# Patient Record
Sex: FEMALE | Race: BLACK OR AFRICAN AMERICAN | NOT HISPANIC OR LATINO | Employment: OTHER | URBAN - METROPOLITAN AREA
[De-identification: names, ages, dates, MRNs, and addresses within clinical notes are randomized per-mention and may not be internally consistent; named-entity substitution may affect disease eponyms.]

---

## 2017-02-05 ENCOUNTER — HOSPITAL ENCOUNTER (EMERGENCY)
Facility: HOSPITAL | Age: 67
Discharge: HOME | End: 2017-02-05
Attending: EMERGENCY MEDICINE
Payer: MEDICARE

## 2017-02-05 VITALS
OXYGEN SATURATION: 87 % | BODY MASS INDEX: 29.26 KG/M2 | RESPIRATION RATE: 20 BRPM | WEIGHT: 159 LBS | DIASTOLIC BLOOD PRESSURE: 91 MMHG | HEIGHT: 62 IN | SYSTOLIC BLOOD PRESSURE: 142 MMHG | HEART RATE: 99 BPM

## 2017-02-05 DIAGNOSIS — M54.32 BACK PAIN WITH LEFT-SIDED SCIATICA: Primary | ICD-10-CM

## 2017-02-05 DIAGNOSIS — R52 PAIN: ICD-10-CM

## 2017-02-05 LAB
ALBUMIN SERPL BCP-MCNC: 4 G/DL
ALP SERPL-CCNC: 120 U/L
ALT SERPL W/O P-5'-P-CCNC: 21 U/L
ANION GAP SERPL CALC-SCNC: 12 MMOL/L
AST SERPL-CCNC: 18 U/L
BASOPHILS # BLD AUTO: 0.01 K/UL
BASOPHILS NFR BLD: 0.1 %
BILIRUB SERPL-MCNC: 0.2 MG/DL
BILIRUB UR QL STRIP: NEGATIVE
BUN SERPL-MCNC: 9 MG/DL
CALCIUM SERPL-MCNC: 9.5 MG/DL
CHLORIDE SERPL-SCNC: 108 MMOL/L
CLARITY UR: CLEAR
CO2 SERPL-SCNC: 17 MMOL/L
COLOR UR: YELLOW
CREAT SERPL-MCNC: 0.9 MG/DL
DIFFERENTIAL METHOD: ABNORMAL
EOSINOPHIL # BLD AUTO: 0.1 K/UL
EOSINOPHIL NFR BLD: 1.1 %
ERYTHROCYTE [DISTWIDTH] IN BLOOD BY AUTOMATED COUNT: 19.3 %
EST. GFR  (AFRICAN AMERICAN): >60 ML/MIN/1.73 M^2
EST. GFR  (NON AFRICAN AMERICAN): >60 ML/MIN/1.73 M^2
GLUCOSE SERPL-MCNC: 297 MG/DL
GLUCOSE UR QL STRIP: ABNORMAL
HCT VFR BLD AUTO: 39.2 %
HGB BLD-MCNC: 12.7 G/DL
HGB UR QL STRIP: NEGATIVE
KETONES UR QL STRIP: ABNORMAL
LEUKOCYTE ESTERASE UR QL STRIP: NEGATIVE
LYMPHOCYTES # BLD AUTO: 1.2 K/UL
LYMPHOCYTES NFR BLD: 14.1 %
MCH RBC QN AUTO: 29.3 PG
MCHC RBC AUTO-ENTMCNC: 32.4 %
MCV RBC AUTO: 91 FL
MONOCYTES # BLD AUTO: 0.3 K/UL
MONOCYTES NFR BLD: 3.6 %
NEUTROPHILS # BLD AUTO: 7.1 K/UL
NEUTROPHILS NFR BLD: 80.9 %
NITRITE UR QL STRIP: NEGATIVE
PH UR STRIP: 5 [PH] (ref 5–8)
PLATELET # BLD AUTO: 239 K/UL
PMV BLD AUTO: 9.1 FL
POCT GLUCOSE: 262 MG/DL (ref 70–110)
POTASSIUM SERPL-SCNC: 4.4 MMOL/L
PROT SERPL-MCNC: 7.9 G/DL
PROT UR QL STRIP: NEGATIVE
RBC # BLD AUTO: 4.33 M/UL
SODIUM SERPL-SCNC: 137 MMOL/L
SP GR UR STRIP: 1.02 (ref 1–1.03)
URN SPEC COLLECT METH UR: ABNORMAL
UROBILINOGEN UR STRIP-ACNC: 1 EU/DL
WBC # BLD AUTO: 8.72 K/UL

## 2017-02-05 PROCEDURE — 80053 COMPREHEN METABOLIC PANEL: CPT

## 2017-02-05 PROCEDURE — 81003 URINALYSIS AUTO W/O SCOPE: CPT

## 2017-02-05 PROCEDURE — 93010 ELECTROCARDIOGRAM REPORT: CPT | Mod: ,,, | Performed by: INTERNAL MEDICINE

## 2017-02-05 PROCEDURE — 96375 TX/PRO/DX INJ NEW DRUG ADDON: CPT

## 2017-02-05 PROCEDURE — 63600175 PHARM REV CODE 636 W HCPCS: Performed by: PHYSICIAN ASSISTANT

## 2017-02-05 PROCEDURE — 25000003 PHARM REV CODE 250: Performed by: PHYSICIAN ASSISTANT

## 2017-02-05 PROCEDURE — 51701 INSERT BLADDER CATHETER: CPT

## 2017-02-05 PROCEDURE — 82962 GLUCOSE BLOOD TEST: CPT

## 2017-02-05 PROCEDURE — 96374 THER/PROPH/DIAG INJ IV PUSH: CPT

## 2017-02-05 PROCEDURE — 93005 ELECTROCARDIOGRAM TRACING: CPT

## 2017-02-05 PROCEDURE — 85025 COMPLETE CBC W/AUTO DIFF WBC: CPT

## 2017-02-05 PROCEDURE — 99284 EMERGENCY DEPT VISIT MOD MDM: CPT | Mod: 25

## 2017-02-05 RX ORDER — HYDROMORPHONE HYDROCHLORIDE 1 MG/ML
1 INJECTION, SOLUTION INTRAMUSCULAR; INTRAVENOUS; SUBCUTANEOUS
Status: COMPLETED | OUTPATIENT
Start: 2017-02-05 | End: 2017-02-05

## 2017-02-05 RX ORDER — MORPHINE SULFATE 2 MG/ML
2 INJECTION, SOLUTION INTRAMUSCULAR; INTRAVENOUS
Status: COMPLETED | OUTPATIENT
Start: 2017-02-05 | End: 2017-02-05

## 2017-02-05 RX ORDER — CYCLOBENZAPRINE HCL 10 MG
10 TABLET ORAL 3 TIMES DAILY PRN
Qty: 15 TABLET | Refills: 0 | Status: SHIPPED | OUTPATIENT
Start: 2017-02-05 | End: 2017-02-10

## 2017-02-05 RX ORDER — ONDANSETRON 2 MG/ML
4 INJECTION INTRAMUSCULAR; INTRAVENOUS
Status: COMPLETED | OUTPATIENT
Start: 2017-02-05 | End: 2017-02-05

## 2017-02-05 RX ORDER — SODIUM CHLORIDE 9 MG/ML
1000 INJECTION, SOLUTION INTRAVENOUS
Status: COMPLETED | OUTPATIENT
Start: 2017-02-05 | End: 2017-02-05

## 2017-02-05 RX ORDER — HYDROCODONE BITARTRATE AND ACETAMINOPHEN 5; 325 MG/1; MG/1
1 TABLET ORAL EVERY 4 HOURS PRN
Qty: 18 TABLET | Refills: 0 | Status: SHIPPED | OUTPATIENT
Start: 2017-02-05 | End: 2017-06-20

## 2017-02-05 RX ADMIN — HYDROMORPHONE HYDROCHLORIDE 1 MG: 1 INJECTION, SOLUTION INTRAMUSCULAR; INTRAVENOUS; SUBCUTANEOUS at 10:02

## 2017-02-05 RX ADMIN — MORPHINE SULFATE 2 MG: 2 INJECTION, SOLUTION INTRAMUSCULAR; INTRAVENOUS at 09:02

## 2017-02-05 RX ADMIN — ONDANSETRON 4 MG: 2 INJECTION INTRAMUSCULAR; INTRAVENOUS at 09:02

## 2017-02-05 RX ADMIN — SODIUM CHLORIDE 1000 ML: 0.9 INJECTION, SOLUTION INTRAVENOUS at 09:02

## 2017-02-05 RX ADMIN — PROMETHAZINE HYDROCHLORIDE 12.5 MG: 25 INJECTION INTRAMUSCULAR; INTRAVENOUS at 11:02

## 2017-02-05 NOTE — ED NOTES
APPEARANCE: Alert, oriented and in no acute distress.  CARDIAC: Normal rate and rhythm, no murmur heard.   PERIPHERAL VASCULAR: peripheral pulses present. Normal cap refill. No edema. Warm to touch.    RESPIRATORY:Normal rate and effort, breath sounds clear bilaterally throughout chest. Respirations are equal and unlabored no obvious signs of distress.  GASTRO: soft, bowel sounds normal, no tenderness, no abdominal distention. + lt flank for 2 days,.  MUSC: Full ROM. No bony tenderness or soft tissue tenderness. No obvious deformity.  SKIN: Skin is warm and dry, normal skin turgor, mucous membranes moist.  NEURO: 5/5 strength major flexors/extensors bilaterally. Sensory intact to light touch bilaterally. [place]rotney coma scale: eyes open spontaneously-4, oriented & converses-5, obeys commands-6. No neurological abnormalities.   MENTAL STATUS: awake, alert and aware of environment.  EYE: PERRL, both eyes: pupils brisk and reactive to light. Normal size.  ENT: EARS: no obvious drainage. NOSE: no active bleeding.   BREAST: symmetrical. No masses. No tenderness.  GENITALIA: Normal external genitalia. Denies dysuria, + bleeding from vaginal area.

## 2017-02-05 NOTE — ED NOTES
This nurse was asked by Madeleine RODRÍGUEZ to administer pain meds and IV fluids while pt's nurse is caring for another patient.

## 2017-02-05 NOTE — ED NOTES
Reported received from maia mcdermott patient awake alert ox4 in no respiratory distress patient on cardiac monitor nurse will continue to monitor no new orders at this maxime.

## 2017-02-05 NOTE — ED PROVIDER NOTES
"Encounter Date: 2017       History     Chief Complaint   Patient presents with    Flank Pain     c/o lt  flank pain, with "vaginal bleeding", pt had hysterectomy years ago. pt not sure if blood coming from urine or vagina.     Review of patient's allergies indicates:  No Known Allergies  HPI Comments: Chayito Chung 66 y.o. nontoxic/afebrile female with PMH of chronic back pain, COPD, CAD,HTN, HLD, DM, asthma and colon cancer presented to the ED with c/o left flank pain for the past three days. She states that the pain began to the left lower back and now radiates to the left flank. She reports that she has chronic low back pain however this is sharp in sensation which is different from her chronic pain. She states that she noted scant blood in tissue with wiping today x 1 and is unsure if from the urethra or vagina. She states some decreased appetite and fluid intake due to intermittent nausea although presently denies. She denies any fever, chills, URI symptoms, cough, CP, upper abdominal pain, vomiting, diarrhea, constipation, blood in the stool, vaginal pain, vaginal discharge or dysuria. She denies trying any medications for the symptoms and did not take her regular medications today.     The history is provided by the patient.     Past Medical History   Diagnosis Date    Acute coronary syndrome     Asthma     Cancer      colon    COPD (chronic obstructive pulmonary disease)     Coronary artery disease     Depression     Diabetes mellitus     Elevated cholesterol     Hypertension      No past medical history pertinent negatives.  Past Surgical History   Procedure Laterality Date    Coronary angioplasty with stent placement  3 months ago      x2, Hysterectomy, Lung surgery, Partial stomach removed, Part of colon removed for rectal cancer    Abdominal surgery      Colon surgery      Cardiac surgery      Gastrectomy      Hysterectomy       section, classic      Hemorrhoid " surgery      Lung biopsy       Family History   Problem Relation Age of Onset    Cancer Mother     Diabetes Mother     Hypertension Mother     Cancer Father     Heart disease Father     Depression Sister     Hypertension Sister     Cancer Maternal Aunt      Social History   Substance Use Topics    Smoking status: Former Smoker     Packs/day: 1.00     Years: 46.00     Types: Cigarettes    Smokeless tobacco: Never Used    Alcohol use 1.8 oz/week     3 Glasses of wine per week      Comment: 1 every 3 months     Review of Systems   Constitutional: Negative for activity change, appetite change, chills and fever.   HENT: Negative for congestion and sore throat.    Respiratory: Negative for cough, chest tightness and shortness of breath.    Cardiovascular: Negative for chest pain and palpitations.   Gastrointestinal: Negative for abdominal pain, constipation, diarrhea, nausea and vomiting.   Genitourinary: Positive for flank pain, hematuria and vaginal bleeding. Negative for dysuria, frequency, pelvic pain, vaginal discharge and vaginal pain.        Patient is unsure if urinary or vaginal source of scant blood on the tissue   Musculoskeletal: Positive for back pain.        Chronic low back pain; left lower back pain   Skin: Negative for rash.   Neurological: Negative for dizziness, weakness, light-headedness, numbness and headaches.   Hematological: Does not bruise/bleed easily.       Physical Exam   Initial Vitals   BP Pulse Resp Temp SpO2   02/05/17 0800 02/05/17 0800 02/05/17 0800 -- 02/05/17 0800   144/91 126 18  98 %     Physical Exam    Nursing note and vitals reviewed.  Constitutional: She appears well-developed and well-nourished. She is cooperative.  Non-toxic appearance. She does not appear ill. No distress.   HENT:   Head: Normocephalic and atraumatic.   Mouth/Throat: Oropharynx is clear and moist. Mucous membranes are not pale and dry.   Eyes: Conjunctivae and lids are normal.   Neck: Neck supple.  No rigidity.   Cardiovascular: Normal rate and regular rhythm.   Pulmonary/Chest: Breath sounds normal. No respiratory distress. She has no wheezes. She has no rhonchi.   Abdominal: Soft. Normal appearance and bowel sounds are normal. There is no tenderness. There is no rigidity, no rebound, no guarding and no CVA tenderness.       Mild TTP of the left flank    Musculoskeletal: Normal range of motion.        Lumbar back: She exhibits tenderness and pain. She exhibits no swelling, no edema and no deformity.        Back:    Neurological: She is alert and oriented to person, place, and time. GCS eye subscore is 4. GCS verbal subscore is 5. GCS motor subscore is 6.   Skin: Skin is warm, dry and intact. No abrasion, no bruising and no rash noted. No erythema.   Psychiatric: She has a normal mood and affect. Her speech is normal and behavior is normal. Thought content normal.         ED Course   Procedures   Imaging Results         CT Renal Stone Study ABD Pelvis WO (Final result) Result time:  02/05/17 11:24:08    Final result by Trudy Cazares MD (02/05/17 11:24:08)    Impression:        #1. No evidence of urinary tract stone or obstruction.    #2. Nonspecific but stable adrenal nodules.    #3.  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    #4.  Abdominal aortic atherosclerosis.    #5.  Hysterectomy.      Electronically signed by: TRUDY CAZARES MD  Date:     02/05/17  Time:    11:24     Narrative:    HISTORY: Pain    COMPARISON: Abdominal ultrasound 08/25/16, CT abdomen 08/16/16    FINDINGS: Axial images of the abdomen and pelvis were obtained without the use of contrast per the renal stone protocol.    The right kidney collecting system and right ureter demonstrate no evidence of stone or obstruction. There is no right hydronephrosis.     The left kidney collecting system and left ureter demonstrate no evidence of stone or obstruction. There is no left hydronephrosis.     The liver demonstrates 2 hypodensities that are to  small to characterize but appear grossly stable when compared to the prior exam.  There is postsurgical change of in the region of the gastroesophageal junction and stomach.  The stomach is otherwise grossly unremarkable.    There is a splenic hypodensity that is incompletely characterized without IV contrast but appears stable.  The spleen is otherwise grossly unremarkable.  There are stable nonspecific bilateral adrenal gland nodules.    The gallbladder, pancreas, small bowel, large bowel, and urinary bladder are unremarkable.  The appendix is not definitively seen.  The abdominal aorta is atherosclerotic but tapers normally.    No ascites or lymphadenopathy. The visualized lung bases are unremarkable.  There are pars defects bilaterally at L5.  The osseous structures are otherwise unremarkable.  Status post hysterectomy.              Labs Reviewed   CBC W/ AUTO DIFFERENTIAL - Abnormal; Notable for the following:        Result Value    RDW 19.3 (*)     MPV 9.1 (*)     Gran% 80.9 (*)     Lymph% 14.1 (*)     Mono% 3.6 (*)     All other components within normal limits   COMPREHENSIVE METABOLIC PANEL - Abnormal; Notable for the following:     CO2 17 (*)     Glucose 297 (*)     All other components within normal limits   URINALYSIS - Abnormal; Notable for the following:     Glucose, UA 2+ (*)     Ketones, UA 2+ (*)     All other components within normal limits   POCT GLUCOSE - Abnormal; Notable for the following:     POCT Glucose 262 (*)     All other components within normal limits   Chayito Chung 66 y.o. nontoxic/afebrile female with PMH of chronic back pain, COPD, CAD,HTN, HLD, DM, asthma and colon cancer presented to the ED with c/o left flank pain for the past three days. She states that the pain began to the left lower back and now radiates to the left flank. She reports that she has chronic low back pain however this is sharp in sensation which is different from her chronic pain. She states that she noted scant  blood in tissue with wiping today x 1 and is unsure if from the urethra or vagina. She states some decreased appetite and fluid intake due to intermittent nausea although presently denies. She denies any fever, chills, URI symptoms, cough, CP, upper abdominal pain, vomiting, diarrhea, constipation, blood in the stool, vaginal pain, vaginal discharge or dysuria. She denies trying any medications for the symptoms and did not take her regular medications today.  ROS positive for left flank pain.  Physical exam reveals patient that appears well in no obvious distress. Mucous membranes dry. Lungs clear and free of wheeze; heart regular rhythm with tachycardia that improved throughout ED course. Abdomen is soft with no rebound or rigidity. Minimal TTP of the left flank, no CVA tenderness; normal bowel sounds. Fair ROM of neck and all extremities with strength 5/5 bilaterally. TTP of the left lower paraspinal region with no midline tenderness, step-off or deformity. Skin free of rash, pallor or diaphoresis.    DDX: UTI, kidney stone, vaginal bleeding, back strain, JUAN    ED management: labs and UA with no acute findings to suggest anemia, kidney stone, UTI or JUAN. . CT with no acute findings to explain symptoms. Patient with small amount of blood noted on tissue in the ED hemodynamically stable and well appearing. Patient with some development of nausea in the ED that resolved with medications; pain reduced in the ED. We recommended patient to follow up with GYN regarding vaginal source of bleeding with return if symptoms worsen. We will send home with norco for pain control.  Discussed monitoring BP and BG as both were elevated, low sodium and carb diet,  importance of taking medications and that patient should follow up with PCP next week.      Impression/Plan: The primary encounter diagnosis was Back pain with left-sided sciatica. A diagnosis of Pain was also pertinent to this visit. Discharged with Cecil. Patient will  follow up with Primary.  Patient cautioned on when to return to ED.  Pt. Understands and agrees with current treatment plan                         Attending Attestation:     Physician Attestation Statement for NP/PA:   I have conducted a face to face encounter with this patient in addition to the NP/PA, due to Medical Complexity    Other NP/PA Attestation Additions:    History of Present Illness: agree   Physical Exam: agree   Medical Decision Making: agree                 ED Course     Clinical Impression:   The primary encounter diagnosis was Back pain with left-sided sciatica. A diagnosis of Pain was also pertinent to this visit.    Disposition:   Disposition: Discharged  Condition: Stable       ANNE Wood  02/07/17 7987       Deshawn Perez MD  02/18/17 1214

## 2017-02-05 NOTE — ED AVS SNAPSHOT
[facility]  [address]  [place] [address]               Chayito Chung   2017  7:57 AM   ED    Description:  Female : 1950   Department:  [facility]           Your Care was Coordinated By:     Provider Role From To    Deshawn Perez MD Attending Provider 17 0834 --    ANNE Wood Physician Assistant 17 0807 --      Reason for Visit     Flank Pain           Diagnoses this Visit        Comments    Back pain with left-sided sciatica    -  Primary     Pain           ED Disposition     None           To Do List           Follow-up Information     Follow up with Sidney Delgado MD In 2 days.    Specialty:  Family Medicine    Contact information:    [address]  [place] [address]  [phone]         These Medications        Disp Refills Start End    hydrocodone-acetaminophen 5-325mg (NORCO) 5-325 mg per tablet 18 tablet 0 2017     Take 1 tablet by mouth every 4 (four) hours as needed for Pain. - Oral    Pharmacy: [facility] [id number]  [place] - [address] AT [facility] Ph #: [phone]       cyclobenzaprine (FLEXERIL) 10 MG tablet 15 tablet 0 2017 2/10/2017    Take 1 tablet (10 mg total) by mouth 3 (three) times daily as needed for Muscle spasms. - Oral    Pharmacy: [facility] [id number]  [place], [place] - [address] [facility] Ph #: [phone]         [facility] On Call     [facility] -  Assistance  Registered nurses in the [facility] provide clinical advisement, health education, appointment booking, and other advisory services.  Call for this free service at [phone].             Medications           Message regarding Medications     Verify the changes and/or additions to your medication regime listed below are the same as discussed with your clinician today.  If any of these  changes or additions are incorrect, please notify your healthcare provider.        START taking these NEW medications        Refills    hydrocodone-acetaminophen 5-325mg (NORCO) 5-325 mg per tablet 0    Sig: Take 1 tablet by mouth every 4 (four) hours as needed for Pain.    Class: Print    Route: Oral    cyclobenzaprine (FLEXERIL) 10 MG tablet 0    Sig: Take 1 tablet (10 mg total) by mouth 3 (three) times daily as needed for Muscle spasms.    Class: Print    Route: Oral      These medications were administered today        Dose Freq    0.9%  NaCl infusion 1,000 mL ED 1 Time    Sig: Inject 1,000 mLs into the vein ED 1 Time.    Class: Normal    Route: Intravenous    Cosign for Ordering: Required by Deshawn Perez MD    morphine injection 2 mg 2 mg ED 1 Time    Sig: Inject 1 mL (2 mg total) into the vein ED 1 Time.    Class: Normal    Route: Intravenous    Cosign for Ordering: Required by Deshawn Perez MD    ondansetron injection 4 mg 4 mg ED 1 Time    Sig: Inject 4 mg into the vein ED 1 Time.    Class: Normal    Route: Intravenous    Cosign for Ordering: Required by Deshawn Perez MD    hydromorphone (PF) injection 1 mg 1 mg ED 1 Time    Sig: Inject 1 mL (1 mg total) into the vein ED 1 Time.    Class: Normal    Route: Intravenous    Cosign for Ordering: Required by Deshawn Perez MD    promethazine (PHENERGAN) 12.5 mg in dextrose 5 % 50 mL IVPB 12.5 mg ED 1 Time    Sig: Inject 12.5 mg into the vein ED 1 Time.    Class: Normal    Route: Intravenous    Cosign for Ordering: Required by Deshawn Perez MD           Verify that the below list of medications is an accurate representation of the medications you are currently taking.  If none reported, the list may be blank. If incorrect, please contact your healthcare provider. Carry this list with you in case of emergency.           Current Medications     albuterol 90 mcg/actuation inhaler Inhale 2 puffs into the lungs every 6 (six) hours as needed for Wheezing.  "   albuterol-ipratropium 2.5mg-0.5mg/3mL (DUO-NEB) 0.5 mg-3 mg(2.5 mg base)/3 mL nebulizer solution     aspirin (ECOTRIN) 81 MG EC tablet Take 81 mg by mouth once daily.    BD ULTRA-FINE CATARINO PEN NEEDLES 32 gauge x 5/32" Ndle USE UTD    buPROPion (WELLBUTRIN SR) 200 MG TbSR Take 200 mg by mouth.    cyclobenzaprine (FLEXERIL) 10 MG tablet Take 1 tablet (10 mg total) by mouth 3 (three) times daily as needed for Muscle spasms.    dicyclomine (BENTYL) 20 mg tablet Take 1 tablet (20 mg total) by mouth every 6 (six) hours.    duloxetine (CYMBALTA) 60 MG capsule Take 60 mg by mouth once daily.    ferrous sulfate 324 mg (65 mg iron) TbEC Take 1 tablet (325 mg total) by mouth once daily.    FLUTICASONE/SALMETEROL (ADVAIR DISKUS INHL) Inhale into the lungs.    hydrocodone-acetaminophen 5-325mg (NORCO) 5-325 mg per tablet Take 1 tablet by mouth every 4 (four) hours as needed for Pain.    insulin syringe-needle U-100 1/2 mL 31 x 5/16" Syrg USE BID UTD    lidocaine-prilocaine (EMLA) cream     lisinopril (PRINIVIL,ZESTRIL) 2.5 MG tablet Take 2.5 mg by mouth 2 (two) times daily.    metformin (GLUCOPHAGE) 1000 MG tablet Take 0.5 tablets (500 mg total) by mouth 2 (two) times daily with meals.    nitroGLYCERIN (NITROSTAT) 0.4 MG SL tablet Place 0.4 mg under the tongue every 5 (five) minutes as needed for Chest pain.    NOVOLOG MIX 70-30 100 unit/mL (70-30) Soln INJECT 50 UNITS BENEATH SKIN BID    ondansetron (ZOFRAN) 4 MG tablet as needed.     potassium chloride SA (K-DUR,KLOR-CON) 20 MEQ tablet Take 20 mEq by mouth once daily.     pravastatin (PRAVACHOL) 10 MG tablet Take 20 mg by mouth.     pregabalin (LYRICA) 75 MG capsule Take 75 mg by mouth. Takes tablet Q AM, Noon and two Q PM    quetiapine (SEROQUEL) 200 MG Tab 200 mg once daily.     quetiapine (SEROQUEL) 50 MG tablet Take 50 mg by mouth nightly.     varenicline (CHANTIX) 1 mg Tab Take 1 tablet (1 mg total) by mouth 2 (two) times daily.    verapamil (CALAN-SR) 240 MG CR tablet " "Take 240 mg by mouth once daily.            Clinical Reference Information           Your Vitals Were     BP Pulse Resp Height Weight SpO2    135/92 106 21 5' 2" (1.575 m) 72.1 kg (159 lb) 82%    BMI                29.08 kg/m2          Allergies as of 2/5/2017     No Known Allergies      Immunizations Administered on Date of Encounter - 2/5/2017     None      ED Micro, Lab, POCT     Start Ordered       Status Ordering Provider    02/05/17 0835 02/05/17 0835  POCT glucose  Once      Final result     02/05/17 0826 02/05/17 0826  Urinalysis Catheterized  STAT      Final result     02/05/17 0812 02/05/17 0811    Once,   Status:  Canceled      Canceled     02/05/17 0811 02/05/17 0811  CBC auto differential  STAT      Final result     02/05/17 0811 02/05/17 0811  Comprehensive metabolic panel  STAT      Final result     02/05/17 0811 02/05/17 0811    STAT,   Status:  Canceled      Canceled       ED Imaging Orders     Start Ordered       Status Ordering Provider    02/05/17 1032 02/05/17 1031  CT Renal Stone Study ABD Pelvis WO  1 time imaging      Final result         Discharge Instructions         Causes of Lumbar (Low Back) Pain  Low back pain can be caused by problems with any part of the lumbar spine. A disk can herniate (push out) and press on a nerve. Vertebrae can rub against each other or slip out of place. This can irritate facet joints and nerves. It can also lead to stenosis, a narrowing of the spinal canal or foramen.  Pressure from a disk  Constant wear and tear on a disk can cause it to weaken and push outward. Part of the disk may then press on nearby nerves. There are two common types of herniated disks:  Contained means the soft nucleus is protruding outward.   Extruded means the firm annulus has torn, letting the soft center squeeze through.     Pressure from bone  An unstable spine   With age, a disk may thin and wear out. Vertebrae above and below the disk may begin to touch. This can put pressure on " nerves. It can also cause bone spurs (growths) to form where the bones rub together.    Stenosis results when bone spurs narrow the foramen or spinal canal. This also puts pressure on nerves. Slipping vertebrae can irritate nerves and joints. They can also worsen stenosis.    In some cases, vertebrae become unstable and slip forward. This is called spondylolisthesis.     Date Last Reviewed: 10/12/2015  © 1768-5839 [employer] [address] [place] [address]. All rights reserved. This information is not intended as a substitute for professional medical care. Always follow your healthcare professional's instructions.          Possible Causes of Low Back or Leg Pain    The symptoms in your back or leg may be due to pressure on a nerve. This pressure may be caused by a damaged disk or by abnormal bone growth. Either way, you may feel pain, burning, tingling, or numbness. If you have pressure on a nerve that connects to the sciatic nerve, pain may shoot down your leg.    Pressure from the disk  Constant wear and tear can weaken a disk over time and cause back pain. The disk can then be damaged by a sudden movement or injury. If its soft center begins to bulge, the disk may press on a nerve. Or the outside of the disk may tear, and the soft center may squeeze through and pinch a nerve.    Pressure from bone  As a disk wears out, the vertebrae right above and below the disk begin to touch. This can put pressure on a nerve. Often, abnormal bone (called bone spurs) grows where the vertebrae rub against each other. This can cause the foramen or the spinal canal to narrow (called stenosis) and press against a nerve.  Date Last Reviewed: 10/4/2015  © 2730-5042 [employer] [address] [place] [address]. All rights reserved. This information is not intended as a substitute for professional medical care. Always follow your healthcare professional's instructions.          Your  Scheduled Appointments     Jun 20, 2017  8:30 AM CDT   Follow Up with Gaurav Malin MD   [facility] GAURAV MALIN M.D. [place] ([facility])    [address] [place] [address]  [address]  [place] [address] [phone]              [place] Sign-Up     Activating your [facility] account is as easy as 1-2-3!     1) Visit [email or web address], select Sign Up Now, enter this activation code and your date of birth, then select Next.  [email or web address]  Expires: 3/22/2017 12:53 PM      2) Create a username and password to use when you visit [facility] in the future and select a security question in case you lose your password and select Next.    3) Enter your e-mail address and click Sign Up!    Additional Information  If you have questions, please e-mail [email or web address] or call [phone] to talk to our [facility] staff. Remember, [facility] is NOT to be used for urgent needs. For medical emergencies, dial 911.         Smoking Cessation     If you would like to quit smoking:   You may be eligible for free services if you are a [place] resident and started smoking cigarettes before September 1, 1988.  Call the Smoking Cessation Trust (SCT) toll free at (892) 669-8497 or (560) 649-9951.   Call 1-800-QUIT-NOW if you do not meet the above criteria.             [facility] complies with applicable Federal civil rights laws and does not discriminate on the basis of race, color, national origin, age, disability, or sex.        Language Assistance Services     ATTENTION: Language assistance services are available, free of charge. Please call [phone].      ATENCIÓN: Si habla efrain, tiene a ramirez disposición servicios gratuitos de asistencia lingüística. Llame al [phone].     CHÚ Ý: N?u b?n nói Ti?ng Vi?t, có các d?ch v? h? tr? ngôn ng? mi?n phí dành cho b?n. G?i s? [phone].

## 2017-02-05 NOTE — DISCHARGE INSTRUCTIONS
Causes of Lumbar (Low Back) Pain  Low back pain can be caused by problems with any part of the lumbar spine. A disk can herniate (push out) and press on a nerve. Vertebrae can rub against each other or slip out of place. This can irritate facet joints and nerves. It can also lead to stenosis, a narrowing of the spinal canal or foramen.  Pressure from a disk  Constant wear and tear on a disk can cause it to weaken and push outward. Part of the disk may then press on nearby nerves. There are two common types of herniated disks:  Contained means the soft nucleus is protruding outward.   Extruded means the firm annulus has torn, letting the soft center squeeze through.     Pressure from bone  An unstable spine   With age, a disk may thin and wear out. Vertebrae above and below the disk may begin to touch. This can put pressure on nerves. It can also cause bone spurs (growths) to form where the bones rub together.    Stenosis results when bone spurs narrow the foramen or spinal canal. This also puts pressure on nerves. Slipping vertebrae can irritate nerves and joints. They can also worsen stenosis.    In some cases, vertebrae become unstable and slip forward. This is called spondylolisthesis.     Date Last Reviewed: 10/12/2015  © 2958-0749 [employer]. [address] [place] [address], [place] [address]. All rights reserved. This information is not intended as a substitute for professional medical care. Always follow your healthcare professional's instructions.          Possible Causes of Low Back or Leg Pain    The symptoms in your back or leg may be due to pressure on a nerve. This pressure may be caused by a damaged disk or by abnormal bone growth. Either way, you may feel pain, burning, tingling, or numbness. If you have pressure on a nerve that connects to the sciatic nerve, pain may shoot down your leg.    Pressure from the disk  Constant wear and tear can weaken a disk over time and cause back pain. The  disk can then be damaged by a sudden movement or injury. If its soft center begins to bulge, the disk may press on a nerve. Or the outside of the disk may tear, and the soft center may squeeze through and pinch a nerve.    Pressure from bone  As a disk wears out, the vertebrae right above and below the disk begin to touch. This can put pressure on a nerve. Often, abnormal bone (called bone spurs) grows where the vertebrae rub against each other. This can cause the foramen or the spinal canal to narrow (called stenosis) and press against a nerve.  Date Last Reviewed: 10/4/2015  © 4119-9690 [employer]. [address] [place] [address], [place] [address]. All rights reserved. This information is not intended as a substitute for professional medical care. Always follow your healthcare professional's instructions.

## 2017-04-10 ENCOUNTER — CLINICAL SUPPORT (OUTPATIENT)
Dept: SMOKING CESSATION | Facility: CLINIC | Age: 67
End: 2017-04-10
Payer: COMMERCIAL

## 2017-04-10 DIAGNOSIS — F17.200 NICOTINE DEPENDENCE: Primary | ICD-10-CM

## 2017-04-10 PROCEDURE — 99407 BEHAV CHNG SMOKING > 10 MIN: CPT | Mod: S$GLB,,,

## 2017-04-23 ENCOUNTER — HOSPITAL ENCOUNTER (INPATIENT)
Facility: HOSPITAL | Age: 67
LOS: 8 days | Discharge: HOME HEALTH | DRG: 189 | End: 2017-05-01
Attending: EMERGENCY MEDICINE | Admitting: HOSPITALIST
Payer: MEDICARE

## 2017-04-23 DIAGNOSIS — Z72.0 TOBACCO ABUSE: ICD-10-CM

## 2017-04-23 DIAGNOSIS — J44.1 COPD EXACERBATION: Primary | ICD-10-CM

## 2017-04-23 DIAGNOSIS — R00.0 TACHYCARDIA: ICD-10-CM

## 2017-04-23 DIAGNOSIS — R79.89 ELEVATED LACTIC ACID LEVEL: ICD-10-CM

## 2017-04-23 LAB
ALBUMIN SERPL BCP-MCNC: 3.6 G/DL
ALP SERPL-CCNC: 137 U/L
ALT SERPL W/O P-5'-P-CCNC: 22 U/L
ANION GAP SERPL CALC-SCNC: 13 MMOL/L
AST SERPL-CCNC: 19 U/L
BASOPHILS # BLD AUTO: 0 K/UL
BASOPHILS NFR BLD: 0.4 %
BILIRUB SERPL-MCNC: 0.3 MG/DL
BUN SERPL-MCNC: 7 MG/DL
CALCIUM SERPL-MCNC: 9.5 MG/DL
CHLORIDE SERPL-SCNC: 105 MMOL/L
CO2 SERPL-SCNC: 21 MMOL/L
CREAT SERPL-MCNC: 0.8 MG/DL
DIFFERENTIAL METHOD: ABNORMAL
EOSINOPHIL # BLD AUTO: 0.1 K/UL
EOSINOPHIL NFR BLD: 1.7 %
ERYTHROCYTE [DISTWIDTH] IN BLOOD BY AUTOMATED COUNT: 17.1 %
EST. GFR  (AFRICAN AMERICAN): >60 ML/MIN/1.73 M^2
EST. GFR  (NON AFRICAN AMERICAN): >60 ML/MIN/1.73 M^2
GLUCOSE SERPL-MCNC: 412 MG/DL
HCT VFR BLD AUTO: 32.1 %
HGB BLD-MCNC: 10.4 G/DL
LACTATE SERPL-SCNC: 3 MMOL/L
LYMPHOCYTES # BLD AUTO: 1.6 K/UL
LYMPHOCYTES NFR BLD: 27.8 %
MCH RBC QN AUTO: 26.4 PG
MCHC RBC AUTO-ENTMCNC: 32.5 %
MCV RBC AUTO: 81 FL
MONOCYTES # BLD AUTO: 0.5 K/UL
MONOCYTES NFR BLD: 8.4 %
NEUTROPHILS # BLD AUTO: 3.7 K/UL
NEUTROPHILS NFR BLD: 61.7 %
PLATELET # BLD AUTO: 251 K/UL
PMV BLD AUTO: 6.8 FL
POCT GLUCOSE: 296 MG/DL (ref 70–110)
POCT GLUCOSE: 322 MG/DL (ref 70–110)
POTASSIUM SERPL-SCNC: 4 MMOL/L
PROT SERPL-MCNC: 7.3 G/DL
RBC # BLD AUTO: 3.96 M/UL
SODIUM SERPL-SCNC: 139 MMOL/L
WBC # BLD AUTO: 5.9 K/UL

## 2017-04-23 PROCEDURE — 25000242 PHARM REV CODE 250 ALT 637 W/ HCPCS: Performed by: HOSPITALIST

## 2017-04-23 PROCEDURE — 85025 COMPLETE CBC W/AUTO DIFF WBC: CPT

## 2017-04-23 PROCEDURE — 94640 AIRWAY INHALATION TREATMENT: CPT

## 2017-04-23 PROCEDURE — 80053 COMPREHEN METABOLIC PANEL: CPT

## 2017-04-23 PROCEDURE — 96374 THER/PROPH/DIAG INJ IV PUSH: CPT

## 2017-04-23 PROCEDURE — 25000003 PHARM REV CODE 250: Performed by: EMERGENCY MEDICINE

## 2017-04-23 PROCEDURE — 83605 ASSAY OF LACTIC ACID: CPT

## 2017-04-23 PROCEDURE — 96361 HYDRATE IV INFUSION ADD-ON: CPT

## 2017-04-23 PROCEDURE — 36415 COLL VENOUS BLD VENIPUNCTURE: CPT

## 2017-04-23 PROCEDURE — 63600175 PHARM REV CODE 636 W HCPCS: Performed by: EMERGENCY MEDICINE

## 2017-04-23 PROCEDURE — 12000002 HC ACUTE/MED SURGE SEMI-PRIVATE ROOM

## 2017-04-23 PROCEDURE — 25000003 PHARM REV CODE 250: Performed by: HOSPITALIST

## 2017-04-23 PROCEDURE — 99285 EMERGENCY DEPT VISIT HI MDM: CPT | Mod: 25

## 2017-04-23 PROCEDURE — 25000242 PHARM REV CODE 250 ALT 637 W/ HCPCS: Performed by: EMERGENCY MEDICINE

## 2017-04-23 PROCEDURE — 63600175 PHARM REV CODE 636 W HCPCS: Performed by: HOSPITALIST

## 2017-04-23 RX ORDER — ONDANSETRON 2 MG/ML
8 INJECTION INTRAMUSCULAR; INTRAVENOUS EVERY 8 HOURS PRN
Status: DISCONTINUED | OUTPATIENT
Start: 2017-04-23 | End: 2017-04-25

## 2017-04-23 RX ORDER — INSULIN ASPART 100 [IU]/ML
1-10 INJECTION, SOLUTION INTRAVENOUS; SUBCUTANEOUS
Status: DISCONTINUED | OUTPATIENT
Start: 2017-04-23 | End: 2017-05-02 | Stop reason: HOSPADM

## 2017-04-23 RX ORDER — BUPROPION HYDROCHLORIDE 100 MG/1
200 TABLET, EXTENDED RELEASE ORAL DAILY
Status: DISCONTINUED | OUTPATIENT
Start: 2017-04-24 | End: 2017-05-02 | Stop reason: HOSPADM

## 2017-04-23 RX ORDER — LISINOPRIL 2.5 MG/1
2.5 TABLET ORAL 2 TIMES DAILY
Status: DISCONTINUED | OUTPATIENT
Start: 2017-04-23 | End: 2017-05-02 | Stop reason: HOSPADM

## 2017-04-23 RX ORDER — GLUCAGON 1 MG
1 KIT INJECTION
Status: DISCONTINUED | OUTPATIENT
Start: 2017-04-23 | End: 2017-05-02 | Stop reason: HOSPADM

## 2017-04-23 RX ORDER — FLUTICASONE PROPIONATE AND SALMETEROL 250; 50 UG/1; UG/1
1 POWDER RESPIRATORY (INHALATION) 2 TIMES DAILY
Status: DISCONTINUED | OUTPATIENT
Start: 2017-04-23 | End: 2017-04-23 | Stop reason: SDUPTHER

## 2017-04-23 RX ORDER — IPRATROPIUM BROMIDE AND ALBUTEROL SULFATE 2.5; .5 MG/3ML; MG/3ML
3 SOLUTION RESPIRATORY (INHALATION) EVERY 6 HOURS
Status: DISCONTINUED | OUTPATIENT
Start: 2017-04-23 | End: 2017-05-02 | Stop reason: HOSPADM

## 2017-04-23 RX ORDER — CYCLOBENZAPRINE HCL 10 MG
10 TABLET ORAL 3 TIMES DAILY PRN
COMMUNITY
End: 2017-06-20

## 2017-04-23 RX ORDER — METHYLPREDNISOLONE SOD SUCC 125 MG
80 VIAL (EA) INJECTION EVERY 8 HOURS
Status: DISCONTINUED | OUTPATIENT
Start: 2017-04-23 | End: 2017-04-25

## 2017-04-23 RX ORDER — BENZONATATE 100 MG/1
100 CAPSULE ORAL 3 TIMES DAILY PRN
Status: DISCONTINUED | OUTPATIENT
Start: 2017-04-23 | End: 2017-05-02 | Stop reason: HOSPADM

## 2017-04-23 RX ORDER — QUETIAPINE FUMARATE 100 MG/1
200 TABLET, FILM COATED ORAL NIGHTLY
Status: DISCONTINUED | OUTPATIENT
Start: 2017-04-23 | End: 2017-05-02 | Stop reason: HOSPADM

## 2017-04-23 RX ORDER — DULOXETIN HYDROCHLORIDE 30 MG/1
60 CAPSULE, DELAYED RELEASE ORAL DAILY
Status: DISCONTINUED | OUTPATIENT
Start: 2017-04-24 | End: 2017-05-02 | Stop reason: HOSPADM

## 2017-04-23 RX ORDER — RAMELTEON 8 MG/1
8 TABLET ORAL NIGHTLY PRN
Status: DISCONTINUED | OUTPATIENT
Start: 2017-04-23 | End: 2017-05-02 | Stop reason: HOSPADM

## 2017-04-23 RX ORDER — HYDROCODONE POLISTIREX AND CHLORPHENIRAMINE POLISTIREX 10; 8 MG/5ML; MG/5ML
5 SUSPENSION, EXTENDED RELEASE ORAL EVERY 12 HOURS PRN
Status: DISCONTINUED | OUTPATIENT
Start: 2017-04-23 | End: 2017-05-02 | Stop reason: HOSPADM

## 2017-04-23 RX ORDER — PREGABALIN 75 MG/1
150 CAPSULE ORAL 2 TIMES DAILY
Status: DISCONTINUED | OUTPATIENT
Start: 2017-04-23 | End: 2017-05-02 | Stop reason: HOSPADM

## 2017-04-23 RX ORDER — IBUPROFEN 200 MG
16 TABLET ORAL
Status: DISCONTINUED | OUTPATIENT
Start: 2017-04-23 | End: 2017-05-02 | Stop reason: HOSPADM

## 2017-04-23 RX ORDER — VARENICLINE TARTRATE 0.5 MG/1
1 TABLET, FILM COATED ORAL 2 TIMES DAILY
Status: DISCONTINUED | OUTPATIENT
Start: 2017-04-23 | End: 2017-05-02 | Stop reason: HOSPADM

## 2017-04-23 RX ORDER — ASPIRIN 81 MG/1
81 TABLET ORAL DAILY
Status: DISCONTINUED | OUTPATIENT
Start: 2017-04-24 | End: 2017-05-02 | Stop reason: HOSPADM

## 2017-04-23 RX ORDER — HYDROCODONE BITARTRATE AND ACETAMINOPHEN 5; 325 MG/1; MG/1
1 TABLET ORAL EVERY 4 HOURS PRN
Status: DISCONTINUED | OUTPATIENT
Start: 2017-04-23 | End: 2017-05-02 | Stop reason: HOSPADM

## 2017-04-23 RX ORDER — INSULIN ASPART 100 [IU]/ML
15 INJECTION, SOLUTION INTRAVENOUS; SUBCUTANEOUS
Status: DISCONTINUED | OUTPATIENT
Start: 2017-04-23 | End: 2017-04-25

## 2017-04-23 RX ORDER — ACETAMINOPHEN 500 MG
1000 TABLET ORAL EVERY 6 HOURS PRN
Status: DISCONTINUED | OUTPATIENT
Start: 2017-04-23 | End: 2017-05-02 | Stop reason: HOSPADM

## 2017-04-23 RX ORDER — METOPROLOL SUCCINATE 50 MG/1
200 TABLET, EXTENDED RELEASE ORAL DAILY
Status: DISCONTINUED | OUTPATIENT
Start: 2017-04-24 | End: 2017-05-02 | Stop reason: HOSPADM

## 2017-04-23 RX ORDER — LIDOCAINE HYDROCHLORIDE 40 MG/ML
5 INJECTION, SOLUTION RETROBULBAR
Status: COMPLETED | OUTPATIENT
Start: 2017-04-23 | End: 2017-04-23

## 2017-04-23 RX ORDER — FLUTICASONE FUROATE AND VILANTEROL 100; 25 UG/1; UG/1
1 POWDER RESPIRATORY (INHALATION) DAILY
Status: DISCONTINUED | OUTPATIENT
Start: 2017-04-24 | End: 2017-05-02 | Stop reason: HOSPADM

## 2017-04-23 RX ORDER — IPRATROPIUM BROMIDE AND ALBUTEROL SULFATE 2.5; .5 MG/3ML; MG/3ML
3 SOLUTION RESPIRATORY (INHALATION)
Status: COMPLETED | OUTPATIENT
Start: 2017-04-23 | End: 2017-04-23

## 2017-04-23 RX ORDER — HYDROCODONE BITARTRATE AND ACETAMINOPHEN 5; 325 MG/1; MG/1
1 TABLET ORAL EVERY 4 HOURS PRN
Status: DISCONTINUED | OUTPATIENT
Start: 2017-04-23 | End: 2017-04-23 | Stop reason: SDUPTHER

## 2017-04-23 RX ORDER — PRAVASTATIN SODIUM 10 MG/1
20 TABLET ORAL DAILY
Status: DISCONTINUED | OUTPATIENT
Start: 2017-04-24 | End: 2017-05-02 | Stop reason: HOSPADM

## 2017-04-23 RX ORDER — MOXIFLOXACIN HYDROCHLORIDE 400 MG/250ML
400 INJECTION, SOLUTION INTRAVENOUS
Status: DISCONTINUED | OUTPATIENT
Start: 2017-04-23 | End: 2017-04-29

## 2017-04-23 RX ORDER — IBUPROFEN 200 MG
24 TABLET ORAL
Status: DISCONTINUED | OUTPATIENT
Start: 2017-04-23 | End: 2017-05-02 | Stop reason: HOSPADM

## 2017-04-23 RX ORDER — ENOXAPARIN SODIUM 100 MG/ML
40 INJECTION SUBCUTANEOUS EVERY 24 HOURS
Status: DISCONTINUED | OUTPATIENT
Start: 2017-04-23 | End: 2017-05-02 | Stop reason: HOSPADM

## 2017-04-23 RX ADMIN — BENZONATATE 100 MG: 100 CAPSULE ORAL at 06:04

## 2017-04-23 RX ADMIN — HYDROCODONE POLISTIREX AND CHLORPHENIRAMINE POLISITREX 5 ML: 10; 8 SUSPENSION, EXTENDED RELEASE ORAL at 08:04

## 2017-04-23 RX ADMIN — IPRATROPIUM BROMIDE AND ALBUTEROL SULFATE 3 ML: .5; 3 SOLUTION RESPIRATORY (INHALATION) at 01:04

## 2017-04-23 RX ADMIN — INSULIN ASPART 3 UNITS: 100 INJECTION, SOLUTION INTRAVENOUS; SUBCUTANEOUS at 08:04

## 2017-04-23 RX ADMIN — VARENICLINE TARTRATE 1 MG: 0.5 TABLET, FILM COATED ORAL at 08:04

## 2017-04-23 RX ADMIN — METHYLPREDNISOLONE SODIUM SUCCINATE 80 MG: 125 INJECTION, POWDER, FOR SOLUTION INTRAMUSCULAR; INTRAVENOUS at 11:04

## 2017-04-23 RX ADMIN — SODIUM CHLORIDE 1000 ML: 0.9 INJECTION, SOLUTION INTRAVENOUS at 01:04

## 2017-04-23 RX ADMIN — HYDROCODONE BITARTRATE AND ACETAMINOPHEN 1 TABLET: 5; 325 TABLET ORAL at 06:04

## 2017-04-23 RX ADMIN — MOXIFLOXACIN HYDROCHLORIDE 400 MG: 400 INJECTION, SOLUTION INTRAVENOUS at 05:04

## 2017-04-23 RX ADMIN — PREGABALIN 150 MG: 75 CAPSULE ORAL at 08:04

## 2017-04-23 RX ADMIN — ENOXAPARIN SODIUM 40 MG: 100 INJECTION SUBCUTANEOUS at 05:04

## 2017-04-23 RX ADMIN — LISINOPRIL 2.5 MG: 2.5 TABLET ORAL at 08:04

## 2017-04-23 RX ADMIN — LIDOCAINE HYDROCHLORIDE 5 ML: 40 INJECTION, SOLUTION RETROBULBAR; TOPICAL at 03:04

## 2017-04-23 RX ADMIN — INSULIN ASPART 15 UNITS: 100 INJECTION, SOLUTION INTRAVENOUS; SUBCUTANEOUS at 05:04

## 2017-04-23 RX ADMIN — RAMELTEON 8 MG: 8 TABLET, FILM COATED ORAL at 08:04

## 2017-04-23 RX ADMIN — IPRATROPIUM BROMIDE AND ALBUTEROL SULFATE 3 ML: .5; 3 SOLUTION RESPIRATORY (INHALATION) at 08:04

## 2017-04-23 RX ADMIN — INSULIN DETEMIR 40 UNITS: 100 INJECTION, SOLUTION SUBCUTANEOUS at 08:04

## 2017-04-23 RX ADMIN — QUETIAPINE FUMARATE 200 MG: 100 TABLET, FILM COATED ORAL at 08:04

## 2017-04-23 RX ADMIN — HYDROCODONE BITARTRATE AND ACETAMINOPHEN 1 TABLET: 5; 325 TABLET ORAL at 11:04

## 2017-04-23 RX ADMIN — METHYLPREDNISOLONE SODIUM SUCCINATE 40 MG: 40 INJECTION, POWDER, FOR SOLUTION INTRAMUSCULAR; INTRAVENOUS at 03:04

## 2017-04-23 NOTE — IP AVS SNAPSHOT
[address] [place] [address]  Phone: [phone]           Patient Discharge Instructions   Our goal is to set you up for success. This packet includes information on your condition, medications, and your home care.  It will help you care for yourself to prevent having to return to the hospital.     Please ask your nurse if you have any questions.      There are many details to remember when preparing to leave the hospital. Here is what you will need to do:    1. Take your medicine. If you are prescribed medications, review your Medication List on the following pages. You may have new medications to  at the pharmacy and others that you'll need to stop taking. Review the instructions for how and when to take your medications. Talk with your doctor or nurses if you are unsure of what to do.     2. Go to your follow-up appointments. Specific follow-up information is listed in the following pages. Your may be contacted by a nurse or clinical provider about future appointments. Be sure we have all of the phone numbers to reach you. Please contact your provider's office if you are unable to make an appointment.     3. Watch for warning signs. Your doctor or nurse will give you detailed warning signs to watch for and when to call for assistance. These instructions may also include educational information about your condition. If you experience any of warning signs to your health, call your doctor.           [facility]  Unless otherwise directed by your provider, please   contact [facility], our nurse care line   that is available for 24/7 assistance.     [phone] (toll-free)     Registered nurses in the [facility]   provide: appointment scheduling, clinical advisement, health education, and other advisory services.                  ** Verify the list of medication(s) below is accurate and up to date. Carry this with you in case of  emergency. If your medications have changed, please notify your healthcare provider.             Medication List      START taking these medications        Additional Info                      azithromycin 250 MG tablet   Commonly known as:  Z-YONI   Quantity:  7 tablet   Refills:  0   Dose:  250 mg    Instructions:  Take 1 tablet (250 mg total) by mouth once daily.     Begin Date    AM    Noon    PM    Bedtime       benzonatate 100 MG capsule   Commonly known as:  TESSALON   Quantity:  24 capsule   Refills:  0   Dose:  100 mg    Last time this was given:  100 mg on 4/25/2017 11:44 PM   Instructions:  Take 1 capsule (100 mg total) by mouth 3 (three) times daily as needed for Cough.     Begin Date    AM    Noon    PM    Bedtime       predniSONE 20 MG tablet   Commonly known as:  DELTASONE   Quantity:  18 tablet   Refills:  0    Instructions:  Take two tabs daily for 4 days, then one tab daily for 4 days then 1/2 tab daily for 6 days then stop     Begin Date    AM    Noon    PM    Bedtime         CHANGE how you take these medications        Additional Info                      metformin 1000 MG tablet   Commonly known as:  GLUCOPHAGE   Quantity:  30 tablet   Refills:  0   Dose:  500 mg   What changed:  how much to take    Instructions:  Take 0.5 tablets (500 mg total) by mouth 2 (two) times daily with meals.     Begin Date    AM    Noon    PM    Bedtime       NOVOLOG MIX 70-30 100 unit/mL (70-30) Soln   Refills:  3   What changed:  See the new instructions.   Generic drug:  insulin asp prt-insulin aspart (NOVOLOG 70/30)    Instructions:  60 units in the morning before breakfast, 40 units in the evening before dinner     Begin Date    AM    Noon    PM    Bedtime         CONTINUE taking these medications        Additional Info                      ADVAIR DISKUS INHL   Refills:  0    Instructions:  Inhale into the lungs.     Begin Date    AM    Noon    PM    Bedtime       albuterol 90 mcg/actuation inhaler   Refills:  0  "  Dose:  2 puff    Instructions:  Inhale 2 puffs into the lungs every 6 (six) hours as needed for Wheezing.     Begin Date    AM    Noon    PM    Bedtime       albuterol-ipratropium 2.5mg-0.5mg/3mL 0.5 mg-3 mg(2.5 mg base)/3 mL nebulizer solution   Commonly known as:  DUO-NEB   Refills:  5    Last time this was given:  3 mLs on 5/1/2017  1:43 PM     Begin Date    AM    Noon    PM    Bedtime       aspirin 81 MG EC tablet   Commonly known as:  ECOTRIN   Refills:  0   Dose:  81 mg    Last time this was given:  81 mg on 5/1/2017  8:11 AM   Instructions:  Take 81 mg by mouth once daily.     Begin Date    AM    Noon    PM    Bedtime       BD ULTRA-FINE CATARINO PEN NEEDLES 32 gauge x 5/32" Ndle   Refills:  2   Generic drug:  pen needle, diabetic    Instructions:  USE UTD     Begin Date    AM    Noon    PM    Bedtime       buPROPion 200 MG Tbsr   Commonly known as:  WELLBUTRIN SR   Refills:  0   Dose:  200 mg    Last time this was given:  200 mg on 5/1/2017  8:10 AM   Instructions:  Take 200 mg by mouth.     Begin Date    AM    Noon    PM    Bedtime       cyclobenzaprine 10 MG tablet   Commonly known as:  FLEXERIL   Refills:  0   Dose:  10 mg    Instructions:  Take 10 mg by mouth 3 (three) times daily as needed for Muscle spasms.     Begin Date    AM    Noon    PM    Bedtime       dicyclomine 20 mg tablet   Commonly known as:  BENTYL   Quantity:  30 tablet   Refills:  0   Dose:  20 mg    Instructions:  Take 1 tablet (20 mg total) by mouth every 6 (six) hours.     Begin Date    AM    Noon    PM    Bedtime       duloxetine 60 MG capsule   Commonly known as:  CYMBALTA   Refills:  0   Dose:  60 mg    Last time this was given:  60 mg on 5/1/2017  8:10 AM   Instructions:  Take 60 mg by mouth once daily.     Begin Date    AM    Noon    PM    Bedtime       ferrous sulfate 324 mg (65 mg iron) Tbec   Quantity:  60 tablet   Refills:  11   Dose:  325 mg    Instructions:  Take 1 tablet (325 mg total) by mouth once daily.     Begin Date    " AM    Noon    PM    Bedtime       hydrocodone-acetaminophen 5-325mg 5-325 mg per tablet   Commonly known as:  NORCO   Quantity:  18 tablet   Refills:  0   Dose:  1 tablet    Last time this was given:  1 tablet on 4/30/2017  8:45 AM   Instructions:  Take 1 tablet by mouth every 4 (four) hours as needed for Pain.     Begin Date    AM    Noon    PM    Bedtime       insulin syringe-needle U-100 0.5 mL 31 gauge x 5/16 Syrg   Refills:  3    Instructions:  USE BID UTD     Begin Date    AM    Noon    PM    Bedtime       lidocaine-prilocaine cream   Commonly known as:  EMLA   Refills:  0      Begin Date    AM    Noon    PM    Bedtime       lisinopril 2.5 MG tablet   Commonly known as:  PRINIVIL,ZESTRIL   Refills:  0   Dose:  2.5 mg    Last time this was given:  2.5 mg on 5/1/2017  8:10 AM   Instructions:  Take 2.5 mg by mouth 2 (two) times daily.     Begin Date    AM    Noon    PM    Bedtime       metoprolol succinate 200 MG 24 hr tablet   Commonly known as:  TOPROL-XL   Quantity:  90 tablet   Refills:  4    Last time this was given:  200 mg on 5/1/2017  8:11 AM   Instructions:  TAKE 1 TABLET BY MOUTH EVERY DAY     Begin Date    AM    Noon    PM    Bedtime       nitroGLYCERIN 0.4 MG SL tablet   Commonly known as:  NITROSTAT   Refills:  0   Dose:  0.4 mg    Instructions:  Place 0.4 mg under the tongue every 5 (five) minutes as needed for Chest pain.     Begin Date    AM    Noon    PM    Bedtime       ondansetron 4 MG tablet   Commonly known as:  ZOFRAN   Refills:  0    Instructions:  as needed.     Begin Date    AM    Noon    PM    Bedtime       potassium chloride SA 20 MEQ tablet   Commonly known as:  K-DUR,KLOR-CON   Refills:  0   Dose:  20 mEq    Instructions:  Take 20 mEq by mouth once daily.     Begin Date    AM    Noon    PM    Bedtime       pravastatin 10 MG tablet   Commonly known as:  PRAVACHOL   Refills:  0   Dose:  20 mg    Last time this was given:  20 mg on 5/1/2017  8:10 AM   Instructions:  Take 20 mg by mouth.      Begin Date    AM    Noon    PM    Bedtime       pregabalin 75 MG capsule   Commonly known as:  LYRICA   Refills:  0   Dose:  75 mg    Last time this was given:  150 mg on 5/1/2017  8:10 AM   Instructions:  Take 75 mg by mouth. Takes tablet Q AM, Noon and two Q PM     Begin Date    AM    Noon    PM    Bedtime       * quetiapine 50 MG tablet   Commonly known as:  SEROQUEL   Refills:  1   Dose:  50 mg    Last time this was given:  200 mg on 4/30/2017  9:30 PM   Instructions:  Take 50 mg by mouth nightly.     Begin Date    AM    Noon    PM    Bedtime       * quetiapine 200 MG Tab   Commonly known as:  SEROQUEL   Refills:  0   Dose:  200 mg    Last time this was given:  200 mg on 4/30/2017  9:30 PM   Instructions:  200 mg once daily.     Begin Date    AM    Noon    PM    Bedtime       varenicline 1 mg Tab   Commonly known as:  CHANTIX   Quantity:  58 tablet   Refills:  0   Dose:  1 mg    Last time this was given:  1 mg on 5/1/2017  8:10 AM   Instructions:  Take 1 tablet (1 mg total) by mouth 2 (two) times daily.     Begin Date    AM    Noon    PM    Bedtime       verapamil 240 MG CR tablet   Commonly known as:  CALAN-SR   Refills:  2   Dose:  240 mg    Instructions:  Take 240 mg by mouth once daily.     Begin Date    AM    Noon    PM    Bedtime       * Notice:  This list has 2 medication(s) that are the same as other medications prescribed for you. Read the directions carefully, and ask your doctor or other care provider to review them with you.         Where to Get Your Medications      These medications were sent to [id number] [place]  [address] AT [facility]  [address] [address]     Phone:  [phone]     azithromycin 250 MG tablet    benzonatate 100 MG capsule    predniSONE 20 MG tablet         Information about where to get these medications is not yet available     ! Ask your nurse or doctor about these medications     NOVOLOG MIX 70-30  "100 unit/mL (70-30) Soln                  Please bring to all follow up appointments:    1. A copy of your discharge instructions.  2. All medicines you are currently taking in their original bottles.  3. Identification and insurance card.    Please arrive 15 minutes ahead of scheduled appointment time.    Please call 24 hours in advance if you must reschedule your appointment and/or time.        Your Scheduled Appointments     Jun 20, 2017  8:30 AM CDT   Follow Up with Gaurav Malin MD   [facility] MICHELLE ROBB ([facility])    [address]  [place] [address]   [phone]              Follow-up Information     Follow up with Sidney Delgado MD On 5/16/2017.    Specialty:  Family Medicine    Why:  @ 0945    Contact information:    [address]  [facility] [address]  [phone]          Follow up with Martinez Chung MD On 5/22/2017.    Specialty:  Pulmonary Disease    Why:  @ 11:15; follow up of COPD    Contact information:    [address]  [address]  [facility] [address]  [phone]        Referrals     Future Orders    AMB Referral to [facility] Smoking Cessation     Referral to Home health         Discharge Instructions     Future Orders    Diet Diabetic 1800 Calories     Diet general     Questions:    Total calories:      Fat restriction, if any:      Protein restriction, if any:      Na restriction, if any:      Fluid restriction:      Additional restrictions:      OXYGEN FOR HOME USE     Questions:    Liter Flow:  2    Duration:  Continuous    Qualifying SpO2:  87%    Testing done at:  [facility]    Route:  nasal cannula    Portable mode:  pulse dose acceptable    Device:  home concentrator with portable unit    Length of need (in months):      Patient condition with qualifying saturation:      Height:  5' 2" (1.575 m)    Weight:  70.3 kg (155 lb)    Does patient have medical equipment at home?:  glucometer Comment - nebulizer / nebulizer    other (see comments)    " "Alternative treatment measures have been tried or considered and deemed clinically ineffective.:  Yes        Primary Diagnosis     Your primary diagnosis was:  Chronic Bronchitis      Admission Information     Date & Time Provider Department CSN    4/23/2017 12:28 PM Judson Fields MD [facility] [id number]      Care Providers     Provider Role Specialty Primary office phone    Judson Fields MD Attending Provider Rheumatology [phone]      Important Medicare Message          Most Recent Value    Important Message from Medicare Regarding Discharge Appeal Rights  Explained to patient/caregiver, Signed/date by patient/caregiver yes 04/27/2017 1130      Your Vitals Were     BP Pulse Temp Resp Height Weight    123/80 (BP Location: Left arm) 81 98.9 °F (37.2 °C) (Oral) 16 5' 2" (1.575 m) 70.3 kg (155 lb)    SpO2 BMI             90% 28.35 kg/m2         Recent Lab Values        2/26/2010 3/27/2011 8/8/2011 12/16/2012 5/4/2013 9/21/2014 1/21/2015 12/6/2015      2:45 AM  3:20 AM  6:05 AM  5:04 AM  6:54 AM 12:14 AM 10:55 AM  5:15 PM    A1C 11.5 (H) 7.9 (H) 7.1 (H) 11.7 (H) 9.7 (H) 6.1 7.0 (H) 7.7 (H)    Comment for A1C at  5:04 AM on 12/16/2012:           Increased risk for diabetes: 5.7 - 6.4         Diabetes: >6.4         Glycemic control for adults with diabetes: <7.0**In order to standardize %HbA1c results worldwide, as of October 11, 2010,the %HbA1c is being calculated using the master equation recommended in theconsensus statement adopted by the ADA (American Diabetes Assoc), EASD(European Assoc for the Study of Diabetes), IFCC (International Federationof Clinical Chemistry and Laboratory Medicine) and IDF (InternationalDiabetes Federation). Result units: %HgbA1c (DCCT/NGSP).In common with other methods, Hb A1C values may not accurately reflect meanblood glucose in patients with hemoglobin variants (HgbF, HgbS and HgbC).Any cause of shortened erythrocyte survival will reduce exposure oferythrocytes to " glucose with a consequent decrease in HbA1c (%) values, eventhough the time-averaged blood glucose level may be elevated. Causes ofshortened erythrocyte lifetime might be hemolytic anemia or other hemolyticdiseases, homozygous sickle cell trait, pregnancy, recent significant orchronic blood loss, etc. Caution should be used when interpreting the [id number] from patients with these conditions.      Allergies as of 5/1/2017     No Known Allergies      Advance Directives     An advance directive is a document which, in the event you are no longer able to make decisions for yourself, tells your healthcare team what kind of treatment you do or do not want to receive, or who you would like to make those decisions for you.  If you do not currently have an advance directive, [facility] encourages you to create one.  For more information call:  [phone] ([phone]), [phone] ([phone]),  or log on to [email or web address].        Smoking Cessation     If you would like to quit smoking:   You may be eligible for free services if you are a [place] resident and started smoking cigarettes before September 1, 1988.  Call the Smoking Cessation [employer] ([facility]) toll free at [phone] or [phone].   Call 7-800-QUIT-NOW if you do not meet the above criteria.   Contact us via email: [email or web address]   View our website for more information: [email or web address]        Language Assistance Services     ATTENTION: Language assistance services are available, free of charge. Please call [phone].      ATENCIÓN: Si habla español, tiene a ramirez disposición servicios gratuitos de asistencia lingüística. Llame al [phone].     [facility] Ý: N?u b?n nói Ti?ng Vi?t, có các d?ch v? h? tr? ngôn ng? mi?n phí dành cho b?n. G?i s? [phone].        Diabetes Discharge Instructions                                   [facility] Sign-Up     Activating your [facility] account is as easy as 1-2-3!     1) Visit  [email or web address], select Sign Up Now, enter this activation code and your date of birth, then select Next.  [email or web address]  Expires: 6/15/2017  3:04 PM      2) Create a username and password to use when you visit [facility] in the future and select a security question in case you lose your password and select Next.    3) Enter your e-mail address and click Sign Up!    Additional Information  If you have questions, please e-mail [email or web address] or call [phone] to talk to our [facility] staff. Remember, [facility] is NOT to be used for urgent needs. For medical emergencies, dial 911.          [facility] complies with applicable Federal civil rights laws and does not discriminate on the basis of race, color, national origin, age, disability, or sex.

## 2017-04-23 NOTE — ED PROVIDER NOTES
Encounter Date: 2017       History     Chief Complaint   Patient presents with    Cough     since Thursday, gen body aches.     Review of patient's allergies indicates:  No Known Allergies  HPI Comments: 66-year-old female with a history of CAD hypertension diabetes asthma and COPD presents to the ER with a productive cough since Thursday.  Symptoms are similar to a prior episode of pneumonia.  Fever 102 last night.  Shortness of breath is at baseline and is not any worse than usual.  Also complaining of a runny nose congestion and sneezing.  No myalgias no vomiting or diarrhea no chest pain or diaphoresis. No aggravating or alleviating factors.       The history is provided by the patient.     Past Medical History:   Diagnosis Date    Acute coronary syndrome     Asthma     Cancer     colon    COPD (chronic obstructive pulmonary disease)     Coronary artery disease     Depression     Diabetes mellitus     Elevated cholesterol     Hypertension      Past Surgical History:   Procedure Laterality Date    ABDOMINAL SURGERY      CARDIAC SURGERY       SECTION, CLASSIC      COLON SURGERY      CORONARY ANGIOPLASTY WITH STENT PLACEMENT  3 months ago     x2, Hysterectomy, Lung surgery, Partial stomach removed, Part of colon removed for rectal cancer    GASTRECTOMY      HEMORRHOID SURGERY      HYSTERECTOMY      LUNG BIOPSY       Family History   Problem Relation Age of Onset    Cancer Mother     Diabetes Mother     Hypertension Mother     Cancer Father     Heart disease Father     Depression Sister     Hypertension Sister     Cancer Maternal Aunt      Social History   Substance Use Topics    Smoking status: Former Smoker     Packs/day: 1.00     Years: 46.00     Types: Cigarettes    Smokeless tobacco: Never Used    Alcohol use 1.8 oz/week     3 Glasses of wine per week      Comment: 1 every 3 months     Review of Systems   Constitutional: Positive for fever.   HENT: Positive  for postnasal drip, rhinorrhea, sneezing and sore throat. Negative for trouble swallowing and voice change.    Respiratory: Positive for cough. Negative for shortness of breath and wheezing.    Gastrointestinal: Negative for abdominal pain, diarrhea, nausea and vomiting.   Skin: Negative for rash.   Neurological: Negative for headaches.   All other systems reviewed and are negative.      Physical Exam   Initial Vitals   BP Pulse Resp Temp SpO2   04/23/17 1226 04/23/17 1226 04/23/17 1226 04/23/17 1226 04/23/17 1226   140/88 118 16 98.9 °F (37.2 °C) 95 %     Physical Exam    Nursing note and vitals reviewed.  Constitutional: She appears well-developed and well-nourished. No distress.   HENT:   Head: Normocephalic and atraumatic.   Eyes: EOM are normal.   Neck: Normal range of motion. Neck supple.   Cardiovascular: Regular rhythm, normal heart sounds and intact distal pulses.   No murmur heard.  Regular tachycardia   Pulmonary/Chest: Breath sounds normal. She is in respiratory distress (due to intractable coughing). She has no wheezes. She has no rales.   There is no wheezing   Abdominal: She exhibits no distension. There is no tenderness.   Musculoskeletal: Normal range of motion.   Neurological: She is alert and oriented to person, place, and time.   Skin: Skin is warm and dry.   Psychiatric: She has a normal mood and affect. Her behavior is normal. Judgment and thought content normal.         ED Course   Procedures  Labs Reviewed   CBC W/ AUTO DIFFERENTIAL   COMPREHENSIVE METABOLIC PANEL   LACTIC ACID, PLASMA             Medical Decision Making:   History:   Old Medical Records: I decided to obtain old medical records.  Clinical Tests:   Lab Tests: Ordered and Reviewed  Radiological Study: Ordered and Reviewed                   ED Course   Comment By Time   Patient presents with a cough but no wheezing.  Lactic acid is elevated.  IV fluids have been ordered chest x-ray is unremarkable for any infiltrate.  I suspect  her symptoms are likely due to bronchitis and not pneumonia.  At this time she is coughing excessively which she was not doing on arrival.  A DuoNeb has been ordered. Esau Villegas MD 04/23 2324     Clinical Impression:   The primary encounter diagnosis was COPD exacerbation. Diagnoses of Elevated lactic acid level and Tachycardia were also pertinent to this visit.      66-year-old female with multiple medical problems including COPD presents to the ER with several days of a productive cough.  X-ray is unremarkable.  Labs demonstrate hyperglycemia but no DKA.  Lactic acidosis is present as well.  Multiple nebulizers have been ineffective in the emergency room at alleviating the cough.  Despite intervention the patient continues to cough frequently and forcefully.  Patient cannot be discharged in this condition.  She will be admitted by hospital medicine.  Dr. Valladares in the emergency room to admit the patient.  He requests 40 mg of IV Solu-Medrol.  Patient will be admitted to hospital medicine.     Esau Villegas MD  04/23/17 9113

## 2017-04-24 LAB
ALBUMIN SERPL BCP-MCNC: 3.3 G/DL
ALP SERPL-CCNC: 124 U/L
ALT SERPL W/O P-5'-P-CCNC: 21 U/L
ANION GAP SERPL CALC-SCNC: 10 MMOL/L
AST SERPL-CCNC: 17 U/L
BASOPHILS # BLD AUTO: 0 K/UL
BASOPHILS NFR BLD: 0 %
BILIRUB SERPL-MCNC: 0.3 MG/DL
BUN SERPL-MCNC: 10 MG/DL
CALCIUM SERPL-MCNC: 9.1 MG/DL
CHLORIDE SERPL-SCNC: 107 MMOL/L
CO2 SERPL-SCNC: 19 MMOL/L
CREAT SERPL-MCNC: 0.8 MG/DL
DIFFERENTIAL METHOD: ABNORMAL
EOSINOPHIL # BLD AUTO: 0 K/UL
EOSINOPHIL NFR BLD: 0 %
ERYTHROCYTE [DISTWIDTH] IN BLOOD BY AUTOMATED COUNT: 17.4 %
EST. GFR  (AFRICAN AMERICAN): >60 ML/MIN/1.73 M^2
EST. GFR  (NON AFRICAN AMERICAN): >60 ML/MIN/1.73 M^2
GLUCOSE SERPL-MCNC: 322 MG/DL
HCT VFR BLD AUTO: 30 %
HGB BLD-MCNC: 9.6 G/DL
LYMPHOCYTES # BLD AUTO: 0.6 K/UL
LYMPHOCYTES NFR BLD: 10.3 %
MAGNESIUM SERPL-MCNC: 1.5 MG/DL
MCH RBC QN AUTO: 26.2 PG
MCHC RBC AUTO-ENTMCNC: 31.9 %
MCV RBC AUTO: 82 FL
MONOCYTES # BLD AUTO: 0 K/UL
MONOCYTES NFR BLD: 0.5 %
NEUTROPHILS # BLD AUTO: 5.2 K/UL
NEUTROPHILS NFR BLD: 89.2 %
PHOSPHATE SERPL-MCNC: 3.8 MG/DL
PLATELET # BLD AUTO: 227 K/UL
PMV BLD AUTO: 7.3 FL
POCT GLUCOSE: 284 MG/DL (ref 70–110)
POCT GLUCOSE: 298 MG/DL (ref 70–110)
POCT GLUCOSE: 337 MG/DL (ref 70–110)
POTASSIUM SERPL-SCNC: 4.2 MMOL/L
PROT SERPL-MCNC: 6.9 G/DL
RBC # BLD AUTO: 3.65 M/UL
SODIUM SERPL-SCNC: 136 MMOL/L
WBC # BLD AUTO: 5.8 K/UL

## 2017-04-24 PROCEDURE — 36415 COLL VENOUS BLD VENIPUNCTURE: CPT

## 2017-04-24 PROCEDURE — 94761 N-INVAS EAR/PLS OXIMETRY MLT: CPT

## 2017-04-24 PROCEDURE — 94640 AIRWAY INHALATION TREATMENT: CPT

## 2017-04-24 PROCEDURE — 84100 ASSAY OF PHOSPHORUS: CPT

## 2017-04-24 PROCEDURE — 27000221 HC OXYGEN, UP TO 24 HOURS

## 2017-04-24 PROCEDURE — 25000242 PHARM REV CODE 250 ALT 637 W/ HCPCS: Performed by: HOSPITALIST

## 2017-04-24 PROCEDURE — 80053 COMPREHEN METABOLIC PANEL: CPT

## 2017-04-24 PROCEDURE — 83735 ASSAY OF MAGNESIUM: CPT

## 2017-04-24 PROCEDURE — 85025 COMPLETE CBC W/AUTO DIFF WBC: CPT

## 2017-04-24 PROCEDURE — 12000002 HC ACUTE/MED SURGE SEMI-PRIVATE ROOM

## 2017-04-24 PROCEDURE — 63600175 PHARM REV CODE 636 W HCPCS: Performed by: HOSPITALIST

## 2017-04-24 PROCEDURE — 25000003 PHARM REV CODE 250: Performed by: HOSPITALIST

## 2017-04-24 RX ORDER — MAG HYDROX/ALUMINUM HYD/SIMETH 200-200-20
30 SUSPENSION, ORAL (FINAL DOSE FORM) ORAL EVERY 4 HOURS PRN
Status: DISCONTINUED | OUTPATIENT
Start: 2017-04-24 | End: 2017-04-25

## 2017-04-24 RX ADMIN — FLUTICASONE FUROATE AND VILANTEROL TRIFENATATE 1 PUFF: 100; 25 POWDER RESPIRATORY (INHALATION) at 08:04

## 2017-04-24 RX ADMIN — INSULIN ASPART 15 UNITS: 100 INJECTION, SOLUTION INTRAVENOUS; SUBCUTANEOUS at 12:04

## 2017-04-24 RX ADMIN — ENOXAPARIN SODIUM 40 MG: 100 INJECTION SUBCUTANEOUS at 04:04

## 2017-04-24 RX ADMIN — HYDROCODONE BITARTRATE AND ACETAMINOPHEN 1 TABLET: 5; 325 TABLET ORAL at 09:04

## 2017-04-24 RX ADMIN — HYDROCODONE BITARTRATE AND ACETAMINOPHEN 1 TABLET: 5; 325 TABLET ORAL at 03:04

## 2017-04-24 RX ADMIN — INSULIN ASPART 8 UNITS: 100 INJECTION, SOLUTION INTRAVENOUS; SUBCUTANEOUS at 06:04

## 2017-04-24 RX ADMIN — PRAVASTATIN SODIUM 20 MG: 10 TABLET ORAL at 08:04

## 2017-04-24 RX ADMIN — DULOXETINE 60 MG: 30 CAPSULE, DELAYED RELEASE ORAL at 08:04

## 2017-04-24 RX ADMIN — METOPROLOL SUCCINATE 200 MG: 50 TABLET, EXTENDED RELEASE ORAL at 08:04

## 2017-04-24 RX ADMIN — ALUMINUM HYDROXIDE, MAGNESIUM HYDROXIDE, AND SIMETHICONE 30 ML: 200; 200; 20 SUSPENSION ORAL at 11:04

## 2017-04-24 RX ADMIN — INSULIN ASPART 6 UNITS: 100 INJECTION, SOLUTION INTRAVENOUS; SUBCUTANEOUS at 04:04

## 2017-04-24 RX ADMIN — INSULIN ASPART 15 UNITS: 100 INJECTION, SOLUTION INTRAVENOUS; SUBCUTANEOUS at 04:04

## 2017-04-24 RX ADMIN — ASPIRIN 81 MG: 81 TABLET, COATED ORAL at 08:04

## 2017-04-24 RX ADMIN — PREGABALIN 150 MG: 75 CAPSULE ORAL at 09:04

## 2017-04-24 RX ADMIN — QUETIAPINE FUMARATE 200 MG: 100 TABLET, FILM COATED ORAL at 09:04

## 2017-04-24 RX ADMIN — IPRATROPIUM BROMIDE AND ALBUTEROL SULFATE 3 ML: .5; 3 SOLUTION RESPIRATORY (INHALATION) at 07:04

## 2017-04-24 RX ADMIN — METHYLPREDNISOLONE SODIUM SUCCINATE 80 MG: 125 INJECTION, POWDER, FOR SOLUTION INTRAMUSCULAR; INTRAVENOUS at 05:04

## 2017-04-24 RX ADMIN — INSULIN ASPART 15 UNITS: 100 INJECTION, SOLUTION INTRAVENOUS; SUBCUTANEOUS at 06:04

## 2017-04-24 RX ADMIN — IPRATROPIUM BROMIDE AND ALBUTEROL SULFATE 3 ML: .5; 3 SOLUTION RESPIRATORY (INHALATION) at 01:04

## 2017-04-24 RX ADMIN — MOXIFLOXACIN HYDROCHLORIDE 400 MG: 400 INJECTION, SOLUTION INTRAVENOUS at 04:04

## 2017-04-24 RX ADMIN — METHYLPREDNISOLONE SODIUM SUCCINATE 80 MG: 125 INJECTION, POWDER, FOR SOLUTION INTRAMUSCULAR; INTRAVENOUS at 11:04

## 2017-04-24 RX ADMIN — ALUMINUM HYDROXIDE, MAGNESIUM HYDROXIDE, AND SIMETHICONE 30 ML: 200; 200; 20 SUSPENSION ORAL at 04:04

## 2017-04-24 RX ADMIN — VARENICLINE TARTRATE 1 MG: 0.5 TABLET, FILM COATED ORAL at 09:04

## 2017-04-24 RX ADMIN — HYDROCODONE BITARTRATE AND ACETAMINOPHEN 1 TABLET: 5; 325 TABLET ORAL at 08:04

## 2017-04-24 RX ADMIN — HYDROCODONE BITARTRATE AND ACETAMINOPHEN 1 TABLET: 5; 325 TABLET ORAL at 04:04

## 2017-04-24 RX ADMIN — LISINOPRIL 2.5 MG: 2.5 TABLET ORAL at 08:04

## 2017-04-24 RX ADMIN — METHYLPREDNISOLONE SODIUM SUCCINATE 80 MG: 125 INJECTION, POWDER, FOR SOLUTION INTRAMUSCULAR; INTRAVENOUS at 02:04

## 2017-04-24 RX ADMIN — ONDANSETRON 8 MG: 2 INJECTION INTRAMUSCULAR; INTRAVENOUS at 07:04

## 2017-04-24 RX ADMIN — BUPROPION HYDROCHLORIDE 200 MG: 100 TABLET, FILM COATED, EXTENDED RELEASE ORAL at 09:04

## 2017-04-24 RX ADMIN — HYDROCODONE POLISTIREX AND CHLORPHENIRAMINE POLISITREX 5 ML: 10; 8 SUSPENSION, EXTENDED RELEASE ORAL at 11:04

## 2017-04-24 RX ADMIN — IPRATROPIUM BROMIDE AND ALBUTEROL SULFATE 3 ML: .5; 3 SOLUTION RESPIRATORY (INHALATION) at 12:04

## 2017-04-24 RX ADMIN — BENZONATATE 100 MG: 100 CAPSULE ORAL at 03:04

## 2017-04-24 RX ADMIN — LISINOPRIL 2.5 MG: 2.5 TABLET ORAL at 09:04

## 2017-04-24 RX ADMIN — PREGABALIN 150 MG: 75 CAPSULE ORAL at 08:04

## 2017-04-24 NOTE — NURSING
"Patient c/o "burning" pain in stomach; Dr. Valladares notified, ordered maalox; maalox administered per PRN order. Patient resting with bed in lowest position, wheels locked, SR raised, in NAD. Will cont to monitor.  "

## 2017-04-24 NOTE — ASSESSMENT & PLAN NOTE
Severe. Ordered scheduled nebs/steroids/antibiotics and cough suppressants. Monitor status and wean oxygen to sats >90%. Low threshold for BIPAP.

## 2017-04-24 NOTE — ASSESSMENT & PLAN NOTE
Chronic problem. Will continue chronic medication(s), Lyrica and monitor for any changes, adjusting as needed.

## 2017-04-24 NOTE — ASSESSMENT & PLAN NOTE
Chronic problem. Will continue chronic medication(s), Wellbutrin and Chantix and monitor for any changes, adjusting as needed.

## 2017-04-24 NOTE — H&P
[facility] Hospital Medicine  History & Physical    Patient Name: Chayito Chung  MRN: [id number]  Admission Date: 2017  Attending Physician: Alphonse Valladares MD   Primary Care Provider: Sidney Delgado MD         Patient information was obtained from patient and ER records.     Subjective:     Principal Problem:COPD exacerbation    Chief Complaint:   Chief Complaint   Patient presents with    Cough     since Thursday, gen body aches.        HPI: 66-year-old female with a history of CAD hypertension diabetes asthma and COPD presents to the ER with a productive cough since Thursday.  Symptoms are similar to a prior episode of pneumonia.  Fever 102 last night.  Shortness of breath is at baseline and is not any worse than usual.  Also complaining of a runny nose congestion and sneezing.  No myalgias no vomiting or diarrhea no chest pain or diaphoresis. No aggravating or alleviating factors. Patient was coughing uncontrollably in ED and in resp distress. She improved with albuterol and steroids, but was not back to baseline and was admitted for COPD exacerbation.    Past Medical History:   Diagnosis Date    Acute coronary syndrome     Asthma     Cancer     colon    Cataracts, both eyes     COPD (chronic obstructive pulmonary disease)     Coronary artery disease     Depression     Diabetes mellitus     Elevated cholesterol     Hypertension        Past Surgical History:   Procedure Laterality Date    ABDOMINAL SURGERY       SECTION, CLASSIC      COLON SURGERY      CORONARY ANGIOPLASTY WITH STENT PLACEMENT  3 months ago     x2, Hysterectomy, Lung surgery, Partial stomach removed, Part of colon removed for rectal cancer    GASTRECTOMY      HEMORRHOID SURGERY      HYSTERECTOMY      LUNG BIOPSY         Review of patient's allergies indicates:  No Known Allergies    No current facility-administered medications on file prior to encounter.      Current Outpatient  "Prescriptions on File Prior to Encounter   Medication Sig    FLUTICASONE/SALMETEROL (ADVAIR DISKUS INHL) Inhale into the lungs.    hydrocodone-acetaminophen 5-325mg (NORCO) 5-325 mg per tablet Take 1 tablet by mouth every 4 (four) hours as needed for Pain.    insulin syringe-needle U-100 1/2 mL 31 x 5/16" Syrg USE BID UTD    lisinopril (PRINIVIL,ZESTRIL) 2.5 MG tablet Take 2.5 mg by mouth 2 (two) times daily.    metformin (GLUCOPHAGE) 1000 MG tablet Take 0.5 tablets (500 mg total) by mouth 2 (two) times daily with meals. (Patient taking differently: Take 1,000 mg by mouth 2 (two) times daily with meals. )    metoprolol succinate (TOPROL-XL) 200 MG 24 hr tablet TAKE 1 TABLET BY MOUTH EVERY DAY    NOVOLOG MIX 70-30 100 unit/mL (70-30) Soln INJECT 50 UNITS BENEATH SKIN BID    ondansetron (ZOFRAN) 4 MG tablet as needed.     potassium chloride SA (K-DUR,KLOR-CON) 20 MEQ tablet Take 20 mEq by mouth once daily.     pravastatin (PRAVACHOL) 10 MG tablet Take 20 mg by mouth.     pregabalin (LYRICA) 75 MG capsule Take 75 mg by mouth. Takes tablet Q AM, Noon and two Q PM    quetiapine (SEROQUEL) 200 MG Tab 200 mg once daily.     quetiapine (SEROQUEL) 50 MG tablet Take 50 mg by mouth nightly.     verapamil (CALAN-SR) 240 MG CR tablet Take 240 mg by mouth once daily.     albuterol 90 mcg/actuation inhaler Inhale 2 puffs into the lungs every 6 (six) hours as needed for Wheezing.    albuterol-ipratropium 2.5mg-0.5mg/3mL (DUO-NEB) 0.5 mg-3 mg(2.5 mg base)/3 mL nebulizer solution     aspirin (ECOTRIN) 81 MG EC tablet Take 81 mg by mouth once daily.    BD ULTRA-FINE CATARINO PEN NEEDLES 32 gauge x 5/32" Ndle USE UTD    buPROPion (WELLBUTRIN SR) 200 MG TbSR Take 200 mg by mouth.    dicyclomine (BENTYL) 20 mg tablet Take 1 tablet (20 mg total) by mouth every 6 (six) hours.    duloxetine (CYMBALTA) 60 MG capsule Take 60 mg by mouth once daily.    ferrous sulfate 324 mg (65 mg iron) TbEC Take 1 tablet (325 mg total) by " mouth once daily.    lidocaine-prilocaine (EMLA) cream     nitroGLYCERIN (NITROSTAT) 0.4 MG SL tablet Place 0.4 mg under the tongue every 5 (five) minutes as needed for Chest pain.    varenicline (CHANTIX) 1 mg Tab Take 1 tablet (1 mg total) by mouth 2 (two) times daily.     Family History     Problem Relation (Age of Onset)    Cancer Mother, Father, Maternal Aunt    Depression Sister    Diabetes Mother    Heart disease Father    Hypertension Mother, Sister        Social History Main Topics    Smoking status: Current Every Day Smoker     Packs/day: 1.00     Years: 46.00     Types: Cigarettes    Smokeless tobacco: Never Used    Alcohol use 1.8 oz/week     3 Glasses of wine per week      Comment: 1 every 3 months    Drug use: No    Sexual activity: Yes     Partners: Male     Birth control/ protection: None      Comment: last drink yesterday afternoon     Review of Systems   Constitutional: Positive for chills, diaphoresis, fatigue and fever. Negative for activity change.   HENT: Negative for ear discharge, facial swelling and sore throat.    Eyes: Negative for photophobia, pain and visual disturbance.   Respiratory: Positive for cough, shortness of breath and wheezing. Negative for apnea.    Cardiovascular: Negative for chest pain and leg swelling.   Gastrointestinal: Negative for abdominal pain and blood in stool.   Endocrine: Negative for cold intolerance and heat intolerance.   Musculoskeletal: Negative for back pain and gait problem.   Skin: Negative for pallor and rash.   Neurological: Negative for speech difficulty and headaches.   Psychiatric/Behavioral: Negative for confusion, hallucinations and suicidal ideas.   All other systems reviewed and are negative.    Objective:     Vital Signs (Most Recent):  Temp: 98.2 °F (36.8 °C) (04/23/17 1959)  Pulse: 100 (04/23/17 2000)  Resp: (!) 22 (04/23/17 2000)  BP: (!) 140/91 (04/23/17 1959)  SpO2: 95 % (04/23/17 2000) Vital Signs (24h Range):  Temp:  [98.2 °F  (36.8 °C)-98.9 °F (37.2 °C)] 98.2 °F (36.8 °C)  Pulse:  [100-118] 100  Resp:  [16-34] 22  SpO2:  [94 %-100 %] 95 %  BP: (104-140)/(78-91) 140/91     Weight: 70.3 kg (155 lb)  Body mass index is 28.35 kg/(m^2).    Physical Exam   Constitutional: She is oriented to person, place, and time. She appears well-developed and well-nourished.   HENT:   Head: Normocephalic and atraumatic.   Eyes: EOM are normal. Pupils are equal, round, and reactive to light.   Neck: Neck supple. No JVD present.   Cardiovascular: Normal rate and regular rhythm.  Exam reveals no gallop and no friction rub.    No murmur heard.  Pulmonary/Chest: She is in respiratory distress. She has no wheezes. She has no rales.   Increased resp effort with noted distress in ED, improved after nebs. Bibasilar decreased BS with scattered wheezes and rhonchi.   Abdominal: Soft. Bowel sounds are normal. She exhibits no distension. There is no tenderness.   Musculoskeletal: She exhibits edema. She exhibits no tenderness.   Lymphadenopathy:     She has no cervical adenopathy.   Neurological: She is alert and oriented to person, place, and time. She has normal reflexes. No cranial nerve deficit.   Skin: No rash noted. No erythema.   Psychiatric: She has a normal mood and affect.   Nursing note and vitals reviewed.       Significant Labs: All pertinent labs within the past 24 hours have been reviewed.    Significant Imaging: I have reviewed all pertinent imaging results/findings within the past 24 hours.    Assessment/Plan:     * COPD exacerbation  Severe. Ordered scheduled nebs/steroids/antibiotics and cough suppressants. Monitor status and wean oxygen to sats >90%. Low threshold for BIPAP.      CAD (coronary artery disease), stent 4/2012  Patient with known CAD s/p stent placement. Will continue Aspirin and Statin and monitor for S/Sx of angina/ACS. Continue to monitor on telemetry.         Tobacco abuse, 1ppd, 46 years  Dangers of cigarette smoking were reviewed  with patient in detail and patient was encouraged to quit. Nicotine replacement options were discussed.        Major depressive disorder in partial remission  Chronic problem. Will continue chronic medication(s), Wellbutrin and Chantix and monitor for any changes, adjusting as needed.         Migraines  Chronic problem. Will continue chronic medication(s), Lyrica and monitor for any changes, adjusting as needed.         Uncontrolled type 2 diabetes mellitus with hyperglycemia, with long-term current use of insulin  Patient's FSGs are not controlled on current hypoglycemics.   Last A1c reviewed-   Lab Results   Component Value Date    HGBA1C 7.7 (H) 12/06/2015     Most recent fingerstick glucose reviewed-   Recent Labs  Lab 04/23/17  1715   POCTGLUCOSE 322*     Will monitor FSGs regularly, adjust insulin and treat with sliding scale as needed.  Increase basal/bolus by 50% and monitor response. May need Gtt as steroid therapy may complicate hyperglycemia.        HTN (hypertension)  Chronic, controlled.  Continue home regimen monitoring BP closely.  Will titrate BP medications as needed for sustained BP control.        VTE Risk Mitigation         Ordered     enoxaparin injection 40 mg  Daily     Route:  Subcutaneous        04/23/17 1558     Medium Risk of VTE  Once      04/23/17 1558        Alphonse Valladares MD  Department of Hospital Medicine   [facility]

## 2017-04-24 NOTE — ASSESSMENT & PLAN NOTE
Patient's FSGs are not controlled on current hypoglycemics.   Last A1c reviewed-   Lab Results   Component Value Date    HGBA1C 7.7 (H) 12/06/2015     Most recent fingerstick glucose reviewed-   Recent Labs  Lab 04/23/17  1715   POCTGLUCOSE 322*     Will monitor FSGs regularly, adjust insulin and treat with sliding scale as needed.  Increase basal/bolus by 50% and monitor response. May need Gtt as steroid therapy may complicate hyperglycemia.

## 2017-04-24 NOTE — ASSESSMENT & PLAN NOTE
Patient with known CAD s/p stent placement. Will continue Aspirin and Statin and monitor for S/Sx of angina/ACS. Continue to monitor on telemetry.

## 2017-04-24 NOTE — ASSESSMENT & PLAN NOTE
Dangers of cigarette smoking were reviewed with patient in detail and patient was encouraged to quit. Nicotine replacement options were discussed.

## 2017-04-24 NOTE — PLAN OF CARE
PCP is Dr Delgado.  Verified insurance as [employer].  Pharmacy is [email or web address] on [facility].  Patient lives alone however has multiple family members that live nearby.  Patient may benefit from  upon discharge.       04/24/17 1214   Discharge Assessment   Assessment Type Discharge Planning Assessment   Confirmed/corrected address and phone number on facesheet? Yes   Assessment information obtained from? Patient   Prior to hospitilization cognitive status: Alert/Oriented   Prior to hospitalization functional status: Independent   Current cognitive status: Alert/Oriented   Current Functional Status: Independent   Arrived From home or self-care   Lives With alone   Able to Return to Prior Arrangements yes   Is patient able to care for self after discharge? Yes   How many people do you have in your home that can help with your care after discharge? 0   Patient's perception of discharge disposition home or selfcare   Readmission Within The Last 30 Days no previous admission in last 30 days   Patient currently being followed by outpatient case management? Yes   If yes, name of outpatient case management following: insurance company assigned oupatient case management   Patient currently receives home health services? No   Does the patient currently use E? Yes   Patient currently receives private duty nursing? No   Patient currently receives any other outside agency services? No   Equipment Currently Used at Home glucometer;other (see comments)  (nebulizer)   Do you have any problems affording any of your prescribed medications? No   Is the patient taking medications as prescribed? yes   Do you have any financial concerns preventing you from receiving the healthcare you need? No   Does the patient have transportation to healthcare appointments? Yes   Transportation Available car   On Dialysis? No   Does the patient receive services at the Coumadin Clinic? No   Discharge Plan A Home   Discharge Plan B Home Health    Patient/Family In Agreement With Plan yes

## 2017-04-24 NOTE — PROGRESS NOTES
04/23/17 2000   Patient Assessment/Suction   Level of Consciousness (AVPU) alert   Respiratory Effort Mild   Expansion/Accessory Muscles/Retractions no retractions;no use of accessory muscles   All Lung Fields Breath Sounds clear   Cough Frequency frequent   Cough Type dry;nonproductive   Suction Method not required   PRE-TX-O2-ETCO2   O2 Device (Oxygen Therapy) room air   SpO2 95 %   Pulse 100   Resp (!) 22   Aerosol Therapy   $ Aerosol Therapy Charges Aerosol Treatment   Respiratory Treatment Status given   SVN/Inhaler Treatment Route mask   Position During Treatment HOB at 30 degrees   Patient Tolerance good   Post-Treatment   Post-treatment Heart Rate (beats/min) 100   Post-treatment Resp Rate (breaths/min) 20   All Fields Breath Sounds unchanged

## 2017-04-24 NOTE — PLAN OF CARE
Problem: Patient Care Overview  Goal: Plan of Care Review  Outcome: Ongoing (interventions implemented as appropriate)  Pt had a decent night free from fall or injury.  Pt slept well with intermittent violent coughing spell.  Pt received IV steroids and PRN pain medications for c/o pleuritic pain and PRN cough medication with moderate relief.  Pt was continent of bladder and called for nursing assistance to ambulate to the BR.  Pt is receiving resp tx as well with mod relief.  No skin breakdown noted.  Pt's bedside rails raised x2 with call bell placed within reach.  Nurse rounded Q2 hrs to ensure pt safety.

## 2017-04-24 NOTE — PLAN OF CARE
Problem: Patient Care Overview  Goal: Plan of Care Review  Outcome: Ongoing (interventions implemented as appropriate)  Sergio aerosol tx and MDI well O2 placed on pt due to decrease in saturation. Upon arrival in room, pt was sitting in chair with an aerosol tx on her running at 2L. She said nurse put it on her due to decrease in O2 saturation of 80

## 2017-04-24 NOTE — SUBJECTIVE & OBJECTIVE
"Past Medical History:   Diagnosis Date    Acute coronary syndrome     Asthma     Cancer     colon    Cataracts, both eyes     COPD (chronic obstructive pulmonary disease)     Coronary artery disease     Depression     Diabetes mellitus     Elevated cholesterol     Hypertension        Past Surgical History:   Procedure Laterality Date    ABDOMINAL SURGERY       SECTION, CLASSIC      COLON SURGERY      CORONARY ANGIOPLASTY WITH STENT PLACEMENT  3 months ago     x2, Hysterectomy, Lung surgery, Partial stomach removed, Part of colon removed for rectal cancer    GASTRECTOMY      HEMORRHOID SURGERY      HYSTERECTOMY      LUNG BIOPSY         Review of patient's allergies indicates:  No Known Allergies    No current facility-administered medications on file prior to encounter.      Current Outpatient Prescriptions on File Prior to Encounter   Medication Sig    FLUTICASONE/SALMETEROL (ADVAIR DISKUS INHL) Inhale into the lungs.    hydrocodone-acetaminophen 5-325mg (NORCO) 5-325 mg per tablet Take 1 tablet by mouth every 4 (four) hours as needed for Pain.    insulin syringe-needle U-100 1/2 mL 31 x 5/16" Syrg USE BID UTD    lisinopril (PRINIVIL,ZESTRIL) 2.5 MG tablet Take 2.5 mg by mouth 2 (two) times daily.    metformin (GLUCOPHAGE) 1000 MG tablet Take 0.5 tablets (500 mg total) by mouth 2 (two) times daily with meals. (Patient taking differently: Take 1,000 mg by mouth 2 (two) times daily with meals. )    metoprolol succinate (TOPROL-XL) 200 MG 24 hr tablet TAKE 1 TABLET BY MOUTH EVERY DAY    NOVOLOG MIX 70-30 100 unit/mL (70-30) Soln INJECT 50 UNITS BENEATH SKIN BID    ondansetron (ZOFRAN) 4 MG tablet as needed.     potassium chloride SA (K-DUR,KLOR-CON) 20 MEQ tablet Take 20 mEq by mouth once daily.     pravastatin (PRAVACHOL) 10 MG tablet Take 20 mg by mouth.     pregabalin (LYRICA) 75 MG capsule Take 75 mg by mouth. Takes tablet Q AM, Noon and two Q PM    quetiapine " "(SEROQUEL) 200 MG Tab 200 mg once daily.     quetiapine (SEROQUEL) 50 MG tablet Take 50 mg by mouth nightly.     verapamil (CALAN-SR) 240 MG CR tablet Take 240 mg by mouth once daily.     albuterol 90 mcg/actuation inhaler Inhale 2 puffs into the lungs every 6 (six) hours as needed for Wheezing.    albuterol-ipratropium 2.5mg-0.5mg/3mL (DUO-NEB) 0.5 mg-3 mg(2.5 mg base)/3 mL nebulizer solution     aspirin (ECOTRIN) 81 MG EC tablet Take 81 mg by mouth once daily.    BD ULTRA-FINE CATARINO PEN NEEDLES 32 gauge x 5/32" Ndle USE UTD    buPROPion (WELLBUTRIN SR) 200 MG TbSR Take 200 mg by mouth.    dicyclomine (BENTYL) 20 mg tablet Take 1 tablet (20 mg total) by mouth every 6 (six) hours.    duloxetine (CYMBALTA) 60 MG capsule Take 60 mg by mouth once daily.    ferrous sulfate 324 mg (65 mg iron) TbEC Take 1 tablet (325 mg total) by mouth once daily.    lidocaine-prilocaine (EMLA) cream     nitroGLYCERIN (NITROSTAT) 0.4 MG SL tablet Place 0.4 mg under the tongue every 5 (five) minutes as needed for Chest pain.    varenicline (CHANTIX) 1 mg Tab Take 1 tablet (1 mg total) by mouth 2 (two) times daily.     Family History     Problem Relation (Age of Onset)    Cancer Mother, Father, Maternal Aunt    Depression Sister    Diabetes Mother    Heart disease Father    Hypertension Mother, Sister        Social History Main Topics    Smoking status: Current Every Day Smoker     Packs/day: 1.00     Years: 46.00     Types: Cigarettes    Smokeless tobacco: Never Used    Alcohol use 1.8 oz/week     3 Glasses of wine per week      Comment: 1 every 3 months    Drug use: No    Sexual activity: Yes     Partners: Male     Birth control/ protection: None      Comment: last drink yesterday afternoon     Review of Systems   Constitutional: Positive for chills, diaphoresis, fatigue and fever. Negative for activity change.   HENT: Negative for ear discharge, facial swelling and sore throat.    Eyes: Negative for photophobia, pain " and visual disturbance.   Respiratory: Positive for cough, shortness of breath and wheezing. Negative for apnea.    Cardiovascular: Negative for chest pain and leg swelling.   Gastrointestinal: Negative for abdominal pain and blood in stool.   Endocrine: Negative for cold intolerance and heat intolerance.   Musculoskeletal: Negative for back pain and gait problem.   Skin: Negative for pallor and rash.   Neurological: Negative for speech difficulty and headaches.   Psychiatric/Behavioral: Negative for confusion, hallucinations and suicidal ideas.   All other systems reviewed and are negative.    Objective:     Vital Signs (Most Recent):  Temp: 98.2 °F (36.8 °C) (04/23/17 1959)  Pulse: 100 (04/23/17 2000)  Resp: (!) 22 (04/23/17 2000)  BP: (!) 140/91 (04/23/17 1959)  SpO2: 95 % (04/23/17 2000) Vital Signs (24h Range):  Temp:  [98.2 °F (36.8 °C)-98.9 °F (37.2 °C)] 98.2 °F (36.8 °C)  Pulse:  [100-118] 100  Resp:  [16-34] 22  SpO2:  [94 %-100 %] 95 %  BP: (104-140)/(78-91) 140/91     Weight: 70.3 kg (155 lb)  Body mass index is 28.35 kg/(m^2).    Physical Exam   Constitutional: She is oriented to person, place, and time. She appears well-developed and well-nourished.   HENT:   Head: Normocephalic and atraumatic.   Eyes: EOM are normal. Pupils are equal, round, and reactive to light.   Neck: Neck supple. No JVD present.   Cardiovascular: Normal rate and regular rhythm.  Exam reveals no gallop and no friction rub.    No murmur heard.  Pulmonary/Chest: She is in respiratory distress. She has no wheezes. She has no rales.   Increased resp effort with noted distress in ED, improved after nebs. Bibasilar decreased BS with scattered wheezes and rhonchi.   Abdominal: Soft. Bowel sounds are normal. She exhibits no distension. There is no tenderness.   Musculoskeletal: She exhibits edema. She exhibits no tenderness.   Lymphadenopathy:     She has no cervical adenopathy.   Neurological: She is alert and oriented to person, place,  and time. She has normal reflexes. No cranial nerve deficit.   Skin: No rash noted. No erythema.   Psychiatric: She has a normal mood and affect.   Nursing note and vitals reviewed.       Significant Labs: All pertinent labs within the past 24 hours have been reviewed.    Significant Imaging: I have reviewed all pertinent imaging results/findings within the past 24 hours.

## 2017-04-25 PROBLEM — K29.60 STEROID-INDUCED GASTRITIS: Status: ACTIVE | Noted: 2017-04-25

## 2017-04-25 PROBLEM — T38.0X5A STEROID-INDUCED GASTRITIS: Status: ACTIVE | Noted: 2017-04-25

## 2017-04-25 LAB
ALBUMIN SERPL BCP-MCNC: 3.4 G/DL
ALP SERPL-CCNC: 114 U/L
ALT SERPL W/O P-5'-P-CCNC: 19 U/L
ANION GAP SERPL CALC-SCNC: 10 MMOL/L
AST SERPL-CCNC: 15 U/L
BASOPHILS # BLD AUTO: 0 K/UL
BASOPHILS NFR BLD: 0.2 %
BILIRUB SERPL-MCNC: 0.3 MG/DL
BUN SERPL-MCNC: 14 MG/DL
CALCIUM SERPL-MCNC: 9.1 MG/DL
CHLORIDE SERPL-SCNC: 103 MMOL/L
CO2 SERPL-SCNC: 22 MMOL/L
CREAT SERPL-MCNC: 0.7 MG/DL
DIFFERENTIAL METHOD: ABNORMAL
EOSINOPHIL # BLD AUTO: 0 K/UL
EOSINOPHIL NFR BLD: 0 %
ERYTHROCYTE [DISTWIDTH] IN BLOOD BY AUTOMATED COUNT: 17.7 %
EST. GFR  (AFRICAN AMERICAN): >60 ML/MIN/1.73 M^2
EST. GFR  (NON AFRICAN AMERICAN): >60 ML/MIN/1.73 M^2
GLUCOSE SERPL-MCNC: 290 MG/DL
HCT VFR BLD AUTO: 31.5 %
HGB BLD-MCNC: 10 G/DL
LYMPHOCYTES # BLD AUTO: 0.6 K/UL
LYMPHOCYTES NFR BLD: 5.6 %
MAGNESIUM SERPL-MCNC: 1.9 MG/DL
MCH RBC QN AUTO: 25.9 PG
MCHC RBC AUTO-ENTMCNC: 31.6 %
MCV RBC AUTO: 82 FL
MONOCYTES # BLD AUTO: 0.6 K/UL
MONOCYTES NFR BLD: 6.1 %
NEUTROPHILS # BLD AUTO: 9.3 K/UL
NEUTROPHILS NFR BLD: 88.1 %
PHOSPHATE SERPL-MCNC: 3.6 MG/DL
PLATELET # BLD AUTO: 262 K/UL
PMV BLD AUTO: 7.2 FL
POCT GLUCOSE: 210 MG/DL (ref 70–110)
POCT GLUCOSE: 322 MG/DL (ref 70–110)
POCT GLUCOSE: 88 MG/DL (ref 70–110)
POTASSIUM SERPL-SCNC: 3.8 MMOL/L
PROT SERPL-MCNC: 7 G/DL
RBC # BLD AUTO: 3.85 M/UL
SODIUM SERPL-SCNC: 135 MMOL/L
WBC # BLD AUTO: 10.6 K/UL

## 2017-04-25 PROCEDURE — 85025 COMPLETE CBC W/AUTO DIFF WBC: CPT

## 2017-04-25 PROCEDURE — 84100 ASSAY OF PHOSPHORUS: CPT

## 2017-04-25 PROCEDURE — 94761 N-INVAS EAR/PLS OXIMETRY MLT: CPT

## 2017-04-25 PROCEDURE — 27000221 HC OXYGEN, UP TO 24 HOURS

## 2017-04-25 PROCEDURE — 94640 AIRWAY INHALATION TREATMENT: CPT

## 2017-04-25 PROCEDURE — 83735 ASSAY OF MAGNESIUM: CPT

## 2017-04-25 PROCEDURE — 63600175 PHARM REV CODE 636 W HCPCS: Performed by: HOSPITALIST

## 2017-04-25 PROCEDURE — 12000002 HC ACUTE/MED SURGE SEMI-PRIVATE ROOM

## 2017-04-25 PROCEDURE — 36415 COLL VENOUS BLD VENIPUNCTURE: CPT

## 2017-04-25 PROCEDURE — 25000242 PHARM REV CODE 250 ALT 637 W/ HCPCS: Performed by: HOSPITALIST

## 2017-04-25 PROCEDURE — 80053 COMPREHEN METABOLIC PANEL: CPT

## 2017-04-25 PROCEDURE — 25000003 PHARM REV CODE 250: Performed by: HOSPITALIST

## 2017-04-25 RX ORDER — PANTOPRAZOLE SODIUM 40 MG/1
40 TABLET, DELAYED RELEASE ORAL 2 TIMES DAILY
Status: DISCONTINUED | OUTPATIENT
Start: 2017-04-25 | End: 2017-05-02 | Stop reason: HOSPADM

## 2017-04-25 RX ORDER — MAG HYDROX/ALUMINUM HYD/SIMETH 200-200-20
30 SUSPENSION, ORAL (FINAL DOSE FORM) ORAL
Status: DISCONTINUED | OUTPATIENT
Start: 2017-04-25 | End: 2017-05-02 | Stop reason: HOSPADM

## 2017-04-25 RX ORDER — ONDANSETRON 2 MG/ML
4 INJECTION INTRAMUSCULAR; INTRAVENOUS EVERY 6 HOURS PRN
Status: DISCONTINUED | OUTPATIENT
Start: 2017-04-25 | End: 2017-05-02 | Stop reason: HOSPADM

## 2017-04-25 RX ORDER — INSULIN ASPART 100 [IU]/ML
10 INJECTION, SOLUTION INTRAVENOUS; SUBCUTANEOUS
Status: DISCONTINUED | OUTPATIENT
Start: 2017-04-26 | End: 2017-04-27

## 2017-04-25 RX ORDER — METOCLOPRAMIDE HYDROCHLORIDE 5 MG/ML
5 INJECTION INTRAMUSCULAR; INTRAVENOUS EVERY 6 HOURS PRN
Status: DISCONTINUED | OUTPATIENT
Start: 2017-04-25 | End: 2017-04-30

## 2017-04-25 RX ADMIN — INSULIN ASPART 15 UNITS: 100 INJECTION, SOLUTION INTRAVENOUS; SUBCUTANEOUS at 08:04

## 2017-04-25 RX ADMIN — ALUMINUM HYDROXIDE, MAGNESIUM HYDROXIDE, AND SIMETHICONE 30 ML: 200; 200; 20 SUSPENSION ORAL at 10:04

## 2017-04-25 RX ADMIN — DULOXETINE 60 MG: 30 CAPSULE, DELAYED RELEASE ORAL at 09:04

## 2017-04-25 RX ADMIN — IPRATROPIUM BROMIDE AND ALBUTEROL SULFATE 3 ML: .5; 3 SOLUTION RESPIRATORY (INHALATION) at 07:04

## 2017-04-25 RX ADMIN — PANTOPRAZOLE SODIUM 40 MG: 40 TABLET, DELAYED RELEASE ORAL at 08:04

## 2017-04-25 RX ADMIN — ASPIRIN 81 MG: 81 TABLET, COATED ORAL at 09:04

## 2017-04-25 RX ADMIN — BENZONATATE 100 MG: 100 CAPSULE ORAL at 11:04

## 2017-04-25 RX ADMIN — PREGABALIN 150 MG: 75 CAPSULE ORAL at 08:04

## 2017-04-25 RX ADMIN — HYDROCODONE POLISTIREX AND CHLORPHENIRAMINE POLISITREX 5 ML: 10; 8 SUSPENSION, EXTENDED RELEASE ORAL at 09:04

## 2017-04-25 RX ADMIN — IPRATROPIUM BROMIDE AND ALBUTEROL SULFATE 3 ML: .5; 3 SOLUTION RESPIRATORY (INHALATION) at 12:04

## 2017-04-25 RX ADMIN — VARENICLINE TARTRATE 1 MG: 0.5 TABLET, FILM COATED ORAL at 09:04

## 2017-04-25 RX ADMIN — ALUMINUM HYDROXIDE, MAGNESIUM HYDROXIDE, AND SIMETHICONE 30 ML: 200; 200; 20 SUSPENSION ORAL at 08:04

## 2017-04-25 RX ADMIN — METOPROLOL SUCCINATE 200 MG: 50 TABLET, EXTENDED RELEASE ORAL at 09:04

## 2017-04-25 RX ADMIN — PREGABALIN 150 MG: 75 CAPSULE ORAL at 09:04

## 2017-04-25 RX ADMIN — FLUTICASONE FUROATE AND VILANTEROL TRIFENATATE 1 PUFF: 100; 25 POWDER RESPIRATORY (INHALATION) at 08:04

## 2017-04-25 RX ADMIN — HYDROCODONE BITARTRATE AND ACETAMINOPHEN 1 TABLET: 5; 325 TABLET ORAL at 11:04

## 2017-04-25 RX ADMIN — INSULIN ASPART 15 UNITS: 100 INJECTION, SOLUTION INTRAVENOUS; SUBCUTANEOUS at 11:04

## 2017-04-25 RX ADMIN — INSULIN ASPART 4 UNITS: 100 INJECTION, SOLUTION INTRAVENOUS; SUBCUTANEOUS at 08:04

## 2017-04-25 RX ADMIN — ENOXAPARIN SODIUM 40 MG: 100 INJECTION SUBCUTANEOUS at 04:04

## 2017-04-25 RX ADMIN — QUETIAPINE FUMARATE 200 MG: 100 TABLET, FILM COATED ORAL at 08:04

## 2017-04-25 RX ADMIN — MOXIFLOXACIN HYDROCHLORIDE 400 MG: 400 INJECTION, SOLUTION INTRAVENOUS at 04:04

## 2017-04-25 RX ADMIN — PRAVASTATIN SODIUM 20 MG: 10 TABLET ORAL at 09:04

## 2017-04-25 RX ADMIN — RAMELTEON 8 MG: 8 TABLET, FILM COATED ORAL at 11:04

## 2017-04-25 RX ADMIN — HYDROCODONE BITARTRATE AND ACETAMINOPHEN 1 TABLET: 5; 325 TABLET ORAL at 12:04

## 2017-04-25 RX ADMIN — BENZONATATE 100 MG: 100 CAPSULE ORAL at 10:04

## 2017-04-25 RX ADMIN — LISINOPRIL 2.5 MG: 2.5 TABLET ORAL at 09:04

## 2017-04-25 RX ADMIN — ACETAMINOPHEN 1000 MG: 500 TABLET, FILM COATED ORAL at 05:04

## 2017-04-25 RX ADMIN — METHYLPREDNISOLONE SODIUM SUCCINATE 40 MG: 40 INJECTION, POWDER, FOR SOLUTION INTRAMUSCULAR; INTRAVENOUS at 08:04

## 2017-04-25 RX ADMIN — PANTOPRAZOLE SODIUM 40 MG: 40 TABLET, DELAYED RELEASE ORAL at 10:04

## 2017-04-25 RX ADMIN — VARENICLINE TARTRATE 1 MG: 0.5 TABLET, FILM COATED ORAL at 08:04

## 2017-04-25 RX ADMIN — INSULIN ASPART 4 UNITS: 100 INJECTION, SOLUTION INTRAVENOUS; SUBCUTANEOUS at 11:04

## 2017-04-25 RX ADMIN — BUPROPION HYDROCHLORIDE 200 MG: 100 TABLET, FILM COATED, EXTENDED RELEASE ORAL at 09:04

## 2017-04-25 RX ADMIN — RAMELTEON 8 MG: 8 TABLET, FILM COATED ORAL at 12:04

## 2017-04-25 RX ADMIN — ALUMINUM HYDROXIDE, MAGNESIUM HYDROXIDE, AND SIMETHICONE 30 ML: 200; 200; 20 SUSPENSION ORAL at 04:04

## 2017-04-25 RX ADMIN — METHYLPREDNISOLONE SODIUM SUCCINATE 80 MG: 125 INJECTION, POWDER, FOR SOLUTION INTRAMUSCULAR; INTRAVENOUS at 06:04

## 2017-04-25 RX ADMIN — INSULIN ASPART 6 UNITS: 100 INJECTION, SOLUTION INTRAVENOUS; SUBCUTANEOUS at 08:04

## 2017-04-25 NOTE — PLAN OF CARE
Problem: Patient Care Overview  Goal: Plan of Care Review  Outcome: Ongoing (interventions implemented as appropriate)  Patient remained free from falls this shift with NAD; remains on O2 per nc, bed in lowest position, wheels locked, SR raised, call light within reach.

## 2017-04-25 NOTE — PHYSICIAN QUERY
PT Name: Chayito Chung  MR #: [id number]    Physician Query Form - Respiratory Condition Clarification      CDS/: Nell Cervantes RN, CDS              Contact information: [phone]    This form is a permanent document in the medical record.    Query Date: April 25, 2017    By submitting this query, we are merely seeking further clarification of documentation. Please utilize your independent clinical judgment when addressing the question(s) below.    The Medical record contains the following   Indicators   Supporting Clinical Findings Location in Medical Record   X   SOB, GARAY, Wheezing, Productive Cough, Use of Accessory Muscles, etc.  66-year-old female with multiple medical problems including COPD presents to the ER with several days of a productive cough.Shortness of breath is at baseline      Positive for cough, shortness of breath   She has wheezes    Bibasilar decreased BS with scattered wheezes and rhonchi ED Prov note 4/23            Hosp PN 4/24      H/P 4/23     X   Acute/Chronic Illness PMH:HTN,Nicotone dependence CAD hypertension diabetes asthma and COPD  H/P 4/23   X   Radiology Findings Cardiac size is normal. The lungs are clear. No pleural effusion or pneumothorax is seen. No focal alveolar consolidation is seen. Large patient body habitus noted. Multiple surgical clips are seen in the upper abdomen. No acute osseous   abnormality is seen. There is multilevel, mild to moderate degenerative disc disease.   CXR 4/23   X   Respiratory Distress or Failure She is in respiratory distress (due to intractable coughing).  ED Prov note 4/23   X   Hypoxia or Hypercapnia Patient doing better, but remains hypoxic off O2 into high 80s Hosp PN 4/24   X   RR         ABGs         O2 sat RR 16-34  O2 sat % Flowsheet 4/23-4/25      BiPAP/Intubation     X   Supplemental O2 O2 at 2L per NC Flowsheet 4/23-4/25      Home O2, Oxygen Dependence     X   Treatment Aerosol Inhalational Tx's    Ordered scheduled  nebs/steroids/antibiotics and cough suppressants. Monitor status and wean oxygen to sats >90%. MAR 4/24-4/25    H/P 4/23   X   Other COPD exacerbation   Severe.  Ordered scheduled nebs/steroids/antibiotics and cough suppressants.       Elevated lactic acid level and Tachycardia were also pertinent to this visit    pending admit to PCU. H/P 4/23            ED Prov note 4/23      ED PN 4/23     Provider, please specify diagnosis or diagnoses associated with above clinical findings.    [  ] Acute Respiratory Failure with Hypoxia  [  ] Acute Respiratory Failure with Hypercapnia  [  ] Acute Respiratory Failure with Hypoxia and Hypercapnia  [  ] Other Acute Respiratory Failure  [  ] Chronic Respiratory Failure with Hypoxia  [  ] Chronic Respiratory Failure with Hypercapnia  [  ] Chronic Respiratory Failure with Hypoxia and Hypercapnia  [  ] Other Chronic Respiratory Failure  [ x ] Other Respiratory Diagnosis (please specify): ___________COPD exacerbation____________________  [  ] Clinically Undetermined    Please document in your progress notes daily for the duration of treatment until resolved and include in your discharge summary.

## 2017-04-25 NOTE — ASSESSMENT & PLAN NOTE
Chronic, controlled.  Continue home regimen monitoring BP closely.  Will titrate BP medications as needed for sustained BP control.

## 2017-04-25 NOTE — PROGRESS NOTES
[facility] Hospital Medicine  Progress Note    Patient Name: Chayito Chung  MRN: [id number]  Patient Class: IP- Inpatient   Admission Date: 4/23/2017  Length of Stay: 1 days  Attending Physician: Alphonse Valladares MD  Primary Care Provider: Sidney Delgado MD        Subjective:     Principal Problem:COPD exacerbation    HPI:  66-year-old female with a history of CAD hypertension diabetes asthma and COPD presents to the ER with a productive cough since Thursday.  Symptoms are similar to a prior episode of pneumonia.  Fever 102 last night.  Shortness of breath is at baseline and is not any worse than usual.  Also complaining of a runny nose congestion and sneezing.  No myalgias no vomiting or diarrhea no chest pain or diaphoresis. No aggravating or alleviating factors. Patient was coughing uncontrollably in ED and in resp distress. She improved with albuterol and steroids, but was not back to baseline and was admitted for COPD exacerbation.    Interval History: Patient doing better, but remains hypoxic off O2 into high 80s. No acute events overnight. SOB with minimal exertion.    Review of Systems   Constitutional: Positive for fatigue.   Respiratory: Positive for cough, shortness of breath and wheezing.    Cardiovascular: Negative for chest pain and leg swelling.   Gastrointestinal: Negative for abdominal pain and nausea.   Neurological: Negative for weakness.   Psychiatric/Behavioral: Negative for confusion.     Objective:     Vital Signs (Most Recent):  Temp: 98.3 °F (36.8 °C) (04/24/17 1500)  Pulse: 86 (04/24/17 1956)  Resp: 18 (04/24/17 1956)  BP: 139/83 (04/24/17 1500)  SpO2: (!) 92 % (04/24/17 1956) Vital Signs (24h Range):  Temp:  [97 °F (36.1 °C)-98.3 °F (36.8 °C)] 98.3 °F (36.8 °C)  Pulse:  [] 86  Resp:  [16-26] 18  SpO2:  [90 %-100 %] 92 %  BP: (113-139)/(73-83) 139/83     Weight: 70.3 kg (155 lb)  Body mass index is 28.35 kg/(m^2).    Intake/Output Summary (Last 24 hours) at 04/24/17  2046  Last data filed at 04/24/17 1700   Gross per 24 hour   Intake             1090 ml   Output                0 ml   Net             1090 ml      Physical Exam   Constitutional: She is oriented to person, place, and time. She appears well-developed and well-nourished.   HENT:   Head: Normocephalic and atraumatic.   Eyes: EOM are normal. Pupils are equal, round, and reactive to light.   Neck: Neck supple. No JVD present.   Cardiovascular: Normal rate and regular rhythm.  Exam reveals no gallop and no friction rub.    No murmur heard.  Pulmonary/Chest: She has wheezes. She has no rales.   Increased resp effort noted   Abdominal: Soft. Bowel sounds are normal. She exhibits no distension. There is no tenderness.   Musculoskeletal: She exhibits no edema or tenderness.   Lymphadenopathy:     She has no cervical adenopathy.   Neurological: She is alert and oriented to person, place, and time. She has normal reflexes. No cranial nerve deficit.   Skin: No rash noted. No erythema.   Psychiatric: She has a normal mood and affect.   Nursing note and vitals reviewed.      Significant Labs: All pertinent labs within the past 24 hours have been reviewed.    Significant Imaging: I have reviewed all pertinent imaging results/findings within the past 24 hours.    Assessment/Plan:      * COPD exacerbation  Severe. Ordered scheduled nebs/steroids/antibiotics and cough suppressants. Monitor status and wean oxygen to sats >90%. Low threshold for BIPAP.      CAD (coronary artery disease), stent 4/2012  Patient with known CAD s/p stent placement. Will continue Aspirin and Statin and monitor for S/Sx of angina/ACS. Continue to monitor on telemetry.         Tobacco abuse, 1ppd, 46 years  Dangers of cigarette smoking were reviewed with patient in detail and patient was encouraged to quit. Nicotine replacement options were discussed.        Major depressive disorder in partial remission  Chronic problem. Will continue chronic medication(s),  Wellbutrin and Chantix and monitor for any changes, adjusting as needed.         Migraines  Chronic problem. Will continue chronic medication(s), Lyrica and monitor for any changes, adjusting as needed.         Uncontrolled type 2 diabetes mellitus with hyperglycemia, with long-term current use of insulin  Patient's FSGs are not controlled on current hypoglycemics.   Last A1c reviewed-   Lab Results   Component Value Date    HGBA1C 7.7 (H) 12/06/2015     Most recent fingerstick glucose reviewed-     Recent Labs  Lab 04/24/17  1048   POCTGLUCOSE 298*     Will monitor FSGs regularly, adjust insulin and treat with sliding scale as needed.  Increase basal/bolus by 50% and monitor response. May need Gtt as steroid therapy may complicate hyperglycemia.        HTN (hypertension)  Chronic, controlled.  Continue home regimen monitoring BP closely.  Will titrate BP medications as needed for sustained BP control.        VTE Risk Mitigation         Ordered     enoxaparin injection 40 mg  Daily     Route:  Subcutaneous        04/23/17 1558     Medium Risk of VTE  Once      04/23/17 1558          Alphonse Valladares MD  Department of Hospital Medicine   [facility]

## 2017-04-25 NOTE — SUBJECTIVE & OBJECTIVE
Interval History: Patient doing better, but remains hypoxic off O2 into high 80s. No acute events overnight. SOB with minimal exertion.    Review of Systems   Constitutional: Positive for fatigue.   Respiratory: Positive for cough, shortness of breath and wheezing.    Cardiovascular: Negative for chest pain and leg swelling.   Gastrointestinal: Negative for abdominal pain and nausea.   Neurological: Negative for weakness.   Psychiatric/Behavioral: Negative for confusion.     Objective:     Vital Signs (Most Recent):  Temp: 98.3 °F (36.8 °C) (04/24/17 1500)  Pulse: 86 (04/24/17 1956)  Resp: 18 (04/24/17 1956)  BP: 139/83 (04/24/17 1500)  SpO2: (!) 92 % (04/24/17 1956) Vital Signs (24h Range):  Temp:  [97 °F (36.1 °C)-98.3 °F (36.8 °C)] 98.3 °F (36.8 °C)  Pulse:  [] 86  Resp:  [16-26] 18  SpO2:  [90 %-100 %] 92 %  BP: (113-139)/(73-83) 139/83     Weight: 70.3 kg (155 lb)  Body mass index is 28.35 kg/(m^2).    Intake/Output Summary (Last 24 hours) at 04/24/17 2046  Last data filed at 04/24/17 1700   Gross per 24 hour   Intake             1090 ml   Output                0 ml   Net             1090 ml      Physical Exam   Constitutional: She is oriented to person, place, and time. She appears well-developed and well-nourished.   HENT:   Head: Normocephalic and atraumatic.   Eyes: EOM are normal. Pupils are equal, round, and reactive to light.   Neck: Neck supple. No JVD present.   Cardiovascular: Normal rate and regular rhythm.  Exam reveals no gallop and no friction rub.    No murmur heard.  Pulmonary/Chest: She has wheezes. She has no rales.   Increased resp effort noted   Abdominal: Soft. Bowel sounds are normal. She exhibits no distension. There is no tenderness.   Musculoskeletal: She exhibits no edema or tenderness.   Lymphadenopathy:     She has no cervical adenopathy.   Neurological: She is alert and oriented to person, place, and time. She has normal reflexes. No cranial nerve deficit.   Skin: No rash  noted. No erythema.   Psychiatric: She has a normal mood and affect.   Nursing note and vitals reviewed.      Significant Labs: All pertinent labs within the past 24 hours have been reviewed.    Significant Imaging: I have reviewed all pertinent imaging results/findings within the past 24 hours.

## 2017-04-25 NOTE — PLAN OF CARE
04/24/17 1956   Patient Assessment/Suction   Level of Consciousness (AVPU) alert   Respiratory Effort Unlabored   Expansion/Accessory Muscles/Retractions expansion symmetric   All Lung Fields Breath Sounds diminished   Cough Frequency infrequent   Cough Type nonproductive   Is this patient in SNF or Inpatient Rehab? No   PRE-TX-O2-ETCO2   O2 Device (Oxygen Therapy) nasal cannula   $ Is the patient on Oxygen? Yes   Flow (L/min) 2   SpO2 (!) 92 %   Pulse Oximetry Type Intermittent   $ Pulse Oximetry - Multiple Charge Pulse Oximetry - Multiple   Pulse 86   Resp 18   Aerosol Therapy   $ Aerosol Therapy Charges Aerosol Treatment   Respiratory Treatment Status given   SVN/Inhaler Treatment Route mask   Position During Treatment HOB at 30 degrees   Patient Tolerance good   Post-Treatment   Post-treatment Heart Rate (beats/min) 90   Post-treatment Resp Rate (breaths/min) 18   All Fields Breath Sounds unchanged

## 2017-04-25 NOTE — ASSESSMENT & PLAN NOTE
Patient's FSGs are not controlled on current hypoglycemics.   Last A1c reviewed-   Lab Results   Component Value Date    HGBA1C 7.7 (H) 12/06/2015     Most recent fingerstick glucose reviewed-     Recent Labs  Lab 04/24/17  1048   POCTGLUCOSE 298*     Will monitor FSGs regularly, adjust insulin and treat with sliding scale as needed.  Increase basal/bolus by 50% and monitor response. May need Gtt as steroid therapy may complicate hyperglycemia.

## 2017-04-26 LAB
ALBUMIN SERPL BCP-MCNC: 3.1 G/DL
ALP SERPL-CCNC: 106 U/L
ALT SERPL W/O P-5'-P-CCNC: 21 U/L
ANION GAP SERPL CALC-SCNC: 10 MMOL/L
AST SERPL-CCNC: 22 U/L
BASOPHILS # BLD AUTO: 0 K/UL
BASOPHILS NFR BLD: 0 %
BILIRUB SERPL-MCNC: 0.2 MG/DL
BUN SERPL-MCNC: 15 MG/DL
CALCIUM SERPL-MCNC: 8.7 MG/DL
CHLORIDE SERPL-SCNC: 103 MMOL/L
CO2 SERPL-SCNC: 21 MMOL/L
CREAT SERPL-MCNC: 0.8 MG/DL
DIFFERENTIAL METHOD: ABNORMAL
EOSINOPHIL # BLD AUTO: 0 K/UL
EOSINOPHIL NFR BLD: 0.1 %
ERYTHROCYTE [DISTWIDTH] IN BLOOD BY AUTOMATED COUNT: 17.3 %
EST. GFR  (AFRICAN AMERICAN): >60 ML/MIN/1.73 M^2
EST. GFR  (NON AFRICAN AMERICAN): >60 ML/MIN/1.73 M^2
GLUCOSE SERPL-MCNC: 435 MG/DL
HCT VFR BLD AUTO: 31.4 %
HGB BLD-MCNC: 10 G/DL
LYMPHOCYTES # BLD AUTO: 0.7 K/UL
LYMPHOCYTES NFR BLD: 8.2 %
MAGNESIUM SERPL-MCNC: 2.4 MG/DL
MCH RBC QN AUTO: 25.9 PG
MCHC RBC AUTO-ENTMCNC: 31.9 %
MCV RBC AUTO: 81 FL
MONOCYTES # BLD AUTO: 0.7 K/UL
MONOCYTES NFR BLD: 7.4 %
NEUTROPHILS # BLD AUTO: 7.6 K/UL
NEUTROPHILS NFR BLD: 84.3 %
PHOSPHATE SERPL-MCNC: 4 MG/DL
PLATELET # BLD AUTO: 240 K/UL
PMV BLD AUTO: 7.9 FL
POCT GLUCOSE: 214 MG/DL (ref 70–110)
POCT GLUCOSE: 304 MG/DL (ref 70–110)
POCT GLUCOSE: 359 MG/DL (ref 70–110)
POCT GLUCOSE: 363 MG/DL (ref 70–110)
POTASSIUM SERPL-SCNC: 4.2 MMOL/L
PROT SERPL-MCNC: 6.4 G/DL
RBC # BLD AUTO: 3.87 M/UL
SODIUM SERPL-SCNC: 134 MMOL/L
WBC # BLD AUTO: 9 K/UL

## 2017-04-26 PROCEDURE — 12000002 HC ACUTE/MED SURGE SEMI-PRIVATE ROOM

## 2017-04-26 PROCEDURE — 36415 COLL VENOUS BLD VENIPUNCTURE: CPT

## 2017-04-26 PROCEDURE — 25000242 PHARM REV CODE 250 ALT 637 W/ HCPCS: Performed by: HOSPITALIST

## 2017-04-26 PROCEDURE — 94640 AIRWAY INHALATION TREATMENT: CPT

## 2017-04-26 PROCEDURE — 83735 ASSAY OF MAGNESIUM: CPT

## 2017-04-26 PROCEDURE — 94761 N-INVAS EAR/PLS OXIMETRY MLT: CPT

## 2017-04-26 PROCEDURE — 80053 COMPREHEN METABOLIC PANEL: CPT

## 2017-04-26 PROCEDURE — 27000221 HC OXYGEN, UP TO 24 HOURS

## 2017-04-26 PROCEDURE — 84100 ASSAY OF PHOSPHORUS: CPT

## 2017-04-26 PROCEDURE — 63600175 PHARM REV CODE 636 W HCPCS: Performed by: PHYSICIAN ASSISTANT

## 2017-04-26 PROCEDURE — 25000003 PHARM REV CODE 250: Performed by: HOSPITALIST

## 2017-04-26 PROCEDURE — 99900035 HC TECH TIME PER 15 MIN (STAT)

## 2017-04-26 PROCEDURE — 63600175 PHARM REV CODE 636 W HCPCS: Performed by: HOSPITALIST

## 2017-04-26 PROCEDURE — 85025 COMPLETE CBC W/AUTO DIFF WBC: CPT

## 2017-04-26 RX ADMIN — LISINOPRIL 2.5 MG: 2.5 TABLET ORAL at 08:04

## 2017-04-26 RX ADMIN — ALUMINUM HYDROXIDE, MAGNESIUM HYDROXIDE, AND SIMETHICONE 30 ML: 200; 200; 20 SUSPENSION ORAL at 09:04

## 2017-04-26 RX ADMIN — ENOXAPARIN SODIUM 40 MG: 100 INJECTION SUBCUTANEOUS at 04:04

## 2017-04-26 RX ADMIN — INSULIN ASPART 10 UNITS: 100 INJECTION, SOLUTION INTRAVENOUS; SUBCUTANEOUS at 11:04

## 2017-04-26 RX ADMIN — ALUMINUM HYDROXIDE, MAGNESIUM HYDROXIDE, AND SIMETHICONE 30 ML: 200; 200; 20 SUSPENSION ORAL at 04:04

## 2017-04-26 RX ADMIN — FLUTICASONE FUROATE AND VILANTEROL TRIFENATATE 1 PUFF: 100; 25 POWDER RESPIRATORY (INHALATION) at 07:04

## 2017-04-26 RX ADMIN — IPRATROPIUM BROMIDE AND ALBUTEROL SULFATE 3 ML: .5; 3 SOLUTION RESPIRATORY (INHALATION) at 01:04

## 2017-04-26 RX ADMIN — BUPROPION HYDROCHLORIDE 200 MG: 100 TABLET, FILM COATED, EXTENDED RELEASE ORAL at 08:04

## 2017-04-26 RX ADMIN — ASPIRIN 81 MG: 81 TABLET, COATED ORAL at 08:04

## 2017-04-26 RX ADMIN — INSULIN ASPART 4 UNITS: 100 INJECTION, SOLUTION INTRAVENOUS; SUBCUTANEOUS at 11:04

## 2017-04-26 RX ADMIN — ALUMINUM HYDROXIDE, MAGNESIUM HYDROXIDE, AND SIMETHICONE 30 ML: 200; 200; 20 SUSPENSION ORAL at 11:04

## 2017-04-26 RX ADMIN — INSULIN ASPART 10 UNITS: 100 INJECTION, SOLUTION INTRAVENOUS; SUBCUTANEOUS at 04:04

## 2017-04-26 RX ADMIN — IPRATROPIUM BROMIDE AND ALBUTEROL SULFATE 3 ML: .5; 3 SOLUTION RESPIRATORY (INHALATION) at 07:04

## 2017-04-26 RX ADMIN — PANTOPRAZOLE SODIUM 40 MG: 40 TABLET, DELAYED RELEASE ORAL at 09:04

## 2017-04-26 RX ADMIN — VARENICLINE TARTRATE 1 MG: 0.5 TABLET, FILM COATED ORAL at 09:04

## 2017-04-26 RX ADMIN — VARENICLINE TARTRATE 1 MG: 0.5 TABLET, FILM COATED ORAL at 08:04

## 2017-04-26 RX ADMIN — MOXIFLOXACIN HYDROCHLORIDE 400 MG: 400 INJECTION, SOLUTION INTRAVENOUS at 04:04

## 2017-04-26 RX ADMIN — DULOXETINE 60 MG: 30 CAPSULE, DELAYED RELEASE ORAL at 08:04

## 2017-04-26 RX ADMIN — INSULIN ASPART 10 UNITS: 100 INJECTION, SOLUTION INTRAVENOUS; SUBCUTANEOUS at 08:04

## 2017-04-26 RX ADMIN — PREGABALIN 150 MG: 75 CAPSULE ORAL at 09:04

## 2017-04-26 RX ADMIN — METHYLPREDNISOLONE SODIUM SUCCINATE 40 MG: 40 INJECTION, POWDER, FOR SOLUTION INTRAMUSCULAR; INTRAVENOUS at 09:04

## 2017-04-26 RX ADMIN — METOPROLOL SUCCINATE 200 MG: 50 TABLET, EXTENDED RELEASE ORAL at 08:04

## 2017-04-26 RX ADMIN — QUETIAPINE FUMARATE 200 MG: 100 TABLET, FILM COATED ORAL at 09:04

## 2017-04-26 RX ADMIN — PRAVASTATIN SODIUM 20 MG: 10 TABLET ORAL at 08:04

## 2017-04-26 RX ADMIN — PANTOPRAZOLE SODIUM 40 MG: 40 TABLET, DELAYED RELEASE ORAL at 08:04

## 2017-04-26 RX ADMIN — INSULIN ASPART 8 UNITS: 100 INJECTION, SOLUTION INTRAVENOUS; SUBCUTANEOUS at 04:04

## 2017-04-26 RX ADMIN — INSULIN ASPART 10 UNITS: 100 INJECTION, SOLUTION INTRAVENOUS; SUBCUTANEOUS at 05:04

## 2017-04-26 RX ADMIN — METHYLPREDNISOLONE SODIUM SUCCINATE 40 MG: 40 INJECTION, POWDER, FOR SOLUTION INTRAMUSCULAR; INTRAVENOUS at 10:04

## 2017-04-26 RX ADMIN — ONDANSETRON 4 MG: 2 INJECTION INTRAMUSCULAR; INTRAVENOUS at 09:04

## 2017-04-26 RX ADMIN — PREGABALIN 150 MG: 75 CAPSULE ORAL at 08:04

## 2017-04-26 RX ADMIN — LISINOPRIL 2.5 MG: 2.5 TABLET ORAL at 09:04

## 2017-04-26 NOTE — ASSESSMENT & PLAN NOTE
Patient's FSGs are not controlled on current hypoglycemics.   Last A1c reviewed-   Lab Results   Component Value Date    HGBA1C 7.7 (H) 12/06/2015     Most recent fingerstick glucose reviewed-     Recent Labs  Lab 04/26/17  1547   POCTGLUCOSE 304*     Will monitor FSGs regularly, adjust insulin and treat with sliding scale as needed.  Increase basal/bolus by 20% and monitor response. May need Gtt as steroid therapy may complicate hyperglycemia.

## 2017-04-26 NOTE — ASSESSMENT & PLAN NOTE
Patient is chronically on statin. Will continue for now. Monitor clinically. Last LDL was   Lab Results   Component Value Date    LDLCALC 110.2 12/06/2015

## 2017-04-26 NOTE — PLAN OF CARE
Problem: Patient Care Overview  Goal: Plan of Care Review  Nc 2 lpm in use with aerosol txs Q6 hrs tolerates well,

## 2017-04-26 NOTE — ASSESSMENT & PLAN NOTE
Severe, with continued hypoxia. Ordered scheduled nebs/steroids/antibiotics and cough suppressants. Monitor status and wean oxygen to sats >90%. Low threshold for BIPAP.

## 2017-04-26 NOTE — SUBJECTIVE & OBJECTIVE
Interval History: Patient this AM with abdominal pain and nausea/vomiting, likely from steroid gastritis. FSGs elevated but improved. Still having severe SOB, hypoxia and severe paroxysms of cough.    Review of Systems   Constitutional: Positive for fatigue.   Respiratory: Positive for cough and shortness of breath.    Cardiovascular: Negative for chest pain and leg swelling.   Gastrointestinal: Positive for abdominal pain, nausea and vomiting.   Neurological: Negative for weakness.   Psychiatric/Behavioral: Negative for confusion.     Objective:     Vital Signs (Most Recent):  Temp: 98.5 °F (36.9 °C) (04/25/17 1600)  Pulse: 76 (04/25/17 1923)  Resp: 16 (04/25/17 1923)  BP: 119/79 (04/25/17 1600)  SpO2: 96 % (04/25/17 1923) Vital Signs (24h Range):  Temp:  [98.1 °F (36.7 °C)-98.5 °F (36.9 °C)] 98.5 °F (36.9 °C)  Pulse:  [76-95] 76  Resp:  [16-20] 16  SpO2:  [87 %-96 %] 96 %  BP: (119-150)/(79) 119/79     Weight: 70.3 kg (155 lb)  Body mass index is 28.35 kg/(m^2).    Intake/Output Summary (Last 24 hours) at 04/25/17 1942  Last data filed at 04/25/17 0500   Gross per 24 hour   Intake              680 ml   Output                0 ml   Net              680 ml      Physical Exam   Constitutional: She is oriented to person, place, and time. She appears well-developed and well-nourished.   HENT:   Head: Normocephalic and atraumatic.   Eyes: EOM are normal. Pupils are equal, round, and reactive to light.   Neck: Neck supple. No JVD present.   Cardiovascular: Normal rate and regular rhythm.  Exam reveals no gallop and no friction rub.    No murmur heard.  Pulmonary/Chest: She has wheezes. She has no rales.   Increased resp effort noted   Abdominal: Soft. Bowel sounds are normal. She exhibits no distension. There is no tenderness.   Musculoskeletal: She exhibits no edema or tenderness.   Lymphadenopathy:     She has no cervical adenopathy.   Neurological: She is alert and oriented to person, place, and time. She has normal  reflexes. No cranial nerve deficit.   Skin: No rash noted. No erythema.   Psychiatric: She has a normal mood and affect.   Nursing note and vitals reviewed.      Significant Labs: All pertinent labs within the past 24 hours have been reviewed.    Significant Imaging: I have reviewed all pertinent imaging results/findings within the past 24 hours.

## 2017-04-26 NOTE — ASSESSMENT & PLAN NOTE
Patient's FSGs are not controlled on current hypoglycemics.   Last A1c reviewed-   Lab Results   Component Value Date    HGBA1C 7.7 (H) 12/06/2015     Most recent fingerstick glucose reviewed-     Recent Labs  Lab 04/25/17  1627   POCTGLUCOSE 88     Will monitor FSGs regularly, adjust insulin and treat with sliding scale as needed.  Increase basal/bolus by 50% and monitor response. May need Gtt as steroid therapy may complicate hyperglycemia.

## 2017-04-26 NOTE — PLAN OF CARE
04/25/17 1923   Patient Assessment/Suction   Level of Consciousness (AVPU) alert   Respiratory Effort Unlabored;Normal   Expansion/Accessory Muscles/Retractions expansion symmetric   All Lung Fields Breath Sounds diminished   Cough Frequency infrequent   Cough Type good;nonproductive   PRE-TX-O2-ETCO2   O2 Device (Oxygen Therapy) nasal cannula   $ Is the patient on Oxygen? Yes   Flow (L/min) 2   SpO2 96 %   Pulse Oximetry Type Intermittent   $ Pulse Oximetry - Multiple Charge Pulse Oximetry - Multiple   Pulse 76   Resp 16   Positioning HOB elevated 45 degrees   Aerosol Therapy   $ Aerosol Therapy Charges Aerosol Treatment   Respiratory Treatment Status given   SVN/Inhaler Treatment Route mask   Position During Treatment HOB at 45 degrees   Patient Tolerance good   Post-Treatment   Post-treatment Heart Rate (beats/min) 79   Post-treatment Resp Rate (breaths/min) 16   All Fields Breath Sounds aeration increased

## 2017-04-26 NOTE — NURSING
Dr. Valladares consulting with patient; notified of patient's N/V overnight. States he wants to wean patient from O2 if SaO2 remains above 90%; SaO2 94% on room air at this time, will cont to monitor. Patient resting in bed with SR raised, call light within reach, NAD. Pillow provided per MD request for assistive coughing. Will cont to monitor.

## 2017-04-26 NOTE — PLAN OF CARE
Problem: Patient Care Overview  Goal: Plan of Care Review  Outcome: Ongoing (interventions implemented as appropriate)  Patient remained free of falls and in NAD this shift; no vomiting occurred. Patient's VSS with O2 at 2L to maintain SaO2 above 90%. Call light in reach, bed in lowest position, safety maintained.

## 2017-04-26 NOTE — SUBJECTIVE & OBJECTIVE
Interval History: Patient nausea improved, no further vomiting but still c/o abdominal pain. FSGs elevated but improved. Still having severe SOB, hypoxia and severe paroxysms of cough.    Review of Systems   Constitutional: Positive for fatigue.   Respiratory: Positive for cough and shortness of breath.    Cardiovascular: Negative for chest pain and leg swelling.   Gastrointestinal: Positive for abdominal pain and nausea. Negative for vomiting.   Neurological: Negative for weakness.   Psychiatric/Behavioral: Negative for confusion.     Objective:     Vital Signs (Most Recent):  Temp: 98 °F (36.7 °C) (04/26/17 1100)  Pulse: 76 (04/26/17 1313)  Resp: 20 (04/26/17 1313)  BP: 133/72 (04/26/17 1100)  SpO2: (!) 93 % (04/26/17 1313) Vital Signs (24h Range):  Temp:  [98 °F (36.7 °C)-98.7 °F (37.1 °C)] 98 °F (36.7 °C)  Pulse:  [73-87] 76  Resp:  [16-20] 20  SpO2:  [92 %-98 %] 93 %  BP: (114-149)/(64-89) 133/72     Weight: 70.3 kg (155 lb)  Body mass index is 28.35 kg/(m^2).    Intake/Output Summary (Last 24 hours) at 04/26/17 1554  Last data filed at 04/26/17 0600   Gross per 24 hour   Intake             1370 ml   Output                0 ml   Net             1370 ml      Physical Exam   Constitutional: She is oriented to person, place, and time. She appears well-developed and well-nourished.   HENT:   Head: Normocephalic and atraumatic.   Eyes: EOM are normal. Pupils are equal, round, and reactive to light.   Neck: Neck supple. No JVD present.   Cardiovascular: Normal rate and regular rhythm.  Exam reveals no gallop and no friction rub.    No murmur heard.  Pulmonary/Chest: She has wheezes. She has no rales.   Increased resp effort noted   Abdominal: Soft. Bowel sounds are normal. She exhibits no distension. There is tenderness.   Musculoskeletal: She exhibits no edema or tenderness.   Lymphadenopathy:     She has no cervical adenopathy.   Neurological: She is alert and oriented to person, place, and time. She has normal  reflexes. No cranial nerve deficit.   Skin: No rash noted. No erythema.   Psychiatric: She has a normal mood and affect.   Nursing note and vitals reviewed.      Significant Labs: All pertinent labs within the past 24 hours have been reviewed.    Significant Imaging: I have reviewed all pertinent imaging results/findings within the past 24 hours.

## 2017-04-26 NOTE — PROGRESS NOTES
[facility] Medicine  Progress Note    Patient Name: Chayito Chung  MRN: [id number]  Patient Class: IP- Inpatient   Admission Date: 4/23/2017  Length of Stay: 2 days  Attending Physician: Alphonse Valladares MD  Primary Care Provider: Sidney Delgado MD        Subjective:     Principal Problem:COPD exacerbation    HPI:  66-year-old female with a history of CAD hypertension diabetes asthma and COPD presents to the ER with a productive cough since Thursday.  Symptoms are similar to a prior episode of pneumonia.  Fever 102 last night.  Shortness of breath is at baseline and is not any worse than usual.  Also complaining of a runny nose congestion and sneezing.  No myalgias no vomiting or diarrhea no chest pain or diaphoresis. No aggravating or alleviating factors. Patient was coughing uncontrollably in ED and in resp distress. She improved with albuterol and steroids, but was not back to baseline and was admitted for COPD exacerbation.    Interval History: Patient this AM with abdominal pain and nausea/vomiting, likely from steroid gastritis. FSGs elevated but improved. Still having severe SOB, hypoxia and severe paroxysms of cough.    Review of Systems   Constitutional: Positive for fatigue.   Respiratory: Positive for cough and shortness of breath.    Cardiovascular: Negative for chest pain and leg swelling.   Gastrointestinal: Positive for abdominal pain, nausea and vomiting.   Neurological: Negative for weakness.   Psychiatric/Behavioral: Negative for confusion.     Objective:     Vital Signs (Most Recent):  Temp: 98.5 °F (36.9 °C) (04/25/17 1600)  Pulse: 76 (04/25/17 1923)  Resp: 16 (04/25/17 1923)  BP: 119/79 (04/25/17 1600)  SpO2: 96 % (04/25/17 1923) Vital Signs (24h Range):  Temp:  [98.1 °F (36.7 °C)-98.5 °F (36.9 °C)] 98.5 °F (36.9 °C)  Pulse:  [76-95] 76  Resp:  [16-20] 16  SpO2:  [87 %-96 %] 96 %  BP: (119-150)/(79) 119/79     Weight: 70.3 kg (155 lb)  Body mass index is 28.35  kg/(m^2).    Intake/Output Summary (Last 24 hours) at 04/25/17 1942  Last data filed at 04/25/17 0500   Gross per 24 hour   Intake              680 ml   Output                0 ml   Net              680 ml      Physical Exam   Constitutional: She is oriented to person, place, and time. She appears well-developed and well-nourished.   HENT:   Head: Normocephalic and atraumatic.   Eyes: EOM are normal. Pupils are equal, round, and reactive to light.   Neck: Neck supple. No JVD present.   Cardiovascular: Normal rate and regular rhythm.  Exam reveals no gallop and no friction rub.    No murmur heard.  Pulmonary/Chest: She has wheezes. She has no rales.   Increased resp effort noted   Abdominal: Soft. Bowel sounds are normal. She exhibits no distension. There is no tenderness.   Musculoskeletal: She exhibits no edema or tenderness.   Lymphadenopathy:     She has no cervical adenopathy.   Neurological: She is alert and oriented to person, place, and time. She has normal reflexes. No cranial nerve deficit.   Skin: No rash noted. No erythema.   Psychiatric: She has a normal mood and affect.   Nursing note and vitals reviewed.      Significant Labs: All pertinent labs within the past 24 hours have been reviewed.    Significant Imaging: I have reviewed all pertinent imaging results/findings within the past 24 hours.    Assessment/Plan:      * COPD exacerbation  Severe. Ordered scheduled nebs/steroids/antibiotics and cough suppressants. Monitor status and wean oxygen to sats >90%. Low threshold for BIPAP.      CAD (coronary artery disease), stent 4/2012  Patient with known CAD s/p stent placement. Will continue Aspirin and Statin and monitor for S/Sx of angina/ACS. Continue to monitor on telemetry.         Hyperlipidemia        Tobacco abuse, 1ppd, 46 years  Dangers of cigarette smoking were reviewed with patient in detail and patient was encouraged to quit. Nicotine replacement options were discussed.        Major depressive  disorder in partial remission  Chronic problem. Will continue chronic medication(s), Wellbutrin and Chantix and monitor for any changes, adjusting as needed.         Migraines  Chronic problem. Will continue chronic medication(s), Lyrica and monitor for any changes, adjusting as needed.         Uncontrolled type 2 diabetes mellitus with hyperglycemia, with long-term current use of insulin  Patient's FSGs are not controlled on current hypoglycemics.   Last A1c reviewed-   Lab Results   Component Value Date    HGBA1C 7.7 (H) 12/06/2015     Most recent fingerstick glucose reviewed-     Recent Labs  Lab 04/25/17  1627   POCTGLUCOSE 88     Will monitor FSGs regularly, adjust insulin and treat with sliding scale as needed.  Increase basal/bolus by 50% and monitor response. May need Gtt as steroid therapy may complicate hyperglycemia.        HTN (hypertension)  Chronic, controlled.  Continue home regimen monitoring BP closely.  Will titrate BP medications as needed for sustained BP control.        Steroid-induced gastritis  New problem. Will start bid PPI and maalox and monitor clinical response.      VTE Risk Mitigation         Ordered     enoxaparin injection 40 mg  Daily     Route:  Subcutaneous        04/23/17 1558     Medium Risk of VTE  Once      04/23/17 1558          Alphonse Valladares MD  Department of Hospital Medicine   [facility]

## 2017-04-26 NOTE — PROGRESS NOTES
[facility] Medicine  Progress Note    Patient Name: Chayito Chung  MRN: [id number]  Patient Class: IP- Inpatient   Admission Date: 4/23/2017  Length of Stay: 3 days  Attending Physician: Alphonse Valladares MD  Primary Care Provider: Sidney Delgado MD        Subjective:     Principal Problem:COPD exacerbation    HPI:  66-year-old female with a history of CAD hypertension diabetes asthma and COPD presents to the ER with a productive cough since Thursday.  Symptoms are similar to a prior episode of pneumonia.  Fever 102 last night.  Shortness of breath is at baseline and is not any worse than usual.  Also complaining of a runny nose congestion and sneezing.  No myalgias no vomiting or diarrhea no chest pain or diaphoresis. No aggravating or alleviating factors. Patient was coughing uncontrollably in ED and in resp distress. She improved with albuterol and steroids, but was not back to baseline and was admitted for COPD exacerbation.    Interval History: Patient nausea improved, no further vomiting but still c/o abdominal pain. FSGs elevated but improved. Still having severe SOB, hypoxia and severe paroxysms of cough.    Review of Systems   Constitutional: Positive for fatigue.   Respiratory: Positive for cough and shortness of breath.    Cardiovascular: Negative for chest pain and leg swelling.   Gastrointestinal: Positive for abdominal pain and nausea. Negative for vomiting.   Neurological: Negative for weakness.   Psychiatric/Behavioral: Negative for confusion.     Objective:     Vital Signs (Most Recent):  Temp: 98 °F (36.7 °C) (04/26/17 1100)  Pulse: 76 (04/26/17 1313)  Resp: 20 (04/26/17 1313)  BP: 133/72 (04/26/17 1100)  SpO2: (!) 93 % (04/26/17 1313) Vital Signs (24h Range):  Temp:  [98 °F (36.7 °C)-98.7 °F (37.1 °C)] 98 °F (36.7 °C)  Pulse:  [73-87] 76  Resp:  [16-20] 20  SpO2:  [92 %-98 %] 93 %  BP: (114-149)/(64-89) 133/72     Weight: 70.3 kg (155 lb)  Body mass index is 28.35  kg/(m^2).    Intake/Output Summary (Last 24 hours) at 04/26/17 1554  Last data filed at 04/26/17 0600   Gross per 24 hour   Intake             1370 ml   Output                0 ml   Net             1370 ml      Physical Exam   Constitutional: She is oriented to person, place, and time. She appears well-developed and well-nourished.   HENT:   Head: Normocephalic and atraumatic.   Eyes: EOM are normal. Pupils are equal, round, and reactive to light.   Neck: Neck supple. No JVD present.   Cardiovascular: Normal rate and regular rhythm.  Exam reveals no gallop and no friction rub.    No murmur heard.  Pulmonary/Chest: She has wheezes. She has no rales.   Increased resp effort noted   Abdominal: Soft. Bowel sounds are normal. She exhibits no distension. There is tenderness.   Musculoskeletal: She exhibits no edema or tenderness.   Lymphadenopathy:     She has no cervical adenopathy.   Neurological: She is alert and oriented to person, place, and time. She has normal reflexes. No cranial nerve deficit.   Skin: No rash noted. No erythema.   Psychiatric: She has a normal mood and affect.   Nursing note and vitals reviewed.      Significant Labs: All pertinent labs within the past 24 hours have been reviewed.    Significant Imaging: I have reviewed all pertinent imaging results/findings within the past 24 hours.    Assessment/Plan:      * COPD exacerbation  Severe, with continued hypoxia. Ordered scheduled nebs/steroids/antibiotics and cough suppressants. Monitor status and wean oxygen to sats >90%. Low threshold for BIPAP.      CAD (coronary artery disease), stent 4/2012  Patient with known CAD s/p stent placement. Will continue Aspirin and Statin and monitor for S/Sx of angina/ACS. Continue to monitor on telemetry.         Hyperlipidemia   Patient is chronically on statin. Will continue for now. Monitor clinically. Last LDL was   Lab Results   Component Value Date    LDLCALC 110.2 12/06/2015            Tobacco abuse, 1ppd,  46 years  Dangers of cigarette smoking were reviewed with patient in detail and patient was encouraged to quit. Nicotine replacement options were discussed.        Major depressive disorder in partial remission  Chronic problem. Will continue chronic medication(s), Wellbutrin and Chantix and monitor for any changes, adjusting as needed.         Migraines  Chronic problem. Will continue chronic medication(s), Lyrica and monitor for any changes, adjusting as needed.         Uncontrolled type 2 diabetes mellitus with hyperglycemia, with long-term current use of insulin  Patient's FSGs are not controlled on current hypoglycemics.   Last A1c reviewed-   Lab Results   Component Value Date    HGBA1C 7.7 (H) 12/06/2015     Most recent fingerstick glucose reviewed-     Recent Labs  Lab 04/26/17  1547   POCTGLUCOSE 304*     Will monitor FSGs regularly, adjust insulin and treat with sliding scale as needed.  Increase basal/bolus by 20% and monitor response. May need Gtt as steroid therapy may complicate hyperglycemia.        HTN (hypertension)  Chronic, controlled.  Continue home regimen monitoring BP closely.  Will titrate BP medications as needed for sustained BP control.        Steroid-induced gastritis  New problem. Will continue bid PPI and maalox and monitor clinical response.      VTE Risk Mitigation         Ordered     enoxaparin injection 40 mg  Daily     Route:  Subcutaneous        04/23/17 1558     Medium Risk of VTE  Once      04/23/17 1558          Alphonse Valladares MD  Department of Hospital Medicine   [facility]

## 2017-04-26 NOTE — NURSING
Patient on 2LO2 at this time; attempted to wean patient from O2: 89% on room air, 90% on 1L, 95% on 2L; patient remains with 2L O2 at this time; night nurse notified of Dr. Valladares's request to wean patient from supplemental O2 if SaO2 remains above 90%..

## 2017-04-27 LAB
ALBUMIN SERPL BCP-MCNC: 2.8 G/DL
ALP SERPL-CCNC: 99 U/L
ALT SERPL W/O P-5'-P-CCNC: 19 U/L
ANION GAP SERPL CALC-SCNC: 7 MMOL/L
AST SERPL-CCNC: 12 U/L
BASOPHILS # BLD AUTO: 0 K/UL
BASOPHILS NFR BLD: 0.1 %
BILIRUB SERPL-MCNC: 0.2 MG/DL
BUN SERPL-MCNC: 13 MG/DL
CALCIUM SERPL-MCNC: 8.8 MG/DL
CHLORIDE SERPL-SCNC: 105 MMOL/L
CO2 SERPL-SCNC: 23 MMOL/L
CREAT SERPL-MCNC: 0.7 MG/DL
DIFFERENTIAL METHOD: ABNORMAL
EOSINOPHIL # BLD AUTO: 0 K/UL
EOSINOPHIL NFR BLD: 0 %
ERYTHROCYTE [DISTWIDTH] IN BLOOD BY AUTOMATED COUNT: 17.3 %
EST. GFR  (AFRICAN AMERICAN): >60 ML/MIN/1.73 M^2
EST. GFR  (NON AFRICAN AMERICAN): >60 ML/MIN/1.73 M^2
GLUCOSE SERPL-MCNC: 322 MG/DL
HCT VFR BLD AUTO: 30.8 %
HGB BLD-MCNC: 9.7 G/DL
LYMPHOCYTES # BLD AUTO: 0.8 K/UL
LYMPHOCYTES NFR BLD: 9.2 %
MAGNESIUM SERPL-MCNC: 2.1 MG/DL
MCH RBC QN AUTO: 26 PG
MCHC RBC AUTO-ENTMCNC: 31.6 %
MCV RBC AUTO: 82 FL
MONOCYTES # BLD AUTO: 0.6 K/UL
MONOCYTES NFR BLD: 7.2 %
NEUTROPHILS # BLD AUTO: 7.5 K/UL
NEUTROPHILS NFR BLD: 83.5 %
PHOSPHATE SERPL-MCNC: 3.6 MG/DL
PLATELET # BLD AUTO: 243 K/UL
PMV BLD AUTO: 7.6 FL
POCT GLUCOSE: 259 MG/DL (ref 70–110)
POCT GLUCOSE: 269 MG/DL (ref 70–110)
POCT GLUCOSE: 302 MG/DL (ref 70–110)
POCT GLUCOSE: 309 MG/DL (ref 70–110)
POTASSIUM SERPL-SCNC: 4.2 MMOL/L
PROT SERPL-MCNC: 6.1 G/DL
RBC # BLD AUTO: 3.75 M/UL
SODIUM SERPL-SCNC: 135 MMOL/L
WBC # BLD AUTO: 8.9 K/UL

## 2017-04-27 PROCEDURE — 94640 AIRWAY INHALATION TREATMENT: CPT

## 2017-04-27 PROCEDURE — 80053 COMPREHEN METABOLIC PANEL: CPT

## 2017-04-27 PROCEDURE — 25000003 PHARM REV CODE 250: Performed by: HOSPITALIST

## 2017-04-27 PROCEDURE — 84100 ASSAY OF PHOSPHORUS: CPT

## 2017-04-27 PROCEDURE — 27000221 HC OXYGEN, UP TO 24 HOURS

## 2017-04-27 PROCEDURE — 83735 ASSAY OF MAGNESIUM: CPT

## 2017-04-27 PROCEDURE — 63600175 PHARM REV CODE 636 W HCPCS: Performed by: HOSPITALIST

## 2017-04-27 PROCEDURE — 25000242 PHARM REV CODE 250 ALT 637 W/ HCPCS: Performed by: HOSPITALIST

## 2017-04-27 PROCEDURE — 12000002 HC ACUTE/MED SURGE SEMI-PRIVATE ROOM

## 2017-04-27 PROCEDURE — 36415 COLL VENOUS BLD VENIPUNCTURE: CPT

## 2017-04-27 PROCEDURE — 85025 COMPLETE CBC W/AUTO DIFF WBC: CPT

## 2017-04-27 PROCEDURE — 94761 N-INVAS EAR/PLS OXIMETRY MLT: CPT

## 2017-04-27 RX ORDER — LACTULOSE 10 G/15ML
20 SOLUTION ORAL 3 TIMES DAILY
Status: DISCONTINUED | OUTPATIENT
Start: 2017-04-27 | End: 2017-05-02 | Stop reason: HOSPADM

## 2017-04-27 RX ORDER — INSULIN ASPART 100 [IU]/ML
12 INJECTION, SOLUTION INTRAVENOUS; SUBCUTANEOUS
Status: DISCONTINUED | OUTPATIENT
Start: 2017-04-27 | End: 2017-05-02 | Stop reason: HOSPADM

## 2017-04-27 RX ADMIN — IPRATROPIUM BROMIDE AND ALBUTEROL SULFATE 3 ML: .5; 3 SOLUTION RESPIRATORY (INHALATION) at 12:04

## 2017-04-27 RX ADMIN — ENOXAPARIN SODIUM 40 MG: 100 INJECTION SUBCUTANEOUS at 04:04

## 2017-04-27 RX ADMIN — METHYLPREDNISOLONE SODIUM SUCCINATE 40 MG: 40 INJECTION, POWDER, FOR SOLUTION INTRAMUSCULAR; INTRAVENOUS at 09:04

## 2017-04-27 RX ADMIN — IPRATROPIUM BROMIDE AND ALBUTEROL SULFATE 3 ML: .5; 3 SOLUTION RESPIRATORY (INHALATION) at 01:04

## 2017-04-27 RX ADMIN — FLUTICASONE FUROATE AND VILANTEROL TRIFENATATE 1 PUFF: 100; 25 POWDER RESPIRATORY (INHALATION) at 07:04

## 2017-04-27 RX ADMIN — PREGABALIN 150 MG: 75 CAPSULE ORAL at 08:04

## 2017-04-27 RX ADMIN — ASPIRIN 81 MG: 81 TABLET, COATED ORAL at 09:04

## 2017-04-27 RX ADMIN — INSULIN ASPART 8 UNITS: 100 INJECTION, SOLUTION INTRAVENOUS; SUBCUTANEOUS at 06:04

## 2017-04-27 RX ADMIN — PREGABALIN 150 MG: 75 CAPSULE ORAL at 09:04

## 2017-04-27 RX ADMIN — ALUMINUM HYDROXIDE, MAGNESIUM HYDROXIDE, AND SIMETHICONE 30 ML: 200; 200; 20 SUSPENSION ORAL at 11:04

## 2017-04-27 RX ADMIN — HYDROCODONE BITARTRATE AND ACETAMINOPHEN 1 TABLET: 5; 325 TABLET ORAL at 07:04

## 2017-04-27 RX ADMIN — IPRATROPIUM BROMIDE AND ALBUTEROL SULFATE 3 ML: .5; 3 SOLUTION RESPIRATORY (INHALATION) at 07:04

## 2017-04-27 RX ADMIN — LISINOPRIL 2.5 MG: 2.5 TABLET ORAL at 09:04

## 2017-04-27 RX ADMIN — QUETIAPINE FUMARATE 200 MG: 100 TABLET, FILM COATED ORAL at 08:04

## 2017-04-27 RX ADMIN — VARENICLINE TARTRATE 1 MG: 0.5 TABLET, FILM COATED ORAL at 10:04

## 2017-04-27 RX ADMIN — LISINOPRIL 2.5 MG: 2.5 TABLET ORAL at 08:04

## 2017-04-27 RX ADMIN — INSULIN ASPART 8 UNITS: 100 INJECTION, SOLUTION INTRAVENOUS; SUBCUTANEOUS at 04:04

## 2017-04-27 RX ADMIN — PRAVASTATIN SODIUM 20 MG: 10 TABLET ORAL at 09:04

## 2017-04-27 RX ADMIN — HYDROCODONE BITARTRATE AND ACETAMINOPHEN 1 TABLET: 5; 325 TABLET ORAL at 02:04

## 2017-04-27 RX ADMIN — ALUMINUM HYDROXIDE, MAGNESIUM HYDROXIDE, AND SIMETHICONE 30 ML: 200; 200; 20 SUSPENSION ORAL at 04:04

## 2017-04-27 RX ADMIN — ALUMINUM HYDROXIDE, MAGNESIUM HYDROXIDE, AND SIMETHICONE 30 ML: 200; 200; 20 SUSPENSION ORAL at 08:04

## 2017-04-27 RX ADMIN — BUPROPION HYDROCHLORIDE 200 MG: 100 TABLET, FILM COATED, EXTENDED RELEASE ORAL at 09:04

## 2017-04-27 RX ADMIN — LACTULOSE 20 G: 10 SOLUTION ORAL at 09:04

## 2017-04-27 RX ADMIN — HYDROCODONE POLISTIREX AND CHLORPHENIRAMINE POLISITREX 5 ML: 10; 8 SUSPENSION, EXTENDED RELEASE ORAL at 01:04

## 2017-04-27 RX ADMIN — LACTULOSE 20 G: 10 SOLUTION ORAL at 01:04

## 2017-04-27 RX ADMIN — INSULIN ASPART 10 UNITS: 100 INJECTION, SOLUTION INTRAVENOUS; SUBCUTANEOUS at 09:04

## 2017-04-27 RX ADMIN — DULOXETINE 60 MG: 30 CAPSULE, DELAYED RELEASE ORAL at 09:04

## 2017-04-27 RX ADMIN — HYDROCODONE BITARTRATE AND ACETAMINOPHEN 1 TABLET: 5; 325 TABLET ORAL at 09:04

## 2017-04-27 RX ADMIN — INSULIN ASPART 6 UNITS: 100 INJECTION, SOLUTION INTRAVENOUS; SUBCUTANEOUS at 11:04

## 2017-04-27 RX ADMIN — ALUMINUM HYDROXIDE, MAGNESIUM HYDROXIDE, AND SIMETHICONE 30 ML: 200; 200; 20 SUSPENSION ORAL at 06:04

## 2017-04-27 RX ADMIN — PANTOPRAZOLE SODIUM 40 MG: 40 TABLET, DELAYED RELEASE ORAL at 09:04

## 2017-04-27 RX ADMIN — INSULIN ASPART 3 UNITS: 100 INJECTION, SOLUTION INTRAVENOUS; SUBCUTANEOUS at 08:04

## 2017-04-27 RX ADMIN — METOPROLOL SUCCINATE 200 MG: 50 TABLET, EXTENDED RELEASE ORAL at 09:04

## 2017-04-27 RX ADMIN — VARENICLINE TARTRATE 1 MG: 0.5 TABLET, FILM COATED ORAL at 09:04

## 2017-04-27 RX ADMIN — MOXIFLOXACIN HYDROCHLORIDE 400 MG: 400 INJECTION, SOLUTION INTRAVENOUS at 04:04

## 2017-04-27 RX ADMIN — PANTOPRAZOLE SODIUM 40 MG: 40 TABLET, DELAYED RELEASE ORAL at 08:04

## 2017-04-27 RX ADMIN — INSULIN ASPART 12 UNITS: 100 INJECTION, SOLUTION INTRAVENOUS; SUBCUTANEOUS at 04:04

## 2017-04-27 RX ADMIN — INSULIN ASPART 10 UNITS: 100 INJECTION, SOLUTION INTRAVENOUS; SUBCUTANEOUS at 11:04

## 2017-04-27 NOTE — PLAN OF CARE
04/26/17 1930   Patient Assessment/Suction   Level of Consciousness (AVPU) alert   Respiratory Effort Normal;Unlabored   Expansion/Accessory Muscles/Retractions no retractions;no use of accessory muscles   All Lung Fields Breath Sounds coarse   LLL Breath Sounds crackles   RLL Breath Sounds crackles   Cough Frequency infrequent   Cough Type good;nonproductive   PRE-TX-O2-ETCO2   O2 Device (Oxygen Therapy) nasal cannula   Flow (L/min) 2   Oxygen Concentration (%) 28   SpO2 (!) 93 %   Pulse Oximetry Type Intermittent   Pulse 77   Resp 18   Aerosol Therapy   $ Aerosol Therapy Charges Aerosol Treatment   Respiratory Treatment Status given   SVN/Inhaler Treatment Route mask;with oxygen   Position During Treatment HOB at 45 degrees   Patient Tolerance good   Post-Treatment   Post-treatment Heart Rate (beats/min) 74   Post-treatment Resp Rate (breaths/min) 18   All Fields Breath Sounds unchanged   Ready to Wean/Extubation Screen   FIO2<60 (chart decimal) 0.28

## 2017-04-27 NOTE — NURSING
Patient resting with SR raised, call light in reach, family at bedside. SaO2 89% on room air; verbal cues given to increase depth of respiration, SaO2 increased to 92% on room air. Will cont to monitor.

## 2017-04-27 NOTE — NURSING
Dr. Valladares states to cont trying to wean patient from supplemental O2. Patient trialed on 1L, SaO2 92%; patient trialed on room air, SaO2 92%; patient to remain on room air. Will reassess at a later time. Patient currently resting with bed in lowest position, wheels locked, SR raised, NAD noted. Will cont to monitor.

## 2017-04-27 NOTE — PROGRESS NOTES
04/27/17 0711   Patient Assessment/Suction   Level of Consciousness (AVPU) alert   Respiratory Effort Normal;Unlabored   Expansion/Accessory Muscles/Retractions no retractions;no use of accessory muscles   All Lung Fields Breath Sounds coarse   Cough Frequency infrequent   Cough Type good;nonproductive   PRE-TX-O2-ETCO2   O2 Device (Oxygen Therapy) nasal cannula   $ Is the patient on Oxygen? Yes   Flow (L/min) 2   Oxygen Concentration (%) 28   SpO2 (!) 94 %   Pulse Oximetry Type Intermittent   $ Pulse Oximetry - Multiple Charge Pulse Oximetry - Multiple   Pulse 75   Resp 18   Inhaler   $ Inhaler Charges MDI (Metered Dose Inahler) Treatment   Respiratory Treatment Status given;mouth rinsed post treatment   SVN/Inhaler Treatment Route mouthpiece   Patient Tolerance good   Post-Treatment   Post-treatment Heart Rate (beats/min) 75   Post-treatment Resp Rate (breaths/min) 18   All Fields Breath Sounds unchanged       Breo q day. Duoneb q6 hours. Patient tolerated well.

## 2017-04-27 NOTE — NURSING
Patient up amb in room; SaO2 85% on room air; 2LO2 applied per NC, SaO2 increased to 92%. Patient resting in bed with SR raised, call light in reach, NAD noted. Will cont to monitor.

## 2017-04-27 NOTE — PLAN OF CARE
Problem: Patient Care Overview  Goal: Plan of Care Review  Outcome: Ongoing (interventions implemented as appropriate)  Fall and injury free. o2 2l nc in place. sats 93 - 94 %.  Plan of care reviewed with patient. Understanding voiced.  Blood sugar checked with sliding scale coverage noted.  Bed in low position and locked. Side rails up x 2. Call bell in reach.  Telemetry - Sinus Rhythm

## 2017-04-27 NOTE — PROGRESS NOTES
SSC met with patient at bedside regarding home health.  Patient signed patient choice disclosure choosing to allow placement with first available provider.  SSC will send patient information to [facility] through [facility] for processing and authorization ONCE/IF MD orders home health.RUY Pruitt

## 2017-04-28 LAB
ALBUMIN SERPL BCP-MCNC: 2.8 G/DL
ALP SERPL-CCNC: 93 U/L
ALT SERPL W/O P-5'-P-CCNC: 27 U/L
ANION GAP SERPL CALC-SCNC: 7 MMOL/L
AST SERPL-CCNC: 13 U/L
BASOPHILS # BLD AUTO: 0 K/UL
BASOPHILS NFR BLD: 0.1 %
BILIRUB SERPL-MCNC: 0.2 MG/DL
BUN SERPL-MCNC: 16 MG/DL
CALCIUM SERPL-MCNC: 8.9 MG/DL
CHLORIDE SERPL-SCNC: 104 MMOL/L
CO2 SERPL-SCNC: 24 MMOL/L
CREAT SERPL-MCNC: 0.7 MG/DL
DIFFERENTIAL METHOD: ABNORMAL
EOSINOPHIL # BLD AUTO: 0 K/UL
EOSINOPHIL NFR BLD: 0.1 %
ERYTHROCYTE [DISTWIDTH] IN BLOOD BY AUTOMATED COUNT: 17.2 %
EST. GFR  (AFRICAN AMERICAN): >60 ML/MIN/1.73 M^2
EST. GFR  (NON AFRICAN AMERICAN): >60 ML/MIN/1.73 M^2
GLUCOSE SERPL-MCNC: 308 MG/DL
GLUCOSE SERPL-MCNC: 555 MG/DL
HCT VFR BLD AUTO: 31.2 %
HGB BLD-MCNC: 9.9 G/DL
LYMPHOCYTES # BLD AUTO: 1.2 K/UL
LYMPHOCYTES NFR BLD: 11.8 %
MAGNESIUM SERPL-MCNC: 2.1 MG/DL
MCH RBC QN AUTO: 26 PG
MCHC RBC AUTO-ENTMCNC: 31.8 %
MCV RBC AUTO: 82 FL
MONOCYTES # BLD AUTO: 0.6 K/UL
MONOCYTES NFR BLD: 5.8 %
NEUTROPHILS # BLD AUTO: 8.1 K/UL
NEUTROPHILS NFR BLD: 82.2 %
PHOSPHATE SERPL-MCNC: 3.5 MG/DL
PLATELET # BLD AUTO: 260 K/UL
PMV BLD AUTO: 7.6 FL
POCT GLUCOSE: 279 MG/DL (ref 70–110)
POCT GLUCOSE: 314 MG/DL (ref 70–110)
POCT GLUCOSE: 389 MG/DL (ref 70–110)
POCT GLUCOSE: 449 MG/DL (ref 70–110)
POTASSIUM SERPL-SCNC: 4.4 MMOL/L
PROT SERPL-MCNC: 6.3 G/DL
RBC # BLD AUTO: 3.82 M/UL
SODIUM SERPL-SCNC: 135 MMOL/L
WBC # BLD AUTO: 9.8 K/UL

## 2017-04-28 PROCEDURE — 94761 N-INVAS EAR/PLS OXIMETRY MLT: CPT

## 2017-04-28 PROCEDURE — 80053 COMPREHEN METABOLIC PANEL: CPT

## 2017-04-28 PROCEDURE — 85025 COMPLETE CBC W/AUTO DIFF WBC: CPT

## 2017-04-28 PROCEDURE — 83735 ASSAY OF MAGNESIUM: CPT

## 2017-04-28 PROCEDURE — 25000003 PHARM REV CODE 250: Performed by: HOSPITALIST

## 2017-04-28 PROCEDURE — 12000002 HC ACUTE/MED SURGE SEMI-PRIVATE ROOM

## 2017-04-28 PROCEDURE — 84100 ASSAY OF PHOSPHORUS: CPT

## 2017-04-28 PROCEDURE — 63600175 PHARM REV CODE 636 W HCPCS: Performed by: HOSPITALIST

## 2017-04-28 PROCEDURE — 36415 COLL VENOUS BLD VENIPUNCTURE: CPT

## 2017-04-28 PROCEDURE — 94640 AIRWAY INHALATION TREATMENT: CPT

## 2017-04-28 PROCEDURE — 25000242 PHARM REV CODE 250 ALT 637 W/ HCPCS: Performed by: HOSPITALIST

## 2017-04-28 PROCEDURE — 82947 ASSAY GLUCOSE BLOOD QUANT: CPT

## 2017-04-28 PROCEDURE — 27000221 HC OXYGEN, UP TO 24 HOURS

## 2017-04-28 RX ADMIN — LACTULOSE 20 G: 10 SOLUTION ORAL at 03:04

## 2017-04-28 RX ADMIN — ALUMINUM HYDROXIDE, MAGNESIUM HYDROXIDE, AND SIMETHICONE 30 ML: 200; 200; 20 SUSPENSION ORAL at 12:04

## 2017-04-28 RX ADMIN — FLUTICASONE FUROATE AND VILANTEROL TRIFENATATE 1 PUFF: 100; 25 POWDER RESPIRATORY (INHALATION) at 07:04

## 2017-04-28 RX ADMIN — HYDROCODONE BITARTRATE AND ACETAMINOPHEN 1 TABLET: 5; 325 TABLET ORAL at 09:04

## 2017-04-28 RX ADMIN — ALUMINUM HYDROXIDE, MAGNESIUM HYDROXIDE, AND SIMETHICONE 30 ML: 200; 200; 20 SUSPENSION ORAL at 08:04

## 2017-04-28 RX ADMIN — PRAVASTATIN SODIUM 20 MG: 10 TABLET ORAL at 09:04

## 2017-04-28 RX ADMIN — INSULIN ASPART 6 UNITS: 100 INJECTION, SOLUTION INTRAVENOUS; SUBCUTANEOUS at 05:04

## 2017-04-28 RX ADMIN — LISINOPRIL 2.5 MG: 2.5 TABLET ORAL at 08:04

## 2017-04-28 RX ADMIN — PANTOPRAZOLE SODIUM 40 MG: 40 TABLET, DELAYED RELEASE ORAL at 09:04

## 2017-04-28 RX ADMIN — INSULIN ASPART 12 UNITS: 100 INJECTION, SOLUTION INTRAVENOUS; SUBCUTANEOUS at 09:04

## 2017-04-28 RX ADMIN — HYDROCODONE BITARTRATE AND ACETAMINOPHEN 1 TABLET: 5; 325 TABLET ORAL at 02:04

## 2017-04-28 RX ADMIN — DULOXETINE 60 MG: 30 CAPSULE, DELAYED RELEASE ORAL at 09:04

## 2017-04-28 RX ADMIN — INSULIN ASPART 8 UNITS: 100 INJECTION, SOLUTION INTRAVENOUS; SUBCUTANEOUS at 12:04

## 2017-04-28 RX ADMIN — METOPROLOL SUCCINATE 200 MG: 50 TABLET, EXTENDED RELEASE ORAL at 09:04

## 2017-04-28 RX ADMIN — ASPIRIN 81 MG: 81 TABLET, COATED ORAL at 09:04

## 2017-04-28 RX ADMIN — ALUMINUM HYDROXIDE, MAGNESIUM HYDROXIDE, AND SIMETHICONE 30 ML: 200; 200; 20 SUSPENSION ORAL at 05:04

## 2017-04-28 RX ADMIN — LACTULOSE 20 G: 10 SOLUTION ORAL at 05:04

## 2017-04-28 RX ADMIN — INSULIN ASPART 5 UNITS: 100 INJECTION, SOLUTION INTRAVENOUS; SUBCUTANEOUS at 08:04

## 2017-04-28 RX ADMIN — METHYLPREDNISOLONE SODIUM SUCCINATE 40 MG: 40 INJECTION, POWDER, FOR SOLUTION INTRAMUSCULAR; INTRAVENOUS at 09:04

## 2017-04-28 RX ADMIN — IPRATROPIUM BROMIDE AND ALBUTEROL SULFATE 3 ML: .5; 3 SOLUTION RESPIRATORY (INHALATION) at 01:04

## 2017-04-28 RX ADMIN — PANTOPRAZOLE SODIUM 40 MG: 40 TABLET, DELAYED RELEASE ORAL at 08:04

## 2017-04-28 RX ADMIN — INSULIN ASPART 12 UNITS: 100 INJECTION, SOLUTION INTRAVENOUS; SUBCUTANEOUS at 12:04

## 2017-04-28 RX ADMIN — PREGABALIN 150 MG: 75 CAPSULE ORAL at 09:04

## 2017-04-28 RX ADMIN — BUPROPION HYDROCHLORIDE 200 MG: 100 TABLET, FILM COATED, EXTENDED RELEASE ORAL at 09:04

## 2017-04-28 RX ADMIN — LISINOPRIL 2.5 MG: 2.5 TABLET ORAL at 09:04

## 2017-04-28 RX ADMIN — ENOXAPARIN SODIUM 40 MG: 100 INJECTION SUBCUTANEOUS at 05:04

## 2017-04-28 RX ADMIN — PREGABALIN 150 MG: 75 CAPSULE ORAL at 08:04

## 2017-04-28 RX ADMIN — VARENICLINE TARTRATE 1 MG: 0.5 TABLET, FILM COATED ORAL at 09:04

## 2017-04-28 RX ADMIN — IPRATROPIUM BROMIDE AND ALBUTEROL SULFATE 3 ML: .5; 3 SOLUTION RESPIRATORY (INHALATION) at 07:04

## 2017-04-28 RX ADMIN — INSULIN ASPART 12 UNITS: 100 INJECTION, SOLUTION INTRAVENOUS; SUBCUTANEOUS at 05:04

## 2017-04-28 RX ADMIN — QUETIAPINE FUMARATE 200 MG: 100 TABLET, FILM COATED ORAL at 08:04

## 2017-04-28 RX ADMIN — RAMELTEON 8 MG: 8 TABLET, FILM COATED ORAL at 09:04

## 2017-04-28 RX ADMIN — INSULIN ASPART 10 UNITS: 100 INJECTION, SOLUTION INTRAVENOUS; SUBCUTANEOUS at 05:04

## 2017-04-28 RX ADMIN — IPRATROPIUM BROMIDE AND ALBUTEROL SULFATE 3 ML: .5; 3 SOLUTION RESPIRATORY (INHALATION) at 12:04

## 2017-04-28 RX ADMIN — VARENICLINE TARTRATE 1 MG: 0.5 TABLET, FILM COATED ORAL at 08:04

## 2017-04-28 RX ADMIN — MOXIFLOXACIN HYDROCHLORIDE 400 MG: 400 INJECTION, SOLUTION INTRAVENOUS at 05:04

## 2017-04-28 NOTE — SUBJECTIVE & OBJECTIVE
Interval History: Patient nausea improved, no further vomiting but still c/o abdominal pain. FSGs elevated but improved. Still having severe SOB, hypoxia and severe paroxysms of cough.    Review of Systems   Constitutional: Positive for fatigue.   Respiratory: Positive for cough and shortness of breath.    Cardiovascular: Negative for chest pain and leg swelling.   Gastrointestinal: Positive for abdominal pain and nausea. Negative for vomiting.   Neurological: Negative for weakness.   Psychiatric/Behavioral: Negative for confusion.     Objective:     Vital Signs (Most Recent):  Temp: 98.1 °F (36.7 °C) (04/27/17 1940)  Pulse: 74 (04/27/17 1940)  Resp: 18 (04/27/17 1940)  BP: 129/89 (04/27/17 1940)  SpO2: (!) 94 % (04/27/17 1940) Vital Signs (24h Range):  Temp:  [97.6 °F (36.4 °C)-98.4 °F (36.9 °C)] 98.1 °F (36.7 °C)  Pulse:  [72-80] 74  Resp:  [18-20] 18  SpO2:  [92 %-96 %] 94 %  BP: (116-147)/(73-89) 129/89     Weight: 70.3 kg (155 lb)  Body mass index is 28.35 kg/(m^2).  No intake or output data in the 24 hours ending 04/27/17 1946   Physical Exam   Constitutional: She is oriented to person, place, and time. She appears well-developed and well-nourished.   HENT:   Head: Normocephalic and atraumatic.   Eyes: EOM are normal. Pupils are equal, round, and reactive to light.   Neck: Neck supple. No JVD present.   Cardiovascular: Normal rate and regular rhythm.  Exam reveals no gallop and no friction rub.    No murmur heard.  Pulmonary/Chest: She has wheezes. She has no rales.   Increased resp effort noted   Abdominal: Soft. Bowel sounds are normal. She exhibits no distension. There is tenderness.   Musculoskeletal: She exhibits no edema or tenderness.   Lymphadenopathy:     She has no cervical adenopathy.   Neurological: She is alert and oriented to person, place, and time. She has normal reflexes. No cranial nerve deficit.   Skin: No rash noted. No erythema.   Psychiatric: She has a normal mood and affect.   Nursing  note and vitals reviewed.      Significant Labs: All pertinent labs within the past 24 hours have been reviewed.    Significant Imaging: I have reviewed all pertinent imaging results/findings within the past 24 hours.

## 2017-04-28 NOTE — PROGRESS NOTES
04/28/17 1200   Home Oxygen Qualification   Room Air SpO2 At Rest 90 %   Room Air SpO2 on Exertion (!) 86 %   Exertion O2 LPM 2 LPM   SpO2 on Recovery 94 %

## 2017-04-28 NOTE — PLAN OF CARE
Problem: Patient Care Overview  Goal: Plan of Care Review  Outcome: Ongoing (interventions implemented as appropriate)  Patient remained free of falls this shift; remained resting with bed in lowest position, call light in reach, SR raised, NAD. Attempted weaning from supplemental O2, SaO2 drops to mid to high 80s on room air; patient on 2L O2 at this time. Patient c/o constipation, lactulose administered; no BM at this time.

## 2017-04-28 NOTE — ASSESSMENT & PLAN NOTE
Patient's FSGs are not controlled on current hypoglycemics.   Last A1c reviewed-   Lab Results   Component Value Date    HGBA1C 7.7 (H) 12/06/2015     Most recent fingerstick glucose reviewed-     Recent Labs  Lab 04/27/17  1629   POCTGLUCOSE 302*     Will monitor FSGs regularly, adjust insulin and treat with sliding scale as needed.  Increase basal/bolus and monitor response.

## 2017-04-28 NOTE — PROGRESS NOTES
[facility] Medicine  Progress Note    Patient Name: Chayito Chung  MRN: [id number]  Patient Class: IP- Inpatient   Admission Date: 4/23/2017  Length of Stay: 4 days  Attending Physician: Alphonse Valladares MD  Primary Care Provider: Sidney Delgado MD        Subjective:     Principal Problem:COPD exacerbation    HPI:  66-year-old female with a history of CAD hypertension diabetes asthma and COPD presents to the ER with a productive cough since Thursday.  Symptoms are similar to a prior episode of pneumonia.  Fever 102 last night.  Shortness of breath is at baseline and is not any worse than usual.  Also complaining of a runny nose congestion and sneezing.  No myalgias no vomiting or diarrhea no chest pain or diaphoresis. No aggravating or alleviating factors. Patient was coughing uncontrollably in ED and in resp distress. She improved with albuterol and steroids, but was not back to baseline and was admitted for COPD exacerbation.    Hospital Course:       Interval History: Patient nausea improved, no further vomiting but still c/o abdominal pain. FSGs elevated but improved. Still having severe SOB, hypoxia and severe paroxysms of cough.    Review of Systems   Constitutional: Positive for fatigue.   Respiratory: Positive for cough and shortness of breath.    Cardiovascular: Negative for chest pain and leg swelling.   Gastrointestinal: Positive for abdominal pain and nausea. Negative for vomiting.   Neurological: Negative for weakness.   Psychiatric/Behavioral: Negative for confusion.     Objective:     Vital Signs (Most Recent):  Temp: 98.1 °F (36.7 °C) (04/27/17 1940)  Pulse: 74 (04/27/17 1940)  Resp: 18 (04/27/17 1940)  BP: 129/89 (04/27/17 1940)  SpO2: (!) 94 % (04/27/17 1940) Vital Signs (24h Range):  Temp:  [97.6 °F (36.4 °C)-98.4 °F (36.9 °C)] 98.1 °F (36.7 °C)  Pulse:  [72-80] 74  Resp:  [18-20] 18  SpO2:  [92 %-96 %] 94 %  BP: (116-147)/(73-89) 129/89     Weight: 70.3 kg (155 lb)  Body  mass index is 28.35 kg/(m^2).  No intake or output data in the 24 hours ending 04/27/17 1946   Physical Exam   Constitutional: She is oriented to person, place, and time. She appears well-developed and well-nourished.   HENT:   Head: Normocephalic and atraumatic.   Eyes: EOM are normal. Pupils are equal, round, and reactive to light.   Neck: Neck supple. No JVD present.   Cardiovascular: Normal rate and regular rhythm.  Exam reveals no gallop and no friction rub.    No murmur heard.  Pulmonary/Chest: She has wheezes. She has no rales.   Increased resp effort noted   Abdominal: Soft. Bowel sounds are normal. She exhibits no distension. There is tenderness.   Musculoskeletal: She exhibits no edema or tenderness.   Lymphadenopathy:     She has no cervical adenopathy.   Neurological: She is alert and oriented to person, place, and time. She has normal reflexes. No cranial nerve deficit.   Skin: No rash noted. No erythema.   Psychiatric: She has a normal mood and affect.   Nursing note and vitals reviewed.      Significant Labs: All pertinent labs within the past 24 hours have been reviewed.    Significant Imaging: I have reviewed all pertinent imaging results/findings within the past 24 hours.    Assessment/Plan:      * COPD exacerbation  Severe, with continued hypoxia. Ordered scheduled nebs/steroids/antibiotics and cough suppressants. Monitor status and wean oxygen to sats >90%. Low threshold for BIPAP.      CAD (coronary artery disease), stent 4/2012  Patient with known CAD s/p stent placement. Will continue Aspirin and Statin and monitor for S/Sx of angina/ACS. Continue to monitor on telemetry.         Hyperlipidemia   Patient is chronically on statin. Will continue for now. Monitor clinically. Last LDL was   Lab Results   Component Value Date    LDLCALC 110.2 12/06/2015            Tobacco abuse, 1ppd, 46 years  Dangers of cigarette smoking were reviewed with patient in detail and patient was encouraged to quit.  Nicotine replacement options were discussed.        Major depressive disorder in partial remission  Chronic problem. Will continue chronic medication(s), Wellbutrin and Chantix and monitor for any changes, adjusting as needed.         Migraines  Chronic problem. Will continue chronic medication(s), Lyrica and monitor for any changes, adjusting as needed.         Uncontrolled type 2 diabetes mellitus with hyperglycemia, with long-term current use of insulin  Patient's FSGs are not controlled on current hypoglycemics.   Last A1c reviewed-   Lab Results   Component Value Date    HGBA1C 7.7 (H) 12/06/2015     Most recent fingerstick glucose reviewed-     Recent Labs  Lab 04/27/17  1629   POCTGLUCOSE 302*     Will monitor FSGs regularly, adjust insulin and treat with sliding scale as needed.  Increase basal/bolus and monitor response.       HTN (hypertension)  Chronic, controlled.  Continue home regimen monitoring BP closely.  Will titrate BP medications as needed for sustained BP control.        Steroid-induced gastritis  Improving. Will continue bid PPI and maalox and monitor clinical response.      VTE Risk Mitigation         Ordered     enoxaparin injection 40 mg  Daily     Route:  Subcutaneous        04/23/17 1558     Medium Risk of VTE  Once      04/23/17 1558          Alphonse Valladares MD  Department of Hospital Medicine   [facility]

## 2017-04-28 NOTE — PLAN OF CARE
Problem: Patient Care Overview  Goal: Plan of Care Review  Outcome: Ongoing (interventions implemented as appropriate)  Sergio aerosol tx well BBS decreased O2 eval done per RN O2 on @ 2l

## 2017-04-28 NOTE — PROGRESS NOTES
Informed Dr Valladares that patient qualifies for home oxygen.    Dr Valladares stated that patient is not cleared for discharge today and he will have the home o2 eval repeated tomorrow.

## 2017-04-28 NOTE — PLAN OF CARE
04/27/17 1940   Patient Assessment/Suction   Level of Consciousness (AVPU) alert   Respiratory Effort Normal;Unlabored   Expansion/Accessory Muscles/Retractions no retractions;no use of accessory muscles   All Lung Fields Breath Sounds coarse   Cough Type good;nonproductive   PRE-TX-O2-ETCO2   O2 Device (Oxygen Therapy) nasal cannula   Flow (L/min) 2   Oxygen Concentration (%) 28   SpO2 (!) 94 %   Pulse Oximetry Type Intermittent   Pulse 74   Resp 18   Temp 98.1 °F (36.7 °C)   /89   Aerosol Therapy   $ Aerosol Therapy Charges Aerosol Treatment   Respiratory Treatment Status given   SVN/Inhaler Treatment Route mask;with oxygen   Position During Treatment HOB at 45 degrees   Patient Tolerance good   Post-Treatment   Post-treatment Heart Rate (beats/min) 72   Post-treatment Resp Rate (breaths/min) 16   All Fields Breath Sounds unchanged   Ready to Wean/Extubation Screen   FIO2<60 (chart decimal) 0.28

## 2017-04-29 LAB
ALBUMIN SERPL BCP-MCNC: 2.8 G/DL
ALP SERPL-CCNC: 105 U/L
ALT SERPL W/O P-5'-P-CCNC: 36 U/L
ANION GAP SERPL CALC-SCNC: 8 MMOL/L
AST SERPL-CCNC: 19 U/L
BASOPHILS # BLD AUTO: 0 K/UL
BASOPHILS NFR BLD: 0.3 %
BILIRUB SERPL-MCNC: 0.3 MG/DL
BUN SERPL-MCNC: 12 MG/DL
CALCIUM SERPL-MCNC: 9.1 MG/DL
CHLORIDE SERPL-SCNC: 104 MMOL/L
CO2 SERPL-SCNC: 23 MMOL/L
CREAT SERPL-MCNC: 0.7 MG/DL
CRP SERPL-MCNC: 7.6 MG/L
DIFFERENTIAL METHOD: ABNORMAL
EOSINOPHIL # BLD AUTO: 0 K/UL
EOSINOPHIL NFR BLD: 0.1 %
ERYTHROCYTE [DISTWIDTH] IN BLOOD BY AUTOMATED COUNT: 17.1 %
EST. GFR  (AFRICAN AMERICAN): >60 ML/MIN/1.73 M^2
EST. GFR  (NON AFRICAN AMERICAN): >60 ML/MIN/1.73 M^2
GLUCOSE SERPL-MCNC: 273 MG/DL
HCT VFR BLD AUTO: 32.6 %
HGB BLD-MCNC: 10.3 G/DL
LYMPHOCYTES # BLD AUTO: 1.2 K/UL
LYMPHOCYTES NFR BLD: 13.1 %
MAGNESIUM SERPL-MCNC: 1.7 MG/DL
MCH RBC QN AUTO: 25.9 PG
MCHC RBC AUTO-ENTMCNC: 31.8 %
MCV RBC AUTO: 82 FL
MONOCYTES # BLD AUTO: 0.4 K/UL
MONOCYTES NFR BLD: 4.3 %
NEUTROPHILS # BLD AUTO: 7.6 K/UL
NEUTROPHILS NFR BLD: 82.2 %
PHOSPHATE SERPL-MCNC: 3.6 MG/DL
PLATELET # BLD AUTO: 250 K/UL
PMV BLD AUTO: 7.4 FL
POCT GLUCOSE: 262 MG/DL (ref 70–110)
POCT GLUCOSE: 273 MG/DL (ref 70–110)
POCT GLUCOSE: 303 MG/DL (ref 70–110)
POCT GLUCOSE: 333 MG/DL (ref 70–110)
POTASSIUM SERPL-SCNC: 4.3 MMOL/L
PROT SERPL-MCNC: 6.4 G/DL
RBC # BLD AUTO: 3.99 M/UL
SODIUM SERPL-SCNC: 135 MMOL/L
WBC # BLD AUTO: 9.2 K/UL

## 2017-04-29 PROCEDURE — 83735 ASSAY OF MAGNESIUM: CPT

## 2017-04-29 PROCEDURE — 63600175 PHARM REV CODE 636 W HCPCS: Performed by: HOSPITALIST

## 2017-04-29 PROCEDURE — 25000242 PHARM REV CODE 250 ALT 637 W/ HCPCS: Performed by: HOSPITALIST

## 2017-04-29 PROCEDURE — 86140 C-REACTIVE PROTEIN: CPT

## 2017-04-29 PROCEDURE — 12000002 HC ACUTE/MED SURGE SEMI-PRIVATE ROOM

## 2017-04-29 PROCEDURE — 94640 AIRWAY INHALATION TREATMENT: CPT

## 2017-04-29 PROCEDURE — 94761 N-INVAS EAR/PLS OXIMETRY MLT: CPT

## 2017-04-29 PROCEDURE — 80053 COMPREHEN METABOLIC PANEL: CPT

## 2017-04-29 PROCEDURE — 36415 COLL VENOUS BLD VENIPUNCTURE: CPT

## 2017-04-29 PROCEDURE — 25000003 PHARM REV CODE 250: Performed by: HOSPITALIST

## 2017-04-29 PROCEDURE — 27000221 HC OXYGEN, UP TO 24 HOURS

## 2017-04-29 PROCEDURE — 85025 COMPLETE CBC W/AUTO DIFF WBC: CPT

## 2017-04-29 PROCEDURE — 84100 ASSAY OF PHOSPHORUS: CPT

## 2017-04-29 RX ORDER — MOXIFLOXACIN HYDROCHLORIDE 400 MG/1
400 TABLET ORAL DAILY
Status: DISCONTINUED | OUTPATIENT
Start: 2017-04-29 | End: 2017-05-01

## 2017-04-29 RX ADMIN — MOXIFLOXACIN HYDROCHLORIDE 400 MG: 400 TABLET, FILM COATED ORAL at 12:04

## 2017-04-29 RX ADMIN — PRAVASTATIN SODIUM 20 MG: 10 TABLET ORAL at 08:04

## 2017-04-29 RX ADMIN — HYDROCODONE BITARTRATE AND ACETAMINOPHEN 1 TABLET: 5; 325 TABLET ORAL at 04:04

## 2017-04-29 RX ADMIN — IPRATROPIUM BROMIDE AND ALBUTEROL SULFATE 3 ML: .5; 3 SOLUTION RESPIRATORY (INHALATION) at 07:04

## 2017-04-29 RX ADMIN — PANTOPRAZOLE SODIUM 40 MG: 40 TABLET, DELAYED RELEASE ORAL at 08:04

## 2017-04-29 RX ADMIN — VARENICLINE TARTRATE 1 MG: 0.5 TABLET, FILM COATED ORAL at 09:04

## 2017-04-29 RX ADMIN — QUETIAPINE FUMARATE 200 MG: 100 TABLET, FILM COATED ORAL at 09:04

## 2017-04-29 RX ADMIN — PANTOPRAZOLE SODIUM 40 MG: 40 TABLET, DELAYED RELEASE ORAL at 09:04

## 2017-04-29 RX ADMIN — ALUMINUM HYDROXIDE, MAGNESIUM HYDROXIDE, AND SIMETHICONE 30 ML: 200; 200; 20 SUSPENSION ORAL at 04:04

## 2017-04-29 RX ADMIN — INSULIN ASPART 12 UNITS: 100 INJECTION, SOLUTION INTRAVENOUS; SUBCUTANEOUS at 04:04

## 2017-04-29 RX ADMIN — LISINOPRIL 2.5 MG: 2.5 TABLET ORAL at 08:04

## 2017-04-29 RX ADMIN — METHYLPREDNISOLONE SODIUM SUCCINATE 40 MG: 40 INJECTION, POWDER, FOR SOLUTION INTRAMUSCULAR; INTRAVENOUS at 09:04

## 2017-04-29 RX ADMIN — INSULIN ASPART 12 UNITS: 100 INJECTION, SOLUTION INTRAVENOUS; SUBCUTANEOUS at 08:04

## 2017-04-29 RX ADMIN — ENOXAPARIN SODIUM 40 MG: 100 INJECTION SUBCUTANEOUS at 04:04

## 2017-04-29 RX ADMIN — PREGABALIN 150 MG: 75 CAPSULE ORAL at 09:04

## 2017-04-29 RX ADMIN — IPRATROPIUM BROMIDE AND ALBUTEROL SULFATE 3 ML: .5; 3 SOLUTION RESPIRATORY (INHALATION) at 12:04

## 2017-04-29 RX ADMIN — INSULIN ASPART 6 UNITS: 100 INJECTION, SOLUTION INTRAVENOUS; SUBCUTANEOUS at 05:04

## 2017-04-29 RX ADMIN — ASPIRIN 81 MG: 81 TABLET, COATED ORAL at 08:04

## 2017-04-29 RX ADMIN — HYDROCODONE POLISTIREX AND CHLORPHENIRAMINE POLISITREX 5 ML: 10; 8 SUSPENSION, EXTENDED RELEASE ORAL at 08:04

## 2017-04-29 RX ADMIN — INSULIN ASPART 4 UNITS: 100 INJECTION, SOLUTION INTRAVENOUS; SUBCUTANEOUS at 09:04

## 2017-04-29 RX ADMIN — RAMELTEON 8 MG: 8 TABLET, FILM COATED ORAL at 09:04

## 2017-04-29 RX ADMIN — METOPROLOL SUCCINATE 200 MG: 50 TABLET, EXTENDED RELEASE ORAL at 08:04

## 2017-04-29 RX ADMIN — INSULIN ASPART 8 UNITS: 100 INJECTION, SOLUTION INTRAVENOUS; SUBCUTANEOUS at 04:04

## 2017-04-29 RX ADMIN — BUPROPION HYDROCHLORIDE 200 MG: 100 TABLET, FILM COATED, EXTENDED RELEASE ORAL at 08:04

## 2017-04-29 RX ADMIN — ALUMINUM HYDROXIDE, MAGNESIUM HYDROXIDE, AND SIMETHICONE 30 ML: 200; 200; 20 SUSPENSION ORAL at 05:04

## 2017-04-29 RX ADMIN — DULOXETINE 60 MG: 30 CAPSULE, DELAYED RELEASE ORAL at 08:04

## 2017-04-29 RX ADMIN — INSULIN ASPART 12 UNITS: 100 INJECTION, SOLUTION INTRAVENOUS; SUBCUTANEOUS at 11:04

## 2017-04-29 RX ADMIN — LISINOPRIL 2.5 MG: 2.5 TABLET ORAL at 09:04

## 2017-04-29 RX ADMIN — ALUMINUM HYDROXIDE, MAGNESIUM HYDROXIDE, AND SIMETHICONE 30 ML: 200; 200; 20 SUSPENSION ORAL at 10:04

## 2017-04-29 RX ADMIN — FLUTICASONE FUROATE AND VILANTEROL TRIFENATATE 1 PUFF: 100; 25 POWDER RESPIRATORY (INHALATION) at 07:04

## 2017-04-29 RX ADMIN — PREGABALIN 150 MG: 75 CAPSULE ORAL at 08:04

## 2017-04-29 RX ADMIN — VARENICLINE TARTRATE 1 MG: 0.5 TABLET, FILM COATED ORAL at 08:04

## 2017-04-29 RX ADMIN — INSULIN ASPART 6 UNITS: 100 INJECTION, SOLUTION INTRAVENOUS; SUBCUTANEOUS at 11:04

## 2017-04-29 NOTE — PLAN OF CARE
04/28/17 1934   Patient Assessment/Suction   Level of Consciousness (AVPU) alert   All Lung Fields Breath Sounds coarse;diminished   PRE-TX-O2-ETCO2   O2 Device (Oxygen Therapy) nasal cannula   Flow (L/min) 2   SpO2 95 %   Pulse Oximetry Type Intermittent   $ Pulse Oximetry - Multiple Charge Pulse Oximetry - Multiple   Pulse 73   Resp 16   Aerosol Therapy   $ Aerosol Therapy Charges Aerosol Treatment   Respiratory Treatment Status given   SVN/Inhaler Treatment Route mask   Position During Treatment HOB at 30 degrees   Patient Tolerance good   Post-Treatment   Post-treatment Heart Rate (beats/min) 73   Post-treatment Resp Rate (breaths/min) 16   All Fields Breath Sounds unchanged

## 2017-04-29 NOTE — SUBJECTIVE & OBJECTIVE
Interval History: Patient nausea improved, no further vomiting but still c/o abdominal pain. FSGs elevated. Still having severe SOB, hypoxia and severe paroxysms of cough.    Review of Systems   Constitutional: Positive for fatigue.   Respiratory: Positive for cough and shortness of breath.    Cardiovascular: Negative for chest pain and leg swelling.   Gastrointestinal: Positive for abdominal pain and nausea. Negative for vomiting.   Neurological: Negative for weakness.   Psychiatric/Behavioral: Negative for confusion.     Objective:     Vital Signs (Most Recent):  Temp: 98 °F (36.7 °C) (04/28/17 2010)  Pulse: 72 (04/28/17 2010)  Resp: 18 (04/28/17 2010)  BP: (!) 143/98 (04/28/17 2010)  SpO2: (!) 93 % (04/28/17 2010) Vital Signs (24h Range):  Temp:  [97.7 °F (36.5 °C)-98.2 °F (36.8 °C)] 98 °F (36.7 °C)  Pulse:  [63-89] 72  Resp:  [14-18] 18  SpO2:  [92 %-99 %] 93 %  BP: (134-148)/(68-98) 143/98     Weight: 70.3 kg (155 lb)  Body mass index is 28.35 kg/(m^2).    Intake/Output Summary (Last 24 hours) at 04/28/17 2111  Last data filed at 04/28/17 1800   Gross per 24 hour   Intake              600 ml   Output                0 ml   Net              600 ml      Physical Exam   Constitutional: She is oriented to person, place, and time. She appears well-developed and well-nourished.   HENT:   Head: Normocephalic and atraumatic.   Eyes: EOM are normal. Pupils are equal, round, and reactive to light.   Neck: Neck supple. No JVD present.   Cardiovascular: Normal rate and regular rhythm.  Exam reveals no gallop and no friction rub.    No murmur heard.  Pulmonary/Chest: She has wheezes. She has no rales.   Increased resp effort noted   Abdominal: Soft. Bowel sounds are normal. She exhibits no distension. There is tenderness.   Musculoskeletal: She exhibits no edema or tenderness.   Lymphadenopathy:     She has no cervical adenopathy.   Neurological: She is alert and oriented to person, place, and time. She has normal reflexes.  No cranial nerve deficit.   Skin: No rash noted. No erythema.   Psychiatric: She has a normal mood and affect.   Nursing note and vitals reviewed.      Significant Labs: All pertinent labs within the past 24 hours have been reviewed.    Significant Imaging: I have reviewed all pertinent imaging results/findings within the past 24 hours.

## 2017-04-29 NOTE — PLAN OF CARE
Problem: Patient Care Overview  Goal: Plan of Care Review  Outcome: Ongoing (interventions implemented as appropriate)  Pt rec neb treatments with duoneb and breo mdi with O2 @ 2lpm nc. BS diminished roseann with HR 68 bpm and 93% sats.

## 2017-04-29 NOTE — PLAN OF CARE
Problem: Patient Care Overview  Goal: Plan of Care Review  Outcome: Ongoing (interventions implemented as appropriate)  Asleep most of the shift. Denies complaint of pain or discomfort.   Bowel movent x2 noted. Pt refuses lactulose. O2 2l nc in place  o2 sats 93- 95%. Fall and injury free this shift. Bed in low position  And locked. Side rails up x 2, call bell in reach.  Telemetry

## 2017-04-29 NOTE — ASSESSMENT & PLAN NOTE
Patient's FSGs are not controlled on current hypoglycemics.   Last A1c reviewed-   Lab Results   Component Value Date    HGBA1C 7.7 (H) 12/06/2015     Most recent fingerstick glucose reviewed-     Recent Labs  Lab 04/28/17 2012   POCTGLUCOSE 389*     Will monitor FSGs regularly, adjust insulin and treat with sliding scale as needed.  Increase basal/bolus and monitor response.  Discuss with patient about dietary compliance (Found candy in her room)

## 2017-04-29 NOTE — PROGRESS NOTES
[facility]  Progress Note    Patient Name: Chayito Chung  MRN: [id number]  Patient Class: IP- Inpatient   Admission Date: 4/23/2017  Length of Stay: 5 days  Attending Physician: Alphonse Valladares MD  Primary Care Provider: Sidney Delgado MD        Subjective:     Principal Problem:COPD exacerbation    HPI:  66-year-old female with a history of CAD hypertension diabetes asthma and COPD presents to the ER with a productive cough since Thursday.  Symptoms are similar to a prior episode of pneumonia.  Fever 102 last night.  Shortness of breath is at baseline and is not any worse than usual.  Also complaining of a runny nose congestion and sneezing.  No myalgias no vomiting or diarrhea no chest pain or diaphoresis. No aggravating or alleviating factors. Patient was coughing uncontrollably in ED and in resp distress. She improved with albuterol and steroids, but was not back to baseline and was admitted for COPD exacerbation.    Interval History: Patient nausea improved, no further vomiting but still c/o abdominal pain. FSGs elevated. Still having severe SOB, hypoxia and severe paroxysms of cough. CXR done which shows developing RLL pneumonia.    Review of Systems   Constitutional: Positive for fatigue.   Respiratory: Positive for cough and shortness of breath.    Cardiovascular: Negative for chest pain and leg swelling.   Gastrointestinal: Positive for abdominal pain and nausea. Negative for vomiting.   Neurological: Negative for weakness.   Psychiatric/Behavioral: Negative for confusion.     Objective:     Vital Signs (Most Recent):  Temp: 98 °F (36.7 °C) (04/28/17 2010)  Pulse: 72 (04/28/17 2010)  Resp: 18 (04/28/17 2010)  BP: (!) 143/98 (04/28/17 2010)  SpO2: (!) 93 % (04/28/17 2010) Vital Signs (24h Range):  Temp:  [97.7 °F (36.5 °C)-98.2 °F (36.8 °C)] 98 °F (36.7 °C)  Pulse:  [63-89] 72  Resp:  [14-18] 18  SpO2:  [92 %-99 %] 93 %  BP: (134-148)/(68-98) 143/98     Weight: 70.3 kg (155  lb)  Body mass index is 28.35 kg/(m^2).    Intake/Output Summary (Last 24 hours) at 04/28/17 2111  Last data filed at 04/28/17 1800   Gross per 24 hour   Intake              600 ml   Output                0 ml   Net              600 ml      Physical Exam   Constitutional: She is oriented to person, place, and time. She appears well-developed and well-nourished.   HENT:   Head: Normocephalic and atraumatic.   Eyes: EOM are normal. Pupils are equal, round, and reactive to light.   Neck: Neck supple. No JVD present.   Cardiovascular: Normal rate and regular rhythm.  Exam reveals no gallop and no friction rub.    No murmur heard.  Pulmonary/Chest: She has wheezes. She has no rales.   Increased resp effort noted   Abdominal: Soft. Bowel sounds are normal. She exhibits no distension. There is tenderness.   Musculoskeletal: She exhibits no edema or tenderness.   Lymphadenopathy:     She has no cervical adenopathy.   Neurological: She is alert and oriented to person, place, and time. She has normal reflexes. No cranial nerve deficit.   Skin: No rash noted. No erythema.   Psychiatric: She has a normal mood and affect.   Nursing note and vitals reviewed.      Significant Labs: All pertinent labs within the past 24 hours have been reviewed.    Significant Imaging: I have reviewed all pertinent imaging results/findings within the past 24 hours.    Assessment/Plan:      * COPD exacerbation  Severe, with continued hypoxia. Ordered scheduled nebs/steroids/antibiotics and cough suppressants. Monitor status and wean oxygen to sats >90%. Low threshold for BIPAP.      CAD (coronary artery disease), stent 4/2012  Patient with known CAD s/p stent placement. Will continue Aspirin and Statin and monitor for S/Sx of angina/ACS. Continue to monitor on telemetry.         Hyperlipidemia   Patient is chronically on statin. Will continue for now. Monitor clinically. Last LDL was   Lab Results   Component Value Date    LDLCALC 110.2 12/06/2015             Tobacco abuse, 1ppd, 46 years  Dangers of cigarette smoking were reviewed with patient in detail and patient was encouraged to quit. Nicotine replacement options were discussed.        Major depressive disorder in partial remission  Chronic problem. Will continue chronic medication(s), Wellbutrin and Chantix and monitor for any changes, adjusting as needed.         Migraines  Chronic problem. Will continue chronic medication(s), Lyrica and monitor for any changes, adjusting as needed.         Uncontrolled type 2 diabetes mellitus with hyperglycemia, with long-term current use of insulin  Patient's FSGs are not controlled on current hypoglycemics.   Last A1c reviewed-   Lab Results   Component Value Date    HGBA1C 7.7 (H) 12/06/2015     Most recent fingerstick glucose reviewed-     Recent Labs  Lab 04/28/17 2012   POCTGLUCOSE 389*     Will monitor FSGs regularly, adjust insulin and treat with sliding scale as needed.  Increase basal/bolus and monitor response.  Discuss with patient about dietary compliance (Found candy in her room)      HTN (hypertension)  Chronic, controlled.  Continue home regimen monitoring BP closely.  Will titrate BP medications as needed for sustained BP control.        Steroid-induced gastritis  Improving. Will continue bid PPI and maalox and monitor clinical response.      VTE Risk Mitigation         Ordered     enoxaparin injection 40 mg  Daily     Route:  Subcutaneous        04/23/17 1558     Medium Risk of VTE  Once      04/23/17 5207          Alphonse Valladares MD  Department of Hospital Medicine   [facility]

## 2017-04-30 LAB
ALBUMIN SERPL BCP-MCNC: 2.8 G/DL
ALP SERPL-CCNC: 126 U/L
ALT SERPL W/O P-5'-P-CCNC: 56 U/L
ANION GAP SERPL CALC-SCNC: 12 MMOL/L
AST SERPL-CCNC: 34 U/L
BASOPHILS # BLD AUTO: 0 K/UL
BASOPHILS NFR BLD: 0.2 %
BILIRUB SERPL-MCNC: 0.2 MG/DL
BUN SERPL-MCNC: 17 MG/DL
CALCIUM SERPL-MCNC: 9 MG/DL
CHLORIDE SERPL-SCNC: 101 MMOL/L
CO2 SERPL-SCNC: 22 MMOL/L
CREAT SERPL-MCNC: 0.9 MG/DL
DIFFERENTIAL METHOD: ABNORMAL
EOSINOPHIL # BLD AUTO: 0 K/UL
EOSINOPHIL NFR BLD: 0.1 %
ERYTHROCYTE [DISTWIDTH] IN BLOOD BY AUTOMATED COUNT: 17.4 %
EST. GFR  (AFRICAN AMERICAN): >60 ML/MIN/1.73 M^2
EST. GFR  (NON AFRICAN AMERICAN): >60 ML/MIN/1.73 M^2
GLUCOSE SERPL-MCNC: 550 MG/DL
HCT VFR BLD AUTO: 34.2 %
HGB BLD-MCNC: 10.9 G/DL
LYMPHOCYTES # BLD AUTO: 1 K/UL
LYMPHOCYTES NFR BLD: 10.2 %
MAGNESIUM SERPL-MCNC: 1.9 MG/DL
MCH RBC QN AUTO: 25.9 PG
MCHC RBC AUTO-ENTMCNC: 31.7 %
MCV RBC AUTO: 82 FL
MONOCYTES # BLD AUTO: 0.6 K/UL
MONOCYTES NFR BLD: 6.3 %
NEUTROPHILS # BLD AUTO: 7.7 K/UL
NEUTROPHILS NFR BLD: 83.2 %
PHOSPHATE SERPL-MCNC: 4.5 MG/DL
PLATELET # BLD AUTO: 263 K/UL
PMV BLD AUTO: 7.8 FL
POCT GLUCOSE: 330 MG/DL (ref 70–110)
POCT GLUCOSE: 347 MG/DL (ref 70–110)
POCT GLUCOSE: 349 MG/DL (ref 70–110)
POTASSIUM SERPL-SCNC: 4.3 MMOL/L
PROT SERPL-MCNC: 6.2 G/DL
RBC # BLD AUTO: 4.2 M/UL
SODIUM SERPL-SCNC: 135 MMOL/L
WBC # BLD AUTO: 9.3 K/UL

## 2017-04-30 PROCEDURE — 25000242 PHARM REV CODE 250 ALT 637 W/ HCPCS: Performed by: HOSPITALIST

## 2017-04-30 PROCEDURE — 84100 ASSAY OF PHOSPHORUS: CPT

## 2017-04-30 PROCEDURE — 83735 ASSAY OF MAGNESIUM: CPT

## 2017-04-30 PROCEDURE — 27000221 HC OXYGEN, UP TO 24 HOURS

## 2017-04-30 PROCEDURE — 63600175 PHARM REV CODE 636 W HCPCS: Performed by: HOSPITALIST

## 2017-04-30 PROCEDURE — 94640 AIRWAY INHALATION TREATMENT: CPT

## 2017-04-30 PROCEDURE — 99900035 HC TECH TIME PER 15 MIN (STAT)

## 2017-04-30 PROCEDURE — 85025 COMPLETE CBC W/AUTO DIFF WBC: CPT

## 2017-04-30 PROCEDURE — 94761 N-INVAS EAR/PLS OXIMETRY MLT: CPT

## 2017-04-30 PROCEDURE — 36415 COLL VENOUS BLD VENIPUNCTURE: CPT

## 2017-04-30 PROCEDURE — 80053 COMPREHEN METABOLIC PANEL: CPT

## 2017-04-30 PROCEDURE — 25000003 PHARM REV CODE 250: Performed by: HOSPITALIST

## 2017-04-30 PROCEDURE — 12000002 HC ACUTE/MED SURGE SEMI-PRIVATE ROOM

## 2017-04-30 RX ADMIN — VARENICLINE TARTRATE 1 MG: 0.5 TABLET, FILM COATED ORAL at 09:04

## 2017-04-30 RX ADMIN — ALUMINUM HYDROXIDE, MAGNESIUM HYDROXIDE, AND SIMETHICONE 30 ML: 200; 200; 20 SUSPENSION ORAL at 06:04

## 2017-04-30 RX ADMIN — BUPROPION HYDROCHLORIDE 200 MG: 100 TABLET, FILM COATED, EXTENDED RELEASE ORAL at 08:04

## 2017-04-30 RX ADMIN — IPRATROPIUM BROMIDE AND ALBUTEROL SULFATE 3 ML: .5; 3 SOLUTION RESPIRATORY (INHALATION) at 07:04

## 2017-04-30 RX ADMIN — INSULIN ASPART 10 UNITS: 100 INJECTION, SOLUTION INTRAVENOUS; SUBCUTANEOUS at 06:04

## 2017-04-30 RX ADMIN — INSULIN ASPART 12 UNITS: 100 INJECTION, SOLUTION INTRAVENOUS; SUBCUTANEOUS at 08:04

## 2017-04-30 RX ADMIN — QUETIAPINE FUMARATE 200 MG: 100 TABLET, FILM COATED ORAL at 09:04

## 2017-04-30 RX ADMIN — METOPROLOL SUCCINATE 200 MG: 50 TABLET, EXTENDED RELEASE ORAL at 08:04

## 2017-04-30 RX ADMIN — INSULIN ASPART 12 UNITS: 100 INJECTION, SOLUTION INTRAVENOUS; SUBCUTANEOUS at 06:04

## 2017-04-30 RX ADMIN — PANTOPRAZOLE SODIUM 40 MG: 40 TABLET, DELAYED RELEASE ORAL at 08:04

## 2017-04-30 RX ADMIN — INSULIN ASPART 12 UNITS: 100 INJECTION, SOLUTION INTRAVENOUS; SUBCUTANEOUS at 11:04

## 2017-04-30 RX ADMIN — INSULIN ASPART 4 UNITS: 100 INJECTION, SOLUTION INTRAVENOUS; SUBCUTANEOUS at 09:04

## 2017-04-30 RX ADMIN — IPRATROPIUM BROMIDE AND ALBUTEROL SULFATE 3 ML: .5; 3 SOLUTION RESPIRATORY (INHALATION) at 01:04

## 2017-04-30 RX ADMIN — ALUMINUM HYDROXIDE, MAGNESIUM HYDROXIDE, AND SIMETHICONE 30 ML: 200; 200; 20 SUSPENSION ORAL at 10:04

## 2017-04-30 RX ADMIN — LISINOPRIL 2.5 MG: 2.5 TABLET ORAL at 09:04

## 2017-04-30 RX ADMIN — INSULIN ASPART 8 UNITS: 100 INJECTION, SOLUTION INTRAVENOUS; SUBCUTANEOUS at 06:04

## 2017-04-30 RX ADMIN — INSULIN ASPART 8 UNITS: 100 INJECTION, SOLUTION INTRAVENOUS; SUBCUTANEOUS at 11:04

## 2017-04-30 RX ADMIN — PANTOPRAZOLE SODIUM 40 MG: 40 TABLET, DELAYED RELEASE ORAL at 09:04

## 2017-04-30 RX ADMIN — VARENICLINE TARTRATE 1 MG: 0.5 TABLET, FILM COATED ORAL at 08:04

## 2017-04-30 RX ADMIN — HYDROCODONE BITARTRATE AND ACETAMINOPHEN 1 TABLET: 5; 325 TABLET ORAL at 08:04

## 2017-04-30 RX ADMIN — LISINOPRIL 2.5 MG: 2.5 TABLET ORAL at 08:04

## 2017-04-30 RX ADMIN — ASPIRIN 81 MG: 81 TABLET, COATED ORAL at 08:04

## 2017-04-30 RX ADMIN — PREGABALIN 150 MG: 75 CAPSULE ORAL at 09:04

## 2017-04-30 RX ADMIN — LACTULOSE 20 G: 10 SOLUTION ORAL at 02:04

## 2017-04-30 RX ADMIN — RAMELTEON 8 MG: 8 TABLET, FILM COATED ORAL at 09:04

## 2017-04-30 RX ADMIN — FLUTICASONE FUROATE AND VILANTEROL TRIFENATATE 1 PUFF: 100; 25 POWDER RESPIRATORY (INHALATION) at 07:04

## 2017-04-30 RX ADMIN — ALUMINUM HYDROXIDE, MAGNESIUM HYDROXIDE, AND SIMETHICONE 30 ML: 200; 200; 20 SUSPENSION ORAL at 11:04

## 2017-04-30 RX ADMIN — METHYLPREDNISOLONE SODIUM SUCCINATE 40 MG: 40 INJECTION, POWDER, FOR SOLUTION INTRAMUSCULAR; INTRAVENOUS at 08:04

## 2017-04-30 RX ADMIN — MOXIFLOXACIN HYDROCHLORIDE 400 MG: 400 TABLET, FILM COATED ORAL at 08:04

## 2017-04-30 RX ADMIN — ENOXAPARIN SODIUM 40 MG: 100 INJECTION SUBCUTANEOUS at 06:04

## 2017-04-30 RX ADMIN — PREGABALIN 150 MG: 75 CAPSULE ORAL at 08:04

## 2017-04-30 RX ADMIN — METHYLPREDNISOLONE SODIUM SUCCINATE 40 MG: 40 INJECTION, POWDER, FOR SOLUTION INTRAMUSCULAR; INTRAVENOUS at 09:04

## 2017-04-30 RX ADMIN — DULOXETINE 60 MG: 30 CAPSULE, DELAYED RELEASE ORAL at 08:04

## 2017-04-30 RX ADMIN — PRAVASTATIN SODIUM 20 MG: 10 TABLET ORAL at 08:04

## 2017-04-30 NOTE — PLAN OF CARE
Problem: Patient Care Overview  Goal: Plan of Care Review  Outcome: Ongoing (interventions implemented as appropriate)  Sergio aerosol tx well BBS decreased clear ao2 on @ 2L 99%MDI sdone

## 2017-04-30 NOTE — PROGRESS NOTES
Outcome: Ongoing (interventions implemented as appropriate)  Home Oxygen evaluation      Resting in bed on 2LNasal Cannula = 93%      Resting in bed on room air= 87%      Ambulating to restroom= 86%      Recovery on 2L nasal cannula= 93%  Resting in bed recovery= 93     04/30/17 1500   Home Oxygen Qualification   Room Air SpO2 At Rest (!) 87 %   Room Air SpO2 on Exertion (!) 86 %   SpO2 During Exertion on O2 93 %   Heart Rate on O2 102 bpm   Exertion O2 LPM 2 LPM   SpO2 on Recovery 93 %   Recovery Heart Rate 100 bpm   Recovery O2 LPM 2 LPM

## 2017-04-30 NOTE — PROGRESS NOTES
[facility]  Progress Note    Patient Name: Chayito Chung  MRN: [id number]  Patient Class: IP- Inpatient   Admission Date: 4/23/2017  Length of Stay: 6 days  Attending Physician: Xochitl Gray MD  Primary Care Provider: Sidney Delgado MD        Subjective:     Principal Problem:COPD exacerbation    HPI:  66-year-old female with a history of CAD hypertension diabetes asthma and COPD presents to the ER with a productive cough since Thursday.  Symptoms are similar to a prior episode of pneumonia.  Fever 102 last night.  Shortness of breath is at baseline and is not any worse than usual.  Also complaining of a runny nose congestion and sneezing.  No myalgias no vomiting or diarrhea no chest pain or diaphoresis. No aggravating or alleviating factors. Patient was coughing uncontrollably in ED and in resp distress. She improved with albuterol and steroids, but was not back to baseline and was admitted for COPD exacerbation.    Hospital Course:  pna-copd  Patient nausea improved, no further vomiting but still c/o abdominal pain. FSGs elevated. Still having severe SOB, hypoxia and severe paroxysms of cough. CXR done which shows developing RLL pneumonia.    COPD exacerbation  Severe, with continued hypoxia. Ordered scheduled nebs/steroids/antibiotics and cough suppressants. Monitor status and wean oxygen to sats >90%. Low threshold for BIPAP.        CAD (coronary artery disease), stent 4/2012  Patient with known CAD s/p stent placement. Will continue Aspirin and Statin and monitor for S/Sx of angina/ACS.         Hyperlipidemia  Patient is chronically on statin. Will continue for now. Monitor clinically. Last LDL was   Lab Results  Component Value Date    LDLCALC 110.2 12/06/2015      Interval History: feeling better. Some sob with exertiono  Review of Systems   Respiratory: Positive for shortness of breath and wheezing.    Cardiovascular: Negative.    Gastrointestinal: Negative.       Objective:     Vital Signs (Most Recent):  Temp: 98.2 °F (36.8 °C) (04/29/17 1300)  Pulse: 78 (04/29/17 1936)  Resp: 18 (04/29/17 1936)  BP: (!) 158/89 (04/29/17 1300)  SpO2: 97 % (04/29/17 1936) Vital Signs (24h Range):  Temp:  [97.2 °F (36.2 °C)-98.2 °F (36.8 °C)] 98.2 °F (36.8 °C)  Pulse:  [65-78] 78  Resp:  [16-18] 18  SpO2:  [93 %-97 %] 97 %  BP: (131-158)/(81-97) 158/89     Weight: 70.3 kg (155 lb)  Body mass index is 28.35 kg/(m^2).  No intake or output data in the 24 hours ending 04/29/17 2143   Physical Exam   Constitutional: She is oriented to person, place, and time. She appears well-developed and well-nourished.   HENT:   Head: Normocephalic and atraumatic.   Eyes: EOM are normal. Pupils are equal, round, and reactive to light.   Neck: Neck supple. No JVD present.   Cardiovascular: Normal rate and regular rhythm.  Exam reveals no gallop and no friction rub.    No murmur heard.  Pulmonary/Chest: She has wheezes. She has no rales.   Increased resp effort noted   Abdominal: Soft. Bowel sounds are normal. She exhibits no distension. There is tenderness.   Musculoskeletal: She exhibits no edema or tenderness.   Lymphadenopathy:     She has no cervical adenopathy.   Neurological: She is alert and oriented to person, place, and time. She has normal reflexes. No cranial nerve deficit.   Skin: No rash noted. No erythema.   Psychiatric: She has a normal mood and affect.   Nursing note and vitals reviewed.      Significant Labs: All pertinent labs within the past 24 hours have been reviewed.    Significant Imaging: I have reviewed and interpreted all pertinent imaging results/findings within the past 24 hours.    Assessment/Plan:      * COPD exacerbation  Severe, with continued hypoxia. Ordered scheduled nebs/steroids/antibiotics and cough suppressants. Monitor status and wean oxygen to sats >90%. Low threshold for BIPAP.  Home o2 eval prior to dc       CAD (coronary artery disease), stent 4/2012  Patient with  known CAD s/p stent placement. Will continue Aspirin and Statin and monitor for S/Sx of angina/ACS. Continue to monitor on telemetry.         Tobacco abuse, 1ppd, 46 years  Dangers of cigarette smoking were reviewed with patient in detail and patient was encouraged to quit. Nicotine replacement options were discussed.        Major depressive disorder in partial remission  Chronic problem. Will continue chronic medication(s), Wellbutrin and Chantix and monitor for any changes, adjusting as needed.         Uncontrolled type 2 diabetes mellitus with hyperglycemia, with long-term current use of insulin  Patient's FSGs are not controlled on current hypoglycemics.   Last A1c reviewed-   Lab Results   Component Value Date    HGBA1C 7.7 (H) 12/06/2015     Most recent fingerstick glucose reviewed-     Recent Labs  Lab 04/29/17  2114   POCTGLUCOSE 333*     Will monitor FSGs regularly, adjust insulin and treat with sliding scale as needed.  Increase basal/bolus and monitor response.  Discuss with patient about dietary compliance (Found candy in her room)      HTN (hypertension)  Chronic, controlled.  Continue home regimen monitoring BP closely.  Will titrate BP medications as needed for sustained BP control.        Steroid-induced gastritis  Improving. Will continue bid PPI and maalox and monitor clinical response.      VTE Risk Mitigation         Ordered     enoxaparin injection 40 mg  Daily     Route:  Subcutaneous        04/23/17 1558     Medium Risk of VTE  Once      04/23/17 1558      dispo-home with daughter with hh  Home o2 eval in am     Xochitl Gray MD  Department of Hospital Medicine   [facility]

## 2017-04-30 NOTE — NURSING
Spoke with lab blood sugar 550. Spoke with Karla NP. Ok to use sliding scale. Will cont to ingris.

## 2017-04-30 NOTE — ASSESSMENT & PLAN NOTE
Severe, with continued hypoxia. Ordered scheduled nebs/steroids/antibiotics and cough suppressants. Monitor status and wean oxygen to sats >90%. Low threshold for BIPAP.  Home o2 eval prior to dc

## 2017-04-30 NOTE — PLAN OF CARE
Problem: Patient Care Overview  Goal: Plan of Care Review  Outcome: Ongoing (interventions implemented as appropriate)  Home Oxygen evaluation     Resting in bed on 2LNasal Cannula  = 93%     Resting in bed on room air= 87%     Ambulating to restroom= 86%     Recovery on 2L nasal cannula= 93%  Resting in bed recovery= 93%

## 2017-04-30 NOTE — SUBJECTIVE & OBJECTIVE
Interval History: feeling better. Some sob with exertiono  Review of Systems   Respiratory: Positive for shortness of breath and wheezing.    Cardiovascular: Negative.    Gastrointestinal: Negative.      Objective:     Vital Signs (Most Recent):  Temp: 98.2 °F (36.8 °C) (04/29/17 1300)  Pulse: 78 (04/29/17 1936)  Resp: 18 (04/29/17 1936)  BP: (!) 158/89 (04/29/17 1300)  SpO2: 97 % (04/29/17 1936) Vital Signs (24h Range):  Temp:  [97.2 °F (36.2 °C)-98.2 °F (36.8 °C)] 98.2 °F (36.8 °C)  Pulse:  [65-78] 78  Resp:  [16-18] 18  SpO2:  [93 %-97 %] 97 %  BP: (131-158)/(81-97) 158/89     Weight: 70.3 kg (155 lb)  Body mass index is 28.35 kg/(m^2).  No intake or output data in the 24 hours ending 04/29/17 2143   Physical Exam   Constitutional: She is oriented to person, place, and time. She appears well-developed and well-nourished.   HENT:   Head: Normocephalic and atraumatic.   Eyes: EOM are normal. Pupils are equal, round, and reactive to light.   Neck: Neck supple. No JVD present.   Cardiovascular: Normal rate and regular rhythm.  Exam reveals no gallop and no friction rub.    No murmur heard.  Pulmonary/Chest: She has wheezes. She has no rales.   Increased resp effort noted   Abdominal: Soft. Bowel sounds are normal. She exhibits no distension. There is tenderness.   Musculoskeletal: She exhibits no edema or tenderness.   Lymphadenopathy:     She has no cervical adenopathy.   Neurological: She is alert and oriented to person, place, and time. She has normal reflexes. No cranial nerve deficit.   Skin: No rash noted. No erythema.   Psychiatric: She has a normal mood and affect.   Nursing note and vitals reviewed.      Significant Labs: All pertinent labs within the past 24 hours have been reviewed.    Significant Imaging: I have reviewed and interpreted all pertinent imaging results/findings within the past 24 hours.

## 2017-04-30 NOTE — ASSESSMENT & PLAN NOTE
Patient's FSGs are not controlled on current hypoglycemics.   Last A1c reviewed-   Lab Results   Component Value Date    HGBA1C 7.7 (H) 12/06/2015     Most recent fingerstick glucose reviewed-     Recent Labs  Lab 04/29/17 2114   POCTGLUCOSE 333*     Will monitor FSGs regularly, adjust insulin and treat with sliding scale as needed.  Increase basal/bolus and monitor response.  Discuss with patient about dietary compliance (Found candy in her room)

## 2017-04-30 NOTE — PLAN OF CARE
Pt stated this morning while getting up to the sink she started to get dizzy and landed on her left hip. She stated she used her left knee to get back up. She denies losing consciousness and denies any pain or hitting her head at this time. On assessment, pt has no redness and no tenderness to touch. Pt stated she was wearing her oxygen while ambulating to the sink. Pt is sitting up in chair at this time, legs crossed, AAOX4 and has been re-educated to call for assistance if needing to get up. Pt verbalizes understanding.     1420-Freda Cabrales and Dr. Gray notified. Post huddle completed.

## 2017-04-30 NOTE — PLAN OF CARE
04/29/17 1936   Patient Assessment/Suction   Level of Consciousness (AVPU) alert   All Lung Fields Breath Sounds diminished   PRE-TX-O2-ETCO2   O2 Device (Oxygen Therapy) nasal cannula   Flow (L/min) 2   SpO2 97 %   Pulse Oximetry Type Intermittent   $ Pulse Oximetry - Multiple Charge Pulse Oximetry - Multiple   Pulse 78   Resp 18   Aerosol Therapy   $ Aerosol Therapy Charges Aerosol Treatment   Respiratory Treatment Status given   SVN/Inhaler Treatment Route mask   Patient Tolerance good   Post-Treatment   Post-treatment Heart Rate (beats/min) 76   Post-treatment Resp Rate (breaths/min) 18   All Fields Breath Sounds unchanged

## 2017-05-01 VITALS
DIASTOLIC BLOOD PRESSURE: 80 MMHG | WEIGHT: 155 LBS | RESPIRATION RATE: 16 BRPM | HEART RATE: 81 BPM | HEIGHT: 62 IN | SYSTOLIC BLOOD PRESSURE: 123 MMHG | TEMPERATURE: 99 F | BODY MASS INDEX: 28.52 KG/M2 | OXYGEN SATURATION: 90 %

## 2017-05-01 PROBLEM — J96.01 ACUTE HYPOXEMIC RESPIRATORY FAILURE: Status: ACTIVE | Noted: 2017-05-01

## 2017-05-01 LAB
POCT GLUCOSE: 313 MG/DL (ref 70–110)
POCT GLUCOSE: 327 MG/DL (ref 70–110)
POCT GLUCOSE: 330 MG/DL (ref 70–110)
POCT GLUCOSE: 363 MG/DL (ref 70–110)
POCT GLUCOSE: >500 MG/DL (ref 70–110)

## 2017-05-01 PROCEDURE — 94761 N-INVAS EAR/PLS OXIMETRY MLT: CPT

## 2017-05-01 PROCEDURE — 25000003 PHARM REV CODE 250: Performed by: HOSPITALIST

## 2017-05-01 PROCEDURE — 63600175 PHARM REV CODE 636 W HCPCS: Performed by: HOSPITALIST

## 2017-05-01 PROCEDURE — 94640 AIRWAY INHALATION TREATMENT: CPT

## 2017-05-01 PROCEDURE — 27000221 HC OXYGEN, UP TO 24 HOURS

## 2017-05-01 PROCEDURE — 25000242 PHARM REV CODE 250 ALT 637 W/ HCPCS: Performed by: HOSPITALIST

## 2017-05-01 PROCEDURE — 99900035 HC TECH TIME PER 15 MIN (STAT)

## 2017-05-01 RX ORDER — AZITHROMYCIN 250 MG/1
250 TABLET, FILM COATED ORAL DAILY
Status: DISCONTINUED | OUTPATIENT
Start: 2017-05-02 | End: 2017-05-02 | Stop reason: HOSPADM

## 2017-05-01 RX ORDER — INSULIN ASPART 100 [IU]/ML
INJECTION, SUSPENSION SUBCUTANEOUS
Refills: 3 | Status: ON HOLD
Start: 2017-05-01 | End: 2020-05-13 | Stop reason: SDUPTHER

## 2017-05-01 RX ORDER — AZITHROMYCIN 250 MG/1
250 TABLET, FILM COATED ORAL DAILY
Qty: 7 TABLET | Refills: 0 | Status: SHIPPED | OUTPATIENT
Start: 2017-05-01 | End: 2017-05-08

## 2017-05-01 RX ORDER — PREDNISONE 20 MG/1
40 TABLET ORAL DAILY
Status: DISCONTINUED | OUTPATIENT
Start: 2017-05-02 | End: 2017-05-02 | Stop reason: HOSPADM

## 2017-05-01 RX ORDER — BENZONATATE 100 MG/1
100 CAPSULE ORAL 3 TIMES DAILY PRN
Qty: 24 CAPSULE | Refills: 0 | Status: SHIPPED | OUTPATIENT
Start: 2017-05-01 | End: 2017-05-11

## 2017-05-01 RX ORDER — PREDNISONE 20 MG/1
TABLET ORAL
Qty: 18 TABLET | Refills: 0 | Status: SHIPPED | OUTPATIENT
Start: 2017-05-01 | End: 2017-06-20

## 2017-05-01 RX ADMIN — INSULIN ASPART 10 UNITS: 100 INJECTION, SOLUTION INTRAVENOUS; SUBCUTANEOUS at 05:05

## 2017-05-01 RX ADMIN — ALUMINUM HYDROXIDE, MAGNESIUM HYDROXIDE, AND SIMETHICONE 30 ML: 200; 200; 20 SUSPENSION ORAL at 12:05

## 2017-05-01 RX ADMIN — ASPIRIN 81 MG: 81 TABLET, COATED ORAL at 08:05

## 2017-05-01 RX ADMIN — IPRATROPIUM BROMIDE AND ALBUTEROL SULFATE 3 ML: .5; 3 SOLUTION RESPIRATORY (INHALATION) at 12:05

## 2017-05-01 RX ADMIN — BUPROPION HYDROCHLORIDE 200 MG: 100 TABLET, FILM COATED, EXTENDED RELEASE ORAL at 08:05

## 2017-05-01 RX ADMIN — INSULIN ASPART 8 UNITS: 100 INJECTION, SOLUTION INTRAVENOUS; SUBCUTANEOUS at 08:05

## 2017-05-01 RX ADMIN — VARENICLINE TARTRATE 1 MG: 0.5 TABLET, FILM COATED ORAL at 08:05

## 2017-05-01 RX ADMIN — INSULIN ASPART 12 UNITS: 100 INJECTION, SOLUTION INTRAVENOUS; SUBCUTANEOUS at 12:05

## 2017-05-01 RX ADMIN — INSULIN ASPART 12 UNITS: 100 INJECTION, SOLUTION INTRAVENOUS; SUBCUTANEOUS at 05:05

## 2017-05-01 RX ADMIN — IPRATROPIUM BROMIDE AND ALBUTEROL SULFATE 3 ML: .5; 3 SOLUTION RESPIRATORY (INHALATION) at 01:05

## 2017-05-01 RX ADMIN — METOPROLOL SUCCINATE 200 MG: 50 TABLET, EXTENDED RELEASE ORAL at 08:05

## 2017-05-01 RX ADMIN — DULOXETINE 60 MG: 30 CAPSULE, DELAYED RELEASE ORAL at 08:05

## 2017-05-01 RX ADMIN — INSULIN ASPART 12 UNITS: 100 INJECTION, SOLUTION INTRAVENOUS; SUBCUTANEOUS at 06:05

## 2017-05-01 RX ADMIN — PRAVASTATIN SODIUM 20 MG: 10 TABLET ORAL at 08:05

## 2017-05-01 RX ADMIN — FLUTICASONE FUROATE AND VILANTEROL TRIFENATATE 1 PUFF: 100; 25 POWDER RESPIRATORY (INHALATION) at 07:05

## 2017-05-01 RX ADMIN — IPRATROPIUM BROMIDE AND ALBUTEROL SULFATE 3 ML: .5; 3 SOLUTION RESPIRATORY (INHALATION) at 07:05

## 2017-05-01 RX ADMIN — PREGABALIN 150 MG: 75 CAPSULE ORAL at 08:05

## 2017-05-01 RX ADMIN — METHYLPREDNISOLONE SODIUM SUCCINATE 40 MG: 40 INJECTION, POWDER, FOR SOLUTION INTRAMUSCULAR; INTRAVENOUS at 09:05

## 2017-05-01 RX ADMIN — INSULIN ASPART 8 UNITS: 100 INJECTION, SOLUTION INTRAVENOUS; SUBCUTANEOUS at 12:05

## 2017-05-01 RX ADMIN — PANTOPRAZOLE SODIUM 40 MG: 40 TABLET, DELAYED RELEASE ORAL at 08:05

## 2017-05-01 RX ADMIN — MOXIFLOXACIN HYDROCHLORIDE 400 MG: 400 TABLET, FILM COATED ORAL at 08:05

## 2017-05-01 RX ADMIN — ALUMINUM HYDROXIDE, MAGNESIUM HYDROXIDE, AND SIMETHICONE 30 ML: 200; 200; 20 SUSPENSION ORAL at 06:05

## 2017-05-01 RX ADMIN — LISINOPRIL 2.5 MG: 2.5 TABLET ORAL at 08:05

## 2017-05-01 NOTE — ASSESSMENT & PLAN NOTE
Chronic problem. Will continue chronic medication(s), Wellbutrin and Chantix and monitor for any changes, adjusting as needed.   Dc to daughters home with CLAUD[facility]

## 2017-05-01 NOTE — ASSESSMENT & PLAN NOTE
Patient's FSGs are not controlled on current hypoglycemics.   Last A1c reviewed-   Lab Results   Component Value Date    HGBA1C 7.7 (H) 12/06/2015     Most recent fingerstick glucose reviewed-     Recent Labs  Lab 04/30/17  1535   POCTGLUCOSE 330*     Will monitor FSGs regularly, adjust insulin and treat with sliding scale as needed.  Increase basal/bolus and monitor response.  Discuss with patient about dietary compliance (Found candy in her room)

## 2017-05-01 NOTE — SUBJECTIVE & OBJECTIVE
Interval History: ambulated to sink but fell after a few steps-no pain  Sob/cough better     Review of Systems   Respiratory: Positive for cough and shortness of breath.    Cardiovascular: Negative.    Gastrointestinal: Negative.    Neurological: Positive for weakness.     Objective:     Vital Signs (Most Recent):  Temp: 98.1 °F (36.7 °C) (04/30/17 1931)  Pulse: 73 (04/30/17 1931)  Resp: 18 (04/30/17 1931)  BP: (!) 140/82 (04/30/17 1931)  SpO2: (!) 94 % (04/30/17 1931) Vital Signs (24h Range):  Temp:  [97.5 °F (36.4 °C)-98.7 °F (37.1 °C)] 98.1 °F (36.7 °C)  Pulse:  [65-92] 73  Resp:  [14-18] 18  SpO2:  [87 %-99 %] 94 %  BP: (115-147)/(71-92) 140/82     Weight: 70.3 kg (155 lb)  Body mass index is 28.35 kg/(m^2).    Intake/Output Summary (Last 24 hours) at 04/30/17 1946  Last data filed at 04/30/17 1802   Gross per 24 hour   Intake             1080 ml   Output                0 ml   Net             1080 ml      Physical Exam   Constitutional: She is oriented to person, place, and time. She appears well-developed and well-nourished.   HENT:   Head: Normocephalic and atraumatic.   Eyes: EOM are normal. Pupils are equal, round, and reactive to light.   Neck: Neck supple. No JVD present.   Cardiovascular: Normal rate and regular rhythm.  Exam reveals no gallop and no friction rub.    No murmur heard.  Pulmonary/Chest: She has no rales.   Decreased bs bilat bases    Abdominal: Soft. Bowel sounds are normal. She exhibits no distension. There is tenderness.   Musculoskeletal: She exhibits no edema or tenderness.   Lymphadenopathy:     She has no cervical adenopathy.   Neurological: She is alert and oriented to person, place, and time. She has normal reflexes. No cranial nerve deficit.   Skin: No rash noted. No erythema.   Psychiatric: She has a normal mood and affect.   Nursing note and vitals reviewed.      Significant Labs: All pertinent labs within the past 24 hours have been reviewed.    Significant Imaging: na

## 2017-05-01 NOTE — PLAN OF CARE
05/01/17 1641   Final Note   Assessment Type Final Discharge Note   Discharge Disposition Home-Health   Discharge planning education complete? Yes   Hospital Follow Up  Appt(s) scheduled? Yes

## 2017-05-01 NOTE — PLAN OF CARE
04/30/17 1931   Patient Assessment/Suction   Level of Consciousness (AVPU) alert   All Lung Fields Breath Sounds diminished   PRE-TX-O2-ETCO2   O2 Device (Oxygen Therapy) nasal cannula   Flow (L/min) 2   SpO2 (!) 94 %   Pulse Oximetry Type Intermittent   $ Pulse Oximetry - Multiple Charge Pulse Oximetry - Multiple   Pulse 73   Resp 18   Aerosol Therapy   $ Aerosol Therapy Charges Aerosol Treatment   Respiratory Treatment Status given   SVN/Inhaler Treatment Route mask   Position During Treatment HOB at 30 degrees   Patient Tolerance good   Post-Treatment   Post-treatment Heart Rate (beats/min) 75   Post-treatment Resp Rate (breaths/min) 16   All Fields Breath Sounds unchanged

## 2017-05-01 NOTE — PROGRESS NOTES
05/01/17 1343   Patient Assessment/Suction   Level of Consciousness (AVPU) alert   All Lung Fields Breath Sounds diminished   PRE-TX-O2-ETCO2   O2 Device (Oxygen Therapy) nasal cannula   SpO2 (!) 90 %   Pulse 81   Resp 16   Aerosol Therapy   $ Aerosol Therapy Charges Aerosol Treatment   Respiratory Treatment Status given   SVN/Inhaler Treatment Route mask;with oxygen   Position During Treatment HOB at 45 degrees   Patient Tolerance good   Post-Treatment   Post-treatment Heart Rate (beats/min) 80   Post-treatment Resp Rate (breaths/min) 16   All Fields Breath Sounds unchanged   Home Oxygen Qualification   Room Air SpO2 At Rest (!) 88 %   Room Air SpO2 on Exertion (!) 85 %   SpO2 During Exertion on O2 92 %   Heart Rate on O2 101 bpm   Exertion O2 LPM 2 LPM   SpO2 on Recovery 93 %   Recovery Heart Rate 98 bpm   Recovery O2 LPM 2 LPM

## 2017-05-01 NOTE — PROGRESS NOTES
[facility] Hospital Medicine  Progress Note    Patient Name: Chayito Chung  MRN: [id number]  Patient Class: IP- Inpatient   Admission Date: 4/23/2017  Length of Stay: 7 days  Attending Physician: Xochitl Gray MD  Primary Care Provider: Sidney Delgado MD        Subjective:     Principal Problem:COPD exacerbation    HPI:  66-year-old female with a history of CAD hypertension diabetes asthma and COPD presents to the ER with a productive cough since Thursday.  Symptoms are similar to a prior episode of pneumonia.  Fever 102 last night.  Shortness of breath is at baseline and is not any worse than usual.  Also complaining of a runny nose congestion and sneezing.  No myalgias no vomiting or diarrhea no chest pain or diaphoresis. No aggravating or alleviating factors. Patient was coughing uncontrollably in ED and in resp distress. She improved with albuterol and steroids, but was not back to baseline and was admitted for COPD exacerbation.    Hospital Course:  pna-copd  Patient nausea improved, no further vomiting but still c/o abdominal pain. FSGs elevated. Still having severe SOB, hypoxia and severe paroxysms of cough. CXR done which shows developing RLL pneumonia.-    COPD exacerbation  Severe, with continued hypoxia. Ordered scheduled nebs/steroids/antibiotics and cough suppressants. Monitor status and wean oxygen to sats >90%.   Home O2 re-eval to be done prior to dc -likely am         CAD (coronary artery disease), stent 4/2012  Patient with known CAD s/p stent placement. Continued Aspirin and Statin and monitor for S/Sx of angina/ACS.          Interval History: ambulated to sink but fell after a few steps-no pain  Sob/cough better     Review of Systems   Respiratory: Positive for cough and shortness of breath.    Cardiovascular: Negative.    Gastrointestinal: Negative.    Neurological: Positive for weakness.     Objective:     Vital Signs (Most Recent):  Temp: 98.1 °F (36.7 °C) (04/30/17  1931)  Pulse: 73 (04/30/17 1931)  Resp: 18 (04/30/17 1931)  BP: (!) 140/82 (04/30/17 1931)  SpO2: (!) 94 % (04/30/17 1931) Vital Signs (24h Range):  Temp:  [97.5 °F (36.4 °C)-98.7 °F (37.1 °C)] 98.1 °F (36.7 °C)  Pulse:  [65-92] 73  Resp:  [14-18] 18  SpO2:  [87 %-99 %] 94 %  BP: (115-147)/(71-92) 140/82     Weight: 70.3 kg (155 lb)  Body mass index is 28.35 kg/(m^2).    Intake/Output Summary (Last 24 hours) at 04/30/17 1946  Last data filed at 04/30/17 1802   Gross per 24 hour   Intake             1080 ml   Output                0 ml   Net             1080 ml      Physical Exam   Constitutional: She is oriented to person, place, and time. She appears well-developed and well-nourished.   HENT:   Head: Normocephalic and atraumatic.   Eyes: EOM are normal. Pupils are equal, round, and reactive to light.   Neck: Neck supple. No JVD present.   Cardiovascular: Normal rate and regular rhythm.  Exam reveals no gallop and no friction rub.    No murmur heard.  Pulmonary/Chest: She has no rales.   Decreased bs bilat bases    Abdominal: Soft. Bowel sounds are normal. She exhibits no distension. There is tenderness.   Musculoskeletal: She exhibits no edema or tenderness.   Lymphadenopathy:     She has no cervical adenopathy.   Neurological: She is alert and oriented to person, place, and time. She has normal reflexes. No cranial nerve deficit.   Skin: No rash noted. No erythema.   Psychiatric: She has a normal mood and affect.   Nursing note and vitals reviewed.      Significant Labs: All pertinent labs within the past 24 hours have been reviewed.    Significant Imaging: na    Assessment/Plan:      * COPD exacerbation  Severe, with continued hypoxia. Ordered scheduled nebs/steroids/antibiotics -completed 8 days avelox in hospital  and cough suppressants. Monitor status and wean oxygen to sats >90%. Low threshold for BIPAP.  Home o2 eval prior to dc   Advised DC tobacco       CAD (coronary artery disease), stent 4/2012  Patient  with known CAD s/p stent placement. Will continue Aspirin and Statin and monitor for S/Sx of angina/ACS. Continue to monitor on telemetry.         Major depressive disorder in partial remission  Chronic problem. Will continue chronic medication(s), Wellbutrin and Chantix and monitor for any changes, adjusting as needed.   Dc to daughters home with HH        Uncontrolled type 2 diabetes mellitus with hyperglycemia, with long-term current use of insulin  Patient's FSGs are not controlled on current hypoglycemics.   Last A1c reviewed-   Lab Results   Component Value Date    HGBA1C 7.7 (H) 12/06/2015     Most recent fingerstick glucose reviewed-     Recent Labs  Lab 04/30/17  1535   POCTGLUCOSE 330*     Will monitor FSGs regularly, adjust insulin and treat with sliding scale as needed.  Increase basal/bolus and monitor response.  Discuss with patient about dietary compliance (Found candy in her room)      HTN (hypertension)  Chronic, controlled.  Continue home regimen monitoring BP closely.  Will titrate BP medications as needed for sustained BP control.        VTE Risk Mitigation         Ordered     enoxaparin injection 40 mg  Daily     Route:  Subcutaneous        04/23/17 1558     Medium Risk of VTE  Once      04/23/17 1558        dispo-home with HH  Xochitl Gray MD  Department of Hospital Medicine   [facility]

## 2017-05-01 NOTE — PROGRESS NOTES
[facility] acknowledge home health and home O2 orders for patient.  Per VIVIAN Ramos note patient undecided about discharge address.  [facility] awaiting discharge address to set up.RUY kelly    3:25pm [facility] sent patient information to [facility] for authorization and acceptance.RUY kelly    05/03/2017 @ 420pm Per Raquel with [facility], [facility] is home health agency.RUY kelly

## 2017-05-01 NOTE — ASSESSMENT & PLAN NOTE
Severe, with continued hypoxia. Ordered scheduled nebs/steroids/antibiotics -completed 8 days avelox in hospital  and cough suppressants. Monitor status and wean oxygen to sats >90%. Low threshold for BIPAP.  Home o2 eval prior to dc   Advised DC tobacco

## 2017-05-01 NOTE — PLAN OF CARE
Problem: Patient Care Overview  Goal: Plan of Care Review  Outcome: Ongoing (interventions implemented as appropriate)  Patient alert and oriented resting in bed. NAD. Denies pain or SOB. VSS.Normnal Sr on monitor. O2@2L. Up with assist.  Plan of care reviewed with patient. Verbalizes understanding.Call light in reach. Pt free from fall or injury. Will monitor.

## 2017-05-01 NOTE — PROGRESS NOTES
Met with patient at bedside regarding discharge destination; patient has decided to discharge to her home and her sister will be staying with her.  Patient is sitting in chair dressed and ready to discharge.  Informed patient that she can't leave the hospital until the portable oxygen has been delivered; verbalized understanding.

## 2017-05-02 ENCOUNTER — TELEPHONE (OUTPATIENT)
Dept: MEDSURG UNIT | Facility: HOSPITAL | Age: 67
End: 2017-05-02

## 2017-05-02 NOTE — ASSESSMENT & PLAN NOTE
Dangers of cigarette smoking were reviewed with patient in detail and patient was encouraged to quit. Nicotine replacement options were discussed. Refer to tobacco cessation program on discharge.

## 2017-05-02 NOTE — ASSESSMENT & PLAN NOTE
Patient with known CAD s/p stent placement. Will continue Aspirin and Statin and metoprolol and monitor for S/Sx of angina/ACS. Continue to monitor on telemetry.

## 2017-05-02 NOTE — DISCHARGE SUMMARY
[facility] Hospital Medicine  Discharge Summary      Patient Name: Chayito Chung  MRN: [id number]  Admission Date: 4/23/2017  Hospital Length of Stay: 8 days  Discharge Date and Time:  05/01/2017 10:09 PM  Attending Physician: Judson Fields MD   Discharging Provider: Judson Fields MD  Primary Care Provider: Sidney Delgado MD      HPI:   66-year-old female with a history of CAD hypertension diabetes asthma and COPD presents to the ER with a productive cough since Thursday.  Symptoms are similar to a prior episode of pneumonia.  Fever 102 last night.  Shortness of breath is at baseline and is not any worse than usual.  Also complaining of a runny nose congestion and sneezing.  No myalgias no vomiting or diarrhea no chest pain or diaphoresis. No aggravating or alleviating factors. Patient was coughing uncontrollably in ED and in resp distress. She improved with albuterol and steroids, but was not back to baseline and was admitted for COPD exacerbation.    * No surgery found *      Indwelling Lines/Drains at time of discharge:   Lines/Drains/Airways          No matching active lines, drains, or airways        Hospital Course:   Patient was admitted and started on steroids, nebulizer treatments and antibiotics for COPD exacerbation. Supplemental O2 was provided.  Chest radiograph demonstrated no focal infiltrate. She did have fever to 102 prior to presentation, likely due to mucopurulent bronchitis in context of bronchiectasis.  Her symptoms eventually improved.  She was noted to have ongoing requirement for oxygen supplementation and was discharged home to complete course of oral azithromycin and prednisone taper with follow up with her outpatient pulmonologist Dr. Chung, in [facility].       Consults:   Consults         Status Ordering Provider     Inpatient consult to Social Work/Case Management  Once     Provider:  (Not yet assigned)    Completed MARLENY SULLIVAN          Significant Diagnostic Studies:  Labs: All labs within the past 24 hours have been reviewed  Microbiology:   Blood Culture   Lab Results   Component Value Date    LABBLOO No growth after 5 days. 01/20/2015   , Sputum Culture No results found for: GSRESP, RESPIRATORYC and Urine Culture    Lab Results   Component Value Date    LABURIN KLEBSIELLA PNEUMONIAE  >100,000 cfu/ml   08/31/2015     Radiology: X-Ray: CXR: X-Ray Chest 1 View (CXR):   Results for orders placed or performed during the hospital encounter of 04/23/17   X-Ray Chest 1 View    Narrative    One view chest  Comparison study: 4/23/2017    Right hemidiaphragm is mildly elevated and there is bibasilar atelectasis.  There is no pleural effusion.  The cardiomediastinal silhouette is stable    Impression    Interval development of bibasilar atelectasis      Electronically signed by: BECKY LA MD  Date:     04/28/17  Time:    16:05     and X-Ray Chest PA and Lateral (CXR):   Results for orders placed or performed during the hospital encounter of 04/23/17   X-Ray Chest PA And Lateral    Narrative    Comparison: None available    Technique: PA and lateral chest radiographs.    Findings:Cardiac size is normal. The lungs are clear. No pleural effusion or pneumothorax is seen. No focal alveolar consolidation is seen. Large patient body habitus noted. Multiple surgical clips are seen in the upper abdomen. No acute osseous abnormality is seen. There is multilevel, mild to moderate degenerative disc disease.    Impression    1.  No acute radiographic findings in the chest.        Electronically signed by: Wero Sheth MD  Date:     04/23/17  Time:    13:15      Cardiac Graphics: Echocardiogram:   2D echo with color flow doppler:   Results for orders placed or performed during the hospital encounter of 12/06/15   2D echo with color flow doppler   Result Value Ref Range    EF 62 55 - 65    Diastolic Dysfunction No     Est. PA Systolic Pressure 5.69        Pending Diagnostic Studies:     None         Final Active Diagnoses:    Diagnosis Date Noted POA    PRINCIPAL PROBLEM:  COPD exacerbation [J44.1] 04/23/2017 Yes    Acute hypoxemic respiratory failure [J96.01] 05/01/2017 Yes    Steroid-induced gastritis [K29.60] 04/25/2017 No    HTN (hypertension) [I10] 09/22/2014 Yes    Uncontrolled type 2 diabetes mellitus with hyperglycemia, with long-term current use of insulin [E11.65, Z79.4] 09/22/2014 Not Applicable    Migraines [G43.909] 12/17/2012 Yes    Major depressive disorder in partial remission [F32.4] 12/17/2012 Yes    CAD (coronary artery disease), stent 4/2012 [I25.10] 12/16/2012 Yes    Tobacco abuse, 1ppd, 46 years [Z72.0] 12/16/2012 Yes    Hyperlipidemia [E78.5] 12/16/2012 Yes      Problems Resolved During this Admission:    Diagnosis Date Noted Date Resolved POA      * COPD exacerbation  Continues to smoke. Present exacerbation mostly resolved. She is hypoxemic presently at rest and this corrects with minimal oxygen supplementation.  Prescribe Home O2. Follow up with pulmonologist for reassessment promptly.       CAD (coronary artery disease), stent 4/2012  Patient with known CAD s/p stent placement. Will continue Aspirin and Statin and metoprolol and monitor for S/Sx of angina/ACS. Continue to monitor on telemetry.         Hyperlipidemia   Patient is chronically on statin. Will continue for now. Monitor clinically. Last LDL was   Lab Results   Component Value Date    LDLCALC 110.2 12/06/2015            Tobacco abuse, 1ppd, 46 years  Dangers of cigarette smoking were reviewed with patient in detail and patient was encouraged to quit. Nicotine replacement options were discussed. Refer to tobacco cessation program on discharge.         Major depressive disorder in partial remission  Chronic problem. Will continue chronic medication(s), Wellbutrin and Chantix and monitor for any changes, adjusting as needed.   Dc to daughters home with HH        Migraines  Chronic problem. Will continue chronic  medication(s), Lyrica and monitor for any changes, adjusting as needed.         Uncontrolled type 2 diabetes mellitus with hyperglycemia, with long-term current use of insulin  Patient's FSGs are not controlled on current hypoglycemics.   Last A1c reviewed-   Lab Results   Component Value Date    HGBA1C 7.7 (H) 12/06/2015     Most recent fingerstick glucose reviewed-     Recent Labs  Lab 05/01/17  2048   POCTGLUCOSE 313*     Will monitor FSGs regularly, adjust insulin and treat with sliding scale as needed.  Increase basal/bolus and monitor response.  Discuss with patient about dietary compliance.    Glucocorticoids also implicated in her hyperglycemia; with taper of prednisone, likely to improve.       HTN (hypertension)  Chronic, controlled.  Continue home regimen monitoring BP closely.  Will titrate BP medications as needed for sustained BP control.        Steroid-induced gastritis  Improving. Will continue bid PPI and maalox and monitor clinical response.      Acute hypoxemic respiratory failure  Likely related to underlying emphysema rather than solely acute COPD exacerbation.  With supplemental O2 today she had good exercise tolerance relatively.  Prescribe home O2 and follow up with outpatient pulmonologist.         Discharged Condition: good    Disposition: Home-Health Care [facility]    Follow Up:  Follow-up Information     Follow up with Sidney Delgado MD On 5/16/2017.    Specialty:  Family Medicine    Why:  @ 0945    Contact information:    [address]  [place] [id number] [phone]          Follow up with Martinez Chung MD On 5/22/2017.    Specialty:  Pulmonary Disease    Why:  @ 11:15; follow up of COPD    Contact information:    [address] [facility]  [address]  [place] [address]  [phone]          Patient Instructions:     OXYGEN FOR HOME USE   Order Specific Question Answer Comments   Liter Flow 2    Duration Continuous    Qualifying SpO2: 87%    Testing done at: Rest    Route nasal cannula   "  Portable mode: pulse dose acceptable    Device home concentrator with portable unit    Height: 5' 2" (1.575 m)    Weight: 70.3 kg (155 lb)    Does patient have medical equipment at home? glucometer nebulizer / nebulizer   Does patient have medical equipment at home? other (see comments)    Alternative treatment measures have been tried or considered and deemed clinically ineffective. Yes      AMB Referral to [facility] Smoking Cessation   Referral Priority: Routine Referral Type: Consultation   Referral Reason: Specialty Services Required    Requested Specialty: Addiction Medicine    Number of Visits Requested: 1      Referral to Home health   Referral Priority: Routine Referral Type: Home Health Care   Referral Reason: Specialty Services Required    Requested Specialty: Home Health Services    Number of Visits Requested: 1      Diet Diabetic 1800 Calories     Diet general       Medications:  Reconciled Home Medications:   Current Discharge Medication List      START taking these medications    Details   azithromycin (Z-YONI) 250 MG tablet Take 1 tablet (250 mg total) by mouth once daily.  Qty: 7 tablet, Refills: 0    Associated Diagnoses: COPD exacerbation      benzonatate (TESSALON) 100 MG capsule Take 1 capsule (100 mg total) by mouth 3 (three) times daily as needed for Cough.  Qty: 24 capsule, Refills: 0    Associated Diagnoses: COPD exacerbation      predniSONE (DELTASONE) 20 MG tablet Take two tabs daily for 4 days, then one tab daily for 4 days then 1/2 tab daily for 6 days then stop  Qty: 18 tablet, Refills: 0    Associated Diagnoses: COPD exacerbation         CONTINUE these medications which have CHANGED    Details   NOVOLOG MIX 70-30 100 unit/mL (70-30) Soln 60 units in the morning before breakfast, 40 units in the evening before dinner  Refills: 3         CONTINUE these medications which have NOT CHANGED    Details   cyclobenzaprine (FLEXERIL) 10 MG tablet Take 10 mg by mouth 3 (three) times daily as needed " "for Muscle spasms.      FLUTICASONE/SALMETEROL (ADVAIR DISKUS INHL) Inhale into the lungs.      hydrocodone-acetaminophen 5-325mg (NORCO) 5-325 mg per tablet Take 1 tablet by mouth every 4 (four) hours as needed for Pain.  Qty: 18 tablet, Refills: 0      insulin syringe-needle U-100 1/2 mL 31 x 5/16" Syrg USE BID UTD  Refills: 3      lisinopril (PRINIVIL,ZESTRIL) 2.5 MG tablet Take 2.5 mg by mouth 2 (two) times daily.      metformin (GLUCOPHAGE) 1000 MG tablet Take 0.5 tablets (500 mg total) by mouth 2 (two) times daily with meals.  Qty: 30 tablet, Refills: 0      metoprolol succinate (TOPROL-XL) 200 MG 24 hr tablet TAKE 1 TABLET BY MOUTH EVERY DAY  Qty: 90 tablet, Refills: 4    Associated Diagnoses: Essential hypertension      ondansetron (ZOFRAN) 4 MG tablet as needed.       potassium chloride SA (K-DUR,KLOR-CON) 20 MEQ tablet Take 20 mEq by mouth once daily.   Refills: 0      pravastatin (PRAVACHOL) 10 MG tablet Take 20 mg by mouth.       pregabalin (LYRICA) 75 MG capsule Take 75 mg by mouth. Takes tablet Q AM, Noon and two Q PM      !! quetiapine (SEROQUEL) 200 MG Tab 200 mg once daily.       !! quetiapine (SEROQUEL) 50 MG tablet Take 50 mg by mouth nightly.   Refills: 1      verapamil (CALAN-SR) 240 MG CR tablet Take 240 mg by mouth once daily.   Refills: 2      albuterol 90 mcg/actuation inhaler Inhale 2 puffs into the lungs every 6 (six) hours as needed for Wheezing.      albuterol-ipratropium 2.5mg-0.5mg/3mL (DUO-NEB) 0.5 mg-3 mg(2.5 mg base)/3 mL nebulizer solution Refills: 5      aspirin (ECOTRIN) 81 MG EC tablet Take 81 mg by mouth once daily.      BD ULTRA-FINE CATARINO PEN NEEDLES 32 gauge x 5/32" Ndle USE UTD  Refills: 2      buPROPion (WELLBUTRIN SR) 200 MG TbSR Take 200 mg by mouth.      dicyclomine (BENTYL) 20 mg tablet Take 1 tablet (20 mg total) by mouth every 6 (six) hours.  Qty: 30 tablet, Refills: 0      duloxetine (CYMBALTA) 60 MG capsule Take 60 mg by mouth once daily.      ferrous sulfate 324 mg " (65 mg iron) TbEC Take 1 tablet (325 mg total) by mouth once daily.  Qty: 60 tablet, Refills: 11    Associated Diagnoses: Other iron deficiency anemias      lidocaine-prilocaine (EMLA) cream       nitroGLYCERIN (NITROSTAT) 0.4 MG SL tablet Place 0.4 mg under the tongue every 5 (five) minutes as needed for Chest pain.      varenicline (CHANTIX) 1 mg Tab Take 1 tablet (1 mg total) by mouth 2 (two) times daily.  Qty: 58 tablet, Refills: 0    Associated Diagnoses: Smokes 1/2 pack/day or less       !! - Potential duplicate medications found. Please discuss with provider.        Time spent on the discharge of patient: 33 minutes    Judson Fields MD  Department of Hospital Medicine  [facility]

## 2017-05-02 NOTE — PROGRESS NOTES
Pt still waiting to be picked up per family, states her daughter had to met with the rep for her home O2.

## 2017-05-02 NOTE — ASSESSMENT & PLAN NOTE
Patient's FSGs are not controlled on current hypoglycemics.   Last A1c reviewed-   Lab Results   Component Value Date    HGBA1C 7.7 (H) 12/06/2015     Most recent fingerstick glucose reviewed-     Recent Labs  Lab 05/01/17 2048   POCTGLUCOSE 313*     Will monitor FSGs regularly, adjust insulin and treat with sliding scale as needed.  Increase basal/bolus and monitor response.  Discuss with patient about dietary compliance.    Glucocorticoids also implicated in her hyperglycemia; with taper of prednisone, likely to improve.

## 2017-05-02 NOTE — ASSESSMENT & PLAN NOTE
Continues to smoke. Present exacerbation mostly resolved. She is hypoxemic presently at rest and this corrects with minimal oxygen supplementation.  Prescribe Home O2. Follow up with pulmonologist for reassessment promptly.

## 2017-05-02 NOTE — ASSESSMENT & PLAN NOTE
Likely related to underlying emphysema rather than solely acute COPD exacerbation.  With supplemental O2 today she had good exercise tolerance relatively.  Prescribe home O2 and follow up with outpatient pulmonologist.

## 2017-05-02 NOTE — PROGRESS NOTES
Pt sitting up to chair at  currently waiting for family to pick her. States they will be here for 2030

## 2017-05-02 NOTE — PLAN OF CARE
Discharged pt per MD order. Given d/c instruction and education on new medications with family at bedside. Pt waiting on ride at this time. Instructed to call nurses station when they arrive. Report given to PRAVEEN Medina.

## 2017-05-03 ENCOUNTER — PATIENT OUTREACH (OUTPATIENT)
Dept: ADMINISTRATIVE | Facility: CLINIC | Age: 67
End: 2017-05-03
Payer: MEDICARE

## 2017-05-03 NOTE — PATIENT INSTRUCTIONS
Care for COPD  You have been diagnosed with chronic obstructive pulmonary disease (COPD). This is a name given to a group of diseases that limit the flow of air in and out of your lungs, making it harder to breathe. With COPD, you are also more likely to get lung infections. COPD includes chronic bronchitis and emphysema, which are most often caused by heavy, long-term cigarette smoking.  Home Care  Break the smoking habit.  Enroll in a stop-smoking program to increase your chances of success.  Ask your doctor about medications or other methods to help you quit.  Ask family members to quit smoking as well.  Don't allow people to smoke in your home or when they are around you.  Protect yourself from infection.  Wash your hands often. Do your best to keep your hands away from your face. Most germs are spread from your hands to your mouth.  Get a flu shot every year. Ask your doctor about a pneumonia vaccination.  Avoid crowds. It's especially important to do this in the winter when more people have colds and flu.  Get enough sleep, exercise regularly, and eat a balanced diet.  Learn postural drainage and percussion, techniques that can help you cough up extra mucus. This extra mucus can trap germs in your lungs.  Take your medications exactly as directed. Don't skip doses.  Stress can make COPD worse. Use these stress-management techniques:  Find a quiet place and sit or lie in a comfortable position.  Close your eyes and perform breathing exercises for several minutes.  Follow-Up  Make a follow-up appointment as directed by our staff.    When to Call Your Doctor  Call your doctor immediately if you have any of the following:  Shortness of breath, wheezing, or coughing  Increased mucus  Yellow, green, bloody, or smelly mucus  Fever or chills  Tightness in your chest that does not go away with rest or medication  An irregular heartbeat  Swollen ankles                                     © 1627-8678 [address]  [place] [address]. All rights reserved. This information is not intended as a substitute for professional medical care. Always follow your healthcare professional's instructions.

## 2017-05-25 ENCOUNTER — TELEPHONE (OUTPATIENT)
Dept: SMOKING CESSATION | Facility: CLINIC | Age: 67
End: 2017-05-25

## 2017-05-25 NOTE — TELEPHONE ENCOUNTER
Called pt in regards to missed appointment for smoking cessation. Pt stated she was stuck in traffic, but would like to reschedule for tomorrow 5/26/17 at 2 pm.

## 2017-05-26 ENCOUNTER — CLINICAL SUPPORT (OUTPATIENT)
Dept: SMOKING CESSATION | Facility: CLINIC | Age: 67
End: 2017-05-26
Payer: COMMERCIAL

## 2017-05-26 DIAGNOSIS — F17.200 NICOTINE DEPENDENCE: Primary | ICD-10-CM

## 2017-05-26 PROCEDURE — 99999 PR PBB SHADOW E&M-EST. PATIENT-LVL I: CPT | Mod: PBBFAC,,,

## 2017-05-26 PROCEDURE — 99404 PREV MED CNSL INDIV APPRX 60: CPT | Mod: S$GLB,,,

## 2017-05-26 RX ORDER — IBUPROFEN 200 MG
1 TABLET ORAL DAILY
Qty: 14 PATCH | Refills: 0 | Status: SHIPPED | OUTPATIENT
Start: 2017-05-26 | End: 2017-06-15 | Stop reason: SDUPTHER

## 2017-05-26 RX ORDER — DIPHENHYDRAMINE HCL 25 MG
4 CAPSULE ORAL
Qty: 100 EACH | Refills: 0 | Status: SHIPPED | OUTPATIENT
Start: 2017-05-26 | End: 2018-05-08

## 2017-06-08 ENCOUNTER — CLINICAL SUPPORT (OUTPATIENT)
Dept: SMOKING CESSATION | Facility: CLINIC | Age: 67
End: 2017-06-08
Payer: COMMERCIAL

## 2017-06-08 DIAGNOSIS — F17.200 NICOTINE DEPENDENCE: Primary | ICD-10-CM

## 2017-06-08 PROCEDURE — 99407 BEHAV CHNG SMOKING > 10 MIN: CPT | Mod: S$GLB,,,

## 2017-06-15 ENCOUNTER — CLINICAL SUPPORT (OUTPATIENT)
Dept: SMOKING CESSATION | Facility: CLINIC | Age: 67
End: 2017-06-15
Payer: COMMERCIAL

## 2017-06-15 DIAGNOSIS — F17.200 NICOTINE DEPENDENCE: Primary | ICD-10-CM

## 2017-06-15 PROCEDURE — 99999 PR PBB SHADOW E&M-EST. PATIENT-LVL I: CPT | Mod: PBBFAC,,,

## 2017-06-15 PROCEDURE — 90853 GROUP PSYCHOTHERAPY: CPT | Mod: S$GLB,,,

## 2017-06-15 RX ORDER — IBUPROFEN 200 MG
1 TABLET ORAL DAILY
Qty: 14 PATCH | Refills: 0 | Status: SHIPPED | OUTPATIENT
Start: 2017-06-15 | End: 2017-07-13 | Stop reason: DRUGHIGH

## 2017-06-15 NOTE — PROGRESS NOTES
Smoking Cessation Group Session #1 & 2     Site: [facility]  Date:  6/15/2017  Clinical Status of Patient: Outpatient   Length of Service and Code: 90 minutes - 26584   Number in Attendance: 3  Group Activities/Focus of Group:  Sharing last weeks challenges, triggers, and coping activities to remain quit and/ or keep making progress toward cessation, completion of TCRS (Tobacco Cessation Rating Scale) learned addiction model, personal reasons for quitting, medications, goals, quit date. Reviewed strategies, cues, and triggers. Introduced the negative impact of tobacco on health, the health advantages of discontinuing the use of tobacco, time line improved health changes after a quit, withdrawal issues to expect from nicotine and habit, and ways to achieve the goal of a quit.      Target symptoms:  withdrawal and medication side effects             The following were rated moderate (3) to severe (4) on TCRS:       Moderate 3: desires or craves tobacco, anxious or nervous, heart racing; reviewed with patient     Severe 4:   none  Patient's Response to Intervention: Patient continues to smoke 3 cpd. Pt remains on the prescribed tobacco cessation medication of the 21 mg nicotine patch QAM and 4 mg gum PRN. Withdrawals and side effects are all related to withdrawals and NRT. Advised pt to completely eliminate smoking with the patch and gum. Will send refill for patches. Pt is to remain on 21 mg patches for another 2 weeks. Pt's goal for the week is to not buy anymore cigarettes and attempt an abrupt quit. CO was 5 ppm with last cigarette smoked 2 hrs prior. Pt is to f/u next week in group session.    Progress Toward Goals and Other Mental Status Changes: The patient denies any abnormal behavioral or mental changes at this time.       Diagnosis: Z72.0  Plan: The patient will continue with group therapy sessions and medication regimen prescribed with management by physician or by the Cessation Clinic Provider. Patient  will inform Smoking Cessation Counselor of symptoms as rated high on TCRS.    Return to Clinic: 1 week    Planned Quit Date: TBD

## 2017-06-15 NOTE — Clinical Note
Patient continues to smoke 3 cpd. Pt remains on the prescribed tobacco cessation medication of the 21 mg nicotine patch QAM and 4 mg gum PRN. Symtoms and side effects are all related to withdrawals and NRT. Advised pt to completely eliminate smoking with the patch and gum. Will send refill for patches. Pt is to remain on 21 mg patches for another 2 weeks. Pt's goal for the week is to not buy anymore cigarettes and attempt an abrupt quit. Pt is to f/u next week in group session.

## 2017-06-22 ENCOUNTER — CLINICAL SUPPORT (OUTPATIENT)
Dept: SMOKING CESSATION | Facility: CLINIC | Age: 67
End: 2017-06-22
Payer: COMMERCIAL

## 2017-06-22 DIAGNOSIS — F17.200 NICOTINE DEPENDENCE: Primary | ICD-10-CM

## 2017-06-22 PROCEDURE — 99999 PR PBB SHADOW E&M-EST. PATIENT-LVL I: CPT | Mod: PBBFAC,,,

## 2017-06-22 PROCEDURE — 90853 GROUP PSYCHOTHERAPY: CPT | Mod: S$GLB,,,

## 2017-06-22 NOTE — PROGRESS NOTES
Smoking Cessation Group Session #3    Site: [facility] [place]  Date:  6/22/2017  Clinical Status of Patient: Outpatient   Length of Service and Code: 90 minutes - 35290   Number in Attendance: 2  Group Activities/Focus of Group:  completion of TCRS (Tobacco Cessation Rating Scale) reviewed strategies, controlling environment, cues, triggers, new goals set. Introduced high risk situations with preparation interventions, caffeine similarities with withdrawal issues of habit and nicotine, Alcohol, Understanding urges, cravings, stress and relaxation. Open discussion with intervention discussion.    Target symptoms:  withdrawal and medication side effects             The following were rated moderate (3) to severe (4) on TCRS:       Moderate 3: anxious, nervous, appetite loss, unusual, vivid dreams, headaches; reviewed with patient     Severe 4:   none  Patient's Response to Intervention:  Patient continues to smoke 3 cpd. Pt remains on the prescribed tobacco cessation medication of the 21 mg nicotine patch QAM and 4 mg gum PRN. Withdrawals and side effects are all related to withdrawals and NRT. Advised pt to completely eliminate smoking with the patch and gum. Pt is to remain on 21 mg patches for another week, will wean to 14 mg with next visit. Pt's goal for the week is to not buy anymore cigarettes and attempt an abrupt quit. She will attempt a dry run on Saturday 6/24/17. CO was 5 ppm with last cigarette smoked last night. Pt is to f/u next week in group session.    Progress Toward Goals and Other Mental Status Changes: The patient denies any abnormal behavioral or mental changes at this time.     Diagnosis: Z72.0  Plan: The patient will continue with group therapy sessions and medication regimen prescribed with management by physician or by the Cessation Clinic Provider. Patient will inform Smoking Cessation Counselor of symptoms as rated high on TCRS.    Return to Clinic: 1 week

## 2017-06-22 NOTE — Clinical Note
Patient continues to smoke 3 cpd. Pt remains on the prescribed tobacco cessation medication of the 21 mg nicotine patch QAM and 4 mg gum PRN. Withdrawals and side effects are all related to withdrawals and NRT. Advised pt to completely eliminate smoking with the patch and gum. Pt is to remain on 21 mg patches for another week, will wean to 14 mg with next visit. Pt's goal for the week is to not buy anymore cigarettes and attempt an abrupt quit. She will attempt a dry run on Saturday 6/24/17. CO was 5 ppm with last cigarette smoked last night. Pt is to f/u next week in group session.

## 2017-06-29 ENCOUNTER — CLINICAL SUPPORT (OUTPATIENT)
Dept: SMOKING CESSATION | Facility: CLINIC | Age: 67
End: 2017-06-29
Payer: COMMERCIAL

## 2017-06-29 DIAGNOSIS — F17.200 NICOTINE DEPENDENCE: Primary | ICD-10-CM

## 2017-06-29 PROCEDURE — 90853 GROUP PSYCHOTHERAPY: CPT | Mod: S$GLB,,,

## 2017-06-29 NOTE — PROGRESS NOTES
Smoking Cessation Group Session #4    Site: [facility]  Date:  6/29/2017  Clinical Status of Patient: Outpatient   Length of Service and Code: 60 minutes - 40476   Number in Attendance: 2  Group Activities/Focus of Group:completion of TCRS (Tobacco Cessation Rating Scale) reviewed strategies, habitual behavior, stress, and high risk situations. Introduced stress with addition interventions, SOLVE, relaxation with interventions, nutrition, exercise, weight gain, and the importance of rewarding oneself for accomplishments toward becoming tobacco free. Open discussion of all items with interventions.     Target symptoms:  withdrawal and medication side effects             The following were rated moderate (3) to severe (4) on TCRS:       Moderate 3: desires or craves tobacco; reviewed with patient     Severe 4:   none     Patient's Response to Intervention:  Patient continues to smoke 3 cpd. Pt remains on the prescribed tobacco cessation medication of the 21 mg nicotine patch QAM and 4 mg gum PRN. Withdrawals and side effects are all related to withdrawals and NRT. Advised pt to completely eliminate smoking with the patch and gum. Pt is to remain on 21 mg patches for another week, will wean to 14 mg with next visit. Pt's goal for the week is to not buy anymore cigarettes and attempt an abrupt quit. She will attempt a dry run tomorrow. Exhaled carbon monoxide level was 6 ppm per Smoker[facility]zer (0-6 non-smoker). Pt is to f/u next week in group session.     Progress Toward Goals and Other Mental Status Changes: The patient denies any abnormal behavioral or mental changes at this time.     Diagnosis: Z72.0  Plan: The patient will continue with group therapy sessions and medication regimen prescribed with management by physician or by the Cessation Clinic Provider. Patient will inform Smoking Cessation Counselor of symptoms as rated high on TCRS.    Return to Clinic: 1 week    Planned Quit Date: 6/30/17

## 2017-06-29 NOTE — Clinical Note
Patient continues to smoke 3 cpd. Pt remains on the prescribed tobacco cessation medication of the 21 mg nicotine patch QAM and 4 mg gum PRN. Pt is to remain on 21 mg patches for another week, will wean to 14 mg with next visit. Pt's goal for the week is to not buy anymore cigarettes and attempt an abrupt quit. She will attempt a dry run tomorrow. Exhaled carbon monoxide level was 4 ppm per Smokerlyzer (0-6 non-smoker). Pt is to f/u next week in group session.

## 2017-07-06 ENCOUNTER — CLINICAL SUPPORT (OUTPATIENT)
Dept: SMOKING CESSATION | Facility: CLINIC | Age: 67
End: 2017-07-06
Payer: COMMERCIAL

## 2017-07-06 DIAGNOSIS — F17.200 NICOTINE DEPENDENCE: Primary | ICD-10-CM

## 2017-07-06 PROCEDURE — 99407 BEHAV CHNG SMOKING > 10 MIN: CPT | Mod: S$GLB,,,

## 2017-07-13 ENCOUNTER — CLINICAL SUPPORT (OUTPATIENT)
Dept: SMOKING CESSATION | Facility: CLINIC | Age: 67
End: 2017-07-13
Payer: COMMERCIAL

## 2017-07-13 DIAGNOSIS — F17.200 NICOTINE DEPENDENCE: Primary | ICD-10-CM

## 2017-07-13 PROCEDURE — 99999 PR PBB SHADOW E&M-EST. PATIENT-LVL I: CPT | Mod: PBBFAC,,,

## 2017-07-13 PROCEDURE — 90853 GROUP PSYCHOTHERAPY: CPT | Mod: S$GLB,,,

## 2017-07-13 RX ORDER — IBUPROFEN 200 MG
1 TABLET ORAL DAILY
Qty: 28 PATCH | Refills: 0 | Status: SHIPPED | OUTPATIENT
Start: 2017-07-13 | End: 2018-05-08

## 2017-07-13 NOTE — Clinical Note
Pt was quit for 4 days, but lapsed and is back to smoking 1 cpd. Pt stated her lapse occurred when her mother got sick and was admitted to the hospital. Her sister offered her a cigarette and she stated she went for it. Advised pt not to bum anymore cigarettes and attempt another try at remaining quit. Patient remains on prescribed tobacco cessation medication regimen of 21 mg patch and 4 mg nicotine gum PRN, without any negative side effects at this time. Will wean to 14 mg patch with this visit. Her goal is to get back on track and attempt another dry run. Exhaled carbon monoxide level was 4 ppm per Smokerlyzer (0-6 non-smoker). Pt is to f/u in 1 week for group session.

## 2017-07-13 NOTE — PROGRESS NOTES
Smoking Cessation Group Session #5    Site: [facility] [place]  Date:  7/13/2017  Clinical Status of Patient: Outpatient   Length of Service and Code: 90 minutes - 23126   Number in Attendance: 3  Group Activities/Focus of Group:completion of TCRS (Tobacco Cessation Rating Scale) reviewed strategies, habitual behavior, high risks situations, understanding urges and cravings, stress and relaxation with open discussion and additional interventions, Introduced lapses, relapses, understanding them and analyzing the situation of a lapse, conflict issues that may be linked to a lapse.     Target symptoms:  withdrawal and medication side effects             The following were rated moderate (3) to severe (4) on TCRS:       Moderate 3: angry, irritable, frustrated, desires or craves tobacco, insomnia, appetite loss, unusual/vivid dreams; reviewed as withdrawals     Severe 4:   none  Patient's Response to Intervention: Pt was quit for 4 days, but lapsed and is back to smoking 1 cpd. Pt stated her lapse occurred when her mother got sick and was admitted to the hospital. Her sister offered her a cigarette and she stated she went for it. Advised pt not to bum anymore cigarettes and attempt another try at remaining quit. Patient remains on prescribed tobacco cessation medication regimen of 21 mg patch and 4 mg nicotine gum PRN, without any negative side effects at this time. Will wean to 14 mg patch with this visit. Her goal is to get back on track and attempt another dry run. Exhaled carbon monoxide level was 4 ppm per Smokerlyzer (0-6 non-smoker). Pt is to f/u in 1 week for group session.      Progress Toward Goals and Other Mental Status Changes: The patient denies any abnormal behavioral or mental changes at this time.     Diagnosis: Z72.0  Plan: The patient will continue with group therapy sessions and medication regimen prescribed with management by physician or by the Cessation Clinic Provider. Patient will inform Smoking  Cessation Counselor of symptoms as rated high on TCRS.    Return to Clinic: 1 week

## 2017-07-20 ENCOUNTER — CLINICAL SUPPORT (OUTPATIENT)
Dept: SMOKING CESSATION | Facility: CLINIC | Age: 67
End: 2017-07-20
Payer: COMMERCIAL

## 2017-07-20 DIAGNOSIS — F17.200 NICOTINE DEPENDENCE: Primary | ICD-10-CM

## 2017-07-20 PROCEDURE — 99407 BEHAV CHNG SMOKING > 10 MIN: CPT | Mod: S$GLB,,,

## 2017-07-27 ENCOUNTER — CLINICAL SUPPORT (OUTPATIENT)
Dept: SMOKING CESSATION | Facility: CLINIC | Age: 67
End: 2017-07-27
Payer: COMMERCIAL

## 2017-07-27 DIAGNOSIS — F17.200 NICOTINE DEPENDENCE: Primary | ICD-10-CM

## 2017-07-27 PROCEDURE — 99999 PR PBB SHADOW E&M-EST. PATIENT-LVL I: CPT | Mod: PBBFAC,,,

## 2017-07-27 PROCEDURE — 90853 GROUP PSYCHOTHERAPY: CPT | Mod: S$GLB,,,

## 2017-07-27 NOTE — Clinical Note
Patient is down to smoking 1 cig/day. Patient remains on prescribed tobacco cessation medication regimen of 14 mg patch and 4 mg gum PRN, without any negative side effects at this time. Discussed rate reduction with her and encouraged her to wait 15 min prior to smoking, move location of where she keeps her cigarettes, and to stay distracted and develop new habits oppose to smoking. Pt stated she went 48 hrs last week without a cigarette when she was admitted into the hospital, but as soon as she was discharged she bought some. Pt's goal for the week is to not buy anymore. Exhaled carbon monoxide level was 4 ppm per Smokerlyzer (0-6 non-smoker). Will see pt back in 1 week for group session.

## 2017-07-27 NOTE — PROGRESS NOTES
Smoking Cessation Group Session #1    Site: [facility] [place]  Date:  7/27/2017  Clinical Status of Patient: Outpatient   Length of Service and Code: 90 minutes - 03358   Number in Attendance: 4  Group Activities/Focus of Group:  Sharing last weeks challenges, triggers, and coping activities to remain quit and/ or keep making progress toward cessation, completion of TCRS (Tobacco Cessation Rating Scale) learned addiction model, personal reasons for quitting, medications, goals, quit date.    Target symptoms:  withdrawal and medication side effects             The following were rated moderate (3) to severe (4) on TCRS:       Moderate 3: insomnia, restless, appetite loss; reviewed with patient     Severe 4:   Desires or craves tobacco; reviewed with patient    Patient's Response to Intervention:  Patient is down to smoking 1 cig/day. Patient remains on prescribed tobacco cessation medication regimen of 14 mg patch and 4 mg gum PRN, without any negative side effects at this time. Discussed rate reduction with her and encouraged her to wait 15 min prior to smoking, move location of where she keeps her cigarettes, and to stay distracted and develop new habits oppose to smoking. Pt stated she went 48 hrs last week without a cigarette when she was admitted into the hospital, but as soon as she was discharged she bought some. Pt's goal for the week is to not buy anymore. Exhaled carbon monoxide level was 4 ppm per Smokerlyzer (0-6 non-smoker). Will see pt back in 1 week for group session.    Progress Toward Goals and Other Mental Status Changes: The patient denies any abnormal behavioral or mental changes at this time.     Diagnosis: Z72.0  Plan: The patient will continue with group therapy sessions and medication regimen prescribed with management by physician or by the Cessation Clinic Provider. Patient will inform Smoking Cessation Counselor of symptoms as rated high on TCRS.    Return to Clinic: 1 week

## 2017-08-03 ENCOUNTER — CLINICAL SUPPORT (OUTPATIENT)
Dept: SMOKING CESSATION | Facility: CLINIC | Age: 67
End: 2017-08-03
Payer: COMMERCIAL

## 2017-08-03 DIAGNOSIS — F17.200 NICOTINE DEPENDENCE: Primary | ICD-10-CM

## 2017-08-03 PROCEDURE — 90853 GROUP PSYCHOTHERAPY: CPT | Mod: S$GLB,,,

## 2017-08-03 PROCEDURE — 99999 PR PBB SHADOW E&M-EST. PATIENT-LVL I: CPT | Mod: PBBFAC,,,

## 2017-08-03 NOTE — Clinical Note
Patient continues to smoke 1 cig/day. Patient remains on prescribed tobacco cessation medication regimen of 14 mg patch and 4 mg gum PRN, without any negative side effects at this time. Exhaled carbon monoxide level was 4 ppm per Smokerlyzer (0-6 non-smoker). Pt is to f/u next week for group session.

## 2017-08-03 NOTE — PROGRESS NOTES
Smoking Cessation Group Session #2    Site: [facility] [place]  Date:  8/3/2017  Clinical Status of Patient: Outpatient   Length of Service and Code: 90 minutes - 63030   Number in Attendance: 4  Group Activities/Focus of Group:completion of TCRS (Tobacco Cessation Rating Scale) reviewed strategies, cues, and triggers. Introduced the negative impact of tobacco on health, the health advantages of discontinuing the use of tobacco, time line improved health changes after a quit, withdrawal issues to expect from nicotine and habit, and ways to achieve the goal of a quit.    Target symptoms:  withdrawal and medication side effects             The following were rated moderate (3) to severe (4) on TCRS:       Moderate 3: desires or craves tobacco, increased hunger; reviewed with patient     Severe 4:   none  Patient's Response to Intervention:  Patient continues to smoke 1 cig/day. Patient remains on prescribed tobacco cessation medication regimen of 14 mg patch and 4 mg gum PRN, without any negative side effects at this time. Exhaled carbon monoxide level was 4 ppm per Smokerlyzer (0-6 non-smoker). Pt is to f/u next week for group session.    Progress Toward Goals and Other Mental Status Changes: The patient denies any abnormal behavioral or mental changes at this time.     Diagnosis: Z72.0  Plan: The patient will continue with group therapy sessions and medication regimen prescribed with management by physician or by the Cessation Clinic Provider. Patient will inform Smoking Cessation Counselor of symptoms as rated high on TCRS.    Return to Clinic: 1 week

## 2017-08-18 ENCOUNTER — HOSPITAL ENCOUNTER (EMERGENCY)
Facility: HOSPITAL | Age: 67
Discharge: HOME | End: 2017-08-18
Attending: EMERGENCY MEDICINE
Payer: MEDICARE

## 2017-08-18 VITALS
DIASTOLIC BLOOD PRESSURE: 70 MMHG | BODY MASS INDEX: 26.68 KG/M2 | HEIGHT: 62 IN | OXYGEN SATURATION: 100 % | HEART RATE: 80 BPM | SYSTOLIC BLOOD PRESSURE: 130 MMHG | TEMPERATURE: 98 F | WEIGHT: 145 LBS | RESPIRATION RATE: 18 BRPM

## 2017-08-18 DIAGNOSIS — M54.16 LUMBAR RADICULOPATHY, ACUTE: Primary | ICD-10-CM

## 2017-08-18 DIAGNOSIS — D64.9 ANEMIA, UNSPECIFIED TYPE: ICD-10-CM

## 2017-08-18 LAB
ALBUMIN SERPL BCP-MCNC: 2.7 G/DL
ALP SERPL-CCNC: 95 U/L
ALT SERPL W/O P-5'-P-CCNC: 13 U/L
ANION GAP SERPL CALC-SCNC: 10 MMOL/L
ANION GAP SERPL CALC-SCNC: 9 MMOL/L
AST SERPL-CCNC: 13 U/L
BASOPHILS # BLD AUTO: 0.01 K/UL
BASOPHILS NFR BLD: 0.2 %
BILIRUB SERPL-MCNC: 0.2 MG/DL
BILIRUB UR QL STRIP: NEGATIVE
BUN SERPL-MCNC: 8 MG/DL
BUN SERPL-MCNC: 9 MG/DL
CALCIUM SERPL-MCNC: 7.7 MG/DL
CALCIUM SERPL-MCNC: 8.1 MG/DL
CHLORIDE SERPL-SCNC: 113 MMOL/L
CHLORIDE SERPL-SCNC: 114 MMOL/L
CLARITY UR: CLEAR
CO2 SERPL-SCNC: 17 MMOL/L
CO2 SERPL-SCNC: 19 MMOL/L
COLOR UR: ABNORMAL
CREAT SERPL-MCNC: 0.6 MG/DL
CREAT SERPL-MCNC: 0.7 MG/DL
DIFFERENTIAL METHOD: ABNORMAL
EOSINOPHIL # BLD AUTO: 0.1 K/UL
EOSINOPHIL NFR BLD: 2.1 %
ERYTHROCYTE [DISTWIDTH] IN BLOOD BY AUTOMATED COUNT: 19.1 %
EST. GFR  (AFRICAN AMERICAN): >60 ML/MIN/1.73 M^2
EST. GFR  (AFRICAN AMERICAN): >60 ML/MIN/1.73 M^2
EST. GFR  (NON AFRICAN AMERICAN): >60 ML/MIN/1.73 M^2
EST. GFR  (NON AFRICAN AMERICAN): >60 ML/MIN/1.73 M^2
GLUCOSE SERPL-MCNC: 107 MG/DL
GLUCOSE SERPL-MCNC: 128 MG/DL
GLUCOSE UR QL STRIP: ABNORMAL
HCT VFR BLD AUTO: 27.5 %
HGB BLD-MCNC: 8.1 G/DL
HGB UR QL STRIP: NEGATIVE
KETONES UR QL STRIP: NEGATIVE
LACTATE SERPL-SCNC: 1.9 MMOL/L
LACTATE SERPL-SCNC: 2.5 MMOL/L
LEUKOCYTE ESTERASE UR QL STRIP: NEGATIVE
LIPASE SERPL-CCNC: 65 U/L
LYMPHOCYTES # BLD AUTO: 1.4 K/UL
LYMPHOCYTES NFR BLD: 20.3 %
MCH RBC QN AUTO: 23 PG
MCHC RBC AUTO-ENTMCNC: 29.5 G/DL
MCV RBC AUTO: 78 FL
MONOCYTES # BLD AUTO: 0.5 K/UL
MONOCYTES NFR BLD: 7.7 %
NEUTROPHILS # BLD AUTO: 4.6 K/UL
NEUTROPHILS NFR BLD: 69.5 %
NITRITE UR QL STRIP: NEGATIVE
PH UR STRIP: 5 [PH] (ref 5–8)
PLATELET # BLD AUTO: 161 K/UL
PMV BLD AUTO: 8.9 FL
POTASSIUM SERPL-SCNC: 3.2 MMOL/L
POTASSIUM SERPL-SCNC: 4.1 MMOL/L
PROT SERPL-MCNC: 5.9 G/DL
PROT UR QL STRIP: NEGATIVE
RBC # BLD AUTO: 3.52 M/UL
SODIUM SERPL-SCNC: 140 MMOL/L
SODIUM SERPL-SCNC: 142 MMOL/L
SP GR UR STRIP: >=1.03 (ref 1–1.03)
URN SPEC COLLECT METH UR: ABNORMAL
UROBILINOGEN UR STRIP-ACNC: NEGATIVE EU/DL
WBC # BLD AUTO: 6.66 K/UL

## 2017-08-18 PROCEDURE — 63600175 PHARM REV CODE 636 W HCPCS: Performed by: EMERGENCY MEDICINE

## 2017-08-18 PROCEDURE — 96374 THER/PROPH/DIAG INJ IV PUSH: CPT

## 2017-08-18 PROCEDURE — 80053 COMPREHEN METABOLIC PANEL: CPT

## 2017-08-18 PROCEDURE — 25500020 PHARM REV CODE 255: Performed by: EMERGENCY MEDICINE

## 2017-08-18 PROCEDURE — 93005 ELECTROCARDIOGRAM TRACING: CPT

## 2017-08-18 PROCEDURE — 96375 TX/PRO/DX INJ NEW DRUG ADDON: CPT

## 2017-08-18 PROCEDURE — 83605 ASSAY OF LACTIC ACID: CPT

## 2017-08-18 PROCEDURE — 25000003 PHARM REV CODE 250: Performed by: EMERGENCY MEDICINE

## 2017-08-18 PROCEDURE — 83605 ASSAY OF LACTIC ACID: CPT | Mod: 91

## 2017-08-18 PROCEDURE — 96361 HYDRATE IV INFUSION ADD-ON: CPT

## 2017-08-18 PROCEDURE — 85025 COMPLETE CBC W/AUTO DIFF WBC: CPT

## 2017-08-18 PROCEDURE — 93010 ELECTROCARDIOGRAM REPORT: CPT | Mod: ,,, | Performed by: INTERNAL MEDICINE

## 2017-08-18 PROCEDURE — 81003 URINALYSIS AUTO W/O SCOPE: CPT

## 2017-08-18 PROCEDURE — 83690 ASSAY OF LIPASE: CPT

## 2017-08-18 PROCEDURE — 99285 EMERGENCY DEPT VISIT HI MDM: CPT | Mod: 25

## 2017-08-18 PROCEDURE — 80048 BASIC METABOLIC PNL TOTAL CA: CPT

## 2017-08-18 RX ORDER — ONDANSETRON 2 MG/ML
4 INJECTION INTRAMUSCULAR; INTRAVENOUS
Status: COMPLETED | OUTPATIENT
Start: 2017-08-18 | End: 2017-08-18

## 2017-08-18 RX ORDER — HYDROCODONE BITARTRATE AND ACETAMINOPHEN 10; 325 MG/1; MG/1
1 TABLET ORAL EVERY 6 HOURS PRN
Qty: 12 TABLET | Refills: 0 | Status: SHIPPED | OUTPATIENT
Start: 2017-08-18 | End: 2018-11-15 | Stop reason: SDUPTHER

## 2017-08-18 RX ORDER — KETOROLAC TROMETHAMINE 30 MG/ML
15 INJECTION, SOLUTION INTRAMUSCULAR; INTRAVENOUS
Status: COMPLETED | OUTPATIENT
Start: 2017-08-18 | End: 2017-08-18

## 2017-08-18 RX ORDER — POTASSIUM CHLORIDE 20 MEQ/1
40 TABLET, EXTENDED RELEASE ORAL
Status: COMPLETED | OUTPATIENT
Start: 2017-08-18 | End: 2017-08-18

## 2017-08-18 RX ORDER — HYDROCODONE BITARTRATE AND ACETAMINOPHEN 5; 325 MG/1; MG/1
1 TABLET ORAL
Status: COMPLETED | OUTPATIENT
Start: 2017-08-18 | End: 2017-08-18

## 2017-08-18 RX ADMIN — SODIUM CHLORIDE 1000 ML: 0.9 INJECTION, SOLUTION INTRAVENOUS at 06:08

## 2017-08-18 RX ADMIN — KETOROLAC TROMETHAMINE 15 MG: 30 INJECTION, SOLUTION INTRAMUSCULAR at 03:08

## 2017-08-18 RX ADMIN — POTASSIUM CHLORIDE 40 MEQ: 20 TABLET, EXTENDED RELEASE ORAL at 08:08

## 2017-08-18 RX ADMIN — SODIUM CHLORIDE 1000 ML: 0.9 INJECTION, SOLUTION INTRAVENOUS at 03:08

## 2017-08-18 RX ADMIN — IOHEXOL 75 ML: 350 INJECTION, SOLUTION INTRAVENOUS at 07:08

## 2017-08-18 RX ADMIN — ONDANSETRON 4 MG: 2 INJECTION INTRAMUSCULAR; INTRAVENOUS at 11:08

## 2017-08-18 RX ADMIN — HYDROCODONE BITARTRATE AND ACETAMINOPHEN 1 TABLET: 5; 325 TABLET ORAL at 11:08

## 2017-08-18 NOTE — ED PROVIDER NOTES
Encounter Date: 2017       History     Chief Complaint   Patient presents with    Flank Pain     pt activated her medical alert for left flank pain tonight. pt took 300mg seroquel prior to calling ems. pt is lethargic and hypotensive.      66-year-old female with a history of hypertension, coronary artery disease, hyperlipidemia, and anxiety, comes to the emergency department for left posterior buttock pain that started tonight.  She had a similar episode on Friday evening that resolved.  The pain does not radiate to her groin she does have some pain that radiates down to her left leg.  She said she was taking her Seroquel as she normally does to help her sleep because the pain was so severe.  She denies urinary incontinence she does have some back pain.  The pain radiates down her left leg, and a sharp stabbing pain.    She denies history of blood in her urine she denies fevers or chills.      The history is provided by the patient.     Review of patient's allergies indicates:  No Known Allergies  Past Medical History:   Diagnosis Date    Acute coronary syndrome     Anxiety     Asthma     Cancer     colon    Cataracts, both eyes     COPD (chronic obstructive pulmonary disease)     Coronary artery disease     Depression     Diabetes mellitus     Elevated cholesterol     Hypertension      Past Surgical History:   Procedure Laterality Date    ABDOMINAL SURGERY       SECTION, CLASSIC      COLON SURGERY      CORONARY ANGIOPLASTY WITH STENT PLACEMENT  3 months ago     x2, Hysterectomy, Lung surgery, Partial stomach removed, Part of colon removed for rectal cancer    GASTRECTOMY      HEMORRHOID SURGERY      HYSTERECTOMY      LUNG BIOPSY       Family History   Problem Relation Age of Onset    Cancer Mother     Diabetes Mother     Hypertension Mother     Cancer Father     Heart disease Father     Depression Sister     Hypertension Sister     Cancer Maternal Aunt      Social  History   Substance Use Topics    Smoking status: Current Every Day Smoker     Packs/day: 0.25     Years: 46.00     Types: Cigarettes    Smokeless tobacco: Never Used    Alcohol use 1.8 oz/week     3 Glasses of wine per week      Comment: 1 every 3 months     Review of Systems   Musculoskeletal: Positive for back pain and myalgias.   All other systems reviewed and are negative.      Physical Exam     Initial Vitals   BP Pulse Resp Temp SpO2   08/18/17 0130 08/18/17 0130 08/18/17 0130 08/18/17 0144 08/18/17 0130   104/66 84 (!) 21 97.9 °F (36.6 °C) 99 %      MAP       08/18/17 0130       78.67         Physical Exam    Nursing note and vitals reviewed.  Constitutional: She appears well-developed and well-nourished. She appears distressed.   HENT:   Head: Normocephalic and atraumatic.   Eyes: EOM are normal. Pupils are equal, round, and reactive to light.   Neck: Normal range of motion. Neck supple.   Cardiovascular: Normal rate and normal heart sounds.   Pulmonary/Chest: Breath sounds normal.   Abdominal: Soft.   Musculoskeletal: Normal range of motion.   She has posterior buttock tenderness over her left iliac crest and left flank.  No tenderness in her left lower quadrant or suprapubic region.   Neurological: She is alert and oriented to person, place, and time. She has normal strength. No cranial nerve deficit.   Skin: Skin is warm and dry.   Psychiatric: She has a normal mood and affect. Her behavior is normal. Thought content normal.   She is initially sleepy and slurring her words, but wakes up and talks, appropriate         ED Course   Procedures  Labs Reviewed   CBC W/ AUTO DIFFERENTIAL - Abnormal; Notable for the following:        Result Value    RBC 3.52 (*)     Hemoglobin 8.1 (*)     Hematocrit 27.5 (*)     MCV 78 (*)     MCH 23.0 (*)     MCHC 29.5 (*)     RDW 19.1 (*)     MPV 8.9 (*)     All other components within normal limits   COMPREHENSIVE METABOLIC PANEL - Abnormal; Notable for the following:      Potassium 3.2 (*)     Chloride 113 (*)     CO2 17 (*)     Glucose 128 (*)     Calcium 7.7 (*)     Total Protein 5.9 (*)     Albumin 2.7 (*)     All other components within normal limits   LIPASE - Abnormal; Notable for the following:     Lipase 65 (*)     All other components within normal limits   URINALYSIS - Abnormal; Notable for the following:     Color, UA Brown (*)     Specific Gravity, UA >=1.030 (*)     Glucose, UA 2+ (*)     All other components within normal limits   LACTIC ACID, PLASMA - Abnormal; Notable for the following:     Lactate (Lactic Acid) 2.5 (*)     All other components within normal limits   BASIC METABOLIC PANEL - Abnormal; Notable for the following:     Chloride 114 (*)     CO2 19 (*)     Calcium 8.1 (*)     All other components within normal limits   LACTIC ACID, PLASMA     EKG Readings: (Independently Interpreted)   Normal sinus rhythm rate of 83, no acute ST changes suggestive of ischemia          Medical Decision Making:   History:   Old Medical Records: I decided to obtain old medical records.  Old Records Summarized: records from previous admission(s).       <> Summary of Records: Previous CT scan w/o nephrolithiasis, with calcified aorta  Initial Assessment:   46-year-old female with a history of hypertension coronary artery disease here with left posterior buttock pain, recurrence of pain since Friday radiating down her left leg apparently took more Seroquel than normal and is sleepy, though it is 2AM at the time of her exam  Differential Diagnosis:   Radiculopathy, UTI/pyelo/nephrolithiasis, AAA, colitis/diverticulitis  Clinical Tests:   Lab Tests: Ordered and Reviewed       <> Summary of Lab: Drop in Hgb, acidotic and hypokalemic  Radiological Study: Ordered and Reviewed  ED Management:  Patient continues to be sleepy, UA with ketones, BMP acidotic and hypokalemic  Awaiting CT scan of head and abdomen, repeating IVF and lactate  Signed out to Dr Aguilar pending blood work and CT  scan, re-evaluation      FOLLOW UP PROGRESS NOTES FROM THIS ED VISIT, FROM DR EUCEDA:      Patient presents with left lower lumbosacral pain that radiates to the leg.  I believe this is likely a lumbar radiculopathy or or peripheral neuropathy and less likely her kidneys, diverticulitis colitis AAA or vascular ischemia.  CT is read as normal.  However, her labs were abnormal here with a slightly elevated lactic acidosis and a hemoglobin and hematocrit that were slightly decreased.  After review of her cardiologist recent notes it states she was hospitalized 2 weeks ago for anemia and her cardiologist stopped her Plavix.  She states she still having dark stools.  I did a rectal exam on her here because her H&H is decreased compared to her was a week ago and she still hasn't seen a gastroenterologist.  She does take aspirin.  Her rectal exam was heme-negative.  I will discuss the case with her cardiologist.     11:38 AM I spoke with the patient's cardiologist who states [facility] and they stopped her Plavix.  Her hemoglobin was 8.5 at that time.  It's back at that level now w/ no cp or sob and I don't think she needs transufsion.   Spoke with her gastrologist, Dr. Daniels, and the patient be set up for an upper endoscopy next week.  Patient is no longer taking Plavix.  She has had a similar hemoglobin and hematocrit in the past and has small bowel and gastric AVMs.  Given no significant bleeding at this time I feel that she is stable for discharge.  I'll prescribe Norco for her back pain.                      ED Course     Clinical Impression:   The primary encounter diagnosis was Lumbar radiculopathy, acute. A diagnosis of Anemia, unspecified type was also pertinent to this visit.                           Lalitha Mckeon MD  08/30/17 2442

## 2017-08-18 NOTE — ED NOTES
Assumed care of patient at this time. IV NS infusing at this time. On cardiac, bp and o2 sat monitor. SR up and CB in reach. Pt visible from nurses station.

## 2017-08-18 NOTE — PROVIDER PROGRESS NOTES - EMERGENCY DEPT.
Encounter Date: 8/18/2017    ED Physician Progress Notes        Physician Note:   Patient presents with left lower lumbosacral pain that radiates to the leg.  I believe this is likely a lumbar radiculopathy or or peripheral neuropathy and less likely her kidneys, diverticulitis colitis AAA or vascular ischemia.  CT is read as normal.  However, her labs were abnormal here with a slightly elevated lactic acidosis and a hemoglobin and hematocrit that were slightly decreased.  After review of her cardiologist recent notes it states she was hospitalized 2 weeks ago for anemia and her cardiologist stopped her Plavix.  She states she still having dark stools.  I did a rectal exam on her here because her H&H is decreased compared to her was a week ago and she still hasn't seen a gastroenterologist.  She does take aspirin.  Her rectal exam was heme-negative.  I will discuss the case with her cardiologist.    11:38 AM I spoke with the patient's cardiologist who states that she was in [facility] last week for a GI bleed and they stopped her Plavix.  Her hemoglobin was 8.5 at that time.  It's back at that level now w/ no cp or sob and I don't think she needs transufsion.   Spoke with her gastrologist, Dr. Daniels, and the patient be set up for an upper endoscopy next week.  Patient is no longer taking Plavix.  She has had a similar hemoglobin and hematocrit in the past and has small bowel and gastric AVMs.  Given no significant bleeding at this time I feel that she is stable for discharge.  I'll prescribe Norco for her back pain.

## 2017-08-18 NOTE — ED NOTES
Patient identifiers for Chayito Chung checked and correct.  LOC: Drowsy. Speech slurred.  Oriented x 4.  APPEARANCE: Patient uncomfortable  SKIN: The skin is warm and dry, patient has normal skin turgor and moist mucus membranes, skin intact, no breakdown or brusing noted.  MUSKULOSKELETAL: Patient moving all extremities well, no obvious swelling or deformities noted.  RESPIRATORY: Airway is open and patent, respirations are spontaneous, patient has a normal effort and rate.  CARDIAC: Patient has a normal rate and rhythm, no periphreal edema noted.  NEUROLOGIC: PERRL, facial expression is symmetrical, bilateral hand grasps weak and equal, normal sensation in all extremities when touched with a finger.

## 2017-08-18 NOTE — ED NOTES
Pt resting on stretcher at this time with eyes closed, snoring. Pt on cardiac, bp and o2 sat monitor. Pt awakens after tactile stimulation. Pt tolerated PO medication at this time. Pt then asks for pain medication for complaints of pain to her left lower abdomen. Dr. Junior. SR up and CB in reach.

## 2017-08-19 DIAGNOSIS — R10.9 FLANK PAIN: Primary | ICD-10-CM

## 2017-09-06 DIAGNOSIS — Z12.31 SCREENING MAMMOGRAM, ENCOUNTER FOR: Primary | ICD-10-CM

## 2017-09-11 ENCOUNTER — HOSPITAL ENCOUNTER (OUTPATIENT)
Dept: RADIOLOGY | Facility: HOSPITAL | Age: 67
Discharge: HOME | End: 2017-09-11
Attending: FAMILY MEDICINE
Payer: MEDICARE

## 2017-09-11 DIAGNOSIS — Z12.31 SCREENING MAMMOGRAM, ENCOUNTER FOR: ICD-10-CM

## 2017-09-11 PROCEDURE — 77067 SCR MAMMO BI INCL CAD: CPT | Mod: TC

## 2017-09-11 PROCEDURE — 77067 SCR MAMMO BI INCL CAD: CPT | Mod: 26,,, | Performed by: RADIOLOGY

## 2017-09-12 ENCOUNTER — CLINICAL SUPPORT (OUTPATIENT)
Dept: SMOKING CESSATION | Facility: CLINIC | Age: 67
End: 2017-09-12
Payer: COMMERCIAL

## 2017-09-12 ENCOUNTER — TELEPHONE (OUTPATIENT)
Dept: OTOLARYNGOLOGY | Facility: CLINIC | Age: 67
End: 2017-09-12

## 2017-09-12 DIAGNOSIS — F17.200 NICOTINE DEPENDENCE: Primary | ICD-10-CM

## 2017-09-12 PROCEDURE — 99407 BEHAV CHNG SMOKING > 10 MIN: CPT | Mod: S$GLB,,,

## 2017-09-12 NOTE — TELEPHONE ENCOUNTER
----- Message from Letitia Wells sent at 9/12/2017 12:53 PM CDT -----  Contact: [phone]  Patient is requesting to be seen before 10 2-17

## 2017-09-13 ENCOUNTER — OFFICE VISIT (OUTPATIENT)
Dept: OTOLARYNGOLOGY | Facility: CLINIC | Age: 67
End: 2017-09-13
Payer: MEDICARE

## 2017-09-13 VITALS
WEIGHT: 151.38 LBS | DIASTOLIC BLOOD PRESSURE: 88 MMHG | TEMPERATURE: 99 F | HEART RATE: 103 BPM | BODY MASS INDEX: 27.68 KG/M2 | SYSTOLIC BLOOD PRESSURE: 126 MMHG

## 2017-09-13 DIAGNOSIS — R49.0 HOARSENESS OF VOICE: Primary | ICD-10-CM

## 2017-09-13 DIAGNOSIS — R09.A2 GLOBUS SENSATION: ICD-10-CM

## 2017-09-13 DIAGNOSIS — K21.9 LARYNGOPHARYNGEAL REFLUX (LPR): ICD-10-CM

## 2017-09-13 PROCEDURE — 3008F BODY MASS INDEX DOCD: CPT | Mod: S$GLB,,, | Performed by: OTOLARYNGOLOGY

## 2017-09-13 PROCEDURE — 99999 PR PBB SHADOW E&M-EST. PATIENT-LVL V: CPT | Mod: PBBFAC,,, | Performed by: OTOLARYNGOLOGY

## 2017-09-13 PROCEDURE — 31575 DIAGNOSTIC LARYNGOSCOPY: CPT | Mod: S$GLB,,, | Performed by: OTOLARYNGOLOGY

## 2017-09-13 PROCEDURE — 3079F DIAST BP 80-89 MM HG: CPT | Mod: S$GLB,,, | Performed by: OTOLARYNGOLOGY

## 2017-09-13 PROCEDURE — 3074F SYST BP LT 130 MM HG: CPT | Mod: S$GLB,,, | Performed by: OTOLARYNGOLOGY

## 2017-09-13 PROCEDURE — 99204 OFFICE O/P NEW MOD 45 MIN: CPT | Mod: 25,S$GLB,, | Performed by: OTOLARYNGOLOGY

## 2017-09-13 RX ORDER — PANTOPRAZOLE SODIUM 40 MG/1
40 TABLET, DELAYED RELEASE ORAL 2 TIMES DAILY
Qty: 60 TABLET | Refills: 11 | Status: SHIPPED | OUTPATIENT
Start: 2017-09-13 | End: 2018-01-23 | Stop reason: SDUPTHER

## 2017-09-13 NOTE — PATIENT INSTRUCTIONS
Tips to Control Acid Reflux    To control acid reflux, youll need to make some basic diet and lifestyle changes. The simple steps outlined below may be all youll need to ease discomfort.  Watch what you eat  · Avoid fatty foods and spicy foods.  · Eat fewer acidic foods, such as citrus and tomato-based foods. These can increase symptoms.  · Limit drinking alcohol, caffeine, and fizzy beverages. All increase acid reflux.  · Try limiting chocolate, peppermint, and spearmint. These can worsen acid reflux in some people.  Watch when you eat  · Avoid lying down for 3 hours after eating.  · Do not snack before going to bed.  Raise your head  Raising your head and upper body by 4 to 6 inches helps limit reflux when youre lying down. Put blocks under the head of your bed frame to raise it.  Other changes  · Lose weight, if you need to  · Dont exercise near bedtime  · Avoid tight-fitting clothes  · Limit aspirin and ibuprofen  · Stop smoking   Date Last Reviewed: 7/1/2016  © 3833-1275 [employer]. [address] [place] [address], [place] [address]. All rights reserved. This information is not intended as a substitute for professional medical care. Always follow your healthcare professional's instructions.

## 2017-09-13 NOTE — PROGRESS NOTES
Chief Complaint   Patient presents with    Other     patients reports it feels like something stuck in her throat    Cough   .     HPI:Chayito Chung is a 66 y.o. female who has been referred by Dr. Escamilla for a 3 months history of globus sensation and hoarseness. She notes a foreign bodys sensation that is constant.  She states it is worse at night.  She coughs and clears her throat but unable to relieve the sensation.  Her voice is not progressively worsening over this time. There are not pitch breaks or cracks. There is not vocal fatigue. She denies dysphagia, odynophagia, throat pain, and otalgia.  There is no hemoptysis or hematemesis. She is breathing well.     She admits to throat clearing and cough. She admits to frequent heartburn and reflux. She is currently on Protonix 40mg daily and will use OTC tums and pepto-bismol without relief.         Past Medical History:   Diagnosis Date    Acute coronary syndrome     Anxiety     Asthma     Cancer     colon    Cataracts, both eyes     Colon cancer 1988    COPD (chronic obstructive pulmonary disease)     Coronary artery disease     Depression     Diabetes mellitus     Elevated cholesterol     Hypertension      Social History     Social History    Marital status: Single     Spouse name: N/A    Number of children: N/A    Years of education: N/A     Occupational History    Not on file.     Social History Main Topics    Smoking status: Current Every Day Smoker     Packs/day: 0.25     Years: 46.00     Types: Cigarettes    Smokeless tobacco: Former User    Alcohol use 1.8 oz/week     3 Glasses of wine per week      Comment: 1 every 3 months    Drug use: No    Sexual activity: Yes     Partners: Male     Birth control/ protection: None      Comment: last drink yesterday afternoon     Other Topics Concern    Not on file     Social History Narrative    No narrative on file     Past Surgical History:   Procedure Laterality Date    ABDOMINAL  SURGERY       SECTION, CLASSIC      COLON SURGERY      CORONARY ANGIOPLASTY WITH STENT PLACEMENT  3 months ago     x2, Hysterectomy, Lung surgery, Partial stomach removed, Part of colon removed for rectal cancer    GASTRECTOMY      HEMORRHOID SURGERY      HYSTERECTOMY      @28yrs of age    LUNG BIOPSY      OOPHORECTOMY      @28yrs of age     Family History   Problem Relation Age of Onset    Cancer Mother     Diabetes Mother     Hypertension Mother     Breast cancer Mother     Cancer Father     Heart disease Father     Depression Sister     Hypertension Sister     Cancer Maternal Aunt            Review of Systems  General: negative for chills, fever or weight loss  Psychological: negative for mood changes or depression  Ophthalmic: negative for blurry vision, photophobia or eye pain  ENT: see HPI  Respiratory: no cough, shortness of breath, or wheezing  Cardiovascular: no chest pain or dyspnea on exertion  Gastrointestinal: no abdominal pain, change in bowel habits, or black/ bloody stools  Musculoskeletal: negative for gait disturbance or muscular weakness  Neurological: no syncope or seizures; no ataxia  Dermatological: negative for puritis,  rash and jaundice  Hematologic/lymphatic: no easy bruising, no new lumps or bumps      Physical Exam:    Vitals:    17 1458   BP: 126/88   Pulse: 103   Temp: 98.7 °F (37.1 °C)       Constitutional: Well appearing / communicating without difficutly.  NAD.  Eyes: EOM I Bilaterally  Head/Face: Normocephalic.  Negative paranasal sinus pressure/tenderness.  Salivary glands WNL.  House Brackmann I Bilaterally.    Right Ear: Auricle normal appearance. External Auditory Canal within normal limits no lesions or masses,TM w/o masses/lesions/perforations. TM mobility noted.   Left Ear: Auricle normal appearance. External Auditory Canal within normal limits no lesions or masses,TM w/o masses/lesions/perforations. TM mobility noted.  Nose: No gross  nasal septal deviation. Inferior Turbinates 3+ bilaterally. No septal perforation. No masses/lesions. External nasal skin appears normal without masses/lesions.  Oral Cavity: Gingiva/lips within normal limits.  Dentition/gingiva healthy appearing. Mucus membranes moist. Floor of mouth soft, no masses palpated. Oral Tongue mobile. Hard Palate appears normal.    Oropharynx: Base of tongue appears normal. No masses/lesions noted. Tonsillar fossa/pharyngeal wall without lesions. Posterior oropharynx WNL.  Soft palate without masses. Midline uvula.   Neck/Lymphatic: No LAD I-VI bilaterally.  No thyromegaly.  No masses noted on exam.    Mirror laryngoscopy/nasopharyngoscopy: Active gag reflex.  Unable to perform.    Neuro/Psychiatric: AOx3.  Normal mood and affect.   Cardiovascular: Normal carotid pulses bilaterally, no increasing jugular venous distention noted at cervical region bilaterally.    Respiratory: Normal respiratory effort, no stridor, no retractions noted.      See separate procedure note for FFL.     Assessment:    ICD-10-CM ICD-9-CM    1. Hoarseness of voice R49.0 784.42    2. Laryngopharyngeal reflux (LPR) K21.9 478.79    3. Globus sensation F45.8 306.4      The primary encounter diagnosis was Hoarseness of voice. Diagnoses of Laryngopharyngeal reflux (LPR) and Globus sensation were also pertinent to this visit.      Plan:  No orders of the defined types were placed in this encounter.      The patient has the physical findings of chronic laryngopharyngeal reflux, which I believe is the cause of the hoarseness and globus sensation. I explained to the patient how it is common to experience throat discomfort, a foreign body sensation, problems swallowing, chronic cough and hoarseness among other things due to reflux, even in the absence of heartburn. Smaller volumes of gastric contents are required to irritate the pharynx than the lower esophagus. Often patients with significant reflux and heartburn will seek  medical treatment earlier.     I recommended that the patient start taking Omeprazole 40mg twice daily and provided a prescription.     I also recommended that the patient refrain from eating within 3 hours of going to bed at night and that the patient place a 2 x 4 underneath the head board of the bed to elevate the head of bed very subtly and optimize the impact of gravity on the potential reflux.  I recommended that the patient avoid alcohol, caffeine, tobacco, tomato sauce, spicy foods, fried food, and chocolate.     There is a potential long-term risk of reflux for the development of esophageal cancer, and the data on head and neck malignancy is unclear. Therefore it is important for the patient to be compliant with these recommendations. The patient may benefit from an evaluation by Gastroenterology if the symptoms fail to improve or worsen. Follow up in 4 weeks  to reassess progress with treatment regimen.     Sonia Freeman MD

## 2017-09-13 NOTE — PROCEDURES
Laryngoscopy  Date/Time: 9/13/2017 4:17 PM  Performed by: LORE TRAN  Authorized by: LORE TRAN     Consent Done?:  Yes (Verbal)    Due to indication in patient's history, presentation or risk factors,  a fiber optic exam was performed.    SEPARATE PROCEDURE NOTE:    ANESTHESIA:  Topical xylocaine with bud-synephrine    FINDINGS:  Moderate to severe inaterarytenoid erythema and edema    PROCEDURE:  After verbal consent was obtained, the flexible scope was passed through the patient's nasal cavity without difficulty.  The nasopharynx (adenoid pad) and eustachian tube orifices were first visualized and were found to be normal, without masses or irregularity.  The posterior pharyngeal wall and base of tongue were then examined and no mass or irregular tissue was seen.  The scope was then advanced to the larynx, and the epiglottis, valleculae, and piriform sinuses were normal, without masses or mucosal irregularity.  The false vocal folds and true vocal folds were then examined and were found to have normal mobility (full abduction and adduction) and no masses or mucosal irregularity was seen.  The interartyenoid area had moderate to severe edema and erythema consistent with reflux.

## 2017-09-13 NOTE — LETTER
September 13, 2017      Ruby Escamilla MD  [address]  [address]  [facility] [address]           [place]hin[facility]  [address], [address]  [facility] [address]  Phone: [phone]  Fax: [phone]          Patient: Chayito Chung   MR Number: [id number]   YOB: 1950   Date of Visit: 9/13/2017       Dear Dr. Ruby Escamilla:    Thank you for referring Chayito Chung to me for evaluation. Attached you will find relevant portions of my assessment and plan of care.    If you have questions, please do not hesitate to call me. I look forward to following Chayito Chung along with you.    Sincerely,    Sonia Freeman MD    Enclosure  CC:  No Recipients    If you would like to receive this communication electronically, please contact [email or web address] or [phone] to request more information on [employer] access.    For providers and/or their staff who would like to refer a patient to [facility], please contact us through our one-stop-shop provider referral line, [facility], at [phone].    If you feel you have received this communication in error or would no longer like to receive these types of communications, please e-mail [email or web address]

## 2017-09-18 ENCOUNTER — TELEPHONE (OUTPATIENT)
Dept: OTOLARYNGOLOGY | Facility: CLINIC | Age: 67
End: 2017-09-18

## 2017-10-11 ENCOUNTER — OFFICE VISIT (OUTPATIENT)
Dept: OTOLARYNGOLOGY | Facility: CLINIC | Age: 67
End: 2017-10-11
Payer: MEDICARE

## 2017-10-11 VITALS
TEMPERATURE: 97 F | SYSTOLIC BLOOD PRESSURE: 142 MMHG | HEIGHT: 62 IN | DIASTOLIC BLOOD PRESSURE: 101 MMHG | WEIGHT: 147.19 LBS | BODY MASS INDEX: 27.08 KG/M2 | HEART RATE: 103 BPM

## 2017-10-11 DIAGNOSIS — K21.9 LARYNGOPHARYNGEAL REFLUX (LPR): ICD-10-CM

## 2017-10-11 DIAGNOSIS — R09.A2 GLOBUS SENSATION: ICD-10-CM

## 2017-10-11 DIAGNOSIS — R49.0 HOARSENESS OF VOICE: Primary | ICD-10-CM

## 2017-10-11 PROCEDURE — 99214 OFFICE O/P EST MOD 30 MIN: CPT | Mod: 25,S$GLB,, | Performed by: OTOLARYNGOLOGY

## 2017-10-11 PROCEDURE — 31575 DIAGNOSTIC LARYNGOSCOPY: CPT | Mod: S$GLB,,, | Performed by: OTOLARYNGOLOGY

## 2017-10-11 PROCEDURE — 99999 PR PBB SHADOW E&M-EST. PATIENT-LVL V: CPT | Mod: PBBFAC,,, | Performed by: OTOLARYNGOLOGY

## 2017-10-11 RX ORDER — BENZONATATE 100 MG/1
100 CAPSULE ORAL 3 TIMES DAILY PRN
Refills: 5 | COMMUNITY
Start: 2017-09-28 | End: 2018-07-12

## 2017-10-11 NOTE — PROGRESS NOTES
Chief Complaint   Patient presents with    Follow-up      pt reports since last visit hoarseness has improved   .     HPI:Chayito Chung is a 66 y.o. female who has been referred by Dr. Escamilla for a 3 months history of globus sensation and hoarseness. She notes a foreign bodys sensation that is constant.  She states it is worse at night.  She coughs and clears her throat but unable to relieve the sensation.  Her voice is not progressively worsening over this time. There are not pitch breaks or cracks. There is not vocal fatigue. She denies dysphagia, odynophagia, throat pain, and otalgia.  There is no hemoptysis or hematemesis. She is breathing well.     She admits to throat clearing and cough. She admits to frequent heartburn and reflux. She is currently on Protonix 40mg daily and will use OTC tums and pepto-bismol without relief.     Interval HPI:  She reports that the hoarseness is still present but it is improved. She notes the globus sensation is improved. She denies dysphagia or a choking sensation at this point. She has been on Protonix 40mg BID.         Past Medical History:   Diagnosis Date    Acute coronary syndrome     Anxiety     Asthma     Cancer     colon    Cataracts, both eyes     Colon cancer 1988    COPD (chronic obstructive pulmonary disease)     Coronary artery disease     Depression     Diabetes mellitus     Elevated cholesterol     Hypertension      Social History     Social History    Marital status: Single     Spouse name: N/A    Number of children: N/A    Years of education: N/A     Occupational History    Not on file.     Social History Main Topics    Smoking status: Current Every Day Smoker     Packs/day: 0.25     Years: 46.00     Types: Cigarettes    Smokeless tobacco: Former User    Alcohol use 1.8 oz/week     3 Glasses of wine per week      Comment: 1 every 3 months    Drug use: No    Sexual activity: Yes     Partners: Male     Birth control/ protection: None       Comment: last drink yesterday afternoon     Other Topics Concern    Not on file     Social History Narrative    No narrative on file     Past Surgical History:   Procedure Laterality Date    ABDOMINAL SURGERY       SECTION, CLASSIC      COLON SURGERY      CORONARY ANGIOPLASTY WITH STENT PLACEMENT  3 months ago     x2, Hysterectomy, Lung surgery, Partial stomach removed, Part of colon removed for rectal cancer    GASTRECTOMY      HEMORRHOID SURGERY      HYSTERECTOMY      @28yrs of age    LUNG BIOPSY      OOPHORECTOMY      @28yrs of age     Family History   Problem Relation Age of Onset    Cancer Mother     Diabetes Mother     Hypertension Mother     Breast cancer Mother     Cancer Father     Heart disease Father     Depression Sister     Hypertension Sister     Cancer Maternal Aunt            Review of Systems  General: negative for chills, fever or weight loss  Psychological: negative for mood changes or depression  Ophthalmic: negative for blurry vision, photophobia or eye pain  ENT: see HPI  Respiratory: no cough, shortness of breath, or wheezing  Cardiovascular: no chest pain or dyspnea on exertion  Gastrointestinal: no abdominal pain, change in bowel habits, or black/ bloody stools  Musculoskeletal: negative for gait disturbance or muscular weakness  Neurological: no syncope or seizures; no ataxia  Dermatological: negative for puritis,  rash and jaundice  Hematologic/lymphatic: no easy bruising, no new lumps or bumps      Physical Exam:    Vitals:    10/11/17 1130   BP: (!) 142/101   Pulse: 103   Temp: 97.4 °F (36.3 °C)       Constitutional: Well appearing / communicating without difficutly.  NAD.  Eyes: EOM I Bilaterally  Head/Face: Normocephalic.  Negative paranasal sinus pressure/tenderness.  Salivary glands WNL.  House Brackmann I Bilaterally.    Right Ear: Auricle normal appearance. External Auditory Canal within normal limits no lesions or masses,TM w/o  masses/lesions/perforations. TM mobility noted.   Left Ear: Auricle normal appearance. External Auditory Canal within normal limits no lesions or masses,TM w/o masses/lesions/perforations. TM mobility noted.  Nose: No gross nasal septal deviation. Inferior Turbinates 3+ bilaterally. No septal perforation. No masses/lesions. External nasal skin appears normal without masses/lesions.  Oral Cavity: Gingiva/lips within normal limits.  Dentition/gingiva healthy appearing. Mucus membranes moist. Floor of mouth soft, no masses palpated. Oral Tongue mobile. Hard Palate appears normal.    Oropharynx: Base of tongue appears normal. No masses/lesions noted. Tonsillar fossa/pharyngeal wall without lesions. Posterior oropharynx WNL.  Soft palate without masses. Midline uvula.   Neck/Lymphatic: No LAD I-VI bilaterally.  No thyromegaly.  No masses noted on exam.    Mirror laryngoscopy/nasopharyngoscopy: Active gag reflex.  Unable to perform.    Neuro/Psychiatric: AOx3.  Normal mood and affect.   Cardiovascular: Normal carotid pulses bilaterally, no increasing jugular venous distention noted at cervical region bilaterally.    Respiratory: Normal respiratory effort, no stridor, no retractions noted.      See separate procedure note for FFL.     Assessment:    ICD-10-CM ICD-9-CM    1. Hoarseness of voice R49.0 784.42    2. Laryngopharyngeal reflux (LPR) K21.9 478.79    3. Globus sensation F45.8 306.4      The primary encounter diagnosis was Hoarseness of voice. Diagnoses of Laryngopharyngeal reflux (LPR) and Globus sensation were also pertinent to this visit.      Plan:  No orders of the defined types were placed in this encounter.     I recommended that the patient continue taking Omeprazole 40mg twice daily and provided a prescription.     I also recommended that the patient refrain from eating within 3 hours of going to bed at night and that the patient place a 2 x 4 underneath the head board of the bed to elevate the head of bed  very subtly and optimize the impact of gravity on the potential reflux.  I recommended that the patient avoid alcohol, caffeine, tobacco, tomato sauce, spicy foods, fried food, and chocolate.     There is a potential long-term risk of reflux for the development of esophageal cancer, and the data on head and neck malignancy is unclear. Therefore it is important for the patient to be compliant with these recommendations. The patient may benefit from an evaluation by Gastroenterology if the symptoms fail to improve or worsen. Follow up in 6 weeks  to reassess progress with treatment regimen. If improving, will attempt to wean PPI.     Sonia Freeman MD

## 2017-10-11 NOTE — PROCEDURES
Laryngoscopy  Date/Time: 10/11/2017 12:57 PM  Performed by: LORE TRAN  Authorized by: LORE TRAN     Consent Done?:  Yes (Verbal)    Due to indication in patient's history, presentation or risk factors,  a fiber optic exam was performed.    SEPARATE PROCEDURE NOTE:    ANESTHESIA:  Topical xylocaine with bud-synephrine    FINDINGS:  Moderate inaterarytenoid erythema and edema    PROCEDURE:  After verbal consent was obtained, the flexible scope was passed through the patient's nasal cavity without difficulty.  The nasopharynx (adenoid pad) and eustachian tube orifices were first visualized and were found to be normal, without masses or irregularity.  The posterior pharyngeal wall and base of tongue were then examined and no mass or irregular tissue was seen.  The scope was then advanced to the larynx, and the epiglottis, valleculae, and piriform sinuses were normal, without masses or mucosal irregularity.  The false vocal folds and true vocal folds were then examined and were found to have normal mobility (full abduction and adduction) and no masses or mucosal irregularity was seen.  The interartyenoid area had moderate edema and erythema consistent with reflux. Improvement noted since last exam.

## 2017-10-31 ENCOUNTER — OFFICE VISIT (OUTPATIENT)
Dept: CARDIOLOGY | Facility: CLINIC | Age: 67
End: 2017-10-31
Payer: MEDICARE

## 2017-10-31 ENCOUNTER — HOSPITAL ENCOUNTER (OUTPATIENT)
Dept: RADIOLOGY | Facility: HOSPITAL | Age: 67
Discharge: HOME | End: 2017-10-31
Attending: INTERNAL MEDICINE
Payer: MEDICARE

## 2017-10-31 VITALS
WEIGHT: 148.31 LBS | HEART RATE: 98 BPM | SYSTOLIC BLOOD PRESSURE: 134 MMHG | DIASTOLIC BLOOD PRESSURE: 88 MMHG | HEIGHT: 62 IN | BODY MASS INDEX: 27.29 KG/M2

## 2017-10-31 DIAGNOSIS — I10 ESSENTIAL HYPERTENSION: ICD-10-CM

## 2017-10-31 DIAGNOSIS — J42 CHRONIC BRONCHITIS, UNSPECIFIED CHRONIC BRONCHITIS TYPE: ICD-10-CM

## 2017-10-31 DIAGNOSIS — I25.118 CORONARY ARTERY DISEASE OF NATIVE ARTERY OF NATIVE HEART WITH STABLE ANGINA PECTORIS: Primary | ICD-10-CM

## 2017-10-31 DIAGNOSIS — E78.5 HYPERLIPIDEMIA, UNSPECIFIED HYPERLIPIDEMIA TYPE: ICD-10-CM

## 2017-10-31 DIAGNOSIS — Z98.61 S/P PTCA (PERCUTANEOUS TRANSLUMINAL CORONARY ANGIOPLASTY): ICD-10-CM

## 2017-10-31 PROCEDURE — 71020 XR CHEST PA AND LATERAL: CPT | Mod: 26,,, | Performed by: RADIOLOGY

## 2017-10-31 PROCEDURE — 99999 PR PBB SHADOW E&M-EST. PATIENT-LVL III: CPT | Mod: PBBFAC,,, | Performed by: INTERNAL MEDICINE

## 2017-10-31 PROCEDURE — 93000 ELECTROCARDIOGRAM COMPLETE: CPT | Mod: S$GLB,,, | Performed by: INTERNAL MEDICINE

## 2017-10-31 PROCEDURE — 71020 XR CHEST PA AND LATERAL: CPT | Mod: TC

## 2017-10-31 PROCEDURE — 99214 OFFICE O/P EST MOD 30 MIN: CPT | Mod: S$GLB,,, | Performed by: INTERNAL MEDICINE

## 2017-10-31 NOTE — PROGRESS NOTES
Subjective:   Patient ID:  Chayito Chugn is a 66 y.o. female     Chief Complaint   Patient presents with    Follow-up       HPI:     ROS    Objective:   Physical Exam    Assessment:     No diagnosis found.    Plan:

## 2017-10-31 NOTE — PROGRESS NOTES
Subjective:   Patient ID:  Chayito Chung is a 66 y.o. female     Chief Complaint   Patient presents with    Follow-up   fpr chronic CAD    HPI: Pt still feels tired even after getting 2 doses of IV iron as ordered by Dr Mendoza. Help her Mother with household duties.     Review of Systems   Cardiovascular: Positive for chest pain (Pt has chest discomfort and shortness of breath after pushing grocery cart one block. The discomfort is left anterior in location and eases off in several minutes with quiet resting.  ), dyspnea on exertion and palpitations (Occasional racing of the heart.). Negative for claudication, irregular heartbeat, leg swelling, near-syncope, orthopnea and syncope.     Mother  with breast cancer.  Father  with lung cancer. Pt has a hx of colon cancer. Brother with bladder cancer.     Dr Gomez increased the insulin due to HgbA1C of 12.  Pt reports noncompliance with insulin.     ENT put pt on antibiotics and Protonix.  Objective:   Physical Exam   Constitutional: She is oriented to person, place, and time. She appears well-developed and well-nourished. No distress.   HENT:   Head: Normocephalic.   Eyes: No scleral icterus.   Neck: No JVD present.   Cardiovascular: Normal rate, regular rhythm and normal heart sounds.  Exam reveals no gallop and no friction rub.    No murmur heard.  Pulmonary/Chest: Effort normal and breath sounds normal. No stridor.   Few ronchi bilaterally     Musculoskeletal: She exhibits no edema.   Neurological: She is alert and oriented to person, place, and time.   Skin: Skin is warm and dry. She is not diaphoretic.   Psychiatric: She has a normal mood and affect. Her behavior is normal. Judgment and thought content normal.   Vitals reviewed.    ECG: NSR, WNL  Assessment:     1. Coronary artery disease of native artery of native heart with stable angina pectoris    2. Hyperlipidemia, unspecified hyperlipidemia type    3. Essential hypertension    4. Chronic bronchitis,  unspecified chronic bronchitis type    5. S/P PTCA (percutaneous transluminal coronary angioplasty)    6.      Anemia due to GI blood loss now S/P electrocautery.    Plan:   Dobutamine Cardiolite stress test due to some increased frequency of angina pectoris.   EPA and lateral CXR  Repeat lab  Same meds  RTC 6 months

## 2017-11-01 ENCOUNTER — TELEPHONE (OUTPATIENT)
Dept: CARDIOLOGY | Facility: CLINIC | Age: 67
End: 2017-11-01

## 2017-11-06 ENCOUNTER — HOSPITAL ENCOUNTER (OUTPATIENT)
Dept: RADIOLOGY | Facility: HOSPITAL | Age: 67
Discharge: HOME | End: 2017-11-06
Attending: INTERNAL MEDICINE
Payer: MEDICARE

## 2017-11-06 ENCOUNTER — HOSPITAL ENCOUNTER (OUTPATIENT)
Dept: CARDIOLOGY | Facility: HOSPITAL | Age: 67
Discharge: HOME | End: 2017-11-06
Attending: INTERNAL MEDICINE
Payer: MEDICARE

## 2017-11-06 DIAGNOSIS — I25.118 CORONARY ARTERY DISEASE OF NATIVE ARTERY OF NATIVE HEART WITH STABLE ANGINA PECTORIS: ICD-10-CM

## 2017-11-06 DIAGNOSIS — Z98.61 S/P PTCA (PERCUTANEOUS TRANSLUMINAL CORONARY ANGIOPLASTY): ICD-10-CM

## 2017-11-06 LAB — DIASTOLIC DYSFUNCTION: NO

## 2017-11-06 PROCEDURE — 78452 HT MUSCLE IMAGE SPECT MULT: CPT | Mod: 26,,, | Performed by: RADIOLOGY

## 2017-11-06 PROCEDURE — 93017 CV STRESS TEST TRACING ONLY: CPT

## 2017-11-06 PROCEDURE — 78452 HT MUSCLE IMAGE SPECT MULT: CPT | Mod: TC

## 2017-11-06 PROCEDURE — A9502 TC99M TETROFOSMIN: HCPCS

## 2017-11-06 PROCEDURE — 93018 CV STRESS TEST I&R ONLY: CPT | Mod: ,,, | Performed by: INTERNAL MEDICINE

## 2017-11-06 PROCEDURE — 93016 CV STRESS TEST SUPVJ ONLY: CPT | Mod: ,,, | Performed by: INTERNAL MEDICINE

## 2017-11-06 NOTE — NURSING
1400    Patient on table, ready for test.  Allergies/history confirmed.  Connected to monitor EKG and NIBP  1410    CYRUS Carrillo NP @ BS, consent obtained, all questions answered.  1418    Test started per protocol, See EKG sheet for VS   1421    Dobutamine increased to 20mcg per protocol to increase heart rate.  Pt tolerating test well   1424    Dobutamine increased to 30 mcg per protocol to increase heart rate, pt tolerating test well.    1427    Dobutamine increased to 40 mcg per protocol to increase heart rate, pt tolerating test well.  1431    Target heart rate achieved.    1431    Testing phase complete, dobutamine off.  NS up at rapid rate for recovery phase   1445     Recovery phase complete.  Patient states she feels normal, no distress.  NS dc'd 200cc infused.    Pt             released to Cardio.

## 2017-11-07 ENCOUNTER — HOSPITAL ENCOUNTER (EMERGENCY)
Facility: HOSPITAL | Age: 67
Discharge: HOME | End: 2017-11-07
Attending: EMERGENCY MEDICINE
Payer: MEDICARE

## 2017-11-07 VITALS
DIASTOLIC BLOOD PRESSURE: 81 MMHG | HEART RATE: 98 BPM | RESPIRATION RATE: 22 BRPM | SYSTOLIC BLOOD PRESSURE: 130 MMHG | OXYGEN SATURATION: 97 % | HEIGHT: 62 IN | WEIGHT: 148 LBS | BODY MASS INDEX: 27.23 KG/M2 | TEMPERATURE: 99 F

## 2017-11-07 DIAGNOSIS — R73.9 HYPERGLYCEMIA: ICD-10-CM

## 2017-11-07 DIAGNOSIS — K85.90 PANCREATITIS: ICD-10-CM

## 2017-11-07 DIAGNOSIS — A59.00 UROGENITAL INFECTION BY TRICHOMONAS VAGINALIS: ICD-10-CM

## 2017-11-07 DIAGNOSIS — K85.20 ALCOHOL-INDUCED ACUTE PANCREATITIS, UNSPECIFIED COMPLICATION STATUS: Primary | ICD-10-CM

## 2017-11-07 LAB
ALBUMIN SERPL BCP-MCNC: 3.6 G/DL
ALP SERPL-CCNC: 135 U/L
ALT SERPL W/O P-5'-P-CCNC: 13 U/L
ANION GAP SERPL CALC-SCNC: 13 MMOL/L
AST SERPL-CCNC: 12 U/L
BACTERIA #/AREA URNS HPF: ABNORMAL /HPF
BASOPHILS # BLD AUTO: 0.01 K/UL
BASOPHILS NFR BLD: 0.1 %
BILIRUB SERPL-MCNC: 0.5 MG/DL
BILIRUB UR QL STRIP: NEGATIVE
BUN SERPL-MCNC: 8 MG/DL
CALCIUM SERPL-MCNC: 9.8 MG/DL
CHLORIDE SERPL-SCNC: 99 MMOL/L
CLARITY UR: CLEAR
CO2 SERPL-SCNC: 22 MMOL/L
COLOR UR: YELLOW
CREAT SERPL-MCNC: 0.8 MG/DL
DIFFERENTIAL METHOD: ABNORMAL
EOSINOPHIL # BLD AUTO: 0.1 K/UL
EOSINOPHIL NFR BLD: 1.2 %
ERYTHROCYTE [DISTWIDTH] IN BLOOD BY AUTOMATED COUNT: 20.1 %
EST. GFR  (AFRICAN AMERICAN): >60 ML/MIN/1.73 M^2
EST. GFR  (NON AFRICAN AMERICAN): >60 ML/MIN/1.73 M^2
GLUCOSE SERPL-MCNC: 309 MG/DL
GLUCOSE UR QL STRIP: ABNORMAL
HCT VFR BLD AUTO: 40.9 %
HGB BLD-MCNC: 14.2 G/DL
HGB UR QL STRIP: NEGATIVE
HYALINE CASTS #/AREA URNS LPF: 1 /LPF
KETONES UR QL STRIP: NEGATIVE
LEUKOCYTE ESTERASE UR QL STRIP: NEGATIVE
LIPASE SERPL-CCNC: 408 U/L
LYMPHOCYTES # BLD AUTO: 1.4 K/UL
LYMPHOCYTES NFR BLD: 16.8 %
MCH RBC QN AUTO: 32.7 PG
MCHC RBC AUTO-ENTMCNC: 34.7 G/DL
MCV RBC AUTO: 94 FL
MICROSCOPIC COMMENT: ABNORMAL
MONOCYTES # BLD AUTO: 0.7 K/UL
MONOCYTES NFR BLD: 8 %
NEUTROPHILS # BLD AUTO: 6.1 K/UL
NEUTROPHILS NFR BLD: 73.7 %
NITRITE UR QL STRIP: NEGATIVE
PH UR STRIP: 6 [PH] (ref 5–8)
PLATELET # BLD AUTO: 211 K/UL
PMV BLD AUTO: 9.3 FL
POCT GLUCOSE: 197 MG/DL (ref 70–110)
POCT GLUCOSE: 300 MG/DL (ref 70–110)
POTASSIUM SERPL-SCNC: 4 MMOL/L
PROT SERPL-MCNC: 7.8 G/DL
PROT UR QL STRIP: NEGATIVE
RBC # BLD AUTO: 4.34 M/UL
RBC #/AREA URNS HPF: 2 /HPF (ref 0–4)
SODIUM SERPL-SCNC: 134 MMOL/L
SP GR UR STRIP: 1.01 (ref 1–1.03)
SQUAMOUS #/AREA URNS HPF: ABNORMAL /HPF
TRICHOMONAS UR QL MICRO: ABNORMAL
URN SPEC COLLECT METH UR: ABNORMAL
UROBILINOGEN UR STRIP-ACNC: 1 EU/DL
WBC # BLD AUTO: 8.33 K/UL
WBC #/AREA URNS HPF: 4 /HPF (ref 0–5)
WBC CASTS #/AREA URNS LPF: ABNORMAL /LPF
YEAST URNS QL MICRO: ABNORMAL

## 2017-11-07 PROCEDURE — 83690 ASSAY OF LIPASE: CPT

## 2017-11-07 PROCEDURE — 93005 ELECTROCARDIOGRAM TRACING: CPT

## 2017-11-07 PROCEDURE — 96372 THER/PROPH/DIAG INJ SC/IM: CPT | Mod: 59

## 2017-11-07 PROCEDURE — 93010 ELECTROCARDIOGRAM REPORT: CPT | Mod: ,,, | Performed by: INTERNAL MEDICINE

## 2017-11-07 PROCEDURE — 96375 TX/PRO/DX INJ NEW DRUG ADDON: CPT

## 2017-11-07 PROCEDURE — 96361 HYDRATE IV INFUSION ADD-ON: CPT

## 2017-11-07 PROCEDURE — 81000 URINALYSIS NONAUTO W/SCOPE: CPT

## 2017-11-07 PROCEDURE — 96374 THER/PROPH/DIAG INJ IV PUSH: CPT

## 2017-11-07 PROCEDURE — 25000003 PHARM REV CODE 250: Performed by: EMERGENCY MEDICINE

## 2017-11-07 PROCEDURE — 82962 GLUCOSE BLOOD TEST: CPT

## 2017-11-07 PROCEDURE — 85025 COMPLETE CBC W/AUTO DIFF WBC: CPT

## 2017-11-07 PROCEDURE — 25500020 PHARM REV CODE 255: Performed by: EMERGENCY MEDICINE

## 2017-11-07 PROCEDURE — 63600175 PHARM REV CODE 636 W HCPCS: Performed by: EMERGENCY MEDICINE

## 2017-11-07 PROCEDURE — 99284 EMERGENCY DEPT VISIT MOD MDM: CPT | Mod: 25

## 2017-11-07 PROCEDURE — 80053 COMPREHEN METABOLIC PANEL: CPT

## 2017-11-07 RX ORDER — DICYCLOMINE HYDROCHLORIDE 10 MG/ML
20 INJECTION INTRAMUSCULAR
Status: COMPLETED | OUTPATIENT
Start: 2017-11-07 | End: 2017-11-07

## 2017-11-07 RX ORDER — ONDANSETRON 4 MG/1
4 TABLET, ORALLY DISINTEGRATING ORAL EVERY 8 HOURS PRN
Qty: 14 TABLET | Refills: 1 | Status: SHIPPED | OUTPATIENT
Start: 2017-11-07 | End: 2019-12-16 | Stop reason: SDUPTHER

## 2017-11-07 RX ORDER — METRONIDAZOLE 500 MG/1
500 TABLET ORAL EVERY 12 HOURS
Qty: 14 TABLET | Refills: 0 | Status: SHIPPED | OUTPATIENT
Start: 2017-11-07 | End: 2017-11-14

## 2017-11-07 RX ORDER — ONDANSETRON 2 MG/ML
4 INJECTION INTRAMUSCULAR; INTRAVENOUS
Status: COMPLETED | OUTPATIENT
Start: 2017-11-07 | End: 2017-11-07

## 2017-11-07 RX ORDER — KETOROLAC TROMETHAMINE 30 MG/ML
30 INJECTION, SOLUTION INTRAMUSCULAR; INTRAVENOUS
Status: COMPLETED | OUTPATIENT
Start: 2017-11-07 | End: 2017-11-07

## 2017-11-07 RX ORDER — DICYCLOMINE HYDROCHLORIDE 20 MG/1
20 TABLET ORAL 2 TIMES DAILY
Qty: 20 TABLET | Refills: 0 | Status: SHIPPED | OUTPATIENT
Start: 2017-11-07 | End: 2017-12-07

## 2017-11-07 RX ADMIN — ONDANSETRON 4 MG: 2 INJECTION INTRAMUSCULAR; INTRAVENOUS at 03:11

## 2017-11-07 RX ADMIN — IOHEXOL 75 ML: 350 INJECTION, SOLUTION INTRAVENOUS at 05:11

## 2017-11-07 RX ADMIN — INSULIN HUMAN 5 UNITS: 100 INJECTION, SOLUTION PARENTERAL at 05:11

## 2017-11-07 RX ADMIN — DICYCLOMINE HYDROCHLORIDE 20 MG: 20 INJECTION, SOLUTION INTRAMUSCULAR at 03:11

## 2017-11-07 RX ADMIN — KETOROLAC TROMETHAMINE 30 MG: 30 INJECTION, SOLUTION INTRAMUSCULAR at 06:11

## 2017-11-07 RX ADMIN — SODIUM CHLORIDE 1000 ML: 0.9 INJECTION, SOLUTION INTRAVENOUS at 05:11

## 2017-11-07 NOTE — ED NOTES
Pt. reports epigastric abd. pain with n.v.d 5 days. Generalized weakness. Reports n.v shortly after any food or liquids. Denies CP. Denies SOB. Denies fever, chills. Denies urianry symptoms. Denies back pain. Reports having stress test done yesterday. AAOx4. NAD.

## 2017-11-07 NOTE — ED PROVIDER NOTES
Encounter Date: 2017    SCRIBE #1 NOTE: I, Enedina López, am scribing for, and in the presence of, Dr. Hoyt.       History     Chief Complaint   Patient presents with    Abdominal Pain     Patient presents to the ED with reports of having epigastric abdominal pain with nausea, vomiting and diarrhea x 5 days.      Time seen by provider: 2:59 AM    This is a 66 y.o. female with a PMHx of HTN,  ACS, COPD, DM, stomach ulcers,  and cancer who presents with complaint of abdominal pain and vomiting for 5 days. The patient describes constant epigastric pain with vomiting about 15 minutes after any PO intake including fluids and medications. The patient states this feels similar to a bout of pancreatitis she had about 4 years ago. The patient reports associated frontal headache, weakness, and diarrhea. She denies dysuria, frequency, and decreased output. The patient reports she smokes cigarettes and drinks a few beers 2 times a week, last 3 days ago. She also had a negative stress test done yesterday.       The history is provided by the patient.     Review of patient's allergies indicates:  No Known Allergies  Past Medical History:   Diagnosis Date    Acute coronary syndrome     Anxiety     Asthma     Cancer     colon    Cataracts, both eyes     Colon cancer     COPD (chronic obstructive pulmonary disease)     Coronary artery disease     Depression     Diabetes mellitus     Elevated cholesterol     Hypertension      Past Surgical History:   Procedure Laterality Date    ABDOMINAL SURGERY       SECTION, CLASSIC      COLON SURGERY      CORONARY ANGIOPLASTY WITH STENT PLACEMENT  3 months ago     x2, Hysterectomy, Lung surgery, Partial stomach removed, Part of colon removed for rectal cancer    GASTRECTOMY      HEMORRHOID SURGERY      HYSTERECTOMY      @28yrs of age    LUNG BIOPSY      OOPHORECTOMY      @28yrs of age     Family History   Problem Relation Age of Onset     Cancer Mother     Diabetes Mother     Hypertension Mother     Breast cancer Mother     Cancer Father     Heart disease Father     Depression Sister     Hypertension Sister     Cancer Maternal Aunt      Social History   Substance Use Topics    Smoking status: Current Every Day Smoker     Packs/day: 0.25     Years: 46.00     Types: Cigarettes    Smokeless tobacco: Never Used    Alcohol use 1.8 oz/week     3 Glasses of wine per week      Comment: 1 every 3 months     Review of Systems   Constitutional: Positive for fatigue.   Respiratory: Negative for shortness of breath.    Cardiovascular: Negative for chest pain.   Gastrointestinal: Positive for abdominal pain, diarrhea, nausea and vomiting.   Genitourinary: Negative for decreased urine volume, dysuria and frequency.   Neurological: Positive for headaches.       Physical Exam     Initial Vitals [11/07/17 0254]   BP Pulse Resp Temp SpO2   (!) 159/88 (!) 111 20 99.1 °F (37.3 °C) 98 %      MAP       111.67         Physical Exam    Nursing note and vitals reviewed.  Constitutional: She appears well-developed and well-nourished. She is not diaphoretic. No distress.   Appears uncomfortable.    HENT:   Head: Normocephalic and atraumatic.   Eyes: Conjunctivae are normal. Pupils are equal, round, and reactive to light.   Neck: Normal range of motion. Neck supple.   Cardiovascular: Normal rate, regular rhythm, normal heart sounds and intact distal pulses.   Pulmonary/Chest: Breath sounds normal. No respiratory distress. She has no wheezes. She has no rhonchi. She has no rales.   Abdominal: Soft. There is tenderness (high central and right epigastric) in the epigastric area.   Musculoskeletal: Normal range of motion. She exhibits no edema.   Neurological: She is alert and oriented to person, place, and time.   Skin: Skin is warm and dry.         ED Course   Procedures  Labs Reviewed   CBC W/ AUTO DIFFERENTIAL   COMPREHENSIVE METABOLIC PANEL   LIPASE   URINALYSIS    POCT GLUCOSE MONITORING CONTINUOUS     EKG Readings: (Independently Interpreted)   Initial Reading: No STEMI.   Sinus tachycardia, heart rate 103, no ST segment elevation, normal QT, normal NJ, normal axis          Medical Decision Making:   History:   Old Medical Records: I decided to obtain old medical records.  Initial Assessment:   66-year-old complains of pain to her upper abdomen associated with vomiting for 5 days.  On exam patient does have tenderness to her upper abdomen  Clinical Tests:   Lab Tests: Ordered and Reviewed  Radiological Study: Ordered and Reviewed  ED Management:  Patient will be evaluated with labs.  She'll be given Zofran and Bentyl.  Patient's labs were significant for a blood sugar 300 and lipase of 408.  CT shows acute pancreatitis.  No bowel obstruction.  Patient was treated with Bentyl, Toradol and Zofran with resolution of her pain.  Do not feel that she requires admission at this time.  She was also given IV fluids and IV insulin.  She was informed that she was not drink any alcohol or symptoms recur                   ED Course      Clinical Impression:   The primary encounter diagnosis was Alcohol-induced acute pancreatitis, unspecified complication status. Diagnoses of Pancreatitis, Hyperglycemia, and Urogenital infection by trichomonas vaginalis were also pertinent to this visit.                           Lola Hoyt MD  11/08/17 2780

## 2017-11-07 NOTE — ED NOTES
Report received from ANDERSON Geronimo. Assumed care of pt, resting in no acute distress. Will continue to monitor.

## 2017-11-07 NOTE — ED NOTES
Pt. reports abd discomfort. Rates abd pain 7/10 compares to 10/10 upon arrival. Denies n.v since administration of medication.

## 2017-11-08 ENCOUNTER — TELEPHONE (OUTPATIENT)
Dept: CARDIOLOGY | Facility: CLINIC | Age: 67
End: 2017-11-08

## 2017-11-08 NOTE — TELEPHONE ENCOUNTER
Dobutamine stress Cardiolite WNL.  I spoke with pt.  Pt reports recent pancreatitis.  Pt now abstains from alcohol.

## 2017-11-13 DIAGNOSIS — R07.9 CHEST PAIN RADIATING TO JAW: Primary | ICD-10-CM

## 2017-12-12 ENCOUNTER — OFFICE VISIT (OUTPATIENT)
Dept: OTOLARYNGOLOGY | Facility: CLINIC | Age: 67
End: 2017-12-12
Payer: MEDICARE

## 2017-12-12 VITALS
HEART RATE: 84 BPM | BODY MASS INDEX: 27.25 KG/M2 | HEIGHT: 62 IN | WEIGHT: 148.06 LBS | TEMPERATURE: 98 F | SYSTOLIC BLOOD PRESSURE: 129 MMHG | DIASTOLIC BLOOD PRESSURE: 91 MMHG

## 2017-12-12 DIAGNOSIS — R49.0 HOARSENESS OF VOICE: ICD-10-CM

## 2017-12-12 DIAGNOSIS — K21.9 LARYNGOPHARYNGEAL REFLUX (LPR): Primary | ICD-10-CM

## 2017-12-12 DIAGNOSIS — R09.A2 GLOBUS SENSATION: ICD-10-CM

## 2017-12-12 PROCEDURE — 99999 PR PBB SHADOW E&M-EST. PATIENT-LVL V: CPT | Mod: PBBFAC,,, | Performed by: OTOLARYNGOLOGY

## 2017-12-12 PROCEDURE — 31575 DIAGNOSTIC LARYNGOSCOPY: CPT | Mod: S$GLB,,, | Performed by: OTOLARYNGOLOGY

## 2017-12-12 PROCEDURE — 99213 OFFICE O/P EST LOW 20 MIN: CPT | Mod: 25,S$GLB,, | Performed by: OTOLARYNGOLOGY

## 2017-12-12 RX ORDER — INSULIN ASPART 100 [IU]/ML
INJECTION, SUSPENSION SUBCUTANEOUS
Refills: 5 | COMMUNITY
Start: 2017-10-07 | End: 2019-07-25 | Stop reason: SDUPTHER

## 2017-12-12 RX ORDER — TRAMADOL HYDROCHLORIDE 50 MG/1
50 TABLET ORAL EVERY 6 HOURS PRN
COMMUNITY
Start: 2017-10-21 | End: 2019-05-23

## 2017-12-12 RX ORDER — HYDROCHLOROTHIAZIDE 25 MG/1
25 TABLET ORAL DAILY
COMMUNITY
Start: 2017-10-11 | End: 2019-12-16 | Stop reason: SDUPTHER

## 2017-12-12 RX ORDER — LISINOPRIL 5 MG/1
5 TABLET ORAL DAILY
COMMUNITY
Start: 2017-12-04 | End: 2018-08-22 | Stop reason: SINTOL

## 2017-12-12 NOTE — PROGRESS NOTES
Chief Complaint   Patient presents with    Follow-up     pt reports feels like something stuck in her throat    Cough   .     HPI:Chayito Chung is a 67 y.o. female who has been referred by Dr. Escamilla for a 3 months history of globus sensation and hoarseness. She notes a foreign bodys sensation that is constant.  She states it is worse at night.  She coughs and clears her throat but unable to relieve the sensation.  Her voice is not progressively worsening over this time. There are not pitch breaks or cracks. There is not vocal fatigue. She denies dysphagia, odynophagia, throat pain, and otalgia.  There is no hemoptysis or hematemesis. She is breathing well.     She admits to throat clearing and cough. She admits to frequent heartburn and reflux. She is currently on Protonix 40mg daily and will use OTC tums and pepto-bismol without relief.     Interval HPI:  She reports that the hoarseness is  improved. She notes that she still has a globus sensation which is intermittent.  She notes an intermittent dry cough as well. She denies dysphagia or a choking sensation at this point. She has been on Protonix 40mg BID. She does feel that the Protonix is helping with her symptoms.         Past Medical History:   Diagnosis Date    Acute coronary syndrome     Anxiety     Asthma     Cancer     colon    Cataracts, both eyes     Colon cancer 1988    COPD (chronic obstructive pulmonary disease)     Coronary artery disease     Depression     Diabetes mellitus     Elevated cholesterol     Hypertension      Social History     Social History    Marital status: Single     Spouse name: N/A    Number of children: N/A    Years of education: N/A     Occupational History    Not on file.     Social History Main Topics    Smoking status: Current Every Day Smoker     Packs/day: 0.25     Years: 46.00     Types: Cigarettes    Smokeless tobacco: Never Used    Alcohol use 1.8 oz/week     3 Glasses of wine per week       Comment: 1 every 3 months    Drug use: No    Sexual activity: Yes     Partners: Male     Birth control/ protection: None      Comment: last drink yesterday afternoon     Other Topics Concern    Not on file     Social History Narrative    No narrative on file     Past Surgical History:   Procedure Laterality Date    ABDOMINAL SURGERY       SECTION, CLASSIC      COLON SURGERY      CORONARY ANGIOPLASTY WITH STENT PLACEMENT  3 months ago     x2, Hysterectomy, Lung surgery, Partial stomach removed, Part of colon removed for rectal cancer    GASTRECTOMY      HEMORRHOID SURGERY      HYSTERECTOMY      @28yrs of age    LUNG BIOPSY      OOPHORECTOMY      @28yrs of age     Family History   Problem Relation Age of Onset    Cancer Mother     Diabetes Mother     Hypertension Mother     Breast cancer Mother     Cancer Father     Heart disease Father     Depression Sister     Hypertension Sister     Cancer Maternal Aunt            Review of Systems  General: negative for chills, fever or weight loss  Psychological: negative for mood changes or depression  Ophthalmic: negative for blurry vision, photophobia or eye pain  ENT: see HPI  Respiratory: no cough, shortness of breath, or wheezing  Cardiovascular: no chest pain or dyspnea on exertion  Gastrointestinal: no abdominal pain, change in bowel habits, or black/ bloody stools  Musculoskeletal: negative for gait disturbance or muscular weakness  Neurological: no syncope or seizures; no ataxia  Dermatological: negative for puritis,  rash and jaundice  Hematologic/lymphatic: no easy bruising, no new lumps or bumps      Physical Exam:    Vitals:    17 0855   BP: (!) 129/91   Pulse: 84   Temp: 98.2 °F (36.8 °C)       Constitutional: Well appearing / communicating without difficutly.  NAD.  Eyes: EOM I Bilaterally  Head/Face: Normocephalic.  Negative paranasal sinus pressure/tenderness.  Salivary glands WNL.  House Brackmann I  Bilaterally.    Right Ear: Auricle normal appearance. External Auditory Canal within normal limits no lesions or masses,TM w/o masses/lesions/perforations. TM mobility noted.   Left Ear: Auricle normal appearance. External Auditory Canal within normal limits no lesions or masses,TM w/o masses/lesions/perforations. TM mobility noted.  Nose: No gross nasal septal deviation. Inferior Turbinates 3+ bilaterally. No septal perforation. No masses/lesions. External nasal skin appears normal without masses/lesions.  Oral Cavity: Gingiva/lips within normal limits.  Dentition/gingiva healthy appearing. Mucus membranes moist. Floor of mouth soft, no masses palpated. Oral Tongue mobile. Hard Palate appears normal.    Oropharynx: Base of tongue appears normal. No masses/lesions noted. Tonsillar fossa/pharyngeal wall without lesions. Posterior oropharynx WNL.  Soft palate without masses. Midline uvula.   Neck/Lymphatic: No LAD I-VI bilaterally.  No thyromegaly.  No masses noted on exam.    Mirror laryngoscopy/nasopharyngoscopy: Active gag reflex.  Unable to perform.    Neuro/Psychiatric: AOx3.  Normal mood and affect.   Cardiovascular: Normal carotid pulses bilaterally, no increasing jugular venous distention noted at cervical region bilaterally.    Respiratory: Normal respiratory effort, no stridor, no retractions noted.      See separate procedure note for FFL.     Assessment:    ICD-10-CM ICD-9-CM    1. Laryngopharyngeal reflux (LPR) K21.9 478.79    2. Globus sensation F45.8 306.4    3. Hoarseness of voice R49.0 784.42      The primary encounter diagnosis was Laryngopharyngeal reflux (LPR). Diagnoses of Globus sensation and Hoarseness of voice were also pertinent to this visit.      Plan:  No orders of the defined types were placed in this encounter.     I recommended that the patient continue taking Protonix 40mg BId* and provided a prescription.     I also recommended that the patient refrain from eating within 3 hours of  going to bed at night and that the patient place a 2 x 4 underneath the head board of the bed to elevate the head of bed very subtly and optimize the impact of gravity on the potential reflux.  I recommended that the patient avoid alcohol, caffeine, tobacco, tomato sauce, spicy foods, fried food, and chocolate.     Follow up in 6 weeks  to reassess progress with treatment regimen. If improving, will attempt to wean PPI.     Sonia Freeman MD

## 2018-01-23 ENCOUNTER — OFFICE VISIT (OUTPATIENT)
Dept: OTOLARYNGOLOGY | Facility: CLINIC | Age: 68
End: 2018-01-23
Payer: MEDICARE

## 2018-01-23 VITALS
DIASTOLIC BLOOD PRESSURE: 104 MMHG | TEMPERATURE: 98 F | HEIGHT: 62 IN | SYSTOLIC BLOOD PRESSURE: 146 MMHG | BODY MASS INDEX: 26.56 KG/M2 | WEIGHT: 144.31 LBS | HEART RATE: 114 BPM

## 2018-01-23 DIAGNOSIS — R49.0 HOARSENESS OF VOICE: ICD-10-CM

## 2018-01-23 DIAGNOSIS — K21.9 LARYNGOPHARYNGEAL REFLUX (LPR): Primary | ICD-10-CM

## 2018-01-23 DIAGNOSIS — Z72.0 TOBACCO ABUSE: ICD-10-CM

## 2018-01-23 DIAGNOSIS — Z71.6 TOBACCO ABUSE COUNSELING: ICD-10-CM

## 2018-01-23 DIAGNOSIS — R09.A2 GLOBUS SENSATION: ICD-10-CM

## 2018-01-23 PROCEDURE — 99999 PR PBB SHADOW E&M-EST. PATIENT-LVL V: CPT | Mod: PBBFAC,,, | Performed by: OTOLARYNGOLOGY

## 2018-01-23 PROCEDURE — 99213 OFFICE O/P EST LOW 20 MIN: CPT | Mod: 25,S$GLB,, | Performed by: OTOLARYNGOLOGY

## 2018-01-23 RX ORDER — CELECOXIB 200 MG/1
200 CAPSULE ORAL DAILY
COMMUNITY
Start: 2017-12-20 | End: 2019-12-16

## 2018-01-23 RX ORDER — LISINOPRIL 20 MG/1
20 TABLET ORAL DAILY
COMMUNITY
Start: 2017-12-28 | End: 2018-08-22 | Stop reason: SINTOL

## 2018-01-23 RX ORDER — LORAZEPAM 0.5 MG/1
0.5 TABLET ORAL EVERY 12 HOURS PRN
Refills: 1 | Status: ON HOLD | COMMUNITY
Start: 2017-12-28 | End: 2020-07-29 | Stop reason: HOSPADM

## 2018-01-23 RX ORDER — PANTOPRAZOLE SODIUM 40 MG/1
40 TABLET, DELAYED RELEASE ORAL DAILY
Qty: 30 TABLET | Refills: 11 | Status: SHIPPED | OUTPATIENT
Start: 2018-01-23 | End: 2018-03-06

## 2018-01-23 NOTE — PROGRESS NOTES
"  Chief Complaint   Patient presents with    Follow-up     LPR, pt reports coughing through out the day, hoarseness    .     HPI:Chayito Chung is a 67 y.o. female who has been referred by Dr. Escamilla for a 3 months history of globus sensation and hoarseness. She notes a foreign bodys sensation that is constant.  She states it is worse at night.  She coughs and clears her throat but unable to relieve the sensation.  Her voice is not progressively worsening over this time. There are not pitch breaks or cracks. There is not vocal fatigue. She denies dysphagia, odynophagia, throat pain, and otalgia.  There is no hemoptysis or hematemesis. She is breathing well.     She admits to throat clearing and cough. She admits to frequent heartburn and reflux. She is currently on Protonix 40mg daily and will use OTC tums and pepto-bismol without relief.     Interval HPI:  She reports that the hoarseness is much improved. She reports that she still has a dry cough that is constant and worse at night.  She notes intermittent globus sensation. She denies dysphagia or a choking sensation at this point. She has been on Protonix 40mg BID and feels it is improving her symptoms. She states that she has a pulmonologist Dr. Martinez Chung at [facility].  She states he is treating her for her COPD and is watching a "spot on her lungs."  She last visited him 2 weeks ago.  She has follow up with in 3 months with a repeat chest x ray.       Past Medical History:   Diagnosis Date    Acute coronary syndrome     Anxiety     Asthma     Cancer     colon    Cataracts, both eyes     Colon cancer 1988    COPD (chronic obstructive pulmonary disease)     Coronary artery disease     Depression     Diabetes mellitus     Elevated cholesterol     Hypertension      Social History     Social History    Marital status: Single     Spouse name: N/A    Number of children: N/A    Years of education: N/A     Occupational History    Not on file. "     Social History Main Topics    Smoking status: Current Every Day Smoker     Packs/day: 0.25     Years: 46.00     Types: Cigarettes    Smokeless tobacco: Never Used    Alcohol use 1.8 oz/week     3 Glasses of wine per week      Comment: 1 every 3 months    Drug use: No    Sexual activity: Yes     Partners: Male     Birth control/ protection: None      Comment: last drink yesterday afternoon     Other Topics Concern    Not on file     Social History Narrative    No narrative on file     Past Surgical History:   Procedure Laterality Date    ABDOMINAL SURGERY       SECTION, CLASSIC      COLON SURGERY      CORONARY ANGIOPLASTY WITH STENT PLACEMENT  3 months ago     x2, Hysterectomy, Lung surgery, Partial stomach removed, Part of colon removed for rectal cancer    GASTRECTOMY      HEMORRHOID SURGERY      HYSTERECTOMY      @28yrs of age    LUNG BIOPSY      OOPHORECTOMY      @28yrs of age     Family History   Problem Relation Age of Onset    Cancer Mother     Diabetes Mother     Hypertension Mother     Breast cancer Mother     Cancer Father     Heart disease Father     Depression Sister     Hypertension Sister     Cancer Maternal Aunt            Review of Systems  General: negative for chills, fever or weight loss  Psychological: negative for mood changes or depression  Ophthalmic: negative for blurry vision, photophobia or eye pain  ENT: see HPI  Respiratory: no cough, shortness of breath, or wheezing  Cardiovascular: no chest pain or dyspnea on exertion  Gastrointestinal: no abdominal pain, change in bowel habits, or black/ bloody stools  Musculoskeletal: negative for gait disturbance or muscular weakness  Neurological: no syncope or seizures; no ataxia  Dermatological: negative for puritis,  rash and jaundice  Hematologic/lymphatic: no easy bruising, no new lumps or bumps      Physical Exam:    Vitals:    18 1002   BP: (!) 146/104   Pulse: (!) 114   Temp: 98 °F (36.7 °C)        Constitutional: Well appearing / communicating without difficutly.  NAD.  Eyes: EOM I Bilaterally  Head/Face: Normocephalic.  Negative paranasal sinus pressure/tenderness.  Salivary glands WNL.  House Brackmann I Bilaterally.    Right Ear: Auricle normal appearance. External Auditory Canal within normal limits no lesions or masses,TM w/o masses/lesions/perforations. TM mobility noted.   Left Ear: Auricle normal appearance. External Auditory Canal within normal limits no lesions or masses,TM w/o masses/lesions/perforations. TM mobility noted.  Nose: No gross nasal septal deviation. Inferior Turbinates 3+ bilaterally. No septal perforation. No masses/lesions. External nasal skin appears normal without masses/lesions.  Oral Cavity: Gingiva/lips within normal limits.  Dentition/gingiva healthy appearing. Mucus membranes moist. Floor of mouth soft, no masses palpated. Oral Tongue mobile. Hard Palate appears normal.    Oropharynx: Base of tongue appears normal. No masses/lesions noted. Tonsillar fossa/pharyngeal wall without lesions. Posterior oropharynx WNL.  Soft palate without masses. Midline uvula.   Neck/Lymphatic: No LAD I-VI bilaterally.  No thyromegaly.  No masses noted on exam.    Mirror laryngoscopy/nasopharyngoscopy: Active gag reflex.  Unable to perform.    Neuro/Psychiatric: AOx3.  Normal mood and affect.   Cardiovascular: Normal carotid pulses bilaterally, no increasing jugular venous distention noted at cervical region bilaterally.    Respiratory: Normal respiratory effort, no stridor, no retractions noted.      See separate procedure note for FFL.     Assessment:    ICD-10-CM ICD-9-CM    1. Laryngopharyngeal reflux (LPR) K21.9 478.79    2. Globus sensation F45.8 306.4    3. Hoarseness of voice R49.0 784.42    4. Tobacco abuse Z72.0 305.1    5. Tobacco abuse counseling Z71.6 V65.42      305.1      The primary encounter diagnosis was Laryngopharyngeal reflux (LPR). Diagnoses of Globus sensation,  Hoarseness of voice, Tobacco abuse, and Tobacco abuse counseling were also pertinent to this visit.      Plan:  No orders of the defined types were placed in this encounter.     I recommended that the patient decrease her Protonix to 40mg once daily. She has had improvement in many of her LPR symptoms. She continues with cough which is likely more related to her COPD and tobacco use.  I have counseled her regarding tobacco cessation.  She does see the tobacco cessation clinic.  She states she has been having difficulty with stress and  has increased her cigarette smoking as a result.      I also recommended that the patient refrain from eating within 3 hours of going to bed at night and that the patient place a 2 x 4 underneath the head board of the bed to elevate the head of bed very subtly and optimize the impact of gravity on the potential reflux.  I recommended that the patient avoid alcohol, caffeine, tobacco, tomato sauce, spicy foods, fried food, and chocolate.     Follow up in 6 weeks  to reassess progress with treatment regimen. If improving, will attempt to wean PPI.     Sonia Freeman MD

## 2018-03-06 ENCOUNTER — OFFICE VISIT (OUTPATIENT)
Dept: OTOLARYNGOLOGY | Facility: CLINIC | Age: 68
End: 2018-03-06
Payer: MEDICARE

## 2018-03-06 VITALS
HEART RATE: 104 BPM | WEIGHT: 144.81 LBS | DIASTOLIC BLOOD PRESSURE: 90 MMHG | HEIGHT: 62 IN | TEMPERATURE: 99 F | SYSTOLIC BLOOD PRESSURE: 125 MMHG | BODY MASS INDEX: 26.65 KG/M2

## 2018-03-06 DIAGNOSIS — K21.9 LARYNGOPHARYNGEAL REFLUX (LPR): Primary | ICD-10-CM

## 2018-03-06 DIAGNOSIS — J30.89 CHRONIC NON-SEASONAL ALLERGIC RHINITIS, UNSPECIFIED TRIGGER: ICD-10-CM

## 2018-03-06 DIAGNOSIS — R09.A2 GLOBUS SENSATION: ICD-10-CM

## 2018-03-06 DIAGNOSIS — R49.0 HOARSENESS OF VOICE: ICD-10-CM

## 2018-03-06 PROCEDURE — 31575 DIAGNOSTIC LARYNGOSCOPY: CPT | Mod: S$GLB,,, | Performed by: OTOLARYNGOLOGY

## 2018-03-06 PROCEDURE — 99999 PR PBB SHADOW E&M-EST. PATIENT-LVL V: CPT | Mod: PBBFAC,,, | Performed by: OTOLARYNGOLOGY

## 2018-03-06 PROCEDURE — 3074F SYST BP LT 130 MM HG: CPT | Mod: S$GLB,,, | Performed by: OTOLARYNGOLOGY

## 2018-03-06 PROCEDURE — 99214 OFFICE O/P EST MOD 30 MIN: CPT | Mod: 25,S$GLB,, | Performed by: OTOLARYNGOLOGY

## 2018-03-06 PROCEDURE — 3080F DIAST BP >= 90 MM HG: CPT | Mod: S$GLB,,, | Performed by: OTOLARYNGOLOGY

## 2018-03-06 RX ORDER — FLUTICASONE PROPIONATE 50 MCG
2 SPRAY, SUSPENSION (ML) NASAL DAILY
Qty: 1 BOTTLE | Refills: 12 | Status: SHIPPED | OUTPATIENT
Start: 2018-03-06 | End: 2019-06-04

## 2018-03-06 RX ORDER — PANTOPRAZOLE SODIUM 40 MG/1
40 TABLET, DELAYED RELEASE ORAL 2 TIMES DAILY
Qty: 60 TABLET | Refills: 11 | Status: SHIPPED | OUTPATIENT
Start: 2018-03-06 | End: 2018-05-08 | Stop reason: SDUPTHER

## 2018-03-06 RX ORDER — CIPROFLOXACIN HYDROCHLORIDE 3 MG/ML
SOLUTION/ DROPS OPHTHALMIC
COMMUNITY
Start: 2018-02-01 | End: 2018-05-08

## 2018-03-06 NOTE — PROGRESS NOTES
Chief Complaint   Patient presents with    Follow-up     pt reports feeling of something stuck in her throat still occurs but has improved, also clearing of the throat   .     HPI:Chayito Chung is a 67 y.o. female who has been referred by Dr. Escamilla for a 3 months history of globus sensation and hoarseness. She notes a foreign bodys sensation that is constant.  She states it is worse at night.  She coughs and clears her throat but unable to relieve the sensation.  Her voice is not progressively worsening over this time. There are not pitch breaks or cracks. There is not vocal fatigue. She denies dysphagia, odynophagia, throat pain, and otalgia.  There is no hemoptysis or hematemesis. She is breathing well.     She admits to throat clearing and cough. She admits to frequent heartburn and reflux. She is currently on Protonix 40mg daily and will use OTC tums and pepto-bismol without relief.     Interval HPI:  Since last visit she has been on decreased dose of Protonix 40mg.  She reports that the hoarseness seems worse with this dosing.   She notes worsened globus sensation. She denies dysphagia or a choking sensation at this point. She has been on Protonix 40mg BID in the past and felt that that helped her more.   She also reports that she has had increased clear bilateral rhinorrhea over the last few weeks. She notes nasal congestion associated with sneezing as well. She has not tried any medications for these symptoms.       Past Medical History:   Diagnosis Date    Acute coronary syndrome     Anxiety     Asthma     Cancer     colon    Cataracts, both eyes     Colon cancer 1988    COPD (chronic obstructive pulmonary disease)     Coronary artery disease     Depression     Diabetes mellitus     Elevated cholesterol     Hypertension      Social History     Social History    Marital status: Single     Spouse name: N/A    Number of children: N/A    Years of education: N/A     Occupational History     Not on file.     Social History Main Topics    Smoking status: Current Every Day Smoker     Packs/day: 0.25     Years: 46.00     Types: Cigarettes    Smokeless tobacco: Never Used    Alcohol use 1.8 oz/week     3 Glasses of wine per week      Comment: 1 every 3 months    Drug use: No    Sexual activity: Yes     Partners: Male     Birth control/ protection: None      Comment: last drink yesterday afternoon     Other Topics Concern    Not on file     Social History Narrative    No narrative on file     Past Surgical History:   Procedure Laterality Date    ABDOMINAL SURGERY       SECTION, CLASSIC      COLON SURGERY      CORONARY ANGIOPLASTY WITH STENT PLACEMENT  3 months ago     x2, Hysterectomy, Lung surgery, Partial stomach removed, Part of colon removed for rectal cancer    GASTRECTOMY      HEMORRHOID SURGERY      HYSTERECTOMY      @28yrs of age    LUNG BIOPSY      OOPHORECTOMY      @28yrs of age     Family History   Problem Relation Age of Onset    Cancer Mother     Diabetes Mother     Hypertension Mother     Breast cancer Mother     Cancer Father     Heart disease Father     Depression Sister     Hypertension Sister     Cancer Maternal Aunt            Review of Systems  General: negative for chills, fever or weight loss  Psychological: negative for mood changes or depression  Ophthalmic: negative for blurry vision, photophobia or eye pain  ENT: see HPI  Respiratory: no cough, shortness of breath, or wheezing  Cardiovascular: no chest pain or dyspnea on exertion  Gastrointestinal: no abdominal pain, change in bowel habits, or black/ bloody stools  Musculoskeletal: negative for gait disturbance or muscular weakness  Neurological: no syncope or seizures; no ataxia  Dermatological: negative for puritis,  rash and jaundice  Hematologic/lymphatic: no easy bruising, no new lumps or bumps      Physical Exam:    Vitals:    18 1418   BP: (!) 125/90   Pulse: 104   Temp: 98.8 °F  (37.1 °C)       Constitutional: Well appearing / communicating without difficutly.  NAD.  Eyes: EOM I Bilaterally  Head/Face: Normocephalic.  Negative paranasal sinus pressure/tenderness.  Salivary glands WNL.  House Brackmann I Bilaterally.    Right Ear: Auricle normal appearance. External Auditory Canal within normal limits no lesions or masses,TM w/o masses/lesions/perforations. TM mobility noted.   Left Ear: Auricle normal appearance. External Auditory Canal within normal limits no lesions or masses,TM w/o masses/lesions/perforations. TM mobility noted.  Nose: clear rhinorrhea present.  No gross nasal septal deviation. Inferior Turbinates 3+ bilaterally. No septal perforation. No masses/lesions. External nasal skin appears normal without masses/lesions.  Oral Cavity: Gingiva/lips within normal limits.  Dentition/gingiva healthy appearing. Mucus membranes moist. Floor of mouth soft, no masses palpated. Oral Tongue mobile. Hard Palate appears normal.    Oropharynx: Base of tongue appears normal. No masses/lesions noted. Tonsillar fossa/pharyngeal wall without lesions. Posterior oropharynx WNL.  Soft palate without masses. Midline uvula.   Neck/Lymphatic: No LAD I-VI bilaterally.  No thyromegaly.  No masses noted on exam.    Mirror laryngoscopy/nasopharyngoscopy: Active gag reflex.  Unable to perform.    Neuro/Psychiatric: AOx3.  Normal mood and affect.   Cardiovascular: Normal carotid pulses bilaterally, no increasing jugular venous distention noted at cervical region bilaterally.    Respiratory: Normal respiratory effort, no stridor, no retractions noted.      See separate procedure note for FFL.     Assessment:    ICD-10-CM ICD-9-CM    1. Laryngopharyngeal reflux (LPR) K21.9 478.79    2. Globus sensation F45.8 306.4    3. Hoarseness of voice R49.0 784.42    4. Chronic non-seasonal allergic rhinitis, unspecified trigger J30.89 477.9      The primary encounter diagnosis was Laryngopharyngeal reflux (LPR).  Diagnoses of Globus sensation, Hoarseness of voice, and Chronic non-seasonal allergic rhinitis, unspecified trigger were also pertinent to this visit.      Plan:  No orders of the defined types were placed in this encounter.     I recommended that the patient increase her Protonix to 40mg once daily. She has had improvement in many of her LPR symptoms when on this dose in the past.  I also recommended that the patient refrain from eating within 3 hours of going to bed at night and that the patient place a 2 x 4 underneath the head board of the bed to elevate the head of bed very subtly and optimize the impact of gravity on the potential reflux.  I recommended that the patient avoid alcohol, caffeine, tobacco, tomato sauce, spicy foods, fried food, and chocolate.     AR: Flonase prescription given today.     Follow up in 8 weeks  to reassess progress with treatment regimen.     Sonia Freeman MD

## 2018-03-06 NOTE — PROCEDURES
Procedures  Due to indication in patient's history, presentation or risk factors,  a fiber optic exam was performed.    SEPARATE PROCEDURE NOTE:    ANESTHESIA:  Topical xylocaine with bud-synephrine    FINDINGS:  Moderate and severe  inaterarytenoid erythema and edema    PROCEDURE:  After verbal consent was obtained, the flexible scope was passed through the patient's nasal cavity without difficulty.  The nasopharynx (adenoid pad) and eustachian tube orifices were first visualized and were found to be normal, without masses or irregularity.  The posterior pharyngeal wall and base of tongue were then examined and no mass or irregular tissue was seen.  The scope was then advanced to the larynx, and the epiglottis, valleculae, and piriform sinuses were normal, without masses or mucosal irregularity.  The false vocal folds and true vocal folds were then examined and were found to have normal mobility (full abduction and adduction) and no masses or mucosal irregularity was seen.  The interartyenoid area had moderate and severe edema and erythema consistent with reflux. Worsened since last exam.

## 2018-04-12 ENCOUNTER — TELEPHONE (OUTPATIENT)
Dept: SMOKING CESSATION | Facility: CLINIC | Age: 68
End: 2018-04-12

## 2018-04-24 DIAGNOSIS — M47.892 OTHER SPONDYLOSIS, CERVICAL REGION: Primary | ICD-10-CM

## 2018-04-24 DIAGNOSIS — M47.816 ARTHROPATHY OF LUMBAR FACET JOINT: ICD-10-CM

## 2018-04-24 DIAGNOSIS — M47.816 FACET HYPERTROPHY OF LUMBAR REGION: ICD-10-CM

## 2018-04-24 DIAGNOSIS — M47.26 OTHER SPONDYLOSIS WITH RADICULOPATHY, LUMBAR REGION: ICD-10-CM

## 2018-04-28 ENCOUNTER — HOSPITAL ENCOUNTER (OUTPATIENT)
Dept: RADIOLOGY | Facility: HOSPITAL | Age: 68
Discharge: HOME | End: 2018-04-28
Attending: PAIN MEDICINE
Payer: MEDICARE

## 2018-04-28 DIAGNOSIS — M47.26 OTHER SPONDYLOSIS WITH RADICULOPATHY, LUMBAR REGION: ICD-10-CM

## 2018-04-28 DIAGNOSIS — M47.816 FACET HYPERTROPHY OF LUMBAR REGION: ICD-10-CM

## 2018-04-28 DIAGNOSIS — M47.892 OTHER SPONDYLOSIS, CERVICAL REGION: ICD-10-CM

## 2018-04-28 DIAGNOSIS — M47.816 ARTHROPATHY OF LUMBAR FACET JOINT: ICD-10-CM

## 2018-04-28 PROCEDURE — 72148 MRI LUMBAR SPINE W/O DYE: CPT | Mod: 26,,, | Performed by: RADIOLOGY

## 2018-04-28 PROCEDURE — 72148 MRI LUMBAR SPINE W/O DYE: CPT | Mod: TC

## 2018-04-28 PROCEDURE — 72110 X-RAY EXAM L-2 SPINE 4/>VWS: CPT | Mod: 26,,, | Performed by: RADIOLOGY

## 2018-04-28 PROCEDURE — 72110 X-RAY EXAM L-2 SPINE 4/>VWS: CPT | Mod: TC,FY

## 2018-05-01 ENCOUNTER — CLINICAL SUPPORT (OUTPATIENT)
Dept: SMOKING CESSATION | Facility: CLINIC | Age: 68
End: 2018-05-01
Payer: COMMERCIAL

## 2018-05-01 DIAGNOSIS — F17.200 NICOTINE DEPENDENCE: Primary | ICD-10-CM

## 2018-05-01 PROCEDURE — 99407 BEHAV CHNG SMOKING > 10 MIN: CPT | Mod: S$GLB,,,

## 2018-05-08 ENCOUNTER — OFFICE VISIT (OUTPATIENT)
Dept: OTOLARYNGOLOGY | Facility: CLINIC | Age: 68
End: 2018-05-08
Payer: MEDICARE

## 2018-05-08 VITALS
WEIGHT: 146.94 LBS | BODY MASS INDEX: 27.04 KG/M2 | TEMPERATURE: 98 F | SYSTOLIC BLOOD PRESSURE: 137 MMHG | DIASTOLIC BLOOD PRESSURE: 97 MMHG | HEART RATE: 102 BPM | HEIGHT: 62 IN

## 2018-05-08 DIAGNOSIS — Z72.0 TOBACCO ABUSE: ICD-10-CM

## 2018-05-08 DIAGNOSIS — Z71.6 TOBACCO ABUSE COUNSELING: ICD-10-CM

## 2018-05-08 DIAGNOSIS — J31.0 CHRONIC RHINITIS: ICD-10-CM

## 2018-05-08 DIAGNOSIS — K21.9 LARYNGOPHARYNGEAL REFLUX (LPR): Primary | ICD-10-CM

## 2018-05-08 DIAGNOSIS — R09.A2 GLOBUS SENSATION: ICD-10-CM

## 2018-05-08 PROCEDURE — 99999 PR PBB SHADOW E&M-EST. PATIENT-LVL V: CPT | Mod: PBBFAC,,, | Performed by: OTOLARYNGOLOGY

## 2018-05-08 PROCEDURE — 3080F DIAST BP >= 90 MM HG: CPT | Mod: CPTII,S$GLB,, | Performed by: OTOLARYNGOLOGY

## 2018-05-08 PROCEDURE — 3075F SYST BP GE 130 - 139MM HG: CPT | Mod: CPTII,S$GLB,, | Performed by: OTOLARYNGOLOGY

## 2018-05-08 PROCEDURE — 99213 OFFICE O/P EST LOW 20 MIN: CPT | Mod: 25,S$GLB,, | Performed by: OTOLARYNGOLOGY

## 2018-05-08 PROCEDURE — 31575 DIAGNOSTIC LARYNGOSCOPY: CPT | Mod: S$GLB,,, | Performed by: OTOLARYNGOLOGY

## 2018-05-08 PROCEDURE — 3008F BODY MASS INDEX DOCD: CPT | Mod: CPTII,S$GLB,, | Performed by: OTOLARYNGOLOGY

## 2018-05-08 RX ORDER — PREGABALIN 150 MG/1
CAPSULE ORAL 2 TIMES DAILY
Refills: 1 | COMMUNITY
Start: 2018-05-03 | End: 2021-08-18 | Stop reason: ALTCHOICE

## 2018-05-08 RX ORDER — PANTOPRAZOLE SODIUM 40 MG/1
40 TABLET, DELAYED RELEASE ORAL 2 TIMES DAILY
Qty: 60 TABLET | Refills: 11 | Status: SHIPPED | OUTPATIENT
Start: 2018-05-08 | End: 2018-08-21

## 2018-05-08 NOTE — PROCEDURES
Procedures  Due to indication in patient's history, presentation or risk factors,  a fiber optic exam was performed.    SEPARATE PROCEDURE NOTE:    ANESTHESIA:  Topical xylocaine with bud-synephrine    FINDINGS:  Moderate and severe  inaterarytenoid erythema and edema    PROCEDURE:  After verbal consent was obtained, the flexible scope was passed through the patient's nasal cavity without difficulty.  The nasopharynx (adenoid pad) and eustachian tube orifices were first visualized and were found to be normal, without masses or irregularity.  The posterior pharyngeal wall and base of tongue were then examined and no mass or irregular tissue was seen.  The scope was then advanced to the larynx, and the epiglottis, valleculae, and piriform sinuses were normal, without masses or mucosal irregularity.  The false vocal folds and true vocal folds were then examined and were found to have normal mobility (full abduction and adduction) and no masses or mucosal irregularity was seen.  The interartyenoid area had moderate and severe edema and erythema consistent with reflux. Stable since last exam.

## 2018-05-08 NOTE — PROGRESS NOTES
Chief Complaint   Patient presents with    Follow-up     pt reports cough has slightly improved, hoarseness is better, clearing of the throat   .     HPI:Chayito Chung is a 67 y.o. female who has been referred by Dr. Escamilla for a 3 months history of globus sensation and hoarseness. She notes a foreign bodys sensation that is constant.  She states it is worse at night.  She coughs and clears her throat but unable to relieve the sensation.  Her voice is not progressively worsening over this time. There are not pitch breaks or cracks. There is not vocal fatigue. She denies dysphagia, odynophagia, throat pain, and otalgia.  There is no hemoptysis or hematemesis. She is breathing well.     She admits to throat clearing and cough. She admits to frequent heartburn and reflux. She is currently on Protonix 40mg daily and will use OTC tums and pepto-bismol without relief.     Interval HPI:  Since last visit she has been on Protonix 40mg PO BID.   She reports that the hoarseness seems better with this dosing.   She the globus sensation is improved. She is still frequently clearing her throat.  She denies dysphagia or a choking sensation at this point. She notes that she ran out of Protonix a few weeks ago and now her symptoms are worse.    She notes nasal congestion is improved with Flonase. She notes decreased sneezing and itchy nose as well.     Past Medical History:   Diagnosis Date    Acute coronary syndrome     Anxiety     Asthma     Cancer     colon    Cataracts, both eyes     Colon cancer 1988    COPD (chronic obstructive pulmonary disease)     Coronary artery disease     Depression     Diabetes mellitus     Elevated cholesterol     Hypertension      Social History     Social History    Marital status: Single     Spouse name: N/A    Number of children: N/A    Years of education: N/A     Occupational History    Not on file.     Social History Main Topics    Smoking status: Current Every Day Smoker      Packs/day: 0.25     Years: 46.00     Types: Cigarettes    Smokeless tobacco: Never Used    Alcohol use 1.8 oz/week     3 Glasses of wine per week      Comment: 1 every 3 months    Drug use: No    Sexual activity: Yes     Partners: Male     Birth control/ protection: None      Comment: last drink yesterday afternoon     Other Topics Concern    Not on file     Social History Narrative    No narrative on file     Past Surgical History:   Procedure Laterality Date    ABDOMINAL SURGERY       SECTION, CLASSIC      COLON SURGERY      CORONARY ANGIOPLASTY WITH STENT PLACEMENT  3 months ago     x2, Hysterectomy, Lung surgery, Partial stomach removed, Part of colon removed for rectal cancer    GASTRECTOMY      HEMORRHOID SURGERY      HYSTERECTOMY      @28yrs of age    LUNG BIOPSY      OOPHORECTOMY      @28yrs of age     Family History   Problem Relation Age of Onset    Cancer Mother     Diabetes Mother     Hypertension Mother     Breast cancer Mother     Cancer Father     Heart disease Father     Depression Sister     Hypertension Sister     Cancer Maternal Aunt            Review of Systems  General: negative for chills, fever or weight loss  Psychological: negative for mood changes or depression  Ophthalmic: negative for blurry vision, photophobia or eye pain  ENT: see HPI  Respiratory: no cough, shortness of breath, or wheezing  Cardiovascular: no chest pain or dyspnea on exertion  Gastrointestinal: no abdominal pain, change in bowel habits, or black/ bloody stools  Musculoskeletal: negative for gait disturbance or muscular weakness  Neurological: no syncope or seizures; no ataxia  Dermatological: negative for puritis,  rash and jaundice  Hematologic/lymphatic: no easy bruising, no new lumps or bumps      Physical Exam:    Vitals:    18 1355   BP: (!) 137/97   Pulse: 102   Temp: 98 °F (36.7 °C)       Constitutional: Well appearing / communicating without difficutly.   NAD.  Eyes: EOM I Bilaterally  Head/Face: Normocephalic.  Negative paranasal sinus pressure/tenderness.  Salivary glands WNL.  House Brackmann I Bilaterally.    Right Ear: Auricle normal appearance. External Auditory Canal within normal limits no lesions or masses,TM w/o masses/lesions/perforations. TM mobility noted.   Left Ear: Auricle normal appearance. External Auditory Canal within normal limits no lesions or masses,TM w/o masses/lesions/perforations. TM mobility noted.  Nose: clear rhinorrhea present.  No gross nasal septal deviation. Inferior Turbinates 3+ bilaterally. No septal perforation. No masses/lesions. External nasal skin appears normal without masses/lesions.  Oral Cavity: Gingiva/lips within normal limits.  Dentition/gingiva healthy appearing. Mucus membranes moist. Floor of mouth soft, no masses palpated. Oral Tongue mobile. Hard Palate appears normal.    Oropharynx: Base of tongue appears normal. No masses/lesions noted. Tonsillar fossa/pharyngeal wall without lesions. Posterior oropharynx WNL.  Soft palate without masses. Midline uvula.   Neck/Lymphatic: No LAD I-VI bilaterally.  No thyromegaly.  No masses noted on exam.    Mirror laryngoscopy/nasopharyngoscopy: Active gag reflex.  Unable to perform.    Neuro/Psychiatric: AOx3.  Normal mood and affect.   Cardiovascular: Normal carotid pulses bilaterally, no increasing jugular venous distention noted at cervical region bilaterally.    Respiratory: Normal respiratory effort, no stridor, no retractions noted.      See separate procedure note for FFL.     Assessment:    ICD-10-CM ICD-9-CM    1. Laryngopharyngeal reflux (LPR) K21.9 478.79    2. Globus sensation F45.8 306.4    3. Chronic rhinitis J31.0 472.0    4. Tobacco abuse Z72.0 305.1    5. Tobacco abuse counseling Z71.6 V65.42      305.1      The primary encounter diagnosis was Laryngopharyngeal reflux (LPR). Diagnoses of Globus sensation, Chronic rhinitis, Tobacco abuse, and Tobacco abuse  counseling were also pertinent to this visit.      Plan:  No orders of the defined types were placed in this encounter.    I recommended that the patient continue her Protonix 40mg BID.  I also recommended that the patient refrain from eating within 3 hours of going to bed at night and that the patient place a 2 x 4 underneath the head board of the bed to elevate the head of bed very subtly and optimize the impact of gravity on the potential reflux.  I recommended that the patient avoid alcohol, caffeine, tobacco, tomato sauce, spicy foods, fried food, and chocolate.     AR: Continue Flonase.      I have encouraged her to continue her efforts to quit smokingand offered options of Wellbutrin or Chantix. She is currently active in the smoking cessation program.       Follow up in 8 weeks  to reassess progress with treatment regimen.     Sonia Freeman MD

## 2018-07-12 ENCOUNTER — OFFICE VISIT (OUTPATIENT)
Dept: CARDIOLOGY | Facility: CLINIC | Age: 68
End: 2018-07-12
Payer: MEDICARE

## 2018-07-12 VITALS
HEART RATE: 94 BPM | BODY MASS INDEX: 28.37 KG/M2 | DIASTOLIC BLOOD PRESSURE: 80 MMHG | HEIGHT: 62 IN | SYSTOLIC BLOOD PRESSURE: 122 MMHG | WEIGHT: 154.19 LBS

## 2018-07-12 DIAGNOSIS — Z79.4 UNCONTROLLED TYPE 2 DIABETES MELLITUS WITH HYPERGLYCEMIA, WITH LONG-TERM CURRENT USE OF INSULIN: ICD-10-CM

## 2018-07-12 DIAGNOSIS — R00.2 PALPITATIONS: ICD-10-CM

## 2018-07-12 DIAGNOSIS — R07.9 CHEST PAIN OF UNCERTAIN ETIOLOGY: ICD-10-CM

## 2018-07-12 DIAGNOSIS — R55 SYNCOPE AND COLLAPSE: Primary | ICD-10-CM

## 2018-07-12 DIAGNOSIS — I25.118 CORONARY ARTERY DISEASE OF NATIVE ARTERY OF NATIVE HEART WITH STABLE ANGINA PECTORIS: ICD-10-CM

## 2018-07-12 DIAGNOSIS — I25.119 CORONARY ARTERY DISEASE INVOLVING NATIVE CORONARY ARTERY OF NATIVE HEART WITH ANGINA PECTORIS: ICD-10-CM

## 2018-07-12 DIAGNOSIS — I10 ESSENTIAL HYPERTENSION: ICD-10-CM

## 2018-07-12 DIAGNOSIS — Z98.61 S/P PTCA (PERCUTANEOUS TRANSLUMINAL CORONARY ANGIOPLASTY): ICD-10-CM

## 2018-07-12 DIAGNOSIS — E11.65 UNCONTROLLED TYPE 2 DIABETES MELLITUS WITH HYPERGLYCEMIA, WITH LONG-TERM CURRENT USE OF INSULIN: ICD-10-CM

## 2018-07-12 DIAGNOSIS — E11.649 HYPOGLYCEMIA ASSOCIATED WITH DIABETES: ICD-10-CM

## 2018-07-12 DIAGNOSIS — J44.1 COPD EXACERBATION: ICD-10-CM

## 2018-07-12 DIAGNOSIS — E78.00 ELEVATED CHOLESTEROL: ICD-10-CM

## 2018-07-12 PROCEDURE — 93000 ELECTROCARDIOGRAM COMPLETE: CPT | Mod: S$GLB,,, | Performed by: INTERNAL MEDICINE

## 2018-07-12 PROCEDURE — 99999 PR PBB SHADOW E&M-EST. PATIENT-LVL II: CPT | Mod: PBBFAC,,, | Performed by: INTERNAL MEDICINE

## 2018-07-12 PROCEDURE — 3079F DIAST BP 80-89 MM HG: CPT | Mod: CPTII,S$GLB,, | Performed by: INTERNAL MEDICINE

## 2018-07-12 PROCEDURE — 99214 OFFICE O/P EST MOD 30 MIN: CPT | Mod: S$GLB,,, | Performed by: INTERNAL MEDICINE

## 2018-07-12 PROCEDURE — 3074F SYST BP LT 130 MM HG: CPT | Mod: CPTII,S$GLB,, | Performed by: INTERNAL MEDICINE

## 2018-07-12 PROCEDURE — 3008F BODY MASS INDEX DOCD: CPT | Mod: CPTII,S$GLB,, | Performed by: INTERNAL MEDICINE

## 2018-07-12 RX ORDER — TOPIRAMATE 25 MG/1
50 TABLET ORAL 2 TIMES DAILY
Refills: 1 | COMMUNITY
Start: 2018-05-29 | End: 2019-12-13 | Stop reason: SDUPTHER

## 2018-07-12 NOTE — PROGRESS NOTES
Subjective:      Patient ID: Chayito Chung is a 67 y.o. female.    Chief Complaint: Loss of Consciousness (Several falls over the last 4 months)    HPI:  Woke up on the floor with fecal and urinary incontinence last week and last month.    Friend called ambulance.  Sugar was 96 when pt woke up.  Pt declined to go to the hospital.    No hx of seizures.    Last year pt's mother called the ambulance for a syncopal attack and pt's blood sugar was documented to be 24.    Dr Delgado did labs which pt says were OK    Pt has been hospitalized with a blood sugar of 16 a couple of years ago after a syncopal episode    Review of Systems   Cardiovascular: Positive for chest pain, palpitations and syncope. Negative for claudication, dyspnea on exertion, irregular heartbeat, leg swelling, near-syncope and orthopnea.      Chest pains last 15 min and occur while pt is lying in bed  Past Medical History:   Diagnosis Date    Acute coronary syndrome     Anxiety     Asthma     Cancer     colon    Cataracts, both eyes     Colon cancer     COPD (chronic obstructive pulmonary disease)     Coronary artery disease     Depression     Diabetes mellitus     Elevated cholesterol     Hypertension         Past Surgical History:   Procedure Laterality Date    ABDOMINAL SURGERY       SECTION, CLASSIC      COLON SURGERY      CORONARY ANGIOPLASTY WITH STENT PLACEMENT  3 months ago     x2, Hysterectomy, Lung surgery, Partial stomach removed, Part of colon removed for rectal cancer    GASTRECTOMY      HEMORRHOID SURGERY      HYSTERECTOMY      @28yrs of age    LUNG BIOPSY      OOPHORECTOMY      @28yrs of age       Family History   Problem Relation Age of Onset    Cancer Mother     Diabetes Mother     Hypertension Mother     Breast cancer Mother     Cancer Father     Heart disease Father     Depression Sister     Hypertension Sister     Cancer Maternal Aunt        Social History     Social History     "Marital status: Single     Spouse name: N/A    Number of children: N/A    Years of education: N/A     Social History Main Topics    Smoking status: Current Every Day Smoker     Packs/day: 0.25     Years: 46.00     Types: Cigarettes    Smokeless tobacco: Never Used    Alcohol use 1.8 oz/week     3 Glasses of wine per week      Comment: 1 every 3 months    Drug use: No    Sexual activity: Yes     Partners: Male     Birth control/ protection: None      Comment: last drink yesterday afternoon     Other Topics Concern    None     Social History Narrative    None       Current Outpatient Prescriptions on File Prior to Visit   Medication Sig Dispense Refill    albuterol 90 mcg/actuation inhaler Inhale 2 puffs into the lungs every 6 (six) hours as needed for Wheezing.      albuterol-ipratropium 2.5mg-0.5mg/3mL (DUO-NEB) 0.5 mg-3 mg(2.5 mg base)/3 mL nebulizer solution   5    aspirin (ECOTRIN) 81 MG EC tablet Take 81 mg by mouth once daily.      BD ULTRA-FINE CATARINO PEN NEEDLES 32 gauge x 5/32" Ndle USE UTD  2    buPROPion (WELLBUTRIN SR) 200 MG TbSR Take 200 mg by mouth.      celecoxib (CELEBREX) 200 MG capsule Take 200 mg by mouth once daily.       duloxetine (CYMBALTA) 30 MG capsule Take 30 mg by mouth once daily.       duloxetine (CYMBALTA) 60 MG capsule Take 60 mg by mouth once daily.      fluticasone (FLONASE) 50 mcg/actuation nasal spray 2 sprays (100 mcg total) by Each Nare route once daily. 1 Bottle 12    FLUTICASONE/SALMETEROL (ADVAIR DISKUS INHL) Inhale into the lungs.      hydroCHLOROthiazide (HYDRODIURIL) 25 MG tablet Take 25 mg by mouth once daily.       hydrocodone-acetaminophen 10-325mg (NORCO)  mg Tab Take 1 tablet by mouth every 6 (six) hours as needed for Pain. 12 tablet 0    insulin syringe-needle U-100 1/2 mL 31 x 5/16" Syrg USE BID UTD  3    lisinopril (PRINIVIL,ZESTRIL) 2.5 MG tablet Take 2.5 mg by mouth 2 (two) times daily.      lisinopril (PRINIVIL,ZESTRIL) 20 MG tablet " Take 20 mg by mouth once daily.       lisinopril (PRINIVIL,ZESTRIL) 5 MG tablet Take 5 mg by mouth once daily.       LORazepam (ATIVAN) 0.5 MG tablet TK ONE T PO BID  1    LYRICA 150 mg capsule TK 1 C PO BID FOR 30 DAYS  1    metformin (GLUCOPHAGE) 1000 MG tablet Take 0.5 tablets (500 mg total) by mouth 2 (two) times daily with meals. (Patient taking differently: Take 1,000 mg by mouth 2 (two) times daily with meals. ) 30 tablet 0    metoprolol succinate (TOPROL-XL) 25 MG 24 hr tablet Take 1 tablet (25 mg total) by mouth once daily. 30 tablet 11    nitroGLYCERIN (NITROSTAT) 0.4 MG SL tablet Place 0.4 mg under the tongue every 5 (five) minutes as needed for Chest pain.      NOVOLOG FLEXPEN 100 unit/mL InPn pen       NOVOLOG MIX 70-30 100 unit/mL (70-30) Soln 60 units in the morning before breakfast, 40 units in the evening before dinner (Patient taking differently: 40 units in the morning before breakfast, 50 units in the evening before dinner)  3    NOVOLOG MIX 70-30 FLEXPEN 100 unit/mL (70-30) InPn pen INJECT 40 UNITS UNDER THE SKIN QAM AND 50 UNITS QPM UTD  5    ondansetron (ZOFRAN-ODT) 4 MG TbDL Take 1 tablet (4 mg total) by mouth every 8 (eight) hours as needed. 14 tablet 1    pantoprazole (PROTONIX) 40 MG tablet Take 1 tablet (40 mg total) by mouth 2 (two) times daily. 60 tablet 11    potassium chloride SA (K-DUR,KLOR-CON) 20 MEQ tablet Take 20 mEq by mouth once daily.   0    pravastatin (PRAVACHOL) 20 MG tablet Take 20 mg by mouth once daily.       quetiapine (SEROQUEL) 300 MG Tab 300 mg nightly.       tizanidine (ZANAFLEX) 4 MG tablet as needed.       traMADol (ULTRAM) 50 mg tablet Take 50 mg by mouth every 6 (six) hours as needed.       verapamil (CALAN-SR) 240 MG CR tablet Take 240 mg by mouth once daily.   2    [DISCONTINUED] benzonatate (TESSALON) 100 MG capsule Take 100 mg by mouth 3 (three) times daily as needed.   5    [DISCONTINUED] pregabalin (LYRICA) 75 MG capsule Take 75 mg by  "mouth. Takes tablet Q AM, Noon and two Q PM       No current facility-administered medications on file prior to visit.        Review of patient's allergies indicates:  No Known Allergies  Objective:     Vitals:    07/12/18 1554   BP: 122/80   BP Location: Left arm   Patient Position: Sitting   BP Method: Medium (Automatic)   Pulse: 94   Weight: 69.9 kg (154 lb 3.2 oz)   Height: 5' 2" (1.575 m)        Physical Exam   Constitutional: She is oriented to person, place, and time. She appears well-developed and well-nourished.   Eyes: No scleral icterus.   Neck: No JVD present. Carotid bruit is not present.   Cardiovascular: Normal rate and regular rhythm.  Exam reveals no gallop.    No murmur heard.  Pulmonary/Chest: Breath sounds normal.   Musculoskeletal: She exhibits no edema.   Neurological: She is alert and oriented to person, place, and time.   Skin: Skin is warm and dry.   Psychiatric: She has a normal mood and affect. Her behavior is normal. Judgment and thought content normal.   Vitals reviewed.     Note Cardiolite stress test 11/17 WNL with LVEF 70%    Note CT scans of brain in 2016 and 2017 were OK.    ECG today--NSR, WNL  Assessment:     1. Syncope and collapse    2. Chest pain of uncertain etiology    3. Uncontrolled type 2 diabetes mellitus with hyperglycemia, with long-term current use of insulin    4. Hypoglycemia associated with diabetes    5. Palpitations    6. Coronary artery disease involving native coronary artery of native heart with angina pectoris    7. S/P PTCA (percutaneous transluminal coronary angioplasty)    8. Elevated cholesterol    9. Essential hypertension    10. Coronary artery disease of native artery of native heart with stable angina pectoris    11. COPD exacerbation      Plan:   Chayito was seen today for loss of consciousness.    Diagnoses and all orders for this visit:    Syncope and collapse  -     IN OFFICE EKG 12-LEAD (to Muse)  -     Holter monitor - 24 hour; Future    Chest pain " of uncertain etiology    Uncontrolled type 2 diabetes mellitus with hyperglycemia, with long-term current use of insulin    Hypoglycemia associated with diabetes    Palpitations    Coronary artery disease involving native coronary artery of native heart with angina pectoris    S/P PTCA (percutaneous transluminal coronary angioplasty)    Elevated cholesterol    Essential hypertension    Coronary artery disease of native artery of native heart with stable angina pectoris    COPD exacerbation       Syncope with incontinence twice in the past 2 weeks and 3 times earlier this year of uncertain etiology.  Suspect symptomatic hypoglycemia. Doubt seizures or arrhythmia or orthostatic hypotension.  Holter monitor  F/u with dr Delgado next week, endocrinologist, for apparent insulin reactions as cause for recurrent syncope  F/u with Dr Delgado, PCP  F/U with Dr Cotto, psychiatrist.  F/u with Dr Martinez Chung for pulmonary nodule who does serial CT scans    Follow-up in about 4 months (around 11/12/2018).

## 2018-07-16 ENCOUNTER — HOSPITAL ENCOUNTER (OUTPATIENT)
Dept: CARDIOLOGY | Facility: HOSPITAL | Age: 68
Discharge: HOME | End: 2018-07-16
Attending: INTERNAL MEDICINE
Payer: MEDICARE

## 2018-07-16 DIAGNOSIS — R55 SYNCOPE AND COLLAPSE: ICD-10-CM

## 2018-07-16 PROCEDURE — 93227 XTRNL ECG REC<48 HR R&I: CPT | Mod: ,,, | Performed by: INTERNAL MEDICINE

## 2018-07-16 PROCEDURE — 93226 XTRNL ECG REC<48 HR SCAN A/R: CPT

## 2018-07-20 ENCOUNTER — TELEPHONE (OUTPATIENT)
Dept: CARDIOLOGY | Facility: CLINIC | Age: 68
End: 2018-07-20

## 2018-08-21 ENCOUNTER — OFFICE VISIT (OUTPATIENT)
Dept: OTOLARYNGOLOGY | Facility: CLINIC | Age: 68
End: 2018-08-21
Payer: MEDICARE

## 2018-08-21 VITALS
SYSTOLIC BLOOD PRESSURE: 104 MMHG | BODY MASS INDEX: 28.28 KG/M2 | TEMPERATURE: 99 F | HEIGHT: 62 IN | WEIGHT: 153.69 LBS | DIASTOLIC BLOOD PRESSURE: 72 MMHG | HEART RATE: 94 BPM

## 2018-08-21 DIAGNOSIS — K21.9 LARYNGOPHARYNGEAL REFLUX (LPR): Primary | ICD-10-CM

## 2018-08-21 DIAGNOSIS — Z72.0 TOBACCO ABUSE: ICD-10-CM

## 2018-08-21 DIAGNOSIS — J31.0 CHRONIC RHINITIS: ICD-10-CM

## 2018-08-21 PROCEDURE — 99999 PR PBB SHADOW E&M-EST. PATIENT-LVL V: CPT | Mod: PBBFAC,,, | Performed by: OTOLARYNGOLOGY

## 2018-08-21 PROCEDURE — 3078F DIAST BP <80 MM HG: CPT | Mod: CPTII,S$GLB,, | Performed by: OTOLARYNGOLOGY

## 2018-08-21 PROCEDURE — 3074F SYST BP LT 130 MM HG: CPT | Mod: CPTII,S$GLB,, | Performed by: OTOLARYNGOLOGY

## 2018-08-21 PROCEDURE — 31575 DIAGNOSTIC LARYNGOSCOPY: CPT | Mod: S$GLB,,, | Performed by: OTOLARYNGOLOGY

## 2018-08-21 PROCEDURE — 3008F BODY MASS INDEX DOCD: CPT | Mod: CPTII,S$GLB,, | Performed by: OTOLARYNGOLOGY

## 2018-08-21 PROCEDURE — 99214 OFFICE O/P EST MOD 30 MIN: CPT | Mod: 25,S$GLB,, | Performed by: OTOLARYNGOLOGY

## 2018-08-21 RX ORDER — PANTOPRAZOLE SODIUM 40 MG/1
40 TABLET, DELAYED RELEASE ORAL DAILY
Qty: 30 TABLET | Refills: 11 | Status: SHIPPED | OUTPATIENT
Start: 2018-08-21 | End: 2019-04-04

## 2018-08-21 RX ORDER — LEVOCETIRIZINE DIHYDROCHLORIDE 5 MG/1
5 TABLET, FILM COATED ORAL NIGHTLY
Qty: 30 TABLET | Refills: 11 | Status: SHIPPED | OUTPATIENT
Start: 2018-08-21 | End: 2018-11-15

## 2018-08-21 NOTE — PROGRESS NOTES
Chief Complaint   Patient presents with    Follow-up    Hoarse     slightly improved    Cough     dry cough   .     HPI:Chayito Chung is a 67 y.o. female who has been referred by Dr. Escamilla for a 3 months history of globus sensation and hoarseness. She notes a foreign bodys sensation that is constant.  She states it is worse at night.  She coughs and clears her throat but unable to relieve the sensation.  Her voice is not progressively worsening over this time. There are not pitch breaks or cracks. There is not vocal fatigue. She denies dysphagia, odynophagia, throat pain, and otalgia.  There is no hemoptysis or hematemesis. She is breathing well.     She admits to throat clearing and cough. She admits to frequent heartburn and reflux. She is currently on Protonix 40mg daily and will use OTC tums and pepto-bismol without relief.     Interval HPI 8/21/2018:    Since last visit she has been on Protonix 40mg PO BID.   She reports that the hoarseness seems better with this dosing.   She the globus sensation and throat clearing s improved. She denies dysphagia.  She notes persistant dry cough.     Past Medical History:   Diagnosis Date    Acute coronary syndrome     Anxiety     Asthma     Cancer     colon    Cataracts, both eyes     Colon cancer 1988    COPD (chronic obstructive pulmonary disease)     Coronary artery disease     Depression     Diabetes mellitus     Elevated cholesterol     Hypertension      Social History     Socioeconomic History    Marital status: Single     Spouse name: Not on file    Number of children: Not on file    Years of education: Not on file    Highest education level: Not on file   Social Needs    Financial resource strain: Not on file    Food insecurity - worry: Not on file    Food insecurity - inability: Not on file    Transportation needs - medical: Not on file    Transportation needs - non-medical: Not on file   Occupational History    Not on file   Tobacco  Use    Smoking status: Current Every Day Smoker     Packs/day: 0.25     Years: 46.00     Pack years: 11.50     Types: Cigarettes    Smokeless tobacco: Never Used   Substance and Sexual Activity    Alcohol use: Yes     Alcohol/week: 1.8 oz     Types: 3 Glasses of wine per week     Comment: 1 every 3 months    Drug use: No    Sexual activity: Yes     Partners: Male     Birth control/protection: None     Comment: last drink yesterday afternoon   Other Topics Concern    Not on file   Social History Narrative    Not on file     Past Surgical History:   Procedure Laterality Date    ABDOMINAL SURGERY       SECTION, CLASSIC      COLON SURGERY      CORONARY ANGIOPLASTY WITH STENT PLACEMENT  3 months ago     x2, Hysterectomy, Lung surgery, Partial stomach removed, Part of colon removed for rectal cancer    GASTRECTOMY      HEMORRHOID SURGERY      HYSTERECTOMY      @28yrs of age    LUNG BIOPSY      OOPHORECTOMY      @28yrs of age     Family History   Problem Relation Age of Onset    Cancer Mother     Diabetes Mother     Hypertension Mother     Breast cancer Mother     Cancer Father     Heart disease Father     Depression Sister     Hypertension Sister     Cancer Maternal Aunt            Review of Systems  General: negative for chills, fever or weight loss  Psychological: negative for mood changes or depression  Ophthalmic: negative for blurry vision, photophobia or eye pain  ENT: see HPI  Respiratory: no cough, shortness of breath, or wheezing  Cardiovascular: no chest pain or dyspnea on exertion  Gastrointestinal: no abdominal pain, change in bowel habits, or black/ bloody stools  Musculoskeletal: negative for gait disturbance or muscular weakness  Neurological: no syncope or seizures; no ataxia  Dermatological: negative for puritis,  rash and jaundice  Hematologic/lymphatic: no easy bruising, no new lumps or bumps      Physical Exam:    Vitals:    18 1014   BP: 104/72   Pulse:  94   Temp: 98.8 °F (37.1 °C)       Constitutional: Well appearing / communicating without difficutly.  NAD.  Eyes: EOM I Bilaterally  Head/Face: Normocephalic.  Negative paranasal sinus pressure/tenderness.  Salivary glands WNL.  House Brackmann I Bilaterally.    Right Ear: Auricle normal appearance. External Auditory Canal within normal limits no lesions or masses,TM w/o masses/lesions/perforations. TM mobility noted.   Left Ear: Auricle normal appearance. External Auditory Canal within normal limits no lesions or masses,TM w/o masses/lesions/perforations. TM mobility noted.  Nose: clear rhinorrhea present.  No gross nasal septal deviation. Inferior Turbinates 3+ bilaterally. No septal perforation. No masses/lesions. External nasal skin appears normal without masses/lesions.  Oral Cavity: Gingiva/lips within normal limits.  Dentition/gingiva healthy appearing. Mucus membranes moist. Floor of mouth soft, no masses palpated. Oral Tongue mobile. Hard Palate appears normal.    Oropharynx: Base of tongue appears normal. No masses/lesions noted. Tonsillar fossa/pharyngeal wall without lesions. Posterior oropharynx WNL.  Soft palate without masses. Midline uvula.   Neck/Lymphatic: No LAD I-VI bilaterally.  No thyromegaly.  No masses noted on exam.    Mirror laryngoscopy/nasopharyngoscopy: Active gag reflex.  Unable to perform.    Neuro/Psychiatric: AOx3.  Normal mood and affect.   Cardiovascular: Normal carotid pulses bilaterally, no increasing jugular venous distention noted at cervical region bilaterally.    Respiratory: Normal respiratory effort, no stridor, no retractions noted.      See separate procedure note for FFL.     Assessment:    ICD-10-CM ICD-9-CM    1. Laryngopharyngeal reflux (LPR) K21.9 478.79    2. Chronic rhinitis J31.0 472.0    3. Tobacco abuse Z72.0 305.1      The primary encounter diagnosis was Laryngopharyngeal reflux (LPR). Diagnoses of Chronic rhinitis and Tobacco abuse were also pertinent to this  visit.      Plan:  No orders of the defined types were placed in this encounter.    LPR symptoms and examination appear to be improving. I recommended decreasing Protonix to 40mg PO daily.  I also recommended that the patient refrain from eating within 3 hours of going to bed at night and that the patient place a 2 x 4 underneath the head board of the bed to elevate the head of bed very subtly and optimize the impact of gravity on the potential reflux.  I recommended that the patient avoid alcohol, caffeine, tobacco, tomato sauce, spicy foods, fried food, and chocolate.     AR: Continue Flonase; add levocetirizine.    Patient still with persistent dry cough.  In review of medication list, patient is on lisinopril. I will reach out to her cardiologist to review if this could be contributing.       I have encouraged her to continue her efforts to quit smokingand offered options of Wellbutrin or Chantix. She is currently active in the smoking cessation program.     Follow up in  3 months  to reassess progress with treatment regimen.     Sonia Freeman MD

## 2018-08-21 NOTE — PROCEDURES
Procedures  Due to indication in patient's history, presentation or risk factors,  a fiber optic exam was performed.    SEPARATE PROCEDURE NOTE:    ANESTHESIA:  Topical xylocaine with bud-synephrine    FINDINGS:  Mild to moderate  inaterarytenoid erythema and edema    PROCEDURE:  After verbal consent was obtained, the flexible scope was passed through the patient's nasal cavity without difficulty.  The nasopharynx (adenoid pad) and eustachian tube orifices were first visualized and were found to be normal, without masses or irregularity.  The posterior pharyngeal wall and base of tongue were then examined and no mass or irregular tissue was seen.  The scope was then advanced to the larynx, and the epiglottis, valleculae, and piriform sinuses were normal, without masses or mucosal irregularity.  The false vocal folds and true vocal folds were then examined and were found to have normal mobility (full abduction and adduction) and no masses or mucosal irregularity was seen.  The interartyenoid area had mild to moderate edema and erythema consistent with reflux. Stable since last exam.

## 2018-08-22 ENCOUNTER — TELEPHONE (OUTPATIENT)
Dept: CARDIOLOGY | Facility: CLINIC | Age: 68
End: 2018-08-22

## 2018-08-22 RX ORDER — IRBESARTAN 75 MG/1
75 TABLET ORAL DAILY
Qty: 90 TABLET | Refills: 3 | Status: SHIPPED | OUTPATIENT
Start: 2018-08-22 | End: 2018-11-27 | Stop reason: SDUPTHER

## 2018-08-22 NOTE — TELEPHONE ENCOUNTER
Dr Arzate, ENT, suggests discontinuation of the lisinopril due to cough and hoarseness.  Discussed with pt.  Discontinue the lisinopril.  Substitute irbesartan 75 mg daily. RTC one month for blood pressure check.

## 2018-08-27 ENCOUNTER — TELEPHONE (OUTPATIENT)
Dept: OTOLARYNGOLOGY | Facility: CLINIC | Age: 68
End: 2018-08-27

## 2018-08-27 NOTE — TELEPHONE ENCOUNTER
----- Message from Savannah Mensah sent at 8/27/2018 11:48 AM CDT -----  Contact: [phone]  Patient would like to be seen today for a swollen face. Please advise.

## 2018-08-27 NOTE — TELEPHONE ENCOUNTER
Called pt, c/o edematous face and jaw. GI physician recommended she go to the ED today, she is requesting and appt for today rather than the ED. I explained that Dr. Arzate is in surgery and advised her to follow her physician recommendations. She requested an appt tomzully, appt overbooked for 0920 in the am on 8/28/18,  With Dr. Arzate, pt confirmed.

## 2018-08-28 ENCOUNTER — OFFICE VISIT (OUTPATIENT)
Dept: OTOLARYNGOLOGY | Facility: CLINIC | Age: 68
End: 2018-08-28
Payer: MEDICARE

## 2018-08-28 VITALS
BODY MASS INDEX: 27.83 KG/M2 | WEIGHT: 151.25 LBS | DIASTOLIC BLOOD PRESSURE: 95 MMHG | SYSTOLIC BLOOD PRESSURE: 139 MMHG | HEIGHT: 62 IN | HEART RATE: 94 BPM

## 2018-08-28 DIAGNOSIS — L03.211 FACIAL CELLULITIS: Primary | ICD-10-CM

## 2018-08-28 PROCEDURE — 3080F DIAST BP >= 90 MM HG: CPT | Mod: CPTII,S$GLB,, | Performed by: OTOLARYNGOLOGY

## 2018-08-28 PROCEDURE — 99214 OFFICE O/P EST MOD 30 MIN: CPT | Mod: 25,S$GLB,, | Performed by: OTOLARYNGOLOGY

## 2018-08-28 PROCEDURE — 96372 THER/PROPH/DIAG INJ SC/IM: CPT | Mod: 59,S$GLB,, | Performed by: OTOLARYNGOLOGY

## 2018-08-28 PROCEDURE — 3008F BODY MASS INDEX DOCD: CPT | Mod: CPTII,S$GLB,, | Performed by: OTOLARYNGOLOGY

## 2018-08-28 PROCEDURE — 99999 PR PBB SHADOW E&M-EST. PATIENT-LVL IV: CPT | Mod: PBBFAC,,, | Performed by: OTOLARYNGOLOGY

## 2018-08-28 PROCEDURE — 3075F SYST BP GE 130 - 139MM HG: CPT | Mod: CPTII,S$GLB,, | Performed by: OTOLARYNGOLOGY

## 2018-08-28 RX ORDER — METHYLPREDNISOLONE ACETATE 40 MG/ML
40 INJECTION, SUSPENSION INTRA-ARTICULAR; INTRALESIONAL; INTRAMUSCULAR; SOFT TISSUE
Status: COMPLETED | OUTPATIENT
Start: 2018-08-28 | End: 2018-08-28

## 2018-08-28 RX ORDER — CEFTRIAXONE 1 G/1
1 INJECTION, POWDER, FOR SOLUTION INTRAMUSCULAR; INTRAVENOUS
Status: COMPLETED | OUTPATIENT
Start: 2018-08-28 | End: 2018-08-28

## 2018-08-28 RX ORDER — AMOXICILLIN AND CLAVULANATE POTASSIUM 875; 125 MG/1; MG/1
1 TABLET, FILM COATED ORAL 2 TIMES DAILY
Qty: 20 TABLET | Refills: 0 | Status: SHIPPED | OUTPATIENT
Start: 2018-08-28 | End: 2018-09-07

## 2018-08-28 RX ORDER — ALPRAZOLAM 0.5 MG/1
0.5 TABLET ORAL DAILY
COMMUNITY
Start: 2018-08-07 | End: 2019-05-23

## 2018-08-28 RX ADMIN — CEFTRIAXONE 1 G: 1 INJECTION, POWDER, FOR SOLUTION INTRAMUSCULAR; INTRAVENOUS at 11:08

## 2018-08-28 RX ADMIN — METHYLPREDNISOLONE ACETATE 40 MG: 40 INJECTION, SUSPENSION INTRA-ARTICULAR; INTRALESIONAL; INTRAMUSCULAR; SOFT TISSUE at 11:08

## 2018-08-28 NOTE — PROGRESS NOTES
Chief Complaint   Patient presents with    Follow-up     pt reports on Thursday started with pain on right side of face, gradually started swelling to her lips and right cheek   .       Chief Complaint: Follow-up (pt reports on Thursday started with pain on right side of face, gradually started swelling to her lips and right cheek)      Subjective:       Patient ID: Chayito Chnug is a 67 y.o. female.      HPI Mrs. Chung presents with a 5 day history of pain and swelling to the right side of her face.  She notes that it has been gradually worsening since onset.  She notes that it it tender to touch.  She rates the pain at a 8/10.  She denies any trauma, insect bites, new medications.  She denies shortness or breath, change in speech, dysphagia, or sensation of tongue or throat swelling. Medication list reviewed- she is not currently on an ACE inhibitor.  She has not noted any alleviating factors.     Past Medical History:   Diagnosis Date    Acute coronary syndrome     Anxiety     Asthma     Cancer     colon    Cataracts, both eyes     Colon cancer 1988    COPD (chronic obstructive pulmonary disease)     Coronary artery disease     Depression     Diabetes mellitus     Elevated cholesterol     Hypertension        Social History     Socioeconomic History    Marital status: Single     Spouse name: Not on file    Number of children: Not on file    Years of education: Not on file    Highest education level: Not on file   Social Needs    Financial resource strain: Not on file    Food insecurity - worry: Not on file    Food insecurity - inability: Not on file    Transportation needs - medical: Not on file    Transportation needs - non-medical: Not on file   Occupational History    Not on file   Tobacco Use    Smoking status: Current Every Day Smoker     Packs/day: 0.25     Years: 46.00     Pack years: 11.50     Types: Cigarettes    Smokeless tobacco: Never Used   Substance and Sexual Activity     Alcohol use: Yes     Alcohol/week: 1.8 oz     Types: 3 Glasses of wine per week     Comment: 1 every 3 months    Drug use: No    Sexual activity: Yes     Partners: Male     Birth control/protection: None     Comment: last drink yesterday afternoon   Other Topics Concern    Not on file   Social History Narrative    Not on file     Past Surgical History:   Procedure Laterality Date    ABDOMINAL SURGERY       SECTION, CLASSIC      COLON SURGERY      CORONARY ANGIOPLASTY WITH STENT PLACEMENT  3 months ago     x2, Hysterectomy, Lung surgery, Partial stomach removed, Part of colon removed for rectal cancer    GASTRECTOMY      HEMORRHOID SURGERY      HYSTERECTOMY      @28yrs of age    LUNG BIOPSY      OOPHORECTOMY      @28yrs of age       Family History   Problem Relation Age of Onset    Cancer Mother     Diabetes Mother     Hypertension Mother     Breast cancer Mother     Cancer Father     Heart disease Father     Depression Sister     Hypertension Sister     Cancer Maternal Aunt        Review of Systems   Constitutional: Negative for chills, fever and unexpected weight change.   HENT: Positive for facial swelling and sinus pain. Negative for postnasal drip, sinus pressure, sore throat, trouble swallowing and voice change.    Eyes: Negative for photophobia, pain and visual disturbance.   Respiratory: Negative for shortness of breath, wheezing and stridor.    Cardiovascular: Negative for chest pain and palpitations.   Gastrointestinal: Negative for abdominal pain, blood in stool and vomiting.   Musculoskeletal: Negative for gait problem and myalgias.   Skin: Negative for pallor and rash.   Neurological: Negative for seizures, syncope and weakness.   Hematological: Negative for adenopathy. Does not bruise/bleed easily.   Psychiatric/Behavioral: Negative for confusion and dysphoric mood.         Objective:        Physical Exam     Vitals:    18 1003   BP: (!) 139/95   Pulse:  94       Constitutional: Well appearing / communicating.  NAD.  Eyes: EOM I Bilaterally  Head/Face: Normocephalic.  Negative paranasal sinus pressure/tenderness.  Salivary glands WNL.  House Brackmann I Bilaterally. +right facial swelling in the maxillary, buccal, and upper lip region. + mild erythema. No fluctuance, no induration.     Right Ear: External Auditory Canal WNL,TM w/o masses/lesions/perforations.  Auricle WNL.  Left Ear: External Auditory Canal WNL,TM w/o masses/lesions/perforations. Auricle WNL.  Nose: No gross nasal septal deviation. Inferior Turbinates 3+ bilaterally. No septal perforation. No masses/lesions. External nasal skin without masses/lesions.  Oral Cavity: Gingiva/lips- right maxillary gingiva exquisitely tender to palpation.  FOM Soft, no masses palpated. Oral Tongue mobile. Hard Palate WNL.   Oropharynx: BOT WNL. No masses/lesions noted. Tonsillar fossa/pharyngeal wall without lesions. Posterior oropharynx WNL.  Soft palate without masses. Midline uvula.   Neck/Lymphatic: No LAD I-VI bilaterally.  No thyromegaly.  No masses noted on exam.    Mirror laryngoscopy/nasopharyngoscopy: Active gag reflex.  Unable to perform.    Neuro/Psychiatric: AOx3.  Normal mood and affect.   Cardiovascular: Normal carotid pulses bilaterally, no increasing jugular venous distention noted at cervical region bilaterally.    Respiratory: Normal respiratory effort, no stridor, no retractions noted.      Assessment:       1. Facial cellulitis        Plan:       I suspect facial cellulitis as sequela to odontogenic source.   IM Rocephin 1gm today.  Augmentin 875mg PO BID for 10 days.  Depo medrol IM injection today.  Patient counseled to go to the ER for any worsening symptoms such as tongue swelling, shortness of breath, dysphagia, worsening lip swelling.      Follow up in 1-2 weeks for recheck.   This visit was 25 minutes in duration, with over 50% of the time spent in direct face-to-face counseling regarding the  issues outlined above.      Sonia Freeman MD

## 2018-09-14 DIAGNOSIS — Z12.31 VISIT FOR SCREENING MAMMOGRAM: Primary | ICD-10-CM

## 2018-09-19 ENCOUNTER — HOSPITAL ENCOUNTER (OUTPATIENT)
Dept: RADIOLOGY | Facility: HOSPITAL | Age: 68
Discharge: HOME | End: 2018-09-19
Attending: FAMILY MEDICINE
Payer: MEDICARE

## 2018-09-19 DIAGNOSIS — Z12.31 VISIT FOR SCREENING MAMMOGRAM: ICD-10-CM

## 2018-09-19 PROCEDURE — 77067 SCR MAMMO BI INCL CAD: CPT | Mod: 26,,, | Performed by: RADIOLOGY

## 2018-09-19 PROCEDURE — 77067 SCR MAMMO BI INCL CAD: CPT | Mod: TC

## 2018-10-01 ENCOUNTER — HOSPITAL ENCOUNTER (EMERGENCY)
Facility: HOSPITAL | Age: 68
Discharge: HOME | End: 2018-10-01
Attending: EMERGENCY MEDICINE
Payer: MEDICARE

## 2018-10-01 VITALS
BODY MASS INDEX: 27.42 KG/M2 | HEART RATE: 85 BPM | OXYGEN SATURATION: 99 % | DIASTOLIC BLOOD PRESSURE: 82 MMHG | TEMPERATURE: 99 F | RESPIRATION RATE: 26 BRPM | HEIGHT: 62 IN | SYSTOLIC BLOOD PRESSURE: 148 MMHG | WEIGHT: 149 LBS

## 2018-10-01 DIAGNOSIS — R07.9 CHEST PAIN: ICD-10-CM

## 2018-10-01 LAB
ALBUMIN SERPL BCP-MCNC: 3.8 G/DL
ALP SERPL-CCNC: 114 U/L
ALT SERPL W/O P-5'-P-CCNC: 13 U/L
ANION GAP SERPL CALC-SCNC: 13 MMOL/L
AST SERPL-CCNC: 10 U/L
BASOPHILS # BLD AUTO: 0.02 K/UL
BASOPHILS NFR BLD: 0.3 %
BILIRUB SERPL-MCNC: 0.4 MG/DL
BNP SERPL-MCNC: 137 PG/ML
BUN SERPL-MCNC: 10 MG/DL
CALCIUM SERPL-MCNC: 9.8 MG/DL
CHLORIDE SERPL-SCNC: 110 MMOL/L
CO2 SERPL-SCNC: 18 MMOL/L
CREAT SERPL-MCNC: 0.8 MG/DL
DIFFERENTIAL METHOD: NORMAL
EOSINOPHIL # BLD AUTO: 0.1 K/UL
EOSINOPHIL NFR BLD: 0.8 %
ERYTHROCYTE [DISTWIDTH] IN BLOOD BY AUTOMATED COUNT: 14.5 %
EST. GFR  (AFRICAN AMERICAN): >60 ML/MIN/1.73 M^2
EST. GFR  (NON AFRICAN AMERICAN): >60 ML/MIN/1.73 M^2
GLUCOSE SERPL-MCNC: 228 MG/DL
HCT VFR BLD AUTO: 39.7 %
HGB BLD-MCNC: 13 G/DL
LYMPHOCYTES # BLD AUTO: 1.8 K/UL
LYMPHOCYTES NFR BLD: 26.9 %
MCH RBC QN AUTO: 30.2 PG
MCHC RBC AUTO-ENTMCNC: 32.7 G/DL
MCV RBC AUTO: 92 FL
MONOCYTES # BLD AUTO: 0.5 K/UL
MONOCYTES NFR BLD: 7.4 %
NEUTROPHILS # BLD AUTO: 4.3 K/UL
NEUTROPHILS NFR BLD: 64.6 %
PLATELET # BLD AUTO: 248 K/UL
PMV BLD AUTO: 9.6 FL
POTASSIUM SERPL-SCNC: 3.7 MMOL/L
PROT SERPL-MCNC: 7.6 G/DL
RBC # BLD AUTO: 4.31 M/UL
SODIUM SERPL-SCNC: 141 MMOL/L
TROPONIN I SERPL DL<=0.01 NG/ML-MCNC: <0.006 NG/ML
WBC # BLD AUTO: 6.66 K/UL

## 2018-10-01 PROCEDURE — 83880 ASSAY OF NATRIURETIC PEPTIDE: CPT

## 2018-10-01 PROCEDURE — 99285 EMERGENCY DEPT VISIT HI MDM: CPT | Mod: 25

## 2018-10-01 PROCEDURE — 84484 ASSAY OF TROPONIN QUANT: CPT

## 2018-10-01 PROCEDURE — 80053 COMPREHEN METABOLIC PANEL: CPT

## 2018-10-01 PROCEDURE — 93005 ELECTROCARDIOGRAM TRACING: CPT

## 2018-10-01 PROCEDURE — 25000003 PHARM REV CODE 250: Performed by: EMERGENCY MEDICINE

## 2018-10-01 PROCEDURE — 85025 COMPLETE CBC W/AUTO DIFF WBC: CPT

## 2018-10-01 RX ORDER — ASPIRIN 325 MG
325 TABLET ORAL
Status: COMPLETED | OUTPATIENT
Start: 2018-10-01 | End: 2018-10-01

## 2018-10-01 RX ADMIN — ASPIRIN 325 MG ORAL TABLET 325 MG: 325 PILL ORAL at 11:10

## 2018-10-01 NOTE — TELEPHONE ENCOUNTER
Patient called and stated she was having chest pain, took a NTG no relief took another one 15 minutes later fell asleep woke up this morning still having chest pain took another NTG now have a headache and still having chest pain. Advised patient to go to ER she said she was going to ER.

## 2018-10-01 NOTE — ED PROVIDER NOTES
Encounter Date: 10/1/2018    SCRIBE #1 NOTE: I, Reta Flores, am scribing for, and in the presence of,  Dr. Michele. I have scribed the entire note.       History     Chief Complaint   Patient presents with    Chest Pain     c/o pain to left breast since yesterday, unrelieved by 2 nitro yesterday and 2 nitro this morning. Also c/o headache after taking nitro     A 67 year-old female, with a history of CAD and COPD, present to the ED complaining of left sided chest pain that began last night at  while she was watching television. She describes an intermittent sharp/stabbing pain that lasts a few minutes per episode and dull pain radiates to her left arm. Increased nausea, diaphoresis, and SOB associated with chest pain. Patient denies any abdominal pain, vomiting, or any other concerning symptoms. On exam, patient states that she is currently experiencing chest pain. The patient is a smoker, with no plans to quit. She took 2 NTG this morning, without relief.           The history is provided by the patient.     Review of patient's allergies indicates:  No Known Allergies  Past Medical History:   Diagnosis Date    Acute coronary syndrome     Anxiety     Asthma     Cancer     colon    Cataracts, both eyes     Colon cancer     COPD (chronic obstructive pulmonary disease)     Coronary artery disease     Depression     Diabetes mellitus     Elevated cholesterol     Hypertension      Past Surgical History:   Procedure Laterality Date    ABDOMINAL SURGERY      BLOCK-NERVE-MEDIAL BRANCH-LUMBAR Bilateral 10/18/2016    [facility] PAIN [facility]GT    BREAST BIOPSY      benign unsure what side     SECTION, CLASSIC      COLON SURGERY      CORONARY ANGIOPLASTY WITH STENT PLACEMENT  3 months ago     x2, Hysterectomy, Lung surgery, Partial stomach removed, Part of colon removed for rectal cancer    GASTRECTOMY      HEMORRHOID SURGERY      HYSTERECTOMY      @28yrs  of age    INJECTION-FACET Left 8/23/2016    [facility]formed [facility] at [facility]    INJECTION-FACET Right 8/2/2016    Performed by [facility] at [facility]    LUNG BIOPSY      OOPHORECTOMY      @28yrs of age    RADIOFREQUENCY THERMOCOAGULATION ([facility])-NERVE-MEDIAN BRANCH-LUMBAR Bilateral 10/19/2016    Performed by [facility] at [facility]     Family History   Problem Relation Age of Onset    Cancer Mother     Diabetes Mother     Hypertension Mother     Breast cancer Mother     Cancer Father     Heart disease Father     Depression Sister     Hypertension Sister     Cancer Maternal Aunt      Social History     Tobacco Use    Smoking status: Current Every Day Smoker     Packs/day: 0.25     Years: 46.00     Pack years: 11.50     Types: Cigarettes    Smokeless tobacco: Never Used   Substance Use Topics    Alcohol use: Yes     Alcohol/week: 1.8 oz     Types: 3 Glasses of wine per week     Comment: 1 every 3 months    Drug use: No     Review of Systems   Constitutional: Positive for diaphoresis.   Respiratory: Positive for shortness of breath.    Cardiovascular: Positive for chest pain.   Gastrointestinal: Positive for nausea. Negative for abdominal pain and vomiting.       Physical Exam     Initial Vitals [10/01/18 1105]   BP Pulse Resp Temp SpO2   (!) 180/106 105 20 98.9 °F (37.2 °C) 97 %      MAP       --         Physical Exam    Nursing note and vitals reviewed.  Constitutional: She appears well-developed and well-nourished. She is not diaphoretic. No distress.   Eyes: Conjunctivae are normal.   Cardiovascular: Normal heart sounds.   Pulmonary/Chest: Breath sounds normal. No respiratory distress.   Abdominal: Soft. There is no tenderness.   Genitourinary: Vagina normal.   Musculoskeletal: Normal range of motion. She exhibits no edema or tenderness.   No LE edema   Neurological: She is alert and oriented to person, place, and time. She has normal strength. No cranial  nerve deficit.         ED Course   Procedures  Labs Reviewed   COMPREHENSIVE METABOLIC PANEL - Abnormal; Notable for the following components:       Result Value    CO2 18 (*)     Glucose 228 (*)     All other components within normal limits   B-TYPE NATRIURETIC PEPTIDE - Abnormal; Notable for the following components:     (*)     All other components within normal limits   CBC W/ AUTO DIFFERENTIAL   TROPONIN I     EKG Readings: (Independently Interpreted)   Rate of 108 Sinus tachycardia. No St elevations. Normal T waves          X-Rays:   Independently Interpreted Readings:   Other Readings:  Reviewed by myself, read by radiology.      Imaging Results          X-Ray Chest 1 View (Final result)  Result time 10/01/18 12:05:39    Final result by Dinesh Ayoub MD (10/01/18 12:05:39)                 Impression:      Mild CHF/pulmonary edema pattern.  Pneumonitis could have a similar appearance.  Short-term PA and lateral chest follow-up could be performed to ensure resolution.      Electronically signed by: Dinesh Ayoub MD  Date:    10/01/2018  Time:    12:05             Narrative:    EXAMINATION:  XR CHEST 1 VIEW    CLINICAL HISTORY:  chest pain;    TECHNIQUE:  Single frontal view of the chest was performed.    COMPARISON:  10/31/2017    FINDINGS:  There is mild increased perihilar lung opacities with slightly indistinct pulmonary vasculature.  No pneumothorax or pleural effusions.  Heart size within normal limits.                               Medical Decision Making:   Clinical Tests:   Lab Tests: Ordered and Reviewed  Radiological Study: Reviewed and Ordered  Medical Tests: Reviewed and Ordered  ED Management:  67-year-old female intermittent chest pain since last night.  Patient describes it as a sharp pain that only lasts for 2-3 minutes.  Her pain was completely unaffected by nitroglycerin.  Her EKG shows no acute injury pattern and troponin is 0.  Based on her history and workup performed here in the  ED, this does not seem consistent with angina.  She will be discharged in stable condition and urged to follow up with the primary physician as soon as able for recheck and further treatment as warranted.  I have also advised to return here immediately for any recurring concerning chest pain.                      Clinical Impression:     1. Chest pain            I, Dr. Abdirahman Elias, personally performed the services described in this documentation. All medical record entries made by the scribe were at my direction and in my presence. I have reviewed the chart and agree that the record reflects my personal performance and is accurate and complete. Abdirahman Elias MD.  3:41 PM 10/01/2018                   Abdirahman Elias MD  10/01/18 1542

## 2018-10-08 ENCOUNTER — TELEPHONE (OUTPATIENT)
Dept: OTOLARYNGOLOGY | Facility: CLINIC | Age: 68
End: 2018-10-08

## 2018-10-08 NOTE — TELEPHONE ENCOUNTER
----- Message from Marry Law sent at 10/8/2018  9:51 AM CDT -----  Contact: [phone]/self  Patient called to cancel appointment on due to family emergency. Patient would still like to be seen but on date sooner than 10-12-18. Please call and advise.

## 2018-10-25 NOTE — ASSESSMENT & PLAN NOTE
Dangers of cigarette smoking were reviewed with patient in detail and patient was encouraged to quit. Nicotine replacement options were discussed.       25-Oct-2018

## 2018-11-15 ENCOUNTER — OFFICE VISIT (OUTPATIENT)
Dept: CARDIOLOGY | Facility: CLINIC | Age: 68
End: 2018-11-15
Payer: MEDICARE

## 2018-11-15 VITALS
HEART RATE: 90 BPM | DIASTOLIC BLOOD PRESSURE: 85 MMHG | HEIGHT: 62 IN | BODY MASS INDEX: 27.58 KG/M2 | SYSTOLIC BLOOD PRESSURE: 139 MMHG | WEIGHT: 149.88 LBS

## 2018-11-15 DIAGNOSIS — R07.9 CHEST PAIN OF UNCERTAIN ETIOLOGY: ICD-10-CM

## 2018-11-15 DIAGNOSIS — R79.89 ELEVATED BRAIN NATRIURETIC PEPTIDE (BNP) LEVEL: ICD-10-CM

## 2018-11-15 DIAGNOSIS — I10 ESSENTIAL HYPERTENSION: ICD-10-CM

## 2018-11-15 DIAGNOSIS — I25.118 CORONARY ARTERY DISEASE OF NATIVE ARTERY OF NATIVE HEART WITH STABLE ANGINA PECTORIS: Primary | ICD-10-CM

## 2018-11-15 DIAGNOSIS — E78.2 MIXED HYPERLIPIDEMIA: ICD-10-CM

## 2018-11-15 DIAGNOSIS — E11.65 UNCONTROLLED TYPE 2 DIABETES MELLITUS WITH HYPERGLYCEMIA, WITH LONG-TERM CURRENT USE OF INSULIN: ICD-10-CM

## 2018-11-15 DIAGNOSIS — R07.9 CHRONIC CHEST PAIN: ICD-10-CM

## 2018-11-15 DIAGNOSIS — G89.29 CHRONIC CHEST PAIN: ICD-10-CM

## 2018-11-15 DIAGNOSIS — R06.09 DYSPNEA ON EXERTION: ICD-10-CM

## 2018-11-15 DIAGNOSIS — I50.33 ACUTE ON CHRONIC DIASTOLIC HEART FAILURE: ICD-10-CM

## 2018-11-15 DIAGNOSIS — Z98.61 POST PTCA: ICD-10-CM

## 2018-11-15 DIAGNOSIS — Z98.61 S/P PTCA (PERCUTANEOUS TRANSLUMINAL CORONARY ANGIOPLASTY): ICD-10-CM

## 2018-11-15 DIAGNOSIS — R55 SYNCOPE AND COLLAPSE: ICD-10-CM

## 2018-11-15 DIAGNOSIS — Z79.4 UNCONTROLLED TYPE 2 DIABETES MELLITUS WITH HYPERGLYCEMIA, WITH LONG-TERM CURRENT USE OF INSULIN: ICD-10-CM

## 2018-11-15 DIAGNOSIS — Z72.0 TOBACCO ABUSE: ICD-10-CM

## 2018-11-15 PROCEDURE — 99999 PR PBB SHADOW E&M-EST. PATIENT-LVL III: CPT | Mod: PBBFAC,,, | Performed by: INTERNAL MEDICINE

## 2018-11-15 PROCEDURE — 3075F SYST BP GE 130 - 139MM HG: CPT | Mod: CPTII,S$GLB,, | Performed by: INTERNAL MEDICINE

## 2018-11-15 PROCEDURE — 99214 OFFICE O/P EST MOD 30 MIN: CPT | Mod: 25,S$GLB,, | Performed by: INTERNAL MEDICINE

## 2018-11-15 PROCEDURE — 93000 ELECTROCARDIOGRAM COMPLETE: CPT | Mod: S$GLB,,, | Performed by: INTERNAL MEDICINE

## 2018-11-15 PROCEDURE — 1101F PT FALLS ASSESS-DOCD LE1/YR: CPT | Mod: CPTII,S$GLB,, | Performed by: INTERNAL MEDICINE

## 2018-11-15 PROCEDURE — 3008F BODY MASS INDEX DOCD: CPT | Mod: CPTII,S$GLB,, | Performed by: INTERNAL MEDICINE

## 2018-11-15 PROCEDURE — 3079F DIAST BP 80-89 MM HG: CPT | Mod: CPTII,S$GLB,, | Performed by: INTERNAL MEDICINE

## 2018-11-15 RX ORDER — LISINOPRIL 2.5 MG/1
TABLET ORAL
Refills: 0 | COMMUNITY
Start: 2018-11-05 | End: 2018-11-15

## 2018-11-15 NOTE — PROGRESS NOTES
Subjective:      Patient ID: Chayito Chung is a 67 y.o. female.    Chief Complaint: Shortness of Breath    HPI: No fainting since dose of insulin was reduced.   Sometimes blood sugar drops and pt eats and then feels better.   Walks.  Went to ER about 6 weeks ago with chest pain and fast heart beat.  Pt was sent home after lab and CXR and ECG.  Pt took NTG SL 3 times with no relief.   Review of Systems   Cardiovascular: Positive for chest pain and dyspnea on exertion. Negative for claudication, irregular heartbeat, leg swelling, near-syncope, orthopnea, palpitations and syncope.      Back to smoking one pack per day cigarets.    Stopped beer over a year ago due to pancreatitis    Father  from lung cancer    Mother  from breast cancer.  Past Medical History:   Diagnosis Date    Acute coronary syndrome     Anxiety     Asthma     Cancer     colon    Cataracts, both eyes     Colon cancer     COPD (chronic obstructive pulmonary disease)     Coronary artery disease     Depression     Diabetes mellitus     Elevated cholesterol     Hypertension         Past Surgical History:   Procedure Laterality Date    ABDOMINAL SURGERY      BLOCK-NERVE-MEDIAL BRANCH-LUMBAR Bilateral 10/18/2016    Performed by [facility] at [facility]    BREAST BIOPSY      benign unsure what side     SECTION, CLASSIC      COLON SURGERY      CORONARY ANGIOPLASTY WITH STENT PLACEMENT  3 months ago     x2, Hysterectomy, Lung surgery, Partial stomach removed, Part of colon removed for rectal cancer    GASTRECTOMY      HEMORRHOID SURGERY      HYSTERECTOMY      @28yrs of age    INJECTION-FACET Left 2016    Performed by [facility] at [facility]    INJECTION-FACET Right 2016    Per[facility] by [facility] at [facility]    LUNG BIOPSY      OOPHORECTOMY      @28yrs of age    RADIOFREQUENCY THERMOCOAGULATION ([facility])-NERVE-MEDIAN BRANCH-LUMBAR Bilateral 10/19/2016     "Performed by [facility] at [facility] PAIN MGT       Family History   Problem Relation Age of Onset    Cancer Mother     Diabetes Mother     Hypertension Mother     Breast cancer Mother     Cancer Father     Heart disease Father     Depression Sister     Hypertension Sister     Cancer Maternal Aunt        Social History     Socioeconomic History    Marital status: Single     Spouse name: None    Number of children: None    Years of education: None    Highest education level: None   Social Needs    Financial resource strain: None    Food insecurity - worry: None    Food insecurity - inability: None    Transportation needs - medical: None    Transportation needs - non-medical: None   Occupational History    None   Tobacco Use    Smoking status: Current Every Day Smoker     Packs/day: 0.25     Years: 46.00     Pack years: 11.50     Types: Cigarettes    Smokeless tobacco: Never Used   Substance and Sexual Activity    Alcohol use: Yes     Alcohol/week: 1.8 oz     Types: 3 Glasses of wine per week     Comment: 1 every 3 months    Drug use: No    Sexual activity: Yes     Partners: Male     Birth control/protection: None     Comment: last drink yesterday afternoon   Other Topics Concern    None   Social History Narrative    None       Current Outpatient Medications on File Prior to Visit   Medication Sig Dispense Refill    albuterol 90 mcg/actuation inhaler Inhale 2 puffs into the lungs every 6 (six) hours as needed for Wheezing.      albuterol-ipratropium 2.5mg-0.5mg/3mL (DUO-NEB) 0.5 mg-3 mg(2.5 mg base)/3 mL nebulizer solution   5    ALPRAZolam (XANAX) 0.5 MG tablet Take 0.5 mg by mouth once daily.       aspirin (ECOTRIN) 81 MG EC tablet Take 81 mg by mouth once daily.      BD ULTRA-FINE CATARINO PEN NEEDLES 32 gauge x 5/32" Ndle USE UTD  2    buPROPion (WELLBUTRIN SR) 200 MG TbSR Take 200 mg by mouth.      celecoxib (CELEBREX) 200 MG capsule Take 200 mg by mouth once daily.       " "duloxetine (CYMBALTA) 30 MG capsule Take 30 mg by mouth once daily.       duloxetine (CYMBALTA) 60 MG capsule Take 60 mg by mouth once daily.      fluticasone (FLONASE) 50 mcg/actuation nasal spray 2 sprays (100 mcg total) by Each Nare route once daily. 1 Bottle 12    FLUTICASONE/SALMETEROL (ADVAIR DISKUS INHL) Inhale into the lungs.      hydroCHLOROthiazide (HYDRODIURIL) 25 MG tablet Take 25 mg by mouth once daily.       HYDROcodone-acetaminophen (NORCO)  mg per tablet Take 1 tablet by mouth 2 to 3 times daily as needed. for pain for 30 days 75 tablet 0    insulin syringe-needle U-100 1/2 mL 31 x 5/16" Syrg USE BID UTD  3    irbesartan (AVAPRO) 75 MG tablet Take 1 tablet (75 mg total) by mouth once daily. 90 tablet 3    LORazepam (ATIVAN) 0.5 MG tablet TK ONE T PO BID  1    LYRICA 150 mg capsule TK 1 C PO BID FOR 30 DAYS  1    metformin (GLUCOPHAGE) 1000 MG tablet Take 0.5 tablets (500 mg total) by mouth 2 (two) times daily with meals. (Patient taking differently: Take 1,000 mg by mouth 2 (two) times daily with meals. ) 30 tablet 0    metoprolol succinate (TOPROL-XL) 25 MG 24 hr tablet Take 1 tablet (25 mg total) by mouth once daily. 30 tablet 11    nitroGLYCERIN (NITROSTAT) 0.4 MG SL tablet Place 0.4 mg under the tongue every 5 (five) minutes as needed for Chest pain.      NOVOLOG FLEXPEN 100 unit/mL InPn pen       NOVOLOG MIX 70-30 100 unit/mL (70-30) Soln 60 units in the morning before breakfast, 40 units in the evening before dinner (Patient taking differently: 40 units in the morning before breakfast, 50 units in the evening before dinner)  3    NOVOLOG MIX 70-30 FLEXPEN 100 unit/mL (70-30) InPn pen INJECT 40 UNITS UNDER THE SKIN QAM AND 50 UNITS QPM UTD  5    ondansetron (ZOFRAN-ODT) 4 MG TbDL Take 1 tablet (4 mg total) by mouth every 8 (eight) hours as needed. 14 tablet 1    pantoprazole (PROTONIX) 40 MG tablet Take 1 tablet (40 mg total) by mouth once daily. 30 tablet 11    potassium " "chloride SA (K-DUR,KLOR-CON) 20 MEQ tablet Take 20 mEq by mouth once daily.   0    pravastatin (PRAVACHOL) 20 MG tablet Take 20 mg by mouth once daily.       quetiapine (SEROQUEL) 300 MG Tab 300 mg nightly.       tizanidine (ZANAFLEX) 4 MG tablet as needed.       topiramate (TOPAMAX) 25 MG tablet Take 25 mg by mouth once daily.   1    traMADol (ULTRAM) 50 mg tablet Take 50 mg by mouth every 6 (six) hours as needed.       verapamil (CALAN-SR) 240 MG CR tablet Take 240 mg by mouth once daily.   2    [DISCONTINUED] hydrocodone-acetaminophen 10-325mg (NORCO)  mg Tab Take 1 tablet by mouth every 6 (six) hours as needed for Pain. 12 tablet 0    [DISCONTINUED] levocetirizine (XYZAL) 5 MG tablet Take 1 tablet (5 mg total) by mouth every evening. 30 tablet 11    [DISCONTINUED] lisinopril (PRINIVIL,ZESTRIL) 2.5 MG tablet TK 1 T PO BID  0     No current facility-administered medications on file prior to visit.        Review of patient's allergies indicates:  No Known Allergies  Objective:     Vitals:    11/15/18 0840 11/15/18 0929   BP: (!) 134/92 139/85   BP Location: Left arm Left arm   Patient Position: Sitting Sitting   BP Method: Large (Automatic)    Pulse: 90    Weight: 68 kg (149 lb 14.4 oz)    Height: 5' 2" (1.575 m)         Physical Exam   Constitutional: She is oriented to person, place, and time. She appears well-developed and well-nourished.   Eyes: No scleral icterus.   Neck: No JVD present. Carotid bruit is not present.   Cardiovascular: Normal rate and regular rhythm. Exam reveals no gallop.   No murmur heard.  Pulmonary/Chest: Breath sounds normal.   Musculoskeletal: She exhibits no edema.   Neurological: She is alert and oriented to person, place, and time.   Skin: Skin is warm and dry.   Psychiatric: She has a normal mood and affect. Her behavior is normal. Judgment and thought content normal.   Vitals reviewed.     Recent CBC and CMP and troponin WNL, BNP was slightly elevated.    \recent CXR " in ER suggested possible CHF or pneumonitis    ECG: NSR, WNL  Assessment:     1. Coronary artery disease of native artery of native heart with stable angina pectoris    2. Mixed hyperlipidemia    3. Essential hypertension    4. S/P PTCA (percutaneous transluminal coronary angioplasty)    5. Uncontrolled type 2 diabetes mellitus with hyperglycemia, with long-term current use of insulin    6. Tobacco abuse, 1ppd, 46 years    7. Chest pain of uncertain etiology    8. Syncope and collapse    9. Chronic chest pain    10. Dyspnea on exertion    11. Elevated brain natriuretic peptide (BNP) level    12. Acute on chronic diastolic heart failure    13. Post PTCA      Plan:   Chayito was seen today for shortness of breath.    Diagnoses and all orders for this visit:    Coronary artery disease of native artery of native heart with stable angina pectoris  -     Stress test (Cupid Only); Future    Mixed hyperlipidemia    Essential hypertension    S/P PTCA (percutaneous transluminal coronary angioplasty)  -     IN OFFICE EKG 12-LEAD (to Muse)  -     NM Myocardial Perfusion Spect Multi Exer; Future  -     Stress test (Cupid Only); Future    Uncontrolled type 2 diabetes mellitus with hyperglycemia, with long-term current use of insulin    Tobacco abuse, 1ppd, 46 years  -     X-Ray Chest PA And Lateral; Future    Chest pain of uncertain etiology    Syncope and collapse    Chronic chest pain  -     NM Myocardial Perfusion Spect Multi Exer; Future  -     Stress test (Cupid Only); Future    Dyspnea on exertion    Elevated brain natriuretic peptide (BNP) level    Acute on chronic diastolic heart failure    Post PTCA  -     IN OFFICE EKG 12-LEAD (to Muse)     Treadmill Cardiolite stress test    Same meds    RTC 6 months    F/u with Dr Martinez Chung for pulmonary nodule    Follow-up in about 6 months (around 5/15/2019).

## 2018-11-20 ENCOUNTER — HOSPITAL ENCOUNTER (OUTPATIENT)
Dept: RADIOLOGY | Facility: HOSPITAL | Age: 68
Discharge: HOME | End: 2018-11-20
Attending: INTERNAL MEDICINE
Payer: MEDICARE

## 2018-11-20 ENCOUNTER — TELEPHONE (OUTPATIENT)
Dept: CARDIOLOGY | Facility: CLINIC | Age: 68
End: 2018-11-20

## 2018-11-20 ENCOUNTER — HOSPITAL ENCOUNTER (OUTPATIENT)
Dept: CARDIOLOGY | Facility: HOSPITAL | Age: 68
Discharge: HOME | End: 2018-11-20
Attending: INTERNAL MEDICINE
Payer: MEDICARE

## 2018-11-20 DIAGNOSIS — Z98.61 S/P PTCA (PERCUTANEOUS TRANSLUMINAL CORONARY ANGIOPLASTY): ICD-10-CM

## 2018-11-20 DIAGNOSIS — G89.29 CHRONIC CHEST PAIN: ICD-10-CM

## 2018-11-20 DIAGNOSIS — R07.9 CHRONIC CHEST PAIN: ICD-10-CM

## 2018-11-20 DIAGNOSIS — I25.118 CORONARY ARTERY DISEASE OF NATIVE ARTERY OF NATIVE HEART WITH STABLE ANGINA PECTORIS: ICD-10-CM

## 2018-11-20 LAB
CV STRESS BASE HR: 86
DIASTOLIC BLOOD PRESSURE: 91
OHS CV CPX 1 MINUTE RECOVERY HEART RATE: 125 BPM
OHS CV CPX 85 PERCENT MAX PREDICTED HEART RATE MALE: 126
OHS CV CPX ESTIMATED METS: 4
OHS CV CPX MAX PREDICTED HEART RATE: 149
OHS CV CPX PATIENT IS FEMALE: 1
OHS CV CPX PATIENT IS MALE: 0
OHS CV CPX PEAK DIASTOLIC BLOOD PRESSURE: 98 MMHG
OHS CV CPX PEAK HEAR RATE: 139
OHS CV CPX PEAK RATE PRESSURE PRODUCT: NORMAL
OHS CV CPX PEAK SYSTOLIC BLOOD PRESSURE: 186
OHS CV CPX PERCENT MAX PREDICTED HEART RATE ACHIEVED: 93
OHS CV CPX PERCENT TARGET HEART RATE ACHIEVED: 89.68
OHS CV CPX RATE PRESSURE PRODUCT PRESENTING: NORMAL
OHS CV CPX TARGET HEART RATE: 155
STRESS ECHO POST EXERCISE DUR MIN: 7 MIN
STRESS ECHO POST EXERCISE DUR SEC: 0
SYSTOLIC BLOOD PRESSURE: 151

## 2018-11-20 PROCEDURE — 78452 HT MUSCLE IMAGE SPECT MULT: CPT | Mod: 26,,, | Performed by: RADIOLOGY

## 2018-11-20 PROCEDURE — 93016 CV STRESS TEST SUPVJ ONLY: CPT | Mod: ,,, | Performed by: INTERNAL MEDICINE

## 2018-11-20 PROCEDURE — 93017 CV STRESS TEST TRACING ONLY: CPT

## 2018-11-20 PROCEDURE — 78452 HT MUSCLE IMAGE SPECT MULT: CPT | Mod: TC

## 2018-11-20 PROCEDURE — 93018 CV STRESS TEST I&R ONLY: CPT | Mod: ,,, | Performed by: INTERNAL MEDICINE

## 2018-11-27 RX ORDER — IRBESARTAN 75 MG/1
75 TABLET ORAL DAILY
Qty: 90 TABLET | Refills: 3 | Status: SHIPPED | OUTPATIENT
Start: 2018-11-27 | End: 2020-01-15

## 2019-01-03 DIAGNOSIS — R05.9 COUGH: Primary | ICD-10-CM

## 2019-01-04 ENCOUNTER — HOSPITAL ENCOUNTER (OUTPATIENT)
Dept: RADIOLOGY | Facility: HOSPITAL | Age: 69
Discharge: HOME | End: 2019-01-04
Attending: FAMILY MEDICINE
Payer: MEDICARE

## 2019-01-04 DIAGNOSIS — R05.9 COUGH: ICD-10-CM

## 2019-01-04 PROCEDURE — 71046 X-RAY EXAM CHEST 2 VIEWS: CPT | Mod: 26,,, | Performed by: RADIOLOGY

## 2019-01-04 PROCEDURE — 71046 XR CHEST PA AND LATERAL: ICD-10-PCS | Mod: 26,,, | Performed by: RADIOLOGY

## 2019-01-04 PROCEDURE — 71046 X-RAY EXAM CHEST 2 VIEWS: CPT | Mod: TC,FY

## 2019-04-04 ENCOUNTER — OFFICE VISIT (OUTPATIENT)
Dept: OTOLARYNGOLOGY | Facility: CLINIC | Age: 69
End: 2019-04-04
Payer: MEDICARE

## 2019-04-04 VITALS
HEART RATE: 111 BPM | BODY MASS INDEX: 27.22 KG/M2 | SYSTOLIC BLOOD PRESSURE: 151 MMHG | DIASTOLIC BLOOD PRESSURE: 105 MMHG | WEIGHT: 148.81 LBS | TEMPERATURE: 98 F

## 2019-04-04 DIAGNOSIS — J31.0 CHRONIC RHINITIS: ICD-10-CM

## 2019-04-04 DIAGNOSIS — K21.9 LARYNGOPHARYNGEAL REFLUX (LPR): Primary | ICD-10-CM

## 2019-04-04 DIAGNOSIS — R09.A2 GLOBUS SENSATION: ICD-10-CM

## 2019-04-04 PROCEDURE — 99214 PR OFFICE/OUTPT VISIT, EST, LEVL IV, 30-39 MIN: ICD-10-PCS | Mod: 25,S$GLB,, | Performed by: OTOLARYNGOLOGY

## 2019-04-04 PROCEDURE — 3288F FALL RISK ASSESSMENT DOCD: CPT | Mod: CPTII,S$GLB,, | Performed by: OTOLARYNGOLOGY

## 2019-04-04 PROCEDURE — 1100F PR PT FALLS ASSESS DOC 2+ FALLS/FALL W/INJURY/YR: ICD-10-PCS | Mod: CPTII,S$GLB,, | Performed by: OTOLARYNGOLOGY

## 2019-04-04 PROCEDURE — 99999 PR PBB SHADOW E&M-EST. PATIENT-LVL V: CPT | Mod: PBBFAC,,, | Performed by: OTOLARYNGOLOGY

## 2019-04-04 PROCEDURE — 99999 PR PBB SHADOW E&M-EST. PATIENT-LVL V: ICD-10-PCS | Mod: PBBFAC,,, | Performed by: OTOLARYNGOLOGY

## 2019-04-04 PROCEDURE — 1100F PTFALLS ASSESS-DOCD GE2>/YR: CPT | Mod: CPTII,S$GLB,, | Performed by: OTOLARYNGOLOGY

## 2019-04-04 PROCEDURE — 31575 DIAGNOSTIC LARYNGOSCOPY: CPT | Mod: S$GLB,,, | Performed by: OTOLARYNGOLOGY

## 2019-04-04 PROCEDURE — 3077F PR MOST RECENT SYSTOLIC BLOOD PRESSURE >= 140 MM HG: ICD-10-PCS | Mod: CPTII,S$GLB,, | Performed by: OTOLARYNGOLOGY

## 2019-04-04 PROCEDURE — 3077F SYST BP >= 140 MM HG: CPT | Mod: CPTII,S$GLB,, | Performed by: OTOLARYNGOLOGY

## 2019-04-04 PROCEDURE — 3080F PR MOST RECENT DIASTOLIC BLOOD PRESSURE >= 90 MM HG: ICD-10-PCS | Mod: CPTII,S$GLB,, | Performed by: OTOLARYNGOLOGY

## 2019-04-04 PROCEDURE — 31575 PR LARYNGOSCOPY, FLEXIBLE; DIAGNOSTIC: ICD-10-PCS | Mod: S$GLB,,, | Performed by: OTOLARYNGOLOGY

## 2019-04-04 PROCEDURE — 3080F DIAST BP >= 90 MM HG: CPT | Mod: CPTII,S$GLB,, | Performed by: OTOLARYNGOLOGY

## 2019-04-04 PROCEDURE — 3288F PR FALLS RISK ASSESSMENT DOCUMENTED: ICD-10-PCS | Mod: CPTII,S$GLB,, | Performed by: OTOLARYNGOLOGY

## 2019-04-04 PROCEDURE — 99214 OFFICE O/P EST MOD 30 MIN: CPT | Mod: 25,S$GLB,, | Performed by: OTOLARYNGOLOGY

## 2019-04-04 RX ORDER — OMEPRAZOLE 40 MG/1
40 CAPSULE, DELAYED RELEASE ORAL
Qty: 60 CAPSULE | Refills: 11 | Status: SHIPPED | OUTPATIENT
Start: 2019-04-04 | End: 2019-07-24

## 2019-04-04 RX ORDER — AMMONIUM LACTATE 12 G/100G
CREAM TOPICAL
Status: ON HOLD | COMMUNITY
End: 2020-07-29 | Stop reason: HOSPADM

## 2019-04-04 NOTE — PROCEDURES
Procedures  Due to indication in patient's history, presentation or risk factors,  a fiber optic exam was performed.    SEPARATE PROCEDURE NOTE:    ANESTHESIA:  Topical xylocaine with bud-synephrine    FINDINGS:  moderate to severe inaterarytenoid erythema and edema    PROCEDURE:  After verbal consent was obtained, the flexible scope was passed through the patient's nasal cavity without difficulty.  The nasopharynx (adenoid pad) and eustachian tube orifices were first visualized and were found to be normal, without masses or irregularity.  The posterior pharyngeal wall and base of tongue were then examined and no mass or irregular tissue was seen.  The scope was then advanced to the larynx, and the epiglottis, valleculae, and piriform sinuses were normal, without masses or mucosal irregularity.  The false vocal folds and true vocal folds were then examined and were found to have normal mobility (full abduction and adduction) and no masses or mucosal irregularity was seen.  The interartyenoid area had moderate to severe edema and erythema consistent with reflux.

## 2019-04-04 NOTE — PROGRESS NOTES
Chief Complaint   Patient presents with    Other     LPR, feels like something is stuck in her throat and she feels like she is choking   .     HPI:Chayito Chung is a 68 y.o. female who has been referred by Dr. Escamilla for a 3 months history of globus sensation and hoarseness. She notes a foreign bodys sensation that is constant.  She states it is worse at night.  She coughs and clears her throat but unable to relieve the sensation.  Her voice is not progressively worsening over this time. There are not pitch breaks or cracks. There is not vocal fatigue. She denies dysphagia, odynophagia, throat pain, and otalgia.  There is no hemoptysis or hematemesis. She is breathing well.     She admits to throat clearing and cough. She admits to frequent heartburn and reflux. She is currently on Protonix 40mg daily and will use OTC tums and pepto-bismol without relief.     Interval HPI 4/4/2019:  Since last visit she has been on Protonix 40mg PO daily.   She reports that she is having globus sensation with the feeling of something stuck in her throat.  She feels like she is having a choking sensation.  She notes that her symptoms are worse since decreasing her Protonix to once daily. She reports that the hoarseness seems better with this dosing.   She notes throat clearing.  She denies dysphagia.    She notes nasal congestion is improved with Flonase. She notes decreased sneezing and itchy nose as well.     Past Medical History:   Diagnosis Date    Acute coronary syndrome     Anxiety     Asthma     Cancer     colon    Cataracts, both eyes     Colon cancer 1988    COPD (chronic obstructive pulmonary disease)     Coronary artery disease     Depression     Diabetes mellitus     Elevated cholesterol     Hypertension      Social History     Socioeconomic History    Marital status: Single     Spouse name: Not on file    Number of children: Not on file    Years of education: Not on file    Highest education  level: Not on file   Occupational History    Not on file   Social Needs    Financial resource strain: Not on file    Food insecurity:     Worry: Not on file     Inability: Not on file    Transportation needs:     Medical: Not on file     Non-medical: Not on file   Tobacco Use    Smoking status: Current Every Day Smoker     Packs/day: 0.25     Years: 46.00     Pack years: 11.50     Types: Cigarettes    Smokeless tobacco: Never Used   Substance and Sexual Activity    Alcohol use: Yes     Alcohol/week: 1.8 oz     Types: 3 Glasses of wine per week     Comment: 1 every 3 months    Drug use: No    Sexual activity: Yes     Partners: Male     Birth control/protection: None     Comment: last drink yesterday afternoon   Lifestyle    Physical activity:     Days per week: Not on file     Minutes per session: Not on file    Stress: Not on file   Relationships    Social connections:     Talks on phone: Not on file     Gets together: Not on file     Attends Je[employer] witness service: Not on file     Active member of club or organization: Not on file     Attends meetings of clubs or organizations: Not on file     Relationship status: Not on file   Other Topics Concern    Not on file   Social History Narrative    Not on file     Past Surgical History:   Procedure Laterality Date    ABDOMINAL SURGERY      BLOCK-NERVE-MEDIAL BRANCH-LUMBAR Bilateral 10/18/2016    [facility]    BREAST BIOPSY      benign unsure what side     SECTION, CLASSIC      COLON SURGERY      CORONARY ANGIOPLASTY WITH STENT PLACEMENT  3 months ago     x2, Hysterectomy, Lung surgery, Partial stomach removed, Part of colon removed for rectal cancer    GASTRECTOMY      HEMORRHOID SURGERY      HYSTERECTOMY      @28yrs of age    INJECTION-FACET Left 2016    [facility]    INJECTION-FACET Right 2016    [facility]     LUNG BIOPSY      OOPHORECTOMY      @28yrs of age    RADIOFREQUENCY THERMOCOAGULATION (RFTC)-NERVE-MEDIAN BRANCH-LUMBAR Bilateral 10/19/2016    [facility]     Family History   Problem Relation Age of Onset    Cancer Mother     Diabetes Mother     Hypertension Mother     Breast cancer Mother     Cancer Father     Heart disease Father     Depression Sister     Hypertension Sister     Cancer Maternal Aunt            Review of Systems  General: negative for chills, fever or weight loss  Psychological: negative for mood changes or depression  Ophthalmic: negative for blurry vision, photophobia or eye pain  ENT: see HPI  Respiratory: no cough, shortness of breath, or wheezing  Cardiovascular: no chest pain or dyspnea on exertion  Gastrointestinal: no abdominal pain, change in bowel habits, or black/ bloody stools  Musculoskeletal: negative for gait disturbance or muscular weakness  Neurological: no syncope or seizures; no ataxia  Dermatological: negative for puritis,  rash and jaundice  Hematologic/lymphatic: no easy bruising, no new lumps or bumps      Physical Exam:    Vitals:    04/04/19 1124   BP: (!) 151/105   Pulse: (!) 111   Temp: 98.4 °F (36.9 °C)       Constitutional: Well appearing / communicating without difficutly.  NAD.  Eyes: EOM I Bilaterally  Head/Face: Normocephalic.  Negative paranasal sinus pressure/tenderness.  Salivary glands WNL.  House Brackmann I Bilaterally.    Right Ear: Auricle normal appearance. External Auditory Canal within normal limits no lesions or masses,TM w/o masses/lesions/perforations. TM mobility noted.   Left Ear: Auricle normal appearance. External Auditory Canal within normal limits no lesions or masses,TM w/o masses/lesions/perforations. TM mobility noted.  Nose: clear rhinorrhea present.  No gross nasal septal deviation. Inferior Turbinates 3+ bilaterally. No septal perforation. No masses/lesions. External nasal skin appears  normal without masses/lesions.  Oral Cavity: Gingiva/lips within normal limits.  Dentition/gingiva healthy appearing. Mucus membranes moist. Floor of mouth soft, no masses palpated. Oral Tongue mobile. Hard Palate appears normal.    Oropharynx: Base of tongue appears normal. No masses/lesions noted. Tonsillar fossa/pharyngeal wall without lesions. Posterior oropharynx WNL.  Soft palate without masses. Midline uvula.   Neck/Lymphatic: No LAD I-VI bilaterally.  No thyromegaly.  No masses noted on exam.    Mirror laryngoscopy/nasopharyngoscopy: Active gag reflex.  Unable to perform.    Neuro/Psychiatric: AOx3.  Normal mood and affect.   Cardiovascular: Normal carotid pulses bilaterally, no increasing jugular venous distention noted at cervical region bilaterally.    Respiratory: Normal respiratory effort, no stridor, no retractions noted.      See separate procedure note for FFL.     Assessment:    ICD-10-CM ICD-9-CM    1. Laryngopharyngeal reflux (LPR) K21.9 478.79    2. Chronic rhinitis J31.0 472.0    3. Globus sensation F45.8 306.4      The primary encounter diagnosis was Laryngopharyngeal reflux (LPR). Diagnoses of Chronic rhinitis and Globus sensation were also pertinent to this visit.      Plan:  No orders of the defined types were placed in this encounter.    I recommended that the patient continue her Protonix 40mg BID.  I also recommended that the patient refrain from eating within 3 hours of going to bed at night and that the patient place a 2 x 4 underneath the head board of the bed to elevate the head of bed very subtly and optimize the impact of gravity on the potential reflux.  I recommended that the patient avoid alcohol, caffeine, tobacco, tomato sauce, spicy foods, fried food, and chocolate.     AR: Continue Flonase.     Follow up in 8 weeks  to reassess progress with treatment regimen.     Sonia Freeman MD

## 2019-04-12 ENCOUNTER — NURSE TRIAGE (OUTPATIENT)
Dept: ADMINISTRATIVE | Facility: CLINIC | Age: 69
End: 2019-04-12

## 2019-04-13 NOTE — TELEPHONE ENCOUNTER
Reason for Disposition   [1] Systolic BP  >= 180 OR Diastolic >= 110 AND [2] missed most recent dose of blood pressure medication    Protocols used: HIGH BLOOD PRESSURE-A-AH

## 2019-05-02 ENCOUNTER — TELEPHONE (OUTPATIENT)
Dept: CARDIOLOGY | Facility: CLINIC | Age: 69
End: 2019-05-02

## 2019-05-02 ENCOUNTER — HOSPITAL ENCOUNTER (EMERGENCY)
Facility: HOSPITAL | Age: 69
Discharge: HOME | End: 2019-05-02
Attending: EMERGENCY MEDICINE
Payer: MEDICARE

## 2019-05-02 VITALS
SYSTOLIC BLOOD PRESSURE: 127 MMHG | OXYGEN SATURATION: 95 % | RESPIRATION RATE: 18 BRPM | HEIGHT: 62 IN | BODY MASS INDEX: 27.42 KG/M2 | WEIGHT: 149 LBS | TEMPERATURE: 99 F | HEART RATE: 95 BPM | DIASTOLIC BLOOD PRESSURE: 76 MMHG

## 2019-05-02 DIAGNOSIS — R06.02 SHORTNESS OF BREATH: ICD-10-CM

## 2019-05-02 LAB
ANION GAP SERPL CALC-SCNC: 9 MMOL/L (ref 8–16)
BASOPHILS # BLD AUTO: 0.01 K/UL (ref 0–0.2)
BASOPHILS NFR BLD: 0.2 % (ref 0–1.9)
BILIRUB UR QL STRIP: NEGATIVE
BNP SERPL-MCNC: 26 PG/ML (ref 0–99)
BUN SERPL-MCNC: 21 MG/DL (ref 8–23)
CALCIUM SERPL-MCNC: 9.3 MG/DL (ref 8.7–10.5)
CHLORIDE SERPL-SCNC: 107 MMOL/L (ref 95–110)
CLARITY UR: CLEAR
CO2 SERPL-SCNC: 24 MMOL/L (ref 23–29)
COLOR UR: YELLOW
CREAT SERPL-MCNC: 0.8 MG/DL (ref 0.5–1.4)
DIFFERENTIAL METHOD: ABNORMAL
EOSINOPHIL # BLD AUTO: 0.2 K/UL (ref 0–0.5)
EOSINOPHIL NFR BLD: 3.4 % (ref 0–8)
ERYTHROCYTE [DISTWIDTH] IN BLOOD BY AUTOMATED COUNT: 16.5 % (ref 11.5–14.5)
EST. GFR  (AFRICAN AMERICAN): >60 ML/MIN/1.73 M^2
EST. GFR  (NON AFRICAN AMERICAN): >60 ML/MIN/1.73 M^2
GLUCOSE SERPL-MCNC: 101 MG/DL (ref 70–110)
GLUCOSE UR QL STRIP: ABNORMAL
HCT VFR BLD AUTO: 32.1 % (ref 37–48.5)
HGB BLD-MCNC: 9.7 G/DL (ref 12–16)
HGB UR QL STRIP: NEGATIVE
KETONES UR QL STRIP: NEGATIVE
LEUKOCYTE ESTERASE UR QL STRIP: NEGATIVE
LYMPHOCYTES # BLD AUTO: 1.8 K/UL (ref 1–4.8)
LYMPHOCYTES NFR BLD: 38.8 % (ref 18–48)
MCH RBC QN AUTO: 26.1 PG (ref 27–31)
MCHC RBC AUTO-ENTMCNC: 30.2 G/DL (ref 32–36)
MCV RBC AUTO: 87 FL (ref 82–98)
MONOCYTES # BLD AUTO: 0.4 K/UL (ref 0.3–1)
MONOCYTES NFR BLD: 8.8 % (ref 4–15)
NEUTROPHILS # BLD AUTO: 2.3 K/UL (ref 1.8–7.7)
NEUTROPHILS NFR BLD: 48.6 % (ref 38–73)
NITRITE UR QL STRIP: NEGATIVE
PH UR STRIP: 7 [PH] (ref 5–8)
PLATELET # BLD AUTO: 186 K/UL (ref 150–350)
PMV BLD AUTO: 8.6 FL (ref 9.2–12.9)
POTASSIUM SERPL-SCNC: 4.1 MMOL/L (ref 3.5–5.1)
PROT UR QL STRIP: NEGATIVE
RBC # BLD AUTO: 3.71 M/UL (ref 4–5.4)
SODIUM SERPL-SCNC: 140 MMOL/L (ref 136–145)
SP GR UR STRIP: 1.01 (ref 1–1.03)
TROPONIN I SERPL DL<=0.01 NG/ML-MCNC: <0.006 NG/ML (ref 0–0.03)
URN SPEC COLLECT METH UR: ABNORMAL
UROBILINOGEN UR STRIP-ACNC: NEGATIVE EU/DL
WBC # BLD AUTO: 4.67 K/UL (ref 3.9–12.7)

## 2019-05-02 PROCEDURE — 99285 EMERGENCY DEPT VISIT HI MDM: CPT | Mod: 25

## 2019-05-02 PROCEDURE — 84484 ASSAY OF TROPONIN QUANT: CPT

## 2019-05-02 PROCEDURE — 93005 ELECTROCARDIOGRAM TRACING: CPT

## 2019-05-02 PROCEDURE — 80048 BASIC METABOLIC PNL TOTAL CA: CPT

## 2019-05-02 PROCEDURE — 85025 COMPLETE CBC W/AUTO DIFF WBC: CPT

## 2019-05-02 PROCEDURE — 81003 URINALYSIS AUTO W/O SCOPE: CPT

## 2019-05-02 PROCEDURE — 83880 ASSAY OF NATRIURETIC PEPTIDE: CPT

## 2019-05-02 NOTE — DISCHARGE INSTRUCTIONS
Follow up with your primary care physician in the next 2-3 days for a recheck of today's complaints.    Return to the emergency department for any new or worsening symptoms.

## 2019-05-02 NOTE — ED NOTES
Pt complaining of increasing shortness of breath the last month, at rest and with walking. Pt states she will stand up and become dizzy and pass out sometimes.Pt stopped using her oxygen due to smoking. Pt has numbness and tingling to right arm and tightness mid sternal chest.

## 2019-05-02 NOTE — ED PROVIDER NOTES
Encounter Date: 5/2/2019    SCRIBE #1 NOTE: I, Hank Wallis, am scribing for, and in the presence of,  Dr. Hyatt. I have scribed the entire note.       History     Chief Complaint   Patient presents with    Shortness of Breath     68 year old female presents to ed cc of sob x 3 weeks states sob is worse with exertion. patient states tingling feeling down right arm x 2 months      Time seen by provider: 12:39 PM    Pt is a 67 yo AAF with pmxh of COPD who presents to the ED with the complaint of SOB.  Patient reports worsening shortness of breath worsening upon exertion associated with chest tightness. The patient currently denies any  chest pain or leg swelling on my questioning, but does endorse the aforementioned intermittent SOB. The patient denies any recent illness. Patient was seen today by Dr. Mancuso ([facility]) for her chronic back pain who instructed her to report to the ED. Pt states that he also called her cardiologist and said she was having chest pain; the cardiologist's office instructed the patient to come to the ED (of note is the fact that the patient is not complaining of chest pain on this presentation). She states she had a nodule on her lung removed by Dr. Chung and has a follow up in July. Patient admits to smoking 0.5 packs per day.        The history is provided by the patient.     Review of patient's allergies indicates:  No Known Allergies  Past Medical History:   Diagnosis Date    Acute coronary syndrome     Anxiety     Asthma     Cancer     colon    Cataracts, both eyes     Colon cancer 1988    COPD (chronic obstructive pulmonary disease)     Coronary artery disease     Depression     Diabetes mellitus     Elevated cholesterol     Hypertension      Past Surgical History:   Procedure Laterality Date    ABDOMINAL SURGERY      BLOCK-NERVE-MEDIAL BRANCH-LUMBAR Bilateral 10/18/2016    [facility]    BREAST BIOPSY      benign unsure  what side     SECTION, CLASSIC      COLON SURGERY      CORONARY ANGIOPLASTY WITH STENT PLACEMENT  3 months ago     x2, Hysterectomy, Lung surgery, Partial stomach removed, Part of colon removed for rectal cancer    GASTRECTOMY      HEMORRHOID SURGERY      HYSTERECTOMY      @28yrs of age    INJECTION-FACET Left 2016    [facility]    INJECTION-FACET Right 2016    [facility]    LUNG BIOPSY      OOPHORECTOMY      @28yrs of age    RADIOFREQUENCY THERMOCOAGULATION (RFTC)-NERVE-MEDIAN BRANCH-LUMBAR Bilateral 10/19/2016    [facility]     Family History   Problem Relation Age of Onset    Cancer Mother     Diabetes Mother     Hypertension Mother     Breast cancer Mother     Cancer Father     Heart disease Father     Depression Sister     Hypertension Sister     Cancer Maternal Aunt      Social History     Tobacco Use    Smoking status: Current Every Day Smoker     Packs/day: 0.25     Years: 46.00     Pack years: 11.50     Types: Cigarettes    Smokeless tobacco: Never Used   Substance Use Topics    Alcohol use: Yes     Alcohol/week: 1.8 oz     Types: 3 Glasses of wine per week     Comment: 1 every 3 months    Drug use: No     Review of Systems   Constitutional: Negative for fever.   HENT: Negative for facial swelling and sore throat.    Eyes: Negative for pain and redness.   Respiratory: Positive for shortness of breath. Negative for chest tightness.    Cardiovascular: Negative for chest pain.   Gastrointestinal: Negative for abdominal pain, diarrhea, nausea and vomiting.   Genitourinary: Negative for dysuria and hematuria.   Musculoskeletal: Negative for back pain and neck pain.   Skin: Negative for rash.   Neurological: Negative for seizures and facial asymmetry.        Right arm tingling       Physical Exam     Initial Vitals [19 1231]   BP Pulse Resp  Temp SpO2   126/86 102 20 98.2 °F (36.8 °C) 98 %      MAP       --         Physical Exam    Nursing note and vitals reviewed.  Constitutional: She appears well-developed and well-nourished. She is not diaphoretic. No distress.   HENT:   Head: Normocephalic and atraumatic.   Right Ear: External ear normal.   Left Ear: External ear normal.   Nose: Nose normal.   Mouth/Throat: Oropharynx is clear and moist.   Eyes: Conjunctivae and EOM are normal. Pupils are equal, round, and reactive to light.   Neck: Normal range of motion. Neck supple. No JVD present.   Cardiovascular: Normal rate, regular rhythm and normal heart sounds.   Pulmonary/Chest: Breath sounds normal. No respiratory distress.   Well-appearing AAF in no acute distress.  No increased WOB.  No respiratory distress  Lungs are clear.     Abdominal: Soft. There is no tenderness.   Musculoskeletal: Normal range of motion. She exhibits no edema or tenderness.        Right upper arm: She exhibits no tenderness, no bony tenderness and no swelling.   Neurological: She is alert and oriented to person, place, and time. She has normal strength.   Strength 5/5 throughout  Sensation grossly in tact  No focal neurological deficits noted     Skin: Skin is warm and dry. Capillary refill takes less than 2 seconds.   Psychiatric: She has a normal mood and affect.         ED Course   Procedures  Labs Reviewed   CBC W/ AUTO DIFFERENTIAL - Abnormal; Notable for the following components:       Result Value    RBC 3.71 (*)     Hemoglobin 9.7 (*)     Hematocrit 32.1 (*)     Mean Corpuscular Hemoglobin 26.1 (*)     Mean Corpuscular Hemoglobin Conc 30.2 (*)     RDW 16.5 (*)     MPV 8.6 (*)     All other components within normal limits   URINALYSIS - Abnormal; Notable for the following components:    Glucose, UA Trace (*)     All other components within normal limits   BASIC METABOLIC PANEL   TROPONIN I   B-TYPE NATRIURETIC PEPTIDE   B-TYPE NATRIURETIC PEPTIDE        ECG Results           EKG 12-lead (In process)  Result time 05/02/19 13:41:52    In process by Interface, Lab In [facility] (05/02/19 13:41:52)                 Narrative:    Test Reason : R06.02,    Vent. Rate : 095 BPM     Atrial Rate : 095 BPM     P-R Int : 120 ms          QRS Dur : 084 ms      QT Int : 348 ms       P-R-T Axes : 068 046 023 degrees     QTc Int : 437 ms    Normal sinus rhythm  Normal ECG  When compared with ECG of 15-NOV-2018 09:44,  Nonspecific T wave abnormality, improved in Lateral leads    Referred By: System System           Confirmed By:                             Imaging Results          X-Ray Chest PA And Lateral (Final result)  Result time 05/02/19 13:33:45    Final result by Leonid Hook MD (05/02/19 13:33:45)                 Impression:      See above      Electronically signed by: Leonid Hook MD  Date:    05/02/2019  Time:    13:33             Narrative:    EXAMINATION:  XR CHEST PA AND LATERAL    CLINICAL HISTORY:  Shortness of breath    TECHNIQUE:  PA and lateral views of the chest were performed.    COMPARISON:  01/04/2019    FINDINGS:  Cardiac size is normal.  Few fibrotic or atelectatic streaks are seen at the left lung base no consolidation or pleural fluid can be seen.                                 Medical Decision Making:   Clinical Tests:   Lab Tests: Ordered and Reviewed  Radiological Study: Ordered and Reviewed  Medical Tests: Ordered and Reviewed  ED Management:    - Well-appearing AAF who presents to the ED with the complaint of SOB  - Pt states she called her cardiologist's office who told her to come to the emergency department for evealuation  - EKG sinus rhythm without significant ST elevation/depression/TWI  - CXR without acute cardiopulmonary process; no PNA; no PTX  - CBC w/diff WNL; H/H stable; no leukocytosis  - Troponin WNL  - Pt in no acute distress; in increased WOB; no neurological deficits  - Pt currently being seen by pulmonology, pain mgmt and cardiology  -  Laboratory workup unremarkable  - Pt claims that she is supposed to be on home oxygen, but has not been using because she wishes to smoke; discussed the risks involved with smoking and not following home O2 protocol  - No emergent intervention required at this time  - Will discharge pt to home with instructions to follow up with her PCP as OP in next 2-3 days  -No further intervention is indicated at this time after having taken into account the patient's history, physical exam findings, and empirical and objective data obtained during the patient's emergency department workup.   - The patient is at low risk for an emergent medical condition at this time, and I am of the belief that that it is safe to discharge the patient from the emergency department.   - The patient is instructed to follow up as outpatient as indicated on the discharge paperwork.    - I have discussed the specifics of the workup with the patient and the patient has verbalized understanding of the details of the workup, the diagnosis, the treatment plan, and the need for outpatient follow-up.    - Although the patient has no emergent etiology today this does not preclude the development of an emergent condition so, in addition, I have advised the patient that they can return to the ED and/or activate EMS at any time with worsening of their symptoms, change of their symptoms, or with any other medical complaint.    - The patient remained comfortable and stable during their visit in the ED.    - Discharge and follow-up instructions discussed with the patient who expressed understanding and willingness to comply with my recommendations.  - Results of all emergency department tests  discussed thoroughly with patient; all patient questions answered; pt in agreement with plan  - Pt instructed to follow up with PCP in 2-3 days for recheck of today's complaints  - Pt given strict emergency department return precautions for any new or worsening of  symptoms  - Pt discharged from the emergency department in stable condition, in no acute distress                        Clinical Impression:       ICD-10-CM ICD-9-CM   1. Shortness of breath R06.02 786.05   2. Shortness of breath R06.02 786.05             I, Theodore Hyatt,  personally performed the services described in this documentation. All medical record entries made by the scribe were at my direction and in my presence.  I have reviewed the chart and agree that the record reflects my personal performance and is accurate and complete. Theodore Hyatt M.D. 4:20 PM05/02/2019                 Theodore Hyatt MD  05/02/19 [id number]

## 2019-05-23 ENCOUNTER — OFFICE VISIT (OUTPATIENT)
Dept: CARDIOLOGY | Facility: CLINIC | Age: 69
End: 2019-05-23
Payer: MEDICARE

## 2019-05-23 VITALS
HEART RATE: 108 BPM | DIASTOLIC BLOOD PRESSURE: 78 MMHG | WEIGHT: 143.5 LBS | HEIGHT: 63 IN | BODY MASS INDEX: 25.43 KG/M2 | SYSTOLIC BLOOD PRESSURE: 122 MMHG

## 2019-05-23 DIAGNOSIS — Z98.61 S/P PTCA (PERCUTANEOUS TRANSLUMINAL CORONARY ANGIOPLASTY): Primary | ICD-10-CM

## 2019-05-23 DIAGNOSIS — E78.00 PURE HYPERCHOLESTEROLEMIA: ICD-10-CM

## 2019-05-23 DIAGNOSIS — Z98.61 POST PTCA: ICD-10-CM

## 2019-05-23 DIAGNOSIS — I10 ESSENTIAL HYPERTENSION: ICD-10-CM

## 2019-05-23 DIAGNOSIS — R00.0 SINUS TACHYCARDIA: ICD-10-CM

## 2019-05-23 DIAGNOSIS — R55 SYNCOPE AND COLLAPSE: ICD-10-CM

## 2019-05-23 DIAGNOSIS — I25.119 CORONARY ARTERY DISEASE INVOLVING NATIVE CORONARY ARTERY OF NATIVE HEART WITH ANGINA PECTORIS: ICD-10-CM

## 2019-05-23 DIAGNOSIS — D50.9 MICROCYTIC ANEMIA: ICD-10-CM

## 2019-05-23 DIAGNOSIS — E78.00 ELEVATED CHOLESTEROL: ICD-10-CM

## 2019-05-23 DIAGNOSIS — Z72.0 TOBACCO ABUSE: ICD-10-CM

## 2019-05-23 PROCEDURE — 93000 ELECTROCARDIOGRAM COMPLETE: CPT | Mod: S$GLB,,, | Performed by: INTERNAL MEDICINE

## 2019-05-23 PROCEDURE — 93000 EKG 12-LEAD: ICD-10-PCS | Mod: S$GLB,,, | Performed by: INTERNAL MEDICINE

## 2019-05-23 PROCEDURE — 99999 PR PBB SHADOW E&M-EST. PATIENT-LVL V: ICD-10-PCS | Mod: PBBFAC,,, | Performed by: INTERNAL MEDICINE

## 2019-05-23 PROCEDURE — 1101F PT FALLS ASSESS-DOCD LE1/YR: CPT | Mod: CPTII,S$GLB,, | Performed by: INTERNAL MEDICINE

## 2019-05-23 PROCEDURE — 3078F DIAST BP <80 MM HG: CPT | Mod: CPTII,S$GLB,, | Performed by: INTERNAL MEDICINE

## 2019-05-23 PROCEDURE — 3078F PR MOST RECENT DIASTOLIC BLOOD PRESSURE < 80 MM HG: ICD-10-PCS | Mod: CPTII,S$GLB,, | Performed by: INTERNAL MEDICINE

## 2019-05-23 PROCEDURE — 99999 PR PBB SHADOW E&M-EST. PATIENT-LVL V: CPT | Mod: PBBFAC,,, | Performed by: INTERNAL MEDICINE

## 2019-05-23 PROCEDURE — 99214 OFFICE O/P EST MOD 30 MIN: CPT | Mod: S$GLB,,, | Performed by: INTERNAL MEDICINE

## 2019-05-23 PROCEDURE — 3074F SYST BP LT 130 MM HG: CPT | Mod: CPTII,S$GLB,, | Performed by: INTERNAL MEDICINE

## 2019-05-23 PROCEDURE — 1101F PR PT FALLS ASSESS DOC 0-1 FALLS W/OUT INJ PAST YR: ICD-10-PCS | Mod: CPTII,S$GLB,, | Performed by: INTERNAL MEDICINE

## 2019-05-23 PROCEDURE — 3074F PR MOST RECENT SYSTOLIC BLOOD PRESSURE < 130 MM HG: ICD-10-PCS | Mod: CPTII,S$GLB,, | Performed by: INTERNAL MEDICINE

## 2019-05-23 PROCEDURE — 99214 PR OFFICE/OUTPT VISIT, EST, LEVL IV, 30-39 MIN: ICD-10-PCS | Mod: S$GLB,,, | Performed by: INTERNAL MEDICINE

## 2019-05-23 RX ORDER — LEVOCETIRIZINE DIHYDROCHLORIDE 5 MG/1
5 TABLET, FILM COATED ORAL NIGHTLY
COMMUNITY
Start: 2019-02-28 | End: 2019-12-16

## 2019-05-23 RX ORDER — LISINOPRIL 2.5 MG/1
TABLET ORAL
COMMUNITY
End: 2019-05-23

## 2019-05-23 NOTE — PROGRESS NOTES
Subjective:      Patient ID: Chayito Chung is a 68 y.o. female.    Chief Complaint: Shortness of Breath (ER visit on 19 for SOB)    HPI:  Some bad days with shortness of breath.  Appetite is bad.  Still smoking  Pt went to ER with chest pain and elevated blood pressure.  Heart rate was 109 bpm    Review of Systems   Cardiovascular: Positive for chest pain (no chest pain since ER visit), dyspnea on exertion and syncope. Negative for claudication, irregular heartbeat, leg swelling, near-syncope, orthopnea and palpitations.      Pt sees Dr Anguiano.   Had an MRI of neck and an EEG.    Had upper and lower endoscopy by Dr Mendoza last year and received intravenous iron.    Seven times last year pt awoke on the floor,once with facial injury    Pt awoke on the floor twice this year    Twice pt's syncopal episodes have been associated with fecal incontinence  Past Medical History:   Diagnosis Date    Acute coronary syndrome     Anxiety     Asthma     Cancer     colon    Cataracts, both eyes     Colon cancer     COPD (chronic obstructive pulmonary disease)     Coronary artery disease     Depression     Diabetes mellitus     Elevated cholesterol     Hypertension         Past Surgical History:   Procedure Laterality Date    ABDOMINAL SURGERY      BLOCK-NERVE-MEDIAL BRANCH-LUMBAR Bilateral 10/18/2016    [facility] at [facility]    BREAST BIOPSY      benign unsure what side     SECTION, CLASSIC      COLON SURGERY      CORONARY ANGIOPLASTY WITH STENT PLACEMENT  3 months ago     x2, Hysterectomy, Lung surgery, Partial stomach removed, Part of colon removed for rectal cancer    GASTRECTOMY      HEMORRHOID SURGERY      HYSTERECTOMY      @28yrs of age    INJECTION-FACET Left 2016    [facility] by [facility] at [facility]    INJECTION-FACET Right 2016    [facility] by [facility] at [facility]    LUNG BIOPSY      OOPHORECTOMY       @28yrs of age    RADIOFREQUENCY THERMOCOAGULATION (RFTC)-NERVE-MEDIAN BRANCH-LUMBAR Bilateral 10/19/2016    Performed by [facility] Powell [facility] at [facility]       Family History   Problem Relation Age of Onset    Cancer Mother     Diabetes Mother     Hypertension Mother     Breast cancer Mother     Cancer Father     Heart disease Father     Depression Sister     Hypertension Sister     Cancer Maternal Aunt        Social History     Socioeconomic History    Marital status: Single     Spouse name: Not on file    Number of children: Not on file    Years of education: Not on file    Highest education level: Not on file   Occupational History    Not on file   Social Needs    Financial resource strain: Not on file    Food insecurity:     Worry: Not on file     Inability: Not on file    Transportation needs:     Medical: Not on file     Non-medical: Not on file   Tobacco Use    Smoking status: Current Every Day Smoker     Packs/day: 0.50     Years: 46.00     Pack years: 23.00     Types: Cigarettes    Smokeless tobacco: Never Used   Substance and Sexual Activity    Alcohol use: Yes     Alcohol/week: 1.8 oz     Types: 3 Glasses of wine per week     Comment: 1 every 3 months    Drug use: No    Sexual activity: Yes     Partners: Male     Birth control/protection: None     Comment: last drink yesterday afternoon   Lifestyle    Physical activity:     Days per week: Not on file     Minutes per session: Not on file    Stress: Not on file   Relationships    Social connections:     Talks on phone: Not on file     Gets together: Not on file     Attends Worship service: Not on file     Active member of club or organization: Not on file     Attends meetings of clubs or organizations: Not on file     Relationship status: Not on file   Other Topics Concern    Not on file   Social History Narrative    Not on file       Current Outpatient Medications on File Prior to Visit   Medication Sig Dispense Refill     "albuterol 90 mcg/actuation inhaler Inhale 2 puffs into the lungs every 6 (six) hours as needed for Wheezing.      albuterol-ipratropium 2.5mg-0.5mg/3mL (DUO-NEB) 0.5 mg-3 mg(2.5 mg base)/3 mL nebulizer solution   5    ammonium lactate 12 % Crea ammonium lactate 12 % topical cream      aspirin (ECOTRIN) 81 MG EC tablet Take 81 mg by mouth once daily.      BD ULTRA-FINE CATARINO PEN NEEDLES 32 gauge x 5/32" Ndle USE UTD  2    celecoxib (CELEBREX) 200 MG capsule Take 200 mg by mouth once daily.       duloxetine (CYMBALTA) 30 MG capsule Take 30 mg by mouth once daily.       duloxetine (CYMBALTA) 60 MG capsule Take 60 mg by mouth once daily.      fluticasone (FLONASE) 50 mcg/actuation nasal spray 2 sprays (100 mcg total) by Each Nare route once daily. 1 Bottle 12    FLUTICASONE/SALMETEROL (ADVAIR DISKUS INHL) Inhale into the lungs.      hydroCHLOROthiazide (HYDRODIURIL) 25 MG tablet Take 25 mg by mouth once daily.       HYDROcodone-acetaminophen (NORCO)  mg per tablet Take 1 tablet by mouth 2 to 3 times daily as needed. for pain for 30 days 75 tablet 0    insulin syringe-needle U-100 1/2 mL 31 x 5/16" Syrg USE BID UTD  3    irbesartan (AVAPRO) 75 MG tablet Take 1 tablet (75 mg total) by mouth once daily. 90 tablet 3    levocetirizine (XYZAL) 5 MG tablet Take 5 mg by mouth every evening.       LORazepam (ATIVAN) 1 MG tablet TK ONE T PO BID  1    LYRICA 75 mg capsule TK 1 C PO BID  1    metformin (GLUCOPHAGE) 1000 MG tablet Take 0.5 tablets (500 mg total) by mouth 2 (two) times daily with meals. (Patient taking differently: Take 1,000 mg by mouth 2 (two) times daily with meals. ) 30 tablet 0    nitroGLYCERIN (NITROSTAT) 0.4 MG SL tablet Place 0.4 mg under the tongue every 5 (five) minutes as needed for Chest pain.      NOVOLOG FLEXPEN 100 unit/mL InPn pen       NOVOLOG MIX 70-30 100 unit/mL (70-30) Soln 60 units in the morning before breakfast, 40 units in the evening before dinner (Patient taking " "differently: 40 units in the morning before breakfast, 60 units in the evening before dinner)  3    NOVOLOG MIX 70-30 FLEXPEN 100 unit/mL (70-30) InPn pen INJECT 40 UNITS UNDER THE SKIN QAM AND 60 UNITS QPM UTD  5    omeprazole (PRILOSEC) 40 MG capsule Take 1 capsule (40 mg total) by mouth 2 (two) times daily before meals. 60 capsule 11    ondansetron (ZOFRAN-ODT) 4 MG TbDL Take 1 tablet (4 mg total) by mouth every 8 (eight) hours as needed. 14 tablet 1    potassium chloride SA (K-DUR,KLOR-CON) 20 MEQ tablet Take 20 mEq by mouth once daily.   0    pravastatin (PRAVACHOL) 20 MG tablet Take 20 mg by mouth once daily.       quetiapine (SEROQUEL) 300 MG Tab 300 mg nightly.       tizanidine (ZANAFLEX) 4 MG tablet as needed.       topiramate (TOPAMAX) 25 MG tablet Take 50 mg by mouth 2 (two) times daily.   1    verapamil (CALAN-SR) 240 MG CR tablet Take 240 mg by mouth once daily.   2    [DISCONTINUED] lisinopril (PRINIVIL,ZESTRIL) 2.5 MG tablet Take one tablet twice daily      buPROPion (WELLBUTRIN SR) 200 MG TbSR Take 200 mg by mouth.      metoprolol succinate (TOPROL-XL) 25 MG 24 hr tablet Take 1 tablet (25 mg total) by mouth once daily. 30 tablet 11    [DISCONTINUED] ALPRAZolam (XANAX) 0.5 MG tablet Take 0.5 mg by mouth once daily.       [DISCONTINUED] traMADol (ULTRAM) 50 mg tablet Take 50 mg by mouth every 6 (six) hours as needed.        No current facility-administered medications on file prior to visit.        Review of patient's allergies indicates:  No Known Allergies  Objective:     Vitals:    05/23/19 0915   BP: 122/78   BP Location: Left arm   Patient Position: Sitting   BP Method: Medium (Automatic)   Pulse: 108   Weight: 65.1 kg (143 lb 8.3 oz)   Height: 5' 3" (1.6 m)        Physical Exam   Constitutional: She is oriented to person, place, and time. She appears well-developed and well-nourished.   Eyes: No scleral icterus.   Neck: No JVD present. Carotid bruit is not present.   Cardiovascular: " Normal rate and regular rhythm. Exam reveals no gallop.   No murmur heard.  Pulmonary/Chest: Breath sounds normal.   Musculoskeletal: She exhibits no edema.   Neurological: She is alert and oriented to person, place, and time.   Skin: Skin is warm and dry.   Psychiatric: She has a normal mood and affect. Her behavior is normal. Judgment and thought content normal.   Vitals reviewed.       ECG today:sinus tachycardia, nonspecific ST segment sagging, low T waves    Note Cardiolite stress test 11/18 WNL with normal LVEF    BMP this month WNL    Hgb 9.7  MCV 87    Assessment:     1. S/P PTCA (percutaneous transluminal coronary angioplasty)    2. Elevated cholesterol    3. Essential hypertension    4. Pure hypercholesterolemia    5. Coronary artery disease involving native coronary artery of native heart with angina pectoris    6. Tobacco abuse, 1ppd, 46 years    7. Syncope and collapse    8. Post PTCA    9. Microcytic anemia    10. Sinus tachycardia      Plan:   Chayito was seen today for shortness of breath.    Diagnoses and all orders for this visit:    S/P PTCA (percutaneous transluminal coronary angioplasty)  -     IN OFFICE EKG 12-LEAD (to Muse)  -     Transthoracic echo (TTE) 2D with Color Flow; Future    Elevated cholesterol  -     Comprehensive metabolic panel; Future  -     Lipid panel; Future    Essential hypertension  -     Magnesium; Future    Pure hypercholesterolemia    Coronary artery disease involving native coronary artery of native heart with angina pectoris    Tobacco abuse, 1ppd, 46 years  -     Ambulatory referral to Smoking Cessation Program    Syncope and collapse  -     IN OFFICE EKG 12-LEAD (to Muse)  -     Holter monitor - 24 hour; Future  -     Transthoracic echo (TTE) 2D with Color Flow; Future    Post PTCA  -     IN OFFICE EKG 12-LEAD (to Muse)    Microcytic anemia  -     CBC auto differential; Future  -     Ferritin; Future  -     Iron and TIBC; Future    Sinus tachycardia  -     TSH;  Future  -     T4, free; Future    Holter monitor    Echocardiogram    Fe TIBC  Ferritin  Repeat CBC and lab    TFT's    Consider 30 day event monitor or loop recorder    Discontinue the lisinopril since pt is already taking irbesartan    Syncopal episodes could be due to orthostatic hypotension or arrhythmia or seizure disorder or may be due to oversedation from meds or hypoglycemia.  Pt reports 2 known episodes of syncope associated with documented sugars of 17 and 21 on those occasions.    F/u with PCP    F/u with Dr Delgado     Follow up in about 1 month (around 6/20/2019).

## 2019-05-27 ENCOUNTER — CLINICAL SUPPORT (OUTPATIENT)
Dept: SMOKING CESSATION | Facility: CLINIC | Age: 69
End: 2019-05-27
Payer: COMMERCIAL

## 2019-05-27 VITALS
HEART RATE: 99 BPM | BODY MASS INDEX: 26.31 KG/M2 | WEIGHT: 143 LBS | SYSTOLIC BLOOD PRESSURE: 138 MMHG | DIASTOLIC BLOOD PRESSURE: 93 MMHG | HEIGHT: 62 IN

## 2019-05-27 DIAGNOSIS — F17.210 MODERATE CIGARETTE SMOKER (10-19 PER DAY): Primary | ICD-10-CM

## 2019-05-27 PROCEDURE — 99404 PR PREVENT COUNSEL,INDIV,60 MIN: ICD-10-PCS | Mod: S$GLB,,,

## 2019-05-27 PROCEDURE — 99999 PR PBB SHADOW E&M-EST. PATIENT-LVL II: ICD-10-PCS | Mod: PBBFAC,,,

## 2019-05-27 PROCEDURE — 99404 PREV MED CNSL INDIV APPRX 60: CPT | Mod: S$GLB,,,

## 2019-05-27 PROCEDURE — 99999 PR PBB SHADOW E&M-EST. PATIENT-LVL II: CPT | Mod: PBBFAC,,,

## 2019-05-27 RX ORDER — DM/P-EPHED/ACETAMINOPH/DOXYLAM 30-7.5/3
2 LIQUID (ML) ORAL
Qty: 168 LOZENGE | Refills: 0 | Status: SHIPPED | OUTPATIENT
Start: 2019-05-27 | End: 2019-09-16 | Stop reason: SDUPTHER

## 2019-05-27 RX ORDER — DM/P-EPHED/ACETAMINOPH/DOXYLAM 30-7.5/3
2 LIQUID (ML) ORAL
Qty: 168 LOZENGE | Refills: 0 | Status: SHIPPED | OUTPATIENT
Start: 2019-05-27 | End: 2019-05-27

## 2019-05-27 RX ORDER — IBUPROFEN 200 MG
1 TABLET ORAL DAILY
Qty: 14 PATCH | Refills: 0 | Status: SHIPPED | OUTPATIENT
Start: 2019-05-27 | End: 2019-06-06 | Stop reason: SDUPTHER

## 2019-05-27 NOTE — Clinical Note
CESD of 4 is preceived as no mental distress or depression at this time; FTND of 3 indicates a low level of tobacco dependency. CO 3 ppm

## 2019-05-28 ENCOUNTER — HOSPITAL ENCOUNTER (OUTPATIENT)
Dept: CARDIOLOGY | Facility: HOSPITAL | Age: 69
Discharge: HOME | End: 2019-05-28
Attending: INTERNAL MEDICINE
Payer: MEDICARE

## 2019-05-28 ENCOUNTER — TELEPHONE (OUTPATIENT)
Dept: CARDIOLOGY | Facility: CLINIC | Age: 69
End: 2019-05-28

## 2019-05-28 DIAGNOSIS — R55 SYNCOPE AND COLLAPSE: ICD-10-CM

## 2019-05-28 DIAGNOSIS — Z98.61 S/P PTCA (PERCUTANEOUS TRANSLUMINAL CORONARY ANGIOPLASTY): ICD-10-CM

## 2019-05-28 LAB
AORTIC ROOT ANNULUS: 2.93 CM
AORTIC VALVE CUSP SEPERATION: 1.89 CM
AV INDEX (PROSTH): 0.98
AV MEAN GRADIENT: 3.28 MMHG
AV PEAK GRADIENT: 5.76 MMHG
AV VALVE AREA: 3.03 CM2
AV VELOCITY RATIO: 0.99
CV ECHO LV RWT: 0.54 CM
DOP CALC AO PEAK VEL: 1.2 M/S
DOP CALC AO VTI: 23.21 CM
DOP CALC LVOT AREA: 3.11 CM2
DOP CALC LVOT DIAMETER: 1.99 CM
DOP CALC LVOT PEAK VEL: 1.19 M/S
DOP CALC LVOT STROKE VOLUME: 70.38 CM3
DOP CALCLVOT PEAK VEL VTI: 22.64 CM
E WAVE DECELERATION TIME: 205.76 MSEC
E/A RATIO: 0.92
ECHO LV POSTERIOR WALL: 1.1 CM (ref 0.6–1.1)
FRACTIONAL SHORTENING: 38 % (ref 28–44)
INTERVENTRICULAR SEPTUM: 1.8 CM (ref 0.6–1.1)
IVRT: 0.09 MSEC
LA MAJOR: 4.66 CM
LA MINOR: 5.24 CM
LA WIDTH: 3.39 CM
LEFT ATRIUM SIZE: 4.1 CM
LEFT ATRIUM VOLUME: 58.28 CM3
LEFT INTERNAL DIMENSION IN SYSTOLE: 2.55 CM (ref 2.1–4)
LEFT VENTRICLE DIASTOLIC VOLUME: 73.22 ML
LEFT VENTRICLE SYSTOLIC VOLUME: 23.41 ML
LEFT VENTRICULAR INTERNAL DIMENSION IN DIASTOLE: 4.08 CM (ref 3.5–6)
LEFT VENTRICULAR MASS: 227.03 G
MV PEAK A VEL: 0.74 M/S
MV PEAK E VEL: 0.68 M/S
PISA TR MAX VEL: 1.94 M/S
PULM VEIN S/D RATIO: 1.42
PV PEAK D VEL: 0.53 M/S
PV PEAK S VEL: 0.75 M/S
PV PEAK VELOCITY: 0.7 CM/S
RA MAJOR: 4.4 CM
RA PRESSURE: 3 MMHG
RIGHT VENTRICULAR END-DIASTOLIC DIMENSION: 2.6 CM
TR MAX PG: 15.05 MMHG
TV REST PULMONARY ARTERY PRESSURE: 18 MMHG

## 2019-05-28 PROCEDURE — 93226 XTRNL ECG REC<48 HR SCAN A/R: CPT

## 2019-05-28 PROCEDURE — 93306 TRANSTHORACIC ECHO (TTE) COMPLETE (CUPID ONLY): ICD-10-PCS | Mod: 26,,, | Performed by: INTERNAL MEDICINE

## 2019-05-28 PROCEDURE — 93306 TTE W/DOPPLER COMPLETE: CPT | Mod: 26,,, | Performed by: INTERNAL MEDICINE

## 2019-05-28 PROCEDURE — 93227 XTRNL ECG REC<48 HR R&I: CPT | Mod: ,,, | Performed by: INTERNAL MEDICINE

## 2019-05-28 PROCEDURE — 93306 TTE W/DOPPLER COMPLETE: CPT

## 2019-05-28 PROCEDURE — 93227 HOLTER MONITOR - 24 HOUR (CUPID ONLY): ICD-10-PCS | Mod: ,,, | Performed by: INTERNAL MEDICINE

## 2019-05-28 RX ORDER — FERROUS SULFATE 324(65)MG
325 TABLET, DELAYED RELEASE (ENTERIC COATED) ORAL 2 TIMES DAILY
Qty: 180 TABLET | Refills: 3 | COMMUNITY
Start: 2019-05-28 | End: 2019-06-20

## 2019-05-28 NOTE — TELEPHONE ENCOUNTER
Iron levels are low c/w iron deficiency anemia  I put in prescription for iron sulfate 325 mg bid at [facility]  F/u with Dr Mendoza and Danny

## 2019-05-29 ENCOUNTER — TELEPHONE (OUTPATIENT)
Dept: CARDIOLOGY | Facility: CLINIC | Age: 69
End: 2019-05-29

## 2019-05-29 LAB
OHS CV EVENT MONITOR DAY: 0
OHS CV HOLTER LENGTH DECIMAL HOURS: 24
OHS CV HOLTER LENGTH HOURS: 24
OHS CV HOLTER LENGTH MINUTES: 0

## 2019-05-29 NOTE — TELEPHONE ENCOUNTER
Echocardiogram shows LVH with asymmetric septal hypertrophy but no outflow tract obstruction.  Left ventricular systolic function is normal.  Mild diastolic dysfunction  Holter shows no dysrhythmia to explain syncope.  Pt reassured

## 2019-06-04 ENCOUNTER — OFFICE VISIT (OUTPATIENT)
Dept: OTOLARYNGOLOGY | Facility: CLINIC | Age: 69
End: 2019-06-04
Payer: MEDICARE

## 2019-06-04 VITALS
TEMPERATURE: 99 F | SYSTOLIC BLOOD PRESSURE: 127 MMHG | HEART RATE: 122 BPM | HEIGHT: 62 IN | DIASTOLIC BLOOD PRESSURE: 91 MMHG | WEIGHT: 141.19 LBS | BODY MASS INDEX: 25.98 KG/M2

## 2019-06-04 DIAGNOSIS — J31.0 CHRONIC RHINITIS: ICD-10-CM

## 2019-06-04 DIAGNOSIS — K21.9 LARYNGOPHARYNGEAL REFLUX (LPR): Primary | ICD-10-CM

## 2019-06-04 DIAGNOSIS — R13.10 DYSPHAGIA, UNSPECIFIED TYPE: ICD-10-CM

## 2019-06-04 PROCEDURE — 99214 PR OFFICE/OUTPT VISIT, EST, LEVL IV, 30-39 MIN: ICD-10-PCS | Mod: 25,S$GLB,, | Performed by: OTOLARYNGOLOGY

## 2019-06-04 PROCEDURE — 1101F PR PT FALLS ASSESS DOC 0-1 FALLS W/OUT INJ PAST YR: ICD-10-PCS | Mod: CPTII,S$GLB,, | Performed by: OTOLARYNGOLOGY

## 2019-06-04 PROCEDURE — 99999 PR PBB SHADOW E&M-EST. PATIENT-LVL V: ICD-10-PCS | Mod: PBBFAC,,, | Performed by: OTOLARYNGOLOGY

## 2019-06-04 PROCEDURE — 3080F PR MOST RECENT DIASTOLIC BLOOD PRESSURE >= 90 MM HG: ICD-10-PCS | Mod: CPTII,S$GLB,, | Performed by: OTOLARYNGOLOGY

## 2019-06-04 PROCEDURE — 3074F SYST BP LT 130 MM HG: CPT | Mod: CPTII,S$GLB,, | Performed by: OTOLARYNGOLOGY

## 2019-06-04 PROCEDURE — 31575 DIAGNOSTIC LARYNGOSCOPY: CPT | Mod: S$GLB,,, | Performed by: OTOLARYNGOLOGY

## 2019-06-04 PROCEDURE — 99214 OFFICE O/P EST MOD 30 MIN: CPT | Mod: 25,S$GLB,, | Performed by: OTOLARYNGOLOGY

## 2019-06-04 PROCEDURE — 31575 PR LARYNGOSCOPY, FLEXIBLE; DIAGNOSTIC: ICD-10-PCS | Mod: S$GLB,,, | Performed by: OTOLARYNGOLOGY

## 2019-06-04 PROCEDURE — 1101F PT FALLS ASSESS-DOCD LE1/YR: CPT | Mod: CPTII,S$GLB,, | Performed by: OTOLARYNGOLOGY

## 2019-06-04 PROCEDURE — 3074F PR MOST RECENT SYSTOLIC BLOOD PRESSURE < 130 MM HG: ICD-10-PCS | Mod: CPTII,S$GLB,, | Performed by: OTOLARYNGOLOGY

## 2019-06-04 PROCEDURE — 3080F DIAST BP >= 90 MM HG: CPT | Mod: CPTII,S$GLB,, | Performed by: OTOLARYNGOLOGY

## 2019-06-04 PROCEDURE — 99999 PR PBB SHADOW E&M-EST. PATIENT-LVL V: CPT | Mod: PBBFAC,,, | Performed by: OTOLARYNGOLOGY

## 2019-06-04 RX ORDER — FLUTICASONE PROPIONATE 50 MCG
2 SPRAY, SUSPENSION (ML) NASAL DAILY
Qty: 1 BOTTLE | Refills: 12 | Status: ON HOLD | OUTPATIENT
Start: 2019-06-04 | End: 2022-09-01 | Stop reason: CLARIF

## 2019-06-04 RX ORDER — PREGABALIN 150 MG/1
150 CAPSULE ORAL 2 TIMES DAILY
Refills: 1 | COMMUNITY
Start: 2019-05-21 | End: 2019-06-20

## 2019-06-04 NOTE — PROGRESS NOTES
Chief Complaint   Patient presents with    Follow-up     Patient states no improvement,still feels like something stuck in throat.No Pain   .     HPI:Chayito Chung is a 68 y.o. female who has been referred by Dr. Escamilla for a 3 months history of globus sensation and hoarseness. She notes a foreign bodys sensation that is constant.  She states it is worse at night.  She coughs and clears her throat but unable to relieve the sensation.  Her voice is not progressively worsening over this time. There are not pitch breaks or cracks. There is not vocal fatigue. She denies dysphagia, odynophagia, throat pain, and otalgia.  There is no hemoptysis or hematemesis. She is breathing well.     She admits to throat clearing and cough. She admits to frequent heartburn and reflux. She is currently on Protonix 40mg daily and will use OTC tums and pepto-bismol without relief.     Interval HPI 6/4/2019:    Mrs. Chung follows up today for LPR.  Since last visit she has been on Protonix 40mg PO BID.  She relays that she is still having globus sensation with the feeling of something stuck in her throat.  She has noted that it is difficult to swallow some solid foods(breads) and pills.  She reports that the hoarseness seems better with this dosing.   She notes throat clearing.  She denies dysphagia.    She notes nasal congestion is controleed with Flonase. She notes decreased sneezing and itchy nose as well.     Past Medical History:   Diagnosis Date    Acute coronary syndrome     Anxiety     Asthma     Cancer     colon    Cataracts, both eyes     Colon cancer 1988    COPD (chronic obstructive pulmonary disease)     Coronary artery disease     Depression     Diabetes mellitus     Elevated cholesterol     Hypertension      Social History     Socioeconomic History    Marital status: Single     Spouse name: Not on file    Number of children: Not on file    Years of education: Not on file    Highest education level:  Not on file   Occupational History    Not on file   Social Needs    Financial resource strain: Not on file    Food insecurity:     Worry: Not on file     Inability: Not on file    Transportation needs:     Medical: Not on file     Non-medical: Not on file   Tobacco Use    Smoking status: Current Every Day Smoker     Packs/day: 0.50     Years: 46.00     Pack years: 23.00     Types: Cigarettes    Smokeless tobacco: Never Used   Substance and Sexual Activity    Alcohol use: Yes     Alcohol/week: 1.8 oz     Types: 3 Glasses of wine per week     Comment: 1 every 3 months    Drug use: No    Sexual activity: Yes     Partners: Male     Birth control/protection: None     Comment: last drink yesterday afternoon   Lifestyle    Physical activity:     Days per week: Not on file     Minutes per session: Not on file    Stress: Not on file   Relationships    Social connections:     Talks on phone: Not on file     Gets together: Not on file     Attends [employer] service: Not on file     Active member of club or organization: Not on file     Attends meetings of clubs or organizations: Not on file     Relationship status: Not on file   Other Topics Concern    Not on file   Social History Narrative    Not on file     Past Surgical History:   Procedure Laterality Date    ABDOMINAL SURGERY      BLOCK-NERVE-MEDIAL BRANCH-LUMBAR Bilateral 10/18/2016    [facility]formed by [facility] at [facility]    BREAST BIOPSY      benign unsure what side     SECTION, CLASSIC      COLON SURGERY      CORONARY ANGIOPLASTY WITH STENT PLACEMENT  3 months ago     x2, Hysterectomy, Lung surgery, Partial stomach removed, Part of colon removed for rectal cancer    GASTRECTOMY      HEMORRHOID SURGERY      HYSTERECTOMY      @28yrs of age    INJECTION-FACET Left 2016    [facility]    INJECTION-FACET Right 2016    Performed by [facility] at [facility]    LUNG  BIOPSY      OOPHORECTOMY      @28yrs of age    RADIOFREQUENCY THERMOCOAGULATION (RFTC)-NERVE-MEDIAN BRANCH-LUMBAR Bilateral 10/19/2016    Per[facility] by [facility] at [facility]     Family History   Problem Relation Age of Onset    Cancer Mother     Diabetes Mother     Hypertension Mother     Breast cancer Mother     Cancer Father     Heart disease Father     Depression Sister     Hypertension Sister     Cancer Maternal Aunt            Review of Systems  General: negative for chills, fever or weight loss  Psychological: negative for mood changes or depression  Ophthalmic: negative for blurry vision, photophobia or eye pain  ENT: see HPI  Respiratory: no cough, shortness of breath, or wheezing  Cardiovascular: no chest pain or dyspnea on exertion  Gastrointestinal: no abdominal pain, change in bowel habits, or black/ bloody stools  Musculoskeletal: negative for gait disturbance or muscular weakness  Neurological: no syncope or seizures; no ataxia  Dermatological: negative for puritis,  rash and jaundice  Hematologic/lymphatic: no easy bruising, no new lumps or bumps      Physical Exam:    Vitals:    06/04/19 0941   BP: (!) 127/91   Pulse: (!) 122   Temp: 98.8 °F (37.1 °C)       Constitutional: Well appearing / communicating without difficutly.  NAD.  Eyes: EOM I Bilaterally  Head/Face: Normocephalic.  Negative paranasal sinus pressure/tenderness.  Salivary glands WNL.  House Brackmann I Bilaterally.    Right Ear: Auricle normal appearance. External Auditory Canal within normal limits no lesions or masses,TM w/o masses/lesions/perforations. TM mobility noted.   Left Ear: Auricle normal appearance. External Auditory Canal within normal limits no lesions or masses,TM w/o masses/lesions/perforations. TM mobility noted.  Nose: clear rhinorrhea present.  No gross nasal septal deviation. Inferior Turbinates 3+ bilaterally. No septal perforation. No masses/lesions. External nasal skin appears normal  without masses/lesions.  Oral Cavity: Gingiva/lips within normal limits.  Dentition/gingiva healthy appearing. Mucus membranes moist. Floor of mouth soft, no masses palpated. Oral Tongue mobile. Hard Palate appears normal.    Oropharynx: Base of tongue appears normal. No masses/lesions noted. Tonsillar fossa/pharyngeal wall without lesions. Posterior oropharynx WNL.  Soft palate without masses. Midline uvula.   Neck/Lymphatic: No LAD I-VI bilaterally.  No thyromegaly.  No masses noted on exam.    Mirror laryngoscopy/nasopharyngoscopy: Active gag reflex.  Unable to perform.    Neuro/Psychiatric: AOx3.  Normal mood and affect.   Cardiovascular: Normal carotid pulses bilaterally, no increasing jugular venous distention noted at cervical region bilaterally.    Respiratory: Normal respiratory effort, no stridor, no retractions noted.      See separate procedure note for FFL.     Assessment:    ICD-10-CM ICD-9-CM    1. Laryngopharyngeal reflux (LPR) K21.9 478.79 Ambulatory consult to Gastroenterology   2. Dysphagia, unspecified type R13.10 787.20 FL Esophagram Cervical   3. Chronic rhinitis J31.0 472.0      The primary encounter diagnosis was Laryngopharyngeal reflux (LPR). Diagnoses of Dysphagia, unspecified type and Chronic rhinitis were also pertinent to this visit.      Plan:  Orders Placed This Encounter   Procedures    FL Esophagram Cervical    Ambulatory consult to Gastroenterology     Obtain esophagram.    I recommended that the patient continue her Protonix 40mg BID.  GI referral placed. Continue reflux precautions.    AR: Continue Flonase.     Follow up in 8 weeks  to reassess progress with treatment regimen.     Sonia Freeman MD

## 2019-06-04 NOTE — PROCEDURES
Procedures  Due to indication in patient's history, presentation or risk factors,  a fiber optic exam was performed.    SEPARATE PROCEDURE NOTE:    ANESTHESIA:  Topical xylocaine with bud-synephrine    FINDINGS:  Moderate inaterarytenoid erythema and edema    PROCEDURE:  After verbal consent was obtained, the flexible scope was passed through the patient's nasal cavity without difficulty.  The nasopharynx (adenoid pad) and eustachian tube orifices were first visualized and were found to be normal, without masses or irregularity.  The posterior pharyngeal wall and base of tongue were then examined and no mass or irregular tissue was seen.  The scope was then advanced to the larynx, and the epiglottis, valleculae, and piriform sinuses were normal, without masses or mucosal irregularity.  The false vocal folds and true vocal folds were then examined and were found to have normal mobility (full abduction and adduction) and no masses or mucosal irregularity was seen.  The interartyenoid area had moderate edema and erythema consistent with reflux.

## 2019-06-06 ENCOUNTER — CLINICAL SUPPORT (OUTPATIENT)
Dept: SMOKING CESSATION | Facility: CLINIC | Age: 69
End: 2019-06-06
Payer: COMMERCIAL

## 2019-06-06 DIAGNOSIS — F17.210 MODERATE CIGARETTE SMOKER (10-19 PER DAY): ICD-10-CM

## 2019-06-06 PROCEDURE — 99999 PR PBB SHADOW E&M-EST. PATIENT-LVL I: ICD-10-PCS | Mod: PBBFAC,,,

## 2019-06-06 PROCEDURE — 99403 PREV MED CNSL INDIV APPRX 45: CPT | Mod: S$GLB,,,

## 2019-06-06 PROCEDURE — 99999 PR PBB SHADOW E&M-EST. PATIENT-LVL I: CPT | Mod: PBBFAC,,,

## 2019-06-06 PROCEDURE — 99403 PR PREVENT COUNSEL,INDIV,45 MIN: ICD-10-PCS | Mod: S$GLB,,,

## 2019-06-06 RX ORDER — IBUPROFEN 200 MG
1 TABLET ORAL DAILY
Qty: 28 PATCH | Refills: 0 | Status: SHIPPED | OUTPATIENT
Start: 2019-06-06 | End: 2019-07-29 | Stop reason: SDUPTHER

## 2019-06-06 NOTE — Clinical Note
Just a note to advise how the patient is progressing in the tobacco cessation program. patient states she smoking 8 cigarettes per day. The patient remains on the prescribed tobacco cessation medication regimen of 2 mg Lozenges without any negative side effects at this time. Patient stated that she wish she could get more relief from her cravings. Advised adding 21 mg patches into the cessation medication regimen and ordered. CO 1 ppm (6< is nonsmoker) completion of TCRS (Tobacco Cessation Rating Scale) reviewed strategies, cues, and triggers. Introduced the negative impact of tobacco on health, the health advantages of discontinuing the use of tobacco, time line improved health changes after a quit, withdrawal issues to expect from nicotine and habit, and ways to achieve the goal of a quit.The patient denies any abnormal behavioral or mental changes at this time. The patient will continue with group therapy sessions and medication monitoring by CTTS. Prescribed medication management will be by physician.

## 2019-06-06 NOTE — PROGRESS NOTES
Individual Follow-Up Form    6/6/2019    Quit Date:     Clinical Status of Patient: Outpatient    Length of Service: 45 minutes    Continuing Medication: yes  Nicotine Lozenges    Other Medications: Patches     Target Symptoms: Withdrawal and medication side effects. The following were  rated moderate (3) to severe (4) on TCRS:  · Moderate (3): Desire or Crave Tobacco   · Severe (4): None    Comments: patient states she smoking 8 cigarettes per day. The patient remains on the prescribed tobacco cessation medication regimen of 2 mg Lozenges without any negative side effects at this time. Patient stated that she wish she could get more relief from her cravings. Advised adding 21 mg patches into the cessation medication regimen and ordered. CO 1 ppm (6< is nonsmoker) completion of TCRS (Tobacco Cessation Rating Scale) reviewed strategies, cues, and triggers. Introduced the negative impact of tobacco on health, the health advantages of discontinuing the use of tobacco, time line improved health changes after a quit, withdrawal issues to expect from nicotine and habit, and ways to achieve the goal of a quit.The patient denies any abnormal behavioral or mental changes at this time. The patient will continue with group therapy sessions and medication monitoring by CTTS. Prescribed medication management will be by physician.     Diagnosis: F17.210    Next Visit: 1 week

## 2019-06-10 ENCOUNTER — CLINICAL SUPPORT (OUTPATIENT)
Dept: SMOKING CESSATION | Facility: CLINIC | Age: 69
End: 2019-06-10
Payer: COMMERCIAL

## 2019-06-10 DIAGNOSIS — F17.210 LIGHT CIGARETTE SMOKER (1-9 CIGARETTES PER DAY): Primary | ICD-10-CM

## 2019-06-10 PROCEDURE — 90853 GROUP PSYCHOTHERAPY: CPT | Mod: S$GLB,,,

## 2019-06-10 PROCEDURE — 99999 PR PBB SHADOW E&M-EST. PATIENT-LVL I: CPT | Mod: PBBFAC,,,

## 2019-06-10 PROCEDURE — 99999 PR PBB SHADOW E&M-EST. PATIENT-LVL I: ICD-10-PCS | Mod: PBBFAC,,,

## 2019-06-10 PROCEDURE — 90853 PR GROUP PSYCHOTHERAPY: ICD-10-PCS | Mod: S$GLB,,,

## 2019-06-10 NOTE — PROGRESS NOTES
Smoking Cessation Group Session #6    Site: [facility]  Date:  6/10/2019  Clinical Status of Patient: Outpatient   Length of Service and Code: 90 minutes - 61104   Number in Attendance: 6  Group Activities/Focus of Group:  Sharing last weeks challenges, triggers, and coping activities to remain quit and/ or keep making progress toward cessation, completion of TCRS (Tobacco Cessation Rating Scale) learned addiction model, personal reasons for quitting, medications, goals, quit date.    Specific session focus: completion of TCRS (Tobacco Cessation Rating Scale) reviewed strategies, cues, triggers, high risk situations, lapses, relapses, diet, exercise, stress, relaxation, sleep, habitual behavior, and life style changes.    Target symptoms:  withdrawal and medication side effects             The following were rated moderate (3) to severe (4) on TCRS:       Moderate 3: Angry, Irritable, Frustrated; Anxious, Nervous;      Severe 4:   Increased Appetite/Hunger.    Patient's Response to Intervention: Active participation, self-disclosure, supportive of group and peers. Patient states she's smoking 8 cigarettes per day. Commended patient on this as she's down from 20 cigarettes per day. The patient remains on the prescribed tobacco cessation medication regimen of 21 mg patches without any negative side effects at this time. Specific session focus: completion of TCRS (Tobacco Cessation Rating Scale) reviewed strategies, cues, triggers, high risk situations, lapses, relapses, diet, exercise, stress, relaxation, sleep, habitual behavior, and life style changes. Target symptoms:  Due to withdrawal and medication side effects.  The following were rated moderate (3) to severe (4) on TCRS: Moderate 3: Angry, Irritable, Frustrated; Anxious, Nervous; Severe 4:   Increased Appetite/Hunger. Patient to follow up in one week.     Progress Toward Goals and Other Mental Status Changes: The patient denies any abnormal behavioral or mental  changes at this time.     Diagnosis: Z72.0  Plan: The patient will continue with group therapy sessions and medication regimen prescribed with management by physician or by the Cessation Clinic Provider. Patient will inform Smoking Cessation Counselor of symptoms as rated high on TCRS.    Return to Clinic: 1 week    Quit Date:    Planned Quit Date:

## 2019-06-10 NOTE — Clinical Note
Just a note to advise how the patient is progressing in the tobacco cessation program. Active participation, self-disclosure, supportive of group and peers. Patient states she's smoking 8 cigarettes per day. Commended patient on this as she's down from 20 cigarettes per day. The patient remains on the prescribed tobacco cessation medication regimen of 21 mg patches without any negative side effects at this time. Specific session focus: completion of TCRS (Tobacco Cessation Rating Scale) reviewed strategies, cues, triggers, high risk situations, lapses, relapses, diet, exercise, stress, relaxation, sleep, habitual behavior, and life style changes. Target symptoms:  Due to withdrawal and medication side effects.  The following were rated moderate (3) to severe (4) on TCRS: Moderate 3: Angry, Irritable, Frustrated; Anxious, Nervous; Severe 4:   Increased Appetite/Hunger. Patient to follow up in one week.

## 2019-06-12 ENCOUNTER — HOSPITAL ENCOUNTER (OUTPATIENT)
Dept: RADIOLOGY | Facility: HOSPITAL | Age: 69
Discharge: HOME | End: 2019-06-12
Attending: OTOLARYNGOLOGY
Payer: MEDICARE

## 2019-06-12 DIAGNOSIS — R13.10 DYSPHAGIA, UNSPECIFIED TYPE: ICD-10-CM

## 2019-06-12 PROCEDURE — 74210 X-RAY XM PHRNX&/CRV ESOPH C+: CPT | Mod: 26,,, | Performed by: RADIOLOGY

## 2019-06-12 PROCEDURE — 74210 X-RAY XM PHRNX&/CRV ESOPH C+: CPT | Mod: TC

## 2019-06-12 PROCEDURE — 74210 FL ESOPHAGRAM CERVICAL: ICD-10-PCS | Mod: 26,,, | Performed by: RADIOLOGY

## 2019-06-17 ENCOUNTER — CLINICAL SUPPORT (OUTPATIENT)
Dept: SMOKING CESSATION | Facility: CLINIC | Age: 69
End: 2019-06-17
Payer: COMMERCIAL

## 2019-06-17 ENCOUNTER — TELEPHONE (OUTPATIENT)
Dept: OTOLARYNGOLOGY | Facility: CLINIC | Age: 69
End: 2019-06-17

## 2019-06-17 DIAGNOSIS — F17.210 LIGHT CIGARETTE SMOKER (1-9 CIGARETTES PER DAY): Primary | ICD-10-CM

## 2019-06-17 PROCEDURE — 90853 GROUP PSYCHOTHERAPY: CPT | Mod: S$GLB,,,

## 2019-06-17 PROCEDURE — 90853 PR GROUP PSYCHOTHERAPY: ICD-10-PCS | Mod: S$GLB,,,

## 2019-06-17 PROCEDURE — 99999 PR PBB SHADOW E&M-EST. PATIENT-LVL I: ICD-10-PCS | Mod: PBBFAC,,,

## 2019-06-17 PROCEDURE — 99999 PR PBB SHADOW E&M-EST. PATIENT-LVL I: CPT | Mod: PBBFAC,,,

## 2019-06-17 NOTE — PROGRESS NOTES
Smoking Cessation Group Session #1    Site: [facility]  Date:  6/17/2019  Clinical Status of Patient: Outpatient   Length of Service and Code: 90 minutes - 37898   Number in Attendance: 5  Group Activities/Focus of Group:  Sharing last weeks challenges, triggers, and coping activities to remain quit and/ or keep making progress toward cessation, completion of TCRS (Tobacco Cessation Rating Scale) learned addiction model, personal reasons for quitting, medications, goals, quit date.    Specific session focus:  orientation, client introductions, completion of TCRS (Tobacco Cessation Rating Scale) learned addiction model, cues/triggers, personal reasons for quitting, medications, goals, quit date    Target symptoms:  withdrawal and medication side effects             The following were rated moderate (3) to severe (4) on TCRS:       Moderate 3: Desire or Crave Tobacco; Depressed Mood; Difficulty Concentrating; Anxious, Nervous; Insomnia     Severe 4:   Angry, Irritable, Frustrated    Patient's Response to Intervention: Patient states she's smoking 9 cigarettes per day, CO 0 ppm (6< nonsmoker) patient states she does not inhale when she smokes, could result in 0 ppm per smoklizer. Patient an active participant in group discussions. Specific session focus:  orientation, client introductions, completion of TCRS (Tobacco Cessation Rating Scale) learned addiction model, cues/triggers, personal reasons for quitting, medications, goals, quit date. Target symptoms:  withdrawal and medication side effects. The following were rated moderate (3) to severe (4) on TCRS: Moderate 3: Desire or Crave Tobacco; Depressed Mood; Difficulty Concentrating; Anxious, Nervous; Insomnia. Severe 4:   Angry, Irritable, Frustrated. The patient remains on the prescribed tobacco cessation medication regimen of 21 mg patches without any negative side effects at this time.       Progress Toward Goals and Other Mental Status Changes: The patient denies any  abnormal behavioral or mental changes at this time.     Diagnosis: Z72.0  Plan: The patient will continue with group therapy sessions and medication regimen prescribed with management by physician or by the Cessation Clinic Provider. Patient will inform Smoking Cessation Counselor of symptoms as rated high on TCRS.    Return to Clinic: 1 week    Quit Date:    Planned Quit Date:

## 2019-06-17 NOTE — TELEPHONE ENCOUNTER
Spoke with patient and told her I spoke with CAYDEN Long's Medical Assistant and she squeezed you in on Monday June 24 th at 8:00 AM,but you must arrive 15 minutes early. MS. Chung thanked me and said she will make her appointment.

## 2019-06-17 NOTE — TELEPHONE ENCOUNTER
----- Message from Marry Law sent at 6/17/2019  3:07 PM CDT -----  Contact: [phone]/self  Pt would like a callback states that anything she eats or drinks makes her cough. Please call for more info and advise.     Spoke with patient and explained to her I would call her back I'm trying to get her in sooner with  before 07/17/2019.

## 2019-06-17 NOTE — Clinical Note
Just a note to advise how the patient is progressing in the tobacco cessation program. Patient states she's smoking 9 cigarettes per day, CO 0 ppm (6< nonsmoker) patient states she does not inhale when she smokes, could result in 0 ppm per smoklizer. Patient an active participant in group discussions. Specific session focus:  orientation, client introductions, completion of TCRS (Tobacco Cessation Rating Scale) learned addiction model, cues/triggers, personal reasons for quitting, medications, goals, quit date. Target symptoms:  withdrawal and medication side effects. The following were rated moderate (3) to severe (4) on TCRS: Moderate 3: Desire or Crave Tobacco; Depressed Mood; Difficulty Concentrating; Anxious, Nervous; Insomnia. Severe 4:   Angry, Irritable, Frustrated. The patient remains on the prescribed tobacco cessation medication regimen of 21 mg patches without any negative side effects at this time.

## 2019-06-20 ENCOUNTER — OFFICE VISIT (OUTPATIENT)
Dept: CARDIOLOGY | Facility: CLINIC | Age: 69
End: 2019-06-20
Payer: MEDICARE

## 2019-06-20 VITALS
WEIGHT: 145.63 LBS | OXYGEN SATURATION: 97 % | HEIGHT: 62 IN | DIASTOLIC BLOOD PRESSURE: 80 MMHG | HEART RATE: 105 BPM | BODY MASS INDEX: 26.8 KG/M2 | SYSTOLIC BLOOD PRESSURE: 139 MMHG

## 2019-06-20 DIAGNOSIS — R06.09 DYSPNEA ON EXERTION: ICD-10-CM

## 2019-06-20 DIAGNOSIS — I25.118 CORONARY ARTERY DISEASE OF NATIVE ARTERY OF NATIVE HEART WITH STABLE ANGINA PECTORIS: ICD-10-CM

## 2019-06-20 DIAGNOSIS — R00.0 SINUS TACHYCARDIA: ICD-10-CM

## 2019-06-20 DIAGNOSIS — Z98.61 S/P PTCA (PERCUTANEOUS TRANSLUMINAL CORONARY ANGIOPLASTY): ICD-10-CM

## 2019-06-20 DIAGNOSIS — R00.2 PALPITATIONS: ICD-10-CM

## 2019-06-20 DIAGNOSIS — I24.9 ACUTE CORONARY SYNDROME: ICD-10-CM

## 2019-06-20 DIAGNOSIS — J41.0 SIMPLE CHRONIC BRONCHITIS: Primary | ICD-10-CM

## 2019-06-20 DIAGNOSIS — D50.0 IRON DEFICIENCY ANEMIA DUE TO CHRONIC BLOOD LOSS: ICD-10-CM

## 2019-06-20 DIAGNOSIS — E78.00 PURE HYPERCHOLESTEROLEMIA: ICD-10-CM

## 2019-06-20 PROCEDURE — 99999 PR PBB SHADOW E&M-EST. PATIENT-LVL III: CPT | Mod: PBBFAC,,, | Performed by: INTERNAL MEDICINE

## 2019-06-20 PROCEDURE — 3079F DIAST BP 80-89 MM HG: CPT | Mod: CPTII,S$GLB,, | Performed by: INTERNAL MEDICINE

## 2019-06-20 PROCEDURE — 3075F PR MOST RECENT SYSTOLIC BLOOD PRESS GE 130-139MM HG: ICD-10-PCS | Mod: CPTII,S$GLB,, | Performed by: INTERNAL MEDICINE

## 2019-06-20 PROCEDURE — 1101F PR PT FALLS ASSESS DOC 0-1 FALLS W/OUT INJ PAST YR: ICD-10-PCS | Mod: CPTII,S$GLB,, | Performed by: INTERNAL MEDICINE

## 2019-06-20 PROCEDURE — 99214 PR OFFICE/OUTPT VISIT, EST, LEVL IV, 30-39 MIN: ICD-10-PCS | Mod: S$GLB,,, | Performed by: INTERNAL MEDICINE

## 2019-06-20 PROCEDURE — 99999 PR PBB SHADOW E&M-EST. PATIENT-LVL III: ICD-10-PCS | Mod: PBBFAC,,, | Performed by: INTERNAL MEDICINE

## 2019-06-20 PROCEDURE — 3079F PR MOST RECENT DIASTOLIC BLOOD PRESSURE 80-89 MM HG: ICD-10-PCS | Mod: CPTII,S$GLB,, | Performed by: INTERNAL MEDICINE

## 2019-06-20 PROCEDURE — 1101F PT FALLS ASSESS-DOCD LE1/YR: CPT | Mod: CPTII,S$GLB,, | Performed by: INTERNAL MEDICINE

## 2019-06-20 PROCEDURE — 99499 UNLISTED E&M SERVICE: CPT | Mod: S$GLB,,, | Performed by: INTERNAL MEDICINE

## 2019-06-20 PROCEDURE — 99214 OFFICE O/P EST MOD 30 MIN: CPT | Mod: S$GLB,,, | Performed by: INTERNAL MEDICINE

## 2019-06-20 PROCEDURE — 3075F SYST BP GE 130 - 139MM HG: CPT | Mod: CPTII,S$GLB,, | Performed by: INTERNAL MEDICINE

## 2019-06-20 PROCEDURE — 99499 RISK ADDL DX/OHS AUDIT: ICD-10-PCS | Mod: S$GLB,,, | Performed by: INTERNAL MEDICINE

## 2019-06-20 RX ORDER — LAMOTRIGINE 25 MG/1
TABLET ORAL
COMMUNITY
End: 2019-12-16

## 2019-06-20 NOTE — PROGRESS NOTES
Subjective:      Patient ID: Chayito Chung is a 68 y.o. female.    Chief Complaint: Follow-up    HPI:  Pt has a chronic cough.  Pt had an UGI series .  ENT MD referred pt for EGD which is scheduled.     Going to smoking cessation classes.  Still smoking 6 cigarettes a day.  Pt coughs a lot at night. Pt c/o incontinence when coughing.     Dr Mendoza has given pt intravenous iron weekly for the past 2 weeks.    Review of Systems   Cardiovascular: Positive for dyspnea on exertion, palpitations (Heart beats fast at times) and syncope (Fainted last month--fell out of bed while asleep.). Negative for chest pain, claudication, irregular heartbeat, leg swelling, near-syncope and orthopnea.      Dr Anguiano dx nerve damage in neck.      Dr Anguiano diagnosed seizure disorder as cause of sycnope and began pt on lamictal.    (There is also a possibility of symptomatic hypoglycemia.     Past Medical History:   Diagnosis Date    Acute coronary syndrome     Anxiety     Asthma     Cancer     colon    Cataracts, both eyes     Colon cancer     COPD (chronic obstructive pulmonary disease)     Coronary artery disease     Depression     Diabetes mellitus     Elevated cholesterol     Hypertension         Past Surgical History:   Procedure Laterality Date    ABDOMINAL SURGERY      BLOCK-NERVE-MEDIAL BRANCH-LUMBAR Bilateral 10/18/2016    [facility]    BREAST BIOPSY      benign unsure what side     SECTION, CLASSIC      COLON SURGERY      CORONARY ANGIOPLASTY WITH STENT PLACEMENT  3 months ago     x2, Hysterectomy, Lung surgery, Partial stomach removed, Part of colon removed for rectal cancer    GASTRECTOMY      HEMORRHOID SURGERY      HYSTERECTOMY      @28yrs of age    INJECTION-FACET Left 2016    [facility]    INJECTION-FACET Right 2016    [facility]    LUNG BIOPSY       OOPHORECTOMY      @28yrs of age    RADIOFREQUENCY THERMOCOAGULATION (RFTC)-NERVE-MEDIAN BRANCH-LUMBAR Bilateral 10/19/2016    [facility]       Family History   Problem Relation Age of Onset    Cancer Mother     Diabetes Mother     Hypertension Mother     Breast cancer Mother     Cancer Father     Heart disease Father     Depression Sister     Hypertension Sister     Cancer Maternal Aunt        Social History     Socioeconomic History    Marital status: Single     Spouse name: Not on file    Number of children: Not on file    Years of education: Not on file    Highest education level: Not on file   Occupational History    Not on file   Social Needs    Financial resource strain: Not on file    Food insecurity:     Worry: Not on file     Inability: Not on file    Transportation needs:     Medical: Not on file     Non-medical: Not on file   Tobacco Use    Smoking status: Current Every Day Smoker     Packs/day: 0.50     Years: 46.00     Pack years: 23.00     Types: Cigarettes    Smokeless tobacco: Never Used   Substance and Sexual Activity    Alcohol use: Yes     Alcohol/week: 1.8 oz     Types: 3 Glasses of wine per week     Comment: 1 every 3 months    Drug use: No    Sexual activity: Yes     Partners: Male     Birth control/protection: None     Comment: last drink yesterday afternoon   Lifestyle    Physical activity:     Days per week: Not on file     Minutes per session: Not on file    Stress: Not on file   Relationships    Social connections:     Talks on phone: Not on file     Gets together: Not on file     Attends Jain service: Not on file     Active member of club or organization: Not on file     Attends meetings of clubs or organizations: Not on file     Relationship status: Not on file   Other Topics Concern    Not on file   Social History Narrative    Not on file       Current Outpatient Medications on File Prior to Visit   Medication Sig  "Dispense Refill    albuterol 90 mcg/actuation inhaler Inhale 2 puffs into the lungs every 6 (six) hours as needed for Wheezing.      albuterol-ipratropium 2.5mg-0.5mg/3mL (DUO-NEB) 0.5 mg-3 mg(2.5 mg base)/3 mL nebulizer solution   5    ammonium lactate 12 % Crea ammonium lactate 12 % topical cream      aspirin (ECOTRIN) 81 MG EC tablet Take 81 mg by mouth once daily.      BD ULTRA-FINE CATARINO PEN NEEDLES 32 gauge x 5/32" Ndle USE UTD  2    buPROPion (WELLBUTRIN SR) 200 MG TbSR Take 200 mg by mouth.      celecoxib (CELEBREX) 200 MG capsule Take 200 mg by mouth once daily.       duloxetine (CYMBALTA) 30 MG capsule Take 30 mg by mouth once daily.       duloxetine (CYMBALTA) 60 MG capsule Take 60 mg by mouth once daily.      fluticasone propionate (FLONASE) 50 mcg/actuation nasal spray 2 sprays (100 mcg total) by Each Nare route once daily. 1 Bottle 12    FLUTICASONE/SALMETEROL (ADVAIR DISKUS INHL) Inhale into the lungs.      hydroCHLOROthiazide (HYDRODIURIL) 25 MG tablet Take 25 mg by mouth once daily.       HYDROcodone-acetaminophen (NORCO)  mg per tablet Take 1 tablet by mouth 2 to 3 times daily as needed. for pain for 30 days 75 tablet 0    insulin syringe-needle U-100 1/2 mL 31 x 5/16" Syrg USE BID UTD  3    irbesartan (AVAPRO) 75 MG tablet Take 1 tablet (75 mg total) by mouth once daily. 90 tablet 3    lamoTRIgine (LAMICTAL) 25 MG tablet lamotrigine 25 mg tablet   TAKE 2 TABLETS BY MOUTH TWICE DAILY      levocetirizine (XYZAL) 5 MG tablet Take 5 mg by mouth every evening.       LORazepam (ATIVAN) 1 MG tablet TK ONE T PO BID  1    LYRICA 75 mg capsule TK 1 C PO BID  1    metformin (GLUCOPHAGE) 1000 MG tablet Take 0.5 tablets (500 mg total) by mouth 2 (two) times daily with meals. (Patient taking differently: Take 1,000 mg by mouth 2 (two) times daily with meals. ) 30 tablet 0    metoprolol succinate (TOPROL-XL) 25 MG 24 hr tablet Take 1 tablet (25 mg total) by mouth once daily. 30 tablet " 11    nicotine (NICODERM CQ) 21 mg/24 hr Place 1 patch onto the skin once daily. 28 patch 0    nicotine polacrilex 2 MG Lozg Take 1 lozenge (2 mg total) by mouth as needed. No more than 3-5 per day. 168 lozenge 0    nitroGLYCERIN (NITROSTAT) 0.4 MG SL tablet Place 0.4 mg under the tongue every 5 (five) minutes as needed for Chest pain.      NOVOLOG FLEXPEN 100 unit/mL InPn pen       NOVOLOG MIX 70-30 100 unit/mL (70-30) Soln 60 units in the morning before breakfast, 40 units in the evening before dinner (Patient taking differently: 40 units in the morning before breakfast, 60 units in the evening before dinner)  3    NOVOLOG MIX 70-30 FLEXPEN 100 unit/mL (70-30) InPn pen INJECT 40 UNITS UNDER THE SKIN QAM AND 60 UNITS QPM UTD  5    omeprazole (PRILOSEC) 40 MG capsule Take 1 capsule (40 mg total) by mouth 2 (two) times daily before meals. 60 capsule 11    ondansetron (ZOFRAN-ODT) 4 MG TbDL Take 1 tablet (4 mg total) by mouth every 8 (eight) hours as needed. 14 tablet 1    potassium chloride SA (K-DUR,KLOR-CON) 20 MEQ tablet Take 20 mEq by mouth once daily.   0    pravastatin (PRAVACHOL) 20 MG tablet Take 20 mg by mouth once daily.       quetiapine (SEROQUEL) 300 MG Tab 300 mg nightly.       tizanidine (ZANAFLEX) 4 MG tablet as needed.       topiramate (TOPAMAX) 25 MG tablet Take 50 mg by mouth 2 (two) times daily.   1    verapamil (CALAN-SR) 240 MG CR tablet Take 240 mg by mouth once daily.   2    [DISCONTINUED] ferrous sulfate 324 mg (65 mg iron) TbEC Take 1 tablet (324 mg total) by mouth 2 (two) times daily. 180 tablet 3    [DISCONTINUED] LYRICA 150 mg capsule Take 150 mg by mouth 2 (two) times daily.  1     No current facility-administered medications on file prior to visit.        Review of patient's allergies indicates:  No Known Allergies  Objective:     Vitals:    06/20/19 0959 06/20/19 1043   BP: (!) 145/92 139/80   BP Location: Left arm Left arm   Patient Position: Sitting Sitting   BP  "Method: Medium (Automatic)    Pulse: 105    SpO2: 97%    Weight: 66 kg (145 lb 9.8 oz)    Height: 5' 2" (1.575 m)         Physical Exam   Constitutional: She is oriented to person, place, and time. She appears well-developed and well-nourished.   Eyes: No scleral icterus.   Neck: No JVD present. Carotid bruit is not present.   Cardiovascular: Normal rate and regular rhythm. Exam reveals no gallop.   No murmur heard.  Pulmonary/Chest: Breath sounds normal.   Musculoskeletal: She exhibits no edema.   Neurological: She is alert and oriented to person, place, and time.   Skin: Skin is warm and dry.   Psychiatric: She has a normal mood and affect. Her behavior is normal. Judgment and thought content normal.   Vitals reviewed.     UGI showed esophageal dysmotility    Lab last month:  CMP WNL except glu 247  Hgb 10  HDL 51    Fe 18  TIBC 573    Recent holter showed occ PC    Recent echo showed septal hypertrophy without obstruction, LVEF 65%, grade I disatolic dysfunction    Most recent CXR showed fibrotic changes in the base  Assessment:     1. Simple chronic bronchitis    2. Coronary artery disease of native artery of native heart with stable angina pectoris    3. Pure hypercholesterolemia    4. Acute coronary syndrome    5. S/P PTCA (percutaneous transluminal coronary angioplasty)    6. Palpitations    7. Sinus tachycardia    8. Dyspnea on exertion    9. Iron deficiency anemia due to chronic blood loss      Plan:   Chayito was seen today for follow-up.    Diagnoses and all orders for this visit:    Simple chronic bronchitis    Coronary artery disease of native artery of native heart with stable angina pectoris    Pure hypercholesterolemia    Acute coronary syndrome    S/P PTCA (percutaneous transluminal coronary angioplasty)    Palpitations    Sinus tachycardia    Dyspnea on exertion    Iron deficiency anemia due to chronic blood loss    Pt has hypertrophic nonobstructive cardiomyopathy which is stable with normal " LVEF    Pt's hypertension is controlled    Pt is not having angina S/P PTCA    Pt has syncope either due to seizures or hypoglycemia or cough syncope.  No evidence for significant dysrhythmia on recent holter    f/u with ENT  F/u with GI   F/u with new PCP  (pt says Dr Delgado no longer takes [facility])  F/u with Dr Anguiano for seizures.  Smoking cessation counseled  F/u with Dr Martinez Chung for f/u lung nodule    Chronic cough could be due to COPD and esophageal reflux associated with dysmotility and hiatal hernia    Discontinue Good[employer]'s since pt may be losing blood from gastritis.  Continue omeprazole    RTC 6 months  Same meds  Follow up in about 6 months (around 12/20/2019).     Addendum 6/27/19:  Pt requests clearance for endoscopy by Dr Fishman.  Pt is medically stable for endoscopy.  OK to hold the Plavix for endoscopy.

## 2019-06-24 ENCOUNTER — TELEPHONE (OUTPATIENT)
Dept: GASTROENTEROLOGY | Facility: CLINIC | Age: 69
End: 2019-06-24

## 2019-06-24 ENCOUNTER — CLINICAL SUPPORT (OUTPATIENT)
Dept: SMOKING CESSATION | Facility: CLINIC | Age: 69
End: 2019-06-24
Payer: COMMERCIAL

## 2019-06-24 ENCOUNTER — OFFICE VISIT (OUTPATIENT)
Dept: GASTROENTEROLOGY | Facility: CLINIC | Age: 69
End: 2019-06-24
Payer: MEDICARE

## 2019-06-24 VITALS
SYSTOLIC BLOOD PRESSURE: 131 MMHG | HEIGHT: 62 IN | BODY MASS INDEX: 26.17 KG/M2 | WEIGHT: 142.19 LBS | DIASTOLIC BLOOD PRESSURE: 85 MMHG

## 2019-06-24 DIAGNOSIS — K21.9 LARYNGOPHARYNGEAL REFLUX (LPR): ICD-10-CM

## 2019-06-24 DIAGNOSIS — R13.10 DYSPHAGIA, UNSPECIFIED TYPE: Primary | ICD-10-CM

## 2019-06-24 DIAGNOSIS — F17.210 CIGARETTE NICOTINE DEPENDENCE, UNCOMPLICATED: Primary | ICD-10-CM

## 2019-06-24 PROCEDURE — 1101F PT FALLS ASSESS-DOCD LE1/YR: CPT | Mod: CPTII,S$GLB,, | Performed by: INTERNAL MEDICINE

## 2019-06-24 PROCEDURE — 99999 PR PBB SHADOW E&M-EST. PATIENT-LVL III: CPT | Mod: PBBFAC,,, | Performed by: INTERNAL MEDICINE

## 2019-06-24 PROCEDURE — 3075F SYST BP GE 130 - 139MM HG: CPT | Mod: CPTII,S$GLB,, | Performed by: INTERNAL MEDICINE

## 2019-06-24 PROCEDURE — 99204 OFFICE O/P NEW MOD 45 MIN: CPT | Mod: S$GLB,,, | Performed by: INTERNAL MEDICINE

## 2019-06-24 PROCEDURE — 99999 PR PBB SHADOW E&M-EST. PATIENT-LVL III: ICD-10-PCS | Mod: PBBFAC,,, | Performed by: INTERNAL MEDICINE

## 2019-06-24 PROCEDURE — 3079F PR MOST RECENT DIASTOLIC BLOOD PRESSURE 80-89 MM HG: ICD-10-PCS | Mod: CPTII,S$GLB,, | Performed by: INTERNAL MEDICINE

## 2019-06-24 PROCEDURE — 1101F PR PT FALLS ASSESS DOC 0-1 FALLS W/OUT INJ PAST YR: ICD-10-PCS | Mod: CPTII,S$GLB,, | Performed by: INTERNAL MEDICINE

## 2019-06-24 PROCEDURE — 99204 PR OFFICE/OUTPT VISIT, NEW, LEVL IV, 45-59 MIN: ICD-10-PCS | Mod: S$GLB,,, | Performed by: INTERNAL MEDICINE

## 2019-06-24 PROCEDURE — 3075F PR MOST RECENT SYSTOLIC BLOOD PRESS GE 130-139MM HG: ICD-10-PCS | Mod: CPTII,S$GLB,, | Performed by: INTERNAL MEDICINE

## 2019-06-24 PROCEDURE — 99999 PR PBB SHADOW E&M-EST. PATIENT-LVL I: ICD-10-PCS | Mod: PBBFAC,,,

## 2019-06-24 PROCEDURE — 90853 GROUP PSYCHOTHERAPY: CPT | Mod: S$GLB,,,

## 2019-06-24 PROCEDURE — 99999 PR PBB SHADOW E&M-EST. PATIENT-LVL I: CPT | Mod: PBBFAC,,,

## 2019-06-24 PROCEDURE — 90853 PR GROUP PSYCHOTHERAPY: ICD-10-PCS | Mod: S$GLB,,,

## 2019-06-24 PROCEDURE — 3079F DIAST BP 80-89 MM HG: CPT | Mod: CPTII,S$GLB,, | Performed by: INTERNAL MEDICINE

## 2019-06-24 NOTE — Clinical Note
Just a note to advise how the patient is progressing in the tobacco cessation program. Patient reports to being smoke free for 1 week beginning on 6/18/2019. Commended patient on success. CO 0 ppm (6< is nonsmoker). The patient remains on the prescribed tobacco cessation medication regimen of 21 mg Patches and 2 mg Gum without any negative side effects at this time. Specific session focus: completion of TCRS (Tobacco Cessation Rating Scale) reviewed strategies, cues, and triggers. Introduced the negative impact of tobacco on health, the health advantages of discontinuing the use of tobacco, time line improved health changes after a quit, withdrawal issues to expect from nicotine and habit, and ways to achieve the goal of a quit. Target symptoms:  withdrawal and medication side effects. The following were rated moderate (3) to severe (4) on TCRS:  Moderate 3: Increased Appetite; Depressed Mood;Restlessness. Severe 4:   Anxious. Patient to follow up in one week.

## 2019-06-24 NOTE — LETTER
June 24, 2019      Sonia Freeman MD  [address]  [address]  [place] [address]           [facility]  [address] [address]  Phone: [phone]          Patient: Chayito Chung   MR Number: [id number]   YOB: 1950   Date of Visit: 6/24/2019       Dear Dr. Sonia Freeman:    Thank you for referring Chayito Chung to me for evaluation. Attached you will find relevant portions of my assessment and plan of care.    If you have questions, please do not hesitate to call me. I look forward to following Chayito Chung along with you.    Sincerely,    Van Schilling MD    Enclosure  CC:  No Recipients    If you would like to receive this communication electronically, please contact [email or web address] or [phone] to request more information on [facility] access.    For providers and/or their staff who would like to refer a patient to [facility], please contact us through our one-stop-shop provider referral line, [facility], at [phone].    If you feel you have received this communication in error or would no longer like to receive these types of communications, please e-mail [email or web address]

## 2019-06-24 NOTE — PATIENT INSTRUCTIONS
EGD Prep Instructions    [facility] [address] Clinic Office [phone]  Endoscopy Lab [phone]    You are scheduled for an EGD with Dr. Schilling on 7/11/19 at [facility].  You will check in at Admit on the first floor of the hospital.    Nothing to eat or drink after midnight before the procedure.  You MAY brush your teeth.    You MAY take your blood pressure, heart, and seizure medication on the morning of the procedure, with a SIP of water.  Hold ALL other medications until after the procedure.    If you are on blood thinners THAT YOU HAVE BEEN INSTRUCTED TO HOLD BY YOUR DOCTOR FOR THIS PROCEDURE, then do NOT take this the morning of your EGD.  Do NOT stop these medications on your own, they must be approved to be held by your doctor.  Your EGD can NOT be done if you are on these medications.  Examples of blood thinners include: Coumadin, Aggrenox, Plavix, Pradaxa, Reapro, Pletal, Xarelto, Ticagrelor, Brilinta, Eliquis, and high dose aspirin (325 mg).  You do not have to stop baby aspirin 81 mg.    You will receive a call 2 days before your EGD to tell you the time to arrive.  If you have not received a call by the day before your procedure, call the Endoscopy Lab at [phone].

## 2019-06-24 NOTE — PROGRESS NOTES
Subjective:       Patient ID: Chayito Chung is a 68 y.o. female.    Chief Complaint: Dysphagia    This is a 68-year-old female with a past medical history of tobacco use who presents for evaluation of dysphagia and LPR.  She has been seen by ENT to evaluate chronic, moderate changes in her voice suspected due to reflux.  I reviewed her records and she underwent laryngoscopy suspicious for LPR.  She denies any classic heartburn symptoms.  She does endorse dysphagia, predominantly to solids lasting for seconds to minutes at a time intermittently.  She notes a sensation of it is slowing in the midsternal region.  No vomiting or regurgitation.  No history of food impactions.  No history of allergic or atopic conditions.  She denies melena, hematochezia or unintentional weight loss.  She reports a negative colonoscopy last year at [facility].  She also reports concern for a history of questionable bleeding from her stomach.  No melena or hematochezia. Esophagram reviewed.     The following portions of the patient's history were reviewed and updated as appropriate: allergies, current medications, past family history, past medical history, past social history, past surgical history and problem list.    HPI  Review of Systems   Constitutional: Negative for appetite change and fever.   HENT: Positive for trouble swallowing. Negative for postnasal drip.    Eyes: Negative for pain and redness.   Respiratory: Positive for cough. Negative for choking, chest tightness and shortness of breath.    Cardiovascular: Negative for chest pain and leg swelling.   Gastrointestinal: Negative for abdominal distention, abdominal pain, anal bleeding, blood in stool, constipation, diarrhea, nausea, rectal pain and vomiting.   Endocrine: Negative for cold intolerance and heat intolerance.   Genitourinary: Negative for difficulty urinating and hematuria.   Musculoskeletal: Negative for arthralgias and back pain.   Skin: Negative for color change  and pallor.   Allergic/Immunologic: Negative for environmental allergies and food allergies.   Neurological: Negative for dizziness and light-headedness.   Hematological: Negative for adenopathy. Does not bruise/bleed easily.   Psychiatric/Behavioral: Negative for agitation and behavioral problems.       Objective:      Physical Exam   Constitutional: She is oriented to person, place, and time. She appears well-developed and well-nourished. No distress.   HENT:   Head: Normocephalic and atraumatic.   Eyes: Conjunctivae are normal. No scleral icterus.   Neck: Normal range of motion. Neck supple. No tracheal deviation present. No thyromegaly present.   Cardiovascular: Normal rate and regular rhythm. Exam reveals no gallop and no friction rub.   Pulmonary/Chest: Effort normal and breath sounds normal. No respiratory distress. She has no wheezes.   Abdominal: Soft. Bowel sounds are normal. She exhibits no distension. There is no tenderness.   Musculoskeletal:        Right wrist: She exhibits normal range of motion and no tenderness.        Left wrist: She exhibits normal range of motion and no tenderness.   Lymphadenopathy:        Head (right side): No submental and no submandibular adenopathy present.        Head (left side): No submental and no submandibular adenopathy present.   Neurological: She is alert and oriented to person, place, and time.   Skin: Skin is warm and dry. No rash noted. She is not diaphoretic. No erythema.   Psychiatric: She has a normal mood and affect. Her behavior is normal.   Nursing note and vitals reviewed.      Labs/imaging; reviewed  Assessment:       1. Dysphagia, unspecified type    2. Laryngopharyngeal reflux (LPR)        Plan:   1. EGD  2. Cardiac clearance

## 2019-06-24 NOTE — TELEPHONE ENCOUNTER
----- Message from Gaurav Malin MD sent at 6/24/2019  8:55 AM CDT -----  Yes, Ms Chung is mediclly stable for endoscopy.    ----- Message -----  From: Amador Montejo MA  Sent: 6/24/2019   8:47 AM  To: Gaurav Malin MD    Good Morning,     will likes to proceed with doing a Endoscopy on patient above will it be okay for  to proceed with procedure.

## 2019-06-24 NOTE — PROGRESS NOTES
Smoking Cessation Group Session #2    Site: [facility]  Date:  6/24/2019  Clinical Status of Patient: Outpatient   Length of Service and Code: 90 minutes - 67900   Number in Attendance: 7  Group Activities/Focus of Group:  Sharing last weeks challenges, triggers, and coping activities to remain quit and/ or keep making progress toward cessation, completion of TCRS (Tobacco Cessation Rating Scale) learned addiction model, personal reasons for quitting, medications, goals, quit date.    Specific session focus: completion of TCRS (Tobacco Cessation Rating Scale) reviewed strategies, cues, and triggers. Introduced the negative impact of tobacco on health, the health advantages of discontinuing the use of tobacco, time line improved health changes after a quit, withdrawal issues to expect from nicotine and habit, and ways to achieve the goal of a quit.    Target symptoms:  withdrawal and medication side effects             The following were rated moderate (3) to severe (4) on TCRS:       Moderate 3: Increased Appetite; Depressed Mood;Restlessness     Severe 4:   Anxious    Patient's Response to Intervention: Patient reports to being smoke free for 1 week beginning on 6/18/2019. Commended patient on success. CO 0 ppm (6< is nonsmoker). The patient remains on the prescribed tobacco cessation medication regimen of 21 mg Patches and 2 mg Gum without any negative side effects at this time. Specific session focus: completion of TCRS (Tobacco Cessation Rating Scale) reviewed strategies, cues, and triggers. Introduced the negative impact of tobacco on health, the health advantages of discontinuing the use of tobacco, time line improved health changes after a quit, withdrawal issues to expect from nicotine and habit, and ways to achieve the goal of a quit. Target symptoms:  withdrawal and medication side effects. The following were rated moderate (3) to severe (4) on TCRS:  Moderate 3: Increased Appetite; Depressed Mood;Restlessness.  Severe 4:   Anxious. Patient to follow up in one week.     Progress Toward Goals and Other Mental Status Changes: The patient denies any abnormal behavioral or mental changes at this time.     Diagnosis: Z72.0  Plan: The patient will continue with group therapy sessions and medication regimen prescribed with management by physician or by the Cessation Clinic Provider. Patient will inform Smoking Cessation Counselor of symptoms as rated high on TCRS.    Return to Clinic: 1 week    Quit Date: 6/18/2019   Planned Quit Date: 6/18/2019

## 2019-06-27 ENCOUNTER — TELEPHONE (OUTPATIENT)
Dept: GASTROENTEROLOGY | Facility: CLINIC | Age: 69
End: 2019-06-27

## 2019-07-08 ENCOUNTER — TELEPHONE (OUTPATIENT)
Dept: GASTROENTEROLOGY | Facility: CLINIC | Age: 69
End: 2019-07-08

## 2019-07-08 DIAGNOSIS — R13.10 DYSPHAGIA, UNSPECIFIED TYPE: Primary | ICD-10-CM

## 2019-07-09 ENCOUNTER — TELEPHONE (OUTPATIENT)
Dept: ENDOSCOPY | Facility: HOSPITAL | Age: 69
End: 2019-07-09

## 2019-07-09 NOTE — TELEPHONE ENCOUNTER
Unable to leave voicemail d/t being full.  Arrival time to be given @ 1000  Patient does not have a patient portal.

## 2019-07-15 ENCOUNTER — TELEPHONE (OUTPATIENT)
Dept: GASTROENTEROLOGY | Facility: CLINIC | Age: 69
End: 2019-07-15

## 2019-07-15 ENCOUNTER — CLINICAL SUPPORT (OUTPATIENT)
Dept: SMOKING CESSATION | Facility: CLINIC | Age: 69
End: 2019-07-15
Payer: COMMERCIAL

## 2019-07-15 DIAGNOSIS — F17.210 CIGARETTE NICOTINE DEPENDENCE, UNCOMPLICATED: Primary | ICD-10-CM

## 2019-07-15 PROCEDURE — 90853 PR GROUP PSYCHOTHERAPY: ICD-10-PCS | Mod: S$GLB,,,

## 2019-07-15 PROCEDURE — 99999 PR PBB SHADOW E&M-EST. PATIENT-LVL I: ICD-10-PCS | Mod: PBBFAC,,,

## 2019-07-15 PROCEDURE — 99999 PR PBB SHADOW E&M-EST. PATIENT-LVL I: CPT | Mod: PBBFAC,,,

## 2019-07-15 PROCEDURE — 90853 GROUP PSYCHOTHERAPY: CPT | Mod: S$GLB,,,

## 2019-07-15 NOTE — TELEPHONE ENCOUNTER
Spoke with patient about scheduling her procedure with Dr. Schilling. Patient stated she will give office a call back.

## 2019-07-15 NOTE — Clinical Note
Just a note to advise how the patient is progressing in the tobacco cessation program. Patient states she remains smoke free as of 6/18/19. The patient remains on the prescribed tobacco cessation medication regimen of 21 mg Patches without any negative side effects at this time, discussed reducing to 14 mg patches and ordered to [facility]. Specific session focus:  orientation, client introductions, completion of TCRS (Tobacco Cessation Rating Scale) learned addiction model, cues/triggers, personal reasons for quitting, medications, goals, quit date. Target symptoms:  withdrawal and medication side effects. The following were rated moderate (3) to severe (4) on TCRS: Moderate 3: Angry, Frustrated, Irritable. Severe 4:   None. Patient to follow up in one week. Pt stated she's getting support from her [employer] group, helping her to stay motivated.

## 2019-07-16 RX ORDER — IBUPROFEN 200 MG
1 TABLET ORAL DAILY
Qty: 14 PATCH | Refills: 0 | Status: SHIPPED | OUTPATIENT
Start: 2019-07-16 | End: 2019-07-25 | Stop reason: CLARIF

## 2019-07-16 NOTE — PROGRESS NOTES
Smoking Cessation Group Session #1    Site: [facility]  Date:  7/15/2019  Clinical Status of Patient: Outpatient   Length of Service and Code: 90 minutes - 54887   Number in Attendance: 6  Group Activities/Focus of Group:  Sharing last weeks challenges, triggers, and coping activities to remain quit and/ or keep making progress toward cessation, completion of TCRS (Tobacco Cessation Rating Scale) learned addiction model, personal reasons for quitting, medications, goals, quit date.    Specific session focus:  orientation, client introductions, completion of TCRS (Tobacco Cessation Rating Scale) learned addiction model, cues/triggers, personal reasons for quitting, medications, goals, quit date    Target symptoms:  withdrawal and medication side effects             The following were rated moderate (3) to severe (4) on TCRS:       Moderate 3: Angry, Frustrated, Irritable     Severe 4:   None    Patient's Response to Intervention: Patient states she remains smoke free as of 6/18/19. The patient remains on the prescribed tobacco cessation medication regimen of 21 mg Patches without any negative side effects at this time, discussed reducing to 14 mg patches and ordered to [facility]. Specific session focus:  orientation, client introductions, completion of TCRS (Tobacco Cessation Rating Scale) learned addiction model, cues/triggers, personal reasons for quitting, medications, goals, quit date. Target symptoms:  withdrawal and medication side effects. The following were rated moderate (3) to severe (4) on TCRS: Moderate 3: Angry, Frustrated, Irritable. Severe 4:   None. Patient to follow up in one week. Pt stated she's getting support from her [employer] choir group, helping her to stay motivated.     Progress Toward Goals and Other Mental Status Changes: The patient denies any abnormal behavioral or mental changes at this time.     Diagnosis: Z72.0  Plan: The patient will continue with group therapy sessions and medication  regimen prescribed with management by physician or by the Cessation Clinic Provider. Patient will inform Smoking Cessation Counselor of symptoms as rated high on TCRS.    Return to Clinic: 1 week    Quit Date: 6/18/19   Planned Quit Date: 6/25/19

## 2019-07-22 ENCOUNTER — CLINICAL SUPPORT (OUTPATIENT)
Dept: SMOKING CESSATION | Facility: CLINIC | Age: 69
End: 2019-07-22
Payer: COMMERCIAL

## 2019-07-22 DIAGNOSIS — F17.210 CIGARETTE NICOTINE DEPENDENCE, UNCOMPLICATED: Primary | ICD-10-CM

## 2019-07-22 PROCEDURE — 99999 PR PBB SHADOW E&M-EST. PATIENT-LVL I: CPT | Mod: PBBFAC,,,

## 2019-07-22 PROCEDURE — 90853 GROUP PSYCHOTHERAPY: CPT | Mod: S$GLB,,,

## 2019-07-22 PROCEDURE — 90853 PR GROUP PSYCHOTHERAPY: ICD-10-PCS | Mod: S$GLB,,,

## 2019-07-22 PROCEDURE — 99999 PR PBB SHADOW E&M-EST. PATIENT-LVL I: ICD-10-PCS | Mod: PBBFAC,,,

## 2019-07-22 NOTE — Clinical Note
Just a note to advise how the patient is progressing in the tobacco cessation program. Patient remains mostly tobacco free as of 6/18/19. She may have 1 cigarette every now and then. The patient remains on the prescribed tobacco cessation medication regimen of 14 mg Patches and 2 mg Gum without any negative side effects at this time. Specific session focus: completion of TCRS (Tobacco Cessation Rating Scale) reviewed strategies, cues, and triggers. Introduced the negative impact of tobacco on health, the health advantages of discontinuing the use of tobacco, time line improved health changes after a quit, withdrawal issues to expect from nicotine and habit, and ways to achieve the goal of a quit. Target symptoms:  withdrawal and medication side effects. The following were rated moderate (3) to severe (4) on TCRS: Moderate 3: None. Severe 4: None.

## 2019-07-22 NOTE — PROGRESS NOTES
Smoking Cessation Group Session #2    Site: [facility]  Date:  7/22/2019  Clinical Status of Patient: Outpatient   Length of Service and Code: 90 minutes - 82667   Number in Attendance: 7  Group Activities/Focus of Group:  Sharing last weeks challenges, triggers, and coping activities to remain quit and/ or keep making progress toward cessation, completion of TCRS (Tobacco Cessation Rating Scale) learned addiction model, personal reasons for quitting, medications, goals, quit date.    Specific session focus: completion of TCRS (Tobacco Cessation Rating Scale) reviewed strategies, cues, and triggers. Introduced the negative impact of tobacco on health, the health advantages of discontinuing the use of tobacco, time line improved health changes after a quit, withdrawal issues to expect from nicotine and habit, and ways to achieve the goal of a quit.    Target symptoms:  withdrawal and medication side effects             The following were rated moderate (3) to severe (4) on TCRS:       Moderate 3: None     Severe 4:   None    Patient's Response to Intervention: Patient remains mostly tobacco free as of 6/18/19. She may have 1 cigarette every now and then. The patient remains on the prescribed tobacco cessation medication regimen of 14 mg Patches and 2 mg Gum without any negative side effects at this time. Specific session focus: completion of TCRS (Tobacco Cessation Rating Scale) reviewed strategies, cues, and triggers. Introduced the negative impact of tobacco on health, the health advantages of discontinuing the use of tobacco, time line improved health changes after a quit, withdrawal issues to expect from nicotine and habit, and ways to achieve the goal of a quit. Target symptoms:  withdrawal and medication side effects. The following were rated moderate (3) to severe (4) on TCRS: Moderate 3: None. Severe 4: None.The patient will continue with group therapy sessions and medication monitoring by CTTS. Prescribed  medication management will be by physician. The patient denies any abnormal behavioral or mental changes at this time.     Progress Toward Goals and Other Mental Status Changes: The patient denies any abnormal behavioral or mental changes at this time.     Diagnosis: Z72.0  Plan: The patient will continue with group therapy sessions and medication regimen prescribed with management by physician or by the Cessation Clinic Provider. Patient will inform Smoking Cessation Counselor of symptoms as rated high on TCRS.    Return to Clinic: 1 week    Quit Date: 6/18/2019   Planned Quit Date: 6/18/2019

## 2019-07-24 ENCOUNTER — OFFICE VISIT (OUTPATIENT)
Dept: OTOLARYNGOLOGY | Facility: CLINIC | Age: 69
End: 2019-07-24
Payer: MEDICARE

## 2019-07-24 VITALS
SYSTOLIC BLOOD PRESSURE: 133 MMHG | WEIGHT: 146.81 LBS | HEIGHT: 62 IN | BODY MASS INDEX: 27.02 KG/M2 | HEART RATE: 105 BPM | DIASTOLIC BLOOD PRESSURE: 87 MMHG

## 2019-07-24 DIAGNOSIS — J30.89 NON-SEASONAL ALLERGIC RHINITIS, UNSPECIFIED TRIGGER: ICD-10-CM

## 2019-07-24 DIAGNOSIS — R09.A2 GLOBUS SENSATION: ICD-10-CM

## 2019-07-24 DIAGNOSIS — K44.9 HIATAL HERNIA: ICD-10-CM

## 2019-07-24 DIAGNOSIS — K21.9 LARYNGOPHARYNGEAL REFLUX (LPR): Primary | ICD-10-CM

## 2019-07-24 PROCEDURE — 99999 PR PBB SHADOW E&M-EST. PATIENT-LVL V: ICD-10-PCS | Mod: PBBFAC,,, | Performed by: OTOLARYNGOLOGY

## 2019-07-24 PROCEDURE — 99213 OFFICE O/P EST LOW 20 MIN: CPT | Mod: 25,S$GLB,, | Performed by: OTOLARYNGOLOGY

## 2019-07-24 PROCEDURE — 3075F PR MOST RECENT SYSTOLIC BLOOD PRESS GE 130-139MM HG: ICD-10-PCS | Mod: CPTII,S$GLB,, | Performed by: OTOLARYNGOLOGY

## 2019-07-24 PROCEDURE — 3079F PR MOST RECENT DIASTOLIC BLOOD PRESSURE 80-89 MM HG: ICD-10-PCS | Mod: CPTII,S$GLB,, | Performed by: OTOLARYNGOLOGY

## 2019-07-24 PROCEDURE — 99213 PR OFFICE/OUTPT VISIT, EST, LEVL III, 20-29 MIN: ICD-10-PCS | Mod: 25,S$GLB,, | Performed by: OTOLARYNGOLOGY

## 2019-07-24 PROCEDURE — 1100F PTFALLS ASSESS-DOCD GE2>/YR: CPT | Mod: CPTII,S$GLB,, | Performed by: OTOLARYNGOLOGY

## 2019-07-24 PROCEDURE — 99999 PR PBB SHADOW E&M-EST. PATIENT-LVL V: CPT | Mod: PBBFAC,,, | Performed by: OTOLARYNGOLOGY

## 2019-07-24 PROCEDURE — 3288F PR FALLS RISK ASSESSMENT DOCUMENTED: ICD-10-PCS | Mod: CPTII,S$GLB,, | Performed by: OTOLARYNGOLOGY

## 2019-07-24 PROCEDURE — 1100F PR PT FALLS ASSESS DOC 2+ FALLS/FALL W/INJURY/YR: ICD-10-PCS | Mod: CPTII,S$GLB,, | Performed by: OTOLARYNGOLOGY

## 2019-07-24 PROCEDURE — 3288F FALL RISK ASSESSMENT DOCD: CPT | Mod: CPTII,S$GLB,, | Performed by: OTOLARYNGOLOGY

## 2019-07-24 PROCEDURE — 3079F DIAST BP 80-89 MM HG: CPT | Mod: CPTII,S$GLB,, | Performed by: OTOLARYNGOLOGY

## 2019-07-24 PROCEDURE — 31575 PR LARYNGOSCOPY, FLEXIBLE; DIAGNOSTIC: ICD-10-PCS | Mod: S$GLB,,, | Performed by: OTOLARYNGOLOGY

## 2019-07-24 PROCEDURE — 31575 DIAGNOSTIC LARYNGOSCOPY: CPT | Mod: S$GLB,,, | Performed by: OTOLARYNGOLOGY

## 2019-07-24 PROCEDURE — 3075F SYST BP GE 130 - 139MM HG: CPT | Mod: CPTII,S$GLB,, | Performed by: OTOLARYNGOLOGY

## 2019-07-24 RX ORDER — QUETIAPINE FUMARATE 100 MG/1
150 TABLET, FILM COATED ORAL
COMMUNITY
Start: 2017-07-20 | End: 2019-07-25

## 2019-07-24 RX ORDER — ZONISAMIDE 100 MG/1
CAPSULE ORAL
COMMUNITY
Start: 2019-07-17 | End: 2019-07-25 | Stop reason: SDUPTHER

## 2019-07-24 RX ORDER — OMEPRAZOLE 40 MG/1
40 CAPSULE, DELAYED RELEASE ORAL EVERY MORNING
Qty: 30 CAPSULE | Refills: 11 | Status: SHIPPED | OUTPATIENT
Start: 2019-07-24 | End: 2021-03-02

## 2019-07-24 RX ORDER — PREGABALIN 150 MG/1
150 CAPSULE ORAL 2 TIMES DAILY
Refills: 1 | COMMUNITY
Start: 2019-06-29 | End: 2019-07-25

## 2019-07-24 RX ORDER — ZONISAMIDE 100 MG/1
CAPSULE ORAL
COMMUNITY
End: 2021-03-02

## 2019-07-24 RX ORDER — POLYETHYLENE GLYCOL 3350, SODIUM SULFATE ANHYDROUS, SODIUM BICARBONATE, SODIUM CHLORIDE, POTASSIUM CHLORIDE 236; 22.74; 6.74; 5.86; 2.97 G/4L; G/4L; G/4L; G/4L; G/4L
POWDER, FOR SOLUTION ORAL
COMMUNITY
End: 2019-07-25

## 2019-07-24 RX ORDER — POLYETHYLENE GLYCOL-3350 AND ELECTROLYTES 236; 6.74; 5.86; 2.97; 22.74 G/274.31G; G/274.31G; G/274.31G; G/274.31G; G/274.31G
POWDER, FOR SOLUTION ORAL
COMMUNITY
Start: 2019-06-24 | End: 2019-07-25

## 2019-07-24 NOTE — PROGRESS NOTES
Chief Complaint   Patient presents with    Follow-up    Hoarse     improved along with globus sensation   .     HPI:Chayito Chung is a 68 y.o. female who has been referred by Dr. Escamilla for a 3 months history of globus sensation and hoarseness. She notes a foreign bodys sensation that is constant.  She states it is worse at night.  She coughs and clears her throat but unable to relieve the sensation.  Her voice is not progressively worsening over this time. There are not pitch breaks or cracks. There is not vocal fatigue. She denies dysphagia, odynophagia, throat pain, and otalgia.  There is no hemoptysis or hematemesis. She is breathing well.     She admits to throat clearing and cough. She admits to frequent heartburn and reflux. She is currently on Protonix 40mg daily and will use OTC tums and pepto-bismol without relief.     Interval HPI 7/24/2019:   Mrs. Chung follows up today for LPR.  Since last visit she has been on Protonix 40mg PO BID.  She notes that her globus sensation and feeling of mucus stuck in the throat is improved.  She reports that the hoarseness seems better with this dosing.   She notes throat clearing.  She denies dysphagia.  Since last visit has seen Dr. Van Schilling  with GI and also an outside GI physician Dr. Joseph.  Dr. Mendoza performed an EGD and noted a small hiatal hernia but otherwise esophagus.  Records have been reviewed.  She notes nasal congestion is controlled with Flonase. She notes decreased sneezing and itchy nose as well.     Past Medical History:   Diagnosis Date    Acute coronary syndrome     Anxiety     Asthma     Cancer     colon    Cataracts, both eyes     Colon cancer 1988    COPD (chronic obstructive pulmonary disease)     Coronary artery disease     Depression     Diabetes mellitus     Elevated cholesterol     Hypertension      Social History     Socioeconomic History    Marital status: Single     Spouse name: Not on file    Number of  children: Not on file    Years of education: Not on file    Highest education level: Not on file   Occupational History    Not on file   Social Needs    Financial resource strain: Not on file    Food insecurity:     Worry: Not on file     Inability: Not on file    Transportation needs:     Medical: Not on file     Non-medical: Not on file   Tobacco Use    Smoking status: Former Smoker     Packs/day: 0.50     Years: 46.00     Pack years: 23.00     Types: Cigarettes     Last attempt to quit: 2019     Years since quittin.0    Smokeless tobacco: Never Used   Substance and Sexual Activity    Alcohol use: Yes     Alcohol/week: 1.8 oz     Types: 3 Glasses of wine per week     Comment: 1 every 3 months    Drug use: No    Sexual activity: Yes     Partners: Male     Birth control/protection: None     Comment: last drink yesterday afternoon   Lifestyle    Physical activity:     Days per week: Not on file     Minutes per session: Not on file    Stress: Not on file   Relationships    Social connections:     Talks on phone: Not on file     Gets together: Not on file     Attends [employer] service: Not on file     Active member of club or organization: Not on file     Attends meetings of clubs or organizations: Not on file     Relationship status: Not on file   Other Topics Concern    Not on file   Social History Narrative    Not on file     Past Surgical History:   Procedure Laterality Date    ABDOMINAL SURGERY      BLOCK-NERVE-MEDIAL BRANCH-LUMBAR Bilateral 10/18/2016    Per[facility] Powell [facility] at [facility]GT    BREAST BIOPSY      benign unsure what side     SECTION, CLASSIC      COLON SURGERY      CORONARY ANGIOPLASTY WITH STENT PLACEMENT  3 months ago     x2, Hysterectomy, Lung surgery, Partial stomach removed, Part of colon removed for rectal cancer    GASTRECTOMY      HEMORRHOID SURGERY      HYSTERECTOMY      @28yrs of age    INJECTION-FACET Left 2016     [facility]    INJECTION-FACET Right 8/2/2016    [facility] at [facility]    LUNG BIOPSY      OOPHORECTOMY      @28yrs of age    RADIOFREQUENCY THERMOCOAGULATION (RFTC)-NERVE-MEDIAN BRANCH-LUMBAR Bilateral 10/19/2016    Performed by [facility] Powell [facility] at [facility]     Family History   Problem Relation Age of Onset    Cancer Mother     Diabetes Mother     Hypertension Mother     Breast cancer Mother     Cancer Father     Heart disease Father     Depression Sister     Hypertension Sister     Cancer Maternal Aunt            Review of Systems  General: negative for chills, fever or weight loss  Psychological: negative for mood changes or depression  Ophthalmic: negative for blurry vision, photophobia or eye pain  ENT: see HPI  Respiratory: no cough, shortness of breath, or wheezing  Cardiovascular: no chest pain or dyspnea on exertion  Gastrointestinal: no abdominal pain, change in bowel habits, or black/ bloody stools  Musculoskeletal: negative for gait disturbance or muscular weakness  Neurological: no syncope or seizures; no ataxia  Dermatological: negative for puritis,  rash and jaundice  Hematologic/lymphatic: no easy bruising, no new lumps or bumps      Physical Exam:    Vitals:    07/24/19 1040   BP: 133/87   Pulse: 105       Constitutional: Well appearing / communicating without difficutly.  NAD.  Eyes: EOM I Bilaterally  Head/Face: Normocephalic.  Negative paranasal sinus pressure/tenderness.  Salivary glands WNL.  House Brackmann I Bilaterally.    Right Ear: Auricle normal appearance. External Auditory Canal within normal limits no lesions or masses,TM w/o masses/lesions/perforations. TM mobility noted.   Left Ear: Auricle normal appearance. External Auditory Canal within normal limits no lesions or masses,TM w/o masses/lesions/perforations. TM mobility noted.  Nose: clear rhinorrhea present.  No gross nasal septal deviation.  Inferior Turbinates 3+ bilaterally. No septal perforation. No masses/lesions. External nasal skin appears normal without masses/lesions.  Oral Cavity: Gingiva/lips within normal limits.  Dentition/gingiva healthy appearing. Mucus membranes moist. Floor of mouth soft, no masses palpated. Oral Tongue mobile. Hard Palate appears normal.    Oropharynx: Base of tongue appears normal. No masses/lesions noted. Tonsillar fossa/pharyngeal wall without lesions. Posterior oropharynx WNL.  Soft palate without masses. Midline uvula.   Neck/Lymphatic: No LAD I-VI bilaterally.  No thyromegaly.  No masses noted on exam.    Mirror laryngoscopy/nasopharyngoscopy: Active gag reflex.  Unable to perform.    Neuro/Psychiatric: AOx3.  Normal mood and affect.   Cardiovascular: Normal carotid pulses bilaterally, no increasing jugular venous distention noted at cervical region bilaterally.    Respiratory: Normal respiratory effort, no stridor, no retractions noted.      See separate procedure note for FFL.     Assessment:    ICD-10-CM ICD-9-CM    1. Laryngopharyngeal reflux (LPR) K21.9 478.79    2. Globus sensation F45.8 306.4    3. Hiatal hernia K44.9 553.3    4. Non-seasonal allergic rhinitis, unspecified trigger J30.89 477.8      The primary encounter diagnosis was Laryngopharyngeal reflux (LPR). Diagnoses of Globus sensation, Hiatal hernia, and Non-seasonal allergic rhinitis, unspecified trigger were also pertinent to this visit.      Plan:  No orders of the defined types were placed in this encounter.      Decrease Protonix to 40mg daily.  GI referral placed. Continue reflux precautions. Keep GI follow up appointments as scheduled.     AR: Continue Flonase.     Follow up in 3 months  to reassess progress with treatment regimen.     Sonia Freeman MD

## 2019-07-24 NOTE — PROCEDURES
Procedures     Due to indication in patient's history, presentation or risk factors,  a fiber optic exam was performed.    SEPARATE PROCEDURE NOTE:    ANESTHESIA:  Topical xylocaine with bud-synephrine    FINDINGS:  Minimal  inaterarytenoid erythema and edema    PROCEDURE:  After verbal consent was obtained, the flexible scope was passed through the patient's nasal cavity without difficulty.  The nasopharynx (adenoid pad) and eustachian tube orifices were first visualized and were found to be normal, without masses or irregularity.  The posterior pharyngeal wall and base of tongue were then examined and no mass or irregular tissue was seen.  The scope was then advanced to the larynx, and the epiglottis, valleculae, and piriform sinuses were normal, without masses or mucosal irregularity.  The false vocal folds and true vocal folds were then examined and were found to have normal mobility (full abduction and adduction) and no masses or mucosal irregularity was seen.  The interartyenoid area had minimal  edema and erythema consistent with reflux. Improvement since last visit.

## 2019-07-25 ENCOUNTER — OFFICE VISIT (OUTPATIENT)
Dept: FAMILY MEDICINE | Facility: CLINIC | Age: 69
End: 2019-07-25
Payer: MEDICARE

## 2019-07-25 VITALS
BODY MASS INDEX: 27.26 KG/M2 | WEIGHT: 148.13 LBS | SYSTOLIC BLOOD PRESSURE: 130 MMHG | HEIGHT: 62 IN | HEART RATE: 82 BPM | OXYGEN SATURATION: 98 % | DIASTOLIC BLOOD PRESSURE: 86 MMHG

## 2019-07-25 DIAGNOSIS — Z11.8 SCREENING FOR CHLAMYDIAL DISEASE: ICD-10-CM

## 2019-07-25 DIAGNOSIS — I15.2 HYPERTENSION ASSOCIATED WITH DIABETES: ICD-10-CM

## 2019-07-25 DIAGNOSIS — Z78.0 POSTMENOPAUSAL: ICD-10-CM

## 2019-07-25 DIAGNOSIS — E78.5 HYPERLIPIDEMIA ASSOCIATED WITH TYPE 2 DIABETES MELLITUS: ICD-10-CM

## 2019-07-25 DIAGNOSIS — Z11.4 SCREENING FOR HIV (HUMAN IMMUNODEFICIENCY VIRUS): ICD-10-CM

## 2019-07-25 DIAGNOSIS — Z79.899 MEDICATION MANAGEMENT: ICD-10-CM

## 2019-07-25 DIAGNOSIS — I50.32 CHRONIC DIASTOLIC CONGESTIVE HEART FAILURE: ICD-10-CM

## 2019-07-25 DIAGNOSIS — E11.65 UNCONTROLLED TYPE 2 DIABETES MELLITUS WITH HYPERGLYCEMIA, WITH LONG-TERM CURRENT USE OF INSULIN: ICD-10-CM

## 2019-07-25 DIAGNOSIS — E11.59 HYPERTENSION ASSOCIATED WITH DIABETES: ICD-10-CM

## 2019-07-25 DIAGNOSIS — I25.119 CORONARY ARTERY DISEASE INVOLVING NATIVE CORONARY ARTERY OF NATIVE HEART WITH ANGINA PECTORIS: ICD-10-CM

## 2019-07-25 DIAGNOSIS — Z11.59 ENCOUNTER FOR HEPATITIS C SCREENING TEST FOR LOW RISK PATIENT: ICD-10-CM

## 2019-07-25 DIAGNOSIS — Z87.891 PERSONAL HISTORY OF NICOTINE DEPENDENCE: ICD-10-CM

## 2019-07-25 DIAGNOSIS — Z98.61 S/P PTCA (PERCUTANEOUS TRANSLUMINAL CORONARY ANGIOPLASTY): ICD-10-CM

## 2019-07-25 DIAGNOSIS — Z72.0 TOBACCO ABUSE: ICD-10-CM

## 2019-07-25 DIAGNOSIS — M25.541 ARTHRALGIA OF RIGHT HAND: Primary | ICD-10-CM

## 2019-07-25 DIAGNOSIS — E11.69 HYPERLIPIDEMIA ASSOCIATED WITH TYPE 2 DIABETES MELLITUS: ICD-10-CM

## 2019-07-25 DIAGNOSIS — Z79.4 UNCONTROLLED TYPE 2 DIABETES MELLITUS WITH HYPERGLYCEMIA, WITH LONG-TERM CURRENT USE OF INSULIN: ICD-10-CM

## 2019-07-25 PROBLEM — Z85.038 HISTORY OF COLON CANCER: Status: ACTIVE | Noted: 2019-07-25

## 2019-07-25 PROBLEM — J96.01 ACUTE HYPOXEMIC RESPIRATORY FAILURE: Status: RESOLVED | Noted: 2017-05-01 | Resolved: 2019-07-25

## 2019-07-25 PROBLEM — R79.89 ELEVATED BRAIN NATRIURETIC PEPTIDE (BNP) LEVEL: Status: RESOLVED | Noted: 2018-11-15 | Resolved: 2019-07-25

## 2019-07-25 PROBLEM — R06.09 DYSPNEA ON EXERTION: Status: RESOLVED | Noted: 2018-11-15 | Resolved: 2019-07-25

## 2019-07-25 PROCEDURE — G0009 ADMIN PNEUMOCOCCAL VACCINE: HCPCS | Mod: S$GLB,,, | Performed by: FAMILY MEDICINE

## 2019-07-25 PROCEDURE — 99204 PR OFFICE/OUTPT VISIT, NEW, LEVL IV, 45-59 MIN: ICD-10-PCS | Mod: 25,S$GLB,, | Performed by: FAMILY MEDICINE

## 2019-07-25 PROCEDURE — 3045F PR MOST RECENT HEMOGLOBIN A1C LEVEL 7.0-9.0%: CPT | Mod: CPTII,S$GLB,, | Performed by: FAMILY MEDICINE

## 2019-07-25 PROCEDURE — 90732 PNEUMOCOCCAL POLYSACCHARIDE VACCINE 23-VALENT =>2YO SQ IM: ICD-10-PCS | Mod: S$GLB,,, | Performed by: FAMILY MEDICINE

## 2019-07-25 PROCEDURE — 3075F SYST BP GE 130 - 139MM HG: CPT | Mod: CPTII,S$GLB,, | Performed by: FAMILY MEDICINE

## 2019-07-25 PROCEDURE — 3288F PR FALLS RISK ASSESSMENT DOCUMENTED: ICD-10-PCS | Mod: CPTII,S$GLB,, | Performed by: FAMILY MEDICINE

## 2019-07-25 PROCEDURE — 3075F PR MOST RECENT SYSTOLIC BLOOD PRESS GE 130-139MM HG: ICD-10-PCS | Mod: CPTII,S$GLB,, | Performed by: FAMILY MEDICINE

## 2019-07-25 PROCEDURE — 3045F PR MOST RECENT HEMOGLOBIN A1C LEVEL 7.0-9.0%: ICD-10-PCS | Mod: CPTII,S$GLB,, | Performed by: FAMILY MEDICINE

## 2019-07-25 PROCEDURE — G0009 PNEUMOCOCCAL POLYSACCHARIDE VACCINE 23-VALENT =>2YO SQ IM: ICD-10-PCS | Mod: S$GLB,,, | Performed by: FAMILY MEDICINE

## 2019-07-25 PROCEDURE — 99204 OFFICE O/P NEW MOD 45 MIN: CPT | Mod: 25,S$GLB,, | Performed by: FAMILY MEDICINE

## 2019-07-25 PROCEDURE — 90732 PPSV23 VACC 2 YRS+ SUBQ/IM: CPT | Mod: S$GLB,,, | Performed by: FAMILY MEDICINE

## 2019-07-25 PROCEDURE — 3079F DIAST BP 80-89 MM HG: CPT | Mod: CPTII,S$GLB,, | Performed by: FAMILY MEDICINE

## 2019-07-25 PROCEDURE — 1100F PR PT FALLS ASSESS DOC 2+ FALLS/FALL W/INJURY/YR: ICD-10-PCS | Mod: CPTII,S$GLB,, | Performed by: FAMILY MEDICINE

## 2019-07-25 PROCEDURE — 1100F PTFALLS ASSESS-DOCD GE2>/YR: CPT | Mod: CPTII,S$GLB,, | Performed by: FAMILY MEDICINE

## 2019-07-25 PROCEDURE — 3079F PR MOST RECENT DIASTOLIC BLOOD PRESSURE 80-89 MM HG: ICD-10-PCS | Mod: CPTII,S$GLB,, | Performed by: FAMILY MEDICINE

## 2019-07-25 PROCEDURE — 99999 PR PBB SHADOW E&M-EST. PATIENT-LVL IV: CPT | Mod: PBBFAC,,, | Performed by: FAMILY MEDICINE

## 2019-07-25 PROCEDURE — 99999 PR PBB SHADOW E&M-EST. PATIENT-LVL IV: ICD-10-PCS | Mod: PBBFAC,,, | Performed by: FAMILY MEDICINE

## 2019-07-25 PROCEDURE — 3288F FALL RISK ASSESSMENT DOCD: CPT | Mod: CPTII,S$GLB,, | Performed by: FAMILY MEDICINE

## 2019-07-25 RX ORDER — INSULIN ASPART 100 [IU]/ML
INJECTION, SOLUTION INTRAVENOUS; SUBCUTANEOUS
COMMUNITY
End: 2021-03-02

## 2019-07-25 NOTE — PROGRESS NOTES
"Subjective:       Patient ID: Chayito Chung is a 68 y.o. female.    Chief Complaint: [facility]    67 yo F pt, new to me (previously followed by Dr. Sidney Delgado) with PMH significant for CAD s/p stent placement; CHF; HTN; HLD; uncontrolled DM-2; COPD; tobacco abuse; solitary pulmonary nodule s/p resection 4/2012; h/o colon cancer s/p resection; steroid induced gastritis s/p partial gastrectomy;  Migraine Headaches; seizure disorder; Chronic Back pain; MDD/JAZMIN; Fe deficiency anemia  She presents to [facility]. She is followed by multiple medical sub-specialist including the following:  Cardiologist (Dr. Malin--[facility]); Pulmonologist  (Dr. Martinez Chung--[facility]); Gastroenterologist (Dr. Hooper--[facility]); Neurologist (Dr. Mejia--private); Psychiatrist (Dr. Cotto--private); Endocrinologist (Dr. Delgado--private); Pain Management (Dr. Mancuso--[facility]); ENT (Dr. Arzaet--[facility]); Ophthalmologist  (Dr. Sullivan--private)    Her goals with a primary care physician are to monitor her health maintenance measures and to address any urgent issues.    She had a mammogram that was BI-RADS category 1 09/2018  Reports up-to-date colonoscopy with Dr. Hooper  She has never had a bone density screening test  Her last Tdap was 01/18/2015  Shingrix #1 11/08/2018; Shingrix #2 06/13/2019  She has never had a pneumonia vaccine    She tells me that she does have 1 male sexual partner that she believes to be "promiscuous".  She is requesting STI testing today.  Denies symptoms of vaginitis including vaginal itching, vaginal odor, and vaginal discharge. Denies urinary urgency/frequency.  Denies dysuria and hematuria.    Over the past several days she has developed right hand/wrist pain and numbness. Symptoms localized mostly to wrist and to right fourth and fifth fingers. States that she woke up with fingers of right hand in flexed position, and was not able to extend her fingers several hours. Denies h/o " accident/injury/trauma to her right upper extremity.  No redness, swelling, or warmth to right hand or wrist.  She has been experiencing weakness to the right hand .  She is right-hand dominant. No associated neck/back pain.     Review of Systems   Constitutional: Negative for activity change, appetite change, chills, fever and unexpected weight change.   HENT: Negative for congestion, sneezing and sore throat.    Eyes: Negative for redness, itching and visual disturbance.   Respiratory: Negative for cough, chest tightness, shortness of breath and wheezing.    Cardiovascular: Negative for chest pain.   Gastrointestinal: Negative for abdominal pain, constipation, diarrhea, nausea and vomiting.   Genitourinary: Negative for dysuria, frequency, hematuria, urgency, vaginal bleeding and vaginal discharge.   Musculoskeletal: Negative for joint swelling.   Skin: Negative for rash.   Neurological: Positive for weakness (Right hand) and numbness (Right hand). Negative for dizziness, syncope and headaches.   Psychiatric/Behavioral: Negative for dysphoric mood and suicidal ideas. The patient is not nervous/anxious.           Patient Active Problem List   Diagnosis    Coronary artery disease of native artery of native heart with stable angina pectoris    Hyperlipidemia    COPD (chronic obstructive pulmonary disease)    Tobacco abuse, 1ppd, 50 years    Major depressive disorder in partial remission    Solitary pulmonary nodule, operation 4/2012    Neuropathy    Migraines    JAZMIN (generalized anxiety disorder)    Hypoglycemia associated with diabetes    Adrenal nodule    Uncontrolled type 2 diabetes mellitus with hyperglycemia, with long-term current use of insulin    Coronary artery disease    Hypertension associated with diabetes    Hyperlipidemia associated with type 2 diabetes mellitus    S/P PTCA (percutaneous transluminal coronary angioplasty)    Coronary artery disease involving native coronary artery of  "native heart with angina pectoris    Lumbar facet arthropathy    Palpitations    Sinus tachycardia    Iron deficiency anemia    COPD exacerbation    Steroid-induced gastritis    Chronic diastolic congestive heart failure    History of colon cancer       Past Surgical History:   Procedure Laterality Date    ABDOMINAL SURGERY      BLOCK-NERVE-MEDIAL BRANCH-LUMBAR Bilateral 10/18/2016    [facility]    BREAST BIOPSY      benign unsure what side     SECTION, CLASSIC      COLON SURGERY      CORONARY ANGIOPLASTY WITH STENT PLACEMENT  3 months ago     x2, Hysterectomy, Lung surgery, Partial stomach removed, Part of colon removed for rectal cancer    GASTRECTOMY      HEMORRHOID SURGERY      HYSTERECTOMY      @28yrs of age    INJECTION-FACET Left 2016    [facility]    INJECTION-FACET Right 2016    [facility]    LUNG BIOPSY      OOPHORECTOMY      @28yrs of age    RADIOFREQUENCY THERMOCOAGULATION (RFTC)-NERVE-MEDIAN BRANCH-LUMBAR Bilateral 10/19/2016    [facility] by [facility] at [facility]       Family History   Problem Relation Age of Onset    Cancer Mother     Diabetes Mother     Hypertension Mother     Breast cancer Mother     Cancer Father     Heart disease Father     Depression Sister     Hypertension Sister     Cancer Maternal Aunt        Social History     Tobacco Use   Smoking Status Current Every Day Smoker    Packs/day: 1.00    Years: 50.00    Pack years: 50.00    Types: Cigarettes   Smokeless Tobacco Never Used   Tobacco Comment    Smoking 5 cigs daily presently       Objective:        Vitals:    19 0825   BP: 130/86   Pulse: 82   SpO2: 98%   Weight: 67.2 kg (148 lb 2.4 oz)   Height: 5' 2" (1.575 m)       Physical Exam   Constitutional: She is oriented to person, place, and time. She appears well-developed and " well-nourished. No distress.   HENT:   Head: Normocephalic and atraumatic.   Right Ear: External ear normal.   Left Ear: External ear normal.   Nose: Nose normal.   Mouth/Throat: Oropharynx is clear and moist. No oropharyngeal exudate.   Poor dentition. + brown discoloration of tongue.   Eyes: Conjunctivae and EOM are normal. No scleral icterus.   Neck: Normal range of motion. Neck supple.   Cardiovascular: Normal rate, regular rhythm and normal heart sounds. Exam reveals no gallop and no friction rub.   No murmur heard.  Pulmonary/Chest: Effort normal and breath sounds normal. She has no wheezes. She has no rales.   Musculoskeletal: Normal range of motion. She exhibits no edema.   Lymphadenopathy:     She has no cervical adenopathy.   Neurological: She is alert and oriented to person, place, and time. A sensory deficit (+ decreased sensation of 1st-3rd fingers on right) is present. No cranial nerve deficit.   + Durkan's on right  + 3/5  strength on right    Skin: Skin is warm and dry. No rash noted. No erythema.   Psychiatric: She has a normal mood and affect. Her behavior is normal.       Assessment:       1. Arthralgia of right hand     2. Coronary artery disease involving native coronary artery of native heart with angina pectoris    3. S/P PTCA (percutaneous transluminal coronary angioplasty)    4. Chronic diastolic congestive heart failure    5. Hypertension associated with diabetes    6. Uncontrolled type 2 diabetes mellitus with hyperglycemia, with long-term current use of insulin    7. Hyperlipidemia associated with type 2 diabetes mellitus    8. Tobacco abuse, 1ppd, 50 years    9. Personal history of nicotine dependence     10. Postmenopausal    11. Screening for chlamydial disease    12. Screening for HIV (human immunodeficiency virus)    13. Encounter for hepatitis C screening test for low risk patient    14. Medication management        Plan:       Chayito was seen today for establish  care.    Diagnoses and all orders for this visit:    Arthralgia of right hand   -     C. trachomatis/N. gonorrhoeae by AMP DNA [facility]; Urine; Future    Coronary artery disease involving native coronary artery of native heart with angina pectoris    S/P PTCA (percutaneous transluminal coronary angioplasty)    Chronic diastolic congestive heart failure    Hypertension associated with diabetes    Uncontrolled type 2 diabetes mellitus with hyperglycemia, with long-term current use of insulin  -     Hemoglobin A1c; Future  -     (In Office Administered) Pneumococcal Polysaccharide Vaccine (23 Valent) (SQ/IM)    Hyperlipidemia associated with type 2 diabetes mellitus  -     Lipid panel; Future    Tobacco abuse, 1ppd, 50 years  -     CT Chest Lung Screening Low Dose; Future    Personal history of nicotine dependence   -     CT Chest Lung Screening Low Dose; Future    Postmenopausal  -     DXA Bone Density Spine And Hip; Future    Screening for chlamydial disease  -     C. trachomatis/N. gonorrhoeae by AMP DNA [facility]; Urine; Future    Screening for HIV (human immunodeficiency virus)  -     HIV 1/2 Ag/Ab (4th Gen); Future    Encounter for hepatitis C screening test for low risk patient  -     Hepatitis C antibody; Future    Medication management  -     RPR; Future  -     Vitamin D; Future    Other orders  -     Cancel: DXA Bone Density Spine And Hip; Future    Right hand and wrist pain  History and Exam seem most consistent with carpal tunnel syndrome  Advised patient to follow up with Neurology for possible nerve conduction study and EMG  May benefit from trial of splint    CAD s/p stent placement; CHF; HTN   Blood pressure is well controlled in the office today.  Last 2d echo 5/2019:  Normal left ventricular systolic function. The estimated ejection fraction is 65%  Grade I (mild) left ventricular diastolic dysfunction consistent with impaired relaxation.  Followed by cardiologist (Dr. Malin--[facility])  She is not sure  of her current antihypertensive regimen  Advised to bring in all medications to next visit    DM-2 uncontrolled   Reports last A1c in 9s a couple weeks ago.  Will obtain today   Adherent with NovoLog 70/30 45 units qa.m. and 65 units q.p.m. also using NovoLog sliding scale  Followed by endocrinologist (Dr. Delgado--private)  Requesting records from Ophthalmologist  (Dr. Sullivan--private)    HLD   FLP today   Continue pravastatin 20 mg qhs    Screen for Venereal Disease   HIV, RPR, GC/CT  Advised 100% condom use    HM   Mammogram BI-RADS category 1 09/2018  Reports up-to-date colonoscopy with Dr. Hooper--requesting records  Refer for DEXA today   Refer for CT low dose today given 50 pack year history of tobacco use  Hep C screen today  Tdap 01/18/2015. Due 2025  Shingrix #1 11/08/2018; Shingrix #2 06/13/2019  PPSV-23 today 7/25/19. Offer PCV-13 7/25/2020    Follow-up plan pending laboratory test results

## 2019-07-26 ENCOUNTER — TELEPHONE (OUTPATIENT)
Dept: FAMILY MEDICINE | Facility: CLINIC | Age: 69
End: 2019-07-26

## 2019-07-26 DIAGNOSIS — E55.9 VITAMIN D DEFICIENCY: ICD-10-CM

## 2019-07-26 RX ORDER — ERGOCALCIFEROL 1.25 MG/1
50000 CAPSULE ORAL
Qty: 12 CAPSULE | Refills: 1 | Status: SHIPPED | OUTPATIENT
Start: 2019-07-26 | End: 2019-09-14

## 2019-07-26 NOTE — TELEPHONE ENCOUNTER
Called patient to notify that-- Please let patient know HIV, Syphilis, Gonorrhea, and Chlamydia tests negative. Also her Hepatitis C test was negative.  Her diabetes is pretty well-controlled as her A1c was 7.2. Her cholesterol levels were at target.     Her Vitamin D level was low, so I am prescribing a high dose Vitamin D supplement that she should take once per week for 12 weeks.   We will repeat her Vitamin D in 12 weeks. I would like her to schedule a f/u visit with me in 3 weeks (40 minutes). Patient verbalized understanding.

## 2019-07-29 ENCOUNTER — TELEPHONE (OUTPATIENT)
Dept: FAMILY MEDICINE | Facility: CLINIC | Age: 69
End: 2019-07-29

## 2019-07-29 ENCOUNTER — CLINICAL SUPPORT (OUTPATIENT)
Dept: SMOKING CESSATION | Facility: CLINIC | Age: 69
End: 2019-07-29
Payer: COMMERCIAL

## 2019-07-29 ENCOUNTER — HOSPITAL ENCOUNTER (OUTPATIENT)
Dept: RADIOLOGY | Facility: HOSPITAL | Age: 69
Discharge: HOME | End: 2019-07-29
Attending: FAMILY MEDICINE
Payer: MEDICARE

## 2019-07-29 ENCOUNTER — HOSPITAL ENCOUNTER (OUTPATIENT)
Dept: RADIOLOGY | Facility: HOSPITAL | Age: 69
Discharge: HOME | End: 2019-07-29
Attending: FAMILY MEDICINE | Admitting: INTERNAL MEDICINE
Payer: MEDICARE

## 2019-07-29 DIAGNOSIS — Z87.891 PERSONAL HISTORY OF NICOTINE DEPENDENCE: ICD-10-CM

## 2019-07-29 DIAGNOSIS — F17.210 LIGHT CIGARETTE SMOKER (1-9 CIGARETTES PER DAY): Primary | ICD-10-CM

## 2019-07-29 DIAGNOSIS — F17.210 MODERATE CIGARETTE SMOKER (10-19 PER DAY): ICD-10-CM

## 2019-07-29 DIAGNOSIS — Z78.0 POSTMENOPAUSAL: ICD-10-CM

## 2019-07-29 DIAGNOSIS — Z72.0 TOBACCO ABUSE: ICD-10-CM

## 2019-07-29 PROCEDURE — 77080 DEXA BONE DENSITY SPINE HIP: ICD-10-PCS | Mod: 26,,, | Performed by: RADIOLOGY

## 2019-07-29 PROCEDURE — 99999 PR PBB SHADOW E&M-EST. PATIENT-LVL II: ICD-10-PCS | Mod: PBBFAC,,,

## 2019-07-29 PROCEDURE — G0297 CT CHEST LUNG SCREENING LOW DOSE: ICD-10-PCS | Mod: 26,,, | Performed by: RADIOLOGY

## 2019-07-29 PROCEDURE — 77080 DXA BONE DENSITY AXIAL: CPT | Mod: TC

## 2019-07-29 PROCEDURE — G0297 LDCT FOR LUNG CA SCREEN: HCPCS | Mod: 26,,, | Performed by: RADIOLOGY

## 2019-07-29 PROCEDURE — 90853 GROUP PSYCHOTHERAPY: CPT | Mod: S$GLB,,, | Performed by: FAMILY MEDICINE

## 2019-07-29 PROCEDURE — G0297 LDCT FOR LUNG CA SCREEN: HCPCS | Mod: TC

## 2019-07-29 PROCEDURE — 77080 DXA BONE DENSITY AXIAL: CPT | Mod: 26,,, | Performed by: RADIOLOGY

## 2019-07-29 PROCEDURE — 90853 PR GROUP PSYCHOTHERAPY: ICD-10-PCS | Mod: S$GLB,,, | Performed by: FAMILY MEDICINE

## 2019-07-29 PROCEDURE — 99999 PR PBB SHADOW E&M-EST. PATIENT-LVL II: CPT | Mod: PBBFAC,,,

## 2019-07-29 RX ORDER — IBUPROFEN 200 MG
1 TABLET ORAL DAILY
Qty: 28 PATCH | Refills: 0 | Status: SHIPPED | OUTPATIENT
Start: 2019-07-29 | End: 2019-09-16 | Stop reason: SDUPTHER

## 2019-07-29 RX ORDER — IBUPROFEN 200 MG
1 TABLET ORAL DAILY
Qty: 28 PATCH | Refills: 0 | Status: SHIPPED | OUTPATIENT
Start: 2019-07-29 | End: 2019-07-29

## 2019-07-29 NOTE — PROGRESS NOTES
Smoking Cessation Group Session #3    Site: [facility]  Date:  7/29/2019  Clinical Status of Patient: Outpatient   Length of Service and Code: 90 minutes - 90765   Number in Attendance: 7  Group Activities/Focus of Group:  Sharing last weeks challenges, triggers, and coping activities to remain quit and/ or keep making progress toward cessation, completion of TCRS (Tobacco Cessation Rating Scale) learned addiction model, personal reasons for quitting, medications, goals, quit date.    Specific session focus: completion of TCRS (Tobacco Cessation Rating Scale) reviewed strategies, controlling environment, cues, triggers, new goals set. Introduced high risk situations with preparation interventions, caffeine similarities with withdrawal issues of habit and nicotine, Alcohol, Understanding urges, cravings, stress and relaxation. Open discussion with intervention discussion.    Target symptoms:  withdrawal and medication side effects             The following were rated moderate (3) to severe (4) on TCRS:       Moderate 3: Desire or Crave Tobacco     Severe 4:   None    Patient's Response to Intervention: Patient states she's smoking 2-3 cigarettes per day. This past weekend she smoked 3 all weekend due to cooking all weekend, and being busy. Commended pt on noticing that when she's busy it can be easier to not smoke as much. The patient remains on the prescribed tobacco cessation medication regimen of 21 mg Patches without any negative side effects at this time. CO 1 ppm, last smoked 4 hours prior. Specific session focus: completion of TCRS (Tobacco Cessation Rating Scale) reviewed strategies, controlling environment, cues, triggers, new goals set. Introduced high risk situations with preparation interventions, caffeine similarities with withdrawal issues of habit and nicotine, Alcohol, Understanding urges, cravings, stress and relaxation. Open discussion with intervention discussion. Target symptoms:  withdrawal and  medication side effects. Moderate 3: Desire or Crave Tobacco.  Severe 4: None. Pt states she quit for 5 weeks and feels confident she can get back there. Commended patient.     Progress Toward Goals and Other Mental Status Changes: The patient denies any abnormal behavioral or mental changes at this time.     Diagnosis: Z72.0  Plan: The patient will continue with group therapy sessions and medication regimen prescribed with management by physician or by the Cessation Clinic Provider. Patient will inform Smoking Cessation Counselor of symptoms as rated high on TCRS.    Return to Clinic: 1 week    Quit Date:    Planned Quit Date:

## 2019-07-29 NOTE — Clinical Note
Just a note to advise how the patient is progressing in the tobacco cessation program. Patient states she's smoking 2-3 cigarettes per day. This past weekend she smoked 3 all weekend due to cooking all weekend, and being busy. Commended pt on noticing that when she's busy it can be easier to not smoke as much. The patient remains on the prescribed tobacco cessation medication regimen of 21 mg Patches without any negative side effects at this time. CO 1 ppm, last smoked 4 hours prior. Specific session focus: completion of TCRS (Tobacco Cessation Rating Scale) reviewed strategies, controlling environment, cues, triggers, new goals set. Introduced high risk situations with preparation interventions, caffeine similarities with withdrawal issues of habit and nicotine, Alcohol, Understanding urges, cravings, stress and relaxation. Target symptoms:  withdrawal and medication side effects. Moderate 3: Desire or Crave Tobacco.  Severe 4: None. Pt states she quit for 5 week

## 2019-07-29 NOTE — TELEPHONE ENCOUNTER
DEXA   FINDINGS:  The L1 to L4 vertebral bone mineral density is equal to 0.906 g/cm squared with a T score of -2.3.    The left femoral neck bone mineral density is equal to 0.950 g/cm squared with a T score of -0.6.    The right femoral neck bone mineral density is equal to 0.977 g/cm squared with a T score of -0.4.    There is a 3.3% risk of a major osteoporotic fracture and a 0.3% risk of hip fracture in the next 10 years (FRAX).    Impression       Osteopenia of the lumbar spine.  Normal bone mineral density of the hips given age     CT low dose   FINDINGS:  Lungs: There no abnormal nodular opacity identified in the left lung.  Opacity in the right lung appears solid measuring 5 x 4 mm on series 4, image 246, not definitely seen on prior CTA.  The lungs show findings consistent with emphysema.  There are chronic appearing bibasilar interstitial changes, nonspecific.  Streaky right middle lobe and left upper lobe linear atelectasis versus scarring.    Pleura:   No effusion..    Heart and pericardium: Normal size without effusion.    Aorta and vasculature: Atherosclerosis including coronary arteries.    Chest wall and skeletal structures: Unremarkable except age-appropriate degenerative changes.    Upper abdomen: Few low-attenuation hepatic foci, too small for definitive characterization and possible cysts.  Stable postsurgical changes of the upper abdomen.  Similar bilateral adrenal nodular thickening.    Impression       Lung-RADS Category:  3 probably benign-short term follow up suggested (6 month low-dose CT).     Will discuss during f/u office visit  MD Elinor

## 2019-07-29 NOTE — LETTER
July 29, 2019      Hari Howard MD  [address]  [address]  [facility] [address]           [facility]  [address], [address]  [facility] [address]  Phone: [phone]          Patient: Chayito Chung   MR Number: [id number]   YOB: 1950   Date of Visit: 7/29/2019       Dear Dr. Hari Howard:    Thank you for referring Chayito Chung to me for evaluation. Attached you will find relevant portions of my assessment and plan of care.    If you have questions, please do not hesitate to call me. I look forward to following Chayito Chung along with you.    Sincerely,    Guadalupe Raymundo, RRT    Enclosure  CC:  No Recipients    If you would like to receive this communication electronically, please contact [email or web address] or [phone] to request more information on "[employer] Link access.    For providers and/or their staff who would like to refer a patient to [facility], please contact us through our one-stop-shop provider referral line, [facility] , at [phone].    If you feel you have received this communication in error or would no longer like to receive these types of communications, please e-mail [email or web address]

## 2019-08-05 ENCOUNTER — CLINICAL SUPPORT (OUTPATIENT)
Dept: SMOKING CESSATION | Facility: CLINIC | Age: 69
End: 2019-08-05
Payer: MEDICARE

## 2019-08-05 DIAGNOSIS — F17.210 LIGHT CIGARETTE SMOKER (1-9 CIGARETTES PER DAY): Primary | ICD-10-CM

## 2019-08-05 PROCEDURE — 99999 PR PBB SHADOW E&M-EST. PATIENT-LVL I: CPT | Mod: PBBFAC,,,

## 2019-08-05 PROCEDURE — 99999 PR PBB SHADOW E&M-EST. PATIENT-LVL I: ICD-10-PCS | Mod: PBBFAC,,,

## 2019-08-05 NOTE — Clinical Note
Just a note to advise how the patient is progressing in the tobacco cessation program. Patient states she smoking 2 cigarettes per day. She has had days that she has not smoked at all. The patient remains on the prescribed tobacco cessation medication regimen of 21 mg Patches without any negative side effects at this time. CO 3 ppm. Specific session focus: completion of TCRS (Tobacco Cessation Rating Scale) reviewed strategies, habitual behavior, high risks situations, understanding urges and cravings, stress and relaxation with open discussion and additional interventions, Introduced lapses, relapses, understanding them and analyzing the situation of a lapse, conflict issues that may be linked to a lapse. Target symptoms:  withdrawal and medication side effects. The following were rated moderate (3) to severe (4) on TCRS: Moderate 3: Angry, Irritable, Frustrated; Desire or Crave Tobacco.  Severe 4: None.

## 2019-08-06 ENCOUNTER — HOSPITAL ENCOUNTER (EMERGENCY)
Facility: HOSPITAL | Age: 69
Discharge: HOME | End: 2019-08-06
Attending: EMERGENCY MEDICINE
Payer: MEDICARE

## 2019-08-06 VITALS
WEIGHT: 145 LBS | OXYGEN SATURATION: 96 % | RESPIRATION RATE: 16 BRPM | TEMPERATURE: 99 F | HEART RATE: 76 BPM | SYSTOLIC BLOOD PRESSURE: 154 MMHG | BODY MASS INDEX: 26.52 KG/M2 | DIASTOLIC BLOOD PRESSURE: 90 MMHG

## 2019-08-06 DIAGNOSIS — R10.13 EPIGASTRIC ABDOMINAL PAIN: Primary | ICD-10-CM

## 2019-08-06 DIAGNOSIS — N30.00 ACUTE CYSTITIS WITHOUT HEMATURIA: ICD-10-CM

## 2019-08-06 LAB
ALBUMIN SERPL BCP-MCNC: 3.7 G/DL (ref 3.5–5.2)
ALP SERPL-CCNC: 131 U/L (ref 55–135)
ALT SERPL W/O P-5'-P-CCNC: 11 U/L (ref 10–44)
ANION GAP SERPL CALC-SCNC: 10 MMOL/L (ref 8–16)
AST SERPL-CCNC: 11 U/L (ref 10–40)
BACTERIA #/AREA URNS HPF: ABNORMAL /HPF
BILIRUB SERPL-MCNC: 0.2 MG/DL (ref 0.1–1)
BILIRUB UR QL STRIP: NEGATIVE
BUN SERPL-MCNC: 15 MG/DL (ref 8–23)
CALCIUM SERPL-MCNC: 9.1 MG/DL (ref 8.7–10.5)
CHLORIDE SERPL-SCNC: 108 MMOL/L (ref 95–110)
CLARITY UR: CLEAR
CO2 SERPL-SCNC: 19 MMOL/L (ref 23–29)
COLOR UR: YELLOW
CREAT SERPL-MCNC: 1 MG/DL (ref 0.5–1.4)
ERYTHROCYTE [DISTWIDTH] IN BLOOD BY AUTOMATED COUNT: 20.4 % (ref 11.5–14.5)
EST. GFR  (AFRICAN AMERICAN): >60 ML/MIN/1.73 M^2
EST. GFR  (NON AFRICAN AMERICAN): 59 ML/MIN/1.73 M^2
GLUCOSE SERPL-MCNC: 339 MG/DL (ref 70–110)
GLUCOSE UR QL STRIP: ABNORMAL
HCT VFR BLD AUTO: 38.7 % (ref 37–48.5)
HGB BLD-MCNC: 12.6 G/DL (ref 12–16)
HGB UR QL STRIP: NEGATIVE
KETONES UR QL STRIP: NEGATIVE
LEUKOCYTE ESTERASE UR QL STRIP: NEGATIVE
LIPASE SERPL-CCNC: 37 U/L (ref 4–60)
MCH RBC QN AUTO: 31.5 PG (ref 27–31)
MCHC RBC AUTO-ENTMCNC: 32.6 G/DL (ref 32–36)
MCV RBC AUTO: 97 FL (ref 82–98)
MICROSCOPIC COMMENT: ABNORMAL
NITRITE UR QL STRIP: NEGATIVE
PH UR STRIP: 7 [PH] (ref 5–8)
PLATELET # BLD AUTO: 207 K/UL (ref 150–350)
PMV BLD AUTO: 9.4 FL (ref 9.2–12.9)
POTASSIUM SERPL-SCNC: 3.6 MMOL/L (ref 3.5–5.1)
PROT SERPL-MCNC: 6.9 G/DL (ref 6–8.4)
PROT UR QL STRIP: NEGATIVE
RBC # BLD AUTO: 4 M/UL (ref 4–5.4)
RBC #/AREA URNS HPF: 2 /HPF (ref 0–4)
SODIUM SERPL-SCNC: 137 MMOL/L (ref 136–145)
SP GR UR STRIP: 1.01 (ref 1–1.03)
SQUAMOUS #/AREA URNS HPF: 15 /HPF
URN SPEC COLLECT METH UR: ABNORMAL
UROBILINOGEN UR STRIP-ACNC: 1 EU/DL
WBC # BLD AUTO: 5.47 K/UL (ref 3.9–12.7)
WBC #/AREA URNS HPF: 6 /HPF (ref 0–5)
YEAST URNS QL MICRO: ABNORMAL

## 2019-08-06 PROCEDURE — 63600175 PHARM REV CODE 636 W HCPCS: Performed by: EMERGENCY MEDICINE

## 2019-08-06 PROCEDURE — 96361 HYDRATE IV INFUSION ADD-ON: CPT

## 2019-08-06 PROCEDURE — 81000 URINALYSIS NONAUTO W/SCOPE: CPT

## 2019-08-06 PROCEDURE — 85027 COMPLETE CBC AUTOMATED: CPT

## 2019-08-06 PROCEDURE — 96375 TX/PRO/DX INJ NEW DRUG ADDON: CPT

## 2019-08-06 PROCEDURE — 96376 TX/PRO/DX INJ SAME DRUG ADON: CPT

## 2019-08-06 PROCEDURE — 96374 THER/PROPH/DIAG INJ IV PUSH: CPT | Mod: 59

## 2019-08-06 PROCEDURE — 80053 COMPREHEN METABOLIC PANEL: CPT

## 2019-08-06 PROCEDURE — 25500020 PHARM REV CODE 255: Performed by: EMERGENCY MEDICINE

## 2019-08-06 PROCEDURE — 99285 EMERGENCY DEPT VISIT HI MDM: CPT | Mod: 25

## 2019-08-06 PROCEDURE — 83690 ASSAY OF LIPASE: CPT

## 2019-08-06 RX ORDER — NITROFURANTOIN 25; 75 MG/1; MG/1
100 CAPSULE ORAL 2 TIMES DAILY
Qty: 6 CAPSULE | Refills: 0 | Status: SHIPPED | OUTPATIENT
Start: 2019-08-06 | End: 2019-08-09

## 2019-08-06 RX ORDER — SODIUM CHLORIDE 9 MG/ML
500 INJECTION, SOLUTION INTRAVENOUS
Status: COMPLETED | OUTPATIENT
Start: 2019-08-06 | End: 2019-08-06

## 2019-08-06 RX ORDER — HYDROMORPHONE HYDROCHLORIDE 1 MG/ML
0.5 INJECTION, SOLUTION INTRAMUSCULAR; INTRAVENOUS; SUBCUTANEOUS
Status: COMPLETED | OUTPATIENT
Start: 2019-08-06 | End: 2019-08-06

## 2019-08-06 RX ORDER — MORPHINE SULFATE 4 MG/ML
4 INJECTION, SOLUTION INTRAMUSCULAR; INTRAVENOUS
Status: DISCONTINUED | OUTPATIENT
Start: 2019-08-06 | End: 2019-08-06

## 2019-08-06 RX ORDER — ONDANSETRON 2 MG/ML
4 INJECTION INTRAMUSCULAR; INTRAVENOUS
Status: COMPLETED | OUTPATIENT
Start: 2019-08-06 | End: 2019-08-06

## 2019-08-06 RX ORDER — PROMETHAZINE HYDROCHLORIDE 25 MG/1
25 TABLET ORAL EVERY 6 HOURS PRN
Qty: 8 TABLET | Refills: 0 | Status: SHIPPED | OUTPATIENT
Start: 2019-08-06 | End: 2019-08-08

## 2019-08-06 RX ADMIN — IOHEXOL 75 ML: 350 INJECTION, SOLUTION INTRAVENOUS at 02:08

## 2019-08-06 RX ADMIN — ONDANSETRON 4 MG: 2 INJECTION INTRAMUSCULAR; INTRAVENOUS at 01:08

## 2019-08-06 RX ADMIN — HYDROMORPHONE HYDROCHLORIDE 0.5 MG: 1 INJECTION, SOLUTION INTRAMUSCULAR; INTRAVENOUS; SUBCUTANEOUS at 01:08

## 2019-08-06 RX ADMIN — HYDROMORPHONE HYDROCHLORIDE 0.5 MG: 1 INJECTION, SOLUTION INTRAMUSCULAR; INTRAVENOUS; SUBCUTANEOUS at 06:08

## 2019-08-06 RX ADMIN — SODIUM CHLORIDE 500 ML: 0.9 INJECTION, SOLUTION INTRAVENOUS at 01:08

## 2019-08-06 NOTE — ED NOTES
Pt assisted to restroom on return from CT. States pain and nausea have completely resolved. IV was leaking when flushed in CT, so new 22g IV was started in right outer AC by CT tech.

## 2019-08-06 NOTE — ED NOTES
Pt c/o epigastric pain and nausea x2 days. Pain radiates to the center of her back. Denies vomiting, constipation, or diarrhea. States last bm was last night. Pt states nausea yesterday was unrelieved by antiemetic pills, and pain only improved last night after taking a norco.

## 2019-08-06 NOTE — ED PROVIDER NOTES
Encounter Date: 8/6/2019    SCRIBE #1 NOTE: I, David Dai, am scribing for, and in the presence of,  Dr. Quiñonez . I have scribed the entire note.       History     Chief Complaint   Patient presents with    Abdominal Pain     reports epigastric abd pain for past 3 days. last BM last night. reports nausea with abd pain. reports had relief last night after taking hydrocodone but reports pain came back today      This is a 68 y.o. female who has a past medical history of Acute coronary syndrome, Acute hypoxemic respiratory failure (5/1/2017), Anxiety, Asthma, Cancer, Cataracts, both eyes, Chest pain at rest, Chest pain of uncertain etiology (12/6/2015), Colon cancer (1988), COPD (chronic obstructive pulmonary disease), Coronary artery disease, Depression, Diabetes mellitus, Dyspnea on exertion (11/15/2018), Elevated brain natriuretic peptide (BNP) level (11/15/2018), Elevated cholesterol, Hypertension, Swelling, and Syncope and collapse (11/8/2016).     The patient presents to the Emergency Department due to epigastric abdominal pain that started three days ago.   She reports associated nausea but denies any fever, vomiting, diarrhea, dysuria, hematuria.  Her last bowel movement was yesterday, and she denies any hematochezia or melena. She takes hydrocodone at home, that initially helped, but her pain returned today, so she presents for evaluation.  She has been taking zofran for nausea without improvement.  Patient notes she has a history of pancreatitis. She also notes multiple prior abdominal surgeries, including partial gastrectomy.        The history is provided by the patient.     Review of patient's allergies indicates:  No Known Allergies  Past Medical History:   Diagnosis Date    Acute coronary syndrome     Acute hypoxemic respiratory failure 5/1/2017    Anxiety     Asthma     Cancer     colon    Cataracts, both eyes     Chest pain at rest     Chest pain of uncertain etiology 12/6/2015    Colon  cancer 1988    COPD (chronic obstructive pulmonary disease)     Coronary artery disease     Depression     Diabetes mellitus     Dyspnea on exertion 11/15/2018    Elevated brain natriuretic peptide (BNP) level 11/15/2018    Elevated cholesterol     Hypertension     Swelling     Syncope and collapse 2016     Past Surgical History:   Procedure Laterality Date    ABDOMINAL SURGERY      BLOCK-NERVE-MEDIAL BRANCH-LUMBAR Bilateral 10/18/2016    Per[facility] at [facility]    BREAST BIOPSY      benign unsure what side     SECTION, CLASSIC      COLON SURGERY      CORONARY ANGIOPLASTY WITH STENT PLACEMENT  3 months ago     x2, Hysterectomy, Lung surgery, Partial stomach removed, Part of colon removed for rectal cancer    GASTRECTOMY      HEMORRHOID SURGERY      HYSTERECTOMY      @28yrs of age    INJECTION-FACET Left 2016    Per[facility] at [facility]    INJECTION-FACET Right 2016    Per[facility] at [facility]    LUNG BIOPSY      OOPHORECTOMY      @28yrs of age    RADIOFREQUENCY THERMOCOAGULATION (RFTC)-NERVE-MEDIAN BRANCH-LUMBAR Bilateral 10/19/2016    Performed by [facility] Powell [facility] at [facility]     Family History   Problem Relation Age of Onset    Cancer Mother     Diabetes Mother     Hypertension Mother     Breast cancer Mother     Cancer Father     Heart disease Father     Depression Sister     Hypertension Sister     Cancer Maternal Aunt      Social History     Tobacco Use    Smoking status: Current Every Day Smoker     Packs/day: 1.00     Years: 50.00     Pack years: 50.00     Types: Cigarettes    Smokeless tobacco: Never Used    Tobacco comment: Smoking 5 cigs daily presently   Substance Use Topics    Alcohol use: Yes     Alcohol/week: 1.8 oz     Types: 3 Glasses of wine per week     Comment: 1 every 3 months    Drug use: No     Review of Systems   Constitutional: Negative for  chills and fever.   HENT: Negative for congestion, rhinorrhea and sore throat.    Eyes: Negative for redness and visual disturbance.   Respiratory: Negative for cough, shortness of breath and wheezing.    Cardiovascular: Negative for chest pain and palpitations.   Gastrointestinal: Positive for abdominal pain and nausea. Negative for abdominal distention, constipation, diarrhea and vomiting.   Genitourinary: Negative for dysuria and hematuria.   Musculoskeletal: Negative for back pain, myalgias and neck pain.   Skin: Negative for rash.   Neurological: Negative for dizziness, weakness and light-headedness.   Psychiatric/Behavioral: Negative for confusion.       Physical Exam     Initial Vitals [08/06/19 1202]   BP Pulse Resp Temp SpO2   139/78 108 20 98.9 °F (37.2 °C) 97 %      MAP       --         Physical Exam    Nursing note and vitals reviewed.  Constitutional: She appears well-developed and well-nourished. She is not diaphoretic. No distress.   HENT:   Head: Normocephalic and atraumatic.   Mouth/Throat: Oropharynx is clear and moist.   Eyes: EOM are normal. Pupils are equal, round, and reactive to light.   Neck: No tracheal deviation present.   Cardiovascular: Regular rhythm, normal heart sounds and intact distal pulses. Tachycardia present.    Heart rate low 100's.    Pulmonary/Chest: Breath sounds normal. No stridor. No respiratory distress. She has no wheezes.   Abdominal: Soft. Bowel sounds are normal. She exhibits no distension and no mass. There is tenderness in the epigastric area. There is no rigidity, no rebound, no guarding, no CVA tenderness, no tenderness at McBurney's point and negative Walters's sign.   Large midline vertical abdominal scar. Well healed.   Musculoskeletal: Normal range of motion. She exhibits no edema.   Neurological: She is alert and oriented to person, place, and time. She has normal strength. No cranial nerve deficit or sensory deficit.   Skin: Skin is warm and dry. Capillary  refill takes less than 2 seconds. No pallor.   Psychiatric: She has a normal mood and affect. Her behavior is normal. Thought content normal.         ED Course   Procedures  Labs Reviewed   CBC WITHOUT DIFFERENTIAL - Abnormal; Notable for the following components:       Result Value    Mean Corpuscular Hemoglobin 31.5 (*)     RDW 20.4 (*)     All other components within normal limits   COMPREHENSIVE METABOLIC PANEL - Abnormal; Notable for the following components:    CO2 19 (*)     Glucose 339 (*)     eGFR if non  59 (*)     All other components within normal limits   URINALYSIS, REFLEX TO URINE CULTURE - Abnormal; Notable for the following components:    Glucose, UA 3+ (*)     All other components within normal limits    Narrative:     Preferred Collection Type->Urine, Clean Catch   URINALYSIS MICROSCOPIC - Abnormal; Notable for the following components:    WBC, UA 6 (*)     Bacteria Few (*)     All other components within normal limits    Narrative:     Preferred Collection Type->Urine, Clean Catch   LIPASE          Imaging Results          CT Abdomen Pelvis With Contrast (Final result)  Result time 08/06/19 14:57:56    Final result by Alphonse Alex MD (08/06/19 14:57:56)                 Impression:      1. Distention of the urinary bladder without wall thickening, finding is nonspecific, correlation with urinalysis as warranted.  2. Continued prominence of the intra and extrahepatic bile ducts, correlation with ERCP as warranted.  3. Probable atelectasis within the medial aspect of the right lower lobe, developing consolidation however is a consideration and correlation is recommended.  4. Findings suggesting hepatic steatosis, correlation with LFTs advised.  5. No findings to suggest high-grade bowel obstruction as clinically questioned.  6. Additional findings above.      Electronically signed by: Alphonse Alex MD  Date:    08/06/2019  Time:    14:57             Narrative:     EXAMINATION:  CT ABDOMEN PELVIS WITH CONTRAST    CLINICAL HISTORY:  Bowel obstruction, high-grade;    TECHNIQUE:  Low dose axial images, sagittal and coronal reformations were obtained from the lung bases to the pubic symphysis following the IV administration of 75 mL of Omnipaque 350 .  Oral contrast was not given.    COMPARISON:  11/07/2017    FINDINGS:  Images of the lower thorax are far commode for bilateral dependent atelectasis/scarring.  There is slight ground-glass attenuation along the medial aspect of the right lower lobe versus atelectasis.    The liver is hypoattenuating, suggesting steatosis, correlation with LFTs recommended.  Three hypoattenuating subcentimeter lesions are noted within the right hepatic lobe, too small for characterization, stable as compared to the previous examination.  There is an additional lobulated hypoattenuating lesion within the lateral aspect of the left hepatic lobe, also stable.  Surgical changes are noted of the gastroesophageal junction and stomach.  There is lobular thickening of the adrenal glands bilaterally, noting left adrenal nodule measures up to 1.5 cm, similar to the previous examination.  The pancreas is unremarkable.  The gallbladder is unremarkable noting prominence of the common duct, similar to the previous examination without intraluminal filling defect.  The portal vein, splenic vein, SMV, celiac axis and SMA all are patent.    The kidneys enhance symmetrically.  There is excretion of contrast by the kidneys bilaterally, limiting evaluation for nephrolithiasis.  No hydronephrosis.  The bilateral ureters are grossly unremarkable along their visualized extent, no definite calculi seen.  The urinary bladder is mildly distended without wall thickening.  The uterus is absent the adnexa is grossly unremarkable.    The large bowel is grossly unremarkable noting moderate stool within the right colon.  The terminal ileum is unremarkable.  The appendix is not  clearly identified, no pericecal inflammation.  There are a few scattered fluid-filled small bowel loops, nondilated.  No small bowel wall thickening.  No findings to suggest high-grade bowel obstruction.  No focal organized pelvic fluid collection.    Degenerative changes are noted of the spine.  There is osteopenia.  There is either Schmorl's node involvement or prior compression deformity involving the inferior endplate of L1, stable.  No significant inguinal lymphadenopathy.  Surgical changes are noted of the anterior abdominal wall.  There are probable anterior abdominal wall injection sites.                                 Medical Decision Making:   Initial Assessment:   67 yo F with history of pancreatitis, multiple prior abdominal surgeries presents to ED with epigastric abdominal pain and nausea.  Vitals with mild tachycardia, otherwise reassuring.  Exam with mild epigastric tenderness, no rebound/guarding.  Will obtain labs, CT A/P given surgical history, and reassess.  Differential Diagnosis:   Differential Diagnosis includes, but is not limited to:  AAA, aortic dissection, mesenteric ischemia, perforated viscous, MI/ACS, SBO/volvulus, incarcerated/strangulated hernia, intussusception, ileus, appendicitis, cholecystitis, cholangitis, diverticulitis, esophagitis, hepatitis, nephrolithiasis, pancreatitis, gastroenteritis, colitis, IBD/IBS, biliary colic, GERD, PUD, constipation, UTI/pyelonephritis,  disorder.      Clinical Tests:   Lab Tests: Ordered and Reviewed  Radiological Study: Ordered and Reviewed  ED Management:  Labs unremarkable. Lipase WNL.  CT without acute findings.  UA with mild UTI, will RX Keflex.  Counseled on continuing omeprazole as prescribed, and f/u with PCP or GI if symptoms persist.  Given return precautions including vomiting, hematochezia/hematemesis/melena, fever, or any other concerns.  Upon re-evaluation, the patient's status has improved.  After complete ED evaluation,  clinical impression is most consistent with epigastric pain.  PCP/GI follow-up within 2-3 days was recommended.    After taking into careful account the patient's history, physical exam findings, as well as empirical and objective data obtained throughout ED workup, I feel no emergent medical condition has been identified. No further evaluation or admission was felt to be required, and the patient is stable for discharge from the ED. The patient and any additional family present were updated with test results, overall clinical impression, and recommended further plan of care, including discharge instructions as provided and outpatient follow-up for continued evaluation and management as needed. All questions were answered. The patient expressed understanding and agreed with current plan for discharge and follow-up plan of care. Strict ED return precautions were provided, including return/worsening of current symptoms, new symptoms, or any other concerns.      Medications   0.9%  NaCl infusion (0 mLs Intravenous Stopped 8/6/19 1504)   ondansetron injection 4 mg (4 mg Intravenous Given 8/6/19 1335)   HYDROmorphone injection 0.5 mg (0.5 mg Intravenous Given 8/6/19 1346)   iohexol (OMNIPAQUE 350) injection 75 mL (75 mLs Intravenous Given 8/6/19 1410)   HYDROmorphone injection 0.5 mg (0.5 mg Intravenous Given 8/6/19 1802)     Discharge Medication List as of 8/6/2019  5:17 PM      START taking these medications    Details   nitrofurantoin, macrocrystal-monohydrate, (MACROBID) 100 MG capsule Take 1 capsule (100 mg total) by mouth 2 (two) times daily. for 3 days, Starting Tue 8/6/2019, Until Fri 8/9/2019, Print      promethazine (PHENERGAN) 25 MG tablet Take 1 tablet (25 mg total) by mouth every 6 (six) hours as needed for Nausea., Starting Tue 8/6/2019, Until Thu 8/8/2019, Print                                 Clinical Impression:       ICD-10-CM ICD-9-CM   1. Epigastric abdominal pain R10.13 789.06   2. Acute cystitis  without hematuria N30.00 595.0         Disposition:   Disposition: Discharged  Condition: Stable          I, Dr. Shahzad Quiñonez, personally performed the services described in this documentation. All medical record entries made by the scribe were at my direction and in my presence.  I have reviewed the chart and agree that the record reflects my personal performance and is accurate and complete.     Shahzad Quiñonez MD.                   Shahzad Quiñonez MD  08/08/19 1003

## 2019-08-07 NOTE — PROGRESS NOTES
Smoking Cessation Group Session #5    Site: [facility]  Date:  8/5/19  Clinical Status of Patient: Outpatient   Length of Service and Code: 90 minutes - 68466   Number in Attendance: 8  Group Activities/Focus of Group:  Sharing last weeks challenges, triggers, and coping activities to remain quit and/ or keep making progress toward cessation, completion of TCRS (Tobacco Cessation Rating Scale) learned addiction model, personal reasons for quitting, medications, goals, quit date.    Specific session focus: completion of TCRS (Tobacco Cessation Rating Scale) reviewed strategies, habitual behavior, high risks situations, understanding urges and cravings, stress and relaxation with open discussion and additional interventions, Introduced lapses, relapses, understanding them and analyzing the situation of a lapse, conflict issues that may be linked to a lapse.     Target symptoms:  withdrawal and medication side effects             The following were rated moderate (3) to severe (4) on TCRS:       Moderate 3: Angry, Irritable, Frustrated; Desire or Crave Tobacco     Severe 4:   None    Patient's Response to Intervention: Patient states she smoking 2 cigarettes per day. She has had days that she has not smoked at all. The patient remains on the prescribed tobacco cessation medication regimen of 21 mg Patches without any negative side effects at this time. CO 3 ppm. Specific session focus: completion of TCRS (Tobacco Cessation Rating Scale) reviewed strategies, habitual behavior, high risks situations, understanding urges and cravings, stress and relaxation with open discussion and additional interventions, Introduced lapses, relapses, understanding them and analyzing the situation of a lapse, conflict issues that may be linked to a lapse. Target symptoms:  withdrawal and medication side effects. The following were rated moderate (3) to severe (4) on TCRS: Moderate 3: Angry, Irritable, Frustrated; Desire or Crave Tobacco.   Severe 4: None.     Progress Toward Goals and Other Mental Status Changes: The patient denies any abnormal behavioral or mental changes at this time.     Diagnosis: Z72.0  Plan: The patient will continue with group therapy sessions and medication regimen prescribed with management by physician or by the Cessation Clinic Provider. Patient will inform Smoking Cessation Counselor of symptoms as rated high on TCRS.    Return to Clinic: 1 week    Quit Date:    Planned Quit Date:

## 2019-08-08 ENCOUNTER — TELEPHONE (OUTPATIENT)
Dept: FAMILY MEDICINE | Facility: CLINIC | Age: 69
End: 2019-08-08

## 2019-08-08 NOTE — TELEPHONE ENCOUNTER
----- Message from Palmira Camacho sent at 8/8/2019  9:23 AM CDT -----  Contact: MARYBEL RODRIGUEZ [[id number]]  Name of Who is Calling: MARYBEL RODRIGUEZ [[id number]]    What is the request in detail: Patient is needing a medication lsit faxed to [facility] at [phone], patient states she is currently at the dentist office nowPlease contact to further discuss and advise      Can the clinic reply by [facility]: No     What Number to Call Back [facility]: [phone]

## 2019-08-08 NOTE — TELEPHONE ENCOUNTER
Returned patient's call and advised that medication list will be faxed over ASAP.  Patient verbalized understanding.

## 2019-08-12 ENCOUNTER — PATIENT OUTREACH (OUTPATIENT)
Dept: ADMINISTRATIVE | Facility: HOSPITAL | Age: 69
End: 2019-08-12

## 2019-08-12 ENCOUNTER — TELEPHONE (OUTPATIENT)
Dept: ADMINISTRATIVE | Facility: HOSPITAL | Age: 69
End: 2019-08-12

## 2019-08-12 ENCOUNTER — CLINICAL SUPPORT (OUTPATIENT)
Dept: SMOKING CESSATION | Facility: CLINIC | Age: 69
End: 2019-08-12
Payer: COMMERCIAL

## 2019-08-12 DIAGNOSIS — F17.210 LIGHT CIGARETTE SMOKER (1-9 CIGARETTES PER DAY): Primary | ICD-10-CM

## 2019-08-12 PROCEDURE — 99999 PR PBB SHADOW E&M-EST. PATIENT-LVL I: CPT | Mod: PBBFAC,,,

## 2019-08-12 PROCEDURE — 90853 GROUP PSYCHOTHERAPY: CPT | Mod: S$GLB,,,

## 2019-08-12 PROCEDURE — 90853 PR GROUP PSYCHOTHERAPY: ICD-10-PCS | Mod: S$GLB,,,

## 2019-08-12 PROCEDURE — 99999 PR PBB SHADOW E&M-EST. PATIENT-LVL I: ICD-10-PCS | Mod: PBBFAC,,,

## 2019-08-12 NOTE — Clinical Note
"Just a note to advise how the patient is progressing in the tobacco cessation program. Patient states she smoked 2 cigarettes on Sunday and 2 cigarettes today but goes most days with not smoking. When she gets around her sister who "smokes a lot" that she smokes.  CO 17 ppm, last smoked 1 hour prior. The patient remains on the prescribed tobacco cessation medication regimen of 21 mg Patches without any negative side effects at this time. Specific session focus: completion of TCRS (Tobacco Cessation Rating Scale) reviewed strategies, controlling environment, cues, triggers, new goals set. Introduced high risk situations with preparation interventions, caffeine similarities with withdrawal issues of habit and nicotine, Alcohol, Understanding urges, cravings, stress and relaxation. Open discussion with intervention discussion. Target symptoms:  withdrawal and medication side effects, moderate 3: Angry. Irritable, Frustrated. Reminded pt to remember the reasons why she's quitting smoking. "

## 2019-08-12 NOTE — PROGRESS NOTES
"Smoking Cessation Group Session #3    Site: [facility]  Date:  8/12/2019  Clinical Status of Patient: Outpatient   Length of Service and Code: 90 minutes - 14814   Number in Attendance: 12  Group Activities/Focus of Group:  Sharing last weeks challenges, triggers, and coping activities to remain quit and/ or keep making progress toward cessation, completion of TCRS (Tobacco Cessation Rating Scale) learned addiction model, personal reasons for quitting, medications, goals, quit date.    Specific session focus: completion of TCRS (Tobacco Cessation Rating Scale) reviewed strategies, controlling environment, cues, triggers, new goals set. Introduced high risk situations with preparation interventions, caffeine similarities with withdrawal issues of habit and nicotine, Alcohol, Understanding urges, cravings, stress and relaxation. Open discussion with intervention discussion.    Target symptoms:  withdrawal and medication side effects             The following were rated moderate (3) to severe (4) on TCRS:       Moderate 3: Angry. Irritable, Frustrated     Severe 4:   None    Patient's Response to Intervention: Patient states she smoked 2 cigarettes on Sunday and 2 cigarettes today but goes most days with not smoking. When she gets around her sister who "smokes a lot" that she smokes.  CO 17 ppm, last smoked 1 hour prior. The patient remains on the prescribed tobacco cessation medication regimen of 21 mg Patches without any negative side effects at this time. Specific session focus: completion of TCRS (Tobacco Cessation Rating Scale) reviewed strategies, controlling environment, cues, triggers, new goals set. Introduced high risk situations with preparation interventions, caffeine similarities with withdrawal issues of habit and nicotine, Alcohol, Understanding urges, cravings, stress and relaxation. Open discussion with intervention discussion. Target symptoms:  withdrawal and medication side effects. The following were " rated moderate (3) to severe (4) on TCRS: Moderate 3: Angry. Irritable, Frustrated. Severe 4: None. Reminded pt to remember the reasons why she's quitting smoking.       Progress Toward Goals and Other Mental Status Changes: The patient denies any abnormal behavioral or mental changes at this time.     Diagnosis: Z72.0  Plan: The patient will continue with group therapy sessions and medication regimen prescribed with management by physician or by the Cessation Clinic Provider. Patient will inform Smoking Cessation Counselor of symptoms as rated high on TCRS.    Return to Clinic: 1 week    Quit Date:    Planned Quit Date:

## 2019-08-19 ENCOUNTER — CLINICAL SUPPORT (OUTPATIENT)
Dept: SMOKING CESSATION | Facility: CLINIC | Age: 69
End: 2019-08-19
Payer: COMMERCIAL

## 2019-08-19 DIAGNOSIS — F17.210 LIGHT CIGARETTE SMOKER (1-9 CIGARETTES PER DAY): Primary | ICD-10-CM

## 2019-08-19 PROCEDURE — 99999 PR PBB SHADOW E&M-EST. PATIENT-LVL I: ICD-10-PCS | Mod: PBBFAC,,,

## 2019-08-19 PROCEDURE — 90853 PR GROUP PSYCHOTHERAPY: ICD-10-PCS | Mod: S$GLB,,,

## 2019-08-19 PROCEDURE — 99999 PR PBB SHADOW E&M-EST. PATIENT-LVL I: CPT | Mod: PBBFAC,,,

## 2019-08-19 PROCEDURE — 90853 GROUP PSYCHOTHERAPY: CPT | Mod: S$GLB,,,

## 2019-08-19 NOTE — PROGRESS NOTES
Smoking Cessation Group Session #1    Site: [facility]  Date:  8/19/2019  Clinical Status of Patient: Outpatient   Length of Service and Code: 90 minutes - 34771   Number in Attendance: 7  Group Activities/Focus of Group:  Sharing last weeks challenges, triggers, and coping activities to remain quit and/ or keep making progress toward cessation, completion of TCRS (Tobacco Cessation Rating Scale) learned addiction model, personal reasons for quitting, medications, goals, quit date.    Specific session focus:  orientation, client introductions, completion of TCRS (Tobacco Cessation Rating Scale) learned addiction model, cues/triggers, personal reasons for quitting, medications, goals, quit date discussed but not set.     Target symptoms:  withdrawal and medication side effects             The following were rated moderate (3) to severe (4) on TCRS:       Moderate 3: None     Severe 4:   None    Patient's Response to Intervention: Patient states she smoking no more than 2 cigarettes per day, she has days where she doesn't smoke at all. CO 10 ppm, (<6 is nonsmoker) Advised pt to make this pack her last pack, even suggested to give away some that she currently has. Session Focus:  orientation, client introductions, completion of TCRS (Tobacco Cessation Rating Scale) learned addiction model, cues/triggers, personal reasons for quitting, medications, goals, quit date discussed but not set yet. The patient remains on the prescribed tobacco cessation medication regimen of 21 mg Patches without any negative side effects at this time. Specific session focus:  orientation, client introductions, completion of TCRS (Tobacco Cessation Rating Scale) learned addiction model, cues/triggers, personal reasons for quitting, medications, goals, quit date discussed but not set. Target symptoms:  withdrawal and medication side effects. The following were rated moderate (3) to severe (4) on TCRS: Moderate 3: None. Severe 4: None.     Progress  Toward Goals and Other Mental Status Changes: The patient denies any abnormal behavioral or mental changes at this time.     Diagnosis: Z72.0  Plan: The patient will continue with group therapy sessions and medication regimen prescribed with management by physician or by the Cessation Clinic Provider. Patient will inform Smoking Cessation Counselor of symptoms as rated high on TCRS.    Return to Clinic: 1 week    Quit Date:    Planned Quit Date:

## 2019-08-19 NOTE — Clinical Note
Just a note to advise how the patient is progressing in the tobacco cessation program. Patient states she smoking no more than 2 cigarettes per day, she has days where she doesn't smoke at all. CO 10 ppm, (<6 is nonsmoker) Advised pt to make this pack her last pack, even suggested to give away some that she currently has. Session Focus:  orientation, client introductions, completion of TCRS (Tobacco Cessation Rating Scale) learned addiction model, cues/triggers, personal reasons for quitting, medications, goals, quit date discussed but not set yet. The patient remains on the prescribed tobacco cessation medication regimen of 21 mg Patches without any negative side effects at this time. Specific session focus:  orientation, client introductions, completion of TCRS (Tobacco Cessation Rating Scale) learned addiction model, cues/triggers, personal reasons for quitting, medications, goals, quit date discussed but not set.

## 2019-08-26 ENCOUNTER — OFFICE VISIT (OUTPATIENT)
Dept: FAMILY MEDICINE | Facility: CLINIC | Age: 69
End: 2019-08-26
Payer: MEDICARE

## 2019-08-26 ENCOUNTER — CLINICAL SUPPORT (OUTPATIENT)
Dept: SMOKING CESSATION | Facility: CLINIC | Age: 69
End: 2019-08-26
Payer: COMMERCIAL

## 2019-08-26 VITALS
HEIGHT: 62 IN | HEART RATE: 103 BPM | WEIGHT: 146.38 LBS | SYSTOLIC BLOOD PRESSURE: 138 MMHG | BODY MASS INDEX: 26.94 KG/M2 | DIASTOLIC BLOOD PRESSURE: 82 MMHG | OXYGEN SATURATION: 95 %

## 2019-08-26 DIAGNOSIS — I15.2 HYPERTENSION ASSOCIATED WITH DIABETES: ICD-10-CM

## 2019-08-26 DIAGNOSIS — Z79.4 UNCONTROLLED TYPE 2 DIABETES MELLITUS WITH HYPERGLYCEMIA, WITH LONG-TERM CURRENT USE OF INSULIN: Primary | ICD-10-CM

## 2019-08-26 DIAGNOSIS — F17.210 LIGHT CIGARETTE SMOKER (1-9 CIGARETTES PER DAY): Primary | ICD-10-CM

## 2019-08-26 DIAGNOSIS — E11.69 HYPERLIPIDEMIA ASSOCIATED WITH TYPE 2 DIABETES MELLITUS: ICD-10-CM

## 2019-08-26 DIAGNOSIS — M79.641 RIGHT HAND PAIN: ICD-10-CM

## 2019-08-26 DIAGNOSIS — E78.5 HYPERLIPIDEMIA ASSOCIATED WITH TYPE 2 DIABETES MELLITUS: ICD-10-CM

## 2019-08-26 DIAGNOSIS — I25.119 CORONARY ARTERY DISEASE INVOLVING NATIVE CORONARY ARTERY OF NATIVE HEART WITH ANGINA PECTORIS: ICD-10-CM

## 2019-08-26 DIAGNOSIS — I50.32 CHRONIC DIASTOLIC CONGESTIVE HEART FAILURE: ICD-10-CM

## 2019-08-26 DIAGNOSIS — E55.9 VITAMIN D DEFICIENCY: ICD-10-CM

## 2019-08-26 DIAGNOSIS — Z98.61 S/P PTCA (PERCUTANEOUS TRANSLUMINAL CORONARY ANGIOPLASTY): ICD-10-CM

## 2019-08-26 DIAGNOSIS — M85.88 OSTEOPENIA OF LUMBAR SPINE: ICD-10-CM

## 2019-08-26 DIAGNOSIS — E11.59 HYPERTENSION ASSOCIATED WITH DIABETES: ICD-10-CM

## 2019-08-26 DIAGNOSIS — E11.65 UNCONTROLLED TYPE 2 DIABETES MELLITUS WITH HYPERGLYCEMIA, WITH LONG-TERM CURRENT USE OF INSULIN: Primary | ICD-10-CM

## 2019-08-26 PROCEDURE — 99999 PR PBB SHADOW E&M-EST. PATIENT-LVL V: ICD-10-PCS | Mod: PBBFAC,,, | Performed by: FAMILY MEDICINE

## 2019-08-26 PROCEDURE — 99402 PREV MED CNSL INDIV APPRX 30: CPT | Mod: S$GLB,,,

## 2019-08-26 PROCEDURE — 3075F SYST BP GE 130 - 139MM HG: CPT | Mod: CPTII,S$GLB,, | Performed by: FAMILY MEDICINE

## 2019-08-26 PROCEDURE — 99499 RISK ADDL DX/OHS AUDIT: ICD-10-PCS | Mod: S$GLB,,, | Performed by: FAMILY MEDICINE

## 2019-08-26 PROCEDURE — 99402 PR PREVENT COUNSEL,INDIV,30 MIN: ICD-10-PCS | Mod: S$GLB,,,

## 2019-08-26 PROCEDURE — 3075F PR MOST RECENT SYSTOLIC BLOOD PRESS GE 130-139MM HG: ICD-10-PCS | Mod: CPTII,S$GLB,, | Performed by: FAMILY MEDICINE

## 2019-08-26 PROCEDURE — 99499 UNLISTED E&M SERVICE: CPT | Mod: S$GLB,,, | Performed by: FAMILY MEDICINE

## 2019-08-26 PROCEDURE — 99214 OFFICE O/P EST MOD 30 MIN: CPT | Mod: S$GLB,,, | Performed by: FAMILY MEDICINE

## 2019-08-26 PROCEDURE — 3288F PR FALLS RISK ASSESSMENT DOCUMENTED: ICD-10-PCS | Mod: CPTII,S$GLB,, | Performed by: FAMILY MEDICINE

## 2019-08-26 PROCEDURE — 99999 PR PBB SHADOW E&M-EST. PATIENT-LVL I: ICD-10-PCS | Mod: PBBFAC,,,

## 2019-08-26 PROCEDURE — 3045F PR MOST RECENT HEMOGLOBIN A1C LEVEL 7.0-9.0%: ICD-10-PCS | Mod: CPTII,S$GLB,, | Performed by: FAMILY MEDICINE

## 2019-08-26 PROCEDURE — 1100F PTFALLS ASSESS-DOCD GE2>/YR: CPT | Mod: CPTII,S$GLB,, | Performed by: FAMILY MEDICINE

## 2019-08-26 PROCEDURE — 99999 PR PBB SHADOW E&M-EST. PATIENT-LVL V: CPT | Mod: PBBFAC,,, | Performed by: FAMILY MEDICINE

## 2019-08-26 PROCEDURE — 3079F PR MOST RECENT DIASTOLIC BLOOD PRESSURE 80-89 MM HG: ICD-10-PCS | Mod: CPTII,S$GLB,, | Performed by: FAMILY MEDICINE

## 2019-08-26 PROCEDURE — 3045F PR MOST RECENT HEMOGLOBIN A1C LEVEL 7.0-9.0%: CPT | Mod: CPTII,S$GLB,, | Performed by: FAMILY MEDICINE

## 2019-08-26 PROCEDURE — 99999 PR PBB SHADOW E&M-EST. PATIENT-LVL I: CPT | Mod: PBBFAC,,,

## 2019-08-26 PROCEDURE — 1100F PR PT FALLS ASSESS DOC 2+ FALLS/FALL W/INJURY/YR: ICD-10-PCS | Mod: CPTII,S$GLB,, | Performed by: FAMILY MEDICINE

## 2019-08-26 PROCEDURE — 3079F DIAST BP 80-89 MM HG: CPT | Mod: CPTII,S$GLB,, | Performed by: FAMILY MEDICINE

## 2019-08-26 PROCEDURE — 3288F FALL RISK ASSESSMENT DOCD: CPT | Mod: CPTII,S$GLB,, | Performed by: FAMILY MEDICINE

## 2019-08-26 PROCEDURE — 99214 PR OFFICE/OUTPT VISIT, EST, LEVL IV, 30-39 MIN: ICD-10-PCS | Mod: S$GLB,,, | Performed by: FAMILY MEDICINE

## 2019-08-26 RX ORDER — MICONAZOLE NITRATE 2 %
2 CREAM (GRAM) TOPICAL
Qty: 200 EACH | Refills: 0 | Status: SHIPPED | OUTPATIENT
Start: 2019-08-26 | End: 2019-12-16

## 2019-08-26 NOTE — PROGRESS NOTES
Subjective:       Patient ID: Chayito Chung is a 68 y.o. female.    Chief Complaint: Follow-up      69 yo F pt, new to me (previously followed by Dr. Sidney Delgado) with PMH significant for CAD s/p stent placement; CHF; HTN; HLD; uncontrolled DM-2; COPD; tobacco abuse; solitary pulmonary nodule s/p resection 4/2012; h/o colon cancer s/p resection; steroid induced gastritis s/p partial gastrectomy;  Migraine Headaches; seizure disorder; Chronic Back pain; MDD/JAZMIN; Fe deficiency anemia  She presents for f/u visit today. She had laboratory test drawn 7/25/19:     HIV, RPR, GC/CT, Hep C neg   A1c 7.2. Adherent with NovoLog 70/30 45 units qa.m. and 65 units q.p.m.  also using NovoLog sliding scale  Followed by endocrinologist (Dr. Delgado--private)  ; ; HDL 54; LDL 79.6.  Adherent with pravastatin 20 mg HS  Vitamin D 9--Pt states she was unable to afford prescription vitamin-D.  She begin taking over-the-counter vitamin D supplementation (dose unknown).   Last visit she did c/o  right hand/wrist pain and numbness which seemed most c/w carpal tunnel syndrome.  She did alert her neurologist of this issue and follow-up testing (? EMG/NCS) scheduled.     DEXA 7/29/19:     The L1 to L4 vertebral bone mineral density is equal to 0.906 g/cm squared with a T score of -2.3.    The left femoral neck bone mineral density is equal to 0.950 g/cm squared with a T score of -0.6.    The right femoral neck bone mineral density is equal to 0.977 g/cm squared with a T score of -0.4.    There is a 3.3% risk of a major osteoporotic fracture and a 0.3% risk of hip fracture in the next 10 years (FRAX).    CT Low Dose 7/29/19:   Lung-RADS Category:  3 probably benign-short term follow up suggested (6 month low-dose CT).    Review of Systems   Constitutional: Negative for activity change, appetite change, chills, fever and unexpected weight change.   HENT: Negative for congestion, sneezing and sore throat.    Eyes: Negative for  redness, itching and visual disturbance.   Respiratory: Negative for cough, chest tightness, shortness of breath and wheezing.    Cardiovascular: Negative for chest pain.   Gastrointestinal: Negative for abdominal pain, constipation, diarrhea, nausea and vomiting.   Genitourinary: Negative for dysuria, frequency, hematuria, urgency, vaginal bleeding and vaginal discharge.   Musculoskeletal: Negative for joint swelling.   Skin: Negative for rash.   Neurological: Positive for weakness (Right hand) and numbness (Right hand) and intermittent headaches.  Negative for dizziness and syncope.   Psychiatric/Behavioral: Negative for dysphoric mood and suicidal ideas. The patient is not nervous/anxious.      Past Medical History:   Diagnosis Date    Acute coronary syndrome     Acute hypoxemic respiratory failure 5/1/2017    Anxiety     Asthma     Cancer     colon    Cataracts, both eyes     Chest pain at rest     Chest pain of uncertain etiology 12/6/2015    Colon cancer 1988    COPD (chronic obstructive pulmonary disease)     Coronary artery disease     Depression     Diabetes mellitus     Dyspnea on exertion 11/15/2018    Elevated brain natriuretic peptide (BNP) level 11/15/2018    Elevated cholesterol     Hypertension     Swelling     Syncope and collapse 11/8/2016       Patient Active Problem List   Diagnosis    Coronary artery disease of native artery of native heart with stable angina pectoris    Hyperlipidemia    COPD (chronic obstructive pulmonary disease)    Tobacco abuse, 1ppd, 50 years    Major depressive disorder in partial remission    Solitary pulmonary nodule, operation 4/2012    Neuropathy    Migraines    JAZMIN (generalized anxiety disorder)    Hypoglycemia associated with diabetes    Adrenal nodule    Uncontrolled type 2 diabetes mellitus with hyperglycemia, with long-term current use of insulin    Hypertension associated with diabetes    Hyperlipidemia associated with type 2  diabetes mellitus    S/P PTCA (percutaneous transluminal coronary angioplasty)    Coronary artery disease involving native coronary artery of native heart with angina pectoris    Lumbar facet arthropathy    Palpitations    Sinus tachycardia    Iron deficiency anemia    Steroid-induced gastritis    Chronic diastolic congestive heart failure    History of colon cancer    Vitamin D deficiency    Osteopenia of lumbar spine       Past Surgical History:   Procedure Laterality Date    ABDOMINAL SURGERY      BLOCK-NERVE-MEDIAL BRANCH-LUMBAR Bilateral 10/18/2016    [facility]    BREAST BIOPSY      benign unsure what side     SECTION, CLASSIC      COLON SURGERY      CORONARY ANGIOPLASTY WITH STENT PLACEMENT  3 months ago     x2, Hysterectomy, Lung surgery, Partial stomach removed, Part of colon removed for rectal cancer    GASTRECTOMY      HEMORRHOID SURGERY      HYSTERECTOMY      @28yrs of age    INJECTION-FACET Left 2016    [facility]    INJECTION-FACET Right 2016    [facility]    LUNG BIOPSY      OOPHORECTOMY      @28yrs of age    RADIOFREQUENCY THERMOCOAGULATION (RFTC)-NERVE-MEDIAN BRANCH-LUMBAR Bilateral 10/19/2016    Per[facility] by [facility]       Family History   Problem Relation Age of Onset    Cancer Mother     Diabetes Mother     Hypertension Mother     Breast cancer Mother     Cancer Father     Heart disease Father     Depression Sister     Hypertension Sister     Cancer Maternal Aunt        Social History     Tobacco Use   Smoking Status Current Every Day Smoker    Packs/day: 1.00    Years: 50.00    Pack years: 50.00    Types: Cigarettes   Smokeless Tobacco Never Used   Tobacco Comment    Smoking 5 cigs daily presently       Objective:        Vitals:    19 1035   BP: 138/82   Pulse: 103   SpO2: 95%  "  Weight: 66.4 kg (146 lb 6.2 oz)   Height: 5' 2" (1.575 m)       Physical Exam   Constitutional: She is oriented to person, place, and time. She appears well-developed and well-nourished. No distress.   HENT:   Head: Normocephalic and atraumatic.   Right Ear: External ear normal.   Left Ear: External ear normal.   Nose: Nose normal.   Mouth/Throat: Oropharynx is clear and moist. No oropharyngeal exudate.   Poor dentition. + brown discoloration of tongue.   Eyes: Conjunctivae and EOM are normal. No scleral icterus.   Neck: Normal range of motion. Neck supple.   Cardiovascular: Normal rate, regular rhythm and normal heart sounds. Exam reveals no gallop and no friction rub.   No murmur heard.  Pulses:       Dorsalis pedis pulses are 2+ on the right side, and 2+ on the left side.   Pulmonary/Chest: Effort normal and breath sounds normal. She has no wheezes. She has no rales.   Musculoskeletal: Normal range of motion. She exhibits no edema.        Right foot: There is normal range of motion and no deformity.        Left foot: There is normal range of motion and no deformity.   Feet:   Right Foot:   Protective Sensation: 10 sites tested. 10 sites sensed.   Skin Integrity: Negative for ulcer, blister or dry skin.   Left Foot:   Protective Sensation: 10 sites tested. 10 sites sensed.   Skin Integrity: Negative for ulcer or dry skin.   Lymphadenopathy:     She has no cervical adenopathy.   Neurological: She is alert and oriented to person, place, and time. A sensory deficit (+ decreased sensation of 1st-3rd fingers on right) is present. No cranial nerve deficit.   + Durkan's on right  + 3/5  strength on right    Skin: Skin is warm and dry. No rash noted. No erythema. + mild onychomycosis/onychomycosis to all nails  Psychiatric: She has a normal mood and affect. Her behavior is normal.     Assessment:       1. Uncontrolled type 2 diabetes mellitus with hyperglycemia, with long-term current use of insulin    2. Vitamin D " deficiency    3. Osteopenia of lumbar spine    4. Coronary artery disease involving native coronary artery of native heart with angina pectoris    5. S/P PTCA (percutaneous transluminal coronary angioplasty)    6. Chronic diastolic congestive heart failure    7. Hypertension associated with diabetes    8. Hyperlipidemia associated with type 2 diabetes mellitus    9. Right hand pain        Plan:       Chayito was seen today for follow-up.    Diagnoses and all orders for this visit:    Uncontrolled type 2 diabetes mellitus with hyperglycemia, with long-term current use of insulin  -     Foot Exam Performed    Vitamin D deficiency    Osteopenia of lumbar spine    Coronary artery disease involving native coronary artery of native heart with angina pectoris    S/P PTCA (percutaneous transluminal coronary angioplasty)    Chronic diastolic congestive heart failure    Hypertension associated with diabetes    Hyperlipidemia associated with type 2 diabetes mellitus    Right hand pain    DM-2 uncontrolled   A1c 7.2 7/25/19  Continue NovoLog 70/30 45 units qa.m. and 65 units q.p.m. also using NovoLog sliding scale  Continue f/u with endocrinology (Dr. Delgado--private)  Previously requested records from Ophthalmologist  (Dr. Sullivan--private). Pt states she is due for a f/u appt   Foot exam performed today 8/26/19. Notable for tinea unguium. Pt will continue to f/u with podiatry     Vitamin D deficiency   Pt states she is unable to afford ergocalciferol 08239 units q.week  She is taking over-the-counter vitamin-D supplementation (dose unknown--pt will call with exact units)     Osteopenia   DEXA 7/2019 showed Osteopenia of the lumbar spine.  Normal bone mineral density of the hips given age.  Will     CAD s/p stent placement; CHF; HTN   Blood pressure is well controlled in the office today.  Last 2d echo 5/2019:  Normal left ventricular systolic function. The estimated ejection fraction is 65%  Grade I (mild) left ventricular  diastolic dysfunction consistent with impaired relaxation.  Followed by cardiologist (Dr. Malin--[facility])  She is not sure of her current antihypertensive regimen  Advised to bring in all medications to next visit    HLD   ; ; HDL 54; LDL 79.6. 7/2019    Continue pravastatin 20 mg qhs    Right hand and wrist pain  History and Exam seem most consistent with carpal tunnel syndrome  Planning to f/u with neurology for possible nerve conduction study and EMG  May benefit from trial of splint    HM   Mammogram BI-RADS category 1 09/2018  Reports up-to-date colonoscopy with Dr. Hooper---Pt plans to bring records from home   DEXA performed 7/2019. Notable for osteopenia of L-spine. Due 3803-8457  Pt with 50 pack year history of tobacco use--CT low dose 7/2019 significant for Lung-RADS Category:  3 probably benign-short term follow up suggested (6 month low-dose CT)  Hep C screen neg 7/2019  Tdap 01/18/2015. Due 2025  Shingrix #1 11/08/2018; Shingrix #2 06/13/2019  PPSV-23 today 7/25/19. Offer PCV-13 7/25/2020  HIV, RPR, GC/CT neg 7/2019      Follow up in about 3 months (around 11/26/2019).   Needs medication reconciliation on f/u visit.

## 2019-08-26 NOTE — PROGRESS NOTES
Individual Follow-Up Form    8/26/2019    Quit Date:     Clinical Status of Patient: Outpatient    Length of Service: 30 minutes    Continuing Medication: yes  Patches    Other Medications:      Target Symptoms: Withdrawal and medication side effects. The following were  rated moderate (3) to severe (4) on TCRS:  · Moderate (3): Desire or Crave Tobacco  · Severe (4): None    Comments:  Patient states she smoking 3 cigarettes per day. CO 17 ppm per smokilizer (<6 is nonsmoker) lasted 45 minutes prior. Specific session focus: completion of TCRS (Tobacco Cessation Rating Scale) reviewed strategies, controlling environment, cues, triggers, new goals set. Introduced high risk situations with preparation interventions, caffeine similarities with withdrawal issues of habit and nicotine, Alcohol, Understanding urges, cravings, stress and relaxation. Open discussion with intervention discussion. Target symptoms:  withdrawal and medication side effects. The following were rated moderate (3) to severe (4) on TCRS: Moderate 3: None. Severe 4: None. Encouraged patient to pick a quit date.     Diagnosis: F17.210    Next Visit: 2 weeks

## 2019-08-26 NOTE — PATIENT INSTRUCTIONS
Understanding Carbohydrates, Fats, and Protein  Food is a source of fuel and nourishment for your body. Its also a source of pleasure. Having diabetes doesnt mean you have to eat special foods or give up desserts. Instead, your dietitian can show you how to plan meals to suit your body. To start, learn how different foods affect blood sugar.  Carbohydrates  Carbohydrates are the main source of fuel for the body. Carbohydrates raise blood sugar. Many people think carbohydrates are only found in pasta or bread. But carbohydrates are actually in many kinds of foods:  · Sugars occur naturally in foods such as fruit, milk, honey, and molasses. Sugars can also be added to many foods, from cereals and yogurt to candy and desserts. Sugars raise blood sugar.  · Starches are found in bread, cereals, pasta, and dried beans. Theyre also found in corn, peas, potatoes, yam, acorn squash, and butternut squash. Starches also raise blood sugar.   · Fiber is found in foods such as vegetables, fruits, beans, and whole grains. Unlike other carbs, fiber isnt digested or absorbed. So it doesnt raise blood sugar. In fact, fiber can help keep blood sugar from rising too fast. It also helps keep blood cholesterol at a healthy level.  Did you know?  Even though carbohydrates raise blood sugar, its best to have some in every meal. They are an important part of a healthy diet.   Fat  Fat is an energy source that can be stored until needed. Fat does not raise blood sugar. However, it can raise blood cholesterol, increasing the risk of heart disease. Fat is also high in calories, which can cause weight gain. Not all types of fat are the same.  More Healthy:  · Monounsaturated fats are mostly found in vegetable oils, such as olive, canola, and peanut oils. They are also found in avocados and some nuts. Monounsaturated fats are healthy for your heart. Thats because they lower LDL (unhealthy) cholesterol.  · Polyunsaturated fats are mostly  found in vegetable oils, such as corn, safflower, and soybean oils. They are also found in some seeds, nuts, and fish. Polyunsaturated fats lower LDL (unhealthy) cholesterol. So, choosing them instead of saturated fats is healthy for your heart. Certain unsaturated fats can help lower triglycerides.   Less Healthy:  · Saturated fats are found in animal products, such as meat, poultry, whole milk, lard, and butter. Saturated fats raise LDL cholesterol and are not healthy for your heart.  · Hydrogenated oils and trans fats are formed when vegetable oils are processed into solid fats. They are found in many processed foods. Hydrogenated oils and trans fats raise LDL cholesterol and lower HDL (healthy) cholesterol. They are not healthy for your heart.  Protein  Protein helps the body build and repair muscle and other tissue. Protein has little or no effect on blood sugar. However, many foods that contain protein also contain saturated fat. By choosing low-fat protein sources, you can get the benefits of protein without the extra fat:  · Plant protein is found in dry beans and peas, nuts, and soy products, such as tofu and soymilk. These sources tend to be cholesterol-free and low in saturated fat.  · Animal protein is found in fish, poultry, meat, cheese, milk, and eggs. These contain cholesterol and can be high in saturated fat. Aim for lean, lower-fat choices.  Date Last Reviewed: 3/1/2016  © 4832-3654 [employer]. [address] [place] [address]. All rights reserved. This information is not intended as a substitute for professional medical care. Always follow your healthcare professional's instructions.      Please schedule an eye appointment with Dr. Sullivan for your diabetic eye exam.   Please bring in the report from your colonoscopy.

## 2019-08-26 NOTE — Clinical Note
Just a note to advise how the patient is progressing in the tobacco cessation program. Patient states she smoking 3 cigarettes per day. CO 17 ppm per smokilizer (<6 is nonsmoker) lasted 45 minutes prior. Specific session focus: completion of TCRS (Tobacco Cessation Rating Scale) reviewed strategies, controlling environment, cues, triggers, new goals set. Introduced high risk situations with preparation interventions, caffeine similarities with withdrawal issues of habit and nicotine, Alcohol, Understanding urges, cravings, stress and relaxation. Open discussion with intervention discussion. Target symptoms:  withdrawal and medication side effects. The following were rated moderate (3) to severe (4) on TCRS: Moderate 3: None. Severe 4: None. Encouraged patient to pick a quit date.

## 2019-08-27 PROBLEM — M46.96 INFLAMMATORY SPONDYLOPATHY OF LUMBAR REGION: Status: ACTIVE | Noted: 2019-08-27

## 2019-08-27 PROBLEM — M85.88 OSTEOPENIA OF LUMBAR SPINE: Status: ACTIVE | Noted: 2019-08-27

## 2019-08-29 ENCOUNTER — TELEPHONE (OUTPATIENT)
Dept: FAMILY MEDICINE | Facility: CLINIC | Age: 69
End: 2019-08-29

## 2019-08-29 NOTE — TELEPHONE ENCOUNTER
----- Message from Luz Maria Gray LPN sent at 8/28/2019  4:23 PM CDT -----  Contact: Pt came in  Just an FYI...  ----- Message -----  From: Juli Solis  Sent: 8/28/2019   3:09 PM  To: Rory Moe Staff    Pt came in to drop off test results from [facility] for Dr Valadez to review. The pt requested that she be called when they have been reviewed so that she can  the packet to keep for her records. Verified that her best number is [phone]. The information is in the family medicine inbox at the . Thanks!

## 2019-09-09 ENCOUNTER — CLINICAL SUPPORT (OUTPATIENT)
Dept: SMOKING CESSATION | Facility: CLINIC | Age: 69
End: 2019-09-09
Payer: COMMERCIAL

## 2019-09-09 DIAGNOSIS — F17.210 LIGHT CIGARETTE SMOKER (1-9 CIGARETTES PER DAY): Primary | ICD-10-CM

## 2019-09-09 PROCEDURE — 99404 PR PREVENT COUNSEL,INDIV,60 MIN: ICD-10-PCS | Mod: S$GLB,,,

## 2019-09-09 PROCEDURE — 99999 PR PBB SHADOW E&M-EST. PATIENT-LVL I: CPT | Mod: PBBFAC,,,

## 2019-09-09 PROCEDURE — 99999 PR PBB SHADOW E&M-EST. PATIENT-LVL I: ICD-10-PCS | Mod: PBBFAC,,,

## 2019-09-09 PROCEDURE — 99404 PREV MED CNSL INDIV APPRX 60: CPT | Mod: S$GLB,,,

## 2019-09-09 NOTE — Clinical Note
Just a note to advise how the patient is progressing in the tobacco cessation program. Patient states she's smoking about 2-3 cigarettes per day. CO 7 ppm (<6 is nonsmoker) last smoked 1 day prior. Session Focus: completion of TCRS (Tobacco Cessation Rating Scale) reviewed strategies, habitual behavior, high risks situations, understanding urges and cravings, stress and relaxation with open discussion and additional interventions, Introduced lapses, relapses, understanding them and analyzing the situation of a lapse, conflict issues that may be linked to a lapse. The patient denies any abnormal behavioral or mental changes at this time. The patient will continue with group therapy sessions and medication monitoring by CTTS. Patient not taking any cessation medications.

## 2019-09-12 LAB
LEFT EYE DM RETINOPATHY: NEGATIVE
RIGHT EYE DM RETINOPATHY: NEGATIVE

## 2019-09-16 ENCOUNTER — CLINICAL SUPPORT (OUTPATIENT)
Dept: SMOKING CESSATION | Facility: CLINIC | Age: 69
End: 2019-09-16
Payer: COMMERCIAL

## 2019-09-16 DIAGNOSIS — F17.210 LIGHT CIGARETTE SMOKER (1-9 CIGARETTES PER DAY): ICD-10-CM

## 2019-09-16 DIAGNOSIS — F17.210 MODERATE CIGARETTE SMOKER (10-19 PER DAY): ICD-10-CM

## 2019-09-16 PROCEDURE — 99404 PR PREVENT COUNSEL,INDIV,60 MIN: ICD-10-PCS | Mod: S$GLB,,,

## 2019-09-16 PROCEDURE — 99999 PR PBB SHADOW E&M-EST. PATIENT-LVL I: CPT | Mod: PBBFAC,,,

## 2019-09-16 PROCEDURE — 99999 PR PBB SHADOW E&M-EST. PATIENT-LVL I: ICD-10-PCS | Mod: PBBFAC,,,

## 2019-09-16 PROCEDURE — 99404 PREV MED CNSL INDIV APPRX 60: CPT | Mod: S$GLB,,,

## 2019-09-16 RX ORDER — DM/P-EPHED/ACETAMINOPH/DOXYLAM 30-7.5/3
2 LIQUID (ML) ORAL
Qty: 144 LOZENGE | Refills: 0 | Status: SHIPPED | OUTPATIENT
Start: 2019-09-16 | End: 2019-12-16

## 2019-09-16 RX ORDER — IBUPROFEN 200 MG
1 TABLET ORAL DAILY
Qty: 28 PATCH | Refills: 0 | Status: SHIPPED | OUTPATIENT
Start: 2019-09-16 | End: 2019-12-16

## 2019-09-16 NOTE — PROGRESS NOTES
Individual Follow-Up Form    9/16/2019    Quit Date:     Clinical Status of Patient: Outpatient    Length of Service: 60 minutes    Continuing Medication: yes  Patches    Other Medications: Lozenges     Target Symptoms: Withdrawal and medication side effects. The following were  rated moderate (3) to severe (4) on TCRS:  · Moderate (3): Desire or Crave Tobacco   · Severe (4): None    Comments: Patient states she's smoking about 2-3 cigarettes per day. CO 7 ppm (<6 is nonsmoker) last smoked 1 day prior. Session Focus:  orientation, client introductions, completion of TCRS (Tobacco Cessation Rating Scale) learned addiction model, cues/triggers, personal reasons for quitting, medications, goals, quit date discussed but not set. The patient denies any abnormal behavioral or mental changes at this time. The patient will continue with group therapy sessions and medication monitoring by CTTS. Prescribed medication management will be by physician.     Diagnosis: F17.210    Next Visit: 1 week

## 2019-09-16 NOTE — Clinical Note
Just a note to advise how the patient is progressing in the tobacco cessation program.  Patient states she's smoking about 2-3 cigarettes per day. CO 7 ppm (<6 is nonsmoker) last smoked 1 day prior. Session Focus:  orientation, client introductions, completion of TCRS (Tobacco Cessation Rating Scale) learned addiction model, cues/triggers, personal reasons for quitting, medications, goals, quit date discussed but not set. The patient denies any abnormal behavioral or mental changes at this time. The patient will continue with group therapy sessions and medication monitoring by CTTS. Prescribed medication management will be by physician

## 2019-09-23 ENCOUNTER — CLINICAL SUPPORT (OUTPATIENT)
Dept: SMOKING CESSATION | Facility: CLINIC | Age: 69
End: 2019-09-23
Payer: COMMERCIAL

## 2019-09-23 DIAGNOSIS — F17.210 LIGHT CIGARETTE SMOKER (1-9 CIGARETTES PER DAY): Primary | ICD-10-CM

## 2019-09-23 PROCEDURE — 90853 PR GROUP PSYCHOTHERAPY: ICD-10-PCS | Mod: S$GLB,,,

## 2019-09-23 PROCEDURE — 99999 PR PBB SHADOW E&M-EST. PATIENT-LVL I: ICD-10-PCS | Mod: PBBFAC,,,

## 2019-09-23 PROCEDURE — 99999 PR PBB SHADOW E&M-EST. PATIENT-LVL I: CPT | Mod: PBBFAC,,,

## 2019-09-23 PROCEDURE — 90853 GROUP PSYCHOTHERAPY: CPT | Mod: S$GLB,,,

## 2019-09-23 NOTE — Clinical Note
Just a note to advise how the patient is progressing in the tobacco cessation program.  Patient states she smoked 5 cigarettes this weekend. She had a death in the family (sudden loss of Brother in law). Her sister is a heavy smoker and she was with her during this time and smoking more than usual. CO 12 ppm per Smokerlyzer (<6 is nonsmoker). Specific session focus: completion of TCRS (Tobacco Cessation Rating Scale) reviewed strategies, controlling environment, cues, triggers, new goals set. Introduced high risk situations with preparation interventions, caffeine similarities with withdrawal issues of habit and nicotine, Alcohol, Understanding urges, cravings, stress and relaxation. Open discussion with intervention discussion. Target symptoms:  withdrawal and medication side effects. The following were rated moderate (3) to severe (4) on TCRS: Moderate 3: Desire or CraveTobacco

## 2019-09-23 NOTE — PROGRESS NOTES
Smoking Cessation Group Session #3    Site: [facility]  Date:  9/23/2019  Clinical Status of Patient: Outpatient   Length of Service and Code: 90 minutes - 29279   Number in Attendance: 9  Group Activities/Focus of Group:  Sharing last weeks challenges, triggers, and coping activities to remain quit and/ or keep making progress toward cessation, completion of TCRS (Tobacco Cessation Rating Scale) learned addiction model, personal reasons for quitting, medications, goals, quit date.    Specific session focus: completion of TCRS (Tobacco Cessation Rating Scale) reviewed strategies, controlling environment, cues, triggers, new goals set. Introduced high risk situations with preparation interventions, caffeine similarities with withdrawal issues of habit and nicotine, Alcohol, Understanding urges, cravings, stress and relaxation. Open discussion with intervention discussion.    Target symptoms:  withdrawal and medication side effects             The following were rated moderate (3) to severe (4) on TCRS:       Moderate 3: Desire or CraveTobacco     Severe 4:   None    Patient's Response to Intervention: Patient states she smoked 5 cigarettes this weekend. She had a death in the family (sudden loss of Brother in law). Her sister is a heavy smoker and she was with her during this time and smoking more than usual. CO 12 ppm per Smokerlyzer (<6 is nonsmoker). Specific session focus: completion of TCRS (Tobacco Cessation Rating Scale) reviewed strategies, controlling environment, cues, triggers, new goals set. Introduced high risk situations with preparation interventions, caffeine similarities with withdrawal issues of habit and nicotine, Alcohol, Understanding urges, cravings, stress and relaxation. Open discussion with intervention discussion. Target symptoms:  withdrawal and medication side effects. The following were rated moderate (3) to severe (4) on TCRS: Moderate 3: Desire or CraveTobacco    Progress Toward Goals and  Other Mental Status Changes: The patient denies any abnormal behavioral or mental changes at this time.     Diagnosis: Z72.0  Plan: The patient will continue with group therapy sessions and medication regimen prescribed with management by physician or by the Cessation Clinic Provider. Patient will inform Smoking Cessation Counselor of symptoms as rated high on TCRS.    Return to Clinic: 1 week    Quit Date:    Planned Quit Date:

## 2019-09-27 ENCOUNTER — TELEPHONE (OUTPATIENT)
Dept: FAMILY MEDICINE | Facility: CLINIC | Age: 69
End: 2019-09-27

## 2019-09-27 NOTE — TELEPHONE ENCOUNTER
Called patient to notify that documentation has been reviewed and is ready for  per her request.  No answer, unable to leave voicemail.

## 2019-10-07 ENCOUNTER — CLINICAL SUPPORT (OUTPATIENT)
Dept: SMOKING CESSATION | Facility: CLINIC | Age: 69
End: 2019-10-07
Payer: COMMERCIAL

## 2019-10-07 DIAGNOSIS — F17.210 LIGHT CIGARETTE SMOKER (1-9 CIGARETTES PER DAY): Primary | ICD-10-CM

## 2019-10-07 PROCEDURE — 90853 PR GROUP PSYCHOTHERAPY: ICD-10-PCS | Mod: S$GLB,,,

## 2019-10-07 PROCEDURE — 99999 PR PBB SHADOW E&M-EST. PATIENT-LVL I: CPT | Mod: PBBFAC,,,

## 2019-10-07 PROCEDURE — 90853 GROUP PSYCHOTHERAPY: CPT | Mod: S$GLB,,,

## 2019-10-07 PROCEDURE — 99999 PR PBB SHADOW E&M-EST. PATIENT-LVL I: ICD-10-PCS | Mod: PBBFAC,,,

## 2019-10-07 NOTE — Clinical Note
Just a note to advise how the patient is progressing in the tobacco cessation program. Patient states she's still smoking 2 cigarettes per day, patient had a period of time that she quit for 5 weeks. Discussed how she got to that point and what are the obstacles now? Patient could not remember what happened when she started back smoking, she thinks stress. She states her main motivation for quitting is health and her singing voice. Her [employer] thinks she's quit. Advised to be honest with them and ask for support to help her to get rid of the small amount she's smoking now and try to make this pack her last pack.

## 2019-10-07 NOTE — PROGRESS NOTES
Smoking Cessation Group Session #6    Site: [facility]   Date:  10/7/2019  Clinical Status of Patient: Outpatient   Length of Service and Code: 90 minutes - 36797   Number in Attendance: 6  Group Activities/Focus of Group:  Sharing last weeks challenges, triggers, and coping activities to remain quit and/ or keep making progress toward cessation, completion of TCRS (Tobacco Cessation Rating Scale) learned addiction model, personal reasons for quitting, medications, goals, quit date.    Specific session focus: completion of TCRS (Tobacco Cessation Rating Scale) reviewed strategies, cues, triggers, high risk situations, lapses, relapses, diet, exercise, stress, relaxation, sleep, habitual behavior, and life style changes.    Target symptoms:  withdrawal and medication side effects             The following were rated moderate (3) to severe (4) on TCRS:       Moderate 3: Desire or Crave Tobacco      Severe 4:   None    Patient's Response to Intervention: Patient states she's still smoking 2 cigarettes per day, patient had a period of time that she quit for 5 weeks. Discussed how she got to that point and what are the obstacles now? Patient could not remember what happened when she started back smoking, she thinks stress. She states her main motivation for quitting is health and her singing voice. Her Mu-ism thinks she's quit. Advised to be honest with them and ask for support to help her to get rid of the small amount she's smoking now and try to make this pack her last pack.      Progress Toward Goals and Other Mental Status Changes: The patient denies any abnormal behavioral or mental changes at this time.     Diagnosis: Z72.0  Plan: The patient will continue with group therapy sessions and medication regimen prescribed with management by physician or by the Cessation Clinic Provider. Patient will inform Smoking Cessation Counselor of symptoms as rated high on TCRS.    Return to Clinic: 1 week    Quit Date:    Planned  Quit Date:

## 2019-10-09 ENCOUNTER — TELEPHONE (OUTPATIENT)
Dept: FAMILY MEDICINE | Facility: CLINIC | Age: 69
End: 2019-10-09

## 2019-10-09 NOTE — TELEPHONE ENCOUNTER
----- Message from Lashon Steven sent at 10/9/2019  9:02 AM CDT -----  Contact: MARYBEL RODRIGUEZ [[id number]]  [phone]    Patient was treated by Dr. Cotto and was told she might have kidney stones and should she her PCP. First available appointment is 11/19. She needs to be seen sooner.

## 2019-10-10 ENCOUNTER — OFFICE VISIT (OUTPATIENT)
Dept: FAMILY MEDICINE | Facility: CLINIC | Age: 69
End: 2019-10-10
Payer: MEDICARE

## 2019-10-10 DIAGNOSIS — E55.9 VITAMIN D DEFICIENCY: ICD-10-CM

## 2019-10-10 DIAGNOSIS — Z79.4 UNCONTROLLED TYPE 2 DIABETES MELLITUS WITH HYPERGLYCEMIA, WITH LONG-TERM CURRENT USE OF INSULIN: ICD-10-CM

## 2019-10-10 DIAGNOSIS — I15.2 HYPERTENSION ASSOCIATED WITH DIABETES: ICD-10-CM

## 2019-10-10 DIAGNOSIS — Z98.61 S/P PTCA (PERCUTANEOUS TRANSLUMINAL CORONARY ANGIOPLASTY): ICD-10-CM

## 2019-10-10 DIAGNOSIS — I50.32 CHRONIC DIASTOLIC CONGESTIVE HEART FAILURE: ICD-10-CM

## 2019-10-10 DIAGNOSIS — I25.118 CORONARY ARTERY DISEASE OF NATIVE ARTERY OF NATIVE HEART WITH STABLE ANGINA PECTORIS: ICD-10-CM

## 2019-10-10 DIAGNOSIS — E78.00 PURE HYPERCHOLESTEROLEMIA: ICD-10-CM

## 2019-10-10 DIAGNOSIS — M85.88 OSTEOPENIA OF LUMBAR SPINE: ICD-10-CM

## 2019-10-10 DIAGNOSIS — M54.50 ACUTE RIGHT-SIDED LOW BACK PAIN WITHOUT SCIATICA: Primary | ICD-10-CM

## 2019-10-10 DIAGNOSIS — E11.59 HYPERTENSION ASSOCIATED WITH DIABETES: ICD-10-CM

## 2019-10-10 DIAGNOSIS — E11.65 UNCONTROLLED TYPE 2 DIABETES MELLITUS WITH HYPERGLYCEMIA, WITH LONG-TERM CURRENT USE OF INSULIN: ICD-10-CM

## 2019-10-10 PROCEDURE — G0008 FLU VACCINE - HIGH DOSE (65+) PRESERVATIVE FREE IM: ICD-10-PCS | Mod: 59,S$GLB,, | Performed by: FAMILY MEDICINE

## 2019-10-10 PROCEDURE — 1101F PT FALLS ASSESS-DOCD LE1/YR: CPT | Mod: CPTII,S$GLB,, | Performed by: FAMILY MEDICINE

## 2019-10-10 PROCEDURE — 90662 FLU VACCINE - HIGH DOSE (65+) PRESERVATIVE FREE IM: ICD-10-PCS | Mod: S$GLB,,, | Performed by: FAMILY MEDICINE

## 2019-10-10 PROCEDURE — 99999 PR PBB SHADOW E&M-EST. PATIENT-LVL V: ICD-10-PCS | Mod: PBBFAC,,, | Performed by: FAMILY MEDICINE

## 2019-10-10 PROCEDURE — 99499 UNLISTED E&M SERVICE: CPT | Mod: S$GLB,,, | Performed by: FAMILY MEDICINE

## 2019-10-10 PROCEDURE — 99214 OFFICE O/P EST MOD 30 MIN: CPT | Mod: 25,S$GLB,, | Performed by: FAMILY MEDICINE

## 2019-10-10 PROCEDURE — 90662 IIV NO PRSV INCREASED AG IM: CPT | Mod: S$GLB,,, | Performed by: FAMILY MEDICINE

## 2019-10-10 PROCEDURE — 99999 PR PBB SHADOW E&M-EST. PATIENT-LVL V: CPT | Mod: PBBFAC,,, | Performed by: FAMILY MEDICINE

## 2019-10-10 PROCEDURE — 1101F PR PT FALLS ASSESS DOC 0-1 FALLS W/OUT INJ PAST YR: ICD-10-PCS | Mod: CPTII,S$GLB,, | Performed by: FAMILY MEDICINE

## 2019-10-10 PROCEDURE — G0008 ADMIN INFLUENZA VIRUS VAC: HCPCS | Mod: 59,S$GLB,, | Performed by: FAMILY MEDICINE

## 2019-10-10 PROCEDURE — 99214 PR OFFICE/OUTPT VISIT, EST, LEVL IV, 30-39 MIN: ICD-10-PCS | Mod: 25,S$GLB,, | Performed by: FAMILY MEDICINE

## 2019-10-10 PROCEDURE — 99499 RISK ADDL DX/OHS AUDIT: ICD-10-PCS | Mod: S$GLB,,, | Performed by: FAMILY MEDICINE

## 2019-10-10 PROCEDURE — 3074F PR MOST RECENT SYSTOLIC BLOOD PRESSURE < 130 MM HG: ICD-10-PCS | Mod: CPTII,S$GLB,, | Performed by: FAMILY MEDICINE

## 2019-10-10 PROCEDURE — 3078F DIAST BP <80 MM HG: CPT | Mod: CPTII,S$GLB,, | Performed by: FAMILY MEDICINE

## 2019-10-10 PROCEDURE — 3078F PR MOST RECENT DIASTOLIC BLOOD PRESSURE < 80 MM HG: ICD-10-PCS | Mod: CPTII,S$GLB,, | Performed by: FAMILY MEDICINE

## 2019-10-10 PROCEDURE — 3074F SYST BP LT 130 MM HG: CPT | Mod: CPTII,S$GLB,, | Performed by: FAMILY MEDICINE

## 2019-10-10 RX ORDER — LIDOCAINE 50 MG/G
1 PATCH TOPICAL DAILY
Qty: 15 PATCH | Refills: 0 | Status: SHIPPED | OUTPATIENT
Start: 2019-10-10 | End: 2019-12-16

## 2019-10-10 RX ORDER — TIZANIDINE 4 MG/1
4 TABLET ORAL EVERY 8 HOURS PRN
Qty: 30 TABLET | Refills: 1 | Status: SHIPPED | OUTPATIENT
Start: 2019-10-10 | End: 2019-10-20

## 2019-10-10 NOTE — PROGRESS NOTES
"Subjective:       Patient ID: Chayito Chung is a 68 y.o. female.    Chief Complaint: Flank Pain (Right side)    67 yo F pt, new to me (previously followed by Dr. Sidney Delgado) with PMH significant for CAD s/p stent placement; CHF; HTN; HLD; uncontrolled DM-2; COPD; tobacco abuse; solitary pulmonary nodule s/p resection 4/2012; h/o colon cancer s/p resection; steroid induced gastritis s/p partial gastrectomy;  Migraine Headaches; seizure disorder; Chronic Back pain; MDD/JAZMIN; Fe deficiency anemia  She presents today with c/o right lower back pain x 3 days. Pain began suddenly upon waking 3 days ago. Denies heavy lifting, trauma, injury, and accident involving the back. Patient states she is unable to describe the pain. Pain is constant;  non-radiating; 10/10 in intensity. + subjective fevers and diaphoresis associated with pain. No nausea/vomiting. Denies urinary frequency/urgency, dysuria, hematuria. No cough, wheezing, CP, or SOB.  Pain is worse with movement--exacerbated with getting into a motor vehicle and with transitioning from from sitting to standing after sitting for long periods. No OTC medications attempted for pain.  Denies lower extremity radiculopathy, lower extremity sensory deficits, lower extremity weakness, bowel changes, weight loss, and skin changes to affected area. Pt is concerned that symptoms could be 2/2 a "kidney stone"    Review of Systems   Constitutional: Positive for fever. Negative for activity change, appetite change, chills and unexpected weight change.   HENT: Negative for congestion, sneezing and sore throat.    Eyes: Negative for redness, itching and visual disturbance.   Respiratory: Negative for cough, chest tightness, shortness of breath and wheezing.    Cardiovascular: Negative for chest pain.   Gastrointestinal: Negative for abdominal pain, constipation, diarrhea, nausea and vomiting.   Genitourinary: Negative for dysuria, frequency, hematuria, urgency, vaginal bleeding and " vaginal discharge.   Musculoskeletal: Positive for back pain.   Skin: Negative for rash.   Neurological: Negative for dizziness, syncope and headaches.   Psychiatric/Behavioral: Negative for dysphoric mood and suicidal ideas. The patient is not nervous/anxious.          Past Medical History:   Diagnosis Date    Acute coronary syndrome     Acute hypoxemic respiratory failure 2017    Anxiety     Asthma     Cancer     colon    Cataracts, both eyes     Chest pain at rest     Chest pain of uncertain etiology 2015    Colon cancer 1988    COPD (chronic obstructive pulmonary disease)     Coronary artery disease     Depression     Diabetes mellitus     Dyspnea on exertion 11/15/2018    Elevated brain natriuretic peptide (BNP) level 11/15/2018    Elevated cholesterol     Hypertension     Swelling     Syncope and collapse 2016       Patient Active Problem List   Diagnosis    Coronary artery disease of native artery of native heart with stable angina pectoris    Hyperlipidemia    COPD (chronic obstructive pulmonary disease)    Tobacco abuse, 1ppd, 50 years    Major depressive disorder in partial remission    Solitary pulmonary nodule, operation 2012    Neuropathy    Migraines    JAZMIN (generalized anxiety disorder)    Adrenal nodule    Uncontrolled type 2 diabetes mellitus with hyperglycemia, with long-term current use of insulin    Hypertension associated with diabetes    Hyperlipidemia associated with type 2 diabetes mellitus    S/P PTCA (percutaneous transluminal coronary angioplasty)    Lumbar facet arthropathy    Palpitations    Sinus tachycardia    Iron deficiency anemia    Steroid-induced gastritis    Chronic diastolic congestive heart failure    History of colon cancer    Vitamin D deficiency    Osteopenia of lumbar spine       Past Surgical History:   Procedure Laterality Date    ABDOMINAL SURGERY      BREAST BIOPSY      benign unsure what side      "SECTION, CLASSIC      COLON SURGERY      CORONARY ANGIOPLASTY WITH STENT PLACEMENT  3 months ago     x2, Hysterectomy, Lung surgery, Partial stomach removed, Part of colon removed for rectal cancer    GASTRECTOMY      HEMORRHOID SURGERY      HYSTERECTOMY      @28yrs of age    LUNG BIOPSY      OOPHORECTOMY      @28yrs of age       Family History   Problem Relation Age of Onset    Cancer Mother     Diabetes Mother     Hypertension Mother     Breast cancer Mother     Cancer Father     Heart disease Father     Depression Sister     Hypertension Sister     Cancer Maternal Aunt        Social History     Tobacco Use   Smoking Status Current Every Day Smoker    Packs/day: 1.00    Years: 50.00    Pack years: 50.00    Types: Cigarettes   Smokeless Tobacco Never Used   Tobacco Comment    Smoking 5 cigs daily presently       Objective:        Vitals:    10/10/19 1550   BP: 108/72   Pulse: 88   Temp: 98.5 °F (36.9 °C)   TempSrc: Oral   Weight: 66.8 kg (147 lb 4.3 oz)   Height: 5' 2" (1.575 m)           Physical Exam   Constitutional: She is oriented to person, place, and time. She appears well-developed and well-nourished. No distress.   HENT:   Head: Normocephalic and atraumatic.   Right Ear: External ear normal.   Left Ear: External ear normal.   Nose: Nose normal.   Mouth/Throat: Oropharynx is clear and moist. No oropharyngeal exudate.   Poor dentition. + brown discoloration of tongue.   Eyes: Conjunctivae and EOM are normal. No scleral icterus.   Neck: Normal range of motion. Neck supple.   Cardiovascular: Normal rate, regular rhythm and normal heart sounds. Exam reveals no gallop and no friction rub.   No murmur heard.  Pulmonary/Chest: Effort normal and breath sounds normal. She has no wheezes. She has no rales.   Abdominal: Soft. She exhibits no distension and no mass. There is no tenderness. There is no rebound.   Musculoskeletal:        Back:    + pain localized to area outlined in red. No " reproducible ttp. FROM of L-spine on flexion and twisting. Mild discomfort with hyperextension. Severe discomfort with tilting to the left side.    Neurological: She is alert and oriented to person, place, and time. Sensation equal and intact to bilateral lower extremities. No cranial nerve deficit. 5/5 strength to BLEs. +2 patellar reflexes bilaterally. SLR neg bilaterally.    Skin: Skin is warm and dry. No rash noted. No erythema.   Psychiatric: She has a normal mood and affect. Her behavior is normal.     Assessment:       1. Acute right-sided low back pain without sciatica    2. Uncontrolled type 2 diabetes mellitus with hyperglycemia, with long-term current use of insulin    3. Vitamin D deficiency    4. Osteopenia of lumbar spine    5. Coronary artery disease of native artery of native heart with stable angina pectoris    6. S/P PTCA (percutaneous transluminal coronary angioplasty)    7. Chronic diastolic congestive heart failure    8. Hypertension associated with diabetes    9. Pure hypercholesterolemia        Plan:       Chayito was seen today for flank pain.    Diagnoses and all orders for this visit:    Acute right-sided low back pain without sciatica  -     tiZANidine (ZANAFLEX) 4 MG tablet; Take 1 tablet (4 mg total) by mouth every 8 (eight) hours as needed.  -     lidocaine (LIDODERM) 5 %; Place 1 patch onto the skin once daily. Remove & Discard patch within 12 hours.    Uncontrolled type 2 diabetes mellitus with hyperglycemia, with long-term current use of insulin    Vitamin D deficiency    Osteopenia of lumbar spine    Coronary artery disease of native artery of native heart with stable angina pectoris    S/P PTCA (percutaneous transluminal coronary angioplasty)    Chronic diastolic congestive heart failure    Hypertension associated with diabetes    Pure hypercholesterolemia    Other orders  -     Influenza - High Dose (65+) (PF) (IM)    Acute Right Low Back Pain (superimposed on chronic back pain)  MANPREET  in nature   --No red objective flag symptoms  --Pt is already followed by pain management (Dr. Mancuso) Tx'd with Celebrex 200 mg daily, Norco 10/325, Lyrica 150 mg BID  --Will add zanaflex 4 mg q8 prn and Lidoderm Patch.   --Advised warm compress x 15 min 4x daily followed by massage of affected area  --Advised activity as tolerated  --If symptom unimproved or worsening over next 4-6 weeks, consider referral to PT.        DM-2 uncontrolled   A1c 7.2 7/25/19  Continue NovoLog 70/30 45 units qa.m. and 65 units q.p.m. also using NovoLog sliding scale  Continue f/u with endocrinology (Dr. Delgado--private)  Previously requested records from Ophthalmologist  (Dr. Sullivan--private). Pt states she is due for a f/u appt   Foot exam performed 8/26/19. Notable for tinea unguium. Pt will continue to f/u with podiatry     Vitamin D deficiency   Pt states she is unable to afford ergocalciferol 89124 units q.week  She is taking over-the-counter vitamin-D supplementation (dose unknown--pt will call with exact units)     Osteopenia   DEXA 7/2019 showed Osteopenia of the lumbar spine.  Normal bone mineral density of the hips given age.  Will     CAD s/p stent placement; CHF; HTN   Blood pressure is well controlled in the office today.  Last 2d echo 5/2019:  Normal left ventricular systolic function. The estimated ejection fraction is 65%  Grade I (mild) left ventricular diastolic dysfunction consistent with impaired relaxation.  Followed by cardiologist (Dr. Malin--[facility])  She is not sure of her current antihypertensive regimen  Advised to bring in all medications to next visit    HLD   ; ; HDL 54; LDL 79.6. 7/2019    Continue pravastatin 20 mg qhs       Mammogram BI-RADS category 1 09/2018  Reports up-to-date colonoscopy with Dr. Hooper---Pt plans to bring records from home   DEXA performed 7/2019. Notable for osteopenia of L-spine. Due 0176-9002  Pt with 50 pack year history of tobacco use--CT low dose 7/2019  significant for Lung-RADS Category:  3 probably benign-short term follow up suggested (6 month low-dose CT)  Hep C screen neg 7/2019  Tdap 01/18/2015. Due 2025   Shingrix #1 11/08/2018; Shingrix #2 06/13/2019  Flu vaccine administered today 10/10/19  PPSV-23 today 7/25/19. Offer PCV-13 7/25/2020  HIV, RPR, GC/CT neg 7/2019    Follow up due around 11/26/2019  Needs medication reconciliation on f/u visit. Need to f/u colonoscopy and eye exam on f/u visit

## 2019-10-12 VITALS
TEMPERATURE: 99 F | WEIGHT: 147.25 LBS | HEART RATE: 88 BPM | DIASTOLIC BLOOD PRESSURE: 72 MMHG | BODY MASS INDEX: 27.1 KG/M2 | HEIGHT: 62 IN | SYSTOLIC BLOOD PRESSURE: 108 MMHG

## 2019-10-13 ENCOUNTER — HOSPITAL ENCOUNTER (EMERGENCY)
Facility: OTHER | Age: 69
Discharge: HOME | End: 2019-10-13
Attending: EMERGENCY MEDICINE
Payer: MEDICARE

## 2019-10-13 VITALS
BODY MASS INDEX: 25.76 KG/M2 | HEIGHT: 62 IN | DIASTOLIC BLOOD PRESSURE: 77 MMHG | RESPIRATION RATE: 21 BRPM | OXYGEN SATURATION: 99 % | SYSTOLIC BLOOD PRESSURE: 152 MMHG | TEMPERATURE: 98 F | WEIGHT: 140 LBS | HEART RATE: 97 BPM

## 2019-10-13 DIAGNOSIS — R56.9 SEIZURE: Primary | ICD-10-CM

## 2019-10-13 DIAGNOSIS — S09.90XA INJURY OF HEAD, INITIAL ENCOUNTER: ICD-10-CM

## 2019-10-13 DIAGNOSIS — I10 ESSENTIAL HYPERTENSION: ICD-10-CM

## 2019-10-13 LAB
ALBUMIN SERPL BCP-MCNC: 3.7 G/DL (ref 3.5–5.2)
ALP SERPL-CCNC: 119 U/L (ref 55–135)
ALT SERPL W/O P-5'-P-CCNC: 16 U/L (ref 10–44)
ANION GAP SERPL CALC-SCNC: 13 MMOL/L (ref 8–16)
AST SERPL-CCNC: 18 U/L (ref 10–40)
BASOPHILS # BLD AUTO: 0.01 K/UL (ref 0–0.2)
BASOPHILS NFR BLD: 0.2 % (ref 0–1.9)
BILIRUB SERPL-MCNC: 0.3 MG/DL (ref 0.1–1)
BUN SERPL-MCNC: 8 MG/DL (ref 8–23)
CALCIUM SERPL-MCNC: 9.6 MG/DL (ref 8.7–10.5)
CHLORIDE SERPL-SCNC: 111 MMOL/L (ref 95–110)
CO2 SERPL-SCNC: 18 MMOL/L (ref 23–29)
CREAT SERPL-MCNC: 0.7 MG/DL (ref 0.5–1.4)
DIFFERENTIAL METHOD: ABNORMAL
EOSINOPHIL # BLD AUTO: 0.1 K/UL (ref 0–0.5)
EOSINOPHIL NFR BLD: 1.7 % (ref 0–8)
ERYTHROCYTE [DISTWIDTH] IN BLOOD BY AUTOMATED COUNT: 14.2 % (ref 11.5–14.5)
EST. GFR  (AFRICAN AMERICAN): >60 ML/MIN/1.73 M^2
EST. GFR  (NON AFRICAN AMERICAN): >60 ML/MIN/1.73 M^2
GLUCOSE SERPL-MCNC: 121 MG/DL (ref 70–110)
HCT VFR BLD AUTO: 42.3 % (ref 37–48.5)
HGB BLD-MCNC: 13.9 G/DL (ref 12–16)
IMM GRANULOCYTES # BLD AUTO: 0.01 K/UL (ref 0–0.04)
IMM GRANULOCYTES NFR BLD AUTO: 0.2 % (ref 0–0.5)
LYMPHOCYTES # BLD AUTO: 1.7 K/UL (ref 1–4.8)
LYMPHOCYTES NFR BLD: 36.1 % (ref 18–48)
MCH RBC QN AUTO: 33.1 PG (ref 27–31)
MCHC RBC AUTO-ENTMCNC: 32.9 G/DL (ref 32–36)
MCV RBC AUTO: 101 FL (ref 82–98)
MONOCYTES # BLD AUTO: 0.4 K/UL (ref 0.3–1)
MONOCYTES NFR BLD: 8.3 % (ref 4–15)
NEUTROPHILS # BLD AUTO: 2.5 K/UL (ref 1.8–7.7)
NEUTROPHILS NFR BLD: 53.5 % (ref 38–73)
NRBC BLD-RTO: 0 /100 WBC
PLATELET # BLD AUTO: 222 K/UL (ref 150–350)
PMV BLD AUTO: 9.6 FL (ref 9.2–12.9)
POCT GLUCOSE: 135 MG/DL (ref 70–110)
POTASSIUM SERPL-SCNC: 3.5 MMOL/L (ref 3.5–5.1)
PROT SERPL-MCNC: 7.2 G/DL (ref 6–8.4)
RBC # BLD AUTO: 4.2 M/UL (ref 4–5.4)
SODIUM SERPL-SCNC: 142 MMOL/L (ref 136–145)
WBC # BLD AUTO: 4.6 K/UL (ref 3.9–12.7)

## 2019-10-13 PROCEDURE — 36410 VNPNXR 3YR/> PHY/QHP DX/THER: CPT

## 2019-10-13 PROCEDURE — 85025 COMPLETE CBC W/AUTO DIFF WBC: CPT

## 2019-10-13 PROCEDURE — 93005 ELECTROCARDIOGRAM TRACING: CPT

## 2019-10-13 PROCEDURE — 36415 COLL VENOUS BLD VENIPUNCTURE: CPT

## 2019-10-13 PROCEDURE — 25000003 PHARM REV CODE 250: Performed by: EMERGENCY MEDICINE

## 2019-10-13 PROCEDURE — 93010 ELECTROCARDIOGRAM REPORT: CPT | Mod: ,,, | Performed by: INTERNAL MEDICINE

## 2019-10-13 PROCEDURE — 82962 GLUCOSE BLOOD TEST: CPT

## 2019-10-13 PROCEDURE — 80053 COMPREHEN METABOLIC PANEL: CPT

## 2019-10-13 PROCEDURE — 99285 EMERGENCY DEPT VISIT HI MDM: CPT | Mod: 25

## 2019-10-13 PROCEDURE — 93010 EKG 12-LEAD: ICD-10-PCS | Mod: ,,, | Performed by: INTERNAL MEDICINE

## 2019-10-13 RX ORDER — LAMOTRIGINE 25 MG/1
50 TABLET ORAL
Status: COMPLETED | OUTPATIENT
Start: 2019-10-13 | End: 2019-10-13

## 2019-10-13 RX ORDER — HYDROCHLOROTHIAZIDE 25 MG/1
25 TABLET ORAL
Status: COMPLETED | OUTPATIENT
Start: 2019-10-13 | End: 2019-10-13

## 2019-10-13 RX ORDER — METOPROLOL SUCCINATE 25 MG/1
25 TABLET, EXTENDED RELEASE ORAL
Status: COMPLETED | OUTPATIENT
Start: 2019-10-13 | End: 2019-10-13

## 2019-10-13 RX ORDER — VERAPAMIL HYDROCHLORIDE 240 MG/1
240 TABLET, FILM COATED, EXTENDED RELEASE ORAL
Status: COMPLETED | OUTPATIENT
Start: 2019-10-13 | End: 2019-10-13

## 2019-10-13 RX ORDER — IRBESARTAN 75 MG/1
75 TABLET ORAL
Status: COMPLETED | OUTPATIENT
Start: 2019-10-13 | End: 2019-10-13

## 2019-10-13 RX ADMIN — VERAPAMIL HYDROCHLORIDE 240 MG: 240 TABLET, FILM COATED, EXTENDED RELEASE ORAL at 06:10

## 2019-10-13 RX ADMIN — LAMOTRIGINE 50 MG: 25 TABLET ORAL at 06:10

## 2019-10-13 RX ADMIN — IRBESARTAN 75 MG: 75 TABLET ORAL at 06:10

## 2019-10-13 RX ADMIN — HYDROCHLOROTHIAZIDE 25 MG: 25 TABLET ORAL at 06:10

## 2019-10-13 RX ADMIN — METOPROLOL SUCCINATE 25 MG: 25 TABLET, EXTENDED RELEASE ORAL at 06:10

## 2019-10-13 NOTE — ED TRIAGE NOTES
Pt reports to ED via [facility] after witnessed tonic-clonic seizure activity while at [facility]. Per EMS pt was post ictal on scene, upon arrival to ED pt AAO x 3, tearful but speaking appropriately and fully oriented. Pt tearful during initial exam and admits being under a lot of stress lately due to recent medical diagnosis. Pt admits taking 31 pills/day and usually compliant with meds but did not take medications this morning because she was running late to [facility]. Pt reports right sided abd pain after seizure, no bruising or deformity noted, abd soft and non tender. Pt denies chest pain, SOB, fevers, chills, N/V/D or other complaints. Pt did not have episode of incontinence during seizure, denies head trauma.     Patient identifiers for Chayito Chung checked and correct.  LOC: Patient is awake, alert, and aware of environment with an appropriate affect. Patient is oriented x 3 and speaking appropriately.  APPEARANCE: Patient resting comfortably and in no acute distress. Patient is clean and well groomed, patient's clothing is properly fastened.  SKIN: The skin is warm and dry. Patient has normal skin turgor and moist mucus membrances. Skin is intact; no bruising or breakdown noted.  MUSKULOSKELETAL: Patient is moving all extremities well, no obvious swelling or deformities noted. Pulses intact.   RESPIRATORY: Airway is open and patent. Respirations are spontaneous, even and unlabored. Normal effort and rate noted.  ABDOMEN: No distention noted. Bowel sounds active in all 4 quadrants. Soft and non-tender upon palpation.  Allergies reported: Review of patient's allergies indicates:  No Known Allergies

## 2019-10-13 NOTE — ED NOTES
Report received from Jessica FELDMAN PT lying in bed with eyes open, respirations even and unlabored, NAD noted, bed locked and low, will continue to monitor.

## 2019-10-13 NOTE — ED PROVIDER NOTES
Encounter Date: 10/13/2019       History     Chief Complaint   Patient presents with    Seizures     per EMS, pt had witnessed seizure at [facility].      Time seen by provider: 5:09 PM    This is a 68 y.o. female who presents with complaint of a seizure that occurred prior to arrival. The patient was at [facility] this afternoon when she had a seizure. The patient's friend reports that they were standing when she fell to the ground. This morning she reports that she was rushing and didn't take any of her medications because she didn't have time to eat anything. The patient reports that she been having seizures for the last two years. She denies fever, sore throat, chest pain, shortness of breath, nausea, vomiting, and dysuria.     The history is provided by the patient and a friend.     Review of patient's allergies indicates:  No Known Allergies  Past Medical History:   Diagnosis Date    Acute coronary syndrome     Acute hypoxemic respiratory failure 2017    Anxiety     Asthma     Cancer     colon    Cataracts, both eyes     Chest pain at rest     Chest pain of uncertain etiology 2015    Colon cancer 1988    COPD (chronic obstructive pulmonary disease)     Coronary artery disease     Depression     Diabetes mellitus     Dyspnea on exertion 11/15/2018    Elevated brain natriuretic peptide (BNP) level 11/15/2018    Elevated cholesterol     Hypertension     Swelling     Syncope and collapse 2016     Past Surgical History:   Procedure Laterality Date    ABDOMINAL SURGERY      BREAST BIOPSY      benign unsure what side     SECTION, CLASSIC      COLON SURGERY      CORONARY ANGIOPLASTY WITH STENT PLACEMENT  3 months ago     x2, Hysterectomy, Lung surgery, Partial stomach removed, Part of colon removed for rectal cancer    GASTRECTOMY      HEMORRHOID SURGERY      HYSTERECTOMY      @28yrs of age    LUNG BIOPSY      OOPHORECTOMY      @28yrs of age     Family History    Problem Relation Age of Onset    Cancer Mother     Diabetes Mother     Hypertension Mother     Breast cancer Mother     Cancer Father     Heart disease Father     Depression Sister     Hypertension Sister     Cancer Maternal Aunt      Social History     Tobacco Use    Smoking status: Current Every Day Smoker     Packs/day: 1.00     Years: 50.00     Pack years: 50.00     Types: Cigarettes    Smokeless tobacco: Never Used    Tobacco comment: Smoking 5 cigs daily presently   Substance Use Topics    Alcohol use: Yes     Alcohol/week: 3.0 standard drinks     Types: 3 Glasses of wine per week     Comment: 1 every 3 months    Drug use: No     Review of Systems   Constitutional: Negative for chills and fever.   HENT: Negative for congestion and sore throat.    Eyes: Negative for photophobia and redness.   Respiratory: Negative for cough and shortness of breath.    Cardiovascular: Negative for chest pain.   Gastrointestinal: Negative for abdominal pain, nausea and vomiting.   Genitourinary: Negative for dysuria.   Musculoskeletal: Negative for back pain.   Skin: Negative for rash.   Neurological: Positive for seizures. Negative for weakness and light-headedness.   Psychiatric/Behavioral: Negative for confusion.       Physical Exam     Initial Vitals [10/13/19 1651]   BP Pulse Resp Temp SpO2   (!) 180/93 93 18 98.3 °F (36.8 °C) 100 %      MAP       --         Physical Exam    Nursing note and vitals reviewed.  Constitutional: She appears well-developed and well-nourished. She is not diaphoretic. No distress.   HENT:   Head: Normocephalic and atraumatic.   Mouth/Throat: Oropharynx is clear and moist.   Eyes: Conjunctivae and EOM are normal. Pupils are equal, round, and reactive to light. No scleral icterus.   Neck: Normal range of motion. Neck supple.   Cardiovascular: Normal rate, regular rhythm, S1 normal, S2 normal and normal heart sounds. Exam reveals no gallop and no friction rub.    No murmur  heard.  Pulmonary/Chest: Breath sounds normal. No respiratory distress. She has no wheezes. She has no rhonchi. She has no rales.   Abdominal: Soft. Bowel sounds are normal. There is no tenderness. There is no rebound and no guarding.   Musculoskeletal: Normal range of motion. She exhibits no edema or tenderness.   Lymphadenopathy:     She has no cervical adenopathy.   Neurological: She is alert and oriented to person, place, and time. GCS score is 15. GCS eye subscore is 4. GCS verbal subscore is 5. GCS motor subscore is 6.   Cranial nerves II-XII intact. Strength 5/5 throughout. Sensation intact to light touch throughout. Good finger to nose task ability. Negative pronator drift. Two point discrimination is intact throughout. Reflexes 2+ throughout. No papilledema.  No meningeal signs. PERRLA. EOMI.      Skin: Skin is warm and dry. Capillary refill takes less than 2 seconds. No rash and no abscess noted. No erythema. No pallor.   Psychiatric: She has a normal mood and affect. Her behavior is normal. Judgment and thought content normal.         ED Course   External Jugular IV  Date/Time: 10/13/2019 5:21 PM  Performed by: Michael Loo MD  Authorized by: Michael Loo MD     Anesthesia:  Local anesthesia used: no  Location (Ext Jugular): Left.  Area Prepped With: Chlorohexidine.  Catheter Size: 18 ga.  Catheter Type: Jelco.  Number of attempts: 1  Fixation/Dressing: Taped in place.  Patient tolerance: Patient tolerated the procedure well with no immediate complications        Labs Reviewed   CBC W/ AUTO DIFFERENTIAL - Abnormal; Notable for the following components:       Result Value    Mean Corpuscular Volume 101 (*)     Mean Corpuscular Hemoglobin 33.1 (*)     All other components within normal limits   COMPREHENSIVE METABOLIC PANEL - Abnormal; Notable for the following components:    Chloride 111 (*)     CO2 18 (*)     Glucose 121 (*)     All other components within normal limits    Narrative:      Recoll. [id number] by [facility] at 10/13/2019 17:49, reason: Specimen   hemolyzed, called Kellee Hawthorne RN ER   POCT GLUCOSE - Abnormal; Notable for the following components:    POCT Glucose 135 (*)     All other components within normal limits     EKG Readings: (Independently Interpreted)   Initial Reading: No STEMI.   Normal sinus rhythm at a rate of 85 bpm. No ST or T wave abnormalities. Normal QRS.        Imaging Results          X-Ray Lumbar Spine Ap And Lateral (Final result)  Result time 10/13/19 18:18:05    Final result by Inder Aguirre MD (10/13/19 18:18:05)                 Impression:      No acute lumbar spine abnormalities identified.      Electronically signed by: Inder Aguirre MD  Date:    10/13/2019  Time:    18:18             Narrative:    EXAMINATION:  XR LUMBAR SPINE AP AND LATERAL    CLINICAL HISTORY:  T/L-spine trauma, minor-mod, low back pain;    TECHNIQUE:  AP, lateral and spot images were performed of the lumbar spine.    COMPARISON:  April 2018.    FINDINGS:  Lumbar spine alignment is within normal limits.  Stable mild remote compression deformity is seen involving the L1 vertebral body.  No evidence of acute lumbar spine fracture or subluxation.  Intervertebral disc spaces appear fairly well maintained.  Visualized sacrum is unremarkable.                               CT Head Without Contrast (Final result)  Result time 10/13/19 18:04:17    Final result by Inder Aguirre MD (10/13/19 18:04:17)                 Impression:      No acute intracranial abnormalities identified.      Electronically signed by: Inder Aguirre MD  Date:    10/13/2019  Time:    18:04             Narrative:    EXAMINATION:  CT HEAD WITHOUT CONTRAST    CLINICAL HISTORY:  Headache, post trauma;    TECHNIQUE:  Low dose axial images were obtained through the head.  Coronal and sagittal reformations were also performed. Contrast was not administered.    COMPARISON:  CT head from August 2017.    FINDINGS:  No evidence of  acute/recent major vascular distribution cerebral infarction, intraparenchymal hemorrhage, or intra-axial space occupying lesion. The ventricular system is normal in size and configuration with no evidence of hydrocephalus. No effacement of the skull-base cisterns. No abnormal extra-axial fluid collections or blood products. Visualized paranasal sinuses and mastoid air cells are clear. The calvarium shows no significant abnormality.                              X-Rays:   Independently Interpreted Readings:   Other Readings:  L Spine: No fracture or dislocation.     Medical Decision Making:   Clinical Tests:   Lab Tests: Ordered and Reviewed  Medical Tests: Ordered and Reviewed            Scribe Attestation:   Scribe #1: I performed the above scribed service and the documentation accurately describes the services I performed. I attest to the accuracy of the note.    Attending Attestation:           Physician Attestation for Scribe:  Physician Attestation Statement for Scribe #1: I, Dr. Loo, reviewed documentation, as scribed by Juanpablo Cobb  in my presence, and it is both accurate and complete.         Attending ED Notes:   Emergent evaluation a 68-year-old female with past medical history of seizure disorder and hypertension who presents to the emergency room with complaint of seizure while at [facility].  Patient states that she did not take her hypertensive her seizure medications this morning before she left for [employer].  The patient is afebrile, nontoxic-appearing stable vital signs except for elevation of blood pressure.  Patient is neurovascularly intact without focal neurologic deficits. Cranial nerves II-XII intact. Strength 5/5 throughout. Sensation intact to light touch throughout. Good finger to nose task ability. Negative pronator drift. Two point discrimination is intact throughout. Reflexes 2+ throughout. No papilledema.  No meningeal signs. PERRLA. EOMI.  Patient has no midline C-spine, T-spine or  L-spine tenderness to palpation, crepitus or step-offs.  During the course of patient's stay in the emergency room patient did not have any seizure activity.  Patient is administered her home medications that she did not take this morning.  No acute findings on CMP.  No elevation of white blood cell count.  H&H within normal limits.  Blood glucose is 135.  No acute findings on x-ray of the lumbar spine.  No acute findings on CT of the head.  GCS of 15.  The patient is extensively counseled her diagnosis and treatment including all diagnostic, laboratory and physical exam findings.  The patient discharged good condition and directed follow-up with her neurologist and primary care physician in the next 24-48 hours.             Clinical Impression:     1. Seizure    2. Essential hypertension    3. Injury of head, initial encounter                                   Michael Loo MD  10/13/19 3801

## 2019-10-15 ENCOUNTER — TELEPHONE (OUTPATIENT)
Dept: FAMILY MEDICINE | Facility: CLINIC | Age: 69
End: 2019-10-15

## 2019-10-15 NOTE — TELEPHONE ENCOUNTER
----- Message from Marry Law sent at 10/15/2019 10:57 AM CDT -----  Contact: Magalys @ [employer] [phone]  NICO is requesting a callback concerning a PA requested:  Needs to know if member has post herpetic neuralgia, diabetic neuropathy, or cancer related neuropathy.   Please call Magalys @ [employer] [phone] and advise.

## 2019-10-15 NOTE — TELEPHONE ENCOUNTER
Magalys with [employer] called regarding a PA started on patient.  Would like to know if patient's neuropathy is herpetic neuralgia, diabetic or cancer related?  Please advise.

## 2019-10-22 ENCOUNTER — PATIENT OUTREACH (OUTPATIENT)
Dept: ADMINISTRATIVE | Facility: OTHER | Age: 69
End: 2019-10-22

## 2019-10-22 DIAGNOSIS — Z79.4 UNCONTROLLED TYPE 2 DIABETES MELLITUS WITH HYPERGLYCEMIA, WITH LONG-TERM CURRENT USE OF INSULIN: Primary | ICD-10-CM

## 2019-10-22 DIAGNOSIS — E11.65 UNCONTROLLED TYPE 2 DIABETES MELLITUS WITH HYPERGLYCEMIA, WITH LONG-TERM CURRENT USE OF INSULIN: Primary | ICD-10-CM

## 2019-10-24 ENCOUNTER — TELEPHONE (OUTPATIENT)
Dept: OTOLARYNGOLOGY | Facility: CLINIC | Age: 69
End: 2019-10-24

## 2019-10-31 ENCOUNTER — HOSPITAL ENCOUNTER (EMERGENCY)
Facility: HOSPITAL | Age: 69
Discharge: ANOTHER HOSPITAL | End: 2019-11-01
Attending: EMERGENCY MEDICINE
Payer: MEDICARE

## 2019-10-31 DIAGNOSIS — R10.84 GENERALIZED ABDOMINAL PAIN: Primary | ICD-10-CM

## 2019-10-31 DIAGNOSIS — R10.9 ABDOMINAL PAIN: ICD-10-CM

## 2019-10-31 DIAGNOSIS — R73.9 HYPERGLYCEMIA: ICD-10-CM

## 2019-10-31 DIAGNOSIS — R10.9 POSTOPERATIVE ABDOMINAL PAIN: ICD-10-CM

## 2019-10-31 DIAGNOSIS — G89.18 POSTOPERATIVE ABDOMINAL PAIN: ICD-10-CM

## 2019-10-31 DIAGNOSIS — J44.1 COPD WITH ACUTE EXACERBATION: ICD-10-CM

## 2019-10-31 LAB
APTT BLDCRRT: 24.5 SEC (ref 21–32)
BASOPHILS # BLD AUTO: 0.03 K/UL (ref 0–0.2)
BASOPHILS NFR BLD: 0.3 % (ref 0–1.9)
DIFFERENTIAL METHOD: ABNORMAL
EOSINOPHIL # BLD AUTO: 0.1 K/UL (ref 0–0.5)
EOSINOPHIL NFR BLD: 0.9 % (ref 0–8)
ERYTHROCYTE [DISTWIDTH] IN BLOOD BY AUTOMATED COUNT: 17.2 % (ref 11.5–14.5)
ETHANOL SERPL-MCNC: <10 MG/DL
HCT VFR BLD AUTO: 38 % (ref 37–48.5)
HGB BLD-MCNC: 11.9 G/DL (ref 12–16)
INR PPP: 1 (ref 0.8–1.2)
LYMPHOCYTES # BLD AUTO: 1.4 K/UL (ref 1–4.8)
LYMPHOCYTES NFR BLD: 13.8 % (ref 18–48)
MCH RBC QN AUTO: 30.6 PG (ref 27–31)
MCHC RBC AUTO-ENTMCNC: 31.3 G/DL (ref 32–36)
MCV RBC AUTO: 98 FL (ref 82–98)
MONOCYTES # BLD AUTO: 0.6 K/UL (ref 0.3–1)
MONOCYTES NFR BLD: 5.9 % (ref 4–15)
NEUTROPHILS # BLD AUTO: 8.1 K/UL (ref 1.8–7.7)
NEUTROPHILS NFR BLD: 79.1 % (ref 38–73)
PLATELET # BLD AUTO: 377 K/UL (ref 150–350)
PMV BLD AUTO: 9.3 FL (ref 9.2–12.9)
POCT GLUCOSE: 191 MG/DL (ref 70–110)
PROTHROMBIN TIME: 10.6 SEC (ref 9–12.5)
RBC # BLD AUTO: 3.89 M/UL (ref 4–5.4)
WBC # BLD AUTO: 10.31 K/UL (ref 3.9–12.7)

## 2019-10-31 PROCEDURE — 82962 GLUCOSE BLOOD TEST: CPT

## 2019-10-31 PROCEDURE — 85025 COMPLETE CBC W/AUTO DIFF WBC: CPT

## 2019-10-31 PROCEDURE — 99292 CRITICAL CARE ADDL 30 MIN: CPT

## 2019-10-31 PROCEDURE — 63600175 PHARM REV CODE 636 W HCPCS: Performed by: EMERGENCY MEDICINE

## 2019-10-31 PROCEDURE — 87040 BLOOD CULTURE FOR BACTERIA: CPT

## 2019-10-31 PROCEDURE — 85730 THROMBOPLASTIN TIME PARTIAL: CPT

## 2019-10-31 PROCEDURE — 84484 ASSAY OF TROPONIN QUANT: CPT

## 2019-10-31 PROCEDURE — 83880 ASSAY OF NATRIURETIC PEPTIDE: CPT

## 2019-10-31 PROCEDURE — 80320 DRUG SCREEN QUANTALCOHOLS: CPT

## 2019-10-31 PROCEDURE — 96361 HYDRATE IV INFUSION ADD-ON: CPT

## 2019-10-31 PROCEDURE — 85610 PROTHROMBIN TIME: CPT

## 2019-10-31 PROCEDURE — 93005 ELECTROCARDIOGRAM TRACING: CPT

## 2019-10-31 PROCEDURE — 99291 CRITICAL CARE FIRST HOUR: CPT | Mod: 25

## 2019-10-31 PROCEDURE — 83690 ASSAY OF LIPASE: CPT

## 2019-10-31 PROCEDURE — 80053 COMPREHEN METABOLIC PANEL: CPT

## 2019-10-31 PROCEDURE — 82550 ASSAY OF CK (CPK): CPT

## 2019-10-31 RX ADMIN — SODIUM CHLORIDE 1000 ML: 0.9 INJECTION, SOLUTION INTRAVENOUS at 11:10

## 2019-11-01 VITALS
DIASTOLIC BLOOD PRESSURE: 77 MMHG | HEART RATE: 125 BPM | RESPIRATION RATE: 29 BRPM | OXYGEN SATURATION: 95 % | SYSTOLIC BLOOD PRESSURE: 124 MMHG | TEMPERATURE: 99 F

## 2019-11-01 LAB
ALBUMIN SERPL BCP-MCNC: 3.7 G/DL (ref 3.5–5.2)
ALP SERPL-CCNC: 235 U/L (ref 55–135)
ALT SERPL W/O P-5'-P-CCNC: 16 U/L (ref 10–44)
AMPHET+METHAMPHET UR QL: NEGATIVE
ANION GAP SERPL CALC-SCNC: 14 MMOL/L (ref 8–16)
AST SERPL-CCNC: 12 U/L (ref 10–40)
BACTERIA #/AREA URNS HPF: NORMAL /HPF
BARBITURATES UR QL SCN>200 NG/ML: NEGATIVE
BENZODIAZ UR QL SCN>200 NG/ML: NEGATIVE
BILIRUB SERPL-MCNC: 0.6 MG/DL (ref 0.1–1)
BILIRUB UR QL STRIP: NEGATIVE
BNP SERPL-MCNC: 23 PG/ML (ref 0–99)
BUN SERPL-MCNC: 8 MG/DL (ref 8–23)
BZE UR QL SCN: NEGATIVE
CALCIUM SERPL-MCNC: 10.2 MG/DL (ref 8.7–10.5)
CANNABINOIDS UR QL SCN: NEGATIVE
CHLORIDE SERPL-SCNC: 105 MMOL/L (ref 95–110)
CK SERPL-CCNC: 41 U/L (ref 20–180)
CLARITY UR: CLEAR
CO2 SERPL-SCNC: 19 MMOL/L (ref 23–29)
COLOR UR: YELLOW
CREAT SERPL-MCNC: 0.8 MG/DL (ref 0.5–1.4)
CREAT UR-MCNC: 81.2 MG/DL (ref 15–325)
EST. GFR  (AFRICAN AMERICAN): >60 ML/MIN/1.73 M^2
EST. GFR  (NON AFRICAN AMERICAN): >60 ML/MIN/1.73 M^2
GLUCOSE SERPL-MCNC: 373 MG/DL (ref 70–110)
GLUCOSE UR QL STRIP: ABNORMAL
HGB UR QL STRIP: NEGATIVE
KETONES UR QL STRIP: ABNORMAL
LEUKOCYTE ESTERASE UR QL STRIP: NEGATIVE
LIPASE SERPL-CCNC: 108 U/L (ref 4–60)
METHADONE UR QL SCN>300 NG/ML: NEGATIVE
MICROSCOPIC COMMENT: NORMAL
NITRITE UR QL STRIP: NEGATIVE
OPIATES UR QL SCN: NORMAL
PCP UR QL SCN>25 NG/ML: NEGATIVE
PH UR STRIP: 6 [PH] (ref 5–8)
POCT GLUCOSE: 266 MG/DL (ref 70–110)
POCT GLUCOSE: 301 MG/DL (ref 70–110)
POTASSIUM SERPL-SCNC: 3.8 MMOL/L (ref 3.5–5.1)
PROT SERPL-MCNC: 8 G/DL (ref 6–8.4)
PROT UR QL STRIP: NEGATIVE
RBC #/AREA URNS HPF: 2 /HPF (ref 0–4)
SODIUM SERPL-SCNC: 138 MMOL/L (ref 136–145)
SP GR UR STRIP: 1.01 (ref 1–1.03)
SQUAMOUS #/AREA URNS HPF: 7 /HPF
TOXICOLOGY INFORMATION: NORMAL
TROPONIN I SERPL DL<=0.01 NG/ML-MCNC: <0.006 NG/ML (ref 0–0.03)
URN SPEC COLLECT METH UR: ABNORMAL
UROBILINOGEN UR STRIP-ACNC: 1 EU/DL
WBC #/AREA URNS HPF: 1 /HPF (ref 0–5)
YEAST URNS QL MICRO: NORMAL

## 2019-11-01 PROCEDURE — 94640 AIRWAY INHALATION TREATMENT: CPT

## 2019-11-01 PROCEDURE — 96375 TX/PRO/DX INJ NEW DRUG ADDON: CPT | Mod: 59

## 2019-11-01 PROCEDURE — 96365 THER/PROPH/DIAG IV INF INIT: CPT

## 2019-11-01 PROCEDURE — 80307 DRUG TEST PRSMV CHEM ANLYZR: CPT

## 2019-11-01 PROCEDURE — 82962 GLUCOSE BLOOD TEST: CPT | Mod: 91

## 2019-11-01 PROCEDURE — 81000 URINALYSIS NONAUTO W/SCOPE: CPT | Mod: 59

## 2019-11-01 PROCEDURE — 25500020 PHARM REV CODE 255: Performed by: EMERGENCY MEDICINE

## 2019-11-01 PROCEDURE — 87040 BLOOD CULTURE FOR BACTERIA: CPT

## 2019-11-01 PROCEDURE — 96361 HYDRATE IV INFUSION ADD-ON: CPT

## 2019-11-01 PROCEDURE — 63600175 PHARM REV CODE 636 W HCPCS: Performed by: EMERGENCY MEDICINE

## 2019-11-01 PROCEDURE — 25000242 PHARM REV CODE 250 ALT 637 W/ HCPCS: Performed by: EMERGENCY MEDICINE

## 2019-11-01 PROCEDURE — 25000003 PHARM REV CODE 250: Performed by: EMERGENCY MEDICINE

## 2019-11-01 RX ORDER — SODIUM CHLORIDE, SODIUM LACTATE, POTASSIUM CHLORIDE, CALCIUM CHLORIDE 600; 310; 30; 20 MG/100ML; MG/100ML; MG/100ML; MG/100ML
1000 INJECTION, SOLUTION INTRAVENOUS
Status: COMPLETED | OUTPATIENT
Start: 2019-11-01 | End: 2019-11-01

## 2019-11-01 RX ORDER — IPRATROPIUM BROMIDE AND ALBUTEROL SULFATE 2.5; .5 MG/3ML; MG/3ML
3 SOLUTION RESPIRATORY (INHALATION)
Status: COMPLETED | OUTPATIENT
Start: 2019-11-01 | End: 2019-11-01

## 2019-11-01 RX ORDER — KETOROLAC TROMETHAMINE 30 MG/ML
15 INJECTION, SOLUTION INTRAMUSCULAR; INTRAVENOUS
Status: COMPLETED | OUTPATIENT
Start: 2019-11-01 | End: 2019-11-01

## 2019-11-01 RX ORDER — PROCHLORPERAZINE EDISYLATE 5 MG/ML
10 INJECTION INTRAMUSCULAR; INTRAVENOUS
Status: COMPLETED | OUTPATIENT
Start: 2019-11-01 | End: 2019-11-01

## 2019-11-01 RX ORDER — METOCLOPRAMIDE HYDROCHLORIDE 5 MG/ML
10 INJECTION INTRAMUSCULAR; INTRAVENOUS
Status: COMPLETED | OUTPATIENT
Start: 2019-11-01 | End: 2019-11-01

## 2019-11-01 RX ORDER — PREDNISONE 20 MG/1
40 TABLET ORAL
Status: COMPLETED | OUTPATIENT
Start: 2019-11-01 | End: 2019-11-01

## 2019-11-01 RX ADMIN — IPRATROPIUM BROMIDE AND ALBUTEROL SULFATE 3 ML: .5; 3 SOLUTION RESPIRATORY (INHALATION) at 12:11

## 2019-11-01 RX ADMIN — IOHEXOL 100 ML: 350 INJECTION, SOLUTION INTRAVENOUS at 02:11

## 2019-11-01 RX ADMIN — LIDOCAINE HYDROCHLORIDE: 20 SOLUTION ORAL; TOPICAL at 01:11

## 2019-11-01 RX ADMIN — PROCHLORPERAZINE EDISYLATE 10 MG: 5 INJECTION INTRAMUSCULAR; INTRAVENOUS at 12:11

## 2019-11-01 RX ADMIN — DEXTROSE MONOHYDRATE 4.5 G: 50 INJECTION, SOLUTION INTRAVENOUS at 04:11

## 2019-11-01 RX ADMIN — PREDNISONE 40 MG: 20 TABLET ORAL at 12:11

## 2019-11-01 RX ADMIN — METOCLOPRAMIDE 10 MG: 5 INJECTION, SOLUTION INTRAMUSCULAR; INTRAVENOUS at 12:11

## 2019-11-01 RX ADMIN — INSULIN HUMAN 6 UNITS: 100 INJECTION, SOLUTION PARENTERAL at 01:11

## 2019-11-01 RX ADMIN — SODIUM CHLORIDE, SODIUM LACTATE, POTASSIUM CHLORIDE, AND CALCIUM CHLORIDE 1000 ML: .6; .31; .03; .02 INJECTION, SOLUTION INTRAVENOUS at 04:11

## 2019-11-01 RX ADMIN — KETOROLAC TROMETHAMINE 15 MG: 30 INJECTION, SOLUTION INTRAMUSCULAR at 01:11
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
## 2019-11-01 NOTE — ED NOTES
Patient reports feeling cold. Extra blanket provided. Patient drowsy and falls to sleep if left unstimulated. Does not appear distressed and reports having slight improvement on pain but states she is comfortable at this time. Patient has been informed of need to provide urine specimen for analysis. SR up x 2 with call light in reach. Patient instructed to call staff for any assistance.

## 2019-11-01 NOTE — ED PROVIDER NOTES
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 degrades the studies sensitivity, there is no large pulmonary embolism.  There is a fluid collection in the upper abdomen concerning for possible seroma versus biloma versus abscess.  I briefly discussed the case with the [facility] general surgery team, who advised that the patient would likely be better served by evaluation of the surgery team that performed her recent operations.  Patient is comfortable with transfer.  I discussed the case with Dr. Garcia at [facility] who recommends broad-spectrum antibiotics and agrees with transfer to [facility].  I have ordered some Zosyn for the patient as well as blood cultures.                       Clinical Impression:       ICD-10-CM ICD-9-CM   1. Generalized abdominal pain R10.84 789.07   2. Abdominal pain R10.9 789.00   3. Postoperative abdominal pain R10.9 789.00    G89.18 338.18   4. COPD with acute exacerbation J44.1 491.21   5. Hyperglycemia R73.9 790.29         Disposition:   Disposition: Transferred  Condition: Serious                        Brien Man MD  11/01/19 0358

## 2019-11-01 NOTE — ED NOTES
Respiratory therapist finishing breathing treatments at the bedside. Patient provided with warm blankets for comfort. Respirations remain elevated with sinus tach noted on the monitor. Patient speaking in full sentences. HOB adjusted for comfort. Will continue to monitor.

## 2019-11-06 ENCOUNTER — TELEPHONE (OUTPATIENT)
Dept: SMOKING CESSATION | Facility: CLINIC | Age: 69
End: 2019-11-06

## 2019-11-06 LAB
BACTERIA BLD CULT: NORMAL
BACTERIA BLD CULT: NORMAL

## 2019-11-11 ENCOUNTER — HOSPITAL ENCOUNTER (EMERGENCY)
Facility: HOSPITAL | Age: 69
Discharge: ANOTHER HOSPITAL | End: 2019-11-11
Attending: EMERGENCY MEDICINE
Payer: MEDICARE

## 2019-11-11 VITALS
WEIGHT: 140 LBS | TEMPERATURE: 98 F | SYSTOLIC BLOOD PRESSURE: 128 MMHG | BODY MASS INDEX: 25.76 KG/M2 | RESPIRATION RATE: 22 BRPM | DIASTOLIC BLOOD PRESSURE: 73 MMHG | HEART RATE: 105 BPM | OXYGEN SATURATION: 93 % | HEIGHT: 62 IN

## 2019-11-11 DIAGNOSIS — N17.9 ACUTE KIDNEY INJURY: ICD-10-CM

## 2019-11-11 DIAGNOSIS — T81.44XA SEPSIS FOLLOWING PROCEDURE, INITIAL ENCOUNTER: ICD-10-CM

## 2019-11-11 DIAGNOSIS — K83.8 PNEUMOBILIA: ICD-10-CM

## 2019-11-11 DIAGNOSIS — J18.9 HCAP (HEALTHCARE-ASSOCIATED PNEUMONIA): ICD-10-CM

## 2019-11-11 DIAGNOSIS — R56.9 SEIZURE: ICD-10-CM

## 2019-11-11 DIAGNOSIS — E86.0 DEHYDRATION: ICD-10-CM

## 2019-11-11 DIAGNOSIS — A41.9 SEPSIS, DUE TO UNSPECIFIED ORGANISM, UNSPECIFIED WHETHER ACUTE ORGAN DYSFUNCTION PRESENT: ICD-10-CM

## 2019-11-11 DIAGNOSIS — R41.82 ALTERED MENTAL STATUS: ICD-10-CM

## 2019-11-11 DIAGNOSIS — E16.2 HYPOGLYCEMIA: Primary | ICD-10-CM

## 2019-11-11 LAB
ALBUMIN SERPL BCP-MCNC: 3.4 G/DL (ref 3.5–5.2)
ALP SERPL-CCNC: 165 U/L (ref 55–135)
ALT SERPL W/O P-5'-P-CCNC: 10 U/L (ref 10–44)
AMMONIA PLAS-SCNC: 22 UMOL/L (ref 10–50)
AMPHET+METHAMPHET UR QL: NEGATIVE
ANION GAP SERPL CALC-SCNC: 12 MMOL/L (ref 8–16)
AST SERPL-CCNC: 16 U/L (ref 10–40)
BARBITURATES UR QL SCN>200 NG/ML: NEGATIVE
BASOPHILS # BLD AUTO: 0.01 K/UL (ref 0–0.2)
BASOPHILS NFR BLD: 0.1 % (ref 0–1.9)
BENZODIAZ UR QL SCN>200 NG/ML: NEGATIVE
BILIRUB SERPL-MCNC: 0.3 MG/DL (ref 0.1–1)
BILIRUB UR QL STRIP: NEGATIVE
BUN SERPL-MCNC: 27 MG/DL (ref 8–23)
BZE UR QL SCN: NEGATIVE
CALCIUM SERPL-MCNC: 9.4 MG/DL (ref 8.7–10.5)
CANNABINOIDS UR QL SCN: NEGATIVE
CHLORIDE SERPL-SCNC: 112 MMOL/L (ref 95–110)
CLARITY UR: CLEAR
CO2 SERPL-SCNC: 19 MMOL/L (ref 23–29)
COLOR UR: YELLOW
CREAT SERPL-MCNC: 1.8 MG/DL (ref 0.5–1.4)
CREAT UR-MCNC: 196.2 MG/DL (ref 15–325)
DIFFERENTIAL METHOD: ABNORMAL
EOSINOPHIL # BLD AUTO: 0 K/UL (ref 0–0.5)
EOSINOPHIL NFR BLD: 0.4 % (ref 0–8)
ERYTHROCYTE [DISTWIDTH] IN BLOOD BY AUTOMATED COUNT: 17.5 % (ref 11.5–14.5)
EST. GFR  (AFRICAN AMERICAN): 33 ML/MIN/1.73 M^2
EST. GFR  (NON AFRICAN AMERICAN): 29 ML/MIN/1.73 M^2
GLUCOSE SERPL-MCNC: 31 MG/DL (ref 70–110)
GLUCOSE UR QL STRIP: NEGATIVE
HCT VFR BLD AUTO: 35.9 % (ref 37–48.5)
HGB BLD-MCNC: 11 G/DL (ref 12–16)
HGB UR QL STRIP: NEGATIVE
INFLUENZA A, MOLECULAR: NEGATIVE
INFLUENZA B, MOLECULAR: NEGATIVE
KETONES UR QL STRIP: ABNORMAL
LACTATE SERPL-SCNC: 3.1 MMOL/L (ref 0.5–2.2)
LACTATE SERPL-SCNC: 3.5 MMOL/L (ref 0.5–2.2)
LEUKOCYTE ESTERASE UR QL STRIP: NEGATIVE
LYMPHOCYTES # BLD AUTO: 0.7 K/UL (ref 1–4.8)
LYMPHOCYTES NFR BLD: 7.9 % (ref 18–48)
MCH RBC QN AUTO: 29.8 PG (ref 27–31)
MCHC RBC AUTO-ENTMCNC: 30.6 G/DL (ref 32–36)
MCV RBC AUTO: 97 FL (ref 82–98)
METHADONE UR QL SCN>300 NG/ML: NEGATIVE
MONOCYTES # BLD AUTO: 0.3 K/UL (ref 0.3–1)
MONOCYTES NFR BLD: 3.1 % (ref 4–15)
NEUTROPHILS # BLD AUTO: 8 K/UL (ref 1.8–7.7)
NEUTROPHILS NFR BLD: 88.5 % (ref 38–73)
NITRITE UR QL STRIP: NEGATIVE
OPIATES UR QL SCN: NORMAL
PCP UR QL SCN>25 NG/ML: NEGATIVE
PH UR STRIP: 5 [PH] (ref 5–8)
PLATELET # BLD AUTO: 316 K/UL (ref 150–350)
PMV BLD AUTO: 8.8 FL (ref 9.2–12.9)
POCT GLUCOSE: 117 MG/DL (ref 70–110)
POCT GLUCOSE: 174 MG/DL (ref 70–110)
POCT GLUCOSE: 48 MG/DL (ref 70–110)
POCT GLUCOSE: 62 MG/DL (ref 70–110)
POTASSIUM SERPL-SCNC: 4.7 MMOL/L (ref 3.5–5.1)
PROCALCITONIN SERPL IA-MCNC: 0.04 NG/ML
PROT SERPL-MCNC: 7.2 G/DL (ref 6–8.4)
PROT UR QL STRIP: NEGATIVE
RBC # BLD AUTO: 3.69 M/UL (ref 4–5.4)
SODIUM SERPL-SCNC: 143 MMOL/L (ref 136–145)
SP GR UR STRIP: >=1.03 (ref 1–1.03)
SPECIMEN SOURCE: NORMAL
TOXICOLOGY INFORMATION: NORMAL
URN SPEC COLLECT METH UR: ABNORMAL
UROBILINOGEN UR STRIP-ACNC: NEGATIVE EU/DL
WBC # BLD AUTO: 9.07 K/UL (ref 3.9–12.7)

## 2019-11-11 PROCEDURE — 87502 INFLUENZA DNA AMP PROBE: CPT

## 2019-11-11 PROCEDURE — 25500020 PHARM REV CODE 255: Performed by: EMERGENCY MEDICINE

## 2019-11-11 PROCEDURE — 84145 PROCALCITONIN (PCT): CPT

## 2019-11-11 PROCEDURE — 80053 COMPREHEN METABOLIC PANEL: CPT

## 2019-11-11 PROCEDURE — 85025 COMPLETE CBC W/AUTO DIFF WBC: CPT

## 2019-11-11 PROCEDURE — 96365 THER/PROPH/DIAG IV INF INIT: CPT

## 2019-11-11 PROCEDURE — 93005 ELECTROCARDIOGRAM TRACING: CPT

## 2019-11-11 PROCEDURE — 87040 BLOOD CULTURE FOR BACTERIA: CPT | Mod: 59

## 2019-11-11 PROCEDURE — 81003 URINALYSIS AUTO W/O SCOPE: CPT

## 2019-11-11 PROCEDURE — 96361 HYDRATE IV INFUSION ADD-ON: CPT

## 2019-11-11 PROCEDURE — 96376 TX/PRO/DX INJ SAME DRUG ADON: CPT

## 2019-11-11 PROCEDURE — 96374 THER/PROPH/DIAG INJ IV PUSH: CPT | Mod: 59

## 2019-11-11 PROCEDURE — 25000003 PHARM REV CODE 250

## 2019-11-11 PROCEDURE — 80307 DRUG TEST PRSMV CHEM ANLYZR: CPT

## 2019-11-11 PROCEDURE — 83605 ASSAY OF LACTIC ACID: CPT | Mod: 91

## 2019-11-11 PROCEDURE — S5010 5% DEXTROSE AND 0.45% SALINE: HCPCS | Performed by: EMERGENCY MEDICINE

## 2019-11-11 PROCEDURE — 96375 TX/PRO/DX INJ NEW DRUG ADDON: CPT

## 2019-11-11 PROCEDURE — 82140 ASSAY OF AMMONIA: CPT

## 2019-11-11 PROCEDURE — 63600175 PHARM REV CODE 636 W HCPCS: Performed by: EMERGENCY MEDICINE

## 2019-11-11 PROCEDURE — 99291 CRITICAL CARE FIRST HOUR: CPT | Mod: 25

## 2019-11-11 PROCEDURE — 25000003 PHARM REV CODE 250: Performed by: EMERGENCY MEDICINE

## 2019-11-11 PROCEDURE — 99292 CRITICAL CARE ADDL 30 MIN: CPT

## 2019-11-11 PROCEDURE — 63600175 PHARM REV CODE 636 W HCPCS

## 2019-11-11 PROCEDURE — 82962 GLUCOSE BLOOD TEST: CPT

## 2019-11-11 RX ORDER — LORAZEPAM 2 MG/ML
INJECTION INTRAMUSCULAR
Status: COMPLETED
Start: 2019-11-11 | End: 2019-11-11

## 2019-11-11 RX ORDER — DEXTROSE 50 % IN WATER (D50W) INTRAVENOUS SYRINGE
25
Status: COMPLETED | OUTPATIENT
Start: 2019-11-11 | End: 2019-11-11

## 2019-11-11 RX ORDER — DEXTROSE MONOHYDRATE AND SODIUM CHLORIDE 5; .45 G/100ML; G/100ML
1000 INJECTION, SOLUTION INTRAVENOUS
Status: COMPLETED | OUTPATIENT
Start: 2019-11-11 | End: 2019-11-11

## 2019-11-11 RX ORDER — DEXTROSE 50 % IN WATER (D50W) INTRAVENOUS SYRINGE
Status: COMPLETED
Start: 2019-11-11 | End: 2019-11-11

## 2019-11-11 RX ADMIN — PIPERACILLIN AND TAZOBACTAM 4.5 G: 4; .5 INJECTION, POWDER, LYOPHILIZED, FOR SOLUTION INTRAVENOUS; PARENTERAL at 03:11

## 2019-11-11 RX ADMIN — LORAZEPAM 1 MG: 2 INJECTION INTRAMUSCULAR; INTRAVENOUS at 07:11

## 2019-11-11 RX ADMIN — DEXTROSE MONOHYDRATE 50 ML: 500 INJECTION PARENTERAL at 07:11

## 2019-11-11 RX ADMIN — DEXTROSE MONOHYDRATE 25 G: 500 INJECTION PARENTERAL at 07:11

## 2019-11-11 RX ADMIN — SODIUM CHLORIDE, SODIUM LACTATE, POTASSIUM CHLORIDE, AND CALCIUM CHLORIDE 1000 ML: .6; .31; .03; .02 INJECTION, SOLUTION INTRAVENOUS at 03:11

## 2019-11-11 RX ADMIN — LORAZEPAM 1 MG: 2 INJECTION INTRAMUSCULAR; INTRAVENOUS at 06:11

## 2019-11-11 RX ADMIN — VANCOMYCIN HYDROCHLORIDE 2000 MG: 100 INJECTION, POWDER, LYOPHILIZED, FOR SOLUTION INTRAVENOUS at 10:11

## 2019-11-11 RX ADMIN — DEXTROSE AND SODIUM CHLORIDE 1000 ML: 5; .45 INJECTION, SOLUTION INTRAVENOUS at 08:11

## 2019-11-11 RX ADMIN — SODIUM CHLORIDE, SODIUM LACTATE, POTASSIUM CHLORIDE, AND CALCIUM CHLORIDE 750 ML: .6; .31; .03; .02 INJECTION, SOLUTION INTRAVENOUS at 05:11

## 2019-11-11 RX ADMIN — IOHEXOL 75 ML: 350 INJECTION, SOLUTION INTRAVENOUS at 07:11

## 2019-11-11 RX ADMIN — DEXTROSE MONOHYDRATE 25 G: 500 INJECTION PARENTERAL at 08:11
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
## 2019-11-12 ENCOUNTER — CLINICAL SUPPORT (OUTPATIENT)
Dept: SMOKING CESSATION | Facility: CLINIC | Age: 69
End: 2019-11-12
Payer: COMMERCIAL

## 2019-11-12 ENCOUNTER — PATIENT OUTREACH (OUTPATIENT)
Dept: ADMINISTRATIVE | Facility: HOSPITAL | Age: 69
End: 2019-11-12

## 2019-11-12 DIAGNOSIS — F17.200 NICOTINE DEPENDENCE: Primary | ICD-10-CM

## 2019-11-12 PROCEDURE — 99406 BEHAV CHNG SMOKING 3-10 MIN: CPT | Mod: S$GLB,,,

## 2019-11-12 PROCEDURE — 99406 PR TOBACCO USE CESSATION INTERMEDIATE 3-10 MINUTES: ICD-10-PCS | Mod: S$GLB,,,

## 2019-11-12 NOTE — ED NOTES
Pt left unit to be trasferred to [facility] gabi via [facility] at this time.

## 2019-11-12 NOTE — ED NOTES
Pt currently in bed, appears to be resting comfortably. Easily awakened with verbal stimuli. Respirations even and unlabored. Cardiac monitor, continuous pulse ox and NIBP in place.

## 2019-11-12 NOTE — PROGRESS NOTES
Called pt to f/u on her 3 and 6 month smoking cessation quit status. Pt's daughter stated she was in the hospital and that she is still smoking. Informed her she has benefits available and is able to rejoin. Informed her of benefit period, phone follow ups, and contact information. Daughter stated she would inform her of benefits available to rejoin. Completed 3 month smart form per visit with [facility] on 8/26/19 patient was still smoking 3 cpd.  Will complete 6 month smart form and will continue to follow up on quit #4 episode.

## 2019-11-12 NOTE — ED NOTES
Pt awake and following commands. Pt answering questions appropriately. Pt also talking to/referring to people who are not present.

## 2019-11-15 ENCOUNTER — TELEPHONE (OUTPATIENT)
Dept: FAMILY MEDICINE | Facility: CLINIC | Age: 69
End: 2019-11-15

## 2019-11-15 NOTE — TELEPHONE ENCOUNTER
----- Message from Samantha Grady sent at 11/15/2019  2:51 PM CST -----  Contact: [phone] ext [phone] Maryann with [facility]   Maryann with [facility] is requesting verbal orders for Nursing evaluation.

## 2019-11-15 NOTE — TELEPHONE ENCOUNTER
Maryann with [facility] at [phone] ext [phone]   is requesting verbal orders for Nursing evaluation. Pls advise.

## 2019-11-16 LAB
BACTERIA BLD CULT: NORMAL
BACTERIA BLD CULT: NORMAL

## 2019-11-17 ENCOUNTER — HOSPITAL ENCOUNTER (EMERGENCY)
Facility: HOSPITAL | Age: 69
Discharge: ANOTHER HOSPITAL | End: 2019-11-17
Attending: EMERGENCY MEDICINE
Payer: MEDICARE

## 2019-11-17 VITALS
TEMPERATURE: 98 F | DIASTOLIC BLOOD PRESSURE: 69 MMHG | HEART RATE: 114 BPM | WEIGHT: 140 LBS | BODY MASS INDEX: 25.76 KG/M2 | SYSTOLIC BLOOD PRESSURE: 108 MMHG | RESPIRATION RATE: 24 BRPM | OXYGEN SATURATION: 98 % | HEIGHT: 62 IN

## 2019-11-17 DIAGNOSIS — J18.9 HCAP (HEALTHCARE-ASSOCIATED PNEUMONIA): ICD-10-CM

## 2019-11-17 DIAGNOSIS — K83.8 PNEUMOBILIA: ICD-10-CM

## 2019-11-17 DIAGNOSIS — A41.9 SEPSIS, DUE TO UNSPECIFIED ORGANISM, UNSPECIFIED WHETHER ACUTE ORGAN DYSFUNCTION PRESENT: Primary | ICD-10-CM

## 2019-11-17 DIAGNOSIS — N17.9 ACUTE KIDNEY INJURY: ICD-10-CM

## 2019-11-17 DIAGNOSIS — R41.82 ALTERED MENTAL STATUS: ICD-10-CM

## 2019-11-17 DIAGNOSIS — E87.20 LACTIC ACIDOSIS: ICD-10-CM

## 2019-11-17 LAB
ALBUMIN SERPL BCP-MCNC: 3.3 G/DL (ref 3.5–5.2)
ALLENS TEST: ABNORMAL
ALP SERPL-CCNC: 157 U/L (ref 55–135)
ALT SERPL W/O P-5'-P-CCNC: 7 U/L (ref 10–44)
AMMONIA PLAS-SCNC: 32 UMOL/L (ref 10–50)
ANION GAP SERPL CALC-SCNC: 16 MMOL/L (ref 8–16)
AST SERPL-CCNC: 7 U/L (ref 10–40)
BASOPHILS # BLD AUTO: 0.02 K/UL (ref 0–0.2)
BASOPHILS NFR BLD: 0.2 % (ref 0–1.9)
BILIRUB SERPL-MCNC: 0.3 MG/DL (ref 0.1–1)
BILIRUB UR QL STRIP: ABNORMAL
BUN SERPL-MCNC: 33 MG/DL (ref 8–23)
CALCIUM SERPL-MCNC: 8.8 MG/DL (ref 8.7–10.5)
CHLORIDE SERPL-SCNC: 107 MMOL/L (ref 95–110)
CLARITY UR: CLEAR
CO2 SERPL-SCNC: 14 MMOL/L (ref 23–29)
COLOR UR: ABNORMAL
CREAT SERPL-MCNC: 3.7 MG/DL (ref 0.5–1.4)
DELSYS: ABNORMAL
DIFFERENTIAL METHOD: ABNORMAL
EOSINOPHIL # BLD AUTO: 0.2 K/UL (ref 0–0.5)
EOSINOPHIL NFR BLD: 1.9 % (ref 0–8)
ERYTHROCYTE [DISTWIDTH] IN BLOOD BY AUTOMATED COUNT: 16.9 % (ref 11.5–14.5)
EST. GFR  (AFRICAN AMERICAN): 14 ML/MIN/1.73 M^2
EST. GFR  (NON AFRICAN AMERICAN): 12 ML/MIN/1.73 M^2
FLOW: 2
GLUCOSE SERPL-MCNC: 282 MG/DL (ref 70–110)
GLUCOSE UR QL STRIP: ABNORMAL
HCO3 UR-SCNC: 13.4 MMOL/L (ref 24–28)
HCT VFR BLD AUTO: 37 % (ref 37–48.5)
HGB BLD-MCNC: 11.6 G/DL (ref 12–16)
HGB UR QL STRIP: NEGATIVE
INFLUENZA A, MOLECULAR: NEGATIVE
INFLUENZA B, MOLECULAR: NEGATIVE
KETONES UR QL STRIP: NEGATIVE
LACTATE SERPL-SCNC: 5.6 MMOL/L (ref 0.5–2.2)
LEUKOCYTE ESTERASE UR QL STRIP: NEGATIVE
LIPASE SERPL-CCNC: 196 U/L (ref 4–60)
LYMPHOCYTES # BLD AUTO: 1.8 K/UL (ref 1–4.8)
LYMPHOCYTES NFR BLD: 19.7 % (ref 18–48)
MCH RBC QN AUTO: 29.9 PG (ref 27–31)
MCHC RBC AUTO-ENTMCNC: 31.4 G/DL (ref 32–36)
MCV RBC AUTO: 95 FL (ref 82–98)
MODE: ABNORMAL
MONOCYTES # BLD AUTO: 0.7 K/UL (ref 0.3–1)
MONOCYTES NFR BLD: 7.3 % (ref 4–15)
NEUTROPHILS # BLD AUTO: 6.3 K/UL (ref 1.8–7.7)
NEUTROPHILS NFR BLD: 70.9 % (ref 38–73)
NITRITE UR QL STRIP: NEGATIVE
PCO2 BLDA: 32.3 MMHG (ref 35–45)
PH SMN: 7.23 [PH] (ref 7.35–7.45)
PH UR STRIP: 5 [PH] (ref 5–8)
PLATELET # BLD AUTO: 260 K/UL (ref 150–350)
PMV BLD AUTO: 9.4 FL (ref 9.2–12.9)
PO2 BLDA: 92 MMHG (ref 80–100)
POC BE: -14 MMOL/L
POC SATURATED O2: 96 % (ref 95–100)
POC TCO2: 14 MMOL/L (ref 23–27)
POCT GLUCOSE: 326 MG/DL (ref 70–110)
POTASSIUM SERPL-SCNC: 4.8 MMOL/L (ref 3.5–5.1)
PROT SERPL-MCNC: 6.5 G/DL (ref 6–8.4)
PROT UR QL STRIP: NEGATIVE
RBC # BLD AUTO: 3.88 M/UL (ref 4–5.4)
SAMPLE: ABNORMAL
SITE: ABNORMAL
SODIUM SERPL-SCNC: 137 MMOL/L (ref 136–145)
SP GR UR STRIP: >=1.03 (ref 1–1.03)
SPECIMEN SOURCE: NORMAL
TROPONIN I SERPL DL<=0.01 NG/ML-MCNC: 0.01 NG/ML (ref 0–0.03)
URN SPEC COLLECT METH UR: ABNORMAL
UROBILINOGEN UR STRIP-ACNC: NEGATIVE EU/DL
WBC # BLD AUTO: 8.87 K/UL (ref 3.9–12.7)

## 2019-11-17 PROCEDURE — 96365 THER/PROPH/DIAG IV INF INIT: CPT

## 2019-11-17 PROCEDURE — 87040 BLOOD CULTURE FOR BACTERIA: CPT

## 2019-11-17 PROCEDURE — 84484 ASSAY OF TROPONIN QUANT: CPT

## 2019-11-17 PROCEDURE — 36600 WITHDRAWAL OF ARTERIAL BLOOD: CPT | Mod: 59

## 2019-11-17 PROCEDURE — 83690 ASSAY OF LIPASE: CPT

## 2019-11-17 PROCEDURE — 82140 ASSAY OF AMMONIA: CPT

## 2019-11-17 PROCEDURE — 87502 INFLUENZA DNA AMP PROBE: CPT

## 2019-11-17 PROCEDURE — 85025 COMPLETE CBC W/AUTO DIFF WBC: CPT

## 2019-11-17 PROCEDURE — 80053 COMPREHEN METABOLIC PANEL: CPT

## 2019-11-17 PROCEDURE — 93005 ELECTROCARDIOGRAM TRACING: CPT

## 2019-11-17 PROCEDURE — 93010 EKG 12-LEAD: ICD-10-PCS | Mod: ,,, | Performed by: STUDENT IN AN ORGANIZED HEALTH CARE EDUCATION/TRAINING PROGRAM

## 2019-11-17 PROCEDURE — 63600175 PHARM REV CODE 636 W HCPCS: Performed by: EMERGENCY MEDICINE

## 2019-11-17 PROCEDURE — 96366 THER/PROPH/DIAG IV INF ADDON: CPT

## 2019-11-17 PROCEDURE — 99900035 HC TECH TIME PER 15 MIN (STAT)

## 2019-11-17 PROCEDURE — 93010 ELECTROCARDIOGRAM REPORT: CPT | Mod: ,,, | Performed by: STUDENT IN AN ORGANIZED HEALTH CARE EDUCATION/TRAINING PROGRAM

## 2019-11-17 PROCEDURE — 81003 URINALYSIS AUTO W/O SCOPE: CPT

## 2019-11-17 PROCEDURE — 82962 GLUCOSE BLOOD TEST: CPT

## 2019-11-17 PROCEDURE — 99285 EMERGENCY DEPT VISIT HI MDM: CPT | Mod: 25

## 2019-11-17 PROCEDURE — 82803 BLOOD GASES ANY COMBINATION: CPT

## 2019-11-17 PROCEDURE — 51702 INSERT TEMP BLADDER CATH: CPT

## 2019-11-17 PROCEDURE — 96361 HYDRATE IV INFUSION ADD-ON: CPT

## 2019-11-17 PROCEDURE — 96375 TX/PRO/DX INJ NEW DRUG ADDON: CPT | Mod: 59

## 2019-11-17 PROCEDURE — 83605 ASSAY OF LACTIC ACID: CPT

## 2019-11-17 RX ORDER — NALOXONE HCL 0.4 MG/ML
0.2 VIAL (ML) INJECTION
Status: COMPLETED | OUTPATIENT
Start: 2019-11-17 | End: 2019-11-17

## 2019-11-17 RX ORDER — SODIUM CHLORIDE, SODIUM LACTATE, POTASSIUM CHLORIDE, CALCIUM CHLORIDE 600; 310; 30; 20 MG/100ML; MG/100ML; MG/100ML; MG/100ML
1000 INJECTION, SOLUTION INTRAVENOUS
Status: COMPLETED | OUTPATIENT
Start: 2019-11-17 | End: 2019-11-17

## 2019-11-17 RX ADMIN — NALOXONE HYDROCHLORIDE 0.2 MG: 0.4 INJECTION, SOLUTION INTRAMUSCULAR; INTRAVENOUS; SUBCUTANEOUS at 12:11

## 2019-11-17 RX ADMIN — SODIUM CHLORIDE, SODIUM LACTATE, POTASSIUM CHLORIDE, AND CALCIUM CHLORIDE 1000 ML: .6; .31; .03; .02 INJECTION, SOLUTION INTRAVENOUS at 05:11

## 2019-11-17 RX ADMIN — PIPERACILLIN AND TAZOBACTAM 4.5 G: 4; .5 INJECTION, POWDER, FOR SOLUTION INTRAVENOUS at 02:11

## 2019-11-17 RX ADMIN — SODIUM CHLORIDE 1000 ML: 0.9 INJECTION, SOLUTION INTRAVENOUS at 12:11

## 2019-11-17 RX ADMIN — SODIUM CHLORIDE, SODIUM LACTATE, POTASSIUM CHLORIDE, AND CALCIUM CHLORIDE 1000 ML: .6; .31; .03; .02 INJECTION, SOLUTION INTRAVENOUS at 01:11
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
## 2019-11-17 NOTE — ED NOTES
Bed: EXAM 13  Expected date:   Expected time:   Means of arrival:   Comments:   Alert and oriented, no focal deficits, no motor or sensory deficits.

## 2019-11-18 ENCOUNTER — TELEPHONE (OUTPATIENT)
Dept: OTOLARYNGOLOGY | Facility: CLINIC | Age: 69
End: 2019-11-18

## 2019-11-18 NOTE — TELEPHONE ENCOUNTER
Spoke to patient she states she had to cancel recent appointment with Dr Arzate but once she is out the hospital she will call back to schedule a follow up.
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
## 2019-11-20 ENCOUNTER — TELEPHONE (OUTPATIENT)
Dept: SMOKING CESSATION | Facility: CLINIC | Age: 69
End: 2019-11-20

## 2019-11-20 NOTE — TELEPHONE ENCOUNTER
Left a message for patient regarding a missed appointment and to see how her smoking was going. Gave my number to call and reschedule. [phone]

## 2019-11-21 ENCOUNTER — TELEPHONE (OUTPATIENT)
Dept: FAMILY MEDICINE | Facility: CLINIC | Age: 69
End: 2019-11-21

## 2019-11-21 NOTE — TELEPHONE ENCOUNTER
Maryann with [facility], [phone] ext [phone]  States there were no home health orders received from hospital to resume care. Requests an order to resume home health for nurse and physical therapy.  Please advise.

## 2019-11-22 LAB
BACTERIA BLD CULT: NORMAL
BACTERIA BLD CULT: NORMAL

## 2019-11-25 ENCOUNTER — TELEPHONE (OUTPATIENT)
Dept: SMOKING CESSATION | Facility: CLINIC | Age: 69
End: 2019-11-25

## 2019-11-25 ENCOUNTER — CLINICAL SUPPORT (OUTPATIENT)
Dept: SMOKING CESSATION | Facility: CLINIC | Age: 69
End: 2019-11-25
Payer: COMMERCIAL

## 2019-11-25 ENCOUNTER — TELEPHONE (OUTPATIENT)
Dept: FAMILY MEDICINE | Facility: CLINIC | Age: 69
End: 2019-11-25

## 2019-11-25 DIAGNOSIS — F17.210 LIGHT CIGARETTE SMOKER (1-9 CIGARETTES PER DAY): Primary | ICD-10-CM

## 2019-11-25 DIAGNOSIS — E11.65 UNCONTROLLED TYPE 2 DIABETES MELLITUS WITH HYPERGLYCEMIA, WITH LONG-TERM CURRENT USE OF INSULIN: Primary | ICD-10-CM

## 2019-11-25 DIAGNOSIS — Z79.4 UNCONTROLLED TYPE 2 DIABETES MELLITUS WITH HYPERGLYCEMIA, WITH LONG-TERM CURRENT USE OF INSULIN: Primary | ICD-10-CM

## 2019-11-25 PROCEDURE — 99999 PR PBB SHADOW E&M-EST. PATIENT-LVL I: ICD-10-PCS | Mod: PBBFAC,,,

## 2019-11-25 PROCEDURE — 99999 PR PBB SHADOW E&M-EST. PATIENT-LVL I: CPT | Mod: PBBFAC,,,

## 2019-11-25 PROCEDURE — 99406 BEHAV CHNG SMOKING 3-10 MIN: CPT | Mod: S$GLB,,,

## 2019-11-25 PROCEDURE — 99406 PR TOBACCO USE CESSATION INTERMEDIATE 3-10 MINUTES: ICD-10-PCS | Mod: S$GLB,,,

## 2019-11-25 RX ORDER — LANCETS
EACH MISCELLANEOUS
Qty: 100 EACH | Refills: 5 | Status: SHIPPED | OUTPATIENT
Start: 2019-11-25 | End: 2019-12-16

## 2019-11-25 RX ORDER — INSULIN PUMP SYRINGE, 3 ML
EACH MISCELLANEOUS
Qty: 1 EACH | Refills: 0 | Status: SHIPPED | OUTPATIENT
Start: 2019-11-25 | End: 2019-11-27 | Stop reason: SDUPTHER
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
## 2019-11-25 NOTE — TELEPHONE ENCOUNTER
----- Message from Jackie Barahona MA sent at 11/22/2019  2:34 PM CST -----  Contact: [phone]/self  Please Advise.  ----- Message -----  From: Krista Allred  Sent: 11/22/2019   2:23 PM CST  To: Rory Moe Staff    Patient is requesting a call back regarding getting a rx for a blood glucose machine and supplies     sent to [id number] - [place] - [address] AT [facility]
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
## 2019-11-26 ENCOUNTER — TELEPHONE (OUTPATIENT)
Dept: HOME HEALTH SERVICES | Facility: HOSPITAL | Age: 69
End: 2019-11-26

## 2019-11-26 NOTE — TELEPHONE ENCOUNTER
Home health lindsay called and stated that pt said that the hospital told her to stop taking half of her medication and the rest of her meds are missing according to the family,  lindsay stated that her heart rate is at 122 with a lot of wheezing. No flu symptoms.bp was 132/86. She isn't having any other symptoms, I did advise the home health nurse to tell her to go to the ER if her heart rate doesn't go down or she starts to have more symptoms, I also got her hospital follow up scheduled for 8AM with Dr Valadez tomorrow.

## 2019-11-27 ENCOUNTER — OFFICE VISIT (OUTPATIENT)
Dept: FAMILY MEDICINE | Facility: CLINIC | Age: 69
End: 2019-11-27
Payer: MEDICARE

## 2019-11-27 ENCOUNTER — HOSPITAL ENCOUNTER (OUTPATIENT)
Dept: RADIOLOGY | Facility: HOSPITAL | Age: 69
Discharge: HOME | End: 2019-11-27
Attending: FAMILY MEDICINE
Payer: MEDICARE

## 2019-11-27 ENCOUNTER — TELEPHONE (OUTPATIENT)
Dept: FAMILY MEDICINE | Facility: CLINIC | Age: 69
End: 2019-11-27

## 2019-11-27 VITALS
HEIGHT: 62 IN | SYSTOLIC BLOOD PRESSURE: 139 MMHG | DIASTOLIC BLOOD PRESSURE: 94 MMHG | BODY MASS INDEX: 25.11 KG/M2 | TEMPERATURE: 99 F | OXYGEN SATURATION: 95 % | HEART RATE: 118 BPM | WEIGHT: 136.44 LBS

## 2019-11-27 DIAGNOSIS — K66.8 BILOMA FOLLOWING SURGERY, SEQUELA: ICD-10-CM

## 2019-11-27 DIAGNOSIS — I50.32 CHRONIC DIASTOLIC CONGESTIVE HEART FAILURE: ICD-10-CM

## 2019-11-27 DIAGNOSIS — E11.65 UNCONTROLLED TYPE 2 DIABETES MELLITUS WITH HYPERGLYCEMIA, WITH LONG-TERM CURRENT USE OF INSULIN: ICD-10-CM

## 2019-11-27 DIAGNOSIS — I15.2 HYPERTENSION ASSOCIATED WITH DIABETES: ICD-10-CM

## 2019-11-27 DIAGNOSIS — E78.5 HYPERLIPIDEMIA ASSOCIATED WITH TYPE 2 DIABETES MELLITUS: ICD-10-CM

## 2019-11-27 DIAGNOSIS — Z98.61 S/P PTCA (PERCUTANEOUS TRANSLUMINAL CORONARY ANGIOPLASTY): ICD-10-CM

## 2019-11-27 DIAGNOSIS — R05.9 COUGH: Primary | ICD-10-CM

## 2019-11-27 DIAGNOSIS — E11.69 HYPERLIPIDEMIA ASSOCIATED WITH TYPE 2 DIABETES MELLITUS: ICD-10-CM

## 2019-11-27 DIAGNOSIS — Z79.4 UNCONTROLLED TYPE 2 DIABETES MELLITUS WITH HYPERGLYCEMIA, WITH LONG-TERM CURRENT USE OF INSULIN: ICD-10-CM

## 2019-11-27 DIAGNOSIS — R06.02 SHORTNESS OF BREATH: ICD-10-CM

## 2019-11-27 DIAGNOSIS — T81.89XS BILOMA FOLLOWING SURGERY, SEQUELA: ICD-10-CM

## 2019-11-27 DIAGNOSIS — M85.88 OSTEOPENIA OF LUMBAR SPINE: ICD-10-CM

## 2019-11-27 DIAGNOSIS — I25.118 CORONARY ARTERY DISEASE OF NATIVE ARTERY OF NATIVE HEART WITH STABLE ANGINA PECTORIS: ICD-10-CM

## 2019-11-27 DIAGNOSIS — E55.9 VITAMIN D DEFICIENCY: ICD-10-CM

## 2019-11-27 DIAGNOSIS — R05.9 COUGH: ICD-10-CM

## 2019-11-27 DIAGNOSIS — E11.59 HYPERTENSION ASSOCIATED WITH DIABETES: ICD-10-CM

## 2019-11-27 PROBLEM — T81.89XA BILOMA FOLLOWING SURGERY: Status: ACTIVE | Noted: 2019-11-27

## 2019-11-27 LAB
CTP QC/QA: YES
FLUAV AG NPH QL: NEGATIVE
FLUBV AG NPH QL: NEGATIVE

## 2019-11-27 PROCEDURE — 71046 X-RAY EXAM CHEST 2 VIEWS: CPT | Mod: TC,FY

## 2019-11-27 PROCEDURE — 99499 UNLISTED E&M SERVICE: CPT | Mod: S$GLB,,, | Performed by: FAMILY MEDICINE

## 2019-11-27 PROCEDURE — 3075F SYST BP GE 130 - 139MM HG: CPT | Mod: CPTII,S$GLB,, | Performed by: FAMILY MEDICINE

## 2019-11-27 PROCEDURE — 3075F PR MOST RECENT SYSTOLIC BLOOD PRESS GE 130-139MM HG: ICD-10-PCS | Mod: CPTII,S$GLB,, | Performed by: FAMILY MEDICINE

## 2019-11-27 PROCEDURE — 1101F PR PT FALLS ASSESS DOC 0-1 FALLS W/OUT INJ PAST YR: ICD-10-PCS | Mod: CPTII,S$GLB,, | Performed by: FAMILY MEDICINE

## 2019-11-27 PROCEDURE — 99999 PR PBB SHADOW E&M-EST. PATIENT-LVL V: CPT | Mod: PBBFAC,,, | Performed by: FAMILY MEDICINE

## 2019-11-27 PROCEDURE — 99999 PR PBB SHADOW E&M-EST. PATIENT-LVL V: ICD-10-PCS | Mod: PBBFAC,,, | Performed by: FAMILY MEDICINE

## 2019-11-27 PROCEDURE — 87804 INFLUENZA ASSAY W/OPTIC: CPT | Mod: 59,QW,S$GLB, | Performed by: FAMILY MEDICINE

## 2019-11-27 PROCEDURE — 87804 POCT INFLUENZA A/B: ICD-10-PCS | Mod: 59,QW,S$GLB, | Performed by: FAMILY MEDICINE

## 2019-11-27 PROCEDURE — 99214 PR OFFICE/OUTPT VISIT, EST, LEVL IV, 30-39 MIN: ICD-10-PCS | Mod: 25,S$GLB,, | Performed by: FAMILY MEDICINE

## 2019-11-27 PROCEDURE — 1125F PR PAIN SEVERITY QUANTIFIED, PAIN PRESENT: ICD-10-PCS | Mod: S$GLB,,, | Performed by: FAMILY MEDICINE

## 2019-11-27 PROCEDURE — 71046 XR CHEST PA AND LATERAL: ICD-10-PCS | Mod: 26,,, | Performed by: RADIOLOGY

## 2019-11-27 PROCEDURE — 99214 OFFICE O/P EST MOD 30 MIN: CPT | Mod: 25,S$GLB,, | Performed by: FAMILY MEDICINE

## 2019-11-27 PROCEDURE — 1159F MED LIST DOCD IN RCRD: CPT | Mod: S$GLB,,, | Performed by: FAMILY MEDICINE

## 2019-11-27 PROCEDURE — 3078F PR MOST RECENT DIASTOLIC BLOOD PRESSURE < 80 MM HG: ICD-10-PCS | Mod: CPTII,S$GLB,, | Performed by: FAMILY MEDICINE

## 2019-11-27 PROCEDURE — 99499 RISK ADDL DX/OHS AUDIT: ICD-10-PCS | Mod: S$GLB,,, | Performed by: FAMILY MEDICINE

## 2019-11-27 PROCEDURE — 1159F PR MEDICATION LIST DOCUMENTED IN MEDICAL RECORD: ICD-10-PCS | Mod: S$GLB,,, | Performed by: FAMILY MEDICINE

## 2019-11-27 PROCEDURE — 71046 X-RAY EXAM CHEST 2 VIEWS: CPT | Mod: 26,,, | Performed by: RADIOLOGY

## 2019-11-27 PROCEDURE — 3078F DIAST BP <80 MM HG: CPT | Mod: CPTII,S$GLB,, | Performed by: FAMILY MEDICINE

## 2019-11-27 PROCEDURE — 1101F PT FALLS ASSESS-DOCD LE1/YR: CPT | Mod: CPTII,S$GLB,, | Performed by: FAMILY MEDICINE

## 2019-11-27 PROCEDURE — 1125F AMNT PAIN NOTED PAIN PRSNT: CPT | Mod: S$GLB,,, | Performed by: FAMILY MEDICINE

## 2019-11-27 RX ORDER — AMITRIPTYLINE HYDROCHLORIDE 10 MG/1
20 TABLET, FILM COATED ORAL NIGHTLY
COMMUNITY
Start: 2019-09-04 | End: 2021-04-12 | Stop reason: ALTCHOICE

## 2019-11-27 RX ORDER — PREGABALIN 150 MG/1
150 CAPSULE ORAL 2 TIMES DAILY
COMMUNITY
End: 2019-12-16 | Stop reason: SDUPTHER

## 2019-11-27 RX ORDER — LEVOFLOXACIN 750 MG/1
750 TABLET ORAL DAILY
Qty: 5 TABLET | Refills: 0 | Status: SHIPPED | OUTPATIENT
Start: 2019-11-27 | End: 2019-12-16

## 2019-11-27 RX ORDER — INSULIN PUMP SYRINGE, 3 ML
EACH MISCELLANEOUS
Qty: 1 EACH | Refills: 0 | Status: SHIPPED | OUTPATIENT
Start: 2019-11-27 | End: 2019-12-16

## 2019-11-27 RX ORDER — GABAPENTIN 300 MG/1
CAPSULE ORAL
COMMUNITY
Start: 2019-10-29 | End: 2019-12-16

## 2019-11-27 RX ORDER — PREDNISONE 20 MG/1
40 TABLET ORAL DAILY
Qty: 6 TABLET | Refills: 0 | Status: SHIPPED | OUTPATIENT
Start: 2019-11-27 | End: 2019-11-30

## 2019-11-27 NOTE — TELEPHONE ENCOUNTER
----- Message from Hattie Dalton sent at 11/27/2019  1:07 PM CST -----  Contact: self  Patient is requesting a call back concerning needing a prescription for her diabetic machine. Please call
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
## 2019-11-27 NOTE — TELEPHONE ENCOUNTER
Called and spoke to patient at phone number listed. Reviewed CXR results.  Advised that I will treat for community-acquired pneumonia with levofloxacin 750 mg x7 days.  Will also prescribe prednisone 40 mg x3 days.  Will have patient follow up with me in 1 week.    Dr. Valadez

## 2019-11-27 NOTE — TELEPHONE ENCOUNTER
Spoke to pt and stated she needs a new glucometer machine. One was sent to [facility], but [facility] can not fill due to pt's [employer] Insurance. Informed pt that the order will be faxed to [employer]. Pt verbalized understanding.

## 2019-11-29 ENCOUNTER — TELEPHONE (OUTPATIENT)
Dept: FAMILY MEDICINE | Facility: CLINIC | Age: 69
End: 2019-11-29

## 2019-11-29 NOTE — TELEPHONE ENCOUNTER
Spoke with Caitlin with [facility].  She states that patient's heart rate is 118.  Patient o2 saturation is 92%.  Caitlin was also asking if patient should be on metoprolol. Please advise.

## 2019-12-02 ENCOUNTER — TELEPHONE (OUTPATIENT)
Dept: FAMILY MEDICINE | Facility: CLINIC | Age: 69
End: 2019-12-02

## 2019-12-02 NOTE — TELEPHONE ENCOUNTER
----- Message from Hattie Dalton sent at 12/2/2019 12:11 PM CST -----  Contact: [email or web address]-[phone]  [email or web address]-[phone] is requesting a callback concerning need a prescription for meter strips and lances. Please call  Or [phone]

## 2019-12-04 ENCOUNTER — OFFICE VISIT (OUTPATIENT)
Dept: FAMILY MEDICINE | Facility: CLINIC | Age: 69
End: 2019-12-04
Payer: MEDICARE

## 2019-12-04 ENCOUNTER — TELEPHONE (OUTPATIENT)
Dept: CARDIOLOGY | Facility: OTHER | Age: 69
End: 2019-12-04

## 2019-12-04 VITALS
SYSTOLIC BLOOD PRESSURE: 139 MMHG | BODY MASS INDEX: 24.87 KG/M2 | WEIGHT: 135.13 LBS | HEART RATE: 103 BPM | HEIGHT: 62 IN | DIASTOLIC BLOOD PRESSURE: 93 MMHG | OXYGEN SATURATION: 98 %

## 2019-12-04 DIAGNOSIS — M85.88 OSTEOPENIA OF LUMBAR SPINE: ICD-10-CM

## 2019-12-04 DIAGNOSIS — E11.59 HYPERTENSION ASSOCIATED WITH DIABETES: ICD-10-CM

## 2019-12-04 DIAGNOSIS — I25.118 CORONARY ARTERY DISEASE OF NATIVE ARTERY OF NATIVE HEART WITH STABLE ANGINA PECTORIS: ICD-10-CM

## 2019-12-04 DIAGNOSIS — Z98.61 S/P PTCA (PERCUTANEOUS TRANSLUMINAL CORONARY ANGIOPLASTY): ICD-10-CM

## 2019-12-04 DIAGNOSIS — I50.32 CHRONIC DIASTOLIC CONGESTIVE HEART FAILURE: ICD-10-CM

## 2019-12-04 DIAGNOSIS — E11.65 UNCONTROLLED TYPE 2 DIABETES MELLITUS WITH HYPERGLYCEMIA, WITH LONG-TERM CURRENT USE OF INSULIN: ICD-10-CM

## 2019-12-04 DIAGNOSIS — Z79.4 UNCONTROLLED TYPE 2 DIABETES MELLITUS WITH HYPERGLYCEMIA, WITH LONG-TERM CURRENT USE OF INSULIN: ICD-10-CM

## 2019-12-04 DIAGNOSIS — E11.69 HYPERLIPIDEMIA ASSOCIATED WITH TYPE 2 DIABETES MELLITUS: ICD-10-CM

## 2019-12-04 DIAGNOSIS — E55.9 VITAMIN D DEFICIENCY: ICD-10-CM

## 2019-12-04 DIAGNOSIS — K66.8 BILOMA FOLLOWING SURGERY, SEQUELA: ICD-10-CM

## 2019-12-04 DIAGNOSIS — J18.9 COMMUNITY ACQUIRED PNEUMONIA, UNSPECIFIED LATERALITY: Primary | ICD-10-CM

## 2019-12-04 DIAGNOSIS — I15.2 HYPERTENSION ASSOCIATED WITH DIABETES: ICD-10-CM

## 2019-12-04 DIAGNOSIS — T81.89XS BILOMA FOLLOWING SURGERY, SEQUELA: ICD-10-CM

## 2019-12-04 DIAGNOSIS — E78.5 HYPERLIPIDEMIA ASSOCIATED WITH TYPE 2 DIABETES MELLITUS: ICD-10-CM

## 2019-12-04 DIAGNOSIS — J44.1 COPD WITH EXACERBATION: ICD-10-CM

## 2019-12-04 PROCEDURE — 1159F MED LIST DOCD IN RCRD: CPT | Mod: S$GLB,,, | Performed by: FAMILY MEDICINE

## 2019-12-04 PROCEDURE — 3075F PR MOST RECENT SYSTOLIC BLOOD PRESS GE 130-139MM HG: ICD-10-PCS | Mod: CPTII,S$GLB,, | Performed by: FAMILY MEDICINE

## 2019-12-04 PROCEDURE — 1101F PR PT FALLS ASSESS DOC 0-1 FALLS W/OUT INJ PAST YR: ICD-10-PCS | Mod: CPTII,S$GLB,, | Performed by: FAMILY MEDICINE

## 2019-12-04 PROCEDURE — 99214 PR OFFICE/OUTPT VISIT, EST, LEVL IV, 30-39 MIN: ICD-10-PCS | Mod: S$GLB,,, | Performed by: FAMILY MEDICINE

## 2019-12-04 PROCEDURE — 1125F PR PAIN SEVERITY QUANTIFIED, PAIN PRESENT: ICD-10-PCS | Mod: S$GLB,,, | Performed by: FAMILY MEDICINE

## 2019-12-04 PROCEDURE — 99499 UNLISTED E&M SERVICE: CPT | Mod: S$GLB,,, | Performed by: FAMILY MEDICINE

## 2019-12-04 PROCEDURE — 99999 PR PBB SHADOW E&M-EST. PATIENT-LVL V: ICD-10-PCS | Mod: PBBFAC,,, | Performed by: FAMILY MEDICINE

## 2019-12-04 PROCEDURE — 1125F AMNT PAIN NOTED PAIN PRSNT: CPT | Mod: S$GLB,,, | Performed by: FAMILY MEDICINE

## 2019-12-04 PROCEDURE — 3080F DIAST BP >= 90 MM HG: CPT | Mod: CPTII,S$GLB,, | Performed by: FAMILY MEDICINE

## 2019-12-04 PROCEDURE — 99214 OFFICE O/P EST MOD 30 MIN: CPT | Mod: S$GLB,,, | Performed by: FAMILY MEDICINE

## 2019-12-04 PROCEDURE — 99499 RISK ADDL DX/OHS AUDIT: ICD-10-PCS | Mod: S$GLB,,, | Performed by: FAMILY MEDICINE

## 2019-12-04 PROCEDURE — 1101F PT FALLS ASSESS-DOCD LE1/YR: CPT | Mod: CPTII,S$GLB,, | Performed by: FAMILY MEDICINE

## 2019-12-04 PROCEDURE — 99999 PR PBB SHADOW E&M-EST. PATIENT-LVL V: CPT | Mod: PBBFAC,,, | Performed by: FAMILY MEDICINE

## 2019-12-04 PROCEDURE — 3080F PR MOST RECENT DIASTOLIC BLOOD PRESSURE >= 90 MM HG: ICD-10-PCS | Mod: CPTII,S$GLB,, | Performed by: FAMILY MEDICINE

## 2019-12-04 PROCEDURE — 3075F SYST BP GE 130 - 139MM HG: CPT | Mod: CPTII,S$GLB,, | Performed by: FAMILY MEDICINE

## 2019-12-04 PROCEDURE — 1159F PR MEDICATION LIST DOCUMENTED IN MEDICAL RECORD: ICD-10-PCS | Mod: S$GLB,,, | Performed by: FAMILY MEDICINE

## 2019-12-04 RX ORDER — PREDNISONE 10 MG/1
40 TABLET ORAL DAILY
Qty: 30 TABLET | Refills: 0 | Status: SHIPPED | OUTPATIENT
Start: 2019-12-04 | End: 2019-12-07

## 2019-12-04 NOTE — TELEPHONE ENCOUNTER
Phoned patient regarding ERCP scheduled from December 10th.  Per Dr Malin's instructions, patient was advised to hold Plavix one week prior to procedure.  Patient verbalized understanding.
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
## 2019-12-04 NOTE — PATIENT INSTRUCTIONS
Take 4 tabs of prednisone 12/4-12/6  Take 3 tabs of prednisone 12/7-12/9  Take 2 tabs of prednisone 12/10-12/12  Take 1 tab of prednisone 12/13-12/15    Please take over the counter Mucinex every 6 hours as needed.     Please drink lots of water (8-10 glasses) daily

## 2019-12-09 ENCOUNTER — TELEPHONE (OUTPATIENT)
Dept: ADMINISTRATIVE | Facility: HOSPITAL | Age: 69
End: 2019-12-09

## 2019-12-09 ENCOUNTER — PATIENT OUTREACH (OUTPATIENT)
Dept: ADMINISTRATIVE | Facility: HOSPITAL | Age: 69
End: 2019-12-09

## 2019-12-12 ENCOUNTER — TELEPHONE (OUTPATIENT)
Dept: ADMINISTRATIVE | Facility: HOSPITAL | Age: 69
End: 2019-12-12

## 2019-12-12 ENCOUNTER — TELEPHONE (OUTPATIENT)
Dept: HOME HEALTH SERVICES | Facility: HOSPITAL | Age: 69
End: 2019-12-12

## 2019-12-12 ENCOUNTER — PATIENT OUTREACH (OUTPATIENT)
Dept: ADMINISTRATIVE | Facility: HOSPITAL | Age: 69
End: 2019-12-12

## 2019-12-12 NOTE — TELEPHONE ENCOUNTER
----- Message from Pavan Parson sent at 12/12/2019  1:19 PM CST -----  Contact: Vance from [facility]  Vance called to state the patient's heart rate is elevated to 124, her blood sugar is 336 and she is non compliant with her diabetic diet.      She is still missing a bunch of her medications, including her anti seizure and heart medicines.    Please call [phone] to discuss today.

## 2019-12-12 NOTE — TELEPHONE ENCOUNTER
Returned call to Home Health nurse and was advised about patient current health condition.   Patient heart rate is elevated to 124, blood sugar is 336 and that she is non compliant with her diabetic diet and anti seizure medication.  Called patient to check on her and schedule sooner follow up visit with PCP.  Patient was advised to go to ER if symptoms persist or worsen.  Patient was also instructed to bring all medications to upcoming appointment so we can do a medication reconciliation. Patient verbalized understanding.

## 2019-12-12 NOTE — TELEPHONE ENCOUNTER
----- Message from Pavan Parson sent at 12/12/2019  1:41 PM CST -----  Contact: patient  Type:  Patient Returning Call    Who Called: Chayito  Who Left Message for Patient: Chrissy  Does the patient know what this is regarding?: no  Would the patient rather a call back or a response via [facility]? Call back  Best Call Back Number: [phone]  Additional Information:  no

## 2019-12-15 RX ORDER — TOPIRAMATE 50 MG/1
50 TABLET, FILM COATED ORAL 2 TIMES DAILY
Qty: 180 TABLET | Refills: 1 | Status: SHIPPED | OUTPATIENT
Start: 2019-12-15 | End: 2020-05-22

## 2019-12-15 RX ORDER — VERAPAMIL HYDROCHLORIDE 240 MG/1
240 TABLET, FILM COATED, EXTENDED RELEASE ORAL DAILY
Qty: 90 TABLET | Refills: 1 | Status: SHIPPED | OUTPATIENT
Start: 2019-12-15 | End: 2020-05-22

## 2019-12-16 ENCOUNTER — OFFICE VISIT (OUTPATIENT)
Dept: FAMILY MEDICINE | Facility: CLINIC | Age: 69
End: 2019-12-16
Payer: MEDICARE

## 2019-12-16 VITALS
BODY MASS INDEX: 24.51 KG/M2 | SYSTOLIC BLOOD PRESSURE: 126 MMHG | DIASTOLIC BLOOD PRESSURE: 84 MMHG | HEART RATE: 110 BPM | HEIGHT: 62 IN | OXYGEN SATURATION: 95 % | WEIGHT: 133.19 LBS

## 2019-12-16 DIAGNOSIS — E55.9 VITAMIN D DEFICIENCY: ICD-10-CM

## 2019-12-16 DIAGNOSIS — T81.89XS BILOMA FOLLOWING SURGERY, SEQUELA: ICD-10-CM

## 2019-12-16 DIAGNOSIS — Z72.0 TOBACCO ABUSE: ICD-10-CM

## 2019-12-16 DIAGNOSIS — Z85.038 HISTORY OF COLON CANCER: ICD-10-CM

## 2019-12-16 DIAGNOSIS — F33.41 RECURRENT MAJOR DEPRESSIVE DISORDER, IN PARTIAL REMISSION: ICD-10-CM

## 2019-12-16 DIAGNOSIS — I50.32 CHRONIC DIASTOLIC CONGESTIVE HEART FAILURE: ICD-10-CM

## 2019-12-16 DIAGNOSIS — E11.59 HYPERTENSION ASSOCIATED WITH DIABETES: ICD-10-CM

## 2019-12-16 DIAGNOSIS — G40.909 SEIZURE DISORDER: ICD-10-CM

## 2019-12-16 DIAGNOSIS — K29.60 STEROID-INDUCED GASTRITIS: ICD-10-CM

## 2019-12-16 DIAGNOSIS — Z90.3 HISTORY OF PARTIAL GASTRECTOMY: ICD-10-CM

## 2019-12-16 DIAGNOSIS — J44.9 CHRONIC OBSTRUCTIVE PULMONARY DISEASE, UNSPECIFIED COPD TYPE: ICD-10-CM

## 2019-12-16 DIAGNOSIS — K66.8 BILOMA FOLLOWING SURGERY, SEQUELA: ICD-10-CM

## 2019-12-16 DIAGNOSIS — E78.5 HYPERLIPIDEMIA ASSOCIATED WITH TYPE 2 DIABETES MELLITUS: ICD-10-CM

## 2019-12-16 DIAGNOSIS — G43.809 OTHER MIGRAINE WITHOUT STATUS MIGRAINOSUS, NOT INTRACTABLE: ICD-10-CM

## 2019-12-16 DIAGNOSIS — E11.69 HYPERLIPIDEMIA ASSOCIATED WITH TYPE 2 DIABETES MELLITUS: ICD-10-CM

## 2019-12-16 DIAGNOSIS — Z79.4 UNCONTROLLED TYPE 2 DIABETES MELLITUS WITH HYPERGLYCEMIA, WITH LONG-TERM CURRENT USE OF INSULIN: ICD-10-CM

## 2019-12-16 DIAGNOSIS — E11.65 UNCONTROLLED TYPE 2 DIABETES MELLITUS WITH HYPERGLYCEMIA, WITH LONG-TERM CURRENT USE OF INSULIN: ICD-10-CM

## 2019-12-16 DIAGNOSIS — T38.0X5A STEROID-INDUCED GASTRITIS: ICD-10-CM

## 2019-12-16 DIAGNOSIS — N28.9 RENAL INSUFFICIENCY: ICD-10-CM

## 2019-12-16 DIAGNOSIS — D50.9 IRON DEFICIENCY ANEMIA, UNSPECIFIED IRON DEFICIENCY ANEMIA TYPE: ICD-10-CM

## 2019-12-16 DIAGNOSIS — R91.1 SOLITARY PULMONARY NODULE: ICD-10-CM

## 2019-12-16 DIAGNOSIS — Z98.61 S/P PTCA (PERCUTANEOUS TRANSLUMINAL CORONARY ANGIOPLASTY): ICD-10-CM

## 2019-12-16 DIAGNOSIS — M85.88 OSTEOPENIA OF LUMBAR SPINE: ICD-10-CM

## 2019-12-16 DIAGNOSIS — F41.1 GAD (GENERALIZED ANXIETY DISORDER): ICD-10-CM

## 2019-12-16 DIAGNOSIS — I25.118 CORONARY ARTERY DISEASE OF NATIVE ARTERY OF NATIVE HEART WITH STABLE ANGINA PECTORIS: Primary | ICD-10-CM

## 2019-12-16 DIAGNOSIS — I15.2 HYPERTENSION ASSOCIATED WITH DIABETES: ICD-10-CM

## 2019-12-16 DIAGNOSIS — M47.816 LUMBAR FACET ARTHROPATHY: ICD-10-CM

## 2019-12-16 PROCEDURE — 99214 OFFICE O/P EST MOD 30 MIN: CPT | Mod: S$GLB,,, | Performed by: FAMILY MEDICINE

## 2019-12-16 PROCEDURE — 99999 PR PBB SHADOW E&M-EST. PATIENT-LVL III: CPT | Mod: PBBFAC,,, | Performed by: FAMILY MEDICINE

## 2019-12-16 PROCEDURE — 99214 PR OFFICE/OUTPT VISIT, EST, LEVL IV, 30-39 MIN: ICD-10-PCS | Mod: S$GLB,,, | Performed by: FAMILY MEDICINE

## 2019-12-16 PROCEDURE — 99499 UNLISTED E&M SERVICE: CPT | Mod: S$GLB,,, | Performed by: FAMILY MEDICINE

## 2019-12-16 PROCEDURE — 99999 PR PBB SHADOW E&M-EST. PATIENT-LVL III: ICD-10-PCS | Mod: PBBFAC,,, | Performed by: FAMILY MEDICINE

## 2019-12-16 PROCEDURE — 99499 RISK ADDL DX/OHS AUDIT: ICD-10-PCS | Mod: S$GLB,,, | Performed by: FAMILY MEDICINE

## 2019-12-16 RX ORDER — TIZANIDINE 4 MG/1
4 TABLET ORAL DAILY PRN
Status: ON HOLD | COMMUNITY
End: 2020-05-27 | Stop reason: SDUPTHER

## 2019-12-16 RX ORDER — ONDANSETRON 4 MG/1
4 TABLET, ORALLY DISINTEGRATING ORAL EVERY 8 HOURS PRN
Qty: 14 TABLET | Refills: 1 | Status: SHIPPED | OUTPATIENT
Start: 2019-12-16 | End: 2021-05-17 | Stop reason: SDUPTHER

## 2019-12-16 RX ORDER — HYDROCHLOROTHIAZIDE 25 MG/1
25 TABLET ORAL DAILY
Qty: 90 TABLET | Refills: 1 | Status: SHIPPED | OUTPATIENT
Start: 2019-12-16 | End: 2020-05-22

## 2019-12-16 RX ORDER — POTASSIUM CHLORIDE 20 MEQ/1
20 TABLET, EXTENDED RELEASE ORAL DAILY
Qty: 90 TABLET | Refills: 1 | Status: SHIPPED | OUTPATIENT
Start: 2019-12-16 | End: 2020-05-22
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
## 2019-12-17 ENCOUNTER — TELEPHONE (OUTPATIENT)
Dept: FAMILY MEDICINE | Facility: CLINIC | Age: 69
End: 2019-12-17

## 2019-12-17 NOTE — TELEPHONE ENCOUNTER
----- Message from Abhi Valadez MD sent at 12/16/2019  5:46 PM CST -----  Nani Loera,     Could you do me a big favor and ask this patient to return to the office at her earliest convenience to have a repeat kidney function test done? Her kidney function looked like it was really decreased the last time that she was in the hospital and I would like to make sure that this has improved.    Thanks   Dr. Valadez

## 2019-12-17 NOTE — TELEPHONE ENCOUNTER
Patient advised that Dr Valadez would like repeat bmp.  Patient scheduled for lab on 12/19.  Patient verbalized understanding.

## 2019-12-19 ENCOUNTER — LAB VISIT (OUTPATIENT)
Dept: LAB | Facility: HOSPITAL | Age: 69
End: 2019-12-19
Attending: FAMILY MEDICINE
Payer: MEDICARE

## 2019-12-19 ENCOUNTER — OFFICE VISIT (OUTPATIENT)
Dept: CARDIOLOGY | Facility: CLINIC | Age: 69
End: 2019-12-19
Payer: MEDICARE

## 2019-12-19 VITALS
DIASTOLIC BLOOD PRESSURE: 80 MMHG | HEART RATE: 107 BPM | SYSTOLIC BLOOD PRESSURE: 138 MMHG | BODY MASS INDEX: 24.97 KG/M2 | WEIGHT: 135.69 LBS | HEIGHT: 62 IN

## 2019-12-19 DIAGNOSIS — I15.2 HYPERTENSION ASSOCIATED WITH DIABETES: ICD-10-CM

## 2019-12-19 DIAGNOSIS — R91.1 SOLITARY PULMONARY NODULE: ICD-10-CM

## 2019-12-19 DIAGNOSIS — N28.9 RENAL INSUFFICIENCY: ICD-10-CM

## 2019-12-19 DIAGNOSIS — E78.5 HYPERLIPIDEMIA ASSOCIATED WITH TYPE 2 DIABETES MELLITUS: ICD-10-CM

## 2019-12-19 DIAGNOSIS — Z98.61 S/P PTCA (PERCUTANEOUS TRANSLUMINAL CORONARY ANGIOPLASTY): Primary | ICD-10-CM

## 2019-12-19 DIAGNOSIS — Z90.3 HISTORY OF PARTIAL GASTRECTOMY: ICD-10-CM

## 2019-12-19 DIAGNOSIS — I10 ESSENTIAL HYPERTENSION: ICD-10-CM

## 2019-12-19 DIAGNOSIS — Z72.0 TOBACCO ABUSE: ICD-10-CM

## 2019-12-19 DIAGNOSIS — E11.69 HYPERLIPIDEMIA ASSOCIATED WITH TYPE 2 DIABETES MELLITUS: ICD-10-CM

## 2019-12-19 DIAGNOSIS — E11.65 UNCONTROLLED TYPE 2 DIABETES MELLITUS WITH HYPERGLYCEMIA, WITH LONG-TERM CURRENT USE OF INSULIN: ICD-10-CM

## 2019-12-19 DIAGNOSIS — E11.59 HYPERTENSION ASSOCIATED WITH DIABETES: ICD-10-CM

## 2019-12-19 DIAGNOSIS — I50.32 CHRONIC DIASTOLIC CONGESTIVE HEART FAILURE: ICD-10-CM

## 2019-12-19 DIAGNOSIS — Z79.4 UNCONTROLLED TYPE 2 DIABETES MELLITUS WITH HYPERGLYCEMIA, WITH LONG-TERM CURRENT USE OF INSULIN: ICD-10-CM

## 2019-12-19 DIAGNOSIS — R00.0 SINUS TACHYCARDIA: ICD-10-CM

## 2019-12-19 LAB
ALBUMIN SERPL BCP-MCNC: 3.6 G/DL (ref 3.5–5.2)
ALP SERPL-CCNC: 152 U/L (ref 55–135)
ALT SERPL W/O P-5'-P-CCNC: 12 U/L (ref 10–44)
ANION GAP SERPL CALC-SCNC: 11 MMOL/L (ref 8–16)
AST SERPL-CCNC: 9 U/L (ref 10–40)
BILIRUB SERPL-MCNC: 0.2 MG/DL (ref 0.1–1)
BUN SERPL-MCNC: 8 MG/DL (ref 8–23)
CALCIUM SERPL-MCNC: 9.7 MG/DL (ref 8.7–10.5)
CHLORIDE SERPL-SCNC: 109 MMOL/L (ref 95–110)
CO2 SERPL-SCNC: 22 MMOL/L (ref 23–29)
CREAT SERPL-MCNC: 0.7 MG/DL (ref 0.5–1.4)
EST. GFR  (AFRICAN AMERICAN): >60 ML/MIN/1.73 M^2
EST. GFR  (NON AFRICAN AMERICAN): >60 ML/MIN/1.73 M^2
GLUCOSE SERPL-MCNC: 92 MG/DL (ref 70–110)
POTASSIUM SERPL-SCNC: 3.8 MMOL/L (ref 3.5–5.1)
PROT SERPL-MCNC: 7.2 G/DL (ref 6–8.4)
SODIUM SERPL-SCNC: 142 MMOL/L (ref 136–145)

## 2019-12-19 PROCEDURE — 99999 PR PBB SHADOW E&M-EST. PATIENT-LVL III: CPT | Mod: PBBFAC,,, | Performed by: INTERNAL MEDICINE

## 2019-12-19 PROCEDURE — 1159F MED LIST DOCD IN RCRD: CPT | Mod: S$GLB,,, | Performed by: INTERNAL MEDICINE

## 2019-12-19 PROCEDURE — 3079F PR MOST RECENT DIASTOLIC BLOOD PRESSURE 80-89 MM HG: ICD-10-PCS | Mod: CPTII,S$GLB,, | Performed by: INTERNAL MEDICINE

## 2019-12-19 PROCEDURE — 1101F PR PT FALLS ASSESS DOC 0-1 FALLS W/OUT INJ PAST YR: ICD-10-PCS | Mod: CPTII,S$GLB,, | Performed by: INTERNAL MEDICINE

## 2019-12-19 PROCEDURE — 93000 EKG 12-LEAD: ICD-10-PCS | Mod: S$GLB,,, | Performed by: INTERNAL MEDICINE

## 2019-12-19 PROCEDURE — 1101F PT FALLS ASSESS-DOCD LE1/YR: CPT | Mod: CPTII,S$GLB,, | Performed by: INTERNAL MEDICINE

## 2019-12-19 PROCEDURE — 99213 PR OFFICE/OUTPT VISIT, EST, LEVL III, 20-29 MIN: ICD-10-PCS | Mod: 25,S$GLB,, | Performed by: INTERNAL MEDICINE

## 2019-12-19 PROCEDURE — 3075F SYST BP GE 130 - 139MM HG: CPT | Mod: CPTII,S$GLB,, | Performed by: INTERNAL MEDICINE

## 2019-12-19 PROCEDURE — 3079F DIAST BP 80-89 MM HG: CPT | Mod: CPTII,S$GLB,, | Performed by: INTERNAL MEDICINE

## 2019-12-19 PROCEDURE — 3075F PR MOST RECENT SYSTOLIC BLOOD PRESS GE 130-139MM HG: ICD-10-PCS | Mod: CPTII,S$GLB,, | Performed by: INTERNAL MEDICINE

## 2019-12-19 PROCEDURE — 93000 ELECTROCARDIOGRAM COMPLETE: CPT | Mod: S$GLB,,, | Performed by: INTERNAL MEDICINE

## 2019-12-19 PROCEDURE — 36415 COLL VENOUS BLD VENIPUNCTURE: CPT

## 2019-12-19 PROCEDURE — 99213 OFFICE O/P EST LOW 20 MIN: CPT | Mod: 25,S$GLB,, | Performed by: INTERNAL MEDICINE

## 2019-12-19 PROCEDURE — 80053 COMPREHEN METABOLIC PANEL: CPT

## 2019-12-19 PROCEDURE — 1159F PR MEDICATION LIST DOCUMENTED IN MEDICAL RECORD: ICD-10-PCS | Mod: S$GLB,,, | Performed by: INTERNAL MEDICINE

## 2019-12-19 PROCEDURE — 99999 PR PBB SHADOW E&M-EST. PATIENT-LVL III: ICD-10-PCS | Mod: PBBFAC,,, | Performed by: INTERNAL MEDICINE
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
## 2019-12-24 ENCOUNTER — TELEPHONE (OUTPATIENT)
Dept: FAMILY MEDICINE | Facility: CLINIC | Age: 69
End: 2019-12-24

## 2019-12-24 NOTE — TELEPHONE ENCOUNTER
Called patient to notify that Please let patient know that her repeat kidney function test is now normal.  No answer.

## 2020-01-15 ENCOUNTER — OFFICE VISIT (OUTPATIENT)
Dept: FAMILY MEDICINE | Facility: CLINIC | Age: 70
End: 2020-01-15
Payer: MEDICARE

## 2020-01-15 ENCOUNTER — HOSPITAL ENCOUNTER (EMERGENCY)
Facility: HOSPITAL | Age: 70
Discharge: HOME | End: 2020-01-15
Attending: EMERGENCY MEDICINE
Payer: MEDICARE

## 2020-01-15 VITALS
HEART RATE: 96 BPM | BODY MASS INDEX: 24.29 KG/M2 | SYSTOLIC BLOOD PRESSURE: 118 MMHG | TEMPERATURE: 98 F | OXYGEN SATURATION: 97 % | WEIGHT: 132 LBS | HEIGHT: 62 IN | DIASTOLIC BLOOD PRESSURE: 75 MMHG | RESPIRATION RATE: 20 BRPM

## 2020-01-15 VITALS
OXYGEN SATURATION: 98 % | BODY MASS INDEX: 24.51 KG/M2 | HEIGHT: 62 IN | WEIGHT: 133.19 LBS | DIASTOLIC BLOOD PRESSURE: 85 MMHG | HEART RATE: 98 BPM | SYSTOLIC BLOOD PRESSURE: 131 MMHG

## 2020-01-15 DIAGNOSIS — E55.9 VITAMIN D DEFICIENCY: ICD-10-CM

## 2020-01-15 DIAGNOSIS — I25.118 CORONARY ARTERY DISEASE OF NATIVE ARTERY OF NATIVE HEART WITH STABLE ANGINA PECTORIS: ICD-10-CM

## 2020-01-15 DIAGNOSIS — D50.9 IRON DEFICIENCY ANEMIA, UNSPECIFIED IRON DEFICIENCY ANEMIA TYPE: ICD-10-CM

## 2020-01-15 DIAGNOSIS — K66.8 BILOMA FOLLOWING SURGERY, SEQUELA: ICD-10-CM

## 2020-01-15 DIAGNOSIS — J44.9 CHRONIC OBSTRUCTIVE PULMONARY DISEASE, UNSPECIFIED COPD TYPE: ICD-10-CM

## 2020-01-15 DIAGNOSIS — Z79.4 UNCONTROLLED TYPE 2 DIABETES MELLITUS WITH HYPERGLYCEMIA, WITH LONG-TERM CURRENT USE OF INSULIN: ICD-10-CM

## 2020-01-15 DIAGNOSIS — R53.83 FATIGUE: ICD-10-CM

## 2020-01-15 DIAGNOSIS — E16.2 HYPOGLYCEMIA: Primary | ICD-10-CM

## 2020-01-15 DIAGNOSIS — M47.816 LUMBAR FACET ARTHROPATHY: ICD-10-CM

## 2020-01-15 DIAGNOSIS — E11.65 UNCONTROLLED TYPE 2 DIABETES MELLITUS WITH HYPERGLYCEMIA, WITH LONG-TERM CURRENT USE OF INSULIN: ICD-10-CM

## 2020-01-15 DIAGNOSIS — G43.809 OTHER MIGRAINE WITHOUT STATUS MIGRAINOSUS, NOT INTRACTABLE: ICD-10-CM

## 2020-01-15 DIAGNOSIS — R51.9 ACUTE INTRACTABLE HEADACHE, UNSPECIFIED HEADACHE TYPE: Primary | ICD-10-CM

## 2020-01-15 DIAGNOSIS — R42 DIZZINESS: ICD-10-CM

## 2020-01-15 DIAGNOSIS — E11.69 HYPERLIPIDEMIA ASSOCIATED WITH TYPE 2 DIABETES MELLITUS: ICD-10-CM

## 2020-01-15 DIAGNOSIS — E11.59 HYPERTENSION ASSOCIATED WITH DIABETES: ICD-10-CM

## 2020-01-15 DIAGNOSIS — T81.89XS BILOMA FOLLOWING SURGERY, SEQUELA: ICD-10-CM

## 2020-01-15 DIAGNOSIS — I15.2 HYPERTENSION ASSOCIATED WITH DIABETES: ICD-10-CM

## 2020-01-15 DIAGNOSIS — G40.909 SEIZURE DISORDER: ICD-10-CM

## 2020-01-15 DIAGNOSIS — Z98.61 S/P PTCA (PERCUTANEOUS TRANSLUMINAL CORONARY ANGIOPLASTY): ICD-10-CM

## 2020-01-15 DIAGNOSIS — Z72.0 TOBACCO ABUSE: ICD-10-CM

## 2020-01-15 DIAGNOSIS — Z85.038 HISTORY OF COLON CANCER: ICD-10-CM

## 2020-01-15 DIAGNOSIS — K29.60 STEROID-INDUCED GASTRITIS: ICD-10-CM

## 2020-01-15 DIAGNOSIS — E78.5 HYPERLIPIDEMIA ASSOCIATED WITH TYPE 2 DIABETES MELLITUS: ICD-10-CM

## 2020-01-15 DIAGNOSIS — R91.1 SOLITARY PULMONARY NODULE: ICD-10-CM

## 2020-01-15 DIAGNOSIS — F33.41 RECURRENT MAJOR DEPRESSIVE DISORDER, IN PARTIAL REMISSION: ICD-10-CM

## 2020-01-15 DIAGNOSIS — M85.88 OSTEOPENIA OF LUMBAR SPINE: ICD-10-CM

## 2020-01-15 DIAGNOSIS — R20.2 PARESTHESIA: ICD-10-CM

## 2020-01-15 DIAGNOSIS — F41.1 GAD (GENERALIZED ANXIETY DISORDER): ICD-10-CM

## 2020-01-15 DIAGNOSIS — I50.32 CHRONIC DIASTOLIC CONGESTIVE HEART FAILURE: ICD-10-CM

## 2020-01-15 DIAGNOSIS — T38.0X5A STEROID-INDUCED GASTRITIS: ICD-10-CM

## 2020-01-15 LAB
ALBUMIN SERPL BCP-MCNC: 3.6 G/DL (ref 3.5–5.2)
ALLENS TEST: ABNORMAL
ALP SERPL-CCNC: 152 U/L (ref 55–135)
ALT SERPL W/O P-5'-P-CCNC: 10 U/L (ref 10–44)
ANION GAP SERPL CALC-SCNC: 12 MMOL/L (ref 8–16)
AST SERPL-CCNC: 10 U/L (ref 10–40)
B-OH-BUTYR BLD STRIP-SCNC: 0.2 MMOL/L (ref 0–0.5)
BASOPHILS # BLD AUTO: 0.01 K/UL (ref 0–0.2)
BASOPHILS NFR BLD: 0.2 % (ref 0–1.9)
BILIRUB SERPL-MCNC: 0.2 MG/DL (ref 0.1–1)
BILIRUB UR QL STRIP: NEGATIVE
BNP SERPL-MCNC: <10 PG/ML (ref 0–99)
BUN SERPL-MCNC: 10 MG/DL (ref 8–23)
CALCIUM SERPL-MCNC: 9.6 MG/DL (ref 8.7–10.5)
CHLORIDE SERPL-SCNC: 107 MMOL/L (ref 95–110)
CLARITY UR: CLEAR
CO2 SERPL-SCNC: 22 MMOL/L (ref 23–29)
COLOR UR: YELLOW
CREAT SERPL-MCNC: 0.7 MG/DL (ref 0.5–1.4)
DELSYS: ABNORMAL
DIFFERENTIAL METHOD: ABNORMAL
EOSINOPHIL # BLD AUTO: 0.1 K/UL (ref 0–0.5)
EOSINOPHIL NFR BLD: 1.7 % (ref 0–8)
ERYTHROCYTE [DISTWIDTH] IN BLOOD BY AUTOMATED COUNT: 15.9 % (ref 11.5–14.5)
EST. GFR  (AFRICAN AMERICAN): >60 ML/MIN/1.73 M^2
EST. GFR  (NON AFRICAN AMERICAN): >60 ML/MIN/1.73 M^2
GLUCOSE SERPL-MCNC: 32 MG/DL (ref 70–110)
GLUCOSE UR QL STRIP: NEGATIVE
HCO3 UR-SCNC: 19.4 MMOL/L (ref 24–28)
HCT VFR BLD AUTO: 35.3 % (ref 37–48.5)
HGB BLD-MCNC: 10.8 G/DL (ref 12–16)
HGB UR QL STRIP: NEGATIVE
KETONES UR QL STRIP: NEGATIVE
LEUKOCYTE ESTERASE UR QL STRIP: NEGATIVE
LYMPHOCYTES # BLD AUTO: 1.6 K/UL (ref 1–4.8)
LYMPHOCYTES NFR BLD: 29.4 % (ref 18–48)
MAGNESIUM SERPL-MCNC: 1.5 MG/DL (ref 1.6–2.6)
MCH RBC QN AUTO: 26.6 PG (ref 27–31)
MCHC RBC AUTO-ENTMCNC: 30.6 G/DL (ref 32–36)
MCV RBC AUTO: 87 FL (ref 82–98)
MONOCYTES # BLD AUTO: 0.4 K/UL (ref 0.3–1)
MONOCYTES NFR BLD: 7.7 % (ref 4–15)
NEUTROPHILS # BLD AUTO: 3.2 K/UL (ref 1.8–7.7)
NEUTROPHILS NFR BLD: 61 % (ref 38–73)
NITRITE UR QL STRIP: NEGATIVE
PCO2 BLDA: 34.5 MMHG (ref 35–45)
PH SMN: 7.36 [PH] (ref 7.35–7.45)
PH UR STRIP: 6 [PH] (ref 5–8)
PLATELET # BLD AUTO: 249 K/UL (ref 150–350)
PMV BLD AUTO: 8.7 FL (ref 9.2–12.9)
PO2 BLDA: 60 MMHG (ref 80–100)
POC BE: -6 MMOL/L
POC SATURATED O2: 90 % (ref 95–100)
POC TCO2: 20 MMOL/L (ref 23–27)
POCT GLUCOSE: 139 MG/DL (ref 70–110)
POCT GLUCOSE: 196 MG/DL (ref 70–110)
POCT GLUCOSE: 29 MG/DL (ref 70–110)
POTASSIUM SERPL-SCNC: 4.1 MMOL/L (ref 3.5–5.1)
PROT SERPL-MCNC: 7.2 G/DL (ref 6–8.4)
PROT UR QL STRIP: NEGATIVE
RBC # BLD AUTO: 4.06 M/UL (ref 4–5.4)
SAMPLE: ABNORMAL
SITE: ABNORMAL
SODIUM SERPL-SCNC: 141 MMOL/L (ref 136–145)
SP GR UR STRIP: 1.02 (ref 1–1.03)
TROPONIN I SERPL DL<=0.01 NG/ML-MCNC: <0.006 NG/ML (ref 0–0.03)
URN SPEC COLLECT METH UR: NORMAL
UROBILINOGEN UR STRIP-ACNC: NEGATIVE EU/DL
WBC # BLD AUTO: 5.3 K/UL (ref 3.9–12.7)

## 2020-01-15 PROCEDURE — 99499 UNLISTED E&M SERVICE: CPT | Mod: S$GLB,,, | Performed by: FAMILY MEDICINE

## 2020-01-15 PROCEDURE — 82962 GLUCOSE BLOOD TEST: CPT

## 2020-01-15 PROCEDURE — 99999 PR PBB SHADOW E&M-EST. PATIENT-LVL IV: ICD-10-PCS | Mod: PBBFAC,,, | Performed by: FAMILY MEDICINE

## 2020-01-15 PROCEDURE — 81003 URINALYSIS AUTO W/O SCOPE: CPT

## 2020-01-15 PROCEDURE — 63600175 PHARM REV CODE 636 W HCPCS: Performed by: EMERGENCY MEDICINE

## 2020-01-15 PROCEDURE — 93010 EKG 12-LEAD: ICD-10-PCS | Mod: ,,, | Performed by: INTERNAL MEDICINE

## 2020-01-15 PROCEDURE — 99214 OFFICE O/P EST MOD 30 MIN: CPT | Mod: S$GLB,,, | Performed by: FAMILY MEDICINE

## 2020-01-15 PROCEDURE — 3079F PR MOST RECENT DIASTOLIC BLOOD PRESSURE 80-89 MM HG: ICD-10-PCS | Mod: CPTII,S$GLB,, | Performed by: FAMILY MEDICINE

## 2020-01-15 PROCEDURE — 1125F PR PAIN SEVERITY QUANTIFIED, PAIN PRESENT: ICD-10-PCS | Mod: S$GLB,,, | Performed by: FAMILY MEDICINE

## 2020-01-15 PROCEDURE — 99999 PR PBB SHADOW E&M-EST. PATIENT-LVL IV: CPT | Mod: PBBFAC,,, | Performed by: FAMILY MEDICINE

## 2020-01-15 PROCEDURE — 99214 PR OFFICE/OUTPT VISIT, EST, LEVL IV, 30-39 MIN: ICD-10-PCS | Mod: S$GLB,,, | Performed by: FAMILY MEDICINE

## 2020-01-15 PROCEDURE — 1125F AMNT PAIN NOTED PAIN PRSNT: CPT | Mod: S$GLB,,, | Performed by: FAMILY MEDICINE

## 2020-01-15 PROCEDURE — 1159F MED LIST DOCD IN RCRD: CPT | Mod: S$GLB,,, | Performed by: FAMILY MEDICINE

## 2020-01-15 PROCEDURE — 3075F SYST BP GE 130 - 139MM HG: CPT | Mod: CPTII,S$GLB,, | Performed by: FAMILY MEDICINE

## 2020-01-15 PROCEDURE — 99285 EMERGENCY DEPT VISIT HI MDM: CPT | Mod: 25

## 2020-01-15 PROCEDURE — 96374 THER/PROPH/DIAG INJ IV PUSH: CPT

## 2020-01-15 PROCEDURE — 83735 ASSAY OF MAGNESIUM: CPT

## 2020-01-15 PROCEDURE — 93010 ELECTROCARDIOGRAM REPORT: CPT | Mod: ,,, | Performed by: INTERNAL MEDICINE

## 2020-01-15 PROCEDURE — 80053 COMPREHEN METABOLIC PANEL: CPT

## 2020-01-15 PROCEDURE — 96361 HYDRATE IV INFUSION ADD-ON: CPT

## 2020-01-15 PROCEDURE — 3075F PR MOST RECENT SYSTOLIC BLOOD PRESS GE 130-139MM HG: ICD-10-PCS | Mod: CPTII,S$GLB,, | Performed by: FAMILY MEDICINE

## 2020-01-15 PROCEDURE — 25000003 PHARM REV CODE 250

## 2020-01-15 PROCEDURE — 1101F PT FALLS ASSESS-DOCD LE1/YR: CPT | Mod: CPTII,S$GLB,, | Performed by: FAMILY MEDICINE

## 2020-01-15 PROCEDURE — 99499 RISK ADDL DX/OHS AUDIT: ICD-10-PCS | Mod: S$GLB,,, | Performed by: FAMILY MEDICINE

## 2020-01-15 PROCEDURE — 93005 ELECTROCARDIOGRAM TRACING: CPT

## 2020-01-15 PROCEDURE — 85025 COMPLETE CBC W/AUTO DIFF WBC: CPT

## 2020-01-15 PROCEDURE — 1159F PR MEDICATION LIST DOCUMENTED IN MEDICAL RECORD: ICD-10-PCS | Mod: S$GLB,,, | Performed by: FAMILY MEDICINE

## 2020-01-15 PROCEDURE — 1101F PR PT FALLS ASSESS DOC 0-1 FALLS W/OUT INJ PAST YR: ICD-10-PCS | Mod: CPTII,S$GLB,, | Performed by: FAMILY MEDICINE

## 2020-01-15 PROCEDURE — 84484 ASSAY OF TROPONIN QUANT: CPT

## 2020-01-15 PROCEDURE — 82010 KETONE BODYS QUAN: CPT

## 2020-01-15 PROCEDURE — 3079F DIAST BP 80-89 MM HG: CPT | Mod: CPTII,S$GLB,, | Performed by: FAMILY MEDICINE

## 2020-01-15 PROCEDURE — 83880 ASSAY OF NATRIURETIC PEPTIDE: CPT

## 2020-01-15 RX ORDER — DEXTROSE 50 % IN WATER (D50W) INTRAVENOUS SYRINGE
Status: COMPLETED
Start: 2020-01-15 | End: 2020-01-15

## 2020-01-15 RX ORDER — DEXTROSE 50 % IN WATER (D50W) INTRAVENOUS SYRINGE
25
Status: COMPLETED | OUTPATIENT
Start: 2020-01-15 | End: 2020-01-15

## 2020-01-15 RX ADMIN — DEXTROSE 50 % IN WATER (D50W) INTRAVENOUS SYRINGE 50 ML: at 02:01

## 2020-01-15 RX ADMIN — SODIUM CHLORIDE 500 ML: 0.9 INJECTION, SOLUTION INTRAVENOUS at 12:01

## 2020-01-15 RX ADMIN — DEXTROSE MONOHYDRATE 50 ML: 500 INJECTION PARENTERAL at 02:01

## 2020-01-15 NOTE — ED NOTES
Patient in bed, resting well, awakens easily when name called, call light within reach, will continue to monitor

## 2020-01-15 NOTE — ED PROVIDER NOTES
Encounter Date: 1/15/2020    SCRIBE #1 NOTE: I, Juli Clark , am scribing for, and in the presence of,  Dr. Lim . I have scribed the entire note.       History     Chief Complaint   Patient presents with    Fatigue     Reports generalized weakness and fatigue,  also complaining of headache to R side and feels unbalanced. Reports all symptoms started on Sun. VAN-.      Time of initial exam: 11:28    The patient is a 69-year-old female who has a history of coronary artery disease, hypoxemic respiratory failure, anxiety, asthma, cancer, cataracts, colon cancer, COPD, depression, diabetes mellitus, hypercholesterolemia, and hypertension who presents with numbness to the right side of her scalp for the past three days.  She also feels off balance when she walks.  She has also had episodic visual change described as floaters in all visual fields.  She denies headache. She has had intermittent right-sided anterior neck pain. She denies chest pain and palpitations.  She denies cough shortness of breath. She denies dysuria, hematuria, and change in urinary frequency.    The history is provided by the patient. No  was used.     Review of patient's allergies indicates:  No Known Allergies  Past Medical History:   Diagnosis Date    Acute coronary syndrome     Acute hypoxemic respiratory failure 5/1/2017    Anxiety     Asthma     Cancer     colon    Cataracts, both eyes     Chest pain at rest     Chest pain of uncertain etiology 12/6/2015    Colon cancer 1988    COPD (chronic obstructive pulmonary disease)     Coronary artery disease     Depression     Diabetes mellitus     Dyspnea on exertion 11/15/2018    Elevated brain natriuretic peptide (BNP) level 11/15/2018    Elevated cholesterol     Hypertension     Swelling     Syncope and collapse 11/8/2016     Past Surgical History:   Procedure Laterality Date    ABDOMINAL SURGERY      BREAST BIOPSY      benign unsure what side      SECTION, CLASSIC      COLON SURGERY      COLONOSCOPY  2019    repeat in 5 years    CORONARY ANGIOPLASTY WITH STENT PLACEMENT  3 months ago     x2, Hysterectomy, Lung surgery, Partial stomach removed, Part of colon removed for rectal cancer    GASTRECTOMY      HEMORRHOID SURGERY      HYSTERECTOMY      @28yrs of age    LUNG BIOPSY      OOPHORECTOMY      @28yrs of age     Family History   Problem Relation Age of Onset    Cancer Mother     Diabetes Mother     Hypertension Mother     Breast cancer Mother     Cancer Father     Heart disease Father     Depression Sister     Hypertension Sister     Cancer Maternal Aunt      Social History     Tobacco Use    Smoking status: Current Every Day Smoker     Packs/day: 1.00     Years: 50.00     Pack years: 50.00     Types: Cigarettes    Smokeless tobacco: Never Used    Tobacco comment: Smoking 5 cigs daily presently   Substance Use Topics    Alcohol use: Yes     Alcohol/week: 3.0 standard drinks     Types: 3 Glasses of wine per week     Comment: 1 every 3 months    Drug use: No     Review of Systems   Constitutional: Positive for fatigue. Negative for appetite change, chills, diaphoresis and fever.   HENT: Negative for hearing loss, sore throat, tinnitus and trouble swallowing.    Eyes: Positive for visual disturbance. Negative for pain.   Respiratory: Positive for cough and shortness of breath.    Cardiovascular: Negative for chest pain, palpitations and leg swelling.   Gastrointestinal: Negative for abdominal pain, constipation and diarrhea.   Endocrine: Negative for polydipsia and polyuria.   Genitourinary: Negative for difficulty urinating and dysuria.   Musculoskeletal: Positive for neck pain. Negative for arthralgias and myalgias.   Skin: Negative for rash.   Neurological: Positive for numbness. Negative for dizziness, light-headedness and headaches.       Physical Exam     Initial Vitals [01/15/20 1105]   BP Pulse Resp Temp SpO2    137/81 91 17 97.9 °F (36.6 °C) (!) 93 %      MAP       --         Physical Exam    Nursing note and vitals reviewed.  Constitutional: She appears well-developed and well-nourished. She appears lethargic. She is not diaphoretic. No distress.   HENT:   Head: Normocephalic and atraumatic.   Mouth/Throat: Oropharynx is clear and moist. No oropharyngeal exudate or posterior oropharyngeal erythema.   Eyes: Conjunctivae and EOM are normal. Pupils are equal, round, and reactive to light. No scleral icterus. Right eye exhibits no nystagmus. Left eye exhibits no nystagmus.   Neck: Normal range of motion. Neck supple. Carotid bruit is not present. No JVD present.   Cardiovascular: Normal rate, regular rhythm and normal heart sounds. Exam reveals no gallop and no friction rub.    No murmur heard.  Pulses:       Radial pulses are 2+ on the right side, and 2+ on the left side.        Dorsalis pedis pulses are 2+ on the right side, and 2+ on the left side.   Pulmonary/Chest: Effort normal and breath sounds normal. No stridor. No respiratory distress. She has no decreased breath sounds. She has no wheezes. She has no rhonchi. She has no rales.   Abdominal: Soft. She exhibits no distension. There is no tenderness.   Musculoskeletal: Normal range of motion. She exhibits no edema or tenderness.   Neurological: She is oriented to person, place, and time. She has normal strength. She appears lethargic. No cranial nerve deficit. Coordination normal. GCS score is 15. GCS eye subscore is 4. GCS verbal subscore is 5. GCS motor subscore is 6.   Decreased sensation to the right side of the scalp.   Skin: Skin is warm and dry. No pallor.         ED Course   Procedures  Labs Reviewed   CBC W/ AUTO DIFFERENTIAL - Abnormal; Notable for the following components:       Result Value    Hemoglobin 10.8 (*)     Hematocrit 35.3 (*)     Mean Corpuscular Hemoglobin 26.6 (*)     Mean Corpuscular Hemoglobin Conc 30.6 (*)     RDW 15.9 (*)     MPV 8.7 (*)      All other components within normal limits   ISTAT PROCEDURE - Abnormal; Notable for the following components:    POC PCO2 34.5 (*)     POC PO2 60 (*)     POC HCO3 19.4 (*)     POC SATURATED O2 90 (*)     POC TCO2 20 (*)     All other components within normal limits   COMPREHENSIVE METABOLIC PANEL   URINALYSIS, REFLEX TO URINE CULTURE   MAGNESIUM   TROPONIN I   B-TYPE NATRIURETIC PEPTIDE   BETA - HYDROXYBUTYRATE, SERUM     EKG Readings: (Independently Interpreted)   01/15/2020 11:22  Normal sinus rhythm. Ventricular rate 88 bpm.  Normal axis.  Normal QRS and QT intervals.  No ST segment elevation or depression.  Normal T-wave morphology. Overall impression:  Normal EKG.       Imaging Results          CT Head Without Contrast (No Result on File)                X-Ray Chest AP Portable (Final result)  Result time 01/15/20 11:55:38    Final result by Alicia Bailey MD (01/15/20 11:55:38)                 Impression:      No acute cardiopulmonary abnormality.      Electronically signed by: Alicia Bailey  Date:    01/15/2020  Time:    11:55             Narrative:    EXAMINATION:  XR CHEST AP PORTABLE    CLINICAL HISTORY:  Fatigue;    TECHNIQUE:  Single view of the chest was obtained.    COMPARISON:  Multiple priors, most recent 11/27/2019    FINDINGS:  Surgical clips over the upper abdomen.    Normal cardiomediastinal contour. Bibasilar atelectasis versus scarring.  No focal consolidation, pleural effusion or pneumothorax.                                 Medical Decision Making:   History:   Old Medical Records: I decided to obtain old medical records.  Old Records Summarized: other records.       <> Summary of Records: Reviewed past medical history, see HPI.  Independently Interpreted Test(s):   I have ordered and independently interpreted EKG Reading(s) - see prior notes  Clinical Tests:   Lab Tests: Ordered and Reviewed  Radiological Study: Ordered and Reviewed  Medical Tests: Ordered and Reviewed    Medical decision  making:  This patient underwent evaluation for numbness to the right side of her scalp, cough, shortness of breath, fatigue, and episodic vision changes.  She was afebrile with stable vital signs.  She had numbness to the right side of her scalp on exam but no other discernible neurological deficits.  CT imaging of the brain was negative for acute intracranial processes.  Her presentation, exam, workup were not felt to be consistent with TIA or stroke.  Additionally, she was evaluated with cardiopulmonary monitoring, labs, EKG, and serial exams.  She was found to have profound hypoglycemia.  This was treated with IV dextrose and feeding.  Her symptoms resolved after blood glucose was corrected.  The cause of hypoglycemia was unclear.  The patient is an insulin-dependent diabetic but denied skipping meals.  She did not have renal insufficiency.  There are no concerning findings for acute infectious processes or acute coronary syndromes on workup.  She underwent a period of monitoring in the ED.  There was no recurrence of hypoglycemia.  She was discharged with instructions to check her blood glucose periodically for 24 hour period and to contact her primary care provider to arrange close follow-up.              Attending Attestation:             Attending ED Notes:   Portions of this chart were completed by the scribe by interpretive transcription of statements made by the patient as a result of my questions at the bedside. Other portions were completed by the scribe from statements made by me for direct transcription into the medical record. Following completion of the charting by the scribe, I made modifications for both correctness and proper phrasing.  - Rodrick Lim III, M.D.                        Clinical Impression:       ICD-10-CM ICD-9-CM   1. Hypoglycemia E16.2 251.2   2. Fatigue R53.83 780.79   3. Paresthesia R20.2 782.0         Disposition:   Disposition: Discharged  Condition:  Stable                     Rodrick Lim III, MD  01/20/20 0693
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
## 2020-01-15 NOTE — ED NOTES
[facility] called to report critical glucose for this patient of 32. Dr. Lim informed and D50 was overidden. Pt sitting up and stating she is hungry. Repeat POCT glucose taken and showed lower than 29. Pt only has 24G in left wrist. Pt given amie crackers and orange juice. Pt is sitting up drinking without assistance. Will recheck CBG.

## 2020-01-15 NOTE — ED TRIAGE NOTES
Patient arrived to ED c/o weakness and fatigue since Sunday, in triage c/o of headache to right side, in room denied headache, c/o RUQ pain, denies n/v, no chest pain

## 2020-02-27 ENCOUNTER — LAB VISIT (OUTPATIENT)
Dept: LAB | Facility: HOSPITAL | Age: 70
End: 2020-02-27
Attending: FAMILY MEDICINE
Payer: MEDICARE

## 2020-02-27 ENCOUNTER — OFFICE VISIT (OUTPATIENT)
Dept: FAMILY MEDICINE | Facility: CLINIC | Age: 70
End: 2020-02-27
Payer: MEDICARE

## 2020-02-27 VITALS
HEART RATE: 103 BPM | BODY MASS INDEX: 25.15 KG/M2 | DIASTOLIC BLOOD PRESSURE: 82 MMHG | OXYGEN SATURATION: 95 % | SYSTOLIC BLOOD PRESSURE: 136 MMHG | WEIGHT: 136.69 LBS | HEIGHT: 62 IN

## 2020-02-27 DIAGNOSIS — T38.0X5A STEROID-INDUCED GASTRITIS: ICD-10-CM

## 2020-02-27 DIAGNOSIS — F41.1 GAD (GENERALIZED ANXIETY DISORDER): ICD-10-CM

## 2020-02-27 DIAGNOSIS — Z72.0 TOBACCO ABUSE: ICD-10-CM

## 2020-02-27 DIAGNOSIS — E55.9 VITAMIN D DEFICIENCY: ICD-10-CM

## 2020-02-27 DIAGNOSIS — K90.9 DIARRHEA DUE TO MALABSORPTION: ICD-10-CM

## 2020-02-27 DIAGNOSIS — K29.60 STEROID-INDUCED GASTRITIS: ICD-10-CM

## 2020-02-27 DIAGNOSIS — K66.8 BILOMA FOLLOWING SURGERY, SEQUELA: ICD-10-CM

## 2020-02-27 DIAGNOSIS — E11.59 HYPERTENSION ASSOCIATED WITH DIABETES: ICD-10-CM

## 2020-02-27 DIAGNOSIS — E27.8 ADRENAL NODULE: ICD-10-CM

## 2020-02-27 DIAGNOSIS — G43.809 OTHER MIGRAINE WITHOUT STATUS MIGRAINOSUS, NOT INTRACTABLE: ICD-10-CM

## 2020-02-27 DIAGNOSIS — T81.89XS BILOMA FOLLOWING SURGERY, SEQUELA: ICD-10-CM

## 2020-02-27 DIAGNOSIS — E11.65 UNCONTROLLED TYPE 2 DIABETES MELLITUS WITH HYPERGLYCEMIA, WITH LONG-TERM CURRENT USE OF INSULIN: Primary | ICD-10-CM

## 2020-02-27 DIAGNOSIS — Z79.4 UNCONTROLLED TYPE 2 DIABETES MELLITUS WITH HYPERGLYCEMIA, WITH LONG-TERM CURRENT USE OF INSULIN: Primary | ICD-10-CM

## 2020-02-27 DIAGNOSIS — D50.9 IRON DEFICIENCY ANEMIA, UNSPECIFIED IRON DEFICIENCY ANEMIA TYPE: ICD-10-CM

## 2020-02-27 DIAGNOSIS — Z85.038 HISTORY OF COLON CANCER: ICD-10-CM

## 2020-02-27 DIAGNOSIS — M47.816 LUMBAR FACET ARTHROPATHY: ICD-10-CM

## 2020-02-27 DIAGNOSIS — I15.2 HYPERTENSION ASSOCIATED WITH DIABETES: ICD-10-CM

## 2020-02-27 DIAGNOSIS — R19.7 DIARRHEA DUE TO MALABSORPTION: ICD-10-CM

## 2020-02-27 DIAGNOSIS — F33.41 RECURRENT MAJOR DEPRESSIVE DISORDER, IN PARTIAL REMISSION: ICD-10-CM

## 2020-02-27 DIAGNOSIS — R91.1 SOLITARY PULMONARY NODULE: ICD-10-CM

## 2020-02-27 DIAGNOSIS — E11.65 UNCONTROLLED TYPE 2 DIABETES MELLITUS WITH HYPERGLYCEMIA, WITH LONG-TERM CURRENT USE OF INSULIN: ICD-10-CM

## 2020-02-27 DIAGNOSIS — J44.9 CHRONIC OBSTRUCTIVE PULMONARY DISEASE, UNSPECIFIED COPD TYPE: ICD-10-CM

## 2020-02-27 DIAGNOSIS — E11.69 HYPERLIPIDEMIA ASSOCIATED WITH TYPE 2 DIABETES MELLITUS: ICD-10-CM

## 2020-02-27 DIAGNOSIS — Z79.4 UNCONTROLLED TYPE 2 DIABETES MELLITUS WITH HYPERGLYCEMIA, WITH LONG-TERM CURRENT USE OF INSULIN: ICD-10-CM

## 2020-02-27 DIAGNOSIS — I25.118 CORONARY ARTERY DISEASE OF NATIVE ARTERY OF NATIVE HEART WITH STABLE ANGINA PECTORIS: ICD-10-CM

## 2020-02-27 DIAGNOSIS — G40.909 SEIZURE DISORDER: ICD-10-CM

## 2020-02-27 DIAGNOSIS — I50.32 CHRONIC DIASTOLIC CONGESTIVE HEART FAILURE: ICD-10-CM

## 2020-02-27 DIAGNOSIS — Z98.61 S/P PTCA (PERCUTANEOUS TRANSLUMINAL CORONARY ANGIOPLASTY): ICD-10-CM

## 2020-02-27 DIAGNOSIS — E78.5 HYPERLIPIDEMIA ASSOCIATED WITH TYPE 2 DIABETES MELLITUS: ICD-10-CM

## 2020-02-27 DIAGNOSIS — M85.88 OSTEOPENIA OF LUMBAR SPINE: ICD-10-CM

## 2020-02-27 LAB
25(OH)D3+25(OH)D2 SERPL-MCNC: 17 NG/ML (ref 30–96)
ALBUMIN SERPL BCP-MCNC: 3.9 G/DL (ref 3.5–5.2)
ALP SERPL-CCNC: 149 U/L (ref 55–135)
ALT SERPL W/O P-5'-P-CCNC: 13 U/L (ref 10–44)
ANION GAP SERPL CALC-SCNC: 11 MMOL/L (ref 8–16)
AST SERPL-CCNC: 14 U/L (ref 10–40)
BILIRUB SERPL-MCNC: 0.2 MG/DL (ref 0.1–1)
BUN SERPL-MCNC: 9 MG/DL (ref 8–23)
CALCIUM SERPL-MCNC: 9.6 MG/DL (ref 8.7–10.5)
CHLORIDE SERPL-SCNC: 109 MMOL/L (ref 95–110)
CO2 SERPL-SCNC: 19 MMOL/L (ref 23–29)
CREAT SERPL-MCNC: 0.8 MG/DL (ref 0.5–1.4)
EST. GFR  (AFRICAN AMERICAN): >60 ML/MIN/1.73 M^2
EST. GFR  (NON AFRICAN AMERICAN): >60 ML/MIN/1.73 M^2
ESTIMATED AVG GLUCOSE: 192 MG/DL (ref 68–131)
GLUCOSE SERPL-MCNC: 149 MG/DL (ref 70–110)
HBA1C MFR BLD HPLC: 8.3 % (ref 4–5.6)
POTASSIUM SERPL-SCNC: 4 MMOL/L (ref 3.5–5.1)
PROT SERPL-MCNC: 7.7 G/DL (ref 6–8.4)
SODIUM SERPL-SCNC: 139 MMOL/L (ref 136–145)

## 2020-02-27 PROCEDURE — 1126F PR PAIN SEVERITY QUANTIFIED, NO PAIN PRESENT: ICD-10-PCS | Mod: S$GLB,,, | Performed by: FAMILY MEDICINE

## 2020-02-27 PROCEDURE — 99499 RISK ADDL DX/OHS AUDIT: ICD-10-PCS | Mod: S$GLB,,, | Performed by: FAMILY MEDICINE

## 2020-02-27 PROCEDURE — 1126F AMNT PAIN NOTED NONE PRSNT: CPT | Mod: S$GLB,,, | Performed by: FAMILY MEDICINE

## 2020-02-27 PROCEDURE — 1159F MED LIST DOCD IN RCRD: CPT | Mod: S$GLB,,, | Performed by: FAMILY MEDICINE

## 2020-02-27 PROCEDURE — 80053 COMPREHEN METABOLIC PANEL: CPT

## 2020-02-27 PROCEDURE — 99214 PR OFFICE/OUTPT VISIT, EST, LEVL IV, 30-39 MIN: ICD-10-PCS | Mod: S$GLB,,, | Performed by: FAMILY MEDICINE

## 2020-02-27 PROCEDURE — 99999 PR PBB SHADOW E&M-EST. PATIENT-LVL III: CPT | Mod: PBBFAC,,, | Performed by: FAMILY MEDICINE

## 2020-02-27 PROCEDURE — 3075F SYST BP GE 130 - 139MM HG: CPT | Mod: CPTII,S$GLB,, | Performed by: FAMILY MEDICINE

## 2020-02-27 PROCEDURE — 36415 COLL VENOUS BLD VENIPUNCTURE: CPT

## 2020-02-27 PROCEDURE — 3052F PR MOST RECENT HEMOGLOBIN A1C LEVEL 8.0 - < 9.0%: ICD-10-PCS | Mod: CPTII,S$GLB,, | Performed by: FAMILY MEDICINE

## 2020-02-27 PROCEDURE — 99214 OFFICE O/P EST MOD 30 MIN: CPT | Mod: S$GLB,,, | Performed by: FAMILY MEDICINE

## 2020-02-27 PROCEDURE — 3075F PR MOST RECENT SYSTOLIC BLOOD PRESS GE 130-139MM HG: ICD-10-PCS | Mod: CPTII,S$GLB,, | Performed by: FAMILY MEDICINE

## 2020-02-27 PROCEDURE — 3079F PR MOST RECENT DIASTOLIC BLOOD PRESSURE 80-89 MM HG: ICD-10-PCS | Mod: CPTII,S$GLB,, | Performed by: FAMILY MEDICINE

## 2020-02-27 PROCEDURE — 99999 PR PBB SHADOW E&M-EST. PATIENT-LVL III: ICD-10-PCS | Mod: PBBFAC,,, | Performed by: FAMILY MEDICINE

## 2020-02-27 PROCEDURE — 3079F DIAST BP 80-89 MM HG: CPT | Mod: CPTII,S$GLB,, | Performed by: FAMILY MEDICINE

## 2020-02-27 PROCEDURE — 3052F HG A1C>EQUAL 8.0%<EQUAL 9.0%: CPT | Mod: CPTII,S$GLB,, | Performed by: FAMILY MEDICINE

## 2020-02-27 PROCEDURE — 99499 UNLISTED E&M SERVICE: CPT | Mod: S$GLB,,, | Performed by: FAMILY MEDICINE

## 2020-02-27 PROCEDURE — 1159F PR MEDICATION LIST DOCUMENTED IN MEDICAL RECORD: ICD-10-PCS | Mod: S$GLB,,, | Performed by: FAMILY MEDICINE

## 2020-02-27 PROCEDURE — 1101F PR PT FALLS ASSESS DOC 0-1 FALLS W/OUT INJ PAST YR: ICD-10-PCS | Mod: CPTII,S$GLB,, | Performed by: FAMILY MEDICINE

## 2020-02-27 PROCEDURE — 83036 HEMOGLOBIN GLYCOSYLATED A1C: CPT

## 2020-02-27 PROCEDURE — 82306 VITAMIN D 25 HYDROXY: CPT

## 2020-02-27 PROCEDURE — 1101F PT FALLS ASSESS-DOCD LE1/YR: CPT | Mod: CPTII,S$GLB,, | Performed by: FAMILY MEDICINE

## 2020-02-27 RX ORDER — CHOLESTYRAMINE 4 G/9G
4 POWDER, FOR SUSPENSION ORAL 2 TIMES DAILY PRN
Qty: 378 G | Refills: 0 | Status: ON HOLD | OUTPATIENT
Start: 2020-02-27 | End: 2022-04-06

## 2020-02-27 RX ORDER — PEN NEEDLE, DIABETIC 31 GX5/16"
NEEDLE, DISPOSABLE MISCELLANEOUS
COMMUNITY
Start: 2019-12-23
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
## 2020-02-28 DIAGNOSIS — K90.9 DIARRHEA DUE TO MALABSORPTION: ICD-10-CM

## 2020-02-28 DIAGNOSIS — R19.7 DIARRHEA DUE TO MALABSORPTION: ICD-10-CM

## 2020-03-02 RX ORDER — CHOLESTYRAMINE 4 G/9G
POWDER, FOR SUSPENSION ORAL
OUTPATIENT
Start: 2020-03-02

## 2020-03-08 ENCOUNTER — TELEPHONE (OUTPATIENT)
Dept: FAMILY MEDICINE | Facility: CLINIC | Age: 70
End: 2020-03-08

## 2020-03-08 NOTE — TELEPHONE ENCOUNTER
A1c 8.3-- Novolin 70/30  pm dosing recently decreased from 65--> 50 units. Managed by endo Dr. Delgado-- Need fasting and 2 hr PP to adjust insulin dosing given recent episode of hypoglycemia.     Electrolytes normal     Vitamin D 17  --Pt should be taking OTC Vitamin D3 2000 IU daily    MD Elinor

## 2020-03-09 NOTE — TELEPHONE ENCOUNTER
Called patient to notify--Please let patient know that her diabetes control has worsened since her last testing. Her most recent A1c is 8.3, prior to this it was 7.2. She should schedule an appointment with her endocrinologist to discuss her diabetes control. Her electrolytes were normal. Her Vitamin D is improved, but still a little lower than normal. I recommend that she take over the counter Vitamin D3 2000 IU daily.  Patient verbalized understanding.

## 2020-05-04 ENCOUNTER — TELEPHONE (OUTPATIENT)
Dept: FAMILY MEDICINE | Facility: CLINIC | Age: 70
End: 2020-05-04

## 2020-05-04 NOTE — TELEPHONE ENCOUNTER
----- Message from Ede Cotto sent at 5/4/2020 10:18 AM CDT -----  Contact: Pt  Pt is requesting orders for Covid-19 testing.    Pt can be reached at [phone].    Thanks

## 2020-05-12 ENCOUNTER — HOSPITAL ENCOUNTER (OUTPATIENT)
Facility: HOSPITAL | Age: 70
Discharge: HOME | End: 2020-05-13
Attending: EMERGENCY MEDICINE | Admitting: INTERNAL MEDICINE
Payer: MEDICARE

## 2020-05-12 DIAGNOSIS — Z72.0 TOBACCO ABUSE: ICD-10-CM

## 2020-05-12 DIAGNOSIS — I15.2 HYPERTENSION ASSOCIATED WITH DIABETES: ICD-10-CM

## 2020-05-12 DIAGNOSIS — I25.118 CORONARY ARTERY DISEASE OF NATIVE ARTERY OF NATIVE HEART WITH STABLE ANGINA PECTORIS: ICD-10-CM

## 2020-05-12 DIAGNOSIS — E55.9 VITAMIN D DEFICIENCY: ICD-10-CM

## 2020-05-12 DIAGNOSIS — J44.9 CHRONIC OBSTRUCTIVE PULMONARY DISEASE, UNSPECIFIED COPD TYPE: ICD-10-CM

## 2020-05-12 DIAGNOSIS — E78.5 HYPERLIPIDEMIA ASSOCIATED WITH TYPE 2 DIABETES MELLITUS: ICD-10-CM

## 2020-05-12 DIAGNOSIS — E11.69 HYPERLIPIDEMIA ASSOCIATED WITH TYPE 2 DIABETES MELLITUS: ICD-10-CM

## 2020-05-12 DIAGNOSIS — E11.649 HYPOGLYCEMIA ASSOCIATED WITH TYPE 2 DIABETES MELLITUS: ICD-10-CM

## 2020-05-12 DIAGNOSIS — E11.59 HYPERTENSION ASSOCIATED WITH DIABETES: ICD-10-CM

## 2020-05-12 DIAGNOSIS — F33.41 RECURRENT MAJOR DEPRESSIVE DISORDER, IN PARTIAL REMISSION: ICD-10-CM

## 2020-05-12 DIAGNOSIS — E16.2 HYPOGLYCEMIA: Primary | ICD-10-CM

## 2020-05-12 LAB
ALBUMIN SERPL BCP-MCNC: 4 G/DL (ref 3.5–5.2)
ALP SERPL-CCNC: 144 U/L (ref 55–135)
ALT SERPL W/O P-5'-P-CCNC: 16 U/L (ref 10–44)
ANION GAP SERPL CALC-SCNC: 12 MMOL/L (ref 8–16)
APTT BLDCRRT: 25 SEC (ref 21–32)
AST SERPL-CCNC: 19 U/L (ref 10–40)
BASOPHILS # BLD AUTO: 0.03 K/UL (ref 0–0.2)
BASOPHILS NFR BLD: 0.4 % (ref 0–1.9)
BILIRUB SERPL-MCNC: 0.1 MG/DL (ref 0.1–1)
BILIRUB UR QL STRIP: NEGATIVE
BUN SERPL-MCNC: 18 MG/DL (ref 8–23)
CALCIUM SERPL-MCNC: 9.9 MG/DL (ref 8.7–10.5)
CHLORIDE SERPL-SCNC: 106 MMOL/L (ref 95–110)
CLARITY UR: CLEAR
CO2 SERPL-SCNC: 20 MMOL/L (ref 23–29)
COLOR UR: YELLOW
CREAT SERPL-MCNC: 0.9 MG/DL (ref 0.5–1.4)
DIFFERENTIAL METHOD: ABNORMAL
EOSINOPHIL # BLD AUTO: 0.1 K/UL (ref 0–0.5)
EOSINOPHIL NFR BLD: 1.3 % (ref 0–8)
ERYTHROCYTE [DISTWIDTH] IN BLOOD BY AUTOMATED COUNT: 19.5 % (ref 11.5–14.5)
EST. GFR  (AFRICAN AMERICAN): >60 ML/MIN/1.73 M^2
EST. GFR  (NON AFRICAN AMERICAN): >60 ML/MIN/1.73 M^2
GLUCOSE SERPL-MCNC: 69 MG/DL (ref 70–110)
GLUCOSE UR QL STRIP: NEGATIVE
HCT VFR BLD AUTO: 29.3 % (ref 37–48.5)
HGB BLD-MCNC: 8.2 G/DL (ref 12–16)
HGB UR QL STRIP: NEGATIVE
IMM GRANULOCYTES # BLD AUTO: 0.02 K/UL (ref 0–0.04)
IMM GRANULOCYTES NFR BLD AUTO: 0.3 % (ref 0–0.5)
INR PPP: 0.9 (ref 0.8–1.2)
KETONES UR QL STRIP: NEGATIVE
LEUKOCYTE ESTERASE UR QL STRIP: NEGATIVE
LYMPHOCYTES # BLD AUTO: 1.8 K/UL (ref 1–4.8)
LYMPHOCYTES NFR BLD: 27.2 % (ref 18–48)
MCH RBC QN AUTO: 21.8 PG (ref 27–31)
MCHC RBC AUTO-ENTMCNC: 28 G/DL (ref 32–36)
MCV RBC AUTO: 78 FL (ref 82–98)
MONOCYTES # BLD AUTO: 0.6 K/UL (ref 0.3–1)
MONOCYTES NFR BLD: 8.2 % (ref 4–15)
NEUTROPHILS # BLD AUTO: 4.2 K/UL (ref 1.8–7.7)
NEUTROPHILS NFR BLD: 62.6 % (ref 38–73)
NITRITE UR QL STRIP: NEGATIVE
NRBC BLD-RTO: 0 /100 WBC
PH UR STRIP: 6 [PH] (ref 5–8)
PLATELET # BLD AUTO: 245 K/UL (ref 150–350)
PMV BLD AUTO: 10.2 FL (ref 9.2–12.9)
POCT GLUCOSE: 108 MG/DL (ref 70–110)
POCT GLUCOSE: 140 MG/DL (ref 70–110)
POCT GLUCOSE: 164 MG/DL (ref 70–110)
POCT GLUCOSE: 170 MG/DL (ref 70–110)
POCT GLUCOSE: 48 MG/DL (ref 70–110)
POCT GLUCOSE: 63 MG/DL (ref 70–110)
POCT GLUCOSE: 75 MG/DL (ref 70–110)
POCT GLUCOSE: 78 MG/DL (ref 70–110)
POCT GLUCOSE: 98 MG/DL (ref 70–110)
POTASSIUM SERPL-SCNC: 4 MMOL/L (ref 3.5–5.1)
PROT SERPL-MCNC: 8.4 G/DL (ref 6–8.4)
PROT UR QL STRIP: NEGATIVE
PROTHROMBIN TIME: 9.6 SEC (ref 9–12.5)
RBC # BLD AUTO: 3.76 M/UL (ref 4–5.4)
SARS-COV-2 RDRP RESP QL NAA+PROBE: NEGATIVE
SODIUM SERPL-SCNC: 138 MMOL/L (ref 136–145)
SP GR UR STRIP: 1.02 (ref 1–1.03)
URN SPEC COLLECT METH UR: NORMAL
UROBILINOGEN UR STRIP-ACNC: NEGATIVE EU/DL
WBC # BLD AUTO: 6.68 K/UL (ref 3.9–12.7)

## 2020-05-12 PROCEDURE — 82607 VITAMIN B-12: CPT

## 2020-05-12 PROCEDURE — 80061 LIPID PANEL: CPT

## 2020-05-12 PROCEDURE — 83540 ASSAY OF IRON: CPT

## 2020-05-12 PROCEDURE — 85025 COMPLETE CBC W/AUTO DIFF WBC: CPT

## 2020-05-12 PROCEDURE — 80053 COMPREHEN METABOLIC PANEL: CPT

## 2020-05-12 PROCEDURE — 83036 HEMOGLOBIN GLYCOSYLATED A1C: CPT

## 2020-05-12 PROCEDURE — 82728 ASSAY OF FERRITIN: CPT

## 2020-05-12 PROCEDURE — 99285 EMERGENCY DEPT VISIT HI MDM: CPT

## 2020-05-12 PROCEDURE — U0002 COVID-19 LAB TEST NON-CDC: HCPCS

## 2020-05-12 PROCEDURE — G0378 HOSPITAL OBSERVATION PER HR: HCPCS

## 2020-05-12 PROCEDURE — 25000003 PHARM REV CODE 250: Performed by: NURSE PRACTITIONER

## 2020-05-12 PROCEDURE — 85610 PROTHROMBIN TIME: CPT

## 2020-05-12 PROCEDURE — 82746 ASSAY OF FOLIC ACID SERUM: CPT

## 2020-05-12 PROCEDURE — 82962 GLUCOSE BLOOD TEST: CPT

## 2020-05-12 PROCEDURE — 85730 THROMBOPLASTIN TIME PARTIAL: CPT

## 2020-05-12 PROCEDURE — 84443 ASSAY THYROID STIM HORMONE: CPT

## 2020-05-12 PROCEDURE — 81003 URINALYSIS AUTO W/O SCOPE: CPT

## 2020-05-12 PROCEDURE — 84439 ASSAY OF FREE THYROXINE: CPT

## 2020-05-12 RX ORDER — ENOXAPARIN SODIUM 100 MG/ML
40 INJECTION SUBCUTANEOUS EVERY 24 HOURS
Status: DISCONTINUED | OUTPATIENT
Start: 2020-05-13 | End: 2020-05-13 | Stop reason: HOSPADM

## 2020-05-12 RX ORDER — PANTOPRAZOLE SODIUM 40 MG/1
40 TABLET, DELAYED RELEASE ORAL DAILY
Status: DISCONTINUED | OUTPATIENT
Start: 2020-05-13 | End: 2020-05-13 | Stop reason: HOSPADM

## 2020-05-12 RX ORDER — QUETIAPINE FUMARATE 100 MG/1
300 TABLET, FILM COATED ORAL NIGHTLY
Status: DISCONTINUED | OUTPATIENT
Start: 2020-05-12 | End: 2020-05-13 | Stop reason: HOSPADM

## 2020-05-12 RX ORDER — IBUPROFEN 200 MG
16 TABLET ORAL
Status: DISCONTINUED | OUTPATIENT
Start: 2020-05-12 | End: 2020-05-13 | Stop reason: HOSPADM

## 2020-05-12 RX ORDER — AMITRIPTYLINE HYDROCHLORIDE 10 MG/1
20 TABLET, FILM COATED ORAL NIGHTLY
Status: DISCONTINUED | OUTPATIENT
Start: 2020-05-13 | End: 2020-05-13 | Stop reason: HOSPADM

## 2020-05-12 RX ORDER — SODIUM CHLORIDE 0.9 % (FLUSH) 0.9 %
10 SYRINGE (ML) INJECTION
Status: DISCONTINUED | OUTPATIENT
Start: 2020-05-12 | End: 2020-05-13 | Stop reason: HOSPADM

## 2020-05-12 RX ORDER — FLUTICASONE PROPIONATE 50 MCG
2 SPRAY, SUSPENSION (ML) NASAL DAILY
Status: DISCONTINUED | OUTPATIENT
Start: 2020-05-13 | End: 2020-05-13 | Stop reason: HOSPADM

## 2020-05-12 RX ORDER — HYDROCHLOROTHIAZIDE 25 MG/1
25 TABLET ORAL DAILY
Status: DISCONTINUED | OUTPATIENT
Start: 2020-05-13 | End: 2020-05-13 | Stop reason: HOSPADM

## 2020-05-12 RX ORDER — DULOXETIN HYDROCHLORIDE 30 MG/1
90 CAPSULE, DELAYED RELEASE ORAL DAILY
Status: DISCONTINUED | OUTPATIENT
Start: 2020-05-13 | End: 2020-05-13 | Stop reason: HOSPADM

## 2020-05-12 RX ORDER — IBUPROFEN 200 MG
16 TABLET ORAL ONCE AS NEEDED
Status: COMPLETED | OUTPATIENT
Start: 2020-05-12 | End: 2020-05-12

## 2020-05-12 RX ORDER — VERAPAMIL HYDROCHLORIDE 240 MG/1
240 TABLET, FILM COATED, EXTENDED RELEASE ORAL DAILY
Status: DISCONTINUED | OUTPATIENT
Start: 2020-05-13 | End: 2020-05-13 | Stop reason: HOSPADM

## 2020-05-12 RX ORDER — GLUCAGON 1 MG
1 KIT INJECTION
Status: DISCONTINUED | OUTPATIENT
Start: 2020-05-12 | End: 2020-05-13 | Stop reason: HOSPADM

## 2020-05-12 RX ORDER — ONDANSETRON 4 MG/1
4 TABLET, ORALLY DISINTEGRATING ORAL EVERY 8 HOURS PRN
Status: DISCONTINUED | OUTPATIENT
Start: 2020-05-12 | End: 2020-05-13 | Stop reason: HOSPADM

## 2020-05-12 RX ORDER — IBUPROFEN 200 MG
24 TABLET ORAL
Status: DISCONTINUED | OUTPATIENT
Start: 2020-05-12 | End: 2020-05-13 | Stop reason: HOSPADM

## 2020-05-12 RX ORDER — ZONISAMIDE 100 MG/1
200 CAPSULE ORAL 2 TIMES DAILY
Status: DISCONTINUED | OUTPATIENT
Start: 2020-05-13 | End: 2020-05-13 | Stop reason: HOSPADM

## 2020-05-12 RX ORDER — IPRATROPIUM BROMIDE AND ALBUTEROL SULFATE 2.5; .5 MG/3ML; MG/3ML
3 SOLUTION RESPIRATORY (INHALATION) EVERY 4 HOURS PRN
Status: DISCONTINUED | OUTPATIENT
Start: 2020-05-12 | End: 2020-05-13 | Stop reason: HOSPADM

## 2020-05-12 RX ORDER — PREGABALIN 75 MG/1
150 CAPSULE ORAL 2 TIMES DAILY
Status: DISCONTINUED | OUTPATIENT
Start: 2020-05-13 | End: 2020-05-13 | Stop reason: HOSPADM

## 2020-05-12 RX ORDER — PRAVASTATIN SODIUM 20 MG/1
20 TABLET ORAL DAILY
Status: DISCONTINUED | OUTPATIENT
Start: 2020-05-13 | End: 2020-05-13 | Stop reason: HOSPADM

## 2020-05-12 RX ORDER — TOPIRAMATE 25 MG/1
50 TABLET ORAL 2 TIMES DAILY
Status: DISCONTINUED | OUTPATIENT
Start: 2020-05-13 | End: 2020-05-13 | Stop reason: HOSPADM

## 2020-05-12 RX ORDER — ASPIRIN 81 MG/1
81 TABLET ORAL DAILY
Status: DISCONTINUED | OUTPATIENT
Start: 2020-05-13 | End: 2020-05-13 | Stop reason: HOSPADM

## 2020-05-12 RX ADMIN — Medication 16 G: at 05:05

## 2020-05-12 NOTE — ED TRIAGE NOTES
Patient comes to the er after being referred by pain specialist for low cbg. Patient was given crackers there and given a sandwhich and juices. Patient states she has been sleeping a lot and more drowsy.

## 2020-05-12 NOTE — ED NOTES
Pt. States she feels sleepy, cbg checked up to 164, will re-assess in 30 mins. Side rails x 2 up, bed in lowest and locked position.    Call light within reach.

## 2020-05-12 NOTE — ED PROVIDER NOTES
Encounter Date: 5/12/2020       History     Chief Complaint   Patient presents with    Hypoglycemia     sent from Dr. Mancuso's office for hypoglycemia today. States in the office her blood sugar was 37, increased after eating a cookie, and then decreased again to 47. Pt states she has been fatigued today.      69-year-old female presents to the emergency room states that her blood sugars have been running low for a while her doctor had placed her on 60 twice a day of 70 30 of insulin patient states that she started taking 45 units twice daily because her blood sugars have been running low.  Patient states her blood sugar was 70 last night prior to taking her 70 30.  Patient states this morning for breakfast she had a baked potato with chili.  Patient states that she was seen at her pain management clinic for procedure for injection in her back and her knee at that time she was told her blood sugar was low and was transferred care of the emergency room for further evaluation.  Patient states that she has noticed that she has been feeling a little unsteady on her feet when ambulating.  She denies loss of consciousness she denies chest pain abdominal pain nausea vomiting or diarrhea or any recent infection.   Past medical history        Review of patient's allergies indicates:  No Known Allergies  Past Medical History:   Diagnosis Date    Acute coronary syndrome     Acute hypoxemic respiratory failure 5/1/2017    Anxiety     Asthma     Cancer     colon    Cataracts, both eyes     Chest pain at rest     Chest pain of uncertain etiology 12/6/2015    Colon cancer 1988    COPD (chronic obstructive pulmonary disease)     Coronary artery disease     Depression     Diabetes mellitus     Dyspnea on exertion 11/15/2018    Elevated brain natriuretic peptide (BNP) level 11/15/2018    Elevated cholesterol     Hypertension     Swelling     Syncope and collapse 11/8/2016     Past Surgical History:   Procedure  Laterality Date    ABDOMINAL SURGERY      BREAST BIOPSY      benign unsure what side     SECTION, CLASSIC      COLON SURGERY      COLONOSCOPY  2019    repeat in 5 years    CORONARY ANGIOPLASTY WITH STENT PLACEMENT  3 months ago     x2, Hysterectomy, Lung surgery, Partial stomach removed, Part of colon removed for rectal cancer    GASTRECTOMY      HEMORRHOID SURGERY      HYSTERECTOMY      @28yrs of age    LUNG BIOPSY      OOPHORECTOMY      @28yrs of age     Family History   Problem Relation Age of Onset    Cancer Mother     Diabetes Mother     Hypertension Mother     Breast cancer Mother     Cancer Father     Heart disease Father     Depression Sister     Hypertension Sister     Cancer Maternal Aunt      Social History     Tobacco Use    Smoking status: Current Every Day Smoker     Packs/day: 1.00     Years: 50.00     Pack years: 50.00     Types: Cigarettes    Smokeless tobacco: Never Used    Tobacco comment: Smoking 5 cigs daily presently   Substance Use Topics    Alcohol use: Yes     Alcohol/week: 3.0 standard drinks     Types: 3 Glasses of wine per week     Comment: 1 every 3 months    Drug use: No     Review of Systems   Constitutional: Negative for fever.   HENT: Negative for sore throat.    Respiratory: Negative for shortness of breath.    Cardiovascular: Negative for chest pain.   Gastrointestinal: Negative for nausea.   Genitourinary: Negative for dysuria.   Musculoskeletal: Negative for back pain.   Skin: Negative for rash.   Neurological: Negative for weakness.   Hematological: Does not bruise/bleed easily.       Physical Exam     Initial Vitals [20 1405]   BP Pulse Resp Temp SpO2   105/64 99 20 98 °F (36.7 °C) 96 %      MAP       --         Physical Exam    Nursing note and vitals reviewed.  Constitutional: She appears well-developed and well-nourished.   HENT:   Head: Normocephalic.   Neck: Normal range of motion.   Cardiovascular: Normal rate, regular  rhythm and normal heart sounds.   Pulmonary/Chest: Breath sounds normal.   Abdominal: Soft. Bowel sounds are normal.   Neurological: She is alert and oriented to person, place, and time. GCS score is 15. GCS eye subscore is 4. GCS verbal subscore is 5. GCS motor subscore is 6.   Skin: Skin is warm and dry. Capillary refill takes less than 2 seconds.   Psychiatric: She has a normal mood and affect. Thought content normal.         ED Course   Procedures  Labs Reviewed   CBC W/ AUTO DIFFERENTIAL - Abnormal; Notable for the following components:       Result Value    RBC 3.76 (*)     Hemoglobin 8.2 (*)     Hematocrit 29.3 (*)     Mean Corpuscular Volume 78 (*)     Mean Corpuscular Hemoglobin 21.8 (*)     Mean Corpuscular Hemoglobin Conc 28.0 (*)     RDW 19.5 (*)     All other components within normal limits   COMPREHENSIVE METABOLIC PANEL - Abnormal; Notable for the following components:    CO2 20 (*)     Glucose 69 (*)     Alkaline Phosphatase 144 (*)     All other components within normal limits   POCT GLUCOSE - Abnormal; Notable for the following components:    POCT Glucose 48 (*)     All other components within normal limits   POCT GLUCOSE - Abnormal; Notable for the following components:    POCT Glucose 63 (*)     All other components within normal limits   POCT GLUCOSE - Abnormal; Notable for the following components:    POCT Glucose 164 (*)     All other components within normal limits   POCT GLUCOSE - Abnormal; Notable for the following components:    POCT Glucose 140 (*)     All other components within normal limits   POCT GLUCOSE - Abnormal; Notable for the following components:    POCT Glucose 170 (*)     All other components within normal limits   URINALYSIS, REFLEX TO URINE CULTURE    Narrative:     Preferred Collection Type->Urine, Clean Catch   SARS-COV-2 RNA AMPLIFICATION, QUAL    Narrative:     What symptom criteria does the patient meet?->Other (specify)  Please specify:->possible admit   PROTIME-INR    APTT   POCT GLUCOSE   POCT GLUCOSE   POCT GLUCOSE   POCT GLUCOSE          Imaging Results    None          Medical Decision Making:   Initial Assessment:   Initial assessment patient is awake alert oriented she states that she was at her pain management clinic today in which she was to have a procedure and upon taking her blood sugar is 37.  She was fed a cookie ending up to a 47 then transferred to the emergency room.  Patient was given cake on arrival to the emergency room and was able to eat and drink without difficulty we have trended her blood pressure over the time that she has been emergency and consult at \[facility]\"" in which we are to monitor her blood sugar.  She was given glucose tab.  He maintained a blood sugar for approximately 3 hr and begin the declined again.  [facility] internal medicine was been reconsulted is has accepted patient for further monitoring of blood sugars    Patient has remained alert awake and oriented is able is swallowing without difficulty.   Differential Diagnosis:   Hypoglycemia  Clinical Tests:   Lab Tests: Ordered and Reviewed  The following lab test(s) were unremarkable: CBC, BMP and Urinalysis  Other:   I have discussed this case with another health care provider.       <> Summary of the Discussion: Patient was discussed in collaboration with Dr. Lefort  With \[facility]\"" internal medicine - has accepted patient as observation  as well as Dr. Allred for collaboration                                 Clinical Impression:       ICD-10-CM ICD-9-CM   1. Hypoglycemia E16.2 251.2                                Zoe Berman, CHAGO  05/12/20 [id number]

## 2020-05-12 NOTE — ED NOTES
Pt. Given a slice of chocolate cake with two sugar packets on it. Plus apple juice.     Er np okay with it, patient sitting up eating it.

## 2020-05-12 NOTE — PROVIDER PROGRESS NOTES - EMERGENCY DEPT.
Emergency Department TeleTRIAGE Encounter Note      CHIEF COMPLAINT    Chief Complaint   Patient presents with    Hypoglycemia     sent from Dr. Mancuso's office for hypoglycemia today. States in the office her blood sugar was 37, increased after eating a cookie, and then decreased again to 47. Pt states she has been fatigued today.        VITAL SIGNS   Initial Vitals [05/12/20 1405]   BP Pulse Resp Temp SpO2   105/64 99 20 98 °F (36.7 °C) 96 %      MAP       --            ALLERGIES    Review of patient's allergies indicates:  No Known Allergies    PROVIDER TRIAGE NOTE  Patient with past medical history anxiety, asthma, COPD, CAD, depression, diabetes, hypertension, hyperlipidemia presents to the ED for evaluation of hypoglycemia.  Patient states she was seen at her PCPs office and her blood sugar was 37.  Her blood sugar normalized after eating a cookie but then decreased again to 47.  She states she has been feeling more fatigued than normal today.  She reports taking her insulin as prescribed this morning and eating prior to going to PCPs office.  She is currently eating a sandwich and crackers with no complaints.      ORDERS  Labs Reviewed   POCT GLUCOSE - Abnormal; Notable for the following components:       Result Value    POCT Glucose 48 (*)     All other components within normal limits       ED Orders (720h ago, onward)    Start Ordered     Status Ordering Provider    05/12/20 1410 05/12/20 1410  POCT glucose  Once  Completed    Final result EMERGENCY, DEPT PHYSICIAN    Unscheduled 05/12/20 1418  Saline lock IV  Once      Ordered SASHA BAHENA G.    Unscheduled 05/12/20 1418  Vital signs  Every 15 min      Ordered JOSEFSASHA G.    Unscheduled 05/12/20 1418  CBC Auto Differential  Once      Ordered JOSEF SASHA G.    Unscheduled 05/12/20 1418  Comprehensive Metabolic Panel  Once      Ordered SASHA BAHENA G.            Virtual Visit Note: The provider triage portion of this emergency department evaluation and  documentation was performed via [employer], a HIPAA-compliant telemedicine application, in concert with a tele-presenter in the room. A face to face patient evaluation with one of my colleagues will occur once the patient is placed in an emergency department room.      DISCLAIMER: This note was prepared with [employer] voice recognition transcription software. Garbled syntax, mangled pronouns, and other bizarre constructions may be attributed to that software system.

## 2020-05-12 NOTE — ED NOTES
Pt. arousable to verbal stimuli, appropriate. States feeling a little bit better. Side rails x 2 up, bed in lowest and locked position.

## 2020-05-12 NOTE — ED NOTES
np notified about patient's cbg. Given 1 cup of applel juice with 8 sugar packets. Will give glucose chewable tablet

## 2020-05-13 ENCOUNTER — CLINICAL SUPPORT (OUTPATIENT)
Dept: SMOKING CESSATION | Facility: CLINIC | Age: 70
End: 2020-05-13

## 2020-05-13 VITALS
TEMPERATURE: 98 F | WEIGHT: 143.94 LBS | DIASTOLIC BLOOD PRESSURE: 80 MMHG | BODY MASS INDEX: 26.49 KG/M2 | OXYGEN SATURATION: 97 % | HEART RATE: 105 BPM | HEIGHT: 62 IN | SYSTOLIC BLOOD PRESSURE: 135 MMHG | RESPIRATION RATE: 20 BRPM

## 2020-05-13 DIAGNOSIS — F17.210 CIGARETTE SMOKER: Primary | ICD-10-CM

## 2020-05-13 LAB
ALBUMIN SERPL BCP-MCNC: 3.3 G/DL (ref 3.5–5.2)
ALP SERPL-CCNC: 120 U/L (ref 55–135)
ALT SERPL W/O P-5'-P-CCNC: 12 U/L (ref 10–44)
ANION GAP SERPL CALC-SCNC: 9 MMOL/L (ref 8–16)
AST SERPL-CCNC: 9 U/L (ref 10–40)
BASOPHILS # BLD AUTO: 0.02 K/UL (ref 0–0.2)
BASOPHILS NFR BLD: 0.4 % (ref 0–1.9)
BILIRUB SERPL-MCNC: 0.2 MG/DL (ref 0.1–1)
BUN SERPL-MCNC: 12 MG/DL (ref 8–23)
CALCIUM SERPL-MCNC: 9.1 MG/DL (ref 8.7–10.5)
CHLORIDE SERPL-SCNC: 107 MMOL/L (ref 95–110)
CHOLEST SERPL-MCNC: 119 MG/DL (ref 120–199)
CHOLEST/HDLC SERPL: 2.4 {RATIO} (ref 2–5)
CO2 SERPL-SCNC: 23 MMOL/L (ref 23–29)
CREAT SERPL-MCNC: 0.7 MG/DL (ref 0.5–1.4)
DIFFERENTIAL METHOD: ABNORMAL
EOSINOPHIL # BLD AUTO: 0.1 K/UL (ref 0–0.5)
EOSINOPHIL NFR BLD: 2 % (ref 0–8)
ERYTHROCYTE [DISTWIDTH] IN BLOOD BY AUTOMATED COUNT: 19.1 % (ref 11.5–14.5)
EST. GFR  (AFRICAN AMERICAN): >60 ML/MIN/1.73 M^2
EST. GFR  (NON AFRICAN AMERICAN): >60 ML/MIN/1.73 M^2
ESTIMATED AVG GLUCOSE: 157 MG/DL (ref 68–131)
FERRITIN SERPL-MCNC: 4 NG/ML (ref 20–300)
FOLATE SERPL-MCNC: 11.3 NG/ML (ref 4–24)
GLUCOSE SERPL-MCNC: 104 MG/DL (ref 70–110)
HBA1C MFR BLD HPLC: 7.1 % (ref 4–5.6)
HCT VFR BLD AUTO: 27.3 % (ref 37–48.5)
HDLC SERPL-MCNC: 49 MG/DL (ref 40–75)
HDLC SERPL: 41.2 % (ref 20–50)
HGB BLD-MCNC: 7.5 G/DL (ref 12–16)
IMM GRANULOCYTES # BLD AUTO: 0.01 K/UL (ref 0–0.04)
IMM GRANULOCYTES NFR BLD AUTO: 0.2 % (ref 0–0.5)
IRON SERPL-MCNC: 13 UG/DL (ref 30–160)
LDLC SERPL CALC-MCNC: 56.2 MG/DL (ref 63–159)
LYMPHOCYTES # BLD AUTO: 1.7 K/UL (ref 1–4.8)
LYMPHOCYTES NFR BLD: 32.8 % (ref 18–48)
MAGNESIUM SERPL-MCNC: 1.7 MG/DL (ref 1.6–2.6)
MCH RBC QN AUTO: 21.4 PG (ref 27–31)
MCHC RBC AUTO-ENTMCNC: 27.5 G/DL (ref 32–36)
MCV RBC AUTO: 78 FL (ref 82–98)
MONOCYTES # BLD AUTO: 0.5 K/UL (ref 0.3–1)
MONOCYTES NFR BLD: 10.1 % (ref 4–15)
NEUTROPHILS # BLD AUTO: 2.8 K/UL (ref 1.8–7.7)
NEUTROPHILS NFR BLD: 54.5 % (ref 38–73)
NONHDLC SERPL-MCNC: 70 MG/DL
NRBC BLD-RTO: 0 /100 WBC
PLATELET # BLD AUTO: 207 K/UL (ref 150–350)
PMV BLD AUTO: 9.8 FL (ref 9.2–12.9)
POCT GLUCOSE: 127 MG/DL (ref 70–110)
POCT GLUCOSE: 132 MG/DL (ref 70–110)
POCT GLUCOSE: 143 MG/DL (ref 70–110)
POCT GLUCOSE: 161 MG/DL (ref 70–110)
POTASSIUM SERPL-SCNC: 3.6 MMOL/L (ref 3.5–5.1)
PROT SERPL-MCNC: 6.9 G/DL (ref 6–8.4)
RBC # BLD AUTO: 3.51 M/UL (ref 4–5.4)
SATURATED IRON: 2 % (ref 20–50)
SODIUM SERPL-SCNC: 139 MMOL/L (ref 136–145)
T4 FREE SERPL-MCNC: 0.84 NG/DL (ref 0.71–1.51)
TOTAL IRON BINDING CAPACITY: 630 UG/DL (ref 250–450)
TRANSFERRIN SERPL-MCNC: 426 MG/DL (ref 200–375)
TRANSFERRIN SERPL-MCNC: 426 MG/DL (ref 200–375)
TRIGL SERPL-MCNC: 69 MG/DL (ref 30–150)
TSH SERPL DL<=0.005 MIU/L-ACNC: 0.61 UIU/ML (ref 0.4–4)
VIT B12 SERPL-MCNC: 296 PG/ML (ref 210–950)
WBC # BLD AUTO: 5.06 K/UL (ref 3.9–12.7)

## 2020-05-13 PROCEDURE — S4991 NICOTINE PATCH NONLEGEND: HCPCS | Performed by: STUDENT IN AN ORGANIZED HEALTH CARE EDUCATION/TRAINING PROGRAM

## 2020-05-13 PROCEDURE — 80053 COMPREHEN METABOLIC PANEL: CPT

## 2020-05-13 PROCEDURE — 99999 PR PBB SHADOW E&M-EST. PATIENT-LVL II: CPT | Mod: PBBFAC,,,

## 2020-05-13 PROCEDURE — 83735 ASSAY OF MAGNESIUM: CPT

## 2020-05-13 PROCEDURE — 94761 N-INVAS EAR/PLS OXIMETRY MLT: CPT

## 2020-05-13 PROCEDURE — G0378 HOSPITAL OBSERVATION PER HR: HCPCS

## 2020-05-13 PROCEDURE — 36415 COLL VENOUS BLD VENIPUNCTURE: CPT

## 2020-05-13 PROCEDURE — 99999 PR PBB SHADOW E&M-EST. PATIENT-LVL II: ICD-10-PCS | Mod: PBBFAC,,,

## 2020-05-13 PROCEDURE — 99407 BEHAV CHNG SMOKING > 10 MIN: CPT | Mod: S$GLB,,,

## 2020-05-13 PROCEDURE — 25000003 PHARM REV CODE 250: Performed by: STUDENT IN AN ORGANIZED HEALTH CARE EDUCATION/TRAINING PROGRAM

## 2020-05-13 PROCEDURE — 85025 COMPLETE CBC W/AUTO DIFF WBC: CPT

## 2020-05-13 PROCEDURE — 99407 PR TOBACCO USE CESSATION INTENSIVE >10 MINUTES: ICD-10-PCS | Mod: S$GLB,,,

## 2020-05-13 PROCEDURE — 25000242 PHARM REV CODE 250 ALT 637 W/ HCPCS: Performed by: STUDENT IN AN ORGANIZED HEALTH CARE EDUCATION/TRAINING PROGRAM

## 2020-05-13 RX ORDER — IBUPROFEN 200 MG
1 TABLET ORAL DAILY
Status: DISCONTINUED | OUTPATIENT
Start: 2020-05-13 | End: 2020-05-13 | Stop reason: HOSPADM

## 2020-05-13 RX ORDER — FERROUS SULFATE 325(65) MG
325 TABLET ORAL DAILY
Qty: 30 TABLET | Refills: 3 | Status: ON HOLD | OUTPATIENT
Start: 2020-05-13 | End: 2022-08-26

## 2020-05-13 RX ORDER — INSULIN ASPART 100 [IU]/ML
INJECTION, SUSPENSION SUBCUTANEOUS
Refills: 3
Start: 2020-05-13 | End: 2020-07-08

## 2020-05-13 RX ADMIN — PRAVASTATIN SODIUM 20 MG: 20 TABLET ORAL at 09:05

## 2020-05-13 RX ADMIN — ZONISAMIDE 200 MG: 100 CAPSULE ORAL at 09:05

## 2020-05-13 RX ADMIN — DULOXETINE HYDROCHLORIDE 90 MG: 30 CAPSULE, DELAYED RELEASE ORAL at 09:05

## 2020-05-13 RX ADMIN — VERAPAMIL HYDROCHLORIDE 240 MG: 240 TABLET, FILM COATED, EXTENDED RELEASE ORAL at 09:05

## 2020-05-13 RX ADMIN — FLUTICASONE PROPIONATE 100 MCG: 50 SPRAY, METERED NASAL at 09:05

## 2020-05-13 RX ADMIN — Medication 1 PATCH: at 12:05

## 2020-05-13 RX ADMIN — QUETIAPINE FUMARATE 300 MG: 100 TABLET ORAL at 12:05

## 2020-05-13 RX ADMIN — PANTOPRAZOLE SODIUM 40 MG: 40 TABLET, DELAYED RELEASE ORAL at 09:05

## 2020-05-13 RX ADMIN — HYDROCHLOROTHIAZIDE 25 MG: 25 TABLET ORAL at 09:05

## 2020-05-13 RX ADMIN — TOPIRAMATE 50 MG: 25 TABLET, FILM COATED ORAL at 09:05

## 2020-05-13 RX ADMIN — PREGABALIN 150 MG: 75 CAPSULE ORAL at 09:05

## 2020-05-13 RX ADMIN — ASPIRIN 81 MG: 81 TABLET, COATED ORAL at 09:05

## 2020-05-13 NOTE — PLAN OF CARE
Scheduled medications administered per MD order. Afebrile. Blood glucose monitored. Monitored closely for signs of hypoglycemia. Snack provided at bedside. Safety maintained.

## 2020-05-13 NOTE — PROGRESS NOTES
Individual Follow-Up Form    5/13/2020    Quit Date: To be determined    Clinical Status of Patient: Inpatient    Length of Service: 30 minutes    Comments: Smoking cessation education provided. Pt is a 1 pack per day smoker x 53 years.  She states that she has recently cut down to 0.5 to 0.75 pack of cigarettes per day.  21 mg nicotine patch ordered. Pt states that she is ready to quit smoking. She is currently enrolled in the [employer]..     Diagnosis: F17.210    Next Visit:   Ambulatory referral to Smoking Cessation program following hospital discharge

## 2020-05-13 NOTE — ED NOTES
Report received, care assumed.   Adult Physical Assessment  LOC: Chayito Chung, 69 y.o. female verified via two identifiers.  The patient is awake, alert, oriented and speaking appropriately at this time.  APPEARANCE: Patient resting comfortably and appears to be in no acute distress at this time. Patient is clean and well groomed, patient's clothing is properly fastened.  SKIN:The skin is warm and dry, color consistent with ethnicity, patient has normal skin turgor and moist mucus membranes, skin intact, no breakdown or brusing noted.  MUSCULOSKELETAL: Patient moving all extremities well, no obvious swelling or deformities noted.  RESPIRATORY: Airway is open and patent, respirations are spontaneous, patient has a normal effort and rate, no accessory muscle use noted.  CARDIAC: Patient has a normal rate and rhythm, no periphreal edema noted in any extremity, capillary refill < 3 seconds in all extremities  ABDOMEN: Soft and non tender to palpation, no abdominal distention noted. Bowel sounds present in all four quadrants.  NEUROLOGIC: Eyes open spontaneously, behavior, she appears to be resting but wakes up w gentle stimulation and follows simple commands, facial expression symmetrical, bilateral hand grasp equal and even, purposeful motor response noted, normal sensation in all extremities when touched with a finger.

## 2020-05-13 NOTE — ED NOTES
Order clarified w the medicine team re tele needs on the floor. Telemetry ordered and box will be procured.

## 2020-05-13 NOTE — PLAN OF CARE
Discharge order noted, no HH or DME noted.       05/13/20 1624   Final Note   Assessment Type Final Discharge Note   Anticipated Discharge Disposition Home   What phone number can be called within the next 1-3 days to see how you are doing after discharge? [phone]   Hospital Follow Up  Appt(s) scheduled? Yes   Discharge plans and expectations educations in teach back method with documentation complete? Yes   Right Care Referral Info   Post Acute Recommendation No Care

## 2020-05-13 NOTE — NURSING
Patient arrived to floor from ED. Alert. Vitals and BG WNL. Appears to be in no distress. Will continue to monitor.

## 2020-05-13 NOTE — DISCHARGE SUMMARY
[facility] Discharge Summary    Primary Team: [facility] IM Team A  Attending Physician: Chhaya Sunshine MD  Resident: Dr. Stapleton  Intern: Dr. Saavedra    Date of Admit: 5/12/2020  Date of Discharge: 5/13/2020    Discharge to: Home  Condition: Improved    Discharge Diagnoses     Patient Active Problem List   Diagnosis    Coronary artery disease of native artery of native heart with stable angina pectoris    COPD (chronic obstructive pulmonary disease)    Tobacco abuse, 1ppd, 50 years    Major depressive disorder in partial remission    Solitary pulmonary nodule s/p resection 4/2012    Neuropathy    Migraines    JAZMIN (generalized anxiety disorder)    Hypoglycemia associated with type 2 diabetes mellitus    Adrenal nodule    Uncontrolled type 2 diabetes mellitus with hyperglycemia, with long-term current use of insulin    Hypertension associated with diabetes    Hyperlipidemia associated with type 2 diabetes mellitus    S/P PTCA (percutaneous transluminal coronary angioplasty)    Lumbar facet arthropathy    Sinus tachycardia    Iron deficiency anemia    Steroid-induced gastritis    Chronic diastolic congestive heart failure    History of colon cancer    Vitamin D deficiency    Osteopenia of lumbar spine    Biloma following surgery    History of partial gastrectomy    Seizure disorder    Hypoglycemia       Consultants and Procedures     Consultants:  none    Procedures:   none    Brief History of Present Illness      Chayito Chung is a 69 y.o. female w/ a past medical history of colon cancer s/p resection (1988), COPD, CAD s/p stent 2011, T2DM, HFpEF, MDD/JAZMIN, HLD, and HTN who was sent to the [facility] ED on 5/12/2020 after found to by hypoglycemic to the 30's at her pain management clinic appointment earlier this morning.      The patient was in their usual state of health until about 2 days prior to presentation when she began to feel fatigued and lightheaded. She noted  "her BG was running lower than normal (50-70s) but she "didn't think anything of it." She reported feeling poorly this morning and felt off-balance when she walked. She reports compliance with her home insulin regimen which is Novolog 70-30 45 units BID. She reports taking her insulin this morning per usual and eating a baked potato with chili for breakfast. She denies change in appetite. She did notice some nausea over the past 2 days that improved with zofran and denied any episodes of vomiting. She endorses chronic non-bloody diarrhea that has been going on for months now. She denies any LOC, fever/chills, chest pain, worsening of her chronic shortness of breath, cough, increase in LE edema, pain with urination or change in the frequency, color, or smell of her urine.     Hospital Course By Problem with Pertinent Findings     Hypoglycemia  - Pt with history of admissions for hypoglycemic episodesx2 (most recent 11/2019 at [facility] requiring D5 drip for persistent hypoglycemia) was sent to ED today from clinic for BG of 30; patient was given foods with high sugar content which increased her glucose to 170 however her glucose continued to fall over the next few hours despite continued intake of PO. She administered her AM insulin of 40 Units Novolog 70-30 but did not receive any insulin since then.   - patient currently asymptomatic   - continue to monitor glucose checks q 4hrs, stable throughout admission  - encourage PO intake   - will discharge on Novolog 70/30 30 units BID w/ follow up with PCP and Endocrinology     T2DM  - hgb A1C 8.3 (2/2020), repeat 5/12 7.1  - on Novolog 70-30 45 units BID       - per above will decrease to 30 units BID  - follows with Endocrinology Dr. Delgado     Essential HTN  - home meds: Verapamil 240 and HCTZ 25  - BP well controlled continue home medications      HFpEF  - last echo 5/28/2019 w/ grade 1 diastolic dysfunction normal LVEF 65%  - no acute concerns     CAD s/p stent placement " 2011  - no acute issues   - continue asa/statin      Iron Deficiency Anemia   - baseline hgb 10-12   - H/H  8.2/29.3  MCV 78; Ferritin 8  Fe 18  TIBC 573  %sat 3 (5/2019)  - repeat iron panel with low iron, elevated TIBC, low ferritin  - continue home iron     COPD  - home meds: albuterol PRN   - well controlled, no acute issues   - follows with Pulmonologist: Dr. Martinez Chung--[facility]  - Duonebs q4 PRN while here     HLD   - ; TG 69; HDL 49; LDL 56 on 5/2020  - pravastatin 20 mg qhs      History of Migraines  - home meds Topamax 50 BID and amytriptyline 20 qhs   - well controlled; continue home meds     Seizure Disorder   - home meds: Zonisamide 200mg BID   - no acute issues continue home meds   - follows with Neurology: Dr Mejia     Chronic Back Pain  - home meds Pierce 10//325 20-3 times daily, Lyrica 150 BID; Zanaflex 4 qhs  - follows with pain management: Dr. Mancuso      Generalized Anxiety/Major Depressive Disorder  - well managed with Cymbalta 90, amitriptyline 20 qhs, Ativan 0.5 BID PRN, and Seroquel 300 qhs   - follows with psychiatrist Dr. Cotto   - continue with home meds while inpatient      Tobacco Abuse  - 50 pack year history (1 PPDx50 years)   - counseled on the importance of cessation      History of Colon Cancer  - diagnosed in 1988 s/p resection   - UTD with colonoscopies   - last c-scope 7/2019  - follows with Gastroenterology (Dr. Hooper--[facility])    Discharge Medications        Medication List      START taking these medications    ferrous sulfate 324 mg (65 mg iron) Tbec  Take 1 tablet (324 mg total) by mouth once daily.        CHANGE how you take these medications    DULoxetine 60 MG capsule  Commonly known as:  CYMBALTA  What changed:  Another medication with the same name was removed. Continue taking this medication, and follow the directions you see here.     NovoLOG Mix 70-30 U-100 Insuln 100 unit/mL (70-30) Soln  Generic drug:  insulin asp prt-insulin aspart (NOVOLOG 70/30)  30 units  "BID  What changed:  additional instructions        CONTINUE taking these medications    albuterol 90 mcg/actuation inhaler  Commonly known as:  PROVENTIL/VENTOLIN HFA     albuterol-ipratropium 2.5 mg-0.5 mg/3 mL nebulizer solution  Commonly known as:  DUO-NEB     amitriptyline 10 MG tablet  Commonly known as:  ELAVIL     ammonium lactate 12 % Crea     aspirin 81 MG EC tablet  Commonly known as:  ECOTRIN     BD ULTRA-FINE MINI PEN NEEDLE 31 gauge x 3/16" Ndle  Generic drug:  pen needle, diabetic     cholestyramine (with sugar) 4 gram Powd  Take 4 g by mouth 2 (two) times daily as needed (diarrhea).     fluticasone propionate 50 mcg/actuation nasal spray  Commonly known as:  FLONASE  2 sprays (100 mcg total) by Each Nare route once daily.     hydroCHLOROthiazide 25 MG tablet  Commonly known as:  HYDRODIURIL  Take 1 tablet (25 mg total) by mouth once daily.     HYDROcodone-acetaminophen  mg per tablet  Commonly known as:  NORCO  Take 1 tablet by mouth 2 to 3 times daily as needed. for pain for 30 days     LORazepam 0.5 MG tablet  Commonly known as:  ATIVAN     LYRICA 150 MG capsule  Generic drug:  pregabalin     nitroGLYCERIN 0.4 MG SL tablet  Commonly known as:  NITROSTAT     NovoLOG Flexpen U-100 Insulin 100 unit/mL (3 mL) Inpn pen  Generic drug:  insulin aspart U-100     omeprazole 40 MG capsule  Commonly known as:  PRILOSEC  Take 1 capsule (40 mg total) by mouth every morning.     ondansetron 4 MG Tbdl  Commonly known as:  ZOFRAN-ODT  Take 1 tablet (4 mg total) by mouth every 8 (eight) hours as needed.     potassium chloride SA 20 MEQ tablet  Commonly known as:  K-DUR,KLOR-CON  Take 1 tablet (20 mEq total) by mouth once daily.     pravastatin 20 MG tablet  Commonly known as:  PRAVACHOL     QUEtiapine 300 MG Tab  Commonly known as:  SEROQUEL     tiZANidine 4 MG tablet  Commonly known as:  ZANAFLEX     topiramate 50 MG tablet  Commonly known as:  TOPAMAX  Take 1 tablet (50 mg total) by mouth 2 (two) times " daily.     verapamiL 240 MG CR tablet  Commonly known as:  CALAN-SR  Take 1 tablet (240 mg total) by mouth once daily.     zonisamide 100 MG Cap  Commonly known as:  ZONEGRAN        STOP taking these medications    metFORMIN 1000 MG tablet  Commonly known as:  GLUCOPHAGE     metoprolol succinate 25 MG 24 hr tablet  Commonly known as:  TOPROL-XL           Where to Get Your Medications      These medications were sent to [facility] [address], [place] [address]    Hours:  Mon-Fri, 8a-5:30p Phone:  [phone]   · ferrous sulfate 324 mg (65 mg iron) Tbec     Information about where to get these medications is not yet available    Ask your nurse or doctor about these medications  · NovoLOG Mix 70-30 U-100 Insuln 100 unit/mL (70-30) Soln         Discharge Information:   Diet:  Diabetic    Physical Activity:  As tolerated    Instructions:  1. Take all medications as prescribed  2. Keep all follow-up appointments  3. Return to the hospital or call your primary care physicians if any worsening symptoms such as SOB, dizziness, lightheadedness, chest pain occur.    Follow-Up Appointments:  Future Appointments   Date Time Provider Department Center   6/23/2020  2:30 PM Gaurav Malin MD [facility]     Follow up w/ PCP and Endocrinology    Brien Stapleton  \[facility] [place] [facility]\"" Internal Medicine, -III

## 2020-05-13 NOTE — NURSING
VIRTUAL NURSE:  Cued into patient's room.  Permission received per patient to turn camera to view patient. Nurse at bedside. Introduced as VN for night shift that will be working with floor nurse and nursing assistant.  Educated patient on VN's role in patient care. Plan of care reviewed with patient. Education per flowsheet.  Opportunity given for questions and questions answered.  Admission assessment questions answered.  Instructed to call for assistance.  Will cont to monitor.    Labs, notes, and orders reviewed.

## 2020-05-13 NOTE — H&P
"[facility] Medicine H&P Note     Admitting Team: [facility] Hospitalist Team A  Attending Physician: Benedict Allred Jr., MD  Resident: Justino Stapleton  Intern: Pamela Saavedra    Date of Admit: 5/12/2020    Chief Complaint     Hypoglycemia x 2 days    Subjective:      History of Present Illness:  Chayito Chung is a 69 y.o. female w/ a past medical history of colon cancer s/p resection (1988), COPD, CAD s/p stent 2011, T2DM, HFpEF, MDD/JAZMIN, HLD, and HTN who was sent to the [facility] ED on 5/12/2020 after found to by hypoglycemic to the 30's at her pain management clinic appointment earlier this morning.     The patient was in their usual state of health until about 2 days prior to presentation when she began to feel fatigued and lightheaded. She noted her BG was running lower than normal (50-70s) but she "didn't think anything of it." She reported feeling poorly this morning and felt off-balance when she walked. She reports compliance with her home insulin regimen which is Novolog 70-30 45 units BID. She reports taking her insulin this morning per usual and eating a baked potato with chili for breakfast. She denies change in appetite. She did notice some nausea over the past 2 days that improved with zofran and denied any episodes of vomiting. She endorses chronic non-bloody diarrhea that has been going on for months now. She denies any LOC, fever/chills, chest pain, worsening of her chronic shortness of breath, cough, increase in LE edema, pain with urination or change in the frequency, color, or smell of her urine.        Past Medical History:  Past Medical History:   Diagnosis Date    Acute coronary syndrome     Acute hypoxemic respiratory failure 5/1/2017    Anxiety     Asthma     Cancer     colon    Cataracts, both eyes     Chest pain at rest     Chest pain of uncertain etiology 12/6/2015    Colon cancer 1988    COPD (chronic obstructive pulmonary disease)     Coronary artery disease  " "   Depression     Diabetes mellitus     Dyspnea on exertion 11/15/2018    Elevated brain natriuretic peptide (BNP) level 11/15/2018    Elevated cholesterol     Hypertension     Swelling     Syncope and collapse 2016       Past Surgical History:  Past Surgical History:   Procedure Laterality Date    ABDOMINAL SURGERY      BREAST BIOPSY      benign unsure what side     SECTION, CLASSIC      COLON SURGERY      COLONOSCOPY  2019    repeat in 5 years    CORONARY ANGIOPLASTY WITH STENT PLACEMENT  3 months ago     x2, Hysterectomy, Lung surgery, Partial stomach removed, Part of colon removed for rectal cancer    GASTRECTOMY      HEMORRHOID SURGERY      HYSTERECTOMY      @28yrs of age    LUNG BIOPSY      OOPHORECTOMY      @28yrs of age       Allergies:  Review of patient's allergies indicates:  No Known Allergies    Home Medications:  Prior to Admission medications    Medication Sig Start Date End Date Taking? Authorizing Provider   albuterol 90 mcg/actuation inhaler Inhale 2 puffs into the lungs every 6 (six) hours as needed for Wheezing.    Historical Provider, MD   albuterol-ipratropium 2.5mg-0.5mg/3mL (DUO-NEB) 0.5 mg-3 mg(2.5 mg base)/3 mL nebulizer solution  16   Historical Provider, MD   amitriptyline (ELAVIL) 10 MG tablet 20 mg every evening.  19   Historical Provider, MD   ammonium lactate 12 % Crea ammonium lactate 12 % topical cream    Historical Provider, MD   aspirin (ECOTRIN) 81 MG EC tablet Take 81 mg by mouth once daily.    Historical Provider, MD   BD ULTRA-FINE MINI PEN NEEDLE 31 gauge x 3/16" Ndle  19   Historical Provider, MD   cholestyramine, with sugar, 4 gram Powd Take 4 g by mouth 2 (two) times daily as needed (diarrhea). 20  Abhi Valadez MD   duloxetine (CYMBALTA) 60 MG capsule Take 60 mg by mouth once daily.    Historical Provider, MD   DULoxetine 30 mg CDRS Take 60 mg by mouth once daily.  8/3/17   Historical " Provider, MD   fluticasone propionate (FLONASE) 50 mcg/actuation nasal spray 2 sprays (100 mcg total) by Each Nare route once daily. 6/4/19   Sonia Freeman MD   hydroCHLOROthiazide (HYDRODIURIL) 25 MG tablet Take 1 tablet (25 mg total) by mouth once daily. 12/16/19   Abhi Valadez MD   HYDROcodone-acetaminophen (NORCO)  mg per tablet Take 1 tablet by mouth 2 to 3 times daily as needed. for pain for 30 days 10/13/18      insulin aspart U-100 (NOVOLOG FLEXPEN U-100 INSULIN) 100 unit/mL (3 mL) InPn pen Inject into the skin. Taken as SS    Historical Provider, MD   LORazepam (ATIVAN) 0.5 MG tablet Take 0.5 mg by mouth every 12 (twelve) hours as needed.  12/28/17   Historical Provider, MD   LYRICA 150 mg capsule Take by mouth 2 (two) times daily.  5/3/18   Historical Provider, MD   metformin (GLUCOPHAGE) 1000 MG tablet Take 0.5 tablets (500 mg total) by mouth 2 (two) times daily with meals.  Patient taking differently: Take 1,000 mg by mouth 2 (two) times daily with meals.  9/22/14   Juan Vo MD   metoprolol succinate (TOPROL-XL) 25 MG 24 hr tablet Take 1 tablet (25 mg total) by mouth once daily. 8/4/17 10/13/19  Gaurav Malin MD   nitroGLYCERIN (NITROSTAT) 0.4 MG SL tablet Place 0.4 mg under the tongue every 5 (five) minutes as needed for Chest pain.    Historical Provider, MD   NOVOLOG MIX 70-30 100 unit/mL (70-30) Soln 60 units in the morning before breakfast, 40 units in the evening before dinner  Patient taking differently: 45 units in the morning before breakfast, 65 units in the evening before dinner 5/1/17   Judson Fields MD   omeprazole (PRILOSEC) 40 MG capsule Take 1 capsule (40 mg total) by mouth every morning. 7/24/19 7/23/20  Sonia Freeman MD   ondansetron (ZOFRAN-ODT) 4 MG TbDL Take 1 tablet (4 mg total) by mouth every 8 (eight) hours as needed. 12/16/19   Abhi Valadez MD   potassium chloride SA (K-DUR,KLOR-CON) 20 MEQ tablet Take 1 tablet (20 mEq total)  by mouth once daily. 19   Abhi Valadez MD   pravastatin (PRAVACHOL) 20 MG tablet Take 20 mg by mouth once daily.  17   Historical Provider, MD   quetiapine (SEROQUEL) 300 MG Tab 300 mg nightly.  6/10/17   Historical Provider, MD   tiZANidine (ZANAFLEX) 4 MG tablet Take 4 mg by mouth daily as needed.    Historical Provider, MD   topiramate (TOPAMAX) 50 MG tablet Take 1 tablet (50 mg total) by mouth 2 (two) times daily. 12/15/19   Abhi Valadez MD   verapamil (CALAN-SR) 240 MG CR tablet Take 1 tablet (240 mg total) by mouth once daily. 12/15/19   Abhi Valadez MD   zonisamide (ZONEGRAN) 100 MG Cap zonisamide 100 mg capsule   Take 2 capsules twice a day by oral route.    Historical Provider, MD       Family History:  Family History   Problem Relation Age of Onset    Cancer Mother     Diabetes Mother     Hypertension Mother     Breast cancer Mother     Cancer Father     Heart disease Father     Depression Sister     Hypertension Sister     Cancer Maternal Aunt        Social History:  Social History     Tobacco Use    Smoking status: Current Every Day Smoker     Packs/day: 1.00     Years: 50.00     Pack years: 50.00     Types: Cigarettes    Smokeless tobacco: Never Used    Tobacco comment: Smoking 5 cigs daily presently   Substance Use Topics    Alcohol use: Yes     Alcohol/week: 3.0 standard drinks     Types: 3 Glasses of wine per week     Comment: 1 every 3 months    Drug use: No       Review of Systems:  Pertinent items are noted in HPI. All other systems are reviewed and are negative.    Health Maintaince :   Primary Care Physician: Abhi Valadez    Immunizations:   TDap UTD   Flu UTD  Pna UTD    Cancer Screening:  MMG: not UTD  Colonoscopy: UTD     Objective:   Last 24 Hour Vital Signs:  BP  Min: 105/64  Max: 142/80  Temp  Av °F (36.7 °C)  Min: 98 °F (36.7 °C)  Max: 98 °F (36.7 °C)  Pulse  Av.7  Min: 99  Max: 111  Resp  Av  Min: 20  Max: 24  SpO2  Avg:  "97.2 %  Min: 96 %  Max: 98 %  Height  Av' 2" (157.5 cm)  Min: 5' 2" (157.5 cm)  Max: 5' 2" (157.5 cm)  Weight  Av.7 kg (136 lb)  Min: 61.7 kg (136 lb)  Max: 61.7 kg (136 lb)  Body mass index is 24.87 kg/m².  No intake/output data recorded.    Physical Examination:  General: NAD, awake, alert, conversant  HEENT: normocephalic, EOMI, MMM  CV: RRR, no murmurs or rubs  Lungs: minimal bibasilar crackles, no wheezing appreciated   GI: soft, non distended, non tender  Extrem: trace pitting edema; no clubbing or cyanosis  Skin: dry, warm, intact. No bruises or rashes  Neuro: AAOx3, no focal deficits      Laboratory:  Most Recent Data:  CBC:   Lab Results   Component Value Date    WBC 6.68 2020    HGB 8.2 (L) 2020    HCT 29.3 (L) 2020     2020    MCV 78 (L) 2020    RDW 19.5 (H) 2020     BMP:   Lab Results   Component Value Date     2020    K 4.0 2020     2020    CO2 20 (L) 2020    BUN 18 2020    CREATININE 0.9 2020    GLU 69 (L) 2020    CALCIUM 9.9 2020    MG 1.5 (L) 01/15/2020    PHOS 4.5 2017     LFTs:   Lab Results   Component Value Date    PROT 8.4 2020    ALBUMIN 4.0 2020    BILITOT 0.1 2020    AST 19 2020    ALKPHOS 144 (H) 2020    ALT 16 2020     Coags:   Lab Results   Component Value Date    INR 0.9 2020     FLP:   Lab Results   Component Value Date    CHOL 166 2019    HDL 54 2019    LDLCALC 79.6 2019    TRIG 162 (H) 2019    CHOLHDL 32.5 2019     DM:   Lab Results   Component Value Date    HGBA1C 8.3 (H) 2020    HGBA1C 7.2 (H) 2019    HGBA1C 7.7 (H) 2015    LDLCALC 79.6 2019    CREATININE 0.9 2020     Thyroid:   Lab Results   Component Value Date    TSH 0.941 2019    FREET4 0.97 2019    S1THWDA 9.6 2017     Anemia:   Lab Results   Component Value Date    IRON 18 (L) 2019    " TIBC 573 (H) 05/28/2019    FERRITIN 8 (L) 05/28/2019    BOYWQQCR02 468 01/21/2015    FOLATE 10.6 01/21/2015     Cardiac:   Lab Results   Component Value Date    TROPONINI <0.006 01/15/2020    CKTOTAL 97 12/15/2012    BNP <10 01/15/2020     Urinalysis:   Lab Results   Component Value Date    LABURIN KLEBSIELLA PNEUMONIAE  >100,000 cfu/ml   08/31/2015    COLORU Yellow 05/12/2020    CLARITYU HAZY 06/10/2013    SPECGRAV 1.025 05/12/2020    NITRITE Negative 05/12/2020    GLUCOSEU 50 (A) 06/10/2013    KETONESU Negative 05/12/2020    UROBILINOGEN Negative 05/12/2020    BLOODU NEG 06/10/2013    WBCUA 1 11/01/2019       Trended Lab Data:  Recent Labs   Lab 05/12/20  1513   WBC 6.68   HGB 8.2*   HCT 29.3*      MCV 78*   RDW 19.5*      K 4.0      CO2 20*   BUN 18   CREATININE 0.9   GLU 69*   PROT 8.4   ALBUMIN 4.0   BILITOT 0.1   AST 19   ALKPHOS 144*   ALT 16     Microbiology Data:  SARS-CoV-2: negative     Other Results:  EKG (my interpretation): normal EKG, normal sinus rhythm, unchanged from previous tracings.    Radiology:  Imaging Results    None              Assessment:     Chayito Chung is a 69 y.o. female with:  Patient Active Problem List    Diagnosis Date Noted    Hypoglycemia 05/12/2020    History of partial gastrectomy 12/16/2019    Seizure disorder 12/16/2019    Biloma following surgery 11/27/2019    Osteopenia of lumbar spine 08/27/2019    Vitamin D deficiency 07/26/2019    History of colon cancer 07/25/2019    Chronic diastolic congestive heart failure 11/15/2018    Steroid-induced gastritis 04/25/2017    Sinus tachycardia 10/11/2016    Iron deficiency anemia 10/11/2016    Lumbar facet arthropathy 08/02/2016    S/P PTCA (percutaneous transluminal coronary angioplasty) 10/20/2015    Hypertension associated with diabetes     Hyperlipidemia associated with type 2 diabetes mellitus     Uncontrolled type 2 diabetes mellitus with hyperglycemia, with long-term current use of insulin  09/22/2014    Hypoglycemia associated with type 2 diabetes mellitus 09/20/2014    Adrenal nodule 09/20/2014    Major depressive disorder in partial remission 12/17/2012    Solitary pulmonary nodule s/p resection 4/2012 12/17/2012    Neuropathy 12/17/2012    Migraines 12/17/2012    JAZMIN (generalized anxiety disorder) 12/17/2012    Coronary artery disease of native artery of native heart with stable angina pectoris 12/16/2012    COPD (chronic obstructive pulmonary disease) 12/16/2012    Tobacco abuse, 1ppd, 50 years 12/16/2012        Plan:     Hypoglycemia  - Pt with history of admissions for hypoglycemic episodesx2 (most recent 11/2019 at [facility] requiring D5 drip for persistent hypoglycemia) was sent to ED today from clinic for BG of 30; patient was given foods with high sugar content which increased her glucose to 170 however her glucose continued to fall over the next few hours despite continued intake of PO. She administered her AM insulin of 40 Units Novolog 70-30 but did not receive any insulin since then.   - patient currently asymptomatic   - monitor patient overnight with glucose checks q 4hrs  - encourage PO intake   - will consider hypoglycemia workup with iunsulin level and c-peptide if hypoglycemia is persistent     T2DM  - hgb A1C 8.3 (2/2020)   - on Novolog 70-30 45 units BID  - follows with Endocrinology Dr. Delgado  - will hold off on insulin for now given current hypoglycemia     Essential HTN  - home meds: Verapamil 240 and HCTZ 25  - BP well controlled continue home medications     HFpEF  - last echo 5/28/2019 w/ grade 1 diastolic dysfunction normal LVEF 65%  - stable 2 pillow orthopnea, mild bibasilar crackles and trace pitting edema on exam  - continue to monitor     CAD s/p stent placement 2011  - no acute issues   - continue statin     Iron Deficiency Anemia   - baseline hgb 10-12   - H/H  8.2/29.3  MCV 78; Ferritin 8  Fe 18  TIBC 573  %sat 3 (5/2019)  - no signs of bleeding; continue  to monitor     COPD  - home meds: albuterol PRN   - well controlled, no acute issues   - follows with Pulmonologist: Dr. Martinez Chung--[facility]  - Duonebs q4 PRN while here    HLD   - ; ; HDL 54; LDL 79.6. 7/2019  - pravastatin 20 mg qhs     History of Migraines  - home meds Topamax 50 BID and amytriptyline 20 qhs   - well controlled; continue home meds    Seizure Disorder   - home meds: Zonisamide 200mg BID   - no acute issues continue home meds   - follows with Neurology: Dr Mejia    Chronic Back Pain  - home meds [place] 10//325 20-3 times daily, Lyrica 150 BID; Zanaflex 4 qhs  - follows with pain management: Dr. Mancuso     Generalized Anxiety/Major Depressive Disorder  - well managed with Cymbalta 90, amitriptyline 20 qhs, Ativan 0.5 BID PRN, and Seroquel 300 qhs   - follows with psychiatrist Dr. Cotto   - continue with home meds while inpatient     Tobacco Abuse  - 50 pack year history (1 PPDx50 years)   - counseled on the importance of cessation     History of Colon Cancer  - diagnosed in 1988 s/p resection   - UTD with colonoscopies   - last c-scope 7/2019  - follows with Gastroenterology (Dr. Hooper--[facility])      Diet: Cardiac  DVT PPX: Lovenox  Code: Full    Dispo: admit to observation; monitor glucose overnight likely home tomorrow     Ruby Morales MD  [facility] Internal Medicine HO-I    [facility] Medicine Hospitalist Pager numbers:   [facility] Hospitalist Medicine Team A (Bita/Sydni): [phone]  [facility] Hospitalist Medicine Team B (Lillian/Danny):  [phone]

## 2020-05-13 NOTE — PROGRESS NOTES
"[facility] Resident HO-III Progress Note    Subjective:      Chayito Chung is a 69 y.o. female who is being followed by the [facility] Medicine service at [facility] for hypoglycemia.     Admitted to mild lightheadedness this morning, otherwise denied chest pain, SOB, headache, abdominal pain.      Objective:   Last 24 Hour Vital Signs:  BP  Min: 105/64  Max: 148/79  Temp  Av.1 °F (36.7 °C)  Min: 98 °F (36.7 °C)  Max: 98.3 °F (36.8 °C)  Pulse  Av.8  Min: 97  Max: 111  Resp  Av.3  Min: 17  Max: 24  SpO2  Av.8 %  Min: 84 %  Max: 98 %  Height  Av' 2" (157.5 cm)  Min: 5' 2" (157.5 cm)  Max: 5' 2" (157.5 cm)  Weight  Av kg (141 lb 0.2 oz)  Min: 61.7 kg (136 lb)  Max: 65.3 kg (143 lb 15.4 oz)  I/O last 3 completed shifts:  In: -   Out: 300 [Urine:300]    Physical Examination:  Gen: awake, alert, NAD  Head: NC/AT  Eyes: conjunctiva clear, EOMI, PERRL  Throat: MMM, OP clear  Lungs: CTAB, normal respiratory effort   Cardiac: RRR, no murmurs   Abdominal: soft, ND/NT, +BS  Extremities: acyanotic, no edema   Pulses: 2+ and symmetric   Skin: no rashes or lesions noted     Laboratory:  Most Recent Data:  Trended Lab Data:  Recent Labs   Lab 20  1513 20  0642   WBC 6.68 5.06   HGB 8.2* 7.5*   HCT 29.3* 27.3*    207   MCV 78* 78*   RDW 19.5* 19.1*    139   K 4.0 3.6    107   CO2 20* 23   BUN 18 12   CREATININE 0.9 0.7   GLU 69* 104   PROT 8.4 6.9   ALBUMIN 4.0 3.3*   BILITOT 0.1 0.2   AST 19 9*   ALKPHOS 144* 120   ALT 16 12       Coags:   Lab Results   Component Value Date    INR 0.9 2020     FLP:   Lab Results   Component Value Date    CHOL 119 (L) 2020    HDL 49 2020    LDLCALC 56.2 (L) 2020    TRIG 69 2020    CHOLHDL 41.2 2020     DM:   Lab Results   Component Value Date    HGBA1C 7.1 (H) 2020    HGBA1C 8.3 (H) 2020    HGBA1C 7.2 (H) 2019    LDLCALC 56.2 (L) 2020    CREATININE " 0.7 05/13/2020     Thyroid:   Lab Results   Component Value Date    TSH 0.607 05/12/2020    FREET4 0.84 05/12/2020    K9AHWND 9.6 08/04/2017     Anemia:   Lab Results   Component Value Date    IRON 13 (L) 05/12/2020    TIBC 630 (H) 05/12/2020    FERRITIN 4 (L) 05/12/2020    ZHQAMHCH80 296 05/12/2020    FOLATE 11.3 05/12/2020     Urinalysis:   Lab Results   Component Value Date    LABURIN KLEBSIELLA PNEUMONIAE  >100,000 cfu/ml   08/31/2015    COLORU Yellow 05/12/2020    CLARITYU HAZY 06/10/2013    SPECGRAV 1.025 05/12/2020    NITRITE Negative 05/12/2020    GLUCOSEU 50 (A) 06/10/2013    KETONESU Negative 05/12/2020    UROBILINOGEN Negative 05/12/2020    BLOODU NEG 06/10/2013    WBCUA 1 11/01/2019       Microbiology Data:  COVID negative    Other Results:    Radiology:  Imaging Results    None         Current Medications:     Infusions:       Scheduled:   amitriptyline  20 mg Oral QHS    aspirin  81 mg Oral Daily    DULoxetine  90 mg Oral Daily    enoxparin  40 mg Subcutaneous Daily    fluticasone propionate  2 spray Each Nostril Daily    hydroCHLOROthiazide  25 mg Oral Daily    pantoprazole  40 mg Oral Daily    pravastatin  20 mg Oral Daily    pregabalin  150 mg Oral BID    QUEtiapine  300 mg Oral Nightly    topiramate  50 mg Oral BID    verapamiL  240 mg Oral Daily    zonisamide  200 mg Oral BID        PRN:  albuterol-ipratropium, Dextrose 10% Bolus, Dextrose 10% Bolus, glucagon (human recombinant), glucose, glucose, ondansetron, sodium chloride 0.9%    Antibiotics and Day Number of Therapy:  none    Assessment:     Chayito Chung is a 69 y.o.female with  Patient Active Problem List    Diagnosis Date Noted    Hypoglycemia 05/12/2020    History of partial gastrectomy 12/16/2019    Seizure disorder 12/16/2019    Biloma following surgery 11/27/2019    Osteopenia of lumbar spine 08/27/2019    Vitamin D deficiency 07/26/2019    History of colon cancer 07/25/2019    Chronic diastolic congestive heart  failure 11/15/2018    Steroid-induced gastritis 04/25/2017    Sinus tachycardia 10/11/2016    Iron deficiency anemia 10/11/2016    Lumbar facet arthropathy 08/02/2016    S/P PTCA (percutaneous transluminal coronary angioplasty) 10/20/2015    Hypertension associated with diabetes     Hyperlipidemia associated with type 2 diabetes mellitus     Uncontrolled type 2 diabetes mellitus with hyperglycemia, with long-term current use of insulin 09/22/2014    Hypoglycemia associated with type 2 diabetes mellitus 09/20/2014    Adrenal nodule 09/20/2014    Major depressive disorder in partial remission 12/17/2012    Solitary pulmonary nodule s/p resection 4/2012 12/17/2012    Neuropathy 12/17/2012    Migraines 12/17/2012    JAZMIN (generalized anxiety disorder) 12/17/2012    Coronary artery disease of native artery of native heart with stable angina pectoris 12/16/2012    COPD (chronic obstructive pulmonary disease) 12/16/2012    Tobacco abuse, 1ppd, 50 years 12/16/2012        Plan:     Hypoglycemia  - Pt with history of admissions for hypoglycemic episodesx2 (most recent 11/2019 at [facility] requiring D5 drip for persistent hypoglycemia) was sent to ED today from clinic for BG of 30; patient was given foods with high sugar content which increased her glucose to 170 however her glucose continued to fall over the next few hours despite continued intake of PO. She administered her AM insulin of 40 Units Novolog 70-30 but did not receive any insulin since then.   - patient currently asymptomatic   - continue to monitor glucose checks q 4hrs  - encourage PO intake   - will consider hypoglycemia workup with iunsulin level and c-peptide if hypoglycemia is persistent      T2DM  - hgb A1C 8.3 (2/2020), repeat 5/12 7.1  - on Novolog 70-30 45 units BID  - follows with Endocrinology Dr. Delgado  - will hold off on insulin for now given current hypoglycemia      Essential HTN  - home meds: Verapamil 240 and HCTZ 25  - BP well  controlled continue home medications      HFpEF  - last echo 5/28/2019 w/ grade 1 diastolic dysfunction normal LVEF 65%  - no acute concerns     CAD s/p stent placement 2011  - no acute issues   - continue asa/statin      Iron Deficiency Anemia   - baseline hgb 10-12   - H/H  8.2/29.3  MCV 78; Ferritin 8  Fe 18  TIBC 573  %sat 3 (5/2019)  - repeat iron panel with low iron, elevated TIBC, low ferritin     COPD  - home meds: albuterol PRN   - well controlled, no acute issues   - follows with Pulmonologist: Dr. Martinez Chung--[facility]  - Duonebs q4 PRN while here     HLD   - ; TG 69; HDL 49; LDL 56 on 5/2020  - pravastatin 20 mg qhs      History of Migraines  - home meds Topamax 50 BID and amytriptyline 20 qhs   - well controlled; continue home meds     Seizure Disorder   - home meds: Zonisamide 200mg BID   - no acute issues continue home meds   - follows with Neurology: Dr Mejia     Chronic Back Pain  - home meds [place] 10//325 20-3 times daily, Lyrica 150 BID; Zanaflex 4 qhs  - follows with pain management: Dr. Mancuso      Generalized Anxiety/Major Depressive Disorder  - well managed with Cymbalta 90, amitriptyline 20 qhs, Ativan 0.5 BID PRN, and Seroquel 300 qhs   - follows with psychiatrist Dr. Cotto   - continue with home meds while inpatient      Tobacco Abuse  - 50 pack year history (1 PPDx50 years)   - counseled on the importance of cessation      History of Colon Cancer  - diagnosed in 1988 s/p resection   - UTD with colonoscopies   - last c-scope 7/2019  - follows with Gastroenterology (Dr. Hooper--[facility])    Code: Full  Diet: Cardiac  PPx: lovenox    Brien Jose Stapleton  [facility] Internal Medicine/Pediatrics HO-III  [facility] IM Service Team A    [facility] Hospitalist Pager numbers:   [facility] Hospitalist Medicine Team A (Bita/Sydni): [phone]  [facility] Hospitalist Medicine Team B (Lillian/Danny):  [phone]

## 2020-05-19 ENCOUNTER — TELEPHONE (OUTPATIENT)
Dept: FAMILY MEDICINE | Facility: CLINIC | Age: 70
End: 2020-05-19

## 2020-05-19 NOTE — TELEPHONE ENCOUNTER
----- Message from Gelacio Chadwick sent at 5/19/2020  8:30 AM CDT -----  PT missed a call and would the nurse to return their call   Pt can be reached at  [phone]

## 2020-05-21 DIAGNOSIS — G89.4 CHRONIC PAIN SYNDROME: ICD-10-CM

## 2020-05-21 DIAGNOSIS — R20.0 NUMBNESS AND TINGLING OF RIGHT UPPER EXTREMITY: ICD-10-CM

## 2020-05-21 DIAGNOSIS — M47.896 OTHER SPONDYLOSIS, LUMBAR REGION: ICD-10-CM

## 2020-05-21 DIAGNOSIS — M47.26 OTHER SPONDYLOSIS WITH RADICULOPATHY, LUMBAR REGION: Primary | ICD-10-CM

## 2020-05-21 DIAGNOSIS — M47.818 ARTHROPATHY OF SACROILIAC JOINT: ICD-10-CM

## 2020-05-21 DIAGNOSIS — R20.2 NUMBNESS AND TINGLING OF RIGHT UPPER EXTREMITY: ICD-10-CM

## 2020-05-21 DIAGNOSIS — M46.1 SACROILIITIS: ICD-10-CM

## 2020-05-21 DIAGNOSIS — M47.816 LUMBAR SPONDYLOSIS: ICD-10-CM

## 2020-05-21 DIAGNOSIS — Z79.891 ENCOUNTER FOR LONG-TERM USE OF OPIATE ANALGESIC: ICD-10-CM

## 2020-05-21 DIAGNOSIS — F11.20 UNCOMPLICATED OPIOID DEPENDENCE: ICD-10-CM

## 2020-05-21 DIAGNOSIS — M47.892 OTHER SPONDYLOSIS, CERVICAL REGION: ICD-10-CM

## 2020-05-21 DIAGNOSIS — M54.16 RADICULOPATHY OF LUMBAR REGION: ICD-10-CM

## 2020-05-22 DIAGNOSIS — I15.2 HYPERTENSION ASSOCIATED WITH DIABETES: ICD-10-CM

## 2020-05-22 DIAGNOSIS — E11.59 HYPERTENSION ASSOCIATED WITH DIABETES: ICD-10-CM

## 2020-05-22 RX ORDER — VERAPAMIL HYDROCHLORIDE 240 MG/1
TABLET, FILM COATED, EXTENDED RELEASE ORAL
Qty: 90 TABLET | Refills: 1 | Status: SHIPPED | OUTPATIENT
Start: 2020-05-22 | End: 2021-04-26

## 2020-05-22 RX ORDER — POTASSIUM CHLORIDE 20 MEQ/1
TABLET, EXTENDED RELEASE ORAL
Qty: 90 TABLET | Refills: 1 | Status: SHIPPED | OUTPATIENT
Start: 2020-05-22 | End: 2021-03-05 | Stop reason: SDUPTHER

## 2020-05-22 RX ORDER — HYDROCHLOROTHIAZIDE 25 MG/1
TABLET ORAL
Qty: 90 TABLET | Refills: 1 | Status: SHIPPED | OUTPATIENT
Start: 2020-05-22 | End: 2021-07-09 | Stop reason: ALTCHOICE

## 2020-05-22 RX ORDER — TOPIRAMATE 50 MG/1
TABLET, FILM COATED ORAL
Qty: 180 TABLET | Refills: 1 | Status: SHIPPED | OUTPATIENT
Start: 2020-05-22 | End: 2020-07-15 | Stop reason: CLARIF

## 2020-05-25 ENCOUNTER — HOSPITAL ENCOUNTER (OUTPATIENT)
Facility: HOSPITAL | Age: 70
Discharge: HOME | End: 2020-05-27
Attending: EMERGENCY MEDICINE | Admitting: EMERGENCY MEDICINE
Payer: MEDICARE

## 2020-05-25 ENCOUNTER — HOSPITAL ENCOUNTER (EMERGENCY)
Facility: HOSPITAL | Age: 70
Discharge: ANOTHER HOSPITAL | End: 2020-05-25
Attending: EMERGENCY MEDICINE
Payer: MEDICARE

## 2020-05-25 VITALS
SYSTOLIC BLOOD PRESSURE: 154 MMHG | HEART RATE: 91 BPM | RESPIRATION RATE: 24 BRPM | OXYGEN SATURATION: 97 % | BODY MASS INDEX: 25.4 KG/M2 | HEIGHT: 62 IN | DIASTOLIC BLOOD PRESSURE: 80 MMHG | TEMPERATURE: 98 F | WEIGHT: 138 LBS

## 2020-05-25 DIAGNOSIS — M47.816 LUMBAR FACET ARTHROPATHY: ICD-10-CM

## 2020-05-25 DIAGNOSIS — R29.898 WEAKNESS OF LOWER EXTREMITY, UNSPECIFIED LATERALITY: ICD-10-CM

## 2020-05-25 DIAGNOSIS — R07.9 CHEST PAIN: ICD-10-CM

## 2020-05-25 DIAGNOSIS — R29.898 WEAKNESS OF RIGHT LOWER EXTREMITY: ICD-10-CM

## 2020-05-25 DIAGNOSIS — R29.898 RIGHT LEG WEAKNESS: ICD-10-CM

## 2020-05-25 DIAGNOSIS — M54.16 LUMBAR RADICULOPATHY: Primary | ICD-10-CM

## 2020-05-25 DIAGNOSIS — M48.061 SPINAL STENOSIS OF LUMBAR REGION, UNSPECIFIED WHETHER NEUROGENIC CLAUDICATION PRESENT: ICD-10-CM

## 2020-05-25 DIAGNOSIS — M25.561 RIGHT KNEE PAIN: ICD-10-CM

## 2020-05-25 DIAGNOSIS — R26.9 GAIT DISTURBANCE: ICD-10-CM

## 2020-05-25 DIAGNOSIS — M48.02 CERVICAL STENOSIS OF SPINE: Primary | ICD-10-CM

## 2020-05-25 DIAGNOSIS — R26.2 IMPAIRED AMBULATION: ICD-10-CM

## 2020-05-25 LAB
ALBUMIN SERPL BCP-MCNC: 3.4 G/DL (ref 3.5–5.2)
ALP SERPL-CCNC: 138 U/L (ref 55–135)
ALT SERPL W/O P-5'-P-CCNC: 14 U/L (ref 10–44)
ANION GAP SERPL CALC-SCNC: 7 MMOL/L (ref 8–16)
AST SERPL-CCNC: 11 U/L (ref 10–40)
BASOPHILS # BLD AUTO: 0.02 K/UL (ref 0–0.2)
BASOPHILS NFR BLD: 0.4 % (ref 0–1.9)
BILIRUB SERPL-MCNC: 0.2 MG/DL (ref 0.1–1)
BUN SERPL-MCNC: 12 MG/DL (ref 8–23)
CALCIUM SERPL-MCNC: 9.2 MG/DL (ref 8.7–10.5)
CHLORIDE SERPL-SCNC: 111 MMOL/L (ref 95–110)
CO2 SERPL-SCNC: 23 MMOL/L (ref 23–29)
CREAT SERPL-MCNC: 0.7 MG/DL (ref 0.5–1.4)
DIFFERENTIAL METHOD: ABNORMAL
EOSINOPHIL # BLD AUTO: 0.2 K/UL (ref 0–0.5)
EOSINOPHIL NFR BLD: 3.5 % (ref 0–8)
ERYTHROCYTE [DISTWIDTH] IN BLOOD BY AUTOMATED COUNT: 19.4 % (ref 11.5–14.5)
EST. GFR  (AFRICAN AMERICAN): >60 ML/MIN/1.73 M^2
EST. GFR  (NON AFRICAN AMERICAN): >60 ML/MIN/1.73 M^2
GLUCOSE SERPL-MCNC: 72 MG/DL (ref 70–110)
HCT VFR BLD AUTO: 26.7 % (ref 37–48.5)
HGB BLD-MCNC: 7.7 G/DL (ref 12–16)
IMM GRANULOCYTES # BLD AUTO: 0.01 K/UL (ref 0–0.04)
IMM GRANULOCYTES NFR BLD AUTO: 0.2 % (ref 0–0.5)
LYMPHOCYTES # BLD AUTO: 1.7 K/UL (ref 1–4.8)
LYMPHOCYTES NFR BLD: 37.8 % (ref 18–48)
MCH RBC QN AUTO: 21.8 PG (ref 27–31)
MCHC RBC AUTO-ENTMCNC: 28.8 G/DL (ref 32–36)
MCV RBC AUTO: 75 FL (ref 82–98)
MONOCYTES # BLD AUTO: 0.5 K/UL (ref 0.3–1)
MONOCYTES NFR BLD: 10.3 % (ref 4–15)
NEUTROPHILS # BLD AUTO: 2.2 K/UL (ref 1.8–7.7)
NEUTROPHILS NFR BLD: 47.8 % (ref 38–73)
NRBC BLD-RTO: 0 /100 WBC
PLATELET # BLD AUTO: 156 K/UL (ref 150–350)
PMV BLD AUTO: 9.3 FL (ref 9.2–12.9)
POTASSIUM SERPL-SCNC: 3.6 MMOL/L (ref 3.5–5.1)
PROT SERPL-MCNC: 7 G/DL (ref 6–8.4)
RBC # BLD AUTO: 3.54 M/UL (ref 4–5.4)
SARS-COV-2 RDRP RESP QL NAA+PROBE: NEGATIVE
SODIUM SERPL-SCNC: 141 MMOL/L (ref 136–145)
WBC # BLD AUTO: 4.58 K/UL (ref 3.9–12.7)

## 2020-05-25 PROCEDURE — U0002 COVID-19 LAB TEST NON-CDC: HCPCS

## 2020-05-25 PROCEDURE — 99284 EMERGENCY DEPT VISIT MOD MDM: CPT | Mod: ,,, | Performed by: PHYSICIAN ASSISTANT

## 2020-05-25 PROCEDURE — 99285 EMERGENCY DEPT VISIT HI MDM: CPT | Mod: 25

## 2020-05-25 PROCEDURE — 99285 EMERGENCY DEPT VISIT HI MDM: CPT | Mod: 25,27

## 2020-05-25 PROCEDURE — 36000 PLACE NEEDLE IN VEIN: CPT

## 2020-05-25 PROCEDURE — 99284 PR EMERGENCY DEPT VISIT,LEVEL IV: ICD-10-PCS | Mod: ,,, | Performed by: PHYSICIAN ASSISTANT

## 2020-05-25 PROCEDURE — 85025 COMPLETE CBC W/AUTO DIFF WBC: CPT

## 2020-05-25 PROCEDURE — 80053 COMPREHEN METABOLIC PANEL: CPT

## 2020-05-25 NOTE — ED PROVIDER NOTES
"Encounter Date: 5/25/2020    SCRIBE #1 NOTE: I, Michelle Leslie, am scribing for, and in the presence of,  Dr. Henriquez. I have scribed the entire note.       History     Chief Complaint   Patient presents with    Leg Pain     Pt states that she her right leg from her knee to her foot is painful and weak. Pt states that hey symptoms began this am. Pt states her leg keeps giving out on her and she is falling. Pt denies hitting head with fall. Pt states that she has stenosis of her lower back and has been in physical therapy x1 year.      Chayito Chung is a 69 y.o. female with a past medical history of acute coronary syndrome, cancer, COPD, CAD, diabetes mellitus, and hypertension who presents to the ED due to worsening right lower extremity weakness onset gradually a couple weeks ago. Patient reports she got out of bed this morning, took a few steps, and felt her right knee "buckle." She states she fell backwards onto carpeted floor in bedroom. Patient proceeded to walk to the kitchen where she fell again to her side. She denies LOC, head injury, or pain. Patient was diagnosed with lumbar stenosis a couple years ago, and she states her last MRI was approximately one year ago. Last time receiving steroid injections was noted approximately one year ago. Patient occasionally uses walker for ambulation since October 2019. She denies perineal anesthesia, dysuria, fever, recent urinary or fecal incontinence. Patient's mother was tested positive for COVID about a month ago, and patient endorses contact with her before diagnosis was confirmed.     The history is provided by the patient.     Review of patient's allergies indicates:  No Known Allergies  Past Medical History:   Diagnosis Date    Acute coronary syndrome     Acute hypoxemic respiratory failure 5/1/2017    Anxiety     Asthma     Cancer     colon    Cataracts, both eyes     Chest pain at rest     Chest pain of uncertain etiology 12/6/2015    Colon cancer 1988 "    COPD (chronic obstructive pulmonary disease)     Coronary artery disease     Depression     Diabetes mellitus     Dyspnea on exertion 11/15/2018    Elevated brain natriuretic peptide (BNP) level 11/15/2018    Elevated cholesterol     Hypertension     Swelling     Syncope and collapse 2016     Past Surgical History:   Procedure Laterality Date    ABDOMINAL SURGERY      BREAST BIOPSY      benign unsure what side     SECTION, CLASSIC      COLON SURGERY      COLONOSCOPY  2019    repeat in 5 years    CORONARY ANGIOPLASTY WITH STENT PLACEMENT  3 months ago     x2, Hysterectomy, Lung surgery, Partial stomach removed, Part of colon removed for rectal cancer    GASTRECTOMY      HEMORRHOID SURGERY      HYSTERECTOMY      @28yrs of age    LUNG BIOPSY      OOPHORECTOMY      @28yrs of age     Family History   Problem Relation Age of Onset    Cancer Mother     Diabetes Mother     Hypertension Mother     Breast cancer Mother     Cancer Father     Heart disease Father     Depression Sister     Hypertension Sister     Cancer Maternal Aunt      Social History     Tobacco Use    Smoking status: Current Every Day Smoker     Packs/day: 1.00     Years: 53.00     Pack years: 53.00     Types: Cigarettes     Start date:     Smokeless tobacco: Never Used    Tobacco comment: Pt is currently enrolled in the [employer].  Ambulatory referral to Smoking Cessation program.    Substance Use Topics    Alcohol use: Yes     Alcohol/week: 3.0 standard drinks     Types: 3 Glasses of wine per week     Comment: 1 every 3 months    Drug use: No     Review of Systems   Constitutional: Negative for fever.   HENT: Negative for congestion.    Eyes: Negative for pain.   Respiratory: Negative for cough.    Cardiovascular: Negative for chest pain.   Gastrointestinal: Negative for nausea and vomiting.   Genitourinary: Negative for dysuria.   Musculoskeletal: Negative for back pain.   Skin:  Negative for color change.   Neurological: Positive for weakness (RLE).   Psychiatric/Behavioral: The patient is not nervous/anxious.    All other systems reviewed and are negative.      Physical Exam     Initial Vitals [05/25/20 1408]   BP Pulse Resp Temp SpO2   (!) 148/82 74 16 98.5 °F (36.9 °C) --      MAP       --         Physical Exam    Nursing note and vitals reviewed.  Constitutional: She appears well-developed and well-nourished. She is not diaphoretic. No distress.   HENT:   Head: Normocephalic and atraumatic.   Eyes: Conjunctivae and EOM are normal. Pupils are equal, round, and reactive to light.   Neck: Normal range of motion. Neck supple.   Cardiovascular: Normal rate, regular rhythm, normal heart sounds and intact distal pulses.   Pulmonary/Chest: Breath sounds normal. No respiratory distress.   Abdominal: Soft. Bowel sounds are normal. She exhibits no distension. There is no tenderness.   Musculoskeletal: Normal range of motion. She exhibits no edema or tenderness.   Neurological: She is alert and oriented to person, place, and time. She displays abnormal reflex (1/4 right patellar and ankle).   Reflex Scores:       Patellar reflexes are 1+ on the right side and 2+ on the left side.       Achilles reflexes are 1+ on the right side and 2+ on the left side.  4/5 strength LLE  3/5 strength RLE   Skin: Skin is warm and dry. No erythema.   Psychiatric: She has a normal mood and affect. Her behavior is normal. Thought content normal.         ED Course   Procedures  Labs Reviewed   SARS-COV-2 RNA AMPLIFICATION, QUAL   CBC W/ AUTO DIFFERENTIAL   COMPREHENSIVE METABOLIC PANEL          Imaging Results          MRI Lumbar Spine Without Contrast (Final result)  Result time 05/25/20 19:07:08    Final result by Chapincito Byers MD (05/25/20 19:07:08)                 Impression:      Spinal canal stenosis at L1-2 as result of circumferential disc bulge.    Predominantly facet based degenerative change throughout the  lumbar spine with pars defects at L5-S1 bilaterally.    Bladder appears distended.    Electronically signed by resident: Bert Verduzco  Date:    05/25/2020  Time:    18:44    Electronically signed by: Chapincito Byers MD  Date:    05/25/2020  Time:    19:07             Narrative:    EXAMINATION:  MRI LUMBAR SPINE WITHOUT CONTRAST    CLINICAL HISTORY:  Low back pain, cauda equina syndrome suspected;T/L-spine trauma, minor-mod, low back pain;    TECHNIQUE:  Multiplanar, multisequence MR images were acquired from the thoracolumbar junction to the sacrum without the administration of contrast.    COMPARISON:  Lumbar spine radiograph 10/13/2019 and CT abdomen and pelvis most recent 11/17/2019    FINDINGS:  Alignment: 4 mm retrolisthesis of L1 on L2. Minimal anterolisthesis of L5 on S1 likely related to combination of bilateral L5 remote pars defects and degenerative change.    Vertebrae: Chronic moderate anterior wedge deformity with suspected Schmorl's node at the inferior endplate of L1 and chronic mild anterior wedge deformity of T11 and T12, unchanged.  No acute vertebral body height loss.  Marrow signal appears unremarkable.    Discs: Disc heights are fairly well maintained.  There is disc desiccation throughout the upper lumbar spine.    Cord: Normal.  Conus terminates at L2    Degenerative findings:    Facet arthropathy with facet joint effusions throughout the lumbar spine creating multilevel mild-to-moderate neural foraminal narrowing with neural foraminal narrowing worst at L5-S1 as result of bilateral probable pars defects.    Relatively minimal disc based degenerative changes side from at L1-2 with a circumferential disc bulge which produces mild spinal canal stenosis at this level and at L5-S1 which is without spinal canal stenosis.    Paraspinal muscles & soft tissues: Bladder appears distended.                                 Medical Decision Making:   Initial Assessment:   Chayito Chung is a 69 y.o. female  who  has a past medical history of acute coronary syndrome, cancer, COPD, CAD, diabetes mellitus, and hypertension who presents to the ED due to worsening right lower extremity weakness onset gradually a couple weeks ago.  Differential Diagnosis:   Differential Diagnosis includes, but is not limited to:  Clinical Tests:   Lab Tests: Ordered and Reviewed  Radiological Study: Ordered and Reviewed  ED Management:  8:03 PM  Patient informed of CT findings. I discussed with Dr. Tate regarding patient's increasing difficulty to ambulate without falling. No evidence of central cord compression. Will transfer to the [facility] for further evaluation including a possible MRI of the cervical spine. Will add basic labs.                                  Clinical Impression:       ICD-10-CM ICD-9-CM   1. Lumbar radiculopathy M54.16 724.4   2. Spinal stenosis of lumbar region, unspecified whether neurogenic claudication present M48.061 724.02   3. Gait disturbance R26.9 781.2                   I, Dr. Randell Henriquez, personally performed the services described in this documentation. All medical record entries made by the scribe were at my direction and in my presence.  I have reviewed the chart and agree that the record reflects my personal performance and is accurate and complete               Randell Henriquez MD  05/25/20 2006

## 2020-05-25 NOTE — ED TRIAGE NOTES
Pt presents to ED with complaints of lower right leg pain and weakness. Pt states that her right leg from her knee to her foot is painful and weak. Pt states that her symptoms began this am. Pt states her right leg keeps giving out on her when she ambulates and she is falling. Pt states she fell 3 times today. Pt denies hitting head with fall. Pt states that she has stenosis of her lower back and has been in physical therapy x1 year. Pt states she see Dr. Knox for her back. Pt denies numbness, tingling, urinary/bowel incontinence. Pt states pain 7/10.

## 2020-05-25 NOTE — ED NOTES
Spoke with Alix with MRI- states it will be about an hour until she is ready for pt. Will call when she is ready.

## 2020-05-26 ENCOUNTER — TELEPHONE (OUTPATIENT)
Dept: CARDIOLOGY | Facility: CLINIC | Age: 70
End: 2020-05-26

## 2020-05-26 PROBLEM — R29.898 LOWER EXTREMITY WEAKNESS: Status: ACTIVE | Noted: 2020-05-26

## 2020-05-26 LAB
ALBUMIN SERPL BCP-MCNC: 3.1 G/DL (ref 3.5–5.2)
ALP SERPL-CCNC: 137 U/L (ref 55–135)
ALT SERPL W/O P-5'-P-CCNC: 10 U/L (ref 10–44)
ANION GAP SERPL CALC-SCNC: 8 MMOL/L (ref 8–16)
AST SERPL-CCNC: 14 U/L (ref 10–40)
BASOPHILS # BLD AUTO: 0.02 K/UL (ref 0–0.2)
BASOPHILS NFR BLD: 0.4 % (ref 0–1.9)
BILIRUB SERPL-MCNC: 0.2 MG/DL (ref 0.1–1)
BUN SERPL-MCNC: 11 MG/DL (ref 8–23)
CALCIUM SERPL-MCNC: 8.7 MG/DL (ref 8.7–10.5)
CHLORIDE SERPL-SCNC: 109 MMOL/L (ref 95–110)
CO2 SERPL-SCNC: 20 MMOL/L (ref 23–29)
CREAT SERPL-MCNC: 0.8 MG/DL (ref 0.5–1.4)
DIFFERENTIAL METHOD: ABNORMAL
EOSINOPHIL # BLD AUTO: 0.1 K/UL (ref 0–0.5)
EOSINOPHIL NFR BLD: 2.9 % (ref 0–8)
ERYTHROCYTE [DISTWIDTH] IN BLOOD BY AUTOMATED COUNT: 19.8 % (ref 11.5–14.5)
EST. GFR  (AFRICAN AMERICAN): >60 ML/MIN/1.73 M^2
EST. GFR  (NON AFRICAN AMERICAN): >60 ML/MIN/1.73 M^2
GLUCOSE SERPL-MCNC: 162 MG/DL (ref 70–110)
HCT VFR BLD AUTO: 27.5 % (ref 37–48.5)
HGB BLD-MCNC: 7.4 G/DL (ref 12–16)
IMM GRANULOCYTES # BLD AUTO: 0.01 K/UL (ref 0–0.04)
IMM GRANULOCYTES NFR BLD AUTO: 0.2 % (ref 0–0.5)
LYMPHOCYTES # BLD AUTO: 1.4 K/UL (ref 1–4.8)
LYMPHOCYTES NFR BLD: 31.5 % (ref 18–48)
MAGNESIUM SERPL-MCNC: 1.6 MG/DL (ref 1.6–2.6)
MCH RBC QN AUTO: 21.5 PG (ref 27–31)
MCHC RBC AUTO-ENTMCNC: 26.9 G/DL (ref 32–36)
MCV RBC AUTO: 80 FL (ref 82–98)
MONOCYTES # BLD AUTO: 0.4 K/UL (ref 0.3–1)
MONOCYTES NFR BLD: 7.8 % (ref 4–15)
NEUTROPHILS # BLD AUTO: 2.6 K/UL (ref 1.8–7.7)
NEUTROPHILS NFR BLD: 57.2 % (ref 38–73)
NRBC BLD-RTO: 0 /100 WBC
PHOSPHATE SERPL-MCNC: 3.6 MG/DL (ref 2.7–4.5)
PLATELET # BLD AUTO: 157 K/UL (ref 150–350)
PMV BLD AUTO: 10.3 FL (ref 9.2–12.9)
POCT GLUCOSE: 186 MG/DL (ref 70–110)
POCT GLUCOSE: 400 MG/DL (ref 70–110)
POCT GLUCOSE: 76 MG/DL (ref 70–110)
POTASSIUM SERPL-SCNC: 3.8 MMOL/L (ref 3.5–5.1)
PROT SERPL-MCNC: 6.5 G/DL (ref 6–8.4)
RBC # BLD AUTO: 3.44 M/UL (ref 4–5.4)
SODIUM SERPL-SCNC: 137 MMOL/L (ref 136–145)
WBC # BLD AUTO: 4.51 K/UL (ref 3.9–12.7)

## 2020-05-26 PROCEDURE — 25000003 PHARM REV CODE 250: Performed by: PHYSICIAN ASSISTANT

## 2020-05-26 PROCEDURE — 85025 COMPLETE CBC W/AUTO DIFF WBC: CPT

## 2020-05-26 PROCEDURE — 36415 COLL VENOUS BLD VENIPUNCTURE: CPT

## 2020-05-26 PROCEDURE — 80053 COMPREHEN METABOLIC PANEL: CPT

## 2020-05-26 PROCEDURE — 25000003 PHARM REV CODE 250: Performed by: INTERNAL MEDICINE

## 2020-05-26 PROCEDURE — 97116 GAIT TRAINING THERAPY: CPT | Mod: 59

## 2020-05-26 PROCEDURE — 25000003 PHARM REV CODE 250: Performed by: STUDENT IN AN ORGANIZED HEALTH CARE EDUCATION/TRAINING PROGRAM

## 2020-05-26 PROCEDURE — G0378 HOSPITAL OBSERVATION PER HR: HCPCS

## 2020-05-26 PROCEDURE — 97165 OT EVAL LOW COMPLEX 30 MIN: CPT

## 2020-05-26 PROCEDURE — 96372 THER/PROPH/DIAG INJ SC/IM: CPT

## 2020-05-26 PROCEDURE — 99204 PR OFFICE/OUTPT VISIT, NEW, LEVL IV, 45-59 MIN: ICD-10-PCS | Mod: ,,, | Performed by: ORTHOPAEDIC SURGERY

## 2020-05-26 PROCEDURE — 97161 PT EVAL LOW COMPLEX 20 MIN: CPT

## 2020-05-26 PROCEDURE — 97535 SELF CARE MNGMENT TRAINING: CPT | Mod: 59

## 2020-05-26 PROCEDURE — 97530 THERAPEUTIC ACTIVITIES: CPT

## 2020-05-26 PROCEDURE — 63600175 PHARM REV CODE 636 W HCPCS: Performed by: INTERNAL MEDICINE

## 2020-05-26 PROCEDURE — 99220 PR INITIAL OBSERVATION CARE,LEVL III: CPT | Mod: ,,, | Performed by: INTERNAL MEDICINE

## 2020-05-26 PROCEDURE — 84100 ASSAY OF PHOSPHORUS: CPT

## 2020-05-26 PROCEDURE — 99220 PR INITIAL OBSERVATION CARE,LEVL III: ICD-10-PCS | Mod: ,,, | Performed by: INTERNAL MEDICINE

## 2020-05-26 PROCEDURE — 83735 ASSAY OF MAGNESIUM: CPT

## 2020-05-26 PROCEDURE — C9399 UNCLASSIFIED DRUGS OR BIOLOG: HCPCS | Performed by: INTERNAL MEDICINE

## 2020-05-26 PROCEDURE — 99204 OFFICE O/P NEW MOD 45 MIN: CPT | Mod: ,,, | Performed by: ORTHOPAEDIC SURGERY

## 2020-05-26 RX ORDER — PROMETHAZINE HYDROCHLORIDE 25 MG/1
25 TABLET ORAL EVERY 6 HOURS PRN
Status: DISCONTINUED | OUTPATIENT
Start: 2020-05-26 | End: 2020-05-27 | Stop reason: HOSPADM

## 2020-05-26 RX ORDER — GLUCAGON 1 MG
1 KIT INJECTION
Status: DISCONTINUED | OUTPATIENT
Start: 2020-05-26 | End: 2020-05-27 | Stop reason: HOSPADM

## 2020-05-26 RX ORDER — ONDANSETRON 8 MG/1
8 TABLET, ORALLY DISINTEGRATING ORAL EVERY 8 HOURS PRN
Status: DISCONTINUED | OUTPATIENT
Start: 2020-05-26 | End: 2020-05-27 | Stop reason: HOSPADM

## 2020-05-26 RX ORDER — IPRATROPIUM BROMIDE AND ALBUTEROL SULFATE 2.5; .5 MG/3ML; MG/3ML
3 SOLUTION RESPIRATORY (INHALATION) EVERY 6 HOURS PRN
Status: DISCONTINUED | OUTPATIENT
Start: 2020-05-26 | End: 2020-05-27 | Stop reason: HOSPADM

## 2020-05-26 RX ORDER — TIZANIDINE 4 MG/1
4 TABLET ORAL DAILY PRN
Status: DISCONTINUED | OUTPATIENT
Start: 2020-05-26 | End: 2020-05-26

## 2020-05-26 RX ORDER — LIDOCAINE 50 MG/G
1 PATCH TOPICAL
Status: DISCONTINUED | OUTPATIENT
Start: 2020-05-26 | End: 2020-05-27 | Stop reason: HOSPADM

## 2020-05-26 RX ORDER — ASPIRIN 81 MG/1
81 TABLET ORAL DAILY
Status: DISCONTINUED | OUTPATIENT
Start: 2020-05-26 | End: 2020-05-27 | Stop reason: HOSPADM

## 2020-05-26 RX ORDER — ACETAMINOPHEN 325 MG/1
650 TABLET ORAL EVERY 8 HOURS PRN
Status: DISCONTINUED | OUTPATIENT
Start: 2020-05-26 | End: 2020-05-27 | Stop reason: HOSPADM

## 2020-05-26 RX ORDER — QUETIAPINE FUMARATE 100 MG/1
300 TABLET, FILM COATED ORAL NIGHTLY
Status: DISCONTINUED | OUTPATIENT
Start: 2020-05-26 | End: 2020-05-27 | Stop reason: HOSPADM

## 2020-05-26 RX ORDER — POLYETHYLENE GLYCOL 3350 17 G/17G
17 POWDER, FOR SOLUTION ORAL 2 TIMES DAILY PRN
Status: DISCONTINUED | OUTPATIENT
Start: 2020-05-26 | End: 2020-05-27 | Stop reason: HOSPADM

## 2020-05-26 RX ORDER — ACETAMINOPHEN 500 MG
1000 TABLET ORAL EVERY 8 HOURS
Status: DISCONTINUED | OUTPATIENT
Start: 2020-05-26 | End: 2020-05-27 | Stop reason: HOSPADM

## 2020-05-26 RX ORDER — PREGABALIN 75 MG/1
150 CAPSULE ORAL 2 TIMES DAILY
Status: DISCONTINUED | OUTPATIENT
Start: 2020-05-26 | End: 2020-05-27 | Stop reason: HOSPADM

## 2020-05-26 RX ORDER — VERAPAMIL HYDROCHLORIDE 120 MG/1
240 TABLET, FILM COATED, EXTENDED RELEASE ORAL DAILY
Status: DISCONTINUED | OUTPATIENT
Start: 2020-05-26 | End: 2020-05-27 | Stop reason: HOSPADM

## 2020-05-26 RX ORDER — PRAVASTATIN SODIUM 10 MG/1
20 TABLET ORAL DAILY
Status: DISCONTINUED | OUTPATIENT
Start: 2020-05-26 | End: 2020-05-27 | Stop reason: HOSPADM

## 2020-05-26 RX ORDER — IBUPROFEN 200 MG
16 TABLET ORAL
Status: DISCONTINUED | OUTPATIENT
Start: 2020-05-26 | End: 2020-05-27 | Stop reason: HOSPADM

## 2020-05-26 RX ORDER — METHOCARBAMOL 750 MG/1
750 TABLET, FILM COATED ORAL 3 TIMES DAILY
Status: DISCONTINUED | OUTPATIENT
Start: 2020-05-26 | End: 2020-05-27 | Stop reason: HOSPADM

## 2020-05-26 RX ORDER — OXYCODONE HYDROCHLORIDE 10 MG/1
10 TABLET ORAL EVERY 12 HOURS PRN
Status: DISCONTINUED | OUTPATIENT
Start: 2020-05-26 | End: 2020-05-27 | Stop reason: HOSPADM

## 2020-05-26 RX ORDER — FERROUS SULFATE 325(65) MG
325 TABLET, DELAYED RELEASE (ENTERIC COATED) ORAL DAILY
Status: DISCONTINUED | OUTPATIENT
Start: 2020-05-26 | End: 2020-05-27 | Stop reason: HOSPADM

## 2020-05-26 RX ORDER — ZONISAMIDE 100 MG/1
200 CAPSULE ORAL 2 TIMES DAILY
Status: DISCONTINUED | OUTPATIENT
Start: 2020-05-26 | End: 2020-05-27 | Stop reason: HOSPADM

## 2020-05-26 RX ORDER — HYDROCHLOROTHIAZIDE 25 MG/1
25 TABLET ORAL DAILY
Status: DISCONTINUED | OUTPATIENT
Start: 2020-05-26 | End: 2020-05-27 | Stop reason: HOSPADM

## 2020-05-26 RX ORDER — ENOXAPARIN SODIUM 100 MG/ML
40 INJECTION SUBCUTANEOUS EVERY 24 HOURS
Status: DISCONTINUED | OUTPATIENT
Start: 2020-05-26 | End: 2020-05-27 | Stop reason: HOSPADM

## 2020-05-26 RX ORDER — IBUPROFEN 200 MG
24 TABLET ORAL
Status: DISCONTINUED | OUTPATIENT
Start: 2020-05-26 | End: 2020-05-27 | Stop reason: HOSPADM

## 2020-05-26 RX ORDER — LORAZEPAM 0.5 MG/1
0.5 TABLET ORAL EVERY 12 HOURS PRN
Status: DISCONTINUED | OUTPATIENT
Start: 2020-05-26 | End: 2020-05-27 | Stop reason: HOSPADM

## 2020-05-26 RX ORDER — FLUTICASONE PROPIONATE 50 MCG
2 SPRAY, SUSPENSION (ML) NASAL DAILY
Status: DISCONTINUED | OUTPATIENT
Start: 2020-05-26 | End: 2020-05-27 | Stop reason: HOSPADM

## 2020-05-26 RX ORDER — INSULIN ASPART 100 [IU]/ML
0-5 INJECTION, SOLUTION INTRAVENOUS; SUBCUTANEOUS
Status: DISCONTINUED | OUTPATIENT
Start: 2020-05-26 | End: 2020-05-27 | Stop reason: HOSPADM

## 2020-05-26 RX ORDER — PANTOPRAZOLE SODIUM 40 MG/1
40 TABLET, DELAYED RELEASE ORAL DAILY
Status: DISCONTINUED | OUTPATIENT
Start: 2020-05-26 | End: 2020-05-27 | Stop reason: HOSPADM

## 2020-05-26 RX ORDER — SODIUM CHLORIDE 0.9 % (FLUSH) 0.9 %
10 SYRINGE (ML) INJECTION
Status: DISCONTINUED | OUTPATIENT
Start: 2020-05-26 | End: 2020-05-27 | Stop reason: HOSPADM

## 2020-05-26 RX ORDER — AMITRIPTYLINE HYDROCHLORIDE 10 MG/1
20 TABLET, FILM COATED ORAL NIGHTLY PRN
Status: DISCONTINUED | OUTPATIENT
Start: 2020-05-26 | End: 2020-05-27 | Stop reason: HOSPADM

## 2020-05-26 RX ADMIN — PREGABALIN 150 MG: 75 CAPSULE ORAL at 08:05

## 2020-05-26 RX ADMIN — DULOXETINE HYDROCHLORIDE 90 MG: 60 CAPSULE, DELAYED RELEASE ORAL at 08:05

## 2020-05-26 RX ADMIN — HYDROCHLOROTHIAZIDE 25 MG: 25 TABLET ORAL at 08:05

## 2020-05-26 RX ADMIN — LIDOCAINE 1 PATCH: 50 PATCH TOPICAL at 05:05

## 2020-05-26 RX ADMIN — ZONISAMIDE 200 MG: 100 CAPSULE ORAL at 08:05

## 2020-05-26 RX ADMIN — ACETAMINOPHEN 1000 MG: 500 TABLET ORAL at 09:05

## 2020-05-26 RX ADMIN — METHOCARBAMOL TABLETS 750 MG: 750 TABLET, COATED ORAL at 01:05

## 2020-05-26 RX ADMIN — METHOCARBAMOL TABLETS 750 MG: 750 TABLET, COATED ORAL at 08:05

## 2020-05-26 RX ADMIN — VERAPAMIL HYDROCHLORIDE 240 MG: 120 TABLET, FILM COATED, EXTENDED RELEASE ORAL at 08:05

## 2020-05-26 RX ADMIN — QUETIAPINE FUMARATE 300 MG: 100 TABLET ORAL at 08:05

## 2020-05-26 RX ADMIN — QUETIAPINE FUMARATE 300 MG: 100 TABLET ORAL at 02:05

## 2020-05-26 RX ADMIN — METHOCARBAMOL TABLETS 750 MG: 750 TABLET, COATED ORAL at 09:05

## 2020-05-26 RX ADMIN — METHOCARBAMOL TABLETS 750 MG: 750 TABLET, COATED ORAL at 02:05

## 2020-05-26 RX ADMIN — INSULIN ASPART 3 UNITS: 100 INJECTION, SOLUTION INTRAVENOUS; SUBCUTANEOUS at 09:05

## 2020-05-26 RX ADMIN — FERROUS SULFATE TAB EC 325 MG (65 MG FE EQUIVALENT) 325 MG: 325 (65 FE) TABLET DELAYED RESPONSE at 08:05

## 2020-05-26 RX ADMIN — ASPIRIN 81 MG: 81 TABLET, COATED ORAL at 08:05

## 2020-05-26 RX ADMIN — ENOXAPARIN SODIUM 40 MG: 100 INJECTION SUBCUTANEOUS at 04:05

## 2020-05-26 RX ADMIN — PANTOPRAZOLE SODIUM 40 MG: 40 TABLET, DELAYED RELEASE ORAL at 08:05

## 2020-05-26 RX ADMIN — ACETAMINOPHEN 1000 MG: 500 TABLET ORAL at 02:05

## 2020-05-26 RX ADMIN — ACETAMINOPHEN 1000 MG: 500 TABLET ORAL at 05:05

## 2020-05-26 RX ADMIN — PRAVASTATIN SODIUM 20 MG: 10 TABLET ORAL at 08:05

## 2020-05-26 RX ADMIN — INSULIN DETEMIR 20 UNITS: 100 INJECTION, SOLUTION SUBCUTANEOUS at 08:05

## 2020-05-26 NOTE — H&P
Hospital Medicine  History and Physical Exam       Team: [facility] [place], [place] HOSP MED E Ish Wing MD  Admit Date: 2020  Principal Problem:  Lower extremity weakness   Patient information was obtained from patient, past medical records and ER records.   Primary care Physician: Abhi Valadez MD  Code status: Full Code    HPI:   Chayito Chung is a/an 69 y.o. female with CAD, DM2, COPD HTN, HPLD presenting to [facility] for RLE weakness and multiple falls today, sent to ED for evaluation by ortho of spinal stenosis. She states symptoms have been going on for a couple of weeks and fell several times today which she attributes to her RLE weakness. She denies hitting her head or losing consciousness. She denies bowel or bladder incontinence today. She denies hitting her back, or lifting anything heavy over the past couple of weeks. She uses a walker occasionally for assistance. MRI at [facility] showed a stenosis at L1 and L2, with disc bulge, ultimately ortho spine was consulted who felt that her findings were not completely consistent with her physical exam findings, but felt evaluation by PT/OT was warranted.  She denies any other symptoms besides the leg weakness.       Past Medical History: Patient has a past medical history of Acute coronary syndrome, Acute hypoxemic respiratory failure (2017), Anxiety, Asthma, Cancer, Cataracts, both eyes, Chest pain at rest, Chest pain of uncertain etiology (2015), Colon cancer (), COPD (chronic obstructive pulmonary disease), Coronary artery disease, Depression, Diabetes mellitus, Dyspnea on exertion (11/15/2018), Elevated brain natriuretic peptide (BNP) level (11/15/2018), Elevated cholesterol, Hypertension, Swelling, and Syncope and collapse (2016).    Past Surgical History: Patient has a past surgical history that includes Coronary angioplasty with stent (3 months ago); Abdominal surgery; Colon surgery; Gastrectomy;  section, classic; Hemorrhoid surgery; Lung  biopsy; Hysterectomy; Oophorectomy; Breast biopsy; and Colonoscopy (07/02/2019).    Social History: Patient reports that she has been smoking cigarettes. She started smoking about 53 years ago. She has a 53.00 pack-year smoking history. She has never used smokeless tobacco. She reports that she drinks about 3.0 standard drinks of alcohol per week. She reports that she does not use drugs.    Family History: family history includes Breast cancer in her mother; Cancer in her father, maternal aunt, and mother; Depression in her sister; Diabetes in her mother; Heart disease in her father; Hypertension in her mother and sister.    Medications: reviewed     Allergies: Patient has No Known Allergies.    ROS  Constitutional: no fever or chills  Respiratory: no cough or shortness of breath  Cardiovascular: no chest pain or palpitations  Gastrointestinal: no nausea or vomiting, no abdominal pain or change in bowel habits  Genitourinary: no hematuria or dysuria  Integument/Breast: no rash or pruritis  Hematologic/Lymphatic: no easy bruising or lymphadenopathy  Musculoskeletal: no arthralgias or myalgias  Neurological: no seizures or tremors  Behavioral/Psych: no depression or anxiety    PEx  Temp:  [97.9 °F (36.6 °C)-98.8 °F (37.1 °C)]   Pulse:  [74-91]   Resp:  [15-24]   BP: (148-156)/(80-84)   SpO2:  [97 %-98 %]   Body mass index is 25.61 kg/m².   No intake or output data in the 24 hours ending 05/26/20 0055    General appearance: no distress, lying in bed  Mental status: Alert and oriented x 3  HEENT:  conjunctivae/corneas clear, PERRL  Neck: supple, thyroid not enlarged  Pulm:   normal respiratory effort, CTA B, no c/w/r  Card: RRR, S1, S2 normal, no murmur, click, rub or gallop  Abd: soft, NT, ND, BS present; no masses, no organomegaly  Ext: no c/c/e  Pulses: 2+, symmetric  Skin: color, texture, turgor normal. No rashes or lesions  Neuro: CN II-XII grossly intact, RUE and RLE extremity with very mild weakness    Recent  Results (from the past 24 hour(s))   COVID-19 Rapid Screening    Collection Time: 05/25/20  2:20 PM   Result Value Ref Range    SARS-CoV-2 RNA, Amplification, Qual Negative Negative   CBC auto differential    Collection Time: 05/25/20  8:23 PM   Result Value Ref Range    WBC 4.58 3.90 - 12.70 K/uL    RBC 3.54 (L) 4.00 - 5.40 M/uL    Hemoglobin 7.7 (L) 12.0 - 16.0 g/dL    Hematocrit 26.7 (L) 37.0 - 48.5 %    Mean Corpuscular Volume 75 (L) 82 - 98 fL    Mean Corpuscular Hemoglobin 21.8 (L) 27.0 - 31.0 pg    Mean Corpuscular Hemoglobin Conc 28.8 (L) 32.0 - 36.0 g/dL    RDW 19.4 (H) 11.5 - 14.5 %    Platelets 156 150 - 350 K/uL    MPV 9.3 9.2 - 12.9 fL    Immature Granulocytes 0.2 0.0 - 0.5 %    Gran # (ANC) 2.2 1.8 - 7.7 K/uL    Immature Grans (Abs) 0.01 0.00 - 0.04 K/uL    Lymph # 1.7 1.0 - 4.8 K/uL    Mono # 0.5 0.3 - 1.0 K/uL    Eos # 0.2 0.0 - 0.5 K/uL    Baso # 0.02 0.00 - 0.20 K/uL    nRBC 0 0 /100 WBC    Gran% 47.8 38.0 - 73.0 %    Lymph% 37.8 18.0 - 48.0 %    Mono% 10.3 4.0 - 15.0 %    Eosinophil% 3.5 0.0 - 8.0 %    Basophil% 0.4 0.0 - 1.9 %    Differential Method Automated    Comprehensive metabolic panel    Collection Time: 05/25/20  8:23 PM   Result Value Ref Range    Sodium 141 136 - 145 mmol/L    Potassium 3.6 3.5 - 5.1 mmol/L    Chloride 111 (H) 95 - 110 mmol/L    CO2 23 23 - 29 mmol/L    Glucose 72 70 - 110 mg/dL    BUN, Bld 12 8 - 23 mg/dL    Creatinine 0.7 0.5 - 1.4 mg/dL    Calcium 9.2 8.7 - 10.5 mg/dL    Total Protein 7.0 6.0 - 8.4 g/dL    Albumin 3.4 (L) 3.5 - 5.2 g/dL    Total Bilirubin 0.2 0.1 - 1.0 mg/dL    Alkaline Phosphatase 138 (H) 55 - 135 U/L    AST 11 10 - 40 U/L    ALT 14 10 - 44 U/L    Anion Gap 7 (L) 8 - 16 mmol/L    eGFR if African American >60 >60 mL/min/1.73 m^2    eGFR if non African American >60 >60 mL/min/1.73 m^2       No results for input(s): POCTGLUCOSE in the last 168 hours.    Active Hospital Problems    Diagnosis  POA    *Lower extremity weakness [R28.598]  Yes     Hypertension associated with diabetes [E11.59, I10]  Yes    Hyperlipidemia associated with type 2 diabetes mellitus [E11.69, E78.5]  Yes    Major depressive disorder in partial remission [F32.4]  Yes    JAZMIN (generalized anxiety disorder) [F41.1]  Yes    Coronary artery disease of native artery of native heart with stable angina pectoris [I25.118]  Yes    COPD (chronic obstructive pulmonary disease) [J44.9]  Yes    Tobacco abuse, 1ppd, 50 years [Z72.0]  Yes    Diabetes mellitus  Yes      Resolved Hospital Problems   No resolved problems to display.         Assessment and Plan:  Lower ext weakness  Falls  Chronic neuropathy  Chronic pain  - ortho consulted, appreciated recs  - MRI brain did not note any acute processes  - likely chronic pain and neuropathy contributing to symptoms of falls and weakness  - continue home PRNs  - PT/OT ordered    COPD  Tobacco abuse  Continue inhalers,  on quitting smoking     Diabetes type 2- controlled, stable on insulin 70/30, will switch to levemir for now w/ SSI and accuchecks  HTN, CAD- continue home BP/CAD medications  HPLD- continue statin  GERD- continue PPI  Depression/anxiety- continue duloxetine      DVT PPx: lovenox    Ish Wing MD  Hospital Medicine Staff  Ish Wing MD  Hospital Medicine Staff  05/26/2020

## 2020-05-26 NOTE — PT/OT/SLP EVAL
Physical Therapy Evaluation    Patient Name:  Chayito Chung   MRN:  [id number]    Recommendations:     Discharge Recommendations:  home health PT   Discharge Equipment Recommendations: shower chair   Barriers to discharge: None    Assessment:     Chayito Chung is a 69 y.o. female admitted with a medical diagnosis of Lower extremity weakness.  She presents with the following impairments/functional limitations:  weakness, impaired functional mobilty, impaired endurance, gait instability, pain, decreased lower extremity function, impaired self care skills, impaired balance . Patient tolerated session well. She reports R knee pain associated with some numbness along the medial aspect.  Patient will benefit from PT services to address the mentioned deficits in order to promote an improve functional mobility status. Upon d/c, she is an appropriate candidate for HHPT in order to progress towards an improved level of functional mobility independence.     Rehab Prognosis: Good; patient would benefit from acute skilled PT services to address these deficits and reach maximum level of function.    Recent Surgery: * No surgery found *      Plan:     During this hospitalization, patient to be seen 4 x/week to address the identified rehab impairments via gait training, therapeutic activities, therapeutic exercises, neuromuscular re-education and progress toward the following goals:    · Plan of Care Expires:  06/25/20    History:     Past Medical History:   Diagnosis Date    Acute coronary syndrome     Acute hypoxemic respiratory failure 5/1/2017    Anxiety     Asthma     Cancer     colon    Cataracts, both eyes     Chest pain at rest     Chest pain of uncertain etiology 12/6/2015    Colon cancer 1988    COPD (chronic obstructive pulmonary disease)     Coronary artery disease     Depression     Diabetes mellitus     Dyspnea on exertion 11/15/2018    Elevated brain natriuretic peptide (BNP) level 11/15/2018     "Elevated cholesterol     Hypertension     Swelling     Syncope and collapse 2016       Past Surgical History:   Procedure Laterality Date    ABDOMINAL SURGERY      BREAST BIOPSY      benign unsure what side     SECTION, CLASSIC      COLON SURGERY      COLONOSCOPY  2019    repeat in 5 years    CORONARY ANGIOPLASTY WITH STENT PLACEMENT  3 months ago     x2, Hysterectomy, Lung surgery, Partial stomach removed, Part of colon removed for rectal cancer    GASTRECTOMY      HEMORRHOID SURGERY      HYSTERECTOMY      @28yrs of age    LUNG BIOPSY      OOPHORECTOMY      @28yrs of age       Subjective     Chief Complaint: R knee pain   Patient/Family Comments/goals: to get stronger   Pain/Comfort:  Pain Rating 1: 710  Location - Side 1: Right  Location 1: knee  Pain Addressed 1: Reposition, Distraction, Cessation of Activity  Pain Rating Post-Intervention 1: 7/10    Patients cultural, spiritual, Congregation conflicts given the current situation: no    Living Environment:  Patient's living environment is as follows:  Home type Apt    1 or 2 stories  1 story - lives on 3rd floor with elevator access   Number of ALVARO/ rails 0 ALVARO   AD used?/Owned?  Rollator, RW, grab bars   Bathroom set-up  Tub/shower   Working? no   Driving? no   Individuals living with patient:  Alone    Hobbies/Roles: Spending time with family    Prior to admission, patients level of function was (I) for ADLs and Mod(I) for mobility with RW prn; patient reports several falls recently with R knee "giving out".  Equipment used at home: walker, rolling, rollator, grab bar.  DME owned (not currently used): none.  Upon discharge, patient will have assistance from family.    Objective:     Communicated with RN prior to session.  Patient found supine with telemetry, PureWick  upon PT entry to room. See below for detailed functional assessment. Patient agreeable to participate in initial evaluation.    General Precautions: " Standard, fall, seizure   Orthopedic Precautions:RLE weight bearing as tolerated   Braces: N/A     Exams:  · Cognitive Exam:  Patient is oriented to Person, Place, Time and Situation  · Fine Motor Coordination:  Test:  Intact Impaired  Comments    Rapid ankle DF/PF  X     · Postural Exam:  Patient presented with the following abnormalities:    · -       Rounded shoulders  · -       Forward head  · Sensation:    LEFT LE: -       Intact  light/touch LE RIGHT LE:-       Intact  light/touch L2,L5-S1 dermatomal pattern   -       Impaired  light/touch L3-4; diminshed       ROM and Strength   Right Lower Extremity  Left Lower Extremity    Hip Flexion WFL WFL   Knee Extension  3+/5 WFL   Knee Flexion  WFL WFL   Ankle DF 3+/5 WFL   Ankle PF  WFL WFL   · No TTP along R Knee  · Joint play of patella WFL (right and left)   · Slight R patella shea     Functional Mobility:  · Bed Mobility:     · Scooting: stand by assistance  · Supine to Sit: stand by assistance  · Transfers:     · Sit to Stand:  stand by assistance with rolling walker  · Toilet Transfer: stand by assistance with  standard walker  using  Step Transfer  · Gait: 12ftx2 with SBA and RW  ·  FFP over RW; antalgic gait, RLE foot flat contact, slow libby   · Balance: sitting EOB - (I); static standing - Sup; dynamic standing - SBA     Therapeutic Activities and Exercises:  -Patient educated on the role and goal of PT services during acute care LOS. Question and concerns acknowledged and answers as appropriate.   -Will continue to educated as needed.    -White board updated in patients room to reflect level of assistance needed with nursing.     AM-PAC 6 CLICK MOBILITY  Total Score:19     Patient left up in chair with all lines intact, call button in reach and RN notified; RW left in patient room for safe use of RNx1   White board updated in patient room to reflect level of function with nursing.     GOALS:   Multidisciplinary Problems     Physical Therapy Goals         Problem: Physical Therapy Goal    Goal Priority Disciplines Outcome Goal Variances Interventions   Physical Therapy Goal     PT, PT/OT Ongoing, Progressing     Description:  Goals to be met by: 20     Patient will increase functional independence with mobility by performin. Supine to sit with Modified [place]  2. Sit to supine with Modified [place]  3. Sit to stand transfer with Modified [place]  4. Gait  x 100 feet with Modified [place] using Rolling Walker.   5. Lower extremity exercise program x15 reps per handout, with independence                      Time Tracking:     PT Received On: 20  PT Start Time: 902     PT Stop Time: 935  PT Total Time (min): 33 min     Billable Minutes: Evaluation 10, Gait Training 10 and Therapeutic Activity 13      Meri Fox, PT  2020

## 2020-05-26 NOTE — ED PROVIDER NOTES
Encounter Date: 2020       History     Chief Complaint   Patient presents with    Transfer     [place]- spinal stenosis     HPI   Chayito Chung is a 69 y.o. female with medical history of acute coronary syndrome, cancer, COPD, CAD, diabetes mellitus, and hypertension presenting to the ED via transfer from [place] for evaluation of spinal stenosis. Patient reports having RLE weakness and parasthesias for a few weeks. She awoke today and fell several times while walking in her house that she contributes to her RLE weakness. No head trauma or LOC. She reports having 1 episode of bowel incontinence about 1 week ago, but otherwise denies bowel or bladder incontinence. She occasionally uses a walker for assistance.       Review of patient's allergies indicates:  No Known Allergies  Past Medical History:   Diagnosis Date    Acute coronary syndrome     Acute hypoxemic respiratory failure 2017    Anxiety     Asthma     Cancer     colon    Cataracts, both eyes     Chest pain at rest     Chest pain of uncertain etiology 2015    Colon cancer 1988    COPD (chronic obstructive pulmonary disease)     Coronary artery disease     Depression     Diabetes mellitus     Dyspnea on exertion 11/15/2018    Elevated brain natriuretic peptide (BNP) level 11/15/2018    Elevated cholesterol     Hypertension     Swelling     Syncope and collapse 2016     Past Surgical History:   Procedure Laterality Date    ABDOMINAL SURGERY      BREAST BIOPSY      benign unsure what side     SECTION, CLASSIC      COLON SURGERY      COLONOSCOPY  2019    repeat in 5 years    CORONARY ANGIOPLASTY WITH STENT PLACEMENT  3 months ago     x2, Hysterectomy, Lung surgery, Partial stomach removed, Part of colon removed for rectal cancer    GASTRECTOMY      HEMORRHOID SURGERY      HYSTERECTOMY      @28yrs of age    LUNG BIOPSY      OOPHORECTOMY      @28yrs of age     Family History   Problem Relation  Age of Onset    Cancer Mother     Diabetes Mother     Hypertension Mother     Breast cancer Mother     Cancer Father     Heart disease Father     Depression Sister     Hypertension Sister     Cancer Maternal Aunt      Social History     Tobacco Use    Smoking status: Current Every Day Smoker     Packs/day: 1.00     Years: 53.00     Pack years: 53.00     Types: Cigarettes     Start date: 1967    Smokeless tobacco: Never Used    Tobacco comment: Pt is currently enrolled in the [employer].  Ambulatory referral to Smoking Cessation program.    Substance Use Topics    Alcohol use: Yes     Alcohol/week: 3.0 standard drinks     Types: 3 Glasses of wine per week     Comment: 1 every 3 months    Drug use: No     Review of Systems   Constitutional: Negative for fever.   HENT: Negative for sore throat.    Eyes: Negative for pain.   Respiratory: Negative for shortness of breath.    Cardiovascular: Negative for chest pain.   Gastrointestinal: Negative for nausea.   Genitourinary: Negative for dysuria.   Musculoskeletal: Positive for gait problem. Negative for back pain.   Skin: Negative for rash.   Neurological: Positive for weakness (RLE) and numbness (RLE).   Hematological: Does not bruise/bleed easily.     Physical Exam     Initial Vitals [05/25/20 2201]   BP Pulse Resp Temp SpO2   (!) 149/84 85 18 98.8 °F (37.1 °C) 98 %      MAP       --         Physical Exam    Constitutional: She appears well-developed and well-nourished. She is not diaphoretic. No distress.   HENT:   Head: Normocephalic and atraumatic.   Eyes: EOM are normal. Pupils are equal, round, and reactive to light.   Neck: Normal range of motion. Neck supple.   Cardiovascular: Normal rate and regular rhythm.   Pulmonary/Chest: No respiratory distress.   Speaking full sentences without difficulty   Abdominal: Soft. There is no tenderness.   Musculoskeletal: Normal range of motion.   TTP overlying R knee. No edema, ecchymosis, or other skin changes.  Full AROM and PROM   Neurological: She is alert and oriented to person, place, and time. A sensory deficit is present.   Strength RLE: 4/5 dorsiflexion, 4/5 hip flexion, 5/5 otherwise. Difficulty holding RL off of bed   Strength RUE: 4+/5 hand strength, 5/5 otherwise  Decreased sensation RLE below knee and RUE forearm  Follows commands appropriately. No dysmetria. Negative pronator drift.    Skin: Skin is warm and dry. No rash noted. No erythema.       ED Course   Procedures  Labs Reviewed   MAGNESIUM   PHOSPHORUS   CBC W/ AUTO DIFFERENTIAL   COMPREHENSIVE METABOLIC PANEL          Imaging Results           MRI Thoracic Spine Without Contrast (Final result)  Result time 05/26/20 01:04:23    Final result by Heriberto Brown MD (05/26/20 01:04:23)                 Impression:      Multilevel cervical spondylosis resulting in multilevel cervical spinal canal stenosis most notable at the C5-C6 level resulting in severe left neural foraminal narrowing and severe spinal canal stenosis with associated mildly increased T2/STIR signal in the adjacent spinal cord which could reflect myelomalacia.  Additional multilevel cervical spondylosis, as above.    Thoracic spondylosis with posterior disc osteophyte complex at T9-10 resulting in a mild degree of canal stenosis at this level..  No significant thoracic neural foraminal narrowing or significant spinal canal stenosis identified.    This report was flagged in Epic as abnormal.    Electronically signed by resident: Clyde Otero MD  Date:    05/26/2020  Time:    00:29    Electronically signed by: Heriberto Brown MD  Date:    05/26/2020  Time:    01:04             Narrative:    EXAMINATION:  MRI CERVICAL SPINE WITHOUT CONTRAST; MRI THORACIC SPINE WITHOUT CONTRAST    CLINICAL HISTORY:  Spinal stenosis, cervical;; Spinal stenosis, thoracic;    TECHNIQUE:  Multiplanar, multisequence images of the cervical and thoracic Spine were obtained without the use of intravenous  contrast    COMPARISON:  Cervical spine radiograph 05/25/2020.    FINDINGS:  Cervical spine:    There is straightening of the normal cervical lordosis.  No acute fracture.  Bone marrow signal intensity is within normal limits.  Vertebral body heights are maintained.  There is multilevel intervertebral disc height loss and disc desiccation.  The craniocervical junction and visualized intracranial contents are unremarkable.  Surrounding soft tissue structures reveals no significant abnormality.    C2-C3: Posterior disc osteophyte complex.  No significant neural foraminal narrowing or spinal canal stenosis.    C3-C4: Posterior disc osteophyte complex and uncovertebral spurring bilateral uncovertebral spurring resulting in mild left moderate right neural foraminal narrowing and mild spinal canal stenosis.    C4-C5: Posterior disc osteophyte complex and bilateral uncovertebral spurring resulting in moderate bilateral neural foraminal narrowing, worse on the right, and mild spinal canal stenosis.    C5-C6: Posterior disc osteophyte complex and uncovertebral spurring resulting in moderate right, severe left neural foraminal narrowing and severe spinal canal stenosis.  There is mildly increased T2/STIR cord signal which could reflect cervical cord myelomalacia.    C6-C7: Posterior disc osteophyte complex and uncovertebral spurring resulting in moderate right, severe left neural foraminal narrowing and moderate spinal canal stenosis.    C7-T1: No significant posterior disc osteophyte complex, neural foraminal narrowing, or spinal canal stenosis.    Thoracic spine:    Thoracic spine alignment is within normal limits.  Thoracic vertebral body heights and intervertebral disc heights are maintained.  No acute fracture.  No marrow signal abnormality suspicious for infiltrative process.  No cord signal abnormality.  There is posterior disc osteophyte complex at T9-T10 resulting in a mild degree of canal stenosis at this level.   Additional broad-based posterior disc bulges are present at T2-T3 and T3-T4.  No significant neural foraminal narrowing or significant spinal canal stenosis identified at any level.    Remote L1 compression fracture better evaluated on prior lumbar MRI exam earlier today, reported separately.    Limited evaluation of paraspinal musculature and partially visualized abdominopelvic contents reveals nothing unusual.                                MRI Cervical Spine Without Contrast (Final result)  Result time 05/26/20 01:04:23    Final result by Heriberto Brown MD (05/26/20 01:04:23)                 Impression:      Multilevel cervical spondylosis resulting in multilevel cervical spinal canal stenosis most notable at the C5-C6 level resulting in severe left neural foraminal narrowing and severe spinal canal stenosis with associated mildly increased T2/STIR signal in the adjacent spinal cord which could reflect myelomalacia.  Additional multilevel cervical spondylosis, as above.    Thoracic spondylosis with posterior disc osteophyte complex at T9-10 resulting in a mild degree of canal stenosis at this level..  No significant thoracic neural foraminal narrowing or significant spinal canal stenosis identified.    This report was flagged in Epic as abnormal.    Electronically signed by resident: Clyde Otero MD  Date:    05/26/2020  Time:    00:29    Electronically signed by: Heriberto Brown MD  Date:    05/26/2020  Time:    01:04             Narrative:    EXAMINATION:  MRI CERVICAL SPINE WITHOUT CONTRAST; MRI THORACIC SPINE WITHOUT CONTRAST    CLINICAL HISTORY:  Spinal stenosis, cervical;; Spinal stenosis, thoracic;    TECHNIQUE:  Multiplanar, multisequence images of the cervical and thoracic Spine were obtained without the use of intravenous contrast    COMPARISON:  Cervical spine radiograph 05/25/2020.    FINDINGS:  Cervical spine:    There is straightening of the normal cervical lordosis.  No acute fracture.  Bone  marrow signal intensity is within normal limits.  Vertebral body heights are maintained.  There is multilevel intervertebral disc height loss and disc desiccation.  The craniocervical junction and visualized intracranial contents are unremarkable.  Surrounding soft tissue structures reveals no significant abnormality.    C2-C3: Posterior disc osteophyte complex.  No significant neural foraminal narrowing or spinal canal stenosis.    C3-C4: Posterior disc osteophyte complex and uncovertebral spurring bilateral uncovertebral spurring resulting in mild left moderate right neural foraminal narrowing and mild spinal canal stenosis.    C4-C5: Posterior disc osteophyte complex and bilateral uncovertebral spurring resulting in moderate bilateral neural foraminal narrowing, worse on the right, and mild spinal canal stenosis.    C5-C6: Posterior disc osteophyte complex and uncovertebral spurring resulting in moderate right, severe left neural foraminal narrowing and severe spinal canal stenosis.  There is mildly increased T2/STIR cord signal which could reflect cervical cord myelomalacia.    C6-C7: Posterior disc osteophyte complex and uncovertebral spurring resulting in moderate right, severe left neural foraminal narrowing and moderate spinal canal stenosis.    C7-T1: No significant posterior disc osteophyte complex, neural foraminal narrowing, or spinal canal stenosis.    Thoracic spine:    Thoracic spine alignment is within normal limits.  Thoracic vertebral body heights and intervertebral disc heights are maintained.  No acute fracture.  No marrow signal abnormality suspicious for infiltrative process.  No cord signal abnormality.  There is posterior disc osteophyte complex at T9-T10 resulting in a mild degree of canal stenosis at this level.  Additional broad-based posterior disc bulges are present at T2-T3 and T3-T4.  No significant neural foraminal narrowing or significant spinal canal stenosis identified at any  level.    Remote L1 compression fracture better evaluated on prior lumbar MRI exam earlier today, reported separately.    Limited evaluation of paraspinal musculature and partially visualized abdominopelvic contents reveals nothing unusual.                               MRI Brain Without Contrast (Final result)  Result time 05/26/20 00:34:53    Final result by Heriberto Brown MD (05/26/20 00:34:53)                 Impression:      No significant intracranial pathology.    Small scattered supratentorial white matter T2/flair hyperintense signal foci, nonspecific, suggesting sequela of mild chronic small vessel ischemic change.    Electronically signed by resident: Clyde Otero MD  Date:    05/26/2020  Time:    00:23    Electronically signed by: Heriberto Brown MD  Date:    05/26/2020  Time:    00:34             Narrative:    EXAMINATION:  MRI BRAIN WITHOUT CONTRAST    CLINICAL HISTORY:  Focal neuro deficit, new, fixed or worsening, >6 hours;.    TECHNIQUE:  Multiplanar multisequence MR imaging of the brain was performed without contrast.    COMPARISON:  CT head 01/15/2020    FINDINGS:  Ventricles and sulci are normal in size and midline for age without evidence of hydrocephalus.    Brain parenchyma is grossly unremarkable.  Small scattered supratentorial white matter T2/flair hyperintense signal foci, nonspecific, suggesting sequela of mild chronic small vessel ischemic change.  No diffusion restriction to suggest acute infarct. No areas of gradient susceptibility to suggest intraparenchymal hemorrhage.  No mass lesion or edema.  No extra-axial blood or fluid collections.    Major T2 skull base vascular flow voids are preserved.    Bone marrow signal intensity is within normal limits.                               X-Ray Lumbar Spine Ap And Lateral (Final result)  Result time 05/25/20 23:37:05    Final result by Heriberto Brown MD (05/25/20 23:37:05)                 Impression:      No acute lumbar  fracture.    Stable mild remote compression deformity involving L1.      Electronically signed by: Heriberto Brown MD  Date:    05/25/2020  Time:    23:37             Narrative:    EXAMINATION:  XR LUMBAR SPINE AP AND LATERAL    CLINICAL HISTORY:  Low back pain, minor trauma;    TECHNIQUE:  AP, lateral and spot images were performed of the lumbar spine.    COMPARISON:  October 13, 2019.    FINDINGS:  Alignment: Alignment is maintained.    Vertebrae: Stable mild remote compression deformity involving L1. No acute lumbar compression fracture identified.    Discs and facets: Suboptimal positioning/rotation for lumbosacral spot view limiting evaluation of lumbosacral junction.  (Note that this region was evaluated on recent lumbar MRI earlier today.)  Otherwise, disc heights are maintained.  Facet arthropathy similar to prior.  L5 spondylolysis not as well seen on the current study due to suboptimal positioning/rotation.    Miscellaneous: Vascular calcifications similar to prior.                               X-Ray Knee 1 or 2 View Right (Final result)  Result time 05/25/20 23:31:02    Final result by Heriberto Brown MD (05/25/20 23:31:02)                 Impression:      No acute fracture.      Electronically signed by: Heriberto Brown MD  Date:    05/25/2020  Time:    23:31             Narrative:    EXAMINATION:  XR KNEE 1 OR 2 VIEW RIGHT    CLINICAL HISTORY:  Pain in right knee    TECHNIQUE:  AP and lateral views of right knee    COMPARISON:  None.    FINDINGS:  No fracture or dislocation.  No joint effusion.  Mild narrowing of the medial tibiofemoral compartment.                               X-Ray Thoracic Spine AP Lateral (Final result)  Result time 05/25/20 23:30:12    Final result by Heriberto Brown MD (05/25/20 23:30:12)                 Impression:      No acute thoracic vertebral fracture.    Chronic anterior wedge compression deformity L1 similar to October 13, 2019.      Electronically signed  by: Heriberto Brown MD  Date:    05/25/2020  Time:    23:30             Narrative:    EXAMINATION:  XR THORACIC SPINE AP LATERAL    CLINICAL HISTORY:  Other symptoms and signs involving the musculoskeletal system    TECHNIQUE:  AP and lateral views of the thoracic spine.    COMPARISON:  Lumbar spine October 13, 2019.    FINDINGS:  Alignment: Alignment is maintained.    Vertebrae: Thoracic vertebral body heights are maintained.  Chronic anterior wedge compression deformity L1 similar to October 13, 2019.    Discs and facets: Disc heights are maintained.    Miscellaneous: Surgical clips about the GE junction region.  Electronic device projects over the lateral chest.                               X-Ray Cervical Spine AP And Lateral (Final result)  Result time 05/25/20 23:53:17    Final result by Salomon Palmer MD (05/25/20 23:53:17)                 Impression:      Multilevel chronic degenerative changes with no acute radiographic abnormality.      Electronically signed by: Salomon Palmer  Date:    05/25/2020  Time:    23:53             Narrative:    EXAMINATION:  XR CERVICAL SPINE AP LATERAL    CLINICAL HISTORY:  Other symptoms and signs involving the musculoskeletal system    TECHNIQUE:  AP, lateral and open mouth views of the cervical spine were performed.    COMPARISON:  None.    FINDINGS:  The odontoid process appears intact.    Suboptimal patient positioning.    Moderate to severe disc space narrowing from C3 through C7.  Anterior osteophytic spurring most prominent C5-6.    No acute fracture, subluxation or dislocation is identified.  Prevertebral soft tissues appear within normal limits.                                 Medical Decision Making:   History:   Old Medical Records: I decided to obtain old medical records.  Old Records Summarized: records from clinic visits and records from previous admission(s).       <> Summary of Records: OSH ED MRI L-spine impresson:  --Spinal canal stenosis at L1-2 as result  of circumferential disc bulge.  --Predominantly facet based degenerative change throughout the lumbar spine with pars defects at L5-S1 bilaterally.  --Bladder appears distended.  Clinical Tests:   Lab Tests: Ordered and Reviewed  Radiological Study: Ordered and Reviewed  Other:   I have discussed this case with another health care provider.       <> Summary of the Discussion: Ortho Spine Service, Hospital Medicine       APC / Resident Notes:   69 y.o. female with medical history of acute coronary syndrome, cancer, COPD, CAD, diabetes mellitus, and hypertension presenting to the ED via transfer from [place] for evaluation of spinal stenosis. Patient reports 1 week of RLE weakness and paresthesias. Reports falling several times today due to the weakness. MRI L-spine OSH ED without impressive evidence to explain patient's weakness and ambulation difficulty and transferred to [facility] for Ortho Spine Service evaluation. DDx includes but not limited to spinal cord injury, spinal canal stenosis, peripheral neuropathy, CVA, brain mass, MS. Symptoms unilateral and do not suspect GBS.     10:47 PM - Chap[place]ito Stafford PA-C  Discussed with Ortho Spine Service on arrival. Given additional RUE neuro deficits, will obtain MRI C/T/Brain for further evaluation.     1:39 AM - Chapincito Stafford PA-C  MRI significant for severe spinal canal stenosis most notable C5-6. Ortho evaluated patient at bedside and reviewed imaging, no emergent intervention at this time. Official recommendations pending. Given her ambulation difficulties, will place in observation for further evaluation. Patient expresses understanding and agreeable to the plan. I have discussed the care of this patient with my supervising physician.                             Clinical Impression:       ICD-10-CM ICD-9-CM   1. Impaired ambulation R26.2 719.7   2. Right leg weakness R29.898 729.89   3. Right knee pain M25.561 719.46   4. Chest pain R07.9 786.50         Disposition:    Disposition: Placed in Observation  Condition: Fair     ED Disposition Condition    Observation                           Chapincito Stafford PA-C  05/26/20 0140

## 2020-05-26 NOTE — CONSULTS
"[facility]  Orthopedics  Consult Note    Patient Name: Chayito Chung  MRN: [id number]  Admission Date: 2020  Hospital Length of Stay: 0 days  Attending Provider: Scar Hess MD  Primary Care Provider: Abhi Valadez MD      Consults  Subjective:     Principal Problem:Lower extremity weakness    Chief Complaint:   Chief Complaint   Patient presents with    Transfer     [place]- spinal stenosis        HPI: Chayito Chung is a 69 y.o. female with medical history of CAD, COPD, presents as a transfer from [place] for evaluation of  Lower extremity weakness.  Patient reports having RLE weakness and parasthesias for a few weeks, 1 episode of "bowel incontinence" one week ago.  She awoke today and fell several times while trying to ambulate. She denies head trauma or LOC.  She occasionally uses a walker for assistance.      Past Medical History:   Diagnosis Date    Acute coronary syndrome     Acute hypoxemic respiratory failure 2017    Anxiety     Asthma     Cancer     colon    Cataracts, both eyes     Chest pain at rest     Chest pain of uncertain etiology 2015    Colon cancer 1988    COPD (chronic obstructive pulmonary disease)     Coronary artery disease     Depression     Diabetes mellitus     Dyspnea on exertion 11/15/2018    Elevated brain natriuretic peptide (BNP) level 11/15/2018    Elevated cholesterol     Hypertension     Swelling     Syncope and collapse 2016       Past Surgical History:   Procedure Laterality Date    ABDOMINAL SURGERY      BREAST BIOPSY      benign unsure what side     SECTION, CLASSIC      COLON SURGERY      COLONOSCOPY  2019    repeat in 5 years    CORONARY ANGIOPLASTY WITH STENT PLACEMENT  3 months ago     x2, Hysterectomy, Lung surgery, Partial stomach removed, Part of colon removed for rectal cancer    GASTRECTOMY      HEMORRHOID SURGERY      HYSTERECTOMY      @28yrs of age    LUNG BIOPSY      " "OOPHORECTOMY      @28yrs of age       Review of patient's allergies indicates:  No Known Allergies    No current facility-administered medications for this encounter.      Current Outpatient Medications   Medication Sig    albuterol 90 mcg/actuation inhaler Inhale 2 puffs into the lungs every 6 (six) hours as needed for Wheezing.    albuterol-ipratropium 2.5mg-0.5mg/3mL (DUO-NEB) 0.5 mg-3 mg(2.5 mg base)/3 mL nebulizer solution     amitriptyline (ELAVIL) 10 MG tablet 20 mg every evening.     ammonium lactate 12 % Crea ammonium lactate 12 % topical cream    aspirin (ECOTRIN) 81 MG EC tablet Take 81 mg by mouth once daily.    BD ULTRA-FINE MINI PEN NEEDLE 31 gauge x 3/16" Ndle     cholestyramine, with sugar, 4 gram Powd Take 4 g by mouth 2 (two) times daily as needed (diarrhea).    duloxetine (CYMBALTA) 60 MG capsule Take 90 mg by mouth once daily.     ferrous sulfate (FEOSOL) 325 mg (65 mg iron) Tab tablet Take 1 tablet (325 mg total) by mouth once daily.    fluticasone propionate (FLONASE) 50 mcg/actuation nasal spray 2 sprays (100 mcg total) by Each Nare route once daily.    hydroCHLOROthiazide (HYDRODIURIL) 25 MG tablet TAKE ONE TABLET BY MOUTH DAILY    HYDROcodone-acetaminophen (NORCO)  mg per tablet Take 1 tablet by mouth 2 to 3 times daily as needed. for pain for 30 days    insulin aspart U-100 (NOVOLOG FLEXPEN U-100 INSULIN) 100 unit/mL (3 mL) InPn pen Inject into the skin. Taken as SS    LORazepam (ATIVAN) 0.5 MG tablet Take 0.5 mg by mouth every 12 (twelve) hours as needed.     LYRICA 150 mg capsule Take by mouth 2 (two) times daily.     nitroGLYCERIN (NITROSTAT) 0.4 MG SL tablet Place 0.4 mg under the tongue every 5 (five) minutes as needed for Chest pain.    NOVOLOG MIX 70-30 U-100 INSULN 100 unit/mL (70-30) Soln 30 units BID    omeprazole (PRILOSEC) 40 MG capsule Take 1 capsule (40 mg total) by mouth every morning.    ondansetron (ZOFRAN-ODT) 4 MG TbDL Take 1 tablet (4 mg total) " "by mouth every 8 (eight) hours as needed.    potassium chloride SA (K-DUR,KLOR-CON) 20 MEQ tablet TAKE ONE TABLET BY MOUTH DAILY    pravastatin (PRAVACHOL) 20 MG tablet Take 20 mg by mouth once daily.     quetiapine (SEROQUEL) 300 MG Tab 300 mg nightly.     tiZANidine (ZANAFLEX) 4 MG tablet Take 4 mg by mouth daily as needed.    topiramate (TOPAMAX) 50 MG tablet TAKE 1 BY MOUTH TWICE DAILY    verapamiL (CALAN-SR) 240 MG CR tablet TAKE 1 TABLET BY MOUTH ONCE DAILY    zonisamide (ZONEGRAN) 100 MG Cap zonisamide 100 mg capsule   Take 2 capsules twice a day by oral route.     Family History     Problem Relation (Age of Onset)    Breast cancer Mother    Cancer Mother, Father, Maternal Aunt    Depression Sister    Diabetes Mother    Heart disease Father    Hypertension Mother, Sister        Tobacco Use    Smoking status: Current Every Day Smoker     Packs/day: 1.00     Years: 53.00     Pack years: 53.00     Types: Cigarettes     Start date: 1967    Smokeless tobacco: Never Used    Tobacco comment: Pt is currently enrolled in the [employer].  Ambulatory referral to Smoking Cessation program.    Substance and Sexual Activity    Alcohol use: Yes     Alcohol/week: 3.0 standard drinks     Types: 3 Glasses of wine per week     Comment: 1 every 3 months    Drug use: No    Sexual activity: Yes     Partners: Male     Birth control/protection: None     Comment: last drink yesterday afternoon     ROS   Per ED ROS 5/25/20  Objective:     Vital Signs (Most Recent):  Temp: 98.8 °F (37.1 °C) (05/25/20 2201)  Pulse: 85 (05/25/20 2201)  Resp: 18 (05/25/20 2201)  BP: (!) 149/84 (05/25/20 2201)  SpO2: 98 % (05/25/20 2201) Vital Signs (24h Range):  Temp:  [97.9 °F (36.6 °C)-98.8 °F (37.1 °C)] 98.8 °F (37.1 °C)  Pulse:  [74-91] 85  Resp:  [15-24] 18  SpO2:  [97 %-98 %] 98 %  BP: (148-156)/(80-84) 149/84     Weight: 63.5 kg (140 lb)  Height: 5' 2" (157.5 cm)  Body mass index is 25.61 kg/m².    No intake or output data in the 24 " hours ending 05/25/20 2305    Ortho/SPM Exam  Vitals: Afebrile.  Vital signs stable.  General: No acute distress.  Cardio: Regular rate.  Chest: No increased work of breathing.    Motor            RIGHT  LEFT  Deltoid(C5)        5/5    5/5  Biceps(C5)         5/5    5/5  Extensor Carpi Radialis Longus(C6)    5/5    5/5   Triceps(C7)       5/5    5/5     Flexor Carpi Radialis(C7)     5/5    5/5  Flexor Digitorum Superficialis(C8)    5/5    5/5  Interossei(T1)       5/5    5/5     Sensation (a=absent, i=impaired, n=normal)       RIGHT  LEFT  C5 dermatome       n     n  C6 dermatome       n     n  C7 dermatome       n     n  C8 dermatome       n     n  T1 dermatome       n     n    Reflexes       RIGHT  LEFT  Triceps        2+     2+  Biceps         2+     2+  Brachioradialis       2+           2+  Hoffmans's       neg    neg    Motor             RIGHT  LEFT  Iliopsoas (L2,3)       5/5    5/5  Quadriceps (L3,4)      3/5    5/5   Tibialis Anterior (L4.5)         5/5    5/5   Extensor Halucis Longus (L5)    5/5    5/5     Gastrocnemius/Soleus (S1)     5/5    5/5  Flexor Hallucis Longus(S2)     5/5    5/5    Sensation (a=absent, i=impaired, n=normal)       RIGHT   LEFT  L2 dermatome        n     n  L3 dermatome        n     n  L4 dermatome         n     n  L5 dermatome         n     n  S1 dermatome       n     n  S2 dermatome       n     n    Reflexes        RIGHT  LEFT  Patellar       2+     2+  Achilles       2+     2+  Babinski      neg    neg  Clonus          neg    neg    Right patella hypermobile    Significant Labs:   CBC:   Recent Labs   Lab 05/25/20 2023   WBC 4.58   HGB 7.7*   HCT 26.7*        CMP:   Recent Labs   Lab 05/25/20 2023      K 3.6   *   CO2 23   GLU 72   BUN 12   CREATININE 0.7   CALCIUM 9.2   PROT 7.0   ALBUMIN 3.4*   BILITOT 0.2   ALKPHOS 138*   AST 11   ALT 14   ANIONGAP 7*   EGFRNONAA >60     All pertinent labs within the past 24 hours have been reviewed.    Significant  Imaging: I have reviewed all pertinent imaging results/findings.   Xray cervical spine: spondylosis and anterior osteophytes, thoracic spine with mild degenerative disease. lumbar spine  With chronic appearing L1 wedge fx and spondylosis similar to study 2 years ago.   MRI Lumbar spine: Chronic degenerative changes consistent with MRI 2 years ago. Mild spinal stenosis at level of L1-2 primarily  MRI cervical spine: Moderate cervical stenosis most severe C5-6 region, no acute disc herniation. No significant myelomalacia.   MRI thoracic spine: mild stenosis    Assessment/Plan:     * Lower extremity weakness  Chayito Chung is a 69 y.o. female with RLE weakness and multiple falls in last day. After evaluating the patient and evaluating imaging, we recommend admission to the hospital to work with physical therapy. Her acute RLE weakness does not seem consistent with her imaging. She has moderate cervical spine stenosis and mild thoracic and lumbar stenosis. Cervical spine imaging shows trace myelomalacia C3-4, C5-6 primarily. She has no urinary retention, last BM was 2 days ago.   -WBAT  -Robaxin  -pain control  -Patella stabilizing brace  -monitor neuro status  -anticoagulation: per primary team  -NPO midnight as precaution  -Will discuss with staff        Mustapha Magallanes MD  Orthopedics  [facility]

## 2020-05-26 NOTE — PLAN OF CARE
CM met with patient at the bedside to discuss discharge planning assessment. Pt lives alone and has transportation at discharge. Pt verified PCP and Pharmacy. CM will continue to follow for discharge needs.        05/26/20 7253   Discharge Assessment   Assessment Type Discharge Planning Assessment   Confirmed/corrected address and phone number on facesheet? Yes   Assessment information obtained from? Patient   Expected Length of Stay (days) 2   Communicated expected length of stay with patient/caregiver yes   Prior to hospitilization cognitive status: Alert/Oriented   Prior to hospitalization functional status: Assistive Equipment   Current cognitive status: Alert/Oriented   Current Functional Status: Assistive Equipment   Lives With alone   Able to Return to Prior Arrangements yes   Is patient able to care for self after discharge? Yes   Who are your caregiver(s) and their phone number(s)? Jodie gonsales- [phone]   Patient's perception of discharge disposition home health   Readmission Within the Last 30 Days no previous admission in last 30 days   Patient currently being followed by outpatient case management? No   Patient currently receives any other outside agency services? Yes   Name and contact number of agency or person providing outside services [facility]     Is it the patient/care giver preference to resume care with the current outside agency? Yes   Equipment Currently Used at Home nebulizer;walker, rolling   Do you have any problems affording any of your prescribed medications? No   Is the patient taking medications as prescribed? yes   Does the patient have transportation home? Yes   Transportation Anticipated family or friend will provide   Does the patient receive services at the [facility]? No   Discharge Plan A Home Health   Discharge Plan B Home with family   DME Needed Upon Discharge  none   Patient/Family in Agreement with Plan yes

## 2020-05-26 NOTE — ED TRIAGE NOTES
"Patient transfer from [place]. Hx spinal stenosis. Physical therapy x1 year. Patient presented to ED with complaints of right knee pain and right leg weakness. Reports right knee "giving out" while ambulating and fell 3 times today. Denies LOC or head trauma.   "

## 2020-05-26 NOTE — TELEPHONE ENCOUNTER
Returned patient's call. No answer. Left voice mail message.     ----- Message from Ernestine Martínez sent at 5/26/2020 12:23 PM CDT -----  Contact: SElf [phone]  Patient would like to speak with you about having to cancel her appointment today due to being admitted. Please advise

## 2020-05-26 NOTE — ASSESSMENT & PLAN NOTE
Chayito Chung is a 69 y.o. female with RLE weakness and multiple falls in last day. After evaluating the patient and evaluating imaging, we recommend admission to the hospital to work with physical therapy. Her acute RLE weakness does not seem consistent with her imaging. She has moderate cervical spine stenosis and mild thoracic and lumbar stenosis. She has no urinary retention, last BM was 2 days ago.   -WBAT  -Robaxin  -pain control  -Patella stabilizing brace  -monitor neuro status  -anticoagulation: per primary team

## 2020-05-26 NOTE — HPI
"Chayito Chung is a 69 y.o. female with medical history of CAD, COPD, presents as a transfer from [place] for evaluation of  Lower extremity weakness.  Patient reports having RLE weakness and parasthesias for a few weeks, 1 episode of "bowel incontinence" one week ago.  She awoke today and fell several times while trying to ambulate. She denies head trauma or LOC.  She occasionally uses a walker for assistance.   "

## 2020-05-26 NOTE — PROGRESS NOTES
[facility] Hospital Medicine  Progress Note    Patient Name: Chayito Chung  MRN: [id number]  Patient Class: OP- Observation   Admission Date: 5/25/2020  Length of Stay: 0 days  Attending Physician: Erik Chen MD  Primary Care Provider: Abhi Valadez MD    [facility] Medicine Team: [facility] [place] Marry Hernandez PA-C    Subjective:     Principal Problem:Lower extremity weakness    Interval History: No reported events overnight. Pt seen at bedside resting in NAD, sitting in chair. She reports some improvement in RLE weakness. Patella brace in place. Appreciate ortho assistance> will follow recs  PTOT rec HHC    Code status discussed with patietn who wishes to be DNR. Will update chart    Review of Systems   Constitutional: Negative for fever.   Respiratory: Negative for shortness of breath.    Cardiovascular: Negative for chest pain.   Gastrointestinal: Negative for nausea and vomiting.   Genitourinary: Negative for difficulty urinating and dysuria.   Musculoskeletal: Positive for gait problem. Negative for arthralgias and myalgias.   Skin: Negative for color change and wound.   Neurological: Positive for weakness (RLE). Negative for speech difficulty.   Psychiatric/Behavioral: Negative for agitation. The patient is not nervous/anxious.      Objective:     Vital Signs (Most Recent):  Temp: 97.5 °F (36.4 °C) (05/26/20 1128)  Pulse: 95 (05/26/20 1128)  Resp: 18 (05/26/20 1128)  BP: (!) 105/55 (05/26/20 1128)  SpO2: 95 % (05/26/20 1128) Vital Signs (24h Range):  Temp:  [96.7 °F (35.9 °C)-98.8 °F (37.1 °C)] 97.5 °F (36.4 °C)  Pulse:  [74-99] 95  Resp:  [15-24] 18  SpO2:  [93 %-99 %] 95 %  BP: (105-167)/(55-87) 105/55     Weight: 63.5 kg (140 lb 0.2 oz)  Body mass index is 25.61 kg/m².  No intake or output data in the 24 hours ending 05/26/20 1345   Physical Exam   Constitutional: She is oriented to person, place, and time. No distress.   HENT:   Head: Normocephalic.   Eyes: EOM are  normal.   Neck: Normal range of motion.   Cardiovascular: Normal rate.   Pulmonary/Chest: Effort normal. No respiratory distress.   Abdominal: She exhibits no distension.   Musculoskeletal:   Decreased ROM RLE  Knee brace RLE in place   Neurological: She is alert and oriented to person, place, and time. No cranial nerve deficit.   Skin: Skin is warm and dry. She is not diaphoretic.   Psychiatric: She has a normal mood and affect. Her behavior is normal.         Overview/Hospital Course: Patient admitted for RLE weakness, several falls.  MRI brain no acute abnormality. MRI C-T-L spine showed mod cervical spine stenosis, mild thoracic and lumbar spine stenosis. Cervical spine also has trace myelomalacia c3-4 and C5-6. Ortho spine consulted, started robaxin, placed in patella stabilizing brace, NPO as precaution. Will follow up ortho recs. PTOT rec [facility]. Neuro f/u at d/c for possible neuropathy contributing    Significant Labs: All pertinent labs within the past 24 hours have been reviewed.    Significant Imaging: I have reviewed all pertinent imaging results/findings within the past 24 hours.    Assessment/Plan:      Lower ext weakness  Falls  Chronic neuropathy  Chronic pain  - MRI brain did not note any acute processes  - ortho consulted, appreciated recs   Her acute RLE weakness does not seem consistent with her imaging. She has moderate cervical spine stenosis and mild thoracic and lumbar stenosis. Cervical spine imaging shows trace myelomalacia C3-4, C5-6 primarily   Starting robaxin, patella stabilizing brace; discussing with teeam> will follow recs  - likely chronic pain and neuropathy contributing to symptoms of falls and weakness  - continue home PRNs  - PT/OT: [facility]     COPD  Tobacco abuse  Continue inhalers,  on quitting smoking      Diabetes type 2- controlled, stable on insulin 70/30, will switch to levemir for now w/ SSI and accuchecks  HTN, CAD- continue home BP/CAD medications  HPLD- continue  statin  GERD- continue PPI  Depression/anxiety- continue duloxetine        DVT PPx: lovenox  VTE Risk Mitigation (From admission, onward)         Ordered     enoxaparin injection 40 mg  Daily      05/26/20 0103     IP VTE HIGH RISK PATIENT  Once      05/26/20 0103     Place sequential compression device  Until discontinued      05/26/20 0103                 Discussed with staff  Marry Hernandez PA-C  Department of Hospital Medicine   [facility]

## 2020-05-26 NOTE — PLAN OF CARE
Problem: Physical Therapy Goal  Goal: Physical Therapy Goal  Description  Goals to be met by: 20     Patient will increase functional independence with mobility by performin. Supine to sit with Modified [place]  2. Sit to supine with Modified [place]  3. Sit to stand transfer with Modified [place]  4. Gait  x 100 feet with Modified [place] using Rolling Walker.   5. Lower extremity exercise program x15 reps per handout, with independence     Outcome: Ongoing, Progressing   Initial evaluation completed. Patient tolerated assessment well. Established POC and goals. Patient would continue to benefit from skilled PT services in order to improve functional mobility independence.       Meri Fox, PT, DPT  2020

## 2020-05-26 NOTE — PLAN OF CARE
Problem: Occupational Therapy Goal  Goal: Occupational Therapy Goal  Description  Goals set on 5/26 with expiration date 6/9:  Patient will increase functional independence with ADLs by performing:    Supine <> Sit with Stand-by Assistance.  Feeding with Stand-by Assistance.  Grooming while standing at sink with Stand-by Assistance.  UB Dressing with Stand-by Assistance.  LB Dressing with Stand-by Assistance.  Step transfer with Stand-by Assistance with DME as needed.  Pt will demonstrate understanding of education provided regarding energy conservation and task modification through teach-back method.        Outcome: Ongoing, Progressing     Eval complete. Pt tolerated session well. Initiate OT POC.  Once patient is medically stable, patient is safe to discharge home with continued OT services via HHOT.     ANNE Espana  05/26/2020

## 2020-05-26 NOTE — PLAN OF CARE
Pt is a/ox4, using purewick, VSS. She c/o pain in her RLE but denies n/v, dizziness, and sob. Fall risks and precautions, as well as pain management were discussed, and any questions addressed. Bed alarm active. Seizure precautions maintained. Pt remains free from falls and injury. Personal belongings and call light are within reach. Will continue to monitor.

## 2020-05-26 NOTE — PT/OT/SLP EVAL
"Occupational Therapy   Evaluation    Name: Chayito Chung  MRN: [id number]  Admitting Diagnosis:  Lower extremity weakness    Length of Stay: 0 days    Recommendations:     Discharge Recommendations: home, home health OT  Discharge Equipment Recommendations:  shower chair  Barriers to discharge:    patient lives alone    Plan:     Patient to be seen 3 x/week to address the above listed problems via self-care/home management, therapeutic activities, therapeutic exercises  · Plan of Care Expires: 06/25/20  · Plan of Care Reviewed with:      Assessment:     Chayito Chung is a 69 y.o. female with a medical diagnosis of Lower extremity weakness.  She presents with the following performance deficits affecting function: weakness, impaired endurance, impaired self care skills, impaired functional mobilty, gait instability, impaired balance, pain, decreased lower extremity function.  Patient presents with R knee pain, remained constant in seated and during functional mobility. Patient demonstrated good insight and had no instability or LOB.   Once patient is medically stable, patient is safe to discharge home with continued OT services via HHOT.       Rehab Prognosis: Good; patient would benefit from acute skilled OT services to address these deficits and reach maximum level of function.       Subjective   Communicated with: RN prior to session.  Patient found HOB elevated with telemetry, PureWick upon OT entry to room.    Chief Complaint: Transfer ([place]- spinal stenosis)    Patient/Family Comments/goals: "the pain and the numbness is new, I don't remember hurting it, but I have had seizures, so maybe they happened then"     Pain/Comfort:  · Pain Rating 1: 7/10  · Location - Side 1: Right  · Location - Orientation 1: anterior  · Location 1: knee  · Pain Addressed 1: Reposition, Distraction, Cessation of Activity  · Pain Rating Post-Intervention 1: 7/10    Patients cultural, spiritual, Catholic conflicts given the current " "situation: no    Occupational Profile:  Living Environment: Patient lives alone in apartment with elevator entrance to 3rd floor. TBS. 0STE.   Prior Level of Function: Patient reports being mod I with mobility & with ADLs using rolling walker and rollator.   Patient uses DME as follows: walker, rolling, rollator, grab bar.   DME owned (not currently used): none.  Roles/Repsonsibilities:   Hand Dominance: right   Work: no.   Drive: no.   Managing Medicines/Managing Home: yes.   Hobbies: patient enjoys spending time with family, walks to mothers house daily to spend time with family.  Equipment Used at Home:  walker, rolling, rollator, grab bar    Patient reports they will have assistance from siblings upon discharge.      Objective:     Patient found with: telemetry, PureWick   General Precautions: Standard, Cardiac fall, seizure   Orthopedic Precautions:RLE weight bearing as tolerated   Braces: N/A   Oxygen Device: Room Air  Vitals: BP (!) 105/55   Pulse 95   Temp 97.5 °F (36.4 °C)   Resp 18   Ht 5' 2" (1.575 m)   Wt 63.5 kg (140 lb 0.2 oz)   LMP  (LMP Unknown)   SpO2 95%   Breastfeeding? No   BMI 25.61 kg/m²     Cognitive and Psychosocial Function:   · AxOx4 -- Person, Place, Time and Situation   · Follows Commands/attention:follows one-step commands  · Communication:  clear/fluent  · Memory: No Deficits noted  · Safety awareness/insight to disability: intact   · Mood/Affect/Coping skills/emotional control: Appropriate to situation, Cooperative and Flat affect    Hearing: Intact    Vision:  Intact visual fields    Physical Exam:  Postural examination/scapula alignment:    -       Rounded shoulders  -       Forward head  Skin integrity: Visible skin intact     Left UE Right UE   UE Edema absent absent   UE ROM AROM WFL AROM WFL   UE Strength 5/5 5/5    Strength grasp WFL grasp WFL   Sensation LUE INTACT:WFL RUE INTACT: WFL   Fine Motor Coordination:  LUE INTACT: WFL RUE INTACT: WFL   Gross Motor " Coordination: LUE INTACT: WFL RUE INTACT:WFL     Occupational Performance:  Bed Mobility:    · Patient completed Rolling/Turning to Left with  stand by assistance  · Patient completed Supine to Sit with stand by assistance on L side of bed  · Scooting anteriorly to EOB to have both feet planted on floor: stand by assistance    Functional Mobility/Transfers:  -Static Sitting EOB: SBA  -Dynamic Sitting EOB: SBA  -Patient completed Sit <> Stand Transfer with stand by assistance  with  rolling walker   -Patient completed Bed <> Chair Transfer using Step Transfer technique with contact guard assistance with rolling walker  -Patient completed Toilet Transfer Step Transfer technique with contact guard assistance with  rolling walker  -Functional Mobility: Patient ambulated bathroom distance using RW to complete toileting at toilet level, patient endorsed steady pain of 7/10 throughout functional mobility.     NOTE TO STAFF: Patient is safe to transfer to bedside chair/commode with [id number] assist.     Activities of Daily Living:  · Lower Body Dressing: stand by assistance seated UIC  · Toileting: contact guard assistance at toilet level, patient able to perform perineal hygeine with adequate balance      AMPAC 6 Click ADL:  AMPAC Total Score: 22    Treatment & Education:  -Pt education on OT role and POC   -Importance of E/OOB activity with staff assistance  -Multiple self-care tasks and functional mobility completed -- assistance level noted above  -Safety during functional transfer and mobility ensured  -White board updated, orientation assessed and provided  -Education provided reviewed, questions answered within OT scope of practice.       Patient left up in chair with all lines intact, call button in reach and RN notified    GOALS:   Multidisciplinary Problems     Occupational Therapy Goals        Problem: Occupational Therapy Goal    Goal Priority Disciplines Outcome Interventions   Occupational Therapy Goal     OT,  PT/OT Ongoing, Progressing    Description:  Goals set on  with expiration date :  Patient will increase functional independence with ADLs by performing:    Supine <> Sit with Stand-by Assistance.  Feeding with Stand-by Assistance.  Grooming while standing at sink with Stand-by Assistance.  UB Dressing with Stand-by Assistance.  LB Dressing with Stand-by Assistance.  Step transfer with Stand-by Assistance with DME as needed.  Pt will demonstrate understanding of education provided regarding energy conservation and task modification through teach-back method.                         History:     Past Medical History:   Diagnosis Date    Acute coronary syndrome     Acute hypoxemic respiratory failure 2017    Anxiety     Asthma     Cancer     colon    Cataracts, both eyes     Chest pain at rest     Chest pain of uncertain etiology 2015    Colon cancer 1988    COPD (chronic obstructive pulmonary disease)     Coronary artery disease     Depression     Diabetes mellitus     Dyspnea on exertion 11/15/2018    Elevated brain natriuretic peptide (BNP) level 11/15/2018    Elevated cholesterol     Hypertension     Swelling     Syncope and collapse 2016       Past Surgical History:   Procedure Laterality Date    ABDOMINAL SURGERY      BREAST BIOPSY      benign unsure what side     SECTION, CLASSIC      COLON SURGERY      COLONOSCOPY  2019    repeat in 5 years    CORONARY ANGIOPLASTY WITH STENT PLACEMENT  3 months ago     x2, Hysterectomy, Lung surgery, Partial stomach removed, Part of colon removed for rectal cancer    GASTRECTOMY      HEMORRHOID SURGERY      HYSTERECTOMY      @28yrs of age    LUNG BIOPSY      OOPHORECTOMY      @28yrs of age       Time Tracking:       OT Date of Treatment: 20  OT Start Time: 905  OT Stop Time: 935  OT Total Time (min): 30 min  Additional staff present: PT      Billable Minutes:Evaluation 15  Self Care/Home  Management 15      Bri Helm, LOTR  5/26/2020

## 2020-05-26 NOTE — SUBJECTIVE & OBJECTIVE
"Past Medical History:   Diagnosis Date    Acute coronary syndrome     Acute hypoxemic respiratory failure 2017    Anxiety     Asthma     Cancer     colon    Cataracts, both eyes     Chest pain at rest     Chest pain of uncertain etiology 2015    Colon cancer 1988    COPD (chronic obstructive pulmonary disease)     Coronary artery disease     Depression     Diabetes mellitus     Dyspnea on exertion 11/15/2018    Elevated brain natriuretic peptide (BNP) level 11/15/2018    Elevated cholesterol     Hypertension     Swelling     Syncope and collapse 2016       Past Surgical History:   Procedure Laterality Date    ABDOMINAL SURGERY      BREAST BIOPSY      benign unsure what side     SECTION, CLASSIC      COLON SURGERY      COLONOSCOPY  2019    repeat in 5 years    CORONARY ANGIOPLASTY WITH STENT PLACEMENT  3 months ago     x2, Hysterectomy, Lung surgery, Partial stomach removed, Part of colon removed for rectal cancer    GASTRECTOMY      HEMORRHOID SURGERY      HYSTERECTOMY      @28yrs of age    LUNG BIOPSY      OOPHORECTOMY      @28yrs of age       Review of patient's allergies indicates:  No Known Allergies    No current facility-administered medications for this encounter.      Current Outpatient Medications   Medication Sig    albuterol 90 mcg/actuation inhaler Inhale 2 puffs into the lungs every 6 (six) hours as needed for Wheezing.    albuterol-ipratropium 2.5mg-0.5mg/3mL (DUO-NEB) 0.5 mg-3 mg(2.5 mg base)/3 mL nebulizer solution     amitriptyline (ELAVIL) 10 MG tablet 20 mg every evening.     ammonium lactate 12 % Crea ammonium lactate 12 % topical cream    aspirin (ECOTRIN) 81 MG EC tablet Take 81 mg by mouth once daily.    BD ULTRA-FINE MINI PEN NEEDLE 31 gauge x 3/16" Ndle     cholestyramine, with sugar, 4 gram Powd Take 4 g by mouth 2 (two) times daily as needed (diarrhea).    duloxetine (CYMBALTA) 60 MG capsule Take 90 mg by mouth once " daily.     ferrous sulfate (FEOSOL) 325 mg (65 mg iron) Tab tablet Take 1 tablet (325 mg total) by mouth once daily.    fluticasone propionate (FLONASE) 50 mcg/actuation nasal spray 2 sprays (100 mcg total) by Each Nare route once daily.    hydroCHLOROthiazide (HYDRODIURIL) 25 MG tablet TAKE ONE TABLET BY MOUTH DAILY    HYDROcodone-acetaminophen (NORCO)  mg per tablet Take 1 tablet by mouth 2 to 3 times daily as needed. for pain for 30 days    insulin aspart U-100 (NOVOLOG FLEXPEN U-100 INSULIN) 100 unit/mL (3 mL) InPn pen Inject into the skin. Taken as SS    LORazepam (ATIVAN) 0.5 MG tablet Take 0.5 mg by mouth every 12 (twelve) hours as needed.     LYRICA 150 mg capsule Take by mouth 2 (two) times daily.     nitroGLYCERIN (NITROSTAT) 0.4 MG SL tablet Place 0.4 mg under the tongue every 5 (five) minutes as needed for Chest pain.    NOVOLOG MIX 70-30 U-100 INSULN 100 unit/mL (70-30) Soln 30 units BID    omeprazole (PRILOSEC) 40 MG capsule Take 1 capsule (40 mg total) by mouth every morning.    ondansetron (ZOFRAN-ODT) 4 MG TbDL Take 1 tablet (4 mg total) by mouth every 8 (eight) hours as needed.    potassium chloride SA (K-DUR,KLOR-CON) 20 MEQ tablet TAKE ONE TABLET BY MOUTH DAILY    pravastatin (PRAVACHOL) 20 MG tablet Take 20 mg by mouth once daily.     quetiapine (SEROQUEL) 300 MG Tab 300 mg nightly.     tiZANidine (ZANAFLEX) 4 MG tablet Take 4 mg by mouth daily as needed.    topiramate (TOPAMAX) 50 MG tablet TAKE 1 BY MOUTH TWICE DAILY    verapamiL (CALAN-SR) 240 MG CR tablet TAKE 1 TABLET BY MOUTH ONCE DAILY    zonisamide (ZONEGRAN) 100 MG Cap zonisamide 100 mg capsule   Take 2 capsules twice a day by oral route.     Family History     Problem Relation (Age of Onset)    Breast cancer Mother    Cancer Mother, Father, Maternal Aunt    Depression Sister    Diabetes Mother    Heart disease Father    Hypertension Mother, Sister        Tobacco Use    Smoking status: Current Every Day Smoker  "    Packs/day: 1.00     Years: 53.00     Pack years: 53.00     Types: Cigarettes     Start date: 1967    Smokeless tobacco: Never Used    Tobacco comment: Pt is currently enrolled in the [employer].  Ambulatory referral to Smoking Cessation program.    Substance and Sexual Activity    Alcohol use: Yes     Alcohol/week: 3.0 standard drinks     Types: 3 Glasses of wine per week     Comment: 1 every 3 months    Drug use: No    Sexual activity: Yes     Partners: Male     Birth control/protection: None     Comment: last drink yesterday afternoon     ROS   Per ED ROS 5/25/20  Objective:     Vital Signs (Most Recent):  Temp: 98.8 °F (37.1 °C) (05/25/20 2201)  Pulse: 85 (05/25/20 2201)  Resp: 18 (05/25/20 2201)  BP: (!) 149/84 (05/25/20 2201)  SpO2: 98 % (05/25/20 2201) Vital Signs (24h Range):  Temp:  [97.9 °F (36.6 °C)-98.8 °F (37.1 °C)] 98.8 °F (37.1 °C)  Pulse:  [74-91] 85  Resp:  [15-24] 18  SpO2:  [97 %-98 %] 98 %  BP: (148-156)/(80-84) 149/84     Weight: 63.5 kg (140 lb)  Height: 5' 2" (157.5 cm)  Body mass index is 25.61 kg/m².    No intake or output data in the 24 hours ending 05/25/20 2305    Ortho/SPM Exam  Vitals: Afebrile.  Vital signs stable.  General: No acute distress.  Cardio: Regular rate.  Chest: No increased work of breathing.    Motor            RIGHT  LEFT  Deltoid(C5)        5/5    5/5  Biceps(C5)         5/5    5/5  Extensor Carpi Radialis Longus(C6)    5/5    5/5   Triceps(C7)       5/5    5/5     Flexor Carpi Radialis(C7)     5/5    5/5  Flexor Digitorum Superficialis(C8)    5/5    5/5  Interossei(T1)       5/5    5/5     Sensation (a=absent, i=impaired, n=normal)       RIGHT  LEFT  C5 dermatome       n     n  C6 dermatome       n     n  C7 dermatome       n     n  C8 dermatome       n     n  T1 dermatome       n     n    Reflexes       RIGHT  LEFT  Triceps        2+     2+  Biceps         2+     2+  Brachioradialis       2+           2+  Hoffmans's       neg    neg    Motor   "           RIGHT  LEFT  Iliopsoas (L2,3)       5/5    5/5  Quadriceps (L3,4)      5/5    5/5   Tibialis Anterior (L4.5)         5/5    5/5   Extensor Halucis Longus (L5)    5/5    5/5     Gastrocnemius/Soleus (S1)     5/5    5/5  Flexor Hallucis Longus(S2)     5/5    5/5    Sensation (a=absent, i=impaired, n=normal)       RIGHT   LEFT  L2 dermatome        n     n  L3 dermatome        n     n  L4 dermatome         n     n  L5 dermatome         n     n  S1 dermatome       n     n  S2 dermatome       n     n    Reflexes        RIGHT  LEFT  Patellar       2+     2+  Achilles       2+     2+  Babinski      neg    neg  Clonus          neg    neg      Significant Labs:   CBC:   Recent Labs   Lab 05/25/20 2023   WBC 4.58   HGB 7.7*   HCT 26.7*        CMP:   Recent Labs   Lab 05/25/20 2023      K 3.6   *   CO2 23   GLU 72   BUN 12   CREATININE 0.7   CALCIUM 9.2   PROT 7.0   ALBUMIN 3.4*   BILITOT 0.2   ALKPHOS 138*   AST 11   ALT 14   ANIONGAP 7*   EGFRNONAA >60     All pertinent labs within the past 24 hours have been reviewed.    Significant Imaging: I have reviewed all pertinent imaging results/findings.   Xray cervical spine: spondylosis and anterior osteophytes, thoracic spine with mild degenerative disease. lumbar spine  With chronic appearing L1 wedge fx and spondylosis similar to study 2 years ago.   MRI Lumbar spine: Chronic degenerative changes consistent with MRI 2 years ago. Mild spinal stenosis at level of L1-2 primarily  MRI cervical spine: Moderate cervical stenosis most severe C5-6 region, no acute disc herniation. No significant myelomalacia.   MRI thoracic spine: mild stenosis

## 2020-05-26 NOTE — PLAN OF CARE
Problem: Adult Inpatient Plan of Care  Goal: Plan of Care Review  Outcome: Ongoing, Progressing       Problem: Adult Inpatient Plan of Care  Goal: Optimal Comfort and Wellbeing  Outcome: Ongoing, Progressing        POC reviewed with Pt. VSS, A&Ox4. No acute changes. Pt denies pain. Safety checks preformed. Call light in reach. All questions answered. Will continue to monitor.

## 2020-05-27 VITALS
BODY MASS INDEX: 25.76 KG/M2 | TEMPERATURE: 98 F | RESPIRATION RATE: 20 BRPM | HEIGHT: 62 IN | WEIGHT: 140 LBS | OXYGEN SATURATION: 94 % | HEART RATE: 89 BPM | DIASTOLIC BLOOD PRESSURE: 67 MMHG | SYSTOLIC BLOOD PRESSURE: 110 MMHG

## 2020-05-27 LAB
ALBUMIN SERPL BCP-MCNC: 3.1 G/DL (ref 3.5–5.2)
ALP SERPL-CCNC: 147 U/L (ref 55–135)
ALT SERPL W/O P-5'-P-CCNC: 9 U/L (ref 10–44)
ANION GAP SERPL CALC-SCNC: 7 MMOL/L (ref 8–16)
AST SERPL-CCNC: 7 U/L (ref 10–40)
BASOPHILS # BLD AUTO: 0.03 K/UL (ref 0–0.2)
BASOPHILS NFR BLD: 0.9 % (ref 0–1.9)
BILIRUB SERPL-MCNC: 0.2 MG/DL (ref 0.1–1)
BUN SERPL-MCNC: 11 MG/DL (ref 8–23)
CALCIUM SERPL-MCNC: 9.1 MG/DL (ref 8.7–10.5)
CHLORIDE SERPL-SCNC: 109 MMOL/L (ref 95–110)
CO2 SERPL-SCNC: 25 MMOL/L (ref 23–29)
CREAT SERPL-MCNC: 0.8 MG/DL (ref 0.5–1.4)
DIFFERENTIAL METHOD: ABNORMAL
EOSINOPHIL # BLD AUTO: 0.1 K/UL (ref 0–0.5)
EOSINOPHIL NFR BLD: 3.4 % (ref 0–8)
ERYTHROCYTE [DISTWIDTH] IN BLOOD BY AUTOMATED COUNT: 19.4 % (ref 11.5–14.5)
EST. GFR  (AFRICAN AMERICAN): >60 ML/MIN/1.73 M^2
EST. GFR  (NON AFRICAN AMERICAN): >60 ML/MIN/1.73 M^2
GLUCOSE SERPL-MCNC: 129 MG/DL (ref 70–110)
HCT VFR BLD AUTO: 26.2 % (ref 37–48.5)
HGB BLD-MCNC: 7.3 G/DL (ref 12–16)
IMM GRANULOCYTES # BLD AUTO: 0.01 K/UL (ref 0–0.04)
IMM GRANULOCYTES NFR BLD AUTO: 0.3 % (ref 0–0.5)
LYMPHOCYTES # BLD AUTO: 1.4 K/UL (ref 1–4.8)
LYMPHOCYTES NFR BLD: 44 % (ref 18–48)
MAGNESIUM SERPL-MCNC: 1.7 MG/DL (ref 1.6–2.6)
MCH RBC QN AUTO: 21.9 PG (ref 27–31)
MCHC RBC AUTO-ENTMCNC: 27.9 G/DL (ref 32–36)
MCV RBC AUTO: 78 FL (ref 82–98)
MONOCYTES # BLD AUTO: 0.4 K/UL (ref 0.3–1)
MONOCYTES NFR BLD: 11.3 % (ref 4–15)
NEUTROPHILS # BLD AUTO: 1.3 K/UL (ref 1.8–7.7)
NEUTROPHILS NFR BLD: 40.1 % (ref 38–73)
NRBC BLD-RTO: 0 /100 WBC
PHOSPHATE SERPL-MCNC: 4.2 MG/DL (ref 2.7–4.5)
PLATELET # BLD AUTO: 158 K/UL (ref 150–350)
PMV BLD AUTO: 9.8 FL (ref 9.2–12.9)
POCT GLUCOSE: 131 MG/DL (ref 70–110)
POCT GLUCOSE: 255 MG/DL (ref 70–110)
POCT GLUCOSE: 287 MG/DL (ref 70–110)
POCT GLUCOSE: 367 MG/DL (ref 70–110)
POCT GLUCOSE: 370 MG/DL (ref 70–110)
POTASSIUM SERPL-SCNC: 3.8 MMOL/L (ref 3.5–5.1)
PROT SERPL-MCNC: 6.5 G/DL (ref 6–8.4)
RBC # BLD AUTO: 3.34 M/UL (ref 4–5.4)
SODIUM SERPL-SCNC: 141 MMOL/L (ref 136–145)
WBC # BLD AUTO: 3.27 K/UL (ref 3.9–12.7)

## 2020-05-27 PROCEDURE — 80053 COMPREHEN METABOLIC PANEL: CPT

## 2020-05-27 PROCEDURE — 96372 THER/PROPH/DIAG INJ SC/IM: CPT

## 2020-05-27 PROCEDURE — 83735 ASSAY OF MAGNESIUM: CPT

## 2020-05-27 PROCEDURE — 25000003 PHARM REV CODE 250: Performed by: PHYSICIAN ASSISTANT

## 2020-05-27 PROCEDURE — 97116 GAIT TRAINING THERAPY: CPT

## 2020-05-27 PROCEDURE — 63600175 PHARM REV CODE 636 W HCPCS: Performed by: PHYSICIAN ASSISTANT

## 2020-05-27 PROCEDURE — 25000003 PHARM REV CODE 250: Performed by: STUDENT IN AN ORGANIZED HEALTH CARE EDUCATION/TRAINING PROGRAM

## 2020-05-27 PROCEDURE — G0378 HOSPITAL OBSERVATION PER HR: HCPCS

## 2020-05-27 PROCEDURE — 85025 COMPLETE CBC W/AUTO DIFF WBC: CPT

## 2020-05-27 PROCEDURE — 63600175 PHARM REV CODE 636 W HCPCS: Performed by: INTERNAL MEDICINE

## 2020-05-27 PROCEDURE — 84100 ASSAY OF PHOSPHORUS: CPT

## 2020-05-27 PROCEDURE — 97530 THERAPEUTIC ACTIVITIES: CPT

## 2020-05-27 PROCEDURE — 25000003 PHARM REV CODE 250: Performed by: INTERNAL MEDICINE

## 2020-05-27 PROCEDURE — 25000242 PHARM REV CODE 250 ALT 637 W/ HCPCS: Performed by: INTERNAL MEDICINE

## 2020-05-27 PROCEDURE — 99217 PR OBSERVATION CARE DISCHARGE: ICD-10-PCS | Mod: ,,, | Performed by: PHYSICIAN ASSISTANT

## 2020-05-27 PROCEDURE — 99217 PR OBSERVATION CARE DISCHARGE: CPT | Mod: ,,, | Performed by: PHYSICIAN ASSISTANT

## 2020-05-27 PROCEDURE — 36415 COLL VENOUS BLD VENIPUNCTURE: CPT

## 2020-05-27 RX ORDER — LIDOCAINE 50 MG/G
1 PATCH TOPICAL DAILY
Qty: 10 PATCH | Refills: 0 | Status: ON HOLD | OUTPATIENT
Start: 2020-05-27 | End: 2020-07-29 | Stop reason: HOSPADM

## 2020-05-27 RX ORDER — INSULIN ASPART 100 [IU]/ML
5 INJECTION, SOLUTION INTRAVENOUS; SUBCUTANEOUS ONCE
Status: COMPLETED | OUTPATIENT
Start: 2020-05-27 | End: 2020-05-27

## 2020-05-27 RX ORDER — TIZANIDINE 4 MG/1
4 TABLET ORAL DAILY PRN
Qty: 30 TABLET | Refills: 0 | Status: SHIPPED | OUTPATIENT
Start: 2020-05-27 | End: 2020-08-26 | Stop reason: SDUPTHER

## 2020-05-27 RX ADMIN — PREGABALIN 150 MG: 75 CAPSULE ORAL at 09:05

## 2020-05-27 RX ADMIN — METHOCARBAMOL TABLETS 750 MG: 750 TABLET, COATED ORAL at 09:05

## 2020-05-27 RX ADMIN — HYDROCHLOROTHIAZIDE 25 MG: 25 TABLET ORAL at 09:05

## 2020-05-27 RX ADMIN — ZONISAMIDE 200 MG: 100 CAPSULE ORAL at 09:05

## 2020-05-27 RX ADMIN — OXYCODONE HYDROCHLORIDE 10 MG: 10 TABLET ORAL at 12:05

## 2020-05-27 RX ADMIN — ACETAMINOPHEN 1000 MG: 500 TABLET ORAL at 03:05

## 2020-05-27 RX ADMIN — LIDOCAINE 1 PATCH: 50 PATCH TOPICAL at 05:05

## 2020-05-27 RX ADMIN — PANTOPRAZOLE SODIUM 40 MG: 40 TABLET, DELAYED RELEASE ORAL at 09:05

## 2020-05-27 RX ADMIN — FERROUS SULFATE TAB EC 325 MG (65 MG FE EQUIVALENT) 325 MG: 325 (65 FE) TABLET DELAYED RESPONSE at 09:05

## 2020-05-27 RX ADMIN — INSULIN ASPART 5 UNITS: 100 INJECTION, SOLUTION INTRAVENOUS; SUBCUTANEOUS at 12:05

## 2020-05-27 RX ADMIN — DULOXETINE HYDROCHLORIDE 90 MG: 60 CAPSULE, DELAYED RELEASE ORAL at 09:05

## 2020-05-27 RX ADMIN — PRAVASTATIN SODIUM 20 MG: 10 TABLET ORAL at 09:05

## 2020-05-27 RX ADMIN — VERAPAMIL HYDROCHLORIDE 240 MG: 120 TABLET, FILM COATED, EXTENDED RELEASE ORAL at 09:05

## 2020-05-27 RX ADMIN — FLUTICASONE PROPIONATE 100 MCG: 50 SPRAY, METERED NASAL at 12:05

## 2020-05-27 RX ADMIN — ASPIRIN 81 MG: 81 TABLET, COATED ORAL at 09:05

## 2020-05-27 RX ADMIN — METHOCARBAMOL TABLETS 750 MG: 750 TABLET, COATED ORAL at 03:05

## 2020-05-27 NOTE — PT/OT/SLP PROGRESS
"Physical Therapy   Progress Note    Patient Name:  Chayito Chung  MRN: [id number]  Recent Surgery: * No surgery found *      Recommendations:     Discharge Recommendations: Home Health PT  Equipment recommendations: shower chair  Barriers to discharge: Fall risk     Assessment:     Chayito Chung is a 69 y.o. female admitted to [facility] on 5/25/2020 with medical diagnosis of Lower extremity weakness. Pt presents with RLE pain, weakness, impaired balance, decreased endurance, impaired functional mobility  and gait instability. Pt tolerated mobilization well this visit, with mild increase in RLE pain with weight bearing activities.  Chayito Chung would benefit from continued acute PT intervention to address listed functional deficits, provide patient/caregiver education, reduce fall risk, and maximize (I) and safety with functional mobility. Once medically stable, recommending pt discharge home with HHPT.     Rehab Prognosis: Good; patient would benefit from acute skilled PT services to address these deficits and reach maximum level of function.      Plan:     During this hospitalization, patient to be seen 4 x/week to address the identified rehab impairments via gait training, therapeutic exercises, therapeutic activities, neuromuscular re-education and progress towards stated goals.     Plan of Care Expires:  06/25/20  Plan of Care reviewed with: patient    This plan of care has been discussed with the patient/caregiver, who was included in its development and is in agreement with the identified goals and treatment plan.     Subjective     Communicated with RN prior to session.  Patient agreeable to participate.      Patient comments/goals: "They aren't doing surgery today, but maybe later to help my leg"     Pain/Comfort:  · Location: RLE   · Pt did not rate pain on numeric scale   · RN notified, pt assisted with repositioning for comfort     Patients cultural, spiritual, Confucianism conflicts given the current situation: " No cultural, spiritual, or educational needs identified.    Objective:     Patient found HOB elevated with: telemetry    General Precautions: Standard, fall, seizure   Orthopedic Precautions:RLE weight bearing as tolerated   Braces: R patella stabilizing brace   Body mass index is 25.61 kg/m².  Oxygen Device: nasal cannula    Cognition:   Pt is Alert and Cooperative during session.    Functional Mobility:    Bed Mobility:  · Supine > Sit: Supervision  · Sit > Supine: Supervision     Transfers:   · Sit <> Stand Transfer:   · Stand-by Assistance from EOB with RW   · Minimum Assistance from low toilet with RW and grab bar                 Gait:  · Distance: ~15 ft.+ 10 ft. With seated rest break between trials   · Assistance level: Stand-by Assistance  · Assistive Device: rolling walker  · Gait Assessment: antalgic gait pattern with decreased WB through RLE, RLE foot flat contact, decreased gait speed; no LOB     Balance:  · Static Stand: Stand-By Assist with Rolling walker    Outcome Measure: AM-PAC 6 CLICK MOBILITY  Total Score:20     Patient/Caregiver Education and Therapeutic Activities/Exercises     Therapist facilitated progression of gait training to improve gait stability, endurance, and independence with functional ambulation.     Therapeutic activities aimed to increase pt's independence, safety, and efficiency with bed mobility and functional transfers. See above for assistance levels. Educated pt on donning/doffing knee brace and BLE therex HEP. Pt demonstrated understanding.     Therapist educated pt/caregiver regarding:    PT POC and goals for therapy    Safety with mobility and fall risk    Instruction on use of call button and importance of calling nursing staff for assistance with mobility     Patient/caregiver able to verbalize understanding; will follow-up with pt/caregiver during current admit for additional questions/concerns within scope of practice.     White board updated.     Patient left up  in chair with all lines intact, call button in reach and RN notified.    Goals:     Multidisciplinary Problems     Physical Therapy Goals        Problem: Physical Therapy Goal    Goal Priority Disciplines Outcome Goal Variances Interventions   Physical Therapy Goal     PT, PT/OT Ongoing, Progressing     Description:  Goals to be met by: 20     Patient will increase functional independence with mobility by performin. Supine to sit with Modified [place]  2. Sit to supine with Modified [place]  3. Sit to stand transfer with Modified [place]  4. Gait  x 100 feet with Modified [place] using Rolling Walker.   5. Lower extremity exercise program x15 reps per handout, with independence                      Time Tracking:       PT Received On: 20  PT Start Time: 1035     PT Stop Time: 1100  PT Total Time (min): 25 min     Billable Minutes: Gait Training 10 min and Therapeutic Activity 15 min     Maryann Reis PT, DPT   2020  Pager: [phone]

## 2020-05-27 NOTE — NURSING
Patient rounds made frequently.  AAO x 4.  VVS.  Afebrile.  No complaints of chest pain or shortness of breath.  Complain of pain to the right knee; however, this morning at at 0500 patient requested Lidocaine patch be applied to the left leg.  At 2100 accu-check was 400, 3 units of insulin given.  Patient states at home she takes 30 units of insulin in the morning and at night.  At midnight accu-check was 370.  Telephone conference with Luiz Harman PA-C - orders given.  5 units of insulin given sq. I found an empty pack of cookies and Snickers bar wrapping in her room. I explained to the patient she is a diabetic and should not eat sweets.   Patient stated her sister brought the cookies.  Charge nurse informed.   At 0207 accu-check was 287.  Patient stated she felt much better.  Patient is NPO oast midnight.  Remained free from falls or any other incidents this shift.

## 2020-05-27 NOTE — DISCHARGE INSTRUCTIONS
Discharge instructions reviewed with patient. Review of medication. To have sister  medications from discharge pharmacy. IV discontinued.

## 2020-05-27 NOTE — PLAN OF CARE
Problem: Physical Therapy Goal  Goal: Physical Therapy Goal  Description  Goals to be met by: 20     Patient will increase functional independence with mobility by performin. Supine to sit with Modified [place]  2. Sit to supine with Modified [place]  3. Sit to stand transfer with Modified [place]  4. Gait  x 100 feet with Modified [place] using Rolling Walker.   5. Lower extremity exercise program x15 reps per handout, with independence     Outcome: Ongoing, Progressing    Goals remain appropriate. Continue current POC, progressing as tolerated.     Maryann Reis PT, DPT   2020  Pager: [phone]

## 2020-05-27 NOTE — PLAN OF CARE
[facility]    HOME HEALTH ORDERS  FACE TO FACE ENCOUNTER    Patient Name: Chayito Chung  YOB: 1950    PCP: Abhi Valadez MD   PCP Address: [address] / [place] [address]  PCP Phone Number: [phone]  PCP Fax: [phone]    Encounter Date: 05/27/2020    Admit to Home Health    Diagnoses:  Active Hospital Problems    Diagnosis  POA    *Lower extremity weakness [R29.898]  Yes    Impaired ambulation [R26.2]  Yes    Hypertension associated with diabetes [E11.59, I10]  Yes    Hyperlipidemia associated with type 2 diabetes mellitus [E11.69, E78.5]  Yes    Major depressive disorder in partial remission [F32.4]  Yes    JAZMIN (generalized anxiety disorder) [F41.1]  Yes    Coronary artery disease of native artery of native heart with stable angina pectoris [I25.118]  Yes    COPD (chronic obstructive pulmonary disease) [J44.9]  Yes    Tobacco abuse, 1ppd, 50 years [Z72.0]  Yes    Diabetes mellitus  Yes      Resolved Hospital Problems   No resolved problems to display.       Future Appointments   Date Time Provider Department Center   6/8/2020 12:00 PM Mani Martinez, PT [facility] OP [facility] [place]   6/17/2020 10:15 AM Zoraida Irwin PA-C [facility] SPINE [facility]           I have seen and examined this patient face to face today. My clinical findings that support the need for the home health skilled services and home bound status are the following:  Weakness/numbness causing balance and gait disturbance due to cervical stenosis; weakness/debility making it taxing to leave home.    Allergies:Review of patient's allergies indicates:  No Known Allergies    Diet: diabetic diet: 2000 calorie    Activities: activity as tolerated; WBAT, patella stabilizing brace to RLE    Nursing:   SN to complete comprehensive assessment including routine vital signs. Instruct on disease process and s/s of complications to report to MD. Review/verify medication list sent home  "with the patient at time of discharge  and instruct patient/caregiver as needed. Frequency may be adjusted depending on start of care date.    Notify MD if SBP > 160 or < 90; DBP > 90 or < 50; HR > 120 or < 50; Temp > 101      CONSULTS:    Physical Therapy to evaluate and treat. Evaluate for home safety and equipment needs; Establish/upgrade home exercise program. Perform / instruct on therapeutic exercises, gait training, transfer training, and Range of Motion.  Occupational Therapy to evaluate and treat. Evaluate home environment for safety and equipment needs. Perform/Instruct on transfers, ADL training, ROM, and therapeutic exercises.    MISCELLANEOUS CARE:  N/A    WOUND CARE ORDERS  n/a      Medications: Review discharge medications with patient and family and provide education.      Current Discharge Medication List      START taking these medications    Details   lidocaine (LIDODERM) 5 % Place 1 patch onto the skin once daily. Remove & Discard patch within 12 hours or as directed by MD  Qty: 10 patch, Refills: 0         CONTINUE these medications which have CHANGED    Details   tiZANidine (ZANAFLEX) 4 MG tablet Take 1 tablet (4 mg total) by mouth daily as needed.  Qty: 30 tablet, Refills: 0         CONTINUE these medications which have NOT CHANGED    Details   albuterol 90 mcg/actuation inhaler Inhale 2 puffs into the lungs every 6 (six) hours as needed for Wheezing.      albuterol-ipratropium 2.5mg-0.5mg/3mL (DUO-NEB) 0.5 mg-3 mg(2.5 mg base)/3 mL nebulizer solution Refills: 5      amitriptyline (ELAVIL) 10 MG tablet 20 mg every evening.       ammonium lactate 12 % Crea ammonium lactate 12 % topical cream      aspirin (ECOTRIN) 81 MG EC tablet Take 81 mg by mouth once daily.      BD ULTRA-FINE MINI PEN NEEDLE 31 gauge x 3/16" Ndle       cholestyramine, with sugar, 4 gram Powd Take 4 g by mouth 2 (two) times daily as needed (diarrhea).  Qty: 378 g, Refills: 0    Associated Diagnoses: Diarrhea due to " malabsorption      duloxetine (CYMBALTA) 60 MG capsule Take 90 mg by mouth once daily.       ferrous sulfate (FEOSOL) 325 mg (65 mg iron) Tab tablet Take 1 tablet (325 mg total) by mouth once daily.  Qty: 30 tablet, Refills: 3      fluticasone propionate (FLONASE) 50 mcg/actuation nasal spray 2 sprays (100 mcg total) by Each Nare route once daily.  Qty: 1 Bottle, Refills: 12      hydroCHLOROthiazide (HYDRODIURIL) 25 MG tablet TAKE ONE TABLET BY MOUTH DAILY  Qty: 90 tablet, Refills: 1    Associated Diagnoses: Hypertension associated with diabetes      HYDROcodone-acetaminophen (NORCO)  mg per tablet Take 1 tablet by mouth 2 to 3 times daily as needed. for pain for 30 days  Qty: 75 tablet, Refills: 0      insulin aspart U-100 (NOVOLOG FLEXPEN U-100 INSULIN) 100 unit/mL (3 mL) InPn pen Inject into the skin. Taken as SS      LORazepam (ATIVAN) 0.5 MG tablet Take 0.5 mg by mouth every 12 (twelve) hours as needed.   Refills: 1      LYRICA 150 mg capsule Take by mouth 2 (two) times daily.   Refills: 1      nitroGLYCERIN (NITROSTAT) 0.4 MG SL tablet Place 0.4 mg under the tongue every 5 (five) minutes as needed for Chest pain.      NOVOLOG MIX 70-30 U-100 INSULN 100 unit/mL (70-30) Soln 30 units BID  Refills: 3      omeprazole (PRILOSEC) 40 MG capsule Take 1 capsule (40 mg total) by mouth every morning.  Qty: 30 capsule, Refills: 11      ondansetron (ZOFRAN-ODT) 4 MG TbDL Take 1 tablet (4 mg total) by mouth every 8 (eight) hours as needed.  Qty: 14 tablet, Refills: 1    Associated Diagnoses: Other migraine without status migrainosus, not intractable      potassium chloride SA (K-DUR,KLOR-CON) 20 MEQ tablet TAKE ONE TABLET BY MOUTH DAILY  Qty: 90 tablet, Refills: 1    Associated Diagnoses: Hypertension associated with diabetes      pravastatin (PRAVACHOL) 20 MG tablet Take 20 mg by mouth once daily.       quetiapine (SEROQUEL) 300 MG Tab 300 mg nightly.       topiramate (TOPAMAX) 50 MG tablet TAKE 1 BY MOUTH TWICE  DAILY  Qty: 180 tablet, Refills: 1      verapamiL (CALAN-SR) 240 MG CR tablet TAKE 1 TABLET BY MOUTH ONCE DAILY  Qty: 90 tablet, Refills: 1      zonisamide (ZONEGRAN) 100 MG Cap zonisamide 100 mg capsule   Take 2 capsules twice a day by oral route.             I certify that this patient is confined to her home and needs physical therapy and occupational therapy.

## 2020-05-27 NOTE — DISCHARGE SUMMARY
[facility] Hospital Medicine  Discharge Summary      Patient Name: Chayito Chung  MRN: [id number]  Admission Date: 5/25/2020  Hospital Length of Stay: 0 days  Discharge Date and Time: No discharge date for patient encounter.  Attending Physician: Erik Chen MD   Discharging Provider: Marry Hernandez PA-C  Primary Care Provider: Abhi Valadez MD    [facility] Medicine Team: [facility] MED E Marry Hernandez PA-C    HPI: Chayito Chung is a/an 69 y.o. female with CAD, DM2, COPD HTN, HPLD presenting to [facility] for RLE weakness and multiple falls today, sent to ED for evaluation by ortho of spinal stenosis. She states symptoms have been going on for a couple of weeks and fell several times today which she attributes to her RLE weakness. She denies hitting her head or losing consciousness. She denies bowel or bladder incontinence today. She denies hitting her back, or lifting anything heavy over the past couple of weeks. She uses a walker occasionally for assistance. MRI at [facility] showed a stenosis at L1 and L2, with disc bulge, ultimately ortho spine was consulted who felt that her findings were not completely consistent with her physical exam findings, but felt evaluation by PT/OT was warranted.  She denies any other symptoms besides the leg weakness.        * No surgery found *      Hospital Course: Patient admitted for RLE weakness, several falls.  MRI brain no acute abnormality. MRI C-T-L spine showed mod cervical spine stenosis, mild thoracic and lumbar spine stenosis. Cervical spine also has trace myelomalacia c3-4 and C5-6. Ortho spine consulted, started robaxin, placed in patella stabilizing brace, will try to mobilize patient with R sided paterllar brace, if this in unsuccessful, cervical decompression will be discussed at outpt apt. PTOT rec [facility]. Neuro f/u at d/c for possible neuropathy contributing.     Consults:     Final Active Diagnoses:    Diagnosis Date Noted POA     PRINCIPAL PROBLEM:  Lower extremity weakness [R29.898] 05/26/2020 Yes    Impaired ambulation [R26.2]  Yes    Hypertension associated with diabetes [E11.59, I10]  Yes    Hyperlipidemia associated with type 2 diabetes mellitus [E11.69, E78.5]  Yes    Major depressive disorder in partial remission [F32.4] 12/17/2012 Yes    JAZMIN (generalized anxiety disorder) [F41.1] 12/17/2012 Yes    Coronary artery disease of native artery of native heart with stable angina pectoris [I25.118] 12/16/2012 Yes    COPD (chronic obstructive pulmonary disease) [J44.9] 12/16/2012 Yes    Tobacco abuse, 1ppd, 50 years [Z72.0] 12/16/2012 Yes    Diabetes mellitus 12/16/2012 Yes      Problems Resolved During this Admission:      Discharged Condition: stable    Disposition: Home or Self Care    Follow Up:    Patient Instructions:      Ambulatory referral/consult to Neurology   Standing Status: Future   Referral Priority: Routine Referral Type: Consultation   Referral Reason: Specialty Services Required   Requested Specialty: Neurology   Number of Visits Requested: 1     Ambulatory referral/consult to Orthopedics   Standing Status: Future   Referral Priority: Routine Referral Type: Consultation   Requested Specialty: Orthopedic Surgery   Number of Visits Requested: 1     Medications:  Reconciled Home Medications:      Medication List      START taking these medications    lidocaine 5 %  Commonly known as:  LIDODERM  Place 1 patch onto the skin once daily. Remove & Discard patch within 12 hours or as directed by MD        CONTINUE taking these medications    albuterol 90 mcg/actuation inhaler  Commonly known as:  PROVENTIL/VENTOLIN HFA  Inhale 2 puffs into the lungs every 6 (six) hours as needed for Wheezing.     albuterol-ipratropium 2.5 mg-0.5 mg/3 mL nebulizer solution  Commonly known as:  DUO-NEB     amitriptyline 10 MG tablet  Commonly known as:  ELAVIL  20 mg every evening.     ammonium lactate 12 % Crea  ammonium lactate 12 % topical  "cream     aspirin 81 MG EC tablet  Commonly known as:  ECOTRIN  Take 81 mg by mouth once daily.     BD ULTRA-FINE MINI PEN NEEDLE 31 gauge x 3/16" Ndle  Generic drug:  pen needle, diabetic     cholestyramine (with sugar) 4 gram Powd  Take 4 g by mouth 2 (two) times daily as needed (diarrhea).     DULoxetine 60 MG capsule  Commonly known as:  CYMBALTA  Take 90 mg by mouth once daily.     ferrous sulfate 325 mg (65 mg iron) Tab tablet  Commonly known as:  FEOSOL  Take 1 tablet (325 mg total) by mouth once daily.     fluticasone propionate 50 mcg/actuation nasal spray  Commonly known as:  FLONASE  2 sprays (100 mcg total) by Each Nare route once daily.     hydroCHLOROthiazide 25 MG tablet  Commonly known as:  HYDRODIURIL  TAKE ONE TABLET BY MOUTH DAILY     HYDROcodone-acetaminophen  mg per tablet  Commonly known as:  NORCO  Take 1 tablet by mouth 2 to 3 times daily as needed. for pain for 30 days     LORazepam 0.5 MG tablet  Commonly known as:  ATIVAN  Take 0.5 mg by mouth every 12 (twelve) hours as needed.     LYRICA 150 MG capsule  Generic drug:  pregabalin  Take by mouth 2 (two) times daily.     nitroGLYCERIN 0.4 MG SL tablet  Commonly known as:  NITROSTAT  Place 0.4 mg under the tongue every 5 (five) minutes as needed for Chest pain.     NovoLOG Flexpen U-100 Insulin 100 unit/mL (3 mL) Inpn pen  Generic drug:  insulin aspart U-100  Inject into the skin. Taken as SS     NovoLOG Mix 70-30 U-100 Insuln 100 unit/mL (70-30) Soln  Generic drug:  insulin asp prt-insulin aspart (NOVOLOG 70/30)  30 units BID     omeprazole 40 MG capsule  Commonly known as:  PRILOSEC  Take 1 capsule (40 mg total) by mouth every morning.     ondansetron 4 MG Tbdl  Commonly known as:  ZOFRAN-ODT  Take 1 tablet (4 mg total) by mouth every 8 (eight) hours as needed.     potassium chloride SA 20 MEQ tablet  Commonly known as:  K-DUR,KLOR-CON  TAKE ONE TABLET BY MOUTH DAILY     pravastatin 20 MG tablet  Commonly known as:  PRAVACHOL  Take 20 " mg by mouth once daily.     QUEtiapine 300 MG Tab  Commonly known as:  SEROQUEL  300 mg nightly.     tiZANidine 4 MG tablet  Commonly known as:  ZANAFLEX  Take 1 tablet (4 mg total) by mouth daily as needed.     topiramate 50 MG tablet  Commonly known as:  TOPAMAX  TAKE 1 BY MOUTH TWICE DAILY     verapamiL 240 MG CR tablet  Commonly known as:  CALAN-SR  TAKE 1 TABLET BY MOUTH ONCE DAILY     zonisamide 100 MG Cap  Commonly known as:  ZONEGRAN  zonisamide 100 mg capsule   Take 2 capsules twice a day by oral route.            Significant Diagnostic Studies: Radiology: moderate cervical spine stenosis and mild thoracic and lumbar stenosis. Cervical spine imaging shows trace myelomalacia C3-4, C5-6    Pending Diagnostic Studies:     None        Indwelling Lines/Drains at time of discharge:   Lines/Drains/Airways     None                 Time spent on the discharge of patient: >35 minutes  Patient was seen and examined on the date of discharge and determined to be suitable for discharge.       Discussed with ortho and staff  Marry Hernandez PA-C  Department of Hospital Medicine  [facility]

## 2020-05-29 ENCOUNTER — TELEPHONE (OUTPATIENT)
Dept: FAMILY MEDICINE | Facility: CLINIC | Age: 70
End: 2020-05-29

## 2020-05-29 NOTE — TELEPHONE ENCOUNTER
----- Message from Krista Allred sent at 5/29/2020 10:49 AM CDT -----  Contact: [phone]/self  Claudia with [facility] is requesting the patient be call or seen. She also fell and was in the hospital.  She has swelling in right leg down to toes. Please call the patient to schedule. Thanks

## 2020-06-01 ENCOUNTER — TELEPHONE (OUTPATIENT)
Dept: FAMILY MEDICINE | Facility: CLINIC | Age: 70
End: 2020-06-01

## 2020-06-01 NOTE — TELEPHONE ENCOUNTER
Returned patient's call regarding appointment.  Patient does not have appointment for today.    No answer, left detailed voicemail requesting call back.

## 2020-06-01 NOTE — TELEPHONE ENCOUNTER
----- Message from Ernestine Martínez sent at 6/1/2020  7:58 AM CDT -----  Contact: [facility] [phone]  Patient would like to speak with you about stating she has an appointment today. Please advise

## 2020-06-02 ENCOUNTER — INITIAL CONSULT (OUTPATIENT)
Dept: ORTHOPEDICS | Facility: CLINIC | Age: 70
End: 2020-06-02
Payer: MEDICARE

## 2020-06-02 VITALS — BODY MASS INDEX: 25.76 KG/M2 | HEIGHT: 62 IN | WEIGHT: 140 LBS

## 2020-06-02 DIAGNOSIS — G95.9 CERVICAL MYELOPATHY: ICD-10-CM

## 2020-06-02 DIAGNOSIS — M48.02 CERVICAL STENOSIS OF SPINE: Primary | ICD-10-CM

## 2020-06-02 PROCEDURE — 1159F MED LIST DOCD IN RCRD: CPT | Mod: S$GLB,,, | Performed by: PHYSICIAN ASSISTANT

## 2020-06-02 PROCEDURE — 99499 UNLISTED E&M SERVICE: CPT | Mod: S$GLB,,, | Performed by: PHYSICIAN ASSISTANT

## 2020-06-02 PROCEDURE — 1125F AMNT PAIN NOTED PAIN PRSNT: CPT | Mod: S$GLB,,, | Performed by: PHYSICIAN ASSISTANT

## 2020-06-02 PROCEDURE — 99999 PR PBB SHADOW E&M-EST. PATIENT-LVL IV: CPT | Mod: PBBFAC,,, | Performed by: PHYSICIAN ASSISTANT

## 2020-06-02 PROCEDURE — 99499 RISK ADDL DX/OHS AUDIT: ICD-10-PCS | Mod: S$GLB,,, | Performed by: PHYSICIAN ASSISTANT

## 2020-06-02 PROCEDURE — 99999 PR PBB SHADOW E&M-EST. PATIENT-LVL IV: ICD-10-PCS | Mod: PBBFAC,,, | Performed by: PHYSICIAN ASSISTANT

## 2020-06-02 PROCEDURE — 1159F PR MEDICATION LIST DOCUMENTED IN MEDICAL RECORD: ICD-10-PCS | Mod: S$GLB,,, | Performed by: PHYSICIAN ASSISTANT

## 2020-06-02 PROCEDURE — 1101F PT FALLS ASSESS-DOCD LE1/YR: CPT | Mod: CPTII,S$GLB,, | Performed by: PHYSICIAN ASSISTANT

## 2020-06-02 PROCEDURE — 99204 PR OFFICE/OUTPT VISIT, NEW, LEVL IV, 45-59 MIN: ICD-10-PCS | Mod: S$GLB,,, | Performed by: PHYSICIAN ASSISTANT

## 2020-06-02 PROCEDURE — 1125F PR PAIN SEVERITY QUANTIFIED, PAIN PRESENT: ICD-10-PCS | Mod: S$GLB,,, | Performed by: PHYSICIAN ASSISTANT

## 2020-06-02 PROCEDURE — 99204 OFFICE O/P NEW MOD 45 MIN: CPT | Mod: S$GLB,,, | Performed by: PHYSICIAN ASSISTANT

## 2020-06-02 PROCEDURE — 1101F PR PT FALLS ASSESS DOC 0-1 FALLS W/OUT INJ PAST YR: ICD-10-PCS | Mod: CPTII,S$GLB,, | Performed by: PHYSICIAN ASSISTANT

## 2020-06-02 NOTE — PROGRESS NOTES
DATE: 6/3/2020  PATIENT: Chayito Chung    Supervising Physician: Herson Tate M.D.    CHIEF COMPLAINT: falls    HISTORY:  Chayito Chung is a 69 y.o. female previously seen in the hospital after a fall here for hospital follow up.  She says she woke up 2020 and couldn't stand.  She says her leg just kept giving out on her.  She denies any pain, trauma or accidents.  She fell 5 times prior to coming to the ED.  She was seen in the hospital where imaging of the cervical spine showed cervical stenosis with equivocal myelomalacia.  She reports difficulty with balance over the last 8-9 months.  She says her family has told her to started walking differently but she didn't really notice.  She also reports difficulty with small objects and clumsiness with her hands.  She was given a Rolator and a knee brace in the hospital.  She says she has fallen one time since being discharged from the hospital.     The patient denies myelopathic symptoms such as handwriting changes.  She reports difficulty with buttons/coins/keys. Denies perineal paresthesias, bowel/bladder dysfunction.    PAST MEDICAL/SURGICAL HISTORY:  Past Medical History:   Diagnosis Date    Acute coronary syndrome     Acute hypoxemic respiratory failure 2017    Anxiety     Asthma     Cancer     colon    Cataracts, both eyes     Chest pain at rest     Chest pain of uncertain etiology 2015    Colon cancer 1988    COPD (chronic obstructive pulmonary disease)     Coronary artery disease     Depression     Diabetes mellitus     Dyspnea on exertion 11/15/2018    Elevated brain natriuretic peptide (BNP) level 11/15/2018    Elevated cholesterol     Hypertension     Swelling     Syncope and collapse 2016     Past Surgical History:   Procedure Laterality Date    ABDOMINAL SURGERY      BREAST BIOPSY      benign unsure what side     SECTION, CLASSIC      COLON SURGERY      COLONOSCOPY  2019    repeat in 5 years  "   CORONARY ANGIOPLASTY WITH STENT PLACEMENT  3 months ago     x2, Hysterectomy, Lung surgery, Partial stomach removed, Part of colon removed for rectal cancer    GASTRECTOMY      HEMORRHOID SURGERY      HYSTERECTOMY      @28yrs of age    LUNG BIOPSY      OOPHORECTOMY      @28yrs of age       Medications:  Current Outpatient Medications on File Prior to Visit   Medication Sig Dispense Refill    albuterol 90 mcg/actuation inhaler Inhale 2 puffs into the lungs every 6 (six) hours as needed for Wheezing.      albuterol-ipratropium 2.5mg-0.5mg/3mL (DUO-NEB) 0.5 mg-3 mg(2.5 mg base)/3 mL nebulizer solution   5    amitriptyline (ELAVIL) 10 MG tablet 20 mg every evening.       ammonium lactate 12 % Crea ammonium lactate 12 % topical cream      aspirin (ECOTRIN) 81 MG EC tablet Take 81 mg by mouth once daily.      BD ULTRA-FINE MINI PEN NEEDLE 31 gauge x 3/16" Ndle       cholestyramine, with sugar, 4 gram Powd Take 4 g by mouth 2 (two) times daily as needed (diarrhea). 378 g 0    duloxetine (CYMBALTA) 60 MG capsule Take 90 mg by mouth once daily.       ferrous sulfate (FEOSOL) 325 mg (65 mg iron) Tab tablet Take 1 tablet (325 mg total) by mouth once daily. 30 tablet 3    fluticasone propionate (FLONASE) 50 mcg/actuation nasal spray 2 sprays (100 mcg total) by Each Nare route once daily. 1 Bottle 12    hydroCHLOROthiazide (HYDRODIURIL) 25 MG tablet TAKE ONE TABLET BY MOUTH DAILY 90 tablet 1    HYDROcodone-acetaminophen (NORCO)  mg per tablet Take 1 tablet by mouth 2 to 3 times daily as needed. for pain for 30 days 75 tablet 0    insulin aspart U-100 (NOVOLOG FLEXPEN U-100 INSULIN) 100 unit/mL (3 mL) InPn pen Inject into the skin. Taken as SS      lidocaine (LIDODERM) 5 % Place 1 patch onto the skin once daily. Remove & Discard patch within 12 hours or as directed by MD 10 patch 0    LORazepam (ATIVAN) 0.5 MG tablet Take 0.5 mg by mouth every 12 (twelve) hours as needed.   1    LYRICA 150 " mg capsule Take by mouth 2 (two) times daily.   1    nitroGLYCERIN (NITROSTAT) 0.4 MG SL tablet Place 0.4 mg under the tongue every 5 (five) minutes as needed for Chest pain.      NOVOLOG MIX 70-30 U-100 INSULN 100 unit/mL (70-30) Soln 30 units BID  3    omeprazole (PRILOSEC) 40 MG capsule Take 1 capsule (40 mg total) by mouth every morning. 30 capsule 11    ondansetron (ZOFRAN-ODT) 4 MG TbDL Take 1 tablet (4 mg total) by mouth every 8 (eight) hours as needed. 14 tablet 1    potassium chloride SA (K-DUR,KLOR-CON) 20 MEQ tablet TAKE ONE TABLET BY MOUTH DAILY 90 tablet 1    pravastatin (PRAVACHOL) 20 MG tablet Take 20 mg by mouth once daily.       quetiapine (SEROQUEL) 300 MG Tab 300 mg nightly.       tiZANidine (ZANAFLEX) 4 MG tablet Take 1 tablet (4 mg total) by mouth daily as needed. 30 tablet 0    topiramate (TOPAMAX) 50 MG tablet TAKE 1 BY MOUTH TWICE DAILY 180 tablet 1    verapamiL (CALAN-SR) 240 MG CR tablet TAKE 1 TABLET BY MOUTH ONCE DAILY 90 tablet 1    zonisamide (ZONEGRAN) 100 MG Cap zonisamide 100 mg capsule   Take 2 capsules twice a day by oral route.       No current facility-administered medications on file prior to visit.        Social History:   Social History     Socioeconomic History    Marital status: Single     Spouse name: Not on file    Number of children: Not on file    Years of education: Not on file    Highest education level: Not on file   Occupational History    Not on file   Social Needs    Financial resource strain: Not on file    Food insecurity:     Worry: Not on file     Inability: Not on file    Transportation needs:     Medical: Not on file     Non-medical: Not on file   Tobacco Use    Smoking status: Current Every Day Smoker     Packs/day: 1.00     Years: 53.00     Pack years: 53.00     Types: Cigarettes     Start date: 1965    Smokeless tobacco: Never Used    Tobacco comment: Pt is currently enrolled in the [employer].  Ambulatory referral to Smoking  "Cessation program.    Substance and Sexual Activity    Alcohol use: Yes     Alcohol/week: 3.0 standard drinks     Types: 3 Glasses of wine per week     Comment: 1 every 3 months    Drug use: No    Sexual activity: Yes     Partners: Male     Birth control/protection: None     Comment: last drink yesterday afternoon   Lifestyle    Physical activity:     Days per week: Not on file     Minutes per session: Not on file    Stress: Not on file   Relationships    Social connections:     Talks on phone: Not on file     Gets together: Not on file     Attends Taoist service: Not on file     Active member of club or organization: Not on file     Attends meetings of clubs or organizations: Not on file     Relationship status: Not on file   Other Topics Concern    Not on file   Social History Narrative    Not on file       REVIEW OF SYSTEMS:  Constitution: Negative. Negative for chills, fever and night sweats.   Cardiovascular: Negative for chest pain and syncope.   Respiratory: Negative for cough and shortness of breath.   Gastrointestinal: See HPI. Negative for nausea/vomiting. Negative for abdominal pain.  Genitourinary: See HPI. Negative for discoloration or dysuria.  Skin: Negative for dry skin, itching and rash.   Hematologic/Lymphatic: Negative for bleeding problem. Does not bruise/bleed easily.   Musculoskeletal: Negative for falls and muscle weakness.   Neurological: See HPI. No seizures.   Endocrine: Negative for polydipsia, polyphagia and polyuria.   Allergic/Immunologic: Negative for hives and persistent infections.  Psychiatric/Behavioral: Negative for depression and insomnia.         EXAM:  Ht 5' 2" (1.575 m)   Wt 63.5 kg (140 lb)   LMP  (LMP Unknown)   BMI 25.61 kg/m²     General: The patient is a very pleasant 69 y.o. female in no apparent distress, the patient is oriented to person, place and time.  Psych: Normal mood and affect  HEENT: Vision grossly intact, hearing intact to the spoken " word.  Lungs: Respirations unlabored.  Gait: Antalgic, broad based station and gait, she is using a Rolator today.   Skin: Cervical skin negative for rashes, lesions, hairy patches and surgical scars.  Range of motion: Cervical range of motion is acceptable. There is no tenderness to palpation.  Spinal Balance: Global saggital and coronal spinal balance acceptable, no significant for scoliosis and kyphosis.  Musculoskeletal: No pain with the range of motion of the bilateral shoulders and elbows. Normal bulk and contour of the bilateral hands.  Vascular: Bilateral hands warm and well perfused, radial pulses 2+ bilaterally.  Neurological: Normal strength and tone in all major motor groups in the bilateral upper and lower extremities. Normal sensation to light touch in the C5-T1 and L2-S1 dermatomes bilaterally.  Deep tendon reflexes symmetric 2++ in the bilateral upper and lower extremities.  Positive Inverted Radial Reflex and negative Atkinson's bilaterally. Negative Babinski bilaterally.     IMAGING:   Today I personally reviewed AP and Lat upright C-spine films that demonstrate multilevel degenerative changes.    MRI cervical spine demonstrates multilevel cervical spondylosis with severe spinal stenosis at C5/6 with myelomalacia.  There is moderate C6/7 stenosis. Mild C3/4 and C4/5 stenosis.     Body mass index is 25.61 kg/m².    Hemoglobin A1C   Date Value Ref Range Status   05/12/2020 7.1 (H) 4.0 - 5.6 % Final     Comment:     ADA Screening Guidelines:  5.7-6.4%  Consistent with prediabetes  >or=6.5%  Consistent with diabetes  High levels of fetal hemoglobin interfere with the HbA1C  assay. Heterozygous hemoglobin variants (HbS, HgC, etc)do  not significantly interfere with this assay.   However, presence of multiple variants may affect accuracy.     02/27/2020 8.3 (H) 4.0 - 5.6 % Final     Comment:     ADA Screening Guidelines:  5.7-6.4%  Consistent with prediabetes  >or=6.5%  Consistent with diabetes  High  levels of fetal hemoglobin interfere with the HbA1C  assay. Heterozygous hemoglobin variants (HbS, HgC, etc)do  not significantly interfere with this assay.   However, presence of multiple variants may affect accuracy.     07/25/2019 7.2 (H) 4.0 - 5.6 % Final     Comment:     ADA Screening Guidelines:  5.7-6.4%  Consistent with prediabetes  >or=6.5%  Consistent with diabetes  High levels of fetal hemoglobin interfere with the HbA1C  assay. Heterozygous hemoglobin variants (HbS, HgC, etc)do  not significantly interfere with this assay.   However, presence of multiple variants may affect accuracy.     12/16/2012 11.7 (H) 4.8 - 5.6 % Final     Comment:              Increased risk for diabetes: 5.7 - 6.4           Diabetes: >6.4           Glycemic control for adults with diabetes: <7.0  **In order to standardize %HbA1c results worldwide, as of October 11, 2010,  the %HbA1c is being calculated using the master equation recommended in the  consensus statement adopted by the ADA (American Diabetes Assoc), EASD  (European Assoc for the Study of Diabetes), IFCC (International Federation  of Clinical Chemistry and Laboratory Medicine) and IDF (International  Diabetes Federation). Result units: %HgbA1c (DCCT/NGSP).  In common with other methods, Hb A1C values may not accurately reflect mean  blood glucose in patients with hemoglobin variants (HgbF, HgbS and HgbC).  Any cause of shortened erythrocyte survival will reduce exposure of  erythrocytes to glucose with a consequent decrease in HbA1c (%) values, even  though the time-averaged blood glucose level may be elevated. Causes of  shortened erythrocyte lifetime might be hemolytic anemia or other hemolytic  diseases, homozygous sickle cell trait, pregnancy, recent significant or  chronic blood loss, etc. Caution should be used when interpreting the HbA1c  results from patients with these conditions.           ASSESSMENT/PLAN:    Chayito was seen today for low-back pain and leg  pain.    Diagnoses and all orders for this visit:    Cervical stenosis of spine    Cervical myelopathy      Today we discussed at length all of the different treatment options including anti-inflammatories, acetaminophen, rest, ice, heat, physical therapy including strengthening and stretching exercises, home exercises, ROM, aerobic conditioning, aqua therapy, other modalities including ultrasound, massage, and dry needling, epidural steroid injections and finally surgical intervention.      I had a sit down discussion with the patient regarding cervical myelopathy. I discussed the known stepwise degeneration of cervical myelopathy. I discussed the risks and benefits of decompressive surgery, including the concept zara surgery would be done to prevent further neurological injury/degeneration rather than to ameliorate any existing deficits.     Discussed with Dr. Tate.  We will get her scheduled for a C3-T1 posterior cervical laminectomy and fusion.  Tentative date for 7/23.         Follow up if symptoms worsen or fail to improve.

## 2020-06-03 ENCOUNTER — TELEPHONE (OUTPATIENT)
Dept: ORTHOPEDICS | Facility: CLINIC | Age: 70
End: 2020-06-03

## 2020-06-03 DIAGNOSIS — Z20.822 COVID-19 VIRUS NOT DETECTED: Primary | ICD-10-CM

## 2020-06-05 ENCOUNTER — OFFICE VISIT (OUTPATIENT)
Dept: FAMILY MEDICINE | Facility: CLINIC | Age: 70
End: 2020-06-05
Attending: FAMILY MEDICINE
Payer: MEDICARE

## 2020-06-05 VITALS
DIASTOLIC BLOOD PRESSURE: 82 MMHG | HEIGHT: 62 IN | TEMPERATURE: 98 F | WEIGHT: 140.88 LBS | BODY MASS INDEX: 25.92 KG/M2 | SYSTOLIC BLOOD PRESSURE: 132 MMHG | HEART RATE: 119 BPM | OXYGEN SATURATION: 96 %

## 2020-06-05 DIAGNOSIS — R26.2 IMPAIRED AMBULATION: ICD-10-CM

## 2020-06-05 DIAGNOSIS — M47.816 LUMBAR FACET ARTHROPATHY: Primary | ICD-10-CM

## 2020-06-05 DIAGNOSIS — R29.898 WEAKNESS OF RIGHT LOWER EXTREMITY: ICD-10-CM

## 2020-06-05 PROCEDURE — 99999 PR PBB SHADOW E&M-EST. PATIENT-LVL III: CPT | Mod: PBBFAC,,, | Performed by: FAMILY MEDICINE

## 2020-06-05 PROCEDURE — 3079F DIAST BP 80-89 MM HG: CPT | Mod: CPTII,S$GLB,, | Performed by: FAMILY MEDICINE

## 2020-06-05 PROCEDURE — 99214 OFFICE O/P EST MOD 30 MIN: CPT | Mod: S$GLB,,, | Performed by: FAMILY MEDICINE

## 2020-06-05 PROCEDURE — 1126F AMNT PAIN NOTED NONE PRSNT: CPT | Mod: S$GLB,,, | Performed by: FAMILY MEDICINE

## 2020-06-05 PROCEDURE — 3075F PR MOST RECENT SYSTOLIC BLOOD PRESS GE 130-139MM HG: ICD-10-PCS | Mod: CPTII,S$GLB,, | Performed by: FAMILY MEDICINE

## 2020-06-05 PROCEDURE — 1101F PR PT FALLS ASSESS DOC 0-1 FALLS W/OUT INJ PAST YR: ICD-10-PCS | Mod: CPTII,S$GLB,, | Performed by: FAMILY MEDICINE

## 2020-06-05 PROCEDURE — 1159F PR MEDICATION LIST DOCUMENTED IN MEDICAL RECORD: ICD-10-PCS | Mod: S$GLB,,, | Performed by: FAMILY MEDICINE

## 2020-06-05 PROCEDURE — 99214 PR OFFICE/OUTPT VISIT, EST, LEVL IV, 30-39 MIN: ICD-10-PCS | Mod: S$GLB,,, | Performed by: FAMILY MEDICINE

## 2020-06-05 PROCEDURE — 1101F PT FALLS ASSESS-DOCD LE1/YR: CPT | Mod: CPTII,S$GLB,, | Performed by: FAMILY MEDICINE

## 2020-06-05 PROCEDURE — 99999 PR PBB SHADOW E&M-EST. PATIENT-LVL III: ICD-10-PCS | Mod: PBBFAC,,, | Performed by: FAMILY MEDICINE

## 2020-06-05 PROCEDURE — 3079F PR MOST RECENT DIASTOLIC BLOOD PRESSURE 80-89 MM HG: ICD-10-PCS | Mod: CPTII,S$GLB,, | Performed by: FAMILY MEDICINE

## 2020-06-05 PROCEDURE — 1159F MED LIST DOCD IN RCRD: CPT | Mod: S$GLB,,, | Performed by: FAMILY MEDICINE

## 2020-06-05 PROCEDURE — 3075F SYST BP GE 130 - 139MM HG: CPT | Mod: CPTII,S$GLB,, | Performed by: FAMILY MEDICINE

## 2020-06-05 PROCEDURE — 1126F PR PAIN SEVERITY QUANTIFIED, NO PAIN PRESENT: ICD-10-PCS | Mod: S$GLB,,, | Performed by: FAMILY MEDICINE

## 2020-06-05 NOTE — PROGRESS NOTES
Subjective:       Patient ID: Chayito Chung is a 69 y.o. female.    Chief Complaint: Hospital Follow Up    69 yr old pleasant black female with DM II, HTN, COPD and other co morbidities presents after ER and hospital visit for sudden right leg weakness. She states symptoms have been going on for a couple of weeks and fell several times today which she attributes to her RLE weakness. She denies hitting her head or losing consciousness. She denies bowel or bladder incontinence today. She denies hitting her back, or lifting anything heavy over the past couple of weeks. She uses a walker occasionally for assistance. MRI at [facility] showed a stenosis at L1 and L2, with disc bulge, ultimately ortho spine was consulted who felt that her findings were not completely consistent with her physical exam findings, but felt evaluation by PT/OT was warranted.  She denies any other symptoms besides the leg weakness. She was admitted in hospital a week ago for RLE weakness, several falls.  MRI brain no acute abnormality. MRI C-T-L spine showed mod cervical spine stenosis, mild thoracic and lumbar spine stenosis. Cervical spine also has trace myelomalacia c3-4 and C5-6. Ortho spine consulted, started robaxin, placed in patella stabilizing brace, will try to mobilize patient with R sided paterllar brace, if this in unsuccessful, cervical decompression was recommended. She is following neurosurgery and scheduled for surgery next month. The weakness is still ongoing. No new complaints.           Review of Systems   Constitutional: Negative.  Negative for activity change, diaphoresis and unexpected weight change.   HENT: Negative.  Negative for congestion, ear discharge, hearing loss, rhinorrhea, sore throat and voice change.    Eyes: Negative.  Negative for pain, discharge and visual disturbance.   Respiratory: Negative.  Negative for chest tightness, shortness of breath and wheezing.    Cardiovascular: Negative.  Negative for chest  pain.   Gastrointestinal: Negative.  Negative for abdominal distention, anal bleeding, constipation and nausea.   Endocrine: Negative.  Negative for cold intolerance, polydipsia and polyuria.   Genitourinary: Negative.  Negative for decreased urine volume, difficulty urinating, dysuria, frequency, menstrual problem and vaginal pain.   Musculoskeletal: Negative.  Negative for arthralgias, gait problem and myalgias.   Skin: Negative.  Negative for color change, pallor and wound.   Allergic/Immunologic: Negative.  Negative for environmental allergies and immunocompromised state.   Neurological: Positive for weakness. Negative for dizziness, tremors, seizures, speech difficulty and headaches.   Hematological: Negative.  Negative for adenopathy. Does not bruise/bleed easily.   Psychiatric/Behavioral: Negative.  Negative for agitation, confusion, decreased concentration, hallucinations, self-injury and suicidal ideas. The patient is not nervous/anxious.        PMH/PSH/FH/SH/MED/ALLERGY reviewed    Objective:       Vitals:    06/05/20 0910   BP: 132/82   Pulse: (!) 119   Temp: 98.2 °F (36.8 °C)       Physical Exam   Constitutional: She is oriented to person, place, and time. She appears well-developed and well-nourished. No distress.   HENT:   Head: Normocephalic and atraumatic.   Right Ear: External ear normal.   Left Ear: External ear normal.   Nose: Nose normal.   Mouth/Throat: Oropharynx is clear and moist. No oropharyngeal exudate.   Eyes: Pupils are equal, round, and reactive to light. Conjunctivae and EOM are normal. Right eye exhibits no discharge. Left eye exhibits no discharge. No scleral icterus.   Neck: Normal range of motion. Neck supple. No JVD present. No tracheal deviation present. No thyromegaly present.   Cardiovascular: Normal rate, regular rhythm, normal heart sounds and intact distal pulses. Exam reveals no gallop and no friction rub.   No murmur heard.  Pulmonary/Chest: Effort normal and breath sounds  normal. No stridor. She has no wheezes. She has no rales. She exhibits no tenderness.   Abdominal: Soft. Bowel sounds are normal. She exhibits no distension and no mass. There is no tenderness. There is no rebound and no guarding. No hernia.   Musculoskeletal: Normal range of motion. She exhibits no edema or tenderness.   Lymphadenopathy:     She has no cervical adenopathy.   Neurological: She is alert and oriented to person, place, and time. She displays abnormal reflex (abnormal right patellar reflex 1/4. strength 3-4/5 RLE). No cranial nerve deficit. She exhibits normal muscle tone. Coordination normal.   Skin: Skin is warm and dry. No rash noted. She is not diaphoretic. No erythema. There is pallor.   Psychiatric: She has a normal mood and affect. Her behavior is normal. Judgment and thought content normal.       Assessment:       1. Lumbar facet arthropathy    2. Weakness of right lower extremity    3. Impaired ambulation        Plan:           Chayito was seen today for hospital follow up.    Diagnoses and all orders for this visit:    Lumbar facet arthropathy    Weakness of right lower extremity    Impaired ambulation      Reviewed chart and notes and imaging studies    Recommend to use walker all the time and watch for falls    If weakness worsens - go ER immediately      She voiced understanding    Spent adequate time in obtaining history and explaining differentials    25 minutes spent during this visit of which greater than 50% devoted to face-face counseling and coordination of care regarding diagnosis and management plan    Follow up if symptoms worsen or fail to improve.

## 2020-06-10 ENCOUNTER — CLINICAL SUPPORT (OUTPATIENT)
Dept: SMOKING CESSATION | Facility: CLINIC | Age: 70
End: 2020-06-10
Payer: COMMERCIAL

## 2020-06-10 DIAGNOSIS — F17.200 NICOTINE DEPENDENCE: Primary | ICD-10-CM

## 2020-06-10 PROCEDURE — 99407 BEHAV CHNG SMOKING > 10 MIN: CPT | Mod: S$GLB,,, | Performed by: INTERNAL MEDICINE

## 2020-06-10 PROCEDURE — 99407 PR TOBACCO USE CESSATION INTENSIVE >10 MINUTES: ICD-10-PCS | Mod: S$GLB,,, | Performed by: INTERNAL MEDICINE

## 2020-06-10 NOTE — PROGRESS NOTES
Spoke with patient today in regard to smoking cessation progress for 12 month telephone follow up, she states not tobacco free. Patient states she has cut down on cigarette intake, but not ready to return to the program at this time. Informed patient of benefit period and contact information if any further help or support is needed. Will resolve episode and complete smart form for Quit attempt #4.

## 2020-06-23 ENCOUNTER — TELEPHONE (OUTPATIENT)
Dept: CARDIOLOGY | Facility: CLINIC | Age: 70
End: 2020-06-23

## 2020-06-23 NOTE — TELEPHONE ENCOUNTER
Returned call to patient.  Scheduled surgery clearance with Dr Malin.  Confirmed appointment date, time and location with patient.  Reminder letter mailed to patient.    ----- Message from Jackie Chung sent at 6/23/2020  2:15 PM CDT -----      Name of Who is Calling: MARYBEL CHUNG [[id number]]      What is the request in detail: Pt called said she will be having spinal surgery soon and would like an appt as soon as possible.Please contact to further discuss and advise.          Can the clinic reply by [facility]: N      What [facility]: [phone]

## 2020-06-25 ENCOUNTER — TELEPHONE (OUTPATIENT)
Dept: FAMILY MEDICINE | Facility: CLINIC | Age: 70
End: 2020-06-25

## 2020-06-25 ENCOUNTER — TELEPHONE (OUTPATIENT)
Dept: PREADMISSION TESTING | Facility: HOSPITAL | Age: 70
End: 2020-06-25

## 2020-06-25 DIAGNOSIS — Z01.818 PREOPERATIVE TESTING: Primary | ICD-10-CM

## 2020-06-25 NOTE — TELEPHONE ENCOUNTER
----- Message from Angie Perdue RN sent at 6/25/2020  3:12 PM CDT -----  Patient is scheduled for cervical laminectomy with posterior fusion , C3-T1 on 7/23 with Dr. Tate( approximately 230 minutes of general anesthesia). She will need medical clearance for this surgey . Please scheudule a preop clearance appt.  Thanks!

## 2020-06-25 NOTE — ANESTHESIA PAT ROS NOTE
06/25/2020  Chayito Chung is a 69 y.o., female.      Pre-op Assessment          Review of Systems         Anesthesia Assessment: Preoperative EQUATION    Planned Procedure: Procedure(s) (LRB):  LAMINECTOMY, SPINE, CERVICAL, WITH POSTERIOR FUSION C3-T1 Depuy SNS:Motors/SSEP/EMG [place] + pads + tongs  (N/A)  Requested Anesthesia Type:General  Surgeon: Herson Tate MD  Service: Neurosurgery  Known or anticipated Date of Surgery:7/23/2020    Surgeon notes: reviewed    Electronic QUestionnaire Assessment completed via nurse interview with patient.        Triage considerations:       Previous anesthesia records:No problems and Not available    Last PCP note: 3-6 months ago , within [facility]   Subspecialty notes: Cardiology: General, ENT, Ortho    Other important co-morbidities:PER Epic:  CHF, COPD, DM2, GERD, HLD, HTN, Smoker and CAD      Tests already available:  Available tests,  within 3 months , within [facility] . 5/27/2020 CMP, CBC, PHOS, MG, 5/26/2020 CT CERVICAL SPINE W/O CONTRAST, MRI THORACIC SPINE W/O CONTRAST,MRI CERVICAL SPINE W/O CONTRAST, MRI BRAIN W/O CONTRAST,5/25/2020 XRAY THORACIC SPINE AP/LAT, XRAY CERVICAL SPINE AP/ALAT, MRI LUMBAR SPINE W/O CONTRAST, 5/12/2020 HGAIC, TSH, FT4, PTT, PT/INR, 1/15/2020 EKG            Instructions given. (See in Nurse's note)    Optimization:  Anesthesia Preop Clinic Assessment  Indicated    Medical Opinion Indicated       Sub-specialist consult indicated:  CARDIOLOGY       Plan:    Testing:  CBC, T&S and UA   Pre-anesthesia  visit       Visit focus: concerns in complex and/or prolonged anesthesia     Consultation:Patient's PCP for re-evaluation     Patient  has previously scheduled Medical Appointment:7/15 DR. TATE, 7/21 COVID TESTING    Navigation: Tests Scheduled. TBD             Consults scheduled.TBD             Results will be tracked by Preop  Clinic.  7/15 Instructions to stay on asa 81 mg per Dr. Tate given  on 7/15 .  7/16 Had GI evaluation with Dr. Randell Mendoza on 7/15( scanned to media).   7/17 Labs and ua resulted and noted.Not in media from Dr. Ramachandran: refer to Hematology, Dr. Duke. May have hematology consult after surgery.   7/21 Medical clearance given by Dr. Hari Howard on 7/20.  7/22 Nuclear spect stress in progress. Awaiting results and cardiac clearance from Dr. Pearce.Dr. Dalia Swift informed of this situation. InMailjetsket message to Dr. Tate and his staff concerning this matter.  7/23   Message left on voicemail:  Cardiolite stress test is normal.    Left ventricular systolic function is normal  CXR shows clear lungs  Labs show anemia has improved with hgb up to 10.9  CMP OK except for hyperglycemia.  Patient is medically stable for neck surgery today.      Electronically signed by Gaurav Malin MD at 7/23/2020  9:42 AM    Angie Perdue RN BSN

## 2020-06-25 NOTE — TELEPHONE ENCOUNTER
----- Message from Angie Perdue RN sent at 6/25/2020  3:07 PM CDT -----  Surgery 7/23  Please schedule poc appt, labs and ua  Thanks!

## 2020-06-25 NOTE — TELEPHONE ENCOUNTER
Called patient to schedule a pre op clearance per message below.  Left a detailed message on patient's voicemail requesting a call back to set up appointment.

## 2020-06-25 NOTE — PRE-PROCEDURE INSTRUCTIONS
Patient stated she has not had any problem with anesthesia in the past. Will need medical clearance from your PCP, Dr. Abhi Valadez. She will make an appt. She has an appt for Cardiac clearance with Dr. Malin for 7/8. I will ask him for asa 81 mg instructions.Will also need poc appt, labs and ua. Our  will call to set up these appts.Preop instructions given. Hold other asa containing products, nsaids, vitamins and supplements one week prior to surgery. Verbalizes understanding.

## 2020-07-08 ENCOUNTER — OFFICE VISIT (OUTPATIENT)
Dept: CARDIOLOGY | Facility: CLINIC | Age: 70
End: 2020-07-08
Payer: MEDICARE

## 2020-07-08 VITALS
DIASTOLIC BLOOD PRESSURE: 86 MMHG | WEIGHT: 147.94 LBS | BODY MASS INDEX: 27.22 KG/M2 | SYSTOLIC BLOOD PRESSURE: 130 MMHG | HEART RATE: 98 BPM | HEIGHT: 62 IN

## 2020-07-08 DIAGNOSIS — E11.59 HYPERTENSION ASSOCIATED WITH DIABETES: ICD-10-CM

## 2020-07-08 DIAGNOSIS — J42 CHRONIC BRONCHITIS, UNSPECIFIED CHRONIC BRONCHITIS TYPE: ICD-10-CM

## 2020-07-08 DIAGNOSIS — Z98.61 S/P PTCA (PERCUTANEOUS TRANSLUMINAL CORONARY ANGIOPLASTY): Primary | ICD-10-CM

## 2020-07-08 DIAGNOSIS — I25.118 CORONARY ARTERY DISEASE OF NATIVE ARTERY OF NATIVE HEART WITH STABLE ANGINA PECTORIS: ICD-10-CM

## 2020-07-08 DIAGNOSIS — D50.0 IRON DEFICIENCY ANEMIA DUE TO CHRONIC BLOOD LOSS: ICD-10-CM

## 2020-07-08 DIAGNOSIS — Z98.61 POST PTCA: ICD-10-CM

## 2020-07-08 DIAGNOSIS — E11.69 HYPERLIPIDEMIA ASSOCIATED WITH TYPE 2 DIABETES MELLITUS: ICD-10-CM

## 2020-07-08 DIAGNOSIS — E78.5 HYPERLIPIDEMIA ASSOCIATED WITH TYPE 2 DIABETES MELLITUS: ICD-10-CM

## 2020-07-08 DIAGNOSIS — I15.2 HYPERTENSION ASSOCIATED WITH DIABETES: ICD-10-CM

## 2020-07-08 PROCEDURE — 3075F PR MOST RECENT SYSTOLIC BLOOD PRESS GE 130-139MM HG: ICD-10-PCS | Mod: CPTII,S$GLB,, | Performed by: INTERNAL MEDICINE

## 2020-07-08 PROCEDURE — 99999 PR PBB SHADOW E&M-EST. PATIENT-LVL V: CPT | Mod: PBBFAC,,, | Performed by: INTERNAL MEDICINE

## 2020-07-08 PROCEDURE — 1101F PT FALLS ASSESS-DOCD LE1/YR: CPT | Mod: CPTII,S$GLB,, | Performed by: INTERNAL MEDICINE

## 2020-07-08 PROCEDURE — 3008F BODY MASS INDEX DOCD: CPT | Mod: CPTII,S$GLB,, | Performed by: INTERNAL MEDICINE

## 2020-07-08 PROCEDURE — 3079F DIAST BP 80-89 MM HG: CPT | Mod: CPTII,S$GLB,, | Performed by: INTERNAL MEDICINE

## 2020-07-08 PROCEDURE — 3008F PR BODY MASS INDEX (BMI) DOCUMENTED: ICD-10-PCS | Mod: CPTII,S$GLB,, | Performed by: INTERNAL MEDICINE

## 2020-07-08 PROCEDURE — 3079F PR MOST RECENT DIASTOLIC BLOOD PRESSURE 80-89 MM HG: ICD-10-PCS | Mod: CPTII,S$GLB,, | Performed by: INTERNAL MEDICINE

## 2020-07-08 PROCEDURE — 1159F MED LIST DOCD IN RCRD: CPT | Mod: S$GLB,,, | Performed by: INTERNAL MEDICINE

## 2020-07-08 PROCEDURE — 99999 PR PBB SHADOW E&M-EST. PATIENT-LVL V: ICD-10-PCS | Mod: PBBFAC,,, | Performed by: INTERNAL MEDICINE

## 2020-07-08 PROCEDURE — 3051F HG A1C>EQUAL 7.0%<8.0%: CPT | Mod: CPTII,S$GLB,, | Performed by: INTERNAL MEDICINE

## 2020-07-08 PROCEDURE — 1101F PR PT FALLS ASSESS DOC 0-1 FALLS W/OUT INJ PAST YR: ICD-10-PCS | Mod: CPTII,S$GLB,, | Performed by: INTERNAL MEDICINE

## 2020-07-08 PROCEDURE — 99214 PR OFFICE/OUTPT VISIT, EST, LEVL IV, 30-39 MIN: ICD-10-PCS | Mod: 25,S$GLB,, | Performed by: INTERNAL MEDICINE

## 2020-07-08 PROCEDURE — 93000 ELECTROCARDIOGRAM COMPLETE: CPT | Mod: S$GLB,,, | Performed by: INTERNAL MEDICINE

## 2020-07-08 PROCEDURE — 1159F PR MEDICATION LIST DOCUMENTED IN MEDICAL RECORD: ICD-10-PCS | Mod: S$GLB,,, | Performed by: INTERNAL MEDICINE

## 2020-07-08 PROCEDURE — 3075F SYST BP GE 130 - 139MM HG: CPT | Mod: CPTII,S$GLB,, | Performed by: INTERNAL MEDICINE

## 2020-07-08 PROCEDURE — 3051F PR MOST RECENT HEMOGLOBIN A1C LEVEL 7.0 - < 8.0%: ICD-10-PCS | Mod: CPTII,S$GLB,, | Performed by: INTERNAL MEDICINE

## 2020-07-08 PROCEDURE — 93000 EKG 12-LEAD: ICD-10-PCS | Mod: S$GLB,,, | Performed by: INTERNAL MEDICINE

## 2020-07-08 PROCEDURE — 99214 OFFICE O/P EST MOD 30 MIN: CPT | Mod: 25,S$GLB,, | Performed by: INTERNAL MEDICINE

## 2020-07-08 RX ORDER — INSULIN LISPRO 100 [IU]/ML
30 INJECTION, SUSPENSION SUBCUTANEOUS 2 TIMES DAILY
COMMUNITY
Start: 2020-06-15 | End: 2021-07-09 | Stop reason: ALTCHOICE

## 2020-07-08 NOTE — PROGRESS NOTES
Subjective:      Patient ID: Chayito Chung is a 69 y.o. female.    Chief Complaint: Pre-op Exam (Cervical laminectomy w/posterior fusion C3-T1 - Dr Tate)    HPI:  Pt is anticipating cervical spine surgery.    Review of Systems   Cardiovascular: Positive for chest pain and dyspnea on exertion. Negative for claudication, irregular heartbeat, leg swelling, near-syncope, orthopnea, palpitations and syncope.      Pt reports chronic shortness of breath walking less than a block.    Pt has taken NTG SL on tow occasions this year for chest pains which occurred while pt was cleaning her house.    Still smoking    Gastroenterologist, Dr Mendoza has given pt IV iron in the past for anemia    Dr Chung has been following pt for lung nodule and put pt off for a year's f/u appt.      Past Medical History:   Diagnosis Date    Acute coronary syndrome     Acute hypoxemic respiratory failure 2017    Anxiety     Asthma     Cancer     colon    Cataracts, both eyes     Chest pain at rest     Chest pain of uncertain etiology 2015    Colon cancer 1988    COPD (chronic obstructive pulmonary disease)     Coronary artery disease     Depression     Diabetes mellitus     Dyspnea on exertion 11/15/2018    Elevated brain natriuretic peptide (BNP) level 11/15/2018    Elevated cholesterol     Hypertension     Swelling     Syncope and collapse 2016        Past Surgical History:   Procedure Laterality Date    ABDOMINAL SURGERY      BREAST BIOPSY      benign unsure what side     SECTION, CLASSIC      COLON SURGERY      COLONOSCOPY  2019    repeat in 5 years    CORONARY ANGIOPLASTY WITH STENT PLACEMENT  3 months ago     x2, Hysterectomy, Lung surgery, Partial stomach removed, Part of colon removed for rectal cancer    GASTRECTOMY      HEMORRHOID SURGERY      HYSTERECTOMY      @28yrs of age    LUNG BIOPSY      OOPHORECTOMY      @28yrs of age       Family History   Problem  Relation Age of Onset    Cancer Mother     Diabetes Mother     Hypertension Mother     Breast cancer Mother     Cancer Father     Heart disease Father     Depression Sister     Hypertension Sister     Cancer Maternal Aunt        Social History     Socioeconomic History    Marital status: Single     Spouse name: Not on file    Number of children: Not on file    Years of education: Not on file    Highest education level: Not on file   Occupational History    Not on file   Social Needs    Financial resource strain: Not on file    Food insecurity     Worry: Not on file     Inability: Not on file    Transportation needs     Medical: Not on file     Non-medical: Not on file   Tobacco Use    Smoking status: Current Every Day Smoker     Packs/day: 1.00     Years: 53.00     Pack years: 53.00     Types: Cigarettes     Start date: 1967    Smokeless tobacco: Never Used    Tobacco comment: Pt is currently enrolled in the [employer].  Ambulatory referral to Smoking Cessation program.    Substance and Sexual Activity    Alcohol use: Yes     Alcohol/week: 3.0 standard drinks     Types: 3 Glasses of wine per week     Comment: 1 every 3 months    Drug use: No    Sexual activity: Yes     Partners: Male     Birth control/protection: None     Comment: last drink yesterday afternoon   Lifestyle    Physical activity     Days per week: Not on file     Minutes per session: Not on file    Stress: Not on file   Relationships    Social connections     Talks on phone: Not on file     Gets together: Not on file     Attends Taoist service: Not on file     Active member of club or organization: Not on file     Attends meetings of clubs or organizations: Not on file     Relationship status: Not on file   Other Topics Concern    Not on file   Social History Narrative    Not on file       Current Outpatient Medications on File Prior to Visit   Medication Sig Dispense Refill    albuterol 90 mcg/actuation inhaler  "Inhale 2 puffs into the lungs every 6 (six) hours as needed for Wheezing.      albuterol-ipratropium 2.5mg-0.5mg/3mL (DUO-NEB) 0.5 mg-3 mg(2.5 mg base)/3 mL nebulizer solution   5    amitriptyline (ELAVIL) 10 MG tablet 20 mg every evening.       ammonium lactate 12 % Crea ammonium lactate 12 % topical cream      aspirin (ECOTRIN) 81 MG EC tablet Take 81 mg by mouth once daily.      BD ULTRA-FINE MINI PEN NEEDLE 31 gauge x 3/16" Ndle       cholestyramine, with sugar, 4 gram Powd Take 4 g by mouth 2 (two) times daily as needed (diarrhea). 378 g 0    duloxetine (CYMBALTA) 60 MG capsule Take 90 mg by mouth once daily.       ferrous sulfate (FEOSOL) 325 mg (65 mg iron) Tab tablet Take 1 tablet (325 mg total) by mouth once daily. 30 tablet 3    fluticasone propionate (FLONASE) 50 mcg/actuation nasal spray 2 sprays (100 mcg total) by Each Nare route once daily. 1 Bottle 12    HUMALOG MIX 75-25,U-100,INSULN 100 unit/mL (75-25) Susp 30 Units 2 (two) times daily.      hydroCHLOROthiazide (HYDRODIURIL) 25 MG tablet TAKE ONE TABLET BY MOUTH DAILY 90 tablet 1    HYDROcodone-acetaminophen (NORCO)  mg per tablet Take 1 tablet by mouth 2 to 3 times daily as needed. for pain for 30 days 75 tablet 0    insulin aspart U-100 (NOVOLOG FLEXPEN U-100 INSULIN) 100 unit/mL (3 mL) InPn pen Inject into the skin. Taken as SS      lidocaine (LIDODERM) 5 % Place 1 patch onto the skin once daily. Remove & Discard patch within 12 hours or as directed by MD 10 patch 0    LORazepam (ATIVAN) 0.5 MG tablet Take 0.5 mg by mouth every 12 (twelve) hours as needed.   1    LYRICA 150 mg capsule Take by mouth 2 (two) times daily.   1    nitroGLYCERIN (NITROSTAT) 0.4 MG SL tablet Place 0.4 mg under the tongue every 5 (five) minutes as needed for Chest pain.      omeprazole (PRILOSEC) 40 MG capsule Take 1 capsule (40 mg total) by mouth every morning. 30 capsule 11    ondansetron (ZOFRAN-ODT) 4 MG TbDL Take 1 tablet (4 mg total) by " "mouth every 8 (eight) hours as needed. 14 tablet 1    potassium chloride SA (K-DUR,KLOR-CON) 20 MEQ tablet TAKE ONE TABLET BY MOUTH DAILY 90 tablet 1    pravastatin (PRAVACHOL) 20 MG tablet Take 20 mg by mouth once daily.       quetiapine (SEROQUEL) 300 MG Tab 300 mg nightly.       tiZANidine (ZANAFLEX) 4 MG tablet Take 1 tablet (4 mg total) by mouth daily as needed. 30 tablet 0    topiramate (TOPAMAX) 50 MG tablet TAKE 1 BY MOUTH TWICE DAILY 180 tablet 1    verapamiL (CALAN-SR) 240 MG CR tablet TAKE 1 TABLET BY MOUTH ONCE DAILY 90 tablet 1    zonisamide (ZONEGRAN) 100 MG Cap zonisamide 100 mg capsule   Take 2 capsules twice a day by oral route.      [DISCONTINUED] NOVOLOG MIX 70-30 U-100 INSULN 100 unit/mL (70-30) Soln 30 units BID  3     No current facility-administered medications on file prior to visit.        Review of patient's allergies indicates:  No Known Allergies  Objective:     Vitals:    07/08/20 1135   BP: 130/86   BP Location: Left arm   Patient Position: Sitting   BP Method: Large (Automatic)   Pulse: 98   Weight: 67.1 kg (147 lb 14.9 oz)   Height: 5' 2" (1.575 m)        Physical Exam   Constitutional: She is oriented to person, place, and time. She appears well-developed and well-nourished.   Eyes: No scleral icterus.   Neck: No JVD present. Carotid bruit is not present.   Cardiovascular: Normal rate and regular rhythm. Exam reveals no gallop.   No murmur heard.  Pulmonary/Chest: She has rales (few crackles at both bases).   Musculoskeletal:         General: Edema (trace pitting pedal edema bilaterally) present.   Neurological: She is alert and oriented to person, place, and time.   Skin: Skin is warm and dry.   Psychiatric: She has a normal mood and affect. Her behavior is normal. Judgment and thought content normal.   Vitals reviewed.     Lab 5/27/20:  hgb 7.3  MCV 78  Glucose 129  Alb 3.1    Echocardiogram 5/19 showed asymmetric septal hypertrophy without obstruction, LVEF " 65%    Cardiolite stress 11/20/18 WNL with LVEF 73%    Lab 5/28/19  Fe 18  TIBC 513  Ferritin 8  Assessment:     1. S/P PTCA (percutaneous transluminal coronary angioplasty)    2. Hypertension associated with diabetes    3. Hyperlipidemia associated with type 2 diabetes mellitus    4. Chronic bronchitis, unspecified chronic bronchitis type    5. Post PTCA    6. Coronary artery disease of native artery of native heart with stable angina pectoris    7. Iron deficiency anemia due to chronic blood loss      Plan:   Chayito was seen today for pre-op exam.    Diagnoses and all orders for this visit:    S/P PTCA (percutaneous transluminal coronary angioplasty)  -     IN OFFICE EKG 12-LEAD (to Muse)  -     NM Myocardial Perfusion Spect Multi Pharmacologic; Future  -     Nuclear Stress Test; Future  -     Brain Natriuretic Peptide; Future  -     Comprehensive metabolic panel; Future  -     TSH; Future    Hypertension associated with diabetes  -     Brain Natriuretic Peptide; Future  -     Comprehensive metabolic panel; Future  -     TSH; Future    Hyperlipidemia associated with type 2 diabetes mellitus  -     TSH; Future  -     Lipid Panel; Future    Chronic bronchitis, unspecified chronic bronchitis type  -     X-Ray Chest PA And Lateral; Future  -     Brain Natriuretic Peptide; Future  -     Comprehensive metabolic panel; Future  -     TSH; Future    Post PTCA  -     IN OFFICE EKG 12-LEAD (to Muse)  -     TSH; Future    Coronary artery disease of native artery of native heart with stable angina pectoris  -     TSH; Future    Iron deficiency anemia due to chronic blood loss  -     CBC auto differential; Future  -     TSH; Future       Pt has severe symptomatic iron deficiency anemia which should be corrected prior to surgery.    Pt may require blood transfusions pre-op.    Pt will f/u with GI Dr Mendoza who has given pt IV iron in the past.    Will repeat the Lexiscan Cardiolite stress test pre-op    Repeat CXR    Smoking  cessation counseled    If ASA is held for surgery there is a small risk of heart attack which has to be weighed against the benefit of surgery and the risk of bleeding if surgery is performed while taking ASA    RTC 2 months with lab    Follow up in about 2 months (around 9/8/2020).

## 2020-07-09 ENCOUNTER — TELEPHONE (OUTPATIENT)
Dept: FAMILY MEDICINE | Facility: CLINIC | Age: 70
End: 2020-07-09

## 2020-07-09 NOTE — TELEPHONE ENCOUNTER
Covering for Dr. GONZALEZ while she is out, please check with patient on this:  Got a message regarding preop medical clearance for neck surgery.  She has seen cardiology yesterday who recommended stress testing prior to clearance.  Not sure if she needs additional clearance from PCP.  I see she has diabetes, last A1c was 7.1 in May.   cardiology ordered  panel of labs to be done, I can add A1c to this just to document stability.  I see that she was called regarding an appointment and was not available on a message was left in late June.  At any rate it sounds like she is going to have to have  a stress test preop from Cardiology anyway

## 2020-07-10 ENCOUNTER — TELEPHONE (OUTPATIENT)
Dept: CARDIOLOGY | Facility: CLINIC | Age: 70
End: 2020-07-10

## 2020-07-10 NOTE — TELEPHONE ENCOUNTER
Patient called requesting recent labs faxed to GI for evaluation of anemia.  Labs routed to Dr Randell Mendoza per patient's request. Fax # [phone].

## 2020-07-10 NOTE — TELEPHONE ENCOUNTER
Faxed labs to Dr Mendoza [phone].      ----- Message from Ricardo Perea sent at 7/10/2020 11:34 AM CDT -----  Regarding: Resend labs  Contact: Jessika ()  Name of Who is Calling: Jessika ()      What is the request in detail: Would like for staff to refax patient labs, states they did not come through only cover sheet. Please refax to # [phone].      Can the clinic reply by [facility]: no      [facility]: [phone]

## 2020-07-14 ENCOUNTER — ANESTHESIA EVENT (OUTPATIENT)
Dept: SURGERY | Facility: HOSPITAL | Age: 70
DRG: 472 | End: 2020-07-14
Payer: MEDICARE

## 2020-07-15 ENCOUNTER — HOSPITAL ENCOUNTER (OUTPATIENT)
Dept: PREADMISSION TESTING | Facility: HOSPITAL | Age: 70
Discharge: HOME | End: 2020-07-15
Attending: ANESTHESIOLOGY
Payer: MEDICARE

## 2020-07-15 ENCOUNTER — OFFICE VISIT (OUTPATIENT)
Dept: ORTHOPEDICS | Facility: CLINIC | Age: 70
End: 2020-07-15
Payer: MEDICARE

## 2020-07-15 ENCOUNTER — TELEPHONE (OUTPATIENT)
Dept: PREADMISSION TESTING | Facility: HOSPITAL | Age: 70
End: 2020-07-15

## 2020-07-15 VITALS
DIASTOLIC BLOOD PRESSURE: 70 MMHG | HEART RATE: 100 BPM | WEIGHT: 150 LBS | BODY MASS INDEX: 27.6 KG/M2 | HEIGHT: 62 IN | TEMPERATURE: 99 F | RESPIRATION RATE: 18 BRPM | OXYGEN SATURATION: 95 % | SYSTOLIC BLOOD PRESSURE: 131 MMHG

## 2020-07-15 VITALS — BODY MASS INDEX: 26.09 KG/M2 | HEIGHT: 62 IN | WEIGHT: 141.75 LBS

## 2020-07-15 DIAGNOSIS — G95.9 CERVICAL MYELOPATHY: Primary | ICD-10-CM

## 2020-07-15 PROCEDURE — 1159F PR MEDICATION LIST DOCUMENTED IN MEDICAL RECORD: ICD-10-PCS | Mod: S$GLB,,, | Performed by: ORTHOPAEDIC SURGERY

## 2020-07-15 PROCEDURE — 3008F BODY MASS INDEX DOCD: CPT | Mod: CPTII,S$GLB,, | Performed by: ORTHOPAEDIC SURGERY

## 2020-07-15 PROCEDURE — 1101F PT FALLS ASSESS-DOCD LE1/YR: CPT | Mod: CPTII,S$GLB,, | Performed by: ORTHOPAEDIC SURGERY

## 2020-07-15 PROCEDURE — 1125F AMNT PAIN NOTED PAIN PRSNT: CPT | Mod: S$GLB,,, | Performed by: ORTHOPAEDIC SURGERY

## 2020-07-15 PROCEDURE — 1125F PR PAIN SEVERITY QUANTIFIED, PAIN PRESENT: ICD-10-PCS | Mod: S$GLB,,, | Performed by: ORTHOPAEDIC SURGERY

## 2020-07-15 PROCEDURE — 1101F PR PT FALLS ASSESS DOC 0-1 FALLS W/OUT INJ PAST YR: ICD-10-PCS | Mod: CPTII,S$GLB,, | Performed by: ORTHOPAEDIC SURGERY

## 2020-07-15 PROCEDURE — 3074F SYST BP LT 130 MM HG: CPT | Mod: CPTII,S$GLB,, | Performed by: ORTHOPAEDIC SURGERY

## 2020-07-15 PROCEDURE — 3078F PR MOST RECENT DIASTOLIC BLOOD PRESSURE < 80 MM HG: ICD-10-PCS | Mod: CPTII,S$GLB,, | Performed by: ORTHOPAEDIC SURGERY

## 2020-07-15 PROCEDURE — 3008F PR BODY MASS INDEX (BMI) DOCUMENTED: ICD-10-PCS | Mod: CPTII,S$GLB,, | Performed by: ORTHOPAEDIC SURGERY

## 2020-07-15 PROCEDURE — 99214 PR OFFICE/OUTPT VISIT, EST, LEVL IV, 30-39 MIN: ICD-10-PCS | Mod: S$GLB,,, | Performed by: ORTHOPAEDIC SURGERY

## 2020-07-15 PROCEDURE — 3078F DIAST BP <80 MM HG: CPT | Mod: CPTII,S$GLB,, | Performed by: ORTHOPAEDIC SURGERY

## 2020-07-15 PROCEDURE — 99214 OFFICE O/P EST MOD 30 MIN: CPT | Mod: S$GLB,,, | Performed by: ORTHOPAEDIC SURGERY

## 2020-07-15 PROCEDURE — 3074F PR MOST RECENT SYSTOLIC BLOOD PRESSURE < 130 MM HG: ICD-10-PCS | Mod: CPTII,S$GLB,, | Performed by: ORTHOPAEDIC SURGERY

## 2020-07-15 PROCEDURE — 99999 PR PBB SHADOW E&M-EST. PATIENT-LVL III: ICD-10-PCS | Mod: PBBFAC,,, | Performed by: ORTHOPAEDIC SURGERY

## 2020-07-15 PROCEDURE — 1159F MED LIST DOCD IN RCRD: CPT | Mod: S$GLB,,, | Performed by: ORTHOPAEDIC SURGERY

## 2020-07-15 PROCEDURE — 99999 PR PBB SHADOW E&M-EST. PATIENT-LVL III: CPT | Mod: PBBFAC,,, | Performed by: ORTHOPAEDIC SURGERY

## 2020-07-15 RX ORDER — GABAPENTIN 300 MG/1
300 CAPSULE ORAL 3 TIMES DAILY
COMMUNITY
End: 2020-07-30

## 2020-07-15 NOTE — PROGRESS NOTES
DATE: 7/15/2020  PATIENT: Chayito Chung    Supervising Physician: Herson Tate M.D.    CHIEF COMPLAINT: falls    HISTORY:  Chayito Chung is a 69 y.o. female previously seen in the hospital after a fall here for hospital follow up.  She says she woke up 2020 and couldn't stand.  She says her leg just kept giving out on her.  She denies any pain, trauma or accidents.  She fell 5 times prior to coming to the ED.  She was seen in the hospital where imaging of the cervical spine showed cervical stenosis with equivocal myelomalacia.  She reports difficulty with balance over the last 8-9 months.  She says her family has told her to started walking differently but she didn't really notice.  She also reports difficulty with small objects and clumsiness with her hands.  She was given a Rolator and a knee brace in the hospital.  She says she has fallen one time since being discharged from the hospital.       Interval History: Pt reports persistent issues with balance and coordinations. Also reports difficulty with small objects and clumsiness with her hands. Denies b/b changes.     PAST MEDICAL/SURGICAL HISTORY:  Past Medical History:   Diagnosis Date    Acute coronary syndrome     Acute hypoxemic respiratory failure 2017    Anxiety     Asthma     Cancer     colon    Cataracts, both eyes     Chest pain at rest     Chest pain of uncertain etiology 2015    Colon cancer 1988    COPD (chronic obstructive pulmonary disease)     Coronary artery disease     Depression     Diabetes mellitus     Dyspnea on exertion 11/15/2018    Elevated brain natriuretic peptide (BNP) level 11/15/2018    Elevated cholesterol     Hypertension     Swelling     Syncope and collapse 2016     Past Surgical History:   Procedure Laterality Date    ABDOMINAL SURGERY      BREAST BIOPSY      benign unsure what side     SECTION, CLASSIC      COLON SURGERY      COLONOSCOPY  2019    repeat in 5  "years    CORONARY ANGIOPLASTY WITH STENT PLACEMENT  3 months ago     x2, Hysterectomy, Lung surgery, Partial stomach removed, Part of colon removed for rectal cancer    GASTRECTOMY      HEMORRHOID SURGERY      HYSTERECTOMY      @28yrs of age    LUNG BIOPSY      OOPHORECTOMY      @28yrs of age       Medications:  Current Outpatient Medications on File Prior to Visit   Medication Sig Dispense Refill    albuterol 90 mcg/actuation inhaler Inhale 2 puffs into the lungs every 6 (six) hours as needed for Wheezing.      albuterol-ipratropium 2.5mg-0.5mg/3mL (DUO-NEB) 0.5 mg-3 mg(2.5 mg base)/3 mL nebulizer solution   5    amitriptyline (ELAVIL) 10 MG tablet 20 mg every evening.       ammonium lactate 12 % Crea ammonium lactate 12 % topical cream      aspirin (ECOTRIN) 81 MG EC tablet Take 81 mg by mouth once daily.      BD ULTRA-FINE MINI PEN NEEDLE 31 gauge x 3/16" Ndle       cholestyramine, with sugar, 4 gram Powd Take 4 g by mouth 2 (two) times daily as needed (diarrhea). 378 g 0    duloxetine (CYMBALTA) 60 MG capsule Take 90 mg by mouth once daily.       ferrous sulfate (FEOSOL) 325 mg (65 mg iron) Tab tablet Take 1 tablet (325 mg total) by mouth once daily. 30 tablet 3    fluticasone propionate (FLONASE) 50 mcg/actuation nasal spray 2 sprays (100 mcg total) by Each Nare route once daily. 1 Bottle 12    HUMALOG MIX 75-25,U-100,INSULN 100 unit/mL (75-25) Susp 30 Units 2 (two) times daily.      hydroCHLOROthiazide (HYDRODIURIL) 25 MG tablet TAKE ONE TABLET BY MOUTH DAILY 90 tablet 1    HYDROcodone-acetaminophen (NORCO)  mg per tablet Take 1 tablet by mouth 2 to 3 times daily as needed. for pain for 30 days 75 tablet 0    insulin aspart U-100 (NOVOLOG FLEXPEN U-100 INSULIN) 100 unit/mL (3 mL) InPn pen Inject into the skin. Taken as SS      lidocaine (LIDODERM) 5 % Place 1 patch onto the skin once daily. Remove & Discard patch within 12 hours or as directed by MD 10 patch 0    LORazepam " (ATIVAN) 0.5 MG tablet Take 0.5 mg by mouth every 12 (twelve) hours as needed.   1    LYRICA 150 mg capsule Take by mouth 2 (two) times daily.   1    nitroGLYCERIN (NITROSTAT) 0.4 MG SL tablet Place 0.4 mg under the tongue every 5 (five) minutes as needed for Chest pain.      omeprazole (PRILOSEC) 40 MG capsule Take 1 capsule (40 mg total) by mouth every morning. 30 capsule 11    ondansetron (ZOFRAN-ODT) 4 MG TbDL Take 1 tablet (4 mg total) by mouth every 8 (eight) hours as needed. 14 tablet 1    potassium chloride SA (K-DUR,KLOR-CON) 20 MEQ tablet TAKE ONE TABLET BY MOUTH DAILY 90 tablet 1    pravastatin (PRAVACHOL) 20 MG tablet Take 20 mg by mouth once daily.       quetiapine (SEROQUEL) 300 MG Tab 300 mg nightly.       tiZANidine (ZANAFLEX) 4 MG tablet Take 1 tablet (4 mg total) by mouth daily as needed. 30 tablet 0    topiramate (TOPAMAX) 50 MG tablet TAKE 1 BY MOUTH TWICE DAILY 180 tablet 1    verapamiL (CALAN-SR) 240 MG CR tablet TAKE 1 TABLET BY MOUTH ONCE DAILY 90 tablet 1    zonisamide (ZONEGRAN) 100 MG Cap zonisamide 100 mg capsule   Take 2 capsules twice a day by oral route.       No current facility-administered medications on file prior to visit.        Social History:   Social History     Socioeconomic History    Marital status: Single     Spouse name: Not on file    Number of children: Not on file    Years of education: Not on file    Highest education level: Not on file   Occupational History    Not on file   Social Needs    Financial resource strain: Not on file    Food insecurity     Worry: Not on file     Inability: Not on file    Transportation needs     Medical: Not on file     Non-medical: Not on file   Tobacco Use    Smoking status: Current Every Day Smoker     Packs/day: 1.00     Years: 53.00     Pack years: 53.00     Types: Cigarettes     Start date: 1967    Smokeless tobacco: Never Used    Tobacco comment: Pt is currently enrolled in the [employer].  Ambulatory  "referral to Smoking Cessation program.    Substance and Sexual Activity    Alcohol use: Yes     Alcohol/week: 3.0 standard drinks     Types: 3 Glasses of wine per week     Comment: 1 every 3 months    Drug use: No    Sexual activity: Yes     Partners: Male     Birth control/protection: None     Comment: last drink yesterday afternoon   Lifestyle    Physical activity     Days per week: Not on file     Minutes per session: Not on file    Stress: Not on file   Relationships    Social connections     Talks on phone: Not on file     Gets together: Not on file     Attends Islam service: Not on file     Active member of club or organization: Not on file     Attends meetings of clubs or organizations: Not on file     Relationship status: Not on file   Other Topics Concern    Not on file   Social History Narrative    Not on file       REVIEW OF SYSTEMS:  Constitution: Negative. Negative for chills, fever and night sweats.   Cardiovascular: Negative for chest pain and syncope.   Respiratory: Negative for cough and shortness of breath.   Gastrointestinal: See HPI. Negative for nausea/vomiting. Negative for abdominal pain.  Genitourinary: See HPI. Negative for discoloration or dysuria.  Skin: Negative for dry skin, itching and rash.   Hematologic/Lymphatic: Negative for bleeding problem. Does not bruise/bleed easily.   Musculoskeletal: Negative for falls and muscle weakness.   Neurological: See HPI. No seizures.   Endocrine: Negative for polydipsia, polyphagia and polyuria.   Allergic/Immunologic: Negative for hives and persistent infections.  Psychiatric/Behavioral: Negative for depression and insomnia.         EXAM:  Ht 5' 2" (1.575 m)   Wt 64.3 kg (141 lb 12.1 oz)   LMP  (LMP Unknown)   BMI 25.93 kg/m²     General: The patient is a very pleasant 69 y.o. female in no apparent distress, the patient is oriented to person, place and time.  Psych: Normal mood and affect  HEENT: Vision grossly intact, hearing intact " to the spoken word.  Lungs: Respirations unlabored.  Gait: Antalgic, broad based station and gait, she is using a Rolator today.   Skin: Cervical skin negative for rashes, lesions, hairy patches and surgical scars.  Range of motion: Cervical range of motion is acceptable. There is no tenderness to palpation.  Spinal Balance: Global saggital and coronal spinal balance acceptable, no significant for scoliosis and kyphosis.  Musculoskeletal: No pain with the range of motion of the bilateral shoulders and elbows. Normal bulk and contour of the bilateral hands.  Vascular: Bilateral hands warm and well perfused, radial pulses 2+ bilaterally.  Neurological: Normal strength and tone in all major motor groups in the bilateral upper and lower extremities. Normal sensation to light touch in the C5-T1 and L2-S1 dermatomes bilaterally.  Deep tendon reflexes symmetric 2++ in the bilateral upper and lower extremities.  Positive Inverted Radial Reflex and negative Atkinson's bilaterally. Negative Babinski bilaterally.     IMAGING:   Today I personally reviewed AP and Lat upright C-spine films that demonstrate multilevel degenerative changes.    MRI cervical spine demonstrates multilevel cervical spondylosis with severe spinal stenosis at C5/6 with myelomalacia.  There is moderate C6/7 stenosis. Mild C3/4 and C4/5 stenosis.     Body mass index is 25.93 kg/m².    Hemoglobin A1C   Date Value Ref Range Status   05/12/2020 7.1 (H) 4.0 - 5.6 % Final     Comment:     ADA Screening Guidelines:  5.7-6.4%  Consistent with prediabetes  >or=6.5%  Consistent with diabetes  High levels of fetal hemoglobin interfere with the HbA1C  assay. Heterozygous hemoglobin variants (HbS, HgC, etc)do  not significantly interfere with this assay.   However, presence of multiple variants may affect accuracy.     02/27/2020 8.3 (H) 4.0 - 5.6 % Final     Comment:     ADA Screening Guidelines:  5.7-6.4%  Consistent with prediabetes  >or=6.5%  Consistent with  diabetes  High levels of fetal hemoglobin interfere with the HbA1C  assay. Heterozygous hemoglobin variants (HbS, HgC, etc)do  not significantly interfere with this assay.   However, presence of multiple variants may affect accuracy.     07/25/2019 7.2 (H) 4.0 - 5.6 % Final     Comment:     ADA Screening Guidelines:  5.7-6.4%  Consistent with prediabetes  >or=6.5%  Consistent with diabetes  High levels of fetal hemoglobin interfere with the HbA1C  assay. Heterozygous hemoglobin variants (HbS, HgC, etc)do  not significantly interfere with this assay.   However, presence of multiple variants may affect accuracy.     12/16/2012 11.7 (H) 4.8 - 5.6 % Final     Comment:              Increased risk for diabetes: 5.7 - 6.4           Diabetes: >6.4           Glycemic control for adults with diabetes: <7.0  **In order to standardize %HbA1c results worldwide, as of October 11, 2010,  the %HbA1c is being calculated using the master equation recommended in the  consensus statement adopted by the ADA (American Diabetes Assoc), EASD  (European Assoc for the Study of Diabetes), IFCC (International Federation  of Clinical Chemistry and Laboratory Medicine) and IDF (International  Diabetes Federation). Result units: %HgbA1c (DCCT/NGSP).  In common with other methods, Hb A1C values may not accurately reflect mean  blood glucose in patients with hemoglobin variants (HgbF, HgbS and HgbC).  Any cause of shortened erythrocyte survival will reduce exposure of  erythrocytes to glucose with a consequent decrease in HbA1c (%) values, even  though the time-averaged blood glucose level may be elevated. Causes of  shortened erythrocyte lifetime might be hemolytic anemia or other hemolytic  diseases, homozygous sickle cell trait, pregnancy, recent significant or  chronic blood loss, etc. Caution should be used when interpreting the HbA1c  results from patients with these conditions.           ASSESSMENT/PLAN:    There are no diagnoses linked to  this encounter.  Today we discussed at length all of the different treatment options including anti-inflammatories, acetaminophen, rest, ice, heat, physical therapy including strengthening and stretching exercises, home exercises, ROM, aerobic conditioning, aqua therapy, other modalities including ultrasound, massage, and dry needling, epidural steroid injections and finally surgical intervention.      I had a sit down discussion with the patient regarding cervical myelopathy. I discussed the known stepwise degeneration of cervical myelopathy. I discussed the risks and benefits of decompressive surgery, including the concept zara surgery would be done to prevent further neurological injury/degeneration rather than to ameliorate any existing deficits.     Discussed R/B/A. All questions answered and pt would like to proceed. Plan for PCF C3-T1 7/23

## 2020-07-15 NOTE — TELEPHONE ENCOUNTER
"----- Message from Herson Tate MD sent at 7/15/2020 10:37 AM CDT -----  Yes, ok to continue  ----- Message -----  From: Angie Perdue RN  Sent: 7/15/2020   9:11 AM CDT  To: Angie Perdue RN, Herson Tate MD, #    I  did not get an answer about her ASA 81mg, whether to stay on or to hold for how many days prior to surgery. I had sent a message about 6 days ago with Dr. Mlain;"recommendation.  Thanks!      "

## 2020-07-15 NOTE — ANESTHESIA PREPROCEDURE EVALUATION
Anesthesia Assessment: Preoperative EQUATION    Planned Procedure: Procedure(s) (LRB):  LAMINECTOMY, SPINE, CERVICAL, WITH POSTERIOR FUSION C3-T1 Depuy SNS:Motors/SSEP/EMG [place] + pads + tongs  (N/A)  Requested Anesthesia Type:General  Surgeon: Herson Tate MD  Service: Neurosurgery  Known or anticipated Date of Surgery:7/23/2020    Surgeon notes: reviewed    Electronic QUestionnaire Assessment completed via nurse interview with patient.        Triage considerations:       Previous anesthesia records:No problems and Not available    Last PCP note: 3-6 months ago , within [facility]   Subspecialty notes: Cardiology: General, ENT, Ortho    Other important co-morbidities:PER Epic:  CHF, COPD, DM2, GERD, HLD, HTN, Smoker and CAD      Tests already available:  Available tests,  within 3 months , within [facility] . 5/27/2020 CMP, CBC, PHOS, MG, 5/26/2020 CT CERVICAL SPINE W/O CONTRAST, MRI THORACIC SPINE W/O CONTRAST,MRI CERVICAL SPINE W/O CONTRAST, MRI BRAIN W/O CONTRAST,5/25/2020 XRAY THORACIC SPINE AP/LAT, XRAY CERVICAL SPINE AP/ALAT, MRI LUMBAR SPINE W/O CONTRAST, 5/12/2020 HGAIC, TSH, FT4, PTT, PT/INR, 1/15/2020 EKG            Instructions given. (See in Nurse's note)    Optimization:  Anesthesia Preop Clinic Assessment  Indicated    Medical Opinion Indicated       Sub-specialist consult indicated:  CARDIOLOGY       Plan:    Testing:  CBC, T&S and UA   Pre-anesthesia  visit       Visit focus: concerns in complex and/or prolonged anesthesia     Consultation:Patient's PCP for re-evaluation     Patient  has previously scheduled Medical Appointment:7/15 DR. TATE, 7/21 COVID TESTING    Navigation: Tests Scheduled. TBD             Consults scheduled.TBD             Results will be tracked by Preop Clinic.  Angie Perdue RN BSN                                                07/15/2020     Chayito Chung is a 69 y.o., female.      Anesthesia Evaluation    I have reviewed the Patient  Summary Reports.    I have reviewed the Nursing Notes. I have reviewed the NPO Status.   I have reviewed the Medications.     Review of Systems  Anesthesia Hx:  No problems with previous Anesthesia  Denies Family Hx of Anesthesia complications.   Denies Personal Hx of Anesthesia complications.   Social:  Smoker, No Alcohol Use  Tobacco Use:  of cigarette   Hematology/Oncology:         -- Anemia: Iron Deficiency Anemia (IV iron infustion 7/14 and again planned for 7/21) Blood Loss Anemia --  Cancer in past history:  Other (see Oncology comments) surgery  Oncology Comments: colon     Cardiovascular:   Hypertension, well controlled Past MI CAD  CABG/stent  Angina (relieved w/ NTG.  Reports using NTG maybe twice per year.) CHF hyperlipidemia GARAY ECG has been reviewed.     TTE 5/28/19  Conclusion  · Asymmetric septal hypertrophy without outflow tract obstruction  · Mild left atrial enlargement.  · Normal right ventricular systolic function.  · Mild pulmonic regurgitation.  · Normal left ventricular systolic function. The estimated ejection fraction is 65%  · Grade I (mild) left ventricular diastolic dysfunction consistent with impaired relaxation.  · No wall motion abnormalities.  · Normal central venous pressure (3 mm Hg).  · The estimated PA systolic pressure is 18 mm Hg  · Normal left atrial pressure.       Cardiovascular Symptoms: Dyspnea On Extertion  Coronary Artery Disease:  hx of Percutaneous Coronary Intervention (PCI), patient hx of diagnosis, patient problem list, hx of myocardial infarction (MI). S/P Percutaneous Coronary Intervention (PCI) (approx. 7 years ago at ) , currently on aspirin.  Congestive Heart Failure (CHF) , LV Diastolic HF Hypertension, Essential Hypertension , stage 1 hypertension, systolic 140 - 159 or diastolic 90 - 99, Well Controlled on Rx    Pulmonary:   COPD, mild Asthma asymptomatic and mild Denies Shortness of breath.  Denies Recent URI. Chronic bronchitis  Lung nodule Asthma:   "Chronic Obstructive Pulmonary Disease (COPD):  is secondary to smoking. Inhaler use is maintenance inhaler daily and rescue inhaler PRN. Current breathing status is optimal, free of wheezing.    Renal/:   Denies Chronic Renal Disease.   Denies Kidney Function/Disease    Hepatic/GI:   GERD, well controlled Denies Liver Disease. Denies Hepatitis. S/p gastrectomy Esophageal / Stomach Disorders Gerd Controlled by chronic antireflux medication.  Denies Liver Disease    Musculoskeletal:  Musculoskeletal General/Symptoms: Functional capacity is ambulatory with walker.  Bone Disorders: Osteopenia  Cervical Spine Disorder, Cervical Disc Disease, Myelopathy Cervical Stenosis    Neurological:   Denies TIA. Denies CVA. Seizures, well controlled  Dx of Headaches, Migraine Headache Pain , onset is chronic , location of BACK , quality of sharp , radiating to right leg , severity is a 10 , precipitating factors are "overdoing anything" , alleviating factors are sitting/lying down. Seizure Disorder , Most recent seizure occurred 6 month to 1 year ago  Denies CVA - Cerebrovasular Accident  Denies TIA - Transient Ischemic Attack    Endocrine:   Diabetes, well controlled, type 2, using insulin Denies Hypothyroidism.  Diabetes, Type 2 Diabetes , controlled by insulin. , most recent HgA1c value was 7.1 on 5/12/20.  Denies Thyroid Disease  Denies Metabolic Disorders  Psych:  Anxiety Disorder.  Depression.          Physical Exam  General:  Well nourished    Airway/Jaw/Neck:  Airway Findings: Mouth Opening: Normal Tongue: Large  General Airway Assessment: Adult  Mallampati: III  Improves to III with phonation.  TM Distance: 4 - 6 cm  Jaw/Neck Findings:  Neck ROM: Extension Decreased, Mild, Extension Painful      Dental:  Dental Findings: Edentulous   Chest/Lungs:  Chest/Lungs Findings: Normal Respiratory Rate, Clear to auscultation     Heart/Vascular:  Heart Findings: Rate: Normal  Rhythm: Regular Rhythm  Sounds: Normal  Heart murmur: " negative       Mental Status:  Mental Status Findings:  Cooperative, Alert and Oriented                              Anesthesia Plan  Type of Anesthesia, risks & benefits discussed:  Anesthesia Type:  general  Patient's Preference:   Intra-op Monitoring Plan: arterial line and standard ASA monitors  Intra-op Monitoring Plan Comments:   Post Op Pain Control Plan: per primary service following discharge from PACU, IV/PO Opioids PRN and multimodal analgesia  Post Op Pain Control Plan Comments:   Induction:   IV  Beta Blocker:  Patient is not currently on a Beta-Blocker (No further documentation required).       Informed Consent: Patient understands risks and agrees with Anesthesia plan.  Questions answered. Anesthesia consent signed with patient.  ASA Score: 3     Day of Surgery Review of History & Physical:    H&P update referred to the surgeon.         Ready For Surgery From Anesthesia Perspective.     Evaluations Pending:  PCP 7/20  Stress test 7/22  GI 7/15  CXR 7/22

## 2020-07-15 NOTE — PRE-PROCEDURE INSTRUCTIONS
Preop instructions given. Hold asa, asa containing products, nsaids, vitamins and supplements one week prior to surgery.Medication instructions given..  Shower the night before surgery and the morning of surgery with an antibacterial soap( hibiclens or dial antibacterial soap).  Nothing on the skin once shower. Do not apply any deodorant, lotion, powder, perfume,or aftershave.  No makeup or moisturizer. No fingernail polish or jewelry going to surgery. May have solid foods, gum, and hard candy until 8 hours before surgery/procedure time.  May have clear liquids( water, gatorade, powerade or apple juice) until 2 hours prior to surgery/procedure time.  No red drinks. If in doubt , drink water. Nothing to drink 2 hours before arrival time for surgery/procedure. If you are told to take medication in the morning of surgery, it may be taken with a sip of water. If your doctor tells you something different pertaining to when to stop eating or drinking, follow your doctor's instructions. Call for changes in status or questions.Stated knows where the surgery center is located.Verbalizes understanding.

## 2020-07-20 ENCOUNTER — OFFICE VISIT (OUTPATIENT)
Dept: FAMILY MEDICINE | Facility: CLINIC | Age: 70
End: 2020-07-20
Attending: FAMILY MEDICINE
Payer: MEDICARE

## 2020-07-20 VITALS
OXYGEN SATURATION: 94 % | TEMPERATURE: 99 F | DIASTOLIC BLOOD PRESSURE: 78 MMHG | HEART RATE: 118 BPM | BODY MASS INDEX: 27.22 KG/M2 | HEIGHT: 62 IN | SYSTOLIC BLOOD PRESSURE: 134 MMHG | WEIGHT: 147.94 LBS

## 2020-07-20 DIAGNOSIS — J42 CHRONIC BRONCHITIS, UNSPECIFIED CHRONIC BRONCHITIS TYPE: ICD-10-CM

## 2020-07-20 DIAGNOSIS — G43.809 OTHER MIGRAINE WITHOUT STATUS MIGRAINOSUS, NOT INTRACTABLE: ICD-10-CM

## 2020-07-20 DIAGNOSIS — F41.1 GAD (GENERALIZED ANXIETY DISORDER): ICD-10-CM

## 2020-07-20 DIAGNOSIS — E78.5 HYPERLIPIDEMIA ASSOCIATED WITH TYPE 2 DIABETES MELLITUS: ICD-10-CM

## 2020-07-20 DIAGNOSIS — F33.41 RECURRENT MAJOR DEPRESSIVE DISORDER, IN PARTIAL REMISSION: ICD-10-CM

## 2020-07-20 DIAGNOSIS — Z79.4 UNCONTROLLED TYPE 2 DIABETES MELLITUS WITH HYPERGLYCEMIA, WITH LONG-TERM CURRENT USE OF INSULIN: ICD-10-CM

## 2020-07-20 DIAGNOSIS — E11.69 HYPERLIPIDEMIA ASSOCIATED WITH TYPE 2 DIABETES MELLITUS: ICD-10-CM

## 2020-07-20 DIAGNOSIS — Z01.818 PRE-OPERATIVE CLEARANCE: Primary | ICD-10-CM

## 2020-07-20 DIAGNOSIS — I50.32 CHRONIC DIASTOLIC CONGESTIVE HEART FAILURE: ICD-10-CM

## 2020-07-20 DIAGNOSIS — I15.2 HYPERTENSION ASSOCIATED WITH DIABETES: ICD-10-CM

## 2020-07-20 DIAGNOSIS — I25.118 CORONARY ARTERY DISEASE OF NATIVE ARTERY OF NATIVE HEART WITH STABLE ANGINA PECTORIS: ICD-10-CM

## 2020-07-20 DIAGNOSIS — E11.65 UNCONTROLLED TYPE 2 DIABETES MELLITUS WITH HYPERGLYCEMIA, WITH LONG-TERM CURRENT USE OF INSULIN: ICD-10-CM

## 2020-07-20 DIAGNOSIS — E11.59 HYPERTENSION ASSOCIATED WITH DIABETES: ICD-10-CM

## 2020-07-20 PROCEDURE — 3078F DIAST BP <80 MM HG: CPT | Mod: CPTII,S$GLB,, | Performed by: FAMILY MEDICINE

## 2020-07-20 PROCEDURE — 3075F PR MOST RECENT SYSTOLIC BLOOD PRESS GE 130-139MM HG: ICD-10-PCS | Mod: CPTII,S$GLB,, | Performed by: FAMILY MEDICINE

## 2020-07-20 PROCEDURE — 3008F PR BODY MASS INDEX (BMI) DOCUMENTED: ICD-10-PCS | Mod: CPTII,S$GLB,, | Performed by: FAMILY MEDICINE

## 2020-07-20 PROCEDURE — 1159F PR MEDICATION LIST DOCUMENTED IN MEDICAL RECORD: ICD-10-PCS | Mod: S$GLB,,, | Performed by: FAMILY MEDICINE

## 2020-07-20 PROCEDURE — 99999 PR PBB SHADOW E&M-EST. PATIENT-LVL V: ICD-10-PCS | Mod: PBBFAC,,, | Performed by: FAMILY MEDICINE

## 2020-07-20 PROCEDURE — 1101F PR PT FALLS ASSESS DOC 0-1 FALLS W/OUT INJ PAST YR: ICD-10-PCS | Mod: CPTII,S$GLB,, | Performed by: FAMILY MEDICINE

## 2020-07-20 PROCEDURE — 99214 OFFICE O/P EST MOD 30 MIN: CPT | Mod: S$GLB,,, | Performed by: FAMILY MEDICINE

## 2020-07-20 PROCEDURE — 1159F MED LIST DOCD IN RCRD: CPT | Mod: S$GLB,,, | Performed by: FAMILY MEDICINE

## 2020-07-20 PROCEDURE — 3051F HG A1C>EQUAL 7.0%<8.0%: CPT | Mod: CPTII,S$GLB,, | Performed by: FAMILY MEDICINE

## 2020-07-20 PROCEDURE — 1126F AMNT PAIN NOTED NONE PRSNT: CPT | Mod: S$GLB,,, | Performed by: FAMILY MEDICINE

## 2020-07-20 PROCEDURE — 3008F BODY MASS INDEX DOCD: CPT | Mod: CPTII,S$GLB,, | Performed by: FAMILY MEDICINE

## 2020-07-20 PROCEDURE — 3078F PR MOST RECENT DIASTOLIC BLOOD PRESSURE < 80 MM HG: ICD-10-PCS | Mod: CPTII,S$GLB,, | Performed by: FAMILY MEDICINE

## 2020-07-20 PROCEDURE — 1126F PR PAIN SEVERITY QUANTIFIED, NO PAIN PRESENT: ICD-10-PCS | Mod: S$GLB,,, | Performed by: FAMILY MEDICINE

## 2020-07-20 PROCEDURE — 1101F PT FALLS ASSESS-DOCD LE1/YR: CPT | Mod: CPTII,S$GLB,, | Performed by: FAMILY MEDICINE

## 2020-07-20 PROCEDURE — 3051F PR MOST RECENT HEMOGLOBIN A1C LEVEL 7.0 - < 8.0%: ICD-10-PCS | Mod: CPTII,S$GLB,, | Performed by: FAMILY MEDICINE

## 2020-07-20 PROCEDURE — 99999 PR PBB SHADOW E&M-EST. PATIENT-LVL V: CPT | Mod: PBBFAC,,, | Performed by: FAMILY MEDICINE

## 2020-07-20 PROCEDURE — 3075F SYST BP GE 130 - 139MM HG: CPT | Mod: CPTII,S$GLB,, | Performed by: FAMILY MEDICINE

## 2020-07-20 PROCEDURE — 99214 PR OFFICE/OUTPT VISIT, EST, LEVL IV, 30-39 MIN: ICD-10-PCS | Mod: S$GLB,,, | Performed by: FAMILY MEDICINE

## 2020-07-20 NOTE — LETTER
July 20, 2020        Herson Tate MD  [address]  [facility] [address]             [facility]  [address]  [facility] [address]  Phone: [phone]  Fax: [phone]   Patient: Chayito Chung   MR Number: [id number]   YOB: 1950   Date of Visit: 7/20/2020       Dear Dr. Tate:    Thank you for referring Chayito Chung to me for evaluation. Below are the relevant portions of my assessment and plan of care.    1. Pre-operative clearance    2. Other migraine without status migrainosus, not intractable    3. Recurrent major depressive disorder, in partial remission    4. JAZMIN (generalized anxiety disorder)    5. Chronic bronchitis, unspecified chronic bronchitis type    6. Hypertension associated with diabetes    7. Hyperlipidemia associated with type 2 diabetes mellitus    8. Chronic diastolic congestive heart failure    9. Coronary artery disease of native artery of native heart with stable angina pectoris    10. Uncontrolled type 2 diabetes mellitus with hyperglycemia, with long-term current use of insulin      Chayito was seen today for pre-op exam.    Diagnoses and all orders for this visit:    Pre-operative clearance    Other migraine without status migrainosus, not intractable    Recurrent major depressive disorder, in partial remission    JAZMIN (generalized anxiety disorder)    Chronic bronchitis, unspecified chronic bronchitis type    Hypertension associated with diabetes    Hyperlipidemia associated with type 2 diabetes mellitus    Chronic diastolic congestive heart failure    Coronary artery disease of native artery of native heart with stable angina pectoris    Uncontrolled type 2 diabetes mellitus with hyperglycemia, with long-term current use of insulin      DM II  -controlled    JAZMIN  -controlled    HTN  -controlled    CAD  -controlled    Pre op clearance  -UA normal  EKG - normal  -CXR pending  -labs reassuring    ACC/AHA 2007 Guidelines for  clearance    Step 1: No need for emergency non cardiac surgery  Step 2: No active cardiac conditions  Step 3: No low risk surgery  Step4: Yes - Functional capacity greater than or equal to 4 METs without symptoms - YES - Class IIa, AUBREY B - Proceed with surgery    She is medically optimized and intermediate risk for surgery          Follow up if symptoms worsen or fail to improve.        If you have questions, please do not hesitate to call me. I look forward to following Chayito along with you.    Sincerely,      Hari Howard MD           CC  No Recipients

## 2020-07-21 ENCOUNTER — LAB VISIT (OUTPATIENT)
Dept: SURGERY | Facility: CLINIC | Age: 70
DRG: 472 | End: 2020-07-21
Payer: MEDICARE

## 2020-07-21 DIAGNOSIS — Z20.822 COVID-19 VIRUS NOT DETECTED: ICD-10-CM

## 2020-07-21 PROCEDURE — U0003 INFECTIOUS AGENT DETECTION BY NUCLEIC ACID (DNA OR RNA); SEVERE ACUTE RESPIRATORY SYNDROME CORONAVIRUS 2 (SARS-COV-2) (CORONAVIRUS DISEASE [COVID-19]), AMPLIFIED PROBE TECHNIQUE, MAKING USE OF HIGH THROUGHPUT TECHNOLOGIES AS DESCRIBED BY CMS-2020-01-R: HCPCS

## 2020-07-21 NOTE — PROGRESS NOTES
Subjective:       Patient ID: Chayito Chung is a 69 y.o. female.    Chief Complaint: Pre-op Exam    69 yr old pleasant female with DM II controlled, HTN, HLD, CAD, and other co morbidities presents today as a new patient to me and for pre op clearance for neck surgery. No cardiovascular symptoms. No chest pain/SOB/GARAY. She had labs/UA/EKG and they are normal. EKG pending.    DM II - controlled -A1C                   7.1 (H)             05/12/2020       - on    Insulin - complaint - no side effects       HTN - controlled - verapamil 240 mg daily - no side effects       HLD - controlled - on statin - LDLCALC                  56.2 (L)            05/12/2020         Cervical radiculopathy - arthritis - follows neuro surgery - scheduled for surgery       History as below - reviewed                  Review of Systems   Constitutional: Negative.  Negative for activity change, diaphoresis and unexpected weight change.   HENT: Negative.  Negative for nasal congestion, ear discharge, hearing loss, rhinorrhea, sore throat and voice change.    Eyes: Negative.  Negative for pain, discharge and visual disturbance.   Respiratory: Negative.  Negative for chest tightness, shortness of breath and wheezing.    Cardiovascular: Negative.  Negative for chest pain.   Gastrointestinal: Negative.  Negative for abdominal distention, anal bleeding, constipation and nausea.   Endocrine: Negative.  Negative for cold intolerance, polydipsia and polyuria.   Genitourinary: Negative.  Negative for decreased urine volume, difficulty urinating, dysuria, frequency, menstrual problem and vaginal pain.   Musculoskeletal: Positive for arthralgias, back pain, myalgias and neck pain. Negative for gait problem.   Integumentary:  Negative for color change, pallor and wound. Negative.   Allergic/Immunologic: Negative.  Negative for environmental allergies and immunocompromised state.   Neurological: Negative.  Negative for dizziness, tremors, seizures, speech  difficulty and headaches.   Hematological: Negative.  Negative for adenopathy. Does not bruise/bleed easily.   Psychiatric/Behavioral: Negative.  Negative for agitation, confusion, decreased concentration, hallucinations, self-injury and suicidal ideas. The patient is not nervous/anxious.          Past  Past Surgical History:   Procedure Laterality Date    ABDOMINAL SURGERY      BREAST BIOPSY      benign unsure what side     SECTION, CLASSIC      COLON SURGERY      COLONOSCOPY  2019    repeat in 5 years    CORONARY ANGIOPLASTY WITH STENT PLACEMENT  3 months ago     x2, Hysterectomy, Lung surgery, Partial stomach removed, Part of colon removed for rectal cancer    GASTRECTOMY      HEMORRHOID SURGERY      HYSTERECTOMY      @28yrs of age    LUNG BIOPSY      OOPHORECTOMY      @28yrs of age     Family History   Problem Relation Age of Onset    Cancer Mother     Diabetes Mother     Hypertension Mother     Breast cancer Mother     Cancer Father     Heart disease Father     Depression Sister     Hypertension Sister     Cancer Maternal Aunt      Social History     Socioeconomic History    Marital status: Single     Spouse name: Not on file    Number of children: Not on file    Years of education: Not on file    Highest education level: Not on file   Occupational History    Not on file   Social Needs    Financial resource strain: Not on file    Food insecurity     Worry: Not on file     Inability: Not on file    Transportation needs     Medical: Not on file     Non-medical: Not on file   Tobacco Use    Smoking status: Current Every Day Smoker     Packs/day: 1.00     Years: 53.00     Pack years: 53.00     Types: Cigarettes     Start date:     Smokeless tobacco: Never Used    Tobacco comment: Pt is currently enrolled in the [employer].  Ambulatory referral to Smoking Cessation program.    Substance and Sexual Activity    Alcohol use: Yes     Alcohol/week: 3.0  "standard drinks     Types: 3 Glasses of wine per week     Comment: 1 every 3 months    Drug use: No    Sexual activity: Yes     Partners: Male     Birth control/protection: None     Comment: last drink yesterday afternoon   Lifestyle    Physical activity     Days per week: Not on file     Minutes per session: Not on file    Stress: Not on file   Relationships    Social connections     Talks on phone: Not on file     Gets together: Not on file     Attends [employer] service: Not on file     Active member of club or organization: Not on file     Attends meetings of clubs or organizations: Not on file     Relationship status: Not on file   Other Topics Concern    Not on file   Social History Narrative    Not on file     Review of patient's allergies indicates:  No Known Allergies  Current Outpatient Medications on File Prior to Visit   Medication Sig Dispense Refill    albuterol 90 mcg/actuation inhaler Inhale 2 puffs into the lungs every 6 (six) hours as needed for Wheezing.      albuterol-ipratropium 2.5mg-0.5mg/3mL (DUO-NEB) 0.5 mg-3 mg(2.5 mg base)/3 mL nebulizer solution   5    amitriptyline (ELAVIL) 10 MG tablet 20 mg every evening.       ammonium lactate 12 % Crea ammonium lactate 12 % topical cream      aspirin (ECOTRIN) 81 MG EC tablet Take 81 mg by mouth once daily.      BD ULTRA-FINE MINI PEN NEEDLE 31 gauge x 3/16" Ndle       cholestyramine, with sugar, 4 gram Powd Take 4 g by mouth 2 (two) times daily as needed (diarrhea). 378 g 0    duloxetine (CYMBALTA) 60 MG capsule Take 90 mg by mouth once daily.       ferrous sulfate (FEOSOL) 325 mg (65 mg iron) Tab tablet Take 1 tablet (325 mg total) by mouth once daily. 30 tablet 3    fluticasone propionate (FLONASE) 50 mcg/actuation nasal spray 2 sprays (100 mcg total) by Each Nare route once daily. 1 Bottle 12    gabapentin (NEURONTIN) 300 MG capsule Take 300 mg by mouth 3 (three) times daily.      HUMALOG MIX 75-25,U-100,INSULN 100 unit/mL " (75-25) Susp 30 Units 2 (two) times daily.      hydroCHLOROthiazide (HYDRODIURIL) 25 MG tablet TAKE ONE TABLET BY MOUTH DAILY 90 tablet 1    HYDROcodone-acetaminophen (NORCO)  mg per tablet Take 1 tablet by mouth 2 to 3 times daily as needed. for pain for 30 days 75 tablet 0    insulin aspart U-100 (NOVOLOG FLEXPEN U-100 INSULIN) 100 unit/mL (3 mL) InPn pen Inject into the skin. Taken as SS      lidocaine (LIDODERM) 5 % Place 1 patch onto the skin once daily. Remove & Discard patch within 12 hours or as directed by MD 10 patch 0    LORazepam (ATIVAN) 0.5 MG tablet Take 0.5 mg by mouth every 12 (twelve) hours as needed.   1    LYRICA 150 mg capsule Take by mouth 2 (two) times daily.   1    nitroGLYCERIN (NITROSTAT) 0.4 MG SL tablet Place 0.4 mg under the tongue every 5 (five) minutes as needed for Chest pain.      omeprazole (PRILOSEC) 40 MG capsule Take 1 capsule (40 mg total) by mouth every morning. 30 capsule 11    ondansetron (ZOFRAN-ODT) 4 MG TbDL Take 1 tablet (4 mg total) by mouth every 8 (eight) hours as needed. 14 tablet 1    potassium chloride SA (K-DUR,KLOR-CON) 20 MEQ tablet TAKE ONE TABLET BY MOUTH DAILY 90 tablet 1    pravastatin (PRAVACHOL) 20 MG tablet Take 20 mg by mouth once daily.       quetiapine (SEROQUEL) 300 MG Tab 300 mg nightly.       tiZANidine (ZANAFLEX) 4 MG tablet Take 1 tablet (4 mg total) by mouth daily as needed. 30 tablet 0    verapamiL (CALAN-SR) 240 MG CR tablet TAKE 1 TABLET BY MOUTH ONCE DAILY 90 tablet 1    zonisamide (ZONEGRAN) 100 MG Cap zonisamide 100 mg capsule   Take 2 capsules twice a day by oral route.       No current facility-administered medications on file prior to visit.        Objective:       Vitals:    07/20/20 1614   BP: 134/78   Pulse: (!) 118   Temp: 99 °F (37.2 °C)       Physical Exam  Constitutional:       General: She is not in acute distress.     Appearance: She is well-developed. She is not diaphoretic.   HENT:      Head: Normocephalic  and atraumatic.      Right Ear: External ear normal.      Left Ear: External ear normal.      Nose: Nose normal.      Mouth/Throat:      Pharynx: No oropharyngeal exudate.   Eyes:      General: No scleral icterus.        Right eye: No discharge.         Left eye: No discharge.      Conjunctiva/sclera: Conjunctivae normal.      Pupils: Pupils are equal, round, and reactive to light.   Neck:      Musculoskeletal: Normal range of motion and neck supple.      Thyroid: No thyromegaly.      Vascular: No JVD.      Trachea: No tracheal deviation.   Cardiovascular:      Rate and Rhythm: Normal rate and regular rhythm.      Heart sounds: Normal heart sounds. No murmur. No friction rub. No gallop.    Pulmonary:      Effort: Pulmonary effort is normal.      Breath sounds: Normal breath sounds. No stridor. No wheezing or rales.   Chest:      Chest wall: No tenderness.   Abdominal:      General: Bowel sounds are normal. There is no distension.      Palpations: Abdomen is soft. There is no mass.      Tenderness: There is no abdominal tenderness. There is no guarding or rebound.      Hernia: No hernia is present.   Musculoskeletal: Normal range of motion.         General: Tenderness (TTP C2-C4 and paracervical area) present.   Lymphadenopathy:      Cervical: No cervical adenopathy.   Skin:     General: Skin is warm and dry.      Coloration: Skin is not pale.      Findings: No erythema or rash.   Neurological:      Mental Status: She is alert and oriented to person, place, and time.      Cranial Nerves: No cranial nerve deficit.      Motor: No abnormal muscle tone.      Coordination: Coordination normal.      Deep Tendon Reflexes: Reflexes are normal and symmetric. Reflexes normal.   Psychiatric:         Behavior: Behavior normal.         Thought Content: Thought content normal.         Judgment: Judgment normal.         Assessment:       1. Pre-operative clearance    2. Other migraine without status migrainosus, not intractable     3. Recurrent major depressive disorder, in partial remission    4. JAZMIN (generalized anxiety disorder)    5. Chronic bronchitis, unspecified chronic bronchitis type    6. Hypertension associated with diabetes    7. Hyperlipidemia associated with type 2 diabetes mellitus    8. Chronic diastolic congestive heart failure    9. Coronary artery disease of native artery of native heart with stable angina pectoris    10. Uncontrolled type 2 diabetes mellitus with hyperglycemia, with long-term current use of insulin        Plan:           Chayito was seen today for pre-op exam.    Diagnoses and all orders for this visit:    Pre-operative clearance    Other migraine without status migrainosus, not intractable    Recurrent major depressive disorder, in partial remission    JAZMIN (generalized anxiety disorder)    Chronic bronchitis, unspecified chronic bronchitis type    Hypertension associated with diabetes    Hyperlipidemia associated with type 2 diabetes mellitus    Chronic diastolic congestive heart failure    Coronary artery disease of native artery of native heart with stable angina pectoris    Uncontrolled type 2 diabetes mellitus with hyperglycemia, with long-term current use of insulin      DM II  -controlled    JAZMIN  -controlled    HTN  -controlled    CAD  -controlled    Pre op clearance  -UA normal  EKG - normal  -CXR pending  -labs reassuring    ACC/AHA 2007 Guidelines for clearance    Step 1: No need for emergency non cardiac surgery  Step 2: No active cardiac conditions  Step 3: No low risk surgery  Step4: Yes - Functional capacity greater than or equal to 4 METs without symptoms - YES - Class IIa, AUBREY B - Proceed with surgery    She is medically optimized and intermediate risk for surgery      Spent adequate time in obtaining history and explaining differentials    25 minutes spent during this visit of which greater than 50% devoted to face-face counseling and coordination of care regarding diagnosis and management  plan      Follow up if symptoms worsen or fail to improve.

## 2020-07-22 ENCOUNTER — HOSPITAL ENCOUNTER (OUTPATIENT)
Dept: RADIOLOGY | Facility: HOSPITAL | Age: 70
Discharge: HOME | End: 2020-07-22
Attending: INTERNAL MEDICINE
Payer: MEDICARE

## 2020-07-22 ENCOUNTER — HOSPITAL ENCOUNTER (OUTPATIENT)
Dept: CARDIOLOGY | Facility: HOSPITAL | Age: 70
Discharge: HOME | End: 2020-07-22
Attending: INTERNAL MEDICINE
Payer: MEDICARE

## 2020-07-22 VITALS — BODY MASS INDEX: 27.05 KG/M2 | HEIGHT: 62 IN | WEIGHT: 147 LBS

## 2020-07-22 DIAGNOSIS — Z98.61 S/P PTCA (PERCUTANEOUS TRANSLUMINAL CORONARY ANGIOPLASTY): ICD-10-CM

## 2020-07-22 DIAGNOSIS — J42 CHRONIC BRONCHITIS, UNSPECIFIED CHRONIC BRONCHITIS TYPE: ICD-10-CM

## 2020-07-22 PROCEDURE — 93018 NUCLEAR STRESS TEST (CUPID ONLY): ICD-10-PCS | Mod: ,,, | Performed by: INTERNAL MEDICINE

## 2020-07-22 PROCEDURE — 78452 NM MYOCARDIAL PERFUSION SPECT MULTI PHARM: ICD-10-PCS | Mod: 26,,, | Performed by: RADIOLOGY

## 2020-07-22 PROCEDURE — 71046 XR CHEST PA AND LATERAL: ICD-10-PCS | Mod: 26,,, | Performed by: RADIOLOGY

## 2020-07-22 PROCEDURE — 71046 X-RAY EXAM CHEST 2 VIEWS: CPT | Mod: 26,,, | Performed by: RADIOLOGY

## 2020-07-22 PROCEDURE — 71046 X-RAY EXAM CHEST 2 VIEWS: CPT | Mod: TC,FY

## 2020-07-22 PROCEDURE — A9502 TC99M TETROFOSMIN: HCPCS

## 2020-07-22 PROCEDURE — 93016 NUCLEAR STRESS TEST (CUPID ONLY): ICD-10-PCS | Mod: ,,, | Performed by: INTERNAL MEDICINE

## 2020-07-22 PROCEDURE — 78452 HT MUSCLE IMAGE SPECT MULT: CPT | Mod: 26,,, | Performed by: RADIOLOGY

## 2020-07-22 PROCEDURE — 93017 CV STRESS TEST TRACING ONLY: CPT

## 2020-07-22 PROCEDURE — 93018 CV STRESS TEST I&R ONLY: CPT | Mod: ,,, | Performed by: INTERNAL MEDICINE

## 2020-07-22 PROCEDURE — 93016 CV STRESS TEST SUPVJ ONLY: CPT | Mod: ,,, | Performed by: INTERNAL MEDICINE

## 2020-07-22 NOTE — PRE-PROCEDURE INSTRUCTIONS
"Spoke to Emelina Godfrey () with Dr. Kye HUMPHREY.  "OK to see Hematology after surgery given the date of surgery".      Darren English, RN MSN  "

## 2020-07-22 NOTE — H&P
PATIENT: Chayito Chung     Supervising Physician: Herson Tate M.D.     CHIEF COMPLAINT: falls     HISTORY:  Chayito Chung is a 69 y.o. female previously seen in the hospital after a fall here for hospital follow up.  She says she woke up 2020 and couldn't stand.  She says her leg just kept giving out on her.  She denies any pain, trauma or accidents.  She fell 5 times prior to coming to the ED.  She was seen in the hospital where imaging of the cervical spine showed cervical stenosis with equivocal myelomalacia.  She reports difficulty with balance over the last 8-9 months.  She says her family has told her to started walking differently but she didn't really notice.  She also reports difficulty with small objects and clumsiness with her hands.  She was given a Rolator and a knee brace in the hospital.  She says she has fallen one time since being discharged from the hospital.         Interval History: Pt reports persistent issues with balance and coordinations. Also reports difficulty with small objects and clumsiness with her hands. Denies b/b changes.      PAST MEDICAL/SURGICAL HISTORY:       Past Medical History:   Diagnosis Date    Acute coronary syndrome      Acute hypoxemic respiratory failure 2017    Anxiety      Asthma      Cancer       colon    Cataracts, both eyes      Chest pain at rest      Chest pain of uncertain etiology 2015    Colon cancer 1988    COPD (chronic obstructive pulmonary disease)      Coronary artery disease      Depression      Diabetes mellitus      Dyspnea on exertion 11/15/2018    Elevated brain natriuretic peptide (BNP) level 11/15/2018    Elevated cholesterol      Hypertension      Swelling      Syncope and collapse 2016           Past Surgical History:   Procedure Laterality Date    ABDOMINAL SURGERY        BREAST BIOPSY         benign unsure what side     SECTION, CLASSIC        COLON SURGERY        COLONOSCOPY    "2019     repeat in 5 years    CORONARY ANGIOPLASTY WITH STENT PLACEMENT   3 months ago      x2, Hysterectomy, Lung surgery, Partial stomach removed, Part of colon removed for rectal cancer    GASTRECTOMY        HEMORRHOID SURGERY        HYSTERECTOMY         @28yrs of age    LUNG BIOPSY        OOPHORECTOMY         @28yrs of age        Medications:         Current Outpatient Medications on File Prior to Visit   Medication Sig Dispense Refill    albuterol 90 mcg/actuation inhaler Inhale 2 puffs into the lungs every 6 (six) hours as needed for Wheezing.        albuterol-ipratropium 2.5mg-0.5mg/3mL (DUO-NEB) 0.5 mg-3 mg(2.5 mg base)/3 mL nebulizer solution     5    amitriptyline (ELAVIL) 10 MG tablet 20 mg every evening.         ammonium lactate 12 % Crea ammonium lactate 12 % topical cream        aspirin (ECOTRIN) 81 MG EC tablet Take 81 mg by mouth once daily.        BD ULTRA-FINE MINI PEN NEEDLE 31 gauge x 3/16" Ndle          cholestyramine, with sugar, 4 gram Powd Take 4 g by mouth 2 (two) times daily as needed (diarrhea). 378 g 0    duloxetine (CYMBALTA) 60 MG capsule Take 90 mg by mouth once daily.         ferrous sulfate (FEOSOL) 325 mg (65 mg iron) Tab tablet Take 1 tablet (325 mg total) by mouth once daily. 30 tablet 3    fluticasone propionate (FLONASE) 50 mcg/actuation nasal spray 2 sprays (100 mcg total) by Each Nare route once daily. 1 Bottle 12    HUMALOG MIX 75-25,U-100,INSULN 100 unit/mL (75-25) Susp 30 Units 2 (two) times daily.        hydroCHLOROthiazide (HYDRODIURIL) 25 MG tablet TAKE ONE TABLET BY MOUTH DAILY 90 tablet 1    HYDROcodone-acetaminophen (NORCO)  mg per tablet Take 1 tablet by mouth 2 to 3 times daily as needed. for pain for 30 days 75 tablet 0    insulin aspart U-100 (NOVOLOG FLEXPEN U-100 INSULIN) 100 unit/mL (3 mL) InPn pen Inject into the skin. Taken as SS        lidocaine (LIDODERM) 5 % Place 1 patch onto the skin once daily. Remove & Discard " patch within 12 hours or as directed by MD 10 patch 0    LORazepam (ATIVAN) 0.5 MG tablet Take 0.5 mg by mouth every 12 (twelve) hours as needed.    1    LYRICA 150 mg capsule Take by mouth 2 (two) times daily.    1    nitroGLYCERIN (NITROSTAT) 0.4 MG SL tablet Place 0.4 mg under the tongue every 5 (five) minutes as needed for Chest pain.        omeprazole (PRILOSEC) 40 MG capsule Take 1 capsule (40 mg total) by mouth every morning. 30 capsule 11    ondansetron (ZOFRAN-ODT) 4 MG TbDL Take 1 tablet (4 mg total) by mouth every 8 (eight) hours as needed. 14 tablet 1    potassium chloride SA (K-DUR,KLOR-CON) 20 MEQ tablet TAKE ONE TABLET BY MOUTH DAILY 90 tablet 1    pravastatin (PRAVACHOL) 20 MG tablet Take 20 mg by mouth once daily.         quetiapine (SEROQUEL) 300 MG Tab 300 mg nightly.         tiZANidine (ZANAFLEX) 4 MG tablet Take 1 tablet (4 mg total) by mouth daily as needed. 30 tablet 0    topiramate (TOPAMAX) 50 MG tablet TAKE 1 BY MOUTH TWICE DAILY 180 tablet 1    verapamiL (CALAN-SR) 240 MG CR tablet TAKE 1 TABLET BY MOUTH ONCE DAILY 90 tablet 1    zonisamide (ZONEGRAN) 100 MG Cap zonisamide 100 mg capsule   Take 2 capsules twice a day by oral route.          No current facility-administered medications on file prior to visit.         Social History:   Social History               Socioeconomic History    Marital status: Single       Spouse name: Not on file    Number of children: Not on file    Years of education: Not on file    Highest education level: Not on file   Occupational History    Not on file   Social Needs    Financial resource strain: Not on file    Food insecurity       Worry: Not on file       Inability: Not on file    Transportation needs       Medical: Not on file       Non-medical: Not on file   Tobacco Use    Smoking status: Current Every Day Smoker       Packs/day: 1.00       Years: 53.00       Pack years: 53.00       Types: Cigarettes       Start date: 1967     "Smokeless tobacco: Never Used    Tobacco comment: Pt is currently enrolled in the [employer].  Ambulatory referral to Smoking Cessation program.    Substance and Sexual Activity    Alcohol use: Yes       Alcohol/week: 3.0 standard drinks       Types: 3 Glasses of wine per week       Comment: 1 every 3 months    Drug use: No    Sexual activity: Yes       Partners: Male       Birth control/protection: None       Comment: last drink yesterday afternoon   Lifestyle    Physical activity       Days per week: Not on file       Minutes per session: Not on file    Stress: Not on file   Relationships    Social connections       Talks on phone: Not on file       Gets together: Not on file       Attends [employer] service: Not on file       Active member of club or organization: Not on file       Attends meetings of clubs or organizations: Not on file       Relationship status: Not on file   Other Topics Concern    Not on file   Social History Narrative    Not on file           REVIEW OF SYSTEMS:  Constitution: Negative. Negative for chills, fever and night sweats.   Cardiovascular: Negative for chest pain and syncope.   Respiratory: Negative for cough and shortness of breath.   Gastrointestinal: See HPI. Negative for nausea/vomiting. Negative for abdominal pain.  Genitourinary: See HPI. Negative for discoloration or dysuria.  Skin: Negative for dry skin, itching and rash.   Hematologic/Lymphatic: Negative for bleeding problem. Does not bruise/bleed easily.   Musculoskeletal: Negative for falls and muscle weakness.   Neurological: See HPI. No seizures.   Endocrine: Negative for polydipsia, polyphagia and polyuria.   Allergic/Immunologic: Negative for hives and persistent infections.  Psychiatric/Behavioral: Negative for depression and insomnia.          EXAM:  Ht 5' 2" (1.575 m)   Wt 64.3 kg (141 lb 12.1 oz)   LMP  (LMP Unknown)   BMI 25.93 kg/m²      General: The patient is a very pleasant 69 y.o. female in no " apparent distress, the patient is oriented to person, place and time.  Psych: Normal mood and affect  HEENT: Vision grossly intact, hearing intact to the spoken word.  Lungs: Respirations unlabored.  Gait: Antalgic, broad based station and gait, she is using a Rolator today.   Skin: Cervical skin negative for rashes, lesions, hairy patches and surgical scars.  Range of motion: Cervical range of motion is acceptable. There is no tenderness to palpation.  Spinal Balance: Global saggital and coronal spinal balance acceptable, no significant for scoliosis and kyphosis.  Musculoskeletal: No pain with the range of motion of the bilateral shoulders and elbows. Normal bulk and contour of the bilateral hands.  Vascular: Bilateral hands warm and well perfused, radial pulses 2+ bilaterally.  Neurological: Normal strength and tone in all major motor groups in the bilateral upper and lower extremities. Normal sensation to light touch in the C5-T1 and L2-S1 dermatomes bilaterally.  Deep tendon reflexes symmetric 2++ in the bilateral upper and lower extremities.  Positive Inverted Radial Reflex and negative Atkinson's bilaterally. Negative Babinski bilaterally.      IMAGING:   Today I personally reviewed AP and Lat upright C-spine films that demonstrate multilevel degenerative changes.     MRI cervical spine demonstrates multilevel cervical spondylosis with severe spinal stenosis at C5/6 with myelomalacia.  There is moderate C6/7 stenosis. Mild C3/4 and C4/5 stenosis.      Body mass index is 25.93 kg/m².            Hemoglobin A1C   Date Value Ref Range Status   05/12/2020 7.1 (H) 4.0 - 5.6 % Final       Comment:       ADA Screening Guidelines:  5.7-6.4%  Consistent with prediabetes  >or=6.5%  Consistent with diabetes  High levels of fetal hemoglobin interfere with the HbA1C  assay. Heterozygous hemoglobin variants (HbS, HgC, etc)do  not significantly interfere with this assay.   However, presence of multiple variants may affect  accuracy.      02/27/2020 8.3 (H) 4.0 - 5.6 % Final       Comment:       ADA Screening Guidelines:  5.7-6.4%  Consistent with prediabetes  >or=6.5%  Consistent with diabetes  High levels of fetal hemoglobin interfere with the HbA1C  assay. Heterozygous hemoglobin variants (HbS, HgC, etc)do  not significantly interfere with this assay.   However, presence of multiple variants may affect accuracy.      07/25/2019 7.2 (H) 4.0 - 5.6 % Final       Comment:       ADA Screening Guidelines:  5.7-6.4%  Consistent with prediabetes  >or=6.5%  Consistent with diabetes  High levels of fetal hemoglobin interfere with the HbA1C  assay. Heterozygous hemoglobin variants (HbS, HgC, etc)do  not significantly interfere with this assay.   However, presence of multiple variants may affect accuracy.      12/16/2012 11.7 (H) 4.8 - 5.6 % Final       Comment:                Increased risk for diabetes: 5.7 - 6.4           Diabetes: >6.4           Glycemic control for adults with diabetes: <7.0  **In order to standardize %HbA1c results worldwide, as of October 11, 2010,  the %HbA1c is being calculated using the master equation recommended in the  consensus statement adopted by the ADA (American Diabetes Assoc), EASD  (European Assoc for the Study of Diabetes), IFCC (International Federation  of Clinical Chemistry and Laboratory Medicine) and IDF (International  Diabetes Federation). Result units: %HgbA1c (DCCT/NGSP).  In common with other methods, Hb A1C values may not accurately reflect mean  blood glucose in patients with hemoglobin variants (HgbF, HgbS and HgbC).  Any cause of shortened erythrocyte survival will reduce exposure of  erythrocytes to glucose with a consequent decrease in HbA1c (%) values, even  though the time-averaged blood glucose level may be elevated. Causes of  shortened erythrocyte lifetime might be hemolytic anemia or other hemolytic  diseases, homozygous sickle cell trait, pregnancy, recent significant or  chronic  blood loss, etc. Caution should be used when interpreting the HbA1c  results from patients with these conditions.              ASSESSMENT/PLAN:     To OR 7/23 for PCF C3-T1.  Consents signed in clinic.

## 2020-07-23 ENCOUNTER — TELEPHONE (OUTPATIENT)
Dept: CARDIOLOGY | Facility: CLINIC | Age: 70
End: 2020-07-23

## 2020-07-23 ENCOUNTER — HOSPITAL ENCOUNTER (INPATIENT)
Facility: HOSPITAL | Age: 70
LOS: 6 days | Discharge: NURSING, REHAB OR LONG-TERM CARE | DRG: 472 | End: 2020-07-29
Attending: ORTHOPAEDIC SURGERY | Admitting: ORTHOPAEDIC SURGERY
Payer: MEDICARE

## 2020-07-23 ENCOUNTER — TELEPHONE (OUTPATIENT)
Dept: ORTHOPEDICS | Facility: CLINIC | Age: 70
End: 2020-07-23

## 2020-07-23 ENCOUNTER — ANESTHESIA (OUTPATIENT)
Dept: SURGERY | Facility: HOSPITAL | Age: 70
DRG: 472 | End: 2020-07-23
Payer: MEDICARE

## 2020-07-23 DIAGNOSIS — G95.9 CERVICAL MYELOPATHY: Primary | ICD-10-CM

## 2020-07-23 DIAGNOSIS — R07.9 CHEST PAIN: ICD-10-CM

## 2020-07-23 PROBLEM — E11.9 TYPE 2 DIABETES MELLITUS, WITH LONG-TERM CURRENT USE OF INSULIN: Status: ACTIVE | Noted: 2019-11-12

## 2020-07-23 PROBLEM — I25.10 CAD (CORONARY ARTERY DISEASE): Chronic | Status: ACTIVE | Noted: 2020-07-23

## 2020-07-23 PROBLEM — Z79.4 TYPE 2 DIABETES MELLITUS, WITH LONG-TERM CURRENT USE OF INSULIN: Status: ACTIVE | Noted: 2019-11-12

## 2020-07-23 PROBLEM — I10 HYPERTENSION: Status: ACTIVE | Noted: 2020-07-23

## 2020-07-23 PROBLEM — F41.9 ANXIETY: Status: ACTIVE | Noted: 2020-07-23

## 2020-07-23 PROBLEM — G43.909 MIGRAINE HEADACHE: Status: ACTIVE | Noted: 2020-07-23

## 2020-07-23 LAB
ALBUMIN SERPL BCP-MCNC: 3.8 G/DL (ref 3.5–5.2)
ALP SERPL-CCNC: 129 U/L (ref 55–135)
ALT SERPL W/O P-5'-P-CCNC: 38 U/L (ref 10–44)
ANION GAP SERPL CALC-SCNC: 7 MMOL/L (ref 8–16)
AST SERPL-CCNC: 72 U/L (ref 10–40)
BASOPHILS # BLD AUTO: 0.01 K/UL (ref 0–0.2)
BASOPHILS NFR BLD: 0.2 % (ref 0–1.9)
BILIRUB SERPL-MCNC: 0.2 MG/DL (ref 0.1–1)
BUN SERPL-MCNC: 5 MG/DL (ref 8–23)
CALCIUM SERPL-MCNC: 8.2 MG/DL (ref 8.7–10.5)
CHLORIDE SERPL-SCNC: 111 MMOL/L (ref 95–110)
CO2 SERPL-SCNC: 22 MMOL/L (ref 23–29)
CREAT SERPL-MCNC: 0.7 MG/DL (ref 0.5–1.4)
CV STRESS BASE HR: 99 BPM
DIASTOLIC BLOOD PRESSURE: 88 MMHG
DIFFERENTIAL METHOD: ABNORMAL
EOSINOPHIL # BLD AUTO: 0.1 K/UL (ref 0–0.5)
EOSINOPHIL NFR BLD: 0.9 % (ref 0–8)
ERYTHROCYTE [DISTWIDTH] IN BLOOD BY AUTOMATED COUNT: 25.6 % (ref 11.5–14.5)
EST. GFR  (AFRICAN AMERICAN): >60 ML/MIN/1.73 M^2
EST. GFR  (NON AFRICAN AMERICAN): >60 ML/MIN/1.73 M^2
GLUCOSE SERPL-MCNC: 238 MG/DL (ref 70–110)
HCT VFR BLD AUTO: 32.2 % (ref 37–48.5)
HGB BLD-MCNC: 9.5 G/DL (ref 12–16)
IMM GRANULOCYTES # BLD AUTO: 0.02 K/UL (ref 0–0.04)
IMM GRANULOCYTES NFR BLD AUTO: 0.4 % (ref 0–0.5)
LYMPHOCYTES # BLD AUTO: 1 K/UL (ref 1–4.8)
LYMPHOCYTES NFR BLD: 17.9 % (ref 18–48)
MCH RBC QN AUTO: 25.7 PG (ref 27–31)
MCHC RBC AUTO-ENTMCNC: 29.5 G/DL (ref 32–36)
MCV RBC AUTO: 87 FL (ref 82–98)
MONOCYTES # BLD AUTO: 0.3 K/UL (ref 0.3–1)
MONOCYTES NFR BLD: 5.3 % (ref 4–15)
NEUTROPHILS # BLD AUTO: 4.2 K/UL (ref 1.8–7.7)
NEUTROPHILS NFR BLD: 75.3 % (ref 38–73)
NRBC BLD-RTO: 0 /100 WBC
OHS CV CPX 1 MINUTE RECOVERY HEART RATE: 104 BPM
OHS CV CPX 85 PERCENT MAX PREDICTED HEART RATE MALE: 125
OHS CV CPX MAX PREDICTED HEART RATE: 147
OHS CV CPX PATIENT IS FEMALE: 1
OHS CV CPX PATIENT IS MALE: 0
OHS CV CPX PEAK DIASTOLIC BLOOD PRESSURE: 96 MMHG
OHS CV CPX PEAK HEAR RATE: 112 BPM
OHS CV CPX PEAK RATE PRESSURE PRODUCT: NORMAL
OHS CV CPX PEAK SYSTOLIC BLOOD PRESSURE: 153 MMHG
OHS CV CPX PERCENT MAX PREDICTED HEART RATE ACHIEVED: 76
OHS CV CPX RATE PRESSURE PRODUCT PRESENTING: NORMAL
PLATELET # BLD AUTO: 165 K/UL (ref 150–350)
PMV BLD AUTO: 9.1 FL (ref 9.2–12.9)
POCT GLUCOSE: 161 MG/DL (ref 70–110)
POCT GLUCOSE: 229 MG/DL (ref 70–110)
POCT GLUCOSE: 267 MG/DL (ref 70–110)
POTASSIUM SERPL-SCNC: 3.9 MMOL/L (ref 3.5–5.1)
PROT SERPL-MCNC: 6.5 G/DL (ref 6–8.4)
RBC # BLD AUTO: 3.69 M/UL (ref 4–5.4)
SARS-COV-2 RNA RESP QL NAA+PROBE: NOT DETECTED
SODIUM SERPL-SCNC: 140 MMOL/L (ref 136–145)
STRESS ST DEPRESSION: 0.7 MM
SYSTOLIC BLOOD PRESSURE: 151 MMHG
WBC # BLD AUTO: 5.63 K/UL (ref 3.9–12.7)

## 2020-07-23 PROCEDURE — 22600 PR ARTHRODESIS, POST/POSTLAT, SNGL INTERSPACE, CERVICAL BELOW C2: ICD-10-PCS | Mod: AS,51,, | Performed by: PHYSICIAN ASSISTANT

## 2020-07-23 PROCEDURE — 36415 COLL VENOUS BLD VENIPUNCTURE: CPT

## 2020-07-23 PROCEDURE — 82962 GLUCOSE BLOOD TEST: CPT | Performed by: ORTHOPAEDIC SURGERY

## 2020-07-23 PROCEDURE — 11000001 HC ACUTE MED/SURG PRIVATE ROOM

## 2020-07-23 PROCEDURE — 63600175 PHARM REV CODE 636 W HCPCS: Performed by: NURSE ANESTHETIST, CERTIFIED REGISTERED

## 2020-07-23 PROCEDURE — 25000003 PHARM REV CODE 250: Performed by: STUDENT IN AN ORGANIZED HEALTH CARE EDUCATION/TRAINING PROGRAM

## 2020-07-23 PROCEDURE — 63015 PR LAMINECTOMY,>2 SGMT,CERVICAL: ICD-10-PCS | Mod: ,,, | Performed by: ORTHOPAEDIC SURGERY

## 2020-07-23 PROCEDURE — 25000003 PHARM REV CODE 250: Performed by: NURSE ANESTHETIST, CERTIFIED REGISTERED

## 2020-07-23 PROCEDURE — 20936 PR AUTOGRAFT SPINE SURGERY LOCAL FROM SAME INCISION: ICD-10-PCS | Mod: ,,, | Performed by: ORTHOPAEDIC SURGERY

## 2020-07-23 PROCEDURE — 36620 ARTERIAL: ICD-10-PCS | Mod: 59,,, | Performed by: ANESTHESIOLOGY

## 2020-07-23 PROCEDURE — 63015 PR LAMINECTOMY,>2 SGMT,CERVICAL: ICD-10-PCS | Mod: AS,,, | Performed by: PHYSICIAN ASSISTANT

## 2020-07-23 PROCEDURE — D9220A PRA ANESTHESIA: ICD-10-PCS | Mod: ANES,,, | Performed by: ANESTHESIOLOGY

## 2020-07-23 PROCEDURE — 25000003 PHARM REV CODE 250: Performed by: PHYSICIAN ASSISTANT

## 2020-07-23 PROCEDURE — 25000003 PHARM REV CODE 250: Performed by: ORTHOPAEDIC SURGERY

## 2020-07-23 PROCEDURE — 63600175 PHARM REV CODE 636 W HCPCS

## 2020-07-23 PROCEDURE — 36620 INSERTION CATHETER ARTERY: CPT | Mod: 59,,, | Performed by: ANESTHESIOLOGY

## 2020-07-23 PROCEDURE — 20936 SP BONE AGRFT LOCAL ADD-ON: CPT | Mod: ,,, | Performed by: ORTHOPAEDIC SURGERY

## 2020-07-23 PROCEDURE — 37000009 HC ANESTHESIA EA ADD 15 MINS: Performed by: ORTHOPAEDIC SURGERY

## 2020-07-23 PROCEDURE — 22600 ARTHRD PST TQ 1NTRSPC CRV: CPT | Mod: 51,,, | Performed by: ORTHOPAEDIC SURGERY

## 2020-07-23 PROCEDURE — P9045 ALBUMIN (HUMAN), 5%, 250 ML: HCPCS | Mod: JG | Performed by: NURSE ANESTHETIST, CERTIFIED REGISTERED

## 2020-07-23 PROCEDURE — 63600175 PHARM REV CODE 636 W HCPCS: Performed by: ANESTHESIOLOGY

## 2020-07-23 PROCEDURE — 22614 PR ARTHRODESIS, POST/POSTLAT, SNGL INTERSPACE, EA ADDTL: ICD-10-PCS | Mod: AS,,, | Performed by: PHYSICIAN ASSISTANT

## 2020-07-23 PROCEDURE — 63600175 PHARM REV CODE 636 W HCPCS: Performed by: STUDENT IN AN ORGANIZED HEALTH CARE EDUCATION/TRAINING PROGRAM

## 2020-07-23 PROCEDURE — 22842 INSERT SPINE FIXATION DEVICE: CPT | Mod: ,,, | Performed by: ORTHOPAEDIC SURGERY

## 2020-07-23 PROCEDURE — 63600175 PHARM REV CODE 636 W HCPCS: Performed by: ORTHOPAEDIC SURGERY

## 2020-07-23 PROCEDURE — 71000033 HC RECOVERY, INTIAL HOUR: Performed by: ORTHOPAEDIC SURGERY

## 2020-07-23 PROCEDURE — 22842 PR POSTERIOR SEGMENTAL INSTRUMENTATION 3-6 VRT SEG: ICD-10-PCS | Mod: AS,,, | Performed by: PHYSICIAN ASSISTANT

## 2020-07-23 PROCEDURE — 99222 1ST HOSP IP/OBS MODERATE 55: CPT | Mod: GC,,, | Performed by: HOSPITALIST

## 2020-07-23 PROCEDURE — 27201423 OPTIME MED/SURG SUP & DEVICES STERILE SUPPLY: Performed by: ORTHOPAEDIC SURGERY

## 2020-07-23 PROCEDURE — 37000008 HC ANESTHESIA 1ST 15 MINUTES: Performed by: ORTHOPAEDIC SURGERY

## 2020-07-23 PROCEDURE — 63600175 PHARM REV CODE 636 W HCPCS: Performed by: INTERNAL MEDICINE

## 2020-07-23 PROCEDURE — 71000039 HC RECOVERY, EACH ADD'L HOUR: Performed by: ORTHOPAEDIC SURGERY

## 2020-07-23 PROCEDURE — 36000711: Performed by: ORTHOPAEDIC SURGERY

## 2020-07-23 PROCEDURE — 63015 REMOVE SPINE LAMINA >2 CRVCL: CPT | Mod: ,,, | Performed by: ORTHOPAEDIC SURGERY

## 2020-07-23 PROCEDURE — 22600 ARTHRD PST TQ 1NTRSPC CRV: CPT | Mod: AS,51,, | Performed by: PHYSICIAN ASSISTANT

## 2020-07-23 PROCEDURE — 85025 COMPLETE CBC W/AUTO DIFF WBC: CPT

## 2020-07-23 PROCEDURE — C1729 CATH, DRAINAGE: HCPCS | Performed by: ORTHOPAEDIC SURGERY

## 2020-07-23 PROCEDURE — 80053 COMPREHEN METABOLIC PANEL: CPT

## 2020-07-23 PROCEDURE — 22842 INSERT SPINE FIXATION DEVICE: CPT | Mod: AS,,, | Performed by: PHYSICIAN ASSISTANT

## 2020-07-23 PROCEDURE — 36000710: Performed by: ORTHOPAEDIC SURGERY

## 2020-07-23 PROCEDURE — D9220A PRA ANESTHESIA: Mod: ANES,,, | Performed by: ANESTHESIOLOGY

## 2020-07-23 PROCEDURE — 22600 PR ARTHRODESIS, POST/POSTLAT, SNGL INTERSPACE, CERVICAL BELOW C2: ICD-10-PCS | Mod: 51,,, | Performed by: ORTHOPAEDIC SURGERY

## 2020-07-23 PROCEDURE — 63015 REMOVE SPINE LAMINA >2 CRVCL: CPT | Mod: AS,,, | Performed by: PHYSICIAN ASSISTANT

## 2020-07-23 PROCEDURE — 22614 PR ARTHRODESIS, POST/POSTLAT, SNGL INTERSPACE, EA ADDTL: ICD-10-PCS | Mod: ,,, | Performed by: ORTHOPAEDIC SURGERY

## 2020-07-23 PROCEDURE — 22614 ARTHRD PST TQ 1NTRSPC EA ADD: CPT | Mod: ,,, | Performed by: ORTHOPAEDIC SURGERY

## 2020-07-23 PROCEDURE — 99222 PR INITIAL HOSPITAL CARE,LEVL II: ICD-10-PCS | Mod: GC,,, | Performed by: HOSPITALIST

## 2020-07-23 PROCEDURE — 22614 ARTHRD PST TQ 1NTRSPC EA ADD: CPT | Mod: AS,,, | Performed by: PHYSICIAN ASSISTANT

## 2020-07-23 PROCEDURE — 25000242 PHARM REV CODE 250 ALT 637 W/ HCPCS: Performed by: NURSE ANESTHETIST, CERTIFIED REGISTERED

## 2020-07-23 PROCEDURE — 94761 N-INVAS EAR/PLS OXIMETRY MLT: CPT

## 2020-07-23 PROCEDURE — D9220A PRA ANESTHESIA: Mod: CRNA,,, | Performed by: NURSE ANESTHETIST, CERTIFIED REGISTERED

## 2020-07-23 PROCEDURE — 63600175 PHARM REV CODE 636 W HCPCS: Performed by: PHYSICIAN ASSISTANT

## 2020-07-23 PROCEDURE — 71000015 HC POSTOP RECOV 1ST HR: Performed by: ORTHOPAEDIC SURGERY

## 2020-07-23 PROCEDURE — 22842 PR POSTERIOR SEGMENTAL INSTRUMENTATION 3-6 VRT SEG: ICD-10-PCS | Mod: ,,, | Performed by: ORTHOPAEDIC SURGERY

## 2020-07-23 PROCEDURE — D9220A PRA ANESTHESIA: ICD-10-PCS | Mod: CRNA,,, | Performed by: NURSE ANESTHETIST, CERTIFIED REGISTERED

## 2020-07-23 PROCEDURE — C1713 ANCHOR/SCREW BN/BN,TIS/BN: HCPCS | Performed by: ORTHOPAEDIC SURGERY

## 2020-07-23 DEVICE — IMPLANTABLE DEVICE: Type: IMPLANTABLE DEVICE | Site: SPINE CERVICAL | Status: FUNCTIONAL

## 2020-07-23 RX ORDER — OXYCODONE HYDROCHLORIDE 10 MG/1
10 TABLET ORAL EVERY 4 HOURS PRN
Status: DISCONTINUED | OUTPATIENT
Start: 2020-07-23 | End: 2020-07-24

## 2020-07-23 RX ORDER — OXYCODONE HYDROCHLORIDE 5 MG/1
15 TABLET ORAL EVERY 6 HOURS PRN
Status: DISCONTINUED | OUTPATIENT
Start: 2020-07-23 | End: 2020-07-23

## 2020-07-23 RX ORDER — HYDROMORPHONE HYDROCHLORIDE 1 MG/ML
0.2 INJECTION, SOLUTION INTRAMUSCULAR; INTRAVENOUS; SUBCUTANEOUS EVERY 5 MIN PRN
Status: DISCONTINUED | OUTPATIENT
Start: 2020-07-23 | End: 2020-07-23 | Stop reason: HOSPADM

## 2020-07-23 RX ORDER — BUPIVACAINE HYDROCHLORIDE AND EPINEPHRINE 5; 5 MG/ML; UG/ML
INJECTION, SOLUTION EPIDURAL; INTRACAUDAL; PERINEURAL
Status: DISCONTINUED | OUTPATIENT
Start: 2020-07-23 | End: 2020-07-23 | Stop reason: HOSPADM

## 2020-07-23 RX ORDER — OXYCODONE HYDROCHLORIDE 5 MG/1
5 TABLET ORAL EVERY 4 HOURS PRN
Status: DISCONTINUED | OUTPATIENT
Start: 2020-07-23 | End: 2020-07-26

## 2020-07-23 RX ORDER — SODIUM CHLORIDE 0.9 % (FLUSH) 0.9 %
5 SYRINGE (ML) INJECTION
Status: DISCONTINUED | OUTPATIENT
Start: 2020-07-23 | End: 2020-07-29 | Stop reason: HOSPADM

## 2020-07-23 RX ORDER — ZONISAMIDE 50 MG/1
200 CAPSULE ORAL 2 TIMES DAILY
Status: DISCONTINUED | OUTPATIENT
Start: 2020-07-23 | End: 2020-07-29 | Stop reason: HOSPADM

## 2020-07-23 RX ORDER — SODIUM CHLORIDE 0.9 % (FLUSH) 0.9 %
10 SYRINGE (ML) INJECTION
Status: DISCONTINUED | OUTPATIENT
Start: 2020-07-23 | End: 2020-07-23 | Stop reason: HOSPADM

## 2020-07-23 RX ORDER — PHENYLEPHRINE HYDROCHLORIDE 10 MG/ML
INJECTION INTRAVENOUS
Status: DISCONTINUED | OUTPATIENT
Start: 2020-07-23 | End: 2020-07-23

## 2020-07-23 RX ORDER — LIDOCAINE HYDROCHLORIDE AND EPINEPHRINE 10; 10 MG/ML; UG/ML
INJECTION, SOLUTION INFILTRATION; PERINEURAL
Status: DISCONTINUED | OUTPATIENT
Start: 2020-07-23 | End: 2020-07-23 | Stop reason: HOSPADM

## 2020-07-23 RX ORDER — GABAPENTIN 300 MG/1
300 CAPSULE ORAL 3 TIMES DAILY
Status: DISCONTINUED | OUTPATIENT
Start: 2020-07-23 | End: 2020-07-26

## 2020-07-23 RX ORDER — ACETAMINOPHEN 10 MG/ML
INJECTION, SOLUTION INTRAVENOUS
Status: DISCONTINUED | OUTPATIENT
Start: 2020-07-23 | End: 2020-07-23

## 2020-07-23 RX ORDER — MIDAZOLAM HYDROCHLORIDE 1 MG/ML
INJECTION, SOLUTION INTRAMUSCULAR; INTRAVENOUS
Status: DISCONTINUED | OUTPATIENT
Start: 2020-07-23 | End: 2020-07-23

## 2020-07-23 RX ORDER — LORAZEPAM 0.5 MG/1
0.5 TABLET ORAL EVERY 12 HOURS PRN
Status: DISCONTINUED | OUTPATIENT
Start: 2020-07-23 | End: 2020-07-26

## 2020-07-23 RX ORDER — FLUTICASONE PROPIONATE 50 MCG
2 SPRAY, SUSPENSION (ML) NASAL DAILY
Status: DISCONTINUED | OUTPATIENT
Start: 2020-07-23 | End: 2020-07-29 | Stop reason: HOSPADM

## 2020-07-23 RX ORDER — QUETIAPINE FUMARATE 300 MG/1
300 TABLET, FILM COATED ORAL NIGHTLY
Status: DISCONTINUED | OUTPATIENT
Start: 2020-07-23 | End: 2020-07-26

## 2020-07-23 RX ORDER — CEFAZOLIN SODIUM 1 G/3ML
INJECTION, POWDER, FOR SOLUTION INTRAMUSCULAR; INTRAVENOUS
Status: DISCONTINUED | OUTPATIENT
Start: 2020-07-23 | End: 2020-07-23

## 2020-07-23 RX ORDER — FENTANYL CITRATE 50 UG/ML
INJECTION, SOLUTION INTRAMUSCULAR; INTRAVENOUS
Status: DISCONTINUED | OUTPATIENT
Start: 2020-07-23 | End: 2020-07-23

## 2020-07-23 RX ORDER — AMITRIPTYLINE HYDROCHLORIDE 10 MG/1
20 TABLET, FILM COATED ORAL NIGHTLY
Status: DISCONTINUED | OUTPATIENT
Start: 2020-07-23 | End: 2020-07-29 | Stop reason: HOSPADM

## 2020-07-23 RX ORDER — INSULIN ASPART 100 [IU]/ML
1-10 INJECTION, SOLUTION INTRAVENOUS; SUBCUTANEOUS
Status: DISCONTINUED | OUTPATIENT
Start: 2020-07-23 | End: 2020-07-29 | Stop reason: HOSPADM

## 2020-07-23 RX ORDER — VERAPAMIL HYDROCHLORIDE 120 MG/1
240 TABLET, FILM COATED, EXTENDED RELEASE ORAL DAILY
Status: DISCONTINUED | OUTPATIENT
Start: 2020-07-23 | End: 2020-07-29 | Stop reason: HOSPADM

## 2020-07-23 RX ORDER — BACITRACIN 50000 [IU]/1
INJECTION, POWDER, FOR SOLUTION INTRAMUSCULAR
Status: DISCONTINUED | OUTPATIENT
Start: 2020-07-23 | End: 2020-07-23 | Stop reason: HOSPADM

## 2020-07-23 RX ORDER — IBUPROFEN 200 MG
16 TABLET ORAL
Status: DISCONTINUED | OUTPATIENT
Start: 2020-07-23 | End: 2020-07-29 | Stop reason: HOSPADM

## 2020-07-23 RX ORDER — POTASSIUM CHLORIDE 20 MEQ/1
20 TABLET, EXTENDED RELEASE ORAL DAILY
Status: DISCONTINUED | OUTPATIENT
Start: 2020-07-23 | End: 2020-07-29 | Stop reason: HOSPADM

## 2020-07-23 RX ORDER — ACETAMINOPHEN 500 MG
500 TABLET ORAL EVERY 12 HOURS
Qty: 14 TABLET | Refills: 0 | Status: SHIPPED | OUTPATIENT
Start: 2020-07-23 | End: 2020-07-30

## 2020-07-23 RX ORDER — HYDROMORPHONE HYDROCHLORIDE 1 MG/ML
0.5 INJECTION, SOLUTION INTRAMUSCULAR; INTRAVENOUS; SUBCUTANEOUS EVERY 4 HOURS PRN
Status: DISCONTINUED | OUTPATIENT
Start: 2020-07-23 | End: 2020-07-23

## 2020-07-23 RX ORDER — PROPOFOL 10 MG/ML
VIAL (ML) INTRAVENOUS CONTINUOUS PRN
Status: DISCONTINUED | OUTPATIENT
Start: 2020-07-23 | End: 2020-07-23

## 2020-07-23 RX ORDER — OXYCODONE HYDROCHLORIDE 5 MG/1
5 TABLET ORAL EVERY 4 HOURS PRN
Qty: 30 TABLET | Refills: 0 | Status: SHIPPED | OUTPATIENT
Start: 2020-07-23 | End: 2020-07-30

## 2020-07-23 RX ORDER — ONDANSETRON 2 MG/ML
8 INJECTION INTRAMUSCULAR; INTRAVENOUS EVERY 12 HOURS PRN
Status: DISCONTINUED | OUTPATIENT
Start: 2020-07-23 | End: 2020-07-29 | Stop reason: HOSPADM

## 2020-07-23 RX ORDER — PRAVASTATIN SODIUM 10 MG/1
20 TABLET ORAL DAILY
Status: DISCONTINUED | OUTPATIENT
Start: 2020-07-23 | End: 2020-07-29 | Stop reason: HOSPADM

## 2020-07-23 RX ORDER — ROCURONIUM BROMIDE 10 MG/ML
INJECTION, SOLUTION INTRAVENOUS
Status: DISCONTINUED | OUTPATIENT
Start: 2020-07-23 | End: 2020-07-23

## 2020-07-23 RX ORDER — ALBUMIN HUMAN 50 G/1000ML
SOLUTION INTRAVENOUS CONTINUOUS PRN
Status: DISCONTINUED | OUTPATIENT
Start: 2020-07-23 | End: 2020-07-23

## 2020-07-23 RX ORDER — FAMOTIDINE 20 MG/1
20 TABLET, FILM COATED ORAL 2 TIMES DAILY
Status: DISCONTINUED | OUTPATIENT
Start: 2020-07-23 | End: 2020-07-26

## 2020-07-23 RX ORDER — HYDROMORPHONE HYDROCHLORIDE 1 MG/ML
0.5 INJECTION, SOLUTION INTRAMUSCULAR; INTRAVENOUS; SUBCUTANEOUS EVERY 4 HOURS PRN
Status: DISCONTINUED | OUTPATIENT
Start: 2020-07-23 | End: 2020-07-24

## 2020-07-23 RX ORDER — SUCCINYLCHOLINE CHLORIDE 20 MG/ML
INJECTION INTRAMUSCULAR; INTRAVENOUS
Status: DISCONTINUED | OUTPATIENT
Start: 2020-07-23 | End: 2020-07-23

## 2020-07-23 RX ORDER — ONDANSETRON 2 MG/ML
4 INJECTION INTRAMUSCULAR; INTRAVENOUS DAILY PRN
Status: DISCONTINUED | OUTPATIENT
Start: 2020-07-23 | End: 2020-07-23 | Stop reason: HOSPADM

## 2020-07-23 RX ORDER — KETAMINE HCL IN 0.9 % NACL 50 MG/5 ML
SYRINGE (ML) INTRAVENOUS
Status: DISCONTINUED | OUTPATIENT
Start: 2020-07-23 | End: 2020-07-23

## 2020-07-23 RX ORDER — HYDROCHLOROTHIAZIDE 25 MG/1
25 TABLET ORAL DAILY
Status: DISCONTINUED | OUTPATIENT
Start: 2020-07-23 | End: 2020-07-29 | Stop reason: HOSPADM

## 2020-07-23 RX ORDER — AMOXICILLIN 250 MG
1 CAPSULE ORAL 2 TIMES DAILY
Status: DISCONTINUED | OUTPATIENT
Start: 2020-07-23 | End: 2020-07-29 | Stop reason: HOSPADM

## 2020-07-23 RX ORDER — SODIUM CHLORIDE 9 MG/ML
INJECTION, SOLUTION INTRAVENOUS CONTINUOUS PRN
Status: DISCONTINUED | OUTPATIENT
Start: 2020-07-23 | End: 2020-07-23

## 2020-07-23 RX ORDER — ESMOLOL HYDROCHLORIDE 10 MG/ML
INJECTION INTRAVENOUS
Status: DISCONTINUED | OUTPATIENT
Start: 2020-07-23 | End: 2020-07-23

## 2020-07-23 RX ORDER — DEXAMETHASONE SODIUM PHOSPHATE 4 MG/ML
INJECTION, SOLUTION INTRA-ARTICULAR; INTRALESIONAL; INTRAMUSCULAR; INTRAVENOUS; SOFT TISSUE
Status: DISCONTINUED | OUTPATIENT
Start: 2020-07-23 | End: 2020-07-23

## 2020-07-23 RX ORDER — IBUPROFEN 200 MG
24 TABLET ORAL
Status: DISCONTINUED | OUTPATIENT
Start: 2020-07-23 | End: 2020-07-29 | Stop reason: HOSPADM

## 2020-07-23 RX ORDER — REGADENOSON 0.08 MG/ML
0.4 INJECTION, SOLUTION INTRAVENOUS ONCE
Status: COMPLETED | OUTPATIENT
Start: 2020-07-23 | End: 2020-07-23

## 2020-07-23 RX ORDER — VANCOMYCIN HYDROCHLORIDE 1 G/20ML
INJECTION, POWDER, LYOPHILIZED, FOR SOLUTION INTRAVENOUS
Status: DISCONTINUED | OUTPATIENT
Start: 2020-07-23 | End: 2020-07-23 | Stop reason: HOSPADM

## 2020-07-23 RX ORDER — HYDROMORPHONE HYDROCHLORIDE 1 MG/ML
0.2 INJECTION, SOLUTION INTRAMUSCULAR; INTRAVENOUS; SUBCUTANEOUS EVERY 5 MIN PRN
Status: DISCONTINUED | OUTPATIENT
Start: 2020-07-23 | End: 2020-07-23

## 2020-07-23 RX ORDER — GLUCAGON 1 MG
1 KIT INJECTION
Status: DISCONTINUED | OUTPATIENT
Start: 2020-07-23 | End: 2020-07-29 | Stop reason: HOSPADM

## 2020-07-23 RX ORDER — VASOPRESSIN 20 [USP'U]/ML
INJECTION, SOLUTION INTRAMUSCULAR; SUBCUTANEOUS
Status: DISCONTINUED | OUTPATIENT
Start: 2020-07-23 | End: 2020-07-23

## 2020-07-23 RX ORDER — HYDROMORPHONE HYDROCHLORIDE 1 MG/ML
INJECTION, SOLUTION INTRAMUSCULAR; INTRAVENOUS; SUBCUTANEOUS
Status: COMPLETED
Start: 2020-07-23 | End: 2020-07-23

## 2020-07-23 RX ORDER — LIDOCAINE HCL/PF 100 MG/5ML
SYRINGE (ML) INTRAVENOUS
Status: DISCONTINUED | OUTPATIENT
Start: 2020-07-23 | End: 2020-07-23

## 2020-07-23 RX ORDER — CEFAZOLIN SODIUM 1 G/3ML
2 INJECTION, POWDER, FOR SOLUTION INTRAMUSCULAR; INTRAVENOUS
Status: COMPLETED | OUTPATIENT
Start: 2020-07-23 | End: 2020-07-24

## 2020-07-23 RX ORDER — PROPOFOL 10 MG/ML
VIAL (ML) INTRAVENOUS
Status: DISCONTINUED | OUTPATIENT
Start: 2020-07-23 | End: 2020-07-23

## 2020-07-23 RX ORDER — ALBUTEROL SULFATE 90 UG/1
AEROSOL, METERED RESPIRATORY (INHALATION)
Status: DISCONTINUED | OUTPATIENT
Start: 2020-07-23 | End: 2020-07-23

## 2020-07-23 RX ADMIN — PROPOFOL 150 MCG/KG/MIN: 10 INJECTION, EMULSION INTRAVENOUS at 11:07

## 2020-07-23 RX ADMIN — ESMOLOL HYDROCHLORIDE 30 MG: 100 INJECTION, SOLUTION INTRAVENOUS at 01:07

## 2020-07-23 RX ADMIN — HYDROMORPHONE HYDROCHLORIDE 0.5 MG: 1 INJECTION, SOLUTION INTRAMUSCULAR; INTRAVENOUS; SUBCUTANEOUS at 09:07

## 2020-07-23 RX ADMIN — PROPOFOL 50 MG: 10 INJECTION, EMULSION INTRAVENOUS at 11:07

## 2020-07-23 RX ADMIN — PHENYLEPHRINE HYDROCHLORIDE 100 MCG: 10 INJECTION INTRAVENOUS at 11:07

## 2020-07-23 RX ADMIN — OXYCODONE HYDROCHLORIDE 15 MG: 10 TABLET ORAL at 11:07

## 2020-07-23 RX ADMIN — ACETAMINOPHEN 1000 MG: 10 INJECTION, SOLUTION INTRAVENOUS at 12:07

## 2020-07-23 RX ADMIN — ROCURONIUM BROMIDE 30 MG: 10 INJECTION, SOLUTION INTRAVENOUS at 12:07

## 2020-07-23 RX ADMIN — MIDAZOLAM HYDROCHLORIDE 2 MG: 1 INJECTION, SOLUTION INTRAMUSCULAR; INTRAVENOUS at 10:07

## 2020-07-23 RX ADMIN — INSULIN ASPART 4 UNITS: 100 INJECTION, SOLUTION INTRAVENOUS; SUBCUTANEOUS at 03:07

## 2020-07-23 RX ADMIN — HYDROMORPHONE HYDROCHLORIDE 0.2 MG: 1 INJECTION, SOLUTION INTRAMUSCULAR; INTRAVENOUS; SUBCUTANEOUS at 02:07

## 2020-07-23 RX ADMIN — OXYCODONE HYDROCHLORIDE 15 MG: 10 TABLET ORAL at 02:07

## 2020-07-23 RX ADMIN — ALBUTEROL SULFATE 2 PUFF: 90 AEROSOL, METERED RESPIRATORY (INHALATION) at 01:07

## 2020-07-23 RX ADMIN — QUETIAPINE FUMARATE 300 MG: 300 TABLET ORAL at 10:07

## 2020-07-23 RX ADMIN — ZONISAMIDE 200 MG: 100 CAPSULE ORAL at 10:07

## 2020-07-23 RX ADMIN — FENTANYL CITRATE 100 MCG: 50 INJECTION, SOLUTION INTRAMUSCULAR; INTRAVENOUS at 10:07

## 2020-07-23 RX ADMIN — VASOPRESSIN 0.5 UNITS: 20 INJECTION INTRAVENOUS at 01:07

## 2020-07-23 RX ADMIN — PRAVASTATIN SODIUM 20 MG: 20 TABLET ORAL at 04:07

## 2020-07-23 RX ADMIN — SODIUM CHLORIDE 0.25 MCG/KG/MIN: 9 INJECTION, SOLUTION INTRAVENOUS at 11:07

## 2020-07-23 RX ADMIN — CEFAZOLIN 2 G: 1 INJECTION, POWDER, FOR SOLUTION INTRAMUSCULAR; INTRAVENOUS at 09:07

## 2020-07-23 RX ADMIN — FAMOTIDINE 20 MG: 20 TABLET, FILM COATED ORAL at 09:07

## 2020-07-23 RX ADMIN — SODIUM CHLORIDE: 0.9 INJECTION, SOLUTION INTRAVENOUS at 10:07

## 2020-07-23 RX ADMIN — PROPOFOL 100 MG: 10 INJECTION, EMULSION INTRAVENOUS at 10:07

## 2020-07-23 RX ADMIN — DEXAMETHASONE SODIUM PHOSPHATE 4 MG: 4 INJECTION, SOLUTION INTRAMUSCULAR; INTRAVENOUS at 12:07

## 2020-07-23 RX ADMIN — Medication 20 MG: at 11:07

## 2020-07-23 RX ADMIN — SODIUM CHLORIDE, SODIUM GLUCONATE, SODIUM ACETATE, POTASSIUM CHLORIDE, MAGNESIUM CHLORIDE, SODIUM PHOSPHATE, DIBASIC, AND POTASSIUM PHOSPHATE: .53; .5; .37; .037; .03; .012; .00082 INJECTION, SOLUTION INTRAVENOUS at 11:07

## 2020-07-23 RX ADMIN — HYDROCHLOROTHIAZIDE 25 MG: 25 TABLET ORAL at 04:07

## 2020-07-23 RX ADMIN — VASOPRESSIN 0.5 UNITS: 20 INJECTION INTRAVENOUS at 12:07

## 2020-07-23 RX ADMIN — GABAPENTIN 300 MG: 300 CAPSULE ORAL at 02:07

## 2020-07-23 RX ADMIN — AMITRIPTYLINE HYDROCHLORIDE 20 MG: 10 TABLET, FILM COATED ORAL at 10:07

## 2020-07-23 RX ADMIN — ALBUMIN (HUMAN): 12.5 SOLUTION INTRAVENOUS at 12:07

## 2020-07-23 RX ADMIN — HYDROMORPHONE HYDROCHLORIDE 0.2 MG: 1 INJECTION, SOLUTION INTRAMUSCULAR; INTRAVENOUS; SUBCUTANEOUS at 03:07

## 2020-07-23 RX ADMIN — ROCURONIUM BROMIDE 20 MG: 10 INJECTION, SOLUTION INTRAVENOUS at 11:07

## 2020-07-23 RX ADMIN — SUCCINYLCHOLINE CHLORIDE 140 MG: 20 INJECTION, SOLUTION INTRAMUSCULAR; INTRAVENOUS at 10:07

## 2020-07-23 RX ADMIN — SUGAMMADEX 200 MG: 100 INJECTION, SOLUTION INTRAVENOUS at 12:07

## 2020-07-23 RX ADMIN — DOCUSATE SODIUM 50 MG AND SENNOSIDES 8.6 MG 1 TABLET: 8.6; 5 TABLET, FILM COATED ORAL at 09:07

## 2020-07-23 RX ADMIN — REMIFENTANIL HYDROCHLORIDE 0.2 MCG/KG/MIN: 1 INJECTION, POWDER, LYOPHILIZED, FOR SOLUTION INTRAVENOUS at 11:07

## 2020-07-23 RX ADMIN — SODIUM CHLORIDE, SODIUM GLUCONATE, SODIUM ACETATE, POTASSIUM CHLORIDE, MAGNESIUM CHLORIDE, SODIUM PHOSPHATE, DIBASIC, AND POTASSIUM PHOSPHATE: .53; .5; .37; .037; .03; .012; .00082 INJECTION, SOLUTION INTRAVENOUS at 12:07

## 2020-07-23 RX ADMIN — CEFAZOLIN 2 G: 330 INJECTION, POWDER, FOR SOLUTION INTRAMUSCULAR; INTRAVENOUS at 11:07

## 2020-07-23 RX ADMIN — OXYCODONE HYDROCHLORIDE 15 MG: 10 TABLET ORAL at 07:07

## 2020-07-23 RX ADMIN — PHENYLEPHRINE HYDROCHLORIDE 100 MCG: 10 INJECTION INTRAVENOUS at 12:07

## 2020-07-23 RX ADMIN — GABAPENTIN 300 MG: 300 CAPSULE ORAL at 09:07

## 2020-07-23 RX ADMIN — REGADENOSON 0.4 MG: 0.08 INJECTION, SOLUTION INTRAVENOUS at 08:07

## 2020-07-23 RX ADMIN — INSULIN ASPART 3 UNITS: 100 INJECTION, SOLUTION INTRAVENOUS; SUBCUTANEOUS at 10:07

## 2020-07-23 RX ADMIN — POTASSIUM CHLORIDE 20 MEQ: 20 TABLET, EXTENDED RELEASE ORAL at 04:07

## 2020-07-23 RX ADMIN — VASOPRESSIN 1 UNITS: 20 INJECTION INTRAVENOUS at 11:07

## 2020-07-23 RX ADMIN — LIDOCAINE HYDROCHLORIDE 75 MG: 20 INJECTION, SOLUTION INTRAVENOUS at 10:07

## 2020-07-23 NOTE — SUBJECTIVE & OBJECTIVE
Past Medical History:   Diagnosis Date    Acute coronary syndrome     Acute hypoxemic respiratory failure 2017    Anxiety     Asthma     Cancer     colon    Cataracts, both eyes     Chest pain at rest     Chest pain of uncertain etiology 2015    Colon cancer 1988    COPD (chronic obstructive pulmonary disease)     Coronary artery disease     Depression     Diabetes mellitus     Dyspnea on exertion 11/15/2018    Elevated brain natriuretic peptide (BNP) level 11/15/2018    Elevated cholesterol     Hypertension     Swelling     Syncope and collapse 2016       Past Surgical History:   Procedure Laterality Date    ABDOMINAL SURGERY      BREAST BIOPSY      benign unsure what side     SECTION, CLASSIC      COLON SURGERY      COLONOSCOPY  2019    repeat in 5 years    CORONARY ANGIOPLASTY WITH STENT PLACEMENT  3 months ago     x2, Hysterectomy, Lung surgery, Partial stomach removed, Part of colon removed for rectal cancer    GASTRECTOMY      HEMORRHOID SURGERY      HYSTERECTOMY      @28yrs of age    LUNG BIOPSY      OOPHORECTOMY      @28yrs of age       Review of patient's allergies indicates:  No Known Allergies    No current facility-administered medications on file prior to encounter.      Current Outpatient Medications on File Prior to Encounter   Medication Sig    albuterol 90 mcg/actuation inhaler Inhale 2 puffs into the lungs every 6 (six) hours as needed for Wheezing.    albuterol-ipratropium 2.5mg-0.5mg/3mL (DUO-NEB) 0.5 mg-3 mg(2.5 mg base)/3 mL nebulizer solution     amitriptyline (ELAVIL) 10 MG tablet 20 mg every evening.     aspirin (ECOTRIN) 81 MG EC tablet Take 81 mg by mouth once daily.    duloxetine (CYMBALTA) 60 MG capsule Take 90 mg by mouth once daily.     ferrous sulfate (FEOSOL) 325 mg (65 mg iron) Tab tablet Take 1 tablet (325 mg total) by mouth once daily.    fluticasone propionate (FLONASE) 50 mcg/actuation nasal spray 2 sprays  "(100 mcg total) by Each Nare route once daily.    gabapentin (NEURONTIN) 300 MG capsule Take 300 mg by mouth 3 (three) times daily.    hydroCHLOROthiazide (HYDRODIURIL) 25 MG tablet TAKE ONE TABLET BY MOUTH DAILY    LORazepam (ATIVAN) 0.5 MG tablet Take 0.5 mg by mouth every 12 (twelve) hours as needed.     LYRICA 150 mg capsule Take by mouth 2 (two) times daily.     omeprazole (PRILOSEC) 40 MG capsule Take 1 capsule (40 mg total) by mouth every morning.    ondansetron (ZOFRAN-ODT) 4 MG TbDL Take 1 tablet (4 mg total) by mouth every 8 (eight) hours as needed.    potassium chloride SA (K-DUR,KLOR-CON) 20 MEQ tablet TAKE ONE TABLET BY MOUTH DAILY    pravastatin (PRAVACHOL) 20 MG tablet Take 20 mg by mouth once daily.     quetiapine (SEROQUEL) 300 MG Tab 300 mg nightly.     tiZANidine (ZANAFLEX) 4 MG tablet Take 1 tablet (4 mg total) by mouth daily as needed.    verapamiL (CALAN-SR) 240 MG CR tablet TAKE 1 TABLET BY MOUTH ONCE DAILY    zonisamide (ZONEGRAN) 100 MG Cap zonisamide 100 mg capsule   Take 2 capsules twice a day by oral route.    [DISCONTINUED] HYDROcodone-acetaminophen (NORCO)  mg per tablet Take 1 tablet by mouth 2 to 3 times daily as needed. for pain for 30 days    ammonium lactate 12 % Crea ammonium lactate 12 % topical cream    BD ULTRA-FINE MINI PEN NEEDLE 31 gauge x 3/16" Ndle     cholestyramine, with sugar, 4 gram Powd Take 4 g by mouth 2 (two) times daily as needed (diarrhea).    insulin aspart U-100 (NOVOLOG FLEXPEN U-100 INSULIN) 100 unit/mL (3 mL) InPn pen Inject into the skin. Taken as SS    lidocaine (LIDODERM) 5 % Place 1 patch onto the skin once daily. Remove & Discard patch within 12 hours or as directed by MD    nitroGLYCERIN (NITROSTAT) 0.4 MG SL tablet Place 0.4 mg under the tongue every 5 (five) minutes as needed for Chest pain.     Family History     Problem Relation (Age of Onset)    Breast cancer Mother    Cancer Mother, Father, Maternal Aunt    Depression " Sister    Diabetes Mother    Heart disease Father    Hypertension Mother, Sister        Tobacco Use    Smoking status: Current Every Day Smoker     Packs/day: 1.00     Years: 53.00     Pack years: 53.00     Types: Cigarettes     Start date: 1967    Smokeless tobacco: Never Used    Tobacco comment: Pt is currently enrolled in the [employer].  Ambulatory referral to Smoking Cessation program.    Substance and Sexual Activity    Alcohol use: Yes     Alcohol/week: 3.0 standard drinks     Types: 3 Glasses of wine per week     Comment: 1 every 3 months    Drug use: No    Sexual activity: Yes     Partners: Male     Birth control/protection: None     Comment: last drink yesterday afternoon     Review of Systems   Constitutional: Negative for activity change, appetite change, chills, diaphoresis, fatigue and fever.   Cardiovascular: Negative for chest pain, palpitations and leg swelling.   Gastrointestinal: Negative for abdominal distention, abdominal pain, anal bleeding, blood in stool, constipation, diarrhea, nausea and vomiting.   Genitourinary: Negative.    Musculoskeletal: Positive for back pain and neck pain.     Objective:     Vital Signs (Most Recent):  Temp: 98.3 °F (36.8 °C) (07/23/20 1026)  Pulse: (!) 111 (07/23/20 1600)  Resp: 13 (07/23/20 1600)  BP: (!) 145/87 (07/23/20 1600)  SpO2: 98 % (07/23/20 1600) Vital Signs (24h Range):  Temp:  [98.3 °F (36.8 °C)] 98.3 °F (36.8 °C)  Pulse:  [] 111  Resp:  [13-20] 13  SpO2:  [89 %-100 %] 98 %  BP: (116-167)/(74-99) 145/87     Weight: 66.7 kg (147 lb)  Body mass index is 26.89 kg/m².    Physical Exam  Constitutional:       General: She is not in acute distress.     Appearance: She is obese. She is not ill-appearing, toxic-appearing or diaphoretic.   HENT:      Head: Normocephalic and atraumatic.   Cardiovascular:      Rate and Rhythm: Normal rate and regular rhythm.      Pulses: Normal pulses.      Heart sounds: No murmur. No friction rub. No gallop.     Pulmonary:      Effort: Pulmonary effort is normal. No respiratory distress.      Breath sounds: Normal breath sounds. No stridor. No wheezing, rhonchi or rales.   Chest:      Chest wall: No tenderness.   Abdominal:      General: Abdomen is flat. Bowel sounds are normal. There is no distension.      Palpations: Abdomen is soft.      Tenderness: There is no abdominal tenderness.   Neurological:      Mental Status: She is alert and oriented to person, place, and time.      Sensory: Sensory deficit (chronic sensory loss on the medial aspect of the right leg) present.         Significant Labs:   BMP:   Recent Labs   Lab 07/23/20  1413   *      K 3.9   *   CO2 22*   BUN 5*   CREATININE 0.7   CALCIUM 8.2*     CBC:   Recent Labs   Lab 07/22/20  0815 07/23/20  1413   WBC 4.04 5.63   HGB 10.9* 9.5*   HCT 36.3* 32.2*    165       Significant Imaging: I have reviewed all pertinent imaging results/findings within the past 24 hours.

## 2020-07-23 NOTE — ANESTHESIA PROCEDURE NOTES
Arterial    Diagnosis: Cervical Myelopathy    Patient location during procedure: done in OR  Procedure start time: 7/23/2020 11:10 AM  Procedure end time: 7/23/2020 11:15 AM    Staffing  Authorizing Provider: Jay Nguyen MD  Performing Provider: Reta Payan CRNA    Anesthesiologist was present at the time of the procedure.    Preanesthetic Checklist  Completed: patient identified, site marked, surgical consent, pre-op evaluation, IV checked, risks and benefits discussed, monitors and equipment checked and anesthesia consent givenArterial  Skin Prep: chlorhexidine gluconate and isopropyl alcohol  Local Infiltration: none  Orientation: right  Location: radial  Catheter Size: 20 G  Catheter placement by Anatomical landmarks. Heme positive aspiration all ports.Insertion Attempts: 1  Assessment  Dressing: secured with tape and tegaderm  Patient: Tolerated well

## 2020-07-23 NOTE — CONSULTS
[facility] Hospital Medicine  Consult Note    Patient Name: Chayito Chung  MRN: [id number]  Admission Date: 7/23/2020  Hospital Length of Stay: 0 days  Attending Physician: Herson Tate MD   Primary Care Provider: Abhi Valadez MD     [facility] Medicine Team: Networked reference to record PCT  Trae Sunshine MD      Patient information was obtained from patient and EMS personnel.     Consults  Subjective:     Principal Problem: Cervical myelopathy    Chief Complaint: No chief complaint on file.       HPI: 68 yo F w/ PMHx of IDDM, HTN, CAD, COPD, HLD, migraines, anxiety, and recurrent episodes of depression is here for C3-T1 spinal fusion. Medicine is consulted to manage her medical comorbidities, specifically DM.    Previously seen in the hospital after a fall.  She says she woke up 5/25/2020 and couldn't stand.  She says her leg just kept giving out on her.  She denies any pain, trauma or accidents.  She fell 5 times prior to coming to the ED.  She was seen in the hospital where imaging of the cervical spine showed cervical stenosis with equivocal myelomalacia. She reports difficulty with balance over the last 8-9 months. She says her family comented on her walk being different but she didn't notice a difference from baseline. She also reports difficulty with carrying small objects and clumsiness with her hands. She was given a Rolator and a knee brace in the hospital.  She says she has fallen one time since being discharged from the hospital. Patient denies myelopathic symptoms such as handwriting changes. She reports difficulty with buttons/coins/keys. Denies perineal paresthesias, bowel/bladder dysfunction.       Past Medical History:   Diagnosis Date    Acute coronary syndrome     Acute hypoxemic respiratory failure 5/1/2017    Anxiety     Asthma     Cancer     colon    Cataracts, both eyes     Chest pain at rest     Chest pain of uncertain etiology 12/6/2015    Colon cancer      COPD (chronic obstructive pulmonary disease)     Coronary artery disease     Depression     Diabetes mellitus     Dyspnea on exertion 11/15/2018    Elevated brain natriuretic peptide (BNP) level 11/15/2018    Elevated cholesterol     Hypertension     Swelling     Syncope and collapse 2016       Past Surgical History:   Procedure Laterality Date    ABDOMINAL SURGERY      BREAST BIOPSY      benign unsure what side     SECTION, CLASSIC      COLON SURGERY      COLONOSCOPY  2019    repeat in 5 years    CORONARY ANGIOPLASTY WITH STENT PLACEMENT  3 months ago     x2, Hysterectomy, Lung surgery, Partial stomach removed, Part of colon removed for rectal cancer    GASTRECTOMY      HEMORRHOID SURGERY      HYSTERECTOMY      @28yrs of age    LUNG BIOPSY      OOPHORECTOMY      @28yrs of age       Review of patient's allergies indicates:  No Known Allergies    No current facility-administered medications on file prior to encounter.      Current Outpatient Medications on File Prior to Encounter   Medication Sig    albuterol 90 mcg/actuation inhaler Inhale 2 puffs into the lungs every 6 (six) hours as needed for Wheezing.    albuterol-ipratropium 2.5mg-0.5mg/3mL (DUO-NEB) 0.5 mg-3 mg(2.5 mg base)/3 mL nebulizer solution     amitriptyline (ELAVIL) 10 MG tablet 20 mg every evening.     aspirin (ECOTRIN) 81 MG EC tablet Take 81 mg by mouth once daily.    duloxetine (CYMBALTA) 60 MG capsule Take 90 mg by mouth once daily.     ferrous sulfate (FEOSOL) 325 mg (65 mg iron) Tab tablet Take 1 tablet (325 mg total) by mouth once daily.    fluticasone propionate (FLONASE) 50 mcg/actuation nasal spray 2 sprays (100 mcg total) by Each Nare route once daily.    gabapentin (NEURONTIN) 300 MG capsule Take 300 mg by mouth 3 (three) times daily.    hydroCHLOROthiazide (HYDRODIURIL) 25 MG tablet TAKE ONE TABLET BY MOUTH DAILY    LORazepam (ATIVAN) 0.5 MG tablet Take 0.5 mg by mouth  "every 12 (twelve) hours as needed.     LYRICA 150 mg capsule Take by mouth 2 (two) times daily.     omeprazole (PRILOSEC) 40 MG capsule Take 1 capsule (40 mg total) by mouth every morning.    ondansetron (ZOFRAN-ODT) 4 MG TbDL Take 1 tablet (4 mg total) by mouth every 8 (eight) hours as needed.    potassium chloride SA (K-DUR,KLOR-CON) 20 MEQ tablet TAKE ONE TABLET BY MOUTH DAILY    pravastatin (PRAVACHOL) 20 MG tablet Take 20 mg by mouth once daily.     quetiapine (SEROQUEL) 300 MG Tab 300 mg nightly.     tiZANidine (ZANAFLEX) 4 MG tablet Take 1 tablet (4 mg total) by mouth daily as needed.    verapamiL (CALAN-SR) 240 MG CR tablet TAKE 1 TABLET BY MOUTH ONCE DAILY    zonisamide (ZONEGRAN) 100 MG Cap zonisamide 100 mg capsule   Take 2 capsules twice a day by oral route.    [DISCONTINUED] HYDROcodone-acetaminophen (NORCO)  mg per tablet Take 1 tablet by mouth 2 to 3 times daily as needed. for pain for 30 days    ammonium lactate 12 % Crea ammonium lactate 12 % topical cream    BD ULTRA-FINE MINI PEN NEEDLE 31 gauge x 3/16" Ndle     cholestyramine, with sugar, 4 gram Powd Take 4 g by mouth 2 (two) times daily as needed (diarrhea).    insulin aspart U-100 (NOVOLOG FLEXPEN U-100 INSULIN) 100 unit/mL (3 mL) InPn pen Inject into the skin. Taken as SS    lidocaine (LIDODERM) 5 % Place 1 patch onto the skin once daily. Remove & Discard patch within 12 hours or as directed by MD    nitroGLYCERIN (NITROSTAT) 0.4 MG SL tablet Place 0.4 mg under the tongue every 5 (five) minutes as needed for Chest pain.     Family History     Problem Relation (Age of Onset)    Breast cancer Mother    Cancer Mother, Father, Maternal Aunt    Depression Sister    Diabetes Mother    Heart disease Father    Hypertension Mother, Sister        Tobacco Use    Smoking status: Current Every Day Smoker     Packs/day: 1.00     Years: 53.00     Pack years: 53.00     Types: Cigarettes     Start date: 1967    Smokeless tobacco: Never " Used    Tobacco comment: Pt is currently enrolled in the [employer].  Ambulatory referral to Smoking Cessation program.    Substance and Sexual Activity    Alcohol use: Yes     Alcohol/week: 3.0 standard drinks     Types: 3 Glasses of wine per week     Comment: 1 every 3 months    Drug use: No    Sexual activity: Yes     Partners: Male     Birth control/protection: None     Comment: last drink yesterday afternoon     Review of Systems   Constitutional: Negative for activity change, appetite change, chills, diaphoresis, fatigue and fever.   Cardiovascular: Negative for chest pain, palpitations and leg swelling.   Gastrointestinal: Negative for abdominal distention, abdominal pain, anal bleeding, blood in stool, constipation, diarrhea, nausea and vomiting.   Genitourinary: Negative.    Musculoskeletal: Positive for back pain and neck pain.     Objective:     Vital Signs (Most Recent):  Temp: 98.3 °F (36.8 °C) (07/23/20 1026)  Pulse: (!) 111 (07/23/20 1600)  Resp: 13 (07/23/20 1600)  BP: (!) 145/87 (07/23/20 1600)  SpO2: 98 % (07/23/20 1600) Vital Signs (24h Range):  Temp:  [98.3 °F (36.8 °C)] 98.3 °F (36.8 °C)  Pulse:  [] 111  Resp:  [13-20] 13  SpO2:  [89 %-100 %] 98 %  BP: (116-167)/(74-99) 145/87     Weight: 66.7 kg (147 lb)  Body mass index is 26.89 kg/m².    Physical Exam  Constitutional:       General: She is not in acute distress.     Appearance: She is obese. She is not ill-appearing, toxic-appearing or diaphoretic.   HENT:      Head: Normocephalic and atraumatic.   Cardiovascular:      Rate and Rhythm: Normal rate and regular rhythm.      Pulses: Normal pulses.      Heart sounds: No murmur. No friction rub. No gallop.    Pulmonary:      Effort: Pulmonary effort is normal. No respiratory distress.      Breath sounds: Normal breath sounds. No stridor. No wheezing, rhonchi or rales.   Chest:      Chest wall: No tenderness.   Abdominal:      General: Abdomen is flat. Bowel sounds are normal. There is  no distension.      Palpations: Abdomen is soft.      Tenderness: There is no abdominal tenderness.   Neurological:      Mental Status: She is alert and oriented to person, place, and time.      Sensory: Sensory deficit (chronic sensory loss on the medial aspect of the right leg) present.         Significant Labs:   BMP:   Recent Labs   Lab 07/23/20  1413   *      K 3.9   *   CO2 22*   BUN 5*   CREATININE 0.7   CALCIUM 8.2*     CBC:   Recent Labs   Lab 07/22/20  0815 07/23/20  1413   WBC 4.04 5.63   HGB 10.9* 9.5*   HCT 36.3* 32.2*    165       Significant Imaging: I have reviewed all pertinent imaging results/findings within the past 24 hours.    Assessment/Plan:     * Cervical myelopathy        CAD (coronary artery disease)  Pt on ASA 81 mg and om nitroglycerine 0.4 mg PRN    Plan:  --Continue ASA in the am   --Nitroglycerine PRN         Hypertension  Pt on HCTZ 25 mg and Verapamil 240 mg. Hold post op. Can resume tomorrow     Type 2 diabetes mellitus, with long-term current use of insulin  Last   HbA1c on 7/20/2020 is 7.1 Pt on Humalog Insulin 30 Units BID and Insulin Aspart SS.     Plan:  -- Start insulin detemir 15U overnight   -- SSI   -- Monitor for signs of hypoglycemia or hyperglycemia     Anxiety  Pt on Ativan on.5mg BID    Plan:  --Ativan PRN       Seizure disorder  Pt on Zonisamide 200 mg at home     Plan:   -Can continue home med in the am       Iron deficiency anemia  Pt on Ferrous Sulfate 325 mg     Plan:  -Can continue home meds in the am       Hyperlipidemia associated with type 2 diabetes mellitus  Pt on Pravastatin 20 mg at home    Plan:  -Can continue home med in the am       Neuropathy  Pt on Lyrica 150 mg BID, Gabapentin 300 mg TID, and Amitriptyline 20 mg noche    Plan:  - Can restart tomorrow     Major depressive disorder in partial remission  Pt on Duloxetine 90 mg and Quetiapine 300 mg noche    Plan:   - Can continue home meds tomorrow       COPD (chronic  obstructive pulmonary disease)  Pt was on home Oxygen at night in the past but she was taken in off it and placed on CPAP at night instead. She uses her Duonebs 1-2 times per day.    Plan:  --Place pt on CPAP at night  --Duonebs PRN   --Maintain Saturation of 88-92%        VTE Risk Mitigation (From admission, onward)         Ordered     IP VTE HIGH RISK PATIENT  Once      07/23/20 1406     Place sequential compression device  Until discontinued      07/23/20 0957                    Thank you for your consult. I will follow-up with patient. Please contact us if you have any additional questions.    Trae Sunshine MD  Department of Hospital Medicine   [facility]

## 2020-07-23 NOTE — ANESTHESIA PROCEDURE NOTES
Intubation  Performed by: Reta Payan CRNA  Authorized by: Jay Nguyen MD     Intubation:     Induction:  Intravenous    Intubated:  Postinduction    Mask Ventilation:  Easy mask    Attempts:  1    Attempted By:  Student    Method of Intubation:  Video laryngoscopy    Blade:  Bernard 3    Laryngeal View Grade: Grade I - full view of chords      Difficult Airway Encountered?: No      Complications:  None    Airway Device:  Oral endotracheal tube    Airway Device Size:  7.5    Style/Cuff Inflation:  Cuffed    Inflation Amount (mL):  6    Tube secured:  21    Secured at:  The lips    Placement Verified By:  Capnometry    Complicating Factors:  None    Findings Post-Intubation:  BS equal bilateral and atraumatic/condition of teeth unchanged

## 2020-07-23 NOTE — ASSESSMENT & PLAN NOTE
Pt on ASA 81 mg and om nitroglycerine 0.4 mg PRN    Plan:  --Continue ASA in the am   --Nitroglycerine PRN

## 2020-07-23 NOTE — OPERATIVE NOTE ADDENDUM
Certification of Assistant at Surgery       Surgery Date: 7/23/2020     Participating Surgeons:  Surgeon(s) and Role:     * Herson Tate MD - Primary    Procedures:  Procedure(s) (LRB):  LAMINECTOMY, SPINE, CERVICAL, WITH POSTERIOR FUSION C3-T1  (N/A)    Assistant Surgeon's Certification of Necessity:  I understand that section 1842 (b) (6) (d) of the Social Security Act generally prohibits Medicare Part B reasonable charge payment for the services of assistants at surgery in teaching hospitals when qualified residents are available to furnish such services. I certify that the services for which payment is claimed were medically necessary, and that no qualified resident was available to perform the services. I further understand that these services are subject to post-payment review by the Medicare carrier.      Zoraida Irwin PA-C    07/23/2020  2:24 PM

## 2020-07-23 NOTE — TELEPHONE ENCOUNTER
Reached out to Angie.  Looking for surgery clearance.  Stress test was done yesterday.  Will have Dr Malin do clearance and route to Angie and Dr Swift.    ----- Message from Hattie Dalton sent at 7/23/2020  8:14 AM CDT -----  Contact: Angie @ Anesthesia Department -[phone]

## 2020-07-23 NOTE — ASSESSMENT & PLAN NOTE
Chayito Chung is a 69 y.o. female s/p PCF C3-T1 (DOS: 7/23/20)    Pain control: multimodal   PT/OT: WBAT BLE, progressive mobility   DVT PPx: no chemical ppx for now, SCDs at all times when not ambulating  Abx: postop Ancef  Labs: Hg 9.5  Drain: deep drain to gravity with 150cc, continue  Grullon: d/c this am  D/c IVF  C collar in place   Encourage IS use  Medicine comanagement consulted for assistance with medical management and tight BG control in periop period    Dispo: pending pain control and mobilization with PT/OT

## 2020-07-23 NOTE — ASSESSMENT & PLAN NOTE
Pt was on home Oxygen at night in the past but she was taken in off it and placed on CPAP at night instead. She uses her Duonebs 1-2 times per day.    Plan:  --Place pt on CPAP at night  --Duonebs PRN   --Maintain Saturation of 88-92%

## 2020-07-23 NOTE — TELEPHONE ENCOUNTER
----- Message from Angie Perdue RN sent at 7/23/2020  9:54 AM CDT -----  Patient's cardiac clearance.   Message left on voicemail:  Cardiolite stress test is normal.    Left ventricular systolic function is normal  CXR shows clear lungs  Labs show anemia has improved with hgb up to 10.9  CMP OK except for hyperglycemia.  Patient is medically stable for neck surgery today.    Electronically signed by Gaurav Malin MD at 7/23/2020  9:42 AM

## 2020-07-23 NOTE — ASSESSMENT & PLAN NOTE
Pt on Lyrica 150 mg BID, Gabapentin 300 mg TID, and Amitriptyline 20 mg noche    Plan:  - Can restart tomorrow

## 2020-07-23 NOTE — TELEPHONE ENCOUNTER
Message left on voicemail:  Cardiolite stress test is normal.    Left ventricular systolic function is normal  CXR shows clear lungs  Labs show anemia has improved with hgb up to 10.9  CMP OK except for hyperglycemia.  Patient is medically stable for neck surgery today.

## 2020-07-23 NOTE — ASSESSMENT & PLAN NOTE
Last   HbA1c on 7/20/2020 is 7.1 Pt on Humalog Insulin 30 Units BID and Insulin Aspart SS.     Plan:  -- Start insulin detemir 15U overnight   -- SSI   -- Monitor for signs of hypoglycemia or hyperglycemia

## 2020-07-23 NOTE — NURSING TRANSFER
Nursing Transfer Note      7/23/2020     Transfer To 527    Transfer via stretcher    Transfer with n/a    Transported by transport    Medicines sent: n/a    Chart send with patient: Yes    Notified: daughter    Patient reassessed at: 7/23/2020    Upon arrival to floor:

## 2020-07-23 NOTE — CONSULTS
Pt seen in PACU with attending. Will see her again once she is more alert and in her room. Consult note to follow.     Trae Sunshine MD  Resident Physician, PGY-1  [email or web address]  Pager: [phone]

## 2020-07-23 NOTE — TRANSFER OF CARE
"Anesthesia Transfer of Care Note    Patient: Chayito Chung    Procedure(s) Performed: Procedure(s) (LRB):  LAMINECTOMY, SPINE, CERVICAL, WITH POSTERIOR FUSION C3-T1  (N/A)    Patient location: PACU    Anesthesia Type: general    Transport from OR: Transported from OR on 6-10 L/min O2 by face mask with adequate spontaneous ventilation    Post pain: adequate analgesia    Post assessment: no apparent anesthetic complications and tolerated procedure well    Post vital signs: stable    Level of consciousness: awake    Nausea/Vomiting: no nausea/vomiting    Complications: none    Transfer of care protocol was followed      Last vitals:   Visit Vitals  /76 (BP Location: Right arm, Patient Position: Lying)   Pulse 96   Temp 36.8 °C (98.3 °F) (Oral)   Resp 16   Ht 5' 2" (1.575 m)   Wt 66.7 kg (147 lb)   LMP  (LMP Unknown)   SpO2 98%   Breastfeeding No   BMI 26.89 kg/m²     "

## 2020-07-23 NOTE — ASSESSMENT & PLAN NOTE
Pt on Duloxetine 90 mg and Quetiapine 300 mg noche    Plan:   - Can continue home meds tomorrow

## 2020-07-23 NOTE — HPI
68 yo F w/ PMHx of IDDM, HTN, CAD, COPD, HLD, migraines, anxiety, and recurrent episodes of depression is here for C3-T1 spinal fusion. Medicine is consulted to manage her medical comorbidities, specifically DM.    Previously seen in the hospital after a fall.  She says she woke up 5/25/2020 and couldn't stand.  She says her leg just kept giving out on her.  She denies any pain, trauma or accidents.  She fell 5 times prior to coming to the ED.  She was seen in the hospital where imaging of the cervical spine showed cervical stenosis with equivocal myelomalacia. She reports difficulty with balance over the last 8-9 months. She says her family comented on her walk being different but she didn't notice a difference from baseline. She also reports difficulty with carrying small objects and clumsiness with her hands. She was given a Rolator and a knee brace in the hospital.  She says she has fallen one time since being discharged from the hospital. Patient denies myelopathic symptoms such as handwriting changes. She reports difficulty with buttons/coins/keys. Denies perineal paresthesias, bowel/bladder dysfunction.

## 2020-07-24 LAB
ALBUMIN SERPL BCP-MCNC: 3.9 G/DL (ref 3.5–5.2)
ALP SERPL-CCNC: 155 U/L (ref 55–135)
ALT SERPL W/O P-5'-P-CCNC: 28 U/L (ref 10–44)
ANION GAP SERPL CALC-SCNC: 11 MMOL/L (ref 8–16)
AST SERPL-CCNC: 20 U/L (ref 10–40)
BASOPHILS # BLD AUTO: 0.02 K/UL (ref 0–0.2)
BASOPHILS NFR BLD: 0.2 % (ref 0–1.9)
BILIRUB SERPL-MCNC: 0.4 MG/DL (ref 0.1–1)
BUN SERPL-MCNC: 5 MG/DL (ref 8–23)
CALCIUM SERPL-MCNC: 9.9 MG/DL (ref 8.7–10.5)
CHLORIDE SERPL-SCNC: 100 MMOL/L (ref 95–110)
CO2 SERPL-SCNC: 25 MMOL/L (ref 23–29)
CREAT SERPL-MCNC: 0.7 MG/DL (ref 0.5–1.4)
DIFFERENTIAL METHOD: ABNORMAL
EOSINOPHIL # BLD AUTO: 0 K/UL (ref 0–0.5)
EOSINOPHIL NFR BLD: 0.2 % (ref 0–8)
ERYTHROCYTE [DISTWIDTH] IN BLOOD BY AUTOMATED COUNT: 26.1 % (ref 11.5–14.5)
EST. GFR  (AFRICAN AMERICAN): >60 ML/MIN/1.73 M^2
EST. GFR  (NON AFRICAN AMERICAN): >60 ML/MIN/1.73 M^2
GLUCOSE SERPL-MCNC: 218 MG/DL (ref 70–110)
HCT VFR BLD AUTO: 38 % (ref 37–48.5)
HGB BLD-MCNC: 11.2 G/DL (ref 12–16)
IMM GRANULOCYTES # BLD AUTO: 0.02 K/UL (ref 0–0.04)
IMM GRANULOCYTES NFR BLD AUTO: 0.2 % (ref 0–0.5)
LYMPHOCYTES # BLD AUTO: 1 K/UL (ref 1–4.8)
LYMPHOCYTES NFR BLD: 12.1 % (ref 18–48)
MCH RBC QN AUTO: 26.1 PG (ref 27–31)
MCHC RBC AUTO-ENTMCNC: 29.5 G/DL (ref 32–36)
MCV RBC AUTO: 89 FL (ref 82–98)
MONOCYTES # BLD AUTO: 0.7 K/UL (ref 0.3–1)
MONOCYTES NFR BLD: 8.9 % (ref 4–15)
NEUTROPHILS # BLD AUTO: 6.5 K/UL (ref 1.8–7.7)
NEUTROPHILS NFR BLD: 78.4 % (ref 38–73)
NRBC BLD-RTO: 0 /100 WBC
PLATELET # BLD AUTO: 207 K/UL (ref 150–350)
PMV BLD AUTO: 9.4 FL (ref 9.2–12.9)
POCT GLUCOSE: 204 MG/DL (ref 70–110)
POCT GLUCOSE: 205 MG/DL (ref 70–110)
POCT GLUCOSE: 216 MG/DL (ref 70–110)
POCT GLUCOSE: 220 MG/DL (ref 70–110)
POCT GLUCOSE: 272 MG/DL (ref 70–110)
POTASSIUM SERPL-SCNC: 3.4 MMOL/L (ref 3.5–5.1)
PROT SERPL-MCNC: 7.5 G/DL (ref 6–8.4)
RBC # BLD AUTO: 4.29 M/UL (ref 4–5.4)
SODIUM SERPL-SCNC: 136 MMOL/L (ref 136–145)
TROPONIN I SERPL DL<=0.01 NG/ML-MCNC: <0.006 NG/ML (ref 0–0.03)
WBC # BLD AUTO: 8.29 K/UL (ref 3.9–12.7)

## 2020-07-24 PROCEDURE — C9399 UNCLASSIFIED DRUGS OR BIOLOG: HCPCS | Performed by: STUDENT IN AN ORGANIZED HEALTH CARE EDUCATION/TRAINING PROGRAM

## 2020-07-24 PROCEDURE — 93010 EKG 12-LEAD: ICD-10-PCS | Mod: ,,, | Performed by: INTERNAL MEDICINE

## 2020-07-24 PROCEDURE — 97535 SELF CARE MNGMENT TRAINING: CPT

## 2020-07-24 PROCEDURE — 25000003 PHARM REV CODE 250: Performed by: STUDENT IN AN ORGANIZED HEALTH CARE EDUCATION/TRAINING PROGRAM

## 2020-07-24 PROCEDURE — 85025 COMPLETE CBC W/AUTO DIFF WBC: CPT

## 2020-07-24 PROCEDURE — 94761 N-INVAS EAR/PLS OXIMETRY MLT: CPT

## 2020-07-24 PROCEDURE — 93005 ELECTROCARDIOGRAM TRACING: CPT

## 2020-07-24 PROCEDURE — 63600175 PHARM REV CODE 636 W HCPCS: Performed by: ORTHOPAEDIC SURGERY

## 2020-07-24 PROCEDURE — 93010 ELECTROCARDIOGRAM REPORT: CPT | Mod: ,,, | Performed by: INTERNAL MEDICINE

## 2020-07-24 PROCEDURE — 36415 COLL VENOUS BLD VENIPUNCTURE: CPT

## 2020-07-24 PROCEDURE — 80053 COMPREHEN METABOLIC PANEL: CPT

## 2020-07-24 PROCEDURE — 99232 PR SUBSEQUENT HOSPITAL CARE,LEVL II: ICD-10-PCS | Mod: GC,,, | Performed by: HOSPITALIST

## 2020-07-24 PROCEDURE — 25000242 PHARM REV CODE 250 ALT 637 W/ HCPCS: Performed by: STUDENT IN AN ORGANIZED HEALTH CARE EDUCATION/TRAINING PROGRAM

## 2020-07-24 PROCEDURE — 97167 OT EVAL HIGH COMPLEX 60 MIN: CPT

## 2020-07-24 PROCEDURE — 99232 SBSQ HOSP IP/OBS MODERATE 35: CPT | Mod: GC,,, | Performed by: HOSPITALIST

## 2020-07-24 PROCEDURE — 27000221 HC OXYGEN, UP TO 24 HOURS

## 2020-07-24 PROCEDURE — 11000001 HC ACUTE MED/SURG PRIVATE ROOM

## 2020-07-24 PROCEDURE — 84484 ASSAY OF TROPONIN QUANT: CPT

## 2020-07-24 PROCEDURE — 63600175 PHARM REV CODE 636 W HCPCS: Performed by: PHYSICIAN ASSISTANT

## 2020-07-24 PROCEDURE — 25000003 PHARM REV CODE 250: Performed by: PHYSICIAN ASSISTANT

## 2020-07-24 RX ORDER — NALOXONE HCL 0.4 MG/ML
VIAL (ML) INJECTION
Status: DISPENSED
Start: 2020-07-24 | End: 2020-07-25

## 2020-07-24 RX ORDER — POTASSIUM CHLORIDE 20 MEQ/1
40 TABLET, EXTENDED RELEASE ORAL ONCE
Status: COMPLETED | OUTPATIENT
Start: 2020-07-24 | End: 2020-07-24

## 2020-07-24 RX ADMIN — INSULIN ASPART 6 UNITS: 100 INJECTION, SOLUTION INTRAVENOUS; SUBCUTANEOUS at 08:07

## 2020-07-24 RX ADMIN — DULOXETINE HYDROCHLORIDE 90 MG: 60 CAPSULE, DELAYED RELEASE ORAL at 08:07

## 2020-07-24 RX ADMIN — DOCUSATE SODIUM 50 MG AND SENNOSIDES 8.6 MG 1 TABLET: 8.6; 5 TABLET, FILM COATED ORAL at 08:07

## 2020-07-24 RX ADMIN — DOCUSATE SODIUM 50 MG: 50 CAPSULE, LIQUID FILLED ORAL at 08:07

## 2020-07-24 RX ADMIN — AMITRIPTYLINE HYDROCHLORIDE 20 MG: 10 TABLET, FILM COATED ORAL at 09:07

## 2020-07-24 RX ADMIN — OXYCODONE HYDROCHLORIDE 15 MG: 10 TABLET ORAL at 10:07

## 2020-07-24 RX ADMIN — POTASSIUM CHLORIDE 40 MEQ: 1500 TABLET, EXTENDED RELEASE ORAL at 06:07

## 2020-07-24 RX ADMIN — GABAPENTIN 300 MG: 300 CAPSULE ORAL at 05:07

## 2020-07-24 RX ADMIN — FAMOTIDINE 20 MG: 20 TABLET, FILM COATED ORAL at 09:07

## 2020-07-24 RX ADMIN — DOCUSATE SODIUM 50 MG AND SENNOSIDES 8.6 MG 1 TABLET: 8.6; 5 TABLET, FILM COATED ORAL at 09:07

## 2020-07-24 RX ADMIN — INSULIN DETEMIR 15 UNITS: 100 INJECTION, SOLUTION SUBCUTANEOUS at 09:07

## 2020-07-24 RX ADMIN — PRAVASTATIN SODIUM 20 MG: 20 TABLET ORAL at 08:07

## 2020-07-24 RX ADMIN — POTASSIUM CHLORIDE 20 MEQ: 20 TABLET, EXTENDED RELEASE ORAL at 08:07

## 2020-07-24 RX ADMIN — HYDROMORPHONE HYDROCHLORIDE 0.5 MG: 1 INJECTION, SOLUTION INTRAMUSCULAR; INTRAVENOUS; SUBCUTANEOUS at 01:07

## 2020-07-24 RX ADMIN — ZONISAMIDE 200 MG: 100 CAPSULE ORAL at 10:07

## 2020-07-24 RX ADMIN — INSULIN DETEMIR 15 UNITS: 100 INJECTION, SOLUTION SUBCUTANEOUS at 10:07

## 2020-07-24 RX ADMIN — INSULIN ASPART 2 UNITS: 100 INJECTION, SOLUTION INTRAVENOUS; SUBCUTANEOUS at 09:07

## 2020-07-24 RX ADMIN — FLUTICASONE PROPIONATE 100 MCG: 50 SPRAY, METERED NASAL at 11:07

## 2020-07-24 RX ADMIN — SODIUM CHLORIDE 500 ML: 0.9 INJECTION, SOLUTION INTRAVENOUS at 06:07

## 2020-07-24 RX ADMIN — HYDROMORPHONE HYDROCHLORIDE 0.5 MG: 1 INJECTION, SOLUTION INTRAMUSCULAR; INTRAVENOUS; SUBCUTANEOUS at 08:07

## 2020-07-24 RX ADMIN — GABAPENTIN 300 MG: 300 CAPSULE ORAL at 09:07

## 2020-07-24 RX ADMIN — OXYCODONE 5 MG: 5 TABLET ORAL at 05:07

## 2020-07-24 RX ADMIN — OXYCODONE HYDROCHLORIDE 15 MG: 10 TABLET ORAL at 06:07

## 2020-07-24 RX ADMIN — FAMOTIDINE 20 MG: 20 TABLET, FILM COATED ORAL at 08:07

## 2020-07-24 RX ADMIN — GABAPENTIN 300 MG: 300 CAPSULE ORAL at 08:07

## 2020-07-24 RX ADMIN — QUETIAPINE FUMARATE 300 MG: 300 TABLET ORAL at 09:07

## 2020-07-24 RX ADMIN — CEFAZOLIN 2 G: 1 INJECTION, POWDER, FOR SOLUTION INTRAMUSCULAR; INTRAVENOUS at 04:07

## 2020-07-24 RX ADMIN — VERAPAMIL HYDROCHLORIDE 240 MG: 120 TABLET, FILM COATED, EXTENDED RELEASE ORAL at 08:07

## 2020-07-24 RX ADMIN — ZONISAMIDE 200 MG: 100 CAPSULE ORAL at 09:07

## 2020-07-24 RX ADMIN — INSULIN ASPART 4 UNITS: 100 INJECTION, SOLUTION INTRAVENOUS; SUBCUTANEOUS at 05:07

## 2020-07-24 RX ADMIN — HYDROCHLOROTHIAZIDE 25 MG: 25 TABLET ORAL at 08:07

## 2020-07-24 NOTE — ANESTHESIA POSTPROCEDURE EVALUATION
Anesthesia Post Evaluation    Patient: Chayito Chung    Procedure(s) Performed: Procedure(s) (LRB):  LAMINECTOMY, SPINE, CERVICAL, WITH POSTERIOR FUSION C3-T1  (N/A)    Final Anesthesia Type: general    Patient location during evaluation: PACU  Patient participation: Yes- Able to Participate  Level of consciousness: awake and alert  Post-procedure vital signs: reviewed and stable  Pain management: adequate  Airway patency: patent    PONV status at discharge: No PONV  Anesthetic complications: no      Cardiovascular status: hemodynamically stable  Respiratory status: unassisted  Hydration status: euvolemic  Follow-up not needed.          Vitals Value Taken Time   /73 07/24/20 0439   Temp 37 °C (98.6 °F) 07/24/20 0439   Pulse 118 07/24/20 0439   Resp 18 07/24/20 0612   SpO2 90 % 07/24/20 0439         Event Time   Out of Recovery 15:30:00         Pain/Joaquina Score: Pain Rating Prior to Med Admin: 10 (7/24/2020  6:12 AM)  Joaquina Score: 8 (7/23/2020  3:45 PM)

## 2020-07-24 NOTE — PROGRESS NOTES
Grullon d/c at 10:30 AM. Bladder scan results show 925 mls. MD Francis paged and made aware. Asked to page Dr. Pennington. Awaiting return page.

## 2020-07-24 NOTE — PHYSICIAN QUERY
PT Name: Chayito Chung  MR #: [id number]    Hypertensive Condition Clarification      CDS/: Zoe ERICKSON, RN               Contact information: [email or web address]     This form is a permanent document in the medical record.     Query Date: July 24, 2020    By submitting this query, we are merely seeking further clarification of documentation. Please utilize your independent clinical judgment when addressing the question(s) below.    The Medical Record contains the following:   Indicators   Supporting Clinical Findings Location in Medical Record   x Hypertension associated with diabetes documented  Hyperlipidemia associated with type 2 diabetes mellitus  Dr. Sunshine [facility] Medicine progress 7/24/20   x Chronic condition(s)  Hypertension  Pt on HCTZ 25 mg and Verapamil 240 mg. Hold post op. Can continue home meds     Type 2 diabetes mellitus, with long-term current use of insulin  Last   HbA1c on 7/20/2020 is 7.1 Pt on Humalog Insulin 30 Units BID and Insulin Aspart SS.      Dr. Sunshine [facility] Medicine progress 7/24/20    Vital signs     x Treatment/Medication  Pt on Pravastatin 20 mg at home     Plan:  -- Continue insulin detemir 15U BID and access Glc levels  -- SSI   -- Monitor for signs of hypoglycemia or hyperglycemia   Dr. Sunshine [facility] progress 7/24/20    Other     Provider, please clarify the relationship between the Hypertension and Diabetes Mellitus  [   ] Hypertension is a manifestation of Diabetes Mellitus (Secondary Hypertension)     [   ]  Hypertension is not a manifestation of Diabetes Mellitus (Essential Hypertension)     [   ] Other Clarification (please specify): ____________   [ X] Clinically Undetermined       Please document in your progress notes daily for the duration of treatment, until resolved, and include in your discharge summary.

## 2020-07-24 NOTE — PLAN OF CARE
Problem: Occupational Therapy Goal  Goal: Occupational Therapy Goal  Description: Goals to be met by: 08/24/2020  Patient will increase functional independence with ADLs by performing:    Feeding with Stand-by Assistance OOB all meals or HOB at > 80 degrees.  UE Dressing with Minimal Assistance.  LE Dressing with Moderate Assistance.  Grooming while bedside chair with Moderate Assistance.  Toileting from bedside commode with Moderate Assistance for hygiene and clothing management.   Bathing from  sitting at sink with Moderate Assistance.  Toilet transfer to bedside commode with Minimal Assistance.  Increased functional strength to 3/5 gross seated BUE, B gross  ans seated trunk erector as needed for postural righting. .    Outcome: Ongoing, Progressing

## 2020-07-24 NOTE — SUBJECTIVE & OBJECTIVE
Interval History: NAEON.     Review of Systems   Constitutional: Negative for activity change, appetite change, chills, diaphoresis, fatigue and fever.   HENT: Negative.    Respiratory: Positive for shortness of breath. Negative for apnea, cough, choking, chest tightness, wheezing and stridor.    Cardiovascular: Negative for chest pain, palpitations and leg swelling.   Gastrointestinal: Positive for abdominal pain (umbilical ). Negative for abdominal distention, anal bleeding, blood in stool, constipation, diarrhea, nausea and vomiting.   Genitourinary: Negative.    Musculoskeletal: Positive for back pain and neck pain. Negative for arthralgias and joint swelling.     Objective:     Vital Signs (Most Recent):  Temp: 98.8 °F (37.1 °C) (07/24/20 0758)  Pulse: (!) 122 (07/24/20 0758)  Resp: 18 (07/24/20 1004)  BP: 120/66 (07/24/20 0758)  SpO2: (!) 91 % (07/24/20 0938) Vital Signs (24h Range):  Temp:  [97.5 °F (36.4 °C)-98.8 °F (37.1 °C)] 98.8 °F (37.1 °C)  Pulse:  [] 122  Resp:  [13-20] 18  SpO2:  [89 %-100 %] 91 %  BP: (116-167)/(66-99) 120/66     Weight: 66.7 kg (147 lb)  Body mass index is 26.89 kg/m².    Intake/Output Summary (Last 24 hours) at 7/24/2020 1011  Last data filed at 7/24/2020 0400  Gross per 24 hour   Intake 2050 ml   Output 2910 ml   Net -860 ml      Physical Exam  Constitutional:       General: She is not in acute distress.     Appearance: She is not ill-appearing, toxic-appearing or diaphoretic.   HENT:      Head: Normocephalic and atraumatic.   Cardiovascular:      Rate and Rhythm: Normal rate and regular rhythm.      Pulses: Normal pulses.      Heart sounds: No murmur. No friction rub. No gallop.    Pulmonary:      Effort: Pulmonary effort is normal. No respiratory distress.      Breath sounds: Normal breath sounds. No stridor. No wheezing, rhonchi or rales.   Chest:      Chest wall: No tenderness.   Abdominal:      General: Abdomen is flat. Bowel sounds are normal. There is no distension.       Palpations: Abdomen is soft.      Tenderness: There is abdominal tenderness (Umbilical ). There is no guarding or rebound.   Neurological:      Mental Status: She is alert.         Significant Labs:   BMP:   Recent Labs   Lab 07/23/20  1413   *      K 3.9   *   CO2 22*   BUN 5*   CREATININE 0.7   CALCIUM 8.2*     CBC:   Recent Labs   Lab 07/23/20  1413   WBC 5.63   HGB 9.5*   HCT 32.2*          Significant Imaging: I have reviewed all pertinent imaging results/findings within the past 24 hours.

## 2020-07-24 NOTE — PT/OT/SLP PROGRESS
Physical Therapy      Patient Name:  Chayito Chung   MRN:  [id number]    Attempted to see patient for PT; however patient was with increased pain in AM and with increased SOB and drowsiness in PM.  RN requesting to hold at current time. Physical therapy will follow-up with patient tomorrow.     Bert Marx, PT

## 2020-07-24 NOTE — ASSESSMENT & PLAN NOTE
Pt on Lyrica 150 mg BID, Gabapentin 300 mg TID, and Amitriptyline 20 mg noche    Plan:  - Continue home meds

## 2020-07-24 NOTE — OP NOTE
DATE OF PROCEDURE: 07/23/2020     SURGEON: Herson Tate M.D.     ASSISTANT-SURGEON: Zoraida Irwin PA-C, note no resident was available.       PREOPERATIVE DIAGNOSIS:     1:   symptomatic cervical myelopathy     POSTOPERATIVE DIAGNOSIS:    1:   symptomatic cervical myelopathy      PROCEDURES PERFORMED:  1.  Posterior cervical spinal fusion C3-T1  2.   C3-C7 laminectomy for decompression of spinal stenosis  3.  Posterior segmental instrumentation C3-C6  4.  Cadaveric and local bone grafting.     ANESTHESIA: General endotracheal anesthesia.     ESTIMATED BLOOD LOSS:  100  mL.     IMPLANTS:  Depuy Mountaineer     SPECIMENS: None.     FINDINGS:  None.     DRAINS: One deep.     COMPLICATIONS: None.     SPONGE AND NEEDLE COUNT: Correct x2.     NEUROLOGICAL MONITORING: Motor evoked potentials, somatosensory evoked    potential, and free-running EMG.  There were no changes to stable and reliable bilateral upper and lower extremity motor and somatosensory potentials that out due entire procedure.     DESCRIPTION INFORMED CONSENT:  please see my last clinic note for full description the informed consent process.     REASON FOR OPERATION AND BRIEF HISTORY AND PHYSICAL:  The patient is a 69-year-old female with symptomatic cervical myelopathy secondary to multilevel cervical spondylosis.  She is here for surgical treatment today.     DESCRIPTION OF PROCEDURE: The patient was met in the preoperative area where    her posterior cervical spine was marked as the operative site. Subsequently, they were    brought to the Operating Room where they was placed under general endotracheal    anesthesia. Sequential compression devices were placed in the patient's    bilateral calves and run throughout the case. A Grullon catheter was then sterilely placed. Resendez-Wells tongs were then placed in the standard sterile fashion and the patient was positioned prone on a Sheldon table with 4 post pads.  The head was secured to 15 lb of in-line  traction.  The arms were padded talked.  The shoulders were gently taped down.  The posterior cervical spine was then prepped and draped in a normal sterile fashion     A full timeout was then called, identifying the patient, the procedural site and  levels, the availability of all instruments and implants, and no specific    nursing, surgical, anesthetic, or neurological monitoring concerns. Finally, it  was safe to proceed with surgery and the patient was given weight-appropriate    dose of Ancef by the Anesthesia Service.     I then infiltrated my planned incision with 10 mL of 0.5% Marcaine with    epinephrine.     We then made a midline posterior cervical incision and performed a preliminary subperiosteal exposure with a took to be the C3-T1 lamina transverse processes and relevant intervening facet joints. At the conclusion of my preliminary exposure we placed an elevator and wanted took to be the right C3-4 facet joint took a lateral radiograph and  I confirmed the levels radiographically. Next we prepared lateral mass screw tracts in the standard fashion from C3-C6 bilaterally but did not place screws to facilitate laminectomy.  Next we performed bilateral C7-T1 facetectomies with an osteotome and placed T1 pedicle screws in a standard fashion using anatomic landmarks in freehand technique.  AP radiographs taken demonstrating adequate position of the screws.     We then began the neurological decompression portion of the procedure. A high-speed drill was used to perform a trough style laminectomy from  C3-C7  bilaterally.  These laminar fragments were then removed on block.  Epidural hemostasis was achieved with a combination of bipolar electrocautery as well as FloSeal and patties.  Wound was thoroughly irrigated. Screws were placed in the previously prepared tracts from C3-C7.  AP and lateral radiographs then taken demonstrating correct level as well as adequate position of all implants.  All bony surfaces  were then decorticated from C3-T1.  Rods were then measured cut contoured and locked into place with many fracture provided set plugs and torque wrench.  A cross-link was placed at the C4 level. A deep drain was then placed.  The patient's local bone graft was milled in a bone mill and mixed with 1 g of vancomycin powder and laid dorsally along the decorticated surfaces from C3-T1  bilaterally.     At this point the wound was closed in layers using 0 Ethibond for the fascia, 2-0 Vicryl for the dermis and 3-0 Monocryl for the skin.     The patient was then flipped supine the Resendez-Wells tongs removed he was extubated and transferred to the recovery room in stable condition.      itudinal ligament

## 2020-07-24 NOTE — PROGRESS NOTES
[facility] Hospital Medicine  Progress Note    Patient Name: Chayito Chung  MRN: [id number]  Patient Class: IP- Inpatient   Admission Date: 7/23/2020  Length of Stay: 1 days  Attending Physician: Herson Tate MD  Primary Care Provider: Abhi Valadez MD    [facility] Medicine Team: Networked reference to record PCT  Trae Sunshine MD    Subjective:     Principal Problem:Cervical myelopathy        HPI:  70 yo F w/ PMHx of IDDM, HTN, CAD, COPD, HLD, migraines, anxiety, and recurrent episodes of depression is here for C3-T1 spinal fusion. Medicine is consulted to manage her medical comorbidities, specifically DM.    Previously seen in the hospital after a fall.  She says she woke up 5/25/2020 and couldn't stand.  She says her leg just kept giving out on her.  She denies any pain, trauma or accidents.  She fell 5 times prior to coming to the ED.  She was seen in the hospital where imaging of the cervical spine showed cervical stenosis with equivocal myelomalacia. She reports difficulty with balance over the last 8-9 months. She says her family comented on her walk being different but she didn't notice a difference from baseline. She also reports difficulty with carrying small objects and clumsiness with her hands. She was given a Rolator and a knee brace in the hospital.  She says she has fallen one time since being discharged from the hospital. Patient denies myelopathic symptoms such as handwriting changes. She reports difficulty with buttons/coins/keys. Denies perineal paresthesias, bowel/bladder dysfunction.       Overview/Hospital Course:  7/24- Pt remains stable overnight. Complains of episodic SOB w/o CP, palpitations, or other symptoms. Pt was not placed on home CPAP overnight.     Interval History: NAEON.     Review of Systems   Constitutional: Negative for activity change, appetite change, chills, diaphoresis, fatigue and fever.   HENT: Negative.    Respiratory: Positive for shortness  of breath. Negative for apnea, cough, choking, chest tightness, wheezing and stridor.    Cardiovascular: Negative for chest pain, palpitations and leg swelling.   Gastrointestinal: Positive for abdominal pain (umbilical ). Negative for abdominal distention, anal bleeding, blood in stool, constipation, diarrhea, nausea and vomiting.   Genitourinary: Negative.    Musculoskeletal: Positive for back pain and neck pain. Negative for arthralgias and joint swelling.     Objective:     Vital Signs (Most Recent):  Temp: 98.8 °F (37.1 °C) (07/24/20 0758)  Pulse: (!) 122 (07/24/20 0758)  Resp: 18 (07/24/20 1004)  BP: 120/66 (07/24/20 0758)  SpO2: (!) 91 % (07/24/20 0938) Vital Signs (24h Range):  Temp:  [97.5 °F (36.4 °C)-98.8 °F (37.1 °C)] 98.8 °F (37.1 °C)  Pulse:  [] 122  Resp:  [13-20] 18  SpO2:  [89 %-100 %] 91 %  BP: (116-167)/(66-99) 120/66     Weight: 66.7 kg (147 lb)  Body mass index is 26.89 kg/m².    Intake/Output Summary (Last 24 hours) at 7/24/2020 1011  Last data filed at 7/24/2020 0400  Gross per 24 hour   Intake 2050 ml   Output 2910 ml   Net -860 ml      Physical Exam  Constitutional:       General: She is not in acute distress.     Appearance: She is not ill-appearing, toxic-appearing or diaphoretic.   HENT:      Head: Normocephalic and atraumatic.   Cardiovascular:      Rate and Rhythm: Normal rate and regular rhythm.      Pulses: Normal pulses.      Heart sounds: No murmur. No friction rub. No gallop.    Pulmonary:      Effort: Pulmonary effort is normal. No respiratory distress.      Breath sounds: Normal breath sounds. No stridor. No wheezing, rhonchi or rales.   Chest:      Chest wall: No tenderness.   Abdominal:      General: Abdomen is flat. Bowel sounds are normal. There is no distension.      Palpations: Abdomen is soft.      Tenderness: There is abdominal tenderness (Umbilical ). There is no guarding or rebound.   Neurological:      Mental Status: She is alert.         Significant Labs:   BMP:    Recent Labs   Lab 07/23/20  1413   *      K 3.9   *   CO2 22*   BUN 5*   CREATININE 0.7   CALCIUM 8.2*     CBC:   Recent Labs   Lab 07/23/20  1413   WBC 5.63   HGB 9.5*   HCT 32.2*          Significant Imaging: I have reviewed all pertinent imaging results/findings within the past 24 hours.      Assessment/Plan:      * Cervical myelopathy        CAD (coronary artery disease)  Pt on ASA 81 mg and om nitroglycerine 0.4 mg PRN    Plan:  -- ASA   --Nitroglycerine PRN         Hypertension  Pt on HCTZ 25 mg and Verapamil 240 mg. Hold post op. Can continue home meds    Type 2 diabetes mellitus, with long-term current use of insulin  Last   HbA1c on 7/20/2020 is 7.1 Pt on Humalog Insulin 30 Units BID and Insulin Aspart SS.     Plan:  -- Continue insulin detemir 15U BID and access Glc levels  -- SSI   -- Monitor for signs of hypoglycemia or hyperglycemia     Anxiety  Pt on Ativan on.5mg BID    Plan:  --Ativan PRN       Seizure disorder  Pt on Zonisamide 200 mg at home     Plan:   -Continue home med       Iron deficiency anemia  Pt on Ferrous Sulfate 325 mg     Plan:  -Continue home meds      Hyperlipidemia associated with type 2 diabetes mellitus  Pt on Pravastatin 20 mg at home    Plan:  -Continue home med       Migraines        Neuropathy  Pt on Lyrica 150 mg BID, Gabapentin 300 mg TID, and Amitriptyline 20 mg noche    Plan:  - Continue home meds    Major depressive disorder in partial remission  Pt on Duloxetine 90 mg and Quetiapine 300 mg noche    Plan:   - Can continue home meds        COPD (chronic obstructive pulmonary disease)  Pt was on home Oxygen at night in the past but she was taken in off it and placed on CPAP at night instead. She uses her Duonebs 1-2 times per day.    Plan:  --Place pt on CPAP at night  --Duonebs PRN   --Maintain Saturation of 88-92%        VTE Risk Mitigation (From admission, onward)         Ordered     IP VTE HIGH RISK PATIENT  Once      07/23/20 1406     Place  sequential compression device  Until discontinued      07/23/20 0957                      Trae Sunshine MD  Department of Hospital Medicine   [facility]

## 2020-07-24 NOTE — PLAN OF CARE
"Pt is awake and alert this evening. No falls/injury as pt calls for assistance when needed. Fall precautions remain in place. No s/s of skin breakdown noted. PRN pain meds given.  Cardiac monitor applied. SCD"s on. IS teaching done and encouraged. Pt on 7 liters high flow O2.  Blood glucose being monitored. Bed in lowest position. Call light within reach. Will continue to monitor.   "

## 2020-07-24 NOTE — PLAN OF CARE
SW left message for pt's sister Jodie to complete discharge assessment. NEW waiting to hear back.     Paola Zuniga, DRAKE  [facility]

## 2020-07-24 NOTE — SUBJECTIVE & OBJECTIVE
"Principal Problem:Cervical myelopathy    Principal Orthopedic Problem: s/p PCF C3-T1    Interval History: NAEON. Pt doing well this am. Pain controlled. Denies new numbness/tingling.      Review of patient's allergies indicates:  No Known Allergies    Current Facility-Administered Medications   Medication    amitriptyline tablet 20 mg    dextrose 50% injection 12.5 g    dextrose 50% injection 25 g    docusate sodium capsule 50 mg    DULoxetine DR capsule 90 mg    famotidine tablet 20 mg    fluticasone propionate 50 mcg/actuation nasal spray 100 mcg    gabapentin capsule 300 mg    glucagon (human recombinant) injection 1 mg    glucose chewable tablet 16 g    glucose chewable tablet 24 g    hydroCHLOROthiazide tablet 25 mg    HYDROmorphone injection 0.5 mg    insulin aspart U-100 pen 1-10 Units    LORazepam tablet 0.5 mg    ondansetron injection 8 mg    oxyCODONE immediate release tablet 10 mg    oxyCODONE immediate release tablet 15 mg    oxyCODONE immediate release tablet 5 mg    potassium chloride SA CR tablet 20 mEq    pravastatin tablet 20 mg    promethazine (PHENERGAN) 6.25 mg in dextrose 5 % 50 mL IVPB    QUEtiapine tablet 300 mg    senna-docusate 8.6-50 mg per tablet 1 tablet    sodium chloride 0.9% flush 5 mL    verapamiL CR tablet 240 mg    zonisamide capsule 200 mg     Objective:     Vital Signs (Most Recent):  Temp: 98.6 °F (37 °C) (07/24/20 0439)  Pulse: (!) 118 (07/24/20 0439)  Resp: 18 (07/24/20 0612)  BP: 133/73 (07/24/20 0439)  SpO2: (!) 90 % (07/24/20 0439) Vital Signs (24h Range):  Temp:  [97.5 °F (36.4 °C)-98.6 °F (37 °C)] 98.6 °F (37 °C)  Pulse:  [] 118  Resp:  [13-20] 18  SpO2:  [89 %-100 %] 90 %  BP: (116-167)/(73-99) 133/73     Weight: 66.7 kg (147 lb)  Height: 5' 2" (157.5 cm)  Body mass index is 26.89 kg/m².      Intake/Output Summary (Last 24 hours) at 7/24/2020 0657  Last data filed at 7/24/2020 0400  Gross per 24 hour   Intake 2050 ml   Output 2910 ml   Net " -860 ml       Ortho/SPM Exam    c collar in place  Posterior cervical dressing in place c/d/i  Neuro exam stable      Significant Labs:   CBC:   Recent Labs   Lab 07/22/20  0815 07/23/20  1413   WBC 4.04 5.63   HGB 10.9* 9.5*   HCT 36.3* 32.2*    165     CMP:   Recent Labs   Lab 07/22/20  0815 07/23/20  1413    140   K 4.0 3.9    111*   CO2 19* 22*   * 238*   BUN 9 5*   CREATININE 0.8 0.7   CALCIUM 9.7 8.2*   PROT 7.3 6.5   ALBUMIN 3.7 3.8   BILITOT 0.2 0.2   ALKPHOS 134 129   AST 15 72*   ALT 16 38   ANIONGAP 12 7*   EGFRNONAA >60 >60.0     All pertinent labs within the past 24 hours have been reviewed.    Significant Imaging: I have reviewed and interpreted all pertinent imaging results/findings.

## 2020-07-24 NOTE — PLAN OF CARE
SW spoke with pt's sister Jodie to complete discharge assessment.     Pt lives alone in an apartment building with elevator access. If elevator is not working, pt has about 8-12 steps to enter. Sister Jodie would like for pt to go to [facility] once stable for discharge.     PT/OT recs are pending.      07/24/20 1212   Discharge Assessment   Assessment Type Discharge Planning Assessment   Confirmed/corrected address and phone number on facesheet? Yes   Assessment information obtained from? Caregiver  (Sister Jodie)   Prior to hospitilization cognitive status: Alert/Oriented   Prior to hospitalization functional status: Assistive Equipment   Current cognitive status: Unable to Assess   Current Functional Status: Assistive Equipment   Lives With alone   Able to Return to Prior Arrangements yes   Is patient able to care for self after discharge? Unable to determine at this time (comments)   Who are your caregiver(s) and their phone number(s)? Sister Jodie Chung ([phone])   Patient currently being followed by outpatient case management? No   Patient currently receives any other outside agency services? No   Equipment Currently Used at Home walker, rolling   Do you have any problems affording any of your prescribed medications? No   Is the patient taking medications as prescribed? yes   Does the patient have transportation home? Yes   Transportation Anticipated family or friend will provide   Does the patient receive services at the [facility]? No   Discharge Plan A Rehab   Discharge Plan B Skilled Nursing Facility   DME Needed Upon Discharge  other (see comments)  (TBD)   Patient/Family in Agreement with Plan unable to assess     Paola Zuniga LMSW  [facility]

## 2020-07-24 NOTE — HOSPITAL COURSE
7/24- Pt remains stable overnight. Complains of episodic SOB w/o CP, palpitations, or other symptoms. Pt was not placed on home CPAP overnight.    7/25- Pt remains stable and endorses improved breathing on her CPAP. Pain is controlled well and ortho is managing.     7/26 - Pt on 5L NC and did not eat breakfast. Participates minimally with interview and exam.     7/27- Pt remains on 5L NC. Pt participated in exam and ROS today. She endorses eating her whole meals.

## 2020-07-24 NOTE — PROGRESS NOTES
Pt's MEWS score 5. Called [facility] [place] [facility] [facility]. Pt on 6L n/c with sats 90%. Arouses to voice but sleeping between conversation. Tachycardia noted since surgery. Team paged. Pain meds d/c. Holding off on Narcan for now. Keep sats at/above 90%.  Resp therapist to follow as well. Will monitor pt closely.

## 2020-07-24 NOTE — PROGRESS NOTES
[facility]  Orthopedics  Progress Note    Patient Name: Chayito Chung  MRN: [id number]  Admission Date: 7/23/2020  Hospital Length of Stay: 1 days  Attending Provider: Herson Tate MD  Primary Care Provider: Abhi Valadez MD  Follow-up For: Procedure(s) (LRB):  LAMINECTOMY, SPINE, CERVICAL, WITH POSTERIOR FUSION C3-T1  (N/A)    Post-Operative Day: 1 Day Post-Op  Subjective:     Principal Problem:Cervical myelopathy    Principal Orthopedic Problem: s/p PCF C3-T1    Interval History: NAEON. Pt doing well this am. Pain controlled. Denies new numbness/tingling.      Review of patient's allergies indicates:  No Known Allergies    Current Facility-Administered Medications   Medication    amitriptyline tablet 20 mg    dextrose 50% injection 12.5 g    dextrose 50% injection 25 g    docusate sodium capsule 50 mg    DULoxetine DR capsule 90 mg    famotidine tablet 20 mg    fluticasone propionate 50 mcg/actuation nasal spray 100 mcg    gabapentin capsule 300 mg    glucagon (human recombinant) injection 1 mg    glucose chewable tablet 16 g    glucose chewable tablet 24 g    hydroCHLOROthiazide tablet 25 mg    HYDROmorphone injection 0.5 mg    insulin aspart U-100 pen 1-10 Units    LORazepam tablet 0.5 mg    ondansetron injection 8 mg    oxyCODONE immediate release tablet 10 mg    oxyCODONE immediate release tablet 15 mg    oxyCODONE immediate release tablet 5 mg    potassium chloride SA CR tablet 20 mEq    pravastatin tablet 20 mg    promethazine (PHENERGAN) 6.25 mg in dextrose 5 % 50 mL IVPB    QUEtiapine tablet 300 mg    senna-docusate 8.6-50 mg per tablet 1 tablet    sodium chloride 0.9% flush 5 mL    verapamiL CR tablet 240 mg    zonisamide capsule 200 mg     Objective:     Vital Signs (Most Recent):  Temp: 98.6 °F (37 °C) (07/24/20 0439)  Pulse: (!) 118 (07/24/20 0439)  Resp: 18 (07/24/20 0612)  BP: 133/73 (07/24/20 0439)  SpO2: (!) 90 % (07/24/20 0439) Vital Signs  "(24h Range):  Temp:  [97.5 °F (36.4 °C)-98.6 °F (37 °C)] 98.6 °F (37 °C)  Pulse:  [] 118  Resp:  [13-20] 18  SpO2:  [89 %-100 %] 90 %  BP: (116-167)/(73-99) 133/73     Weight: 66.7 kg (147 lb)  Height: 5' 2" (157.5 cm)  Body mass index is 26.89 kg/m².      Intake/Output Summary (Last 24 hours) at 7/24/2020 0657  Last data filed at 7/24/2020 0400  Gross per 24 hour   Intake 2050 ml   Output 2910 ml   Net -860 ml       Ortho/SPM Exam    c collar in place  Posterior cervical dressing in place c/d/i  Neuro exam stable      Significant Labs:   CBC:   Recent Labs   Lab 07/22/20  0815 07/23/20  1413   WBC 4.04 5.63   HGB 10.9* 9.5*   HCT 36.3* 32.2*    165     CMP:   Recent Labs   Lab 07/22/20  0815 07/23/20  1413    140   K 4.0 3.9    111*   CO2 19* 22*   * 238*   BUN 9 5*   CREATININE 0.8 0.7   CALCIUM 9.7 8.2*   PROT 7.3 6.5   ALBUMIN 3.7 3.8   BILITOT 0.2 0.2   ALKPHOS 134 129   AST 15 72*   ALT 16 38   ANIONGAP 12 7*   EGFRNONAA >60 >60.0     All pertinent labs within the past 24 hours have been reviewed.    Significant Imaging: I have reviewed and interpreted all pertinent imaging results/findings.    Assessment/Plan:     * Cervical myelopathy  Chayito Chung is a 69 y.o. female s/p PCF C3-T1 (DOS: 7/23/20)    Pain control: multimodal   PT/OT: WBAT BLE, progressive mobility   DVT PPx: no chemical ppx for now, SCDs at all times when not ambulating  Abx: postop Ancef  Labs: Hg 9.5  Drain: deep drain to gravity with 150cc, continue  Grullon: d/c this am  D/c IVF  C collar in place   Encourage IS use  Medicine comanagement consulted for assistance with medical management and tight BG control in periop period    Dispo: pending pain control and mobilization with PT/OT          Mai Wu MD  Orthopedics  [facility]  "

## 2020-07-24 NOTE — PT/OT/SLP EVAL
Occupational Therapy   Evaluation    Name: Chayito Chung  MRN: [id number]  Admitting Diagnosis:  Cervical myelopathy 1 Day Post-Op    Recommendations:     Discharge Recommendations:    Discharge Equipment Recommendations:  bedside commode, bath bench, wheelchair  Barriers to discharge:  Inaccessible home environment, Decreased caregiver support    Assessment:     Chayito Chung is a 69 y.o. female with a medical diagnosis of Cervical myelopathy. Performance deficits affecting function: weakness, impaired endurance, impaired sensation, impaired self care skills, impaired functional mobilty, gait instability, impaired balance, decreased coordination, decreased upper extremity function, pain, decreased ROM, impaired coordination, impaired cardiopulmonary response to activity, orthopedic precautions.      Rehab Prognosis: Good; patient would benefit from acute skilled OT services to address these deficits and reach maximum level of function.       Plan:     Patient to be seen daily to address the above listed problems via self-care/home management, therapeutic activities, therapeutic exercises, neuromuscular re-education  · Plan of Care Expires: 08/24/20  · Plan of Care Reviewed with: patient, other (see comments)(Attending nurse)    Subjective     Chief Complaint: Patient unable to state. Pain initially severe, progressively well managed as session progressed w/ nursing interventions and repositioning.   Patient/Family Comments/goals: Patient unable to state.    Occupational Profile:  Living Environment: (I) ADLs, Patient acknowledged recent (I) driving non verbally. Fullest prior to admit levels difficult to accurately assess 2* limited Patient communication, no family present.     Equipment Used at Home:  walker, rolling  Assistance upon Discharge: Unknown    Pain/Comfort:  · Pain Rating 1: 9/10  · Location 1: cervical spine  · Pain Addressed 1: Reposition, Distraction, Cessation of Activity, Nurse notified  · Pain  "Rating Post-Intervention 1: 2/10    Patients cultural, spiritual, Spiritism conflicts given the current situation: no    Objective:     Communicated with: RN prior to session.  Patient found supine with casiano catheter, oxygen, pulse ox (continuous), MARTIN drain, SCD, cervical collar upon OT entry to room.    General Precautions: Standard,     Orthopedic Precautions:spinal precautions   Braces: Cervical collar     Occupational Performance:    Bed Mobility:    · Patient completed Rolling/Turning to Left with  total assistance  · Patient completed Rolling/Turning to Right with total assistance  · Patient completed Supine to Sit with total assistance of 2 OT and attending nurse.     Activities of Daily Living:  · Feeding:  UT patient unaccepting of am trua (reg diet), non verbal affimative Patient response to suggestion of cool fruit for lunch however. infor relayed yto nurse.     · Grooming: total assistance    · Bathing: total assistance    · Upper Body Dressing: total assistance    · Lower Body Dressing: total assistance    · Toileting: total assistance      Cognitive/Visual Perceptual:  Cognitive fog, frequent initial utterances "Oh God, Oh God." Patient progressively able to affirm needs non verbally when suggestions offered as stated herein.     Physical Exam:  Balance:    -       POOR static sitting. Total external support required.   Postural examination/scapula alignment:    -       Rounded shoulders  -       Forward head  -       Affected scapula adducted  -       Posterior pelvic tilt  -       Lateral weight shift of hips  Edema:  emergent (B) distal dorsal hands 2* dependent positioing. (+) responses to repositioing (B wrists >/= 50 degrees above elbows w/ pillow support and in conjunction w/ retro mass). F/u required.   (B) Extremity Range of Motion: Absent AROM > 10 degrees B elbow flx, PROM (+) < 50% of (B) norms 2* pain and c-collar impingement.  Upper Extremity Strength: 1/5 grossly  (B) Gross  " Strength: 2-/5  Fine / Gross motor coordination: severely impaired as described herein.     AMPAC 6 Click ADL:  AMPAC Total Score: 6    Treatment & Education:  OT provided BUE repositioing (B) wrists >/= 50 degrees above elbows w/ pillow support and in conjunction w/ retro mass  Edu Role of OT and Rx rationales. Patient increasingly attentive and provided hypo phonic verbal affirmative to information presented.   Gross's positioning provided to support orienting interventions and well supported restful sleep at sessions end.   Education:    Patient left with bed in chair position with all lines intact, call button in reach and RN present    GOALS:   Multidisciplinary Problems     Occupational Therapy Goals        Problem: Occupational Therapy Goal    Goal Priority Disciplines Outcome Interventions   Occupational Therapy Goal     OT, PT/OT Ongoing, Progressing    Description: Goals to be met by: 08/24/2020  Patient will increase functional independence with ADLs by performing:    Feeding with Stand-by Assistance OOB all meals or HOB at > 80 degrees.  UE Dressing with Minimal Assistance.  LE Dressing with Moderate Assistance.  Grooming while bedside chair with Moderate Assistance.  Toileting from bedside commode with Moderate Assistance for hygiene and clothing management.   Bathing from  sitting at sink with Moderate Assistance.  Toilet transfer to bedside commode with Minimal Assistance.  Increased functional strength to 3/5 gross seated BUE, B gross  ans seated trunk erector as needed for postural righting. .                     History:     Past Medical History:   Diagnosis Date    Acute coronary syndrome     Acute hypoxemic respiratory failure 5/1/2017    Anxiety     Asthma     Cancer     colon    Cataracts, both eyes     Chest pain at rest     Chest pain of uncertain etiology 12/6/2015    Colon cancer 1988    COPD (chronic obstructive pulmonary disease)     Coronary artery disease     Depression      Diabetes mellitus     Dyspnea on exertion 11/15/2018    Elevated brain natriuretic peptide (BNP) level 11/15/2018    Elevated cholesterol     Hypertension     Swelling     Syncope and collapse 2016       Past Surgical History:   Procedure Laterality Date    ABDOMINAL SURGERY      BREAST BIOPSY      benign unsure what side     SECTION, CLASSIC      COLON SURGERY      COLONOSCOPY  2019    repeat in 5 years    CORONARY ANGIOPLASTY WITH STENT PLACEMENT  3 months ago     x2, Hysterectomy, Lung surgery, Partial stomach removed, Part of colon removed for rectal cancer    GASTRECTOMY      HEMORRHOID SURGERY      HYSTERECTOMY      @28yrs of age    LUNG BIOPSY      OOPHORECTOMY      @28yrs of age    POSTERIOR FUSION OF CERVICAL SPINE WITH LAMINECTOMY N/A 2020    Procedure: LAMINECTOMY, SPINE, CERVICAL, WITH POSTERIOR FUSION C3-T1 ;  Surgeon: Herson Tate MD;  Location: [facility] OR [address] [place] [address];  Service: Neurosurgery;  Laterality: N/A;       Time Tracking:     OT Date of Treatment: 20  OT Start Time: 944  OT Stop Time: 1034  OT Total Time (min): 50 min    Billable Minutes:Evaluation 30  Self Care/Home Management 20 mins a s stated herein.     ANNE Packer  2020

## 2020-07-25 LAB
ANION GAP SERPL CALC-SCNC: 9 MMOL/L (ref 8–16)
BASOPHILS # BLD AUTO: 0.02 K/UL (ref 0–0.2)
BASOPHILS NFR BLD: 0.3 % (ref 0–1.9)
BUN SERPL-MCNC: 7 MG/DL (ref 8–23)
CALCIUM SERPL-MCNC: 9.8 MG/DL (ref 8.7–10.5)
CHLORIDE SERPL-SCNC: 101 MMOL/L (ref 95–110)
CO2 SERPL-SCNC: 25 MMOL/L (ref 23–29)
CREAT SERPL-MCNC: 0.7 MG/DL (ref 0.5–1.4)
DIFFERENTIAL METHOD: ABNORMAL
EOSINOPHIL # BLD AUTO: 0.1 K/UL (ref 0–0.5)
EOSINOPHIL NFR BLD: 1.1 % (ref 0–8)
ERYTHROCYTE [DISTWIDTH] IN BLOOD BY AUTOMATED COUNT: 26.1 % (ref 11.5–14.5)
EST. GFR  (AFRICAN AMERICAN): >60 ML/MIN/1.73 M^2
EST. GFR  (NON AFRICAN AMERICAN): >60 ML/MIN/1.73 M^2
GLUCOSE SERPL-MCNC: 212 MG/DL (ref 70–110)
HCT VFR BLD AUTO: 38.5 % (ref 37–48.5)
HGB BLD-MCNC: 11.1 G/DL (ref 12–16)
IMM GRANULOCYTES # BLD AUTO: 0.02 K/UL (ref 0–0.04)
IMM GRANULOCYTES NFR BLD AUTO: 0.3 % (ref 0–0.5)
LYMPHOCYTES # BLD AUTO: 1.4 K/UL (ref 1–4.8)
LYMPHOCYTES NFR BLD: 19.2 % (ref 18–48)
MCH RBC QN AUTO: 26.1 PG (ref 27–31)
MCHC RBC AUTO-ENTMCNC: 28.8 G/DL (ref 32–36)
MCV RBC AUTO: 90 FL (ref 82–98)
MONOCYTES # BLD AUTO: 0.8 K/UL (ref 0.3–1)
MONOCYTES NFR BLD: 11 % (ref 4–15)
NEUTROPHILS # BLD AUTO: 4.9 K/UL (ref 1.8–7.7)
NEUTROPHILS NFR BLD: 68.1 % (ref 38–73)
NRBC BLD-RTO: 0 /100 WBC
PLATELET # BLD AUTO: 203 K/UL (ref 150–350)
PMV BLD AUTO: 9.7 FL (ref 9.2–12.9)
POCT GLUCOSE: 173 MG/DL (ref 70–110)
POCT GLUCOSE: 203 MG/DL (ref 70–110)
POCT GLUCOSE: 218 MG/DL (ref 70–110)
POCT GLUCOSE: 277 MG/DL (ref 70–110)
POTASSIUM SERPL-SCNC: 3.8 MMOL/L (ref 3.5–5.1)
RBC # BLD AUTO: 4.26 M/UL (ref 4–5.4)
SODIUM SERPL-SCNC: 135 MMOL/L (ref 136–145)
WBC # BLD AUTO: 7.2 K/UL (ref 3.9–12.7)

## 2020-07-25 PROCEDURE — 99900035 HC TECH TIME PER 15 MIN (STAT)

## 2020-07-25 PROCEDURE — 99232 PR SUBSEQUENT HOSPITAL CARE,LEVL II: ICD-10-PCS | Mod: ,,, | Performed by: INTERNAL MEDICINE

## 2020-07-25 PROCEDURE — 36415 COLL VENOUS BLD VENIPUNCTURE: CPT

## 2020-07-25 PROCEDURE — 27000221 HC OXYGEN, UP TO 24 HOURS

## 2020-07-25 PROCEDURE — 85025 COMPLETE CBC W/AUTO DIFF WBC: CPT

## 2020-07-25 PROCEDURE — 94761 N-INVAS EAR/PLS OXIMETRY MLT: CPT

## 2020-07-25 PROCEDURE — 27000190 HC CPAP FULL FACE MASK W/VALVE

## 2020-07-25 PROCEDURE — 25000003 PHARM REV CODE 250: Performed by: PHYSICIAN ASSISTANT

## 2020-07-25 PROCEDURE — 80048 BASIC METABOLIC PNL TOTAL CA: CPT

## 2020-07-25 PROCEDURE — 97530 THERAPEUTIC ACTIVITIES: CPT

## 2020-07-25 PROCEDURE — 11000001 HC ACUTE MED/SURG PRIVATE ROOM

## 2020-07-25 PROCEDURE — 97535 SELF CARE MNGMENT TRAINING: CPT

## 2020-07-25 PROCEDURE — 94664 DEMO&/EVAL PT USE INHALER: CPT

## 2020-07-25 PROCEDURE — 97162 PT EVAL MOD COMPLEX 30 MIN: CPT

## 2020-07-25 PROCEDURE — 99232 SBSQ HOSP IP/OBS MODERATE 35: CPT | Mod: ,,, | Performed by: INTERNAL MEDICINE

## 2020-07-25 PROCEDURE — 94660 CPAP INITIATION&MGMT: CPT

## 2020-07-25 PROCEDURE — 27000646 HC AEROBIKA DEVICE

## 2020-07-25 PROCEDURE — 94799 UNLISTED PULMONARY SVC/PX: CPT

## 2020-07-25 RX ADMIN — INSULIN ASPART 6 UNITS: 100 INJECTION, SOLUTION INTRAVENOUS; SUBCUTANEOUS at 12:07

## 2020-07-25 RX ADMIN — PRAVASTATIN SODIUM 20 MG: 20 TABLET ORAL at 10:07

## 2020-07-25 RX ADMIN — OXYCODONE 5 MG: 5 TABLET ORAL at 10:07

## 2020-07-25 RX ADMIN — DOCUSATE SODIUM 50 MG: 50 CAPSULE, LIQUID FILLED ORAL at 10:07

## 2020-07-25 RX ADMIN — DOCUSATE SODIUM 50 MG AND SENNOSIDES 8.6 MG 1 TABLET: 8.6; 5 TABLET, FILM COATED ORAL at 10:07

## 2020-07-25 RX ADMIN — INSULIN DETEMIR 15 UNITS: 100 INJECTION, SOLUTION SUBCUTANEOUS at 10:07

## 2020-07-25 RX ADMIN — GABAPENTIN 300 MG: 300 CAPSULE ORAL at 10:07

## 2020-07-25 RX ADMIN — ZONISAMIDE 200 MG: 100 CAPSULE ORAL at 10:07

## 2020-07-25 RX ADMIN — GABAPENTIN 300 MG: 300 CAPSULE ORAL at 09:07

## 2020-07-25 RX ADMIN — INSULIN ASPART 2 UNITS: 100 INJECTION, SOLUTION INTRAVENOUS; SUBCUTANEOUS at 05:07

## 2020-07-25 RX ADMIN — AMITRIPTYLINE HYDROCHLORIDE 20 MG: 10 TABLET, FILM COATED ORAL at 10:07

## 2020-07-25 RX ADMIN — FAMOTIDINE 20 MG: 20 TABLET, FILM COATED ORAL at 10:07

## 2020-07-25 RX ADMIN — OXYCODONE 5 MG: 5 TABLET ORAL at 01:07

## 2020-07-25 RX ADMIN — QUETIAPINE FUMARATE 300 MG: 300 TABLET ORAL at 10:07

## 2020-07-25 RX ADMIN — HYDROCHLOROTHIAZIDE 25 MG: 25 TABLET ORAL at 10:07

## 2020-07-25 RX ADMIN — POTASSIUM CHLORIDE 20 MEQ: 20 TABLET, EXTENDED RELEASE ORAL at 10:07

## 2020-07-25 RX ADMIN — INSULIN ASPART 1 UNITS: 100 INJECTION, SOLUTION INTRAVENOUS; SUBCUTANEOUS at 10:07

## 2020-07-25 RX ADMIN — INSULIN ASPART 4 UNITS: 100 INJECTION, SOLUTION INTRAVENOUS; SUBCUTANEOUS at 07:07

## 2020-07-25 RX ADMIN — VERAPAMIL HYDROCHLORIDE 240 MG: 120 TABLET, FILM COATED, EXTENDED RELEASE ORAL at 10:07

## 2020-07-25 RX ADMIN — DULOXETINE HYDROCHLORIDE 90 MG: 60 CAPSULE, DELAYED RELEASE ORAL at 10:07

## 2020-07-25 RX ADMIN — FLUTICASONE PROPIONATE 100 MCG: 50 SPRAY, METERED NASAL at 09:07

## 2020-07-25 NOTE — NURSING
Dr Ortiz at bedside assessing pt after I spoke with him regarding pt still has not urinated. Dr. Ortiz assessed pt and pt with stomach pain but no bladder pain. Instructed to obtain bladder scan and then in and out cath if pt is symptomatic. Dr Ortiz stated he would like for pt to urinate on her on.

## 2020-07-25 NOTE — PT/OT/SLP PROGRESS
Occupational Therapy   Treatment    Name: Chayito Chung  MRN: [id number]  Admitting Diagnosis:  Cervical myelopathy  3 Days Post-Op    Recommendations:     Discharge Recommendations: nursing facility, skilled  Discharge Equipment Recommendations:  bedside commode, bath bench  Barriers to discharge:  Inaccessible home environment, Decreased caregiver support    Assessment:     Chayito Chung is a 69 y.o. female with a medical diagnosis of Cervical myelopathy.  She presents with poor instruction following and waxing/waning confusion.  Pt was physical limited by pain and . Performance deficits affecting function are decreased ROM, decreased safety awareness, decreased upper extremity function, decreased coordination, impaired cognition, impaired balance, impaired endurance, impaired functional mobilty, impaired self care skills, impaired skin, weakness, orthopedic precautions, pain.     Rehab Prognosis:  Fair; patient would benefit from acute skilled OT services to address these deficits and reach maximum level of function.       Plan:     Patient to be seen 4 x/week to address the above listed problems via self-care/home management, therapeutic activities, therapeutic exercises, neuromuscular re-education  · Plan of Care Expires: 08/24/20  · Plan of Care Reviewed with: patient, daughter    Subjective     Pain/Comfort:       Objective:     Communicated with: RN prior to session.  Patient found HOB elevated with telemetry, peripheral IV, hemovac, cervical collar, SCD, oxygen, casiano catheter upon OT entry to room.    General Precautions: Standard, fall   Orthopedic Precautions:spinal precautions   Braces:       Occupational Performance:     Bed Mobility:    · Patient completed Rolling/Turning to Left with  maximal assistance  · Patient completed Rolling/Turning to Right with maximal assistance  · Patient completed Scooting/Bridging with maximal assistance  · Patient completed Supine to Sit with maximal  assistance  · Patient completed Sit to Supine with maximal assistance     Functional Mobility/Transfers:  · Patient completed Sit <> Stand Transfer with moderate assistance  with  hand-held assist   · Functional Mobility: Pt was able to take steps toward the HOB w/ Mod A.     Activities of Daily Living:  · Upper Body Dressing: moderate assistance to don gown as a jacket.   · Lower Body Dressing: total assistance to don hospital socks      [facility] 6 Click ADL:      Treatment & Education:  -Pt edu on OT role/POC  -Importance of OOB activity with staff assistance  -Safety during functional t/f and mobility  - White board updated  - Multiple self care tasks/functional mobility completed-- assistance level noted above  - All questions/concerns answered within OT scope of practice      Patient left HOB elevated with all lines intact, call button in reach, Rn notified and daughter presentEducation:      GOALS:   Multidisciplinary Problems     Occupational Therapy Goals        Problem: Occupational Therapy Goal    Goal Priority Disciplines Outcome Interventions   Occupational Therapy Goal     OT, PT/OT Ongoing, Progressing    Description: Goals to be met by: 08/24/2020  Patient will increase functional independence with ADLs by performing:    Feeding with Stand-by Assistance OOB all meals or HOB at > 80 degrees.  UE Dressing with Minimal Assistance.  LE Dressing with Moderate Assistance.  Grooming while bedside chair with Moderate Assistance.  Toileting from bedside commode with Moderate Assistance for hygiene and clothing management.   Bathing from  sitting at sink with Moderate Assistance.  Toilet transfer to bedside commode with Minimal Assistance.  Increased functional strength to 3/5 gross seated BUE, B gross  ans seated trunk erector as needed for postural righting. .                     Time Tracking:     OT Date of Treatment: 07/25/20  OT Start Time: 1050  OT Stop Time: 1114  OT Total Time (min): 24  min    Billable Minutes:Self Care/Home Management 24 mins    Ezio Vázquez, OT  7/26/2020

## 2020-07-25 NOTE — PLAN OF CARE
Pt goals remain appropriate, continue w/ OT POC.    Problem: Occupational Therapy Goal  Goal: Occupational Therapy Goal  Description: Goals to be met by: 08/24/2020  Patient will increase functional independence with ADLs by performing:    Feeding with Stand-by Assistance OOB all meals or HOB at > 80 degrees.  UE Dressing with Minimal Assistance.  LE Dressing with Moderate Assistance.  Grooming while bedside chair with Moderate Assistance.  Toileting from bedside commode with Moderate Assistance for hygiene and clothing management.   Bathing from  sitting at sink with Moderate Assistance.  Toilet transfer to bedside commode with Minimal Assistance.  Increased functional strength to 3/5 gross seated BUE, B gross  ans seated trunk erector as needed for postural righting. .    7/25/2020 1614 by Ezio Vázquez, OT  Outcome: Ongoing, Progressing   Ezio RODRÍGUEZ  7/25/2020

## 2020-07-25 NOTE — PT/OT/SLP EVAL
Physical Therapy Evaluation    Patient Name:  Chayito Chung   MRN:  [id number]    Recommendations:     Discharge Recommendations:  nursing facility, skilled   Discharge Equipment Recommendations: bedside commode, bath bench   Barriers to discharge: decreased functional mobility    Assessment:     Chayito Chung is a 69 y.o. female admitted with a medical diagnosis of Cervical myelopathy.  She presents with the following impairments/functional limitations:  weakness, impaired endurance, impaired self care skills, impaired functional mobilty, gait instability, impaired balance, pain, decreased safety awareness, orthopedic precautions.  Tolerated session c c/o weakness.  Pt c limited command following and lethargy on this date.  Performed mobility c max-min A.  Pt c delayed initiation when performing activity.  Pt able to take a few steps c HHAx2 and demo decreased gait speed, shuffled gait pattern and unsteadiness.  Pt safe to transfer to/from bedside chair c assistance of 1x person. Pt would benefit from continued skilled acute PT 4x/wk to improve functional mobility.  Recommending pt receive PT services in SNF setting following d/c from hospital once medically cleared.      Rehab Prognosis: Good; patient would benefit from acute skilled PT services to address these deficits and reach maximum level of function.    Recent Surgery: Procedure(s) (LRB):  LAMINECTOMY, SPINE, CERVICAL, WITH POSTERIOR FUSION C3-T1  (N/A) 2 Days Post-Op    Plan:     During this hospitalization, patient to be seen 4 x/week to address the identified rehab impairments via gait training, therapeutic activities, therapeutic exercises, neuromuscular re-education and progress toward the following goals:    · Plan of Care Expires:  08/19/20    Subjective     Chief Complaint: weakness  Patient/Family Comments/goals: to return home  Pain/Comfort:  · Pain Rating 1: (reports cervical discomfort)    Patients cultural, spiritual, Mandaen conflicts given  the current situation: no    Living Environment:  Pt lives in [place].  Upon d/c pt will be living c daughter in trailer c 4STE [facility].  PTA reports multiple falls while getting OOB.  Prior to admission, patients level of function was independent c ADLs and using rollator for mobility.  Equipment used at home: grab bar, walker, rolling.  DME owned (not currently used): none.  Upon discharge, patient will have assistance from family.    Objective:     Communicated with RN and OT prior to session.  Patient found HOB elevated with telemetry, peripheral IV, MARTIN drain, cervical collar, SCD, oxygen, casiano catheter  upon PT entry to room.    General Precautions: Standard, fall   Orthopedic Precautions:spinal precautions   Braces: Aspen collar     Exams:  · Cognitive Exam:  Patient is oriented to Person, Place, Time and Situation  · RLE ROM: WFL  · RLE Strength: grossly 3+/5 (unable to formally assess secondary to poor command following)  · LLE ROM: WFL  · LLE Strength: grossly 3+/5 (unable to formally assess secondary to poor command following)    Functional Mobility:  · Bed Mobility:     · Rolling Right: maximal assistance  · Scooting: maximal assistance  · Supine to Sit: maximal assistance  · Sit to Supine: maximal assistance  · Transfers:     · Sit to Stand:  minimum assistance and moderate assistance with hand-held assist  · Gait: 4 lateral steps c HHAx2 mod A  · Balance: sitting (CGA); standing (min-mod A)    Therapeutic Activities and Exercises:  Pt educated on: PT role/POC; safety c mobility; benefits of OOB activities; performing therex; d/c recs - v/u  -sat EOB x 6mins  -sit<>Stand 4x  -standing x2mins  -repositioned for comfort    AM-PAC 6 CLICK MOBILITY  Total Score:11     Patient left HOB elevated with all lines intact, call button in reach and RN notified.    GOALS:   Multidisciplinary Problems     Physical Therapy Goals        Problem: Physical Therapy Goal    Goal Priority Disciplines Outcome Goal Variances  Interventions   Physical Therapy Goal     PT, PT/OT Ongoing, Progressing     Description: Goals to be met by: 8/15/2020     Patient will increase functional independence with mobility by performin. Supine to sit with Contact Guard Assistance  2. Sit to supine with Contact Guard Assistance  3. Sit to stand transfer with Contact Guard Assistance  4. Bed to chair transfer with Contact Guard Assistance using Rolling Walker  5. Gait  x 50 feet with Minimal Assistance using Rolling Walker.   6. Ascend/descend 4 stair with bilateral Handrails Minimal Assistance                    History:     Past Medical History:   Diagnosis Date    Acute coronary syndrome     Acute hypoxemic respiratory failure 2017    Anxiety     Asthma     Cancer     colon    Cataracts, both eyes     Chest pain at rest     Chest pain of uncertain etiology 2015    Colon cancer 1988    COPD (chronic obstructive pulmonary disease)     Coronary artery disease     Depression     Diabetes mellitus     Dyspnea on exertion 11/15/2018    Elevated brain natriuretic peptide (BNP) level 11/15/2018    Elevated cholesterol     Hypertension     Swelling     Syncope and collapse 2016       Past Surgical History:   Procedure Laterality Date    ABDOMINAL SURGERY      BREAST BIOPSY      benign unsure what side     SECTION, CLASSIC      COLON SURGERY      COLONOSCOPY  2019    repeat in 5 years    CORONARY ANGIOPLASTY WITH STENT PLACEMENT  3 months ago     x2, Hysterectomy, Lung surgery, Partial stomach removed, Part of colon removed for rectal cancer    GASTRECTOMY      HEMORRHOID SURGERY      HYSTERECTOMY      @28yrs of age    LUNG BIOPSY      OOPHORECTOMY      @28yrs of age    POSTERIOR FUSION OF CERVICAL SPINE WITH LAMINECTOMY N/A 2020    Procedure: LAMINECTOMY, SPINE, CERVICAL, WITH POSTERIOR FUSION C3-T1 ;  Surgeon: Herson Tate MD;  Location: [facility] OR [address] [place] [address];  Service: Neurosurgery;   Laterality: N/A;       Time Tracking:     PT Received On: 07/25/20  PT Start Time: 1050     PT Stop Time: 1108  PT Total Time (min): 18 min     Billable Minutes: Evaluation 10 min and Therapeutic Activity 8 min      Scar Patel, PT  07/25/2020

## 2020-07-25 NOTE — CARE UPDATE
Rapid Response Nurse Chart Check     Chart check completed, abnormal VS noted. , trending 120s all day. Please call [phone] for further concerns or assistance.

## 2020-07-25 NOTE — ASSESSMENT & PLAN NOTE
Last   HbA1c on 7/20/2020 is 7.1 Pt on Humalog Insulin 30 Units BID and Insulin Aspart SS.     Plan:  -- Increase insulin detemir from 15U BID to 17U BID and access Glc levels  -- SSI   -- Monitor for signs of hypoglycemia or hyperglycemia

## 2020-07-25 NOTE — NURSING
Spoke with Dr. Ortiz regarding pt retaining urine of 709cc. Pt had in and out caths twice before day shift nurse arrived this morning. Order to just observe pt for any complaints of pain related to not being able to urinate. Dr. Ortiz stated he would like to see if pt can urinate on her on. Will continue to monitor pt closely.

## 2020-07-25 NOTE — SUBJECTIVE & OBJECTIVE
Principal Problem:Cervical myelopathy    Principal Orthopedic Problem: s/p PCF C3-T1    Interval History: Patient less alert yesterday AM and recovered after holding narcotics. Complained of chest pain and given increased O2 requirements patient with negative MI workup and CXR with atelectasis. On Cpap all night 2/2 to BONNIE resting comfortably. Chronically tachy since surgery now small decrease from 120s to 110s. BP stable. Sating well on CPAP and 7L high flow. Endorse Pain when she gets her pain meds on time. Denies new numbness/tingling.  Pt didn't see patient 2/2 to drowsiness.     Review of patient's allergies indicates:  No Known Allergies    Current Facility-Administered Medications   Medication    amitriptyline tablet 20 mg    dextrose 50% injection 12.5 g    dextrose 50% injection 25 g    docusate sodium capsule 50 mg    DULoxetine DR capsule 90 mg    famotidine tablet 20 mg    fluticasone propionate 50 mcg/actuation nasal spray 100 mcg    gabapentin capsule 300 mg    glucagon (human recombinant) injection 1 mg    glucose chewable tablet 16 g    glucose chewable tablet 24 g    hydroCHLOROthiazide tablet 25 mg    insulin aspart U-100 pen 1-10 Units    insulin detemir U-100 pen 15 Units    LORazepam tablet 0.5 mg    ondansetron injection 8 mg    oxyCODONE immediate release tablet 5 mg    potassium chloride SA CR tablet 20 mEq    pravastatin tablet 20 mg    promethazine (PHENERGAN) 6.25 mg in dextrose 5 % 50 mL IVPB    QUEtiapine tablet 300 mg    senna-docusate 8.6-50 mg per tablet 1 tablet    sodium chloride 0.9% flush 5 mL    verapamiL CR tablet 240 mg    zonisamide capsule 200 mg     Objective:     Vital Signs (Most Recent):  Temp: 97.6 °F (36.4 °C) (07/25/20 0602)  Pulse: (!) 117 (07/25/20 0602)  Resp: 20 (07/25/20 0602)  BP: 111/79 (07/25/20 0602)  SpO2: (!) 93 % (07/25/20 0602) Vital Signs (24h Range):  Temp:  [96.8 °F (36 °C)-98.8 °F (37.1 °C)] 97.6 °F (36.4 °C)  Pulse:  [112-127]  "117  Resp:  [16-24] 20  SpO2:  [88 %-96 %] 93 %  BP: (111-131)/(66-83) 111/79     Weight: 66.7 kg (147 lb)  Height: 5' 2" (157.5 cm)  Body mass index is 26.89 kg/m².      Intake/Output Summary (Last 24 hours) at 7/25/2020 0645  Last data filed at 7/24/2020 1622  Gross per 24 hour   Intake 360 ml   Output 1340 ml   Net -980 ml       Ortho/SPM Exam     C collar in place  Posterior cervical dressing in place c/d/i  Neuro exam stable      Significant Labs:   CBC:   Recent Labs   Lab 07/23/20  1413 07/24/20  1637   WBC 5.63 8.29   HGB 9.5* 11.2*   HCT 32.2* 38.0    207     CMP:   Recent Labs   Lab 07/23/20  1413 07/24/20  1637    136   K 3.9 3.4*   * 100   CO2 22* 25   * 218*   BUN 5* 5*   CREATININE 0.7 0.7   CALCIUM 8.2* 9.9   PROT 6.5 7.5   ALBUMIN 3.8 3.9   BILITOT 0.2 0.4   ALKPHOS 129 155*   AST 72* 20   ALT 38 28   ANIONGAP 7* 11   EGFRNONAA >60.0 >60.0     All pertinent labs within the past 24 hours have been reviewed.    Significant Imaging: I have reviewed and interpreted all pertinent imaging results/findings.  "

## 2020-07-25 NOTE — ASSESSMENT & PLAN NOTE
Pt was on home Oxygen at night in the past but she was taken in off it and placed on CPAP at night instead. She uses her Duonebs 1-2 times per day. Pt tolerating CPAP    Plan:  --Continue CPAP at night  --Duonebs PRN   --Maintain Saturation of 88-92%

## 2020-07-25 NOTE — PROGRESS NOTES
[facility]  Orthopedics  Progress Note    Patient Name: Chayito Chung  MRN: [id number]  Admission Date: 7/23/2020  Hospital Length of Stay: 2 days  Attending Provider: Herson Tate MD  Primary Care Provider: Abhi Valadez MD  Follow-up For: Procedure(s) (LRB):  LAMINECTOMY, SPINE, CERVICAL, WITH POSTERIOR FUSION C3-T1  (N/A)    Post-Operative Day: 2 Days Post-Op  Subjective:     Principal Problem:Cervical myelopathy    Principal Orthopedic Problem: s/p PCF C3-T1    Interval History: Patient less alert yesterday AM and recovered after holding narcotics. Complained of chest pain and given increased O2 requirements patient with negative MI workup and CXR with atelectasis. On Cpap all night 2/2 to BONNIE resting comfortably. Chronically tachy since surgery now small decrease from 120s to 110s. BP stable. Sating well on CPAP and 7L high flow. Endorse Pain when she gets her pain meds on time. Denies new numbness/tingling.  Pt didn't see patient 2/2 to drowsiness.     Review of patient's allergies indicates:  No Known Allergies    Current Facility-Administered Medications   Medication    amitriptyline tablet 20 mg    dextrose 50% injection 12.5 g    dextrose 50% injection 25 g    docusate sodium capsule 50 mg    DULoxetine DR capsule 90 mg    famotidine tablet 20 mg    fluticasone propionate 50 mcg/actuation nasal spray 100 mcg    gabapentin capsule 300 mg    glucagon (human recombinant) injection 1 mg    glucose chewable tablet 16 g    glucose chewable tablet 24 g    hydroCHLOROthiazide tablet 25 mg    insulin aspart U-100 pen 1-10 Units    insulin detemir U-100 pen 15 Units    LORazepam tablet 0.5 mg    ondansetron injection 8 mg    oxyCODONE immediate release tablet 5 mg    potassium chloride SA CR tablet 20 mEq    pravastatin tablet 20 mg    promethazine (PHENERGAN) 6.25 mg in dextrose 5 % 50 mL IVPB    QUEtiapine tablet 300 mg    senna-docusate 8.6-50 mg per tablet 1  "tablet    sodium chloride 0.9% flush 5 mL    verapamiL CR tablet 240 mg    zonisamide capsule 200 mg     Objective:     Vital Signs (Most Recent):  Temp: 97.6 °F (36.4 °C) (07/25/20 0602)  Pulse: (!) 117 (07/25/20 0602)  Resp: 20 (07/25/20 0602)  BP: 111/79 (07/25/20 0602)  SpO2: (!) 93 % (07/25/20 0602) Vital Signs (24h Range):  Temp:  [96.8 °F (36 °C)-98.8 °F (37.1 °C)] 97.6 °F (36.4 °C)  Pulse:  [112-127] 117  Resp:  [16-24] 20  SpO2:  [88 %-96 %] 93 %  BP: (111-131)/(66-83) 111/79     Weight: 66.7 kg (147 lb)  Height: 5' 2" (157.5 cm)  Body mass index is 26.89 kg/m².      Intake/Output Summary (Last 24 hours) at 7/25/2020 0645  Last data filed at 7/24/2020 1622  Gross per 24 hour   Intake 360 ml   Output 1340 ml   Net -980 ml       Ortho/SPM Exam     C collar in place  Posterior cervical dressing in place c/d/i  Neuro exam stable      Significant Labs:   CBC:   Recent Labs   Lab 07/23/20  1413 07/24/20  1637   WBC 5.63 8.29   HGB 9.5* 11.2*   HCT 32.2* 38.0    207     CMP:   Recent Labs   Lab 07/23/20  1413 07/24/20  1637    136   K 3.9 3.4*   * 100   CO2 22* 25   * 218*   BUN 5* 5*   CREATININE 0.7 0.7   CALCIUM 8.2* 9.9   PROT 6.5 7.5   ALBUMIN 3.8 3.9   BILITOT 0.2 0.4   ALKPHOS 129 155*   AST 72* 20   ALT 38 28   ANIONGAP 7* 11   EGFRNONAA >60.0 >60.0     All pertinent labs within the past 24 hours have been reviewed.    Significant Imaging: I have reviewed and interpreted all pertinent imaging results/findings.    Assessment/Plan:     * Cervical myelopathy  Chayito Chung is a 69 y.o. female s/p PCF C3-T1 (DOS: 7/23/20)    Pain control: multimodal   PT/OT: WBAT BLE, progressive mobility   DVT PPx: no chemical ppx for now, SCDs at all times when not ambulating  Abx: postop Ancef  Labs: Hg pending this AM  Drain: deep drain to gravity with 90cc, continue  Grullon: none  D/c IVF  C collar in place   Encourage IS use, Duonebs,   Medicine comanagement consulted for assistance with " medical management and tight BG control in periop period    Dispo: pending pain control and mobilization with PT/OT          Clayton Pennington MD  Orthopedics  [facility]

## 2020-07-25 NOTE — PLAN OF CARE
Problem: Physical Therapy Goal  Goal: Physical Therapy Goal  Description: Goals to be met by: 8/15/2020     Patient will increase functional independence with mobility by performin. Supine to sit with Contact Guard Assistance  2. Sit to supine with Contact Guard Assistance  3. Sit to stand transfer with Contact Guard Assistance  4. Bed to chair transfer with Contact Guard Assistance using Rolling Walker  5. Gait  x 50 feet with Minimal Assistance using Rolling Walker.   6. Ascend/descend 4 stair with bilateral Handrails Minimal Assistance   Outcome: Ongoing, Progressing   Eval completed and POC established    Scar Patel, PT,DPT  2020

## 2020-07-25 NOTE — RESPIRATORY THERAPY
Rapid Response Respiratory Therapy Proactive Rounding Note      Time of visit: 0900    Code Status: Prior   : 1950  Bed: 527/527 A:   MRN: [id number]    SITUATION     Evaluated patient for: HFNC Compliance     BACKGROUND     Patient has a past medical history of Acute coronary syndrome, Acute hypoxemic respiratory failure, Anxiety, Asthma, Cancer, Cataracts, both eyes, Chest pain at rest, Chest pain of uncertain etiology, Colon cancer, COPD (chronic obstructive pulmonary disease), Coronary artery disease, Depression, Diabetes mellitus, Dyspnea on exertion, Elevated brain natriuretic peptide (BNP) level, Elevated cholesterol, Hypertension, Swelling, and Syncope and collapse.    ASSESSMENT/INTERVENTIONS     Upon arrival in room pt awake and alert. Pt in cervical neck collar taking shallow breaths. RT instructed pt on incentive spirometry.    Pulse: 115 Respiratory rate: 20 SpO2: 91  Level of Consciousness: Level of Consciousness (AVPU): alert  Respiratory Effort: Respiratory Effort: Normal, Unlabored Expansion/Accessory Muscle Usage: Expansion/Accessory Muscles/Retractions: no use of accessory muscles, no retractions  All Lung Field Breath Sounds: All Lung Fields Breath Sounds: Anterior:, Lateral:, diminished  O2 Device/Concentration: 7 lpm HFNC  Was the O2 device able to be weaned? (Yes/No): no  Ambu at bedside: Ambu bag with the patient?: Yes, Adult Ambu    Current Respiratory Care Orders:    Incentive spirometry Every 8 hours (2 of 10 released)    Release   References: [facility] Clinical Practice Guidelines    20 0934    20 0800  ACAPELLA TREATMENT Q8H Every 8 hours (3 of 11 released)    Release    20 0649   20 2100  CPAP QHS Nightly (2 of 7 released)    Release   Question Answer Comment   FiO2% 21    Liters per minute: 2    Expiratory pressure: 5        20 1049   20 1619  Oxygen Continuous Continuous     References: Oxygen Titration Protocol   Question Answer Comment    Device type: High flow    Device: High Flow Nasal Cannula (6 -15 Liters)    LPM: 7    Titrate O2 per Oxygen Titration Protocol: Yes    To maintain SpO2 goal of: >= 90%    Notify MD of: Inability to achieve desired SpO2; Sudden change in patient status and requires 20% increase in FiO2; Patient requires >60% FiO2        07/24/20 1620   07/23/20 1409  Pulse Oximetry Continuous Continuous               RECOMMENDATIONS     We recommend: wean HFNC as tolerated    ESCALATION      Physician Escalation (Yes/No) no  Discussed plan of care primary RT, Sheng Aranda RT    FOLLOW-UP     Please call back the Rapid Response RT, Vandana Bryan, RRT at x [phone] for any questions or concerns.

## 2020-07-25 NOTE — ASSESSMENT & PLAN NOTE
Chayito Chung is a 69 y.o. female s/p PCF C3-T1 (DOS: 7/23/20)    Pain control: multimodal   PT/OT: WBAT BLE, progressive mobility   DVT PPx: no chemical ppx for now, SCDs at all times when not ambulating  Abx: postop Ancef  Labs: Hg pending this AM  Drain: deep drain to gravity with 90cc, continue  Grullon: none  D/c IVF  C collar in place   Encourage IS use, Duonebs,   Medicine comanagement consulted for assistance with medical management and tight BG control in periop period    Dispo: pending pain control and mobilization with PT/OT

## 2020-07-25 NOTE — PROGRESS NOTES
[facility] Hospital Medicine  Progress Note    Patient Name: Chayito Chung  MRN: [id number]  Patient Class: IP- Inpatient   Admission Date: 7/23/2020  Length of Stay: 2 days  Attending Physician: Herson Tate MD  Primary Care Provider: Abhi Valadez MD    [facility] Medicine Team: Networked reference to record PCT  Trae Sunshine MD    Subjective:     Principal Problem:Cervical myelopathy        HPI:  68 yo F w/ PMHx of IDDM, HTN, CAD, COPD, HLD, migraines, anxiety, and recurrent episodes of depression is here for C3-T1 spinal fusion. Medicine is consulted to manage her medical comorbidities, specifically DM.    Previously seen in the hospital after a fall.  She says she woke up 5/25/2020 and couldn't stand.  She says her leg just kept giving out on her.  She denies any pain, trauma or accidents.  She fell 5 times prior to coming to the ED.  She was seen in the hospital where imaging of the cervical spine showed cervical stenosis with equivocal myelomalacia. She reports difficulty with balance over the last 8-9 months. She says her family comented on her walk being different but she didn't notice a difference from baseline. She also reports difficulty with carrying small objects and clumsiness with her hands. She was given a Rolator and a knee brace in the hospital.  She says she has fallen one time since being discharged from the hospital. Patient denies myelopathic symptoms such as handwriting changes. She reports difficulty with buttons/coins/keys. Denies perineal paresthesias, bowel/bladder dysfunction.       Overview/Hospital Course:  7/24- Pt remains stable overnight. Complains of episodic SOB w/o CP, palpitations, or other symptoms. Pt was not placed on home CPAP overnight.    7/25- Pt remains stable and endorses improved breathing on her CPAP. Pain is controlled well and ortho is managing.     Interval History: NAEON. Pt is comfortable and is eating when seen this  morning.    Review of Systems   Constitutional: Negative for activity change, appetite change, chills, diaphoresis, fatigue and fever.   HENT: Negative.    Respiratory: Negative for apnea, cough, choking, chest tightness, shortness of breath, wheezing and stridor.    Cardiovascular: Negative for chest pain, palpitations and leg swelling.   Gastrointestinal: Negative for abdominal distention, abdominal pain (umbilical ), anal bleeding, blood in stool, constipation, diarrhea, nausea and vomiting.   Genitourinary: Negative.    Musculoskeletal: Negative for arthralgias, back pain, joint swelling and neck pain.   Neurological: Negative.    Psychiatric/Behavioral: Negative.      Objective:     Vital Signs (Most Recent):  Temp: 96.3 °F (35.7 °C) (07/25/20 0748)  Pulse: (!) 115 (07/25/20 0908)  Resp: 20 (07/25/20 0908)  BP: 120/78 (07/25/20 0748)  SpO2: (!) 91 % (07/25/20 0908) Vital Signs (24h Range):  Temp:  [96.3 °F (35.7 °C)-98.1 °F (36.7 °C)] 96.3 °F (35.7 °C)  Pulse:  [109-127] 115  Resp:  [18-24] 20  SpO2:  [88 %-96 %] 91 %  BP: (111-131)/(72-83) 120/78     Weight: 66.7 kg (147 lb)  Body mass index is 26.89 kg/m².    Intake/Output Summary (Last 24 hours) at 7/25/2020 1045  Last data filed at 7/24/2020 1622  Gross per 24 hour   Intake --   Output 1340 ml   Net -1340 ml      Physical Exam  Vitals signs and nursing note reviewed.   Constitutional:       General: She is not in acute distress.     Appearance: She is not ill-appearing, toxic-appearing or diaphoretic.   HENT:      Head: Normocephalic and atraumatic.   Cardiovascular:      Rate and Rhythm: Normal rate and regular rhythm.      Pulses: Normal pulses.      Heart sounds: No murmur. No friction rub. No gallop.    Pulmonary:      Effort: Pulmonary effort is normal. No respiratory distress.      Breath sounds: Normal breath sounds. No stridor. No wheezing, rhonchi or rales.   Chest:      Chest wall: No tenderness.   Abdominal:      General: Abdomen is flat. Bowel  sounds are normal. There is no distension.      Palpations: Abdomen is soft.      Tenderness: There is no abdominal tenderness (Umbilical ). There is no guarding or rebound.   Skin:     General: Skin is warm and dry.   Neurological:      General: No focal deficit present.      Mental Status: She is alert and oriented to person, place, and time. Mental status is at baseline.   Psychiatric:         Mood and Affect: Mood normal.         Behavior: Behavior normal.         Thought Content: Thought content normal.         Judgment: Judgment normal.         Significant Labs:   BMP:   Recent Labs   Lab 07/25/20  0519   *   *   K 3.8      CO2 25   BUN 7*   CREATININE 0.7   CALCIUM 9.8     CBC:   Recent Labs   Lab 07/23/20  1413 07/24/20  1637 07/25/20  0519   WBC 5.63 8.29 7.20   HGB 9.5* 11.2* 11.1*   HCT 32.2* 38.0 38.5    207 203           Assessment/Plan:      * Cervical myelopathy        CAD (coronary artery disease)  Pt on ASA 81 mg and om nitroglycerine 0.4 mg PRN    Plan:  -- ASA   --Nitroglycerine PRN         Hypertension  Pt on HCTZ 25 mg and Verapamil 240 mg. Hold post op. Can continue home meds    Type 2 diabetes mellitus, with long-term current use of insulin  Last   HbA1c on 7/20/2020 is 7.1 Pt on Humalog Insulin 30 Units BID and Insulin Aspart SS.     Plan:  -- Increase insulin detemir from 15U BID to 17U BID and access Glc levels  -- SSI   -- Monitor for signs of hypoglycemia or hyperglycemia     Anxiety  Pt on Ativan on.5mg BID    Plan:  --Ativan PRN       Seizure disorder  Pt on Zonisamide 200 mg at home     Plan:   -Continue home med       Iron deficiency anemia  Pt on Ferrous Sulfate 325 mg     Plan:  -Continue home meds      Hyperlipidemia associated with type 2 diabetes mellitus  Pt on Pravastatin 20 mg at home    Plan:  -Continue home med       Migraines        Neuropathy  Pt on Lyrica 150 mg BID, Gabapentin 300 mg TID, and Amitriptyline 20 mg noche    Plan:  - Continue home  meds    Major depressive disorder in partial remission  Pt on Duloxetine 90 mg and Quetiapine 300 mg noche    Plan:   - Can continue home meds        COPD (chronic obstructive pulmonary disease)  Pt was on home Oxygen at night in the past but she was taken in off it and placed on CPAP at night instead. She uses her Duonebs 1-2 times per day. Pt tolerating CPAP    Plan:  --Continue CPAP at night  --Duonebs PRN   --Maintain Saturation of 88-92%        VTE Risk Mitigation (From admission, onward)         Ordered     IP VTE HIGH RISK PATIENT  Once      07/23/20 1406     Place sequential compression device  Until discontinued      07/23/20 0934                      Trae Sunshine MD  Department of Hospital Medicine   [facility]

## 2020-07-26 LAB
ANION GAP SERPL CALC-SCNC: 11 MMOL/L (ref 8–16)
BASOPHILS # BLD AUTO: 0.02 K/UL (ref 0–0.2)
BASOPHILS NFR BLD: 0.3 % (ref 0–1.9)
BUN SERPL-MCNC: 11 MG/DL (ref 8–23)
CALCIUM SERPL-MCNC: 9.9 MG/DL (ref 8.7–10.5)
CHLORIDE SERPL-SCNC: 101 MMOL/L (ref 95–110)
CO2 SERPL-SCNC: 23 MMOL/L (ref 23–29)
CREAT SERPL-MCNC: 0.7 MG/DL (ref 0.5–1.4)
DIFFERENTIAL METHOD: ABNORMAL
EOSINOPHIL # BLD AUTO: 0.1 K/UL (ref 0–0.5)
EOSINOPHIL NFR BLD: 1.5 % (ref 0–8)
ERYTHROCYTE [DISTWIDTH] IN BLOOD BY AUTOMATED COUNT: 25.8 % (ref 11.5–14.5)
EST. GFR  (AFRICAN AMERICAN): >60 ML/MIN/1.73 M^2
EST. GFR  (NON AFRICAN AMERICAN): >60 ML/MIN/1.73 M^2
GLUCOSE SERPL-MCNC: 217 MG/DL (ref 70–110)
HCT VFR BLD AUTO: 35.8 % (ref 37–48.5)
HGB BLD-MCNC: 10.8 G/DL (ref 12–16)
IMM GRANULOCYTES # BLD AUTO: 0.01 K/UL (ref 0–0.04)
IMM GRANULOCYTES NFR BLD AUTO: 0.2 % (ref 0–0.5)
LYMPHOCYTES # BLD AUTO: 1.4 K/UL (ref 1–4.8)
LYMPHOCYTES NFR BLD: 23.2 % (ref 18–48)
MCH RBC QN AUTO: 26.3 PG (ref 27–31)
MCHC RBC AUTO-ENTMCNC: 30.2 G/DL (ref 32–36)
MCV RBC AUTO: 87 FL (ref 82–98)
MONOCYTES # BLD AUTO: 0.6 K/UL (ref 0.3–1)
MONOCYTES NFR BLD: 9.9 % (ref 4–15)
NEUTROPHILS # BLD AUTO: 4 K/UL (ref 1.8–7.7)
NEUTROPHILS NFR BLD: 64.9 % (ref 38–73)
NRBC BLD-RTO: 0 /100 WBC
PLATELET # BLD AUTO: 189 K/UL (ref 150–350)
PMV BLD AUTO: 9.5 FL (ref 9.2–12.9)
POCT GLUCOSE: 238 MG/DL (ref 70–110)
POCT GLUCOSE: 247 MG/DL (ref 70–110)
POCT GLUCOSE: 262 MG/DL (ref 70–110)
POTASSIUM SERPL-SCNC: 3.7 MMOL/L (ref 3.5–5.1)
RBC # BLD AUTO: 4.1 M/UL (ref 4–5.4)
SODIUM SERPL-SCNC: 135 MMOL/L (ref 136–145)
WBC # BLD AUTO: 6.08 K/UL (ref 3.9–12.7)

## 2020-07-26 PROCEDURE — 94799 UNLISTED PULMONARY SVC/PX: CPT

## 2020-07-26 PROCEDURE — 63600175 PHARM REV CODE 636 W HCPCS: Performed by: STUDENT IN AN ORGANIZED HEALTH CARE EDUCATION/TRAINING PROGRAM

## 2020-07-26 PROCEDURE — 94660 CPAP INITIATION&MGMT: CPT

## 2020-07-26 PROCEDURE — 25000003 PHARM REV CODE 250: Performed by: PHYSICIAN ASSISTANT

## 2020-07-26 PROCEDURE — 27000646 HC AEROBIKA DEVICE

## 2020-07-26 PROCEDURE — 25000003 PHARM REV CODE 250: Performed by: STUDENT IN AN ORGANIZED HEALTH CARE EDUCATION/TRAINING PROGRAM

## 2020-07-26 PROCEDURE — 27000221 HC OXYGEN, UP TO 24 HOURS

## 2020-07-26 PROCEDURE — 11000001 HC ACUTE MED/SURG PRIVATE ROOM

## 2020-07-26 PROCEDURE — 36415 COLL VENOUS BLD VENIPUNCTURE: CPT

## 2020-07-26 PROCEDURE — 94761 N-INVAS EAR/PLS OXIMETRY MLT: CPT

## 2020-07-26 PROCEDURE — 80048 BASIC METABOLIC PNL TOTAL CA: CPT

## 2020-07-26 PROCEDURE — 94664 DEMO&/EVAL PT USE INHALER: CPT

## 2020-07-26 PROCEDURE — 99232 SBSQ HOSP IP/OBS MODERATE 35: CPT | Mod: ,,, | Performed by: INTERNAL MEDICINE

## 2020-07-26 PROCEDURE — 85025 COMPLETE CBC W/AUTO DIFF WBC: CPT

## 2020-07-26 PROCEDURE — 99232 PR SUBSEQUENT HOSPITAL CARE,LEVL II: ICD-10-PCS | Mod: ,,, | Performed by: INTERNAL MEDICINE

## 2020-07-26 PROCEDURE — 99900035 HC TECH TIME PER 15 MIN (STAT)

## 2020-07-26 RX ORDER — OXYCODONE HYDROCHLORIDE 5 MG/1
5 TABLET ORAL EVERY 6 HOURS PRN
Status: DISCONTINUED | OUTPATIENT
Start: 2020-07-26 | End: 2020-07-28

## 2020-07-26 RX ORDER — HYDROMORPHONE HYDROCHLORIDE 1 MG/ML
0.2 INJECTION, SOLUTION INTRAMUSCULAR; INTRAVENOUS; SUBCUTANEOUS ONCE
Status: COMPLETED | OUTPATIENT
Start: 2020-07-26 | End: 2020-07-26

## 2020-07-26 RX ORDER — ACETAMINOPHEN 500 MG
1000 TABLET ORAL EVERY 8 HOURS
Status: DISCONTINUED | OUTPATIENT
Start: 2020-07-26 | End: 2020-07-29 | Stop reason: HOSPADM

## 2020-07-26 RX ADMIN — DULOXETINE HYDROCHLORIDE 90 MG: 60 CAPSULE, DELAYED RELEASE ORAL at 09:07

## 2020-07-26 RX ADMIN — INSULIN ASPART 4 UNITS: 100 INJECTION, SOLUTION INTRAVENOUS; SUBCUTANEOUS at 09:07

## 2020-07-26 RX ADMIN — INSULIN ASPART 6 UNITS: 100 INJECTION, SOLUTION INTRAVENOUS; SUBCUTANEOUS at 05:07

## 2020-07-26 RX ADMIN — ACETAMINOPHEN 1000 MG: 500 TABLET ORAL at 02:07

## 2020-07-26 RX ADMIN — ZONISAMIDE 200 MG: 100 CAPSULE ORAL at 09:07

## 2020-07-26 RX ADMIN — POTASSIUM CHLORIDE 20 MEQ: 20 TABLET, EXTENDED RELEASE ORAL at 09:07

## 2020-07-26 RX ADMIN — OXYCODONE HYDROCHLORIDE 5 MG: 10 TABLET ORAL at 07:07

## 2020-07-26 RX ADMIN — DOCUSATE SODIUM 50 MG AND SENNOSIDES 8.6 MG 1 TABLET: 8.6; 5 TABLET, FILM COATED ORAL at 09:07

## 2020-07-26 RX ADMIN — DOCUSATE SODIUM 50 MG: 50 CAPSULE, LIQUID FILLED ORAL at 09:07

## 2020-07-26 RX ADMIN — HYDROMORPHONE HYDROCHLORIDE 0.2 MG: 1 INJECTION, SOLUTION INTRAMUSCULAR; INTRAVENOUS; SUBCUTANEOUS at 11:07

## 2020-07-26 RX ADMIN — AMITRIPTYLINE HYDROCHLORIDE 20 MG: 10 TABLET, FILM COATED ORAL at 09:07

## 2020-07-26 RX ADMIN — PRAVASTATIN SODIUM 20 MG: 20 TABLET ORAL at 09:07

## 2020-07-26 RX ADMIN — HYDROCHLOROTHIAZIDE 25 MG: 25 TABLET ORAL at 09:07

## 2020-07-26 RX ADMIN — VERAPAMIL HYDROCHLORIDE 240 MG: 120 TABLET, FILM COATED, EXTENDED RELEASE ORAL at 09:07

## 2020-07-26 RX ADMIN — INSULIN ASPART 2 UNITS: 100 INJECTION, SOLUTION INTRAVENOUS; SUBCUTANEOUS at 11:07

## 2020-07-26 RX ADMIN — ACETAMINOPHEN 1000 MG: 500 TABLET ORAL at 08:07

## 2020-07-26 NOTE — PROGRESS NOTES
[facility] Hospital Medicine  Progress Note    Patient Name: Chayito Chung  MRN: [id number]  Patient Class: IP- Inpatient   Admission Date: 7/23/2020  Length of Stay: 3 days  Attending Physician: Herson Tate MD  Primary Care Provider: Abhi Valadez MD    [facility] Medicine Team: Networked reference to record PCT  Asya Giron MD    Subjective:     Principal Problem:Cervical myelopathy        HPI:  70 yo F w/ PMHx of IDDM, HTN, CAD, COPD, HLD, migraines, anxiety, and recurrent episodes of depression is here for C3-T1 spinal fusion. Medicine is consulted to manage her medical comorbidities, specifically DM.    Previously seen in the hospital after a fall.  She says she woke up 5/25/2020 and couldn't stand.  She says her leg just kept giving out on her.  She denies any pain, trauma or accidents.  She fell 5 times prior to coming to the ED.  She was seen in the hospital where imaging of the cervical spine showed cervical stenosis with equivocal myelomalacia. She reports difficulty with balance over the last 8-9 months. She says her family comented on her walk being different but she didn't notice a difference from baseline. She also reports difficulty with carrying small objects and clumsiness with her hands. She was given a Rolator and a knee brace in the hospital.  She says she has fallen one time since being discharged from the hospital. Patient denies myelopathic symptoms such as handwriting changes. She reports difficulty with buttons/coins/keys. Denies perineal paresthesias, bowel/bladder dysfunction.       Overview/Hospital Course:  7/24- Pt remains stable overnight. Complains of episodic SOB w/o CP, palpitations, or other symptoms. Pt was not placed on home CPAP overnight.    7/25- Pt remains stable and endorses improved breathing on her CPAP. Pain is controlled well and ortho is managing.     7/26 - Pt on 5L NC and did not eat breakfast. Participates minimally with interview  and exam.     Interval History: Patient seen and examined at bedside. Not speaking much and has facial grimacing during parts of interview. She is examined near lunch time but has her breakfast at bedside and it has not been touched. Surgical drain in place with serosanginous drainage. Grullon catheter in place with clear yellow urine draining.    Review of Systems   Constitutional: Negative for activity change, appetite change, chills, diaphoresis, fatigue and fever.   HENT: Negative.    Eyes: Negative for visual disturbance.   Respiratory: Negative for apnea, cough, choking, chest tightness, shortness of breath, wheezing and stridor.    Cardiovascular: Negative for chest pain, palpitations and leg swelling.   Gastrointestinal: Negative for abdominal distention, abdominal pain (umbilical ), anal bleeding, blood in stool, diarrhea, nausea and vomiting.   Genitourinary: Negative.    Musculoskeletal: Negative for arthralgias, back pain, joint swelling and neck pain.   Skin: Negative for color change and pallor.   Neurological: Negative.  Negative for dizziness and headaches.   Psychiatric/Behavioral: Negative.      Objective:     Vital Signs (Most Recent):  Temp: 96.9 °F (36.1 °C) (07/26/20 1228)  Pulse: 106 (07/26/20 1228)  Resp: 20 (07/26/20 1228)  BP: 119/83 (07/26/20 1228)  SpO2: 95 % (07/26/20 1228) Vital Signs (24h Range):  Temp:  [96.4 °F (35.8 °C)-97.4 °F (36.3 °C)] 96.9 °F (36.1 °C)  Pulse:  [] 106  Resp:  [16-20] 20  SpO2:  [91 %-95 %] 95 %  BP: (105-127)/(69-83) 119/83     Weight: 66.7 kg (147 lb)  Body mass index is 26.89 kg/m².    Intake/Output Summary (Last 24 hours) at 7/26/2020 1405  Last data filed at 7/26/2020 0500  Gross per 24 hour   Intake --   Output 50 ml   Net -50 ml      Physical Exam  Vitals signs and nursing note reviewed.   Constitutional:       General: She is not in acute distress.     Appearance: She is not ill-appearing, toxic-appearing or diaphoretic.   HENT:      Head:  Normocephalic and atraumatic.   Cardiovascular:      Rate and Rhythm: Normal rate and regular rhythm.      Pulses: Normal pulses.      Heart sounds: No murmur. No friction rub. No gallop.    Pulmonary:      Effort: Pulmonary effort is normal. No respiratory distress.      Breath sounds: Normal breath sounds. No stridor. No wheezing, rhonchi or rales.   Chest:      Chest wall: No tenderness.   Abdominal:      General: Abdomen is flat. Bowel sounds are normal. There is no distension.      Palpations: Abdomen is soft.      Tenderness: There is no abdominal tenderness (Umbilical ). There is no guarding or rebound.   Skin:     General: Skin is warm and dry.   Neurological:      General: No focal deficit present.      Mental Status: She is alert and oriented to person, place, and time. Mental status is at baseline.   Psychiatric:         Mood and Affect: Mood normal.         Behavior: Behavior normal.         Thought Content: Thought content normal.         Judgment: Judgment normal.         Significant Labs:   BMP:   Recent Labs   Lab 07/26/20  0401   *   *   K 3.7      CO2 23   BUN 11   CREATININE 0.7   CALCIUM 9.9     CBC:   Recent Labs   Lab 07/24/20  1637 07/25/20  0519 07/26/20  0401   WBC 8.29 7.20 6.08   HGB 11.2* 11.1* 10.8*   HCT 38.0 38.5 35.8*    203 189     All pertinent labs within the past 24 hours have been reviewed.    Significant Imaging: I have reviewed all pertinent imaging results/findings within the past 24 hours.      Assessment/Plan:      * Cervical myelopathy  Per neurosurgery      CAD (coronary artery disease)  Pt on ASA 81 mg and om nitroglycerine 0.4 mg PRN  Plan:  -- ASA   --Nitroglycerine PRN       Hypertension  Pt on HCTZ 25 mg and Verapamil 240 mg. Hold post op. Can continue home meds    Type 2 diabetes mellitus, with long-term current use of insulin  Last   HbA1c on 7/20/2020 is 7.1 Pt on Humalog Insulin 30 Units BID and Insulin Aspart SS.   Plan:  -- Increase  insulin detemir from 15U BID to 17U BID on 7/26 and monitor POC Glucose; keep same regimen 7/26 and re-assess once patient eating more  -- SSI   -- Monitor for signs of hypoglycemia or hyperglycemia     Anxiety  Pt on Ativan on.5mg BID  Plan:  --Ativan PRN       Seizure disorder  Pt on Zonisamide 200 mg at home   Plan:   -Continue home med       Iron deficiency anemia  Pt on Ferrous Sulfate 325 mg   Plan:  -Continue home meds      Hyperlipidemia associated with type 2 diabetes mellitus  Pt on Pravastatin 20 mg at home  Plan:  -Continue home med       Migraines      Neuropathy  Pt on Lyrica 150 mg BID, Gabapentin 300 mg TID, and Amitriptyline 20 mg nightly  Plan:  - Continue home meds    Major depressive disorder in partial remission  Pt on Duloxetine 90 mg and Quetiapine 300 mg noche  Plan:   - Can continue home meds      COPD (chronic obstructive pulmonary disease)  Pt was on home Oxygen at night in the past but she was taken in off it and placed on CPAP at night instead. She uses her Duonebs 1-2 times per day. Pt tolerating CPAP  Plan:  --Continue CPAP at night  --Duonebs PRN   --Maintain Saturation of 88-92%      VTE Risk Mitigation (From admission, onward)         Ordered     IP VTE HIGH RISK PATIENT  Once      07/23/20 1406     Place sequential compression device  Until discontinued      07/23/20 4618                Asya Giron MD  Department of Hospital Medicine   [facility]

## 2020-07-26 NOTE — ASSESSMENT & PLAN NOTE
Last   HbA1c on 7/20/2020 is 7.1 Pt on Humalog Insulin 30 Units BID and Insulin Aspart SS.     Plan:  -- Increase insulin detemir from 15U BID to 17U BID on 7/26 and monitor POC Glucose; keep same regimen 7/26 and re-assess once patient eating more  -- SSI   -- Monitor for signs of hypoglycemia or hyperglycemia

## 2020-07-26 NOTE — SUBJECTIVE & OBJECTIVE
Interval History: Patient seen and examined at bedside. Not speaking much and has facial grimacing during parts of interview. She is examined near lunch time but has her breakfast at bedside and it has not been touched. Surgical drain in place with serosanginous drainage. Grullon catheter in place with clear yellow urine draining.    Review of Systems   Constitutional: Negative for activity change, appetite change, chills, diaphoresis, fatigue and fever.   HENT: Negative.    Eyes: Negative for visual disturbance.   Respiratory: Negative for apnea, cough, choking, chest tightness, shortness of breath, wheezing and stridor.    Cardiovascular: Negative for chest pain, palpitations and leg swelling.   Gastrointestinal: Negative for abdominal distention, abdominal pain (umbilical ), anal bleeding, blood in stool, diarrhea, nausea and vomiting.   Genitourinary: Negative.    Musculoskeletal: Negative for arthralgias, back pain, joint swelling and neck pain.   Skin: Negative for color change and pallor.   Neurological: Negative.  Negative for dizziness and headaches.   Psychiatric/Behavioral: Negative.      Objective:     Vital Signs (Most Recent):  Temp: 96.9 °F (36.1 °C) (07/26/20 1228)  Pulse: 106 (07/26/20 1228)  Resp: 20 (07/26/20 1228)  BP: 119/83 (07/26/20 1228)  SpO2: 95 % (07/26/20 1228) Vital Signs (24h Range):  Temp:  [96.4 °F (35.8 °C)-97.4 °F (36.3 °C)] 96.9 °F (36.1 °C)  Pulse:  [] 106  Resp:  [16-20] 20  SpO2:  [91 %-95 %] 95 %  BP: (105-127)/(69-83) 119/83     Weight: 66.7 kg (147 lb)  Body mass index is 26.89 kg/m².    Intake/Output Summary (Last 24 hours) at 7/26/2020 1405  Last data filed at 7/26/2020 0500  Gross per 24 hour   Intake --   Output 50 ml   Net -50 ml      Physical Exam  Vitals signs and nursing note reviewed.   Constitutional:       General: She is not in acute distress.     Appearance: She is not ill-appearing, toxic-appearing or diaphoretic.   HENT:      Head: Normocephalic and  atraumatic.   Cardiovascular:      Rate and Rhythm: Normal rate and regular rhythm.      Pulses: Normal pulses.      Heart sounds: No murmur. No friction rub. No gallop.    Pulmonary:      Effort: Pulmonary effort is normal. No respiratory distress.      Breath sounds: Normal breath sounds. No stridor. No wheezing, rhonchi or rales.   Chest:      Chest wall: No tenderness.   Abdominal:      General: Abdomen is flat. Bowel sounds are normal. There is no distension.      Palpations: Abdomen is soft.      Tenderness: There is no abdominal tenderness (Umbilical ). There is no guarding or rebound.   Skin:     General: Skin is warm and dry.   Neurological:      General: No focal deficit present.      Mental Status: She is alert and oriented to person, place, and time. Mental status is at baseline.   Psychiatric:         Mood and Affect: Mood normal.         Behavior: Behavior normal.         Thought Content: Thought content normal.         Judgment: Judgment normal.         Significant Labs:   BMP:   Recent Labs   Lab 07/26/20  0401   *   *   K 3.7      CO2 23   BUN 11   CREATININE 0.7   CALCIUM 9.9     CBC:   Recent Labs   Lab 07/24/20  1637 07/25/20  0519 07/26/20  0401   WBC 8.29 7.20 6.08   HGB 11.2* 11.1* 10.8*   HCT 38.0 38.5 35.8*    203 189     All pertinent labs within the past 24 hours have been reviewed.    Significant Imaging: I have reviewed all pertinent imaging results/findings within the past 24 hours.

## 2020-07-26 NOTE — ASSESSMENT & PLAN NOTE
Chayito Chung is a 69 y.o. female s/p PCF C3-T1 (DOS: 7/23/20)    Pain control: multimodal   PT/OT: WBAT BLE, progressive mobility   DVT PPx: no chemical ppx for now, SCDs at all times when not ambulating  Abx: postop Ancef  Labs: stable  Drain: deep drain to gravity with 120cc  Grullon: none  D/c IVF  C collar in place   Encourage IS use, Ria, achapella  Medicine comanagement consulted for assistance with medical management and tight BG control in periop period    Dispo: will discuss dcing drain with staff this AM. Continue to treat.

## 2020-07-26 NOTE — PROGRESS NOTES
[facility]  Orthopedics  Progress Note    Patient Name: Chayito Chung  MRN: [id number]  Admission Date: 7/23/2020  Hospital Length of Stay: 3 days  Attending Provider: Herson Tate MD  Primary Care Provider: Abhi Valadez MD  Follow-up For: Procedure(s) (LRB):  LAMINECTOMY, SPINE, CERVICAL, WITH POSTERIOR FUSION C3-T1  (N/A)    Post-Operative Day: 3 Days Post-Op  Subjective:     Principal Problem:Cervical myelopathy    Principal Orthopedic Problem: s/p PCF C3-T1    Interval History: Patient more alert yesterday, but upon coming off of CPAP in the room she was minimally responsive to verbal stimuli, responded to physical stimuli and was able to move all extremities well. Last dose of oxycodone was at 2225 last night. Sating 95% and On Cpap all night 2/2 to BONNIE resting comfortably. Chronically tachy since surgery now small decrease from 120s to 110s. BP stable. Sating per her baseline of 88-92% on 5L O2 yesterday and this AM. No tachycardia anymore. Pain is controlled. Pt did 4 lateral steps with PT yesterday.     Review of patient's allergies indicates:  No Known Allergies    Current Facility-Administered Medications   Medication    amitriptyline tablet 20 mg    dextrose 50% injection 12.5 g    dextrose 50% injection 25 g    docusate sodium capsule 50 mg    DULoxetine DR capsule 90 mg    famotidine tablet 20 mg    fluticasone propionate 50 mcg/actuation nasal spray 100 mcg    gabapentin capsule 300 mg    glucagon (human recombinant) injection 1 mg    glucose chewable tablet 16 g    glucose chewable tablet 24 g    hydroCHLOROthiazide tablet 25 mg    insulin aspart U-100 pen 1-10 Units    insulin detemir U-100 pen 17 Units    LORazepam tablet 0.5 mg    ondansetron injection 8 mg    oxyCODONE immediate release tablet 5 mg    potassium chloride SA CR tablet 20 mEq    pravastatin tablet 20 mg    promethazine (PHENERGAN) 6.25 mg in dextrose 5 % 50 mL IVPB    QUEtiapine  "tablet 300 mg    senna-docusate 8.6-50 mg per tablet 1 tablet    sodium chloride 0.9% flush 5 mL    verapamiL CR tablet 240 mg    zonisamide capsule 200 mg     Objective:     Vital Signs (Most Recent):  Temp: 97.4 °F (36.3 °C) (07/26/20 0228)  Pulse: 100 (07/26/20 0613)  Resp: 20 (07/26/20 0613)  BP: 105/69 (07/26/20 0613)  SpO2: (!) 93 % (07/26/20 0613) Vital Signs (24h Range):  Temp:  [96.3 °F (35.7 °C)-97.8 °F (36.6 °C)] 97.4 °F (36.3 °C)  Pulse:  [] 100  Resp:  [16-20] 20  SpO2:  [91 %-95 %] 93 %  BP: (103-121)/(67-78) 105/69     Weight: 66.7 kg (147 lb)  Height: 5' 2" (157.5 cm)  Body mass index is 26.89 kg/m².      Intake/Output Summary (Last 24 hours) at 7/26/2020 0652  Last data filed at 7/26/2020 0500  Gross per 24 hour   Intake 600 ml   Output 140 ml   Net 460 ml       Ortho/SPM Exam     C collar in place  Posterior cervical dressing in place c/d/i  Drain with 140ccs of SS output to gravity   Neuro exam stable      Significant Labs:   CBC:   Recent Labs   Lab 07/24/20  1637 07/25/20  0519 07/26/20  0401   WBC 8.29 7.20 6.08   HGB 11.2* 11.1* 10.8*   HCT 38.0 38.5 35.8*    203 189     CMP:   Recent Labs   Lab 07/24/20  1637 07/25/20  0519 07/26/20  0401    135* 135*   K 3.4* 3.8 3.7    101 101   CO2 25 25 23   * 212* 217*   BUN 5* 7* 11   CREATININE 0.7 0.7 0.7   CALCIUM 9.9 9.8 9.9   PROT 7.5  --   --    ALBUMIN 3.9  --   --    BILITOT 0.4  --   --    ALKPHOS 155*  --   --    AST 20  --   --    ALT 28  --   --    ANIONGAP 11 9 11   EGFRNONAA >60.0 >60.0 >60.0     All pertinent labs within the past 24 hours have been reviewed.    Significant Imaging: I have reviewed and interpreted all pertinent imaging results/findings.    Assessment/Plan:     * Cervical myelopathy  Chayito Chung is a 69 y.o. female s/p PCF C3-T1 (DOS: 7/23/20)    Pain control: multimodal   PT/OT: WBAT BLE, progressive mobility   DVT PPx: no chemical ppx for now, SCDs at all times when not " ambulating  Abx: postop Ancef  Labs: stable  Drain: deep drain to gravity with 120cc  Grullon: none  D/c IVF  C collar in place   Encourage IS use, jose Rollins  Medicine comanagement consulted for assistance with medical management and tight BG control in periop period    Dispo: will discuss dcing drain with staff this AM. Continue to treat.          Clayton Pennington MD  Orthopedics  [facility]

## 2020-07-26 NOTE — PLAN OF CARE
Plan of care reviewed and discussed with pt. Pt verbalizes understanding. Pt AA&O x 4, VSS, and no s/s of distress. PIV x 1 saline locked. Hemovac in place draining bloody fluid. All safety precautions in place. No falls this shift.

## 2020-07-26 NOTE — PLAN OF CARE
07/26/20 1636   Post-Acute Status   Post-Acute Authorization Placement   Post-Acute Placement Status Referrals Sent       Per pt chart, rec SNF. Referrals sent via [facility] to [facility], [facility], [facility], and [facility].    SW contacted sister Adelita for preference. Stated choice as [facility] and [facility]. Advised that SW will be back in contact with accepting facilities.     (Pt will need PASSR and 142 completed).     Will con't to follow.     Pam Adams LMSW  Case Management Social Worker   [facility]

## 2020-07-26 NOTE — ASSESSMENT & PLAN NOTE
Pt on Lyrica 150 mg BID, Gabapentin 300 mg TID, and Amitriptyline 20 mg nightly    Plan:  - Continue home meds

## 2020-07-26 NOTE — SUBJECTIVE & OBJECTIVE
Principal Problem:Cervical myelopathy    Principal Orthopedic Problem: s/p PCF C3-T1    Interval History: Patient more alert yesterday, but upon coming off of CPAP in the room she was minimally responsive to verbal stimuli, responded to physical stimuli and was able to move all extremities well. Last dose of oxycodone was at 2225 last night. Sating 95% and On Cpap all night 2/2 to BONNIE resting comfortably. Chronically tachy since surgery now small decrease from 120s to 110s. BP stable. Sating per her baseline of 88-92% on 5L O2 yesterday and this AM. No tachycardia anymore. Pain is controlled. Pt did 4 lateral steps with PT yesterday.     Review of patient's allergies indicates:  No Known Allergies    Current Facility-Administered Medications   Medication    amitriptyline tablet 20 mg    dextrose 50% injection 12.5 g    dextrose 50% injection 25 g    docusate sodium capsule 50 mg    DULoxetine DR capsule 90 mg    famotidine tablet 20 mg    fluticasone propionate 50 mcg/actuation nasal spray 100 mcg    gabapentin capsule 300 mg    glucagon (human recombinant) injection 1 mg    glucose chewable tablet 16 g    glucose chewable tablet 24 g    hydroCHLOROthiazide tablet 25 mg    insulin aspart U-100 pen 1-10 Units    insulin detemir U-100 pen 17 Units    LORazepam tablet 0.5 mg    ondansetron injection 8 mg    oxyCODONE immediate release tablet 5 mg    potassium chloride SA CR tablet 20 mEq    pravastatin tablet 20 mg    promethazine (PHENERGAN) 6.25 mg in dextrose 5 % 50 mL IVPB    QUEtiapine tablet 300 mg    senna-docusate 8.6-50 mg per tablet 1 tablet    sodium chloride 0.9% flush 5 mL    verapamiL CR tablet 240 mg    zonisamide capsule 200 mg     Objective:     Vital Signs (Most Recent):  Temp: 97.4 °F (36.3 °C) (07/26/20 0228)  Pulse: 100 (07/26/20 0613)  Resp: 20 (07/26/20 0613)  BP: 105/69 (07/26/20 0613)  SpO2: (!) 93 % (07/26/20 0613) Vital Signs (24h Range):  Temp:  [96.3 °F (35.7  "°C)-97.8 °F (36.6 °C)] 97.4 °F (36.3 °C)  Pulse:  [] 100  Resp:  [16-20] 20  SpO2:  [91 %-95 %] 93 %  BP: (103-121)/(67-78) 105/69     Weight: 66.7 kg (147 lb)  Height: 5' 2" (157.5 cm)  Body mass index is 26.89 kg/m².      Intake/Output Summary (Last 24 hours) at 7/26/2020 0652  Last data filed at 7/26/2020 0500  Gross per 24 hour   Intake 600 ml   Output 140 ml   Net 460 ml       Ortho/SPM Exam     C collar in place  Posterior cervical dressing in place c/d/i  Drain with 140ccs of SS output to gravity   Neuro exam stable      Significant Labs:   CBC:   Recent Labs   Lab 07/24/20  1637 07/25/20  0519 07/26/20  0401   WBC 8.29 7.20 6.08   HGB 11.2* 11.1* 10.8*   HCT 38.0 38.5 35.8*    203 189     CMP:   Recent Labs   Lab 07/24/20  1637 07/25/20  0519 07/26/20  0401    135* 135*   K 3.4* 3.8 3.7    101 101   CO2 25 25 23   * 212* 217*   BUN 5* 7* 11   CREATININE 0.7 0.7 0.7   CALCIUM 9.9 9.8 9.9   PROT 7.5  --   --    ALBUMIN 3.9  --   --    BILITOT 0.4  --   --    ALKPHOS 155*  --   --    AST 20  --   --    ALT 28  --   --    ANIONGAP 11 9 11   EGFRNONAA >60.0 >60.0 >60.0     All pertinent labs within the past 24 hours have been reviewed.    Significant Imaging: I have reviewed and interpreted all pertinent imaging results/findings.  "

## 2020-07-26 NOTE — ASSESSMENT & PLAN NOTE
Pt on HCTZ 25 mg and Verapamil 240 mg. Hold post op. Can continue home meds   unable to assess, in bed Pt nonverbal Unable to assess, Pt nonverbal right UE bent and appeared fixed consistent with L MCA stroke

## 2020-07-27 ENCOUNTER — TELEPHONE (OUTPATIENT)
Dept: ORTHOPEDICS | Facility: CLINIC | Age: 70
End: 2020-07-27

## 2020-07-27 DIAGNOSIS — M50.30 DDD (DEGENERATIVE DISC DISEASE), CERVICAL: Primary | ICD-10-CM

## 2020-07-27 LAB
ANION GAP SERPL CALC-SCNC: 10 MMOL/L (ref 8–16)
BASOPHILS # BLD AUTO: 0.02 K/UL (ref 0–0.2)
BASOPHILS NFR BLD: 0.4 % (ref 0–1.9)
BUN SERPL-MCNC: 9 MG/DL (ref 8–23)
CALCIUM SERPL-MCNC: 9.8 MG/DL (ref 8.7–10.5)
CHLORIDE SERPL-SCNC: 99 MMOL/L (ref 95–110)
CO2 SERPL-SCNC: 25 MMOL/L (ref 23–29)
CREAT SERPL-MCNC: 0.7 MG/DL (ref 0.5–1.4)
DIFFERENTIAL METHOD: ABNORMAL
EOSINOPHIL # BLD AUTO: 0.1 K/UL (ref 0–0.5)
EOSINOPHIL NFR BLD: 1.3 % (ref 0–8)
ERYTHROCYTE [DISTWIDTH] IN BLOOD BY AUTOMATED COUNT: 25.4 % (ref 11.5–14.5)
EST. GFR  (AFRICAN AMERICAN): >60 ML/MIN/1.73 M^2
EST. GFR  (NON AFRICAN AMERICAN): >60 ML/MIN/1.73 M^2
GLUCOSE SERPL-MCNC: 207 MG/DL (ref 70–110)
HCT VFR BLD AUTO: 38.8 % (ref 37–48.5)
HGB BLD-MCNC: 11.6 G/DL (ref 12–16)
IMM GRANULOCYTES # BLD AUTO: 0.02 K/UL (ref 0–0.04)
IMM GRANULOCYTES NFR BLD AUTO: 0.4 % (ref 0–0.5)
LYMPHOCYTES # BLD AUTO: 1.1 K/UL (ref 1–4.8)
LYMPHOCYTES NFR BLD: 19.9 % (ref 18–48)
MCH RBC QN AUTO: 26.6 PG (ref 27–31)
MCHC RBC AUTO-ENTMCNC: 29.9 G/DL (ref 32–36)
MCV RBC AUTO: 89 FL (ref 82–98)
MONOCYTES # BLD AUTO: 0.6 K/UL (ref 0.3–1)
MONOCYTES NFR BLD: 10.4 % (ref 4–15)
NEUTROPHILS # BLD AUTO: 3.7 K/UL (ref 1.8–7.7)
NEUTROPHILS NFR BLD: 67.6 % (ref 38–73)
NRBC BLD-RTO: 0 /100 WBC
PLATELET # BLD AUTO: 226 K/UL (ref 150–350)
PMV BLD AUTO: 10.3 FL (ref 9.2–12.9)
POCT GLUCOSE: 210 MG/DL (ref 70–110)
POCT GLUCOSE: 222 MG/DL (ref 70–110)
POCT GLUCOSE: 247 MG/DL (ref 70–110)
POCT GLUCOSE: 257 MG/DL (ref 70–110)
POTASSIUM SERPL-SCNC: 3.5 MMOL/L (ref 3.5–5.1)
RBC # BLD AUTO: 4.36 M/UL (ref 4–5.4)
SODIUM SERPL-SCNC: 134 MMOL/L (ref 136–145)
WBC # BLD AUTO: 5.39 K/UL (ref 3.9–12.7)

## 2020-07-27 PROCEDURE — 25000003 PHARM REV CODE 250: Performed by: STUDENT IN AN ORGANIZED HEALTH CARE EDUCATION/TRAINING PROGRAM

## 2020-07-27 PROCEDURE — 94761 N-INVAS EAR/PLS OXIMETRY MLT: CPT

## 2020-07-27 PROCEDURE — 80048 BASIC METABOLIC PNL TOTAL CA: CPT

## 2020-07-27 PROCEDURE — 27000646 HC AEROBIKA DEVICE

## 2020-07-27 PROCEDURE — 85025 COMPLETE CBC W/AUTO DIFF WBC: CPT

## 2020-07-27 PROCEDURE — 97530 THERAPEUTIC ACTIVITIES: CPT | Mod: CQ

## 2020-07-27 PROCEDURE — 36415 COLL VENOUS BLD VENIPUNCTURE: CPT

## 2020-07-27 PROCEDURE — 25000242 PHARM REV CODE 250 ALT 637 W/ HCPCS: Performed by: STUDENT IN AN ORGANIZED HEALTH CARE EDUCATION/TRAINING PROGRAM

## 2020-07-27 PROCEDURE — 27000221 HC OXYGEN, UP TO 24 HOURS

## 2020-07-27 PROCEDURE — 97110 THERAPEUTIC EXERCISES: CPT | Mod: CQ

## 2020-07-27 PROCEDURE — 99232 PR SUBSEQUENT HOSPITAL CARE,LEVL II: ICD-10-PCS | Mod: ,,, | Performed by: INTERNAL MEDICINE

## 2020-07-27 PROCEDURE — 63600175 PHARM REV CODE 636 W HCPCS: Performed by: STUDENT IN AN ORGANIZED HEALTH CARE EDUCATION/TRAINING PROGRAM

## 2020-07-27 PROCEDURE — 25000003 PHARM REV CODE 250: Performed by: PHYSICIAN ASSISTANT

## 2020-07-27 PROCEDURE — 30200315 PPD INTRADERMAL TEST REV CODE 302: Performed by: ORTHOPAEDIC SURGERY

## 2020-07-27 PROCEDURE — 99232 SBSQ HOSP IP/OBS MODERATE 35: CPT | Mod: ,,, | Performed by: INTERNAL MEDICINE

## 2020-07-27 PROCEDURE — 94660 CPAP INITIATION&MGMT: CPT

## 2020-07-27 PROCEDURE — 86580 TB INTRADERMAL TEST: CPT | Performed by: ORTHOPAEDIC SURGERY

## 2020-07-27 PROCEDURE — 94664 DEMO&/EVAL PT USE INHALER: CPT

## 2020-07-27 PROCEDURE — 11000001 HC ACUTE MED/SURG PRIVATE ROOM

## 2020-07-27 PROCEDURE — 99900035 HC TECH TIME PER 15 MIN (STAT)

## 2020-07-27 RX ORDER — OXYCODONE HYDROCHLORIDE 5 MG/1
5 TABLET ORAL ONCE
Status: COMPLETED | OUTPATIENT
Start: 2020-07-27 | End: 2020-07-27

## 2020-07-27 RX ORDER — OXYCODONE HYDROCHLORIDE 5 MG/1
5 TABLET ORAL EVERY 4 HOURS PRN
Qty: 30 TABLET | Refills: 0 | Status: SHIPPED | OUTPATIENT
Start: 2020-07-27 | End: 2020-07-28 | Stop reason: SDUPTHER

## 2020-07-27 RX ORDER — HYDROMORPHONE HYDROCHLORIDE 1 MG/ML
0.5 INJECTION, SOLUTION INTRAMUSCULAR; INTRAVENOUS; SUBCUTANEOUS ONCE
Status: COMPLETED | OUTPATIENT
Start: 2020-07-27 | End: 2020-07-27

## 2020-07-27 RX ADMIN — FLUTICASONE PROPIONATE 100 MCG: 50 SPRAY, METERED NASAL at 08:07

## 2020-07-27 RX ADMIN — OXYCODONE HYDROCHLORIDE 5 MG: 10 TABLET ORAL at 05:07

## 2020-07-27 RX ADMIN — HYDROMORPHONE HYDROCHLORIDE 0.5 MG: 1 INJECTION, SOLUTION INTRAMUSCULAR; INTRAVENOUS; SUBCUTANEOUS at 02:07

## 2020-07-27 RX ADMIN — ACETAMINOPHEN 1000 MG: 500 TABLET ORAL at 05:07

## 2020-07-27 RX ADMIN — ZONISAMIDE 200 MG: 100 CAPSULE ORAL at 09:07

## 2020-07-27 RX ADMIN — OXYCODONE HYDROCHLORIDE 5 MG: 10 TABLET ORAL at 12:07

## 2020-07-27 RX ADMIN — DULOXETINE HYDROCHLORIDE 90 MG: 60 CAPSULE, DELAYED RELEASE ORAL at 08:07

## 2020-07-27 RX ADMIN — ACETAMINOPHEN 1000 MG: 500 TABLET ORAL at 09:07

## 2020-07-27 RX ADMIN — DOCUSATE SODIUM 50 MG AND SENNOSIDES 8.6 MG 1 TABLET: 8.6; 5 TABLET, FILM COATED ORAL at 08:07

## 2020-07-27 RX ADMIN — ZONISAMIDE 200 MG: 100 CAPSULE ORAL at 08:07

## 2020-07-27 RX ADMIN — POTASSIUM CHLORIDE 20 MEQ: 20 TABLET, EXTENDED RELEASE ORAL at 08:07

## 2020-07-27 RX ADMIN — DOCUSATE SODIUM 50 MG: 50 CAPSULE, LIQUID FILLED ORAL at 08:07

## 2020-07-27 RX ADMIN — OXYCODONE HYDROCHLORIDE 5 MG: 10 TABLET ORAL at 11:07

## 2020-07-27 RX ADMIN — INSULIN ASPART 6 UNITS: 100 INJECTION, SOLUTION INTRAVENOUS; SUBCUTANEOUS at 05:07

## 2020-07-27 RX ADMIN — ACETAMINOPHEN 1000 MG: 500 TABLET ORAL at 03:07

## 2020-07-27 RX ADMIN — TUBERCULIN PURIFIED PROTEIN DERIVATIVE 5 UNITS: 5 INJECTION, SOLUTION INTRADERMAL at 03:07

## 2020-07-27 RX ADMIN — INSULIN ASPART 4 UNITS: 100 INJECTION, SOLUTION INTRAVENOUS; SUBCUTANEOUS at 12:07

## 2020-07-27 RX ADMIN — HYDROCHLOROTHIAZIDE 25 MG: 25 TABLET ORAL at 08:07

## 2020-07-27 RX ADMIN — INSULIN ASPART 2 UNITS: 100 INJECTION, SOLUTION INTRAVENOUS; SUBCUTANEOUS at 08:07

## 2020-07-27 RX ADMIN — PRAVASTATIN SODIUM 20 MG: 20 TABLET ORAL at 08:07

## 2020-07-27 RX ADMIN — AMITRIPTYLINE HYDROCHLORIDE 20 MG: 10 TABLET, FILM COATED ORAL at 08:07

## 2020-07-27 RX ADMIN — VERAPAMIL HYDROCHLORIDE 240 MG: 120 TABLET, FILM COATED, EXTENDED RELEASE ORAL at 08:07

## 2020-07-27 RX ADMIN — OXYCODONE HYDROCHLORIDE 5 MG: 5 TABLET ORAL at 07:07

## 2020-07-27 RX ADMIN — OXYCODONE HYDROCHLORIDE 5 MG: 10 TABLET ORAL at 08:07

## 2020-07-27 RX ADMIN — OXYCODONE HYDROCHLORIDE 5 MG: 10 TABLET ORAL at 01:07

## 2020-07-27 RX ADMIN — INSULIN ASPART 4 UNITS: 100 INJECTION, SOLUTION INTRAVENOUS; SUBCUTANEOUS at 08:07

## 2020-07-27 NOTE — ASSESSMENT & PLAN NOTE
Last   HbA1c on 7/20/2020 is 7.1 Pt on Humalog Insulin 30 Units BID and Insulin Aspart SS.     Plan:  -- Increase insulin detemir from 17U BID to 20U BID and monitor POC Glucose  -- SSI   -- Monitor for signs of hypoglycemia or hyperglycemia

## 2020-07-27 NOTE — SUBJECTIVE & OBJECTIVE
Interval History: NAEON. Pt was sitting comfortably in bed eating breakfast.     Review of Systems   Constitutional: Negative for activity change, appetite change, chills, diaphoresis, fatigue and fever.   HENT: Negative.    Respiratory: Negative for apnea, cough, choking, chest tightness, shortness of breath, wheezing and stridor.    Cardiovascular: Negative for chest pain, palpitations and leg swelling.   Gastrointestinal: Negative for abdominal distention, abdominal pain (umbilical ), anal bleeding, blood in stool, constipation, diarrhea, nausea and vomiting.   Genitourinary: Negative.    Musculoskeletal: Positive for neck pain. Negative for arthralgias, back pain and joint swelling.   Neurological: Negative.    Psychiatric/Behavioral: Negative.      Objective:     Vital Signs (Most Recent):  Temp: 97.7 °F (36.5 °C) (07/27/20 0737)  Pulse: (!) 112 (07/27/20 0828)  Resp: 18 (07/27/20 0737)  BP: 114/77 (07/27/20 0737)  SpO2: 96 % (07/27/20 0737) Vital Signs (24h Range):  Temp:  [96.8 °F (36 °C)-98.1 °F (36.7 °C)] 97.7 °F (36.5 °C)  Pulse:  [101-119] 112  Resp:  [12-20] 18  SpO2:  [91 %-98 %] 96 %  BP: (114-137)/(77-91) 114/77     Weight: 66.7 kg (147 lb)  Body mass index is 26.89 kg/m².    Intake/Output Summary (Last 24 hours) at 7/27/2020 0846  Last data filed at 7/26/2020 2311  Gross per 24 hour   Intake 800 ml   Output 675 ml   Net 125 ml      Physical Exam    Significant Labs:   BMP:   Recent Labs   Lab 07/27/20  0359   *   *   K 3.5   CL 99   CO2 25   BUN 9   CREATININE 0.7   CALCIUM 9.8     CBC:   Recent Labs   Lab 07/26/20  0401 07/27/20  0359   WBC 6.08 5.39   HGB 10.8* 11.6*   HCT 35.8* 38.8    226     POCT Glucose:   Recent Labs   Lab 07/26/20  1707 07/26/20  2301 07/27/20  0732   POCTGLUCOSE 262* 247* 210*       Significant Imaging: None

## 2020-07-27 NOTE — PROGRESS NOTES
Patient complained multiple times throughout the night that her pain is not being controlled with PRN Oxycodone Q6H. Paged Dr. Pennington two times regarding this issue. One times doses on dilaudid IVP ordered after each call (0.2 & 0.5 mg).

## 2020-07-27 NOTE — PROGRESS NOTES
[facility] Hospital Medicine  Progress Note    Patient Name: Chayito Chung  MRN: [id number]  Patient Class: IP- Inpatient   Admission Date: 7/23/2020  Length of Stay: 4 days  Attending Physician: Herson Tate MD  Primary Care Provider: Abhi Valadez MD    [facility] Medicine Team: Networked reference to record PCT  Trae Sunshine MD    Subjective:     Principal Problem:Cervical myelopathy        HPI:  68 yo F w/ PMHx of IDDM, HTN, CAD, COPD, HLD, migraines, anxiety, and recurrent episodes of depression is here for C3-T1 spinal fusion. Medicine is consulted to manage her medical comorbidities, specifically DM.    Previously seen in the hospital after a fall.  She says she woke up 5/25/2020 and couldn't stand.  She says her leg just kept giving out on her.  She denies any pain, trauma or accidents.  She fell 5 times prior to coming to the ED.  She was seen in the hospital where imaging of the cervical spine showed cervical stenosis with equivocal myelomalacia. She reports difficulty with balance over the last 8-9 months. She says her family comented on her walk being different but she didn't notice a difference from baseline. She also reports difficulty with carrying small objects and clumsiness with her hands. She was given a Rolator and a knee brace in the hospital.  She says she has fallen one time since being discharged from the hospital. Patient denies myelopathic symptoms such as handwriting changes. She reports difficulty with buttons/coins/keys. Denies perineal paresthesias, bowel/bladder dysfunction.       Overview/Hospital Course:  7/24- Pt remains stable overnight. Complains of episodic SOB w/o CP, palpitations, or other symptoms. Pt was not placed on home CPAP overnight.    7/25- Pt remains stable and endorses improved breathing on her CPAP. Pain is controlled well and ortho is managing.     7/26 - Pt on 5L NC and did not eat breakfast. Participates minimally with interview and  exam.     7/27- Pt remains on 5L NC. Pt participated in exam and ROS today. She endorses eating her whole meals.     Interval History: NAEON. Pt was sitting comfortably in bed eating breakfast.     Review of Systems   Constitutional: Negative for activity change, appetite change, chills, diaphoresis, fatigue and fever.   HENT: Negative.    Respiratory: Negative for apnea, cough, choking, chest tightness, shortness of breath, wheezing and stridor.    Cardiovascular: Negative for chest pain, palpitations and leg swelling.   Gastrointestinal: Negative for abdominal distention, abdominal pain (umbilical ), anal bleeding, blood in stool, constipation, diarrhea, nausea and vomiting.   Genitourinary: Negative.    Musculoskeletal: Positive for neck pain. Negative for arthralgias, back pain and joint swelling.   Neurological: Negative.    Psychiatric/Behavioral: Negative.      Objective:     Vital Signs (Most Recent):  Temp: 97.7 °F (36.5 °C) (07/27/20 0737)  Pulse: (!) 112 (07/27/20 0828)  Resp: 18 (07/27/20 0737)  BP: 114/77 (07/27/20 0737)  SpO2: 96 % (07/27/20 0737) Vital Signs (24h Range):  Temp:  [96.8 °F (36 °C)-98.1 °F (36.7 °C)] 97.7 °F (36.5 °C)  Pulse:  [101-119] 112  Resp:  [12-20] 18  SpO2:  [91 %-98 %] 96 %  BP: (114-137)/(77-91) 114/77     Weight: 66.7 kg (147 lb)  Body mass index is 26.89 kg/m².    Intake/Output Summary (Last 24 hours) at 7/27/2020 0846  Last data filed at 7/26/2020 2311  Gross per 24 hour   Intake 800 ml   Output 675 ml   Net 125 ml      Physical Exam    Significant Labs:   BMP:   Recent Labs   Lab 07/27/20  0359   *   *   K 3.5   CL 99   CO2 25   BUN 9   CREATININE 0.7   CALCIUM 9.8     CBC:   Recent Labs   Lab 07/26/20  0401 07/27/20  0359   WBC 6.08 5.39   HGB 10.8* 11.6*   HCT 35.8* 38.8    226     POCT Glucose:   Recent Labs   Lab 07/26/20  1707 07/26/20  2301 07/27/20  0732   POCTGLUCOSE 262* 247* 210*       Significant Imaging: None      Assessment/Plan:      *  Cervical myelopathy        CAD (coronary artery disease)  Pt on ASA 81 mg and om nitroglycerine 0.4 mg PRN    Plan:  -- ASA   --Nitroglycerine PRN         Hypertension  Pt on HCTZ 25 mg and Verapamil 240 mg. Hold post op. Can continue home meds    Type 2 diabetes mellitus, with long-term current use of insulin  Last   HbA1c on 7/20/2020 is 7.1 Pt on Humalog Insulin 30 Units BID and Insulin Aspart SS.     Plan:  -- Increase insulin detemir from 17U BID to 20U BID and monitor POC Glucose  -- SSI   -- Monitor for signs of hypoglycemia or hyperglycemia   --Will sign off at this point. Please consult back is services needed again    Anxiety  Pt on Ativan on.5mg BID    Plan:  --Ativan PRN       Seizure disorder  Pt on Zonisamide 200 mg at home     Plan:   -Continue home med       Iron deficiency anemia  Pt on Ferrous Sulfate 325 mg     Plan:  -Continue home meds      Hyperlipidemia associated with type 2 diabetes mellitus  Pt on Pravastatin 20 mg at home    Plan:  -Continue home med       Migraines        Neuropathy  Pt on Lyrica 150 mg BID, Gabapentin 300 mg TID, and Amitriptyline 20 mg nightly    Plan:  - Continue home meds    Major depressive disorder in partial remission  Pt on Duloxetine 90 mg and Quetiapine 300 mg noche    Plan:   - Can continue home meds        COPD (chronic obstructive pulmonary disease)  Pt was on home Oxygen at night in the past but she was taken in off it and placed on CPAP at night instead. She uses her Duonebs 1-2 times per day. Pt tolerating CPAP    Plan:  --Continue CPAP at night  --Duonebs PRN   --Maintain Saturation of 88-92%        VTE Risk Mitigation (From admission, onward)         Ordered     IP VTE HIGH RISK PATIENT  Once      07/23/20 1406     Place sequential compression device  Until discontinued      07/23/20 0957                Thank you for involving us in the pt's care. Will be signing off at this point. Please reach out if need arises.       Trae Sunshine MD  Department of  Hospital Medicine [facility]

## 2020-07-27 NOTE — PLAN OF CARE
NEW completed LOCET and faxed PASRR to state. Waiting on 142.    2:09 PM  Patient has been accepted to [facility]. NEW sent additional clinical documents via RAIMUNDO.    Jeri Alcala LMSW  Case Management   [facility]   Ext. [phone]

## 2020-07-27 NOTE — ASSESSMENT & PLAN NOTE
Chayito Chung is a 69 y.o. female s/p PCF C3-T1 (DOS: 7/23/20)    Pain control: multimodal   PT/OT: WBAT BLE, progressive mobility   DVT PPx: no chemical ppx for now, SCDs at all times when not ambulating  Abx: postop Ancef complete  Labs: stable  Drain: deep drain to gravity pulled this am, pending post drain XR  Grullon: in place  C collar in place   Encourage IS use, Duonemoreno, achapella  Medicine comanagement consulted for assistance with medical management and tight BG control in periop period    Dispo: wean supplemental oxygen, pending SNF placement

## 2020-07-27 NOTE — DISCHARGE INSTRUCTIONS
DR. DAVIE MONZON  POSTOPERATIVE POSTERIOR CERVICAL  LAMINECTOMY AND FUSION INSTRUCTIONS  Antibiotics: You do not need additional antibiotics at home.  NSAIDs: Please refrain from taking ibuprofen (Advil), naproxen (Aleve), and other non  steroidal anti-inflammatory medications as they may inhibit bone healing in the  postoperative period.  Wound Care: You may remove your dressing and shower 7 days after surgery. Until  then please keep your wound clean and dry. Sponge baths are acceptable. Do not go in  a pool or hot tub until seen in clinic. Please leave the small steri-strips covering your  wound in place until they fall off naturally (2 weeks). You may notice clear suture ends  hanging from the sides of your incense after the steri-strips are removed, it is ok to clip  these with scissors.  Cervical Collar: If given a collar after surgery you should wear it at all times. The only  times it may be removed are for sleeping, eating and showering.  Pain: You will be given a prescription for pain medicines before discharge. If your pain  is not adequately controlled with these medications, please call [phone].  Difficulty Breathing: This is uncommon after surgery and may represent dangerous  swelling in your neck. If you experience difficulty breathing please call [phone] immediately.  Infection: Signs of infection include increasing wound drainage and redness around the  wound, as well as a temperature over 101.5 degrees. It is unnecessary to take your  temperature on a routine basis. Please call the above number if you are concerned  about an infection.  Driving and Work: It is ok to return to driving and work as long as you are not taking  narcotic pain medications or wearing your cervical collar. Please do not lift over 5  pounds or participate in exercise or sports until cleared by Dr. Tate.  Deep Venous Thrombosis (Blood Clots): Symptoms include swelling in the legs and  shortness of breath. Please  call the office or proceed to the nearest emergency room if  you have any of these symptoms.  Physical Therapy: The best physical therapy after surgery is walking. Please try to  walk as much as possible.  Follow-up: You will be scheduled for a follow-up appointment in 2-3 weeks.  Questions: During business hours please call [phone] for routine questions. For  after hours questions please call [phone] and ask to speak with the orthopaedic  resident on call.

## 2020-07-27 NOTE — PLAN OF CARE
Problem: Physical Therapy Goal  Goal: Physical Therapy Goal  Description: Goals to be met by: 8/15/2020     Patient will increase functional independence with mobility by performin. Supine to sit with Contact Guard Assistance  2. Sit to supine with Contact Guard Assistance  3. Sit to stand transfer with Contact Guard Assistance  4. Bed to chair transfer with Contact Guard Assistance using Rolling Walker  5. Gait  x 50 feet with Minimal Assistance using Rolling Walker.   6. Ascend/descend 4 stair with bilateral Handrails Minimal Assistance   Outcome: Ongoing, Progressing.  Patient's progress is limited due to decreased endurance and pain.

## 2020-07-27 NOTE — PLAN OF CARE
[facility]    FACILITY TRANSFER ORDERS      Patient Name: Chayito Chung  YOB: 1950    PCP: Abhi Valadez MD   PCP Address: [address] / [place] [address]  PCP Phone Number: [phone]  PCP Fax: [phone]    Encounter Date: 07/29/2020    Admit to: SNF    Vital Signs:  Routine    Diagnoses:   Active Hospital Problems    Diagnosis  POA    *Cervical myelopathy [G95.9]  Yes    Hypertension [I10]  Yes    Anxiety [F41.9]  Yes    CAD (coronary artery disease) [I25.10]  Yes     Chronic    Seizure disorder [G40.909]  Yes    Type 2 diabetes mellitus, with long-term current use of insulin [E11.9, Z79.4]  Not Applicable    Iron deficiency anemia [D50.9]  Yes    Hyperlipidemia associated with type 2 diabetes mellitus [E11.69, E78.5]  Yes    Migraines [G43.909]  Yes    Major depressive disorder in partial remission [F32.4]  Yes    Neuropathy [G62.9]  Yes    COPD (chronic obstructive pulmonary disease) [J44.9]  Yes      Resolved Hospital Problems    Diagnosis Date Resolved POA    Uncontrolled type 2 diabetes mellitus with hyperglycemia, with long-term current use of insulin [E11.65, Z79.4] 07/23/2020 Not Applicable       Allergies:Review of patient's allergies indicates:  No Known Allergies    Diet: diabetic diet: 2000 calorie    Activities: Activity as tolerated    Nursing:    Cervical Collar: you should wear it at all times. The only  times it may be removed are for sleeping, eating and showering.      CONSULTS:    Physical Therapy to evaluate and treat. , Occupational Therapy to evaluate and treat. and  to evaluate for community resources/long-range planning.    MISCELLANEOUS CARE:  Diabetes Care:   SN to perform and educate Diabetic management with blood glucose monitoring:, Fingerstick blood sugar every 6 hours if patient is unable to eat and Report CBG < 60 or > 350 to physician.     Sliding Scale insulin: Blood glucose taken before meals and  nightly PRN  Insulin Novolog instructions for Sliding scale  **MODERATE CORRECTION DOSE**   Blood Glucose   mg/dL                  Pre-meal                Bedtime   151-200                2 units                    1 unit   201-250                4 units                    2 units    251-300                6 units                    3 units    301-350                8 units                    4 units    >350                     10 units                  5 units   Administer subcutaneously if needed at times designated by monitoring schedule.          WOUND CARE ORDERS  Wound Care: You may remove your dressing and shower 7 days after surgery. Until  then please keep your wound clean and dry. Sponge baths are acceptable. Do not go in  a pool or hot tub until seen in clinic. Please leave the small steri-strips covering your  wound in place until they fall off naturally (2 weeks). You may notice clear suture ends  hanging from the sides of your incense after the steri-strips are removed, it is ok to clip  these with scissors.      Medications: Review discharge medications with patient and family and provide education.      NSAIDs: Please refrain from taking ibuprofen (Advil), naproxen (Aleve), and other non steroidal anti-inflammatory medications as they may inhibit bone healing in thepostoperative period.    Current Discharge Medication List      START taking these medications    Details   acetaminophen (TYLENOL) 500 MG tablet Take 1 tablet (500 mg total) by mouth every 12 (twelve) hours. for 7 days  Qty: 14 tablet, Refills: 0      !! albuterol-ipratropium (DUO-NEB) 2.5 mg-0.5 mg/3 mL nebulizer solution Take 3 mLs by nebulization every 4 (four) hours as needed for Wheezing or Shortness of Breath. Rescue  Qty: 1 Box, Refills: 0      !! oxyCODONE (ROXICODONE) 5 MG immediate release tablet Take 1 tablet (5 mg total) by mouth every 4 (four) hours as needed for Pain.  Qty: 30 tablet, Refills: 0    Comments: Quantity  prescribed more than 7 day supply? Yes, quantity medically necessary      !! oxyCODONE (ROXICODONE) 5 MG immediate release tablet Take 1 tablet (5 mg total) by mouth every 4 (four) hours as needed for Pain.  Qty: 45 tablet, Refills: 0    Comments: Quantity prescribed more than 7 day supply? Yes, quantity medically necessary      !! pregabalin (LYRICA) 150 MG capsule Take 1 capsule (150 mg total) by mouth 2 (two) times daily.  Qty: 60 capsule, Refills: 0       !! - Potential duplicate medications found. Please discuss with provider.      CONTINUE these medications which have NOT CHANGED    Details   albuterol 90 mcg/actuation inhaler Inhale 2 puffs into the lungs every 6 (six) hours as needed for Wheezing.      !! albuterol-ipratropium 2.5mg-0.5mg/3mL (DUO-NEB) 0.5 mg-3 mg(2.5 mg base)/3 mL nebulizer solution Refills: 5      amitriptyline (ELAVIL) 10 MG tablet 20 mg every evening.       aspirin (ECOTRIN) 81 MG EC tablet Take 81 mg by mouth once daily.      duloxetine (CYMBALTA) 60 MG capsule Take 90 mg by mouth once daily.       ferrous sulfate (FEOSOL) 325 mg (65 mg iron) Tab tablet Take 1 tablet (325 mg total) by mouth once daily.  Qty: 30 tablet, Refills: 3      fluticasone propionate (FLONASE) 50 mcg/actuation nasal spray 2 sprays (100 mcg total) by Each Nare route once daily.  Qty: 1 Bottle, Refills: 12      gabapentin (NEURONTIN) 300 MG capsule Take 300 mg by mouth 3 (three) times daily.      HUMALOG MIX 75-25,U-100,INSULN 100 unit/mL (75-25) Susp 30 Units 2 (two) times daily.      hydroCHLOROthiazide (HYDRODIURIL) 25 MG tablet TAKE ONE TABLET BY MOUTH DAILY  Qty: 90 tablet, Refills: 1    Associated Diagnoses: Hypertension associated with diabetes      !! LYRICA 150 mg capsule Take by mouth 2 (two) times daily.   Refills: 1      omeprazole (PRILOSEC) 40 MG capsule Take 1 capsule (40 mg total) by mouth every morning.  Qty: 30 capsule, Refills: 11      ondansetron (ZOFRAN-ODT) 4 MG TbDL Take 1 tablet (4 mg total)  "by mouth every 8 (eight) hours as needed.  Qty: 14 tablet, Refills: 1    Associated Diagnoses: Other migraine without status migrainosus, not intractable      potassium chloride SA (K-DUR,KLOR-CON) 20 MEQ tablet TAKE ONE TABLET BY MOUTH DAILY  Qty: 90 tablet, Refills: 1    Associated Diagnoses: Hypertension associated with diabetes      pravastatin (PRAVACHOL) 20 MG tablet Take 20 mg by mouth once daily.       quetiapine (SEROQUEL) 300 MG Tab 300 mg nightly.       tiZANidine (ZANAFLEX) 4 MG tablet Take 1 tablet (4 mg total) by mouth daily as needed.  Qty: 30 tablet, Refills: 0      verapamiL (CALAN-SR) 240 MG CR tablet TAKE 1 TABLET BY MOUTH ONCE DAILY  Qty: 90 tablet, Refills: 1      zonisamide (ZONEGRAN) 100 MG Cap zonisamide 100 mg capsule   Take 2 capsules twice a day by oral route.      BD ULTRA-FINE MINI PEN NEEDLE 31 gauge x 3/16" Ndle       cholestyramine, with sugar, 4 gram Powd Take 4 g by mouth 2 (two) times daily as needed (diarrhea).  Qty: 378 g, Refills: 0    Associated Diagnoses: Diarrhea due to malabsorption      insulin aspart U-100 (NOVOLOG FLEXPEN U-100 INSULIN) 100 unit/mL (3 mL) InPn pen Inject into the skin. Taken as SS      nitroGLYCERIN (NITROSTAT) 0.4 MG SL tablet Place 0.4 mg under the tongue every 5 (five) minutes as needed for Chest pain.       !! - Potential duplicate medications found. Please discuss with provider.      STOP taking these medications       HYDROcodone-acetaminophen (NORCO)  mg per tablet Comments:   Reason for Stopping:         LORazepam (ATIVAN) 0.5 MG tablet Comments:   Reason for Stopping:         ammonium lactate 12 % Crea Comments:   Reason for Stopping:         lidocaine (LIDODERM) 5 % Comments:   Reason for Stopping:                    _________________________________  Mai Wu MD  07/27/2020        "

## 2020-07-27 NOTE — SUBJECTIVE & OBJECTIVE
"Principal Problem:Cervical myelopathy    Principal Orthopedic Problem: s/p PCF C3-T1    Interval History: NAEON. Pt doing well this am. Pain control improving. CPAP overnight. 95-98% with 5L NC. Drain with 50cc over 24h.     Review of patient's allergies indicates:  No Known Allergies    Current Facility-Administered Medications   Medication    acetaminophen tablet 1,000 mg    amitriptyline tablet 20 mg    dextrose 50% injection 12.5 g    dextrose 50% injection 25 g    docusate sodium capsule 50 mg    DULoxetine DR capsule 90 mg    fluticasone propionate 50 mcg/actuation nasal spray 100 mcg    glucagon (human recombinant) injection 1 mg    glucose chewable tablet 16 g    glucose chewable tablet 24 g    hydroCHLOROthiazide tablet 25 mg    insulin aspart U-100 pen 1-10 Units    insulin detemir U-100 pen 17 Units    ondansetron injection 8 mg    oxyCODONE immediate release tablet 5 mg    potassium chloride SA CR tablet 20 mEq    pravastatin tablet 20 mg    senna-docusate 8.6-50 mg per tablet 1 tablet    sodium chloride 0.9% flush 5 mL    verapamiL CR tablet 240 mg    zonisamide capsule 200 mg     Objective:     Vital Signs (Most Recent):  Temp: 97.2 °F (36.2 °C) (07/27/20 0530)  Pulse: (!) 113 (07/27/20 0530)  Resp: 12 (07/27/20 0530)  BP: 131/79 (07/27/20 0530)  SpO2: 95 % (07/27/20 0530) Vital Signs (24h Range):  Temp:  [96.8 °F (36 °C)-98.1 °F (36.7 °C)] 97.2 °F (36.2 °C)  Pulse:  [] 113  Resp:  [12-20] 12  SpO2:  [91 %-98 %] 95 %  BP: (119-137)/(79-91) 131/79     Weight: 66.7 kg (147 lb)  Height: 5' 2" (157.5 cm)  Body mass index is 26.89 kg/m².      Intake/Output Summary (Last 24 hours) at 7/27/2020 0629  Last data filed at 7/26/2020 2311  Gross per 24 hour   Intake 800 ml   Output 2075 ml   Net -1275 ml       Ortho/SPM Exam     C collar in place  Posterior cervical dressing in place c/d/i  Drain with 50ccs of SS output to gravity   Neuro exam stable       Significant Labs:   CBC: "   Recent Labs   Lab 07/26/20  0401   WBC 6.08   HGB 10.8*   HCT 35.8*        CMP:   Recent Labs   Lab 07/26/20  0401   *   K 3.7      CO2 23   *   BUN 11   CREATININE 0.7   CALCIUM 9.9   ANIONGAP 11   EGFRNONAA >60.0     All pertinent labs within the past 24 hours have been reviewed.    Significant Imaging: I have reviewed and interpreted all pertinent imaging results/findings.

## 2020-07-27 NOTE — PROGRESS NOTES
[facility]  Orthopedics  Progress Note    Patient Name: Chayito Chung  MRN: [id number]  Admission Date: 7/23/2020  Hospital Length of Stay: 4 days  Attending Provider: Herson Tate MD  Primary Care Provider: Abhi Valadez MD  Follow-up For: Procedure(s) (LRB):  LAMINECTOMY, SPINE, CERVICAL, WITH POSTERIOR FUSION C3-T1  (N/A)    Post-Operative Day: 4 Days Post-Op  Subjective:     Principal Problem:Cervical myelopathy    Principal Orthopedic Problem: s/p PCF C3-T1    Interval History: NAEON. Pt doing well this am. Pain control improving. CPAP overnight. 95-98% with 5L NC. Drain with 50cc over 24h.     Review of patient's allergies indicates:  No Known Allergies    Current Facility-Administered Medications   Medication    acetaminophen tablet 1,000 mg    amitriptyline tablet 20 mg    dextrose 50% injection 12.5 g    dextrose 50% injection 25 g    docusate sodium capsule 50 mg    DULoxetine DR capsule 90 mg    fluticasone propionate 50 mcg/actuation nasal spray 100 mcg    glucagon (human recombinant) injection 1 mg    glucose chewable tablet 16 g    glucose chewable tablet 24 g    hydroCHLOROthiazide tablet 25 mg    insulin aspart U-100 pen 1-10 Units    insulin detemir U-100 pen 17 Units    ondansetron injection 8 mg    oxyCODONE immediate release tablet 5 mg    potassium chloride SA CR tablet 20 mEq    pravastatin tablet 20 mg    senna-docusate 8.6-50 mg per tablet 1 tablet    sodium chloride 0.9% flush 5 mL    verapamiL CR tablet 240 mg    zonisamide capsule 200 mg     Objective:     Vital Signs (Most Recent):  Temp: 97.2 °F (36.2 °C) (07/27/20 0530)  Pulse: (!) 113 (07/27/20 0530)  Resp: 12 (07/27/20 0530)  BP: 131/79 (07/27/20 0530)  SpO2: 95 % (07/27/20 0530) Vital Signs (24h Range):  Temp:  [96.8 °F (36 °C)-98.1 °F (36.7 °C)] 97.2 °F (36.2 °C)  Pulse:  [] 113  Resp:  [12-20] 12  SpO2:  [91 %-98 %] 95 %  BP: (119-137)/(79-91) 131/79     Weight: 66.7 kg (147  "lb)  Height: 5' 2" (157.5 cm)  Body mass index is 26.89 kg/m².      Intake/Output Summary (Last 24 hours) at 7/27/2020 0629  Last data filed at 7/26/2020 2311  Gross per 24 hour   Intake 800 ml   Output 2075 ml   Net -1275 ml       Ortho/SPM Exam     C collar in place  Posterior cervical dressing in place c/d/i  Drain with 50ccs of SS output to gravity   Neuro exam stable       Significant Labs:   CBC:   Recent Labs   Lab 07/26/20  0401   WBC 6.08   HGB 10.8*   HCT 35.8*        CMP:   Recent Labs   Lab 07/26/20  0401   *   K 3.7      CO2 23   *   BUN 11   CREATININE 0.7   CALCIUM 9.9   ANIONGAP 11   EGFRNONAA >60.0     All pertinent labs within the past 24 hours have been reviewed.    Significant Imaging: I have reviewed and interpreted all pertinent imaging results/findings.    Assessment/Plan:     * Cervical myelopathy  Chayito Chung is a 69 y.o. female s/p PCF C3-T1 (DOS: 7/23/20)    Pain control: multimodal   PT/OT: WBAT BLE, progressive mobility   DVT PPx: no chemical ppx for now, SCDs at all times when not ambulating  Abx: postop Ancef complete  Labs: stable  Drain: deep drain to gravity pulled this am, pending post drain XR  [place]: in place  C collar in place   Encourage IS use, Ria achapella  Medicine comanagement consulted for assistance with medical management and tight BG control in periop period    Dispo: wean supplemental oxygen, pending SNF placement          Mai Wu MD  Orthopedics  [facility]  "

## 2020-07-27 NOTE — PLAN OF CARE
07/27/20 1454   Post-Acute Status   Post-Acute Authorization Placement   Post-Acute Placement Status Awaiting Orders for SNF     SW awaiting SNF orders. Pt has been [facility] to [facility] pending Authorization. SW will continue to follow.    3:37 PM   Patient SNF orders has been received. SW faxed referral to [facility] for Authorization request.     4:17 PM  Placement, pending pt's authorization with PHN.    Jeri Alcala LMSW  Case Management   [facility]   Ext. [phone]

## 2020-07-27 NOTE — PT/OT/SLP PROGRESS
Physical Therapy Treatment    Patient Name:  Chayito Chung   MRN:  [id number]    Recommendations:     Discharge Recommendations:  nursing facility, skilled   Discharge Equipment Recommendations: bedside commode, bath bench   Barriers to discharge: Inaccessible home and Decreased caregiver support    Assessment:     Chayito Chung is a 69 y.o. female admitted with a medical diagnosis of Cervical myelopathy.  She presents with the following impairments/functional limitations:  weakness, impaired endurance, impaired self care skills, impaired functional mobilty, gait instability, impaired balance, impaired cognition, decreased coordination, decreased upper extremity function, decreased lower extremity function, decreased safety awareness, pain, decreased ROM, impaired cardiopulmonary response to activity . Patient appeared incoherent at times and needed encouragement to participate, especially with balance activity in standing. Patient was able to take a few steps along the edge of the bed, but showed difficulty lifting her feet off the floor.    Rehab Prognosis: Fair; patient would benefit from acute skilled PT services to address these deficits and reach maximum level of function.    Recent Surgery: Procedure(s) (LRB):  LAMINECTOMY, SPINE, CERVICAL, WITH POSTERIOR FUSION C3-T1  (N/A) 4 Days Post-Op    Plan:     During this hospitalization, patient to be seen 4 x/week to address the identified rehab impairments via gait training, therapeutic activities, therapeutic exercises, neuromuscular re-education and progress toward the following goals:    · Plan of Care Expires:  08/19/20    Subjective     Chief Complaint: pain with mobility and feeling week.  Patient/Family Comments/goals: to go back to [place].  Pain/Comfort:  · Pain Rating 1: (Did not rate)  · Location - Side 1: Bilateral  · Location - Orientation 1: generalized  · Location 1: neck  · Pain Addressed 1: Pre-medicate for activity, Reposition, Distraction,  Cessation of Activity  · Pain Rating Post-Intervention 1: 0/10      Objective:     Communicated with NSG prior to session.  Patient found HOB elevated with bed alarm, oxygen, SCD, telemetry upon PT entry to room.     General Precautions: Standard, fall   Orthopedic Precautions:spinal precautions   Braces: Cervical collar     Functional Mobility:  · Bed Mobility:     · Rolling Right: minimum assistance  · Scooting: maximal assistance  · Supine to Sit: maximal assistance  · Sit to Supine: maximal assistance  · Transfers:     · Sit to Stand:  moderate assistance with rolling walker  · Gait: 4 steps to the left and then to the right using RW with min to mod assistance, with cues for encouragement.  · Balance: fair in sitting and poor in standing.      AM-PAC 6 CLICK MOBILITY  Turning over in bed (including adjusting bedclothes, sheets and blankets)?: 2  Sitting down on and standing up from a chair with arms (e.g., wheelchair, bedside commode, etc.): 2  Moving from lying on back to sitting on the side of the bed?: 2  Moving to and from a bed to a chair (including a wheelchair)?: 2  Need to walk in hospital room?: 2  Climbing 3-5 steps with a railing?: 1  Basic Mobility Total Score: 11       Therapeutic Activities and Exercises:   Donned/Doffed a gown. Adjusted Cervical Collar while Patient was sitting at EOB. Static standing balance 3x20 secs with RW for support and min assistance, with cues for encouragement.   B LE AAROM/AROM x20 reps on all available planes of motion. Donned SCD's and elevated B heels to prevent Decubitus.    Patient left HOB elevated with all lines intact, call button in reach and bed alarm on..    GOALS:   Multidisciplinary Problems     Physical Therapy Goals        Problem: Physical Therapy Goal    Goal Priority Disciplines Outcome Goal Variances Interventions   Physical Therapy Goal     PT, PT/OT Ongoing, Progressing     Description: Goals to be met by: 8/15/2020     Patient will increase  functional independence with mobility by performin. Supine to sit with Contact Guard Assistance  2. Sit to supine with Contact Guard Assistance  3. Sit to stand transfer with Contact Guard Assistance  4. Bed to chair transfer with Contact Guard Assistance using Rolling Walker  5. Gait  x 50 feet with Minimal Assistance using Rolling Walker.   6. Ascend/descend 4 stair with bilateral Handrails Minimal Assistance                    Time Tracking:     PT Received On: 20  PT Start Time: 1423     PT Stop Time: 1448  PT Total Time (min): 25 min     Billable Minutes: Therapeutic Activity 15 and Therapeutic Exercise 10    Treatment Type: Treatment  PT/PTA: PTA     PTA Visit Number: Chelly Garay, PTA  2020

## 2020-07-28 LAB
ANION GAP SERPL CALC-SCNC: 13 MMOL/L (ref 8–16)
BASOPHILS # BLD AUTO: 0.03 K/UL (ref 0–0.2)
BASOPHILS NFR BLD: 0.4 % (ref 0–1.9)
BUN SERPL-MCNC: 10 MG/DL (ref 8–23)
CALCIUM SERPL-MCNC: 10.9 MG/DL (ref 8.7–10.5)
CHLORIDE SERPL-SCNC: 98 MMOL/L (ref 95–110)
CO2 SERPL-SCNC: 23 MMOL/L (ref 23–29)
CREAT SERPL-MCNC: 0.8 MG/DL (ref 0.5–1.4)
DIFFERENTIAL METHOD: ABNORMAL
EOSINOPHIL # BLD AUTO: 0.1 K/UL (ref 0–0.5)
EOSINOPHIL NFR BLD: 1.3 % (ref 0–8)
ERYTHROCYTE [DISTWIDTH] IN BLOOD BY AUTOMATED COUNT: 25 % (ref 11.5–14.5)
EST. GFR  (AFRICAN AMERICAN): >60 ML/MIN/1.73 M^2
EST. GFR  (NON AFRICAN AMERICAN): >60 ML/MIN/1.73 M^2
GLUCOSE SERPL-MCNC: 209 MG/DL (ref 70–110)
HCT VFR BLD AUTO: 39.8 % (ref 37–48.5)
HGB BLD-MCNC: 12.3 G/DL (ref 12–16)
IMM GRANULOCYTES # BLD AUTO: 0.03 K/UL (ref 0–0.04)
IMM GRANULOCYTES NFR BLD AUTO: 0.4 % (ref 0–0.5)
LYMPHOCYTES # BLD AUTO: 1.6 K/UL (ref 1–4.8)
LYMPHOCYTES NFR BLD: 24 % (ref 18–48)
MCH RBC QN AUTO: 26.7 PG (ref 27–31)
MCHC RBC AUTO-ENTMCNC: 30.9 G/DL (ref 32–36)
MCV RBC AUTO: 86 FL (ref 82–98)
MONOCYTES # BLD AUTO: 0.9 K/UL (ref 0.3–1)
MONOCYTES NFR BLD: 13 % (ref 4–15)
NEUTROPHILS # BLD AUTO: 4.2 K/UL (ref 1.8–7.7)
NEUTROPHILS NFR BLD: 60.9 % (ref 38–73)
NRBC BLD-RTO: 0 /100 WBC
PLATELET # BLD AUTO: 247 K/UL (ref 150–350)
PMV BLD AUTO: 9 FL (ref 9.2–12.9)
POCT GLUCOSE: 184 MG/DL (ref 70–110)
POCT GLUCOSE: 206 MG/DL (ref 70–110)
POCT GLUCOSE: 207 MG/DL (ref 70–110)
POCT GLUCOSE: 215 MG/DL (ref 70–110)
POTASSIUM SERPL-SCNC: 3.5 MMOL/L (ref 3.5–5.1)
RBC # BLD AUTO: 4.61 M/UL (ref 4–5.4)
SODIUM SERPL-SCNC: 134 MMOL/L (ref 136–145)
WBC # BLD AUTO: 6.83 K/UL (ref 3.9–12.7)

## 2020-07-28 PROCEDURE — 27000221 HC OXYGEN, UP TO 24 HOURS

## 2020-07-28 PROCEDURE — 25000003 PHARM REV CODE 250: Performed by: PHYSICIAN ASSISTANT

## 2020-07-28 PROCEDURE — 80048 BASIC METABOLIC PNL TOTAL CA: CPT

## 2020-07-28 PROCEDURE — 99900035 HC TECH TIME PER 15 MIN (STAT)

## 2020-07-28 PROCEDURE — 97530 THERAPEUTIC ACTIVITIES: CPT

## 2020-07-28 PROCEDURE — 25000003 PHARM REV CODE 250: Performed by: STUDENT IN AN ORGANIZED HEALTH CARE EDUCATION/TRAINING PROGRAM

## 2020-07-28 PROCEDURE — 36415 COLL VENOUS BLD VENIPUNCTURE: CPT

## 2020-07-28 PROCEDURE — 11000001 HC ACUTE MED/SURG PRIVATE ROOM

## 2020-07-28 PROCEDURE — 85025 COMPLETE CBC W/AUTO DIFF WBC: CPT

## 2020-07-28 PROCEDURE — 94761 N-INVAS EAR/PLS OXIMETRY MLT: CPT

## 2020-07-28 RX ORDER — POLYETHYLENE GLYCOL 3350 17 G/17G
17 POWDER, FOR SOLUTION ORAL 2 TIMES DAILY PRN
Status: DISCONTINUED | OUTPATIENT
Start: 2020-07-28 | End: 2020-07-29 | Stop reason: HOSPADM

## 2020-07-28 RX ORDER — BISACODYL 10 MG
10 SUPPOSITORY, RECTAL RECTAL DAILY PRN
Status: DISCONTINUED | OUTPATIENT
Start: 2020-07-28 | End: 2020-07-29 | Stop reason: HOSPADM

## 2020-07-28 RX ORDER — GABAPENTIN 300 MG/1
300 CAPSULE ORAL 3 TIMES DAILY
Status: DISCONTINUED | OUTPATIENT
Start: 2020-07-28 | End: 2020-07-29 | Stop reason: HOSPADM

## 2020-07-28 RX ORDER — IPRATROPIUM BROMIDE AND ALBUTEROL SULFATE 2.5; .5 MG/3ML; MG/3ML
3 SOLUTION RESPIRATORY (INHALATION) EVERY 4 HOURS PRN
Status: DISCONTINUED | OUTPATIENT
Start: 2020-07-28 | End: 2020-07-29 | Stop reason: HOSPADM

## 2020-07-28 RX ORDER — OXYCODONE HYDROCHLORIDE 5 MG/1
5 TABLET ORAL EVERY 4 HOURS PRN
Status: DISCONTINUED | OUTPATIENT
Start: 2020-07-28 | End: 2020-07-29 | Stop reason: HOSPADM

## 2020-07-28 RX ORDER — PREGABALIN 150 MG/1
150 CAPSULE ORAL 2 TIMES DAILY
Qty: 60 CAPSULE | Refills: 0 | Status: SHIPPED | OUTPATIENT
Start: 2020-07-28 | End: 2020-08-07

## 2020-07-28 RX ORDER — OXYCODONE HYDROCHLORIDE 5 MG/1
5 TABLET ORAL EVERY 4 HOURS PRN
Qty: 45 TABLET | Refills: 0 | Status: SHIPPED | OUTPATIENT
Start: 2020-07-28 | End: 2020-07-30

## 2020-07-28 RX ORDER — METHOCARBAMOL 500 MG/1
500 TABLET, FILM COATED ORAL 3 TIMES DAILY PRN
Status: DISCONTINUED | OUTPATIENT
Start: 2020-07-28 | End: 2020-07-29 | Stop reason: HOSPADM

## 2020-07-28 RX ORDER — PREGABALIN 75 MG/1
75 CAPSULE ORAL NIGHTLY
Status: DISCONTINUED | OUTPATIENT
Start: 2020-07-28 | End: 2020-07-29

## 2020-07-28 RX ADMIN — DOCUSATE SODIUM 50 MG: 50 CAPSULE, LIQUID FILLED ORAL at 09:07

## 2020-07-28 RX ADMIN — METHOCARBAMOL TABLETS 500 MG: 500 TABLET, COATED ORAL at 10:07

## 2020-07-28 RX ADMIN — DULOXETINE HYDROCHLORIDE 90 MG: 60 CAPSULE, DELAYED RELEASE ORAL at 09:07

## 2020-07-28 RX ADMIN — HYDROCHLOROTHIAZIDE 25 MG: 25 TABLET ORAL at 09:07

## 2020-07-28 RX ADMIN — ACETAMINOPHEN 1000 MG: 500 TABLET ORAL at 05:07

## 2020-07-28 RX ADMIN — BISACODYL 10 MG: 10 SUPPOSITORY RECTAL at 10:07

## 2020-07-28 RX ADMIN — INSULIN ASPART 4 UNITS: 100 INJECTION, SOLUTION INTRAVENOUS; SUBCUTANEOUS at 01:07

## 2020-07-28 RX ADMIN — GABAPENTIN 300 MG: 300 CAPSULE ORAL at 09:07

## 2020-07-28 RX ADMIN — INSULIN ASPART 4 UNITS: 100 INJECTION, SOLUTION INTRAVENOUS; SUBCUTANEOUS at 09:07

## 2020-07-28 RX ADMIN — ZONISAMIDE 200 MG: 100 CAPSULE ORAL at 08:07

## 2020-07-28 RX ADMIN — PRAVASTATIN SODIUM 20 MG: 20 TABLET ORAL at 09:07

## 2020-07-28 RX ADMIN — INSULIN ASPART 2 UNITS: 100 INJECTION, SOLUTION INTRAVENOUS; SUBCUTANEOUS at 05:07

## 2020-07-28 RX ADMIN — INSULIN ASPART 2 UNITS: 100 INJECTION, SOLUTION INTRAVENOUS; SUBCUTANEOUS at 08:07

## 2020-07-28 RX ADMIN — GABAPENTIN 300 MG: 300 CAPSULE ORAL at 04:07

## 2020-07-28 RX ADMIN — OXYCODONE HYDROCHLORIDE 5 MG: 5 TABLET ORAL at 09:07

## 2020-07-28 RX ADMIN — POTASSIUM CHLORIDE 20 MEQ: 20 TABLET, EXTENDED RELEASE ORAL at 09:07

## 2020-07-28 RX ADMIN — OXYCODONE HYDROCHLORIDE 5 MG: 5 TABLET ORAL at 02:07

## 2020-07-28 RX ADMIN — DOCUSATE SODIUM 50 MG AND SENNOSIDES 8.6 MG 1 TABLET: 8.6; 5 TABLET, FILM COATED ORAL at 09:07

## 2020-07-28 RX ADMIN — PREGABALIN 75 MG: 75 CAPSULE ORAL at 09:07

## 2020-07-28 RX ADMIN — AMITRIPTYLINE HYDROCHLORIDE 20 MG: 10 TABLET, FILM COATED ORAL at 09:07

## 2020-07-28 RX ADMIN — VERAPAMIL HYDROCHLORIDE 240 MG: 120 TABLET, FILM COATED, EXTENDED RELEASE ORAL at 09:07

## 2020-07-28 RX ADMIN — OXYCODONE HYDROCHLORIDE 5 MG: 10 TABLET ORAL at 04:07

## 2020-07-28 RX ADMIN — ACETAMINOPHEN 1000 MG: 500 TABLET ORAL at 02:07

## 2020-07-28 RX ADMIN — METHOCARBAMOL TABLETS 500 MG: 500 TABLET, COATED ORAL at 09:07

## 2020-07-28 RX ADMIN — GABAPENTIN 300 MG: 300 CAPSULE ORAL at 01:07

## 2020-07-28 RX ADMIN — ACETAMINOPHEN 1000 MG: 500 TABLET ORAL at 09:07

## 2020-07-28 RX ADMIN — ZONISAMIDE 200 MG: 100 CAPSULE ORAL at 10:07

## 2020-07-28 NOTE — PLAN OF CARE
Spoke with Dr. Wu and bedside nurse Leonie. Patient has still had no BM despite suppository given and facility will be unable to accept patient without recent BM. Nurse is still weaning patient's Oxygen and MD feels patient best to stay an additional night as she is not stable for d/c at this time. [facility] made aware of delay and patient schedule to transfer in AM if stable. Will continue to follow.

## 2020-07-28 NOTE — NURSING
Spoke with dr SHEFALI Mai and notified pt in pain 10/10 ,crying no urine output. Received verbal order ok to give prn oxy 5mg po now and In & out cath if bladder scan over 300ml. Stable. Will continue to monitor.

## 2020-07-28 NOTE — ASSESSMENT & PLAN NOTE
Chayito Chung is a 69 y.o. female s/p PCF C3-T1 (DOS: 7/23/20)    Pain control: multimodal  PT/OT: WBAT BLE, progressive mobility   DVT PPx: no chemical ppx for now, SCDs at all times when not ambulating  Abx: postop Ancef complete  Labs: stable  Drain: n/a  [place]: d/c yest, in and out last night  C collar in place    Encourage IS use, Du[place], achapella  Medicine comanagement consulted for assistance with medical management and tight BG control in periop period     Dispo: wean supplemental oxygen with goal O2 sat 88-92%, OOBTC BID, to bedside commode, pending SNF placement

## 2020-07-28 NOTE — NURSING
Pt pulled off cpap, upon entering room cpap mask laying in bed. Reapplied 4L highflow nc. RT notified. Stable. Denies sob. Will continue to monitor.

## 2020-07-28 NOTE — PT/OT/SLP PROGRESS
Physical Therapy      Patient Name:  Chayito Chung   MRN:  [id number]    Patient not seen today secondary to increased pain  . Will follow-up 7/29/20.    Bree Wilson PTA

## 2020-07-28 NOTE — NURSING
Alerted per bed alarm pt attempting to get oob without assist. No falls/injury noted. repositioned x2 assist in bed. Stable. Call bell in reach. Bed alarm on.

## 2020-07-28 NOTE — PT/OT/SLP PROGRESS
"Occupational Therapy   Treatment    Name: Chayito Chung  MRN: [id number]  Admitting Diagnosis:  Cervical myelopathy      5 Days Post-Op  Pre-op Diagnosis: Cervical stenosis of spine [M48.02]  Cervical myelopathy [G95.9]    Procedure(s):  LAMINECTOMY, SPINE, CERVICAL, WITH POSTERIOR FUSION C3-T1      Recommendations:     Discharge Recommendations: nursing facility, skilled  Discharge Equipment Recommendations:  bedside commode, bath bench  Barriers to discharge:  Decreased caregiver support    Assessment:     Chayito Chung is a 69 y.o. female with a medical diagnosis of Cervical myelopathy. She presents with the following performance deficits affecting function: weakness, impaired endurance, impaired self care skills, impaired functional mobilty, gait instability, impaired balance, impaired cardiopulmonary response to activity, pain, decreased safety awareness, orthopedic precautions. Patient required encouragement to participate in therapy session and sit <> stand transfers. Patient required less assist with functional mobility compared to previous therapy session. At this time, patient will continue to benefit from acute skilled therapy intervention to address deficits/underlying impairments and progress towards prior level of function. After discharge, patient would benefit from continued skilled therapy intervention at SNF to progress more towards independence in ADLs and functional mobility before going home.    Rehab Prognosis:  Good; patient would benefit from acute skilled OT services to address these deficits and reach maximum level of function.       Plan:     Patient to be seen 4 x/week to address the above listed problems via self-care/home management, therapeutic activities, therapeutic exercises  · Plan of Care Expires: 08/24/20  · Plan of Care Reviewed with: patient    Subjective     Patient stated "wait" and "oh lord" throughout session.    Pain/Comfort:  · Pain Rating 1: 10/10  · Location 1: " neck  · Pain Addressed 1: Pre-medicate for activity, Reposition, Distraction, Cessation of Activity    Objective:     Communicated with: RN prior to session. Patient found HOB elevated with telemetry, oxygen, SCD and daughter present upon OT entry to room.    General Precautions: Standard, fall   Orthopedic Precautions:spinal precautions   Braces: Cervical collar     Occupational Performance:     Bed Mobility:    · Patient completed Supine to Sit with moderate assistance for trunk  · Patient completed Sit to Supine with moderate assistance for LEs    Functional Mobility/Transfers:  · Patient completed Sit <> Stand Transfer with minimum assistance with RW  · 2x from EOB  · Cues provided for correct hand placement  · Functional Mobility: Patient took 4 side steps along EOB with min assist with RW    Balance:  · Sitting: SBA EOB  · Static standing: CGA with RW    Activities of Daily Living:  · Not assessed    [facility] 6 Click ADL: 15    Treatment & Education:   Therapist provided facilitation and instruction of proper body mechanics, energy conservation, and fall prevention strategies during tasks listed above.   Instructed patient to sit in bedside chair daily to increase OOB/activity tolerance.   Instructed patient to use call light to have nursing staff assist with transfers.    Educated patient on OT POC and answered all questions within OT scope of practice.   Whiteboard updated     Patient left HOB elevated with all lines intact, call button in reach and patient's daughter, sister, and RN presentEducation:      GOALS:   Multidisciplinary Problems     Occupational Therapy Goals        Problem: Occupational Therapy Goal    Goal Priority Disciplines Outcome Interventions   Occupational Therapy Goal     OT, PT/OT Ongoing, Progressing    Description: Goals to be met by: 08/24/2020  Patient will increase functional independence with ADLs by performing:    Feeding with Stand-by Assistance OOB all meals or HOB at > 80  degrees.  UE Dressing with Minimal Assistance.  LE Dressing with Moderate Assistance.  Grooming while bedside chair with Moderate Assistance.  Toileting from bedside commode with Moderate Assistance for hygiene and clothing management.   Bathing from  sitting at sink with Moderate Assistance.  Toilet transfer to bedside commode with Minimal Assistance.  Increased functional strength to 3/5 gross seated BUE, B gross  ans seated trunk erector as needed for postural righting. .                     Time Tracking:     OT Date of Treatment: 07/28/20  OT Start Time: 1430  OT Stop Time: 1446  OT Total Time (min): 16 min    Billable Minutes:Therapeutic Activity 16    Fide Perez OT  7/28/2020

## 2020-07-28 NOTE — PROGRESS NOTES
[facility]  Orthopedics  Progress Note    Patient Name: Chayito Chung  MRN: [id number]  Admission Date: 7/23/2020  Hospital Length of Stay: 5 days  Attending Provider: Herson Tate MD  Primary Care Provider: Abhi Valadez MD  Follow-up For: Procedure(s) (LRB):  LAMINECTOMY, SPINE, CERVICAL, WITH POSTERIOR FUSION C3-T1  (N/A)    Post-Operative Day: 5 Days Post-Op  Subjective:     Principal Problem:Cervical myelopathy    Principal Orthopedic Problem: s/p PCF C3-T1    Interval History: NAEON. CPAP overnight. 94% on 3L NC this am. Sergio PO. Pt still reporting difficulty urinatiny. 4 steps with PT.     Review of patient's allergies indicates:  No Known Allergies    Current Facility-Administered Medications   Medication    acetaminophen tablet 1,000 mg    amitriptyline tablet 20 mg    dextrose 50% injection 12.5 g    dextrose 50% injection 25 g    docusate sodium capsule 50 mg    DULoxetine DR capsule 90 mg    fluticasone propionate 50 mcg/actuation nasal spray 100 mcg    glucagon (human recombinant) injection 1 mg    glucose chewable tablet 16 g    glucose chewable tablet 24 g    hydroCHLOROthiazide tablet 25 mg    insulin aspart U-100 pen 1-10 Units    insulin detemir U-100 pen 20 Units    methocarbamoL tablet 500 mg    ondansetron injection 8 mg    oxyCODONE immediate release tablet 5 mg    potassium chloride SA CR tablet 20 mEq    pravastatin tablet 20 mg    pregabalin capsule 75 mg    senna-docusate 8.6-50 mg per tablet 1 tablet    sodium chloride 0.9% flush 5 mL    verapamiL CR tablet 240 mg    zonisamide capsule 200 mg     Objective:     Vital Signs (Most Recent):  Temp: 98.3 °F (36.8 °C) (07/28/20 0820)  Pulse: (!) 115 (07/28/20 0820)  Resp: 18 (07/28/20 0419)  BP: (!) 143/92 (07/28/20 0820)  SpO2: 95 % (07/28/20 0820) Vital Signs (24h Range):  Temp:  [96 °F (35.6 °C)-98.3 °F (36.8 °C)] 98.3 °F (36.8 °C)  Pulse:  [104-119] 115  Resp:  [14-18] 18  SpO2:  [91 %-96 %]  "95 %  BP: (135-150)/(81-93) 143/92     Weight: 66.7 kg (147 lb)  Height: 5' 2" (157.5 cm)  Body mass index is 26.89 kg/m².      Intake/Output Summary (Last 24 hours) at 7/28/2020 0945  Last data filed at 7/28/2020 0500  Gross per 24 hour   Intake 700 ml   Output 1475 ml   Net -775 ml       Ortho/SPM Exam     C collar in place  Posterior cervical dressing in place c/d/i  Drain with 50ccs of SS output to gravity   Neuro exam stable       Significant Labs:   CBC:   Recent Labs   Lab 07/27/20  0359 07/28/20  0442   WBC 5.39 6.83   HGB 11.6* 12.3   HCT 38.8 39.8    247     CMP:   Recent Labs   Lab 07/27/20  0359 07/28/20  0442   * 134*   K 3.5 3.5   CL 99 98   CO2 25 23   * 209*   BUN 9 10   CREATININE 0.7 0.8   CALCIUM 9.8 10.9*   ANIONGAP 10 13   EGFRNONAA >60.0 >60.0     All pertinent labs within the past 24 hours have been reviewed.    Significant Imaging: I have reviewed and interpreted all pertinent imaging results/findings.    Assessment/Plan:     * Cervical myelopathy  Chayito Chung is a 69 y.o. female s/p PCF C3-T1 (DOS: 7/23/20)    Pain control: multimodal  PT/OT: WBAT BLE, progressive mobility   DVT PPx: no chemical ppx for now, SCDs at all times when not ambulating  Abx: postop Ancef complete  Labs: stable  Drain: n/a  [place]: d/c yest, in and out last night  C collar in place    Encourage IS use, Duonebs, achapella  Medicine comanagement consulted for assistance with medical management and tight BG control in periop period     Dispo: wean supplemental oxygen with goal O2 sat 88-92%, OOBTC BID, to bedside commode, pending SNF placement          Mai Wu MD  Orthopedics  [facility]  "

## 2020-07-28 NOTE — NURSING
No urine output noted.Bladder scanner not working. Charge nurse paging ninth floor. paged md on call x2. Stable.

## 2020-07-28 NOTE — NURSING
"Paged md on call to notify pt screaming out in pain. "omg, it hurt." awaiting call back. At bedside,.  "

## 2020-07-28 NOTE — NURSING
Pt c/o severe pain, states no relief from earlier dose of prn med, paged on-call MD for [place], spoke c Bryce MONROE, ok to give another oxy 5mg early. Wctm.

## 2020-07-29 VITALS
DIASTOLIC BLOOD PRESSURE: 95 MMHG | RESPIRATION RATE: 18 BRPM | TEMPERATURE: 99 F | HEIGHT: 62 IN | SYSTOLIC BLOOD PRESSURE: 132 MMHG | OXYGEN SATURATION: 90 % | BODY MASS INDEX: 27.05 KG/M2 | WEIGHT: 147 LBS | HEART RATE: 105 BPM

## 2020-07-29 LAB
ANION GAP SERPL CALC-SCNC: 13 MMOL/L (ref 8–16)
BASOPHILS # BLD AUTO: 0.03 K/UL (ref 0–0.2)
BASOPHILS NFR BLD: 0.4 % (ref 0–1.9)
BUN SERPL-MCNC: 11 MG/DL (ref 8–23)
CALCIUM SERPL-MCNC: 10.4 MG/DL (ref 8.7–10.5)
CHLORIDE SERPL-SCNC: 97 MMOL/L (ref 95–110)
CO2 SERPL-SCNC: 22 MMOL/L (ref 23–29)
CREAT SERPL-MCNC: 0.8 MG/DL (ref 0.5–1.4)
DIFFERENTIAL METHOD: ABNORMAL
EOSINOPHIL # BLD AUTO: 0.1 K/UL (ref 0–0.5)
EOSINOPHIL NFR BLD: 1.8 % (ref 0–8)
ERYTHROCYTE [DISTWIDTH] IN BLOOD BY AUTOMATED COUNT: 24.9 % (ref 11.5–14.5)
EST. GFR  (AFRICAN AMERICAN): >60 ML/MIN/1.73 M^2
EST. GFR  (NON AFRICAN AMERICAN): >60 ML/MIN/1.73 M^2
GLUCOSE SERPL-MCNC: 246 MG/DL (ref 70–110)
HCT VFR BLD AUTO: 40.9 % (ref 37–48.5)
HGB BLD-MCNC: 12.5 G/DL (ref 12–16)
IMM GRANULOCYTES # BLD AUTO: 0.05 K/UL (ref 0–0.04)
IMM GRANULOCYTES NFR BLD AUTO: 0.7 % (ref 0–0.5)
LYMPHOCYTES # BLD AUTO: 1.7 K/UL (ref 1–4.8)
LYMPHOCYTES NFR BLD: 23.5 % (ref 18–48)
MCH RBC QN AUTO: 26.9 PG (ref 27–31)
MCHC RBC AUTO-ENTMCNC: 30.6 G/DL (ref 32–36)
MCV RBC AUTO: 88 FL (ref 82–98)
MONOCYTES # BLD AUTO: 0.8 K/UL (ref 0.3–1)
MONOCYTES NFR BLD: 11.6 % (ref 4–15)
NEUTROPHILS # BLD AUTO: 4.4 K/UL (ref 1.8–7.7)
NEUTROPHILS NFR BLD: 62 % (ref 38–73)
NRBC BLD-RTO: 0 /100 WBC
PLATELET # BLD AUTO: 250 K/UL (ref 150–350)
PMV BLD AUTO: 9.3 FL (ref 9.2–12.9)
POTASSIUM SERPL-SCNC: 3.4 MMOL/L (ref 3.5–5.1)
RBC # BLD AUTO: 4.64 M/UL (ref 4–5.4)
SODIUM SERPL-SCNC: 132 MMOL/L (ref 136–145)
WBC # BLD AUTO: 7.14 K/UL (ref 3.9–12.7)

## 2020-07-29 PROCEDURE — 99900035 HC TECH TIME PER 15 MIN (STAT)

## 2020-07-29 PROCEDURE — 25000003 PHARM REV CODE 250: Performed by: STUDENT IN AN ORGANIZED HEALTH CARE EDUCATION/TRAINING PROGRAM

## 2020-07-29 PROCEDURE — 25000003 PHARM REV CODE 250: Performed by: PHYSICIAN ASSISTANT

## 2020-07-29 PROCEDURE — 27000221 HC OXYGEN, UP TO 24 HOURS

## 2020-07-29 PROCEDURE — 97530 THERAPEUTIC ACTIVITIES: CPT

## 2020-07-29 PROCEDURE — 97535 SELF CARE MNGMENT TRAINING: CPT

## 2020-07-29 PROCEDURE — 94761 N-INVAS EAR/PLS OXIMETRY MLT: CPT

## 2020-07-29 PROCEDURE — 94664 DEMO&/EVAL PT USE INHALER: CPT

## 2020-07-29 PROCEDURE — 36415 COLL VENOUS BLD VENIPUNCTURE: CPT

## 2020-07-29 PROCEDURE — 80048 BASIC METABOLIC PNL TOTAL CA: CPT

## 2020-07-29 PROCEDURE — 85025 COMPLETE CBC W/AUTO DIFF WBC: CPT

## 2020-07-29 RX ORDER — PREGABALIN 50 MG/1
150 CAPSULE ORAL 2 TIMES DAILY
Status: DISCONTINUED | OUTPATIENT
Start: 2020-07-29 | End: 2020-07-29 | Stop reason: HOSPADM

## 2020-07-29 RX ORDER — IPRATROPIUM BROMIDE AND ALBUTEROL SULFATE 2.5; .5 MG/3ML; MG/3ML
3 SOLUTION RESPIRATORY (INHALATION) EVERY 4 HOURS PRN
Qty: 1 BOX | Refills: 0
Start: 2020-07-29 | End: 2022-05-09 | Stop reason: SDUPTHER

## 2020-07-29 RX ADMIN — ACETAMINOPHEN 1000 MG: 500 TABLET ORAL at 02:07

## 2020-07-29 RX ADMIN — ACETAMINOPHEN 1000 MG: 500 TABLET ORAL at 05:07

## 2020-07-29 RX ADMIN — GABAPENTIN 300 MG: 300 CAPSULE ORAL at 02:07

## 2020-07-29 RX ADMIN — POTASSIUM CHLORIDE 20 MEQ: 20 TABLET, EXTENDED RELEASE ORAL at 09:07

## 2020-07-29 RX ADMIN — VERAPAMIL HYDROCHLORIDE 240 MG: 120 TABLET, FILM COATED, EXTENDED RELEASE ORAL at 09:07

## 2020-07-29 RX ADMIN — GABAPENTIN 300 MG: 300 CAPSULE ORAL at 09:07

## 2020-07-29 RX ADMIN — ZONISAMIDE 200 MG: 100 CAPSULE ORAL at 09:07

## 2020-07-29 RX ADMIN — DULOXETINE HYDROCHLORIDE 90 MG: 60 CAPSULE, DELAYED RELEASE ORAL at 09:07

## 2020-07-29 RX ADMIN — OXYCODONE HYDROCHLORIDE 5 MG: 5 TABLET ORAL at 02:07

## 2020-07-29 RX ADMIN — DOCUSATE SODIUM 50 MG AND SENNOSIDES 8.6 MG 1 TABLET: 8.6; 5 TABLET, FILM COATED ORAL at 09:07

## 2020-07-29 RX ADMIN — DOCUSATE SODIUM 50 MG: 50 CAPSULE, LIQUID FILLED ORAL at 09:07

## 2020-07-29 RX ADMIN — PRAVASTATIN SODIUM 20 MG: 20 TABLET ORAL at 09:07

## 2020-07-29 RX ADMIN — OXYCODONE HYDROCHLORIDE 5 MG: 5 TABLET ORAL at 05:07

## 2020-07-29 RX ADMIN — PREGABALIN 150 MG: 50 CAPSULE ORAL at 09:07

## 2020-07-29 RX ADMIN — HYDROCHLOROTHIAZIDE 25 MG: 25 TABLET ORAL at 09:07

## 2020-07-29 RX ADMIN — OXYCODONE HYDROCHLORIDE 5 MG: 5 TABLET ORAL at 09:07

## 2020-07-29 NOTE — PLAN OF CARE
Future Appointments   Date Time Provider Department Center   8/26/2020 12:30 PM [facility] OIC [facility]   8/26/2020  1:30 PM Zoraida Irwin PA-C [facility] SPINE [address]   10/8/2020  3:30 PM Gaurav Malin MD [facility] CARDIO [facility]     Patient discharged to [facility] Nursing Unit on 7/29/20.   07/29/20 1630   Final Note   Assessment Type Final Discharge Note   Anticipated Discharge Disposition SNF   What phone number can be called within the next 1-3 days to see how you are doing after discharge?   ([phone])   Hospital Follow Up  Appt(s) scheduled? Yes   Discharge plans and expectations educations in teach back method with documentation complete? Yes   Right Care Referral Info   Post Acute Recommendation SNF / Sub-Acute Rehab   Referral Type Skilled Nursing Facility   Facility Name [facility]

## 2020-07-29 NOTE — PLAN OF CARE
07/29/20 1017   Discharge Reassessment   Assessment Type Discharge Planning Reassessment   Provided patient/caregiver education on the expected discharge date and the discharge plan Yes   Do you have any problems affording any of your prescribed medications? No   Discharge Plan A Skilled Nursing Facility   Discharge Plan B Skilled Nursing Facility   DME Needed Upon Discharge  none   Patient choice form signed by patient/caregiver Yes   Anticipated Discharge Disposition SNF   Can the patient/caregiver answer the patient profile reliably? Yes, cognitively intact   How does the patient rate their overall health at the present time? Fair   Post-Acute Status   Post-Acute Placement Status Set-up Complete

## 2020-07-29 NOTE — PLAN OF CARE
07/29/20 1318   Post-Acute Status   Post-Acute Authorization Placement   Post-Acute Placement Status Set-up Complete     Patient set-up has been completed. NEW scheduled d/c transportation to [facility] through [facility]. Patient is scheduled to be picked up at 2:30 pm. NEW provided patient's nurse with report number [phone]; ask for the nurse for room P110. NEW is in communication with patient's CM and patient's Care Team. NEW arranged wheelchair transport via [facility]. Requested  time does not guarantee arrival time.      Jeri Alcala LMSW  Case Management   [facility]   Ext. [phone]

## 2020-07-29 NOTE — SUBJECTIVE & OBJECTIVE
"Principal Problem:Cervical myelopathy    Principal Orthopedic Problem: s/p PCF C3-T1    Interval History: NAEON.  this am. Sergio PO. 90% on RA. Urinating without difficulty. + BM yest.   Review of patient's allergies indicates:  No Known Allergies    Current Facility-Administered Medications   Medication    acetaminophen tablet 1,000 mg    albuterol-ipratropium 2.5 mg-0.5 mg/3 mL nebulizer solution 3 mL    amitriptyline tablet 20 mg    bisacodyL suppository 10 mg    dextrose 50% injection 12.5 g    dextrose 50% injection 25 g    docusate sodium capsule 50 mg    DULoxetine DR capsule 90 mg    fluticasone propionate 50 mcg/actuation nasal spray 100 mcg    gabapentin capsule 300 mg    glucagon (human recombinant) injection 1 mg    glucose chewable tablet 16 g    glucose chewable tablet 24 g    hydroCHLOROthiazide tablet 25 mg    insulin aspart U-100 pen 1-10 Units    insulin detemir U-100 pen 20 Units    methocarbamoL tablet 500 mg    ondansetron injection 8 mg    oxyCODONE immediate release tablet 5 mg    polyethylene glycol packet 17 g    potassium chloride SA CR tablet 20 mEq    pravastatin tablet 20 mg    pregabalin capsule 75 mg    senna-docusate 8.6-50 mg per tablet 1 tablet    sodium chloride 0.9% flush 5 mL    verapamiL CR tablet 240 mg    zonisamide capsule 200 mg     Objective:     Vital Signs (Most Recent):  Temp: 98.9 °F (37.2 °C) (07/29/20 0521)  Pulse: 110 (07/29/20 0521)  Resp: 19 (07/29/20 0505)  BP: (!) 130/92 (07/29/20 0521)  SpO2: (!) 89 % (07/29/20 0521) Vital Signs (24h Range):  Temp:  [96.7 °F (35.9 °C)-99.2 °F (37.3 °C)] 98.9 °F (37.2 °C)  Pulse:  [102-120] 110  Resp:  [14-20] 19  SpO2:  [89 %-95 %] 89 %  BP: (115-147)/(70-92) 130/92     Weight: 66.7 kg (147 lb)  Height: 5' 2" (157.5 cm)  Body mass index is 26.89 kg/m².      Intake/Output Summary (Last 24 hours) at 7/29/2020 0713  Last data filed at 7/28/2020 2059  Gross per 24 hour   Intake 1340 ml   Output 450 ml   Net " 890 ml       Ortho/SPM Exam     C collar in place  Posterior cervical dressing in place c/d/i  Neuro exam stable       Significant Labs:   CBC:   Recent Labs   Lab 07/28/20  0442 07/29/20  0342   WBC 6.83 7.14   HGB 12.3 12.5   HCT 39.8 40.9    250     CMP:   Recent Labs   Lab 07/28/20  0442 07/29/20  0342   * 132*   K 3.5 3.4*   CL 98 97   CO2 23 22*   * 246*   BUN 10 11   CREATININE 0.8 0.8   CALCIUM 10.9* 10.4   ANIONGAP 13 13   EGFRNONAA >60.0 >60.0     All pertinent labs within the past 24 hours have been reviewed.    Significant Imaging: I have reviewed and interpreted all pertinent imaging results/findings.

## 2020-07-29 NOTE — PLAN OF CARE
Pt calm, cooperative. BG monitoring, given supplemental insulin. Pain managed by PRN regimen. No acute changes overnight. Will continue to monitor.

## 2020-07-29 NOTE — PT/OT/SLP PROGRESS
Occupational Therapy   Treatment    Name: Chayito Chung  MRN: [id number]  Admitting Diagnosis:  Cervical myelopathy  6 Days Post-Op   Procedure(s):  LAMINECTOMY, SPINE, CERVICAL, WITH POSTERIOR FUSION C3-T1      Recommendations:     Discharge Recommendations: nursing facility, skilled  Discharge Equipment Recommendations:  bedside commode, bath bench  Barriers to discharge:  Decreased caregiver support    Assessment:     Chayito Chung is a 69 y.o. female with a medical diagnosis of Cervical myelopathy.  She presents with pain but is agreeable to participate with therapy this day and tolerates session well. Performance deficits affecting function are weakness, impaired endurance, impaired self care skills, impaired functional mobilty, gait instability, impaired balance, pain, decreased safety awareness, orthopedic precautions, impaired cardiopulmonary response to activity.     Rehab Prognosis:  Good; patient would benefit from acute skilled OT services to address these deficits and reach maximum level of function.       Plan:     Patient to be seen 4 x/week to address the above listed problems via self-care/home management, therapeutic activities, therapeutic exercises  · Plan of Care Expires: 08/24/20  · Plan of Care Reviewed with: patient    Subjective     Pain/Comfort:  · Pain Rating 1: 10/10  · Location 1: neck  · Pain Addressed 1: Nurse notified    Objective:     Communicated with: RN prior to session.  Patient found HOB elevated with telemetry, oxygen, SCD, cervical collar upon OT entry to room.    General Precautions: Standard, fall   Orthopedic Precautions:spinal precautions   Braces: Cervical collar     Occupational Performance:     Bed Mobility:    · Patient completed Rolling to Left with contact guard assistance  · Patient completed Supine to Sit to L side EOB via log roll with minimum assistance at trunk  · Patient completed Scooting anteriorly to EOB for foot placement on floor with contact guard  assistance    Functional Mobility/Transfers:  · Patient completed Sit <> Stand Transfer with minimum assistance with rolling walker   · Patient completed Bed > Chair Transfer using Step Transfer technique with minimum assistance with rolling walker  · Functional Mobility: ~6' from the bed > bedside chair with min assist and RW, no LOB/SOB noted, verbal cueing for step sequencing    Activities of Daily Living:  · Feeding:  independence Patient drinking coffee throughout session independently.  · Grooming: minimum assistance Patient participated in a face wash with a washcloth and combed her hair while sitting up in the chair with min assist for her hair from her sister.    [facility] 6 Click ADL: 15    Treatment & Education:  Role of OT/POC  Call button for assistance    Patient left up in chair with all lines intact, call button in reach and RN and sister presentEducation:      GOALS:   Multidisciplinary Problems     Occupational Therapy Goals        Problem: Occupational Therapy Goal    Goal Priority Disciplines Outcome Interventions   Occupational Therapy Goal     OT, PT/OT Ongoing, Progressing    Description: Goals to be met by: 08/24/2020  Patient will increase functional independence with ADLs by performing:    Feeding with Stand-by Assistance OOB all meals or HOB at > 80 degrees.  UE Dressing with Minimal Assistance.  LE Dressing with Moderate Assistance.  Grooming while bedside chair with Moderate Assistance.  Toileting from bedside commode with Moderate Assistance for hygiene and clothing management.   Bathing from  sitting at sink with Moderate Assistance.  Toilet transfer to bedside commode with Minimal Assistance.  Increased functional strength to 3/5 gross seated BUE, B gross  ans seated trunk erector as needed for postural righting. .                     Time Tracking:     OT Date of Treatment: 07/29/20  OT Start Time: 0913  OT Stop Time: 0938  OT Total Time (min): 25 min    Billable Minutes:Self  Care/Home Management 12 minutes  Therapeutic Activity 13 minutes    Dorothy Camilo OT  7/29/2020     Never

## 2020-07-29 NOTE — PROGRESS NOTES
[facility]  Orthopedics  Progress Note    Patient Name: Chayito Chung  MRN: [id number]  Admission Date: 7/23/2020  Hospital Length of Stay: 6 days  Attending Provider: Herson Tate MD  Primary Care Provider: Abhi Valadez MD  Follow-up For: Procedure(s) (LRB):  LAMINECTOMY, SPINE, CERVICAL, WITH POSTERIOR FUSION C3-T1  (N/A)    Post-Operative Day: 6 Days Post-Op  Subjective:     Principal Problem:Cervical myelopathy    Principal Orthopedic Problem: s/p PCF C3-T1    Interval History: NAEON.  this am. Sergio PO. 90% on RA. Urinating without difficulty. + BM yest.   Review of patient's allergies indicates:  No Known Allergies    Current Facility-Administered Medications   Medication    acetaminophen tablet 1,000 mg    albuterol-ipratropium 2.5 mg-0.5 mg/3 mL nebulizer solution 3 mL    amitriptyline tablet 20 mg    bisacodyL suppository 10 mg    dextrose 50% injection 12.5 g    dextrose 50% injection 25 g    docusate sodium capsule 50 mg    DULoxetine DR capsule 90 mg    fluticasone propionate 50 mcg/actuation nasal spray 100 mcg    gabapentin capsule 300 mg    glucagon (human recombinant) injection 1 mg    glucose chewable tablet 16 g    glucose chewable tablet 24 g    hydroCHLOROthiazide tablet 25 mg    insulin aspart U-100 pen 1-10 Units    insulin detemir U-100 pen 20 Units    methocarbamoL tablet 500 mg    ondansetron injection 8 mg    oxyCODONE immediate release tablet 5 mg    polyethylene glycol packet 17 g    potassium chloride SA CR tablet 20 mEq    pravastatin tablet 20 mg    pregabalin capsule 75 mg    senna-docusate 8.6-50 mg per tablet 1 tablet    sodium chloride 0.9% flush 5 mL    verapamiL CR tablet 240 mg    zonisamide capsule 200 mg     Objective:     Vital Signs (Most Recent):  Temp: 98.9 °F (37.2 °C) (07/29/20 0521)  Pulse: 110 (07/29/20 0521)  Resp: 19 (07/29/20 0505)  BP: (!) 130/92 (07/29/20 0521)  SpO2: (!) 89 % (07/29/20 0521) Vital Signs (24h  "Range):  Temp:  [96.7 °F (35.9 °C)-99.2 °F (37.3 °C)] 98.9 °F (37.2 °C)  Pulse:  [102-120] 110  Resp:  [14-20] 19  SpO2:  [89 %-95 %] 89 %  BP: (115-147)/(70-92) 130/92     Weight: 66.7 kg (147 lb)  Height: 5' 2" (157.5 cm)  Body mass index is 26.89 kg/m².      Intake/Output Summary (Last 24 hours) at 7/29/2020 0713  Last data filed at 7/28/2020 2059  Gross per 24 hour   Intake 1340 ml   Output 450 ml   Net 890 ml       Ortho/SPM Exam     C collar in place  Posterior cervical dressing in place c/d/i  Neuro exam stable       Significant Labs:   CBC:   Recent Labs   Lab 07/28/20  0442 07/29/20  0342   WBC 6.83 7.14   HGB 12.3 12.5   HCT 39.8 40.9    250     CMP:   Recent Labs   Lab 07/28/20  0442 07/29/20  0342   * 132*   K 3.5 3.4*   CL 98 97   CO2 23 22*   * 246*   BUN 10 11   CREATININE 0.8 0.8   CALCIUM 10.9* 10.4   ANIONGAP 13 13   EGFRNONAA >60.0 >60.0     All pertinent labs within the past 24 hours have been reviewed.    Significant Imaging: I have reviewed and interpreted all pertinent imaging results/findings.    Assessment/Plan:     * Cervical myelopathy  Chayito Chung is a 69 y.o. female s/p PCF C3-T1 (DOS: 7/23/20)    Pain control: multimodal  PT/OT: WBAT BLE, progressive mobility   DVT PPx: no chemical ppx for now, SCDs at all times when not ambulating  Abx: postop Ancef complete  Labs: stable  Drain: n/a  [place]: d/c yest, in and out last night  C collar in place    Encourage IS use, Duonebs, achapella  Medicine comanagement consulted for assistance with medical management and tight BG control in periop period     Dispo: to [facility]          Mai Wu MD  Orthopedics  [facility]  "

## 2020-07-29 NOTE — NURSING
Discharge instructions given to patient. Verbalizes understanding. IV removed, catheter intact. Pt transferring to [facility] via wheelchair. Report called to nurse at SNF. No complaints/concerns voiced at this time. Patient remains stable upon discharge.

## 2020-07-29 NOTE — ASSESSMENT & PLAN NOTE
Chayito Chung is a 69 y.o. female s/p PCF C3-T1 (DOS: 7/23/20)    Pain control: multimodal  PT/OT: WBAT BLE, progressive mobility   DVT PPx: no chemical ppx for now, SCDs at all times when not ambulating  Abx: postop Ancef complete  Labs: stable  Drain: n/a  [place]: d/c yest, in and out last night  C collar in place    Encourage IS use, Duonebs, achapella  Medicine comanagement consulted for assistance with medical management and tight BG control in periop period     Dispo: to SNF

## 2020-07-31 NOTE — DISCHARGE SUMMARY
[facility]  Orthopedics  Discharge Summary      Patient Name: Chayito Chung  MRN: [id number]  Admission Date: 7/23/2020  Hospital Length of Stay: 6 days  Discharge Date and Time: 7/29/2020  3:25 PM  Attending Physician: No att. providers found   Discharging Provider: Daniel Ho MD  Primary Care Provider: Abhi Valadez MD    HPI:   Chayito Chung is a 69 y.o. female previously seen in the hospital after a fall here for hospital follow up.  She says she woke up 5/25/2020 and couldn't stand.  She says her leg just kept giving out on her.  She denies any pain, trauma or accidents.  She fell 5 times prior to coming to the ED.  She was seen in the hospital where imaging of the cervical spine showed cervical stenosis with equivocal myelomalacia.  She reports difficulty with balance over the last 8-9 months.  She says her family has told her to started walking differently but she didn't really notice.  She also reports difficulty with small objects and clumsiness with her hands.  She was given a Rolator and a knee brace in the hospital.  She says she has fallen one time since being discharged from the hospital.    Procedure(s) (LRB):  LAMINECTOMY, SPINE, CERVICAL, WITH POSTERIOR FUSION C3-T1  (N/A)      Hospital Course:  Patient presented for C3- T1PCF.  Tolerated it well and was discharged to SNF after voiding, tolerating diet, ambulating, pain controlled.    Discharge instructions, follow-up appointment, and med rec are below.      Consults (From admission, onward)        Status Ordering Provider     Inpatient consult to Hospital Medicine-Ortho Comanagement Only  Once     Provider:  (Not yet assigned)    Completed DANIEL HO          Significant Diagnostic Studies: No pertinent studies.    Pending Diagnostic Studies:     None        Final Active Diagnoses:    Diagnosis Date Noted POA    PRINCIPAL PROBLEM:  Cervical myelopathy [G95.9] 07/23/2020 Yes    Hypertension [I10] 07/23/2020 Yes     Anxiety [F41.9] 07/23/2020 Yes    CAD (coronary artery disease) [I25.10] 07/23/2020 Yes     Chronic    Seizure disorder [G40.909] 12/16/2019 Yes    Type 2 diabetes mellitus, with long-term current use of insulin [E11.9, Z79.4] 11/12/2019 Not Applicable    Iron deficiency anemia [D50.9] 10/11/2016 Yes    Hyperlipidemia associated with type 2 diabetes mellitus [E11.69, E78.5]  Yes    Migraines [G43.909] 12/17/2012 Yes    Major depressive disorder in partial remission [F32.4] 12/17/2012 Yes    Neuropathy [G62.9] 12/17/2012 Yes    COPD (chronic obstructive pulmonary disease) [J44.9] 12/16/2012 Yes      Problems Resolved During this Admission:    Diagnosis Date Noted Date Resolved POA    Uncontrolled type 2 diabetes mellitus with hyperglycemia, with long-term current use of insulin [E11.65, Z79.4] 09/22/2014 07/23/2020 Not Applicable      Discharged Condition: stable    Disposition: Home or Self Care    Follow Up:    Patient Instructions:   No discharge procedures on file.  Medications:  Reconciled Home Medications:      Medication List      CHANGE how you take these medications    * albuterol-ipratropium 2.5 mg-0.5 mg/3 mL nebulizer solution  Commonly known as: DUO-NEB  What changed: Another medication with the same name was added. Make sure you understand how and when to take each.     * albuterol-ipratropium 2.5 mg-0.5 mg/3 mL nebulizer solution  Commonly known as: DUO-NEB  Take 3 mLs by nebulization every 4 (four) hours as needed for Wheezing or Shortness of Breath. Rescue  What changed: You were already taking a medication with the same name, and this prescription was added. Make sure you understand how and when to take each.     * LYRICA 150 MG capsule  Generic drug: pregabalin  Take by mouth 2 (two) times daily.  What changed: Another medication with the same name was added. Make sure you understand how and when to take each.     * pregabalin 150 MG capsule  Commonly known as: LYRICA  Take 1 capsule (150  "mg total) by mouth 2 (two) times daily.  What changed: You were already taking a medication with the same name, and this prescription was added. Make sure you understand how and when to take each.         * This list has 4 medication(s) that are the same as other medications prescribed for you. Read the directions carefully, and ask your doctor or other care provider to review them with you.            CONTINUE taking these medications    albuterol 90 mcg/actuation inhaler  Commonly known as: PROVENTIL/VENTOLIN HFA  Inhale 2 puffs into the lungs every 6 (six) hours as needed for Wheezing.     amitriptyline 10 MG tablet  Commonly known as: ELAVIL  20 mg every evening.     aspirin 81 MG EC tablet  Commonly known as: ECOTRIN  Take 81 mg by mouth once daily.     BD ULTRA-FINE MINI PEN NEEDLE 31 gauge x 3/16" Ndle  Generic drug: pen needle, diabetic     cholestyramine (with sugar) 4 gram Powd  Take 4 g by mouth 2 (two) times daily as needed (diarrhea).     DULoxetine 60 MG capsule  Commonly known as: CYMBALTA  Take 90 mg by mouth once daily.     ferrous sulfate 325 mg (65 mg iron) Tab tablet  Commonly known as: FEOSOL  Take 1 tablet (325 mg total) by mouth once daily.     fluticasone propionate 50 mcg/actuation nasal spray  Commonly known as: FLONASE  2 sprays (100 mcg total) by Each Nare route once daily.     HumaLOG Mix 75-25(U-100)Insuln 100 unit/mL (75-25) Susp  Generic drug: insulin lispro protamine-insulin lispro  30 Units 2 (two) times daily.     hydroCHLOROthiazide 25 MG tablet  Commonly known as: HYDRODIURIL  TAKE ONE TABLET BY MOUTH DAILY     nitroGLYCERIN 0.4 MG SL tablet  Commonly known as: NITROSTAT  Place 0.4 mg under the tongue every 5 (five) minutes as needed for Chest pain.     NovoLOG Flexpen U-100 Insulin 100 unit/mL (3 mL) Inpn pen  Generic drug: insulin aspart U-100  Inject into the skin. Taken as SS     omeprazole 40 MG capsule  Commonly known as: PRILOSEC  Take 1 capsule (40 mg total) by mouth " every morning.     ondansetron 4 MG Tbdl  Commonly known as: ZOFRAN-ODT  Take 1 tablet (4 mg total) by mouth every 8 (eight) hours as needed.     potassium chloride SA 20 MEQ tablet  Commonly known as: K-DUR,KLOR-CON  TAKE ONE TABLET BY MOUTH DAILY     pravastatin 20 MG tablet  Commonly known as: PRAVACHOL  Take 20 mg by mouth once daily.     QUEtiapine 300 MG Tab  Commonly known as: SEROQUEL  300 mg nightly.     tiZANidine 4 MG tablet  Commonly known as: ZANAFLEX  Take 1 tablet (4 mg total) by mouth daily as needed.     verapamiL 240 MG CR tablet  Commonly known as: CALAN-SR  TAKE 1 TABLET BY MOUTH ONCE DAILY     zonisamide 100 MG Cap  Commonly known as: ZONEGRAN  zonisamide 100 mg capsule   Take 2 capsules twice a day by oral route.        STOP taking these medications    ammonium lactate 12 % Crea     gabapentin 300 MG capsule  Commonly known as: NEURONTIN     HYDROcodone-acetaminophen  mg per tablet  Commonly known as: NORCO     lidocaine 5 %  Commonly known as: LIDODERM     LORazepam 0.5 MG tablet  Commonly known as: ATIVAN        ASK your doctor about these medications    acetaminophen 500 MG tablet  Commonly known as: TYLENOL  Take 1 tablet (500 mg total) by mouth every 12 (twelve) hours. for 7 days  Ask about: Should I take this medication?            Mai Wu MD  Orthopedics  [facility]

## 2020-07-31 NOTE — HOSPITAL COURSE
Patient presented for C3- T1PCF.  Tolerated it well and was discharged to SNF after voiding, tolerating diet, ambulating, pain controlled.    Discharge instructions, follow-up appointment, and med rec are below.

## 2020-07-31 NOTE — PROGRESS NOTES
07/30/2020      Chayito Chung  [address]  [address]  [place] [address]          [facility] Medicine Dept.  [facility] [address] [place] [address]  [phone] after hours  [phone] fax     Hydrocodone 10 mg /acetaminophen 325 mg po q4h prn pain   #60  No refills    CABRERA #[id number]

## 2020-07-31 NOTE — HPI
Chayito Chung is a 69 y.o. female previously seen in the hospital after a fall here for hospital follow up.  She says she woke up 5/25/2020 and couldn't stand.  She says her leg just kept giving out on her.  She denies any pain, trauma or accidents.  She fell 5 times prior to coming to the ED.  She was seen in the hospital where imaging of the cervical spine showed cervical stenosis with equivocal myelomalacia.  She reports difficulty with balance over the last 8-9 months.  She says her family has told her to started walking differently but she didn't really notice.  She also reports difficulty with small objects and clumsiness with her hands.  She was given a Rolator and a knee brace in the hospital.  She says she has fallen one time since being discharged from the hospital.

## 2020-08-04 RX ORDER — HYDROCODONE BITARTRATE AND ACETAMINOPHEN 10; 325 MG/1; MG/1
1 TABLET ORAL EVERY 6 HOURS PRN
Qty: 40 TABLET | Refills: 0 | Status: SHIPPED | OUTPATIENT
Start: 2020-08-04 | End: 2020-08-07 | Stop reason: SDUPTHER

## 2020-08-04 NOTE — ED NOTES
Educated pt. on medication indication, AE, SE, reactions and expected outcomes. Pt. verbalizes understanding. Agrees to treatment plan. Denies allergy. Will continue to monitor.      Which Photosensitizer Was Used: Levulan

## 2020-08-07 RX ORDER — HYDROCODONE BITARTRATE AND ACETAMINOPHEN 10; 325 MG/1; MG/1
1 TABLET ORAL EVERY 6 HOURS PRN
Qty: 40 TABLET | Refills: 0 | Status: SHIPPED | OUTPATIENT
Start: 2020-08-07 | End: 2022-05-09 | Stop reason: DRUGHIGH

## 2020-08-20 PROCEDURE — G0180 PR HOME HEALTH MD CERTIFICATION: ICD-10-PCS | Mod: ,,, | Performed by: INTERNAL MEDICINE

## 2020-08-20 PROCEDURE — G0180 MD CERTIFICATION HHA PATIENT: HCPCS | Mod: ,,, | Performed by: INTERNAL MEDICINE

## 2020-08-26 ENCOUNTER — OFFICE VISIT (OUTPATIENT)
Dept: ORTHOPEDICS | Facility: CLINIC | Age: 70
End: 2020-08-26
Payer: MEDICARE

## 2020-08-26 ENCOUNTER — HOSPITAL ENCOUNTER (OUTPATIENT)
Dept: RADIOLOGY | Facility: HOSPITAL | Age: 70
Discharge: HOME | End: 2020-08-26
Attending: PHYSICIAN ASSISTANT
Payer: MEDICARE

## 2020-08-26 VITALS — WEIGHT: 147 LBS | BODY MASS INDEX: 27.05 KG/M2 | HEIGHT: 62 IN

## 2020-08-26 DIAGNOSIS — Z98.1 S/P CERVICAL SPINAL FUSION: Primary | ICD-10-CM

## 2020-08-26 DIAGNOSIS — M50.30 DDD (DEGENERATIVE DISC DISEASE), CERVICAL: ICD-10-CM

## 2020-08-26 PROCEDURE — 99024 PR POST-OP FOLLOW-UP VISIT: ICD-10-PCS | Mod: S$GLB,,, | Performed by: PHYSICIAN ASSISTANT

## 2020-08-26 PROCEDURE — 72040 XR CERVICAL SPINE AP LATERAL: ICD-10-PCS | Mod: 26,,, | Performed by: RADIOLOGY

## 2020-08-26 PROCEDURE — 99999 PR PBB SHADOW E&M-EST. PATIENT-LVL IV: CPT | Mod: PBBFAC,,, | Performed by: PHYSICIAN ASSISTANT

## 2020-08-26 PROCEDURE — 99999 PR PBB SHADOW E&M-EST. PATIENT-LVL IV: ICD-10-PCS | Mod: PBBFAC,,, | Performed by: PHYSICIAN ASSISTANT

## 2020-08-26 PROCEDURE — 99024 POSTOP FOLLOW-UP VISIT: CPT | Mod: S$GLB,,, | Performed by: PHYSICIAN ASSISTANT

## 2020-08-26 PROCEDURE — 72040 X-RAY EXAM NECK SPINE 2-3 VW: CPT | Mod: TC

## 2020-08-26 PROCEDURE — 72040 X-RAY EXAM NECK SPINE 2-3 VW: CPT | Mod: 26,,, | Performed by: RADIOLOGY

## 2020-08-26 RX ORDER — TIZANIDINE 4 MG/1
4 TABLET ORAL DAILY PRN
Qty: 60 TABLET | Refills: 0 | Status: SHIPPED | OUTPATIENT
Start: 2020-08-26 | End: 2021-04-12 | Stop reason: ALTCHOICE

## 2020-08-26 NOTE — PROGRESS NOTES
Date: 08/26/2020    Supervising Physician: Herson Tate M.D.    Date of Surgery: 7/23/2020    Procedure: C3-T1 laminectomy and fusion    History: Chayito Chung is seen today for follow-up following the above listed procedure. Overall the patient is doing well but today notes her walking is starting to improve and her handwriting is better.  She still has some neck pain.  Pain is well controlled with current pain medication. she sees pain management for her medications.  SHe is wearing a c collar today and is using a walker to ambulate.  she denies fever, chills, and sweats since the time of the surgery.       Exam:  Incision is healing well, clean, dry and intact.   There is no sign of infection. Neuro exam is stable. No signs of DVT.    Radiographs: imaging today shows hardware in place, no evidence of failure.    Assessment/Plan: 4 weeks post op.    Doing well postoperatively. No refills needed as she gets her pain medicines from pain management.  Continue collar.     I will plan to see the patient back for the next postop visit in 3 months with xrays.     Thank you for the opportunity to participate in this patient's care. Please give me a call if there are any concerns or questions.

## 2020-09-03 ENCOUNTER — TELEPHONE (OUTPATIENT)
Dept: FAMILY MEDICINE | Facility: CLINIC | Age: 70
End: 2020-09-03

## 2020-09-03 ENCOUNTER — EXTERNAL HOME HEALTH (OUTPATIENT)
Dept: HOME HEALTH SERVICES | Facility: HOSPITAL | Age: 70
End: 2020-09-03
Payer: MEDICARE

## 2020-09-04 ENCOUNTER — TELEPHONE (OUTPATIENT)
Dept: FAMILY MEDICINE | Facility: CLINIC | Age: 70
End: 2020-09-04

## 2020-09-04 NOTE — TELEPHONE ENCOUNTER
Spoke with pt. Informed her to contact orthopedic team regarding  Next steps given on recent surgery . Patient voices understanding

## 2020-09-04 NOTE — TELEPHONE ENCOUNTER
----- Message from Abhi Valadez MD sent at 9/4/2020  8:56 AM CDT -----  Regarding: RE: home health  Contact: home health nurse  Patient should contact orthopedic team regarding next steps given recent surgery to cervical spine.     Dr. Valadez  ----- Message -----  From: Ashley Ulloa MA  Sent: 9/3/2020   4:24 PM CDT  To: Abhi Valadez MD  Subject: FW: home health                                    ----- Message -----  From: Brittni Gonzalez  Sent: 9/3/2020   2:27 PM CDT  To: Rory Moe Staff  Subject: home health                                      Home health nurse called to let you know pt had a fall on Sunday morning rolled out of bed and was on the floor didn't have injury didn't have neck brace on please advise     Pt can be reached at [phone]

## 2020-09-08 ENCOUNTER — HOSPITAL ENCOUNTER (OUTPATIENT)
Dept: RADIOLOGY | Facility: HOSPITAL | Age: 70
Discharge: HOME | End: 2020-09-08
Attending: ORTHOPAEDIC SURGERY
Payer: MEDICARE

## 2020-09-08 DIAGNOSIS — Z98.1 S/P CERVICAL SPINAL FUSION: ICD-10-CM

## 2020-09-08 PROCEDURE — 72040 X-RAY EXAM NECK SPINE 2-3 VW: CPT | Mod: 26,,, | Performed by: RADIOLOGY

## 2020-09-08 PROCEDURE — 72040 X-RAY EXAM NECK SPINE 2-3 VW: CPT | Mod: TC

## 2020-09-08 PROCEDURE — 72040 XR CERVICAL SPINE AP LATERAL: ICD-10-PCS | Mod: 26,,, | Performed by: RADIOLOGY

## 2020-09-17 ENCOUNTER — TELEPHONE (OUTPATIENT)
Dept: FAMILY MEDICINE | Facility: CLINIC | Age: 70
End: 2020-09-17

## 2020-09-17 DIAGNOSIS — R30.0 DYSURIA: Primary | ICD-10-CM

## 2020-09-17 NOTE — TELEPHONE ENCOUNTER
Spoke with pt. Who informed me she has been having  a strong urine, itching around the vaginal area. Patient has been having this issue since her surgery on June 23 . Patient  is requesting a prescription to treat issue . I informed patient we would need to test her urine in order to make sure it is not an infection patient voices understanding.

## 2020-09-17 NOTE — TELEPHONE ENCOUNTER
----- Message from Lauren Manning sent at 9/17/2020  8:58 AM CDT -----  Contact: [phone]  Pt Chayito Jackson calling regarding requesting a prescription for a strong urine, itching around the vaginal area.      Please call and advise

## 2020-09-17 NOTE — TELEPHONE ENCOUNTER
----- Message from Ashley Ulloa MA sent at 9/17/2020  9:43 AM CDT -----  Pt. informed me she has been having  a strong urine, itching around the vaginal area. Patient has been having this issue since her surgery on June 23 . Patient  is requesting a prescription to treat issue . Patient would like to test her urine in order to make sure it is not an infection

## 2020-09-21 ENCOUNTER — TELEPHONE (OUTPATIENT)
Dept: FAMILY MEDICINE | Facility: CLINIC | Age: 70
End: 2020-09-21

## 2020-09-21 NOTE — TELEPHONE ENCOUNTER
----- Message from Hattie Dalton sent at 9/21/2020  3:38 PM CDT -----  Contact: [phone]  Type:  Needs Medical Advice    Who Called: Pt  Reason for Call: pt would like to speak with the Nurse regarding orders for a Urine culture and regarding Antibiotic   Pharmacy name and phone #:  [id number] - [place], [place] - [address] [facility]  Would the patient rather a call back or a response via [facility]?  Call back  Best Call Back Number: [phone]  Additional Information:  Pt has been trying for over a week to have the orders and a Prescription for Antibiotics with no response

## 2020-09-21 NOTE — TELEPHONE ENCOUNTER
Each time I return call to patient, no answer is received.  Leave voicemail requesting call back.      Patient can schedule urine orders with phone staff.

## 2020-09-22 RX ORDER — OMEPRAZOLE 40 MG/1
40 CAPSULE, DELAYED RELEASE ORAL EVERY MORNING
Qty: 30 CAPSULE | Refills: 11 | Status: CANCELLED | OUTPATIENT
Start: 2020-09-22 | End: 2021-09-22

## 2020-09-28 ENCOUNTER — OFFICE VISIT (OUTPATIENT)
Dept: INTERNAL MEDICINE | Facility: CLINIC | Age: 70
End: 2020-09-28
Payer: MEDICARE

## 2020-09-28 VITALS
HEIGHT: 62 IN | BODY MASS INDEX: 28.69 KG/M2 | DIASTOLIC BLOOD PRESSURE: 80 MMHG | WEIGHT: 155.88 LBS | OXYGEN SATURATION: 91 % | HEART RATE: 108 BPM | SYSTOLIC BLOOD PRESSURE: 120 MMHG

## 2020-09-28 DIAGNOSIS — I25.10 CORONARY ARTERY DISEASE, ANGINA PRESENCE UNSPECIFIED, UNSPECIFIED VESSEL OR LESION TYPE, UNSPECIFIED WHETHER NATIVE OR TRANSPLANTED HEART: ICD-10-CM

## 2020-09-28 DIAGNOSIS — Z79.4 TYPE 2 DIABETES MELLITUS WITH OTHER CIRCULATORY COMPLICATION, WITH LONG-TERM CURRENT USE OF INSULIN: ICD-10-CM

## 2020-09-28 DIAGNOSIS — I10 ESSENTIAL HYPERTENSION: ICD-10-CM

## 2020-09-28 DIAGNOSIS — N30.00 ACUTE CYSTITIS WITHOUT HEMATURIA: Primary | ICD-10-CM

## 2020-09-28 DIAGNOSIS — E11.59 TYPE 2 DIABETES MELLITUS WITH OTHER CIRCULATORY COMPLICATION, WITH LONG-TERM CURRENT USE OF INSULIN: ICD-10-CM

## 2020-09-28 PROBLEM — E16.2 HYPOGLYCEMIA: Status: RESOLVED | Noted: 2020-05-12 | Resolved: 2020-09-28

## 2020-09-28 LAB
BILIRUB SERPL-MCNC: NORMAL MG/DL
BLOOD URINE, POC: NORMAL
CLARITY, POC UA: CLEAR
COLOR, POC UA: YELLOW
GLUCOSE UR QL STRIP: NORMAL
KETONES UR QL STRIP: NORMAL
LEUKOCYTE ESTERASE URINE, POC: NORMAL
NITRITE, POC UA: NORMAL
PH, POC UA: 5
PROTEIN, POC: NORMAL
SPECIFIC GRAVITY, POC UA: 1.02
UROBILINOGEN, POC UA: 1

## 2020-09-28 PROCEDURE — 1159F PR MEDICATION LIST DOCUMENTED IN MEDICAL RECORD: ICD-10-PCS | Mod: S$GLB,,, | Performed by: INTERNAL MEDICINE

## 2020-09-28 PROCEDURE — 1125F PR PAIN SEVERITY QUANTIFIED, PAIN PRESENT: ICD-10-PCS | Mod: S$GLB,,, | Performed by: INTERNAL MEDICINE

## 2020-09-28 PROCEDURE — 99999 PR PBB SHADOW E&M-EST. PATIENT-LVL III: CPT | Mod: PBBFAC,,, | Performed by: INTERNAL MEDICINE

## 2020-09-28 PROCEDURE — 3079F DIAST BP 80-89 MM HG: CPT | Mod: CPTII,S$GLB,, | Performed by: INTERNAL MEDICINE

## 2020-09-28 PROCEDURE — 3074F SYST BP LT 130 MM HG: CPT | Mod: CPTII,S$GLB,, | Performed by: INTERNAL MEDICINE

## 2020-09-28 PROCEDURE — 90653 FLU VACCINE - ADJUVANTED: ICD-10-PCS | Mod: S$GLB,,, | Performed by: INTERNAL MEDICINE

## 2020-09-28 PROCEDURE — 99214 PR OFFICE/OUTPT VISIT, EST, LEVL IV, 30-39 MIN: ICD-10-PCS | Mod: 25,S$GLB,, | Performed by: INTERNAL MEDICINE

## 2020-09-28 PROCEDURE — G0009 ADMIN PNEUMOCOCCAL VACCINE: HCPCS | Mod: S$GLB,,, | Performed by: INTERNAL MEDICINE

## 2020-09-28 PROCEDURE — 3051F HG A1C>EQUAL 7.0%<8.0%: CPT | Mod: CPTII,S$GLB,, | Performed by: INTERNAL MEDICINE

## 2020-09-28 PROCEDURE — 99999 PR PBB SHADOW E&M-EST. PATIENT-LVL III: ICD-10-PCS | Mod: PBBFAC,,, | Performed by: INTERNAL MEDICINE

## 2020-09-28 PROCEDURE — 81002 POCT URINE DIPSTICK WITHOUT MICROSCOPE: ICD-10-PCS | Mod: S$GLB,,, | Performed by: INTERNAL MEDICINE

## 2020-09-28 PROCEDURE — 1101F PR PT FALLS ASSESS DOC 0-1 FALLS W/OUT INJ PAST YR: ICD-10-PCS | Mod: CPTII,S$GLB,, | Performed by: INTERNAL MEDICINE

## 2020-09-28 PROCEDURE — G0008 ADMIN INFLUENZA VIRUS VAC: HCPCS | Mod: S$GLB,,, | Performed by: INTERNAL MEDICINE

## 2020-09-28 PROCEDURE — 81002 URINALYSIS NONAUTO W/O SCOPE: CPT | Mod: S$GLB,,, | Performed by: INTERNAL MEDICINE

## 2020-09-28 PROCEDURE — 1159F MED LIST DOCD IN RCRD: CPT | Mod: S$GLB,,, | Performed by: INTERNAL MEDICINE

## 2020-09-28 PROCEDURE — G0009 PNEUMOCOCCAL POLYSACCHARIDE VACCINE 23-VALENT =>2YO SQ IM: ICD-10-PCS | Mod: S$GLB,,, | Performed by: INTERNAL MEDICINE

## 2020-09-28 PROCEDURE — 3079F PR MOST RECENT DIASTOLIC BLOOD PRESSURE 80-89 MM HG: ICD-10-PCS | Mod: CPTII,S$GLB,, | Performed by: INTERNAL MEDICINE

## 2020-09-28 PROCEDURE — 99214 OFFICE O/P EST MOD 30 MIN: CPT | Mod: 25,S$GLB,, | Performed by: INTERNAL MEDICINE

## 2020-09-28 PROCEDURE — 1125F AMNT PAIN NOTED PAIN PRSNT: CPT | Mod: S$GLB,,, | Performed by: INTERNAL MEDICINE

## 2020-09-28 PROCEDURE — 3074F PR MOST RECENT SYSTOLIC BLOOD PRESSURE < 130 MM HG: ICD-10-PCS | Mod: CPTII,S$GLB,, | Performed by: INTERNAL MEDICINE

## 2020-09-28 PROCEDURE — 90732 PNEUMOCOCCAL POLYSACCHARIDE VACCINE 23-VALENT =>2YO SQ IM: ICD-10-PCS | Mod: S$GLB,,, | Performed by: INTERNAL MEDICINE

## 2020-09-28 PROCEDURE — 3008F BODY MASS INDEX DOCD: CPT | Mod: CPTII,S$GLB,, | Performed by: INTERNAL MEDICINE

## 2020-09-28 PROCEDURE — G0008 PR ADMIN INFLUENZA VIRUS VAC: ICD-10-PCS | Mod: S$GLB,,, | Performed by: INTERNAL MEDICINE

## 2020-09-28 PROCEDURE — 1101F PT FALLS ASSESS-DOCD LE1/YR: CPT | Mod: CPTII,S$GLB,, | Performed by: INTERNAL MEDICINE

## 2020-09-28 PROCEDURE — 90732 PPSV23 VACC 2 YRS+ SUBQ/IM: CPT | Mod: S$GLB,,, | Performed by: INTERNAL MEDICINE

## 2020-09-28 PROCEDURE — 3008F PR BODY MASS INDEX (BMI) DOCUMENTED: ICD-10-PCS | Mod: CPTII,S$GLB,, | Performed by: INTERNAL MEDICINE

## 2020-09-28 PROCEDURE — 3051F PR MOST RECENT HEMOGLOBIN A1C LEVEL 7.0 - < 8.0%: ICD-10-PCS | Mod: CPTII,S$GLB,, | Performed by: INTERNAL MEDICINE

## 2020-09-28 PROCEDURE — 90653 IIV ADJUVANT VACCINE IM: CPT | Mod: S$GLB,,, | Performed by: INTERNAL MEDICINE

## 2020-09-28 RX ORDER — METFORMIN HYDROCHLORIDE 1000 MG/1
1000 TABLET, FILM COATED, EXTENDED RELEASE ORAL 2 TIMES DAILY WITH MEALS
COMMUNITY
End: 2021-08-18 | Stop reason: ALTCHOICE

## 2020-09-28 RX ORDER — LORAZEPAM 0.5 MG/1
0.5 TABLET ORAL EVERY 6 HOURS PRN
Status: ON HOLD | COMMUNITY
End: 2022-04-06

## 2020-09-28 RX ORDER — SULFAMETHOXAZOLE AND TRIMETHOPRIM 800; 160 MG/1; MG/1
1 TABLET ORAL 2 TIMES DAILY
Qty: 14 TABLET | Refills: 0 | Status: SHIPPED | OUTPATIENT
Start: 2020-09-28 | End: 2020-10-05

## 2020-09-28 RX ORDER — TOPIRAMATE 50 MG/1
50 TABLET, FILM COATED ORAL 2 TIMES DAILY
COMMUNITY
End: 2022-09-14 | Stop reason: SDUPTHER

## 2020-09-28 RX ORDER — GABAPENTIN 300 MG/1
300 CAPSULE ORAL 3 TIMES DAILY
COMMUNITY
End: 2021-04-05 | Stop reason: ALTCHOICE

## 2020-09-28 NOTE — PROGRESS NOTES
Subjective:       Patient ID: Chayito Chung is a 69 y.o. female.    Chief Complaint: Urinary Tract Infection (possibly)    HPI  Chart reviewed. Pt c/o dysuria and foul smelling urine since hospitalised 2 months ago.  Also c/o some incontinence.  No CP, SOB but wheezing.  In wound care for R foot wound.  BSs erratic.  Still feels globally weak.  In PT.  Review of Systems   All other systems reviewed and are negative.      Objective:      Physical Exam  Vitals signs reviewed.   Constitutional:       General: She is not in acute distress.     Appearance: She is well-developed.   HENT:      Head: Normocephalic.      Mouth/Throat:      Mouth: Mucous membranes are moist.   Eyes:      General: No scleral icterus.     Extraocular Movements: Extraocular movements intact.      Conjunctiva/sclera: Conjunctivae normal.   Neck:      Comments: Neck in brace  Cardiovascular:      Rate and Rhythm: Normal rate and regular rhythm.      Pulses: Normal pulses.      Heart sounds: Normal heart sounds.   Pulmonary:      Effort: Pulmonary effort is normal.      Breath sounds: Wheezing (end-exp in apices) present.   Abdominal:      General: Bowel sounds are normal. There is distension.      Palpations: Abdomen is soft. There is no mass.      Tenderness: There is no abdominal tenderness.   Musculoskeletal: Normal range of motion.      Right lower leg: No edema.      Left lower leg: No edema.   Lymphadenopathy:      Cervical: No cervical adenopathy.   Skin:     General: Skin is warm and dry.   Neurological:      General: No focal deficit present.      Mental Status: She is alert.      Cranial Nerves: No cranial nerve deficit.      Motor: No abnormal muscle tone.      Coordination: Coordination normal.   Psychiatric:         Mood and Affect: Mood normal.         Behavior: Behavior normal.         Assessment:       1. Acute cystitis without hematuria    2. Type 2 diabetes mellitus with other circulatory complication, with long-term current use  of insulin    3. Essential hypertension    4. Coronary artery disease, angina presence unspecified, unspecified vessel or lesion type, unspecified whether native or transplanted heart        Plan:       Chayito was seen today for urinary tract infection.    Diagnoses and all orders for this visit:    Acute cystitis without hematuria  -     POCT urine dipstick without microscope  -     sulfamethoxazole-trimethoprim 800-160mg (BACTRIM DS) 800-160 mg Tab; Take 1 tablet by mouth 2 (two) times daily. for 7 days    Type 2 diabetes mellitus with other circulatory complication, with long-term current use of insulin  -     Influenza - Trivalent (Adjuvanted)  -     Pneumococcal Polysaccharide Vaccine (23 Valent) (SQ/IM)    Essential hypertension   Well-cont    Coronary artery disease, angina presence unspecified, unspecified vessel or lesion type, unspecified whether native or transplanted heart  -     Influenza - Trivalent (Adjuvanted)  -     Pneumococcal Polysaccharide Vaccine (23 Valent) (SQ/IM)      Follow up if symptoms worsen or fail to improve.

## 2020-10-06 ENCOUNTER — PATIENT OUTREACH (OUTPATIENT)
Dept: ADMINISTRATIVE | Facility: OTHER | Age: 70
End: 2020-10-06

## 2020-10-06 DIAGNOSIS — Z12.31 ENCOUNTER FOR SCREENING MAMMOGRAM FOR MALIGNANT NEOPLASM OF BREAST: Primary | ICD-10-CM

## 2020-10-06 NOTE — PROGRESS NOTES
Health Maintenance Due   Topic Date Due    Foot Exam  08/26/2020    Eye Exam  09/12/2020    Mammogram  09/19/2020     Updates were requested from care everywhere.  Chart was reviewed for overdue Proactive [facility] Encounters (RALF) topics (CRS, Breast Cancer Screening, Eye exam)  Health Maintenance has been updated.  LINKS immunization registry triggered.  Immunizations were reconciled.

## 2020-10-08 ENCOUNTER — OFFICE VISIT (OUTPATIENT)
Dept: CARDIOLOGY | Facility: CLINIC | Age: 70
End: 2020-10-08
Payer: MEDICARE

## 2020-10-08 VITALS
DIASTOLIC BLOOD PRESSURE: 88 MMHG | BODY MASS INDEX: 28.88 KG/M2 | SYSTOLIC BLOOD PRESSURE: 134 MMHG | OXYGEN SATURATION: 100 % | WEIGHT: 156.94 LBS | HEART RATE: 110 BPM | HEIGHT: 62 IN

## 2020-10-08 DIAGNOSIS — E11.69 HYPERLIPIDEMIA ASSOCIATED WITH TYPE 2 DIABETES MELLITUS: ICD-10-CM

## 2020-10-08 DIAGNOSIS — E11.649 HYPOGLYCEMIA ASSOCIATED WITH TYPE 2 DIABETES MELLITUS: ICD-10-CM

## 2020-10-08 DIAGNOSIS — I10 ESSENTIAL HYPERTENSION: ICD-10-CM

## 2020-10-08 DIAGNOSIS — D50.0 IRON DEFICIENCY ANEMIA DUE TO CHRONIC BLOOD LOSS: ICD-10-CM

## 2020-10-08 DIAGNOSIS — Z90.3 HISTORY OF PARTIAL GASTRECTOMY: ICD-10-CM

## 2020-10-08 DIAGNOSIS — Z72.0 TOBACCO ABUSE: ICD-10-CM

## 2020-10-08 DIAGNOSIS — Z98.61 S/P PTCA (PERCUTANEOUS TRANSLUMINAL CORONARY ANGIOPLASTY): Primary | ICD-10-CM

## 2020-10-08 DIAGNOSIS — R00.0 SINUS TACHYCARDIA: ICD-10-CM

## 2020-10-08 DIAGNOSIS — E11.59 HYPERTENSION ASSOCIATED WITH DIABETES: ICD-10-CM

## 2020-10-08 DIAGNOSIS — E78.5 HYPERLIPIDEMIA ASSOCIATED WITH TYPE 2 DIABETES MELLITUS: ICD-10-CM

## 2020-10-08 DIAGNOSIS — I15.2 HYPERTENSION ASSOCIATED WITH DIABETES: ICD-10-CM

## 2020-10-08 DIAGNOSIS — Z98.61 POST PTCA: ICD-10-CM

## 2020-10-08 DIAGNOSIS — I25.118 CORONARY ARTERY DISEASE OF NATIVE ARTERY OF NATIVE HEART WITH STABLE ANGINA PECTORIS: ICD-10-CM

## 2020-10-08 DIAGNOSIS — I50.32 CHRONIC DIASTOLIC CONGESTIVE HEART FAILURE: ICD-10-CM

## 2020-10-08 DIAGNOSIS — J42 CHRONIC BRONCHITIS, UNSPECIFIED CHRONIC BRONCHITIS TYPE: ICD-10-CM

## 2020-10-08 PROCEDURE — 1159F PR MEDICATION LIST DOCUMENTED IN MEDICAL RECORD: ICD-10-PCS | Mod: S$GLB,,, | Performed by: INTERNAL MEDICINE

## 2020-10-08 PROCEDURE — 3079F PR MOST RECENT DIASTOLIC BLOOD PRESSURE 80-89 MM HG: ICD-10-PCS | Mod: CPTII,S$GLB,, | Performed by: INTERNAL MEDICINE

## 2020-10-08 PROCEDURE — 3051F PR MOST RECENT HEMOGLOBIN A1C LEVEL 7.0 - < 8.0%: ICD-10-PCS | Mod: CPTII,S$GLB,, | Performed by: INTERNAL MEDICINE

## 2020-10-08 PROCEDURE — 3008F BODY MASS INDEX DOCD: CPT | Mod: CPTII,S$GLB,, | Performed by: INTERNAL MEDICINE

## 2020-10-08 PROCEDURE — 99999 PR PBB SHADOW E&M-EST. PATIENT-LVL III: CPT | Mod: PBBFAC,,, | Performed by: INTERNAL MEDICINE

## 2020-10-08 PROCEDURE — 99499 RISK ADDL DX/OHS AUDIT: ICD-10-PCS | Mod: S$GLB,,, | Performed by: INTERNAL MEDICINE

## 2020-10-08 PROCEDURE — 3051F HG A1C>EQUAL 7.0%<8.0%: CPT | Mod: CPTII,S$GLB,, | Performed by: INTERNAL MEDICINE

## 2020-10-08 PROCEDURE — 93000 ELECTROCARDIOGRAM COMPLETE: CPT | Mod: S$GLB,,, | Performed by: INTERNAL MEDICINE

## 2020-10-08 PROCEDURE — 93000 EKG 12-LEAD: ICD-10-PCS | Mod: S$GLB,,, | Performed by: INTERNAL MEDICINE

## 2020-10-08 PROCEDURE — 3075F SYST BP GE 130 - 139MM HG: CPT | Mod: CPTII,S$GLB,, | Performed by: INTERNAL MEDICINE

## 2020-10-08 PROCEDURE — 99214 PR OFFICE/OUTPT VISIT, EST, LEVL IV, 30-39 MIN: ICD-10-PCS | Mod: 25,S$GLB,, | Performed by: INTERNAL MEDICINE

## 2020-10-08 PROCEDURE — 99999 PR PBB SHADOW E&M-EST. PATIENT-LVL III: ICD-10-PCS | Mod: PBBFAC,,, | Performed by: INTERNAL MEDICINE

## 2020-10-08 PROCEDURE — 99499 UNLISTED E&M SERVICE: CPT | Mod: S$GLB,,, | Performed by: INTERNAL MEDICINE

## 2020-10-08 PROCEDURE — 1101F PR PT FALLS ASSESS DOC 0-1 FALLS W/OUT INJ PAST YR: ICD-10-PCS | Mod: CPTII,S$GLB,, | Performed by: INTERNAL MEDICINE

## 2020-10-08 PROCEDURE — 3079F DIAST BP 80-89 MM HG: CPT | Mod: CPTII,S$GLB,, | Performed by: INTERNAL MEDICINE

## 2020-10-08 PROCEDURE — 1101F PT FALLS ASSESS-DOCD LE1/YR: CPT | Mod: CPTII,S$GLB,, | Performed by: INTERNAL MEDICINE

## 2020-10-08 PROCEDURE — 1159F MED LIST DOCD IN RCRD: CPT | Mod: S$GLB,,, | Performed by: INTERNAL MEDICINE

## 2020-10-08 PROCEDURE — 3008F PR BODY MASS INDEX (BMI) DOCUMENTED: ICD-10-PCS | Mod: CPTII,S$GLB,, | Performed by: INTERNAL MEDICINE

## 2020-10-08 PROCEDURE — 3075F PR MOST RECENT SYSTOLIC BLOOD PRESS GE 130-139MM HG: ICD-10-PCS | Mod: CPTII,S$GLB,, | Performed by: INTERNAL MEDICINE

## 2020-10-08 PROCEDURE — 99214 OFFICE O/P EST MOD 30 MIN: CPT | Mod: 25,S$GLB,, | Performed by: INTERNAL MEDICINE

## 2020-10-08 NOTE — PROGRESS NOTES
"  Subjective:      Patient ID: Chayito Chung is a 69 y.o. female.    Chief Complaint: Follow-up    HPI:  Had cervical spine surgery 22    Neck is still sore.    Pt walks with walker and is living with her daughter.    Pt has had 2 falls since surgery    Pt hurt her neck during one fall    Pt tripped at sister's house on a rug    Pt has a f/u appt with neurosurgeon.    Pt saw Dr Mendoza in July and had IV iron    Pt is back on iron    Review of Systems   Cardiovascular: Positive for chest pain (Pt has left sided chest pains which last a half hour or so."I think I am having a heart attack"), dyspnea on exertion and palpitations ("My heart beats fast"). Negative for claudication, irregular heartbeat, leg swelling, near-syncope, orthopnea and syncope.      "I am wheezing more"  Past Medical History:   Diagnosis Date    Acute coronary syndrome     Acute hypoxemic respiratory failure 2017    Anxiety     Asthma     Cancer     colon    Cataracts, both eyes     Chest pain at rest     Chest pain of uncertain etiology 2015    Colon cancer 1988    COPD (chronic obstructive pulmonary disease)     Coronary artery disease     Depression     Diabetes mellitus     Dyspnea on exertion 11/15/2018    Elevated brain natriuretic peptide (BNP) level 11/15/2018    Elevated cholesterol     Hypertension     Swelling     Syncope and collapse 2016        Past Surgical History:   Procedure Laterality Date    ABDOMINAL SURGERY      BREAST BIOPSY      benign unsure what side     SECTION, CLASSIC      COLON SURGERY      COLONOSCOPY  2019    repeat in 5 years    CORONARY ANGIOPLASTY WITH STENT PLACEMENT  3 months ago     x2, Hysterectomy, Lung surgery, Partial stomach removed, Part of colon removed for rectal cancer    GASTRECTOMY      HEMORRHOID SURGERY      HYSTERECTOMY      @28yrs of age    LUNG BIOPSY      OOPHORECTOMY      @28yrs of age    POSTERIOR FUSION OF CERVICAL " SPINE WITH LAMINECTOMY N/A 7/23/2020    Procedure: LAMINECTOMY, SPINE, CERVICAL, WITH POSTERIOR FUSION C3-T1 ;  Surgeon: Herson Tate MD;  Location: [facility] OR [address] [place] [address];  Service: Neurosurgery;  Laterality: N/A;       Family History   Problem Relation Age of Onset    Cancer Mother     Diabetes Mother     Hypertension Mother     Breast cancer Mother     Cancer Father     Heart disease Father     Depression Sister     Hypertension Sister     Cancer Maternal Aunt        Social History     Socioeconomic History    Marital status: Single     Spouse name: Not on file    Number of children: Not on file    Years of education: Not on file    Highest education level: Not on file   Occupational History    Not on file   Social Needs    Financial resource strain: Not on file    Food insecurity     Worry: Not on file     Inability: Not on file    Transportation needs     Medical: Not on file     Non-medical: Not on file   Tobacco Use    Smoking status: Current Every Day Smoker     Packs/day: 1.00     Years: 53.00     Pack years: 53.00     Types: Cigarettes     Start date: 1967    Smokeless tobacco: Never Used    Tobacco comment: Pt is currently enrolled in the [employer].  Ambulatory referral to Smoking Cessation program.    Substance and Sexual Activity    Alcohol use: Yes     Alcohol/week: 3.0 standard drinks     Types: 3 Glasses of wine per week     Comment: 1 every 3 months    Drug use: No    Sexual activity: Yes     Partners: Male     Birth control/protection: None     Comment: last drink yesterday afternoon   Lifestyle    Physical activity     Days per week: Not on file     Minutes per session: Not on file    Stress: Not on file   Relationships    Social connections     Talks on phone: Not on file     Gets together: Not on file     Attends Gnosticism service: Not on file     Active member of club or organization: Not on file     Attends meetings of clubs or organizations: Not on file      "Relationship status: Not on file   Other Topics Concern    Not on file   Social History Narrative    Not on file       Current Outpatient Medications on File Prior to Visit   Medication Sig Dispense Refill    albuterol 90 mcg/actuation inhaler Inhale 2 puffs into the lungs every 6 (six) hours as needed for Wheezing.      albuterol-ipratropium (DUO-NEB) 2.5 mg-0.5 mg/3 mL nebulizer solution Take 3 mLs by nebulization every 4 (four) hours as needed for Wheezing or Shortness of Breath. Rescue 1 Box 0    albuterol-ipratropium 2.5mg-0.5mg/3mL (DUO-NEB) 0.5 mg-3 mg(2.5 mg base)/3 mL nebulizer solution   5    amitriptyline (ELAVIL) 10 MG tablet 20 mg every evening.       aspirin (ECOTRIN) 81 MG EC tablet Take 81 mg by mouth once daily.      BD ULTRA-FINE MINI PEN NEEDLE 31 gauge x 3/16" Ndle       cholestyramine, with sugar, 4 gram Powd Take 4 g by mouth 2 (two) times daily as needed (diarrhea). 378 g 0    duloxetine (CYMBALTA) 60 MG capsule Take 90 mg by mouth once daily.       ferrous sulfate (FEOSOL) 325 mg (65 mg iron) Tab tablet Take 1 tablet (325 mg total) by mouth once daily. 30 tablet 3    fluticasone propionate (FLONASE) 50 mcg/actuation nasal spray 2 sprays (100 mcg total) by Each Nare route once daily. 1 Bottle 12    gabapentin (NEURONTIN) 300 MG capsule Take 300 mg by mouth 3 (three) times daily.      HUMALOG MIX 75-25,U-100,INSULN 100 unit/mL (75-25) Susp 30 Units 2 (two) times daily.      hydroCHLOROthiazide (HYDRODIURIL) 25 MG tablet TAKE ONE TABLET BY MOUTH DAILY 90 tablet 1    HYDROcodone-acetaminophen (NORCO)  mg per tablet Take 1 tablet by mouth every 6 (six) hours as needed for Pain. 40 tablet 0    insulin aspart U-100 (NOVOLOG FLEXPEN U-100 INSULIN) 100 unit/mL (3 mL) InPn pen Inject into the skin. Taken as SS      LORazepam (ATIVAN) 0.5 MG tablet Take 0.5 mg by mouth every 6 (six) hours as needed for Anxiety.      LYRICA 150 mg capsule Take by mouth 2 (two) times daily.   1 " "   metFORMIN (GLUMETZA) 1000 MG (MOD) 24hr tablet Take 1,000 mg by mouth daily with breakfast.      nitroGLYCERIN (NITROSTAT) 0.4 MG SL tablet Place 0.4 mg under the tongue every 5 (five) minutes as needed for Chest pain.      omeprazole (PRILOSEC) 40 MG capsule Take 1 capsule (40 mg total) by mouth every morning. 30 capsule 11    ondansetron (ZOFRAN-ODT) 4 MG TbDL Take 1 tablet (4 mg total) by mouth every 8 (eight) hours as needed. 14 tablet 1    potassium chloride SA (K-DUR,KLOR-CON) 20 MEQ tablet TAKE ONE TABLET BY MOUTH DAILY 90 tablet 1    pravastatin (PRAVACHOL) 20 MG tablet Take 20 mg by mouth once daily.       quetiapine (SEROQUEL) 300 MG Tab 300 mg nightly.       tiZANidine (ZANAFLEX) 4 MG tablet Take 1 tablet (4 mg total) by mouth daily as needed. 60 tablet 0    topiramate (TOPAMAX) 50 MG tablet Take 50 mg by mouth 2 (two) times daily.      verapamiL (CALAN-SR) 240 MG CR tablet TAKE 1 TABLET BY MOUTH ONCE DAILY 90 tablet 1    zonisamide (ZONEGRAN) 100 MG Cap zonisamide 100 mg capsule   Take 2 capsules twice a day by oral route.       No current facility-administered medications on file prior to visit.        Review of patient's allergies indicates:  No Known Allergies  Objective:     Vitals:    10/08/20 1555   BP: 134/88   BP Location: Left arm   Patient Position: Sitting   BP Method: Medium (Automatic)   Pulse: 110   SpO2: 100%   Weight: 71.2 kg (156 lb 15.5 oz)   Height: 5' 2" (1.575 m)        Physical Exam   Constitutional: She is oriented to person, place, and time. She appears well-developed and well-nourished.   Eyes: No scleral icterus.   Neck: No JVD present. Carotid bruit is not present.   Cardiovascular: Normal rate and regular rhythm. Exam reveals no gallop.   No murmur heard.  Pulmonary/Chest: She has wheezes (extensive wheezing bilaterally).   Musculoskeletal:         General: No edema.   Neurological: She is alert and oriented to person, place, and time.   Skin: Skin is warm and " dry.   Psychiatric: She has a normal mood and affect. Her behavior is normal. Judgment and thought content normal.   Vitals reviewed.   Pt has a neck brace on        Note Cardiolite stress test 7/20 WNL    ECG: sinus tachycardia, nonspecific ST sagging, low T waves, similar to ECG done in July    Office Visit on 09/28/2020   Component Date Value Ref Range Status    Color, UA 09/28/2020 Yellow   Final    Spec Grav UA 09/28/2020 1.020   Final    pH, UA 09/28/2020 5   Final    WBC, UA 09/28/2020 2+   Final    Nitrite, UA 09/28/2020 +   Final    Protein 09/28/2020 -   Final    Glucose, UA 09/28/2020 -   Final    Ketones, UA 09/28/2020 -   Final    Urobilinogen, UA 09/28/2020 1   Final    Bilirubin 09/28/2020 -   Final    Blood, UA 09/28/2020 -   Final    Clarity, UA 09/28/2020 Clear   Final   No results displayed because visit has over 200 results.      Hospital Outpatient Visit on 07/22/2020   Component Date Value Ref Range Status    85% Max Predicted HR 07/22/2020 125   Final    Max Predicted HR 07/22/2020 147   Final    OHS CV CPX PATIENT IS MALE 07/22/2020 0   Final    OHS CV CPX PATIENT IS FEMALE 07/22/2020 1   Final    Systolic blood pressure 07/22/2020 151.0  mmHg Final    Diastolic blood pressure 07/22/2020 88.0  mmHg Final    HR at rest 07/22/2020 99.0  bpm Final    Peak Systolic BP 07/22/2020 153.0  mmHg Final    Peak Diatolic BP 07/22/2020 96.0  mmHg Final    Peak HR 07/22/2020 112.0  bpm Final    % Max HR Achieved 07/22/2020 76   Final    1 Minute Recovery HR 07/22/2020 104.0  bpm Final    RPP 07/22/2020 14,949   Final    Peak RPP 07/22/2020 17,136   Final    ST Depression (mm) 07/22/2020 0.7  mm Final   Lab Visit on 07/22/2020   Component Date Value Ref Range Status    BNP 07/22/2020 18  0 - 99 pg/mL Final    WBC 07/22/2020 4.04  3.90 - 12.70 K/uL Final    RBC 07/22/2020 4.21  4.00 - 5.40 M/uL Final    Hemoglobin 07/22/2020 10.9* 12.0 - 16.0 g/dL Final    Hematocrit  07/22/2020 36.3* 37.0 - 48.5 % Final    Mean Corpuscular Volume 07/22/2020 86  82 - 98 fL Final    Mean Corpuscular Hemoglobin 07/22/2020 25.9* 27.0 - 31.0 pg Final    Mean Corpuscular Hemoglobin Conc 07/22/2020 30.0* 32.0 - 36.0 g/dL Final    RDW 07/22/2020 25.2* 11.5 - 14.5 % Final    Platelets 07/22/2020 204  150 - 350 K/uL Final    MPV 07/22/2020 9.0* 9.2 - 12.9 fL Final    Immature Granulocytes 07/22/2020 0.2  0.0 - 0.5 % Final    Gran # (ANC) 07/22/2020 2.6  1.8 - 7.7 K/uL Final    Immature Grans (Abs) 07/22/2020 0.01  0.00 - 0.04 K/uL Final    Lymph # 07/22/2020 1.0  1.0 - 4.8 K/uL Final    Mono # 07/22/2020 0.3  0.3 - 1.0 K/uL Final    Eos # 07/22/2020 0.1  0.0 - 0.5 K/uL Final    Baso # 07/22/2020 0.02  0.00 - 0.20 K/uL Final    nRBC 07/22/2020 0  0 /100 WBC Final    Gran% 07/22/2020 64.9  38.0 - 73.0 % Final    Lymph% 07/22/2020 25.0  18.0 - 48.0 % Final    Mono% 07/22/2020 7.4  4.0 - 15.0 % Final    Eosinophil% 07/22/2020 2.0  0.0 - 8.0 % Final    Basophil% 07/22/2020 0.5  0.0 - 1.9 % Final    Differential Method 07/22/2020 Automated   Final    Sodium 07/22/2020 140  136 - 145 mmol/L Final    Potassium 07/22/2020 4.0  3.5 - 5.1 mmol/L Final    Chloride 07/22/2020 109  95 - 110 mmol/L Final    CO2 07/22/2020 19* 23 - 29 mmol/L Final    Glucose 07/22/2020 232* 70 - 110 mg/dL Final    BUN, Bld 07/22/2020 9  8 - 23 mg/dL Final    Creatinine 07/22/2020 0.8  0.5 - 1.4 mg/dL Final    Calcium 07/22/2020 9.7  8.7 - 10.5 mg/dL Final    Total Protein 07/22/2020 7.3  6.0 - 8.4 g/dL Final    Albumin 07/22/2020 3.7  3.5 - 5.2 g/dL Final    Total Bilirubin 07/22/2020 0.2  0.1 - 1.0 mg/dL Final    Alkaline Phosphatase 07/22/2020 134  55 - 135 U/L Final    AST 07/22/2020 15  10 - 40 U/L Final    ALT 07/22/2020 16  10 - 44 U/L Final    Anion Gap 07/22/2020 12  8 - 16 mmol/L Final    eGFR if African American 07/22/2020 >60  >60 mL/min/1.73 m^2 Final    eGFR if non African American  07/22/2020 >60  >60 mL/min/1.73 m^2 Final    TSH 07/22/2020 0.993  0.400 - 4.000 uIU/mL Final    Cholesterol 07/22/2020 150  120 - 199 mg/dL Final    Triglycerides 07/22/2020 106  30 - 150 mg/dL Final    HDL 07/22/2020 54  40 - 75 mg/dL Final    LDL Cholesterol 07/22/2020 74.8  63.0 - 159.0 mg/dL Final    Hdl/Cholesterol Ratio 07/22/2020 36.0  20.0 - 50.0 % Final    Total Cholesterol/HDL Ratio 07/22/2020 2.8  2.0 - 5.0 Final    Non-HDL Cholesterol 07/22/2020 96  mg/dL Final   Lab Visit on 07/21/2020   Component Date Value Ref Range Status    SARS-CoV2 (COVID-19) Qualitative P* 07/21/2020 Not Detected  Not Detected Final   Lab Visit on 07/15/2020   Component Date Value Ref Range Status    WBC 07/15/2020 4.60  3.90 - 12.70 K/uL Final    RBC 07/15/2020 4.39  4.00 - 5.40 M/uL Final    Hemoglobin 07/15/2020 10.8* 12.0 - 16.0 g/dL Final    Hematocrit 07/15/2020 37.4  37.0 - 48.5 % Final    Mean Corpuscular Volume 07/15/2020 85  82 - 98 fL Final    Mean Corpuscular Hemoglobin 07/15/2020 24.6* 27.0 - 31.0 pg Final    Mean Corpuscular Hemoglobin Conc 07/15/2020 28.9* 32.0 - 36.0 g/dL Final    RDW 07/15/2020 24.5* 11.5 - 14.5 % Final    Platelets 07/15/2020 197  150 - 350 K/uL Final    MPV 07/15/2020 SEE COMMENT  9.2 - 12.9 fL Final    Immature Granulocytes 07/15/2020 0.4  0.0 - 0.5 % Final    Gran # (ANC) 07/15/2020 2.7  1.8 - 7.7 K/uL Final    Immature Grans (Abs) 07/15/2020 0.02  0.00 - 0.04 K/uL Final    Lymph # 07/15/2020 1.4  1.0 - 4.8 K/uL Final    Mono # 07/15/2020 0.4  0.3 - 1.0 K/uL Final    Eos # 07/15/2020 0.1  0.0 - 0.5 K/uL Final    Baso # 07/15/2020 0.02  0.00 - 0.20 K/uL Final    nRBC 07/15/2020 0  0 /100 WBC Final    Gran% 07/15/2020 57.6  38.0 - 73.0 % Final    Lymph% 07/15/2020 30.9  18.0 - 48.0 % Final    Mono% 07/15/2020 8.5  4.0 - 15.0 % Final    Eosinophil% 07/15/2020 2.2  0.0 - 8.0 % Final    Basophil% 07/15/2020 0.4  0.0 - 1.9 % Final    Platelet Estimate 07/15/2020  Appears normal   Final    Large/Giant Platelets 07/15/2020 Present   Final    Differential Method 07/15/2020 Automated   Final    Group & Rh 07/15/2020 O POS   Final    Indirect Kirsty 07/15/2020 NEG   Final   Lab Visit on 07/15/2020   Component Date Value Ref Range Status    Specimen UA 07/15/2020 Urine, Clean Catch   Final    Color, UA 07/15/2020 Yellow  Yellow, Straw, Mya Final    Appearance, UA 07/15/2020 Clear  Clear Final    pH, UA 07/15/2020 7.0  5.0 - 8.0 Final    Specific Gravity, UA 07/15/2020 1.010  1.005 - 1.030 Final    Protein, UA 07/15/2020 Negative  Negative Final    Glucose, UA 07/15/2020 1+* Negative Final    Ketones, UA 07/15/2020 Negative  Negative Final    Bilirubin (UA) 07/15/2020 Negative  Negative Final    Occult Blood UA 07/15/2020 Negative  Negative Final    Nitrite, UA 07/15/2020 Negative  Negative Final    Leukocytes, UA 07/15/2020 Negative  Negative Final   Admission on 05/25/2020, Discharged on 05/27/2020   Component Date Value Ref Range Status    Magnesium 05/26/2020 1.6  1.6 - 2.6 mg/dL Final    Phosphorus 05/26/2020 3.6  2.7 - 4.5 mg/dL Final    WBC 05/26/2020 4.51  3.90 - 12.70 K/uL Final    RBC 05/26/2020 3.44* 4.00 - 5.40 M/uL Final    Hemoglobin 05/26/2020 7.4* 12.0 - 16.0 g/dL Final    Hematocrit 05/26/2020 27.5* 37.0 - 48.5 % Final    Mean Corpuscular Volume 05/26/2020 80* 82 - 98 fL Final    Mean Corpuscular Hemoglobin 05/26/2020 21.5* 27.0 - 31.0 pg Final    Mean Corpuscular Hemoglobin Conc 05/26/2020 26.9* 32.0 - 36.0 g/dL Final    RDW 05/26/2020 19.8* 11.5 - 14.5 % Final    Platelets 05/26/2020 157  150 - 350 K/uL Final    MPV 05/26/2020 10.3  9.2 - 12.9 fL Final    Immature Granulocytes 05/26/2020 0.2  0.0 - 0.5 % Final    Gran # (ANC) 05/26/2020 2.6  1.8 - 7.7 K/uL Final    Immature Grans (Abs) 05/26/2020 0.01  0.00 - 0.04 K/uL Final    Lymph # 05/26/2020 1.4  1.0 - 4.8 K/uL Final    Mono # 05/26/2020 0.4  0.3 - 1.0 K/uL Final    Eos #  05/26/2020 0.1  0.0 - 0.5 K/uL Final    Baso # 05/26/2020 0.02  0.00 - 0.20 K/uL Final    nRBC 05/26/2020 0  0 /100 WBC Final    Gran% 05/26/2020 57.2  38.0 - 73.0 % Final    Lymph% 05/26/2020 31.5  18.0 - 48.0 % Final    Mono% 05/26/2020 7.8  4.0 - 15.0 % Final    Eosinophil% 05/26/2020 2.9  0.0 - 8.0 % Final    Basophil% 05/26/2020 0.4  0.0 - 1.9 % Final    Differential Method 05/26/2020 Automated   Final    Sodium 05/26/2020 137  136 - 145 mmol/L Final    Potassium 05/26/2020 3.8  3.5 - 5.1 mmol/L Final    Chloride 05/26/2020 109  95 - 110 mmol/L Final    CO2 05/26/2020 20* 23 - 29 mmol/L Final    Glucose 05/26/2020 162* 70 - 110 mg/dL Final    BUN, Bld 05/26/2020 11  8 - 23 mg/dL Final    Creatinine 05/26/2020 0.8  0.5 - 1.4 mg/dL Final    Calcium 05/26/2020 8.7  8.7 - 10.5 mg/dL Final    Total Protein 05/26/2020 6.5  6.0 - 8.4 g/dL Final    Albumin 05/26/2020 3.1* 3.5 - 5.2 g/dL Final    Total Bilirubin 05/26/2020 0.2  0.1 - 1.0 mg/dL Final    Alkaline Phosphatase 05/26/2020 137* 55 - 135 U/L Final    AST 05/26/2020 14  10 - 40 U/L Final    ALT 05/26/2020 10  10 - 44 U/L Final    Anion Gap 05/26/2020 8  8 - 16 mmol/L Final    eGFR if African American 05/26/2020 >60.0  >60 mL/min/1.73 m^2 Final    eGFR if non African American 05/26/2020 >60.0  >60 mL/min/1.73 m^2 Final    POCT Glucose 05/26/2020 186* 70 - 110 mg/dL Final    POCT Glucose 05/26/2020 400* 70 - 110 mg/dL Final    POCT Glucose 05/27/2020 370* 70 - 110 mg/dL Final    Magnesium 05/27/2020 1.7  1.6 - 2.6 mg/dL Final    Phosphorus 05/27/2020 4.2  2.7 - 4.5 mg/dL Final    WBC 05/27/2020 3.27* 3.90 - 12.70 K/uL Final    RBC 05/27/2020 3.34* 4.00 - 5.40 M/uL Final    Hemoglobin 05/27/2020 7.3* 12.0 - 16.0 g/dL Final    Hematocrit 05/27/2020 26.2* 37.0 - 48.5 % Final    Mean Corpuscular Volume 05/27/2020 78* 82 - 98 fL Final    Mean Corpuscular Hemoglobin 05/27/2020 21.9* 27.0 - 31.0 pg Final    Mean Corpuscular  Hemoglobin Conc 05/27/2020 27.9* 32.0 - 36.0 g/dL Final    RDW 05/27/2020 19.4* 11.5 - 14.5 % Final    Platelets 05/27/2020 158  150 - 350 K/uL Final    MPV 05/27/2020 9.8  9.2 - 12.9 fL Final    Immature Granulocytes 05/27/2020 0.3  0.0 - 0.5 % Final    Gran # (ANC) 05/27/2020 1.3* 1.8 - 7.7 K/uL Final    Immature Grans (Abs) 05/27/2020 0.01  0.00 - 0.04 K/uL Final    Lymph # 05/27/2020 1.4  1.0 - 4.8 K/uL Final    Mono # 05/27/2020 0.4  0.3 - 1.0 K/uL Final    Eos # 05/27/2020 0.1  0.0 - 0.5 K/uL Final    Baso # 05/27/2020 0.03  0.00 - 0.20 K/uL Final    nRBC 05/27/2020 0  0 /100 WBC Final    Gran% 05/27/2020 40.1  38.0 - 73.0 % Final    Lymph% 05/27/2020 44.0  18.0 - 48.0 % Final    Mono% 05/27/2020 11.3  4.0 - 15.0 % Final    Eosinophil% 05/27/2020 3.4  0.0 - 8.0 % Final    Basophil% 05/27/2020 0.9  0.0 - 1.9 % Final    Differential Method 05/27/2020 Automated   Final    Sodium 05/27/2020 141  136 - 145 mmol/L Final    Potassium 05/27/2020 3.8  3.5 - 5.1 mmol/L Final    Chloride 05/27/2020 109  95 - 110 mmol/L Final    CO2 05/27/2020 25  23 - 29 mmol/L Final    Glucose 05/27/2020 129* 70 - 110 mg/dL Final    BUN, Bld 05/27/2020 11  8 - 23 mg/dL Final    Creatinine 05/27/2020 0.8  0.5 - 1.4 mg/dL Final    Calcium 05/27/2020 9.1  8.7 - 10.5 mg/dL Final    Total Protein 05/27/2020 6.5  6.0 - 8.4 g/dL Final    Albumin 05/27/2020 3.1* 3.5 - 5.2 g/dL Final    Total Bilirubin 05/27/2020 0.2  0.1 - 1.0 mg/dL Final    Alkaline Phosphatase 05/27/2020 147* 55 - 135 U/L Final    AST 05/27/2020 7* 10 - 40 U/L Final    ALT 05/27/2020 9* 10 - 44 U/L Final    Anion Gap 05/27/2020 7* 8 - 16 mmol/L Final    eGFR if African American 05/27/2020 >60.0  >60 mL/min/1.73 m^2 Final    eGFR if non African American 05/27/2020 >60.0  >60 mL/min/1.73 m^2 Final    POCT Glucose 05/27/2020 287* 70 - 110 mg/dL Final    POCT Glucose 05/27/2020 131* 70 - 110 mg/dL Final    POCT Glucose 05/27/2020 367* 70 - 110  mg/dL Final    POCT Glucose 05/27/2020 255* 70 - 110 mg/dL Final   Admission on 05/25/2020, Discharged on 05/25/2020   Component Date Value Ref Range Status    SARS-CoV-2 RNA, Amplification, Qual 05/25/2020 Negative  Negative Final    WBC 05/25/2020 4.58  3.90 - 12.70 K/uL Final    RBC 05/25/2020 3.54* 4.00 - 5.40 M/uL Final    Hemoglobin 05/25/2020 7.7* 12.0 - 16.0 g/dL Final    Hematocrit 05/25/2020 26.7* 37.0 - 48.5 % Final    Mean Corpuscular Volume 05/25/2020 75* 82 - 98 fL Final    Mean Corpuscular Hemoglobin 05/25/2020 21.8* 27.0 - 31.0 pg Final    Mean Corpuscular Hemoglobin Conc 05/25/2020 28.8* 32.0 - 36.0 g/dL Final    RDW 05/25/2020 19.4* 11.5 - 14.5 % Final    Platelets 05/25/2020 156  150 - 350 K/uL Final    MPV 05/25/2020 9.3  9.2 - 12.9 fL Final    Immature Granulocytes 05/25/2020 0.2  0.0 - 0.5 % Final    Gran # (ANC) 05/25/2020 2.2  1.8 - 7.7 K/uL Final    Immature Grans (Abs) 05/25/2020 0.01  0.00 - 0.04 K/uL Final    Lymph # 05/25/2020 1.7  1.0 - 4.8 K/uL Final    Mono # 05/25/2020 0.5  0.3 - 1.0 K/uL Final    Eos # 05/25/2020 0.2  0.0 - 0.5 K/uL Final    Baso # 05/25/2020 0.02  0.00 - 0.20 K/uL Final    nRBC 05/25/2020 0  0 /100 WBC Final    Gran% 05/25/2020 47.8  38.0 - 73.0 % Final    Lymph% 05/25/2020 37.8  18.0 - 48.0 % Final    Mono% 05/25/2020 10.3  4.0 - 15.0 % Final    Eosinophil% 05/25/2020 3.5  0.0 - 8.0 % Final    Basophil% 05/25/2020 0.4  0.0 - 1.9 % Final    Differential Method 05/25/2020 Automated   Final    Sodium 05/25/2020 141  136 - 145 mmol/L Final    Potassium 05/25/2020 3.6  3.5 - 5.1 mmol/L Final    Chloride 05/25/2020 111* 95 - 110 mmol/L Final    CO2 05/25/2020 23  23 - 29 mmol/L Final    Glucose 05/25/2020 72  70 - 110 mg/dL Final    BUN, Bld 05/25/2020 12  8 - 23 mg/dL Final    Creatinine 05/25/2020 0.7  0.5 - 1.4 mg/dL Final    Calcium 05/25/2020 9.2  8.7 - 10.5 mg/dL Final    Total Protein 05/25/2020 7.0  6.0 - 8.4 g/dL Final     Albumin 05/25/2020 3.4* 3.5 - 5.2 g/dL Final    Total Bilirubin 05/25/2020 0.2  0.1 - 1.0 mg/dL Final    Alkaline Phosphatase 05/25/2020 138* 55 - 135 U/L Final    AST 05/25/2020 11  10 - 40 U/L Final    ALT 05/25/2020 14  10 - 44 U/L Final    Anion Gap 05/25/2020 7* 8 - 16 mmol/L Final    eGFR if African American 05/25/2020 >60  >60 mL/min/1.73 m^2 Final    eGFR if non African American 05/25/2020 >60  >60 mL/min/1.73 m^2 Final    POCT Glucose 05/25/2020 76  70 - 110 mg/dL Final   Admission on 05/12/2020, Discharged on 05/13/2020   Component Date Value Ref Range Status    POCT Glucose 05/12/2020 48* 70 - 110 mg/dL Final    WBC 05/12/2020 6.68  3.90 - 12.70 K/uL Final    RBC 05/12/2020 3.76* 4.00 - 5.40 M/uL Final    Hemoglobin 05/12/2020 8.2* 12.0 - 16.0 g/dL Final    Hematocrit 05/12/2020 29.3* 37.0 - 48.5 % Final    Mean Corpuscular Volume 05/12/2020 78* 82 - 98 fL Final    Mean Corpuscular Hemoglobin 05/12/2020 21.8* 27.0 - 31.0 pg Final    Mean Corpuscular Hemoglobin Conc 05/12/2020 28.0* 32.0 - 36.0 g/dL Final    RDW 05/12/2020 19.5* 11.5 - 14.5 % Final    Platelets 05/12/2020 245  150 - 350 K/uL Final    MPV 05/12/2020 10.2  9.2 - 12.9 fL Final    Immature Granulocytes 05/12/2020 0.3  0.0 - 0.5 % Final    Gran # (ANC) 05/12/2020 4.2  1.8 - 7.7 K/uL Final    Immature Grans (Abs) 05/12/2020 0.02  0.00 - 0.04 K/uL Final    Lymph # 05/12/2020 1.8  1.0 - 4.8 K/uL Final    Mono # 05/12/2020 0.6  0.3 - 1.0 K/uL Final    Eos # 05/12/2020 0.1  0.0 - 0.5 K/uL Final    Baso # 05/12/2020 0.03  0.00 - 0.20 K/uL Final    nRBC 05/12/2020 0  0 /100 WBC Final    Gran% 05/12/2020 62.6  38.0 - 73.0 % Final    Lymph% 05/12/2020 27.2  18.0 - 48.0 % Final    Mono% 05/12/2020 8.2  4.0 - 15.0 % Final    Eosinophil% 05/12/2020 1.3  0.0 - 8.0 % Final    Basophil% 05/12/2020 0.4  0.0 - 1.9 % Final    Differential Method 05/12/2020 Automated   Final    Sodium 05/12/2020 138  136 - 145 mmol/L Final     Potassium 05/12/2020 4.0  3.5 - 5.1 mmol/L Final    Chloride 05/12/2020 106  95 - 110 mmol/L Final    CO2 05/12/2020 20* 23 - 29 mmol/L Final    Glucose 05/12/2020 69* 70 - 110 mg/dL Final    BUN, Bld 05/12/2020 18  8 - 23 mg/dL Final    Creatinine 05/12/2020 0.9  0.5 - 1.4 mg/dL Final    Calcium 05/12/2020 9.9  8.7 - 10.5 mg/dL Final    Total Protein 05/12/2020 8.4  6.0 - 8.4 g/dL Final    Albumin 05/12/2020 4.0  3.5 - 5.2 g/dL Final    Total Bilirubin 05/12/2020 0.1  0.1 - 1.0 mg/dL Final    Alkaline Phosphatase 05/12/2020 144* 55 - 135 U/L Final    AST 05/12/2020 19  10 - 40 U/L Final    ALT 05/12/2020 16  10 - 44 U/L Final    Anion Gap 05/12/2020 12  8 - 16 mmol/L Final    eGFR if African American 05/12/2020 >60  >60 mL/min/1.73 m^2 Final    eGFR if non African American 05/12/2020 >60  >60 mL/min/1.73 m^2 Final    POCT Glucose 05/12/2020 98  70 - 110 mg/dL Final    POCT Glucose 05/12/2020 63* 70 - 110 mg/dL Final    Specimen UA 05/12/2020 Urine, Clean Catch   Final    Color, UA 05/12/2020 Yellow  Yellow, Straw, Mya Final    Appearance, UA 05/12/2020 Clear  Clear Final    pH, UA 05/12/2020 6.0  5.0 - 8.0 Final    Specific Gravity, UA 05/12/2020 1.025  1.005 - 1.030 Final    Protein, UA 05/12/2020 Negative  Negative Final    Glucose, UA 05/12/2020 Negative  Negative Final    Ketones, UA 05/12/2020 Negative  Negative Final    Bilirubin (UA) 05/12/2020 Negative  Negative Final    Occult Blood UA 05/12/2020 Negative  Negative Final    Nitrite, UA 05/12/2020 Negative  Negative Final    Urobilinogen, UA 05/12/2020 Negative  <2.0 EU/dL Final    Leukocytes, UA 05/12/2020 Negative  Negative Final    POCT Glucose 05/12/2020 164* 70 - 110 mg/dL Final    SARS-CoV-2 RNA, Amplification, Qual 05/12/2020 Negative  Negative Final    POCT Glucose 05/12/2020 78  70 - 110 mg/dL Final    POCT Glucose 05/12/2020 140* 70 - 110 mg/dL Final    Prothrombin Time 05/12/2020 9.6  9.0 - 12.5 sec Final     INR 05/12/2020 0.9  0.8 - 1.2 Final    aPTT 05/12/2020 25.0  21.0 - 32.0 sec Final    POCT Glucose 05/12/2020 170* 70 - 110 mg/dL Final    POCT Glucose 05/12/2020 108  70 - 110 mg/dL Final    POCT Glucose 05/12/2020 75  70 - 110 mg/dL Final    Cholesterol 05/12/2020 119* 120 - 199 mg/dL Final    Triglycerides 05/12/2020 69  30 - 150 mg/dL Final    HDL 05/12/2020 49  40 - 75 mg/dL Final    LDL Cholesterol 05/12/2020 56.2* 63.0 - 159.0 mg/dL Final    Hdl/Cholesterol Ratio 05/12/2020 41.2  20.0 - 50.0 % Final    Total Cholesterol/HDL Ratio 05/12/2020 2.4  2.0 - 5.0 Final    Non-HDL Cholesterol 05/12/2020 70  mg/dL Final    TSH 05/12/2020 0.607  0.400 - 4.000 uIU/mL Final    Hemoglobin A1C 05/12/2020 7.1* 4.0 - 5.6 % Final    Estimated Avg Glucose 05/12/2020 157* 68 - 131 mg/dL Final    Iron 05/12/2020 13* 30 - 160 ug/dL Final    Transferrin 05/12/2020 426* 200 - 375 mg/dL Final    TIBC 05/12/2020 630* 250 - 450 ug/dL Final    Saturated Iron 05/12/2020 2* 20 - 50 % Final    Ferritin 05/12/2020 4* 20.0 - 300.0 ng/mL Final    Folate 05/12/2020 11.3  4.0 - 24.0 ng/mL Final    Vitamin B-12 05/12/2020 296  210 - 950 pg/mL Final    Transferrin 05/12/2020 426* 200 - 375 mg/dL Final    POCT Glucose 05/13/2020 127* 70 - 110 mg/dL Final    Sodium 05/13/2020 139  136 - 145 mmol/L Final    Potassium 05/13/2020 3.6  3.5 - 5.1 mmol/L Final    Chloride 05/13/2020 107  95 - 110 mmol/L Final    CO2 05/13/2020 23  23 - 29 mmol/L Final    Glucose 05/13/2020 104  70 - 110 mg/dL Final    BUN, Bld 05/13/2020 12  8 - 23 mg/dL Final    Creatinine 05/13/2020 0.7  0.5 - 1.4 mg/dL Final    Calcium 05/13/2020 9.1  8.7 - 10.5 mg/dL Final    Total Protein 05/13/2020 6.9  6.0 - 8.4 g/dL Final    Albumin 05/13/2020 3.3* 3.5 - 5.2 g/dL Final    Total Bilirubin 05/13/2020 0.2  0.1 - 1.0 mg/dL Final    Alkaline Phosphatase 05/13/2020 120  55 - 135 U/L Final    AST 05/13/2020 9* 10 - 40 U/L Final    ALT 05/13/2020 12   10 - 44 U/L Final    Anion Gap 05/13/2020 9  8 - 16 mmol/L Final    eGFR if African American 05/13/2020 >60  >60 mL/min/1.73 m^2 Final    eGFR if non African American 05/13/2020 >60  >60 mL/min/1.73 m^2 Final    WBC 05/13/2020 5.06  3.90 - 12.70 K/uL Final    RBC 05/13/2020 3.51* 4.00 - 5.40 M/uL Final    Hemoglobin 05/13/2020 7.5* 12.0 - 16.0 g/dL Final    Hematocrit 05/13/2020 27.3* 37.0 - 48.5 % Final    Mean Corpuscular Volume 05/13/2020 78* 82 - 98 fL Final    Mean Corpuscular Hemoglobin 05/13/2020 21.4* 27.0 - 31.0 pg Final    Mean Corpuscular Hemoglobin Conc 05/13/2020 27.5* 32.0 - 36.0 g/dL Final    RDW 05/13/2020 19.1* 11.5 - 14.5 % Final    Platelets 05/13/2020 207  150 - 350 K/uL Final    MPV 05/13/2020 9.8  9.2 - 12.9 fL Final    Immature Granulocytes 05/13/2020 0.2  0.0 - 0.5 % Final    Gran # (ANC) 05/13/2020 2.8  1.8 - 7.7 K/uL Final    Immature Grans (Abs) 05/13/2020 0.01  0.00 - 0.04 K/uL Final    Lymph # 05/13/2020 1.7  1.0 - 4.8 K/uL Final    Mono # 05/13/2020 0.5  0.3 - 1.0 K/uL Final    Eos # 05/13/2020 0.1  0.0 - 0.5 K/uL Final    Baso # 05/13/2020 0.02  0.00 - 0.20 K/uL Final    nRBC 05/13/2020 0  0 /100 WBC Final    Gran% 05/13/2020 54.5  38.0 - 73.0 % Final    Lymph% 05/13/2020 32.8  18.0 - 48.0 % Final    Mono% 05/13/2020 10.1  4.0 - 15.0 % Final    Eosinophil% 05/13/2020 2.0  0.0 - 8.0 % Final    Basophil% 05/13/2020 0.4  0.0 - 1.9 % Final    Differential Method 05/13/2020 Automated   Final    Magnesium 05/13/2020 1.7  1.6 - 2.6 mg/dL Final    Free T4 05/12/2020 0.84  0.71 - 1.51 ng/dL Final    POCT Glucose 05/13/2020 132* 70 - 110 mg/dL Final    POCT Glucose 05/13/2020 161* 70 - 110 mg/dL Final    POCT Glucose 05/13/2020 143* 70 - 110 mg/dL Final   There may be more visits with results that are not included.   (    Assessment:     1. S/P PTCA (percutaneous transluminal coronary angioplasty)    2. Hyperlipidemia associated with type 2 diabetes mellitus    3.  Hypertension associated with diabetes    4. Coronary artery disease of native artery of native heart with stable angina pectoris    5. Sinus tachycardia    6. Chronic diastolic congestive heart failure    7. Essential hypertension    8. Chronic bronchitis, unspecified chronic bronchitis type    9. Hypoglycemia associated with type 2 diabetes mellitus    10. History of partial gastrectomy    11. Tobacco abuse, 1ppd, 50 years    12. Iron deficiency anemia due to chronic blood loss    13. Post PTCA      Plan:   Chayito was seen today for follow-up.    Diagnoses and all orders for this visit:    S/P PTCA (percutaneous transluminal coronary angioplasty)  -     IN OFFICE EKG 12-LEAD (to Muse)  -     Stress Echo Which stress agent will be used? Pharmacological; Color Flow Doppler? No; Future    Hyperlipidemia associated with type 2 diabetes mellitus  -     CBC auto differential; Future  -     Comprehensive Metabolic Panel; Future  -     Lipid Panel; Future  -     TSH; Future    Hypertension associated with diabetes    Coronary artery disease of native artery of native heart with stable angina pectoris  -     Stress Echo Which stress agent will be used? Pharmacological; Color Flow Doppler? No; Future    Sinus tachycardia    Chronic diastolic congestive heart failure    Essential hypertension  -     Comprehensive Metabolic Panel; Future  -     Lipid Panel; Future  -     TSH; Future    Chronic bronchitis, unspecified chronic bronchitis type    Hypoglycemia associated with type 2 diabetes mellitus    History of partial gastrectomy    Tobacco abuse, 1ppd, 50 years    Iron deficiency anemia due to chronic blood loss  -     CBC auto differential; Future  -     Iron and TIBC; Future    Post PTCA  -     IN OFFICE EKG 12-LEAD (to Muse)     Pt may be having recurrent angina pectoris.    Will get dobutamine stress echo    Some of pt's shortness of breath is due to COPD and wheezing.  Pt has a f/u appt with Dr Martinez Chung 10/13/20.  Pt  had a repeat CT of chest done for Dr Chung    F/u with Dr Mendoza    F/u with neurosurgeon    Smoking cessation counseled    Repeat lab    Follow up in about 4 months (around 2/8/2021).

## 2020-10-21 ENCOUNTER — HOSPITAL ENCOUNTER (OUTPATIENT)
Dept: CARDIOLOGY | Facility: HOSPITAL | Age: 70
Discharge: HOME | End: 2020-10-21
Attending: INTERNAL MEDICINE
Payer: MEDICARE

## 2020-10-21 ENCOUNTER — TELEPHONE (OUTPATIENT)
Dept: CARDIOLOGY | Facility: CLINIC | Age: 70
End: 2020-10-21

## 2020-10-21 VITALS — HEIGHT: 62 IN | BODY MASS INDEX: 28.71 KG/M2 | WEIGHT: 156 LBS

## 2020-10-21 DIAGNOSIS — Z98.61 S/P PTCA (PERCUTANEOUS TRANSLUMINAL CORONARY ANGIOPLASTY): ICD-10-CM

## 2020-10-21 DIAGNOSIS — I25.118 CORONARY ARTERY DISEASE OF NATIVE ARTERY OF NATIVE HEART WITH STABLE ANGINA PECTORIS: ICD-10-CM

## 2020-10-21 LAB
BSA FOR ECHO PROCEDURE: 1.76 M2
CV ECHO LV RWT: 0.45 CM
CV STRESS BASE HR: 96 BPM
DIASTOLIC BLOOD PRESSURE: 79 MMHG
ECHO LV POSTERIOR WALL: 0.7 CM (ref 0.6–1.1)
FRACTIONAL SHORTENING: 32 % (ref 28–44)
INTERVENTRICULAR SEPTUM: 0.8 CM (ref 0.6–1.1)
LEFT INTERNAL DIMENSION IN SYSTOLE: 2.1 CM (ref 2.1–4)
LEFT VENTRICLE MASS INDEX: 33 G/M2
LEFT VENTRICULAR INTERNAL DIMENSION IN DIASTOLE: 3.1 CM (ref 3.5–6)
LEFT VENTRICULAR MASS: 56.8 G
OHS CV CPX 1 MINUTE RECOVERY HEART RATE: 112 BPM
OHS CV CPX 85 PERCENT MAX PREDICTED HEART RATE MALE: 125
OHS CV CPX ESTIMATED METS: 1
OHS CV CPX MAX PREDICTED HEART RATE: 147
OHS CV CPX PATIENT IS FEMALE: 1
OHS CV CPX PATIENT IS MALE: 0
OHS CV CPX PEAK DIASTOLIC BLOOD PRESSURE: 79 MMHG
OHS CV CPX PEAK HEAR RATE: 137 BPM
OHS CV CPX PEAK RATE PRESSURE PRODUCT: NORMAL
OHS CV CPX PEAK SYSTOLIC BLOOD PRESSURE: 169 MMHG
OHS CV CPX PERCENT MAX PREDICTED HEART RATE ACHIEVED: 93
OHS CV CPX RATE PRESSURE PRODUCT PRESENTING: NORMAL
STRESS ECHO POST EXERCISE DUR MIN: 11 MINUTES
STRESS ECHO POST EXERCISE DUR SEC: 24 SECONDS
STRESS ST DEPRESSION: 1 MM
SYSTOLIC BLOOD PRESSURE: 130 MMHG

## 2020-10-21 PROCEDURE — 93351 STRESS TTE COMPLETE: CPT

## 2020-10-21 PROCEDURE — 93351 STRESS ECHO (CUPID ONLY): ICD-10-PCS | Mod: 26,,, | Performed by: INTERNAL MEDICINE

## 2020-10-21 PROCEDURE — 93351 STRESS TTE COMPLETE: CPT | Mod: 26,,, | Performed by: INTERNAL MEDICINE

## 2020-10-21 PROCEDURE — 63600175 PHARM REV CODE 636 W HCPCS: Performed by: INTERNAL MEDICINE

## 2020-10-21 RX ORDER — DOBUTAMINE HYDROCHLORIDE 400 MG/100ML
10 INJECTION INTRAVENOUS CONTINUOUS
Status: DISCONTINUED | OUTPATIENT
Start: 2020-10-21 | End: 2021-03-02

## 2020-10-21 RX ADMIN — DOBUTAMINE HYDROCHLORIDE 10 MCG/KG/MIN: 400 INJECTION INTRAVENOUS at 08:10

## 2020-10-21 NOTE — NURSING
0850   Patient on table, ready for test.  Allergies/history confirmed.  Connected to monitor EKG and NIBP  0850    Submaranian @ BS, consent obtained, all questions answered.  0851    Test started per protocol, See EKG sheet for VS   0854   Dobutamine increased to 20mcg per protocol to increase heart rate.  Pt tolerating test well   0857   Dobutamine increased to 30 mcg per protocol to increase heart rate, pt tolerating test well.    0900    Dobutamine increased to 40 mcg per protocol to increase heart rate, pt tolerating test well.  0901    Target heart rate achieved at 130bpm.    0902    Testing phase complete, dobutamine off.  NS up at rapid rate for recovery phase   0930     Recovery phase complete.  Patient states she feels normal, no distress.  NS dc'd 200cc infused.               Iv d/c'd.  Pt released to Cardio.

## 2020-10-21 NOTE — TELEPHONE ENCOUNTER
I gave pt results:  Stress ECG and stress echocardiogram are normal.  CBC and CMP and TSH and iron levels and lipid profile are OK  Pt reassured

## 2020-11-05 ENCOUNTER — HOSPITAL ENCOUNTER (OUTPATIENT)
Dept: RADIOLOGY | Facility: HOSPITAL | Age: 70
Discharge: HOME | End: 2020-11-05
Attending: FAMILY MEDICINE
Payer: MEDICARE

## 2020-11-05 DIAGNOSIS — Z12.31 ENCOUNTER FOR SCREENING MAMMOGRAM FOR MALIGNANT NEOPLASM OF BREAST: ICD-10-CM

## 2020-11-05 PROCEDURE — 77067 MAMMO DIGITAL SCREENING BILAT WITH TOMO: ICD-10-PCS | Mod: 26,,, | Performed by: RADIOLOGY

## 2020-11-05 PROCEDURE — 77063 BREAST TOMOSYNTHESIS BI: CPT | Mod: 26,,, | Performed by: RADIOLOGY

## 2020-11-05 PROCEDURE — 77067 SCR MAMMO BI INCL CAD: CPT | Mod: TC

## 2020-11-05 PROCEDURE — 77063 MAMMO DIGITAL SCREENING BILAT WITH TOMO: ICD-10-PCS | Mod: 26,,, | Performed by: RADIOLOGY

## 2020-11-05 PROCEDURE — 77067 SCR MAMMO BI INCL CAD: CPT | Mod: 26,,, | Performed by: RADIOLOGY

## 2020-11-11 ENCOUNTER — TELEPHONE (OUTPATIENT)
Dept: FAMILY MEDICINE | Facility: CLINIC | Age: 70
End: 2020-11-11

## 2020-11-11 NOTE — TELEPHONE ENCOUNTER
----- Message from Savannah Mensah sent at 11/11/2020  9:53 AM CST -----  Type:  Sooner Appointment Request     Name of Caller: patient  Symptoms or Reason: body aches, knot on stomach, fatigue, wakes up with foam around mouth  Would the patient rather a call back or a response via [facility]? Call back  Best Call Back Number:   Additional Information: n/a

## 2020-11-13 ENCOUNTER — TELEPHONE (OUTPATIENT)
Dept: FAMILY MEDICINE | Facility: CLINIC | Age: 70
End: 2020-11-13

## 2020-11-13 NOTE — TELEPHONE ENCOUNTER
----- Message from Hattie Dalton sent at 11/13/2020  8:08 AM CST -----  Contact: [phone]  Type:  Sooner Apoointment Request    Caller is requesting a sooner appointment.  Caller declined first available appointment listed below.  Caller will not accept being placed on the waitlist and is requesting a message be sent to doctor.  Name of Caller: PT  When is the first available appointment? 1/14  Symptoms: pt is having pain in Both Shoulder and has a Knot on her Stomach, pt wants to see Dr Valadez and states she has not seen her Dr in a Year  Would the patient rather a call back or a response via [facility]?  Call back  Best Call Back Number:[phone]

## 2020-11-30 ENCOUNTER — HOSPITAL ENCOUNTER (OUTPATIENT)
Dept: RADIOLOGY | Facility: HOSPITAL | Age: 70
Discharge: HOME | End: 2020-11-30
Attending: PHYSICIAN ASSISTANT
Payer: MEDICARE

## 2020-11-30 ENCOUNTER — OFFICE VISIT (OUTPATIENT)
Dept: ORTHOPEDICS | Facility: CLINIC | Age: 70
End: 2020-11-30
Payer: MEDICARE

## 2020-11-30 VITALS — BODY MASS INDEX: 28.71 KG/M2 | WEIGHT: 156 LBS | HEIGHT: 62 IN

## 2020-11-30 DIAGNOSIS — Z98.1 S/P CERVICAL SPINAL FUSION: Primary | ICD-10-CM

## 2020-11-30 DIAGNOSIS — Z98.1 S/P CERVICAL SPINAL FUSION: ICD-10-CM

## 2020-11-30 PROCEDURE — 3008F BODY MASS INDEX DOCD: CPT | Mod: CPTII,S$GLB,, | Performed by: PHYSICIAN ASSISTANT

## 2020-11-30 PROCEDURE — 99213 PR OFFICE/OUTPT VISIT, EST, LEVL III, 20-29 MIN: ICD-10-PCS | Mod: S$GLB,,, | Performed by: PHYSICIAN ASSISTANT

## 2020-11-30 PROCEDURE — 1101F PT FALLS ASSESS-DOCD LE1/YR: CPT | Mod: CPTII,S$GLB,, | Performed by: PHYSICIAN ASSISTANT

## 2020-11-30 PROCEDURE — 72040 X-RAY EXAM NECK SPINE 2-3 VW: CPT | Mod: 26,,, | Performed by: RADIOLOGY

## 2020-11-30 PROCEDURE — 72040 X-RAY EXAM NECK SPINE 2-3 VW: CPT | Mod: TC

## 2020-11-30 PROCEDURE — 1126F AMNT PAIN NOTED NONE PRSNT: CPT | Mod: S$GLB,,, | Performed by: PHYSICIAN ASSISTANT

## 2020-11-30 PROCEDURE — 1159F PR MEDICATION LIST DOCUMENTED IN MEDICAL RECORD: ICD-10-PCS | Mod: S$GLB,,, | Performed by: PHYSICIAN ASSISTANT

## 2020-11-30 PROCEDURE — 3288F FALL RISK ASSESSMENT DOCD: CPT | Mod: CPTII,S$GLB,, | Performed by: PHYSICIAN ASSISTANT

## 2020-11-30 PROCEDURE — 1157F PR ADVANCE CARE PLAN OR EQUIV PRESENT IN MEDICAL RECORD: ICD-10-PCS | Mod: S$GLB,,, | Performed by: PHYSICIAN ASSISTANT

## 2020-11-30 PROCEDURE — 99999 PR PBB SHADOW E&M-EST. PATIENT-LVL II: ICD-10-PCS | Mod: PBBFAC,,, | Performed by: PHYSICIAN ASSISTANT

## 2020-11-30 PROCEDURE — 1157F ADVNC CARE PLAN IN RCRD: CPT | Mod: S$GLB,,, | Performed by: PHYSICIAN ASSISTANT

## 2020-11-30 PROCEDURE — 3008F PR BODY MASS INDEX (BMI) DOCUMENTED: ICD-10-PCS | Mod: CPTII,S$GLB,, | Performed by: PHYSICIAN ASSISTANT

## 2020-11-30 PROCEDURE — 99213 OFFICE O/P EST LOW 20 MIN: CPT | Mod: S$GLB,,, | Performed by: PHYSICIAN ASSISTANT

## 2020-11-30 PROCEDURE — 1159F MED LIST DOCD IN RCRD: CPT | Mod: S$GLB,,, | Performed by: PHYSICIAN ASSISTANT

## 2020-11-30 PROCEDURE — 3288F PR FALLS RISK ASSESSMENT DOCUMENTED: ICD-10-PCS | Mod: CPTII,S$GLB,, | Performed by: PHYSICIAN ASSISTANT

## 2020-11-30 PROCEDURE — 1126F PR PAIN SEVERITY QUANTIFIED, NO PAIN PRESENT: ICD-10-PCS | Mod: S$GLB,,, | Performed by: PHYSICIAN ASSISTANT

## 2020-11-30 PROCEDURE — 72040 XR CERVICAL SPINE AP LATERAL: ICD-10-PCS | Mod: 26,,, | Performed by: RADIOLOGY

## 2020-11-30 PROCEDURE — 99999 PR PBB SHADOW E&M-EST. PATIENT-LVL II: CPT | Mod: PBBFAC,,, | Performed by: PHYSICIAN ASSISTANT

## 2020-11-30 PROCEDURE — 1101F PR PT FALLS ASSESS DOC 0-1 FALLS W/OUT INJ PAST YR: ICD-10-PCS | Mod: CPTII,S$GLB,, | Performed by: PHYSICIAN ASSISTANT

## 2020-11-30 NOTE — PROGRESS NOTES
Date: 11/30/2020    Supervising Physician: Herson Tate M.D.    Date of Surgery: 7/23/2020    Procedure: C3-T1 laminectomy and fusion    History: Chayito Chung is seen today for follow-up following the above listed procedure. Overall the patient is doing well but today notes her hands are improved but she is still walking with a Rollator.  She has some neck pain as well.  She is still wearing her collar.  She has some left shoulder pain laying down at night.       Exam:  Incision is healing well.   There is no sign of infection. Neuro exam is stable. No signs of DVT.    Radiographs: imaging today shows hardware in place, no evidence of failure.    Assessment/Plan: 4 months post op.    Doing well postoperatively. No refills needed as she gets her pain medicines from pain management.  She may wean collar.  We will schedule her with someone in general ortho for her shoulder.     I will plan to see the patient back for the next postop visit at a year from surgery.    Thank you for the opportunity to participate in this patient's care. Please give me a call if there are any concerns or questions.

## 2020-12-01 ENCOUNTER — TELEPHONE (OUTPATIENT)
Dept: FAMILY MEDICINE | Facility: CLINIC | Age: 70
End: 2020-12-01

## 2020-12-03 ENCOUNTER — TELEPHONE (OUTPATIENT)
Dept: ORTHOPEDICS | Facility: CLINIC | Age: 70
End: 2020-12-03

## 2020-12-03 NOTE — TELEPHONE ENCOUNTER
Attempt to contact patient to get confirmation on which shoulder was giving her pain, so that we can put in x-ray orders. Also left number for patient to return call back to [phone]. Thanks.

## 2020-12-04 ENCOUNTER — PATIENT OUTREACH (OUTPATIENT)
Dept: ADMINISTRATIVE | Facility: OTHER | Age: 70
End: 2020-12-04

## 2020-12-04 ENCOUNTER — TELEPHONE (OUTPATIENT)
Dept: ORTHOPEDICS | Facility: CLINIC | Age: 70
End: 2020-12-04

## 2020-12-04 DIAGNOSIS — Z79.4 TYPE 2 DIABETES MELLITUS WITH OTHER CIRCULATORY COMPLICATION, WITH LONG-TERM CURRENT USE OF INSULIN: Primary | ICD-10-CM

## 2020-12-04 DIAGNOSIS — E11.59 TYPE 2 DIABETES MELLITUS WITH OTHER CIRCULATORY COMPLICATION, WITH LONG-TERM CURRENT USE OF INSULIN: Primary | ICD-10-CM

## 2020-12-04 NOTE — TELEPHONE ENCOUNTER
Spoke to patient in regards to message. Patient stated that she was having pain on the left side of her shoulder.stated to patient thank you for the confirmation. Patient stated thank you. Thanks.

## 2020-12-04 NOTE — PROGRESS NOTES
Requested updates within Care Everywhere.  Patient's chart was reviewed for overdue RALF topics.  Immunizations reconciled.    Orders placed:Hemoglobin A1c

## 2020-12-07 ENCOUNTER — OFFICE VISIT (OUTPATIENT)
Dept: ORTHOPEDICS | Facility: CLINIC | Age: 70
End: 2020-12-07
Payer: MEDICARE

## 2020-12-07 ENCOUNTER — HOSPITAL ENCOUNTER (OUTPATIENT)
Dept: RADIOLOGY | Facility: HOSPITAL | Age: 70
Discharge: HOME | End: 2020-12-07
Attending: PHYSICIAN ASSISTANT
Payer: MEDICARE

## 2020-12-07 VITALS — WEIGHT: 154.63 LBS | BODY MASS INDEX: 28.46 KG/M2 | HEIGHT: 62 IN

## 2020-12-07 DIAGNOSIS — M25.512 CHRONIC LEFT SHOULDER PAIN: ICD-10-CM

## 2020-12-07 DIAGNOSIS — M19.012 OSTEOARTHRITIS, LOCALIZED, SHOULDER, LEFT: Primary | ICD-10-CM

## 2020-12-07 DIAGNOSIS — G89.29 CHRONIC LEFT SHOULDER PAIN: ICD-10-CM

## 2020-12-07 DIAGNOSIS — M75.42 IMPINGEMENT SYNDROME OF LEFT SHOULDER: ICD-10-CM

## 2020-12-07 PROCEDURE — 73030 XR SHOULDER TRAUMA 3 VIEW LEFT: ICD-10-PCS | Mod: 26,LT,, | Performed by: RADIOLOGY

## 2020-12-07 PROCEDURE — 73030 X-RAY EXAM OF SHOULDER: CPT | Mod: 26,LT,, | Performed by: RADIOLOGY

## 2020-12-07 PROCEDURE — 1125F AMNT PAIN NOTED PAIN PRSNT: CPT | Mod: S$GLB,,, | Performed by: PHYSICIAN ASSISTANT

## 2020-12-07 PROCEDURE — 3288F FALL RISK ASSESSMENT DOCD: CPT | Mod: CPTII,S$GLB,, | Performed by: PHYSICIAN ASSISTANT

## 2020-12-07 PROCEDURE — 1159F PR MEDICATION LIST DOCUMENTED IN MEDICAL RECORD: ICD-10-PCS | Mod: S$GLB,,, | Performed by: PHYSICIAN ASSISTANT

## 2020-12-07 PROCEDURE — 73030 X-RAY EXAM OF SHOULDER: CPT | Mod: TC,LT

## 2020-12-07 PROCEDURE — 1157F ADVNC CARE PLAN IN RCRD: CPT | Mod: S$GLB,,, | Performed by: PHYSICIAN ASSISTANT

## 2020-12-07 PROCEDURE — 1159F MED LIST DOCD IN RCRD: CPT | Mod: S$GLB,,, | Performed by: PHYSICIAN ASSISTANT

## 2020-12-07 PROCEDURE — 99214 PR OFFICE/OUTPT VISIT, EST, LEVL IV, 30-39 MIN: ICD-10-PCS | Mod: 25,S$GLB,, | Performed by: PHYSICIAN ASSISTANT

## 2020-12-07 PROCEDURE — 99214 OFFICE O/P EST MOD 30 MIN: CPT | Mod: 25,S$GLB,, | Performed by: PHYSICIAN ASSISTANT

## 2020-12-07 PROCEDURE — 99999 PR PBB SHADOW E&M-EST. PATIENT-LVL II: CPT | Mod: PBBFAC,,, | Performed by: PHYSICIAN ASSISTANT

## 2020-12-07 PROCEDURE — 99999 PR PBB SHADOW E&M-EST. PATIENT-LVL II: ICD-10-PCS | Mod: PBBFAC,,, | Performed by: PHYSICIAN ASSISTANT

## 2020-12-07 PROCEDURE — 1125F PR PAIN SEVERITY QUANTIFIED, PAIN PRESENT: ICD-10-PCS | Mod: S$GLB,,, | Performed by: PHYSICIAN ASSISTANT

## 2020-12-07 PROCEDURE — 1157F PR ADVANCE CARE PLAN OR EQUIV PRESENT IN MEDICAL RECORD: ICD-10-PCS | Mod: S$GLB,,, | Performed by: PHYSICIAN ASSISTANT

## 2020-12-07 PROCEDURE — 1101F PR PT FALLS ASSESS DOC 0-1 FALLS W/OUT INJ PAST YR: ICD-10-PCS | Mod: CPTII,S$GLB,, | Performed by: PHYSICIAN ASSISTANT

## 2020-12-07 PROCEDURE — 20610 LARGE JOINT ASPIRATION/INJECTION: L SUBACROMIAL BURSA: ICD-10-PCS | Mod: LT,S$GLB,, | Performed by: PHYSICIAN ASSISTANT

## 2020-12-07 PROCEDURE — 1101F PT FALLS ASSESS-DOCD LE1/YR: CPT | Mod: CPTII,S$GLB,, | Performed by: PHYSICIAN ASSISTANT

## 2020-12-07 PROCEDURE — 3008F BODY MASS INDEX DOCD: CPT | Mod: CPTII,S$GLB,, | Performed by: PHYSICIAN ASSISTANT

## 2020-12-07 PROCEDURE — 3008F PR BODY MASS INDEX (BMI) DOCUMENTED: ICD-10-PCS | Mod: CPTII,S$GLB,, | Performed by: PHYSICIAN ASSISTANT

## 2020-12-07 PROCEDURE — 3288F PR FALLS RISK ASSESSMENT DOCUMENTED: ICD-10-PCS | Mod: CPTII,S$GLB,, | Performed by: PHYSICIAN ASSISTANT

## 2020-12-07 PROCEDURE — 20610 DRAIN/INJ JOINT/BURSA W/O US: CPT | Mod: LT,S$GLB,, | Performed by: PHYSICIAN ASSISTANT

## 2020-12-07 RX ORDER — TRIAMCINOLONE ACETONIDE 40 MG/ML
40 INJECTION, SUSPENSION INTRA-ARTICULAR; INTRAMUSCULAR
Status: DISCONTINUED | OUTPATIENT
Start: 2020-12-07 | End: 2020-12-07 | Stop reason: HOSPADM

## 2020-12-07 RX ADMIN — TRIAMCINOLONE ACETONIDE 40 MG: 40 INJECTION, SUSPENSION INTRA-ARTICULAR; INTRAMUSCULAR at 11:12

## 2020-12-07 NOTE — PROCEDURES
Large Joint Aspiration/Injection: L subacromial bursa    Date/Time: 12/7/2020 11:30 AM  Performed by: Lilia Barney PA-C  Authorized by: Lilia Barney PA-C     Consent Done?:  Yes (Verbal)  Indications:  Pain  Prep: patient was prepped and draped in usual sterile fashion    Local anesthetic:  Topical anesthetic    Details:  Needle Size:  22 G  Approach:  Posterior  Location:  Shoulder  Site:  L subacromial bursa  Medications:  40 mg triamcinolone acetonide 40 mg/mL  Patient tolerance:  Patient tolerated the procedure well with no immediate complications

## 2020-12-07 NOTE — LETTER
December 7, 2020      Zoraida Irwin PA-C  [address]  [facility] [address]           [facility] - Orthopedics 5th Fl  [address]  [facility] [address]  Phone: [phone]          Patient: Chayito Chung   MR Number: [id number]   YOB: 1950   Date of Visit: 12/7/2020       Dear Zoraida Irwin:    Thank you for referring Chayito Chung to me for evaluation. Attached you will find relevant portions of my assessment and plan of care.    If you have questions, please do not hesitate to call me. I look forward to following Chayito Chung along with you.    Sincerely,    Lilia Barney PA-C    Enclosure  CC:  No Recipients    If you would like to receive this communication electronically, please contact [email or web address] or [phone] to request more information on [employer] access.    For providers and/or their staff who would like to refer a patient to [facility], please contact us through our one-stop-shop provider referral line, [facility], at [phone].    If you feel you have received this communication in error or would no longer like to receive these types of communications, please e-mail [email or web address]

## 2020-12-07 NOTE — PROGRESS NOTES
"  SUBJECTIVE:     Chief Complaint & History of Present Illness:  Chayito Chung is a 70 y.o. year old female who presents today with constant left shoulder pain that started about 3 months ago.  She is 4 months s/p C3-T1 laminectomy and fusion.  There is not a history of injury.  The pain is located in the  lateral aspect of the shoulder.  The pain is described as achy, 6/10.  It is aggravated by any activity, lifting reaching.  She has pain with laying on her side.  She has tried rest and is taking pain medication for her back.  There is not a history of previous injury or surgery to the shoulder.      Review of patient's allergies indicates:  No Known Allergies      Current Outpatient Medications   Medication Sig Dispense Refill    albuterol 90 mcg/actuation inhaler Inhale 2 puffs into the lungs every 6 (six) hours as needed for Wheezing.      albuterol-ipratropium (DUO-NEB) 2.5 mg-0.5 mg/3 mL nebulizer solution Take 3 mLs by nebulization every 4 (four) hours as needed for Wheezing or Shortness of Breath. Rescue 1 Box 0    albuterol-ipratropium 2.5mg-0.5mg/3mL (DUO-NEB) 0.5 mg-3 mg(2.5 mg base)/3 mL nebulizer solution   5    amitriptyline (ELAVIL) 10 MG tablet 20 mg every evening.       aspirin (ECOTRIN) 81 MG EC tablet Take 81 mg by mouth once daily.      BD ULTRA-FINE MINI PEN NEEDLE 31 gauge x 3/16" Ndle       cholestyramine, with sugar, 4 gram Powd Take 4 g by mouth 2 (two) times daily as needed (diarrhea). 378 g 0    duloxetine (CYMBALTA) 60 MG capsule Take 90 mg by mouth once daily.       ferrous sulfate (FEOSOL) 325 mg (65 mg iron) Tab tablet Take 1 tablet (325 mg total) by mouth once daily. 30 tablet 3    fluticasone propionate (FLONASE) 50 mcg/actuation nasal spray 2 sprays (100 mcg total) by Each Nare route once daily. 1 Bottle 12    gabapentin (NEURONTIN) 300 MG capsule Take 300 mg by mouth 3 (three) times daily.      HUMALOG MIX 75-25,U-100,INSULN 100 unit/mL (75-25) Susp 30 Units 2 (two) " times daily.      hydroCHLOROthiazide (HYDRODIURIL) 25 MG tablet TAKE ONE TABLET BY MOUTH DAILY 90 tablet 1    HYDROcodone-acetaminophen (NORCO)  mg per tablet Take 1 tablet by mouth every 6 (six) hours as needed for Pain. 40 tablet 0    insulin aspart U-100 (NOVOLOG FLEXPEN U-100 INSULIN) 100 unit/mL (3 mL) InPn pen Inject into the skin. Taken as SS      LORazepam (ATIVAN) 0.5 MG tablet Take 0.5 mg by mouth every 6 (six) hours as needed for Anxiety.      LYRICA 150 mg capsule Take by mouth 2 (two) times daily.   1    metFORMIN (GLUMETZA) 1000 MG (MOD) 24hr tablet Take 1,000 mg by mouth daily with breakfast.      nitroGLYCERIN (NITROSTAT) 0.4 MG SL tablet Place 0.4 mg under the tongue every 5 (five) minutes as needed for Chest pain.      ondansetron (ZOFRAN-ODT) 4 MG TbDL Take 1 tablet (4 mg total) by mouth every 8 (eight) hours as needed. 14 tablet 1    potassium chloride SA (K-DUR,KLOR-CON) 20 MEQ tablet TAKE ONE TABLET BY MOUTH DAILY 90 tablet 1    pravastatin (PRAVACHOL) 20 MG tablet Take 20 mg by mouth once daily.       quetiapine (SEROQUEL) 300 MG Tab 300 mg nightly.       tiZANidine (ZANAFLEX) 4 MG tablet Take 1 tablet (4 mg total) by mouth daily as needed. 60 tablet 0    topiramate (TOPAMAX) 50 MG tablet Take 50 mg by mouth 2 (two) times daily.      verapamiL (CALAN-SR) 240 MG CR tablet TAKE 1 TABLET BY MOUTH ONCE DAILY 90 tablet 1    zonisamide (ZONEGRAN) 100 MG Cap zonisamide 100 mg capsule   Take 2 capsules twice a day by oral route.      omeprazole (PRILOSEC) 40 MG capsule Take 1 capsule (40 mg total) by mouth every morning. 30 capsule 11     Current Facility-Administered Medications   Medication Dose Route Frequency Provider Last Rate Last Dose    DOBUTamine 1000 mg in D5W 250 mL infusion (premix titrating)  10 mcg/kg/min Intravenous Continuous Gaurav Malin MD   Stopped at 10/21/20 0904       Past Medical History:   Diagnosis Date    Acute coronary syndrome     Acute  hypoxemic respiratory failure 2017    Anxiety     Asthma     Cancer     colon    Cataracts, both eyes     Chest pain at rest     Chest pain of uncertain etiology 2015    Colon cancer 1988    COPD (chronic obstructive pulmonary disease)     Coronary artery disease     Depression     Diabetes mellitus     Dyspnea on exertion 11/15/2018    Elevated brain natriuretic peptide (BNP) level 11/15/2018    Elevated cholesterol     Hypertension     Swelling     Syncope and collapse 2016       Past Surgical History:   Procedure Laterality Date    ABDOMINAL SURGERY      BREAST BIOPSY      benign unsure what side     SECTION, CLASSIC      COLON SURGERY      COLONOSCOPY  2019    repeat in 5 years    CORONARY ANGIOPLASTY WITH STENT PLACEMENT  3 months ago     x2, Hysterectomy, Lung surgery, Partial stomach removed, Part of colon removed for rectal cancer    GASTRECTOMY      HEMORRHOID SURGERY      HYSTERECTOMY      @28yrs of age    LUNG BIOPSY      OOPHORECTOMY      @28yrs of age    POSTERIOR FUSION OF CERVICAL SPINE WITH LAMINECTOMY N/A 2020    Procedure: LAMINECTOMY, SPINE, CERVICAL, WITH POSTERIOR FUSION C3-T1 ;  Surgeon: Herson Tate MD;  Location: [facility] OR [address] [place] [address];  Service: Neurosurgery;  Laterality: N/A;     Review of Systems:  ROS:  Constitutional: no fever or chills  Eyes: no visual changes  ENT: no nasal congestion or sore throat  Respiratory: no cough or shortness of breath  Cardiovascular: no chest pain or palpitations  Gastrointestinal: no nausea or vomiting, tolerating diet  Genitourinary: no hematuria or dysuria  Integument/Breast: no rash or pruritis  Hematologic/Lymphatic: no easy bruising or lymphadenopathy  Musculoskeletal: no arthralgias or myalgias  Neurological: no seizures or tremors  Behavioral/Psych: no auditory or visual hallucinations  Endocrine: no heat or cold intolerance      OBJECTIVE:     PHYSICAL EXAM:  General: Weight: 70.1  kg (154 lb 10.4 oz) Body mass index is 28.29 kg/m².  Patient is alert, awake and oriented to time, place and person. Mood and affect are appropriate.  Patient does not appear to be in any distress, denies any constitutional symptoms and appears stated age.   HEENT: Pupils are equal and round, sclera are not injected. External examination of ears and nose reveals no abnormalities. Cranial nerves II-X are grossly intact  Neck: examination demonstrates painless active range of motion. Spurling's sign is negative  Skin: no rashes, abrasions or open wounds on the affected extremity   Resp: No respiratory distress or audible wheezing   Psych:  normal mood and behavior  CV: 2+ pulses, all extremities warm and well perfused   Right Shoulder   Skin intact, no swelling  No effusion   Tenderness: lateral acromial  Range of motion is painful   ROM: forward flexion 140 / 150, external rotation 50/50, internal rotation to hip  Shoulder Strength: biceps 5/5, triceps 5/5, abduction 4/5, adduction 4/5  positive for impingement sign, sensory exam normal and motor exam normal  Stability tests: normal  Special Tests:    Neer's positive  Hawkin's positive    Sneha's positive  Drop arm negative  IMAGING:  X-rays of the left shoulder, personally reviewed by me, demonstrate mild degenerative changes.  No fracture or dislocation.    ASSESSMENT/PLAN:   70 y.o. year old female with left shoulder osteoarthritis, subacromial bursitis    Plan: We discussed with the patient at length all the different treatment options available for her left shoulder including anti-inflammatories, acetaminophen, rest, ice, Physical therapy to include strengthening exercise, occasional cortisone injections for temporary relief    - She was given left shoulder CSI today, see procedure note.  Recommend rest, ice, activity modification  - I think she would benefit from PT due to decreased ROM in her shoulder- will see if she has relief from injection and if ok per  spine surgery  - Consider MRI if symptoms do not improve  - Follow up as needed

## 2020-12-08 ENCOUNTER — OFFICE VISIT (OUTPATIENT)
Dept: FAMILY MEDICINE | Facility: CLINIC | Age: 70
End: 2020-12-08
Payer: MEDICARE

## 2020-12-08 VITALS
SYSTOLIC BLOOD PRESSURE: 130 MMHG | HEIGHT: 62 IN | TEMPERATURE: 97 F | OXYGEN SATURATION: 95 % | BODY MASS INDEX: 29.33 KG/M2 | WEIGHT: 159.38 LBS | HEART RATE: 102 BPM | DIASTOLIC BLOOD PRESSURE: 90 MMHG

## 2020-12-08 DIAGNOSIS — Z76.89 ESTABLISHING CARE WITH NEW DOCTOR, ENCOUNTER FOR: Primary | ICD-10-CM

## 2020-12-08 DIAGNOSIS — E11.59 HYPERTENSION ASSOCIATED WITH DIABETES: ICD-10-CM

## 2020-12-08 DIAGNOSIS — I15.2 HYPERTENSION ASSOCIATED WITH DIABETES: ICD-10-CM

## 2020-12-08 PROCEDURE — 1159F PR MEDICATION LIST DOCUMENTED IN MEDICAL RECORD: ICD-10-PCS | Mod: S$GLB,,, | Performed by: STUDENT IN AN ORGANIZED HEALTH CARE EDUCATION/TRAINING PROGRAM

## 2020-12-08 PROCEDURE — 99999 PR PBB SHADOW E&M-EST. PATIENT-LVL V: CPT | Mod: PBBFAC,,, | Performed by: STUDENT IN AN ORGANIZED HEALTH CARE EDUCATION/TRAINING PROGRAM

## 2020-12-08 PROCEDURE — 3075F PR MOST RECENT SYSTOLIC BLOOD PRESS GE 130-139MM HG: ICD-10-PCS | Mod: CPTII,S$GLB,, | Performed by: STUDENT IN AN ORGANIZED HEALTH CARE EDUCATION/TRAINING PROGRAM

## 2020-12-08 PROCEDURE — 1125F AMNT PAIN NOTED PAIN PRSNT: CPT | Mod: S$GLB,,, | Performed by: STUDENT IN AN ORGANIZED HEALTH CARE EDUCATION/TRAINING PROGRAM

## 2020-12-08 PROCEDURE — 3080F DIAST BP >= 90 MM HG: CPT | Mod: CPTII,S$GLB,, | Performed by: STUDENT IN AN ORGANIZED HEALTH CARE EDUCATION/TRAINING PROGRAM

## 2020-12-08 PROCEDURE — 3288F PR FALLS RISK ASSESSMENT DOCUMENTED: ICD-10-PCS | Mod: CPTII,S$GLB,, | Performed by: STUDENT IN AN ORGANIZED HEALTH CARE EDUCATION/TRAINING PROGRAM

## 2020-12-08 PROCEDURE — 3288F FALL RISK ASSESSMENT DOCD: CPT | Mod: CPTII,S$GLB,, | Performed by: STUDENT IN AN ORGANIZED HEALTH CARE EDUCATION/TRAINING PROGRAM

## 2020-12-08 PROCEDURE — 1159F MED LIST DOCD IN RCRD: CPT | Mod: S$GLB,,, | Performed by: STUDENT IN AN ORGANIZED HEALTH CARE EDUCATION/TRAINING PROGRAM

## 2020-12-08 PROCEDURE — 3008F BODY MASS INDEX DOCD: CPT | Mod: CPTII,S$GLB,, | Performed by: STUDENT IN AN ORGANIZED HEALTH CARE EDUCATION/TRAINING PROGRAM

## 2020-12-08 PROCEDURE — 3051F PR MOST RECENT HEMOGLOBIN A1C LEVEL 7.0 - < 8.0%: ICD-10-PCS | Mod: CPTII,S$GLB,, | Performed by: STUDENT IN AN ORGANIZED HEALTH CARE EDUCATION/TRAINING PROGRAM

## 2020-12-08 PROCEDURE — 99213 OFFICE O/P EST LOW 20 MIN: CPT | Mod: S$GLB,,, | Performed by: STUDENT IN AN ORGANIZED HEALTH CARE EDUCATION/TRAINING PROGRAM

## 2020-12-08 PROCEDURE — 99999 PR PBB SHADOW E&M-EST. PATIENT-LVL V: ICD-10-PCS | Mod: PBBFAC,,, | Performed by: STUDENT IN AN ORGANIZED HEALTH CARE EDUCATION/TRAINING PROGRAM

## 2020-12-08 PROCEDURE — 99213 PR OFFICE/OUTPT VISIT, EST, LEVL III, 20-29 MIN: ICD-10-PCS | Mod: S$GLB,,, | Performed by: STUDENT IN AN ORGANIZED HEALTH CARE EDUCATION/TRAINING PROGRAM

## 2020-12-08 PROCEDURE — 1125F PR PAIN SEVERITY QUANTIFIED, PAIN PRESENT: ICD-10-PCS | Mod: S$GLB,,, | Performed by: STUDENT IN AN ORGANIZED HEALTH CARE EDUCATION/TRAINING PROGRAM

## 2020-12-08 PROCEDURE — 1100F PR PT FALLS ASSESS DOC 2+ FALLS/FALL W/INJURY/YR: ICD-10-PCS | Mod: CPTII,S$GLB,, | Performed by: STUDENT IN AN ORGANIZED HEALTH CARE EDUCATION/TRAINING PROGRAM

## 2020-12-08 PROCEDURE — 1100F PTFALLS ASSESS-DOCD GE2>/YR: CPT | Mod: CPTII,S$GLB,, | Performed by: STUDENT IN AN ORGANIZED HEALTH CARE EDUCATION/TRAINING PROGRAM

## 2020-12-08 PROCEDURE — 1157F PR ADVANCE CARE PLAN OR EQUIV PRESENT IN MEDICAL RECORD: ICD-10-PCS | Mod: S$GLB,,, | Performed by: STUDENT IN AN ORGANIZED HEALTH CARE EDUCATION/TRAINING PROGRAM

## 2020-12-08 PROCEDURE — 1157F ADVNC CARE PLAN IN RCRD: CPT | Mod: S$GLB,,, | Performed by: STUDENT IN AN ORGANIZED HEALTH CARE EDUCATION/TRAINING PROGRAM

## 2020-12-08 PROCEDURE — 3051F HG A1C>EQUAL 7.0%<8.0%: CPT | Mod: CPTII,S$GLB,, | Performed by: STUDENT IN AN ORGANIZED HEALTH CARE EDUCATION/TRAINING PROGRAM

## 2020-12-08 PROCEDURE — 3080F PR MOST RECENT DIASTOLIC BLOOD PRESSURE >= 90 MM HG: ICD-10-PCS | Mod: CPTII,S$GLB,, | Performed by: STUDENT IN AN ORGANIZED HEALTH CARE EDUCATION/TRAINING PROGRAM

## 2020-12-08 PROCEDURE — 3008F PR BODY MASS INDEX (BMI) DOCUMENTED: ICD-10-PCS | Mod: CPTII,S$GLB,, | Performed by: STUDENT IN AN ORGANIZED HEALTH CARE EDUCATION/TRAINING PROGRAM

## 2020-12-08 PROCEDURE — 3075F SYST BP GE 130 - 139MM HG: CPT | Mod: CPTII,S$GLB,, | Performed by: STUDENT IN AN ORGANIZED HEALTH CARE EDUCATION/TRAINING PROGRAM

## 2020-12-08 RX ORDER — ACETYLCYSTEINE 100 MG/ML
SOLUTION ORAL; RESPIRATORY (INHALATION)
COMMUNITY
Start: 2020-10-15 | End: 2021-03-02
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
## 2020-12-08 NOTE — PATIENT INSTRUCTIONS
It was a pleasure meeting you, Ms. Chayito MICK Chung  today!     Please bring prescriptions with you at your next clinic visit.     If you have any questions, please call the [facility] [phone] or message through [employer] [place].     Have a great day!  Dr. Evans

## 2020-12-09 DIAGNOSIS — E11.9 TYPE 2 DIABETES MELLITUS WITHOUT COMPLICATION: ICD-10-CM

## 2020-12-09 DIAGNOSIS — E11.59 HYPERTENSION ASSOCIATED WITH DIABETES: Primary | ICD-10-CM

## 2020-12-09 DIAGNOSIS — I15.2 HYPERTENSION ASSOCIATED WITH DIABETES: Primary | ICD-10-CM

## 2020-12-26 ENCOUNTER — NURSE TRIAGE (OUTPATIENT)
Dept: ADMINISTRATIVE | Facility: CLINIC | Age: 70
End: 2020-12-26

## 2020-12-27 NOTE — TELEPHONE ENCOUNTER
Reason for Disposition   [1] Systolic BP  >= 160 OR Diastolic >= 100 AND [2] cardiac or neurologic symptoms (e.g., chest pain, difficulty breathing, unsteady gait, blurred vision)    Additional Information   Negative: Difficult to awaken or acting confused (e.g., disoriented, slurred speech)   Negative: Severe difficulty breathing (e.g., struggling for each breath, speaks in single words)   Negative: [1] Weakness of the face, arm or leg on one side of the body AND [2] new onset   Negative: [1] Numbness (i.e., loss of sensation) of the face, arm or leg on one side of the body AND [2] new onset   Negative: [1] Chest pain lasts > 5 minutes AND [2] history of heart disease  (i.e., heart attack, bypass surgery, angina, angioplasty, CHF)   Negative: [1] Chest pain AND [2] took nitrogylcerin AND [3] pain was not relieved   Negative: Sounds like a life-threatening emergency to the triager   Negative: Symptom is main concern  (e.g., headache, chest pain)   Negative: Low blood pressure is main concern    Protocols used: HIGH BLOOD PRESSURE-A-    Pt stated her blood pressure has been high and she cannot get it down. BP was 189/115 and now 180/115. Pt took her blood pressure medication at 1 pm. Stated her balance is off. Per triage protocol, advised to go to the ED now for evaluation. Pt verbalized understanding and stated she does not need assistance with getting to the hospital, stated she lives in an assisted living facility and she can pull her string or use her life alert.

## 2021-01-05 ENCOUNTER — OFFICE VISIT (OUTPATIENT)
Dept: FAMILY MEDICINE | Facility: CLINIC | Age: 71
End: 2021-01-05
Payer: MEDICARE

## 2021-01-05 ENCOUNTER — LAB VISIT (OUTPATIENT)
Dept: LAB | Facility: HOSPITAL | Age: 71
End: 2021-01-05
Attending: STUDENT IN AN ORGANIZED HEALTH CARE EDUCATION/TRAINING PROGRAM
Payer: MEDICARE

## 2021-01-05 VITALS
WEIGHT: 155.44 LBS | HEIGHT: 62 IN | BODY MASS INDEX: 28.61 KG/M2 | SYSTOLIC BLOOD PRESSURE: 122 MMHG | DIASTOLIC BLOOD PRESSURE: 80 MMHG | TEMPERATURE: 97 F | HEART RATE: 91 BPM | OXYGEN SATURATION: 93 %

## 2021-01-05 DIAGNOSIS — I15.2 HYPERTENSION ASSOCIATED WITH DIABETES: Primary | ICD-10-CM

## 2021-01-05 DIAGNOSIS — E11.59 HYPERTENSION ASSOCIATED WITH DIABETES: ICD-10-CM

## 2021-01-05 DIAGNOSIS — E11.59 HYPERTENSION ASSOCIATED WITH DIABETES: Primary | ICD-10-CM

## 2021-01-05 DIAGNOSIS — E11.9 TYPE 2 DIABETES MELLITUS WITHOUT COMPLICATION: ICD-10-CM

## 2021-01-05 DIAGNOSIS — I15.2 HYPERTENSION ASSOCIATED WITH DIABETES: ICD-10-CM

## 2021-01-05 LAB
ALBUMIN SERPL BCP-MCNC: 3.6 G/DL (ref 3.5–5.2)
ALP SERPL-CCNC: 122 U/L (ref 55–135)
ALT SERPL W/O P-5'-P-CCNC: 13 U/L (ref 10–44)
ANION GAP SERPL CALC-SCNC: 10 MMOL/L (ref 8–16)
AST SERPL-CCNC: 12 U/L (ref 10–40)
BILIRUB SERPL-MCNC: 0.2 MG/DL (ref 0.1–1)
BUN SERPL-MCNC: 13 MG/DL (ref 8–23)
CALCIUM SERPL-MCNC: 9.2 MG/DL (ref 8.7–10.5)
CHLORIDE SERPL-SCNC: 107 MMOL/L (ref 95–110)
CO2 SERPL-SCNC: 23 MMOL/L (ref 23–29)
CREAT SERPL-MCNC: 0.7 MG/DL (ref 0.5–1.4)
EST. GFR  (AFRICAN AMERICAN): >60 ML/MIN/1.73 M^2
EST. GFR  (NON AFRICAN AMERICAN): >60 ML/MIN/1.73 M^2
GLUCOSE SERPL-MCNC: 43 MG/DL (ref 70–110)
POTASSIUM SERPL-SCNC: 3.8 MMOL/L (ref 3.5–5.1)
PROT SERPL-MCNC: 7.1 G/DL (ref 6–8.4)
SODIUM SERPL-SCNC: 140 MMOL/L (ref 136–145)

## 2021-01-05 PROCEDURE — 1101F PR PT FALLS ASSESS DOC 0-1 FALLS W/OUT INJ PAST YR: ICD-10-PCS | Mod: CPTII,S$GLB,, | Performed by: STUDENT IN AN ORGANIZED HEALTH CARE EDUCATION/TRAINING PROGRAM

## 2021-01-05 PROCEDURE — 3008F BODY MASS INDEX DOCD: CPT | Mod: CPTII,S$GLB,, | Performed by: STUDENT IN AN ORGANIZED HEALTH CARE EDUCATION/TRAINING PROGRAM

## 2021-01-05 PROCEDURE — 83036 HEMOGLOBIN GLYCOSYLATED A1C: CPT

## 2021-01-05 PROCEDURE — 3051F PR MOST RECENT HEMOGLOBIN A1C LEVEL 7.0 - < 8.0%: ICD-10-PCS | Mod: CPTII,S$GLB,, | Performed by: STUDENT IN AN ORGANIZED HEALTH CARE EDUCATION/TRAINING PROGRAM

## 2021-01-05 PROCEDURE — 3051F HG A1C>EQUAL 7.0%<8.0%: CPT | Mod: CPTII,S$GLB,, | Performed by: STUDENT IN AN ORGANIZED HEALTH CARE EDUCATION/TRAINING PROGRAM

## 2021-01-05 PROCEDURE — 3008F PR BODY MASS INDEX (BMI) DOCUMENTED: ICD-10-PCS | Mod: CPTII,S$GLB,, | Performed by: STUDENT IN AN ORGANIZED HEALTH CARE EDUCATION/TRAINING PROGRAM

## 2021-01-05 PROCEDURE — 1101F PT FALLS ASSESS-DOCD LE1/YR: CPT | Mod: CPTII,S$GLB,, | Performed by: STUDENT IN AN ORGANIZED HEALTH CARE EDUCATION/TRAINING PROGRAM

## 2021-01-05 PROCEDURE — 99214 OFFICE O/P EST MOD 30 MIN: CPT | Mod: S$GLB,,, | Performed by: STUDENT IN AN ORGANIZED HEALTH CARE EDUCATION/TRAINING PROGRAM

## 2021-01-05 PROCEDURE — 3288F PR FALLS RISK ASSESSMENT DOCUMENTED: ICD-10-PCS | Mod: CPTII,S$GLB,, | Performed by: STUDENT IN AN ORGANIZED HEALTH CARE EDUCATION/TRAINING PROGRAM

## 2021-01-05 PROCEDURE — 99999 PR PBB SHADOW E&M-EST. PATIENT-LVL V: CPT | Mod: PBBFAC,,, | Performed by: STUDENT IN AN ORGANIZED HEALTH CARE EDUCATION/TRAINING PROGRAM

## 2021-01-05 PROCEDURE — 3288F FALL RISK ASSESSMENT DOCD: CPT | Mod: CPTII,S$GLB,, | Performed by: STUDENT IN AN ORGANIZED HEALTH CARE EDUCATION/TRAINING PROGRAM

## 2021-01-05 PROCEDURE — 1157F PR ADVANCE CARE PLAN OR EQUIV PRESENT IN MEDICAL RECORD: ICD-10-PCS | Mod: S$GLB,,, | Performed by: STUDENT IN AN ORGANIZED HEALTH CARE EDUCATION/TRAINING PROGRAM

## 2021-01-05 PROCEDURE — 3074F PR MOST RECENT SYSTOLIC BLOOD PRESSURE < 130 MM HG: ICD-10-PCS | Mod: CPTII,S$GLB,, | Performed by: STUDENT IN AN ORGANIZED HEALTH CARE EDUCATION/TRAINING PROGRAM

## 2021-01-05 PROCEDURE — 36415 COLL VENOUS BLD VENIPUNCTURE: CPT | Mod: PO

## 2021-01-05 PROCEDURE — 80053 COMPREHEN METABOLIC PANEL: CPT

## 2021-01-05 PROCEDURE — 3074F SYST BP LT 130 MM HG: CPT | Mod: CPTII,S$GLB,, | Performed by: STUDENT IN AN ORGANIZED HEALTH CARE EDUCATION/TRAINING PROGRAM

## 2021-01-05 PROCEDURE — 1159F PR MEDICATION LIST DOCUMENTED IN MEDICAL RECORD: ICD-10-PCS | Mod: S$GLB,,, | Performed by: STUDENT IN AN ORGANIZED HEALTH CARE EDUCATION/TRAINING PROGRAM

## 2021-01-05 PROCEDURE — 99999 PR PBB SHADOW E&M-EST. PATIENT-LVL V: ICD-10-PCS | Mod: PBBFAC,,, | Performed by: STUDENT IN AN ORGANIZED HEALTH CARE EDUCATION/TRAINING PROGRAM

## 2021-01-05 PROCEDURE — 1157F ADVNC CARE PLAN IN RCRD: CPT | Mod: S$GLB,,, | Performed by: STUDENT IN AN ORGANIZED HEALTH CARE EDUCATION/TRAINING PROGRAM

## 2021-01-05 PROCEDURE — 3079F PR MOST RECENT DIASTOLIC BLOOD PRESSURE 80-89 MM HG: ICD-10-PCS | Mod: CPTII,S$GLB,, | Performed by: STUDENT IN AN ORGANIZED HEALTH CARE EDUCATION/TRAINING PROGRAM

## 2021-01-05 PROCEDURE — 99214 PR OFFICE/OUTPT VISIT, EST, LEVL IV, 30-39 MIN: ICD-10-PCS | Mod: S$GLB,,, | Performed by: STUDENT IN AN ORGANIZED HEALTH CARE EDUCATION/TRAINING PROGRAM

## 2021-01-05 PROCEDURE — 3079F DIAST BP 80-89 MM HG: CPT | Mod: CPTII,S$GLB,, | Performed by: STUDENT IN AN ORGANIZED HEALTH CARE EDUCATION/TRAINING PROGRAM

## 2021-01-05 PROCEDURE — 1159F MED LIST DOCD IN RCRD: CPT | Mod: S$GLB,,, | Performed by: STUDENT IN AN ORGANIZED HEALTH CARE EDUCATION/TRAINING PROGRAM

## 2021-01-06 ENCOUNTER — TELEPHONE (OUTPATIENT)
Dept: FAMILY MEDICINE | Facility: CLINIC | Age: 71
End: 2021-01-06

## 2021-01-06 LAB
ESTIMATED AVG GLUCOSE: 189 MG/DL (ref 68–131)
HBA1C MFR BLD HPLC: 8.2 % (ref 4–5.6)

## 2021-01-07 ENCOUNTER — TELEPHONE (OUTPATIENT)
Dept: FAMILY MEDICINE | Facility: CLINIC | Age: 71
End: 2021-01-07

## 2021-02-08 ENCOUNTER — OFFICE VISIT (OUTPATIENT)
Dept: FAMILY MEDICINE | Facility: CLINIC | Age: 71
End: 2021-02-08
Payer: MEDICARE

## 2021-02-08 VITALS
DIASTOLIC BLOOD PRESSURE: 90 MMHG | BODY MASS INDEX: 27.22 KG/M2 | TEMPERATURE: 97 F | WEIGHT: 147.94 LBS | HEART RATE: 113 BPM | SYSTOLIC BLOOD PRESSURE: 140 MMHG | OXYGEN SATURATION: 98 % | HEIGHT: 62 IN

## 2021-02-08 DIAGNOSIS — I15.2 HYPERTENSION ASSOCIATED WITH DIABETES: Primary | ICD-10-CM

## 2021-02-08 DIAGNOSIS — E11.59 HYPERTENSION ASSOCIATED WITH DIABETES: Primary | ICD-10-CM

## 2021-02-08 DIAGNOSIS — R00.0 TACHYCARDIA: ICD-10-CM

## 2021-02-08 PROCEDURE — 1101F PR PT FALLS ASSESS DOC 0-1 FALLS W/OUT INJ PAST YR: ICD-10-PCS | Mod: CPTII,S$GLB,, | Performed by: STUDENT IN AN ORGANIZED HEALTH CARE EDUCATION/TRAINING PROGRAM

## 2021-02-08 PROCEDURE — 3288F PR FALLS RISK ASSESSMENT DOCUMENTED: ICD-10-PCS | Mod: CPTII,S$GLB,, | Performed by: STUDENT IN AN ORGANIZED HEALTH CARE EDUCATION/TRAINING PROGRAM

## 2021-02-08 PROCEDURE — 1159F PR MEDICATION LIST DOCUMENTED IN MEDICAL RECORD: ICD-10-PCS | Mod: S$GLB,,, | Performed by: STUDENT IN AN ORGANIZED HEALTH CARE EDUCATION/TRAINING PROGRAM

## 2021-02-08 PROCEDURE — 99999 PR PBB SHADOW E&M-EST. PATIENT-LVL III: ICD-10-PCS | Mod: PBBFAC,,, | Performed by: STUDENT IN AN ORGANIZED HEALTH CARE EDUCATION/TRAINING PROGRAM

## 2021-02-08 PROCEDURE — 3008F PR BODY MASS INDEX (BMI) DOCUMENTED: ICD-10-PCS | Mod: CPTII,S$GLB,, | Performed by: STUDENT IN AN ORGANIZED HEALTH CARE EDUCATION/TRAINING PROGRAM

## 2021-02-08 PROCEDURE — 1159F MED LIST DOCD IN RCRD: CPT | Mod: S$GLB,,, | Performed by: STUDENT IN AN ORGANIZED HEALTH CARE EDUCATION/TRAINING PROGRAM

## 2021-02-08 PROCEDURE — 99214 OFFICE O/P EST MOD 30 MIN: CPT | Mod: S$GLB,,, | Performed by: STUDENT IN AN ORGANIZED HEALTH CARE EDUCATION/TRAINING PROGRAM

## 2021-02-08 PROCEDURE — 99214 PR OFFICE/OUTPT VISIT, EST, LEVL IV, 30-39 MIN: ICD-10-PCS | Mod: S$GLB,,, | Performed by: STUDENT IN AN ORGANIZED HEALTH CARE EDUCATION/TRAINING PROGRAM

## 2021-02-08 PROCEDURE — 3080F PR MOST RECENT DIASTOLIC BLOOD PRESSURE >= 90 MM HG: ICD-10-PCS | Mod: CPTII,S$GLB,, | Performed by: STUDENT IN AN ORGANIZED HEALTH CARE EDUCATION/TRAINING PROGRAM

## 2021-02-08 PROCEDURE — 1157F PR ADVANCE CARE PLAN OR EQUIV PRESENT IN MEDICAL RECORD: ICD-10-PCS | Mod: S$GLB,,, | Performed by: STUDENT IN AN ORGANIZED HEALTH CARE EDUCATION/TRAINING PROGRAM

## 2021-02-08 PROCEDURE — 3052F PR MOST RECENT HEMOGLOBIN A1C LEVEL 8.0 - < 9.0%: ICD-10-PCS | Mod: CPTII,S$GLB,, | Performed by: STUDENT IN AN ORGANIZED HEALTH CARE EDUCATION/TRAINING PROGRAM

## 2021-02-08 PROCEDURE — 3288F FALL RISK ASSESSMENT DOCD: CPT | Mod: CPTII,S$GLB,, | Performed by: STUDENT IN AN ORGANIZED HEALTH CARE EDUCATION/TRAINING PROGRAM

## 2021-02-08 PROCEDURE — 3052F HG A1C>EQUAL 8.0%<EQUAL 9.0%: CPT | Mod: CPTII,S$GLB,, | Performed by: STUDENT IN AN ORGANIZED HEALTH CARE EDUCATION/TRAINING PROGRAM

## 2021-02-08 PROCEDURE — 3008F BODY MASS INDEX DOCD: CPT | Mod: CPTII,S$GLB,, | Performed by: STUDENT IN AN ORGANIZED HEALTH CARE EDUCATION/TRAINING PROGRAM

## 2021-02-08 PROCEDURE — 3080F DIAST BP >= 90 MM HG: CPT | Mod: CPTII,S$GLB,, | Performed by: STUDENT IN AN ORGANIZED HEALTH CARE EDUCATION/TRAINING PROGRAM

## 2021-02-08 PROCEDURE — 1157F ADVNC CARE PLAN IN RCRD: CPT | Mod: S$GLB,,, | Performed by: STUDENT IN AN ORGANIZED HEALTH CARE EDUCATION/TRAINING PROGRAM

## 2021-02-08 PROCEDURE — 99999 PR PBB SHADOW E&M-EST. PATIENT-LVL III: CPT | Mod: PBBFAC,,, | Performed by: STUDENT IN AN ORGANIZED HEALTH CARE EDUCATION/TRAINING PROGRAM

## 2021-02-08 PROCEDURE — 3077F PR MOST RECENT SYSTOLIC BLOOD PRESSURE >= 140 MM HG: ICD-10-PCS | Mod: CPTII,S$GLB,, | Performed by: STUDENT IN AN ORGANIZED HEALTH CARE EDUCATION/TRAINING PROGRAM

## 2021-02-08 PROCEDURE — 1101F PT FALLS ASSESS-DOCD LE1/YR: CPT | Mod: CPTII,S$GLB,, | Performed by: STUDENT IN AN ORGANIZED HEALTH CARE EDUCATION/TRAINING PROGRAM

## 2021-02-08 PROCEDURE — 3077F SYST BP >= 140 MM HG: CPT | Mod: CPTII,S$GLB,, | Performed by: STUDENT IN AN ORGANIZED HEALTH CARE EDUCATION/TRAINING PROGRAM

## 2021-02-08 RX ORDER — PRAVASTATIN SODIUM 40 MG/1
20 TABLET ORAL DAILY
Qty: 90 TABLET | Refills: 1 | Status: ON HOLD | OUTPATIENT
Start: 2021-02-08 | End: 2022-04-10 | Stop reason: HOSPADM

## 2021-02-08 RX ORDER — LISINOPRIL 10 MG/1
10 TABLET ORAL DAILY
Qty: 90 TABLET | Refills: 3 | Status: SHIPPED | OUTPATIENT
Start: 2021-02-08 | End: 2021-08-18 | Stop reason: ALTCHOICE

## 2021-02-24 ENCOUNTER — OFFICE VISIT (OUTPATIENT)
Dept: FAMILY MEDICINE | Facility: CLINIC | Age: 71
End: 2021-02-24
Payer: MEDICARE

## 2021-02-24 VITALS
SYSTOLIC BLOOD PRESSURE: 110 MMHG | WEIGHT: 148.38 LBS | TEMPERATURE: 97 F | BODY MASS INDEX: 27.3 KG/M2 | OXYGEN SATURATION: 94 % | HEART RATE: 92 BPM | DIASTOLIC BLOOD PRESSURE: 70 MMHG | HEIGHT: 62 IN

## 2021-02-24 DIAGNOSIS — R30.0 DYSURIA: Primary | ICD-10-CM

## 2021-02-24 PROCEDURE — 3074F PR MOST RECENT SYSTOLIC BLOOD PRESSURE < 130 MM HG: ICD-10-PCS | Mod: CPTII,S$GLB,, | Performed by: STUDENT IN AN ORGANIZED HEALTH CARE EDUCATION/TRAINING PROGRAM

## 2021-02-24 PROCEDURE — 3078F PR MOST RECENT DIASTOLIC BLOOD PRESSURE < 80 MM HG: ICD-10-PCS | Mod: CPTII,S$GLB,, | Performed by: STUDENT IN AN ORGANIZED HEALTH CARE EDUCATION/TRAINING PROGRAM

## 2021-02-24 PROCEDURE — 1101F PT FALLS ASSESS-DOCD LE1/YR: CPT | Mod: CPTII,S$GLB,, | Performed by: STUDENT IN AN ORGANIZED HEALTH CARE EDUCATION/TRAINING PROGRAM

## 2021-02-24 PROCEDURE — 99999 PR PBB SHADOW E&M-EST. PATIENT-LVL V: CPT | Mod: PBBFAC,,, | Performed by: STUDENT IN AN ORGANIZED HEALTH CARE EDUCATION/TRAINING PROGRAM

## 2021-02-24 PROCEDURE — 99999 PR PBB SHADOW E&M-EST. PATIENT-LVL V: ICD-10-PCS | Mod: PBBFAC,,, | Performed by: STUDENT IN AN ORGANIZED HEALTH CARE EDUCATION/TRAINING PROGRAM

## 2021-02-24 PROCEDURE — 1159F MED LIST DOCD IN RCRD: CPT | Mod: S$GLB,,, | Performed by: STUDENT IN AN ORGANIZED HEALTH CARE EDUCATION/TRAINING PROGRAM

## 2021-02-24 PROCEDURE — 3008F BODY MASS INDEX DOCD: CPT | Mod: CPTII,S$GLB,, | Performed by: STUDENT IN AN ORGANIZED HEALTH CARE EDUCATION/TRAINING PROGRAM

## 2021-02-24 PROCEDURE — 1157F PR ADVANCE CARE PLAN OR EQUIV PRESENT IN MEDICAL RECORD: ICD-10-PCS | Mod: S$GLB,,, | Performed by: STUDENT IN AN ORGANIZED HEALTH CARE EDUCATION/TRAINING PROGRAM

## 2021-02-24 PROCEDURE — 1125F AMNT PAIN NOTED PAIN PRSNT: CPT | Mod: S$GLB,,, | Performed by: STUDENT IN AN ORGANIZED HEALTH CARE EDUCATION/TRAINING PROGRAM

## 2021-02-24 PROCEDURE — 99214 OFFICE O/P EST MOD 30 MIN: CPT | Mod: S$GLB,,, | Performed by: STUDENT IN AN ORGANIZED HEALTH CARE EDUCATION/TRAINING PROGRAM

## 2021-02-24 PROCEDURE — 3078F DIAST BP <80 MM HG: CPT | Mod: CPTII,S$GLB,, | Performed by: STUDENT IN AN ORGANIZED HEALTH CARE EDUCATION/TRAINING PROGRAM

## 2021-02-24 PROCEDURE — 1125F PR PAIN SEVERITY QUANTIFIED, PAIN PRESENT: ICD-10-PCS | Mod: S$GLB,,, | Performed by: STUDENT IN AN ORGANIZED HEALTH CARE EDUCATION/TRAINING PROGRAM

## 2021-02-24 PROCEDURE — 1101F PR PT FALLS ASSESS DOC 0-1 FALLS W/OUT INJ PAST YR: ICD-10-PCS | Mod: CPTII,S$GLB,, | Performed by: STUDENT IN AN ORGANIZED HEALTH CARE EDUCATION/TRAINING PROGRAM

## 2021-02-24 PROCEDURE — 1157F ADVNC CARE PLAN IN RCRD: CPT | Mod: S$GLB,,, | Performed by: STUDENT IN AN ORGANIZED HEALTH CARE EDUCATION/TRAINING PROGRAM

## 2021-02-24 PROCEDURE — 99214 PR OFFICE/OUTPT VISIT, EST, LEVL IV, 30-39 MIN: ICD-10-PCS | Mod: S$GLB,,, | Performed by: STUDENT IN AN ORGANIZED HEALTH CARE EDUCATION/TRAINING PROGRAM

## 2021-02-24 PROCEDURE — 3074F SYST BP LT 130 MM HG: CPT | Mod: CPTII,S$GLB,, | Performed by: STUDENT IN AN ORGANIZED HEALTH CARE EDUCATION/TRAINING PROGRAM

## 2021-02-24 PROCEDURE — 3008F PR BODY MASS INDEX (BMI) DOCUMENTED: ICD-10-PCS | Mod: CPTII,S$GLB,, | Performed by: STUDENT IN AN ORGANIZED HEALTH CARE EDUCATION/TRAINING PROGRAM

## 2021-02-24 PROCEDURE — 3288F FALL RISK ASSESSMENT DOCD: CPT | Mod: CPTII,S$GLB,, | Performed by: STUDENT IN AN ORGANIZED HEALTH CARE EDUCATION/TRAINING PROGRAM

## 2021-02-24 PROCEDURE — 3288F PR FALLS RISK ASSESSMENT DOCUMENTED: ICD-10-PCS | Mod: CPTII,S$GLB,, | Performed by: STUDENT IN AN ORGANIZED HEALTH CARE EDUCATION/TRAINING PROGRAM

## 2021-02-24 PROCEDURE — 1159F PR MEDICATION LIST DOCUMENTED IN MEDICAL RECORD: ICD-10-PCS | Mod: S$GLB,,, | Performed by: STUDENT IN AN ORGANIZED HEALTH CARE EDUCATION/TRAINING PROGRAM

## 2021-02-24 RX ORDER — OXYCODONE HYDROCHLORIDE 5 MG/1
TABLET ORAL
COMMUNITY
End: 2021-03-02

## 2021-02-24 RX ORDER — SULFAMETHOXAZOLE AND TRIMETHOPRIM 800; 160 MG/1; MG/1
1 TABLET ORAL 2 TIMES DAILY
Qty: 10 TABLET | Refills: 0 | Status: SHIPPED | OUTPATIENT
Start: 2021-02-24 | End: 2021-03-02

## 2021-02-24 RX ORDER — ZOSTER VACCINE RECOMBINANT, ADJUVANTED 50 MCG/0.5
KIT INTRAMUSCULAR
COMMUNITY
End: 2021-03-02

## 2021-02-24 RX ORDER — CYCLOBENZAPRINE HCL 10 MG
TABLET ORAL
COMMUNITY
End: 2021-03-02

## 2021-03-02 ENCOUNTER — HOSPITAL ENCOUNTER (OUTPATIENT)
Dept: RADIOLOGY | Facility: HOSPITAL | Age: 71
Discharge: HOME | End: 2021-03-02
Attending: INTERNAL MEDICINE
Payer: MEDICARE

## 2021-03-02 ENCOUNTER — TELEPHONE (OUTPATIENT)
Dept: CARDIOLOGY | Facility: CLINIC | Age: 71
End: 2021-03-02

## 2021-03-02 ENCOUNTER — OFFICE VISIT (OUTPATIENT)
Dept: CARDIOLOGY | Facility: CLINIC | Age: 71
End: 2021-03-02
Payer: MEDICARE

## 2021-03-02 VITALS
WEIGHT: 149.69 LBS | OXYGEN SATURATION: 91 % | BODY MASS INDEX: 27.55 KG/M2 | SYSTOLIC BLOOD PRESSURE: 112 MMHG | HEART RATE: 119 BPM | DIASTOLIC BLOOD PRESSURE: 73 MMHG | HEIGHT: 62 IN

## 2021-03-02 DIAGNOSIS — E11.59 HYPERTENSION ASSOCIATED WITH DIABETES: ICD-10-CM

## 2021-03-02 DIAGNOSIS — I25.10 CORONARY ARTERY DISEASE INVOLVING NATIVE CORONARY ARTERY OF NATIVE HEART WITHOUT ANGINA PECTORIS: Primary | Chronic | ICD-10-CM

## 2021-03-02 DIAGNOSIS — I15.2 HYPERTENSION ASSOCIATED WITH DIABETES: ICD-10-CM

## 2021-03-02 DIAGNOSIS — E11.69 HYPERLIPIDEMIA ASSOCIATED WITH TYPE 2 DIABETES MELLITUS: ICD-10-CM

## 2021-03-02 DIAGNOSIS — G40.909 SEIZURE DISORDER: ICD-10-CM

## 2021-03-02 DIAGNOSIS — I50.32 CHRONIC DIASTOLIC CONGESTIVE HEART FAILURE: ICD-10-CM

## 2021-03-02 DIAGNOSIS — Z72.0 TOBACCO ABUSE: ICD-10-CM

## 2021-03-02 DIAGNOSIS — E78.5 HYPERLIPIDEMIA ASSOCIATED WITH TYPE 2 DIABETES MELLITUS: ICD-10-CM

## 2021-03-02 DIAGNOSIS — Z90.3 HISTORY OF PARTIAL GASTRECTOMY: ICD-10-CM

## 2021-03-02 DIAGNOSIS — I10 ESSENTIAL HYPERTENSION: ICD-10-CM

## 2021-03-02 DIAGNOSIS — F33.41 RECURRENT MAJOR DEPRESSIVE DISORDER, IN PARTIAL REMISSION: ICD-10-CM

## 2021-03-02 DIAGNOSIS — Z98.61 S/P PTCA (PERCUTANEOUS TRANSLUMINAL CORONARY ANGIOPLASTY): ICD-10-CM

## 2021-03-02 DIAGNOSIS — R00.0 SINUS TACHYCARDIA: ICD-10-CM

## 2021-03-02 PROCEDURE — 99499 RISK ADDL DX/OHS AUDIT: ICD-10-PCS | Mod: S$GLB,,, | Performed by: INTERNAL MEDICINE

## 2021-03-02 PROCEDURE — 99214 OFFICE O/P EST MOD 30 MIN: CPT | Mod: 25,S$GLB,, | Performed by: INTERNAL MEDICINE

## 2021-03-02 PROCEDURE — 1159F PR MEDICATION LIST DOCUMENTED IN MEDICAL RECORD: ICD-10-PCS | Mod: S$GLB,,, | Performed by: INTERNAL MEDICINE

## 2021-03-02 PROCEDURE — 93000 EKG 12-LEAD: ICD-10-PCS | Mod: S$GLB,,, | Performed by: INTERNAL MEDICINE

## 2021-03-02 PROCEDURE — 3288F FALL RISK ASSESSMENT DOCD: CPT | Mod: CPTII,S$GLB,, | Performed by: INTERNAL MEDICINE

## 2021-03-02 PROCEDURE — 1157F ADVNC CARE PLAN IN RCRD: CPT | Mod: S$GLB,,, | Performed by: INTERNAL MEDICINE

## 2021-03-02 PROCEDURE — 93000 ELECTROCARDIOGRAM COMPLETE: CPT | Mod: S$GLB,,, | Performed by: INTERNAL MEDICINE

## 2021-03-02 PROCEDURE — 1157F PR ADVANCE CARE PLAN OR EQUIV PRESENT IN MEDICAL RECORD: ICD-10-PCS | Mod: S$GLB,,, | Performed by: INTERNAL MEDICINE

## 2021-03-02 PROCEDURE — 99999 PR PBB SHADOW E&M-EST. PATIENT-LVL V: ICD-10-PCS | Mod: PBBFAC,,, | Performed by: INTERNAL MEDICINE

## 2021-03-02 PROCEDURE — 1159F MED LIST DOCD IN RCRD: CPT | Mod: S$GLB,,, | Performed by: INTERNAL MEDICINE

## 2021-03-02 PROCEDURE — 3078F PR MOST RECENT DIASTOLIC BLOOD PRESSURE < 80 MM HG: ICD-10-PCS | Mod: CPTII,S$GLB,, | Performed by: INTERNAL MEDICINE

## 2021-03-02 PROCEDURE — 3074F PR MOST RECENT SYSTOLIC BLOOD PRESSURE < 130 MM HG: ICD-10-PCS | Mod: CPTII,S$GLB,, | Performed by: INTERNAL MEDICINE

## 2021-03-02 PROCEDURE — 1126F PR PAIN SEVERITY QUANTIFIED, NO PAIN PRESENT: ICD-10-PCS | Mod: S$GLB,,, | Performed by: INTERNAL MEDICINE

## 2021-03-02 PROCEDURE — 3078F DIAST BP <80 MM HG: CPT | Mod: CPTII,S$GLB,, | Performed by: INTERNAL MEDICINE

## 2021-03-02 PROCEDURE — 1126F AMNT PAIN NOTED NONE PRSNT: CPT | Mod: S$GLB,,, | Performed by: INTERNAL MEDICINE

## 2021-03-02 PROCEDURE — 3052F HG A1C>EQUAL 8.0%<EQUAL 9.0%: CPT | Mod: CPTII,S$GLB,, | Performed by: INTERNAL MEDICINE

## 2021-03-02 PROCEDURE — 3288F PR FALLS RISK ASSESSMENT DOCUMENTED: ICD-10-PCS | Mod: CPTII,S$GLB,, | Performed by: INTERNAL MEDICINE

## 2021-03-02 PROCEDURE — 1101F PT FALLS ASSESS-DOCD LE1/YR: CPT | Mod: CPTII,S$GLB,, | Performed by: INTERNAL MEDICINE

## 2021-03-02 PROCEDURE — 1101F PR PT FALLS ASSESS DOC 0-1 FALLS W/OUT INJ PAST YR: ICD-10-PCS | Mod: CPTII,S$GLB,, | Performed by: INTERNAL MEDICINE

## 2021-03-02 PROCEDURE — 71046 X-RAY EXAM CHEST 2 VIEWS: CPT | Mod: 26,,, | Performed by: RADIOLOGY

## 2021-03-02 PROCEDURE — 3008F PR BODY MASS INDEX (BMI) DOCUMENTED: ICD-10-PCS | Mod: CPTII,S$GLB,, | Performed by: INTERNAL MEDICINE

## 2021-03-02 PROCEDURE — 71046 XR CHEST PA AND LATERAL: ICD-10-PCS | Mod: 26,,, | Performed by: RADIOLOGY

## 2021-03-02 PROCEDURE — 99214 PR OFFICE/OUTPT VISIT, EST, LEVL IV, 30-39 MIN: ICD-10-PCS | Mod: 25,S$GLB,, | Performed by: INTERNAL MEDICINE

## 2021-03-02 PROCEDURE — 99999 PR PBB SHADOW E&M-EST. PATIENT-LVL V: CPT | Mod: PBBFAC,,, | Performed by: INTERNAL MEDICINE

## 2021-03-02 PROCEDURE — 3008F BODY MASS INDEX DOCD: CPT | Mod: CPTII,S$GLB,, | Performed by: INTERNAL MEDICINE

## 2021-03-02 PROCEDURE — 3052F PR MOST RECENT HEMOGLOBIN A1C LEVEL 8.0 - < 9.0%: ICD-10-PCS | Mod: CPTII,S$GLB,, | Performed by: INTERNAL MEDICINE

## 2021-03-02 PROCEDURE — 71046 X-RAY EXAM CHEST 2 VIEWS: CPT | Mod: TC,FY

## 2021-03-02 PROCEDURE — 99499 UNLISTED E&M SERVICE: CPT | Mod: S$GLB,,, | Performed by: INTERNAL MEDICINE

## 2021-03-02 PROCEDURE — 3074F SYST BP LT 130 MM HG: CPT | Mod: CPTII,S$GLB,, | Performed by: INTERNAL MEDICINE

## 2021-03-05 DIAGNOSIS — E11.59 HYPERTENSION ASSOCIATED WITH DIABETES: ICD-10-CM

## 2021-03-05 DIAGNOSIS — I15.2 HYPERTENSION ASSOCIATED WITH DIABETES: ICD-10-CM

## 2021-03-05 RX ORDER — POTASSIUM CHLORIDE 20 MEQ/1
20 TABLET, EXTENDED RELEASE ORAL DAILY
Qty: 90 TABLET | Refills: 0 | Status: SHIPPED | OUTPATIENT
Start: 2021-03-05 | End: 2021-06-14

## 2021-03-10 ENCOUNTER — OFFICE VISIT (OUTPATIENT)
Dept: FAMILY MEDICINE | Facility: CLINIC | Age: 71
End: 2021-03-10
Payer: MEDICARE

## 2021-03-10 VITALS
HEART RATE: 104 BPM | BODY MASS INDEX: 25.97 KG/M2 | WEIGHT: 141.13 LBS | DIASTOLIC BLOOD PRESSURE: 84 MMHG | SYSTOLIC BLOOD PRESSURE: 120 MMHG | HEIGHT: 62 IN | OXYGEN SATURATION: 96 %

## 2021-03-10 DIAGNOSIS — I10 ESSENTIAL HYPERTENSION: Primary | ICD-10-CM

## 2021-03-10 DIAGNOSIS — F17.200 TOBACCO DEPENDENCE: ICD-10-CM

## 2021-03-10 PROCEDURE — 3008F BODY MASS INDEX DOCD: CPT | Mod: CPTII,S$GLB,, | Performed by: STUDENT IN AN ORGANIZED HEALTH CARE EDUCATION/TRAINING PROGRAM

## 2021-03-10 PROCEDURE — 1126F PR PAIN SEVERITY QUANTIFIED, NO PAIN PRESENT: ICD-10-PCS | Mod: S$GLB,,, | Performed by: STUDENT IN AN ORGANIZED HEALTH CARE EDUCATION/TRAINING PROGRAM

## 2021-03-10 PROCEDURE — 3288F FALL RISK ASSESSMENT DOCD: CPT | Mod: CPTII,S$GLB,, | Performed by: STUDENT IN AN ORGANIZED HEALTH CARE EDUCATION/TRAINING PROGRAM

## 2021-03-10 PROCEDURE — 1101F PT FALLS ASSESS-DOCD LE1/YR: CPT | Mod: CPTII,S$GLB,, | Performed by: STUDENT IN AN ORGANIZED HEALTH CARE EDUCATION/TRAINING PROGRAM

## 2021-03-10 PROCEDURE — 3288F PR FALLS RISK ASSESSMENT DOCUMENTED: ICD-10-PCS | Mod: CPTII,S$GLB,, | Performed by: STUDENT IN AN ORGANIZED HEALTH CARE EDUCATION/TRAINING PROGRAM

## 2021-03-10 PROCEDURE — 99999 PR PBB SHADOW E&M-EST. PATIENT-LVL V: ICD-10-PCS | Mod: PBBFAC,,, | Performed by: STUDENT IN AN ORGANIZED HEALTH CARE EDUCATION/TRAINING PROGRAM

## 2021-03-10 PROCEDURE — 1101F PR PT FALLS ASSESS DOC 0-1 FALLS W/OUT INJ PAST YR: ICD-10-PCS | Mod: CPTII,S$GLB,, | Performed by: STUDENT IN AN ORGANIZED HEALTH CARE EDUCATION/TRAINING PROGRAM

## 2021-03-10 PROCEDURE — 1126F AMNT PAIN NOTED NONE PRSNT: CPT | Mod: S$GLB,,, | Performed by: STUDENT IN AN ORGANIZED HEALTH CARE EDUCATION/TRAINING PROGRAM

## 2021-03-10 PROCEDURE — 1159F PR MEDICATION LIST DOCUMENTED IN MEDICAL RECORD: ICD-10-PCS | Mod: S$GLB,,, | Performed by: STUDENT IN AN ORGANIZED HEALTH CARE EDUCATION/TRAINING PROGRAM

## 2021-03-10 PROCEDURE — 3079F PR MOST RECENT DIASTOLIC BLOOD PRESSURE 80-89 MM HG: ICD-10-PCS | Mod: CPTII,S$GLB,, | Performed by: STUDENT IN AN ORGANIZED HEALTH CARE EDUCATION/TRAINING PROGRAM

## 2021-03-10 PROCEDURE — 3008F PR BODY MASS INDEX (BMI) DOCUMENTED: ICD-10-PCS | Mod: CPTII,S$GLB,, | Performed by: STUDENT IN AN ORGANIZED HEALTH CARE EDUCATION/TRAINING PROGRAM

## 2021-03-10 PROCEDURE — 1157F PR ADVANCE CARE PLAN OR EQUIV PRESENT IN MEDICAL RECORD: ICD-10-PCS | Mod: S$GLB,,, | Performed by: STUDENT IN AN ORGANIZED HEALTH CARE EDUCATION/TRAINING PROGRAM

## 2021-03-10 PROCEDURE — 3079F DIAST BP 80-89 MM HG: CPT | Mod: CPTII,S$GLB,, | Performed by: STUDENT IN AN ORGANIZED HEALTH CARE EDUCATION/TRAINING PROGRAM

## 2021-03-10 PROCEDURE — 99213 OFFICE O/P EST LOW 20 MIN: CPT | Mod: S$GLB,,, | Performed by: STUDENT IN AN ORGANIZED HEALTH CARE EDUCATION/TRAINING PROGRAM

## 2021-03-10 PROCEDURE — 1159F MED LIST DOCD IN RCRD: CPT | Mod: S$GLB,,, | Performed by: STUDENT IN AN ORGANIZED HEALTH CARE EDUCATION/TRAINING PROGRAM

## 2021-03-10 PROCEDURE — 3074F PR MOST RECENT SYSTOLIC BLOOD PRESSURE < 130 MM HG: ICD-10-PCS | Mod: CPTII,S$GLB,, | Performed by: STUDENT IN AN ORGANIZED HEALTH CARE EDUCATION/TRAINING PROGRAM

## 2021-03-10 PROCEDURE — 1157F ADVNC CARE PLAN IN RCRD: CPT | Mod: S$GLB,,, | Performed by: STUDENT IN AN ORGANIZED HEALTH CARE EDUCATION/TRAINING PROGRAM

## 2021-03-10 PROCEDURE — 3074F SYST BP LT 130 MM HG: CPT | Mod: CPTII,S$GLB,, | Performed by: STUDENT IN AN ORGANIZED HEALTH CARE EDUCATION/TRAINING PROGRAM

## 2021-03-10 PROCEDURE — 99999 PR PBB SHADOW E&M-EST. PATIENT-LVL V: CPT | Mod: PBBFAC,,, | Performed by: STUDENT IN AN ORGANIZED HEALTH CARE EDUCATION/TRAINING PROGRAM

## 2021-03-10 PROCEDURE — 99213 PR OFFICE/OUTPT VISIT, EST, LEVL III, 20-29 MIN: ICD-10-PCS | Mod: S$GLB,,, | Performed by: STUDENT IN AN ORGANIZED HEALTH CARE EDUCATION/TRAINING PROGRAM

## 2021-03-16 ENCOUNTER — CLINICAL SUPPORT (OUTPATIENT)
Dept: SMOKING CESSATION | Facility: CLINIC | Age: 71
End: 2021-03-16
Payer: MEDICARE

## 2021-03-16 VITALS
OXYGEN SATURATION: 94 % | SYSTOLIC BLOOD PRESSURE: 114 MMHG | DIASTOLIC BLOOD PRESSURE: 79 MMHG | WEIGHT: 141 LBS | HEIGHT: 62 IN | HEART RATE: 113 BPM | BODY MASS INDEX: 25.95 KG/M2

## 2021-03-16 DIAGNOSIS — F17.210 MODERATE CIGARETTE SMOKER (10-19 PER DAY): Primary | ICD-10-CM

## 2021-03-16 PROCEDURE — 99404 PREV MED CNSL INDIV APPRX 60: CPT | Mod: S$GLB,,,

## 2021-03-16 PROCEDURE — 99999 PR PBB SHADOW E&M-EST. PATIENT-LVL II: ICD-10-PCS | Mod: PBBFAC,,,

## 2021-03-16 PROCEDURE — 99404 PR PREVENT COUNSEL,INDIV,60 MIN: ICD-10-PCS | Mod: S$GLB,,,

## 2021-03-16 PROCEDURE — 99999 PR PBB SHADOW E&M-EST. PATIENT-LVL II: CPT | Mod: PBBFAC,,,

## 2021-03-16 RX ORDER — IBUPROFEN 200 MG
1 TABLET ORAL DAILY
Qty: 14 PATCH | Refills: 0 | Status: SHIPPED | OUTPATIENT
Start: 2021-03-16 | End: 2021-04-21 | Stop reason: SDUPTHER

## 2021-03-16 RX ORDER — NICOTINE POLACRILEX 2 MG/1
2 LOZENGE ORAL
Qty: 81 EACH | Refills: 0 | Status: SHIPPED | OUTPATIENT
Start: 2021-03-16 | End: 2021-05-19 | Stop reason: SDUPTHER

## 2021-03-16 RX ORDER — VARENICLINE TARTRATE 0.5 (11)-1
KIT ORAL
Qty: 53 TABLET | Refills: 0 | Status: ON HOLD | OUTPATIENT
Start: 2021-03-16 | End: 2022-04-06

## 2021-03-17 ENCOUNTER — HOSPITAL ENCOUNTER (EMERGENCY)
Facility: HOSPITAL | Age: 71
Discharge: HOME | End: 2021-03-17
Attending: EMERGENCY MEDICINE
Payer: MEDICARE

## 2021-03-17 ENCOUNTER — TELEPHONE (OUTPATIENT)
Dept: FAMILY MEDICINE | Facility: CLINIC | Age: 71
End: 2021-03-17

## 2021-03-17 VITALS
WEIGHT: 140 LBS | BODY MASS INDEX: 25.76 KG/M2 | OXYGEN SATURATION: 95 % | TEMPERATURE: 98 F | HEART RATE: 109 BPM | HEIGHT: 62 IN | SYSTOLIC BLOOD PRESSURE: 130 MMHG | DIASTOLIC BLOOD PRESSURE: 75 MMHG | RESPIRATION RATE: 20 BRPM

## 2021-03-17 DIAGNOSIS — R91.1 PULMONARY NODULE: ICD-10-CM

## 2021-03-17 DIAGNOSIS — R10.9 LEFT FLANK PAIN: Primary | ICD-10-CM

## 2021-03-17 DIAGNOSIS — N30.91 HEMORRHAGIC CYSTITIS: ICD-10-CM

## 2021-03-17 LAB
ALBUMIN SERPL BCP-MCNC: 3.5 G/DL (ref 3.5–5.2)
ALP SERPL-CCNC: 176 U/L (ref 55–135)
ALT SERPL W/O P-5'-P-CCNC: 7 U/L (ref 10–44)
ANION GAP SERPL CALC-SCNC: 16 MMOL/L (ref 8–16)
AST SERPL-CCNC: 5 U/L (ref 10–40)
BACTERIA #/AREA URNS HPF: ABNORMAL /HPF
BASOPHILS # BLD AUTO: 0.03 K/UL (ref 0–0.2)
BASOPHILS NFR BLD: 0.5 % (ref 0–1.9)
BILIRUB SERPL-MCNC: <0.1 MG/DL (ref 0.1–1)
BILIRUB UR QL STRIP: NEGATIVE
BUN SERPL-MCNC: 11 MG/DL (ref 8–23)
CALCIUM SERPL-MCNC: 8.8 MG/DL (ref 8.7–10.5)
CHLORIDE SERPL-SCNC: 105 MMOL/L (ref 95–110)
CLARITY UR: ABNORMAL
CO2 SERPL-SCNC: 17 MMOL/L (ref 23–29)
COLOR UR: YELLOW
CREAT SERPL-MCNC: 1 MG/DL (ref 0.5–1.4)
DIFFERENTIAL METHOD: ABNORMAL
EOSINOPHIL # BLD AUTO: 0.1 K/UL (ref 0–0.5)
EOSINOPHIL NFR BLD: 1.6 % (ref 0–8)
ERYTHROCYTE [DISTWIDTH] IN BLOOD BY AUTOMATED COUNT: 16.4 % (ref 11.5–14.5)
EST. GFR  (AFRICAN AMERICAN): >60 ML/MIN/1.73 M^2
EST. GFR  (NON AFRICAN AMERICAN): 58 ML/MIN/1.73 M^2
GLUCOSE SERPL-MCNC: 399 MG/DL (ref 70–110)
GLUCOSE UR QL STRIP: ABNORMAL
HCT VFR BLD AUTO: 31 % (ref 37–48.5)
HGB BLD-MCNC: 9.4 G/DL (ref 12–16)
HGB UR QL STRIP: NEGATIVE
IMM GRANULOCYTES # BLD AUTO: 0.01 K/UL (ref 0–0.04)
IMM GRANULOCYTES NFR BLD AUTO: 0.2 % (ref 0–0.5)
KETONES UR QL STRIP: NEGATIVE
LEUKOCYTE ESTERASE UR QL STRIP: NEGATIVE
LYMPHOCYTES # BLD AUTO: 1.7 K/UL (ref 1–4.8)
LYMPHOCYTES NFR BLD: 26.8 % (ref 18–48)
MCH RBC QN AUTO: 24.8 PG (ref 27–31)
MCHC RBC AUTO-ENTMCNC: 30.3 G/DL (ref 32–36)
MCV RBC AUTO: 82 FL (ref 82–98)
MICROSCOPIC COMMENT: ABNORMAL
MONOCYTES # BLD AUTO: 0.5 K/UL (ref 0.3–1)
MONOCYTES NFR BLD: 8.4 % (ref 4–15)
NEUTROPHILS # BLD AUTO: 3.9 K/UL (ref 1.8–7.7)
NEUTROPHILS NFR BLD: 62.5 % (ref 38–73)
NITRITE UR QL STRIP: POSITIVE
NRBC BLD-RTO: 0 /100 WBC
PH UR STRIP: 5 [PH] (ref 5–8)
PLATELET # BLD AUTO: 223 K/UL (ref 150–350)
PMV BLD AUTO: 10.7 FL (ref 9.2–12.9)
POTASSIUM SERPL-SCNC: 4.1 MMOL/L (ref 3.5–5.1)
PROT SERPL-MCNC: 7.2 G/DL (ref 6–8.4)
PROT UR QL STRIP: NEGATIVE
RBC # BLD AUTO: 3.79 M/UL (ref 4–5.4)
RBC #/AREA URNS HPF: 0 /HPF (ref 0–4)
SODIUM SERPL-SCNC: 138 MMOL/L (ref 136–145)
SP GR UR STRIP: 1.01 (ref 1–1.03)
SQUAMOUS #/AREA URNS HPF: 1 /HPF
URN SPEC COLLECT METH UR: ABNORMAL
UROBILINOGEN UR STRIP-ACNC: NEGATIVE EU/DL
WBC # BLD AUTO: 6.22 K/UL (ref 3.9–12.7)
WBC #/AREA URNS HPF: 1 /HPF (ref 0–5)
YEAST URNS QL MICRO: ABNORMAL

## 2021-03-17 PROCEDURE — 63600175 PHARM REV CODE 636 W HCPCS: Performed by: EMERGENCY MEDICINE

## 2021-03-17 PROCEDURE — 99285 EMERGENCY DEPT VISIT HI MDM: CPT | Mod: 25

## 2021-03-17 PROCEDURE — 96375 TX/PRO/DX INJ NEW DRUG ADDON: CPT

## 2021-03-17 PROCEDURE — 96374 THER/PROPH/DIAG INJ IV PUSH: CPT

## 2021-03-17 PROCEDURE — 81000 URINALYSIS NONAUTO W/SCOPE: CPT | Performed by: EMERGENCY MEDICINE

## 2021-03-17 PROCEDURE — 25000003 PHARM REV CODE 250: Performed by: EMERGENCY MEDICINE

## 2021-03-17 PROCEDURE — 85025 COMPLETE CBC W/AUTO DIFF WBC: CPT | Performed by: EMERGENCY MEDICINE

## 2021-03-17 PROCEDURE — 96361 HYDRATE IV INFUSION ADD-ON: CPT

## 2021-03-17 PROCEDURE — 80053 COMPREHEN METABOLIC PANEL: CPT | Performed by: EMERGENCY MEDICINE

## 2021-03-17 RX ORDER — KETOROLAC TROMETHAMINE 30 MG/ML
10 INJECTION, SOLUTION INTRAMUSCULAR; INTRAVENOUS
Status: COMPLETED | OUTPATIENT
Start: 2021-03-17 | End: 2021-03-17

## 2021-03-17 RX ORDER — CEPHALEXIN 500 MG/1
500 CAPSULE ORAL EVERY 8 HOURS
Qty: 15 CAPSULE | Refills: 0 | Status: SHIPPED | OUTPATIENT
Start: 2021-03-17 | End: 2021-03-22

## 2021-03-17 RX ORDER — ONDANSETRON 2 MG/ML
4 INJECTION INTRAMUSCULAR; INTRAVENOUS
Status: COMPLETED | OUTPATIENT
Start: 2021-03-17 | End: 2021-03-17

## 2021-03-17 RX ORDER — CEPHALEXIN 500 MG/1
500 CAPSULE ORAL
Status: COMPLETED | OUTPATIENT
Start: 2021-03-17 | End: 2021-03-17

## 2021-03-17 RX ADMIN — CEPHALEXIN 500 MG: 500 CAPSULE ORAL at 09:03

## 2021-03-17 RX ADMIN — SODIUM CHLORIDE 1000 ML: 0.9 INJECTION, SOLUTION INTRAVENOUS at 06:03

## 2021-03-17 RX ADMIN — KETOROLAC TROMETHAMINE 10 MG: 30 INJECTION, SOLUTION INTRAMUSCULAR; INTRAVENOUS at 06:03

## 2021-03-17 RX ADMIN — ONDANSETRON 4 MG: 2 INJECTION INTRAMUSCULAR; INTRAVENOUS at 06:03

## 2021-03-17 RX ADMIN — SODIUM CHLORIDE 1000 ML: 0.9 INJECTION, SOLUTION INTRAVENOUS at 08:03

## 2021-03-25 ENCOUNTER — OFFICE VISIT (OUTPATIENT)
Dept: FAMILY MEDICINE | Facility: CLINIC | Age: 71
End: 2021-03-25
Payer: MEDICARE

## 2021-03-25 ENCOUNTER — LAB VISIT (OUTPATIENT)
Dept: LAB | Facility: HOSPITAL | Age: 71
End: 2021-03-25
Attending: STUDENT IN AN ORGANIZED HEALTH CARE EDUCATION/TRAINING PROGRAM
Payer: MEDICARE

## 2021-03-25 VITALS
DIASTOLIC BLOOD PRESSURE: 70 MMHG | RESPIRATION RATE: 18 BRPM | SYSTOLIC BLOOD PRESSURE: 100 MMHG | OXYGEN SATURATION: 93 % | BODY MASS INDEX: 26.9 KG/M2 | HEIGHT: 62 IN | TEMPERATURE: 98 F | HEART RATE: 102 BPM | WEIGHT: 146.19 LBS

## 2021-03-25 DIAGNOSIS — E11.59 HYPERTENSION ASSOCIATED WITH DIABETES: ICD-10-CM

## 2021-03-25 DIAGNOSIS — R31.9 HEMATURIA, UNSPECIFIED TYPE: ICD-10-CM

## 2021-03-25 DIAGNOSIS — R31.9 HEMATURIA, UNSPECIFIED TYPE: Primary | ICD-10-CM

## 2021-03-25 DIAGNOSIS — I15.2 HYPERTENSION ASSOCIATED WITH DIABETES: ICD-10-CM

## 2021-03-25 LAB
ALBUMIN SERPL BCP-MCNC: 3.5 G/DL (ref 3.5–5.2)
ALP SERPL-CCNC: 134 U/L (ref 55–135)
ALT SERPL W/O P-5'-P-CCNC: 9 U/L (ref 10–44)
ANION GAP SERPL CALC-SCNC: 13 MMOL/L (ref 8–16)
AST SERPL-CCNC: 8 U/L (ref 10–40)
BASOPHILS # BLD AUTO: 0.03 K/UL (ref 0–0.2)
BASOPHILS NFR BLD: 0.4 % (ref 0–1.9)
BILIRUB SERPL-MCNC: 0.2 MG/DL (ref 0.1–1)
BUN SERPL-MCNC: 10 MG/DL (ref 8–23)
CALCIUM SERPL-MCNC: 9.3 MG/DL (ref 8.7–10.5)
CHLORIDE SERPL-SCNC: 107 MMOL/L (ref 95–110)
CO2 SERPL-SCNC: 21 MMOL/L (ref 23–29)
CREAT SERPL-MCNC: 0.8 MG/DL (ref 0.5–1.4)
DIFFERENTIAL METHOD: ABNORMAL
EOSINOPHIL # BLD AUTO: 0.1 K/UL (ref 0–0.5)
EOSINOPHIL NFR BLD: 1.3 % (ref 0–8)
ERYTHROCYTE [DISTWIDTH] IN BLOOD BY AUTOMATED COUNT: 18.5 % (ref 11.5–14.5)
EST. GFR  (AFRICAN AMERICAN): >60 ML/MIN/1.73 M^2
EST. GFR  (NON AFRICAN AMERICAN): >60 ML/MIN/1.73 M^2
GLUCOSE SERPL-MCNC: 103 MG/DL (ref 70–110)
HCT VFR BLD AUTO: 32.6 % (ref 37–48.5)
HGB BLD-MCNC: 9.1 G/DL (ref 12–16)
IMM GRANULOCYTES # BLD AUTO: 0.05 K/UL (ref 0–0.04)
IMM GRANULOCYTES NFR BLD AUTO: 0.7 % (ref 0–0.5)
LYMPHOCYTES # BLD AUTO: 1.9 K/UL (ref 1–4.8)
LYMPHOCYTES NFR BLD: 27.4 % (ref 18–48)
MCH RBC QN AUTO: 23.6 PG (ref 27–31)
MCHC RBC AUTO-ENTMCNC: 27.9 G/DL (ref 32–36)
MCV RBC AUTO: 85 FL (ref 82–98)
MONOCYTES # BLD AUTO: 0.6 K/UL (ref 0.3–1)
MONOCYTES NFR BLD: 9 % (ref 4–15)
NEUTROPHILS # BLD AUTO: 4.3 K/UL (ref 1.8–7.7)
NEUTROPHILS NFR BLD: 61.2 % (ref 38–73)
NRBC BLD-RTO: 2 /100 WBC
PLATELET # BLD AUTO: 273 K/UL (ref 150–350)
PMV BLD AUTO: 10.5 FL (ref 9.2–12.9)
POTASSIUM SERPL-SCNC: 3.9 MMOL/L (ref 3.5–5.1)
PROT SERPL-MCNC: 7.1 G/DL (ref 6–8.4)
RBC # BLD AUTO: 3.85 M/UL (ref 4–5.4)
SODIUM SERPL-SCNC: 141 MMOL/L (ref 136–145)
WBC # BLD AUTO: 6.98 K/UL (ref 3.9–12.7)

## 2021-03-25 PROCEDURE — 3288F FALL RISK ASSESSMENT DOCD: CPT | Mod: CPTII,S$GLB,, | Performed by: STUDENT IN AN ORGANIZED HEALTH CARE EDUCATION/TRAINING PROGRAM

## 2021-03-25 PROCEDURE — 1157F ADVNC CARE PLAN IN RCRD: CPT | Mod: S$GLB,,, | Performed by: STUDENT IN AN ORGANIZED HEALTH CARE EDUCATION/TRAINING PROGRAM

## 2021-03-25 PROCEDURE — 3078F DIAST BP <80 MM HG: CPT | Mod: CPTII,S$GLB,, | Performed by: STUDENT IN AN ORGANIZED HEALTH CARE EDUCATION/TRAINING PROGRAM

## 2021-03-25 PROCEDURE — 36415 COLL VENOUS BLD VENIPUNCTURE: CPT | Mod: PO | Performed by: STUDENT IN AN ORGANIZED HEALTH CARE EDUCATION/TRAINING PROGRAM

## 2021-03-25 PROCEDURE — 1159F MED LIST DOCD IN RCRD: CPT | Mod: S$GLB,,, | Performed by: STUDENT IN AN ORGANIZED HEALTH CARE EDUCATION/TRAINING PROGRAM

## 2021-03-25 PROCEDURE — 3074F SYST BP LT 130 MM HG: CPT | Mod: CPTII,S$GLB,, | Performed by: STUDENT IN AN ORGANIZED HEALTH CARE EDUCATION/TRAINING PROGRAM

## 2021-03-25 PROCEDURE — 3074F PR MOST RECENT SYSTOLIC BLOOD PRESSURE < 130 MM HG: ICD-10-PCS | Mod: CPTII,S$GLB,, | Performed by: STUDENT IN AN ORGANIZED HEALTH CARE EDUCATION/TRAINING PROGRAM

## 2021-03-25 PROCEDURE — 1101F PR PT FALLS ASSESS DOC 0-1 FALLS W/OUT INJ PAST YR: ICD-10-PCS | Mod: CPTII,S$GLB,, | Performed by: STUDENT IN AN ORGANIZED HEALTH CARE EDUCATION/TRAINING PROGRAM

## 2021-03-25 PROCEDURE — 85025 COMPLETE CBC W/AUTO DIFF WBC: CPT | Performed by: STUDENT IN AN ORGANIZED HEALTH CARE EDUCATION/TRAINING PROGRAM

## 2021-03-25 PROCEDURE — 99214 OFFICE O/P EST MOD 30 MIN: CPT | Mod: S$GLB,,, | Performed by: STUDENT IN AN ORGANIZED HEALTH CARE EDUCATION/TRAINING PROGRAM

## 2021-03-25 PROCEDURE — 1101F PT FALLS ASSESS-DOCD LE1/YR: CPT | Mod: CPTII,S$GLB,, | Performed by: STUDENT IN AN ORGANIZED HEALTH CARE EDUCATION/TRAINING PROGRAM

## 2021-03-25 PROCEDURE — 99999 PR PBB SHADOW E&M-EST. PATIENT-LVL III: CPT | Mod: PBBFAC,,, | Performed by: STUDENT IN AN ORGANIZED HEALTH CARE EDUCATION/TRAINING PROGRAM

## 2021-03-25 PROCEDURE — 3008F BODY MASS INDEX DOCD: CPT | Mod: CPTII,S$GLB,, | Performed by: STUDENT IN AN ORGANIZED HEALTH CARE EDUCATION/TRAINING PROGRAM

## 2021-03-25 PROCEDURE — 99214 PR OFFICE/OUTPT VISIT, EST, LEVL IV, 30-39 MIN: ICD-10-PCS | Mod: S$GLB,,, | Performed by: STUDENT IN AN ORGANIZED HEALTH CARE EDUCATION/TRAINING PROGRAM

## 2021-03-25 PROCEDURE — 1159F PR MEDICATION LIST DOCUMENTED IN MEDICAL RECORD: ICD-10-PCS | Mod: S$GLB,,, | Performed by: STUDENT IN AN ORGANIZED HEALTH CARE EDUCATION/TRAINING PROGRAM

## 2021-03-25 PROCEDURE — 99999 PR PBB SHADOW E&M-EST. PATIENT-LVL III: ICD-10-PCS | Mod: PBBFAC,,, | Performed by: STUDENT IN AN ORGANIZED HEALTH CARE EDUCATION/TRAINING PROGRAM

## 2021-03-25 PROCEDURE — 80053 COMPREHEN METABOLIC PANEL: CPT | Performed by: STUDENT IN AN ORGANIZED HEALTH CARE EDUCATION/TRAINING PROGRAM

## 2021-03-25 PROCEDURE — 3288F PR FALLS RISK ASSESSMENT DOCUMENTED: ICD-10-PCS | Mod: CPTII,S$GLB,, | Performed by: STUDENT IN AN ORGANIZED HEALTH CARE EDUCATION/TRAINING PROGRAM

## 2021-03-25 PROCEDURE — 3008F PR BODY MASS INDEX (BMI) DOCUMENTED: ICD-10-PCS | Mod: CPTII,S$GLB,, | Performed by: STUDENT IN AN ORGANIZED HEALTH CARE EDUCATION/TRAINING PROGRAM

## 2021-03-25 PROCEDURE — 1157F PR ADVANCE CARE PLAN OR EQUIV PRESENT IN MEDICAL RECORD: ICD-10-PCS | Mod: S$GLB,,, | Performed by: STUDENT IN AN ORGANIZED HEALTH CARE EDUCATION/TRAINING PROGRAM

## 2021-03-25 PROCEDURE — 3078F PR MOST RECENT DIASTOLIC BLOOD PRESSURE < 80 MM HG: ICD-10-PCS | Mod: CPTII,S$GLB,, | Performed by: STUDENT IN AN ORGANIZED HEALTH CARE EDUCATION/TRAINING PROGRAM

## 2021-03-26 ENCOUNTER — TELEPHONE (OUTPATIENT)
Dept: INTERNAL MEDICINE | Facility: CLINIC | Age: 71
End: 2021-03-26

## 2021-03-26 DIAGNOSIS — N30.01 ACUTE CYSTITIS WITH HEMATURIA: Primary | ICD-10-CM

## 2021-03-26 RX ORDER — AMOXICILLIN AND CLAVULANATE POTASSIUM 875; 125 MG/1; MG/1
1 TABLET, FILM COATED ORAL 2 TIMES DAILY
Qty: 14 TABLET | Refills: 0 | Status: SHIPPED | OUTPATIENT
Start: 2021-03-26 | End: 2021-04-02

## 2021-03-30 ENCOUNTER — TELEPHONE (OUTPATIENT)
Dept: SMOKING CESSATION | Facility: CLINIC | Age: 71
End: 2021-03-30

## 2021-04-05 ENCOUNTER — OFFICE VISIT (OUTPATIENT)
Dept: FAMILY MEDICINE | Facility: CLINIC | Age: 71
End: 2021-04-05
Payer: MEDICARE

## 2021-04-05 VITALS
BODY MASS INDEX: 25.88 KG/M2 | TEMPERATURE: 98 F | SYSTOLIC BLOOD PRESSURE: 114 MMHG | HEIGHT: 62 IN | OXYGEN SATURATION: 93 % | WEIGHT: 140.63 LBS | HEART RATE: 102 BPM | DIASTOLIC BLOOD PRESSURE: 78 MMHG

## 2021-04-05 DIAGNOSIS — E11.59 HYPERTENSION ASSOCIATED WITH DIABETES: Primary | ICD-10-CM

## 2021-04-05 DIAGNOSIS — Z91.89 AT RISK FOR POLYPHARMACY: ICD-10-CM

## 2021-04-05 DIAGNOSIS — I15.2 HYPERTENSION ASSOCIATED WITH DIABETES: Primary | ICD-10-CM

## 2021-04-05 PROCEDURE — 3288F FALL RISK ASSESSMENT DOCD: CPT | Mod: CPTII,S$GLB,, | Performed by: STUDENT IN AN ORGANIZED HEALTH CARE EDUCATION/TRAINING PROGRAM

## 2021-04-05 PROCEDURE — 1157F ADVNC CARE PLAN IN RCRD: CPT | Mod: S$GLB,,, | Performed by: STUDENT IN AN ORGANIZED HEALTH CARE EDUCATION/TRAINING PROGRAM

## 2021-04-05 PROCEDURE — 3074F PR MOST RECENT SYSTOLIC BLOOD PRESSURE < 130 MM HG: ICD-10-PCS | Mod: CPTII,S$GLB,, | Performed by: STUDENT IN AN ORGANIZED HEALTH CARE EDUCATION/TRAINING PROGRAM

## 2021-04-05 PROCEDURE — 3078F PR MOST RECENT DIASTOLIC BLOOD PRESSURE < 80 MM HG: ICD-10-PCS | Mod: CPTII,S$GLB,, | Performed by: STUDENT IN AN ORGANIZED HEALTH CARE EDUCATION/TRAINING PROGRAM

## 2021-04-05 PROCEDURE — 1125F AMNT PAIN NOTED PAIN PRSNT: CPT | Mod: S$GLB,,, | Performed by: STUDENT IN AN ORGANIZED HEALTH CARE EDUCATION/TRAINING PROGRAM

## 2021-04-05 PROCEDURE — 3288F PR FALLS RISK ASSESSMENT DOCUMENTED: ICD-10-PCS | Mod: CPTII,S$GLB,, | Performed by: STUDENT IN AN ORGANIZED HEALTH CARE EDUCATION/TRAINING PROGRAM

## 2021-04-05 PROCEDURE — 3052F HG A1C>EQUAL 8.0%<EQUAL 9.0%: CPT | Mod: CPTII,S$GLB,, | Performed by: STUDENT IN AN ORGANIZED HEALTH CARE EDUCATION/TRAINING PROGRAM

## 2021-04-05 PROCEDURE — 99999 PR PBB SHADOW E&M-EST. PATIENT-LVL V: ICD-10-PCS | Mod: PBBFAC,,, | Performed by: STUDENT IN AN ORGANIZED HEALTH CARE EDUCATION/TRAINING PROGRAM

## 2021-04-05 PROCEDURE — 1159F PR MEDICATION LIST DOCUMENTED IN MEDICAL RECORD: ICD-10-PCS | Mod: S$GLB,,, | Performed by: STUDENT IN AN ORGANIZED HEALTH CARE EDUCATION/TRAINING PROGRAM

## 2021-04-05 PROCEDURE — 1157F PR ADVANCE CARE PLAN OR EQUIV PRESENT IN MEDICAL RECORD: ICD-10-PCS | Mod: S$GLB,,, | Performed by: STUDENT IN AN ORGANIZED HEALTH CARE EDUCATION/TRAINING PROGRAM

## 2021-04-05 PROCEDURE — 3008F BODY MASS INDEX DOCD: CPT | Mod: CPTII,S$GLB,, | Performed by: STUDENT IN AN ORGANIZED HEALTH CARE EDUCATION/TRAINING PROGRAM

## 2021-04-05 PROCEDURE — 3052F PR MOST RECENT HEMOGLOBIN A1C LEVEL 8.0 - < 9.0%: ICD-10-PCS | Mod: CPTII,S$GLB,, | Performed by: STUDENT IN AN ORGANIZED HEALTH CARE EDUCATION/TRAINING PROGRAM

## 2021-04-05 PROCEDURE — 3008F PR BODY MASS INDEX (BMI) DOCUMENTED: ICD-10-PCS | Mod: CPTII,S$GLB,, | Performed by: STUDENT IN AN ORGANIZED HEALTH CARE EDUCATION/TRAINING PROGRAM

## 2021-04-05 PROCEDURE — 99213 PR OFFICE/OUTPT VISIT, EST, LEVL III, 20-29 MIN: ICD-10-PCS | Mod: S$GLB,,, | Performed by: STUDENT IN AN ORGANIZED HEALTH CARE EDUCATION/TRAINING PROGRAM

## 2021-04-05 PROCEDURE — 1159F MED LIST DOCD IN RCRD: CPT | Mod: S$GLB,,, | Performed by: STUDENT IN AN ORGANIZED HEALTH CARE EDUCATION/TRAINING PROGRAM

## 2021-04-05 PROCEDURE — 1100F PR PT FALLS ASSESS DOC 2+ FALLS/FALL W/INJURY/YR: ICD-10-PCS | Mod: CPTII,S$GLB,, | Performed by: STUDENT IN AN ORGANIZED HEALTH CARE EDUCATION/TRAINING PROGRAM

## 2021-04-05 PROCEDURE — 99499 UNLISTED E&M SERVICE: CPT | Mod: S$GLB,,, | Performed by: STUDENT IN AN ORGANIZED HEALTH CARE EDUCATION/TRAINING PROGRAM

## 2021-04-05 PROCEDURE — 3078F DIAST BP <80 MM HG: CPT | Mod: CPTII,S$GLB,, | Performed by: STUDENT IN AN ORGANIZED HEALTH CARE EDUCATION/TRAINING PROGRAM

## 2021-04-05 PROCEDURE — 1125F PR PAIN SEVERITY QUANTIFIED, PAIN PRESENT: ICD-10-PCS | Mod: S$GLB,,, | Performed by: STUDENT IN AN ORGANIZED HEALTH CARE EDUCATION/TRAINING PROGRAM

## 2021-04-05 PROCEDURE — 99213 OFFICE O/P EST LOW 20 MIN: CPT | Mod: S$GLB,,, | Performed by: STUDENT IN AN ORGANIZED HEALTH CARE EDUCATION/TRAINING PROGRAM

## 2021-04-05 PROCEDURE — 1100F PTFALLS ASSESS-DOCD GE2>/YR: CPT | Mod: CPTII,S$GLB,, | Performed by: STUDENT IN AN ORGANIZED HEALTH CARE EDUCATION/TRAINING PROGRAM

## 2021-04-05 PROCEDURE — 99499 RISK ADDL DX/OHS AUDIT: ICD-10-PCS | Mod: S$GLB,,, | Performed by: STUDENT IN AN ORGANIZED HEALTH CARE EDUCATION/TRAINING PROGRAM

## 2021-04-05 PROCEDURE — 99999 PR PBB SHADOW E&M-EST. PATIENT-LVL V: CPT | Mod: PBBFAC,,, | Performed by: STUDENT IN AN ORGANIZED HEALTH CARE EDUCATION/TRAINING PROGRAM

## 2021-04-05 PROCEDURE — 3074F SYST BP LT 130 MM HG: CPT | Mod: CPTII,S$GLB,, | Performed by: STUDENT IN AN ORGANIZED HEALTH CARE EDUCATION/TRAINING PROGRAM

## 2021-04-06 DIAGNOSIS — M25.512 LEFT SHOULDER PAIN: Primary | ICD-10-CM

## 2021-04-08 ENCOUNTER — HOSPITAL ENCOUNTER (OUTPATIENT)
Dept: RADIOLOGY | Facility: HOSPITAL | Age: 71
Discharge: HOME | End: 2021-04-08
Attending: PAIN MEDICINE
Payer: MEDICARE

## 2021-04-08 DIAGNOSIS — M25.512 LEFT SHOULDER PAIN: ICD-10-CM

## 2021-04-08 PROCEDURE — 73030 X-RAY EXAM OF SHOULDER: CPT | Mod: TC,FY,LT

## 2021-04-08 PROCEDURE — 73030 X-RAY EXAM OF SHOULDER: CPT | Mod: 26,LT,, | Performed by: RADIOLOGY

## 2021-04-08 PROCEDURE — 73030 XR SHOULDER COMPLETE 2 OR MORE VIEWS LEFT: ICD-10-PCS | Mod: 26,LT,, | Performed by: RADIOLOGY

## 2021-04-12 ENCOUNTER — OFFICE VISIT (OUTPATIENT)
Dept: FAMILY MEDICINE | Facility: CLINIC | Age: 71
End: 2021-04-12
Payer: MEDICARE

## 2021-04-12 VITALS
BODY MASS INDEX: 25.15 KG/M2 | WEIGHT: 136.69 LBS | OXYGEN SATURATION: 98 % | SYSTOLIC BLOOD PRESSURE: 118 MMHG | HEART RATE: 100 BPM | DIASTOLIC BLOOD PRESSURE: 78 MMHG | HEIGHT: 62 IN

## 2021-04-12 DIAGNOSIS — Z91.89 AT RISK FOR POLYPHARMACY: Primary | ICD-10-CM

## 2021-04-12 PROCEDURE — 1159F MED LIST DOCD IN RCRD: CPT | Mod: S$GLB,,, | Performed by: STUDENT IN AN ORGANIZED HEALTH CARE EDUCATION/TRAINING PROGRAM

## 2021-04-12 PROCEDURE — 1157F ADVNC CARE PLAN IN RCRD: CPT | Mod: S$GLB,,, | Performed by: STUDENT IN AN ORGANIZED HEALTH CARE EDUCATION/TRAINING PROGRAM

## 2021-04-12 PROCEDURE — 1101F PR PT FALLS ASSESS DOC 0-1 FALLS W/OUT INJ PAST YR: ICD-10-PCS | Mod: CPTII,S$GLB,, | Performed by: STUDENT IN AN ORGANIZED HEALTH CARE EDUCATION/TRAINING PROGRAM

## 2021-04-12 PROCEDURE — 3052F PR MOST RECENT HEMOGLOBIN A1C LEVEL 8.0 - < 9.0%: ICD-10-PCS | Mod: CPTII,S$GLB,, | Performed by: STUDENT IN AN ORGANIZED HEALTH CARE EDUCATION/TRAINING PROGRAM

## 2021-04-12 PROCEDURE — 1159F PR MEDICATION LIST DOCUMENTED IN MEDICAL RECORD: ICD-10-PCS | Mod: S$GLB,,, | Performed by: STUDENT IN AN ORGANIZED HEALTH CARE EDUCATION/TRAINING PROGRAM

## 2021-04-12 PROCEDURE — 3288F FALL RISK ASSESSMENT DOCD: CPT | Mod: CPTII,S$GLB,, | Performed by: STUDENT IN AN ORGANIZED HEALTH CARE EDUCATION/TRAINING PROGRAM

## 2021-04-12 PROCEDURE — 99999 PR PBB SHADOW E&M-EST. PATIENT-LVL V: ICD-10-PCS | Mod: PBBFAC,,, | Performed by: STUDENT IN AN ORGANIZED HEALTH CARE EDUCATION/TRAINING PROGRAM

## 2021-04-12 PROCEDURE — 99999 PR PBB SHADOW E&M-EST. PATIENT-LVL V: CPT | Mod: PBBFAC,,, | Performed by: STUDENT IN AN ORGANIZED HEALTH CARE EDUCATION/TRAINING PROGRAM

## 2021-04-12 PROCEDURE — 1157F PR ADVANCE CARE PLAN OR EQUIV PRESENT IN MEDICAL RECORD: ICD-10-PCS | Mod: S$GLB,,, | Performed by: STUDENT IN AN ORGANIZED HEALTH CARE EDUCATION/TRAINING PROGRAM

## 2021-04-12 PROCEDURE — 3008F BODY MASS INDEX DOCD: CPT | Mod: CPTII,S$GLB,, | Performed by: STUDENT IN AN ORGANIZED HEALTH CARE EDUCATION/TRAINING PROGRAM

## 2021-04-12 PROCEDURE — 3008F PR BODY MASS INDEX (BMI) DOCUMENTED: ICD-10-PCS | Mod: CPTII,S$GLB,, | Performed by: STUDENT IN AN ORGANIZED HEALTH CARE EDUCATION/TRAINING PROGRAM

## 2021-04-12 PROCEDURE — 1101F PT FALLS ASSESS-DOCD LE1/YR: CPT | Mod: CPTII,S$GLB,, | Performed by: STUDENT IN AN ORGANIZED HEALTH CARE EDUCATION/TRAINING PROGRAM

## 2021-04-12 PROCEDURE — 3052F HG A1C>EQUAL 8.0%<EQUAL 9.0%: CPT | Mod: CPTII,S$GLB,, | Performed by: STUDENT IN AN ORGANIZED HEALTH CARE EDUCATION/TRAINING PROGRAM

## 2021-04-12 PROCEDURE — 1126F AMNT PAIN NOTED NONE PRSNT: CPT | Mod: S$GLB,,, | Performed by: STUDENT IN AN ORGANIZED HEALTH CARE EDUCATION/TRAINING PROGRAM

## 2021-04-12 PROCEDURE — 1126F PR PAIN SEVERITY QUANTIFIED, NO PAIN PRESENT: ICD-10-PCS | Mod: S$GLB,,, | Performed by: STUDENT IN AN ORGANIZED HEALTH CARE EDUCATION/TRAINING PROGRAM

## 2021-04-12 PROCEDURE — 99213 PR OFFICE/OUTPT VISIT, EST, LEVL III, 20-29 MIN: ICD-10-PCS | Mod: S$GLB,,, | Performed by: STUDENT IN AN ORGANIZED HEALTH CARE EDUCATION/TRAINING PROGRAM

## 2021-04-12 PROCEDURE — 3288F PR FALLS RISK ASSESSMENT DOCUMENTED: ICD-10-PCS | Mod: CPTII,S$GLB,, | Performed by: STUDENT IN AN ORGANIZED HEALTH CARE EDUCATION/TRAINING PROGRAM

## 2021-04-12 PROCEDURE — 99213 OFFICE O/P EST LOW 20 MIN: CPT | Mod: S$GLB,,, | Performed by: STUDENT IN AN ORGANIZED HEALTH CARE EDUCATION/TRAINING PROGRAM

## 2021-04-14 DIAGNOSIS — E11.9 TYPE 2 DIABETES MELLITUS WITHOUT COMPLICATION: ICD-10-CM

## 2021-04-21 ENCOUNTER — CLINICAL SUPPORT (OUTPATIENT)
Dept: SMOKING CESSATION | Facility: CLINIC | Age: 71
End: 2021-04-21
Payer: COMMERCIAL

## 2021-04-21 ENCOUNTER — PATIENT OUTREACH (OUTPATIENT)
Dept: ADMINISTRATIVE | Facility: HOSPITAL | Age: 71
End: 2021-04-21

## 2021-04-21 ENCOUNTER — TELEPHONE (OUTPATIENT)
Dept: SMOKING CESSATION | Facility: CLINIC | Age: 71
End: 2021-04-21

## 2021-04-21 DIAGNOSIS — E11.9 TYPE 2 DIABETES MELLITUS WITHOUT COMPLICATION: ICD-10-CM

## 2021-04-21 DIAGNOSIS — F17.210 MODERATE CIGARETTE SMOKER (10-19 PER DAY): ICD-10-CM

## 2021-04-21 DIAGNOSIS — F17.210 LIGHT CIGARETTE SMOKER (1-9 CIGARETTES PER DAY): Primary | ICD-10-CM

## 2021-04-21 PROCEDURE — 99404 PREV MED CNSL INDIV APPRX 60: CPT | Mod: S$GLB,,,

## 2021-04-21 PROCEDURE — 99999 PR PBB SHADOW E&M-EST. PATIENT-LVL I: CPT | Mod: PBBFAC,,,

## 2021-04-21 PROCEDURE — 99999 PR PBB SHADOW E&M-EST. PATIENT-LVL I: ICD-10-PCS | Mod: PBBFAC,,,

## 2021-04-21 PROCEDURE — 99404 PR PREVENT COUNSEL,INDIV,60 MIN: ICD-10-PCS | Mod: S$GLB,,,

## 2021-04-21 RX ORDER — IBUPROFEN 200 MG
1 TABLET ORAL DAILY
Qty: 28 PATCH | Refills: 0 | Status: SHIPPED | OUTPATIENT
Start: 2021-04-21 | End: 2021-05-19 | Stop reason: SDUPTHER

## 2021-04-21 RX ORDER — VARENICLINE TARTRATE 1 MG/1
1 TABLET, FILM COATED ORAL DAILY
Qty: 56 TABLET | Refills: 0 | Status: SHIPPED | OUTPATIENT
Start: 2021-04-21 | End: 2021-05-19 | Stop reason: SDUPTHER

## 2021-04-28 DIAGNOSIS — E11.9 TYPE 2 DIABETES MELLITUS WITHOUT COMPLICATION: ICD-10-CM

## 2021-05-05 ENCOUNTER — CLINICAL SUPPORT (OUTPATIENT)
Dept: SMOKING CESSATION | Facility: CLINIC | Age: 71
End: 2021-05-05
Payer: COMMERCIAL

## 2021-05-05 DIAGNOSIS — F17.210 LIGHT CIGARETTE SMOKER (1-9 CIGARETTES PER DAY): Primary | ICD-10-CM

## 2021-05-05 DIAGNOSIS — E11.9 TYPE 2 DIABETES MELLITUS WITHOUT COMPLICATION: ICD-10-CM

## 2021-05-05 PROCEDURE — 99999 PR PBB SHADOW E&M-EST. PATIENT-LVL I: CPT | Mod: PBBFAC,,,

## 2021-05-05 PROCEDURE — 99404 PREV MED CNSL INDIV APPRX 60: CPT | Mod: S$GLB,,,

## 2021-05-05 PROCEDURE — 99404 PR PREVENT COUNSEL,INDIV,60 MIN: ICD-10-PCS | Mod: S$GLB,,,

## 2021-05-05 PROCEDURE — 99999 PR PBB SHADOW E&M-EST. PATIENT-LVL I: ICD-10-PCS | Mod: PBBFAC,,,

## 2021-05-12 DIAGNOSIS — E11.9 TYPE 2 DIABETES MELLITUS WITHOUT COMPLICATION: ICD-10-CM

## 2021-05-17 DIAGNOSIS — G43.809 OTHER MIGRAINE WITHOUT STATUS MIGRAINOSUS, NOT INTRACTABLE: ICD-10-CM

## 2021-05-17 RX ORDER — ONDANSETRON 4 MG/1
4 TABLET, ORALLY DISINTEGRATING ORAL EVERY 8 HOURS PRN
Qty: 14 TABLET | Refills: 1 | Status: ON HOLD | OUTPATIENT
Start: 2021-05-17 | End: 2022-08-26

## 2021-05-18 ENCOUNTER — TELEPHONE (OUTPATIENT)
Dept: CARDIOLOGY | Facility: CLINIC | Age: 71
End: 2021-05-18

## 2021-05-19 ENCOUNTER — CLINICAL SUPPORT (OUTPATIENT)
Dept: SMOKING CESSATION | Facility: CLINIC | Age: 71
End: 2021-05-19
Payer: COMMERCIAL

## 2021-05-19 DIAGNOSIS — F17.210 MODERATE CIGARETTE SMOKER (10-19 PER DAY): ICD-10-CM

## 2021-05-19 DIAGNOSIS — F17.210 LIGHT CIGARETTE SMOKER (1-9 CIGARETTES PER DAY): Primary | ICD-10-CM

## 2021-05-19 PROCEDURE — 99402 PR PREVENT COUNSEL,INDIV,30 MIN: ICD-10-PCS | Mod: S$GLB,,,

## 2021-05-19 PROCEDURE — 99999 PR PBB SHADOW E&M-EST. PATIENT-LVL I: ICD-10-PCS | Mod: PBBFAC,,,

## 2021-05-19 PROCEDURE — 99999 PR PBB SHADOW E&M-EST. PATIENT-LVL I: CPT | Mod: PBBFAC,,,

## 2021-05-19 PROCEDURE — 99402 PREV MED CNSL INDIV APPRX 30: CPT | Mod: S$GLB,,,

## 2021-05-19 RX ORDER — NICOTINE POLACRILEX 2 MG/1
2 LOZENGE ORAL
Qty: 81 EACH | Refills: 0 | Status: ON HOLD | OUTPATIENT
Start: 2021-05-19 | End: 2022-04-06

## 2021-05-19 RX ORDER — IBUPROFEN 200 MG
1 TABLET ORAL DAILY
Qty: 28 PATCH | Refills: 0 | Status: ON HOLD | OUTPATIENT
Start: 2021-05-19 | End: 2022-04-06

## 2021-05-19 RX ORDER — VARENICLINE TARTRATE 1 MG/1
1 TABLET, FILM COATED ORAL DAILY
Qty: 56 TABLET | Refills: 0 | Status: ON HOLD | OUTPATIENT
Start: 2021-05-19 | End: 2022-04-06

## 2021-05-20 ENCOUNTER — TELEPHONE (OUTPATIENT)
Dept: FAMILY MEDICINE | Facility: CLINIC | Age: 71
End: 2021-05-20

## 2021-06-29 ENCOUNTER — CLINICAL SUPPORT (OUTPATIENT)
Dept: SMOKING CESSATION | Facility: CLINIC | Age: 71
End: 2021-06-29
Payer: COMMERCIAL

## 2021-06-29 DIAGNOSIS — F17.210 CIGARETTE NICOTINE DEPENDENCE, UNCOMPLICATED: Primary | ICD-10-CM

## 2021-06-29 PROCEDURE — 99402 PREV MED CNSL INDIV APPRX 30: CPT | Mod: S$GLB,,,

## 2021-06-29 PROCEDURE — 99402 PR PREVENT COUNSEL,INDIV,30 MIN: ICD-10-PCS | Mod: S$GLB,,,

## 2021-06-29 PROCEDURE — 99999 PR PBB SHADOW E&M-EST. PATIENT-LVL I: CPT | Mod: PBBFAC,,,

## 2021-06-29 PROCEDURE — 99999 PR PBB SHADOW E&M-EST. PATIENT-LVL I: ICD-10-PCS | Mod: PBBFAC,,,

## 2021-07-03 PROCEDURE — G0180 MD CERTIFICATION HHA PATIENT: HCPCS | Mod: ,,, | Performed by: STUDENT IN AN ORGANIZED HEALTH CARE EDUCATION/TRAINING PROGRAM

## 2021-07-03 PROCEDURE — G0180 PR HOME HEALTH MD CERTIFICATION: ICD-10-PCS | Mod: ,,, | Performed by: STUDENT IN AN ORGANIZED HEALTH CARE EDUCATION/TRAINING PROGRAM

## 2021-07-07 ENCOUNTER — TELEPHONE (OUTPATIENT)
Dept: CARDIOLOGY | Facility: CLINIC | Age: 71
End: 2021-07-07

## 2021-07-09 ENCOUNTER — OFFICE VISIT (OUTPATIENT)
Dept: FAMILY MEDICINE | Facility: CLINIC | Age: 71
End: 2021-07-09
Payer: MEDICARE

## 2021-07-09 ENCOUNTER — LAB VISIT (OUTPATIENT)
Dept: LAB | Facility: HOSPITAL | Age: 71
End: 2021-07-09
Payer: MEDICARE

## 2021-07-09 VITALS
OXYGEN SATURATION: 95 % | SYSTOLIC BLOOD PRESSURE: 120 MMHG | HEART RATE: 104 BPM | HEIGHT: 62 IN | DIASTOLIC BLOOD PRESSURE: 70 MMHG | WEIGHT: 138.88 LBS | BODY MASS INDEX: 25.55 KG/M2

## 2021-07-09 DIAGNOSIS — E83.42 HYPOMAGNESEMIA: ICD-10-CM

## 2021-07-09 DIAGNOSIS — Z09 HOSPITAL DISCHARGE FOLLOW-UP: Primary | ICD-10-CM

## 2021-07-09 DIAGNOSIS — I10 ESSENTIAL HYPERTENSION: ICD-10-CM

## 2021-07-09 LAB
ALBUMIN SERPL BCP-MCNC: 3.3 G/DL (ref 3.5–5.2)
ALP SERPL-CCNC: 115 U/L (ref 55–135)
ALT SERPL W/O P-5'-P-CCNC: 11 U/L (ref 10–44)
ANION GAP SERPL CALC-SCNC: 9 MMOL/L (ref 8–16)
AST SERPL-CCNC: 10 U/L (ref 10–40)
BILIRUB SERPL-MCNC: 0.3 MG/DL (ref 0.1–1)
BUN SERPL-MCNC: 9 MG/DL (ref 8–23)
CALCIUM SERPL-MCNC: 9.3 MG/DL (ref 8.7–10.5)
CHLORIDE SERPL-SCNC: 111 MMOL/L (ref 95–110)
CO2 SERPL-SCNC: 21 MMOL/L (ref 23–29)
CREAT SERPL-MCNC: 0.8 MG/DL (ref 0.5–1.4)
EST. GFR  (AFRICAN AMERICAN): >60 ML/MIN/1.73 M^2
EST. GFR  (NON AFRICAN AMERICAN): >60 ML/MIN/1.73 M^2
GLUCOSE SERPL-MCNC: 272 MG/DL (ref 70–110)
MAGNESIUM SERPL-MCNC: 1.7 MG/DL (ref 1.6–2.6)
PHOSPHATE SERPL-MCNC: 2.9 MG/DL (ref 2.7–4.5)
POTASSIUM SERPL-SCNC: 4.4 MMOL/L (ref 3.5–5.1)
PROT SERPL-MCNC: 7.1 G/DL (ref 6–8.4)
SODIUM SERPL-SCNC: 141 MMOL/L (ref 136–145)

## 2021-07-09 PROCEDURE — 1101F PR PT FALLS ASSESS DOC 0-1 FALLS W/OUT INJ PAST YR: ICD-10-PCS | Mod: CPTII,S$GLB,, | Performed by: STUDENT IN AN ORGANIZED HEALTH CARE EDUCATION/TRAINING PROGRAM

## 2021-07-09 PROCEDURE — 36415 COLL VENOUS BLD VENIPUNCTURE: CPT | Mod: PO | Performed by: STUDENT IN AN ORGANIZED HEALTH CARE EDUCATION/TRAINING PROGRAM

## 2021-07-09 PROCEDURE — 84100 ASSAY OF PHOSPHORUS: CPT | Performed by: STUDENT IN AN ORGANIZED HEALTH CARE EDUCATION/TRAINING PROGRAM

## 2021-07-09 PROCEDURE — 3008F BODY MASS INDEX DOCD: CPT | Mod: CPTII,S$GLB,, | Performed by: STUDENT IN AN ORGANIZED HEALTH CARE EDUCATION/TRAINING PROGRAM

## 2021-07-09 PROCEDURE — 1157F PR ADVANCE CARE PLAN OR EQUIV PRESENT IN MEDICAL RECORD: ICD-10-PCS | Mod: S$GLB,,, | Performed by: STUDENT IN AN ORGANIZED HEALTH CARE EDUCATION/TRAINING PROGRAM

## 2021-07-09 PROCEDURE — 1159F MED LIST DOCD IN RCRD: CPT | Mod: S$GLB,,, | Performed by: STUDENT IN AN ORGANIZED HEALTH CARE EDUCATION/TRAINING PROGRAM

## 2021-07-09 PROCEDURE — 1157F ADVNC CARE PLAN IN RCRD: CPT | Mod: S$GLB,,, | Performed by: STUDENT IN AN ORGANIZED HEALTH CARE EDUCATION/TRAINING PROGRAM

## 2021-07-09 PROCEDURE — 99214 OFFICE O/P EST MOD 30 MIN: CPT | Mod: S$GLB,,, | Performed by: STUDENT IN AN ORGANIZED HEALTH CARE EDUCATION/TRAINING PROGRAM

## 2021-07-09 PROCEDURE — 99999 PR PBB SHADOW E&M-EST. PATIENT-LVL V: ICD-10-PCS | Mod: PBBFAC,,, | Performed by: STUDENT IN AN ORGANIZED HEALTH CARE EDUCATION/TRAINING PROGRAM

## 2021-07-09 PROCEDURE — 1126F PR PAIN SEVERITY QUANTIFIED, NO PAIN PRESENT: ICD-10-PCS | Mod: S$GLB,,, | Performed by: STUDENT IN AN ORGANIZED HEALTH CARE EDUCATION/TRAINING PROGRAM

## 2021-07-09 PROCEDURE — 83735 ASSAY OF MAGNESIUM: CPT | Performed by: STUDENT IN AN ORGANIZED HEALTH CARE EDUCATION/TRAINING PROGRAM

## 2021-07-09 PROCEDURE — 99214 PR OFFICE/OUTPT VISIT, EST, LEVL IV, 30-39 MIN: ICD-10-PCS | Mod: S$GLB,,, | Performed by: STUDENT IN AN ORGANIZED HEALTH CARE EDUCATION/TRAINING PROGRAM

## 2021-07-09 PROCEDURE — 99499 RISK ADDL DX/OHS AUDIT: ICD-10-PCS | Mod: S$GLB,,, | Performed by: STUDENT IN AN ORGANIZED HEALTH CARE EDUCATION/TRAINING PROGRAM

## 2021-07-09 PROCEDURE — 1101F PT FALLS ASSESS-DOCD LE1/YR: CPT | Mod: CPTII,S$GLB,, | Performed by: STUDENT IN AN ORGANIZED HEALTH CARE EDUCATION/TRAINING PROGRAM

## 2021-07-09 PROCEDURE — 3288F PR FALLS RISK ASSESSMENT DOCUMENTED: ICD-10-PCS | Mod: CPTII,S$GLB,, | Performed by: STUDENT IN AN ORGANIZED HEALTH CARE EDUCATION/TRAINING PROGRAM

## 2021-07-09 PROCEDURE — 1126F AMNT PAIN NOTED NONE PRSNT: CPT | Mod: S$GLB,,, | Performed by: STUDENT IN AN ORGANIZED HEALTH CARE EDUCATION/TRAINING PROGRAM

## 2021-07-09 PROCEDURE — 99499 UNLISTED E&M SERVICE: CPT | Mod: S$GLB,,, | Performed by: STUDENT IN AN ORGANIZED HEALTH CARE EDUCATION/TRAINING PROGRAM

## 2021-07-09 PROCEDURE — 3008F PR BODY MASS INDEX (BMI) DOCUMENTED: ICD-10-PCS | Mod: CPTII,S$GLB,, | Performed by: STUDENT IN AN ORGANIZED HEALTH CARE EDUCATION/TRAINING PROGRAM

## 2021-07-09 PROCEDURE — 1159F PR MEDICATION LIST DOCUMENTED IN MEDICAL RECORD: ICD-10-PCS | Mod: S$GLB,,, | Performed by: STUDENT IN AN ORGANIZED HEALTH CARE EDUCATION/TRAINING PROGRAM

## 2021-07-09 PROCEDURE — 99999 PR PBB SHADOW E&M-EST. PATIENT-LVL V: CPT | Mod: PBBFAC,,, | Performed by: STUDENT IN AN ORGANIZED HEALTH CARE EDUCATION/TRAINING PROGRAM

## 2021-07-09 PROCEDURE — 80053 COMPREHEN METABOLIC PANEL: CPT | Performed by: STUDENT IN AN ORGANIZED HEALTH CARE EDUCATION/TRAINING PROGRAM

## 2021-07-09 PROCEDURE — 3288F FALL RISK ASSESSMENT DOCD: CPT | Mod: CPTII,S$GLB,, | Performed by: STUDENT IN AN ORGANIZED HEALTH CARE EDUCATION/TRAINING PROGRAM

## 2021-07-09 RX ORDER — ALBUTEROL SULFATE 90 UG/1
2 AEROSOL, METERED RESPIRATORY (INHALATION) EVERY 6 HOURS PRN
Qty: 8 G | Refills: 0 | Status: SHIPPED | OUTPATIENT
Start: 2021-07-09 | End: 2021-07-09

## 2021-07-09 RX ORDER — VERAPAMIL HYDROCHLORIDE 180 MG/1
180 TABLET, FILM COATED, EXTENDED RELEASE ORAL NIGHTLY
Qty: 30 TABLET | Refills: 11 | Status: ON HOLD | OUTPATIENT
Start: 2021-07-09 | End: 2022-08-26

## 2021-07-12 ENCOUNTER — TELEPHONE (OUTPATIENT)
Dept: FAMILY MEDICINE | Facility: CLINIC | Age: 71
End: 2021-07-12

## 2021-07-13 ENCOUNTER — TELEPHONE (OUTPATIENT)
Dept: SMOKING CESSATION | Facility: CLINIC | Age: 71
End: 2021-07-13

## 2021-07-14 ENCOUNTER — PATIENT OUTREACH (OUTPATIENT)
Dept: ADMINISTRATIVE | Facility: HOSPITAL | Age: 71
End: 2021-07-14

## 2021-07-25 ENCOUNTER — EXTERNAL HOME HEALTH (OUTPATIENT)
Dept: HOME HEALTH SERVICES | Facility: HOSPITAL | Age: 71
End: 2021-07-25

## 2021-08-11 NOTE — SUBJECTIVE & OBJECTIVE
Interval History: NAEON. Pt is comfortable and is eating when seen this morning.    Review of Systems   Constitutional: Negative for activity change, appetite change, chills, diaphoresis, fatigue and fever.   HENT: Negative.    Respiratory: Negative for apnea, cough, choking, chest tightness, shortness of breath, wheezing and stridor.    Cardiovascular: Negative for chest pain, palpitations and leg swelling.   Gastrointestinal: Negative for abdominal distention, abdominal pain (umbilical ), anal bleeding, blood in stool, constipation, diarrhea, nausea and vomiting.   Genitourinary: Negative.    Musculoskeletal: Negative for arthralgias, back pain, joint swelling and neck pain.   Neurological: Negative.    Psychiatric/Behavioral: Negative.      Objective:     Vital Signs (Most Recent):  Temp: 96.3 °F (35.7 °C) (07/25/20 0748)  Pulse: (!) 115 (07/25/20 0908)  Resp: 20 (07/25/20 0908)  BP: 120/78 (07/25/20 0748)  SpO2: (!) 91 % (07/25/20 0908) Vital Signs (24h Range):  Temp:  [96.3 °F (35.7 °C)-98.1 °F (36.7 °C)] 96.3 °F (35.7 °C)  Pulse:  [109-127] 115  Resp:  [18-24] 20  SpO2:  [88 %-96 %] 91 %  BP: (111-131)/(72-83) 120/78     Weight: 66.7 kg (147 lb)  Body mass index is 26.89 kg/m².    Intake/Output Summary (Last 24 hours) at 7/25/2020 1045  Last data filed at 7/24/2020 1622  Gross per 24 hour   Intake --   Output 1340 ml   Net -1340 ml      Physical Exam  Vitals signs and nursing note reviewed.   Constitutional:       General: She is not in acute distress.     Appearance: She is not ill-appearing, toxic-appearing or diaphoretic.   HENT:      Head: Normocephalic and atraumatic.   Cardiovascular:      Rate and Rhythm: Normal rate and regular rhythm.      Pulses: Normal pulses.      Heart sounds: No murmur. No friction rub. No gallop.    Pulmonary:      Effort: Pulmonary effort is normal. No respiratory distress.      Breath sounds: Normal breath sounds. No stridor. No wheezing, rhonchi or rales.   Chest:      Chest  Padmini Peña 80 y o  female MRN: [id number]    Encounter: [id number]      Assessment/Plan     Assessment: This is a 80y o -year-old female with type 2 diabetes, hypothyroidism, hypertension, hyperlipidemia and vitamin-D deficiency     Plan:  1   Type 2 diabetes:  Her most recent hemoglobin A1c is elevated to 7 7    Recent blood sugars are relatively controlled for her age and medical condition  After some discussion, she will increase Metformin 500 mg to 2 tablets twice daily  For now, she will continue Ronen and Byetta at the current dose   I have asked her to continue checking her blood sugars twice daily at alternating times and for the record to the office in 2 weeks for review and further adjustment, if necessary  I encouraged her to be mindful of her diet and to be more active with walking  She is up-to-date with her annual diabetic eye exams and is seeing the podiatrist every 12 weeks  Check hemoglobin A1c prior to next visit      2   Hypothyroidism:  Her most recent TSH free T4 were normal   Continue levothyroxine 125 mcg Monday through Saturday with no tablet on Sunday   Check TSH and free T4 prior to next office visit      3   Hypertension: Blood pressure is reasonable in the office today  Continue atenolol   Check comprehensive metabolic panel prior to next visit      4   Hyperlipidemia: Discussed the relationship between hyperglycemia and hypertriglyceridemia  Continue simvastatin      5   Vitamin-D deficiency:  Most recent vitamin-D level is normal   Continue supplement with 1000 units of vitamin D3 daily         6   Osteopenia: Cosme Rodriguez will continue with calcium and vitamin-D supplementation   Stressed staying active reviewed fall risk and safety at the time of the visit      CC:   Type 2 Diabetes follow-up    History of Present Illness     HPI:  70 y  o  female for follow-up of type 2 diabetes, hypothyroidism, hypertension and hyperlipidemia  Cosme Rodriguez states she has been diagnosed with type 2 diabetes for approximately 8 years   There are no blood sugar records available for review  She is currently taking metformin 500 mg - three times daily, Byetta 10 mg twice daily and Jardiance 25 mg daily   She denies any other episodes of hypoglycemia recently   She denies any recent polyuria, polydipsia or polyphagia   Her most recent hemoglobin A1c from June 14, 2021 of 7 7  She states she is up-to-date with her annual diabetic eye exam with Dr David Rivera December 2020   She complains of some numbness and tingling to her feet at times and follows Podiatry every 12 weeks for regular diabetic foot care  Her most recent diabetic foot exam was performed on June 24, 2021       For her hypothyroidism, she is treated with levothyroxine 125 mcg daily Monday through Saturday and no tablet on Sunday   Her most recent TSH from  June 14, 2021 was 2 080 with a free T4 of 0 96   She complains of some constipation at times but otherwise has no symptoms/complaints of hypo or hyperthyroidism      Her hypertension is treated with atenolol 50 mg daily      For her hyperlipidemia, she is treated with simvastatin 40 mg daily      For her vitamin-D deficiency, she supplements with 1000 units of vitamin D3 daily  Her most recent 25 hydroxy vitamin-D level from February 24, 2021 is 45  6      Her most recent DEXA scan performed on July 1, 2021 revealed a T-score of -1 3 and lumbar spine consistent with low bone mineral density-osteopenia  She continues to supplement with calcium and vitamin-D daily   She denies any recent falls or fractures        Review of Systems   Constitutional: Positive for fatigue ( at times)  Negative for chills and fever  HENT: Negative  Negative for trouble swallowing and voice change  Eyes: Negative  Respiratory: Negative  Negative for chest tightness and shortness of breath  Cardiovascular: Negative  Negative for chest pain  Gastrointestinal: Negative    Negative for abdominal pain, wall: No tenderness.   Abdominal:      General: Abdomen is flat. Bowel sounds are normal. There is no distension.      Palpations: Abdomen is soft.      Tenderness: There is no abdominal tenderness (Umbilical ). There is no guarding or rebound.   Skin:     General: Skin is warm and dry.   Neurological:      General: No focal deficit present.      Mental Status: She is alert and oriented to person, place, and time. Mental status is at baseline.   Psychiatric:         Mood and Affect: Mood normal.         Behavior: Behavior normal.         Thought Content: Thought content normal.         Judgment: Judgment normal.         Significant Labs:   BMP:   Recent Labs   Lab 07/25/20  0519   *   *   K 3.8      CO2 25   BUN 7*   CREATININE 0.7   CALCIUM 9.8     CBC:   Recent Labs   Lab 07/23/20  1413 07/24/20  1637 07/25/20  0519   WBC 5.63 8.29 7.20   HGB 9.5* 11.2* 11.1*   HCT 32.2* 38.0 38.5    207 203        constipation, diarrhea and vomiting  Endocrine: Positive for polyuria  Negative for cold intolerance, heat intolerance, polydipsia and polyphagia  Genitourinary: Negative  Musculoskeletal: Positive for arthralgias ( right hand)  Skin: Negative  Allergic/Immunologic: Negative  Neurological: Negative  Negative for dizziness, syncope, light-headedness and headaches  Hematological: Negative  Psychiatric/Behavioral: Negative  All other systems reviewed and are negative        Historical Information   Past Medical History:   Diagnosis Date    A-fib [facility])     Abnormal ECG     last assessed: 7/14/2015    Acid reflux     resolved    Anxiety     Pacheco esophagus     Benign neoplasm of sigmoid colon 09/13/2011    next colon 2012    Bronchitis, asthmatic     last assessed: 3/12/2012    Colon cancer ([facility] )     2012- had colon resection    Colon polyp     COPD (chronic obstructive pulmonary disease) (HCC)     questionable    Depression     [facility] )     on byetta    Dizziness     occ    Esophageal stricture     last assessed: 9/30/2014    High cholesterol     Hypertension     Hypothyroidism     Iron (Fe) deficiency anemia 12/22/2011    iron pill continue    Irritable bowel syndrome     OA (osteoarthritis)     Organoaxial gastric volvulus 1/9/2016    Pacemaker     hx SSS    Rash     labia area- uses cream prn    Restrictive lung disease     Seasonal allergies     Skin scarring/fibrosis     fibrotic scar; last assessed: 3/22/2013    MONA (stress urinary incontinence, female)     Urinary frequency     last assessed: 9/10/2012    Wears glasses     reading     Past Surgical History:   Procedure Laterality Date    BREAST EXCISIONAL BIOPSY Right 1990    benign, best guess on year   [address]      does not know type  Dr Taisha Sky is cariologist    [address] Donnel[address]      resection  removed 25 In     COLONOSCOPY  06/01/2012 June 2018:  Adenomatous polyp, colorectal anastomosis, internal hemorrhoids  2012:  proctosigmoidectomy    ESOPHAGOGASTRODUODENOSCOPY N/A 2016    Procedure: ESOPHAGOGASTRODUODENOSCOPY (EGD); Surgeon: Victorino Vallejo MD;  [facility];  Service:     HYSTERECTOMY  1978    UT ARTHROPLASTY I-P JT Right 2018    Procedure: ARTHROPLASTY PIP/FINGER - RIGHT RING;  Surgeon: Virgen Nunez MD;  [facility];  Service: Orthopedics    UT CYSTOURETHROSCOPY N/A 2018    Procedure: Tarry Fontan;  Surgeon: Lisbeth Alejandro MD;  [facility];  Service: UroGynecology           UT LAP, REPAIR PARAESOPHAGEAL HERNIA, INCL FUNDOPLASTY W/ MESH N/A 2016    Procedure: LAPAROSCOPIC PARAESOPHAGEAL HERNIA REPAIR with mesh; toupet  FUNDOPLICATION ;  Surgeon: Jd Fry MD;  [facility];  Service: Thoracic    UT REPAIR INTERCARP/CARP-METACARP JT Right 1/10/2019    Procedure: Don Sierra;  Surgeon: Virgen Nunez MD;  [facility];  Service: Orthopedics    UT SLING OPER STRES INCONTINENCE N/A 2018    Procedure: INSERTION PUBOVAGINAL SLING (FEMALE); Surgeon: Lisbeth Alejandro MD;  [facility];  Service: UroGynecology           TOTAL ABDOMINAL HYSTERECTOMY      UPPER GASTROINTESTINAL ENDOSCOPY      2018:  Irregular Z-line positive for intestinal metaplasia/ Pacheco's, no dysplasia  Gastritis H pylori negative      WRIST TENDON TRANSFER Right 1/10/2019    Procedure: TRANSFER TENDON FLEXOR CARPI RADIALIS FROM FOREARM TO HAND;  Surgeon: Virgen Nunez MD;  [facility];  Service: Orthopedics     Social History   Social History     Substance and Sexual Activity   Alcohol Use No     Social History     Substance and Sexual Activity   Drug Use No     Social History     Tobacco Use   Smoking Status Former Smoker    Types: Cigarettes    Quit date:     Years since quittin 6   Smokeless Tobacco Former User    Quit date:  Family History:   Family History   Problem Relation Age of Onset    Heart disease Mother     Diabetes Mother     Asthma Father     Stomach cancer Father         gastrkic cancer    Cancer Paternal Grandmother     Cancer Paternal Grandfather     No Known Problems Brother     Breast cancer Sister 76    Breast cancer Sister 77    Pancreatic cancer Sister 72    No Known Problems Maternal Aunt     No Known Problems Maternal Aunt     No Known Problems Maternal Aunt     No Known Problems Paternal Aunt     No Known Problems Daughter     No Known Problems Maternal Grandmother     No Known Problems Maternal Grandfather     Prostate cancer Paternal Uncle         age unknown    Substance Abuse Neg Hx     Mental illness Neg Hx     Alcohol abuse Neg Hx     Colon cancer Neg Hx     Colon polyps Neg Hx        Meds/Allergies   Current Outpatient Medications   Medication Sig Dispense Refill    albuterol (ProAir HFA) 90 mcg/act inhaler Inhale 2 puffs every 6 (six) hours as needed for wheezing or shortness of breath 8 5 g 0    Apple Cider Vinegar 500 MG TABS Take by mouth daily      ascorbic acid (VITAMIN C) 500 mg tablet Take 500 mg by mouth daily      atenolol (TENORMIN) 50 mg tablet TAKE 1 TABLET AT BEDTIME 90 tablet 3    Blood Glucose Monitoring Suppl (FREESTYLE FREEDOM LITE) w/Device KIT by Does not apply route once for 1 dose Use meter to check blood sugars daily   1 each 0    Byetta 10 MCG Pen 10 MCG/0 04ML SOPN INJECT 0 04 ML UNDER THE SKIN TWICE A DAY 7 2 mL 3    Cholecalciferol (VITAMIN D-3) 1000 units CAPS Take 1 capsule by mouth daily      clobetasol (TEMOVATE) 0 05 % cream       Coenzyme Q10 (COQ10) 200 MG CAPS Take 200 mg by mouth daily        Continuous Blood Gluc  (FREESTYLE VICTORINA 14 DAY READER) SVETLANA 1 Device by Does not apply route 4 (four) times a day (before meals and at bedtime) 1 Device 0    Continuous Blood Gluc Sensor (FREESTYLE VICTORINA 14 DAY SENSOR) MISC 1 application by Does not apply route every 14 (fourteen) days 2 each 6    DULoxetine (CYMBALTA) 60 mg delayed release capsule Take 1 capsule (60 mg total) by mouth daily 90 capsule 1    Eliquis 5 MG TAKE 1 TABLET TWICE A  tablet 3    estradiol (ESTRACE VAGINAL) 0 1 mg/g vaginal cream Insert 0 5 g into the vagina 2 (two) times a week Patient is NOT to be using this daily 42 5 g 1    Ferrous Sulfate 27 MG TABS Take 1 tablet by mouth daily      FREESTYLE LITE test strip Test 3 times daily  300 each 3    Glucosamine-Chondroitin (MOVE FREE PO) Take by mouth daily      hydrocortisone 2 5 % cream APPLY TOPICALLY TWICE A DAY  MIX WITH KETOCONAZOLE CREAM AND APPLY TO VULVA TWICE A DAY FOR 1 MONTH 60 g 11    Jardiance 25 MG TABS TAKE 1 TABLET EVERY MORNING 90 tablet 3    ketoconazole (NIZORAL) 2 % cream APPLY TOPICALLY TWICE A DAY  MIX WITH HYDROCORTISONE AND APPLY TO VULVA TWICE A DAY FOR 1 MONTH 60 g 11    levothyroxine 125 mcg tablet TAKE 1 TABLET 6 DAYS OF THE WEEK 102 tablet 3    MEGARED OMEGA-3 KRILL OIL PO Take 1 capsule by mouth daily      metFORMIN (GLUCOPHAGE-XR) 500 mg 24 hr tablet TAKE 1 TABLET THREE TIMES A DAY WITH MEALS 270 tablet 3    simvastatin (ZOCOR) 20 mg tablet Take 2 tablets (40 mg total) by mouth daily at bedtime 180 tablet 3     No current facility-administered medications for this visit  Allergies   Allergen Reactions    Fluconazole Dizziness       Objective   Vitals: Blood pressure 134/80, pulse 84, height 5' 2 75" (1 594 m), weight 73 8 kg (162 lb 9 6 oz), not currently breastfeeding  Physical Exam  Vitals reviewed  Constitutional:       Appearance: She is well-developed  HENT:      Head: Normocephalic and atraumatic  Eyes:      Conjunctiva/sclera: Conjunctivae normal       Pupils: Pupils are equal, round, and reactive to light  Cardiovascular:      Rate and Rhythm: Normal rate and regular rhythm  Heart sounds: Normal heart sounds     Pulmonary:      Effort: Pulmonary effort is normal       Breath sounds: Normal breath sounds  Abdominal:      General: Bowel sounds are normal       Palpations: Abdomen is soft  Musculoskeletal:         General: Normal range of motion  Cervical back: Normal range of motion and neck supple  Skin:     General: Skin is warm and dry  Neurological:      Mental Status: She is alert and oriented to person, place, and time  Psychiatric:         Behavior: Behavior normal          Thought Content:  Thought content normal          Judgment: Judgment normal        Lab Results:   Lab Results   Component Value Date/Time    Hemoglobin A1C 7 7 (H) 06/14/2021 07:08 AM    Hemoglobin A1C 7 4 (A) 02/09/2021 08:01 AM    Hemoglobin A1C 7 3 (H) 11/25/2020 07:23 AM    Hemoglobin A1C 7 0 (H) 08/17/2020 07:41 AM    WBC 9 60 02/24/2021 07:02 AM    WBC 8 01 08/17/2020 07:41 AM    Hemoglobin 15 9 (H) 02/24/2021 07:02 AM    Hemoglobin 15 5 (H) 08/17/2020 07:41 AM    Hematocrit 51 0 (H) 02/24/2021 07:02 AM    Hematocrit 49 3 (H) 08/17/2020 07:41 AM    MCV 91 02/24/2021 07:02 AM    MCV 92 08/17/2020 07:41 AM    Platelets 865 14/99/0663 07:02 AM    Platelets 312 62/78/3863 07:41 AM    BUN 16 06/14/2021 07:08 AM    BUN 8 11/25/2020 07:23 AM    BUN 12 08/17/2020 07:41 AM    Potassium 4 4 06/14/2021 07:08 AM    Potassium 4 6 11/25/2020 07:23 AM    Potassium 4 6 08/17/2020 07:41 AM    Chloride 107 06/14/2021 07:08 AM    Chloride 107 11/25/2020 07:23 AM    Chloride 104 08/17/2020 07:41 AM    CO2 28 06/14/2021 07:08 AM    CO2 30 11/25/2020 07:23 AM    CO2 29 08/17/2020 07:41 AM    Creatinine 0 72 06/14/2021 07:08 AM    Creatinine 0 67 11/25/2020 07:23 AM    Creatinine 0 73 08/17/2020 07:41 AM    AST 23 06/14/2021 07:08 AM    AST 17 11/25/2020 07:23 AM    AST 17 08/17/2020 07:41 AM    ALT 24 06/14/2021 07:08 AM    ALT 23 11/25/2020 07:23 AM    ALT 23 08/17/2020 07:41 AM    Albumin 3 9 06/14/2021 07:08 AM    Albumin 3 8 11/25/2020 07:23 AM    Albumin 4 1 08/17/2020 07:41 AM HDL, Direct 33 (L) 02/24/2021 07:02 AM    HDL, Direct 29 (L) 08/17/2020 07:41 AM    Triglycerides 248 (H) 02/24/2021 07:02 AM    Triglycerides 213 (H) 08/17/2020 07:41 AM       Portions of the record may have been created with voice recognition software  Occasional wrong word or "sound a like" substitutions may have occurred due to the inherent limitations of voice recognition software  Read the chart carefully and recognize, using context, where substitutions have occurred

## 2021-08-13 ENCOUNTER — OFFICE VISIT (OUTPATIENT)
Dept: FAMILY MEDICINE | Facility: CLINIC | Age: 71
End: 2021-08-13
Payer: MEDICARE

## 2021-08-13 VITALS
BODY MASS INDEX: 25.55 KG/M2 | DIASTOLIC BLOOD PRESSURE: 60 MMHG | OXYGEN SATURATION: 96 % | WEIGHT: 138.88 LBS | HEIGHT: 62 IN | SYSTOLIC BLOOD PRESSURE: 110 MMHG | HEART RATE: 90 BPM

## 2021-08-13 DIAGNOSIS — G44.209 TENSION HEADACHE: ICD-10-CM

## 2021-08-13 DIAGNOSIS — I10 ESSENTIAL HYPERTENSION: Primary | ICD-10-CM

## 2021-08-13 DIAGNOSIS — Z76.89 REFERRAL OF PATIENT: ICD-10-CM

## 2021-08-13 PROCEDURE — 1101F PR PT FALLS ASSESS DOC 0-1 FALLS W/OUT INJ PAST YR: ICD-10-PCS | Mod: CPTII,S$GLB,, | Performed by: STUDENT IN AN ORGANIZED HEALTH CARE EDUCATION/TRAINING PROGRAM

## 2021-08-13 PROCEDURE — 3288F PR FALLS RISK ASSESSMENT DOCUMENTED: ICD-10-PCS | Mod: CPTII,S$GLB,, | Performed by: STUDENT IN AN ORGANIZED HEALTH CARE EDUCATION/TRAINING PROGRAM

## 2021-08-13 PROCEDURE — 3074F PR MOST RECENT SYSTOLIC BLOOD PRESSURE < 130 MM HG: ICD-10-PCS | Mod: CPTII,S$GLB,, | Performed by: STUDENT IN AN ORGANIZED HEALTH CARE EDUCATION/TRAINING PROGRAM

## 2021-08-13 PROCEDURE — 99999 PR PBB SHADOW E&M-EST. PATIENT-LVL V: CPT | Mod: PBBFAC,,, | Performed by: STUDENT IN AN ORGANIZED HEALTH CARE EDUCATION/TRAINING PROGRAM

## 2021-08-13 PROCEDURE — 1159F PR MEDICATION LIST DOCUMENTED IN MEDICAL RECORD: ICD-10-PCS | Mod: CPTII,S$GLB,, | Performed by: STUDENT IN AN ORGANIZED HEALTH CARE EDUCATION/TRAINING PROGRAM

## 2021-08-13 PROCEDURE — 1160F RVW MEDS BY RX/DR IN RCRD: CPT | Mod: CPTII,S$GLB,, | Performed by: STUDENT IN AN ORGANIZED HEALTH CARE EDUCATION/TRAINING PROGRAM

## 2021-08-13 PROCEDURE — 3052F HG A1C>EQUAL 8.0%<EQUAL 9.0%: CPT | Mod: CPTII,S$GLB,, | Performed by: STUDENT IN AN ORGANIZED HEALTH CARE EDUCATION/TRAINING PROGRAM

## 2021-08-13 PROCEDURE — 3288F FALL RISK ASSESSMENT DOCD: CPT | Mod: CPTII,S$GLB,, | Performed by: STUDENT IN AN ORGANIZED HEALTH CARE EDUCATION/TRAINING PROGRAM

## 2021-08-13 PROCEDURE — 1126F PR PAIN SEVERITY QUANTIFIED, NO PAIN PRESENT: ICD-10-PCS | Mod: CPTII,S$GLB,, | Performed by: STUDENT IN AN ORGANIZED HEALTH CARE EDUCATION/TRAINING PROGRAM

## 2021-08-13 PROCEDURE — 3078F PR MOST RECENT DIASTOLIC BLOOD PRESSURE < 80 MM HG: ICD-10-PCS | Mod: CPTII,S$GLB,, | Performed by: STUDENT IN AN ORGANIZED HEALTH CARE EDUCATION/TRAINING PROGRAM

## 2021-08-13 PROCEDURE — 1126F AMNT PAIN NOTED NONE PRSNT: CPT | Mod: CPTII,S$GLB,, | Performed by: STUDENT IN AN ORGANIZED HEALTH CARE EDUCATION/TRAINING PROGRAM

## 2021-08-13 PROCEDURE — 1159F MED LIST DOCD IN RCRD: CPT | Mod: CPTII,S$GLB,, | Performed by: STUDENT IN AN ORGANIZED HEALTH CARE EDUCATION/TRAINING PROGRAM

## 2021-08-13 PROCEDURE — 1101F PT FALLS ASSESS-DOCD LE1/YR: CPT | Mod: CPTII,S$GLB,, | Performed by: STUDENT IN AN ORGANIZED HEALTH CARE EDUCATION/TRAINING PROGRAM

## 2021-08-13 PROCEDURE — 3052F PR MOST RECENT HEMOGLOBIN A1C LEVEL 8.0 - < 9.0%: ICD-10-PCS | Mod: CPTII,S$GLB,, | Performed by: STUDENT IN AN ORGANIZED HEALTH CARE EDUCATION/TRAINING PROGRAM

## 2021-08-13 PROCEDURE — 1157F ADVNC CARE PLAN IN RCRD: CPT | Mod: CPTII,S$GLB,, | Performed by: STUDENT IN AN ORGANIZED HEALTH CARE EDUCATION/TRAINING PROGRAM

## 2021-08-13 PROCEDURE — 3008F PR BODY MASS INDEX (BMI) DOCUMENTED: ICD-10-PCS | Mod: CPTII,S$GLB,, | Performed by: STUDENT IN AN ORGANIZED HEALTH CARE EDUCATION/TRAINING PROGRAM

## 2021-08-13 PROCEDURE — 99214 PR OFFICE/OUTPT VISIT, EST, LEVL IV, 30-39 MIN: ICD-10-PCS | Mod: S$GLB,,, | Performed by: STUDENT IN AN ORGANIZED HEALTH CARE EDUCATION/TRAINING PROGRAM

## 2021-08-13 PROCEDURE — 3008F BODY MASS INDEX DOCD: CPT | Mod: CPTII,S$GLB,, | Performed by: STUDENT IN AN ORGANIZED HEALTH CARE EDUCATION/TRAINING PROGRAM

## 2021-08-13 PROCEDURE — 3078F DIAST BP <80 MM HG: CPT | Mod: CPTII,S$GLB,, | Performed by: STUDENT IN AN ORGANIZED HEALTH CARE EDUCATION/TRAINING PROGRAM

## 2021-08-13 PROCEDURE — 99499 UNLISTED E&M SERVICE: CPT | Mod: S$GLB,,, | Performed by: STUDENT IN AN ORGANIZED HEALTH CARE EDUCATION/TRAINING PROGRAM

## 2021-08-13 PROCEDURE — 99214 OFFICE O/P EST MOD 30 MIN: CPT | Mod: S$GLB,,, | Performed by: STUDENT IN AN ORGANIZED HEALTH CARE EDUCATION/TRAINING PROGRAM

## 2021-08-13 PROCEDURE — 99999 PR PBB SHADOW E&M-EST. PATIENT-LVL V: ICD-10-PCS | Mod: PBBFAC,,, | Performed by: STUDENT IN AN ORGANIZED HEALTH CARE EDUCATION/TRAINING PROGRAM

## 2021-08-13 PROCEDURE — 3074F SYST BP LT 130 MM HG: CPT | Mod: CPTII,S$GLB,, | Performed by: STUDENT IN AN ORGANIZED HEALTH CARE EDUCATION/TRAINING PROGRAM

## 2021-08-13 PROCEDURE — 1157F PR ADVANCE CARE PLAN OR EQUIV PRESENT IN MEDICAL RECORD: ICD-10-PCS | Mod: CPTII,S$GLB,, | Performed by: STUDENT IN AN ORGANIZED HEALTH CARE EDUCATION/TRAINING PROGRAM

## 2021-08-13 PROCEDURE — 1160F PR REVIEW ALL MEDS BY PRESCRIBER/CLIN PHARMACIST DOCUMENTED: ICD-10-PCS | Mod: CPTII,S$GLB,, | Performed by: STUDENT IN AN ORGANIZED HEALTH CARE EDUCATION/TRAINING PROGRAM

## 2021-08-13 PROCEDURE — 99499 RISK ADDL DX/OHS AUDIT: ICD-10-PCS | Mod: S$GLB,,, | Performed by: STUDENT IN AN ORGANIZED HEALTH CARE EDUCATION/TRAINING PROGRAM

## 2021-08-16 ENCOUNTER — TELEPHONE (OUTPATIENT)
Dept: ADMINISTRATIVE | Facility: OTHER | Age: 71
End: 2021-08-16

## 2021-08-25 DIAGNOSIS — E11.9 TYPE 2 DIABETES MELLITUS WITHOUT COMPLICATION: ICD-10-CM

## 2021-09-01 PROCEDURE — G0179 PR HOME HEALTH MD RECERTIFICATION: ICD-10-PCS | Mod: ,,, | Performed by: STUDENT IN AN ORGANIZED HEALTH CARE EDUCATION/TRAINING PROGRAM

## 2021-09-01 PROCEDURE — G0179 MD RECERTIFICATION HHA PT: HCPCS | Mod: ,,, | Performed by: STUDENT IN AN ORGANIZED HEALTH CARE EDUCATION/TRAINING PROGRAM

## 2021-09-10 ENCOUNTER — EXTERNAL HOME HEALTH (OUTPATIENT)
Dept: HOME HEALTH SERVICES | Facility: HOSPITAL | Age: 71
End: 2021-09-10
Payer: MEDICARE

## 2021-09-24 ENCOUNTER — EXTERNAL HOME HEALTH (OUTPATIENT)
Dept: HOME HEALTH SERVICES | Facility: HOSPITAL | Age: 71
End: 2021-09-24
Payer: MEDICARE

## 2021-09-30 ENCOUNTER — DOCUMENT SCAN (OUTPATIENT)
Dept: HOME HEALTH SERVICES | Facility: HOSPITAL | Age: 71
End: 2021-09-30
Payer: MEDICARE

## 2021-10-01 ENCOUNTER — DOCUMENT SCAN (OUTPATIENT)
Dept: HOME HEALTH SERVICES | Facility: HOSPITAL | Age: 71
End: 2021-10-01
Payer: MEDICARE

## 2021-10-07 RX ORDER — NITROGLYCERIN 0.4 MG/1
0.4 TABLET SUBLINGUAL EVERY 5 MIN PRN
Qty: 100 TABLET | Refills: 1 | Status: SHIPPED | OUTPATIENT
Start: 2021-10-07

## 2021-10-20 ENCOUNTER — TELEPHONE (OUTPATIENT)
Dept: SMOKING CESSATION | Facility: CLINIC | Age: 71
End: 2021-10-20

## 2021-10-27 DIAGNOSIS — E11.9 TYPE 2 DIABETES MELLITUS WITHOUT COMPLICATION, UNSPECIFIED WHETHER LONG TERM INSULIN USE: ICD-10-CM

## 2021-10-28 NOTE — PROCEDURES
Laryngoscopy  Date/Time: 12/12/2017 9:30 AM  Performed by: LORE TRAN  Authorized by: LORE TRAN     Consent Done?:  Yes (Verbal)  Due to indication in patient's history, presentation or risk factors,  a fiber optic exam was performed.    SEPARATE PROCEDURE NOTE:    ANESTHESIA:  Topical xylocaine with bud-synephrine    FINDINGS:  Moderate inaterarytenoid erythema and edema    PROCEDURE:  After verbal consent was obtained, the flexible scope was passed through the patient's nasal cavity without difficulty.  The nasopharynx (adenoid pad) and eustachian tube orifices were first visualized and were found to be normal, without masses or irregularity.  The posterior pharyngeal wall and base of tongue were then examined and no mass or irregular tissue was seen.  The scope was then advanced to the larynx, and the epiglottis, valleculae, and piriform sinuses were normal, without masses or mucosal irregularity.  The false vocal folds and true vocal folds were then examined and were found to have normal mobility (full abduction and adduction) and no masses or mucosal irregularity was seen.  The interartyenoid area had moderate edema and erythema consistent with reflux. Improvement noted since last exam.   
[FreeTextEntry3] : I, EPHRAIM DELGADO , am scribing for and in the presence of RD WALSH the following sections: history of present illness, past medical/family/surgical/family/social history, review of systems, vital signs, physical exam, and disposition.\par \par

## 2021-10-31 ENCOUNTER — PATIENT OUTREACH (OUTPATIENT)
Dept: ADMINISTRATIVE | Facility: OTHER | Age: 71
End: 2021-10-31
Payer: MEDICARE

## 2021-11-02 ENCOUNTER — OFFICE VISIT (OUTPATIENT)
Dept: CARDIOLOGY | Facility: CLINIC | Age: 71
End: 2021-11-02
Payer: MEDICARE

## 2021-11-02 ENCOUNTER — DOCUMENT SCAN (OUTPATIENT)
Dept: HOME HEALTH SERVICES | Facility: HOSPITAL | Age: 71
End: 2021-11-02
Payer: MEDICARE

## 2021-11-02 VITALS
HEART RATE: 114 BPM | DIASTOLIC BLOOD PRESSURE: 89 MMHG | SYSTOLIC BLOOD PRESSURE: 127 MMHG | WEIGHT: 149.81 LBS | OXYGEN SATURATION: 97 % | HEIGHT: 62 IN | BODY MASS INDEX: 27.57 KG/M2

## 2021-11-02 DIAGNOSIS — I70.0 AORTIC ATHEROSCLEROSIS: ICD-10-CM

## 2021-11-02 DIAGNOSIS — I25.118 CORONARY ARTERY DISEASE OF NATIVE ARTERY OF NATIVE HEART WITH STABLE ANGINA PECTORIS: ICD-10-CM

## 2021-11-02 DIAGNOSIS — R00.0 SINUS TACHYCARDIA: ICD-10-CM

## 2021-11-02 DIAGNOSIS — J42 CHRONIC BRONCHITIS, UNSPECIFIED CHRONIC BRONCHITIS TYPE: ICD-10-CM

## 2021-11-02 DIAGNOSIS — R91.1 SOLITARY PULMONARY NODULE: ICD-10-CM

## 2021-11-02 DIAGNOSIS — I10 PRIMARY HYPERTENSION: ICD-10-CM

## 2021-11-02 DIAGNOSIS — Z72.0 TOBACCO ABUSE: ICD-10-CM

## 2021-11-02 DIAGNOSIS — E78.5 HYPERLIPIDEMIA ASSOCIATED WITH TYPE 2 DIABETES MELLITUS: Primary | ICD-10-CM

## 2021-11-02 DIAGNOSIS — E11.59 HYPERTENSION ASSOCIATED WITH DIABETES: ICD-10-CM

## 2021-11-02 DIAGNOSIS — Z85.038 HISTORY OF COLON CANCER: ICD-10-CM

## 2021-11-02 DIAGNOSIS — I50.32 CHRONIC DIASTOLIC CONGESTIVE HEART FAILURE: ICD-10-CM

## 2021-11-02 DIAGNOSIS — Z90.3 HISTORY OF PARTIAL GASTRECTOMY: ICD-10-CM

## 2021-11-02 DIAGNOSIS — I15.2 HYPERTENSION ASSOCIATED WITH DIABETES: ICD-10-CM

## 2021-11-02 DIAGNOSIS — Z98.61 S/P PTCA (PERCUTANEOUS TRANSLUMINAL CORONARY ANGIOPLASTY): ICD-10-CM

## 2021-11-02 DIAGNOSIS — E11.69 HYPERLIPIDEMIA ASSOCIATED WITH TYPE 2 DIABETES MELLITUS: Primary | ICD-10-CM

## 2021-11-02 DIAGNOSIS — I25.10 CORONARY ARTERY DISEASE INVOLVING NATIVE CORONARY ARTERY OF NATIVE HEART WITHOUT ANGINA PECTORIS: Chronic | ICD-10-CM

## 2021-11-02 PROCEDURE — 3079F DIAST BP 80-89 MM HG: CPT | Mod: CPTII,S$GLB,, | Performed by: INTERNAL MEDICINE

## 2021-11-02 PROCEDURE — 3074F SYST BP LT 130 MM HG: CPT | Mod: CPTII,S$GLB,, | Performed by: INTERNAL MEDICINE

## 2021-11-02 PROCEDURE — 1157F ADVNC CARE PLAN IN RCRD: CPT | Mod: CPTII,S$GLB,, | Performed by: INTERNAL MEDICINE

## 2021-11-02 PROCEDURE — 93000 EKG 12-LEAD: ICD-10-PCS | Mod: S$GLB,,, | Performed by: INTERNAL MEDICINE

## 2021-11-02 PROCEDURE — 1159F MED LIST DOCD IN RCRD: CPT | Mod: CPTII,S$GLB,, | Performed by: INTERNAL MEDICINE

## 2021-11-02 PROCEDURE — 93000 ELECTROCARDIOGRAM COMPLETE: CPT | Mod: S$GLB,,, | Performed by: INTERNAL MEDICINE

## 2021-11-02 PROCEDURE — 3066F NEPHROPATHY DOC TX: CPT | Mod: CPTII,S$GLB,, | Performed by: INTERNAL MEDICINE

## 2021-11-02 PROCEDURE — 3061F PR NEG MICROALBUMINURIA RESULT DOCUMENTED/REVIEW: ICD-10-PCS | Mod: CPTII,S$GLB,, | Performed by: INTERNAL MEDICINE

## 2021-11-02 PROCEDURE — 99999 PR PBB SHADOW E&M-EST. PATIENT-LVL III: ICD-10-PCS | Mod: PBBFAC,,, | Performed by: INTERNAL MEDICINE

## 2021-11-02 PROCEDURE — 3008F PR BODY MASS INDEX (BMI) DOCUMENTED: ICD-10-PCS | Mod: CPTII,S$GLB,, | Performed by: INTERNAL MEDICINE

## 2021-11-02 PROCEDURE — 99999 PR PBB SHADOW E&M-EST. PATIENT-LVL III: CPT | Mod: PBBFAC,,, | Performed by: INTERNAL MEDICINE

## 2021-11-02 PROCEDURE — 3288F PR FALLS RISK ASSESSMENT DOCUMENTED: ICD-10-PCS | Mod: CPTII,S$GLB,, | Performed by: INTERNAL MEDICINE

## 2021-11-02 PROCEDURE — 1126F AMNT PAIN NOTED NONE PRSNT: CPT | Mod: CPTII,S$GLB,, | Performed by: INTERNAL MEDICINE

## 2021-11-02 PROCEDURE — 99499 UNLISTED E&M SERVICE: CPT | Mod: S$GLB,,, | Performed by: INTERNAL MEDICINE

## 2021-11-02 PROCEDURE — 4010F ACE/ARB THERAPY RXD/TAKEN: CPT | Mod: CPTII,S$GLB,, | Performed by: INTERNAL MEDICINE

## 2021-11-02 PROCEDURE — 3074F PR MOST RECENT SYSTOLIC BLOOD PRESSURE < 130 MM HG: ICD-10-PCS | Mod: CPTII,S$GLB,, | Performed by: INTERNAL MEDICINE

## 2021-11-02 PROCEDURE — 1101F PR PT FALLS ASSESS DOC 0-1 FALLS W/OUT INJ PAST YR: ICD-10-PCS | Mod: CPTII,S$GLB,, | Performed by: INTERNAL MEDICINE

## 2021-11-02 PROCEDURE — 99499 RISK ADDL DX/OHS AUDIT: ICD-10-PCS | Mod: S$GLB,,, | Performed by: INTERNAL MEDICINE

## 2021-11-02 PROCEDURE — 3052F HG A1C>EQUAL 8.0%<EQUAL 9.0%: CPT | Mod: CPTII,S$GLB,, | Performed by: INTERNAL MEDICINE

## 2021-11-02 PROCEDURE — 3288F FALL RISK ASSESSMENT DOCD: CPT | Mod: CPTII,S$GLB,, | Performed by: INTERNAL MEDICINE

## 2021-11-02 PROCEDURE — 99214 OFFICE O/P EST MOD 30 MIN: CPT | Mod: 25,S$GLB,, | Performed by: INTERNAL MEDICINE

## 2021-11-02 PROCEDURE — 4010F PR ACE/ARB THEARPY RXD/TAKEN: ICD-10-PCS | Mod: CPTII,S$GLB,, | Performed by: INTERNAL MEDICINE

## 2021-11-02 PROCEDURE — 1159F PR MEDICATION LIST DOCUMENTED IN MEDICAL RECORD: ICD-10-PCS | Mod: CPTII,S$GLB,, | Performed by: INTERNAL MEDICINE

## 2021-11-02 PROCEDURE — 1101F PT FALLS ASSESS-DOCD LE1/YR: CPT | Mod: CPTII,S$GLB,, | Performed by: INTERNAL MEDICINE

## 2021-11-02 PROCEDURE — 3008F BODY MASS INDEX DOCD: CPT | Mod: CPTII,S$GLB,, | Performed by: INTERNAL MEDICINE

## 2021-11-02 PROCEDURE — 3052F PR MOST RECENT HEMOGLOBIN A1C LEVEL 8.0 - < 9.0%: ICD-10-PCS | Mod: CPTII,S$GLB,, | Performed by: INTERNAL MEDICINE

## 2021-11-02 PROCEDURE — 1126F PR PAIN SEVERITY QUANTIFIED, NO PAIN PRESENT: ICD-10-PCS | Mod: CPTII,S$GLB,, | Performed by: INTERNAL MEDICINE

## 2021-11-02 PROCEDURE — 1160F RVW MEDS BY RX/DR IN RCRD: CPT | Mod: CPTII,S$GLB,, | Performed by: INTERNAL MEDICINE

## 2021-11-02 PROCEDURE — 1160F PR REVIEW ALL MEDS BY PRESCRIBER/CLIN PHARMACIST DOCUMENTED: ICD-10-PCS | Mod: CPTII,S$GLB,, | Performed by: INTERNAL MEDICINE

## 2021-11-02 PROCEDURE — 99214 PR OFFICE/OUTPT VISIT, EST, LEVL IV, 30-39 MIN: ICD-10-PCS | Mod: 25,S$GLB,, | Performed by: INTERNAL MEDICINE

## 2021-11-02 PROCEDURE — 3066F PR DOCUMENTATION OF TREATMENT FOR NEPHROPATHY: ICD-10-PCS | Mod: CPTII,S$GLB,, | Performed by: INTERNAL MEDICINE

## 2021-11-02 PROCEDURE — 3079F PR MOST RECENT DIASTOLIC BLOOD PRESSURE 80-89 MM HG: ICD-10-PCS | Mod: CPTII,S$GLB,, | Performed by: INTERNAL MEDICINE

## 2021-11-02 PROCEDURE — 3061F NEG MICROALBUMINURIA REV: CPT | Mod: CPTII,S$GLB,, | Performed by: INTERNAL MEDICINE

## 2021-11-02 PROCEDURE — 1157F PR ADVANCE CARE PLAN OR EQUIV PRESENT IN MEDICAL RECORD: ICD-10-PCS | Mod: CPTII,S$GLB,, | Performed by: INTERNAL MEDICINE

## 2021-11-02 RX ORDER — LANOLIN ALCOHOL/MO/W.PET/CERES
400 CREAM (GRAM) TOPICAL DAILY
Status: ON HOLD | COMMUNITY
End: 2022-08-26

## 2021-11-02 RX ORDER — LISINOPRIL 10 MG/1
10 TABLET ORAL DAILY
COMMUNITY
Start: 2021-09-20 | End: 2021-12-07 | Stop reason: SINTOL

## 2021-11-02 RX ORDER — METFORMIN HYDROCHLORIDE 1000 MG/1
1000 TABLET ORAL 2 TIMES DAILY
COMMUNITY
Start: 2021-06-07

## 2021-11-02 RX ORDER — DULOXETIN HYDROCHLORIDE 30 MG/1
30 CAPSULE, DELAYED RELEASE ORAL DAILY
Status: ON HOLD | COMMUNITY
Start: 2021-07-02 | End: 2022-05-12 | Stop reason: HOSPADM

## 2021-11-02 RX ORDER — PREGABALIN 150 MG/1
150 CAPSULE ORAL 2 TIMES DAILY PRN
Status: ON HOLD | COMMUNITY
Start: 2021-09-14 | End: 2022-05-12 | Stop reason: HOSPADM

## 2021-11-02 RX ORDER — FLUTICASONE FUROATE, UMECLIDINIUM BROMIDE AND VILANTEROL TRIFENATATE 200; 62.5; 25 UG/1; UG/1; UG/1
1 POWDER RESPIRATORY (INHALATION) DAILY
COMMUNITY
Start: 2021-10-17

## 2021-11-02 RX ORDER — HYDROCHLOROTHIAZIDE 12.5 MG/1
12.5 CAPSULE ORAL DAILY
Qty: 90 CAPSULE | Refills: 3 | Status: ON HOLD | OUTPATIENT
Start: 2021-11-02 | End: 2022-09-08 | Stop reason: SDUPTHER

## 2021-11-02 RX ORDER — ZONISAMIDE 100 MG/1
200 CAPSULE ORAL 2 TIMES DAILY
COMMUNITY
Start: 2021-10-06

## 2021-11-04 ENCOUNTER — CLINICAL SUPPORT (OUTPATIENT)
Dept: SMOKING CESSATION | Facility: CLINIC | Age: 71
End: 2021-11-04
Payer: COMMERCIAL

## 2021-11-04 DIAGNOSIS — F17.200 NICOTINE DEPENDENCE: Primary | ICD-10-CM

## 2021-11-04 PROCEDURE — 99407 PR TOBACCO USE CESSATION INTENSIVE >10 MINUTES: ICD-10-PCS | Mod: S$GLB,,,

## 2021-11-04 PROCEDURE — 99407 BEHAV CHNG SMOKING > 10 MIN: CPT | Mod: S$GLB,,,

## 2021-11-05 ENCOUNTER — OFFICE VISIT (OUTPATIENT)
Dept: OBSTETRICS AND GYNECOLOGY | Facility: CLINIC | Age: 71
End: 2021-11-05
Payer: MEDICARE

## 2021-11-05 VITALS
DIASTOLIC BLOOD PRESSURE: 80 MMHG | SYSTOLIC BLOOD PRESSURE: 132 MMHG | BODY MASS INDEX: 26.53 KG/M2 | WEIGHT: 145.06 LBS

## 2021-11-05 DIAGNOSIS — Z13.820 OSTEOPOROSIS SCREENING: ICD-10-CM

## 2021-11-05 DIAGNOSIS — Z01.419 WOMEN'S ANNUAL ROUTINE GYNECOLOGICAL EXAMINATION: Primary | ICD-10-CM

## 2021-11-05 DIAGNOSIS — Z78.0 MENOPAUSE: ICD-10-CM

## 2021-11-05 DIAGNOSIS — Z12.31 ENCOUNTER FOR SCREENING MAMMOGRAM FOR BREAST CANCER: ICD-10-CM

## 2021-11-05 DIAGNOSIS — K46.9 HERNIA OF ABDOMINAL CAVITY: ICD-10-CM

## 2021-11-05 PROCEDURE — 1157F ADVNC CARE PLAN IN RCRD: CPT | Mod: CPTII,S$GLB,, | Performed by: NURSE PRACTITIONER

## 2021-11-05 PROCEDURE — 4010F ACE/ARB THERAPY RXD/TAKEN: CPT | Mod: CPTII,S$GLB,, | Performed by: NURSE PRACTITIONER

## 2021-11-05 PROCEDURE — 3008F BODY MASS INDEX DOCD: CPT | Mod: CPTII,S$GLB,, | Performed by: NURSE PRACTITIONER

## 2021-11-05 PROCEDURE — 3079F DIAST BP 80-89 MM HG: CPT | Mod: CPTII,S$GLB,, | Performed by: NURSE PRACTITIONER

## 2021-11-05 PROCEDURE — 3079F PR MOST RECENT DIASTOLIC BLOOD PRESSURE 80-89 MM HG: ICD-10-PCS | Mod: CPTII,S$GLB,, | Performed by: NURSE PRACTITIONER

## 2021-11-05 PROCEDURE — 3052F PR MOST RECENT HEMOGLOBIN A1C LEVEL 8.0 - < 9.0%: ICD-10-PCS | Mod: CPTII,S$GLB,, | Performed by: NURSE PRACTITIONER

## 2021-11-05 PROCEDURE — G0101 CA SCREEN;PELVIC/BREAST EXAM: HCPCS | Mod: S$GLB,,, | Performed by: NURSE PRACTITIONER

## 2021-11-05 PROCEDURE — 1126F PR PAIN SEVERITY QUANTIFIED, NO PAIN PRESENT: ICD-10-PCS | Mod: CPTII,S$GLB,, | Performed by: NURSE PRACTITIONER

## 2021-11-05 PROCEDURE — 99999 PR PBB SHADOW E&M-EST. PATIENT-LVL III: ICD-10-PCS | Mod: PBBFAC,,, | Performed by: NURSE PRACTITIONER

## 2021-11-05 PROCEDURE — 99999 PR PBB SHADOW E&M-EST. PATIENT-LVL III: CPT | Mod: PBBFAC,,, | Performed by: NURSE PRACTITIONER

## 2021-11-05 PROCEDURE — 1126F AMNT PAIN NOTED NONE PRSNT: CPT | Mod: CPTII,S$GLB,, | Performed by: NURSE PRACTITIONER

## 2021-11-05 PROCEDURE — 1101F PR PT FALLS ASSESS DOC 0-1 FALLS W/OUT INJ PAST YR: ICD-10-PCS | Mod: CPTII,S$GLB,, | Performed by: NURSE PRACTITIONER

## 2021-11-05 PROCEDURE — 3075F PR MOST RECENT SYSTOLIC BLOOD PRESS GE 130-139MM HG: ICD-10-PCS | Mod: CPTII,S$GLB,, | Performed by: NURSE PRACTITIONER

## 2021-11-05 PROCEDURE — 1159F PR MEDICATION LIST DOCUMENTED IN MEDICAL RECORD: ICD-10-PCS | Mod: CPTII,S$GLB,, | Performed by: NURSE PRACTITIONER

## 2021-11-05 PROCEDURE — 1159F MED LIST DOCD IN RCRD: CPT | Mod: CPTII,S$GLB,, | Performed by: NURSE PRACTITIONER

## 2021-11-05 PROCEDURE — 3066F NEPHROPATHY DOC TX: CPT | Mod: CPTII,S$GLB,, | Performed by: NURSE PRACTITIONER

## 2021-11-05 PROCEDURE — 3061F PR NEG MICROALBUMINURIA RESULT DOCUMENTED/REVIEW: ICD-10-PCS | Mod: CPTII,S$GLB,, | Performed by: NURSE PRACTITIONER

## 2021-11-05 PROCEDURE — 4010F PR ACE/ARB THEARPY RXD/TAKEN: ICD-10-PCS | Mod: CPTII,S$GLB,, | Performed by: NURSE PRACTITIONER

## 2021-11-05 PROCEDURE — 3288F FALL RISK ASSESSMENT DOCD: CPT | Mod: CPTII,S$GLB,, | Performed by: NURSE PRACTITIONER

## 2021-11-05 PROCEDURE — 3008F PR BODY MASS INDEX (BMI) DOCUMENTED: ICD-10-PCS | Mod: CPTII,S$GLB,, | Performed by: NURSE PRACTITIONER

## 2021-11-05 PROCEDURE — 3052F HG A1C>EQUAL 8.0%<EQUAL 9.0%: CPT | Mod: CPTII,S$GLB,, | Performed by: NURSE PRACTITIONER

## 2021-11-05 PROCEDURE — 3066F PR DOCUMENTATION OF TREATMENT FOR NEPHROPATHY: ICD-10-PCS | Mod: CPTII,S$GLB,, | Performed by: NURSE PRACTITIONER

## 2021-11-05 PROCEDURE — G0101 PR CA SCREEN;PELVIC/BREAST EXAM: ICD-10-PCS | Mod: S$GLB,,, | Performed by: NURSE PRACTITIONER

## 2021-11-05 PROCEDURE — 1101F PT FALLS ASSESS-DOCD LE1/YR: CPT | Mod: CPTII,S$GLB,, | Performed by: NURSE PRACTITIONER

## 2021-11-05 PROCEDURE — 3288F PR FALLS RISK ASSESSMENT DOCUMENTED: ICD-10-PCS | Mod: CPTII,S$GLB,, | Performed by: NURSE PRACTITIONER

## 2021-11-05 PROCEDURE — 3075F SYST BP GE 130 - 139MM HG: CPT | Mod: CPTII,S$GLB,, | Performed by: NURSE PRACTITIONER

## 2021-11-05 PROCEDURE — 1157F PR ADVANCE CARE PLAN OR EQUIV PRESENT IN MEDICAL RECORD: ICD-10-PCS | Mod: CPTII,S$GLB,, | Performed by: NURSE PRACTITIONER

## 2021-11-05 PROCEDURE — 3061F NEG MICROALBUMINURIA REV: CPT | Mod: CPTII,S$GLB,, | Performed by: NURSE PRACTITIONER

## 2021-11-12 ENCOUNTER — OFFICE VISIT (OUTPATIENT)
Dept: SURGERY | Facility: CLINIC | Age: 71
End: 2021-11-12
Payer: MEDICARE

## 2021-11-12 VITALS
WEIGHT: 145.38 LBS | HEIGHT: 62 IN | DIASTOLIC BLOOD PRESSURE: 89 MMHG | HEART RATE: 118 BPM | BODY MASS INDEX: 26.75 KG/M2 | SYSTOLIC BLOOD PRESSURE: 142 MMHG

## 2021-11-12 DIAGNOSIS — K46.9 HERNIA OF ABDOMINAL CAVITY: ICD-10-CM

## 2021-11-12 PROCEDURE — 3052F PR MOST RECENT HEMOGLOBIN A1C LEVEL 8.0 - < 9.0%: ICD-10-PCS | Mod: CPTII,S$GLB,, | Performed by: SURGERY

## 2021-11-12 PROCEDURE — 1159F MED LIST DOCD IN RCRD: CPT | Mod: CPTII,S$GLB,, | Performed by: SURGERY

## 2021-11-12 PROCEDURE — 1157F ADVNC CARE PLAN IN RCRD: CPT | Mod: CPTII,S$GLB,, | Performed by: SURGERY

## 2021-11-12 PROCEDURE — 99203 PR OFFICE/OUTPT VISIT, NEW, LEVL III, 30-44 MIN: ICD-10-PCS | Mod: S$GLB,,, | Performed by: SURGERY

## 2021-11-12 PROCEDURE — 3066F PR DOCUMENTATION OF TREATMENT FOR NEPHROPATHY: ICD-10-PCS | Mod: CPTII,S$GLB,, | Performed by: SURGERY

## 2021-11-12 PROCEDURE — 3061F PR NEG MICROALBUMINURIA RESULT DOCUMENTED/REVIEW: ICD-10-PCS | Mod: CPTII,S$GLB,, | Performed by: SURGERY

## 2021-11-12 PROCEDURE — 99203 OFFICE O/P NEW LOW 30 MIN: CPT | Mod: S$GLB,,, | Performed by: SURGERY

## 2021-11-12 PROCEDURE — 4010F ACE/ARB THERAPY RXD/TAKEN: CPT | Mod: CPTII,S$GLB,, | Performed by: SURGERY

## 2021-11-12 PROCEDURE — 1126F PR PAIN SEVERITY QUANTIFIED, NO PAIN PRESENT: ICD-10-PCS | Mod: CPTII,S$GLB,, | Performed by: SURGERY

## 2021-11-12 PROCEDURE — 3288F PR FALLS RISK ASSESSMENT DOCUMENTED: ICD-10-PCS | Mod: CPTII,S$GLB,, | Performed by: SURGERY

## 2021-11-12 PROCEDURE — 1159F PR MEDICATION LIST DOCUMENTED IN MEDICAL RECORD: ICD-10-PCS | Mod: CPTII,S$GLB,, | Performed by: SURGERY

## 2021-11-12 PROCEDURE — 3008F BODY MASS INDEX DOCD: CPT | Mod: CPTII,S$GLB,, | Performed by: SURGERY

## 2021-11-12 PROCEDURE — 4010F PR ACE/ARB THEARPY RXD/TAKEN: ICD-10-PCS | Mod: CPTII,S$GLB,, | Performed by: SURGERY

## 2021-11-12 PROCEDURE — 3079F PR MOST RECENT DIASTOLIC BLOOD PRESSURE 80-89 MM HG: ICD-10-PCS | Mod: CPTII,S$GLB,, | Performed by: SURGERY

## 2021-11-12 PROCEDURE — 1126F AMNT PAIN NOTED NONE PRSNT: CPT | Mod: CPTII,S$GLB,, | Performed by: SURGERY

## 2021-11-12 PROCEDURE — 3077F SYST BP >= 140 MM HG: CPT | Mod: CPTII,S$GLB,, | Performed by: SURGERY

## 2021-11-12 PROCEDURE — 3008F PR BODY MASS INDEX (BMI) DOCUMENTED: ICD-10-PCS | Mod: CPTII,S$GLB,, | Performed by: SURGERY

## 2021-11-12 PROCEDURE — 1160F RVW MEDS BY RX/DR IN RCRD: CPT | Mod: CPTII,S$GLB,, | Performed by: SURGERY

## 2021-11-12 PROCEDURE — 3066F NEPHROPATHY DOC TX: CPT | Mod: CPTII,S$GLB,, | Performed by: SURGERY

## 2021-11-12 PROCEDURE — 3052F HG A1C>EQUAL 8.0%<EQUAL 9.0%: CPT | Mod: CPTII,S$GLB,, | Performed by: SURGERY

## 2021-11-12 PROCEDURE — 3061F NEG MICROALBUMINURIA REV: CPT | Mod: CPTII,S$GLB,, | Performed by: SURGERY

## 2021-11-12 PROCEDURE — 3077F PR MOST RECENT SYSTOLIC BLOOD PRESSURE >= 140 MM HG: ICD-10-PCS | Mod: CPTII,S$GLB,, | Performed by: SURGERY

## 2021-11-12 PROCEDURE — 1160F PR REVIEW ALL MEDS BY PRESCRIBER/CLIN PHARMACIST DOCUMENTED: ICD-10-PCS | Mod: CPTII,S$GLB,, | Performed by: SURGERY

## 2021-11-12 PROCEDURE — 1101F PR PT FALLS ASSESS DOC 0-1 FALLS W/OUT INJ PAST YR: ICD-10-PCS | Mod: CPTII,S$GLB,, | Performed by: SURGERY

## 2021-11-12 PROCEDURE — 1101F PT FALLS ASSESS-DOCD LE1/YR: CPT | Mod: CPTII,S$GLB,, | Performed by: SURGERY

## 2021-11-12 PROCEDURE — 1157F PR ADVANCE CARE PLAN OR EQUIV PRESENT IN MEDICAL RECORD: ICD-10-PCS | Mod: CPTII,S$GLB,, | Performed by: SURGERY

## 2021-11-12 PROCEDURE — 99999 PR PBB SHADOW E&M-EST. PATIENT-LVL III: ICD-10-PCS | Mod: PBBFAC,,, | Performed by: SURGERY

## 2021-11-12 PROCEDURE — 3079F DIAST BP 80-89 MM HG: CPT | Mod: CPTII,S$GLB,, | Performed by: SURGERY

## 2021-11-12 PROCEDURE — 99999 PR PBB SHADOW E&M-EST. PATIENT-LVL III: CPT | Mod: PBBFAC,,, | Performed by: SURGERY

## 2021-11-12 PROCEDURE — 3288F FALL RISK ASSESSMENT DOCD: CPT | Mod: CPTII,S$GLB,, | Performed by: SURGERY

## 2021-11-17 ENCOUNTER — DOCUMENT SCAN (OUTPATIENT)
Dept: HOME HEALTH SERVICES | Facility: HOSPITAL | Age: 71
End: 2021-11-17
Payer: MEDICARE

## 2021-11-18 ENCOUNTER — HOSPITAL ENCOUNTER (OUTPATIENT)
Dept: RADIOLOGY | Facility: HOSPITAL | Age: 71
Discharge: HOME | End: 2021-11-18
Attending: NURSE PRACTITIONER
Payer: MEDICARE

## 2021-11-18 ENCOUNTER — OFFICE VISIT (OUTPATIENT)
Dept: INTERNAL MEDICINE | Facility: CLINIC | Age: 71
End: 2021-11-18
Payer: MEDICARE

## 2021-11-18 ENCOUNTER — TELEPHONE (OUTPATIENT)
Dept: SURGERY | Facility: CLINIC | Age: 71
End: 2021-11-18
Payer: MEDICARE

## 2021-11-18 ENCOUNTER — TELEPHONE (OUTPATIENT)
Dept: INTERNAL MEDICINE | Facility: CLINIC | Age: 71
End: 2021-11-18

## 2021-11-18 VITALS
SYSTOLIC BLOOD PRESSURE: 122 MMHG | WEIGHT: 144.19 LBS | BODY MASS INDEX: 26.53 KG/M2 | OXYGEN SATURATION: 93 % | HEART RATE: 105 BPM | HEIGHT: 62 IN | DIASTOLIC BLOOD PRESSURE: 70 MMHG

## 2021-11-18 DIAGNOSIS — R07.1 CHEST PAIN ON BREATHING: ICD-10-CM

## 2021-11-18 DIAGNOSIS — M54.2 NECK PAIN: ICD-10-CM

## 2021-11-18 DIAGNOSIS — Z13.820 OSTEOPOROSIS SCREENING: ICD-10-CM

## 2021-11-18 DIAGNOSIS — D53.9 NUTRITIONAL ANEMIA, UNSPECIFIED: ICD-10-CM

## 2021-11-18 DIAGNOSIS — Z78.0 MENOPAUSE: ICD-10-CM

## 2021-11-18 DIAGNOSIS — E78.5 HYPERLIPIDEMIA ASSOCIATED WITH TYPE 2 DIABETES MELLITUS: ICD-10-CM

## 2021-11-18 DIAGNOSIS — I15.2 HYPERTENSION ASSOCIATED WITH DIABETES: ICD-10-CM

## 2021-11-18 DIAGNOSIS — F33.41 RECURRENT MAJOR DEPRESSIVE DISORDER, IN PARTIAL REMISSION: ICD-10-CM

## 2021-11-18 DIAGNOSIS — I25.118 CORONARY ARTERY DISEASE OF NATIVE ARTERY OF NATIVE HEART WITH STABLE ANGINA PECTORIS: ICD-10-CM

## 2021-11-18 DIAGNOSIS — R06.02 SOB (SHORTNESS OF BREATH): Primary | ICD-10-CM

## 2021-11-18 DIAGNOSIS — E11.69 HYPERLIPIDEMIA ASSOCIATED WITH TYPE 2 DIABETES MELLITUS: ICD-10-CM

## 2021-11-18 DIAGNOSIS — E11.59 TYPE 2 DIABETES MELLITUS WITH OTHER CIRCULATORY COMPLICATION, WITH LONG-TERM CURRENT USE OF INSULIN: ICD-10-CM

## 2021-11-18 DIAGNOSIS — J42 CHRONIC BRONCHITIS, UNSPECIFIED CHRONIC BRONCHITIS TYPE: ICD-10-CM

## 2021-11-18 DIAGNOSIS — Z72.0 TOBACCO ABUSE: ICD-10-CM

## 2021-11-18 DIAGNOSIS — E11.59 HYPERTENSION ASSOCIATED WITH DIABETES: ICD-10-CM

## 2021-11-18 DIAGNOSIS — Z79.4 TYPE 2 DIABETES MELLITUS WITH OTHER CIRCULATORY COMPLICATION, WITH LONG-TERM CURRENT USE OF INSULIN: ICD-10-CM

## 2021-11-18 PROCEDURE — 3074F PR MOST RECENT SYSTOLIC BLOOD PRESSURE < 130 MM HG: ICD-10-PCS | Mod: CPTII,S$GLB,, | Performed by: INTERNAL MEDICINE

## 2021-11-18 PROCEDURE — 77080 DEXA BONE DENSITY SPINE HIP: ICD-10-PCS | Mod: 26,,, | Performed by: RADIOLOGY

## 2021-11-18 PROCEDURE — 3061F PR NEG MICROALBUMINURIA RESULT DOCUMENTED/REVIEW: ICD-10-PCS | Mod: CPTII,S$GLB,, | Performed by: INTERNAL MEDICINE

## 2021-11-18 PROCEDURE — 1101F PT FALLS ASSESS-DOCD LE1/YR: CPT | Mod: CPTII,S$GLB,, | Performed by: INTERNAL MEDICINE

## 2021-11-18 PROCEDURE — 3052F PR MOST RECENT HEMOGLOBIN A1C LEVEL 8.0 - < 9.0%: ICD-10-PCS | Mod: CPTII,S$GLB,, | Performed by: INTERNAL MEDICINE

## 2021-11-18 PROCEDURE — 99999 PR PBB SHADOW E&M-EST. PATIENT-LVL IV: ICD-10-PCS | Mod: PBBFAC,,, | Performed by: INTERNAL MEDICINE

## 2021-11-18 PROCEDURE — 1125F PR PAIN SEVERITY QUANTIFIED, PAIN PRESENT: ICD-10-PCS | Mod: CPTII,S$GLB,, | Performed by: INTERNAL MEDICINE

## 2021-11-18 PROCEDURE — 1125F AMNT PAIN NOTED PAIN PRSNT: CPT | Mod: CPTII,S$GLB,, | Performed by: INTERNAL MEDICINE

## 2021-11-18 PROCEDURE — 1159F PR MEDICATION LIST DOCUMENTED IN MEDICAL RECORD: ICD-10-PCS | Mod: CPTII,S$GLB,, | Performed by: INTERNAL MEDICINE

## 2021-11-18 PROCEDURE — 1159F MED LIST DOCD IN RCRD: CPT | Mod: CPTII,S$GLB,, | Performed by: INTERNAL MEDICINE

## 2021-11-18 PROCEDURE — 1160F RVW MEDS BY RX/DR IN RCRD: CPT | Mod: CPTII,S$GLB,, | Performed by: INTERNAL MEDICINE

## 2021-11-18 PROCEDURE — 4010F PR ACE/ARB THEARPY RXD/TAKEN: ICD-10-PCS | Mod: CPTII,S$GLB,, | Performed by: INTERNAL MEDICINE

## 2021-11-18 PROCEDURE — 3066F PR DOCUMENTATION OF TREATMENT FOR NEPHROPATHY: ICD-10-PCS | Mod: CPTII,S$GLB,, | Performed by: INTERNAL MEDICINE

## 2021-11-18 PROCEDURE — 99999 PR PBB SHADOW E&M-EST. PATIENT-LVL IV: CPT | Mod: PBBFAC,,, | Performed by: INTERNAL MEDICINE

## 2021-11-18 PROCEDURE — 77080 DXA BONE DENSITY AXIAL: CPT | Mod: TC

## 2021-11-18 PROCEDURE — 3078F PR MOST RECENT DIASTOLIC BLOOD PRESSURE < 80 MM HG: ICD-10-PCS | Mod: CPTII,S$GLB,, | Performed by: INTERNAL MEDICINE

## 2021-11-18 PROCEDURE — 1157F ADVNC CARE PLAN IN RCRD: CPT | Mod: CPTII,S$GLB,, | Performed by: INTERNAL MEDICINE

## 2021-11-18 PROCEDURE — 1101F PR PT FALLS ASSESS DOC 0-1 FALLS W/OUT INJ PAST YR: ICD-10-PCS | Mod: CPTII,S$GLB,, | Performed by: INTERNAL MEDICINE

## 2021-11-18 PROCEDURE — 99499 UNLISTED E&M SERVICE: CPT | Mod: S$GLB,,, | Performed by: INTERNAL MEDICINE

## 2021-11-18 PROCEDURE — 99215 PR OFFICE/OUTPT VISIT, EST, LEVL V, 40-54 MIN: ICD-10-PCS | Mod: S$GLB,,, | Performed by: INTERNAL MEDICINE

## 2021-11-18 PROCEDURE — 99215 OFFICE O/P EST HI 40 MIN: CPT | Mod: S$GLB,,, | Performed by: INTERNAL MEDICINE

## 2021-11-18 PROCEDURE — 1157F PR ADVANCE CARE PLAN OR EQUIV PRESENT IN MEDICAL RECORD: ICD-10-PCS | Mod: CPTII,S$GLB,, | Performed by: INTERNAL MEDICINE

## 2021-11-18 PROCEDURE — 99499 RISK ADDL DX/OHS AUDIT: ICD-10-PCS | Mod: S$GLB,,, | Performed by: INTERNAL MEDICINE

## 2021-11-18 PROCEDURE — 3078F DIAST BP <80 MM HG: CPT | Mod: CPTII,S$GLB,, | Performed by: INTERNAL MEDICINE

## 2021-11-18 PROCEDURE — 77080 DXA BONE DENSITY AXIAL: CPT | Mod: 26,,, | Performed by: RADIOLOGY

## 2021-11-18 PROCEDURE — 3288F FALL RISK ASSESSMENT DOCD: CPT | Mod: CPTII,S$GLB,, | Performed by: INTERNAL MEDICINE

## 2021-11-18 PROCEDURE — 4010F ACE/ARB THERAPY RXD/TAKEN: CPT | Mod: CPTII,S$GLB,, | Performed by: INTERNAL MEDICINE

## 2021-11-18 PROCEDURE — 3288F PR FALLS RISK ASSESSMENT DOCUMENTED: ICD-10-PCS | Mod: CPTII,S$GLB,, | Performed by: INTERNAL MEDICINE

## 2021-11-18 PROCEDURE — 3008F BODY MASS INDEX DOCD: CPT | Mod: CPTII,S$GLB,, | Performed by: INTERNAL MEDICINE

## 2021-11-18 PROCEDURE — 1160F PR REVIEW ALL MEDS BY PRESCRIBER/CLIN PHARMACIST DOCUMENTED: ICD-10-PCS | Mod: CPTII,S$GLB,, | Performed by: INTERNAL MEDICINE

## 2021-11-18 PROCEDURE — 3074F SYST BP LT 130 MM HG: CPT | Mod: CPTII,S$GLB,, | Performed by: INTERNAL MEDICINE

## 2021-11-18 PROCEDURE — 3066F NEPHROPATHY DOC TX: CPT | Mod: CPTII,S$GLB,, | Performed by: INTERNAL MEDICINE

## 2021-11-18 PROCEDURE — 3008F PR BODY MASS INDEX (BMI) DOCUMENTED: ICD-10-PCS | Mod: CPTII,S$GLB,, | Performed by: INTERNAL MEDICINE

## 2021-11-18 PROCEDURE — 3061F NEG MICROALBUMINURIA REV: CPT | Mod: CPTII,S$GLB,, | Performed by: INTERNAL MEDICINE

## 2021-11-18 PROCEDURE — 3052F HG A1C>EQUAL 8.0%<EQUAL 9.0%: CPT | Mod: CPTII,S$GLB,, | Performed by: INTERNAL MEDICINE

## 2021-11-18 RX ORDER — TIZANIDINE 4 MG/1
4 TABLET ORAL EVERY 6 HOURS PRN
Qty: 10 TABLET | Refills: 0 | Status: SHIPPED | OUTPATIENT
Start: 2021-11-18 | End: 2021-11-28

## 2021-11-18 RX ORDER — DICLOFENAC SODIUM 10 MG/G
2 GEL TOPICAL 4 TIMES DAILY
Qty: 100 G | Refills: 0 | Status: ON HOLD | OUTPATIENT
Start: 2021-11-18 | End: 2022-08-29 | Stop reason: SDUPTHER

## 2021-11-19 ENCOUNTER — TELEPHONE (OUTPATIENT)
Dept: OBSTETRICS AND GYNECOLOGY | Facility: CLINIC | Age: 71
End: 2021-11-19
Payer: MEDICARE

## 2021-11-19 DIAGNOSIS — M81.0 OSTEOPOROSIS, UNSPECIFIED OSTEOPOROSIS TYPE, UNSPECIFIED PATHOLOGICAL FRACTURE PRESENCE: Primary | ICD-10-CM

## 2021-11-19 RX ORDER — ALENDRONATE SODIUM 70 MG/1
70 TABLET ORAL
Qty: 12 TABLET | Refills: 3 | Status: ON HOLD | OUTPATIENT
Start: 2021-11-19 | End: 2022-08-29 | Stop reason: SDUPTHER

## 2021-11-23 ENCOUNTER — TELEPHONE (OUTPATIENT)
Dept: SURGERY | Facility: CLINIC | Age: 71
End: 2021-11-23
Payer: MEDICARE

## 2021-11-26 ENCOUNTER — TELEPHONE (OUTPATIENT)
Dept: SURGERY | Facility: CLINIC | Age: 71
End: 2021-11-26
Payer: MEDICARE

## 2021-12-03 ENCOUNTER — LAB VISIT (OUTPATIENT)
Dept: LAB | Facility: HOSPITAL | Age: 71
End: 2021-12-03
Attending: INTERNAL MEDICINE
Payer: MEDICARE

## 2021-12-03 DIAGNOSIS — I15.2 HYPERTENSION ASSOCIATED WITH DIABETES: ICD-10-CM

## 2021-12-03 DIAGNOSIS — Z85.038 HISTORY OF COLON CANCER: ICD-10-CM

## 2021-12-03 DIAGNOSIS — E11.69 HYPERLIPIDEMIA ASSOCIATED WITH TYPE 2 DIABETES MELLITUS: ICD-10-CM

## 2021-12-03 DIAGNOSIS — D53.9 NUTRITIONAL ANEMIA, UNSPECIFIED: ICD-10-CM

## 2021-12-03 DIAGNOSIS — R91.1 SOLITARY PULMONARY NODULE: ICD-10-CM

## 2021-12-03 DIAGNOSIS — I70.0 AORTIC ATHEROSCLEROSIS: ICD-10-CM

## 2021-12-03 DIAGNOSIS — I10 PRIMARY HYPERTENSION: ICD-10-CM

## 2021-12-03 DIAGNOSIS — I25.10 CORONARY ARTERY DISEASE INVOLVING NATIVE CORONARY ARTERY OF NATIVE HEART WITHOUT ANGINA PECTORIS: Chronic | ICD-10-CM

## 2021-12-03 DIAGNOSIS — E11.59 HYPERTENSION ASSOCIATED WITH DIABETES: ICD-10-CM

## 2021-12-03 DIAGNOSIS — R00.0 SINUS TACHYCARDIA: ICD-10-CM

## 2021-12-03 DIAGNOSIS — E78.5 HYPERLIPIDEMIA ASSOCIATED WITH TYPE 2 DIABETES MELLITUS: ICD-10-CM

## 2021-12-03 DIAGNOSIS — I50.32 CHRONIC DIASTOLIC CONGESTIVE HEART FAILURE: ICD-10-CM

## 2021-12-03 DIAGNOSIS — Z90.3 HISTORY OF PARTIAL GASTRECTOMY: ICD-10-CM

## 2021-12-03 DIAGNOSIS — I25.118 CORONARY ARTERY DISEASE OF NATIVE ARTERY OF NATIVE HEART WITH STABLE ANGINA PECTORIS: ICD-10-CM

## 2021-12-03 DIAGNOSIS — Z98.61 S/P PTCA (PERCUTANEOUS TRANSLUMINAL CORONARY ANGIOPLASTY): ICD-10-CM

## 2021-12-03 DIAGNOSIS — Z72.0 TOBACCO ABUSE: ICD-10-CM

## 2021-12-03 DIAGNOSIS — Z79.4 TYPE 2 DIABETES MELLITUS WITH OTHER CIRCULATORY COMPLICATION, WITH LONG-TERM CURRENT USE OF INSULIN: ICD-10-CM

## 2021-12-03 DIAGNOSIS — E11.59 TYPE 2 DIABETES MELLITUS WITH OTHER CIRCULATORY COMPLICATION, WITH LONG-TERM CURRENT USE OF INSULIN: ICD-10-CM

## 2021-12-03 DIAGNOSIS — J42 CHRONIC BRONCHITIS, UNSPECIFIED CHRONIC BRONCHITIS TYPE: ICD-10-CM

## 2021-12-03 LAB
ALBUMIN SERPL BCP-MCNC: 3.7 G/DL (ref 3.5–5.2)
ALBUMIN SERPL BCP-MCNC: 3.7 G/DL (ref 3.5–5.2)
ALP SERPL-CCNC: 164 U/L (ref 55–135)
ALP SERPL-CCNC: 164 U/L (ref 55–135)
ALT SERPL W/O P-5'-P-CCNC: 11 U/L (ref 10–44)
ALT SERPL W/O P-5'-P-CCNC: 11 U/L (ref 10–44)
ANION GAP SERPL CALC-SCNC: 10 MMOL/L (ref 8–16)
ANION GAP SERPL CALC-SCNC: 10 MMOL/L (ref 8–16)
ANISOCYTOSIS BLD QL SMEAR: ABNORMAL
ANISOCYTOSIS BLD QL SMEAR: ABNORMAL
AST SERPL-CCNC: 8 U/L (ref 10–40)
AST SERPL-CCNC: 8 U/L (ref 10–40)
BASOPHILS # BLD AUTO: 0.02 K/UL (ref 0–0.2)
BASOPHILS # BLD AUTO: 0.02 K/UL (ref 0–0.2)
BASOPHILS NFR BLD: 0.4 % (ref 0–1.9)
BASOPHILS NFR BLD: 0.4 % (ref 0–1.9)
BILIRUB DIRECT SERPL-MCNC: 0.1 MG/DL (ref 0.1–0.3)
BILIRUB SERPL-MCNC: 0.3 MG/DL (ref 0.1–1)
BILIRUB SERPL-MCNC: 0.3 MG/DL (ref 0.1–1)
BNP SERPL-MCNC: <10 PG/ML (ref 0–99)
BUN SERPL-MCNC: 15 MG/DL (ref 8–23)
BUN SERPL-MCNC: 15 MG/DL (ref 8–23)
CALCIUM SERPL-MCNC: 9.2 MG/DL (ref 8.7–10.5)
CALCIUM SERPL-MCNC: 9.2 MG/DL (ref 8.7–10.5)
CHLORIDE SERPL-SCNC: 109 MMOL/L (ref 95–110)
CHLORIDE SERPL-SCNC: 109 MMOL/L (ref 95–110)
CHOLEST SERPL-MCNC: 148 MG/DL (ref 120–199)
CHOLEST/HDLC SERPL: 3.4 {RATIO} (ref 2–5)
CO2 SERPL-SCNC: 20 MMOL/L (ref 23–29)
CO2 SERPL-SCNC: 20 MMOL/L (ref 23–29)
CREAT SERPL-MCNC: 0.9 MG/DL (ref 0.5–1.4)
CREAT SERPL-MCNC: 0.9 MG/DL (ref 0.5–1.4)
DIFFERENTIAL METHOD: ABNORMAL
DIFFERENTIAL METHOD: ABNORMAL
EOSINOPHIL # BLD AUTO: 0.1 K/UL (ref 0–0.5)
EOSINOPHIL # BLD AUTO: 0.1 K/UL (ref 0–0.5)
EOSINOPHIL NFR BLD: 2 % (ref 0–8)
EOSINOPHIL NFR BLD: 2 % (ref 0–8)
ERYTHROCYTE [DISTWIDTH] IN BLOOD BY AUTOMATED COUNT: ABNORMAL % (ref 11.5–14.5)
ERYTHROCYTE [DISTWIDTH] IN BLOOD BY AUTOMATED COUNT: ABNORMAL % (ref 11.5–14.5)
EST. GFR  (AFRICAN AMERICAN): >60 ML/MIN/1.73 M^2
EST. GFR  (AFRICAN AMERICAN): >60 ML/MIN/1.73 M^2
EST. GFR  (NON AFRICAN AMERICAN): >60 ML/MIN/1.73 M^2
EST. GFR  (NON AFRICAN AMERICAN): >60 ML/MIN/1.73 M^2
ESTIMATED AVG GLUCOSE: 163 MG/DL (ref 68–131)
GLUCOSE SERPL-MCNC: 295 MG/DL (ref 70–110)
GLUCOSE SERPL-MCNC: 295 MG/DL (ref 70–110)
HBA1C MFR BLD: 7.3 % (ref 4–5.6)
HCT VFR BLD AUTO: 38.3 % (ref 37–48.5)
HCT VFR BLD AUTO: 38.3 % (ref 37–48.5)
HDLC SERPL-MCNC: 43 MG/DL (ref 40–75)
HDLC SERPL: 29.1 % (ref 20–50)
HGB BLD-MCNC: 11.3 G/DL (ref 12–16)
HGB BLD-MCNC: 11.3 G/DL (ref 12–16)
HYPOCHROMIA BLD QL SMEAR: ABNORMAL
HYPOCHROMIA BLD QL SMEAR: ABNORMAL
IMM GRANULOCYTES # BLD AUTO: 0.01 K/UL (ref 0–0.04)
IMM GRANULOCYTES # BLD AUTO: 0.01 K/UL (ref 0–0.04)
IMM GRANULOCYTES NFR BLD AUTO: 0.2 % (ref 0–0.5)
IMM GRANULOCYTES NFR BLD AUTO: 0.2 % (ref 0–0.5)
LDLC SERPL CALC-MCNC: 80 MG/DL (ref 63–159)
LYMPHOCYTES # BLD AUTO: 1.4 K/UL (ref 1–4.8)
LYMPHOCYTES # BLD AUTO: 1.4 K/UL (ref 1–4.8)
LYMPHOCYTES NFR BLD: 26.3 % (ref 18–48)
LYMPHOCYTES NFR BLD: 26.3 % (ref 18–48)
MCH RBC QN AUTO: 26.6 PG (ref 27–31)
MCH RBC QN AUTO: 26.6 PG (ref 27–31)
MCHC RBC AUTO-ENTMCNC: 29.5 G/DL (ref 32–36)
MCHC RBC AUTO-ENTMCNC: 29.5 G/DL (ref 32–36)
MCV RBC AUTO: 90 FL (ref 82–98)
MCV RBC AUTO: 90 FL (ref 82–98)
MONOCYTES # BLD AUTO: 0.5 K/UL (ref 0.3–1)
MONOCYTES # BLD AUTO: 0.5 K/UL (ref 0.3–1)
MONOCYTES NFR BLD: 8.5 % (ref 4–15)
MONOCYTES NFR BLD: 8.5 % (ref 4–15)
NEUTROPHILS # BLD AUTO: 3.4 K/UL (ref 1.8–7.7)
NEUTROPHILS # BLD AUTO: 3.4 K/UL (ref 1.8–7.7)
NEUTROPHILS NFR BLD: 62.6 % (ref 38–73)
NEUTROPHILS NFR BLD: 62.6 % (ref 38–73)
NONHDLC SERPL-MCNC: 105 MG/DL
NRBC BLD-RTO: 0 /100 WBC
NRBC BLD-RTO: 0 /100 WBC
OVALOCYTES BLD QL SMEAR: ABNORMAL
OVALOCYTES BLD QL SMEAR: ABNORMAL
PLATELET # BLD AUTO: 213 K/UL (ref 150–450)
PLATELET # BLD AUTO: 213 K/UL (ref 150–450)
PLATELET BLD QL SMEAR: ABNORMAL
PLATELET BLD QL SMEAR: ABNORMAL
PMV BLD AUTO: 9.5 FL (ref 9.2–12.9)
PMV BLD AUTO: 9.5 FL (ref 9.2–12.9)
POIKILOCYTOSIS BLD QL SMEAR: ABNORMAL
POIKILOCYTOSIS BLD QL SMEAR: ABNORMAL
POLYCHROMASIA BLD QL SMEAR: ABNORMAL
POLYCHROMASIA BLD QL SMEAR: ABNORMAL
POTASSIUM SERPL-SCNC: 4.5 MMOL/L (ref 3.5–5.1)
POTASSIUM SERPL-SCNC: 4.5 MMOL/L (ref 3.5–5.1)
PROT SERPL-MCNC: 6.9 G/DL (ref 6–8.4)
PROT SERPL-MCNC: 6.9 G/DL (ref 6–8.4)
RBC # BLD AUTO: 4.25 M/UL (ref 4–5.4)
RBC # BLD AUTO: 4.25 M/UL (ref 4–5.4)
SODIUM SERPL-SCNC: 139 MMOL/L (ref 136–145)
SODIUM SERPL-SCNC: 139 MMOL/L (ref 136–145)
TARGETS BLD QL SMEAR: ABNORMAL
TARGETS BLD QL SMEAR: ABNORMAL
TRIGL SERPL-MCNC: 125 MG/DL (ref 30–150)
TSH SERPL DL<=0.005 MIU/L-ACNC: 1.28 UIU/ML (ref 0.4–4)
WBC # BLD AUTO: 5.44 K/UL (ref 3.9–12.7)
WBC # BLD AUTO: 5.44 K/UL (ref 3.9–12.7)

## 2021-12-03 PROCEDURE — 84443 ASSAY THYROID STIM HORMONE: CPT | Performed by: INTERNAL MEDICINE

## 2021-12-03 PROCEDURE — 83880 ASSAY OF NATRIURETIC PEPTIDE: CPT | Performed by: INTERNAL MEDICINE

## 2021-12-03 PROCEDURE — 83036 HEMOGLOBIN GLYCOSYLATED A1C: CPT | Performed by: INTERNAL MEDICINE

## 2021-12-03 PROCEDURE — 85025 COMPLETE CBC W/AUTO DIFF WBC: CPT | Performed by: INTERNAL MEDICINE

## 2021-12-03 PROCEDURE — 80053 COMPREHEN METABOLIC PANEL: CPT | Performed by: INTERNAL MEDICINE

## 2021-12-03 PROCEDURE — 80076 HEPATIC FUNCTION PANEL: CPT | Performed by: INTERNAL MEDICINE

## 2021-12-03 PROCEDURE — 36415 COLL VENOUS BLD VENIPUNCTURE: CPT | Performed by: INTERNAL MEDICINE

## 2021-12-03 PROCEDURE — 80061 LIPID PANEL: CPT | Performed by: INTERNAL MEDICINE

## 2021-12-06 ENCOUNTER — PATIENT OUTREACH (OUTPATIENT)
Dept: ADMINISTRATIVE | Facility: OTHER | Age: 71
End: 2021-12-06
Payer: MEDICARE

## 2021-12-07 ENCOUNTER — OFFICE VISIT (OUTPATIENT)
Dept: CARDIOLOGY | Facility: CLINIC | Age: 71
End: 2021-12-07
Payer: MEDICARE

## 2021-12-07 VITALS
WEIGHT: 141.13 LBS | SYSTOLIC BLOOD PRESSURE: 138 MMHG | OXYGEN SATURATION: 96 % | HEIGHT: 62 IN | DIASTOLIC BLOOD PRESSURE: 88 MMHG | HEART RATE: 106 BPM | BODY MASS INDEX: 25.97 KG/M2 | TEMPERATURE: 99 F

## 2021-12-07 DIAGNOSIS — E11.69 HYPERLIPIDEMIA ASSOCIATED WITH TYPE 2 DIABETES MELLITUS: ICD-10-CM

## 2021-12-07 DIAGNOSIS — I15.2 HYPERTENSION ASSOCIATED WITH DIABETES: ICD-10-CM

## 2021-12-07 DIAGNOSIS — R91.1 SOLITARY PULMONARY NODULE: ICD-10-CM

## 2021-12-07 DIAGNOSIS — R00.0 SINUS TACHYCARDIA: ICD-10-CM

## 2021-12-07 DIAGNOSIS — Z98.61 S/P PTCA (PERCUTANEOUS TRANSLUMINAL CORONARY ANGIOPLASTY): ICD-10-CM

## 2021-12-07 DIAGNOSIS — R05.9 COUGH: ICD-10-CM

## 2021-12-07 DIAGNOSIS — I50.32 CHRONIC DIASTOLIC CONGESTIVE HEART FAILURE: ICD-10-CM

## 2021-12-07 DIAGNOSIS — E11.59 HYPERTENSION ASSOCIATED WITH DIABETES: ICD-10-CM

## 2021-12-07 DIAGNOSIS — I25.118 CORONARY ARTERY DISEASE OF NATIVE ARTERY OF NATIVE HEART WITH STABLE ANGINA PECTORIS: Primary | ICD-10-CM

## 2021-12-07 DIAGNOSIS — Z90.3 HISTORY OF PARTIAL GASTRECTOMY: ICD-10-CM

## 2021-12-07 DIAGNOSIS — E78.5 HYPERLIPIDEMIA ASSOCIATED WITH TYPE 2 DIABETES MELLITUS: ICD-10-CM

## 2021-12-07 DIAGNOSIS — I70.0 AORTIC ATHEROSCLEROSIS: ICD-10-CM

## 2021-12-07 DIAGNOSIS — J42 CHRONIC BRONCHITIS, UNSPECIFIED CHRONIC BRONCHITIS TYPE: ICD-10-CM

## 2021-12-07 DIAGNOSIS — Z72.0 TOBACCO ABUSE: ICD-10-CM

## 2021-12-07 DIAGNOSIS — G95.9 CERVICAL MYELOPATHY: ICD-10-CM

## 2021-12-07 PROCEDURE — 3066F PR DOCUMENTATION OF TREATMENT FOR NEPHROPATHY: ICD-10-PCS | Mod: CPTII,S$GLB,, | Performed by: INTERNAL MEDICINE

## 2021-12-07 PROCEDURE — 99214 PR OFFICE/OUTPT VISIT, EST, LEVL IV, 30-39 MIN: ICD-10-PCS | Mod: S$GLB,,, | Performed by: INTERNAL MEDICINE

## 2021-12-07 PROCEDURE — 99999 PR PBB SHADOW E&M-EST. PATIENT-LVL V: CPT | Mod: PBBFAC,,, | Performed by: INTERNAL MEDICINE

## 2021-12-07 PROCEDURE — 99499 UNLISTED E&M SERVICE: CPT | Mod: S$GLB,,, | Performed by: INTERNAL MEDICINE

## 2021-12-07 PROCEDURE — 3061F PR NEG MICROALBUMINURIA RESULT DOCUMENTED/REVIEW: ICD-10-PCS | Mod: CPTII,S$GLB,, | Performed by: INTERNAL MEDICINE

## 2021-12-07 PROCEDURE — 4010F PR ACE/ARB THEARPY RXD/TAKEN: ICD-10-PCS | Mod: CPTII,S$GLB,, | Performed by: INTERNAL MEDICINE

## 2021-12-07 PROCEDURE — 4010F ACE/ARB THERAPY RXD/TAKEN: CPT | Mod: CPTII,S$GLB,, | Performed by: INTERNAL MEDICINE

## 2021-12-07 PROCEDURE — 99499 RISK ADDL DX/OHS AUDIT: ICD-10-PCS | Mod: S$GLB,,, | Performed by: INTERNAL MEDICINE

## 2021-12-07 PROCEDURE — 3061F NEG MICROALBUMINURIA REV: CPT | Mod: CPTII,S$GLB,, | Performed by: INTERNAL MEDICINE

## 2021-12-07 PROCEDURE — 1157F PR ADVANCE CARE PLAN OR EQUIV PRESENT IN MEDICAL RECORD: ICD-10-PCS | Mod: CPTII,S$GLB,, | Performed by: INTERNAL MEDICINE

## 2021-12-07 PROCEDURE — 99214 OFFICE O/P EST MOD 30 MIN: CPT | Mod: S$GLB,,, | Performed by: INTERNAL MEDICINE

## 2021-12-07 PROCEDURE — 99999 PR PBB SHADOW E&M-EST. PATIENT-LVL V: ICD-10-PCS | Mod: PBBFAC,,, | Performed by: INTERNAL MEDICINE

## 2021-12-07 PROCEDURE — 3066F NEPHROPATHY DOC TX: CPT | Mod: CPTII,S$GLB,, | Performed by: INTERNAL MEDICINE

## 2021-12-07 PROCEDURE — 1157F ADVNC CARE PLAN IN RCRD: CPT | Mod: CPTII,S$GLB,, | Performed by: INTERNAL MEDICINE

## 2021-12-07 RX ORDER — METHYLPREDNISOLONE 4 MG/1
TABLET ORAL
Qty: 21 EACH | Refills: 0 | Status: SHIPPED | OUTPATIENT
Start: 2021-12-07 | End: 2021-12-28

## 2021-12-07 RX ORDER — LOSARTAN POTASSIUM 50 MG/1
50 TABLET ORAL DAILY
Qty: 90 TABLET | Refills: 3 | Status: ON HOLD | OUTPATIENT
Start: 2021-12-07 | End: 2022-09-08 | Stop reason: SDUPTHER

## 2021-12-08 ENCOUNTER — TELEPHONE (OUTPATIENT)
Dept: ADMINISTRATIVE | Facility: OTHER | Age: 71
End: 2021-12-08
Payer: MEDICARE

## 2021-12-09 ENCOUNTER — TELEPHONE (OUTPATIENT)
Dept: SURGERY | Facility: HOSPITAL | Age: 71
End: 2021-12-09
Payer: MEDICARE

## 2021-12-09 ENCOUNTER — TELEPHONE (OUTPATIENT)
Dept: SURGERY | Facility: CLINIC | Age: 71
End: 2021-12-09
Payer: MEDICARE

## 2021-12-09 ENCOUNTER — HOSPITAL ENCOUNTER (EMERGENCY)
Facility: HOSPITAL | Age: 71
Discharge: HOME | End: 2021-12-10
Attending: EMERGENCY MEDICINE
Payer: MEDICARE

## 2021-12-09 VITALS
RESPIRATION RATE: 18 BRPM | DIASTOLIC BLOOD PRESSURE: 80 MMHG | HEIGHT: 62 IN | SYSTOLIC BLOOD PRESSURE: 133 MMHG | OXYGEN SATURATION: 91 % | TEMPERATURE: 99 F | HEART RATE: 112 BPM | WEIGHT: 130 LBS | BODY MASS INDEX: 23.92 KG/M2

## 2021-12-09 DIAGNOSIS — K83.8 DILATION OF BILIARY TRACT: Primary | ICD-10-CM

## 2021-12-09 DIAGNOSIS — R06.02 SHORTNESS OF BREATH: ICD-10-CM

## 2021-12-09 DIAGNOSIS — R05.9 COUGH: ICD-10-CM

## 2021-12-09 DIAGNOSIS — E11.65 UNCONTROLLED TYPE 2 DIABETES MELLITUS WITH HYPERGLYCEMIA: ICD-10-CM

## 2021-12-09 DIAGNOSIS — R07.81 PLEURITIC CHEST PAIN: ICD-10-CM

## 2021-12-09 LAB
ALBUMIN SERPL BCP-MCNC: 3.8 G/DL (ref 3.5–5.2)
ALP SERPL-CCNC: 166 U/L (ref 55–135)
ALT SERPL W/O P-5'-P-CCNC: 14 U/L (ref 10–44)
ANION GAP SERPL CALC-SCNC: 10 MMOL/L (ref 8–16)
AST SERPL-CCNC: 12 U/L (ref 10–40)
BILIRUB SERPL-MCNC: 0.3 MG/DL (ref 0.1–1)
BNP SERPL-MCNC: <10 PG/ML (ref 0–99)
BUN SERPL-MCNC: 13 MG/DL (ref 8–23)
CALCIUM SERPL-MCNC: 9.2 MG/DL (ref 8.7–10.5)
CHLORIDE SERPL-SCNC: 107 MMOL/L (ref 95–110)
CO2 SERPL-SCNC: 19 MMOL/L (ref 23–29)
CREAT SERPL-MCNC: 1 MG/DL (ref 0.5–1.4)
CTP QC/QA: YES
CTP QC/QA: YES
EST. GFR  (AFRICAN AMERICAN): >60 ML/MIN/1.73 M^2
EST. GFR  (NON AFRICAN AMERICAN): 58 ML/MIN/1.73 M^2
GLUCOSE SERPL-MCNC: 494 MG/DL (ref 70–110)
POC MOLECULAR INFLUENZA A AGN: NEGATIVE
POC MOLECULAR INFLUENZA B AGN: NEGATIVE
POTASSIUM SERPL-SCNC: 4.4 MMOL/L (ref 3.5–5.1)
PROT SERPL-MCNC: 6.8 G/DL (ref 6–8.4)
SARS-COV-2 RDRP RESP QL NAA+PROBE: NEGATIVE
SODIUM SERPL-SCNC: 136 MMOL/L (ref 136–145)

## 2021-12-09 PROCEDURE — 99284 EMERGENCY DEPT VISIT MOD MDM: CPT | Mod: 25

## 2021-12-09 PROCEDURE — 80053 COMPREHEN METABOLIC PANEL: CPT | Performed by: EMERGENCY MEDICINE

## 2021-12-09 PROCEDURE — U0002 COVID-19 LAB TEST NON-CDC: HCPCS | Performed by: EMERGENCY MEDICINE

## 2021-12-09 PROCEDURE — 25000242 PHARM REV CODE 250 ALT 637 W/ HCPCS: Performed by: EMERGENCY MEDICINE

## 2021-12-09 PROCEDURE — 85025 COMPLETE CBC W/AUTO DIFF WBC: CPT | Performed by: EMERGENCY MEDICINE

## 2021-12-09 PROCEDURE — 63600175 PHARM REV CODE 636 W HCPCS: Performed by: EMERGENCY MEDICINE

## 2021-12-09 PROCEDURE — 83880 ASSAY OF NATRIURETIC PEPTIDE: CPT | Performed by: EMERGENCY MEDICINE

## 2021-12-09 PROCEDURE — 96374 THER/PROPH/DIAG INJ IV PUSH: CPT

## 2021-12-09 PROCEDURE — 82962 GLUCOSE BLOOD TEST: CPT

## 2021-12-09 PROCEDURE — 94640 AIRWAY INHALATION TREATMENT: CPT

## 2021-12-09 RX ORDER — IPRATROPIUM BROMIDE AND ALBUTEROL SULFATE 2.5; .5 MG/3ML; MG/3ML
3 SOLUTION RESPIRATORY (INHALATION)
Status: COMPLETED | OUTPATIENT
Start: 2021-12-09 | End: 2021-12-09

## 2021-12-09 RX ADMIN — IPRATROPIUM BROMIDE AND ALBUTEROL SULFATE 3 ML: .5; 3 SOLUTION RESPIRATORY (INHALATION) at 09:12

## 2021-12-09 RX ADMIN — INSULIN HUMAN 10 UNITS: 100 INJECTION, SOLUTION PARENTERAL at 11:12

## 2021-12-10 LAB
ANISOCYTOSIS BLD QL SMEAR: SLIGHT
BASOPHILS # BLD AUTO: 0.02 K/UL (ref 0–0.2)
BASOPHILS NFR BLD: 0.4 % (ref 0–1.9)
DACRYOCYTES BLD QL SMEAR: ABNORMAL
DIFFERENTIAL METHOD: ABNORMAL
EOSINOPHIL # BLD AUTO: 0 K/UL (ref 0–0.5)
EOSINOPHIL NFR BLD: 0 % (ref 0–8)
ERYTHROCYTE [DISTWIDTH] IN BLOOD BY AUTOMATED COUNT: ABNORMAL % (ref 11.5–14.5)
HCT VFR BLD AUTO: 38 % (ref 37–48.5)
HGB BLD-MCNC: 11.7 G/DL (ref 12–16)
HYPOCHROMIA BLD QL SMEAR: ABNORMAL
IMM GRANULOCYTES # BLD AUTO: 0.01 K/UL (ref 0–0.04)
IMM GRANULOCYTES NFR BLD AUTO: 0.2 % (ref 0–0.5)
LYMPHOCYTES # BLD AUTO: 1 K/UL (ref 1–4.8)
LYMPHOCYTES NFR BLD: 21.4 % (ref 18–48)
MCH RBC QN AUTO: 27.6 PG (ref 27–31)
MCHC RBC AUTO-ENTMCNC: 30.8 G/DL (ref 32–36)
MCV RBC AUTO: 90 FL (ref 82–98)
MONOCYTES # BLD AUTO: 0.2 K/UL (ref 0.3–1)
MONOCYTES NFR BLD: 3.3 % (ref 4–15)
NEUTROPHILS # BLD AUTO: 3.3 K/UL (ref 1.8–7.7)
NEUTROPHILS NFR BLD: 74.7 % (ref 38–73)
NRBC BLD-RTO: 0 /100 WBC
PLATELET # BLD AUTO: 244 K/UL (ref 150–450)
PLATELET BLD QL SMEAR: ABNORMAL
PMV BLD AUTO: 10.1 FL (ref 9.2–12.9)
POCT GLUCOSE: 257 MG/DL (ref 70–110)
POCT GLUCOSE: 308 MG/DL (ref 70–110)
POIKILOCYTOSIS BLD QL SMEAR: SLIGHT
RBC # BLD AUTO: 4.24 M/UL (ref 4–5.4)
WBC # BLD AUTO: 4.48 K/UL (ref 3.9–12.7)

## 2021-12-10 RX ORDER — AZITHROMYCIN 250 MG/1
TABLET, FILM COATED ORAL
Qty: 6 TABLET | Refills: 0 | Status: SHIPPED | OUTPATIENT
Start: 2021-12-10 | End: 2021-12-15

## 2021-12-10 RX ORDER — BENZONATATE 200 MG/1
200 CAPSULE ORAL 3 TIMES DAILY PRN
Qty: 30 CAPSULE | Refills: 0 | Status: SHIPPED | OUTPATIENT
Start: 2021-12-10 | End: 2022-05-09

## 2021-12-13 ENCOUNTER — TELEPHONE (OUTPATIENT)
Dept: SURGERY | Facility: CLINIC | Age: 71
End: 2021-12-13
Payer: MEDICARE

## 2021-12-15 ENCOUNTER — HOSPITAL ENCOUNTER (OUTPATIENT)
Dept: RADIOLOGY | Facility: HOSPITAL | Age: 71
Discharge: HOME | End: 2021-12-15
Attending: NURSE PRACTITIONER
Payer: MEDICARE

## 2021-12-15 ENCOUNTER — TELEPHONE (OUTPATIENT)
Dept: OBSTETRICS AND GYNECOLOGY | Facility: CLINIC | Age: 71
End: 2021-12-15
Payer: MEDICARE

## 2021-12-15 DIAGNOSIS — Z12.31 ENCOUNTER FOR SCREENING MAMMOGRAM FOR BREAST CANCER: ICD-10-CM

## 2021-12-15 PROCEDURE — 77067 MAMMO DIGITAL SCREENING BILAT WITH TOMO: ICD-10-PCS | Mod: 26,,, | Performed by: RADIOLOGY

## 2021-12-15 PROCEDURE — 77063 MAMMO DIGITAL SCREENING BILAT WITH TOMO: ICD-10-PCS | Mod: 26,,, | Performed by: RADIOLOGY

## 2021-12-15 PROCEDURE — 77063 BREAST TOMOSYNTHESIS BI: CPT | Mod: 26,,, | Performed by: RADIOLOGY

## 2021-12-15 PROCEDURE — 77067 SCR MAMMO BI INCL CAD: CPT | Mod: TC

## 2021-12-15 PROCEDURE — 77067 SCR MAMMO BI INCL CAD: CPT | Mod: 26,,, | Performed by: RADIOLOGY

## 2021-12-16 ENCOUNTER — OFFICE VISIT (OUTPATIENT)
Dept: INTERNAL MEDICINE | Facility: CLINIC | Age: 71
End: 2021-12-16
Payer: MEDICARE

## 2021-12-16 VITALS
HEIGHT: 62 IN | SYSTOLIC BLOOD PRESSURE: 98 MMHG | DIASTOLIC BLOOD PRESSURE: 64 MMHG | WEIGHT: 140.63 LBS | BODY MASS INDEX: 25.88 KG/M2 | OXYGEN SATURATION: 95 % | HEART RATE: 98 BPM

## 2021-12-16 DIAGNOSIS — E11.59 TYPE 2 DIABETES MELLITUS WITH OTHER CIRCULATORY COMPLICATION, WITH LONG-TERM CURRENT USE OF INSULIN: Primary | ICD-10-CM

## 2021-12-16 DIAGNOSIS — I15.2 HYPERTENSION ASSOCIATED WITH DIABETES: ICD-10-CM

## 2021-12-16 DIAGNOSIS — I25.118 CORONARY ARTERY DISEASE OF NATIVE ARTERY OF NATIVE HEART WITH STABLE ANGINA PECTORIS: ICD-10-CM

## 2021-12-16 DIAGNOSIS — M46.1 SACROILIITIS, NOT ELSEWHERE CLASSIFIED: ICD-10-CM

## 2021-12-16 DIAGNOSIS — Z79.4 TYPE 2 DIABETES MELLITUS WITH OTHER CIRCULATORY COMPLICATION, WITH LONG-TERM CURRENT USE OF INSULIN: Primary | ICD-10-CM

## 2021-12-16 DIAGNOSIS — E11.69 HYPERLIPIDEMIA ASSOCIATED WITH TYPE 2 DIABETES MELLITUS: ICD-10-CM

## 2021-12-16 DIAGNOSIS — E11.59 HYPERTENSION ASSOCIATED WITH DIABETES: ICD-10-CM

## 2021-12-16 DIAGNOSIS — E78.5 HYPERLIPIDEMIA ASSOCIATED WITH TYPE 2 DIABETES MELLITUS: ICD-10-CM

## 2021-12-16 PROCEDURE — 99499 RISK ADDL DX/OHS AUDIT: ICD-10-PCS | Mod: S$GLB,,, | Performed by: INTERNAL MEDICINE

## 2021-12-16 PROCEDURE — 3066F PR DOCUMENTATION OF TREATMENT FOR NEPHROPATHY: ICD-10-PCS | Mod: CPTII,S$GLB,, | Performed by: INTERNAL MEDICINE

## 2021-12-16 PROCEDURE — 1157F PR ADVANCE CARE PLAN OR EQUIV PRESENT IN MEDICAL RECORD: ICD-10-PCS | Mod: CPTII,S$GLB,, | Performed by: INTERNAL MEDICINE

## 2021-12-16 PROCEDURE — 1157F ADVNC CARE PLAN IN RCRD: CPT | Mod: CPTII,S$GLB,, | Performed by: INTERNAL MEDICINE

## 2021-12-16 PROCEDURE — 4010F ACE/ARB THERAPY RXD/TAKEN: CPT | Mod: CPTII,S$GLB,, | Performed by: INTERNAL MEDICINE

## 2021-12-16 PROCEDURE — 99214 OFFICE O/P EST MOD 30 MIN: CPT | Mod: S$GLB,,, | Performed by: INTERNAL MEDICINE

## 2021-12-16 PROCEDURE — 3061F PR NEG MICROALBUMINURIA RESULT DOCUMENTED/REVIEW: ICD-10-PCS | Mod: CPTII,S$GLB,, | Performed by: INTERNAL MEDICINE

## 2021-12-16 PROCEDURE — 99499 UNLISTED E&M SERVICE: CPT | Mod: S$GLB,,, | Performed by: INTERNAL MEDICINE

## 2021-12-16 PROCEDURE — 99214 PR OFFICE/OUTPT VISIT, EST, LEVL IV, 30-39 MIN: ICD-10-PCS | Mod: S$GLB,,, | Performed by: INTERNAL MEDICINE

## 2021-12-16 PROCEDURE — 99999 PR PBB SHADOW E&M-EST. PATIENT-LVL III: ICD-10-PCS | Mod: PBBFAC,,, | Performed by: INTERNAL MEDICINE

## 2021-12-16 PROCEDURE — 3061F NEG MICROALBUMINURIA REV: CPT | Mod: CPTII,S$GLB,, | Performed by: INTERNAL MEDICINE

## 2021-12-16 PROCEDURE — 3066F NEPHROPATHY DOC TX: CPT | Mod: CPTII,S$GLB,, | Performed by: INTERNAL MEDICINE

## 2021-12-16 PROCEDURE — 99999 PR PBB SHADOW E&M-EST. PATIENT-LVL III: CPT | Mod: PBBFAC,,, | Performed by: INTERNAL MEDICINE

## 2021-12-16 PROCEDURE — 4010F PR ACE/ARB THEARPY RXD/TAKEN: ICD-10-PCS | Mod: CPTII,S$GLB,, | Performed by: INTERNAL MEDICINE

## 2021-12-16 RX ORDER — TIZANIDINE 4 MG/1
4 TABLET ORAL 2 TIMES DAILY
Status: ON HOLD | COMMUNITY
Start: 2021-12-07 | End: 2022-05-12 | Stop reason: HOSPADM

## 2021-12-16 RX ORDER — INSULIN LISPRO 100 [IU]/ML
35 INJECTION, SUSPENSION SUBCUTANEOUS 2 TIMES DAILY
Status: ON HOLD | COMMUNITY
Start: 2021-11-22 | End: 2022-05-12 | Stop reason: HOSPADM

## 2021-12-22 ENCOUNTER — TELEPHONE (OUTPATIENT)
Dept: SURGERY | Facility: CLINIC | Age: 71
End: 2021-12-22
Payer: MEDICARE

## 2021-12-27 ENCOUNTER — HOSPITAL ENCOUNTER (OUTPATIENT)
Dept: RADIOLOGY | Facility: HOSPITAL | Age: 71
Discharge: HOME | End: 2021-12-27
Attending: SURGERY
Payer: MEDICARE

## 2021-12-27 ENCOUNTER — TELEPHONE (OUTPATIENT)
Dept: SURGERY | Facility: CLINIC | Age: 71
End: 2021-12-27
Payer: MEDICARE

## 2021-12-27 DIAGNOSIS — K83.8 DILATION OF BILIARY TRACT: ICD-10-CM

## 2021-12-27 PROCEDURE — A9585 GADOBUTROL INJECTION: HCPCS | Performed by: SURGERY

## 2021-12-27 PROCEDURE — 76376 3D RENDER W/INTRP POSTPROCES: CPT | Mod: TC

## 2021-12-27 PROCEDURE — 76376 3D RENDER W/INTRP POSTPROCES: CPT | Mod: 26,,, | Performed by: RADIOLOGY

## 2021-12-27 PROCEDURE — 74183 MRI ABDOMEN WITH AND WO_INC MRCP: ICD-10-PCS | Mod: 26,,, | Performed by: RADIOLOGY

## 2021-12-27 PROCEDURE — 74183 MRI ABD W/O CNTR FLWD CNTR: CPT | Mod: 26,,, | Performed by: RADIOLOGY

## 2021-12-27 PROCEDURE — 76376 MRI ABDOMEN WITH AND WO_INC MRCP: ICD-10-PCS | Mod: 26,,, | Performed by: RADIOLOGY

## 2021-12-27 PROCEDURE — 25500020 PHARM REV CODE 255: Performed by: SURGERY

## 2021-12-27 RX ORDER — GADOBUTROL 604.72 MG/ML
9 INJECTION INTRAVENOUS
Status: COMPLETED | OUTPATIENT
Start: 2021-12-27 | End: 2021-12-27

## 2021-12-27 RX ADMIN — GADOBUTROL 9 ML: 604.72 INJECTION INTRAVENOUS at 01:12

## 2022-01-03 ENCOUNTER — TELEPHONE (OUTPATIENT)
Dept: SURGERY | Facility: CLINIC | Age: 72
End: 2022-01-03
Payer: MEDICARE

## 2022-01-03 NOTE — TELEPHONE ENCOUNTER
----- Message from Marry Galeas sent at 1/3/2022  9:23 AM CST -----  Regarding: waiting for a call to schedule surgery  Type:  Needs Medical Advice    Who Called: pt    Would the patient rather a call back or a response via [facility]? Call    Best Call Back Number: [phone]    Additional Information: inquiry scheduling surgery (hernia removal)           01/03/2022          1400  Attempted to contact patient regarding the above message. No answer. Left voicemail.

## 2022-01-06 ENCOUNTER — PATIENT OUTREACH (OUTPATIENT)
Dept: ADMINISTRATIVE | Facility: OTHER | Age: 72
End: 2022-01-06
Payer: MEDICARE

## 2022-01-06 LAB
LEFT EYE DM RETINOPATHY: NEGATIVE
RIGHT EYE DM RETINOPATHY: NEGATIVE

## 2022-01-06 NOTE — PROGRESS NOTES
Health Maintenance Due   Topic Date Due    Foot Exam  08/26/2020    Eye Exam  10/22/2021    Diabetes Urine Screening  01/05/2022     Updates were requested from care everywhere.  Chart was reviewed for overdue [facility]active [facility] Encounters (RALF) topics (CRS, Breast Cancer Screening, Eye exam)  Health Maintenance has been updated.  LINKS immunization registry triggered.  Immunizations were reconciled.

## 2022-01-07 ENCOUNTER — TELEPHONE (OUTPATIENT)
Dept: SURGERY | Facility: CLINIC | Age: 72
End: 2022-01-07
Payer: MEDICARE

## 2022-01-07 DIAGNOSIS — K86.89 DILATED PANCREATIC DUCT: Primary | ICD-10-CM

## 2022-01-07 NOTE — TELEPHONE ENCOUNTER
I called patient this morning as she stated she would be available to discuss the MRI and surgery scheduling, but once again she did not answer and a voicemail was left.  I will leave it her hands in terms of the hernia surgery.  If she would to have it repaired she will need to contact us.  I will refer her to GI for possible EUS re: dilated pancreatic duct on MRI.

## 2022-01-13 ENCOUNTER — TELEPHONE (OUTPATIENT)
Dept: SURGERY | Facility: CLINIC | Age: 72
End: 2022-01-13
Payer: MEDICARE

## 2022-01-13 NOTE — TELEPHONE ENCOUNTER
----- Message from Janessa Garcia sent at 1/13/2022 11:19 AM CST -----  Contact: pt  Type:  Needs Medical Advice    Who Called: pt  Symptoms (please be specific):    How long has patient had these symptoms:    Pharmacy name and phone #:    Would the patient rather a call back or a response via [facility]? call  Best Call Back Number: [phone]  Additional Information: pt is wanting to talk to office about sched her hernia surgery. She said she is answering her all her spam calls. She has appt @1 :00 and will be available after 2:00.

## 2022-01-21 ENCOUNTER — OFFICE VISIT (OUTPATIENT)
Dept: SURGERY | Facility: CLINIC | Age: 72
End: 2022-01-21
Payer: MEDICARE

## 2022-01-21 VITALS
HEIGHT: 62 IN | BODY MASS INDEX: 24.65 KG/M2 | DIASTOLIC BLOOD PRESSURE: 96 MMHG | HEART RATE: 88 BPM | SYSTOLIC BLOOD PRESSURE: 155 MMHG | WEIGHT: 133.94 LBS

## 2022-01-21 DIAGNOSIS — K43.2 INCISIONAL HERNIA, WITHOUT OBSTRUCTION OR GANGRENE: Primary | ICD-10-CM

## 2022-01-21 DIAGNOSIS — Z01.818 PRE-OP TESTING: ICD-10-CM

## 2022-01-21 DIAGNOSIS — K43.2 INCISIONAL HERNIA: ICD-10-CM

## 2022-01-21 PROCEDURE — 3080F DIAST BP >= 90 MM HG: CPT | Mod: CPTII,S$GLB,, | Performed by: SURGERY

## 2022-01-21 PROCEDURE — 99213 OFFICE O/P EST LOW 20 MIN: CPT | Mod: S$GLB,,, | Performed by: SURGERY

## 2022-01-21 PROCEDURE — 99213 PR OFFICE/OUTPT VISIT, EST, LEVL III, 20-29 MIN: ICD-10-PCS | Mod: S$GLB,,, | Performed by: SURGERY

## 2022-01-21 PROCEDURE — 3077F SYST BP >= 140 MM HG: CPT | Mod: CPTII,S$GLB,, | Performed by: SURGERY

## 2022-01-21 PROCEDURE — 1159F MED LIST DOCD IN RCRD: CPT | Mod: CPTII,S$GLB,, | Performed by: SURGERY

## 2022-01-21 PROCEDURE — 99999 PR PBB SHADOW E&M-EST. PATIENT-LVL V: CPT | Mod: PBBFAC,,, | Performed by: SURGERY

## 2022-01-21 PROCEDURE — 1159F PR MEDICATION LIST DOCUMENTED IN MEDICAL RECORD: ICD-10-PCS | Mod: CPTII,S$GLB,, | Performed by: SURGERY

## 2022-01-21 PROCEDURE — 1160F PR REVIEW ALL MEDS BY PRESCRIBER/CLIN PHARMACIST DOCUMENTED: ICD-10-PCS | Mod: CPTII,S$GLB,, | Performed by: SURGERY

## 2022-01-21 PROCEDURE — 1126F PR PAIN SEVERITY QUANTIFIED, NO PAIN PRESENT: ICD-10-PCS | Mod: CPTII,S$GLB,, | Performed by: SURGERY

## 2022-01-21 PROCEDURE — 3077F PR MOST RECENT SYSTOLIC BLOOD PRESSURE >= 140 MM HG: ICD-10-PCS | Mod: CPTII,S$GLB,, | Performed by: SURGERY

## 2022-01-21 PROCEDURE — 3080F PR MOST RECENT DIASTOLIC BLOOD PRESSURE >= 90 MM HG: ICD-10-PCS | Mod: CPTII,S$GLB,, | Performed by: SURGERY

## 2022-01-21 PROCEDURE — 3008F BODY MASS INDEX DOCD: CPT | Mod: CPTII,S$GLB,, | Performed by: SURGERY

## 2022-01-21 PROCEDURE — 3008F PR BODY MASS INDEX (BMI) DOCUMENTED: ICD-10-PCS | Mod: CPTII,S$GLB,, | Performed by: SURGERY

## 2022-01-21 PROCEDURE — 1126F AMNT PAIN NOTED NONE PRSNT: CPT | Mod: CPTII,S$GLB,, | Performed by: SURGERY

## 2022-01-21 PROCEDURE — 1160F RVW MEDS BY RX/DR IN RCRD: CPT | Mod: CPTII,S$GLB,, | Performed by: SURGERY

## 2022-01-21 PROCEDURE — 99999 PR PBB SHADOW E&M-EST. PATIENT-LVL V: ICD-10-PCS | Mod: PBBFAC,,, | Performed by: SURGERY

## 2022-01-21 PROCEDURE — 1157F ADVNC CARE PLAN IN RCRD: CPT | Mod: CPTII,S$GLB,, | Performed by: SURGERY

## 2022-01-21 PROCEDURE — 1157F PR ADVANCE CARE PLAN OR EQUIV PRESENT IN MEDICAL RECORD: ICD-10-PCS | Mod: CPTII,S$GLB,, | Performed by: SURGERY

## 2022-01-21 RX ORDER — SODIUM CHLORIDE 9 MG/ML
INJECTION, SOLUTION INTRAVENOUS CONTINUOUS
Status: CANCELLED | OUTPATIENT
Start: 2022-01-21

## 2022-01-21 NOTE — PROGRESS NOTES
[facility]  OUTPATIENT Follow-Up        HPI: Chayito Chung is a 71 y.o. female.  MRI noted.  No biliary or pancreatic lesions.  Pt wants hernia surgery.     I have reviewed the patient's chart including prior progress notes, procedures and testing.     ROS:   Review of Systems   All other systems reviewed and are negative.      PROBLEM LIST:  Patient Active Problem List   Diagnosis    Coronary artery disease of native artery of native heart with stable angina pectoris    Diabetes mellitus    COPD (chronic obstructive pulmonary disease)    Tobacco abuse, 1ppd, 50 years    Major depressive disorder in partial remission    Solitary pulmonary nodule s/p resection 4/2012    Neuropathy    Hypoglycemia associated with type 2 diabetes mellitus    Adrenal nodule    Hypertension associated with diabetes    Hyperlipidemia associated with type 2 diabetes mellitus    S/P PTCA (percutaneous transluminal coronary angioplasty)    Lumbar facet arthropathy    Sinus tachycardia    Iron deficiency anemia    Steroid-induced gastritis    Chronic diastolic congestive heart failure    History of colon cancer    Vitamin D deficiency    Osteopenia of lumbar spine    Biloma following surgery    History of partial gastrectomy    Seizure disorder    Lower extremity weakness    Impaired ambulation    Cervical myelopathy    Anxiety    Type 2 diabetes mellitus, with long-term current use of insulin    Hypertension    Migraine headache    CAD (coronary artery disease)    Aortic atherosclerosis    Sacroiliitis, not elsewhere classified         HISTORY  Past Medical History:   Diagnosis Date    Acute coronary syndrome     Acute hypoxemic respiratory failure 5/1/2017    Anxiety     Asthma     Cancer     colon    Cataracts, both eyes     Chest pain at rest     Chest pain of uncertain etiology 12/6/2015    Colon cancer 1988    COPD (chronic obstructive pulmonary disease)     Coronary artery  disease     Depression     Diabetes mellitus     Dyspnea on exertion 11/15/2018    Elevated brain natriuretic peptide (BNP) level 11/15/2018    Elevated cholesterol     Hypertension     Swelling     Syncope and collapse 2016       Past Surgical History:   Procedure Laterality Date    ABDOMINAL SURGERY      BREAST BIOPSY      benign unsure what side     SECTION, CLASSIC      COLON SURGERY      COLONOSCOPY  2019    repeat in 5 years    CORONARY ANGIOPLASTY WITH STENT PLACEMENT  3 months ago     x2, Hysterectomy, Lung surgery, Partial stomach removed, Part of colon removed for rectal cancer    GASTRECTOMY      HEMORRHOID SURGERY      HYSTERECTOMY      @28yrs of age    LUNG BIOPSY      OOPHORECTOMY      @28yrs of age    POSTERIOR FUSION OF CERVICAL SPINE WITH LAMINECTOMY N/A 2020    Procedure: LAMINECTOMY, SPINE, CERVICAL, WITH POSTERIOR FUSION C3-T1 ;  Surgeon: Herson Tate MD;  Location: [facility] OR [address] [place] [address];  Service: Neurosurgery;  Laterality: N/A;       Social History     Tobacco Use    Smoking status: Current Every Day Smoker     Packs/day: 0.50     Years: 53.00     Pack years: 26.50     Types: Cigarettes     Start date:     Smokeless tobacco: Never Used    Tobacco comment: Pt is currently enrolled in the [employer] DX.  Ambulatory referral to Smoking Cessation program.    Substance Use Topics    Alcohol use: Yes     Alcohol/week: 3.0 standard drinks     Types: 3 Glasses of wine per week     Comment: 1 every 3 months    Drug use: No       Family History   Problem Relation Age of Onset    Cancer Mother     Diabetes Mother     Hypertension Mother     Breast cancer Mother     Heart disease Father     Lung cancer Father     Depression Sister     Hypertension Sister     Diabetes Mellitus Sister     Cancer Maternal Aunt          MEDS:  Current Outpatient Medications on File Prior to Visit   Medication Sig Dispense Refill    albuterol  "(PROVENTIL/VENTOLIN HFA) 90 mcg/actuation inhaler INHALE 2 PUFFS BY MOUTH INTO THE LUNGS EVERY 6 HOURS AS NEEDED FOR WHEEZING 25.5 g 0    alendronate (FOSAMAX) 70 MG tablet Take 1 tablet (70 mg total) by mouth every 7 days. 12 tablet 3    aspirin (ECOTRIN) 81 MG EC tablet Take 81 mg by mouth once daily.      BD ULTRA-FINE MINI PEN NEEDLE 31 gauge x 3/16" Ndle       benzonatate (TESSALON) 200 MG capsule Take 1 capsule (200 mg total) by mouth 3 (three) times daily as needed for Cough. 30 capsule 0    diclofenac sodium (VOLTAREN) 1 % Gel Apply 2 g topically 4 (four) times daily. 100 g 0    DULoxetine (CYMBALTA) 30 MG capsule Take 30 mg by mouth once daily.      duloxetine (CYMBALTA) 60 MG capsule Take 90 mg by mouth once daily.       ferrous sulfate (FEOSOL) 325 mg (65 mg iron) Tab tablet Take 1 tablet (325 mg total) by mouth once daily. 30 tablet 3    fluticasone propionate (FLONASE) 50 mcg/actuation nasal spray 2 sprays (100 mcg total) by Each Nare route once daily. 1 Bottle 12    HYDROcodone-acetaminophen (NORCO)  mg per tablet Take 1 tablet by mouth every 6 (six) hours as needed for Pain. 40 tablet 0    insulin lispro protamin-lispro 100 unit/mL (75-25) InPn Inject into the skin.      losartan (COZAAR) 50 MG tablet Take 1 tablet (50 mg total) by mouth once daily. 90 tablet 3    metFORMIN (GLUCOPHAGE) 1000 MG tablet Take 1,000 mg by mouth 2 (two) times daily.      nitroGLYCERIN (NITROSTAT) 0.4 MG SL tablet Place 1 tablet (0.4 mg total) under the tongue every 5 (five) minutes as needed for Chest pain. 100 tablet 1    ondansetron (ZOFRAN-ODT) 4 MG TbDL Take 1 tablet (4 mg total) by mouth every 8 (eight) hours as needed (nausea). 14 tablet 1    potassium chloride SA (K-DUR,KLOR-CON) 20 MEQ tablet TAKE 1 TABLET(20 MEQ) BY MOUTH EVERY DAY 90 tablet 0    pravastatin (PRAVACHOL) 40 MG tablet Take 0.5 tablets (20 mg total) by mouth once daily. 90 tablet 1    pregabalin (LYRICA) 150 MG capsule Take " 150 mg by mouth 2 (two) times daily as needed.      quetiapine (SEROQUEL) 300 MG Tab 300 mg nightly.       tiZANidine (ZANAFLEX) 4 MG tablet       topiramate (TOPAMAX) 50 MG tablet Take 50 mg by mouth 2 (two) times daily.      TRELEGY ELLIPTA 200-62.5-25 mcg inhaler       verapamiL (CALAN-SR) 180 MG CR tablet Take 1 tablet (180 mg total) by mouth every evening. 30 tablet 11    zonisamide (ZONEGRAN) 100 MG Cap Take by mouth.      albuterol-ipratropium (DUO-NEB) 2.5 mg-0.5 mg/3 mL nebulizer solution Take 3 mLs by nebulization every 4 (four) hours as needed for Wheezing or Shortness of Breath. Rescue 1 Box 0    cholestyramine, with sugar, 4 gram Powd Take 4 g by mouth 2 (two) times daily as needed (diarrhea). (Patient not taking: No sig reported) 378 g 0    hydroCHLOROthiazide (MICROZIDE) 12.5 mg capsule Take 1 capsule (12.5 mg total) by mouth once daily. (Patient not taking: Reported on 1/21/2022) 90 capsule 3    LORazepam (ATIVAN) 0.5 MG tablet Take 0.5 mg by mouth every 6 (six) hours as needed for Anxiety.      magnesium oxide (MAG-OX) 400 mg (241.3 mg magnesium) tablet Take 400 mg by mouth once daily.      nicotine (NICODERM CQ) 21 mg/24 hr Place 1 patch onto the skin once daily. (Patient not taking: Reported on 1/21/2022) 28 patch 0    nicotine, polacrilex, 2 mg lzmn Take 1 lozenge (2 mg total) by mouth as needed (as needed). Dispense MINIs please. DO NOT CHEW UP. Can take 1-2 per hour as needed in place of a cigarette. (Patient not taking: Reported on 1/21/2022) 81 each 0    varenicline (CHANTIX STARTING MONTH BOX) 0.5 mg (11)- 1 mg (42) tablet Follow package directions (Patient not taking: No sig reported) 53 tablet 0    varenicline (CHANTIX) 1 mg Tab Take 1 tablet (1 mg total) by mouth once daily. CX (Patient not taking: No sig reported) 56 tablet 0     No current facility-administered medications on file prior to visit.       ALLERGIES:  Review of patient's allergies indicates:  No Known  Allergies      VITALS:  Vitals:    01/21/22 0923   BP: (!) 155/96   Pulse: 88         PHYSICAL EXAM:  Physical Exam  Constitutional:       Appearance: Normal appearance.   Cardiovascular:      Pulses: Normal pulses.   Abdominal:      Palpations: Abdomen is soft.      Hernia: A hernia is present.   Neurological:      Mental Status: Mental status is at baseline.   Psychiatric:         Mood and Affect: Mood normal.         Behavior: Behavior normal.           LABS:  Lab Results   Component Value Date    WBC 4.48 12/09/2021    RBC 4.24 12/09/2021    HGB 11.7 (L) 12/09/2021    HGB 9.7 (L) 06/10/2013    HCT 38.0 12/09/2021     12/09/2021     Lab Results   Component Value Date     (HH) 12/09/2021     12/09/2021    K 4.4 12/09/2021     12/09/2021    CO2 19 (L) 12/09/2021    BUN 13 12/09/2021    CREATININE 1.0 12/09/2021    CREATININE 0.8 06/10/2013    CALCIUM 9.2 12/09/2021    CALCIUM 8.8 06/10/2013     Lab Results   Component Value Date    ALT 14 12/09/2021    AST 12 12/09/2021    ALKPHOS 166 (H) 12/09/2021    BILITOT 0.3 12/09/2021     Lab Results   Component Value Date    MG 1.7 07/09/2021    PHOS 2.9 07/09/2021       STUDIES:  MRI, CT images and reports were personally reviewed.        ASSESSMENT & PLAN:  71 y.o. female with incisional hernia.  Will plan for open repair. Risks, benefits, and alternatives to the procedure were explained to the patient in detail.  All questions were answered and the patient has requested the procedure be done.  Informed consent was obtained.        Simon King M.D., F.A.C.S.  [facility]  [facility]

## 2022-01-26 ENCOUNTER — PATIENT OUTREACH (OUTPATIENT)
Dept: ADMINISTRATIVE | Facility: HOSPITAL | Age: 72
End: 2022-01-26
Payer: MEDICARE

## 2022-01-26 NOTE — PROGRESS NOTES
Chart audit performed, Immunizations /Care everywhere triggered updated-  updated DM Eye Exam 2022

## 2022-01-28 ENCOUNTER — TELEPHONE (OUTPATIENT)
Dept: OTOLARYNGOLOGY | Facility: CLINIC | Age: 72
End: 2022-01-28
Payer: MEDICARE

## 2022-01-28 NOTE — TELEPHONE ENCOUNTER
Attempted to call patient to schedule covid test 72hrs prior to upcoming appointment with Dr Arzate/Kb. Patient did not answer and a message was left to return my call to get this set up.

## 2022-02-03 ENCOUNTER — TELEPHONE (OUTPATIENT)
Dept: SURGERY | Facility: CLINIC | Age: 72
End: 2022-02-03
Payer: MEDICARE

## 2022-02-03 NOTE — TELEPHONE ENCOUNTER
----- Message from Marry Galeas sent at 2/3/2022 10:54 AM CST -----  Regarding: Call Back  Type:  Patient Returning Call    Who Called: pt    Does the patient know what this is regarding?: upcoming procedure    Would the patient rather a call back or a response via [email or web address]? Call    Best Call Back Number: [phone]           02/03/2022              1331  Spoke to patient regarding the above message. Confirmed her COVID and pre-op appointments for tomorrow. Let her know that they would address any and all her concerns at her pre-op visit. Patient verbalized understanding.

## 2022-02-03 NOTE — TELEPHONE ENCOUNTER
----- Message from Yuly Bedolla sent at 2/3/2022  8:10 AM CST -----  Contact: [phone]  Who Called: PT  Regarding: procedure questions and home health    Would the patient rather a call back or a response via [facility]? Call back  Best Call Back Number: [phone]  Additional Information:             02/03/2022             1046  Attempted to contact patient regarding the above message. No answer. Left voicemail.

## 2022-02-04 ENCOUNTER — TELEPHONE (OUTPATIENT)
Dept: SURGERY | Facility: CLINIC | Age: 72
End: 2022-02-04
Payer: MEDICARE

## 2022-02-04 ENCOUNTER — TELEPHONE (OUTPATIENT)
Dept: PREADMISSION TESTING | Facility: HOSPITAL | Age: 72
End: 2022-02-04
Payer: MEDICARE

## 2022-02-04 ENCOUNTER — HOSPITAL ENCOUNTER (OUTPATIENT)
Dept: PREADMISSION TESTING | Facility: HOSPITAL | Age: 72
Discharge: HOME | End: 2022-02-04
Attending: SURGERY
Payer: MEDICARE

## 2022-02-04 ENCOUNTER — ANESTHESIA EVENT (OUTPATIENT)
Dept: SURGERY | Facility: HOSPITAL | Age: 72
End: 2022-02-04
Payer: MEDICARE

## 2022-02-04 ENCOUNTER — LAB VISIT (OUTPATIENT)
Dept: FAMILY MEDICINE | Facility: CLINIC | Age: 72
End: 2022-02-04
Payer: MEDICARE

## 2022-02-04 VITALS
BODY MASS INDEX: 26.09 KG/M2 | DIASTOLIC BLOOD PRESSURE: 95 MMHG | SYSTOLIC BLOOD PRESSURE: 145 MMHG | OXYGEN SATURATION: 98 % | RESPIRATION RATE: 16 BRPM | TEMPERATURE: 97 F | HEIGHT: 62 IN | HEART RATE: 96 BPM | WEIGHT: 141.75 LBS

## 2022-02-04 DIAGNOSIS — Z01.818 PRE-OP TESTING: ICD-10-CM

## 2022-02-04 DIAGNOSIS — Z01.818 PREOP TESTING: Primary | ICD-10-CM

## 2022-02-04 PROCEDURE — U0005 INFEC AGEN DETEC AMPLI PROBE: HCPCS | Performed by: SURGERY

## 2022-02-04 PROCEDURE — U0003 INFECTIOUS AGENT DETECTION BY NUCLEIC ACID (DNA OR RNA); SEVERE ACUTE RESPIRATORY SYNDROME CORONAVIRUS 2 (SARS-COV-2) (CORONAVIRUS DISEASE [COVID-19]), AMPLIFIED PROBE TECHNIQUE, MAKING USE OF HIGH THROUGHPUT TECHNOLOGIES AS DESCRIBED BY CMS-2020-01-R: HCPCS | Performed by: SURGERY

## 2022-02-04 RX ORDER — LIDOCAINE HYDROCHLORIDE 10 MG/ML
1 INJECTION, SOLUTION EPIDURAL; INFILTRATION; INTRACAUDAL; PERINEURAL ONCE
Status: CANCELLED | OUTPATIENT
Start: 2022-02-04 | End: 2022-02-04

## 2022-02-04 RX ORDER — SODIUM CHLORIDE, SODIUM LACTATE, POTASSIUM CHLORIDE, CALCIUM CHLORIDE 600; 310; 30; 20 MG/100ML; MG/100ML; MG/100ML; MG/100ML
INJECTION, SOLUTION INTRAVENOUS CONTINUOUS
Status: CANCELLED | OUTPATIENT
Start: 2022-02-04

## 2022-02-04 NOTE — DISCHARGE INSTRUCTIONS
Your surgery is scheduled for 2/7/22 .    Please report to Hospital Front Lobby on the 1st Floor at 0830 a.m.    THIS TIME IS SUBJECT TO CHANGE.  YOU WILL RECEIVE A PHONE CALL THE DAY BEFORE SURGERY BY 3:30 PM TO CONFIRM YOUR TIME OF ARRIVAL.  IF YOU HAVE NOT RECEIVED A PHONE CALL BY 3:30 PM THE DAY BEFORE YOUR SURGERY PLEASE CALL [phone]     INSTRUCTIONS IMPORTANT!!!  ¨ Do not eat or drink after 12 midnight-including water, candy, gum, & mints. OK to brush teeth.      ____  Proceed to [facility] on *** for additional testing.        ____  Do not wear makeup, including mascara.  ____  No powder, lotions or creams to surgical area.  ____  Please remove all jewelry, including piercings and leave at home.  ____  No money or valuables needed. Please leave at home.  ____  Please bring any documents given by your doctor.  ____  If going home the same day, arrange for a ride home. You will not be able to             drive if Anesthesia was used.  ____  Children under 18 years require a parent / guardian present the entire time             they are in surgery / recovery.  ____  Wear loose fitting clothing. Allow for dressings, bandages.  ____  Stop Aspirin, Ibuprofen, Motrin, Aleve, Goody's/BC powders, Excedrine and Naproxen (NSAIDS) at least 3-5 days before surgery, unless otherwise instructed by your doctor, or the nurse.   You MAY use Tylenol/acetaminophen until day of surgery.  ____  Wash the surgical area with Hibiclens the night before surgery, and again the             morning of surgery.  Be sure to rinse hibiclens off completely (if instructed by   nurse).  ____  If you take diabetic medication, do not take am of surgery unless instructed by Doctor.  ____  Call MD for temperature above 101 degrees or any other signs of infection such as Urinary (bladder) infection, Upper respiratory infection, skin boils, etc.  ____ Stop taking any Fish Oil supplement or any Vitamins that contain Vitamin E at  least 5 days prior to surgery.  ____ Do Not wear your contact lenses the day of your procedure.  You may wear your glasses.      ____Do not shave surgical site for 3 days prior to surgery.  ____ Practice Good hand washing before, during, and after procedure.      I have read or had read and explained to me, and understand the above information.  Additional comments or instructions:  For additional questions call [phone]      ANESTHESIA SIDE EFFECTS  -For the first 24 hours after surgery:  Do not drive, use heavy equipment, make important decisions, or drink alcohol  -It is normal to feel sleepy for several hours.  Rest until you are more awake.  -Have someone stay with you, if needed.  They can watch for problems and help keep you safe.  -Some possible post anesthesia side effects include: nausea and vomiting, sore throat and hoarseness, sleepiness, and dizziness.        Pre-Op Bathing Instructions    Before surgery, you can play an important role in your own health.    Because skin is not sterile, we need to be sure that your skin is as free of germs as possible. By following the instructions below, you can reduce the number of germs on your skin before surgery.    IMPORTANT: You will need to shower with a special soap called Hibiclens*, available at any pharmacy.  If you are allergic to Chlorhexidine (the antiseptic in Hibiclens), use an antibacterial soap such as Dial Soap for your preoperative shower.  You will shower with Hibiclens both the night before your surgery and the morning of your surgery.  Do not use Hibiclens on the head, face or genitals to avoid injury to those areas.    STEP #1: THE NIGHT BEFORE YOUR SURGERY     1. Do not shave the area of your body where your surgery will be performed.  2. Shower and wash your hair and body as usual with your normal soap and shampoo.  3. Rinse your hair and body thoroughly after you shower to remove all soap residue.  4. With your hand, apply one packet of  Hibiclens soap to the surgical site.   5. Wash the site gently for five (5) minutes. Do not scrub your skin too hard.   6. Do not wash with your regular soap after Hibiclens is used.  7. Rinse your body thoroughly.  8. Pat yourself dry with a clean, soft towel.  9. Do not use lotion, cream, or powder.  10. Wear clean clothes.    STEP #2: THE MORNING OF YOUR SURGERY     1. Repeat Step #1.    * Not to be used by people allergic to Chlorhexidine.    Patient Education       Abdominal Hernia Repair, Laparoscopic Surgery   Why is this procedure done?   The belly wall covers the center of your body. It keeps your stomach and other body organs in place. Sometimes, part of your belly wall may become weak. This may cause organs to bulge or swell through the weak wall and get stuck. A hernia repair surgery fixes this weakness in the wall. Then, your organs stay in place.     What will the results be?   The belly wall is fixed and the hernia will be gone. Organs are less likely to get stuck in the belly.  What happens before the procedure?   · Your doctor will ask you about your health history. Talk to the doctor about:  ? All the drugs you are taking. Be sure to include all prescription and over-the-counter (OTC) drugs, and herbal supplements. Tell the doctor about any drug allergy. Bring a list of drugs you take with you.  ? Any bleeding problems. Be sure to tell your doctor if you are taking any drugs that may cause bleeding. Some of these are warfarin, rivaroxaban, apixaban, ticagrelor, clopidogrel, ketorolac, ibuprofen, naproxen, or aspirin. Certain vitamins and herbs, such as garlic and fish oil, may also add to the risk for bleeding. You may need to stop these drugs as well. Talk to your doctor about them.  ? If you need to stop eating or drinking before your procedure.  · Your doctor will do an exam and may order:  ? Lab tests  ? ECG  ? CAT scan  · You will not be allowed to drive right away after the procedure. Ask a  family member or a friend to drive you home.  · Take a bath before the procedure. You may be asked to use special soap or wipes to wash your belly. This will help to prevent infection.  What happens during the procedure?   · Once you are in the operating room, the staff will put an IV in your arm to give you fluids and drugs. You will be given a drug to make you sleepy. It will also help you stay pain free during the surgery.  · This surgery can be done a few ways:  ? Laparoscopic Surgery  § Your doctor will make 3 to 4 small cuts in your belly. The doctor will put a scope with a tiny camera in one of the small cuts to look at the hernia. Your doctor will put small surgical tools into the holes to do the procedure.  ? Robotic Surgery  § Your doctor will make 3 to 4 small cuts in your belly.  § A scope with a tiny camera is put through one of the small cuts. Your doctor will put small surgical tools into other holes to do the procedure.  § All the tools will be joined to one of the robotic arms. This will let the robot grasp, cut, dissect, and sew.  § Your doctor will sit at a computer while viewing the pictures on the screen. Your doctor will use a joystick-like hand control and a foot pedal to help guide the tools.  § Another doctor will stay next to you to move the tools if needed.  § Your doctor will guide the robot to do the surgery.  · The doctor moves the organs back into place. The muscles around the hernia will be sewn back into place. This will fix the hole or weakened part. If the hernia is large your doctor may use mesh to repair the hole. This may help keep the hernia from happening again.  · The doctor puts gas into your belly to see the hernia and the organs in the belly.  · Your doctor will close your cuts and cover them with clean bandages.  · Sometimes the doctor puts numbing drugs into your belly after the surgery so your pain is less.  · The procedure most often takes less than 2 hours.  What  happens after the procedure?   · You will go to the Recovery Room after the surgery. The staff will watch you closely. Your doctor may give you drugs for pain.  · You can usually go home after your surgery. In some cases, you may need to stay in the hospital overnight.  What lifestyle changes are needed?   · Eat foods high in fiber and keep a healthy weight.  · Learn an exercise program to strengthen the muscles on the belly area. This may lower the chance of your hernia coming back.  · Avoid heavy lifting after your surgery so that the hernia does not come back.  What drugs may be needed?   The doctor may order drugs to:  · Help with pain  · Fight an infection  · Soften stools  What problems could happen?   · Infection  · Bleeding  · Scarring  · Bowel blockage  · Pneumonia  · Blood clots in the legs  Where can I learn more?   American Academy of Family Physicians  https://familydoctor.org/condition/hernia/   American College of Surgeons  https://www.facs.org/-/media/files/education/patient-ed/groin_hernia.ashx   NHS Choices  https://www.nhs.uk/conditions/hernia   Last Reviewed Date   2021-09-28  Consumer Information Use and Disclaimer   This information is not specific medical advice and does not replace information you receive from your health care provider. This is only a brief summary of general information. It does NOT include all information about conditions, illnesses, injuries, tests, procedures, treatments, therapies, discharge instructions or life-style choices that may apply to you. You must talk with your health care provider for complete information about your health and treatment options. This information should not be used to decide whether or not to accept your health care providers advice, instructions or recommendations. Only your health care provider has the knowledge and training to provide advice that is right for you.  Copyright   Copyright © 2021 [employer], [employer] and its affiliates and/or  licensors. All rights reserved.

## 2022-02-04 NOTE — ANESTHESIA PREPROCEDURE EVALUATION
2022  Chayito Chung is a 71 y.o., female scheduled for REPAIR, HERNIA, INCISIONAL, INCARCERATED, WITHOUT HISTORY OF PRIOR REPAIR 22.      Past Medical History:   Diagnosis Date    Acute coronary syndrome     Acute hypoxemic respiratory failure 2017    Anxiety     Asthma     Cancer     colon    Cataracts, both eyes     Chest pain at rest     Chest pain of uncertain etiology 2015    Colon cancer 1988    COPD (chronic obstructive pulmonary disease)     Coronary artery disease     Depression     Diabetes mellitus     Dyspnea on exertion 11/15/2018    Elevated brain natriuretic peptide (BNP) level 11/15/2018    Elevated cholesterol     Hypertension     Swelling     Syncope and collapse 2016     Past Surgical History:   Procedure Laterality Date    ABDOMINAL SURGERY      BREAST BIOPSY      benign unsure what side     SECTION, CLASSIC      COLON SURGERY      COLONOSCOPY  2019    repeat in 5 years    CORONARY ANGIOPLASTY WITH STENT PLACEMENT  3 months ago     x2, Hysterectomy, Lung surgery, Partial stomach removed, Part of colon removed for rectal cancer    GASTRECTOMY      HEMORRHOID SURGERY      HYSTERECTOMY      @28yrs of age    LUNG BIOPSY      OOPHORECTOMY      @28yrs of age    POSTERIOR FUSION OF CERVICAL SPINE WITH LAMINECTOMY N/A 2020    Procedure: LAMINECTOMY, SPINE, CERVICAL, WITH POSTERIOR FUSION C3-T1 ;  Surgeon: Herson Tate MD;  Location: [facility] OR [address] [place] [address];  Service: Neurosurgery;  Laterality: N/A;     Anesthesia Evaluation    I have reviewed the Patient Summary Reports.    I have reviewed the Nursing Notes.    I have reviewed the Medications.     Review of Systems  Anesthesia Hx:  No problems with previous Anesthesia Denies Hx of Anesthetic complications  History of prior surgery of interest to airway management or  planning:  Denies Personal Hx of Anesthesia complications.   Social:  Smoker, No Alcohol Use    Hematology/Oncology:         -- Cancer in past history (colon): Other (see Oncology comments)   Cardiovascular:   Exercise tolerance: poor Denies Pacemaker. Hypertension, well controlled CAD   CABG/stent  Angina CHF hyperlipidemia GARAY    Pulmonary:   COPD, moderate Asthma moderate Shortness of breath    Renal/:  Renal/ Normal     Hepatic/GI:  Hepatic/GI Normal    Neurological:   Headaches Seizures (about 1 year ago), well controlled    Endocrine:   Diabetes, type 2, using insulin    Psych:   depression          Physical Exam  General:  Well nourished    Airway/Jaw/Neck:  Airway Findings: Mouth Opening: Normal Tongue: Normal  General Airway Assessment: Adult  Mallampati: I  TM Distance: Normal, at least 6 cm  Jaw/Neck Findings:  Neck ROM: Extension Decreased, Severe      Dental:  Dental Findings: (no teeth top and bottom) Edentulous   Chest/Lungs:  Chest/Lungs Findings: Clear to auscultation, Normal Respiratory Rate     Heart/Vascular:  Heart Findings: Rate: Normal  Rhythm: Regular Rhythm  Sounds: Normal        Mental Status:  Mental Status Findings:  Cooperative, Alert and Oriented       EKG 11/2/21  Sinus tachycardia   Nonspecific ST and T wave abnormality   Abnormal ECG   When compared with ECG of 02-MAR-2021 13:19,   No significant change was found   Confirmed by Kimo MONROE Gaurav ([id number]) on 11/3/2021 3:02:04 PM       Anesthesia Plan  Type of Anesthesia, risks & benefits discussed:  Anesthesia Type:  general    Patient's Preference:   Plan Factors:          Intra-op Monitoring Plan: standard ASA monitors  Intra-op Monitoring Plan Comments:   Post Op Pain Control Plan: multimodal analgesia and per primary service following discharge from PACU  Post Op Pain Control Plan Comments:     Induction:   IV  Beta Blocker:         Informed Consent: Patient understands risks and agrees with Anesthesia plan.  Questions  answered. Anesthesia consent signed with patient.  ASA Score: 3     Day of Surgery Review of History & Physical:    H&P update referred to the surgeon.     Anesthesia Plan Notes: Anesthesia consent to be signed prior to surgery 2/7/22          Ready For Surgery From Anesthesia Perspective.

## 2022-02-04 NOTE — TELEPHONE ENCOUNTER
----- Message from Liliana Paredes MA sent at 2/4/2022  1:57 PM CST -----  Per Dr. King it is all in her chart. Her cardiologist and pulmonologist both cleared her.   ----- Message -----  From: Lashon Elias LPN  Sent: 2/4/2022   1:05 PM CST  To: Fernando Cardoza we saw her in pre admit and Dr Urbano saw the patient on 2/3/22. Do you by chance have clearance for her? Thanks Malia

## 2022-02-04 NOTE — PRE-PROCEDURE INSTRUCTIONS
Jeanmarie Chung [phone]    Allergies, medical, surgical, family and psychosocial histories reviewed with patient. Periop plan of care reviewed. Preop instructions given, including medications to take and to hold. Hibiclens soap and instructions on use given. Time allotted for questions to be addressed.  Patient verbalized understanding.

## 2022-02-04 NOTE — TELEPHONE ENCOUNTER
----- Message from Hattie Dalton sent at 2/4/2022  1:58 PM CST -----  Contact: [phone]  Type:  Needs Medical Advice    Who Called: Pt   Reason for call:regarding speaking with the nurse regarding if the pt needs to have a Chest X Ray, pt just left from her pre admit appt and was not told until after she left she needed to havr the x ray  Would the patient rather a call back or a response via [facility]?  Call back  Best Call Back Number: [phone]        02/04/2022                 1411  Attempted to contact patient regarding the above message. No answer. Left voicemail.

## 2022-02-05 LAB
SARS-COV-2 RNA RESP QL NAA+PROBE: NOT DETECTED
SARS-COV-2- CYCLE NUMBER: NORMAL

## 2022-02-07 ENCOUNTER — HOSPITAL ENCOUNTER (OUTPATIENT)
Facility: HOSPITAL | Age: 72
LOS: 1 days | Discharge: HOME | End: 2022-02-08
Attending: SURGERY | Admitting: SURGERY
Payer: MEDICARE

## 2022-02-07 ENCOUNTER — ANESTHESIA (OUTPATIENT)
Dept: SURGERY | Facility: HOSPITAL | Age: 72
End: 2022-02-07
Payer: MEDICARE

## 2022-02-07 DIAGNOSIS — K43.2 INCISIONAL HERNIA, WITHOUT OBSTRUCTION OR GANGRENE: Primary | ICD-10-CM

## 2022-02-07 DIAGNOSIS — K43.2 INCISIONAL HERNIA: ICD-10-CM

## 2022-02-07 LAB
POCT GLUCOSE: 223 MG/DL (ref 70–110)
POCT GLUCOSE: 239 MG/DL (ref 70–110)

## 2022-02-07 PROCEDURE — 25000003 PHARM REV CODE 250: Performed by: SURGERY

## 2022-02-07 PROCEDURE — 36000707: Performed by: SURGERY

## 2022-02-07 PROCEDURE — C1781 MESH (IMPLANTABLE): HCPCS | Performed by: SURGERY

## 2022-02-07 PROCEDURE — 37000009 HC ANESTHESIA EA ADD 15 MINS: Performed by: SURGERY

## 2022-02-07 PROCEDURE — 88302 TISSUE EXAM BY PATHOLOGIST: CPT | Mod: 26,,, | Performed by: STUDENT IN AN ORGANIZED HEALTH CARE EDUCATION/TRAINING PROGRAM

## 2022-02-07 PROCEDURE — 71000033 HC RECOVERY, INTIAL HOUR: Performed by: SURGERY

## 2022-02-07 PROCEDURE — 63600175 PHARM REV CODE 636 W HCPCS: Performed by: SURGERY

## 2022-02-07 PROCEDURE — 88302 TISSUE EXAM BY PATHOLOGIST: CPT | Performed by: STUDENT IN AN ORGANIZED HEALTH CARE EDUCATION/TRAINING PROGRAM

## 2022-02-07 PROCEDURE — 63600175 PHARM REV CODE 636 W HCPCS: Performed by: NURSE ANESTHETIST, CERTIFIED REGISTERED

## 2022-02-07 PROCEDURE — C1729 CATH, DRAINAGE: HCPCS | Performed by: SURGERY

## 2022-02-07 PROCEDURE — 49568 PR IMPLANT MESH HERNIA REPAIR/DEBRIDEMENT CLOSURE: ICD-10-PCS | Mod: ,,, | Performed by: SURGERY

## 2022-02-07 PROCEDURE — 37000008 HC ANESTHESIA 1ST 15 MINUTES: Performed by: SURGERY

## 2022-02-07 PROCEDURE — 36000706: Performed by: SURGERY

## 2022-02-07 PROCEDURE — 71000039 HC RECOVERY, EACH ADD'L HOUR: Performed by: SURGERY

## 2022-02-07 PROCEDURE — 25000003 PHARM REV CODE 250: Performed by: NURSE ANESTHETIST, CERTIFIED REGISTERED

## 2022-02-07 PROCEDURE — 88302 PR  SURG PATH,LEVEL II: ICD-10-PCS | Mod: 26,,, | Performed by: STUDENT IN AN ORGANIZED HEALTH CARE EDUCATION/TRAINING PROGRAM

## 2022-02-07 PROCEDURE — 49568 PR IMPLANT MESH HERNIA REPAIR/DEBRIDEMENT CLOSURE: CPT | Mod: ,,, | Performed by: SURGERY

## 2022-02-07 PROCEDURE — 25000242 PHARM REV CODE 250 ALT 637 W/ HCPCS: Performed by: STUDENT IN AN ORGANIZED HEALTH CARE EDUCATION/TRAINING PROGRAM

## 2022-02-07 PROCEDURE — 49566 PR REPAIR RECURR INCIS HERNIA,STRANG: ICD-10-PCS | Mod: ,,, | Performed by: SURGERY

## 2022-02-07 PROCEDURE — 49566 PR REPAIR RECURR INCIS HERNIA,STRANG: CPT | Mod: ,,, | Performed by: SURGERY

## 2022-02-07 PROCEDURE — 94640 AIRWAY INHALATION TREATMENT: CPT

## 2022-02-07 PROCEDURE — 99900035 HC TECH TIME PER 15 MIN (STAT)

## 2022-02-07 DEVICE — IMPLANTABLE DEVICE: Type: IMPLANTABLE DEVICE | Site: ABDOMEN | Status: FUNCTIONAL

## 2022-02-07 RX ORDER — DULOXETIN HYDROCHLORIDE 30 MG/1
30 CAPSULE, DELAYED RELEASE ORAL DAILY
Status: DISCONTINUED | OUTPATIENT
Start: 2022-02-07 | End: 2022-02-07 | Stop reason: ALTCHOICE

## 2022-02-07 RX ORDER — ONDANSETRON 8 MG/1
8 TABLET, ORALLY DISINTEGRATING ORAL EVERY 6 HOURS PRN
Status: DISCONTINUED | OUTPATIENT
Start: 2022-02-07 | End: 2022-02-08 | Stop reason: HOSPADM

## 2022-02-07 RX ORDER — TOPIRAMATE 25 MG/1
50 TABLET ORAL 2 TIMES DAILY
Status: DISCONTINUED | OUTPATIENT
Start: 2022-02-07 | End: 2022-02-08 | Stop reason: HOSPADM

## 2022-02-07 RX ORDER — LIDOCAINE HCL/PF 100 MG/5ML
SYRINGE (ML) INTRAVENOUS
Status: DISCONTINUED | OUTPATIENT
Start: 2022-02-07 | End: 2022-02-07

## 2022-02-07 RX ORDER — KETOROLAC TROMETHAMINE 30 MG/ML
INJECTION, SOLUTION INTRAMUSCULAR; INTRAVENOUS
Status: DISCONTINUED | OUTPATIENT
Start: 2022-02-07 | End: 2022-02-07

## 2022-02-07 RX ORDER — KETOROLAC TROMETHAMINE 30 MG/ML
15 INJECTION, SOLUTION INTRAMUSCULAR; INTRAVENOUS EVERY 6 HOURS
Status: DISCONTINUED | OUTPATIENT
Start: 2022-02-07 | End: 2022-02-08 | Stop reason: HOSPADM

## 2022-02-07 RX ORDER — ONDANSETRON 2 MG/ML
INJECTION INTRAMUSCULAR; INTRAVENOUS
Status: DISCONTINUED | OUTPATIENT
Start: 2022-02-07 | End: 2022-02-07

## 2022-02-07 RX ORDER — QUETIAPINE FUMARATE 100 MG/1
300 TABLET, FILM COATED ORAL NIGHTLY
Status: DISCONTINUED | OUTPATIENT
Start: 2022-02-07 | End: 2022-02-08 | Stop reason: HOSPADM

## 2022-02-07 RX ORDER — ACETAMINOPHEN 10 MG/ML
INJECTION, SOLUTION INTRAVENOUS
Status: DISCONTINUED | OUTPATIENT
Start: 2022-02-07 | End: 2022-02-07

## 2022-02-07 RX ORDER — ROCURONIUM BROMIDE 10 MG/ML
INJECTION, SOLUTION INTRAVENOUS
Status: DISCONTINUED | OUTPATIENT
Start: 2022-02-07 | End: 2022-02-07

## 2022-02-07 RX ORDER — IBUPROFEN 200 MG
24 TABLET ORAL
Status: DISCONTINUED | OUTPATIENT
Start: 2022-02-07 | End: 2022-02-08 | Stop reason: HOSPADM

## 2022-02-07 RX ORDER — SUCCINYLCHOLINE CHLORIDE 20 MG/ML
INJECTION INTRAMUSCULAR; INTRAVENOUS
Status: DISCONTINUED | OUTPATIENT
Start: 2022-02-07 | End: 2022-02-07

## 2022-02-07 RX ORDER — PHENYLEPHRINE HYDROCHLORIDE 10 MG/ML
INJECTION INTRAVENOUS
Status: DISCONTINUED | OUTPATIENT
Start: 2022-02-07 | End: 2022-02-07

## 2022-02-07 RX ORDER — VERAPAMIL HYDROCHLORIDE 180 MG/1
180 TABLET, FILM COATED, EXTENDED RELEASE ORAL NIGHTLY
Status: DISCONTINUED | OUTPATIENT
Start: 2022-02-07 | End: 2022-02-08 | Stop reason: HOSPADM

## 2022-02-07 RX ORDER — SODIUM CHLORIDE 9 MG/ML
INJECTION, SOLUTION INTRAVENOUS CONTINUOUS
Status: DISCONTINUED | OUTPATIENT
Start: 2022-02-07 | End: 2022-02-07

## 2022-02-07 RX ORDER — IBUPROFEN 200 MG
16 TABLET ORAL
Status: DISCONTINUED | OUTPATIENT
Start: 2022-02-07 | End: 2022-02-08 | Stop reason: HOSPADM

## 2022-02-07 RX ORDER — ONDANSETRON 2 MG/ML
4 INJECTION INTRAMUSCULAR; INTRAVENOUS DAILY PRN
Status: DISCONTINUED | OUTPATIENT
Start: 2022-02-07 | End: 2022-02-07 | Stop reason: HOSPADM

## 2022-02-07 RX ORDER — SODIUM CHLORIDE, SODIUM LACTATE, POTASSIUM CHLORIDE, CALCIUM CHLORIDE 600; 310; 30; 20 MG/100ML; MG/100ML; MG/100ML; MG/100ML
INJECTION, SOLUTION INTRAVENOUS CONTINUOUS
Status: DISCONTINUED | OUTPATIENT
Start: 2022-02-07 | End: 2022-02-07

## 2022-02-07 RX ORDER — CEFAZOLIN SODIUM 2 G/50ML
2 SOLUTION INTRAVENOUS
Status: DISCONTINUED | OUTPATIENT
Start: 2022-02-07 | End: 2022-02-07 | Stop reason: HOSPADM

## 2022-02-07 RX ORDER — LIDOCAINE HYDROCHLORIDE 10 MG/ML
1 INJECTION, SOLUTION EPIDURAL; INFILTRATION; INTRACAUDAL; PERINEURAL ONCE
Status: DISCONTINUED | OUTPATIENT
Start: 2022-02-07 | End: 2022-02-07 | Stop reason: HOSPADM

## 2022-02-07 RX ORDER — BUPIVACAINE HYDROCHLORIDE 5 MG/ML
INJECTION, SOLUTION EPIDURAL; INTRACAUDAL
Status: DISCONTINUED | OUTPATIENT
Start: 2022-02-07 | End: 2022-02-07 | Stop reason: HOSPADM

## 2022-02-07 RX ORDER — HYDROMORPHONE HYDROCHLORIDE 2 MG/ML
0.2 INJECTION, SOLUTION INTRAMUSCULAR; INTRAVENOUS; SUBCUTANEOUS EVERY 5 MIN PRN
Status: DISCONTINUED | OUTPATIENT
Start: 2022-02-07 | End: 2022-02-07 | Stop reason: HOSPADM

## 2022-02-07 RX ORDER — LIDOCAINE HYDROCHLORIDE 10 MG/ML
INJECTION INFILTRATION; PERINEURAL
Status: DISCONTINUED | OUTPATIENT
Start: 2022-02-07 | End: 2022-02-07 | Stop reason: HOSPADM

## 2022-02-07 RX ORDER — INSULIN ASPART 100 [IU]/ML
0-5 INJECTION, SOLUTION INTRAVENOUS; SUBCUTANEOUS
Status: DISCONTINUED | OUTPATIENT
Start: 2022-02-07 | End: 2022-02-08 | Stop reason: HOSPADM

## 2022-02-07 RX ORDER — PROCHLORPERAZINE EDISYLATE 5 MG/ML
5 INJECTION INTRAMUSCULAR; INTRAVENOUS EVERY 30 MIN PRN
Status: DISCONTINUED | OUTPATIENT
Start: 2022-02-07 | End: 2022-02-07 | Stop reason: HOSPADM

## 2022-02-07 RX ORDER — PREGABALIN 75 MG/1
150 CAPSULE ORAL 2 TIMES DAILY PRN
Status: DISCONTINUED | OUTPATIENT
Start: 2022-02-07 | End: 2022-02-08 | Stop reason: HOSPADM

## 2022-02-07 RX ORDER — ALBUTEROL SULFATE 90 UG/1
2 AEROSOL, METERED RESPIRATORY (INHALATION) EVERY 6 HOURS PRN
Status: DISCONTINUED | OUTPATIENT
Start: 2022-02-07 | End: 2022-02-08 | Stop reason: HOSPADM

## 2022-02-07 RX ORDER — OXYCODONE AND ACETAMINOPHEN 5; 325 MG/1; MG/1
1 TABLET ORAL EVERY 4 HOURS PRN
Status: DISCONTINUED | OUTPATIENT
Start: 2022-02-07 | End: 2022-02-08 | Stop reason: HOSPADM

## 2022-02-07 RX ORDER — MIDAZOLAM HYDROCHLORIDE 1 MG/ML
INJECTION INTRAMUSCULAR; INTRAVENOUS
Status: DISCONTINUED | OUTPATIENT
Start: 2022-02-07 | End: 2022-02-07

## 2022-02-07 RX ORDER — NALOXONE HCL 0.4 MG/ML
VIAL (ML) INJECTION
Status: DISPENSED
Start: 2022-02-07 | End: 2022-02-08

## 2022-02-07 RX ORDER — PROPOFOL 10 MG/ML
VIAL (ML) INTRAVENOUS
Status: DISCONTINUED | OUTPATIENT
Start: 2022-02-07 | End: 2022-02-07

## 2022-02-07 RX ORDER — IPRATROPIUM BROMIDE AND ALBUTEROL SULFATE 2.5; .5 MG/3ML; MG/3ML
3 SOLUTION RESPIRATORY (INHALATION) ONCE
Status: COMPLETED | OUTPATIENT
Start: 2022-02-07 | End: 2022-02-07

## 2022-02-07 RX ORDER — DULOXETIN HYDROCHLORIDE 30 MG/1
90 CAPSULE, DELAYED RELEASE ORAL DAILY
Status: DISCONTINUED | OUTPATIENT
Start: 2022-02-07 | End: 2022-02-08 | Stop reason: HOSPADM

## 2022-02-07 RX ORDER — FENTANYL CITRATE 50 UG/ML
INJECTION, SOLUTION INTRAMUSCULAR; INTRAVENOUS
Status: DISCONTINUED | OUTPATIENT
Start: 2022-02-07 | End: 2022-02-07

## 2022-02-07 RX ORDER — GLUCAGON 1 MG
1 KIT INJECTION
Status: DISCONTINUED | OUTPATIENT
Start: 2022-02-07 | End: 2022-02-08 | Stop reason: HOSPADM

## 2022-02-07 RX ORDER — VASOPRESSIN 20 [USP'U]/ML
INJECTION, SOLUTION INTRAMUSCULAR; SUBCUTANEOUS
Status: DISCONTINUED | OUTPATIENT
Start: 2022-02-07 | End: 2022-02-07

## 2022-02-07 RX ORDER — IPRATROPIUM BROMIDE AND ALBUTEROL SULFATE 2.5; .5 MG/3ML; MG/3ML
3 SOLUTION RESPIRATORY (INHALATION)
Status: COMPLETED | OUTPATIENT
Start: 2022-02-07 | End: 2022-02-07

## 2022-02-07 RX ORDER — HYDROMORPHONE HYDROCHLORIDE 2 MG/ML
0.5 INJECTION, SOLUTION INTRAMUSCULAR; INTRAVENOUS; SUBCUTANEOUS EVERY 5 MIN PRN
Status: DISCONTINUED | OUTPATIENT
Start: 2022-02-07 | End: 2022-02-07 | Stop reason: HOSPADM

## 2022-02-07 RX ORDER — DEXAMETHASONE SODIUM PHOSPHATE 4 MG/ML
INJECTION, SOLUTION INTRA-ARTICULAR; INTRALESIONAL; INTRAMUSCULAR; INTRAVENOUS; SOFT TISSUE
Status: DISCONTINUED | OUTPATIENT
Start: 2022-02-07 | End: 2022-02-07

## 2022-02-07 RX ADMIN — OXYCODONE HYDROCHLORIDE AND ACETAMINOPHEN 1 TABLET: 5; 325 TABLET ORAL at 02:02

## 2022-02-07 RX ADMIN — PHENYLEPHRINE HYDROCHLORIDE 100 MCG: 10 INJECTION INTRAVENOUS at 11:02

## 2022-02-07 RX ADMIN — IPRATROPIUM BROMIDE AND ALBUTEROL SULFATE 3 ML: .5; 3 SOLUTION RESPIRATORY (INHALATION) at 01:02

## 2022-02-07 RX ADMIN — LIDOCAINE HYDROCHLORIDE 100 MG: 20 INJECTION, SOLUTION INTRAVENOUS at 11:02

## 2022-02-07 RX ADMIN — ROCURONIUM BROMIDE 5 MG: 10 INJECTION, SOLUTION INTRAVENOUS at 11:02

## 2022-02-07 RX ADMIN — DEXAMETHASONE SODIUM PHOSPHATE 8 MG: 4 INJECTION, SOLUTION INTRA-ARTICULAR; INTRALESIONAL; INTRAMUSCULAR; INTRAVENOUS; SOFT TISSUE at 11:02

## 2022-02-07 RX ADMIN — QUETIAPINE FUMARATE 300 MG: 100 TABLET ORAL at 09:02

## 2022-02-07 RX ADMIN — VASOPRESSIN 1 UNITS: 20 INJECTION, SOLUTION INTRAMUSCULAR; SUBCUTANEOUS at 11:02

## 2022-02-07 RX ADMIN — MIDAZOLAM HYDROCHLORIDE 2 MG: 1 INJECTION, SOLUTION INTRAMUSCULAR; INTRAVENOUS at 10:02

## 2022-02-07 RX ADMIN — FENTANYL CITRATE 100 MCG: 50 INJECTION, SOLUTION INTRAMUSCULAR; INTRAVENOUS at 12:02

## 2022-02-07 RX ADMIN — SODIUM CHLORIDE, SODIUM LACTATE, POTASSIUM CHLORIDE, AND CALCIUM CHLORIDE: .6; .31; .03; .02 INJECTION, SOLUTION INTRAVENOUS at 10:02

## 2022-02-07 RX ADMIN — DULOXETINE 90 MG: 30 CAPSULE, DELAYED RELEASE ORAL at 09:02

## 2022-02-07 RX ADMIN — KETOROLAC TROMETHAMINE 15 MG: 30 INJECTION, SOLUTION INTRAMUSCULAR at 11:02

## 2022-02-07 RX ADMIN — KETOROLAC TROMETHAMINE 15 MG: 30 INJECTION, SOLUTION INTRAMUSCULAR at 05:02

## 2022-02-07 RX ADMIN — ROCURONIUM BROMIDE 45 MG: 10 INJECTION, SOLUTION INTRAVENOUS at 11:02

## 2022-02-07 RX ADMIN — IPRATROPIUM BROMIDE AND ALBUTEROL SULFATE 3 ML: .5; 2.5 SOLUTION RESPIRATORY (INHALATION) at 10:02

## 2022-02-07 RX ADMIN — SUCCINYLCHOLINE CHLORIDE 120 MG: 20 INJECTION, SOLUTION INTRAMUSCULAR; INTRAVENOUS at 11:02

## 2022-02-07 RX ADMIN — ACETAMINOPHEN 1000 MG: 10 INJECTION, SOLUTION INTRAVENOUS at 11:02

## 2022-02-07 RX ADMIN — VERAPAMIL HYDROCHLORIDE 180 MG: 180 TABLET, FILM COATED, EXTENDED RELEASE ORAL at 09:02

## 2022-02-07 RX ADMIN — KETOROLAC TROMETHAMINE 30 MG: 30 INJECTION, SOLUTION INTRAMUSCULAR; INTRAVENOUS at 12:02

## 2022-02-07 RX ADMIN — OXYCODONE HYDROCHLORIDE AND ACETAMINOPHEN 1 TABLET: 5; 325 TABLET ORAL at 09:02

## 2022-02-07 RX ADMIN — SUGAMMADEX 200 MG: 100 INJECTION, SOLUTION INTRAVENOUS at 11:02

## 2022-02-07 RX ADMIN — FENTANYL CITRATE 150 MCG: 50 INJECTION, SOLUTION INTRAMUSCULAR; INTRAVENOUS at 11:02

## 2022-02-07 RX ADMIN — PROPOFOL 50 MG: 10 INJECTION, EMULSION INTRAVENOUS at 12:02

## 2022-02-07 RX ADMIN — ONDANSETRON 8 MG: 2 INJECTION, SOLUTION INTRAMUSCULAR; INTRAVENOUS at 11:02

## 2022-02-07 RX ADMIN — PROPOFOL 130 MG: 10 INJECTION, EMULSION INTRAVENOUS at 11:02

## 2022-02-07 RX ADMIN — TOPIRAMATE 50 MG: 25 TABLET, FILM COATED ORAL at 09:02

## 2022-02-07 NOTE — PROGRESS NOTES
VN cued into pt's room for introduction with pt's permission.  VN role explained and informed pt that VN would be working with bedside nurse and the rest of the care team.  Fall risk and bed alarm protocol education provided.  Instructed pt to call for assistance and agreeable.  Allowed time for questions. NAD noted.  Will cont to be available as needed.      02/07/22 8751   Patient Request   Patient Requested pain medication   Nurse Notification   Bedside Nurse Notified? Yes   Name of Bedside Nurse tia   Nurse Notfication Method Secure Chat   Admission   Initial VN Admission Questions Complete   Shift   Virtual Nurse - Patient Verbalized Approval Of Camera Use   Safety/Activity   Patient Rounds call light in patient/parent reach;visualized patient   Safety Promotion/Fall Prevention Fall Risk reviewed with patient/family;instructed to call staff for mobility   Pain/Comfort/Sleep   Pain Body Location abdomen   Pain Rating (0-10): Rest 9

## 2022-02-07 NOTE — PLAN OF CARE
Meets criteria for discharge from PACU. GABRIELLE Cantu easily. Pain 9/10 then drifts off to sleep.   , Report to Dr. Gaona

## 2022-02-07 NOTE — TRANSFER OF CARE
"Anesthesia Transfer of Care Note    Patient: Chayito Chung    Procedure(s) Performed: Procedure(s) (LRB):  REPAIR, HERNIA, INCISIONAL, INCARCERATED, WITHOUT HISTORY OF PRIOR REPAIR (N/A)    Patient location: PACU (dr. Gaona at bedside in PACU)    Anesthesia Type: general    Transport from OR: Transported from OR on 6-10 L/min O2 by face mask with adequate spontaneous ventilation    Post pain: adequate analgesia    Post assessment: no apparent anesthetic complications and tolerated procedure well    Post vital signs: stable    Level of consciousness: awake, alert and oriented    Nausea/Vomiting: no nausea/vomiting    Complications: none    Transfer of care protocol was followed      Last vitals:   Visit Vitals  BP (!) 165/85 (BP Location: Right arm, Patient Position: Lying)   Pulse 101   Temp 36.9 °C (98.5 °F) (Skin)   Resp 16   Ht 5' 2" (1.575 m)   Wt 63 kg (139 lb)   LMP  (LMP Unknown)   SpO2 96%   Breastfeeding No   BMI 25.42 kg/m²     "

## 2022-02-07 NOTE — OP NOTE
PATIENT: Chayito Chung    MRN: [id number]    DATE OF PROCEDURE: 02/07/2022     PRE-OPERATIVE DIAGNOSIS:  Incarcerated Recurrent Incisional ventral hernia     POST-OPERATIVE DIAGNOSIS:  Same    PROCEDURE: Open repair of Recurrent Incarcerated Incisional Ventral hernia with mesh     SURGEON: Simon King M.D.     ANESTHESIA: General endotracheal.     ESTIMATED BLOOD LOSS: Minimal.     SPECIMEN: hernia sac    COMPLICATIONS: none     INDICATION: The patient is a 71-year-old AAF who presents to the clinic with a  symptomatic incisional hernia.  R/B/A to hernia surgery were explained to the patient in detail.  The risks include, but are not limited to: bleeding, infection, re-operation, injury to surrounding structures,reaction to anesthesia, ayden-operative cardiopulmonary events including PE/DVT, hernia recurrence, chronic pain, need for mesh removal, failure to resolve symptoms, and possible death. The patient stated a clear understanding of the risks and requests the procedure be done.    PROCEDURE IN DETAIL:  After surgical consent was obtained, the patient was transported to the operative theater and onto the operating room table in supine position.  General endotracheal anesthesia was administered without difficulty.  The patient's abdomen was prepped and draped in a standard sterile fashion.  Perioperative antibiotics were administered and a time-out was performed in order to ensure the proper patient and procedure.  An incision was made over the hernia using a scalpel.  This was carried down through the subcutaneous tissues using electrocautery and blunt dissection.  The hernia sac was identified and preserved.  The subcutaneous tissues surrounding the hernia defect were dissected free circumferentially. The hernia sac was opened and there was incarcerated bowel which was freed using sharp dissection.   After the remaining contents were reduced into the abdominal cavity without difficulty, the hernia sac was  divided and sent for pathologic examination.  The hernia defect was 4.5 cm in size.  A 8.6 cm circular mesh was sutured in place in an underlie fashion.  1-0 permanent suture was used to reapproximate the hernia defect over the mesh without any undue tension.  The wound was irrigated out with sterile saline and reinjected with local anesthetic.  The defect was closed in layers using interrupted 3-0 Vicryl and Dermabond.  At the end of the procedure, the instrument, lap, and needle counts were all correct.  The patient was awoken from anesthesia having tolerated the procedure without difficulty was returned to the PACU in stable condition.  Patient's family was updated all questions were answered.     Simon King M.D., F.A.C.S.  [facility] General Surgery  [facility]

## 2022-02-07 NOTE — DISCHARGE SUMMARY
[facility] - Surgery (Hospital)  Discharge Note  Short Stay    Procedure(s) (LRB):  REPAIR, HERNIA, INCISIONAL, INCARCERATED, WITHOUT HISTORY OF PRIOR REPAIR (N/A)    OUTCOME: Patient tolerated treatment/procedure well without complication and is now ready for discharge.    DISPOSITION: Home or Self Care    FINAL DIAGNOSIS:  Recurrent Incarcerated Incisional Ventral hernia    FOLLOWUP: In clinic x 2 weeks    DISCHARGE INSTRUCTIONS:  No discharge procedures on file.     TIME SPENT ON DISCHARGE: 10 minutes

## 2022-02-07 NOTE — ANESTHESIA POSTPROCEDURE EVALUATION
Anesthesia Post Evaluation    Patient: Chayito Chung    Procedure(s) Performed: Procedure(s) (LRB):  REPAIR, HERNIA, INCISIONAL, INCARCERATED, WITHOUT HISTORY OF PRIOR REPAIR (N/A)    Final Anesthesia Type: general      Patient location during evaluation: PACU  Patient participation: Yes- Able to Participate  Level of consciousness: awake and alert  Post-procedure vital signs: reviewed and stable  Pain management: adequate  Airway patency: patent  BONNIE mitigation strategies: Multimodal analgesia  PONV status at discharge: No PONV  Anesthetic complications: no      Cardiovascular status: blood pressure returned to baseline and hemodynamically stable  Respiratory status: room air  Hydration status: euvolemic  Follow-up not needed.          Vitals Value Taken Time   /73 02/07/22 1529   Temp 36.6 °C (97.9 °F) 02/07/22 1529   Pulse 109 02/07/22 1529   Resp 24 02/07/22 1451   SpO2 99 % 02/07/22 1451   Vitals shown include unvalidated device data.      Event Time   Out of Recovery 14:56:37         Pain/Joaquina Score: Pain Rating Prior to Med Admin: 7 (2/7/2022  2:34 PM)  Joaquina Score: 8 (2/7/2022  2:00 PM)

## 2022-02-07 NOTE — H&P
[facility]  OUTPATIENT Follow-Up           HPI: Chayito Chung is a 71 y.o. female.  MRI noted.  No biliary or pancreatic lesions.  Pt wants hernia surgery.      I have reviewed the patient's chart including prior progress notes, procedures and testing.      ROS:   Review of Systems   All other systems reviewed and are negative.        PROBLEM LIST:      Patient Active Problem List   Diagnosis    Coronary artery disease of native artery of native heart with stable angina pectoris    Diabetes mellitus    COPD (chronic obstructive pulmonary disease)    Tobacco abuse, 1ppd, 50 years    Major depressive disorder in partial remission    Solitary pulmonary nodule s/p resection 4/2012    Neuropathy    Hypoglycemia associated with type 2 diabetes mellitus    Adrenal nodule    Hypertension associated with diabetes    Hyperlipidemia associated with type 2 diabetes mellitus    S/P PTCA (percutaneous transluminal coronary angioplasty)    Lumbar facet arthropathy    Sinus tachycardia    Iron deficiency anemia    Steroid-induced gastritis    Chronic diastolic congestive heart failure    History of colon cancer    Vitamin D deficiency    Osteopenia of lumbar spine    Biloma following surgery    History of partial gastrectomy    Seizure disorder    Lower extremity weakness    Impaired ambulation    Cervical myelopathy    Anxiety    Type 2 diabetes mellitus, with long-term current use of insulin    Hypertension    Migraine headache    CAD (coronary artery disease)    Aortic atherosclerosis    Sacroiliitis, not elsewhere classified            HISTORY       Past Medical History:   Diagnosis Date    Acute coronary syndrome      Acute hypoxemic respiratory failure 5/1/2017    Anxiety      Asthma      Cancer       colon    Cataracts, both eyes      Chest pain at rest      Chest pain of uncertain etiology 12/6/2015    Colon cancer 1988    COPD (chronic obstructive pulmonary disease)       Coronary artery disease      Depression      Diabetes mellitus      Dyspnea on exertion 11/15/2018    Elevated brain natriuretic peptide (BNP) level 11/15/2018    Elevated cholesterol      Hypertension      Swelling      Syncope and collapse 2016         Past Surgical History:   Procedure Laterality Date    ABDOMINAL SURGERY        BREAST BIOPSY         benign unsure what side     SECTION, CLASSIC        COLON SURGERY        COLONOSCOPY   2019     repeat in 5 years    CORONARY ANGIOPLASTY WITH STENT PLACEMENT   3 months ago      x2, Hysterectomy, Lung surgery, Partial stomach removed, Part of colon removed for rectal cancer    GASTRECTOMY        HEMORRHOID SURGERY        HYSTERECTOMY         @28yrs of age    LUNG BIOPSY        OOPHORECTOMY         @28yrs of age    POSTERIOR FUSION OF CERVICAL SPINE WITH LAMINECTOMY N/A 2020     Procedure: LAMINECTOMY, SPINE, CERVICAL, WITH POSTERIOR FUSION C3-T1 ;  Surgeon: Herson Tate MD;  Location: [facility] OR [address] [place] [address];  Service: Neurosurgery;  Laterality: N/A;         Social History            Tobacco Use    Smoking status: Current Every Day Smoker       Packs/day: 0.50       Years: 53.00       Pack years: 26.50       Types: Cigarettes       Start date:     Smokeless tobacco: Never Used    Tobacco comment: Pt is currently enrolled in the [employer].  Ambulatory referral to Smoking Cessation program.    Substance Use Topics    Alcohol use: Yes       Alcohol/week: 3.0 standard drinks       Types: 3 Glasses of wine per week       Comment: 1 every 3 months    Drug use: No               Family History   Problem Relation Age of Onset    Cancer Mother      Diabetes Mother      Hypertension Mother      Breast cancer Mother      Heart disease Father      Lung cancer Father      Depression Sister      Hypertension Sister      Diabetes Mellitus Sister      Cancer Maternal Aunt              MEDS:        "  Current Outpatient Medications on File Prior to Visit   Medication Sig Dispense Refill    albuterol (PROVENTIL/VENTOLIN HFA) 90 mcg/actuation inhaler INHALE 2 PUFFS BY MOUTH INTO THE LUNGS EVERY 6 HOURS AS NEEDED FOR WHEEZING 25.5 g 0    alendronate (FOSAMAX) 70 MG tablet Take 1 tablet (70 mg total) by mouth every 7 days. 12 tablet 3    aspirin (ECOTRIN) 81 MG EC tablet Take 81 mg by mouth once daily.        BD ULTRA-FINE MINI PEN NEEDLE 31 gauge x 3/16" Ndle          benzonatate (TESSALON) 200 MG capsule Take 1 capsule (200 mg total) by mouth 3 (three) times daily as needed for Cough. 30 capsule 0    diclofenac sodium (VOLTAREN) 1 % Gel Apply 2 g topically 4 (four) times daily. 100 g 0    DULoxetine (CYMBALTA) 30 MG capsule Take 30 mg by mouth once daily.        duloxetine (CYMBALTA) 60 MG capsule Take 90 mg by mouth once daily.         ferrous sulfate (FEOSOL) 325 mg (65 mg iron) Tab tablet Take 1 tablet (325 mg total) by mouth once daily. 30 tablet 3    fluticasone propionate (FLONASE) 50 mcg/actuation nasal spray 2 sprays (100 mcg total) by Each Nare route once daily. 1 Bottle 12    HYDROcodone-acetaminophen (NORCO)  mg per tablet Take 1 tablet by mouth every 6 (six) hours as needed for Pain. 40 tablet 0    insulin lispro protamin-lispro 100 unit/mL (75-25) InPn Inject into the skin.        losartan (COZAAR) 50 MG tablet Take 1 tablet (50 mg total) by mouth once daily. 90 tablet 3    metFORMIN (GLUCOPHAGE) 1000 MG tablet Take 1,000 mg by mouth 2 (two) times daily.        nitroGLYCERIN (NITROSTAT) 0.4 MG SL tablet Place 1 tablet (0.4 mg total) under the tongue every 5 (five) minutes as needed for Chest pain. 100 tablet 1    ondansetron (ZOFRAN-ODT) 4 MG TbDL Take 1 tablet (4 mg total) by mouth every 8 (eight) hours as needed (nausea). 14 tablet 1    potassium chloride SA (K-DUR,KLOR-CON) 20 MEQ tablet TAKE 1 TABLET(20 MEQ) BY MOUTH EVERY DAY 90 tablet 0    pravastatin (PRAVACHOL) 40 MG " tablet Take 0.5 tablets (20 mg total) by mouth once daily. 90 tablet 1    pregabalin (LYRICA) 150 MG capsule Take 150 mg by mouth 2 (two) times daily as needed.        quetiapine (SEROQUEL) 300 MG Tab 300 mg nightly.         tiZANidine (ZANAFLEX) 4 MG tablet          topiramate (TOPAMAX) 50 MG tablet Take 50 mg by mouth 2 (two) times daily.        TRELEGY ELLIPTA 200-62.5-25 mcg inhaler          verapamiL (CALAN-SR) 180 MG CR tablet Take 1 tablet (180 mg total) by mouth every evening. 30 tablet 11    zonisamide (ZONEGRAN) 100 MG Cap Take by mouth.        albuterol-ipratropium (DUO-NEB) 2.5 mg-0.5 mg/3 mL nebulizer solution Take 3 mLs by nebulization every 4 (four) hours as needed for Wheezing or Shortness of Breath. Rescue 1 Box 0    cholestyramine, with sugar, 4 gram Powd Take 4 g by mouth 2 (two) times daily as needed (diarrhea). (Patient not taking: No sig reported) 378 g 0    hydroCHLOROthiazide (MICROZIDE) 12.5 mg capsule Take 1 capsule (12.5 mg total) by mouth once daily. (Patient not taking: Reported on 1/21/2022) 90 capsule 3    LORazepam (ATIVAN) 0.5 MG tablet Take 0.5 mg by mouth every 6 (six) hours as needed for Anxiety.        magnesium oxide (MAG-OX) 400 mg (241.3 mg magnesium) tablet Take 400 mg by mouth once daily.        nicotine (NICODERM CQ) 21 mg/24 hr Place 1 patch onto the skin once daily. (Patient not taking: Reported on 1/21/2022) 28 patch 0    nicotine, polacrilex, 2 mg lzmn Take 1 lozenge (2 mg total) by mouth as needed (as needed). Dispense MINIs please. DO NOT CHEW UP. Can take 1-2 per hour as needed in place of a cigarette. (Patient not taking: Reported on 1/21/2022) 81 each 0    varenicline (CHANTIX STARTING MONTH BOX) 0.5 mg (11)- 1 mg (42) tablet Follow package directions (Patient not taking: No sig reported) 53 tablet 0    varenicline (CHANTIX) 1 mg Tab Take 1 tablet (1 mg total) by mouth once daily. CX (Patient not taking: No sig reported) 56 tablet 0      No current  facility-administered medications on file prior to visit.         ALLERGIES:  Review of patient's allergies indicates:  No Known Allergies        VITALS:      Vitals:     01/21/22 0923   BP: (!) 155/96   Pulse: 88            PHYSICAL EXAM:  Physical Exam  Constitutional:       Appearance: Normal appearance.   Cardiovascular:      Pulses: Normal pulses.   Abdominal:      Palpations: Abdomen is soft.      Hernia: A hernia is present.   Neurological:      Mental Status: Mental status is at baseline.   Psychiatric:         Mood and Affect: Mood normal.         Behavior: Behavior normal.               LABS:        Lab Results   Component Value Date     WBC 4.48 12/09/2021     RBC 4.24 12/09/2021     HGB 11.7 (L) 12/09/2021     HGB 9.7 (L) 06/10/2013     HCT 38.0 12/09/2021      12/09/2021            Lab Results   Component Value Date      (HH) 12/09/2021      12/09/2021     K 4.4 12/09/2021      12/09/2021     CO2 19 (L) 12/09/2021     BUN 13 12/09/2021     CREATININE 1.0 12/09/2021     CREATININE 0.8 06/10/2013     CALCIUM 9.2 12/09/2021     CALCIUM 8.8 06/10/2013            Lab Results   Component Value Date     ALT 14 12/09/2021     AST 12 12/09/2021     ALKPHOS 166 (H) 12/09/2021     BILITOT 0.3 12/09/2021            Lab Results   Component Value Date     MG 1.7 07/09/2021     PHOS 2.9 07/09/2021         STUDIES:  MRI, CT images and reports were personally reviewed.           ASSESSMENT & PLAN:  71 y.o. female with incisional hernia.  Will plan for open repair. Risks, benefits, and alternatives to the procedure were explained to the patient in detail.  All questions were answered and the patient has requested the procedure be done.  Informed consent was obtained.           Simon King M.D., F.A.C.S.  Ndxsuk-Dptpnqzfx-Rzktuby and General Surgery  [facility]

## 2022-02-08 VITALS
WEIGHT: 143.94 LBS | TEMPERATURE: 98 F | BODY MASS INDEX: 26.49 KG/M2 | HEART RATE: 101 BPM | DIASTOLIC BLOOD PRESSURE: 70 MMHG | RESPIRATION RATE: 19 BRPM | HEIGHT: 62 IN | OXYGEN SATURATION: 90 % | SYSTOLIC BLOOD PRESSURE: 136 MMHG

## 2022-02-08 LAB
GLUCOSE SERPL-MCNC: 270 MG/DL (ref 70–110)
GLUCOSE SERPL-MCNC: 270 MG/DL (ref 70–110)
POCT GLUCOSE: 187 MG/DL (ref 70–110)
POCT GLUCOSE: 270 MG/DL (ref 70–110)
POCT GLUCOSE: 270 MG/DL (ref 70–110)

## 2022-02-08 PROCEDURE — 63600175 PHARM REV CODE 636 W HCPCS: Performed by: SURGERY

## 2022-02-08 PROCEDURE — 99900035 HC TECH TIME PER 15 MIN (STAT)

## 2022-02-08 PROCEDURE — 94761 N-INVAS EAR/PLS OXIMETRY MLT: CPT

## 2022-02-08 PROCEDURE — 25000003 PHARM REV CODE 250: Performed by: SURGERY

## 2022-02-08 PROCEDURE — 27000221 HC OXYGEN, UP TO 24 HOURS

## 2022-02-08 RX ORDER — OXYCODONE AND ACETAMINOPHEN 5; 325 MG/1; MG/1
1 TABLET ORAL EVERY 6 HOURS PRN
Qty: 12 TABLET | Refills: 0 | Status: SHIPPED | OUTPATIENT
Start: 2022-02-08 | End: 2022-02-15

## 2022-02-08 RX ORDER — KETOROLAC TROMETHAMINE 10 MG/1
10 TABLET, FILM COATED ORAL EVERY 6 HOURS PRN
Qty: 12 TABLET | Refills: 0 | Status: SHIPPED | OUTPATIENT
Start: 2022-02-08 | End: 2022-02-11

## 2022-02-08 RX ADMIN — INSULIN ASPART 3 UNITS: 100 INJECTION, SOLUTION INTRAVENOUS; SUBCUTANEOUS at 05:02

## 2022-02-08 RX ADMIN — TOPIRAMATE 50 MG: 25 TABLET, FILM COATED ORAL at 08:02

## 2022-02-08 RX ADMIN — KETOROLAC TROMETHAMINE 15 MG: 30 INJECTION, SOLUTION INTRAMUSCULAR at 05:02

## 2022-02-08 RX ADMIN — DULOXETINE 90 MG: 30 CAPSULE, DELAYED RELEASE ORAL at 08:02

## 2022-02-08 NOTE — PLAN OF CARE
Patient on oxygen with documented flow.  Will attempt to wean per O2 order protocol. Will continue to monitor.

## 2022-02-08 NOTE — PLAN OF CARE
Plan of care reviewed. Medications given per MAR. Patient ambulated to the bathroom with one person assistance. Dressing to abdomen in place no drainage noted. Blood glucose checked as ordered. Safety maintained, call light in reach, bed alarm in use.

## 2022-02-08 NOTE — PROGRESS NOTES
Follow-up With  Details  Why  Contact Info   Simon SPIVEY  Follow-up With  Details  Why  Contact Info   Simon King MD  On 2/22/2022  2:20 pm   [address]  [address]  [place] [id number]  [phone]     On 2/22/2022  2:20 pm   [address]  [address]  [place] [id number]  [phone]

## 2022-02-08 NOTE — PLAN OF CARE
Future Appointments   Date Time Provider Department Center   2/22/2022  2:20 PM Simon King MD [facility] [place] [facility]   3/15/2022 10:30 AM Gaurav Malin MD [facility] [place] [facility]   6/16/2022  9:40 AM Cameron Valladares III, MD [facility]     no dme, no hh ordered   sister will transport pt to home          02/08/22 1139   Final Note   Assessment Type Final Discharge Note   Anticipated Discharge Disposition Home   What phone number can be called within the next 1-3 days to see how you are doing after discharge? [phone]   Hospital Resources/Appts/Education Provided Appointments scheduled and added to AVS   Post-Acute Status   Discharge Delays None known at this time

## 2022-02-08 NOTE — DISCHARGE SUMMARY
"[facility]  Discharge Summary  General Surgery      Admit Date: 2/7/2022    Discharge Date: No discharge date for patient encounter.    Attending Physician/Discharge Provider: Simon King M.D.    Reason for Admission: Ventral hernia    Procedures Performed: Procedure(s) (LRB):  REPAIR, HERNIA, INCISIONAL, INCARCERATED, WITHOUT HISTORY OF PRIOR REPAIR (N/A)    Hospital Course:  Patient is a 71-year old female with Hx of ventral hernia who presented for repair.  She tolerated surgery well and recovered overnight without issue.  She is being discharged in a stable condition.     Final Diagnoses:   Principal Problem: Incisional hernia, without obstruction or gangrene       Discharged Condition: Good    Disposition: Home or Self Care    Follow Up/Patient Instructions: Follow up with Dr. King in 2 weeks    Medications:  Reconciled Home Medications:      Medication List      START taking these medications    ketorolac 10 mg tablet  Commonly known as: TORADOL  Take 1 tablet (10 mg total) by mouth every 6 (six) hours as needed for Pain.     oxyCODONE-acetaminophen 5-325 mg per tablet  Commonly known as: PERCOCET  Take 1 tablet by mouth every 6 (six) hours as needed for Pain.        CONTINUE taking these medications    albuterol 90 mcg/actuation inhaler  Commonly known as: PROVENTIL/VENTOLIN HFA  INHALE 2 PUFFS BY MOUTH INTO THE LUNGS EVERY 6 HOURS AS NEEDED FOR WHEEZING     albuterol-ipratropium 2.5 mg-0.5 mg/3 mL nebulizer solution  Commonly known as: DUO-NEB  Take 3 mLs by nebulization every 4 (four) hours as needed for Wheezing or Shortness of Breath. Rescue     alendronate 70 MG tablet  Commonly known as: FOSAMAX  Take 1 tablet (70 mg total) by mouth every 7 days.     aspirin 81 MG EC tablet  Commonly known as: ECOTRIN  Take 81 mg by mouth once daily.     BD ULTRA-FINE MINI PEN NEEDLE 31 gauge x 3/16" Ndle  Generic drug: pen needle, diabetic     benzonatate 200 MG capsule  Commonly known as: " TESSALON  Take 1 capsule (200 mg total) by mouth 3 (three) times daily as needed for Cough.     * CHANTIX STARTING MONTH BOX 0.5 mg (11)- 1 mg (42) tablet  Generic drug: varenicline  Follow package directions     * varenicline 1 mg Tab  Commonly known as: CHANTIX  Take 1 tablet (1 mg total) by mouth once daily. CX     cholestyramine (with sugar) 4 gram Powd  Take 4 g by mouth 2 (two) times daily as needed (diarrhea).     diclofenac sodium 1 % Gel  Commonly known as: VOLTAREN  Apply 2 g topically 4 (four) times daily.     * DULoxetine 60 MG capsule  Commonly known as: CYMBALTA  Take 90 mg by mouth once daily.     * DULoxetine 30 MG capsule  Commonly known as: CYMBALTA  Take 30 mg by mouth once daily.     ferrous sulfate 325 mg (65 mg iron) Tab tablet  Commonly known as: FEOSOL  Take 1 tablet (325 mg total) by mouth once daily.     fluticasone propionate 50 mcg/actuation nasal spray  Commonly known as: FLONASE  2 sprays (100 mcg total) by Each Nare route once daily.     hydroCHLOROthiazide 12.5 mg capsule  Commonly known as: MICROZIDE  Take 1 capsule (12.5 mg total) by mouth once daily.     HYDROcodone-acetaminophen  mg per tablet  Commonly known as: NORCO  Take 1 tablet by mouth every 6 (six) hours as needed for Pain.     insulin lispro protamin-lispro 100 unit/mL (75-25) Inpn  Inject into the skin.     LORazepam 0.5 MG tablet  Commonly known as: ATIVAN  Take 0.5 mg by mouth every 6 (six) hours as needed for Anxiety.     losartan 50 MG tablet  Commonly known as: COZAAR  Take 1 tablet (50 mg total) by mouth once daily.     magnesium oxide 400 mg (241.3 mg magnesium) tablet  Commonly known as: MAG-OX  Take 400 mg by mouth once daily.     metFORMIN 1000 MG tablet  Commonly known as: GLUCOPHAGE  Take 1,000 mg by mouth 2 (two) times daily.     nicotine (polacrilex) 2 mg Lzmn  Take 1 lozenge (2 mg total) by mouth as needed (as needed). Dispense MINIs please. DO NOT CHEW UP. Can take 1-2 per hour as needed in place of  a cigarette.     nicotine 21 mg/24 hr  Commonly known as: NICODERM CQ  Place 1 patch onto the skin once daily.     nitroGLYCERIN 0.4 MG SL tablet  Commonly known as: NITROSTAT  Place 1 tablet (0.4 mg total) under the tongue every 5 (five) minutes as needed for Chest pain.     ondansetron 4 MG Tbdl  Commonly known as: ZOFRAN-ODT  Take 1 tablet (4 mg total) by mouth every 8 (eight) hours as needed (nausea).     potassium chloride SA 20 MEQ tablet  Commonly known as: K-DUR,KLOR-CON  TAKE 1 TABLET(20 MEQ) BY MOUTH EVERY DAY     pravastatin 40 MG tablet  Commonly known as: PRAVACHOL  Take 0.5 tablets (20 mg total) by mouth once daily.     pregabalin 150 MG capsule  Commonly known as: LYRICA  Take 150 mg by mouth 2 (two) times daily as needed.     QUEtiapine 300 MG Tab  Commonly known as: SEROQUEL  300 mg nightly.     tiZANidine 4 MG tablet  Commonly known as: ZANAFLEX     topiramate 50 MG tablet  Commonly known as: TOPAMAX  Take 50 mg by mouth 2 (two) times daily.     TRELEGY ELLIPTA 200-62.5-25 mcg inhaler  Generic drug: fluticasone-umeclidin-vilanter     verapamiL 180 MG CR tablet  Commonly known as: CALAN-SR  Take 1 tablet (180 mg total) by mouth every evening.     zonisamide 100 MG Cap  Commonly known as: ZONEGRAN  Take by mouth.         * This list has 4 medication(s) that are the same as other medications prescribed for you. Read the directions carefully, and ask your doctor or other care provider to review them with you.              Discharge Procedure Orders   Diet Adult Regular     Ice to affected area     Lifting restrictions   Order Comments: 20 lbs x 6 weeks     Remove dressing in 24 hours   Order Comments: Ok to shower in 24 hours; no bathing x 2 weeks         Activity: As tolerated.  Diet: regular  Wound Care: As above.      Simon King M.D., F.A.C.S.  Nsogpm-Xqdtbdyuc-Arnxpuq and General Surgery  [facility]

## 2022-02-08 NOTE — PLAN OF CARE
Introduced as VN and will be reviewing discharge instructions.  Educated patient on reason for admission, home medication list, and discharge instructions including when to return to ED and the following doctor appointments.  Education per flowsheet.  Opportunity given for questions and questions answered.  Nurse notified of   completion of discharge education. Patient is waiting for ride to arrive.

## 2022-02-08 NOTE — PROGRESS NOTES
Progress Note  General Surgery    Admit Date: 2/7/2022  S/P: Procedure(s) (LRB):  REPAIR, HERNIA, INCISIONAL, INCARCERATED, WITHOUT HISTORY OF PRIOR REPAIR (N/A)    Post-operative Day: 1 Day Post-Op    Hospital Day: 2    SUBJECTIVE:     No acute events overnight. Patient states pain is controlled.  OBJECTIVE:     Vital Signs (Most Recent)  Temp: 98.2 °F (36.8 °C) (02/08/22 0759)  Pulse: 101 (02/08/22 0759)  Resp: 19 (02/08/22 0759)  BP: 136/70 (02/08/22 0759)  SpO2: (!) 90 % (02/08/22 0746)    Vital Signs Range (Last 24H):  Temp:  [97.9 °F (36.6 °C)-98.7 °F (37.1 °C)]   Pulse:  []   Resp:  [16-34]   BP: (108-141)/(61-80)   SpO2:  [87 %-99 %]     I & O (Last 24H):    Intake/Output Summary (Last 24 hours) at 2/8/2022 0938  Last data filed at 2/7/2022 2044  Gross per 24 hour   Intake 120 ml   Output 500 ml   Net -380 ml       Physical Exam:  Gen: NAD   HEENT: NCAT  Pulm: unlabored, symmetrical   Abd: Soft, nttp. No rebound, guarding.     Wound/Incision:  clean, dry, intact    Laboratory:  Labs within the past 24 hours have been reviewed.    ASSESSMENT/PLAN:     Assessment/Plan:  ALLYSSA/JAZLYN King M.D., F.A.C.S.  Upqeom-Qgmcznieq-Pcawlmn and General Surgery  [facility]

## 2022-02-08 NOTE — PLAN OF CARE
[place] met with  pt - lives alone at [facility]   pt has a rollator    pt is otherwise independent, drives - no stairs to climb      Future Appointments   Date Time Provider Department Center   2/22/2022  2:20 PM Simon King MD [facility] [place] [facility]   3/15/2022 10:30 AM Gaurav Malin MD [facility] [place] [facility]   6/16/2022  9:40 AM Cameron Valladares III, MD [facility]     pharmacy:  [facility] - able to afford meds   pt's sister lives nearby and can assist as needed - Adelita Ureña will transport pt to home at d/c        02/08/22 1134   Discharge Planning   Assessment Type Discharge Planning Brief Assessment   Resource/Environmental Concerns none   Support Systems Family members   Equipment Currently Used at Home rollator   Current Living Arrangements home/apartment/condo   Patient/Family Anticipates Transition to home   Patient/Family Anticipated Services at Transition none   DME Needed Upon Discharge  none   Discharge Plan A Home

## 2022-02-09 ENCOUNTER — TELEPHONE (OUTPATIENT)
Dept: ADMINISTRATIVE | Facility: OTHER | Age: 72
End: 2022-02-09
Payer: MEDICARE

## 2022-02-13 LAB
FINAL PATHOLOGIC DIAGNOSIS: NORMAL
GROSS: NORMAL
Lab: NORMAL

## 2022-02-25 ENCOUNTER — OFFICE VISIT (OUTPATIENT)
Dept: SURGERY | Facility: CLINIC | Age: 72
End: 2022-02-25
Payer: MEDICARE

## 2022-02-25 VITALS
HEART RATE: 108 BPM | WEIGHT: 140.75 LBS | DIASTOLIC BLOOD PRESSURE: 84 MMHG | SYSTOLIC BLOOD PRESSURE: 121 MMHG | HEIGHT: 62 IN | BODY MASS INDEX: 25.9 KG/M2

## 2022-02-25 DIAGNOSIS — K43.2 INCISIONAL HERNIA, WITHOUT OBSTRUCTION OR GANGRENE: Primary | ICD-10-CM

## 2022-02-25 PROCEDURE — 3288F PR FALLS RISK ASSESSMENT DOCUMENTED: ICD-10-PCS | Mod: CPTII,S$GLB,, | Performed by: SURGERY

## 2022-02-25 PROCEDURE — 1160F PR REVIEW ALL MEDS BY PRESCRIBER/CLIN PHARMACIST DOCUMENTED: ICD-10-PCS | Mod: CPTII,S$GLB,, | Performed by: SURGERY

## 2022-02-25 PROCEDURE — 1125F PR PAIN SEVERITY QUANTIFIED, PAIN PRESENT: ICD-10-PCS | Mod: CPTII,S$GLB,, | Performed by: SURGERY

## 2022-02-25 PROCEDURE — 1157F ADVNC CARE PLAN IN RCRD: CPT | Mod: CPTII,S$GLB,, | Performed by: SURGERY

## 2022-02-25 PROCEDURE — 99024 PR POST-OP FOLLOW-UP VISIT: ICD-10-PCS | Mod: S$GLB,,, | Performed by: SURGERY

## 2022-02-25 PROCEDURE — 3008F PR BODY MASS INDEX (BMI) DOCUMENTED: ICD-10-PCS | Mod: CPTII,S$GLB,, | Performed by: SURGERY

## 2022-02-25 PROCEDURE — 3079F PR MOST RECENT DIASTOLIC BLOOD PRESSURE 80-89 MM HG: ICD-10-PCS | Mod: CPTII,S$GLB,, | Performed by: SURGERY

## 2022-02-25 PROCEDURE — 3008F BODY MASS INDEX DOCD: CPT | Mod: CPTII,S$GLB,, | Performed by: SURGERY

## 2022-02-25 PROCEDURE — 99024 POSTOP FOLLOW-UP VISIT: CPT | Mod: S$GLB,,, | Performed by: SURGERY

## 2022-02-25 PROCEDURE — 1160F RVW MEDS BY RX/DR IN RCRD: CPT | Mod: CPTII,S$GLB,, | Performed by: SURGERY

## 2022-02-25 PROCEDURE — 3079F DIAST BP 80-89 MM HG: CPT | Mod: CPTII,S$GLB,, | Performed by: SURGERY

## 2022-02-25 PROCEDURE — 1159F PR MEDICATION LIST DOCUMENTED IN MEDICAL RECORD: ICD-10-PCS | Mod: CPTII,S$GLB,, | Performed by: SURGERY

## 2022-02-25 PROCEDURE — 3288F FALL RISK ASSESSMENT DOCD: CPT | Mod: CPTII,S$GLB,, | Performed by: SURGERY

## 2022-02-25 PROCEDURE — 1159F MED LIST DOCD IN RCRD: CPT | Mod: CPTII,S$GLB,, | Performed by: SURGERY

## 2022-02-25 PROCEDURE — 99999 PR PBB SHADOW E&M-EST. PATIENT-LVL V: CPT | Mod: PBBFAC,,, | Performed by: SURGERY

## 2022-02-25 PROCEDURE — 1125F AMNT PAIN NOTED PAIN PRSNT: CPT | Mod: CPTII,S$GLB,, | Performed by: SURGERY

## 2022-02-25 PROCEDURE — 3074F SYST BP LT 130 MM HG: CPT | Mod: CPTII,S$GLB,, | Performed by: SURGERY

## 2022-02-25 PROCEDURE — 3074F PR MOST RECENT SYSTOLIC BLOOD PRESSURE < 130 MM HG: ICD-10-PCS | Mod: CPTII,S$GLB,, | Performed by: SURGERY

## 2022-02-25 PROCEDURE — 1157F PR ADVANCE CARE PLAN OR EQUIV PRESENT IN MEDICAL RECORD: ICD-10-PCS | Mod: CPTII,S$GLB,, | Performed by: SURGERY

## 2022-02-25 PROCEDURE — 1101F PT FALLS ASSESS-DOCD LE1/YR: CPT | Mod: CPTII,S$GLB,, | Performed by: SURGERY

## 2022-02-25 PROCEDURE — 99999 PR PBB SHADOW E&M-EST. PATIENT-LVL V: ICD-10-PCS | Mod: PBBFAC,,, | Performed by: SURGERY

## 2022-02-25 PROCEDURE — 1101F PR PT FALLS ASSESS DOC 0-1 FALLS W/OUT INJ PAST YR: ICD-10-PCS | Mod: CPTII,S$GLB,, | Performed by: SURGERY

## 2022-02-25 NOTE — PROGRESS NOTES
[facility]  POST-OP NOTE    HPI: Chayito Chung is a 71 y.o. female s/p open VH repair.  Doing well.  Some constipation and bloating      VITALS:  Vitals:    02/25/22 1155   BP: 121/84   Pulse: 108       PHYSICAL EXAM:  GEN: NAD  HEENT: NCAT  ABD: incision C/D/I      ASSESSMENT & PLAN:  RTC as needed, ok to resume normal activities      Simon King M.D., F.A.C.S.  Gwzfcn-Ryvqqvsxt-Vbizazu and General Surgery  [facility]

## 2022-03-15 DIAGNOSIS — I15.2 HYPERTENSION ASSOCIATED WITH DIABETES: ICD-10-CM

## 2022-03-15 DIAGNOSIS — E11.59 HYPERTENSION ASSOCIATED WITH DIABETES: ICD-10-CM

## 2022-03-15 RX ORDER — POTASSIUM CHLORIDE 20 MEQ/1
20 TABLET, EXTENDED RELEASE ORAL DAILY
Qty: 90 TABLET | Refills: 3 | Status: ON HOLD | OUTPATIENT
Start: 2022-03-15 | End: 2022-09-08 | Stop reason: SDUPTHER

## 2022-03-15 NOTE — TELEPHONE ENCOUNTER
Refill Routing Note   Medication(s) are not appropriate for processing by [facility] for the following reason(s):      - Medication not previously prescribed by PCP    [facility] action(s):  Defer          --->Care Gap information included in message below if applicable.   Medication reconciliation completed: No   Automatic Epic Generated Protocol Data:        Requested Prescriptions   Pending Prescriptions Disp Refills    potassium chloride SA (K-DUR,KLOR-CON) 20 MEQ tablet 90 tablet 3     Sig: Take 1 tablet (20 mEq total) by mouth once daily.       Endocrinology:  Minerals - Potassium Supplementation Passed - 3/15/2022  3:04 PM        Passed - Patient is at least 18 years old        Passed - Valid encounter within last 15 months     Recent Visits  Date Type Provider Dept   12/16/21 Office Visit Cameron Valladares III, MD [facility] Internal Medicine   11/18/21 Office Visit Cameron Valladares III, MD [facility] Medicine   08/13/21 Office Visit Alexa Evans MD [facility]   07/09/21 Office Visit Alexa Evans MD [facility]   04/12/21 Office Visit Alexa Evans MD [facility]   04/05/21 Office Visit Alexa Evans MD [facility]   03/25/21 Office Visit Alexa Evans MD [facility]   03/10/21 Office Visit Alexa Evans MD [facility]   02/24/21 Office Visit Alexa Evans MD [facility]   02/08/21 Office Visit Alexa Evans MD [facility]   Showing recent visits within past 720 days and meeting all other requirements  Future Appointments  No visits were found meeting these conditions.  Showing future appointments within next 150 days and meeting all other requirements      Future Appointments              In 3 months Cameron Valladares III, MD [place] [facility] [place]                Passed - K is 5.2 or below and within 360 days     Potassium   Date Value Ref Range Status   02/04/2022 4.4 3.5 - 5.1 mmol/L Final    12/09/2021 4.4 3.5 - 5.1 mmol/L Final   12/03/2021 4.5 3.5 - 5.1 mmol/L Final   12/03/2021 4.5 3.5 - 5.1 mmol/L Final              Passed - Cr is 1.39 or below and within 360 days     Lab Results   Component Value Date    CREATININE 0.8 02/04/2022    CREATININE 1.0 12/09/2021    CREATININE 0.9 12/03/2021    CREATININE 0.9 12/03/2021              Passed - eGFR within 360 days     Lab Results   Component Value Date    LABGLOM >60 06/10/2013    LABGLOM >60 06/10/2013    LABGLOM >60 12/17/2012    LABGLOM >60 12/17/2012    EGFRNONAA >60 02/04/2022    EGFRNONAA 58 (A) 12/09/2021    EGFRNONAA >60 12/03/2021    EGFRNONAA >60 12/03/2021                      Appointments  past 12m or future 3m with PCP    Date Provider   Last Visit   12/16/2021 Cameron Valladares III, MD   Next Visit   6/16/2022 Cameron Valladares III, MD   ED visits in past 90 days: 0        Note composed:4:57 PM 03/15/2022

## 2022-03-15 NOTE — TELEPHONE ENCOUNTER
No new care gaps identified.  Powered by [employer]. Reference number: [id number].   3/15/2022 3:05:40 PM CDT

## 2022-03-15 NOTE — TELEPHONE ENCOUNTER
----- Message from Marry Galeas sent at 3/15/2022 10:48 AM CDT -----  Regarding: Refill  Type:  RX Refill Request    Who Called: pt    Refill or New Rx: refill    RX Name and Strength: potassium chloride SA (K-DUR,KLOR-CON) 20 MEQ tablet    Preferred Pharmacy with phone number: [id number] - [place] - [address] [facility]  [address] [address]  Phone: [phone] Fax: [phone]    Would the patient rather a call back or a response via [facility]? Call    Best Call Back Number: [phone]

## 2022-03-24 ENCOUNTER — TELEPHONE (OUTPATIENT)
Dept: SMOKING CESSATION | Facility: CLINIC | Age: 72
End: 2022-03-24
Payer: MEDICARE

## 2022-04-05 ENCOUNTER — TELEPHONE (OUTPATIENT)
Dept: INTERNAL MEDICINE | Facility: CLINIC | Age: 72
End: 2022-04-05
Payer: MEDICARE

## 2022-04-05 ENCOUNTER — NURSE TRIAGE (OUTPATIENT)
Dept: ADMINISTRATIVE | Facility: CLINIC | Age: 72
End: 2022-04-05
Payer: MEDICARE

## 2022-04-05 ENCOUNTER — HOSPITAL ENCOUNTER (INPATIENT)
Facility: HOSPITAL | Age: 72
LOS: 4 days | Discharge: HOME | DRG: 065 | End: 2022-04-10
Attending: EMERGENCY MEDICINE | Admitting: INTERNAL MEDICINE
Payer: MEDICARE

## 2022-04-05 DIAGNOSIS — E11.59 TYPE 2 DIABETES MELLITUS WITH OTHER CIRCULATORY COMPLICATION, WITH LONG-TERM CURRENT USE OF INSULIN: ICD-10-CM

## 2022-04-05 DIAGNOSIS — T79.6XXA TRAUMATIC RHABDOMYOLYSIS, INITIAL ENCOUNTER: Primary | ICD-10-CM

## 2022-04-05 DIAGNOSIS — I25.10 CORONARY ARTERY DISEASE INVOLVING NATIVE CORONARY ARTERY OF NATIVE HEART WITHOUT ANGINA PECTORIS: Chronic | ICD-10-CM

## 2022-04-05 DIAGNOSIS — Z79.4 TYPE 2 DIABETES MELLITUS WITH OTHER CIRCULATORY COMPLICATION, WITH LONG-TERM CURRENT USE OF INSULIN: ICD-10-CM

## 2022-04-05 DIAGNOSIS — M54.6 PAIN IN THORACIC SPINE: ICD-10-CM

## 2022-04-05 DIAGNOSIS — R29.898 RIGHT LEG WEAKNESS: ICD-10-CM

## 2022-04-05 DIAGNOSIS — I67.848 OTHER CEREBROVASCULAR VASOSPASM AND VASOCONSTRICTION: ICD-10-CM

## 2022-04-05 DIAGNOSIS — I63.9 CVA (CEREBRAL VASCULAR ACCIDENT): ICD-10-CM

## 2022-04-05 DIAGNOSIS — M54.9 DORSALGIA, UNSPECIFIED: ICD-10-CM

## 2022-04-05 DIAGNOSIS — G40.909 SEIZURE DISORDER: ICD-10-CM
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
## 2022-04-05 DIAGNOSIS — I63.9 STROKE: ICD-10-CM

## 2022-04-05 DIAGNOSIS — I63.9 CEREBROVASCULAR ACCIDENT (CVA), UNSPECIFIED MECHANISM: ICD-10-CM

## 2022-04-05 DIAGNOSIS — I69.30 HISTORY OF STROKE WITH RESIDUAL DEFICIT: ICD-10-CM

## 2022-04-05 LAB
ALBUMIN SERPL BCP-MCNC: 3.9 G/DL (ref 3.5–5.2)
ALP SERPL-CCNC: 157 U/L (ref 55–135)
ALT SERPL W/O P-5'-P-CCNC: 16 U/L (ref 10–44)
ANION GAP SERPL CALC-SCNC: 12 MMOL/L (ref 8–16)
AST SERPL-CCNC: 38 U/L (ref 10–40)
BACTERIA #/AREA URNS HPF: ABNORMAL /HPF
BASOPHILS # BLD AUTO: 0.03 K/UL (ref 0–0.2)
BASOPHILS NFR BLD: 0.4 % (ref 0–1.9)
BILIRUB SERPL-MCNC: 0.4 MG/DL (ref 0.1–1)
BILIRUB UR QL STRIP: NEGATIVE
BUN SERPL-MCNC: 18 MG/DL (ref 8–23)
CALCIUM SERPL-MCNC: 9.5 MG/DL (ref 8.7–10.5)
CHLORIDE SERPL-SCNC: 106 MMOL/L (ref 95–110)
CHOLEST SERPL-MCNC: 154 MG/DL (ref 120–199)
CHOLEST/HDLC SERPL: 3.1 {RATIO} (ref 2–5)
CK SERPL-CCNC: 1408 U/L (ref 20–180)
CLARITY UR: ABNORMAL
CO2 SERPL-SCNC: 19 MMOL/L (ref 23–29)
COLOR UR: YELLOW
CREAT SERPL-MCNC: 1.1 MG/DL (ref 0.5–1.4)
DIFFERENTIAL METHOD: ABNORMAL
EOSINOPHIL # BLD AUTO: 0.1 K/UL (ref 0–0.5)
EOSINOPHIL NFR BLD: 1 % (ref 0–8)
ERYTHROCYTE [DISTWIDTH] IN BLOOD BY AUTOMATED COUNT: 15 % (ref 11.5–14.5)
EST. GFR  (AFRICAN AMERICAN): 59 ML/MIN/1.73 M^2
EST. GFR  (NON AFRICAN AMERICAN): 51 ML/MIN/1.73 M^2
GLUCOSE SERPL-MCNC: 204 MG/DL (ref 70–110)
GLUCOSE UR QL STRIP: ABNORMAL
HCT VFR BLD AUTO: 38.5 % (ref 37–48.5)
HDLC SERPL-MCNC: 49 MG/DL (ref 40–75)
HDLC SERPL: 31.8 % (ref 20–50)
HGB BLD-MCNC: 11.5 G/DL (ref 12–16)
HGB UR QL STRIP: NEGATIVE
HYALINE CASTS #/AREA URNS LPF: 4 /LPF
IMM GRANULOCYTES # BLD AUTO: 0.02 K/UL (ref 0–0.04)
IMM GRANULOCYTES NFR BLD AUTO: 0.3 % (ref 0–0.5)
INR PPP: 1 (ref 0.8–1.2)
KETONES UR QL STRIP: NEGATIVE
LDLC SERPL CALC-MCNC: 82 MG/DL (ref 63–159)
LEUKOCYTE ESTERASE UR QL STRIP: ABNORMAL
LYMPHOCYTES # BLD AUTO: 1.6 K/UL (ref 1–4.8)
LYMPHOCYTES NFR BLD: 22.4 % (ref 18–48)
MCH RBC QN AUTO: 27.6 PG (ref 27–31)
MCHC RBC AUTO-ENTMCNC: 29.9 G/DL (ref 32–36)
MCV RBC AUTO: 92 FL (ref 82–98)
MICROSCOPIC COMMENT: ABNORMAL
MONOCYTES # BLD AUTO: 0.6 K/UL (ref 0.3–1)
MONOCYTES NFR BLD: 8.5 % (ref 4–15)
NEUTROPHILS # BLD AUTO: 4.8 K/UL (ref 1.8–7.7)
NEUTROPHILS NFR BLD: 67.4 % (ref 38–73)
NITRITE UR QL STRIP: NEGATIVE
NONHDLC SERPL-MCNC: 105 MG/DL
NRBC BLD-RTO: 0 /100 WBC
PH UR STRIP: 6 [PH] (ref 5–8)
PLATELET # BLD AUTO: 269 K/UL (ref 150–450)
PMV BLD AUTO: 10.2 FL (ref 9.2–12.9)
POCT GLUCOSE: 208 MG/DL (ref 70–110)
POTASSIUM SERPL-SCNC: 4.5 MMOL/L (ref 3.5–5.1)
PROT SERPL-MCNC: 8.4 G/DL (ref 6–8.4)
PROT UR QL STRIP: ABNORMAL
PROTHROMBIN TIME: 10 SEC (ref 9–12.5)
RBC # BLD AUTO: 4.17 M/UL (ref 4–5.4)
RBC #/AREA URNS HPF: 27 /HPF (ref 0–4)
SODIUM SERPL-SCNC: 137 MMOL/L (ref 136–145)
SP GR UR STRIP: 1.01 (ref 1–1.03)
SQUAMOUS #/AREA URNS HPF: 3 /HPF
TRIGL SERPL-MCNC: 115 MG/DL (ref 30–150)
TROPONIN I SERPL DL<=0.01 NG/ML-MCNC: 0.01 NG/ML (ref 0–0.03)
TSH SERPL DL<=0.005 MIU/L-ACNC: 0.42 UIU/ML (ref 0.4–4)
URN SPEC COLLECT METH UR: ABNORMAL
UROBILINOGEN UR STRIP-ACNC: NEGATIVE EU/DL
WBC # BLD AUTO: 7.05 K/UL (ref 3.9–12.7)
WBC #/AREA URNS HPF: 7 /HPF (ref 0–5)
YEAST URNS QL MICRO: ABNORMAL

## 2022-04-05 PROCEDURE — 25500020 PHARM REV CODE 255: Performed by: EMERGENCY MEDICINE

## 2022-04-05 PROCEDURE — 25000003 PHARM REV CODE 250: Performed by: STUDENT IN AN ORGANIZED HEALTH CARE EDUCATION/TRAINING PROGRAM

## 2022-04-05 PROCEDURE — 96372 THER/PROPH/DIAG INJ SC/IM: CPT | Mod: 59 | Performed by: STUDENT IN AN ORGANIZED HEALTH CARE EDUCATION/TRAINING PROGRAM

## 2022-04-05 PROCEDURE — A9585 GADOBUTROL INJECTION: HCPCS | Performed by: EMERGENCY MEDICINE

## 2022-04-05 PROCEDURE — 82962 GLUCOSE BLOOD TEST: CPT

## 2022-04-05 PROCEDURE — 80061 LIPID PANEL: CPT | Performed by: NURSE PRACTITIONER

## 2022-04-05 PROCEDURE — 63600175 PHARM REV CODE 636 W HCPCS: Performed by: STUDENT IN AN ORGANIZED HEALTH CARE EDUCATION/TRAINING PROGRAM

## 2022-04-05 PROCEDURE — 99285 EMERGENCY DEPT VISIT HI MDM: CPT | Mod: 25

## 2022-04-05 PROCEDURE — 25000003 PHARM REV CODE 250: Performed by: EMERGENCY MEDICINE

## 2022-04-05 PROCEDURE — G0426 PR INPT TELEHEALTH CONSULT 50M: ICD-10-PCS | Mod: 95,,, | Performed by: PSYCHIATRY & NEUROLOGY

## 2022-04-05 PROCEDURE — 84484 ASSAY OF TROPONIN QUANT: CPT | Performed by: NURSE PRACTITIONER

## 2022-04-05 PROCEDURE — 93010 EKG 12-LEAD: ICD-10-PCS | Mod: ,,, | Performed by: INTERNAL MEDICINE

## 2022-04-05 PROCEDURE — G0426 INPT/ED TELECONSULT50: HCPCS | Mod: 95,,, | Performed by: PSYCHIATRY & NEUROLOGY

## 2022-04-05 PROCEDURE — 82550 ASSAY OF CK (CPK): CPT | Performed by: NURSE PRACTITIONER

## 2022-04-05 PROCEDURE — 85610 PROTHROMBIN TIME: CPT | Performed by: NURSE PRACTITIONER

## 2022-04-05 PROCEDURE — 96374 THER/PROPH/DIAG INJ IV PUSH: CPT

## 2022-04-05 PROCEDURE — 84443 ASSAY THYROID STIM HORMONE: CPT | Performed by: NURSE PRACTITIONER

## 2022-04-05 PROCEDURE — 85025 COMPLETE CBC W/AUTO DIFF WBC: CPT | Performed by: NURSE PRACTITIONER

## 2022-04-05 PROCEDURE — 93005 ELECTROCARDIOGRAM TRACING: CPT

## 2022-04-05 PROCEDURE — 80053 COMPREHEN METABOLIC PANEL: CPT | Performed by: NURSE PRACTITIONER

## 2022-04-05 PROCEDURE — G0378 HOSPITAL OBSERVATION PER HR: HCPCS

## 2022-04-05 PROCEDURE — 93010 ELECTROCARDIOGRAM REPORT: CPT | Mod: ,,, | Performed by: INTERNAL MEDICINE

## 2022-04-05 PROCEDURE — 81000 URINALYSIS NONAUTO W/SCOPE: CPT | Performed by: STUDENT IN AN ORGANIZED HEALTH CARE EDUCATION/TRAINING PROGRAM

## 2022-04-05 PROCEDURE — 96361 HYDRATE IV INFUSION ADD-ON: CPT

## 2022-04-05 RX ORDER — ATORVASTATIN CALCIUM 40 MG/1
40 TABLET, FILM COATED ORAL DAILY
Status: DISCONTINUED | OUTPATIENT
Start: 2022-04-06 | End: 2022-04-10 | Stop reason: HOSPADM

## 2022-04-05 RX ORDER — SODIUM CHLORIDE 9 MG/ML
INJECTION, SOLUTION INTRAVENOUS CONTINUOUS
Status: ACTIVE | OUTPATIENT
Start: 2022-04-05 | End: 2022-04-06

## 2022-04-05 RX ORDER — CLOPIDOGREL BISULFATE 75 MG/1
300 TABLET ORAL
Status: COMPLETED | OUTPATIENT
Start: 2022-04-05 | End: 2022-04-05

## 2022-04-05 RX ORDER — PREGABALIN 75 MG/1
150 CAPSULE ORAL 2 TIMES DAILY
Status: DISCONTINUED | OUTPATIENT
Start: 2022-04-06 | End: 2022-04-10 | Stop reason: HOSPADM

## 2022-04-05 RX ORDER — QUETIAPINE FUMARATE 100 MG/1
300 TABLET, FILM COATED ORAL NIGHTLY
Status: DISCONTINUED | OUTPATIENT
Start: 2022-04-06 | End: 2022-04-10 | Stop reason: HOSPADM

## 2022-04-05 RX ORDER — INSULIN ASPART 100 [IU]/ML
1-10 INJECTION, SOLUTION INTRAVENOUS; SUBCUTANEOUS
Status: DISCONTINUED | OUTPATIENT
Start: 2022-04-05 | End: 2022-04-10 | Stop reason: HOSPADM

## 2022-04-05 RX ORDER — ZONISAMIDE 100 MG/1
100 CAPSULE ORAL DAILY
Status: DISCONTINUED | OUTPATIENT
Start: 2022-04-06 | End: 2022-04-10 | Stop reason: HOSPADM

## 2022-04-05 RX ORDER — DULOXETIN HYDROCHLORIDE 30 MG/1
90 CAPSULE, DELAYED RELEASE ORAL DAILY
Status: DISCONTINUED | OUTPATIENT
Start: 2022-04-06 | End: 2022-04-10 | Stop reason: HOSPADM

## 2022-04-05 RX ORDER — ASPIRIN 81 MG/1
81 TABLET ORAL DAILY
Status: DISCONTINUED | OUTPATIENT
Start: 2022-04-06 | End: 2022-04-05

## 2022-04-05 RX ORDER — SODIUM CHLORIDE 0.9 % (FLUSH) 0.9 %
10 SYRINGE (ML) INJECTION
Status: DISCONTINUED | OUTPATIENT
Start: 2022-04-05 | End: 2022-04-10 | Stop reason: HOSPADM

## 2022-04-05 RX ORDER — GLUCAGON 1 MG
1 KIT INJECTION
Status: DISCONTINUED | OUTPATIENT
Start: 2022-04-05 | End: 2022-04-10 | Stop reason: HOSPADM

## 2022-04-05 RX ORDER — ENOXAPARIN SODIUM 100 MG/ML
40 INJECTION SUBCUTANEOUS EVERY 24 HOURS
Status: DISCONTINUED | OUTPATIENT
Start: 2022-04-05 | End: 2022-04-10 | Stop reason: HOSPADM

## 2022-04-05 RX ORDER — QUETIAPINE FUMARATE 100 MG/1
300 TABLET, FILM COATED ORAL DAILY
Status: DISCONTINUED | OUTPATIENT
Start: 2022-04-06 | End: 2022-04-05

## 2022-04-05 RX ORDER — TOPIRAMATE 25 MG/1
50 TABLET ORAL 2 TIMES DAILY
Status: DISCONTINUED | OUTPATIENT
Start: 2022-04-05 | End: 2022-04-10 | Stop reason: HOSPADM

## 2022-04-05 RX ORDER — IBUPROFEN 200 MG
16 TABLET ORAL
Status: DISCONTINUED | OUTPATIENT
Start: 2022-04-05 | End: 2022-04-10 | Stop reason: HOSPADM

## 2022-04-05 RX ORDER — HYDROCODONE BITARTRATE AND ACETAMINOPHEN 5; 325 MG/1; MG/1
1 TABLET ORAL EVERY 6 HOURS PRN
Status: DISCONTINUED | OUTPATIENT
Start: 2022-04-05 | End: 2022-04-10 | Stop reason: HOSPADM

## 2022-04-05 RX ORDER — IPRATROPIUM BROMIDE AND ALBUTEROL SULFATE 2.5; .5 MG/3ML; MG/3ML
3 SOLUTION RESPIRATORY (INHALATION) EVERY 6 HOURS PRN
Status: ACTIVE | OUTPATIENT
Start: 2022-04-05 | End: 2022-04-08

## 2022-04-05 RX ORDER — SODIUM CHLORIDE 0.9 % (FLUSH) 0.9 %
10 SYRINGE (ML) INJECTION EVERY 12 HOURS PRN
Status: DISCONTINUED | OUTPATIENT
Start: 2022-04-05 | End: 2022-04-10 | Stop reason: HOSPADM

## 2022-04-05 RX ORDER — NAPROXEN SODIUM 220 MG/1
81 TABLET, FILM COATED ORAL DAILY
Status: DISCONTINUED | OUTPATIENT
Start: 2022-04-06 | End: 2022-04-10 | Stop reason: HOSPADM

## 2022-04-05 RX ORDER — PREGABALIN 75 MG/1
150 CAPSULE ORAL 2 TIMES DAILY
Status: DISCONTINUED | OUTPATIENT
Start: 2022-04-05 | End: 2022-04-05

## 2022-04-05 RX ORDER — GADOBUTROL 604.72 MG/ML
6 INJECTION INTRAVENOUS
Status: COMPLETED | OUTPATIENT
Start: 2022-04-05 | End: 2022-04-05

## 2022-04-05 RX ORDER — CLOPIDOGREL BISULFATE 75 MG/1
75 TABLET ORAL DAILY
Status: DISCONTINUED | OUTPATIENT
Start: 2022-04-06 | End: 2022-04-10 | Stop reason: HOSPADM

## 2022-04-05 RX ORDER — IBUPROFEN 200 MG
24 TABLET ORAL
Status: DISCONTINUED | OUTPATIENT
Start: 2022-04-05 | End: 2022-04-10 | Stop reason: HOSPADM

## 2022-04-05 RX ADMIN — GADOBUTROL 6 ML: 604.72 INJECTION INTRAVENOUS at 07:04

## 2022-04-05 RX ADMIN — CLOPIDOGREL 300 MG: 75 TABLET, FILM COATED ORAL at 06:04

## 2022-04-05 RX ADMIN — TOPIRAMATE 50 MG: 25 TABLET, FILM COATED ORAL at 10:04

## 2022-04-05 RX ADMIN — SODIUM CHLORIDE 1000 ML: 0.9 INJECTION, SOLUTION INTRAVENOUS at 09:04

## 2022-04-05 RX ADMIN — ENOXAPARIN SODIUM 40 MG: 100 INJECTION SUBCUTANEOUS at 10:04

## 2022-04-05 RX ADMIN — HYDROCODONE BITARTRATE AND ACETAMINOPHEN 1 TABLET: 5; 325 TABLET ORAL at 10:04

## 2022-04-05 NOTE — FIRST PROVIDER EVALUATION
Emergency Department TeleTriage Encounter Note      CHIEF COMPLAINT    Chief Complaint   Patient presents with    Fall     Pt went to bed at midnight on right side of bed and woke up on the floor to the left side of bed at unknown time. Pt is now c/o frontal, 8/10 headache and constant dizziness as well as numbness and decreased sensation to right leg from the knee down. No visual visual disturbances. Denies unilateral weakness. Pt is currently on aspirin.     Neck Pain     Posterior and 7/10; able to turn neck up and down and from side to side.        VITAL SIGNS   Initial Vitals [04/05/22 1643]   BP Pulse Resp Temp SpO2   135/80 93 20 98.3 °F (36.8 °C) (!) 93 %      MAP       --            ALLERGIES    Review of patient's allergies indicates:  No Known Allergies    PROVIDER TRIAGE NOTE  TeleTriage Note: Chayito Chung, a nontoxic/well appearing, 71 y.o. female, presented to the ED with c/o numbness/weakness to right leg. She states she woke up on the floor but fell asleep in her bed. She states she can't walk correctly and is dizzy. Triage nurse notified that the patient needs to be seen by a provider for a possible stroke code. Hx of seizures.    NP Jacob messaged and is evaluating the patient.    All ED beds are full at present; patient notified of this status.  Patient seen and medically screened by Nurse Practitioner via teletriage. Orders initiated at triage to expedite care.  Patient is stable to return to the waiting room and will be placed in an ED bed when available.  Care will be transferred to an alternate provider when patient has been placed in an Exam Room from the [facility] for physical exam, additional orders, and disposition.  5:18 PM Juli Barksdale DNP, FNP-C        ORDERS  Labs Reviewed - No data to display    ED Orders (720h ago, onward)    None            Virtual Visit Note: The provider triage portion of this emergency department evaluation and documentation was performed via [employer],  a HIPAA-compliant telemedicine application, in concert with a tele-presenter in the room. A face to face patient evaluation with one of my colleagues will occur once the patient is placed in an emergency department room.      DISCLAIMER: This note was prepared with [employer] voice recognition transcription software. Garbled syntax, mangled pronouns, and other bizarre constructions may be attributed to that software system.

## 2022-04-05 NOTE — TELEPHONE ENCOUNTER
Informed the patient that the provider would prefer she go to the ER ASAP. The patient informed that she is currently at [facility] for treatment. Message sent to the provider.

## 2022-04-05 NOTE — TELEPHONE ENCOUNTER
Spoke to Bonny, charge nurse in [place] ED, and advised of pt's LOC at home and right sided weakness. Only saw list of complaints as fall/neck pain so wanted to make sure that ED was aware of the other symptoms pt had relayed in case pt had not made them aware or was having any difficulty remembering. Bonny advised she would pass the information along.

## 2022-04-05 NOTE — TELEPHONE ENCOUNTER
Pt was transferred to O. Pt states about a week ago she woke up on the floor without remembering how she got there. Again today she states the last thing she remembers is it being morning time, then she woke up on the floor without knowing how she got there. States she woke up and it was after 2 pm. She was having trouble getting up off the floor, she was home alone. Eventually pt was able to get up but states she was having numbness/weakness to her right leg down to her foot. Pt continues to feel dizzy/lightheaded. Pt has history of diabetes but has not been able to check blood sugar lately due to glucometer not working. Dispo is call 911. Pt dialed her life alert who alerted EMS. Stayed on the phone with pt until EMS arrived. Pt's family member also arrived. Unable to give O number because pt did not have a way to write it down at this time. Advised we are available 24/7.    Reason for Disposition   Still feels dizzy or lightheaded    Additional Information   Negative: Still unconscious    Protocols used: [id number]

## 2022-04-05 NOTE — CONSULTS
[facility]  Vascular Neurology  Comprehensive Stroke Center  TeleVascular Neurology Acute Consultation Note      Consults    Consulting Provider: TEX BENITEZ  Current Providers  No providers found    Patient Location:  [facility] EMERGENCY DEPARTMENT Emergency Department  Spoke hospital nurse at bedside with patient assisting consultant.     Patient information was obtained from patient.         Assessment/Plan:       Diagnoses:   Right leg weakness  Awoke with symptoms, cannot exclude acute left YUDITH infarct.  Significant risk factors and history of vascular disease.  - Plavix load 300 mg, aspirin 81 mg, statin, permissive hypertension, etcetera unless alternative diagnosis confirmed  - CTA head and neck  - MRI brain with and without contrast    Unusual for her to sleep for 14 hours; consider medication effect, causes of encephalopathy which may be relevant        STROKE DOCUMENTATION     Acute Stroke Times:   Acute Stroke Times   Last Known Normal Date: 04/05/22  Last Known Normal Time: 0000  Stroke Team Arrival Time: 1759  CT Interpretation Time: 1800    NIH Scale:  Interval: baseline  1a. Level of Consciousness: 0-->Alert, keenly responsive  1b. LOC Questions: 0-->Answers both questions correctly  1c. LOC Commands: 0-->Performs both tasks correctly  2. Best Gaze: 0-->Normal  3. Visual: 0-->No visual loss  4. Facial Palsy: 0-->Normal symmetrical movements  5a. Motor Arm, Left: 0-->No drift, limb holds 90 (or 45) degrees for full 10 secs  5b. Motor Arm, Right: 0-->No drift, limb holds 90 (or 45) degrees for full 10 secs  6a. Motor Leg, Left: 0-->No drift, leg holds 30 degree position for full 5 secs  6b. Motor Leg, Right: 0-->No drift, leg holds 30 degree position for full 5 secs  7. Limb Ataxia: 0-->Absent  8. Sensory: 1-->Mild-to-moderate sensory loss, patient feels pinprick is less sharp or is dull on the affected side, or there is a loss of superficial pain with pinprick,  but patient is aware of being touched  9. Best Language: 0-->No aphasia, normal  10. Dysarthria: 0-->Normal  11. Extinction and Inattention (formerly Neglect): 0-->No abnormality  Total (NIH Stroke Scale): 1     Modified [place]    [place] Coma Scale:15   ABCD2 Score:    [id number] Score:   HAS -BLED Score:   ICH Score:   Hunt & Leo Classification:       Blood pressure 135/80, pulse 93, temperature 98.3 °F (36.8 °C), temperature source Oral, resp. rate 20, weight 57.6 kg (127 lb), SpO2 (!) 93 %.  Alteplase Eligible?: No  Alteplase Recommendation: Alteplase not recommended due to Outside of treatment window  and Unknown/unclear onset   Possible Interventional Revascularization Candidate? No; no significant neurologic deficit (NIHSS <6)  and No; at this time symptoms not suggestive of large vessel occlusion    Disposition Recommendation: admit to inpatient    Subjective:     History of Present Illness:  Patient recalls going to bed at midnight, awoke 2:00 p.m. this afternoon, had fallen out of bed on the floor.  Initially thought her 3 sisters were showing the bed with her.  She got up she had difficulty standing, was staggering, dizzy, dragging the right leg.  Painful paresthesias from the right foot up to the hip.  She takes aspirin, believe she may also be taking Plavix.  Multiple chronic health issues I have 10 doctors.        Woke up with symptoms?: yes    Recent bleeding noted: no  Does the patient take any Blood Thinners? no  Medications: Antiplatelets:  aspirin and clopidogrel/Plavix      Past Medical History: hypertension, diabetes, hyperlipidemia, MI/CAD, CHF, kidney problems/dialysis and Heart Problems    Past Surgical History: no major surgeries within the last 2 weeks    Family History: MI/CAD, stroke and Cancer    Social History: former smoker    Allergies: No Known Allergies     Review of Systems   Constitutional: Positive for fatigue. Negative for fever.   HENT: Negative for trouble swallowing.     Eyes: Negative for visual disturbance.   Respiratory: Negative for chest tightness and shortness of breath.    Cardiovascular: Negative for chest pain and palpitations.   Gastrointestinal: Negative for nausea and vomiting.   Genitourinary: Negative for hematuria.   Musculoskeletal: Positive for arthralgias, back pain, gait problem and neck stiffness.   Skin: Negative for rash.   Neurological: Positive for dizziness, weakness and numbness.   Hematological: Does not bruise/bleed easily.   Psychiatric/Behavioral: Negative for decreased concentration.     Objective:   Vitals: Blood pressure 135/80, pulse 93, temperature 98.3 °F (36.8 °C), temperature source Oral, resp. rate 20, weight 57.6 kg (127 lb), SpO2 (!) 93 %.     CT READ: Yes  No hemmorhage. No mass effect. No early infarct signs.     Physical Exam  Vitals reviewed.   HENT:      Head: Normocephalic and atraumatic.      Mouth/Throat:      Pharynx: Oropharynx is clear.   Cardiovascular:      Rate and Rhythm: Normal rate.   Pulmonary:      Effort: Pulmonary effort is normal.   Neurological:      Mental Status: She is alert.      Comments: Tremor LUE - chronic  Able to raise, maintain R leg off bed but not as high as L side               Recommended the emergency room physician to have a brief discussion with the patient and/or family if available regarding the  risks and benefits of treatment, and to briefly document the occurrence of that discussion in his clinical encounter note.     The attending portion of this evaluation, treatment, and documentation was performed per Scar Cheng DO via audiovisual.    Billing code:  (non-intervention mild to moderate stroke, TIA, some mimics)    · This patient has a critical neurological condition/illness, with some potential for high morbidity and mortality.  · There is a moderate probability for acute neurological change leading to clinical and possibly life-threatening deterioration requiring highest level of  physician preparedness for urgent intervention.  · Care was coordinated with other physicians involved in the patient's care.  · Radiologic studies and laboratory data were reviewed and interpreted, and plan of care was re-assessed based on the results.  · Diagnosis, treatment options and prognosis may have been discussed with the patient and/or family members or caregiver.      In your opinion, this was a: Tier 2 Van Negative    Consult End Time: 6:21 PM     Scar Cheng DO  [facility]  Vascular Neurology   [facility]

## 2022-04-05 NOTE — ASSESSMENT & PLAN NOTE
72 y/o female with HTN, DM2, CAD, seizure like activity, migraines, COPD presented with acute right leg weakness.  MRI with no acute findings.  FLAIR changes noted to right frontal lobe.  Although this was read by Radiology as possible subacute, there is no diffusion restriction or ADC changes making it most likely several weeks old.  This lesion would also not correlate to the patient acute right leg weakness.      On exam right leg weakness has significantly improved, but patient reports extreme paresthesia to the entire leg.  Light tough remains intact.  Review of EMR shows an admission 5/25/2020 for similar complaints.  Imaging demonstrated moderate cervical spine stenosis and mild thoracic and lumbar stenosis. Cervical spine imaging shows trace myelomalacia C3-4, C5-6 primarily.  At that time she was managed medically with robaxin and placed in patella stabilizing brace.  She ultimately ended up having LAMINECTOMY, SPINE, CERVICAL, WITH POSTERIOR FUSION C3-T1  on 7/23/2020.  She had a 4 week and 4 month post op visit and was doing well.  There are no further records available until 3/3/2022 from Dr. Anguiano [facility].  In his note he documents a normal exam.      Etiology for right leg weakness/numbness unclear.  Patient does have multiple risk factor for stroke.  Complete stroke workup pending.  Patient has history of seizure like activity and awoke on floor.  Symptoms could be due to trauma.  Given prior spinal findings repeat imaging would be reasonable to ensure stability.      Patient also has history of seizure like activity.  No further information available in EMR.  No EEG on record.  Seizure activity as cause for weakness also in differential.    Recommendations  - ASA 81 mg with Plavix 75mg x 21 days then monotherapy with ASA  - Atorvastatin 40mg daily for goal LDL < 70  - CTA head and neck  - TTE  - EEG  - MRI C-spine, T-Spine, L-spine  - Stroke risk factor modification  - TeleVascular Neurology to  follow pending imaging and make additional recommendations as indicated

## 2022-04-05 NOTE — ED NOTES
"Received call from [facility] who adds that patient had reported getting out of bed this morning and falling and "waking up" this afternoon with no recollection of events. Patient also reported RLE weakness and required assistance getting up from floor. Will room next available bed.  "

## 2022-04-05 NOTE — TELEPHONE ENCOUNTER
----- Message from Hattie Dalton sent at 4/5/2022  3:05 PM CDT -----  Contact: [phone]  Type:  Needs Medical Advice    Who Called: Pt  Reason for call: regarding this the second time this week the pt has found herself on the Floor and the pt was not able to get up of the floor for a While and the pt's Right leg is numb and the pt had to Drag her Leg and that happened this Morning. Pt's leg is still numb and she is Dizzy as well  Pharmacy name and phone #:  N/A  Would the patient rather a call back or a response via [facility]?  Call Back  Best Call Back Number: [phone]

## 2022-04-05 NOTE — ED PROVIDER NOTES
Encounter Date: 4/5/2022       History     Chief Complaint   Patient presents with    Fall     Pt went to bed at midnight on right side of bed and woke up on the floor to the left side of bed at unknown time. Pt is now c/o frontal, 8/10 headache and constant dizziness as well as numbness and decreased sensation to right leg from the knee down. No visual visual disturbances. Denies unilateral weakness. Pt is currently on aspirin.     Neck Pain     Posterior and 7/10; able to turn neck up and down and from side to side.      71-year-old female presents emergency room with reports of right leg numbness in addition to arm numbness that began upon waking around 2:00 p.m. this afternoon.  Patient states she went to bed at midnight and was of her normal mental status with no complaints at that time.  Patient reports she woke up at 2:00 p.m. on the floor in her room and is unsure how she got there.  Patient states when she woke up she began dragging her leg on the floor.  She denies any chest pain or shortness of breath.  She has no slurred speech or facial droop.  Patient reports that she currently takes an aspirin.  She called her primary care physician who advised her to come to the emergency room for further evaluation.  Patient reports a headache and dizziness in addition to posterior neck pain.  She has a past medical history of ACS, anxiety, asthma, colon cancer, COPD, coronary artery disease, diabetes, depression, cataracts, hypertension. CODE stroke called on my exam    The history is provided by the patient. No  was used.     Review of patient's allergies indicates:  No Known Allergies  Past Medical History:   Diagnosis Date    Acute coronary syndrome     Acute hypoxemic respiratory failure 5/1/2017    Anxiety     Asthma     Cancer     colon    Cataracts, both eyes     Chest pain at rest     Chest pain of uncertain etiology 12/6/2015    Colon cancer 1988    COPD (chronic obstructive  pulmonary disease)     Coronary artery disease     Depression     Diabetes mellitus     Dyspnea on exertion 11/15/2018    Elevated brain natriuretic peptide (BNP) level 11/15/2018    Elevated cholesterol     Hypertension     Swelling     Syncope and collapse 2016     Past Surgical History:   Procedure Laterality Date    ABDOMINAL SURGERY      BREAST BIOPSY      benign unsure what side     SECTION, CLASSIC      COLON SURGERY      COLONOSCOPY  2019    repeat in 5 years    CORONARY ANGIOPLASTY WITH STENT PLACEMENT  3 months ago     x2, Hysterectomy, Lung surgery, Partial stomach removed, Part of colon removed for rectal cancer    GASTRECTOMY      HEMORRHOID SURGERY      HYSTERECTOMY      @28yrs of age    LUNG BIOPSY      OOPHORECTOMY      @28yrs of age    POSTERIOR FUSION OF CERVICAL SPINE WITH LAMINECTOMY N/A 2020    Procedure: LAMINECTOMY, SPINE, CERVICAL, WITH POSTERIOR FUSION C3-T1 ;  Surgeon: Herson Tate MD;  Location: [facility] OR [address] [place] [address];  Service: Neurosurgery;  Laterality: N/A;    REPAIR OF INCARCERATED INCISIONAL HERNIA WITHOUT HISTORY OF PRIOR REPAIR N/A 2022    Procedure: REPAIR, HERNIA, INCISIONAL, INCARCERATED, WITHOUT HISTORY OF PRIOR REPAIR;  Surgeon: Simon King MD;  Location: [facility];  Service: General;  Laterality: N/A;     Family History   Problem Relation Age of Onset    Cancer Mother     Diabetes Mother     Hypertension Mother     Breast cancer Mother     Heart disease Father     Lung cancer Father     Depression Sister     Hypertension Sister     Diabetes Mellitus Sister     Cancer Maternal Aunt      Social History     Tobacco Use    Smoking status: Current Every Day Smoker     Packs/day: 0.50     Years: 53.00     Pack years: 26.50     Types: Cigarettes     Start date:     Smokeless tobacco: Never Used    Tobacco comment: Pt is currently enrolled in the [employer].  Ambulatory referral to Smoking Cessation  program.    Substance Use Topics    Alcohol use: Yes     Alcohol/week: 3.0 standard drinks     Types: 3 Glasses of wine per week     Comment: 1 every 3 months    Drug use: No     Review of Systems   Constitutional: Negative for fever.   Respiratory: Negative for shortness of breath.    Cardiovascular: Negative for chest pain, palpitations and leg swelling.   Gastrointestinal: Negative for diarrhea, nausea and vomiting.   Genitourinary: Negative for dysuria.   Musculoskeletal: Positive for neck pain. Negative for neck stiffness.   Skin: Negative for rash and wound.   Neurological: Positive for dizziness, weakness and headaches.       Physical Exam     Initial Vitals [04/05/22 1643]   BP Pulse Resp Temp SpO2   135/80 93 20 98.3 °F (36.8 °C) (!) 93 %      MAP       --         Physical Exam    Constitutional: She appears well-developed and well-nourished.   HENT:   Head: Normocephalic.   Right Ear: Hearing and tympanic membrane normal.   Left Ear: Hearing and tympanic membrane normal.   Nose: Nose normal.   Mouth/Throat: Oropharynx is clear and moist.   Eyes: Lids are normal. Pupils are equal, round, and reactive to light.   Neck:   Normal range of motion.  Cardiovascular: Normal rate.   Pulmonary/Chest: Breath sounds normal. No respiratory distress. She has no wheezes. She has no rhonchi.   Abdominal: Abdomen is soft. There is no abdominal tenderness.   Musculoskeletal:         General: Normal range of motion.      Cervical back: Normal range of motion. No rigidity.     Neurological: She is alert and oriented to person, place, and time.   Patient has no facial droop or slurred speech, extraocular movement is intact.  Patient has a weak and right  compared to the left.  In addition she has limited mobility of the right leg as she states this is not.  She has positive PT and DP noted bilaterally.  Patient reports ataxia however states she is not able to stand up at this time due to weakness.  Sensory is intact  throughout upper and lower extremities.  She is awake alert oriented at this time.   Skin: Skin is warm and dry. No rash noted.   Psychiatric: She has a normal mood and affect. Her behavior is normal. Judgment and thought content normal.         ED Course   Critical Care    Date/Time: 4/5/2022 6:27 PM  Performed by: Marielos James NP  Authorized by: Theodore Hyatt MD   Total critical care time (exclusive of procedural time) : 0 minutes  Critical care time was exclusive of separately billable procedures and treating other patients.  Critical care was necessary to treat or prevent imminent or life-threatening deterioration of the following conditions: CNS failure or compromise.  Critical care was time spent personally by me on the following activities: blood draw for specimens, discussions with consultants, evaluation of patient's response to treatment, obtaining history from patient or surrogate, ordering and review of laboratory studies, pulse oximetry, review of old charts, re-evaluation of patient's condition, ordering and review of radiographic studies, ordering and performing treatments and interventions, examination of patient, interpretation of cardiac output measurements, discussions with primary provider and development of treatment plan with patient or surrogate.        Labs Reviewed   CBC W/ AUTO DIFFERENTIAL - Abnormal; Notable for the following components:       Result Value    Hemoglobin 11.5 (*)     MCHC 29.9 (*)     RDW 15.0 (*)     All other components within normal limits   POCT GLUCOSE - Abnormal; Notable for the following components:    POCT Glucose 208 (*)     All other components within normal limits   PROTIME-INR   TROPONIN I   CK   COMPREHENSIVE METABOLIC PANEL   TSH   LIPID PANEL   CK   POCT GLUCOSE, HAND-HELD DEVICE          Imaging Results          X-Ray Cervical Spine AP And Lateral (In process)                CT Head Without Contrast (Final result)  Result time 04/05/22 17:49:39     Final result by Bert Sandra MD (04/05/22 17:49:39)                 Impression:      No definite acute intracranial findings by noncontrast CT.      Electronically signed by: Bert Sandra  Date:    04/05/2022  Time:    17:49             Narrative:    EXAMINATION:  CT HEAD WITHOUT CONTRAST    CLINICAL HISTORY:  Neuro deficit, acute, stroke suspected;    TECHNIQUE:  Low dose axial images were obtained through the head.  Coronal and sagittal reformations were also performed. Contrast was not administered.    COMPARISON:  MRI brain, 05/25/2020.  CT head 01/15/2020.    FINDINGS:  Blood: No acute intracranial hemorrhage.    Parenchyma: No definite loss of gray-white differentiation to suggest acute or subacute transcortical infarct. Generalized pattern age-related parenchymal volume loss.  Nonspecific areas of white matter hypoattenuation may relate to sequela of chronic small vessel ischemic disease.  Small remote right cerebellar infarcts again noted.    Ventricles/Extra-axial spaces: No abnormal extra-axial fluid collection. Basal cisterns are patent.    Vessels: Vascular calcifications    Orbits: Status post bilateral lens replacements    Scalp: Unremarkable.    Skull: There are no depressed skull fractures or destructive bone lesions.    Sinuses and mastoids: Essentially clear.    Other findings: None                                 Medications   aspirin chewable tablet 81 mg (has no administration in time range)   clopidogreL tablet 300 mg (300 mg Oral Given 4/5/22 1848)                 ED Course as of 04/05/22 1852   Tue Apr 05, 2022   1826 Scar Cheng with vascular Neurology spoke with Dr. Hyatt concerning additional imaging to be obtained.  Right leg weakness  Awoke with symptoms, cannot exclude acute left YUDITH infarct.  Significant risk factors and history of vascular disease.  - Plavix load 300 mg, aspirin 81 mg, statin, permissive hypertension, etcetera unless alternative diagnosis  confirmed  - CTA head and neck  - MRI brain with and without contrast     Unusual for her to sleep for 14 hours; consider medication effect, causes of encephalopathy which may be relevant    [DT]   1845 [facility] paged for admission.  [DT]   1852 Spoke with [facility] concerning admit.  [DT]      ED Course User Index  [DT] Marielos James NP             Clinical Impression:   Final diagnoses:  [I63.9] Stroke  [W19.XXXA] Fall          ED Disposition Condition    Admit               Marielos James NP  04/05/22 1827       Marielos James NP  04/05/22 1844       Marielos James NP  04/05/22 1852

## 2022-04-05 NOTE — HPI
70 y/o female with HTN, DM2, CAD, seizure like activity, migraines, COPD who presented after awakening on the floor yesterday afternoon.  Patient states she frequently awakens on the floor.  This has been going on for the past 2 years.  She states she was told they think she may be having some type of seizure activity. She reports going to bed around midnight then awakening on the floor around 2pm today.  She had difficulty walking and was dragging her right leg which is new for her.  She also reported associated dizziness.  She denies numbness initially but states her right leg is remarkable numb.  She denies associated speech or vision changes.Typically after these events she returns to baseline without any deficits.  Her most recent prior event was last week.  She also had another event about 1 month ago.

## 2022-04-05 NOTE — SUBJECTIVE & OBJECTIVE
Past Medical History:   Diagnosis Date    Acute coronary syndrome     Acute hypoxemic respiratory failure 2017    Anxiety     Asthma     Cancer     colon    Cataracts, both eyes     Chest pain at rest     Chest pain of uncertain etiology 2015    Colon cancer 1988    COPD (chronic obstructive pulmonary disease)     Coronary artery disease     Depression     Diabetes mellitus     Dyspnea on exertion 11/15/2018    Elevated brain natriuretic peptide (BNP) level 11/15/2018    Elevated cholesterol     Hypertension     Swelling     Syncope and collapse 2016       Past Surgical History:   Procedure Laterality Date    ABDOMINAL SURGERY      BREAST BIOPSY      benign unsure what side     SECTION, CLASSIC      COLON SURGERY      COLONOSCOPY  2019    repeat in 5 years    CORONARY ANGIOPLASTY WITH STENT PLACEMENT  3 months ago     x2, Hysterectomy, Lung surgery, Partial stomach removed, Part of colon removed for rectal cancer    GASTRECTOMY      HEMORRHOID SURGERY      HYSTERECTOMY      @28yrs of age    LUNG BIOPSY      OOPHORECTOMY      @28yrs of age    POSTERIOR FUSION OF CERVICAL SPINE WITH LAMINECTOMY N/A 2020    Procedure: LAMINECTOMY, SPINE, CERVICAL, WITH POSTERIOR FUSION C3-T1 ;  Surgeon: Herson Tate MD;  Location: [facility] OR [address] [place] [address];  Service: Neurosurgery;  Laterality: N/A;    REPAIR OF INCARCERATED INCISIONAL HERNIA WITHOUT HISTORY OF PRIOR REPAIR N/A 2022    Procedure: REPAIR, HERNIA, INCISIONAL, INCARCERATED, WITHOUT HISTORY OF PRIOR REPAIR;  Surgeon: Simon King MD;  Location: [facility];  Service: General;  Laterality: N/A;       Family History   Problem Relation Age of Onset    Cancer Mother     Diabetes Mother     Hypertension Mother     Breast cancer Mother     Heart disease Father     Lung cancer Father     Depression Sister     Hypertension Sister     Diabetes Mellitus Sister     Cancer Maternal Aunt        Social History     Socioeconomic History    Marital  status: Single   Tobacco Use    Smoking status: Current Every Day Smoker     Packs/day: 0.50     Years: 53.00     Pack years: 26.50     Types: Cigarettes     Start date: 1967    Smokeless tobacco: Never Used    Tobacco comment: Pt is currently enrolled in the [employer].  Ambulatory referral to Smoking Cessation program.    Substance and Sexual Activity    Alcohol use: Yes     Alcohol/week: 3.0 standard drinks     Types: 3 Glasses of wine per week     Comment: 1 every 3 months    Drug use: No    Sexual activity: Yes     Partners: Male     Birth control/protection: None     Comment: last drink yesterday afternoon   Social History Narrative    Pulmonology Dr. Chung([facility]) for COPD     Endo- Dr. Damaso Delgado for Diabetes Mellitus    GI- Dr. Arias- History of partial gastrectomy, gastritis    Heme/Onc-  Anemia and history of colon cancer     Psych- Dr. Cotto ([facility] Depression/Anxiety. PTSD    Dr. Knox- Pain Management    Dr. Anguiano Neurology- Seizure disorder    Dr. Malin- s/p percutaneous transluminal coronary angioplasty            Current Facility-Administered Medications   Medication Dose Route Frequency Provider Last Rate Last Admin    0.9%  NaCl infusion   Intravenous Continuous Marcie Mcgee  mL/hr at 04/06/22 0254 New Bag at 04/06/22 0254    acetaminophen tablet 650 mg  650 mg Oral Q6H PRN Sundeep Marc MD   650 mg at 04/06/22 0937    albuterol-ipratropium 2.5 mg-0.5 mg/3 mL nebulizer solution 3 mL  3 mL Nebulization Q6H PRN Marcie Mcgee MD        aspirin chewable tablet 81 mg  81 mg Oral Daily Marcie Mcgee MD   81 mg at 04/06/22 0937    atorvastatin tablet 40 mg  40 mg Oral Daily Marcie Mcgee MD   40 mg at 04/06/22 0938    clopidogreL tablet 75 mg  75 mg Oral Daily Marcie Mcgee MD   75 mg at 04/06/22 0937    dextrose 10% bolus 125 mL  12.5 g Intravenous PRN Theodore Hyatt MD        dextrose 10% bolus 250 mL  25 g Intravenous PRN  Theodore Hyatt MD        DULoxetine DR capsule 90 mg  90 mg Oral Daily Marcie Mcgee MD   90 mg at 04/06/22 0937    enoxaparin injection 40 mg  40 mg Subcutaneous Daily Marcie Mcgee MD   40 mg at 04/05/22 2246    glucagon (human recombinant) injection 1 mg  1 mg Intramuscular PRN Marcie Mcgee MD        glucose chewable tablet 16 g  16 g Oral PRN Marcie Mcgee MD        glucose chewable tablet 24 g  24 g Oral PRN Marcie Mcgee MD        hydroCHLOROthiazide tablet 12.5 mg  12.5 mg Oral Daily Marcie Mcgee MD        HYDROcodone-acetaminophen 5-325 mg per tablet 1 tablet  1 tablet Oral Q6H PRN Sundeep Marc MD   1 tablet at 04/05/22 2246    insulin aspart U-100 pen 1-10 Units  1-10 Units Subcutaneous QID (AC + HS) PRN Marcie Mcgee MD        insulin detemir U-100 pen 5 Units  5 Units Subcutaneous Daily Sundeep Marc MD   5 Units at 04/06/22 0939    iohexoL (OMNIPAQUE 350) injection 100 mL  100 mL Intravenous ONCE PRN Marcie Mcgee MD        losartan tablet 50 mg  50 mg Oral Daily Marcie Mcgee MD        magnesium sulfate 2g in water 50mL IVPB (premix)  2 g Intravenous Once Sundeep Marc MD 25 mL/hr at 04/06/22 1009 2 g at 04/06/22 1009    pregabalin capsule 150 mg  150 mg Oral BID Marcie Mcgee MD   150 mg at 04/06/22 0937    QUEtiapine tablet 300 mg  300 mg Oral QHS Marcie Mcgee MD        sodium chloride 0.9% flush 10 mL  10 mL Intravenous PRN Marcie Mcgee MD        sodium chloride 0.9% flush 10 mL  10 mL Intravenous Q12H PRN Marcie Mcgee MD        topiramate tablet 50 mg  50 mg Oral BID Marcie Mcgee MD   50 mg at 04/06/22 0937    zonisamide capsule 100 mg  100 mg Oral Daily Marcie Mcgee MD   100 mg at 04/06/22 0943     Home medications reviewed    Review of patient's allergies indicates:  No Known Allergies       Review of Systems   Constitutional:  Positive for fatigue. Negative for fever.   HENT:   Negative for drooling and trouble swallowing.    Eyes:  Negative for visual disturbance.   Respiratory:  Negative for chest tightness and shortness of breath.    Cardiovascular:  Negative for chest pain and palpitations.   Gastrointestinal:  Negative for nausea and vomiting.   Genitourinary:  Negative for difficulty urinating and hematuria.   Musculoskeletal:  Positive for arthralgias, back pain, gait problem and neck stiffness.   Skin:  Negative for rash.   Neurological:  Positive for dizziness, weakness and numbness.   Hematological:  Does not bruise/bleed easily.   Psychiatric/Behavioral:  Negative for confusion and decreased concentration.    Objective:   Vitals: Blood pressure 135/80, pulse 93, temperature 98.3 °F (36.8 °C), temperature source Oral, resp. rate 20, weight 57.6 kg (127 lb), SpO2 (!) 93 %.     CT READ: Yes  No hemmorhage. No mass effect. No early infarct signs.     Physical Exam  Vitals reviewed.   Constitutional:       General: She is not in acute distress.  HENT:      Head: Normocephalic and atraumatic.      Right Ear: External ear normal.      Left Ear: External ear normal.      Nose: Nose normal.      Mouth/Throat:      Pharynx: Oropharynx is clear.   Pulmonary:      Effort: Pulmonary effort is normal. No respiratory distress.   Abdominal:      General: There is no distension.      Tenderness: There is no guarding.   Musculoskeletal:      Cervical back: Normal range of motion.      Right lower leg: No edema.      Left lower leg: No edema.   Skin:     General: Skin is dry.   Neurological:      Mental Status: She is alert.      Comments: Tremor LUE - chronic     Psychiatric:         Mood and Affect: Mood normal.         Behavior: Behavior normal.        Neurological Exam  LOC: alert  Attention Span: Good   Language: No aphasia  Articulation: No dysarthria  Orientation: Person, Place, Time   Visual Fields: Full  EOM (CN III, IV, VI): Full/intact  Facial Sensation (CN V): Normal  Facial Movement  (CN VII): Symmetric facial expression    Motor: Arm left  Full antigravity; mild loss of power noted with nurse assistance  Leg left  Full antigravity; Full power noted with nurse assistance  Arm right  Full antigravity; Full power noted with nurse assistance  Leg right Full antigravity; mild loss of power noted with nurse assistance  Cerebellum: Abnormal finger to nose left arm - has chronic tremor in this arm  Sensation: Left arm and right leg reports as dull compared to the other side; Left arm neglect with double simultaneous stimulation    Diagnostic Results     Brain Imaging   4/5/2022 MRI Brain  No diffusion restriction noted.  Remote infarct right frontal MCA with chronic appearing hemorrage. Remote right cerebellum.  Chronic white matter changes.      Vessel Imaging   CTA pending    Cardiac Imaging   TTE pending

## 2022-04-06 ENCOUNTER — TELEPHONE (OUTPATIENT)
Dept: INTERNAL MEDICINE | Facility: CLINIC | Age: 72
End: 2022-04-06
Payer: MEDICARE

## 2022-04-06 ENCOUNTER — TELEPHONE (OUTPATIENT)
Dept: NEUROSURGERY | Facility: CLINIC | Age: 72
End: 2022-04-06
Payer: MEDICARE

## 2022-04-06 ENCOUNTER — TELEPHONE (OUTPATIENT)
Dept: NEUROLOGY | Facility: CLINIC | Age: 72
End: 2022-04-06
Payer: MEDICARE

## 2022-04-06 ENCOUNTER — CLINICAL SUPPORT (OUTPATIENT)
Dept: SMOKING CESSATION | Facility: CLINIC | Age: 72
End: 2022-04-06
Payer: COMMERCIAL

## 2022-04-06 DIAGNOSIS — F17.210 CIGARETTE SMOKER: Primary | ICD-10-CM

## 2022-04-06 PROBLEM — Z86.73 HISTORY OF STROKE WITHOUT RESIDUAL DEFICITS: Status: ACTIVE | Noted: 2022-04-05

## 2022-04-06 PROBLEM — E44.1 MILD MALNUTRITION: Status: ACTIVE | Noted: 2022-04-06

## 2022-04-06 PROBLEM — I69.30 HISTORY OF STROKE WITH RESIDUAL DEFICIT: Status: ACTIVE | Noted: 2022-04-05

## 2022-04-06 LAB
ALBUMIN SERPL BCP-MCNC: 3.2 G/DL (ref 3.5–5.2)
ALP SERPL-CCNC: 130 U/L (ref 55–135)
ALT SERPL W/O P-5'-P-CCNC: 14 U/L (ref 10–44)
ANION GAP SERPL CALC-SCNC: 8 MMOL/L (ref 8–16)
AORTIC ROOT ANNULUS: 3.01 CM
APTT BLDCRRT: 25.1 SEC (ref 21–32)
ASCENDING AORTA: 2.82 CM
AST SERPL-CCNC: 21 U/L (ref 10–40)
AV INDEX (PROSTH): 0.9
AV MEAN GRADIENT: 4 MMHG
AV PEAK GRADIENT: 6 MMHG
AV VALVE AREA: 3.19 CM2
AV VELOCITY RATIO: 0.86
BASOPHILS # BLD AUTO: 0.02 K/UL (ref 0–0.2)
BASOPHILS NFR BLD: 0.4 % (ref 0–1.9)
BILIRUB SERPL-MCNC: 0.3 MG/DL (ref 0.1–1)
BSA FOR ECHO PROCEDURE: 1.62 M2
BUN SERPL-MCNC: 11 MG/DL (ref 8–23)
CALCIUM SERPL-MCNC: 9 MG/DL (ref 8.7–10.5)
CHLORIDE SERPL-SCNC: 108 MMOL/L (ref 95–110)
CK SERPL-CCNC: 811 U/L (ref 20–180)
CO2 SERPL-SCNC: 21 MMOL/L (ref 23–29)
CREAT SERPL-MCNC: 0.7 MG/DL (ref 0.5–1.4)
CV ECHO LV RWT: 0.31 CM
DIFFERENTIAL METHOD: ABNORMAL
DOP CALC AO PEAK VEL: 1.19 M/S
DOP CALC AO VTI: 22.54 CM
DOP CALC LVOT AREA: 3.6 CM2
DOP CALC LVOT DIAMETER: 2.13 CM
DOP CALC LVOT PEAK VEL: 1.02 M/S
DOP CALC LVOT STROKE VOLUME: 71.98 CM3
DOP CALC MV VTI: 16.56 CM
DOP CALCLVOT PEAK VEL VTI: 20.21 CM
E WAVE DECELERATION TIME: 121.71 MSEC
E/A RATIO: 0.76
E/E' RATIO: 10.71 M/S
ECHO LV POSTERIOR WALL: 0.69 CM (ref 0.6–1.1)
EJECTION FRACTION: 60 %
EOSINOPHIL # BLD AUTO: 0.2 K/UL (ref 0–0.5)
EOSINOPHIL NFR BLD: 3.1 % (ref 0–8)
ERYTHROCYTE [DISTWIDTH] IN BLOOD BY AUTOMATED COUNT: 15 % (ref 11.5–14.5)
EST. GFR  (AFRICAN AMERICAN): >60 ML/MIN/1.73 M^2
EST. GFR  (NON AFRICAN AMERICAN): >60 ML/MIN/1.73 M^2
ESTIMATED AVG GLUCOSE: 249 MG/DL (ref 68–131)
FRACTIONAL SHORTENING: 39 % (ref 28–44)
GLUCOSE SERPL-MCNC: 259 MG/DL (ref 70–110)
HBA1C MFR BLD: 10.3 % (ref 4–5.6)
HCT VFR BLD AUTO: 33.3 % (ref 37–48.5)
HGB BLD-MCNC: 10.6 G/DL (ref 12–16)
IMM GRANULOCYTES # BLD AUTO: 0.01 K/UL (ref 0–0.04)
IMM GRANULOCYTES NFR BLD AUTO: 0.2 % (ref 0–0.5)
INR PPP: 1 (ref 0.8–1.2)
INTERVENTRICULAR SEPTUM: 0.93 CM (ref 0.6–1.1)
LA MAJOR: 5.29 CM
LA MINOR: 5.48 CM
LA WIDTH: 3.39 CM
LEFT ATRIUM SIZE: 3.34 CM
LEFT ATRIUM VOLUME INDEX MOD: 27.7 ML/M2
LEFT ATRIUM VOLUME INDEX: 32.4 ML/M2
LEFT ATRIUM VOLUME MOD: 44.37 CM3
LEFT ATRIUM VOLUME: 51.81 CM3
LEFT INTERNAL DIMENSION IN SYSTOLE: 2.72 CM (ref 2.1–4)
LEFT VENTRICLE DIASTOLIC VOLUME INDEX: 56.59 ML/M2
LEFT VENTRICLE DIASTOLIC VOLUME: 90.55 ML
LEFT VENTRICLE MASS INDEX: 71 G/M2
LEFT VENTRICLE SYSTOLIC VOLUME INDEX: 17.2 ML/M2
LEFT VENTRICLE SYSTOLIC VOLUME: 27.54 ML
LEFT VENTRICULAR INTERNAL DIMENSION IN DIASTOLE: 4.46 CM (ref 3.5–6)
LEFT VENTRICULAR MASS: 113.78 G
LV LATERAL E/E' RATIO: 9.1 M/S
LV SEPTAL E/E' RATIO: 13 M/S
LYMPHOCYTES # BLD AUTO: 1.3 K/UL (ref 1–4.8)
LYMPHOCYTES NFR BLD: 27.2 % (ref 18–48)
MAGNESIUM SERPL-MCNC: 1.6 MG/DL (ref 1.6–2.6)
MCH RBC QN AUTO: 28.2 PG (ref 27–31)
MCHC RBC AUTO-ENTMCNC: 31.8 G/DL (ref 32–36)
MCV RBC AUTO: 89 FL (ref 82–98)
MONOCYTES # BLD AUTO: 0.5 K/UL (ref 0.3–1)
MONOCYTES NFR BLD: 10 % (ref 4–15)
MV MEAN GRADIENT: 1 MMHG
MV PEAK A VEL: 1.2 M/S
MV PEAK E VEL: 0.91 M/S
MV PEAK GRADIENT: 5 MMHG
MV STENOSIS PRESSURE HALF TIME: 35.3 MS
MV VALVE AREA BY CONTINUITY EQUATION: 4.35 CM2
MV VALVE AREA P 1/2 METHOD: 6.23 CM2
NEUTROPHILS # BLD AUTO: 2.8 K/UL (ref 1.8–7.7)
NEUTROPHILS NFR BLD: 59.1 % (ref 38–73)
NRBC BLD-RTO: 0 /100 WBC
PHOSPHATE SERPL-MCNC: 3.3 MG/DL (ref 2.7–4.5)
PISA MRMAX VEL: 0.04 M/S
PISA TR MAX VEL: 3.12 M/S
PLATELET # BLD AUTO: 260 K/UL (ref 150–450)
PMV BLD AUTO: 9.9 FL (ref 9.2–12.9)
POCT GLUCOSE: 183 MG/DL (ref 70–110)
POCT GLUCOSE: 188 MG/DL (ref 70–110)
POCT GLUCOSE: 263 MG/DL (ref 70–110)
POCT GLUCOSE: 268 MG/DL (ref 70–110)
POTASSIUM SERPL-SCNC: 3.8 MMOL/L (ref 3.5–5.1)
PROT SERPL-MCNC: 6.7 G/DL (ref 6–8.4)
PROTHROMBIN TIME: 10.3 SEC (ref 9–12.5)
PULM VEIN S/D RATIO: 1.48
PV PEAK D VEL: 0.48 M/S
PV PEAK S VEL: 0.71 M/S
PV PEAK VELOCITY: 0.75 CM/S
RA MAJOR: 4.35 CM
RA PRESSURE: 8 MMHG
RA WIDTH: 3.57 CM
RBC # BLD AUTO: 3.76 M/UL (ref 4–5.4)
RIGHT VENTRICULAR END-DIASTOLIC DIMENSION: 2.35 CM
RV TISSUE DOPPLER FREE WALL SYSTOLIC VELOCITY 1 (APICAL 4 CHAMBER VIEW): 17.64 CM/S
SODIUM SERPL-SCNC: 137 MMOL/L (ref 136–145)
STJ: 2.69 CM
TDI LATERAL: 0.1 M/S
TDI SEPTAL: 0.07 M/S
TDI: 0.09 M/S
TR MAX PG: 39 MMHG
TV REST PULMONARY ARTERY PRESSURE: 47 MMHG
WBC # BLD AUTO: 4.78 K/UL (ref 3.9–12.7)

## 2022-04-06 PROCEDURE — 99999 PR PBB SHADOW E&M-EST. PATIENT-LVL I: CPT | Mod: PBBFAC,,,

## 2022-04-06 PROCEDURE — 83735 ASSAY OF MAGNESIUM: CPT | Performed by: STUDENT IN AN ORGANIZED HEALTH CARE EDUCATION/TRAINING PROGRAM

## 2022-04-06 PROCEDURE — 85610 PROTHROMBIN TIME: CPT | Performed by: STUDENT IN AN ORGANIZED HEALTH CARE EDUCATION/TRAINING PROGRAM

## 2022-04-06 PROCEDURE — 36415 COLL VENOUS BLD VENIPUNCTURE: CPT | Performed by: STUDENT IN AN ORGANIZED HEALTH CARE EDUCATION/TRAINING PROGRAM

## 2022-04-06 PROCEDURE — S4991 NICOTINE PATCH NONLEGEND: HCPCS | Performed by: STUDENT IN AN ORGANIZED HEALTH CARE EDUCATION/TRAINING PROGRAM

## 2022-04-06 PROCEDURE — 85025 COMPLETE CBC W/AUTO DIFF WBC: CPT | Performed by: STUDENT IN AN ORGANIZED HEALTH CARE EDUCATION/TRAINING PROGRAM

## 2022-04-06 PROCEDURE — 11000001 HC ACUTE MED/SURG PRIVATE ROOM

## 2022-04-06 PROCEDURE — 97535 SELF CARE MNGMENT TRAINING: CPT

## 2022-04-06 PROCEDURE — 99999 PR PBB SHADOW E&M-EST. PATIENT-LVL I: ICD-10-PCS | Mod: PBBFAC,,,

## 2022-04-06 PROCEDURE — 99407 BEHAV CHNG SMOKING > 10 MIN: CPT | Mod: S$GLB,,,

## 2022-04-06 PROCEDURE — 96372 THER/PROPH/DIAG INJ SC/IM: CPT | Mod: 59 | Performed by: STUDENT IN AN ORGANIZED HEALTH CARE EDUCATION/TRAINING PROGRAM

## 2022-04-06 PROCEDURE — 25000003 PHARM REV CODE 250: Performed by: STUDENT IN AN ORGANIZED HEALTH CARE EDUCATION/TRAINING PROGRAM

## 2022-04-06 PROCEDURE — 83036 HEMOGLOBIN GLYCOSYLATED A1C: CPT | Performed by: EMERGENCY MEDICINE

## 2022-04-06 PROCEDURE — 63600175 PHARM REV CODE 636 W HCPCS: Performed by: STUDENT IN AN ORGANIZED HEALTH CARE EDUCATION/TRAINING PROGRAM

## 2022-04-06 PROCEDURE — 85730 THROMBOPLASTIN TIME PARTIAL: CPT | Performed by: STUDENT IN AN ORGANIZED HEALTH CARE EDUCATION/TRAINING PROGRAM

## 2022-04-06 PROCEDURE — G0427 PR INPT TELEHEALTH CON 70/>M: ICD-10-PCS | Mod: 95,,, | Performed by: NURSE PRACTITIONER

## 2022-04-06 PROCEDURE — C9399 UNCLASSIFIED DRUGS OR BIOLOG: HCPCS | Performed by: STUDENT IN AN ORGANIZED HEALTH CARE EDUCATION/TRAINING PROGRAM

## 2022-04-06 PROCEDURE — 97162 PT EVAL MOD COMPLEX 30 MIN: CPT

## 2022-04-06 PROCEDURE — 97165 OT EVAL LOW COMPLEX 30 MIN: CPT

## 2022-04-06 PROCEDURE — 80053 COMPREHEN METABOLIC PANEL: CPT | Performed by: STUDENT IN AN ORGANIZED HEALTH CARE EDUCATION/TRAINING PROGRAM

## 2022-04-06 PROCEDURE — G0427 INPT/ED TELECONSULT70: HCPCS | Mod: 95,,, | Performed by: NURSE PRACTITIONER

## 2022-04-06 PROCEDURE — 27000221 HC OXYGEN, UP TO 24 HOURS

## 2022-04-06 PROCEDURE — 94761 N-INVAS EAR/PLS OXIMETRY MLT: CPT

## 2022-04-06 PROCEDURE — 82550 ASSAY OF CK (CPK): CPT | Performed by: STUDENT IN AN ORGANIZED HEALTH CARE EDUCATION/TRAINING PROGRAM

## 2022-04-06 PROCEDURE — 99900035 HC TECH TIME PER 15 MIN (STAT)

## 2022-04-06 PROCEDURE — C1751 CATH, INF, PER/CENT/MIDLINE: HCPCS

## 2022-04-06 PROCEDURE — 99407 PR TOBACCO USE CESSATION INTENSIVE >10 MINUTES: ICD-10-PCS | Mod: S$GLB,,,

## 2022-04-06 PROCEDURE — 25500020 PHARM REV CODE 255: Performed by: STUDENT IN AN ORGANIZED HEALTH CARE EDUCATION/TRAINING PROGRAM

## 2022-04-06 PROCEDURE — 97530 THERAPEUTIC ACTIVITIES: CPT

## 2022-04-06 PROCEDURE — 36410 VNPNXR 3YR/> PHY/QHP DX/THER: CPT

## 2022-04-06 PROCEDURE — 84100 ASSAY OF PHOSPHORUS: CPT | Performed by: STUDENT IN AN ORGANIZED HEALTH CARE EDUCATION/TRAINING PROGRAM

## 2022-04-06 RX ORDER — IBUPROFEN 200 MG
1 TABLET ORAL DAILY
Status: DISCONTINUED | OUTPATIENT
Start: 2022-04-06 | End: 2022-04-10 | Stop reason: HOSPADM

## 2022-04-06 RX ORDER — MAGNESIUM SULFATE HEPTAHYDRATE 40 MG/ML
2 INJECTION, SOLUTION INTRAVENOUS ONCE
Status: COMPLETED | OUTPATIENT
Start: 2022-04-06 | End: 2022-04-06

## 2022-04-06 RX ORDER — HYDROCHLOROTHIAZIDE 12.5 MG/1
12.5 TABLET ORAL DAILY
Status: DISCONTINUED | OUTPATIENT
Start: 2022-04-06 | End: 2022-04-10 | Stop reason: HOSPADM

## 2022-04-06 RX ORDER — LOSARTAN POTASSIUM 50 MG/1
50 TABLET ORAL DAILY
Status: DISCONTINUED | OUTPATIENT
Start: 2022-04-06 | End: 2022-04-10 | Stop reason: HOSPADM

## 2022-04-06 RX ORDER — ACETAMINOPHEN 325 MG/1
650 TABLET ORAL EVERY 6 HOURS PRN
Status: DISCONTINUED | OUTPATIENT
Start: 2022-04-06 | End: 2022-04-10 | Stop reason: HOSPADM

## 2022-04-06 RX ADMIN — DULOXETINE 90 MG: 30 CAPSULE, DELAYED RELEASE ORAL at 09:04

## 2022-04-06 RX ADMIN — CLOPIDOGREL 75 MG: 75 TABLET, FILM COATED ORAL at 09:04

## 2022-04-06 RX ADMIN — ENOXAPARIN SODIUM 40 MG: 100 INJECTION SUBCUTANEOUS at 04:04

## 2022-04-06 RX ADMIN — ACETAMINOPHEN 650 MG: 325 TABLET ORAL at 09:04

## 2022-04-06 RX ADMIN — ASPIRIN 81 MG CHEWABLE TABLET 81 MG: 81 TABLET CHEWABLE at 09:04

## 2022-04-06 RX ADMIN — ZONISAMIDE 100 MG: 100 CAPSULE ORAL at 09:04

## 2022-04-06 RX ADMIN — TOPIRAMATE 50 MG: 25 TABLET, FILM COATED ORAL at 08:04

## 2022-04-06 RX ADMIN — INSULIN ASPART 2 UNITS: 100 INJECTION, SOLUTION INTRAVENOUS; SUBCUTANEOUS at 04:04

## 2022-04-06 RX ADMIN — HYDROCODONE BITARTRATE AND ACETAMINOPHEN 1 TABLET: 5; 325 TABLET ORAL at 12:04

## 2022-04-06 RX ADMIN — PREGABALIN 150 MG: 75 CAPSULE ORAL at 09:04

## 2022-04-06 RX ADMIN — INSULIN ASPART 6 UNITS: 100 INJECTION, SOLUTION INTRAVENOUS; SUBCUTANEOUS at 12:04

## 2022-04-06 RX ADMIN — INSULIN DETEMIR 5 UNITS: 100 INJECTION, SOLUTION SUBCUTANEOUS at 12:04

## 2022-04-06 RX ADMIN — NICOTINE 1 PATCH: 21 PATCH, EXTENDED RELEASE TRANSDERMAL at 12:04

## 2022-04-06 RX ADMIN — QUETIAPINE FUMARATE 300 MG: 100 TABLET ORAL at 08:04

## 2022-04-06 RX ADMIN — HYDROCODONE BITARTRATE AND ACETAMINOPHEN 1 TABLET: 5; 325 TABLET ORAL at 05:04

## 2022-04-06 RX ADMIN — MAGNESIUM SULFATE 2 G: 2 INJECTION INTRAVENOUS at 10:04

## 2022-04-06 RX ADMIN — TOPIRAMATE 50 MG: 25 TABLET, FILM COATED ORAL at 09:04

## 2022-04-06 RX ADMIN — IOHEXOL 100 ML: 350 INJECTION, SOLUTION INTRAVENOUS at 09:04

## 2022-04-06 RX ADMIN — INSULIN ASPART 1 UNITS: 100 INJECTION, SOLUTION INTRAVENOUS; SUBCUTANEOUS at 08:04

## 2022-04-06 RX ADMIN — INSULIN DETEMIR 5 UNITS: 100 INJECTION, SOLUTION SUBCUTANEOUS at 09:04

## 2022-04-06 RX ADMIN — HYDROCHLOROTHIAZIDE 12.5 MG: 12.5 TABLET ORAL at 12:04

## 2022-04-06 RX ADMIN — LOSARTAN POTASSIUM 50 MG: 50 TABLET, FILM COATED ORAL at 12:04

## 2022-04-06 RX ADMIN — ATORVASTATIN CALCIUM 40 MG: 40 TABLET, FILM COATED ORAL at 09:04

## 2022-04-06 RX ADMIN — SODIUM CHLORIDE: 0.9 INJECTION, SOLUTION INTRAVENOUS at 02:04

## 2022-04-06 RX ADMIN — PREGABALIN 150 MG: 75 CAPSULE ORAL at 08:04

## 2022-04-06 NOTE — PLAN OF CARE
Problem: Physical Therapy  Goal: Physical Therapy Goal  Description: Goals to be met by: 22     Patient will increase functional independence with mobility by performin. Supine <> sit with Modified [place]  2. Sit to stand transfer with Stand-by Assistance  3. Bed to chair transfer with Stand-by Assistance using Rolling Walker  4. Gait  x 25 feet with Contact Guard Assistance using Rolling Walker.   5. Lower extremity exercise program x 10 reps per handout, with supervision    Outcome: Ongoing, Progressing     PT evaluation completed, note to follow. Pt presenting with R sided weakness and impaired sensation, requiring mod A to stand and side step without AD and min A to complete with RW although presenting with poor coordination and proprioception of RLE and unable to safely ambulate away from bed this date. Pt with significant increased in headache pain during 1st stand.  Despite patient's co-morbidities, including HTN, patient was living in community setting functioning at mod I level for ADLs, and mobility prior to this event.  There is an expectation of returning to prior level of function to maintain independence thus avoiding readmission.  Patient's clinical condition meets full Inpatient Rehab (IPR) criteria; including the ability to actively participate in 3 hours of therapy.

## 2022-04-06 NOTE — PLAN OF CARE
Problem: Adult Inpatient Plan of Care  Goal: Plan of Care Review  Outcome: Ongoing, Progressing   POC reviewed with pt. Pt verbalizes understanding of plan of care. Pt remained free of injury/trauma throughout the shift. Safety precautions maintained. Bedrails upx2, bed in lowest position and locked. Call bell in reach at all times. Pt worked with PT. Blood glucose monitored throughout the shift, scheduled and SSI given as ordered. Pt complained of pain and was medicated with PRN pain meds per MAR.

## 2022-04-06 NOTE — PROGRESS NOTES
"[facility] Medicine Progress Note    Primary Team: [facility] Hospitalist Team B  Attending Physician: Tanvir Snyder MD  Resident: Everardo  Intern: [facility] Day: 0 days    Chief Complaint: Right-sided weakness noted at ~14:00 on 2022    Subjective:      Patient seen and examined by me this morning. Reports feeling "better" but still complains of numbness and weakness in her RLE.  She also complains of a bifrontal headache that has been present for the past day.  She denies photophobia or vision changes.  She denies fever, chills, dizziness, CP, palpitations, SOB, cough, nausea, vomiting, abdominal pain, or diarrhea.    Objective:   Last 24 Hour Vital Signs:  BP  Min: 128/89  Max: 153/92  Temp  Av.4 °F (36.3 °C)  Min: 96.8 °F (36 °C)  Max: 98.4 °F (36.9 °C)  Pulse  Av.1  Min: 88  Max: 108  Resp  Av.6  Min: 18  Max: 26  SpO2  Av.4 %  Min: 92 %  Max: 98 %  Height  Av' 2" (157.5 cm)  Min: 5' 2" (157.5 cm)  Max: 5' 2.01" (157.5 cm)  Weight  Av.8 kg (129 lb 10.2 oz)  Min: 57.6 kg (127 lb)  Max: 60 kg (132 lb 4.4 oz)  No intake or output data in the 24 hours ending 22 0733   Physical Examination:  Physical Exam   Constitutional: No distress.   HENT:   Head: Normocephalic and atraumatic.   Right Ear: External ear normal.   Left Ear: External ear normal.   Nose: Nose normal. No rhinorrhea or nasal congestion.   Mouth/Throat: Mucous membranes are moist. No oropharyngeal exudate or posterior oropharyngeal erythema. Oropharynx is clear.   Eyes: Pupils are equal, round, and reactive to light. Conjunctivae are normal. Right eye exhibits no discharge. Left eye exhibits no discharge. No scleral icterus.   Cardiovascular: Normal rate, regular rhythm, normal heart sounds and normal pulses. Exam reveals no gallop and no friction rub.   No murmur heard.  Pulmonary/Chest: Effort normal and breath sounds normal. No respiratory distress. She has no wheezes. She has no rhonchi. She has no " rales.   Abdominal: Soft. Bowel sounds are normal. She exhibits no distension. There is no abdominal tenderness. There is no rebound and no guarding.   Musculoskeletal:         General: No swelling. Normal range of motion.      Cervical back: Normal range of motion and neck supple. No rigidity.      Right lower leg: No edema.      Left lower leg: No edema.   Neurological: She is alert. She has intact cranial nerves. She displays weakness. GCS eye subscore is 4. GCS verbal subscore is 5. GCS motor subscore is 6.   RUE proximal muscle strength 4/5, LUE proximal muscle strength 5/5  RUE hand  strength 3/5, LUE hand  strength 5/5  RLE proximal muscle strength 4/5, LLE proximal muscle strength 5/5   Skin: Skin is warm and dry.   Nursing note and vitals reviewed.  Laboratory:  Recent Labs   Lab 04/05/22  1750 04/06/22  0619   WBC 7.05 4.78   HGB 11.5* 10.6*   HCT 38.5 33.3*    260   MCV 92 89   RDW 15.0* 15.0*    137   K 4.5 3.8    108   CO2 19* 21*   BUN 18 11   CREATININE 1.1 0.7   * 259*   PROT 8.4 6.7   ALBUMIN 3.9 3.2*   BILITOT 0.4 0.3   AST 38 21   ALKPHOS 157* 130   ALT 16 14     I have personally reviewed the above laboratory studies.     Imaging:  -reviewed     Current Medications:     Scheduled:   aspirin  81 mg Oral Daily    atorvastatin  40 mg Oral Daily    clopidogreL  75 mg Oral Daily    DULoxetine  90 mg Oral Daily    enoxaparin  40 mg Subcutaneous Daily    pregabalin  150 mg Oral BID    QUEtiapine  300 mg Oral QHS    topiramate  50 mg Oral BID    zonisamide  100 mg Oral Daily         Infusions:   sodium chloride 0.9% 100 mL/hr at 04/06/22 0254          Assessment:     Chayito Chung is a 71 y.o. female with a PMHx of HTN, HLD, T2DM, COPD, seizures, migraines, history of colon cancer, major depressive disorder, and generalized anxiety disorder presenting with right-sided weakness noted upon waking at 14:00 on 4/5 with an unknown last known normal.  Upon arrival  to the ED, she was stroke activated and underwent CTH, which was negative for acute infarct, hemorrhage, or mass.  Initial workup was significant for an elevated CK at 1400.  She is undergoing post-CVA workup and being treated with IVF for her mild rhabdomyolysis.    Plan:     Cerebrovascular Accident with acute right-sided weakness  -woke up around 14:00 on 4/5 with weakness in her upper and lower extremities; presented to ED around 17:00 with no clear last known normal  -Code Stroke activated upon arrival to ED; Vascular Neurology also consulted  -CBC with slight normocytic anemia, CMP with hyperglycemia and chronically low bicarbonate  -TSH, PT/INR, troponin I all WNL  -Lipid panel with LDL 82, HDL 49, TGs 115, total cholesterol 154  -CTH negative for acute infarct, hemorrhage, or mass  -EKG with normal sinus rhythm  -Plavix-loaded in ED, already on ASA 81mg daily at home  -CXR ordered  -CTA Head and Neck ordered  -TTE with Bubble Study ordered  -MRI-Brain w/wo contrast showed small subacute right frontal cortical infarct with suggested small-volume petechial hemorrhage, as well as previous CVAs  -will consult PT/OT/SLP  -telemetry ordered  -will start high-intensity statin; patient on low-intensity statin therapy at home  -will start Plavix 75mg daily  -will continue ASA 81mg daily    Headache  -patient complaining of bifrontal headache since awakening on 4/5  -no meningeal signs on exam, CTH and MRI negative for mass, significant hemorrhage, or meningeal enhancement  -Tylenol PRN ordered     Traumatic Rhabdomyolysis, improving  -CPK at 1408 on presentation, improved to 811 on 4/6  -continuing IV normal saline at 100mL/hr  -will check CPK daily     Essential Hypertension  -home regimen includes HCTZ 12.5mg daily, losartan 100mg daily, and and verapamil 180mg daily  -holding home meds given concern for possible CVA to allow for permissive HTN     Hyperlipidemia  -patient on pravastatin 20mg daily at home  -lipid  panel done on arrival showing overall good control with LDL slightly above goal at 82  -will start patient on high-intensity statin therapy with atorvastatin 80mg     Chronic Back Pain  -patient reports long-standing history of lower back pain  -continuing home Norco PRN     Type II Diabetes Mellitus, complicated by peripheral neuropathy  -last A1c in 12/2021 was 7.3%; repeat A1c on admission 10.3%  -home regimen includes metformin 1000mg BID; holding  -patient on pravastatin 20mg daily at home; will transition to atorvastatin 80mg daily  -continuing home Lyrica 150mg BID  -starting Lantus 5U daily; will titrate up  -SSI in house     COPD, Stage Unknown  -home regimen includes albuterol, Duo-Nebs, and Trelegy  -not on home oxygen; SpO2 at goal while on room air during initial evaluation  -currently on low-flow NC for comfort  -currently smoking tobacco, trying to quit  -Duo-Nebs ordered PRN     Seizure Disorder  -on topiramate 50mg BID, zonisamide 200mg BID at home     Migraines  -continuing home topiramate 50mg BID     Depression  -home regimen includes Cymbalta 90mg daily, Seroquel 300mg qhs  -continuing home Cymbalta, Seroquel     Anxiety   -home regimen includes Cymbalta 90mg daily, Seroquel 300mg qhs, and Xanax 0.5mg BID PRN  -holding home Seroquel, Xanax  -continuing home Cymbalta     Health Care Maintenance  - Screening tests needed: A1c, colonoscopy (due 7/2022)     Ppx: Lovenox  Diet: Cardiac Diabetic  Code: Full     Disposition: pending CVA workup, treatment of rhabdomyolysis    Sundeep Frank M.D.  [facility] Internal Medicine PGY-1    [facility] Medicine Hospitalist Pager numbers:   [facility] Hospitalist Medicine Team A (Bita/Sydni): [phone]  [facility] Hospitalist Medicine Team B (Lillian/Danny):  [phone]

## 2022-04-06 NOTE — PT/OT/SLP EVAL
Physical Therapy Evaluation    Patient Name:  Chayito Chung   MRN:  [id number]    Recommendations:     Discharge Recommendations:  rehabilitation facility   Discharge Equipment Recommendations:  (TBD)   Barriers to Discharge: Decreased caregiver support    Assessment:     Chayito Chung is a 71 y.o. female admitted with a medical diagnosis of CVA (cerebral vascular accident).  She presents with the following impairments/functional limitations:  weakness, impaired endurance, impaired self care skills, impaired functional mobilty, gait instability, impaired balance, decreased coordination, decreased upper extremity function, decreased lower extremity function, decreased safety awareness, pain. Pt presenting with R sided weakness and impaired sensation, requiring mod A to stand and side step without AD and min A to complete with RW although presenting with poor coordination and proprioception of RLE and unable to safely ambulate away from bed this date. Pt with significant increased in headache pain during 1st stand.  Despite patient's co-morbidities, including HTN, patient was living in community setting functioning at mod I level for ADLs, and mobility prior to this event.  There is an expectation of returning to prior level of function to maintain independence thus avoiding readmission.  Patient's clinical condition meets full Inpatient Rehab (IPR) criteria; including the ability to actively participate in 3 hours of therapy.      Rehab Prognosis: Good; patient would benefit from acute skilled PT services to address these deficits and reach maximum level of function.    Recent Surgery: * No surgery found *      Plan:     During this hospitalization, patient to be seen 5 x/week to address the identified rehab impairments via gait training, therapeutic activities, therapeutic exercises, neuromuscular re-education and progress toward the following goals:    · Plan of Care Expires:  05/06/22    Subjective     Chief  Complaint: significant increase in headache pain during 1st sit  Patient/Family Comments/goals: pt became tearful and rating pain 10/10 upon sitting EOB  Pain/Comfort:  · Pain Rating 1: 10/10 (upon sitting EOB the 1st time)  · Location 1: head (initally frontal headache then moving to the back as well)  · Pain Addressed 1: Reposition, Distraction, Cessation of Activity, Nurse notified  · Pain Rating Post-Intervention 1: 6/10    Patients cultural, spiritual, Sikhism conflicts given the current situation: no    Living Environment:  Pt lives alone in a 3rd floor apt at [facility] - has a tub/shower combo with shower chair.  Prior to admission, patients level of function was mod I with use of her rollator for mobility and ADLs.  Equipment used at home: rollator, walker, rolling, shower chair.  DME owned (not currently used): rolling walker.  Upon discharge, patient will have assistance from unknown.    Objective:     Communicated with nurse Pierce prior to session. Patient found HOB elevated with bed alarm, telemetry  upon PT entry to room.    General Precautions: Standard, fall   Orthopedic Precautions:N/A   Braces: N/A  Respiratory Status: Room air    Exams:  · Gross Motor Coordination:  impaired RLE coordination/proprioception noted during stepping  · Postural Exam:  Patient presented with the following abnormalities:    · -       Rounded shoulders  · Sensation:    · -       Impaired  light/touch present but diminished by 20-25% in RUE and by ~50% in RLE  · Skin Integrity/Edema:      · -       Skin integrity: Visible skin intact  · RLE ROM: WFL  · RLE Strength: ankle DF 5/5, knee ext 4+/5, hip flexion 4/5  · LLE ROM: WFL  · LLE Strength: WFL    Functional Mobility:  · Bed Mobility:     · Rolling Right: contact guard assistance  · Scooting: contact guard assistance  · Supine to Sit: contact guard assistance  · Sit to Supine: contact guard assistance  · Transfers:     · Sit to Stand:  moderate assistance with  no AD and minimal assistance with rolling walker  · Gait: 3 steps right with B HHA and mod A and min A for side stepping with use of RW - with no AD assist for RW managment and pt maintains R knee/hip flexed throughout and demonstrating decreased coordination with RLE during stepping. Max VCs for upright posture and R quad/glut activation in standing but with stepping returns to flexed knee.    Therapeutic Activities and Exercises:  Educated pt on role of PT and pt agreeable to participate in therapy session.  Pt found sitting upright in bed on bed pan. Able to assist with hygiene.  Transitioned to sit EOB and pt with reports of significant 10/10 headache and became tearful, grabbing front of head.  /94 mmHg, 104 bpm. Increased pain and completed stand with no AD with B HHA and mod A - requiring R knee to be blocked 2/2 buckling - to step right then returned to supine.  Placed cool cloth on pt's head and after several minutes BP assessed again at 138/90 mmHg, HR 97 bpm.  Agreeable to sit EOB again and pt transitioned to sit EOB. No c/o increased headache at this time, rated 6/10.  Completed stand and side stepping with use of RW this bout and noted R knee remaining flexed and impaired coordination as reported above.  Returned to supine and educated on bridging and supine exercises.    AM-PAC 6 CLICK MOBILITY  Total Score:12     Patient left HOB elevated with all lines intact, call button in reach, bed alarm on and nurse notified.    GOALS:   Multidisciplinary Problems     Physical Therapy Goals        Problem: Physical Therapy    Goal Priority Disciplines Outcome Goal Variances Interventions   Physical Therapy Goal     PT, PT/OT Ongoing, Progressing     Description: Goals to be met by: 22     Patient will increase functional independence with mobility by performin. Supine <> sit with Modified [place]  2. Sit to stand transfer with Stand-by Assistance  3. Bed to chair transfer with Stand-by  Assistance using Rolling Walker  4. Gait  x 25 feet with Contact Guard Assistance using Rolling Walker.   5. Lower extremity exercise program x 10 reps per handout, with supervision                     History:     Past Medical History:   Diagnosis Date    Acute coronary syndrome     Acute hypoxemic respiratory failure 2017    Anxiety     Asthma     Cancer     colon    Cataracts, both eyes     Chest pain at rest     Chest pain of uncertain etiology 2015    Colon cancer 1988    COPD (chronic obstructive pulmonary disease)     Coronary artery disease     Depression     Diabetes mellitus     Dyspnea on exertion 11/15/2018    Elevated brain natriuretic peptide (BNP) level 11/15/2018    Elevated cholesterol     Hypertension     Swelling     Syncope and collapse 2016       Past Surgical History:   Procedure Laterality Date    ABDOMINAL SURGERY      BREAST BIOPSY      benign unsure what side     SECTION, CLASSIC      COLON SURGERY      COLONOSCOPY  2019    repeat in 5 years    CORONARY ANGIOPLASTY WITH STENT PLACEMENT  3 months ago     x2, Hysterectomy, Lung surgery, Partial stomach removed, Part of colon removed for rectal cancer    GASTRECTOMY      HEMORRHOID SURGERY      HYSTERECTOMY      @28yrs of age    LUNG BIOPSY      OOPHORECTOMY      @28yrs of age    POSTERIOR FUSION OF CERVICAL SPINE WITH LAMINECTOMY N/A 2020    Procedure: LAMINECTOMY, SPINE, CERVICAL, WITH POSTERIOR FUSION C3-T1 ;  Surgeon: Herson Tate MD;  Location: [address];  Service: Neurosurgery;  Laterality: N/A;    REPAIR OF INCARCERATED INCISIONAL HERNIA WITHOUT HISTORY OF PRIOR REPAIR N/A 2022    Procedure: REPAIR, HERNIA, INCISIONAL, INCARCERATED, WITHOUT HISTORY OF PRIOR REPAIR;  Surgeon: Simon King MD;  Location: [facility];  Service: General;  Laterality: N/A;       Time Tracking:     PT Received On: 22  PT Start Time: 35     PT Stop Time: 1006  PT  Total Time (min): 31 min Overlap with OT for portions of session due to complex nature of patient and for safety with mobility to decrease fall risk for patient and caregiver injury requiring two skilled therapists to provide different interventions.    Billable Minutes: Evaluation 8 and Therapeutic Activity 10      04/06/2022

## 2022-04-06 NOTE — PLAN OF CARE
Problem: Occupational Therapy  Goal: Occupational Therapy Goal  Description: Goals to be met by: 5/6/22     Patient will increase functional independence with ADLs by performing:    UE Dressing with Modified [place].  LE Dressing with Modified [place].  Grooming while standing with Modified [place].  Toileting from toilet with Modified [place] for hygiene and clothing management.   Supine to sit with Modified [place].  Step transfer with Modified [place]  Toilet transfer to toilet with Modified [place].  Increased functional strength to WFL for self care skills and functional mobility.  Upper extremity exercise program x10 reps per handout, with independence.    Outcome: Ongoing, Progressing       Despite patient's co-morbidities,patient was living in community setting functioning at  mod I  level for ADLs, and mobility prior to this event.  There is an expectation of returning to prior level of function to maintain independence thus avoiding readmission.  Patient's clinical condition meets full Inpatient Rehab (IPR) criteria; including the ability to actively participate in 3 hours of therapy.  A lower level of care(SNF) cannot provide the interdisciplinary treatment approach needed.   Pt currently demonstrates R sided deficits causing her decreased independence in ADLs and functional mobility at this time. Pt eager to return to mod I PLOF. Pt has Headache and elevated BP upon initially sitting EOB. Requires assist for ADLs and mobility. Will cont to progress pt

## 2022-04-06 NOTE — TELEPHONE ENCOUNTER
----- Message from Ben Steel sent at 4/6/2022  2:04 PM CDT -----  Regarding: Hosp f/u  Patient is preparing for discharge from [facility] and is requiring a hospital follow up  appointment with Dr. Valladares within 7 days. I'm unable to schedule an appointment within that time frame. The pt is current scheduled with Dr. Valladares on 6/16/22 but needs a sooner. If possible, can you please assist with scheduling this patient and message me back?    DX CVA (cerebral vascular accident)    Thank you,     Ben Steel  Access Navigator/ [facility]

## 2022-04-06 NOTE — NURSING
Patient arrived to unit via stretcher. Patient is alert and oriented x4. Speech clear. Continent of bowel and bladder.Telemetry to placed on chest wall. Denies pain at this time. Bed low. Call light in reach.

## 2022-04-06 NOTE — PT/OT/SLP PROGRESS
Speech Language Pathology  VISIT Attempt      Chayito Chung  MRN: [id number]      1345pm  Two attempts made to eval patient at bedside.   Patient not seen today secondary to Patient reporting pain (headache 10/10). Pt already had pain medication and asked SLP to come back at later time so she could rest and feel better. Pt is currently on a PO diet at this time.   Will follow up as able.       CAITLYN Ramirez, CCC-SLP  Speech Pathologist   4/6/2022

## 2022-04-06 NOTE — ASSESSMENT & PLAN NOTE
-Noted on MRI  -Patient denies history of prior stroke and does not recall having any symptoms  -She denies deficits but left arm neglect noted with DSS  -She also has chronic tremor in the let  arm with unclear etiology

## 2022-04-06 NOTE — PROGRESS NOTES
[facility] - Telemetry  Adult Nutrition  Progress Note    SUMMARY       Recommendations    Recommendation:  1. Encourage intake at meals as tolerated.   2. Add Boost Plus 1xday.   3. Monitor weight/labs.   4. RD to follow to monitor po intake    Goals:   Pt will tolerate diet with at least 50-75% intake at meals by RD Follow up  Nutrition Goal Status: new  Communication of RD Recs: reviewed with RN    Assessment and Plan  Mild malnutrition  Nutrition Problem:  Mild Protein-Calorie Malnutrition  Malnutrition in the context of Acute Illness/Injury    Related to (etiology):  Dx/hx    Signs and Symptoms (as evidenced by):  Energy Intake: <75% of estimated energy requirement for few months  Body Fat Depletion: mild depletion of thoracic and lumbar region   Muscle Mass Depletion: mild depletion of temples, clavicle region   Weight Loss: 11% x 5 months    Interventions(treatment strategy):  Collaboration with other providers  [employer]    Nutrition Diagnosis Status:  New    Malnutrition Assessment  Weight Loss (Malnutrition):  (11% x 5 months)   Thoracic and Lumbar Region: mild depletion   [place] Region (Muscle Loss): mild depletion  Clavicle Bone Region (Muscle Loss): mild depletion  Anterior Thigh Region (Muscle Loss): well nourished  Posterior Calf Region (Muscle Loss): well nourished       Reason for Assessment  Reason For Assessment: identified at risk by screening criteria ([facility])  Diagnosis:  (CVA)  Relevant Medical History: COPD, CAD, HTN, elevated cholesterol, DM, asthma, colon CA, gastrectomy, cervical spine fusion, hernia repair  General Information Comments: Pt on Cardiac diet. Reports good intake of meals. [place] 20-skin intact. NFPE completed today 4/5- mild wasting of legs, thoracic region, clavicles & temples. Noted 17lb weight loss x 5 months.  Nutrition Discharge Planning: pt to d/c on Cardiac diet    Nutrition Risk Screen  Nutrition Risk Screen: no indicators present    Nutrition/Diet History  Food  "Preferences: no Yazidi or cultural food prefs identified  Factors Affecting Nutritional Intake: None identified at this time    Anthropometrics  Temp: 97.1 °F (36.2 °C)  Height: 5' 2" (157.5 cm)  Height (inches): 62 in  Weight Method: Bed Scale  Weight: 59.9 kg (132 lb 0.9 oz)  Weight (lb): 132.06 lb  Ideal Body Weight (IBW), Female: 110 lb  % Ideal Body Weight, Female (lb): 120.05 %  BMI (Calculated): 24.1  BMI Grade: 18.5-24.9 - normal  Usual Body Weight (UBW), k.9 kg (21)  % Usual Body Weight: 88.4  % Weight Change From Usual Weight: -11.78 %     Lab/Procedures/Meds  Pertinent Labs Reviewed: reviewed  Pertinent Labs Comments: Glu 259H, Alb 3.2L  Pertinent Medications Reviewed: reviewed  Pertinent Medications Comments: aspirin, insulin    Estimated/Assessed Needs  Weight Used For Calorie Calculations: 59.9 kg (132 lb 0.9 oz)  Energy Calorie Requirements (kcal): 1797 (30 kcal/kg)  Energy Need Method: Kcal/kg  Protein Requirements: 59g (1.0g/kg)  Weight Used For Protein Calculations: 59.9 kg (132 lb 0.9 oz)  Estimated Fluid Requirement Method: RDA Method  RDA Method (mL): 1797     Nutrition Prescription Ordered  Current Diet Order: Cardiac    Evaluation of Received Nutrient/Fluid Intake  I/O: none recorded  Energy Calories Required: not meeting needs  Protein Required: not meeting needs  Fluid Required: not meeting needs  Comments: LBM 4/4  % Intake of Estimated Energy Needs: 50 - 75 %  % Meal Intake: 50 - 75 %    Nutrition Risk  Level of Risk/Frequency of Follow-up:  (1xweekly)     Monitor and Evaluation  Food and Nutrient Intake: food and beverage intake  Food and Nutrient Adminstration: diet order  Physical Activity and Function: nutrition-related ADLs and IADLs  Anthropometric Measurements: weight  Biochemical Data, Medical Tests and Procedures: electrolyte and renal panel  Nutrition-Focused Physical Findings: overall appearance     Nutrition Follow-Up  RD Follow-up?: Yes    "

## 2022-04-06 NOTE — ASSESSMENT & PLAN NOTE
Chronic, controlled.  Continue home regimen monitoring BP closely.  Will titrate BP medications as needed for sustained BP control.       Prep Survey    Flowsheet Row Responses   [facility] facility patient discharged from? [facility]   Is LACE score < 7 ? Yes   Emergency Room discharge w/ pulse ox? No   Eligibility Not Eligible   What are the reasons patient is not eligible? Other  [Low risk for readmission]   Does the patient have one of the following disease processes/diagnoses(primary or secondary)? Other   Prep survey completed? Yes          MUSA JORDAN - Registered Nurse

## 2022-04-06 NOTE — PT/OT/SLP EVAL
Occupational Therapy   Evaluation    Name: Chayito Chung  MRN: [id number]  Admitting Diagnosis:  CVA (cerebral vascular accident)  Recent Surgery: * No surgery found *      Recommendations:     Discharge Recommendations: rehabilitation facility  Discharge Equipment Recommendations:   (TBD)  Barriers to discharge:  Decreased caregiver support    Assessment:     Chayito Chung is a 71 y.o. female with a medical diagnosis of CVA (cerebral vascular accident).  She presents with The primary encounter diagnosis was Traumatic rhabdomyolysis, initial encounter. Diagnoses of Right leg weakness, CVA (cerebral vascular accident), Stroke, Fall, Cerebrovascular accident (CVA), unspecified mechanism, and Type 2 diabetes mellitus with other circulatory complication, with long-term current use of insulin were also pertinent to this visit.  . Performance deficits affecting function: weakness, impaired sensation, impaired self care skills, decreased upper extremity function, decreased coordination, impaired coordination, impaired fine motor, impaired functional mobilty, gait instability, decreased lower extremity function, pain, impaired balance.      Despite patient's co-morbidities,patient was living in community setting functioning at  mod I  level for ADLs, and mobility prior to this event.  There is an expectation of returning to prior level of function to maintain independence thus avoiding readmission.  Patient's clinical condition meets full Inpatient Rehab (IPR) criteria; including the ability to actively participate in 3 hours of therapy.  A lower level of care(SNF) cannot provide the interdisciplinary treatment approach needed.   Pt currently demonstrates R sided deficits causing her decreased independence in ADLs and functional mobility at this time. Pt eager to return to mod I PLOF. Pt has Headache and elevated BP upon initially sitting EOB. Requires assist for ADLs and mobility. Will cont to progress pt     Rehab  Prognosis: Good; patient would benefit from acute skilled OT services to address these deficits and reach maximum level of function.       Plan:     Patient to be seen 5 x/week to address the above listed problems via self-care/home management, therapeutic activities, therapeutic exercises  · Plan of Care Expires: 05/06/22  · Plan of Care Reviewed with: patient    Subjective     Chief Complaint: pt reporting headache; also reporting R sided weakness and impaired sensation   Patient/Family Comments/goals: return to OF     Occupational Profile:  Living Environment: alone in 3rd floor apartment at [facility], elevator access (states often times the elevator is out of service), t/s combo  Previous level of function: mod I with use of rollator and shower chair; pt performs IADLs as well   Equipment Used at Home:  rollator, shower chair  Assistance upon Discharge: did not report further family assist     Pain/Comfort:  · Pain Rating 1: 10/10  · Location - Orientation 1: generalized  · Location 1: head  · Pain Addressed 1: Reposition, Distraction, Cessation of Activity, Nurse notified  · Pain Rating Post-Intervention 1: 6/10    Patients cultural, spiritual, [employer]ism conflicts given the current situation:      Objective:     Communicated with: nsg prior to session.   General Precautions: Standard, fall   Orthopedic Precautions:N/A   Braces: N/A    Occupational Performance:    Bed Mobility:    · Patient completed Rolling/Turning to Right with contact guard assistance  · Patient completed Scooting/Bridging with contact guard assistance  · Patient completed Supine to Sit with contact guard assistance  · Patient completed Sit to Supine with contact guard assistance    Functional Mobility/Transfers:  · Patient completed Sit <> Stand Transfer with minimum assistance and moderate assistance  with  no assistive device and rolling walker   · Functional Mobility: Mod A without AD lateral steps to HOB; Min A with RW and assist with  RW Management     Activities of Daily Living:  · Lower Body Dressing: minimum assistance doff/nancy socks     Cognitive/Visual Perceptual:  Cognitive/Psychosocial Skills:     -       Oriented to: Person, Place, Time and Situation   -       Follows Commands/attention:Follows multistep  commands  -       Communication: clear/fluent  -       Memory: No Deficits noted  -       Safety awareness/insight to disability: intact   -       Mood/Affect/Coping skills/emotional control: Appropriate to situation    Physical Exam:  Balance:    -       sitting WFL; fair -/poor standing   Postural examination/scapula alignment:    -       Rounded shoulders  Skin integrity: Visible skin intact  Sensation:  Light touch intact, however, reports 20-25% diminished in RUE And ~ 50 % diminished in RLE  Dominant hand:    -       right  Upper Extremity Range of Motion:   BUE WFL   Upper Extremity Strength:  RUE grossly 3+/5 throughout; LUE WFL    Strength:  3+/5 R ; WFL L     AMPAC 6 Click ADL:  AMPAC Total Score: 17    Treatment & Education:  Pt found supine on bed pan  Pt able to bridge and roll with verbal cues to remove  Able to assist with hygiene using RUE while supine   Upon initial trial of bed mobility, pt reporting significant headache 10/10, tearful and grabbing front of head; BP noted 166/94 ; pt reporting increase in pain with continued sitting and requiring return to supine; able to stand without AD mod A- blocking required of RLE 2/2 buckling  cool cloth placed on forehead and BP retaken once supine and 138/90 and HR 97  ROM/MMT testing performed while supine   Agreeable to perform EOB axs for 2nd trial   Doff/donned socks while EOB; increased difficulty noted with RLE and required assist   Stood subsequent trial with RW with decreased assist; continues to require verbal and visual cues to extend BLEs and use of BUE to assist with taking weight   Lateral steps to HOB with RW; assisted with balance and RW management; pt  noted to have increased difficulty placing RLE 2/2 impaired sensation   Returned to supine   Bridging performed for exercise and demo'd UE/LE therex to perform   Education:    Patient left supine with all lines intact, call button in reach, bed alarm on and nsg notified    GOALS:   Multidisciplinary Problems     Occupational Therapy Goals        Problem: Occupational Therapy    Goal Priority Disciplines Outcome Interventions   Occupational Therapy Goal     OT, PT/OT Ongoing, Progressing    Description: Goals to be met by: 22     Patient will increase functional independence with ADLs by performing:    UE Dressing with Modified [place].  LE Dressing with Modified [place].  Grooming while standing with Modified [place].  Toileting from toilet with Modified [place] for hygiene and clothing management.   Supine to sit with Modified [place].  Step transfer with Modified [place]  Toilet transfer to toilet with Modified [place].  Increased functional strength to WFL for self care skills and functional mobility.  Upper extremity exercise program x10 reps per handout, with independence.                     History:     Past Medical History:   Diagnosis Date    Acute coronary syndrome     Acute hypoxemic respiratory failure 2017    Anxiety     Asthma     Cancer     colon    Cataracts, both eyes     Chest pain at rest     Chest pain of uncertain etiology 2015    Colon cancer 1988    COPD (chronic obstructive pulmonary disease)     Coronary artery disease     Depression     Diabetes mellitus     Dyspnea on exertion 11/15/2018    Elevated brain natriuretic peptide (BNP) level 11/15/2018    Elevated cholesterol     Hypertension     Swelling     Syncope and collapse 2016       Past Surgical History:   Procedure Laterality Date    ABDOMINAL SURGERY      BREAST BIOPSY      benign unsure what side     SECTION, CLASSIC      COLON SURGERY       COLONOSCOPY  2019    repeat in 5 years    CORONARY ANGIOPLASTY WITH STENT PLACEMENT  3 months ago     x2, Hysterectomy, Lung surgery, Partial stomach removed, Part of colon removed for rectal cancer    GASTRECTOMY      HEMORRHOID SURGERY      HYSTERECTOMY      @28yrs of age    LUNG BIOPSY      OOPHORECTOMY      @28yrs of age    POSTERIOR FUSION OF CERVICAL SPINE WITH LAMINECTOMY N/A 2020    Procedure: LAMINECTOMY, SPINE, CERVICAL, WITH POSTERIOR FUSION C3-T1 ;  Surgeon: Herson Tate MD;  Location: [address];  Service: Neurosurgery;  Laterality: N/A;    REPAIR OF INCARCERATED INCISIONAL HERNIA WITHOUT HISTORY OF PRIOR REPAIR N/A 2022    Procedure: REPAIR, HERNIA, INCISIONAL, INCARCERATED, WITHOUT HISTORY OF PRIOR REPAIR;  Surgeon: Simon King MD;  Location: [facility];  Service: General;  Laterality: N/A;       Time Tracking:     OT Date of Treatment: 22  OT Start Time: 0935  OT Stop Time: 1006  OT Total Time (min): 31 min    Billable Minutes:Evaluation 8  Self Care/Home Management 13 co tx with PT     2022

## 2022-04-06 NOTE — PROGRESS NOTES
04/06/22 0143   Admission   Initial VN Admission Questions Complete   Communication Issues? None   Shift   Virtual Nurse - Patient Verbalized Approval Of Camera Use   Safety/Activity   Patient Rounds bed in low position;call light in patient/parent reach;visualized patient   Safety Promotion/Fall Prevention Fall Risk reviewed with patient/family

## 2022-04-06 NOTE — ED NOTES
Pt resting, no new complaints. ABCs intact, NAD. Awaiting advanced access and bed placement. Pt SR up x 2. Bed in lowest position with wheels locked. Call bell within reach of pt.

## 2022-04-06 NOTE — TELEPHONE ENCOUNTER
----- Message from Juli Price sent at 2022  9:29 AM CDT -----  CONSULTS:          Patient: Chayito Chung         : 1950         Clinic#: [id number]         Room number: 456         Referring MD: Dr.Natalia holcomb         Diagnosis: Cortical infarct          Person calling:Linda

## 2022-04-06 NOTE — CONSULTS
[facility]  Vascular Neurology  Comprehensive Stroke Center  TeleVascular Neurology Consult Note    Inpatient consult to Vascular (Stroke) Neurology  Consult performed by: Erna Sosa NP  Consult ordered by: Marcie Mcgee MD        Assessment/Plan:     Patient is a 71 y.o. year old female with:    * Right leg weakness  72 y/o female with HTN, DM2, CAD, seizure like activity, migraines, COPD presented with acute right leg weakness.  MRI with no acute findings.  FLAIR changes noted to right frontal lobe.  Although this was read by Radiology as possible subacute, there is no diffusion restriction or ADC changes making it most likely several weeks old.  This lesion would also not correlate to the patient acute right leg weakness.      On exam right leg weakness has significantly improved, but patient reports extreme paresthesia to the entire leg.  Light tough remains intact.  Review of EMR shows an admission 5/25/2020 for similar complaints.  Imaging demonstrated moderate cervical spine stenosis and mild thoracic and lumbar stenosis. Cervical spine imaging shows trace myelomalacia C3-4, C5-6 primarily.  At that time she was managed medically with robaxin and placed in patella stabilizing brace.  She ultimately ended up having LAMINECTOMY, SPINE, CERVICAL, WITH POSTERIOR FUSION C3-T1  on 7/23/2020.  She had a 4 week and 4 month post op visit and was doing well.  There are no further records available until 3/3/2022 from Dr. Anguiano Neurology.  In his note he documents a normal exam.      Etiology for right leg weakness/numbness unclear.  Patient does have multiple risk factor for stroke.  Complete stroke workup pending.  Patient has history of seizure like activity and awoke on floor.  Symptoms could be due to trauma.  Given prior spinal findings repeat imaging would be reasonable to ensure stability.      Patient also has history of seizure like activity.  No further information available in EMR.  No  EEG on record.  Seizure activity as cause for weakness also in differential.    Recommendations  - ASA 81 mg with Plavix 75mg x 21 days then monotherapy with ASA  - Atorvastatin 40mg daily for goal LDL < 70  - CTA head and neck  - TTE  - EEG  - MRI C-spine, T-Spine, L-spine  - Stroke risk factor modification  - TeleVascular Neurology to follow pending imaging and make additional recommendations as indicated        History of stroke with residual deficit  -Noted on MRI  -Patient denies history of prior stroke and does not recall having any symptoms  -She denies deficits but left arm neglect noted with DSS  -She also has chronic tremor in the let  arm with unclear etiology    CAD (coronary artery disease)  -Stroke risk factor  -Continue home regiment    Hypertension  -Stroke risk factor  -Can resume home regiment for long-term goal < 130/80      Type 2 diabetes mellitus, with long-term current use of insulin  -Stroke risk factor  -A1c 10.3  -Goal glucose 140-180 while hospitalized      Tobacco abuse, 1ppd, 50 years  -Stroke risk factor  -Consult to smoking cessation program  -Encourage smoking cessation      STROKE DOCUMENTATION     Acute Stroke Times   Last Known Normal Date: 04/05/22  Last Known Normal Time: 0000  Stroke Team Arrival Time: 1759  CT Interpretation Time: 1800    NIH Scale:       Modified Haroldo    [place] Coma Scale:    ABCD2 Score:    [id number] Score:   HAS -BLED Score:   ICH Score:   Hunt & Leo Classification:       Thrombolysis Candidate? No, Out of window     Delays to Thrombolysis?  Not Applicable    Interventional Revascularization Candidate?   Is the patient eligible for mechanical endovascular reperfusion (CLAUDIA)?  No; at this time symptoms not suggestive of large vessel occlusion    Delays to Thrombectomy? Not Applicable    Hemorrhagic change of an Ischemic Stroke: Does this patient have an ischemic stroke with hemorrhagic changes? No     Subjective:     History of Present Illness:  70 y/o  female with HTN, DM2, CAD, seizure like activity, migraines, COPD who presented after awakening on the floor yesterday afternoon.  Patient states she frequently awakens on the floor.  This has been going on for the past 2 years.  She states she was told they think she may be having some type of seizure activity. She reports going to bed around midnight then awakening on the floor around 2pm today.  She had difficulty walking and was dragging her right leg which is new for her.  She also reported associated dizziness.  She denies numbness initially but states her right leg is remarkable numb.  She denies associated speech or vision changes.Typically after these events she returns to baseline without any deficits.  Her most recent prior event was last week.  She also had another event about 1 month ago.        Past Medical History:   Diagnosis Date    Acute coronary syndrome     Acute hypoxemic respiratory failure 2017    Anxiety     Asthma     Cancer     colon    Cataracts, both eyes     Chest pain at rest     Chest pain of uncertain etiology 2015    Colon cancer 1988    COPD (chronic obstructive pulmonary disease)     Coronary artery disease     Depression     Diabetes mellitus     Dyspnea on exertion 11/15/2018    Elevated brain natriuretic peptide (BNP) level 11/15/2018    Elevated cholesterol     Hypertension     Swelling     Syncope and collapse 2016       Past Surgical History:   Procedure Laterality Date    ABDOMINAL SURGERY      BREAST BIOPSY      benign unsure what side     SECTION, CLASSIC      COLON SURGERY      COLONOSCOPY  2019    repeat in 5 years    CORONARY ANGIOPLASTY WITH STENT PLACEMENT  3 months ago     x2, Hysterectomy, Lung surgery, Partial stomach removed, Part of colon removed for rectal cancer    GASTRECTOMY      HEMORRHOID SURGERY      HYSTERECTOMY      @28yrs of age    LUNG BIOPSY      OOPHORECTOMY      @28yrs of age     POSTERIOR FUSION OF CERVICAL SPINE WITH LAMINECTOMY N/A 7/23/2020    Procedure: LAMINECTOMY, SPINE, CERVICAL, WITH POSTERIOR FUSION C3-T1 ;  Surgeon: Herson Tate MD;  Location: [address];  Service: Neurosurgery;  Laterality: N/A;    REPAIR OF INCARCERATED INCISIONAL HERNIA WITHOUT HISTORY OF PRIOR REPAIR N/A 2/7/2022    Procedure: REPAIR, HERNIA, INCISIONAL, INCARCERATED, WITHOUT HISTORY OF PRIOR REPAIR;  Surgeon: Simon King MD;  Location: [facility] OR;  Service: General;  Laterality: N/A;       Family History   Problem Relation Age of Onset    Cancer Mother     Diabetes Mother     Hypertension Mother     Breast cancer Mother     Heart disease Father     Lung cancer Father     Depression Sister     Hypertension Sister     Diabetes Mellitus Sister     Cancer Maternal Aunt        Social History     Socioeconomic History    Marital status: Single   Tobacco Use    Smoking status: Current Every Day Smoker     Packs/day: 0.50     Years: 53.00     Pack years: 26.50     Types: Cigarettes     Start date: 1967    Smokeless tobacco: Never Used    Tobacco comment: Pt is currently enrolled in the [employer].  Ambulatory referral to Smoking Cessation program.    Substance and Sexual Activity    Alcohol use: Yes     Alcohol/week: 3.0 standard drinks     Types: 3 Glasses of wine per week     Comment: 1 every 3 months    Drug use: No    Sexual activity: Yes     Partners: Male     Birth control/protection: None     Comment: last drink yesterday afternoon   Social History Narrative    Pulmonology Dr. Chung([facility]) for COPD     Endo- Dr. Damaso Delgado for Diabetes Mellitus    GI- Dr. Arias- History of partial gastrectomy, gastritis    Heme/Onc-  Anemia and history of colon cancer     Psych- Dr. Cotto ([facility])- Depression/Anxiety. PTSD    Dr. Knox- Pain Management    Dr. Anguiano Neurology- Seizure disorder    Dr. Malin- s/p percutaneous transluminal coronary angioplasty             Current Facility-Administered Medications   Medication Dose Route Frequency Provider Last Rate Last Admin    0.9%  NaCl infusion   Intravenous Continuous Marcie Mcgee  mL/hr at 04/06/22 0254 New Bag at 04/06/22 0254    acetaminophen tablet 650 mg  650 mg Oral Q6H PRN Sundeep Marc MD   650 mg at 04/06/22 0937    albuterol-ipratropium 2.5 mg-0.5 mg/3 mL nebulizer solution 3 mL  3 mL Nebulization Q6H PRN Marcie Mcgee MD        aspirin chewable tablet 81 mg  81 mg Oral Daily Marcie Mcgee MD   81 mg at 04/06/22 0937    atorvastatin tablet 40 mg  40 mg Oral Daily Marcie Mcgee MD   40 mg at 04/06/22 0938    clopidogreL tablet 75 mg  75 mg Oral Daily Marcie Mcgee MD   75 mg at 04/06/22 0937    dextrose 10% bolus 125 mL  12.5 g Intravenous PRN Theodore Hyatt MD        dextrose 10% bolus 250 mL  25 g Intravenous PRN Theodore Hyatt MD        DULoxetine DR capsule 90 mg  90 mg Oral Daily Marcie Mcgee MD   90 mg at 04/06/22 0937    enoxaparin injection 40 mg  40 mg Subcutaneous Daily Marcie Mcgee MD   40 mg at 04/05/22 2246    glucagon (human recombinant) injection 1 mg  1 mg Intramuscular PRN Marcie Mcgee MD        glucose chewable tablet 16 g  16 g Oral PRN Marcie Mcgee MD        glucose chewable tablet 24 g  24 g Oral PRN Marcie Mcgee MD        hydroCHLOROthiazide tablet 12.5 mg  12.5 mg Oral Daily Marcie Mcgee MD        HYDROcodone-acetaminophen 5-325 mg per tablet 1 tablet  1 tablet Oral Q6H PRN Sundeep Marc MD   1 tablet at 04/05/22 2246    insulin aspart U-100 pen 1-10 Units  1-10 Units Subcutaneous QID (AC + HS) PRN Marcie Mcgee MD        insulin detemir U-100 pen 5 Units  5 Units Subcutaneous Daily Sundeep Marc MD   5 Units at 04/06/22 0939    iohexoL (OMNIPAQUE 350) injection 100 mL  100 mL Intravenous ONCE PRN Marcie Mcgee MD        losartan tablet 50 mg  50 mg Oral  Daily Marcie Mcgee MD        magnesium sulfate 2g in water 50mL IVPB (premix)  2 g Intravenous Once Sundeep Marc MD 25 mL/hr at 04/06/22 1009 2 g at 04/06/22 1009    pregabalin capsule 150 mg  150 mg Oral BID Marcie Mcgee MD   150 mg at 04/06/22 0937    QUEtiapine tablet 300 mg  300 mg Oral QHS Marcie Mcgee MD        sodium chloride 0.9% flush 10 mL  10 mL Intravenous PRN Marcie cMgee MD        sodium chloride 0.9% flush 10 mL  10 mL Intravenous Q12H PRN Marcie Mcgee MD        topiramate tablet 50 mg  50 mg Oral BID Marcie Mcgee MD   50 mg at 04/06/22 0937    zonisamide capsule 100 mg  100 mg Oral Daily Marcie Mcgee MD   100 mg at 04/06/22 0943     Home medications reviewed    Review of patient's allergies indicates:  No Known Allergies       Review of Systems   Constitutional:  Positive for fatigue. Negative for fever.   HENT:  Negative for drooling and trouble swallowing.    Eyes:  Negative for visual disturbance.   Respiratory:  Negative for chest tightness and shortness of breath.    Cardiovascular:  Negative for chest pain and palpitations.   Gastrointestinal:  Negative for nausea and vomiting.   Genitourinary:  Negative for difficulty urinating and hematuria.   Musculoskeletal:  Positive for arthralgias, back pain, gait problem and neck stiffness.   Skin:  Negative for rash.   Neurological:  Positive for dizziness, weakness and numbness.   Hematological:  Does not bruise/bleed easily.   Psychiatric/Behavioral:  Negative for confusion and decreased concentration.    Objective:   Vitals: Blood pressure 135/80, pulse 93, temperature 98.3 °F (36.8 °C), temperature source Oral, resp. rate 20, weight 57.6 kg (127 lb), SpO2 (!) 93 %.     CT READ: Yes  No hemmorhage. No mass effect. No early infarct signs.     Physical Exam  Vitals reviewed.   Constitutional:       General: She is not in acute distress.  HENT:      Head: Normocephalic and atraumatic.       Right Ear: External ear normal.      Left Ear: External ear normal.      Nose: Nose normal.      Mouth/Throat:      Pharynx: Oropharynx is clear.   Pulmonary:      Effort: Pulmonary effort is normal. No respiratory distress.   Abdominal:      General: There is no distension.      Tenderness: There is no guarding.   Musculoskeletal:      Cervical back: Normal range of motion.      Right lower leg: No edema.      Left lower leg: No edema.   Skin:     General: Skin is dry.   Neurological:      Mental Status: She is alert.      Comments: Tremor LUE - chronic     Psychiatric:         Mood and Affect: Mood normal.         Behavior: Behavior normal.        Neurological Exam  LOC: alert  Attention Span: Good   Language: No aphasia  Articulation: No dysarthria  Orientation: Person, Place, Time   Visual Fields: Full  EOM (CN III, IV, VI): Full/intact  Facial Sensation (CN V): Normal  Facial Movement (CN VII): Symmetric facial expression    Motor: Arm left  Full antigravity; mild loss of power noted with nurse assistance  Leg left  Full antigravity; Full power noted with nurse assistance  Arm right  Full antigravity; Full power noted with nurse assistance  Leg right Full antigravity; mild loss of power noted with nurse assistance  Cerebellum: Abnormal finger to nose left arm - has chronic tremor in this arm  Sensation: Left arm and right leg reports as dull compared to the other side; Left arm neglect with double simultaneous stimulation    Diagnostic Results     Brain Imaging   4/5/2022 MRI Brain  No diffusion restriction noted.  Remote infarct right frontal MCA with chronic appearing hemorrage. Remote right cerebellum.  Chronic white matter changes.      Vessel Imaging   CTA pending    Cardiac Imaging   TTE pending            IP Unit  Spoke hospital nurse at bedside with patient assisting consultant.     Patient information was obtained from patient.     The attending portion of this evaluation, treatment, and documentation  was performed per Erna Sosa NP via audiovisual.    Billing code:  (moderate to severe stroke, large areas of edema, some mimics)    · This patient has a critical neurological condition/illness, with high morbidity and mortality.  · There is a high probability for acute neurological change leading to clinical and possibly life-threatening deterioration requiring highest level of physician preparedness for urgent intervention.  · Care was coordinated with other physicians involved in the patient's care.  · Radiologic studies and laboratory data were reviewed and interpreted, and plan of care was re-assessed based on the results.  · Diagnosis, treatment options and prognosis may have been discussed with the patient and/or family members or caregiver.  · Further advanced medical management and further evaluation is warranted for his care.      Consult End Time: 6:55 PM     Erna Sosa NP  [facility] Stroke [facility]  Department of Vascular Neurology   [facility]

## 2022-04-06 NOTE — PROGRESS NOTES
[facility]           Pharmacy        Current Drug Shortage     Due to national backorder and [facility] is critically low on inventory of Dextrose 50% (D50) Syringes and Vials, pharmacy has automatically switched from D50% to D10% IVPB at the equivalent dose until resolution of the shortage per P&T approved protocol.               Owen Horton, PharmD  [phone]

## 2022-04-06 NOTE — ED NOTES
"Assumed care of patient in ED #2 - patient states she went to sleep at midnight last night and woke up on the floor and realized it was 1400 - states she was unable to stand as her right leg was numb and she was "dragging it" - patient called her doctor and then called EMS for transport here - patient states she had a headache last night and took an aspirin for it - denies any visual changes - patient presently continues to have right leg numbness from hip distal to toes - no other loss of sensation - await telestroke  "

## 2022-04-06 NOTE — ED NOTES
Pt to room from MRI. Pt A/O x 4 w/ ABCs intact, NAD. VSS. Pt still c/o headache and numbness to RLE. No new complaints. No obvious neur deficits. ED workup and orders in progress.     Review of patient's allergies indicates:  No Known Allergies     Patient has verified the spelling of their name and  on armband.   APPEARANCE: Patient is alert, calm, oriented x 4, and does not appear distressed.  SKIN: Skin is normal for race, warm, and dry. Normal skin turgor and mucous membranes moist.  CARDIAC: Normal rate and rhythm, no murmur heard.   RESPIRATORY:Normal rate and effort. Breath sounds clear bilaterally throughout chest. Respirations are equal and unlabored.    GASTRO: Bowel sounds normal, abdomen is soft, no tenderness, and no abdominal distention.  MUSCLE: Full ROM. No bony tenderness or soft tissue tenderness. No obvious deformity.  PERIPHERAL VASCULAR: peripheral pulses present. Normal cap refill. No edema. Warm to touch.  NEURO: 5/5 strength major flexors/extensors bilaterally. Sensory intact to light touch bilaterally to upper extrems, decreased on RLE. [place] coma scale: eyes open spontaneously-4, oriented & converses-5, obeys commands-6. No neurological abnormalities.   MENTAL STATUS: awake, alert and aware of environment.  EYE: No overt deficits noted. No drainage. Sclera WNL  ENT: EARS: no obvious drainage. NOSE: no active bleeding. THROAT: no redness or swelling.  : Voids without complication

## 2022-04-06 NOTE — ASSESSMENT & PLAN NOTE
Nutrition Problem:  Mild Protein-Calorie Malnutrition  Malnutrition in the context of Acute Illness/Injury    Related to (etiology):  Dx/hx    Signs and Symptoms (as evidenced by):  Energy Intake: <75% of estimated energy requirement for few months  Body Fat Depletion: mild depletion of thoracic and lumbar region   Muscle Mass Depletion: mild depletion of temples, clavicle region   Weight Loss: 11% x 5 months    Interventions(treatment strategy):  Collaboration with other providers  Chloe + Isabel beverage    Nutrition Diagnosis Status:  New

## 2022-04-06 NOTE — TELEPHONE ENCOUNTER
Received message from Mar Steel.  pt needs appt. Appt scheduled and confirmed. She will give to pt.

## 2022-04-06 NOTE — PLAN OF CARE
Recommendation:  1. Encourage intake at meals as tolerated.   2. Add Boost Plus 1xday.   3. Monitor weight/labs.   4. RD to follow to monitor po intake    Goals:   Pt will tolerate diet with at least 50-75% intake at meals by RD follow up  Nutrition Goal Status: new

## 2022-04-06 NOTE — H&P
"[facility] Medicine H&P Note     Admitting Team: [facility] Hospitalist Team B  Attending Physician: Theodore Hyatt MD  Resident: Everardo  Intern: Monika     Date of Admit: 4/5/2022    Chief Complaint     Right-sided weakness noted at ~14:00 on 4/5/2022    Subjective:      History of Present Illness:  Chayito Chung is a 71 y.o. female with a PMHx of HTN, HLD, T2DM, COPD, seizures, migraines, history of colon cancer, major depressive disorder, and generalized anxiety disorder. The patient presented on 4/5/2022 for evaluation of right-sided weakness.    She reports going to bed around midnight on 4/4 and waking up on the floor around 14:00 on 4/5.  Upon awakening, she noted that her right lower extremity was weak and "dragging."  She was able to help herself up off the floor to call for help.  She has difficulty ambulating and typically uses a rollator for assistance at home, but found that she was having even more trouble than usual.  On presentation, she also complains of a sore throat, a cough with sometimes-green, sometimes-white sputum, decreased appetite with weight loss for the "past few months," and one week of dysuria.    Upon arrival to the ED, a code stroke was called and she was evaluated by Vascular Neurology.     Of note, she reports having another episode about one week ago where she "got dizzy and weak" and subsequently fell to the floor.  She became incontinent of her bowels during this episode.    She currently takes metformin, insulin, multiple antihypertensives, low-dose aspirin, and a statin at home and reports adherence to these medications.    She denies fever, chills, dizziness, chest pain, palpitations, SOB, orthopnea, nausea, vomiting, abdominal pain, or diarrhea.    Past Medical History:  Past Medical History:   Diagnosis Date    Acute coronary syndrome     Acute hypoxemic respiratory failure 5/1/2017    Anxiety     Asthma     Cancer     colon    Cataracts, both eyes     Chest " pain at rest     Chest pain of uncertain etiology 2015    Colon cancer 1988    COPD (chronic obstructive pulmonary disease)     Coronary artery disease     Depression     Diabetes mellitus     Dyspnea on exertion 11/15/2018    Elevated brain natriuretic peptide (BNP) level 11/15/2018    Elevated cholesterol     Hypertension     Swelling     Syncope and collapse 2016     Past Surgical History:  Past Surgical History:   Procedure Laterality Date    ABDOMINAL SURGERY      BREAST BIOPSY      benign unsure what side     SECTION, CLASSIC      COLON SURGERY      COLONOSCOPY  2019    repeat in 5 years    CORONARY ANGIOPLASTY WITH STENT PLACEMENT  3 months ago     x2, Hysterectomy, Lung surgery, Partial stomach removed, Part of colon removed for rectal cancer    GASTRECTOMY      HEMORRHOID SURGERY      HYSTERECTOMY      @28yrs of age    LUNG BIOPSY      OOPHORECTOMY      @28yrs of age    POSTERIOR FUSION OF CERVICAL SPINE WITH LAMINECTOMY N/A 2020    Procedure: LAMINECTOMY, SPINE, CERVICAL, WITH POSTERIOR FUSION C3-T1 ;  Surgeon: Herson Tate MD;  Location: [facility] OR [address] [place] [address];  Service: Neurosurgery;  Laterality: N/A;    REPAIR OF INCARCERATED INCISIONAL HERNIA WITHOUT HISTORY OF PRIOR REPAIR N/A 2022    Procedure: REPAIR, HERNIA, INCISIONAL, INCARCERATED, WITHOUT HISTORY OF PRIOR REPAIR;  Surgeon: Simon King MD;  Location: [facility];  Service: General;  Laterality: N/A;     Allergies:  Review of patient's allergies indicates:  No Known Allergies  Home Medications:  Prior to Admission medications    Medication Sig Start Date End Date Taking? Authorizing Provider   albuterol (PROVENTIL/VENTOLIN HFA) 90 mcg/actuation inhaler INHALE 2 PUFFS BY MOUTH INTO THE LUNGS EVERY 6 HOURS AS NEEDED FOR WHEEZING 21   Alexa Evans MD   albuterol-ipratropium (DUO-NEB) 2.5 mg-0.5 mg/3 mL nebulizer solution Take 3 mLs by nebulization every 4 (four) hours as needed  "for Wheezing or Shortness of Breath. Rescue 7/29/20 7/29/21  Mai Calderón MD   alendronate (FOSAMAX) 70 MG tablet Take 1 tablet (70 mg total) by mouth every 7 days. 11/19/21 11/19/22  Mariam Seo NP   aspirin (ECOTRIN) 81 MG EC tablet Take 81 mg by mouth once daily.    Historical Provider   BD ULTRA-FINE MINI PEN NEEDLE 31 gauge x 3/16" Ndle  12/23/19   Historical Provider   benzonatate (TESSALON) 200 MG capsule Take 1 capsule (200 mg total) by mouth 3 (three) times daily as needed for Cough.  Patient not taking: Reported on 2/25/2022 12/10/21   Rodrick Lim III, MD   cholestyramine, with sugar, 4 gram Powd Take 4 g by mouth 2 (two) times daily as needed (diarrhea).  Patient not taking: No sig reported 2/27/20 3/2/21  Abhi Valadez MD   diclofenac sodium (VOLTAREN) 1 % Gel Apply 2 g topically 4 (four) times daily. 11/18/21   Cameron Valladares III, MD   DULoxetine (CYMBALTA) 30 MG capsule Take 30 mg by mouth once daily. 7/2/21   Historical Provider   duloxetine (CYMBALTA) 60 MG capsule Take 90 mg by mouth once daily.     Historical Provider   ferrous sulfate (FEOSOL) 325 mg (65 mg iron) Tab tablet Take 1 tablet (325 mg total) by mouth once daily. 5/13/20   Brien Stapleton DO   fluticasone propionate (FLONASE) 50 mcg/actuation nasal spray 2 sprays (100 mcg total) by Each Nare route once daily. 6/4/19   Sonia Freeman MD   hydroCHLOROthiazide (MICROZIDE) 12.5 mg capsule Take 1 capsule (12.5 mg total) by mouth once daily. 11/2/21 11/2/22  Gaurav Malin MD   HYDROcodone-acetaminophen (NORCO)  mg per tablet Take 1 tablet by mouth every 6 (six) hours as needed for Pain. 8/7/20   Suzette Malone MD   insulin lispro protamin-lispro 100 unit/mL (75-25) InPn Inject into the skin. 11/22/21   Historical Provider   LORazepam (ATIVAN) 0.5 MG tablet Take 0.5 mg by mouth every 6 (six) hours as needed for Anxiety.    Historical Provider   losartan (COZAAR) 50 MG tablet Take 1 tablet " (50 mg total) by mouth once daily. 12/7/21 12/7/22  Gaurav Malin MD   magnesium oxide (MAG-OX) 400 mg (241.3 mg magnesium) tablet Take 400 mg by mouth once daily.    Historical Provider   metFORMIN (GLUCOPHAGE) 1000 MG tablet Take 1,000 mg by mouth 2 (two) times daily. 6/7/21   Historical Provider   nicotine (NICODERM CQ) 21 mg/24 hr Place 1 patch onto the skin once daily.  Patient not taking: No sig reported 5/19/21   Hari Howard MD   nicotine, polacrilex, 2 mg lzmn Take 1 lozenge (2 mg total) by mouth as needed (as needed). Dispense MINIs please. DO NOT CHEW UP. Can take 1-2 per hour as needed in place of a cigarette.  Patient not taking: No sig reported 5/19/21   Hari Howard MD   nitroGLYCERIN (NITROSTAT) 0.4 MG SL tablet Place 1 tablet (0.4 mg total) under the tongue every 5 (five) minutes as needed for Chest pain. 10/7/21   Gaurav Malin MD   ondansetron (ZOFRAN-ODT) 4 MG TbDL Take 1 tablet (4 mg total) by mouth every 8 (eight) hours as needed (nausea). 5/17/21   Alexa Evans MD   potassium chloride SA (K-DUR,KLOR-CON) 20 MEQ tablet Take 1 tablet (20 mEq total) by mouth once daily. 3/15/22   Cameron Valladares III, MD   pravastatin (PRAVACHOL) 40 MG tablet Take 0.5 tablets (20 mg total) by mouth once daily. 2/8/21   Alexa Evans MD   pregabalin (LYRICA) 150 MG capsule Take 150 mg by mouth 2 (two) times daily as needed. 9/14/21   Historical Provider   quetiapine (SEROQUEL) 300 MG Tab 300 mg nightly.  6/10/17   Historical Provider   tiZANidine (ZANAFLEX) 4 MG tablet  12/7/21   Historical Provider   topiramate (TOPAMAX) 50 MG tablet Take 50 mg by mouth 2 (two) times daily.    Historical Provider   TRELEGY ELLIPTA 200-62.5-25 mcg inhaler  10/17/21   Historical Provider   varenicline (CHANTIX STARTING MONTH BOX) 0.5 mg (11)- 1 mg (42) tablet Follow package directions  Patient not taking: No sig reported 3/16/21   Catherine Patiño NP   varenicline (CHANTIX) 1 mg Tab Take 1 tablet (1 mg  total) by mouth once daily. CX  Patient not taking: No sig reported 5/19/21   Hari Howard MD   verapamiL (CALAN-SR) 180 MG CR tablet Take 1 tablet (180 mg total) by mouth every evening. 7/9/21 7/9/22  Alexa Evans MD   zonisamide (ZONEGRAN) 100 MG Cap Take by mouth. 10/6/21   Historical Provider     Family History:  Family History   Problem Relation Age of Onset    Cancer Mother     Diabetes Mother     Hypertension Mother     Breast cancer Mother     Heart disease Father     Lung cancer Father     Depression Sister     Hypertension Sister     Diabetes Mellitus Sister     Cancer Maternal Aunt      Social History:  Social History     Tobacco Use    Smoking status: Current Every Day Smoker     Packs/day: 0.50     Years: 53.00     Pack years: 26.50     Types: Cigarettes     Start date: 1967    Smokeless tobacco: Never Used    Tobacco comment: Pt is currently enrolled in the [employer].  Ambulatory referral to Smoking Cessation program.    Substance Use Topics    Alcohol use: Yes     Alcohol/week: 3.0 standard drinks     Types: 3 Glasses of wine per week     Comment: 1 every 3 months    Drug use: No     Review of Systems:  Pertinent items noted in HPI.  All systems otherwise negative.    Health Care Maintenance :   Primary Care Physician: Curt Valladares III, MD   Immunizations:   Immunization History   Administered Date(s) Administered    COVID-19 Vaccine 11/29/2021    COVID-19, MRNA, LN-S, PF (MODERNA FULL 0.5 ML DOSE) 01/12/2021, 02/09/2021, 11/29/2021    DTaP 01/18/2015    Influenza (FLUAD) - Quadrivalent - Adjuvanted - PF *Preferred* (65+) 11/12/2021    Influenza (FLUAD) - Trivalent - Adjuvanted - PF (65+) 11/08/2018, 09/28/2020    Influenza - High Dose - PF (65 years and older) 10/10/2019    Influenza - Quadrivalent - PF *Preferred* (6 months and older) 12/07/2015    Influenza - Trivalent - PF (ADULT) 09/21/2014, 12/21/2016    PPD Test 07/27/2020    Pneumococcal Polysaccharide - 23  Valent 2019, 2020    Tdap 2015    Zoster 2013    Zoster Recombinant 2018, 2019      Cancer Screening:  MM2021; BIRADS-1 with recommendation for repeat screening in one year  Colonoscopy: 2021; recommendation to return in one year    Objective:   Last 24 Hour Vital Signs:  BP  Min: 135/80  Max: 147/86  Temp  Av.3 °F (36.8 °C)  Min: 98.3 °F (36.8 °C)  Max: 98.3 °F (36.8 °C)  Pulse  Av  Min: 89  Max: 93  Resp  Av  Min: 20  Max: 26  SpO2  Av.5 %  Min: 93 %  Max: 94 %  Weight  Av.6 kg (127 lb)  Min: 57.6 kg (127 lb)  Max: 57.6 kg (127 lb)  Body mass index is 23.23 kg/m².  No intake/output data recorded.  Physical Examination:  Physical Exam   Constitutional: No distress.   HENT:   Head: Normocephalic and atraumatic.   Right Ear: External ear normal.   Left Ear: External ear normal.   Nose: Nose normal. No rhinorrhea or nasal congestion.   Mouth/Throat: Mucous membranes are moist. No oropharyngeal exudate or posterior oropharyngeal erythema. Oropharynx is clear.   Eyes: Pupils are equal, round, and reactive to light. Conjunctivae are normal. Right eye exhibits no discharge. Left eye exhibits no discharge. No scleral icterus.   Cardiovascular: Normal rate, regular rhythm, normal heart sounds and normal pulses. Exam reveals no gallop and no friction rub.   No murmur heard.  Pulmonary/Chest: Effort normal and breath sounds normal. No respiratory distress. She has no wheezes. She has no rhonchi. She has no rales.   Abdominal: Soft. Bowel sounds are normal. She exhibits no distension. There is no abdominal tenderness. There is no rebound and no guarding.   Musculoskeletal:         General: No swelling. Normal range of motion.      Cervical back: Normal range of motion and neck supple. No rigidity.      Right lower leg: No edema.      Left lower leg: No edema.   Neurological: She is alert. She has intact cranial nerves. She displays weakness. GCS eye  subscore is 4. GCS verbal subscore is 5. GCS motor subscore is 6.   RUE proximal muscle strength 4/5, LUE proximal muscle strength 5/5  RUE hand  strength 3/5, LUE hand  strength 5/5  RLE proximal muscle strength 4/5, LLE proximal muscle strength 5/5   Skin: Skin is warm and dry.   Nursing note and vitals reviewed.     Laboratory:  Most Recent Data:  CBC:   Lab Results   Component Value Date    WBC 7.05 04/05/2022    HGB 11.5 (L) 04/05/2022    HCT 38.5 04/05/2022     04/05/2022    MCV 92 04/05/2022    RDW 15.0 (H) 04/05/2022     BMP:   Lab Results   Component Value Date     04/05/2022    K 4.5 04/05/2022     04/05/2022    CO2 19 (L) 04/05/2022    BUN 18 04/05/2022    CREATININE 1.1 04/05/2022     (H) 04/05/2022    CALCIUM 9.5 04/05/2022    MG 1.7 07/09/2021    PHOS 2.9 07/09/2021     LFTs:   Lab Results   Component Value Date    PROT 8.4 04/05/2022    ALBUMIN 3.9 04/05/2022    BILITOT 0.4 04/05/2022    AST 38 04/05/2022    ALKPHOS 157 (H) 04/05/2022    ALT 16 04/05/2022     Coags:   Lab Results   Component Value Date    INR 1.0 04/05/2022     FLP:   Lab Results   Component Value Date    CHOL 154 04/05/2022    HDL 49 04/05/2022    LDLCALC 82.0 04/05/2022    TRIG 115 04/05/2022    CHOLHDL 31.8 04/05/2022     DM:   Lab Results   Component Value Date    HGBA1C 7.3 (H) 12/03/2021    HGBA1C 8.2 (H) 01/05/2021    HGBA1C 7.1 (H) 05/12/2020    LDLCALC 82.0 04/05/2022    CREATININE 1.1 04/05/2022     Thyroid:   Lab Results   Component Value Date    TSH 0.420 04/05/2022    FREET4 0.92 03/02/2021    Q6RNTQE 9.6 08/04/2017     Anemia:   Lab Results   Component Value Date    IRON 50 10/21/2020    TIBC 502 (H) 10/21/2020    FERRITIN 4 (L) 05/12/2020    MUDWVTWK26 296 05/12/2020    FOLATE 11.3 05/12/2020     Cardiac:   Lab Results   Component Value Date    TROPONINI 0.011 04/05/2022    CKTOTAL 97 12/15/2012    BNP <10 12/09/2021     Urinalysis:   Lab Results   Component Value Date    LABURIN No  significant growth 03/25/2021    COLORU Mya 03/25/2021    CLARITYU Clear 09/28/2020    SPECGRAV 1.020 03/25/2021    NITRITE Negative 03/25/2021    GLUCOSEU 50 (A) 06/10/2013    KETONESU Negative 03/25/2021    UROBILINOGEN Negative 03/17/2021    BLOODU NEG 06/10/2013    WBCUA 14 (H) 03/25/2021     Trended Lab Data:  Recent Labs   Lab 04/05/22  1750   WBC 7.05   HGB 11.5*   HCT 38.5      MCV 92   RDW 15.0*      K 4.5      CO2 19*   BUN 18   CREATININE 1.1   *   PROT 8.4   ALBUMIN 3.9   BILITOT 0.4   AST 38   ALKPHOS 157*   ALT 16     Trended Cardiac Data:  Recent Labs   Lab 04/05/22  1750   TROPONINI 0.011     Microbiology Data:  Microbiology Results (last 7 days)     ** No results found for the last 168 hours. **         Other Results:  EKG (my interpretation): normal sinus rhythm     Radiology:  -reviewed    Assessment:     Chayito Chung is a 71 y.o. female with a PMHx of HTN, HLD, T2DM, COPD, seizures, migraines, history of colon cancer, major depressive disorder, and generalized anxiety disorder presenting with right-sided weakness noted upon waking at 14:00 on 4/5 with an unknown last known normal.  Upon arrival to the ED, she was stroke activated and underwent CTH, which was negative for acute infarct, hemorrhage, or mass.  Initial workup was significant for an elevated CK at 1400.      Plan:     Cerebrovascular Accident with acute right-sided weakness  -woke up around 14:00 on 4/5 with weakness in her upper and lower extremities; presented to ED around 17:00 with no clear last known normal  -Code Stroke activated upon arrival to ED; Vascular Neurology also consulted  -CBC with slight normocytic anemia, CMP with hyperglycemia and chronically low bicarbonate  -TSH, PT/INR, troponin I all WNL  -Lipid panel with LDL 82, HDL 49, TGs 115, total cholesterol 154  -CTH negative for acute infarct, hemorrhage, or mass  -EKG with normal sinus rhythm  -Plavix-loaded in ED, already on ASA 81mg  daily at home  -CXR ordered  -CTA Head and Neck ordered  -TTE with Bubble Study ordered  -MRI-Brain w/wo contrast ordered, in process  -will consult PT/OT/SLP  -telemetry ordered  -will start high-intensity statin; patient on low-intensity statin therapy at home  -will start Plavix 75mg daily  -will continue ASA 81mg daily    Traumatic Rhabdomyolysis  -CPK at 1408 on presentation  -starting IV normal saline  -trending CPK levels every 12 hours    Essential Hypertension  -home regimen includes HCTZ 12.5mg daily, losartan 100mg daily, and and verapamil 180mg daily  -holding home meds given concern for possible CVA to allow for permissive HTN    Hyperlipidemia  -patient on pravastatin 20mg daily at home  -lipid panel done on arrival showing overall good control with LDL slightly above goal at 82  -will start patient on high-intensity statin therapy with atorvastatin 80mg    Chronic Back Pain  -patient reports long-standing history of lower back pain  -continuing home Norco PRN    Type II Diabetes Mellitus, complicated by peripheral neuropathy  -last A1c in 12/2021 was 7.3%  -home regimen includes metformin 1000mg BID; holding  -patient on pravastatin 20mg daily at home; will transition to atorvastatin 80mg daily  -continuing home Lyrica 150mg BID  -will repeat A1c  -SSI in house    COPD, Stage Unknown  -home regimen includes albuterol, Duo-Nebs, and Trelegy  -not on home oxygen; SpO2 at goal while on room air during initial evaluation  -currently smoking tobacco, trying to quit  -Duo-Nebs ordered PRN    Seizure Disorder  -on topiramate 50mg BID, zonisamide 200mg BID at home    Migraines  -continuing home topiramate 50mg BID    Depression  -home regimen includes Cymbalta 90mg daily, Seroquel 300mg qhs  -continuing home Cymbalta, Seroquel    Anxiety   -home regimen includes Cymbalta 90mg daily, Seroquel 300mg qhs, and Xanax 0.5mg BID PRN  -holding home Seroquel, Xanax  -continuing home [facility]  Maintenance  - Screening tests needed: A1c, colonoscopy (due 7/2022)    Ppx: Lovenox  Diet: Cardiac Diabetic  Code: Full    Disposition: pending workup of possible stroke    Sundeep Frank M.D.  [facility] Internal Medicine PGY-1    [facility] Medicine Hospitalist Pager numbers:   [facility] Hospitalist Medicine Team A (Bita/Sydni): [phone]  [facility] Hospitalist Medicine Team B (Lillian/Danny):  [phone]

## 2022-04-06 NOTE — NURSING
Stroke Neurovascular Consult done with Erna Sosa NP Via Telemedicine cart.  See consult note.  Patient educated on stroke s/s, BEFAST, Risk factors including HTN, HLD, DM, CAD, Athersclerosis, smoking cessation, diet, exercise, and medications including; aspirin, plavix, lipitor.   Stroke Education Packet Guide individualized for her care and given to patient to take home.

## 2022-04-06 NOTE — PROGRESS NOTES
Individual Follow-Up Form    4/6/2022    Quit Date: To be determined    Clinical Status of Patient: Inpatient    Length of Service: 30 minutes    Comments: Smoking cessation education note: Pt is a 1 pk/day cigarette smoker x 55 yrs. She states that she has cut down to 0.5 pk/day or less. Pt states ready to quit for good. She is currently enrolled in the [employer]. Ambulatory referral to Smoking Cessation Program following hospital discharge.    Diagnosis: F17.210    Next Visit:  4/19/2022 11am with Mey ALMEIDA

## 2022-04-06 NOTE — PLAN OF CARE
Sw met with pt at bedside to complete assessment. Pt reported having a rollator and sc at home. Pt stated that her Sister Kimberly [phone] will help transport her home at time of d.c. SW will request f/u appts. White board updated with CM name and contact information.  Discharge brochure provided.  Pt encouraged to call with any questions or concerns.  Cm will continue to follow pt through transitions of care and assist with any discharge needs.    Rik Nicole, MSW  [phone]    Future Appointments   Date Time Provider Department Center   6/16/2022  9:40 AM Cameron Valladares III, MD [facility]        04/06/22 0923   Discharge Assessment   Assessment Type Discharge Planning Assessment   Confirmed/corrected address, phone number and insurance Yes   Confirmed Demographics Correct on Facesheet   Source of Information patient   When was your last doctors appointment?   (per pt it has been a while)   Does patient/caregiver understand observation status Yes   Communicated MICKY with patient/caregiver Yes   Reason For Admission CVA   Lives With alone   Facility Arrived From: HOME   Do you expect to return to your current living situation? Yes   Do you have help at home or someone to help you manage your care at home? No   Prior to hospitilization cognitive status: Alert/Oriented   Current cognitive status: Alert/Oriented   Walking or Climbing Stairs Difficulty ambulation difficulty, requires equipment   Dressing/Bathing Difficulty bathing difficulty, requires equipment   Equipment Currently Used at Home shower chair;rollator   Readmission within 30 days? No   Patient currently being followed by outpatient case management? No   Do you currently have service(s) that help you manage your care at home? No   Do you take prescription medications? No   Do you have prescription coverage? Yes   Coverage phn   Do you have any problems affording any of your prescribed medications? No   Who is going to help you get home at  discharge? Sister Kimberly [phone]   How do you get to doctors appointments? car, drives self   Are you on dialysis? No   Do you take coumadin? No   Discharge Plan A Home   DME Needed Upon Discharge  other (see comments)  (TBD)   Discharge Plan discussed with: Patient   Discharge Barriers Identified None

## 2022-04-07 ENCOUNTER — PATIENT OUTREACH (OUTPATIENT)
Dept: ADMINISTRATIVE | Facility: OTHER | Age: 72
End: 2022-04-07
Payer: MEDICARE

## 2022-04-07 ENCOUNTER — CLINICAL SUPPORT (OUTPATIENT)
Dept: SMOKING CESSATION | Facility: CLINIC | Age: 72
End: 2022-04-07
Payer: MEDICARE

## 2022-04-07 DIAGNOSIS — F17.210 CIGARETTE SMOKER: Primary | ICD-10-CM

## 2022-04-07 LAB
ALBUMIN SERPL BCP-MCNC: 3.3 G/DL (ref 3.5–5.2)
ALP SERPL-CCNC: 124 U/L (ref 55–135)
ALT SERPL W/O P-5'-P-CCNC: 14 U/L (ref 10–44)
ANION GAP SERPL CALC-SCNC: 8 MMOL/L (ref 8–16)
AST SERPL-CCNC: 14 U/L (ref 10–40)
BASOPHILS # BLD AUTO: 0.03 K/UL (ref 0–0.2)
BASOPHILS NFR BLD: 0.6 % (ref 0–1.9)
BILIRUB SERPL-MCNC: 0.3 MG/DL (ref 0.1–1)
BUN SERPL-MCNC: 12 MG/DL (ref 8–23)
CALCIUM SERPL-MCNC: 9.2 MG/DL (ref 8.7–10.5)
CHLORIDE SERPL-SCNC: 106 MMOL/L (ref 95–110)
CK SERPL-CCNC: 444 U/L (ref 20–180)
CO2 SERPL-SCNC: 23 MMOL/L (ref 23–29)
CREAT SERPL-MCNC: 0.7 MG/DL (ref 0.5–1.4)
DIFFERENTIAL METHOD: ABNORMAL
EOSINOPHIL # BLD AUTO: 0.1 K/UL (ref 0–0.5)
EOSINOPHIL NFR BLD: 2.6 % (ref 0–8)
ERYTHROCYTE [DISTWIDTH] IN BLOOD BY AUTOMATED COUNT: 14.7 % (ref 11.5–14.5)
EST. GFR  (AFRICAN AMERICAN): >60 ML/MIN/1.73 M^2
EST. GFR  (NON AFRICAN AMERICAN): >60 ML/MIN/1.73 M^2
GLUCOSE SERPL-MCNC: 222 MG/DL (ref 70–110)
HCT VFR BLD AUTO: 32.9 % (ref 37–48.5)
HGB BLD-MCNC: 10.4 G/DL (ref 12–16)
IMM GRANULOCYTES # BLD AUTO: 0.01 K/UL (ref 0–0.04)
IMM GRANULOCYTES NFR BLD AUTO: 0.2 % (ref 0–0.5)
LYMPHOCYTES # BLD AUTO: 1.6 K/UL (ref 1–4.8)
LYMPHOCYTES NFR BLD: 34.2 % (ref 18–48)
MCH RBC QN AUTO: 27.7 PG (ref 27–31)
MCHC RBC AUTO-ENTMCNC: 31.6 G/DL (ref 32–36)
MCV RBC AUTO: 88 FL (ref 82–98)
MONOCYTES # BLD AUTO: 0.4 K/UL (ref 0.3–1)
MONOCYTES NFR BLD: 9.3 % (ref 4–15)
NEUTROPHILS # BLD AUTO: 2.5 K/UL (ref 1.8–7.7)
NEUTROPHILS NFR BLD: 53.1 % (ref 38–73)
NRBC BLD-RTO: 0 /100 WBC
PLATELET # BLD AUTO: 259 K/UL (ref 150–450)
PMV BLD AUTO: 9.6 FL (ref 9.2–12.9)
POCT GLUCOSE: 234 MG/DL (ref 70–110)
POCT GLUCOSE: 300 MG/DL (ref 70–110)
POCT GLUCOSE: 305 MG/DL (ref 70–110)
POCT GLUCOSE: 348 MG/DL (ref 70–110)
POTASSIUM SERPL-SCNC: 3.5 MMOL/L (ref 3.5–5.1)
PROT SERPL-MCNC: 6.7 G/DL (ref 6–8.4)
RBC # BLD AUTO: 3.75 M/UL (ref 4–5.4)
SODIUM SERPL-SCNC: 137 MMOL/L (ref 136–145)
WBC # BLD AUTO: 4.62 K/UL (ref 3.9–12.7)

## 2022-04-07 PROCEDURE — 92523 SPEECH SOUND LANG COMPREHEN: CPT

## 2022-04-07 PROCEDURE — 99900035 HC TECH TIME PER 15 MIN (STAT)

## 2022-04-07 PROCEDURE — 25000003 PHARM REV CODE 250: Performed by: STUDENT IN AN ORGANIZED HEALTH CARE EDUCATION/TRAINING PROGRAM

## 2022-04-07 PROCEDURE — 97535 SELF CARE MNGMENT TRAINING: CPT

## 2022-04-07 PROCEDURE — 36415 COLL VENOUS BLD VENIPUNCTURE: CPT | Performed by: STUDENT IN AN ORGANIZED HEALTH CARE EDUCATION/TRAINING PROGRAM

## 2022-04-07 PROCEDURE — 92610 EVALUATE SWALLOWING FUNCTION: CPT

## 2022-04-07 PROCEDURE — 63600175 PHARM REV CODE 636 W HCPCS: Performed by: STUDENT IN AN ORGANIZED HEALTH CARE EDUCATION/TRAINING PROGRAM

## 2022-04-07 PROCEDURE — 97116 GAIT TRAINING THERAPY: CPT | Mod: CQ

## 2022-04-07 PROCEDURE — 82550 ASSAY OF CK (CPK): CPT | Performed by: STUDENT IN AN ORGANIZED HEALTH CARE EDUCATION/TRAINING PROGRAM

## 2022-04-07 PROCEDURE — 85025 COMPLETE CBC W/AUTO DIFF WBC: CPT | Performed by: STUDENT IN AN ORGANIZED HEALTH CARE EDUCATION/TRAINING PROGRAM

## 2022-04-07 PROCEDURE — 94761 N-INVAS EAR/PLS OXIMETRY MLT: CPT

## 2022-04-07 PROCEDURE — 95816 PR EEG,W/AWAKE & DROWSY RECORD: ICD-10-PCS | Mod: 26,,, | Performed by: PSYCHIATRY & NEUROLOGY

## 2022-04-07 PROCEDURE — 95816 EEG AWAKE AND DROWSY: CPT

## 2022-04-07 PROCEDURE — 80053 COMPREHEN METABOLIC PANEL: CPT | Performed by: STUDENT IN AN ORGANIZED HEALTH CARE EDUCATION/TRAINING PROGRAM

## 2022-04-07 PROCEDURE — 97530 THERAPEUTIC ACTIVITIES: CPT | Mod: CQ

## 2022-04-07 PROCEDURE — 99406 BEHAV CHNG SMOKING 3-10 MIN: CPT | Mod: ,,,

## 2022-04-07 PROCEDURE — 97110 THERAPEUTIC EXERCISES: CPT

## 2022-04-07 PROCEDURE — 95816 EEG AWAKE AND DROWSY: CPT | Mod: 26,,, | Performed by: PSYCHIATRY & NEUROLOGY

## 2022-04-07 PROCEDURE — 27000221 HC OXYGEN, UP TO 24 HOURS

## 2022-04-07 PROCEDURE — 11000001 HC ACUTE MED/SURG PRIVATE ROOM

## 2022-04-07 PROCEDURE — S4991 NICOTINE PATCH NONLEGEND: HCPCS | Performed by: STUDENT IN AN ORGANIZED HEALTH CARE EDUCATION/TRAINING PROGRAM

## 2022-04-07 PROCEDURE — 99406 PT REFUSED TOBACCO CESSATION: ICD-10-PCS | Mod: ,,,

## 2022-04-07 RX ORDER — CYCLOBENZAPRINE HCL 10 MG
10 TABLET ORAL ONCE
Status: COMPLETED | OUTPATIENT
Start: 2022-04-07 | End: 2022-04-07

## 2022-04-07 RX ORDER — VERAPAMIL HYDROCHLORIDE 180 MG/1
180 TABLET, FILM COATED, EXTENDED RELEASE ORAL NIGHTLY
Status: DISCONTINUED | OUTPATIENT
Start: 2022-04-07 | End: 2022-04-10 | Stop reason: HOSPADM

## 2022-04-07 RX ADMIN — PREGABALIN 150 MG: 75 CAPSULE ORAL at 09:04

## 2022-04-07 RX ADMIN — DULOXETINE 90 MG: 30 CAPSULE, DELAYED RELEASE ORAL at 09:04

## 2022-04-07 RX ADMIN — CLOPIDOGREL 75 MG: 75 TABLET, FILM COATED ORAL at 09:04

## 2022-04-07 RX ADMIN — INSULIN ASPART 4 UNITS: 100 INJECTION, SOLUTION INTRAVENOUS; SUBCUTANEOUS at 09:04

## 2022-04-07 RX ADMIN — ASPIRIN 81 MG CHEWABLE TABLET 81 MG: 81 TABLET CHEWABLE at 09:04

## 2022-04-07 RX ADMIN — ZONISAMIDE 100 MG: 100 CAPSULE ORAL at 09:04

## 2022-04-07 RX ADMIN — NICOTINE 1 PATCH: 21 PATCH, EXTENDED RELEASE TRANSDERMAL at 09:04

## 2022-04-07 RX ADMIN — ENOXAPARIN SODIUM 40 MG: 100 INJECTION SUBCUTANEOUS at 04:04

## 2022-04-07 RX ADMIN — VERAPAMIL HYDROCHLORIDE 180 MG: 180 TABLET ORAL at 09:04

## 2022-04-07 RX ADMIN — ATORVASTATIN CALCIUM 40 MG: 40 TABLET, FILM COATED ORAL at 09:04

## 2022-04-07 RX ADMIN — TOPIRAMATE 50 MG: 25 TABLET, FILM COATED ORAL at 09:04

## 2022-04-07 RX ADMIN — CYCLOBENZAPRINE 10 MG: 10 TABLET, FILM COATED ORAL at 01:04

## 2022-04-07 RX ADMIN — INSULIN ASPART 6 UNITS: 100 INJECTION, SOLUTION INTRAVENOUS; SUBCUTANEOUS at 04:04

## 2022-04-07 RX ADMIN — INSULIN ASPART 8 UNITS: 100 INJECTION, SOLUTION INTRAVENOUS; SUBCUTANEOUS at 11:04

## 2022-04-07 RX ADMIN — HYDROCODONE BITARTRATE AND ACETAMINOPHEN 1 TABLET: 5; 325 TABLET ORAL at 11:04

## 2022-04-07 RX ADMIN — POTASSIUM BICARBONATE 25 MEQ: 978 TABLET, EFFERVESCENT ORAL at 04:04

## 2022-04-07 RX ADMIN — QUETIAPINE FUMARATE 300 MG: 100 TABLET ORAL at 09:04

## 2022-04-07 RX ADMIN — HYDROCODONE BITARTRATE AND ACETAMINOPHEN 1 TABLET: 5; 325 TABLET ORAL at 09:04

## 2022-04-07 NOTE — PROGRESS NOTES
04/07/2022 @ 2:36 PM- RSW attempted to contact pt via room phone to complete SDOH questionnaire and liaison assessment. RSW received no answer at this time. RSW will follow up with patient at a later time to complete initial visit assessment.      IP Liaison - Initial Visit Note    Patient: Chayito Chung  MRN:  [id number]  Date of Service:  4/7/2022  Completed by:  NILAY Christy    Reason for Visit   Patient presents with    IP Liaison Initial Visit       RSW contacted pt via room phone in order to complete SDOH questionnaire and liaison assessment. Pt drowsy during assessment, RSW asked if pt has any immediate social barriers to care of food insecurity, housing, transportation, and/or financial resource strain. Pt has identified no immediate social barriers to care. Per pt, pt is not in need of resources at this time.    The following were addressed during this visit:  - Review SDOH Questions   - Complete patient assessment   - Complete initial visit with patient        Patient Summary     IP Liaison Patient Assessment    General  Level of Caregiver support: Member independent and does not need caregiver assistance  Have you had to make a decision between paying for any of the following in the last 2 months?: None  Transportation means: Self  Assessments  Was the PHQ Depression Screening completed this visit?: No  Was the JAZMIN-7 Screening completed this visit?: No         NILAY Christy

## 2022-04-07 NOTE — PLAN OF CARE
Problem: Adult Inpatient Plan of Care  Goal: Plan of Care Review  Outcome: Ongoing, Progressing  Goal: Patient-Specific Goal (Individualized)  Outcome: Ongoing, Progressing  Goal: Absence of Hospital-Acquired Illness or Injury  Outcome: Ongoing, Progressing  Goal: Optimal Comfort and Wellbeing  Outcome: Ongoing, Progressing     Problem: Adjustment to Illness (Stroke, Ischemic/Transient Ischemic Attack)  Goal: Optimal Coping  Outcome: Ongoing, Progressing     Problem: Cerebral Tissue Perfusion (Stroke, Ischemic/Transient Ischemic Attack)  Goal: Optimal Cerebral Tissue Perfusion  Outcome: Ongoing, Progressing     Problem: Cognitive Impairment (Stroke, Ischemic/Transient Ischemic Attack)  Goal: Optimal Cognitive Function  Outcome: Ongoing, Progressing     Problem: Diabetes Comorbidity  Goal: Blood Glucose Level Within Targeted Range  Outcome: Ongoing, Progressing

## 2022-04-07 NOTE — PROGRESS NOTES
Individual Follow-Up Form    4/7/2022    Quit Date: To be determined    Clinical Status of Patient: Inpatient    Length of Service: 15 minutes    Continuing Medication: yes  Patches    Comments: Smoking cessation education follow-up: Pt states nicotine patch is helping, but she is experiencing breakthrough cravings. Reviewed details of pt's Ambulatory Smoking Cessation clinic appointment and she states no conflicts with appointment date and time.     Diagnosis: F17.210    Next Visit: 4/19/22 at 11 o'clock am with [facility] Smoking [facility]essation clinic

## 2022-04-07 NOTE — PT/OT/SLP EVAL
Speech Language Pathology Evaluation  Cognitive/Bedside Swallow    Patient Name:  Chayito Chung   MRN:  [id number]   [id number]    Admitting Diagnosis: Right leg weakness    Recommendations:                  General Recommendations:  Cognitive-linguistic therapy  Diet recommendations:  Regular, Thin   Aspiration Precautions: Standard aspiration precautions   General Precautions: Standard, fall  Communication strategies:  none    History:     Past Medical History:   Diagnosis Date    Acute coronary syndrome     Acute hypoxemic respiratory failure 2017    Anxiety     Asthma     Cancer     colon    Cataracts, both eyes     Chest pain at rest     Chest pain of uncertain etiology 2015    Colon cancer 1988    COPD (chronic obstructive pulmonary disease)     Coronary artery disease     Depression     Diabetes mellitus     Dyspnea on exertion 11/15/2018    Elevated brain natriuretic peptide (BNP) level 11/15/2018    Elevated cholesterol     Hypertension     Swelling     Syncope and collapse 2016       Past Surgical History:   Procedure Laterality Date    ABDOMINAL SURGERY      BREAST BIOPSY      benign unsure what side     SECTION, CLASSIC      COLON SURGERY      COLONOSCOPY  2019    repeat in 5 years    CORONARY ANGIOPLASTY WITH STENT PLACEMENT  3 months ago     x2, Hysterectomy, Lung surgery, Partial stomach removed, Part of colon removed for rectal cancer    GASTRECTOMY      HEMORRHOID SURGERY      HYSTERECTOMY      @28yrs of age    LUNG BIOPSY      OOPHORECTOMY      @28yrs of age    POSTERIOR FUSION OF CERVICAL SPINE WITH LAMINECTOMY N/A 2020    Procedure: LAMINECTOMY, SPINE, CERVICAL, WITH POSTERIOR FUSION C3-T1 ;  Surgeon: Herson Tate MD;  Location: [facility] OR [address] [place] [address];  Service: Neurosurgery;  Laterality: N/A;    REPAIR OF INCARCERATED INCISIONAL HERNIA WITHOUT HISTORY OF PRIOR REPAIR N/A 2022    Procedure: REPAIR, HERNIA, INCISIONAL,  INCARCERATED, WITHOUT HISTORY OF PRIOR REPAIR;  Surgeon: Simon King MD;  Location: [facility];  Service: General;  Laterality: N/A;     NP note 4/6: * Right leg weakness  70 y/o female with HTN, DM2, CAD, seizure like activity, migraines, COPD presented with acute right leg weakness.  MRI with no acute findings.  FLAIR changes noted to right frontal lobe.  Although this was read by Radiology as possible subacute, there is no diffusion restriction or ADC changes making it most likely several weeks old.  This lesion would also not correlate to the patient acute right leg weakness.    On exam right leg weakness has significantly improved, but patient reports extreme paresthesia to the entire leg.  Light tough remains intact.  Review of EMR shows an admission 5/25/2020 for similar complaints.  Imaging demonstrated moderate cervical spine stenosis and mild thoracic and lumbar stenosis. Cervical spine imaging shows trace myelomalacia C3-4, C5-6 primarily.  At that time she was managed medically with robaxin and placed in patella stabilizing brace.  She ultimately ended up having LAMINECTOMY, SPINE, CERVICAL, WITH POSTERIOR FUSION C3-T1  on 7/23/2020.  She had a 4 week and 4 month post op visit and was doing well.  There are no further records available until 3/3/2022 from Dr. Angu[facility].  In his note he documents a normal exam.    Etiology for right leg weakness/numbness unclear.  Patient does have multiple risk factor for stroke.  Complete stroke workup pending.  Patient has history of seizure like activity and awoke on floor.  Symptoms could be due to trauma.  Given prior spinal findings repeat imaging would be reasonable to ensure stability.    Patient also has history of seizure like activity.  No further information available in EMR.  No EEG on record.  Seizure activity as cause for weakness also in differential.    Social History: Patient lives alone. Does not work.     Chest X-Rays 4/5: Nonspecific  ill-defined right basilar opacity, new since 02/07/2022, could relate to atelectasis, aspiration or pneumonia.  Additional diffuse bilateral interstitial markings relatively similar to prior examination could reflect a background of chronic fibrotic change with superimposed vascular congestion/edema versus infectious or noninfectious inflammatory process not excluded.    Head CT 4/5: 1. No evidence of acute large vessel occlusion or flow-limiting stenosis.  2. CT head without definite acute intracranial findings.  Redemonstrated serpiginous cortical enhancement in the right frontal lobe in keeping with late acute/early chronic infarct, when correlating findings demonstrated on prior MRI.  3. Solid pulmonary nodules measuring up to 6 mm.  For multiple solid nodules with any 6 mm or greater, Fleischner Society guidelines recommend follow up with non-contrast chest CT at 3-6 months and 18-24 months after discovery.  4. Additional details as provided in the body of report.    Prior diet: reg/thin      Subjective     Patient awake and cooperative.   Patient goals: to get better    Pain/Comfort:  ·  no pain per patient    Respiratory Status: Room air    Objective:     COGNITIVE STATUS: Patient reports cognition does not feel at baseline  Behavioral Observations: alert and appropriate  Memory:  · Immediate: wfl  · Short Term: wfl  · Long Term: wfl  Orientation: oriented x4  Attention: wfl.  Problem Solving: wfl  Insight Awareness: wfl  Sequencing:   · Functional Events: mildly impaired  · Mental Manipulation: mildly impaired  Pragmatics: wfl  Simple Money/Time Management: impaired    LANGUAGE:   Receptive Language:  Simple y/n Questions: wfl  Complex y/n Questions: wfl  Identification: wfl  1 Step Directions: wfl  2 Step Directions: wfl  Complex Directions: wfl    Expressive Language:   Naming:   · Divergent: impaired  · Convergent: wfl  · Confrontational: wfl  Automatic Speech: wfl  Sentence Completion: wfl  Responsive  Speech: wfl  Repetition: wfl  Conversational Speech: wfl    Motor Speech: no noted motor speech impairments    Voice: wfl    Augmentative Alternative Communication: no    Reading: wfl     Writing: tba     Visual-Spatial: tba    Oral Musculature Evaluation  · Oral Musculature: WFL  · Mucosal Quality: good  · Mandibular Strength and Mobility: WFL  · Oral Labial Strength and Mobility: WFL  · Lingual Strength and Mobility: WFL  · Velar Elevation: WFL  · Buccal Strength and Mobility: WFL  · Volitional Cough: elicited  · Volitional Swallow: timely  · Voice Prior to PO Intake: clear    Bedside Swallow Eval:   Consistencies Assessed:  · Thin liquid sips of water via straw   · Solids bites of amie cracker     Oral Phase:   · WFL     Pharyngeal Phase:   · no overt clinical signs/symptoms of aspiration  · no overt clinical signs/symptoms of pharyngeal dysphagia     Compensatory Strategies  · None     Treatment: SLP provided patient education on SLP role, s/s and risks of aspiraiton, safe swallow precautions, and POC. Patient v/u of all discussed.     Assessment:     Chayito Chung is a 71 y.o. female with an SLP diagnosis of Cognitive-Linguistic Impairment.  She presents with high level cognitive-linguistic impairments. ST will continue to follow.     Goals:   Multidisciplinary Problems     SLP Goals        Problem: SLP    Goal Priority Disciplines Outcome   SLP Goal     SLP Ongoing, Progressing   Description: Short Term Goals:   1. Pt will complete functional, simple money/time management tasks with 75% acc min cues.   2. Pt will name 12 items in a concrete category given no time constraints across 2 categories.  3. Pt will complete sequencing and mental manipulation tasks with 80% acc given 1 repetition.                      Plan:     · Patient to be seen:  2 x/week, 3 x/week   · Plan of Care expires:  05/06/22  · Plan of Care reviewed with:  patient   · SLP Follow-Up:  Yes       Discharge recommendations:  Discharge  Facility/Level of Care Needs: rehabilitation facility per PT/OT; ongoing ST at next level of care   Barriers to Discharge:  None per ST discipline    Time Tracking:     SLP Treatment Date:   04/07/22  Speech Start Time:  0720  Speech Stop Time:  0737     Speech Total Time (min):  17 min    Billable Minutes: Eval 9  and Eval Swallow and Oral Function 8    04/07/2022

## 2022-04-07 NOTE — PROGRESS NOTES
CTA head and neck completed - No high-grade stenosis or occlusion.    TTE Completed - EF 60%, no wall motion abnormalities, no thrombus/vegetation, left atrium normal.    Etiology for symptoms remain unclear at this time.  Patient with significant stroke risk factors that need improved management.  Recent infarct to right frontal MCA appears embolic.  No obvious embolic cause noted.  Etiology should be further evaluated outpatient.    Recommendations:  -DAPT with ASA and Plavix x 21 days then monotherapy with ASA   -Atorvastatin 40mg for goal LDL <70  -Aggressive Stroke risk factor modification for HTN, DM, CAD, tobacco abuse  -No further inpatient stroke workup needed; can dispo with therapy recommendations once medically ready  -MRI Spine pending - if any abnormalities noted consult [facility] for further management  -EEG - if any abnormalities noted consult [facility] for further management  -30 day event monitor with autotrigger ([id number]).  Please call [phone] to notify the tech (the order does not automatically cross over).  The device will be mailed to the patient.  The company will call from a 194 number to confirm the address.  Please share this information with the patient.  -Ambulatory referral to Vascular Neurology in 4-6 weeks (Consult Order [id number])    Please call for any questions.    Erna Sosa, NP-C  Vascular Neurology  [phone]

## 2022-04-07 NOTE — PLAN OF CARE
Patient on oxygen @ documented setting. Attempt to wean FiO2 per [facility] oxygen protocol. Patient instructed on benefits of therapy.    Patient assessed with no need for PRN therapy at this time.Patient instructed for need and benefits of PRN nebulizer. [facility] will continue to follow.

## 2022-04-07 NOTE — PLAN OF CARE
Problem: SLP  Goal: SLP Goal  Description: Short Term Goals:   1. Pt will complete functional, simple money/time management tasks with 75% acc min cues.   2. Pt will name 12 items in a concrete category given no time constraints across 2 categories.  3. Pt will complete sequencing and mental manipulation tasks with 80% acc given 1 repetition.     Outcome: Ongoing, Progressing  Bedside Swallow Study and Lxyxaq-Nfxponsu-Cupdzhwcc Evaluation completed. Recommend: regular diet, thin liquids, standard aspiration precautions. Patient reporting cognitive-linguistic skills not at baseline. ST will continue to follow 2-3x a week and patient would benefit from ongoing Speech Therapy s/p discharge.

## 2022-04-07 NOTE — PROGRESS NOTES
"[facility] Medicine Progress Note    Primary Team: [facility] Hospitalist Team B  Attending Physician: Tanvir Snyder MD  Resident: Everardo  Intern: [facility] Day: 1 days    Chief Complaint: Right-sided weakness noted at ~14:00 on 2022    Subjective:      Patient seen and examined by me this morning. Still complaining of weakness in her RLE.  Reports improvement in her headache, but still complains of neck pain which she has experienced "for months."  She denies any other complaints including fever, chills, dizzness, CP, palpitations, SOB, cough, n/v, abdominal pain, or diarrhea.    Objective:   Last 24 Hour Vital Signs:  BP  Min: 94/51  Max: 164/97  Temp  Av.6 °F (36.4 °C)  Min: 97.1 °F (36.2 °C)  Max: 98.3 °F (36.8 °C)  Pulse  Av.5  Min: 93  Max: 112  Resp  Av.6  Min: 16  Max: 20  SpO2  Av.9 %  Min: 92 %  Max: 97 %  Height  Av' 2" (157.5 cm)  Min: 5' 2" (157.5 cm)  Max: 5' 2.01" (157.5 cm)  Weight  Av.9 kg (132 lb 0.4 oz)  Min: 59.9 kg (132 lb)  Max: 59.9 kg (132 lb 0.9 oz)    Intake/Output Summary (Last 24 hours) at 2022 0823  Last data filed at 2022 0049  Gross per 24 hour   Intake 260 ml   Output 600 ml   Net -340 ml      Physical Examination:  Physical Exam   Constitutional: No distress.   HENT:   Head: Normocephalic and atraumatic.   Right Ear: External ear normal.   Left Ear: External ear normal.   Nose: Nose normal. No rhinorrhea or nasal congestion.   Mouth/Throat: Mucous membranes are moist. No oropharyngeal exudate or posterior oropharyngeal erythema. Oropharynx is clear.   Eyes: Pupils are equal, round, and reactive to light. Conjunctivae are normal. Right eye exhibits no discharge. Left eye exhibits no discharge. No scleral icterus.   Cardiovascular: Normal rate, regular rhythm, normal heart sounds and normal pulses. Exam reveals no gallop and no friction rub.   No murmur heard.  Pulmonary/Chest: Effort normal and breath sounds normal. No respiratory " distress. She has no wheezes. She has no rhonchi. She has no rales.   Abdominal: Soft. Bowel sounds are normal. She exhibits no distension. There is no abdominal tenderness. There is no rebound and no guarding.   Musculoskeletal:         General: No swelling. Normal range of motion.      Cervical back: Normal range of motion and neck supple. No rigidity.      Right lower leg: No edema.      Left lower leg: No edema.   Neurological: She is alert. She has intact cranial nerves. She displays weakness. GCS eye subscore is 4. GCS verbal subscore is 5. GCS motor subscore is 6.   RUE proximal muscle strength 4/5, LUE proximal muscle strength 5/5  RUE hand  strength 3/5, LUE hand  strength 5/5  RLE proximal muscle strength 4/5, LLE proximal muscle strength 5/5   Skin: Skin is warm and dry.   Nursing note and vitals reviewed.  Laboratory:  Recent Labs   Lab 04/05/22  1750 04/06/22  0619 04/07/22  0346   WBC 7.05 4.78 4.62   HGB 11.5* 10.6* 10.4*   HCT 38.5 33.3* 32.9*    260 259   MCV 92 89 88   RDW 15.0* 15.0* 14.7*    137 137   K 4.5 3.8 3.5    108 106   CO2 19* 21* 23   BUN 18 11 12   CREATININE 1.1 0.7 0.7   * 259* 222*   PROT 8.4 6.7 6.7   ALBUMIN 3.9 3.2* 3.3*   BILITOT 0.4 0.3 0.3   AST 38 21 14   ALKPHOS 157* 130 124   ALT 16 14 14     I have personally reviewed the above laboratory studies.     Imaging:  -reviewed     Current Medications:     Scheduled:   aspirin  81 mg Oral Daily    atorvastatin  40 mg Oral Daily    clopidogreL  75 mg Oral Daily    cyclobenzaprine  10 mg Oral Once    DULoxetine  90 mg Oral Daily    enoxaparin  40 mg Subcutaneous Daily    hydroCHLOROthiazide  12.5 mg Oral Daily    insulin detemir U-100  10 Units Subcutaneous Daily    losartan  50 mg Oral Daily    nicotine  1 patch Transdermal Daily    pregabalin  150 mg Oral BID    QUEtiapine  300 mg Oral QHS    topiramate  50 mg Oral BID    zonisamide  100 mg Oral Daily         Infusions:          Assessment:     Chayito Chung is a 71 y.o. female with a PMHx of HTN, HLD, T2DM, COPD, seizures, migraines, history of colon cancer, major depressive disorder, and generalized anxiety disorder presenting with right-sided weakness noted upon waking at 14:00 on 4/5 with an unknown last known normal.  Upon arrival to the ED, she was stroke activated and underwent CTH, which was negative for acute infarct, hemorrhage, or mass.  Initial workup was significant for an elevated CK at 1400.  Rhabdo improved with IVF.  CTA Head and Neck, MRI-Brain completed; both showed subacute infarct in right YUDITH distribution.  Now planning for MRI of C-, T-, and L-spine as she does have a history of falls and malignancy and her right-sided weakness is incongruent with imaging findings.    Plan:     Cerebrovascular Accident with acute right-sided weakness  -woke up around 14:00 on 4/5 with weakness in her upper and lower extremities; presented to ED around 17:00 with no clear last known normal  -Code Stroke activated upon arrival to ED; Vascular Neurology also consulted  -CBC with slight normocytic anemia, CMP with hyperglycemia and chronically low bicarbonate  -TSH, PT/INR, troponin I all WNL  -Lipid panel with LDL 82, HDL 49, TGs 115, total cholesterol 154  -CTH negative for acute infarct, hemorrhage, or mass  -EKG with normal sinus rhythm  -Plavix-loaded in ED, already on ASA 81mg daily at home  -CXR done  -CTA Head and Neck completed, showed late acute/early chronic infarct in R YUDITH distribution, pulmonary nodules  -TTE with Bubble Study completed, showed LVEF of 60% with grade I diastolic dysfunction and no evidence of intracardiac shunting  -MRI-Brain w/wo contrast showed small subacute right frontal cortical infarct with suggested small-volume petechial hemorrhage, as well as previous CVAs  -cleared for diet by SLP  -PT/OT recommending IPR vs SNF  -telemetry ordered  -continuing atorvastatin 40mg daily  -continuing Plavix 75mg  daily for duration of three weeks  -continuing ASA 81mg daily    Headache and Neck Pain  -patient complaining of bifrontal headache since awakening on 4/5  -no meningeal signs on exam, CTH and MRI negative for mass, significant hemorrhage, or meningeal enhancement  -HA improved today, however patient complaining of neck pain that has been present for months  -starting cyclobenzaprine 10mg as one-time dose to assess for relief  -Tylenol PRN ordered     Traumatic Rhabdomyolysis, improving  -CPK at 1408 on presentation, improved to 811 on 4/6 and 444 on 4/7  -stopped IVF as patient is tolerating PO     Essential Hypertension  -home regimen includes HCTZ 12.5mg daily, losartan 100mg daily, and and verapamil 180mg daily  -holding home meds given concern for possible CVA to allow for permissive HTN     Hyperlipidemia  -patient on pravastatin 20mg daily at home  -lipid panel done on arrival showing overall good control with LDL slightly above goal at 82  -continuing high-intensity statin therapy with atorvastatin 40mg daily     Chronic Back Pain  -patient reports long-standing history of lower back pain  -continuing home Norco PRN  -given history of malignancy and smoking history, as well as incongruence of symptoms with imaging, will obtain MRI of entire spine     Type II Diabetes Mellitus, complicated by peripheral neuropathy  -last A1c in 12/2021 was 7.3%; repeat A1c on admission 10.3%  -home regimen includes metformin 1000mg BID; holding  -continuing atorvastatin 40mg daily  -continuing home Lyrica 150mg BID  -increasing detemir to 18U today  -SSI in house     COPD, Stage Unknown  -home regimen includes albuterol, Duo-Nebs, and Trelegy  -not on home oxygen; SpO2 at goal while on room air during initial evaluation  -currently on low-flow NC for comfort at ~2L  -currently smoking tobacco, trying to quit  -Duo-Nebs ordered PRN     Seizure Disorder  -on topiramate 50mg BID, zonisamide 200mg BID at  home     Migraines  -continuing home topiramate 50mg BID     Depression  -home regimen includes Cymbalta 90mg daily, Seroquel 300mg qhs  -continuing home Cymbalta, Seroquel     Anxiety   -home regimen includes Cymbalta 90mg daily, Seroquel 300mg qhs, and Xanax 0.5mg BID PRN  -holding home Seroquel, Xanax  -continuing home Cymbalta     Health Care Maintenance  - Screening tests needed: A1c, colonoscopy (due 7/2022)     Ppx: Lovenox  Diet: Cardiac Diabetic  Code: Full     Disposition: pending CVA workup, treatment of rhabdomyolysis    Sundeep Frank M.D.  [facility] Internal Medicine PGY-1    [facility] Medicine Hospitalist Pager numbers:   [facility] Hospitalist Medicine Team A (Bita/Sydni): [phone]  [facility] Hospitalist Medicine Team B (Lillian/Danny):  [phone]

## 2022-04-07 NOTE — PLAN OF CARE
Problem: Physical Therapy  Goal: Physical Therapy Goal  Description: Goals to be met by: 22     Patient will increase functional independence with mobility by performin. Supine <> sit with Modified [place]  2. Sit to stand transfer with Stand-by Assistance  3. Bed to chair transfer with Stand-by Assistance using Rolling Walker  4. Gait  x 25 feet with Contact Guard Assistance using Rolling Walker.   5. Lower extremity exercise program x 10 reps per handout, with supervision    Outcome: Ongoing, Progressing   Continue working toward goals.

## 2022-04-07 NOTE — PROCEDURES
EEG REPORT      Chayito Chung  [id number]  1950    DATE OF SERVICE: 4/7/2022    OK     METHODOLOGY      Extended electroencephalographic recording is made while the patient is ambulatory and continuing normal daily activities.  Electrodes are placed according to the International 10-20 placement system and included T1 and T2 electrode placement.  Twenty four (24) channels of digital signal (sampling rate of 512/sec) was simultaneously recorded from the scalp including EKG and eye monitors.  Recording band pass was 0.1 to 100 hz and all data was stored digitally on the recorder.  The patient is instructed to press an event button when clinical symptoms occur and write the symptoms into a diary. Activation procedures which include photic stimulation, hyperventilation and instructing patients to perform simple task are done in selected patients.        The EEG is displayed on a monitor screen and can be reformatted into different montages for evaluation.  The entire recoding is submitted for computer assisted analysis to detect spike and electrographic seizure activity.  The entire recording is visually reviewed and the times identified by computer analysis as being spikes or seizures are reviewed again.  Compresses spectral analysis (CSA) is also performed on the activity recorded from each individual channel.  This is displayed as a power display of frequencies from 0 to 30 Hz over time.   The CSA analysis is done and displayed continuously.  This is reviewed for asymmetries in power between homologous areas of the scalp and for presence of changes in power which canbe seen when seizures occur.  Sections of suspected abnormalities on the CSA is then compared with the original EEG recording.  .     [employer] software was also utilized in the review of this study.  This software suite analyzes the EEG recording in multiple domains.  Coherence and rhythmicity is computed to identify EEG sections which may contain  organized seizures.  Each channel undergoes analysis to detect presence of spike and sharp waves which have special and morphological characteristic of epileptic activity.  The routine EEG recording is converted from spacial into frequency domain.  This is then displayed comparing homologous areas to identify areas of significant asymmetry.  Algorithm to identify non-cortically generated artifact is used to separate eye movement, EMG and other artifact from the EEG     Recording Times    A total of 00:27:55 hours of EEG was recorded.      EEG FINDINGS:  Background activity:   The background rhythm was characterized by alpha and anterior dominant beta activity with a 9Hz posterior dominant alpha rhythm at 30-70 microvolts.   Symmetry and continuity: the background was continuous and symmetric     Sleep:   Normal sleep transients including sleep spindles, K complexes, vertex waves and POSTS were seen.    Activation procedures:  Answers questions correctly    Abnormal activity:   No epileptiform discharges, periodic discharges, lateralized rhythmic delta activity or electrographic seizures were seen.    IMPRESSION:   Normal EEG of light sleep and the waking state      Judson Chanel MD  Neurology-Epilepsy.  [facility].

## 2022-04-07 NOTE — PLAN OF CARE
YARY sent rehab referral to [facility]. Yary will follow     Pt is agreeable to IPR. Yray sent consult to sw to [facility] via fax. YARY will follow.     2:46PM YARY spoke with Irsi with N she stated pt would likely not get approved for IPR. YARY informed MD team of situation and they wanted to proceed with SNF placement. Pt is agreeable to SNF. YARY faxed updated SNF referral to [facility]. YARY sent out SNF referrals via [facility]. YARY will follow.    YARY faxed [facility] and called in [facility] to [place].     04/07/22 1009   Post-Acute Status   Post-Acute Authorization Placement

## 2022-04-07 NOTE — PT/OT/SLP PROGRESS
Occupational Therapy   Treatment    Name: Chayito Chung  MRN: [id number]  Admitting Diagnosis:  Right leg weakness       Recommendations:     Discharge Recommendations: rehabilitation facility  Discharge Equipment Recommendations:   (TBD)  Barriers to discharge:  Decreased caregiver support    Assessment:     Chayito Chung is a 71 y.o. female with a medical diagnosis of Right leg weakness.  She presents with The primary encounter diagnosis was Traumatic rhabdomyolysis, initial encounter. Diagnoses of Seizure disorder, Right leg weakness, CVA (cerebral vascular accident), Stroke, Fall, Cerebrovascular accident (CVA), unspecified mechanism, Type 2 diabetes mellitus with other circulatory complication, with long-term current use of insulin, Dorsalgia, unspecified, and Pain in thoracic spine were also pertinent to this visit.  . Performance deficits affecting function are weakness, gait instability, impaired balance, impaired endurance, decreased lower extremity function, decreased upper extremity function, impaired self care skills, impaired functional mobilty, impaired sensation, impaired coordination, decreased coordination, pain, impaired fine motor.        Pt progressing well towards goals. Continues to demo RUE/LE strength deficits causing impaired independence in ADLs and functional mobility at this time. Pt requires seated rest breaks 2/2 impaired endurance as well. Cont OT POC for IPR at d/c.     Rehab Prognosis:  Good; patient would benefit from acute skilled OT services to address these deficits and reach maximum level of function.       Plan:     Patient to be seen 5 x/week to address the above listed problems via self-care/home management, therapeutic activities, therapeutic exercises  · Plan of Care Expires: 05/06/22  · Plan of Care Reviewed with: patient    Subjective     Pain/Comfort:  · Pain Rating 1: 9/10  · Location - Orientation 1: generalized  · Location 1:  (back and neck)  · Pain Addressed 1:  Reposition, Distraction, Cessation of Activity  · Pain Rating Post-Intervention 1:  (did not rate)    Objective:     Communicated with: nsg prior to session.    General Precautions: Standard, fall   Orthopedic Precautions:N/A   Braces: N/A     Occupational Performance:     Bed Mobility:    · Patient completed Scooting/Bridging with contact guard assistance  · Patient completed Supine to Sit with contact guard assistance  · Patient completed Sit to Supine with contact guard assistance     Functional Mobility/Transfers:  · Patient completed Sit <> Stand Transfer with minimum assistance and moderate assistance  with  rolling walker   · Patient completed Bed <> Chair Transfer using Step Transfer technique with minimum assistance with rolling walker  · Functional Mobility: Min A with RW; cues for 3 pt gait and balance     Activities of Daily Living:  · Grooming: minimum assistance standing at sink; SBA seated; constant verbal cues to use R hand as assist   · Bathing: minimum assistance partial sponge bath of upper body while seated in chair at sink       [facility] 6 Click ADL: 17    Treatment & Education:  Pt found supine; reporting fatigue, back and neck pain   Bed mobility as above   Requires cues for correct hand placement for use of RW to push from bed to stand; continues to require assist   Marching in place performed while EOB; verbal cues for use of BUEs to push through RW 2/2 RLE weakness; Pt tremulous and requires assist   Verbal cues for 3 pt gait to off weight RLE 2/2 buckling at times   Increased time required for functional mobility ~ 3-4 feet to stand at sink   Once pt reached sink, required BUE elbow propping on counter for increased balance; washed face in stance while forward flexed, however, seated rest break required   Pt completed oral hygiene while seated; difficulty noted with use of R hand for opening containers, placing toothpaste on toothbrush, and coordinating use of R hand to brush tongue-  increased time required; throughout task, pt required cues to use R hand   Sat in b/s chair to perform sponge bathing for upper body; pt with decreased strength noted in R hand to squeeze excess water out of bathing cloth; again required cues to use R hand for axs   Stood from b/s chair with mod A 2/2 low surface height and no arm rests present   Washed hands at sink with same posture as above; min A required for balance   Functional mobility performed to b/d chair, only ~ 5 feet; assist required for controlled descent   Sat in b/s chair and performed 1 x3 chair press ups Min A with clearance of buttocks from chair   1 x 10 chest press and shoulder press with assist at RUE to reach end shoulder range   EEG tech present and required pt to return to supine; assist at BLEs     Patient left supine with all lines intact, call button in reach, bed alarm on and nsg notifiedEducation:      GOALS:   Multidisciplinary Problems     Occupational Therapy Goals        Problem: Occupational Therapy    Goal Priority Disciplines Outcome Interventions   Occupational Therapy Goal     OT, PT/OT Ongoing, Progressing    Description: Goals to be met by: 5/6/22     Patient will increase functional independence with ADLs by performing:    UE Dressing with Modified [place].  LE Dressing with Modified [place].  Grooming while standing with Modified [place].  Toileting from toilet with Modified [place] for hygiene and clothing management.   Supine to sit with Modified [place].  Step transfer with Modified [place]  Toilet transfer to toilet with Modified [place].  Increased functional strength to WFL for self care skills and functional mobility.  Upper extremity exercise program x10 reps per handout, with independence.                     Time Tracking:     OT Date of Treatment: 04/07/22  OT Start Time: 1105  OT Stop Time: 1143  OT Total Time (min): 38 min    Billable Minutes:Self Care/Home Management  23  Therapeutic Exercise 15    OT/SHA: OT     SHA Visit Number: 0    4/7/2022

## 2022-04-07 NOTE — PT/OT/SLP PROGRESS
Physical Therapy Treatment    Patient Name:  Chayito Chung   MRN:  [id number]    Recommendations:     Discharge Recommendations:  rehabilitation facility   Discharge Equipment Recommendations:  (TBD)   Barriers to discharge: decreased caregiver support; decreased strength/functional mobility requiring assistance at thistime    Assessment:     Chayito Chung is a 71 y.o. female admitted with a medical diagnosis of Right leg weakness.  She presents with the following impairments/functional limitations:  weakness, impaired endurance, impaired self care skills, impaired functional mobilty, gait instability, impaired balance, decreased coordination, decreased lower extremity function, decreased upper extremity function, decreased safety awareness, impaired coordination, impaired fine motor, pain, impaired cardiopulmonary response to activity.  Pt would continue to benefit from P.T. To address impairments listed above.  .    Rehab Prognosis: Fair; patient would benefit from acute skilled PT services to address these deficits and reach maximum level of function.    Recent Surgery: * No surgery found *      Plan:     During this hospitalization, patient to be seen 5 x/week to address the identified rehab impairments via gait training, therapeutic activities, therapeutic exercises, neuromuscular re-education and progress toward the following goals:    · Plan of Care Expires:  05/06/22    Subjective       Patient/Family Comments/goals: Pt agreed to tx.  Pain/Comfort:  · Pain Rating 1: 0/10  · Pain Addressed 1: Reposition, Distraction, Nurse notified  · Pain Rating Post-Intervention 1:  (pt c/o neck pain after tx, did not rate)      Objective:     Communicated with RN (Jyoti) prior to session.  Patient found supine with bed alarm, telemetry, PureWick, oxygen upon PT entry to room.     General Precautions: Standard, fall   Orthopedic Precautions:N/A   Braces:       Functional Mobility:  · Bed Mobility:     · Rolling Left:   stand by assistance and with b/r for assist and increased time and effort to perform  · Scooting: stand by assistance and to EOB  · Supine to Sit: stand by assistance and with HOB elevated, b/r for assist, and increased time and effort to perform  · Sit to Supine: minimum assistance and LE  · Transfers:     · Sit to Stand:  ModA on first trial and Julissa on 2nd trial with RW and vc's/tc's for hand placement  · Gait: 16ft with RW and Julissa for RW management and stability.  Pt ambulated with a 3pt gait with decreased foot to floor clearance, R > L, and one bouts of mild right knee buckling.  Slow libby with trunk flexion and head down posture.  Vc's for improved upright posture, but pt has difficulty maintaining.    · Balance: sitting fair+, standing fair/fair-, gait fair-      AM-PAC 6 CLICK MOBILITY  Turning over in bed (including adjusting bedclothes, sheets and blankets)?: 3  Sitting down on and standing up from a chair with arms (e.g., wheelchair, bedside commode, etc.): 3  Moving from lying on back to sitting on the side of the bed?: 3  Moving to and from a bed to a chair (including a wheelchair)?: 3  Need to walk in hospital room?: 3  Climbing 3-5 steps with a railing?: 1  Basic Mobility Total Score: 16       Therapeutic Activities and Exercises:   Pt sat EOB and performed BLE therex: AP with assistance secondary to decreased DF, LAQ with occasional assist on R for increased ROM, hip flexion with occasional assist on R for increased ROM, hip ABD/ADD with pillow, and glut sets.  STanding with RW marching in place with Julissa for increased stability, especially when performing left hip flexion.  Pt returned to bed supine.    Patient left supine with all lines intact, call button in reach, bed alarm on and RN notified..    GOALS:   Multidisciplinary Problems     Physical Therapy Goals        Problem: Physical Therapy    Goal Priority Disciplines Outcome Goal Variances Interventions   Physical Therapy Goal     PT,  PT/OT Ongoing, Progressing     Description: Goals to be met by: 22     Patient will increase functional independence with mobility by performin. Supine <> sit with Modified [place]  2. Sit to stand transfer with Stand-by Assistance  3. Bed to chair transfer with Stand-by Assistance using Rolling Walker  4. Gait  x 25 feet with Contact Guard Assistance using Rolling Walker.   5. Lower extremity exercise program x 10 reps per handout, with supervision                     Time Tracking:     PT Received On: 22  PT Start Time: 1240     PT Stop Time: 1308  PT Total Time (min): 28 min     Billable Minutes: Gait Training 14 and Therapeutic Activity 14    Treatment Type: Treatment  PT/PTA: PTA     PTA Visit Number: 1     2022

## 2022-04-07 NOTE — PLAN OF CARE
Problem: Occupational Therapy  Goal: Occupational Therapy Goal  Description: Goals to be met by: 5/6/22     Patient will increase functional independence with ADLs by performing:    UE Dressing with Modified [place].  LE Dressing with Modified [place].  Grooming while standing with Modified [place].  Toileting from toilet with Modified [place] for hygiene and clothing management.   Supine to sit with Modified [place].  Step transfer with Modified [place]  Toilet transfer to toilet with Modified [place].  Increased functional strength to WFL for self care skills and functional mobility.  Upper extremity exercise program x10 reps per handout, with independence.    Outcome: Ongoing, Progressing     Pt progressing well towards goals. Continues to demo RUE/LE strength deficits causing impaired independence in ADLs and functional mobility at this time. Pt requires seated rest breaks 2/2 impaired endurance as well. Cont OT POC for IPR at d/c.

## 2022-04-08 ENCOUNTER — TELEPHONE (OUTPATIENT)
Dept: INTERNAL MEDICINE | Facility: CLINIC | Age: 72
End: 2022-04-08
Payer: MEDICARE

## 2022-04-08 ENCOUNTER — PATIENT OUTREACH (OUTPATIENT)
Dept: ADMINISTRATIVE | Facility: OTHER | Age: 72
End: 2022-04-08
Payer: MEDICARE

## 2022-04-08 LAB
ALBUMIN SERPL BCP-MCNC: 3.1 G/DL (ref 3.5–5.2)
ALP SERPL-CCNC: 127 U/L (ref 55–135)
ALT SERPL W/O P-5'-P-CCNC: 17 U/L (ref 10–44)
ANION GAP SERPL CALC-SCNC: 11 MMOL/L (ref 8–16)
AST SERPL-CCNC: 12 U/L (ref 10–40)
BASOPHILS # BLD AUTO: 0.03 K/UL (ref 0–0.2)
BASOPHILS NFR BLD: 0.7 % (ref 0–1.9)
BILIRUB SERPL-MCNC: 0.4 MG/DL (ref 0.1–1)
BUN SERPL-MCNC: 12 MG/DL (ref 8–23)
CALCIUM SERPL-MCNC: 9 MG/DL (ref 8.7–10.5)
CHLORIDE SERPL-SCNC: 107 MMOL/L (ref 95–110)
CO2 SERPL-SCNC: 22 MMOL/L (ref 23–29)
CREAT SERPL-MCNC: 0.7 MG/DL (ref 0.5–1.4)
DIFFERENTIAL METHOD: ABNORMAL
EOSINOPHIL # BLD AUTO: 0.1 K/UL (ref 0–0.5)
EOSINOPHIL NFR BLD: 2.4 % (ref 0–8)
ERYTHROCYTE [DISTWIDTH] IN BLOOD BY AUTOMATED COUNT: 14.6 % (ref 11.5–14.5)
EST. GFR  (AFRICAN AMERICAN): >60 ML/MIN/1.73 M^2
EST. GFR  (NON AFRICAN AMERICAN): >60 ML/MIN/1.73 M^2
GLUCOSE SERPL-MCNC: 188 MG/DL (ref 70–110)
HCT VFR BLD AUTO: 33.9 % (ref 37–48.5)
HGB BLD-MCNC: 10.5 G/DL (ref 12–16)
IMM GRANULOCYTES # BLD AUTO: 0.01 K/UL (ref 0–0.04)
IMM GRANULOCYTES NFR BLD AUTO: 0.2 % (ref 0–0.5)
LYMPHOCYTES # BLD AUTO: 1.5 K/UL (ref 1–4.8)
LYMPHOCYTES NFR BLD: 35.4 % (ref 18–48)
MCH RBC QN AUTO: 27.3 PG (ref 27–31)
MCHC RBC AUTO-ENTMCNC: 31 G/DL (ref 32–36)
MCV RBC AUTO: 88 FL (ref 82–98)
MONOCYTES # BLD AUTO: 0.4 K/UL (ref 0.3–1)
MONOCYTES NFR BLD: 10.2 % (ref 4–15)
NEUTROPHILS # BLD AUTO: 2.1 K/UL (ref 1.8–7.7)
NEUTROPHILS NFR BLD: 51.1 % (ref 38–73)
NRBC BLD-RTO: 0 /100 WBC
PLATELET # BLD AUTO: 256 K/UL (ref 150–450)
PMV BLD AUTO: 10 FL (ref 9.2–12.9)
POCT GLUCOSE: 202 MG/DL (ref 70–110)
POCT GLUCOSE: 233 MG/DL (ref 70–110)
POCT GLUCOSE: 254 MG/DL (ref 70–110)
POCT GLUCOSE: 280 MG/DL (ref 70–110)
POTASSIUM SERPL-SCNC: 3.9 MMOL/L (ref 3.5–5.1)
PROT SERPL-MCNC: 6.1 G/DL (ref 6–8.4)
RBC # BLD AUTO: 3.84 M/UL (ref 4–5.4)
SODIUM SERPL-SCNC: 140 MMOL/L (ref 136–145)
WBC # BLD AUTO: 4.12 K/UL (ref 3.9–12.7)

## 2022-04-08 PROCEDURE — 27000221 HC OXYGEN, UP TO 24 HOURS

## 2022-04-08 PROCEDURE — 25000003 PHARM REV CODE 250: Performed by: STUDENT IN AN ORGANIZED HEALTH CARE EDUCATION/TRAINING PROGRAM

## 2022-04-08 PROCEDURE — 11000001 HC ACUTE MED/SURG PRIVATE ROOM

## 2022-04-08 PROCEDURE — 99900035 HC TECH TIME PER 15 MIN (STAT)

## 2022-04-08 PROCEDURE — 63600175 PHARM REV CODE 636 W HCPCS: Performed by: STUDENT IN AN ORGANIZED HEALTH CARE EDUCATION/TRAINING PROGRAM

## 2022-04-08 PROCEDURE — 85025 COMPLETE CBC W/AUTO DIFF WBC: CPT | Performed by: STUDENT IN AN ORGANIZED HEALTH CARE EDUCATION/TRAINING PROGRAM

## 2022-04-08 PROCEDURE — 25500020 PHARM REV CODE 255: Performed by: INTERNAL MEDICINE

## 2022-04-08 PROCEDURE — S4991 NICOTINE PATCH NONLEGEND: HCPCS | Performed by: STUDENT IN AN ORGANIZED HEALTH CARE EDUCATION/TRAINING PROGRAM

## 2022-04-08 PROCEDURE — 94761 N-INVAS EAR/PLS OXIMETRY MLT: CPT

## 2022-04-08 PROCEDURE — A9585 GADOBUTROL INJECTION: HCPCS | Performed by: INTERNAL MEDICINE

## 2022-04-08 PROCEDURE — 80053 COMPREHEN METABOLIC PANEL: CPT | Performed by: STUDENT IN AN ORGANIZED HEALTH CARE EDUCATION/TRAINING PROGRAM

## 2022-04-08 PROCEDURE — 36415 COLL VENOUS BLD VENIPUNCTURE: CPT | Performed by: STUDENT IN AN ORGANIZED HEALTH CARE EDUCATION/TRAINING PROGRAM

## 2022-04-08 RX ORDER — LIDOCAINE 50 MG/G
1 PATCH TOPICAL
Status: DISCONTINUED | OUTPATIENT
Start: 2022-04-08 | End: 2022-04-10 | Stop reason: HOSPADM

## 2022-04-08 RX ORDER — GADOBUTROL 604.72 MG/ML
6 INJECTION INTRAVENOUS
Status: COMPLETED | OUTPATIENT
Start: 2022-04-08 | End: 2022-04-08

## 2022-04-08 RX ADMIN — ENOXAPARIN SODIUM 40 MG: 100 INJECTION SUBCUTANEOUS at 04:04

## 2022-04-08 RX ADMIN — INSULIN ASPART 6 UNITS: 100 INJECTION, SOLUTION INTRAVENOUS; SUBCUTANEOUS at 04:04

## 2022-04-08 RX ADMIN — INSULIN ASPART 4 UNITS: 100 INJECTION, SOLUTION INTRAVENOUS; SUBCUTANEOUS at 09:04

## 2022-04-08 RX ADMIN — DULOXETINE 90 MG: 30 CAPSULE, DELAYED RELEASE ORAL at 09:04

## 2022-04-08 RX ADMIN — PREGABALIN 150 MG: 75 CAPSULE ORAL at 09:04

## 2022-04-08 RX ADMIN — GADOBUTROL 6 ML: 604.72 INJECTION INTRAVENOUS at 03:04

## 2022-04-08 RX ADMIN — NICOTINE 1 PATCH: 21 PATCH, EXTENDED RELEASE TRANSDERMAL at 09:04

## 2022-04-08 RX ADMIN — LIDOCAINE 1 PATCH: 50 PATCH CUTANEOUS at 09:04

## 2022-04-08 RX ADMIN — TOPIRAMATE 50 MG: 25 TABLET, FILM COATED ORAL at 09:04

## 2022-04-08 RX ADMIN — QUETIAPINE FUMARATE 300 MG: 100 TABLET ORAL at 09:04

## 2022-04-08 RX ADMIN — ZONISAMIDE 100 MG: 100 CAPSULE ORAL at 09:04

## 2022-04-08 RX ADMIN — HYDROCODONE BITARTRATE AND ACETAMINOPHEN 1 TABLET: 5; 325 TABLET ORAL at 09:04

## 2022-04-08 RX ADMIN — CLOPIDOGREL 75 MG: 75 TABLET, FILM COATED ORAL at 09:04

## 2022-04-08 RX ADMIN — INSULIN ASPART 6 UNITS: 100 INJECTION, SOLUTION INTRAVENOUS; SUBCUTANEOUS at 12:04

## 2022-04-08 RX ADMIN — ASPIRIN 81 MG CHEWABLE TABLET 81 MG: 81 TABLET CHEWABLE at 09:04

## 2022-04-08 RX ADMIN — HYDROCODONE BITARTRATE AND ACETAMINOPHEN 1 TABLET: 5; 325 TABLET ORAL at 05:04

## 2022-04-08 RX ADMIN — VERAPAMIL HYDROCHLORIDE 180 MG: 180 TABLET ORAL at 09:04

## 2022-04-08 RX ADMIN — SODIUM CHLORIDE, SODIUM LACTATE, POTASSIUM CHLORIDE, AND CALCIUM CHLORIDE 1000 ML: .6; .31; .03; .02 INJECTION, SOLUTION INTRAVENOUS at 09:04

## 2022-04-08 RX ADMIN — ATORVASTATIN CALCIUM 40 MG: 40 TABLET, FILM COATED ORAL at 09:04

## 2022-04-08 NOTE — PLAN OF CARE
NEW faxed referral to [facility]. NEW will follow.        04/08/22 2621   Post-Acute Status   Post-Acute Authorization Placement

## 2022-04-08 NOTE — PLAN OF CARE
Problem: Adjustment to Illness (Stroke, Ischemic/Transient Ischemic Attack)  Goal: Optimal Coping  Outcome: Ongoing, Progressing     Problem: Bowel Elimination Impaired (Stroke, Ischemic/Transient Ischemic Attack)  Goal: Effective Bowel Elimination  Outcome: Ongoing, Progressing     Problem: Cognitive Impairment (Stroke, Ischemic/Transient Ischemic Attack)  Goal: Optimal Cognitive Function  Outcome: Ongoing, Progressing     Problem: Diabetes Comorbidity  Goal: Blood Glucose Level Within Targeted Range  Outcome: Ongoing, Progressing     Problem: Fall Injury Risk  Goal: Absence of Fall and Fall-Related Injury  Outcome: Ongoing, Progressing

## 2022-04-08 NOTE — PT/OT/SLP PROGRESS
Occupational Therapy  Visit Attempt     Patient Name:  Chayito Chung   MRN:  [id number]    Patient not seen today secondary to Testing/imaging (xray/CT/MRI). Will follow-up as available.    4/8/2022

## 2022-04-08 NOTE — PT/OT/SLP PROGRESS
Occupational Therapy  Visit Attempt     Patient Name:  Chayito Chung   MRN:  [id number]    Patient not seen today secondary to Other (Comment) (pt remains ALEC in testing- MRI at this time). Will follow-up as available.    4/8/2022

## 2022-04-08 NOTE — PT/OT/SLP PROGRESS
Physical Therapy      Patient Name:  Chayito Chung   MRN:  [id number]    Patient not seen today secondary to off unit for MRI. Will follow-up next tx date.

## 2022-04-08 NOTE — TELEPHONE ENCOUNTER
CAYDEN Khan You sent a message on 04/06/2022. I called the patient on 04/06/2022 the patient did not answer so I left a voicemail and made a chart note. I also printed the call back note to make sure I wouldn't forget. I am the one who spoke to the patient and advised her to go to the hospital. I have been keeping eye out for an appointment. We are currently reviewing our schedule for placement. Once an appointment time has been obtained I will give the patient a call. Thank you.       ----- Message from Ben Steel sent at 4/8/2022  3:35 PM CDT -----  Regarding: FW: Hosp f/u  Good afternoon. This is a second attempt. Could you please give an update as to if Ms. Chung will be able to get a sooner appointment for a hospital follow up. I do see that my original message is marked done but the pt's appointment has not been scheduled for a sooner hospital f/u appointment. Please advise so that the discharge nurse can be updated. Thank you.  ----- Message -----  From: Ben Steel  Sent: 4/6/2022   2:12 PM CDT  To: Blaine Barnes V Staff  Subject: Hosp f/u                                         Patient is preparing for discharge from [facility] and is requiring a hospital follow up  appointment with Dr. Valladares within 7 days. I'm unable to schedule an appointment within that time frame. The pt is current scheduled with Dr. Valladares on 6/16/22 but needs a sooner. If possible, can you please assist with scheduling this patient and message me back?    DX CVA (cerebral vascular accident)    Thank you,     Ben Steel  Access Navigator/ [facility]

## 2022-04-08 NOTE — PLAN OF CARE
Discharge plans discussed with pt and her sister Pura [phone] at her bedside.  We discussed SNF placement.  Pt and Pura say pt has 8 siblings and 2 children.  They would all be able to assist pt at home.  They would prefer home with home health and not SNF.      12:10 After speaking with therapy, I called Iris with [facility].  She says pt has not been denied for IPR yet but will need to be sent to medical review.  I requested that Iris move forward with sending to medical review.  She says pt is not meeting the criteria of needing to be seen by MD 3 times per week.  CM will continue to follow.       Future Appointments   Date Time Provider Department Center   4/19/2022 11:00 AM Mey Cano [facility] SMOKE [facility]   4/20/2022  9:00 AM Randell Mtz MD [facility] CARDIO [facility]   5/19/2022  9:00 AM Fritz Cadena MD [facility] NEURO [facility]ending [facility] to [facility]   6/16/2022  9:40 AM Cameron Valladares III, MD [facility]        04/08/22 1158   Post-Acute Status   Post-Acute Authorization Home Health     Kunal Hagen RN,   [phone]

## 2022-04-08 NOTE — PROGRESS NOTES
Patient A&Ox4. Patient Blood pressure have been low writer has held all BP medication for day shift. Patient Neuro checks have not been deviating from baseline. Patient received partial MRI today, and received remaining imaging tomorrow. Patient is not in any distress, writer will continue to monitor patients status.

## 2022-04-08 NOTE — PROGRESS NOTES
04/08/2022 @ 11:27AM- RSW attempted to meet with patient for a follow-up visit, RSW unable to complete visit due to medical staff present in patient room. RSW will follow up with patient at a later time.      04/08/2022 @ 1:40PM- RSW met with patient to discuss resources, discharge, and additional patient barriers to care. Pt asked RSW about information for Meal-on-Wheels. RSW referred pt to [facility] for Meals-on-Wheels, per [facility] a representative will reach out to pt to enroll pt with [employer]-Whe[employer]. Pt expressed understanding of [facility] referral. Per pt, pt is not in need of additional resources at this time.    The following were addressed during this visit:  -Complete follow-up with patient    NILAY Christy

## 2022-04-08 NOTE — PROGRESS NOTES
[facility] Medicine Progress Note    Primary Team: \[facility]\"" Hospitalist Team B  Attending Physician: Tanvir Snyder MD  Resident: Everardo  Intern: [facility] Day: 2 days    Chief Complaint: Right-sided weakness noted at ~14:00 on 2022    Subjective:      Patient seen and examined by me this morning. She still complains of neck pain, which was not improved with muscle relaxers.  She denies any other complaints including fever, chills, dizzness, CP, palpitations, SOB, cough, n/v, abdominal pain, or diarrhea.    Objective:   Last 24 Hour Vital Signs:  BP  Min: 96/55  Max: 135/68  Temp  Av.5 °F (36.4 °C)  Min: 96.8 °F (36 °C)  Max: 98 °F (36.7 °C)  Pulse  Av.8  Min: 66  Max: 117  Resp  Av.9  Min: 16  Max: 20  SpO2  Av %  Min: 92 %  Max: 98 %  Weight  Av.3 kg (130 lb 11.7 oz)  Min: 59.3 kg (130 lb 11.7 oz)  Max: 59.3 kg (130 lb 11.7 oz)    Intake/Output Summary (Last 24 hours) at 2022 0738  Last data filed at 2022 0700  Gross per 24 hour   Intake 125 ml   Output 1600 ml   Net -1475 ml      Physical Examination:  Physical Exam   Constitutional: No distress.   HENT:   Head: Normocephalic and atraumatic.   Right Ear: External ear normal.   Left Ear: External ear normal.   Nose: Nose normal. No rhinorrhea or nasal congestion.   Mouth/Throat: Mucous membranes are moist. No oropharyngeal exudate or posterior oropharyngeal erythema. Oropharynx is clear.   Eyes: Pupils are equal, round, and reactive to light. Conjunctivae are normal. Right eye exhibits no discharge. Left eye exhibits no discharge. No scleral icterus.   Cardiovascular: Normal rate, regular rhythm, normal heart sounds and normal pulses. Exam reveals no gallop and no friction rub.   No murmur heard.  Pulmonary/Chest: Effort normal and breath sounds normal. No respiratory distress. She has no wheezes. She has no rhonchi. She has no rales.   Abdominal: Soft. Bowel sounds are normal. She exhibits no distension. There is  no abdominal tenderness. There is no rebound and no guarding.   Musculoskeletal:         General: No swelling. Normal range of motion.      Cervical back: Normal range of motion and neck supple. No rigidity.      Right lower leg: No edema.      Left lower leg: No edema.   Neurological: She is alert. She has intact cranial nerves. She displays weakness. GCS eye subscore is 4. GCS verbal subscore is 5. GCS motor subscore is 6.   RUE proximal muscle strength 4/5, LUE proximal muscle strength 5/5  RUE hand  strength 3/5, LUE hand  strength 5/5  RLE proximal muscle strength 4/5, LLE proximal muscle strength 5/5   Skin: Skin is warm and dry.   Nursing note and vitals reviewed.  Laboratory:  Recent Labs   Lab 04/06/22  0619 04/07/22  0346 04/08/22  0438   WBC 4.78 4.62 4.12   HGB 10.6* 10.4* 10.5*   HCT 33.3* 32.9* 33.9*    259 256   MCV 89 88 88   RDW 15.0* 14.7* 14.6*    137 140   K 3.8 3.5 3.9    106 107   CO2 21* 23 22*   BUN 11 12 12   CREATININE 0.7 0.7 0.7   * 222* 188*   PROT 6.7 6.7 6.1   ALBUMIN 3.2* 3.3* 3.1*   BILITOT 0.3 0.3 0.4   AST 21 14 12   ALKPHOS 130 124 127   ALT 14 14 17     I have personally reviewed the above laboratory studies.     Imaging:  -reviewed     Current Medications:     Scheduled:   aspirin  81 mg Oral Daily    atorvastatin  40 mg Oral Daily    clopidogreL  75 mg Oral Daily    DULoxetine  90 mg Oral Daily    enoxaparin  40 mg Subcutaneous Daily    hydroCHLOROthiazide  12.5 mg Oral Daily    insulin detemir U-100  30 Units Subcutaneous Daily    losartan  50 mg Oral Daily    nicotine  1 patch Transdermal Daily    pregabalin  150 mg Oral BID    QUEtiapine  300 mg Oral QHS    topiramate  50 mg Oral BID    verapamiL  180 mg Oral Nightly    zonisamide  100 mg Oral Daily         Infusions:         Assessment:     Chayito Chung is a 71 y.o. female with a PMHx of HTN, HLD, T2DM, COPD, seizures, migraines, history of colon cancer, major depressive  disorder, and generalized anxiety disorder presenting with right-sided weakness noted upon waking at 14:00 on 4/5 with an unknown last known normal.  Upon arrival to the ED, she was stroke activated and underwent CTH, which was negative for acute infarct, hemorrhage, or mass.  Initial workup was significant for an elevated CK at 1400.  Rhabdo improved with IVF.  CTA Head and Neck, MRI-Brain completed; both showed subacute infarct in right YUDITH distribution.  Now planning for MRI of C-, T-, and L-spine as she does have a history of falls and malignancy and her right-sided weakness is incongruent with imaging findings.  Will need SNF placement.    Plan:     Cerebrovascular Accident with acute right-sided weakness  -woke up around 14:00 on 4/5 with weakness in her upper and lower extremities; presented to ED around 17:00 with no clear last known normal  -Code Stroke activated upon arrival to ED; Vascular Neurology also consulted  -CBC with slight normocytic anemia, CMP with hyperglycemia and chronically low bicarbonate  -TSH, PT/INR, troponin I all WNL  -Lipid panel with LDL 82, HDL 49, TGs 115, total cholesterol 154  -CTH negative for acute infarct, hemorrhage, or mass  -EKG with normal sinus rhythm  -Plavix-loaded in ED, already on ASA 81mg daily at home  -CXR done  -CTA Head and Neck completed, showed late acute/early chronic infarct in R YUDITH distribution, pulmonary nodules  -TTE with Bubble Study completed, showed LVEF of 60% with grade I diastolic dysfunction and no evidence of intracardiac shunting  -MRI-Brain w/wo contrast showed small subacute right frontal cortical infarct with suggested small-volume petechial hemorrhage, as well as previous CVAs  -cleared for diet by SLP  -PT/OT recommending IPR vs SNF; currently pursuing SNF placement  -telemetry ordered  -continuing atorvastatin 40mg daily  -continuing Plavix 75mg daily for duration of three weeks  -continuing ASA 81mg daily    Headache and Neck  Pain  -patient complaining of bifrontal headache since awakening on 4/5  -no meningeal signs on exam, CTH and MRI negative for mass, significant hemorrhage, or meningeal enhancement  -HA improved today, however patient complaining of neck pain that has been present for months  -tried cyclobenzaprine 10mg as one-time dose, but did not help  -will try Lidocaine patches today  -Tylenol PRN ordered      Traumatic Rhabdomyolysis, resolved  -CPK at 1408 on presentation, improved to 811 on 4/6 and 444 on 4/7  -stopped IVF as patient is tolerating PO     Essential Hypertension  -home regimen includes HCTZ 12.5mg daily, losartan 100mg daily, and and verapamil 180mg daily  -home HCTZ restarted; home losartan restarted but at 50mg  -home verapamil 180mg restarted     Hyperlipidemia  -patient on pravastatin 20mg daily at home  -lipid panel done on arrival showing overall good control with LDL slightly above goal at 82  -continuing high-intensity statin therapy with atorvastatin 40mg daily     Chronic Back Pain  -patient reports long-standing history of lower back pain  -continuing home Norco PRN  -given history of malignancy and smoking history, as well as incongruence of symptoms with imaging, will obtain MRI of entire spine     Type II Diabetes Mellitus, complicated by peripheral neuropathy  -last A1c in 12/2021 was 7.3%; repeat A1c on admission 10.3%  -home regimen includes metformin 1000mg BID; holding  -continuing atorvastatin 40mg daily  -continuing home Lyrica 150mg BID  -increasing detemir to 30U today  -SSI in house     COPD, Stage Unknown  -home regimen includes albuterol, Duo-Nebs, and Trelegy  -not on home oxygen; SpO2 at goal while on room air during initial evaluation  -currently on low-flow NC for comfort at ~2L  -currently smoking tobacco, trying to quit  -Duo-Nebs ordered PRN     Seizure Disorder  -on topiramate 50mg BID, zonisamide 200mg BID at home  -EEG obtained, was WNL     Migraines  -continuing home  topiramate 50mg BID     Depression  -home regimen includes Cymbalta 90mg daily, Seroquel 300mg qhs  -continuing home Cymbalta, Seroquel     Anxiety   -home regimen includes Cymbalta 90mg daily, Seroquel 300mg qhs, and Xanax 0.5mg BID PRN  -holding home Seroquel, Xanax  -continuing home Cymbalta     Health Care Maintenance  - Screening tests needed: A1c, colonoscopy (due 7/2022)     Ppx: Lovenox  Diet: Cardiac Diabetic  Code: Full     Disposition: pending MRI of spine, SNF placement    Sundeep Frank M.D.  [facility] Internal Medicine PGY-1    [facility] Medicine Hospitalist Pager numbers:   [facility] Hospitalist Medicine Team A (Bita/Sydni): [phone]  [facility] Hospitalist Medicine Team B (Lillian/Danny):  [phone]

## 2022-04-08 NOTE — PROGRESS NOTES
Patient has complaint of insulin censor getting thrown away by MRI tech. Writer made Charge Nurse Tammi and Hunter aware. Nurse supervisor Cassie will try and obtain censor for the patient.

## 2022-04-09 LAB
ALBUMIN SERPL BCP-MCNC: 3.1 G/DL (ref 3.5–5.2)
ALP SERPL-CCNC: 125 U/L (ref 55–135)
ALT SERPL W/O P-5'-P-CCNC: 16 U/L (ref 10–44)
ANION GAP SERPL CALC-SCNC: 11 MMOL/L (ref 8–16)
AST SERPL-CCNC: 12 U/L (ref 10–40)
BASOPHILS # BLD AUTO: 0.02 K/UL (ref 0–0.2)
BASOPHILS NFR BLD: 0.4 % (ref 0–1.9)
BILIRUB SERPL-MCNC: 0.3 MG/DL (ref 0.1–1)
BUN SERPL-MCNC: 13 MG/DL (ref 8–23)
CALCIUM SERPL-MCNC: 8.2 MG/DL (ref 8.7–10.5)
CHLORIDE SERPL-SCNC: 109 MMOL/L (ref 95–110)
CO2 SERPL-SCNC: 21 MMOL/L (ref 23–29)
CREAT SERPL-MCNC: 0.7 MG/DL (ref 0.5–1.4)
DIFFERENTIAL METHOD: ABNORMAL
EOSINOPHIL # BLD AUTO: 0.1 K/UL (ref 0–0.5)
EOSINOPHIL NFR BLD: 2.6 % (ref 0–8)
ERYTHROCYTE [DISTWIDTH] IN BLOOD BY AUTOMATED COUNT: 14.5 % (ref 11.5–14.5)
EST. GFR  (AFRICAN AMERICAN): >60 ML/MIN/1.73 M^2
EST. GFR  (NON AFRICAN AMERICAN): >60 ML/MIN/1.73 M^2
GLUCOSE SERPL-MCNC: 151 MG/DL (ref 70–110)
HCT VFR BLD AUTO: 34.2 % (ref 37–48.5)
HGB BLD-MCNC: 10.3 G/DL (ref 12–16)
IMM GRANULOCYTES # BLD AUTO: 0.01 K/UL (ref 0–0.04)
IMM GRANULOCYTES NFR BLD AUTO: 0.2 % (ref 0–0.5)
LYMPHOCYTES # BLD AUTO: 1.5 K/UL (ref 1–4.8)
LYMPHOCYTES NFR BLD: 32.6 % (ref 18–48)
MCH RBC QN AUTO: 27.3 PG (ref 27–31)
MCHC RBC AUTO-ENTMCNC: 30.1 G/DL (ref 32–36)
MCV RBC AUTO: 91 FL (ref 82–98)
MONOCYTES # BLD AUTO: 0.4 K/UL (ref 0.3–1)
MONOCYTES NFR BLD: 9.5 % (ref 4–15)
NEUTROPHILS # BLD AUTO: 2.5 K/UL (ref 1.8–7.7)
NEUTROPHILS NFR BLD: 54.7 % (ref 38–73)
NRBC BLD-RTO: 0 /100 WBC
PLATELET # BLD AUTO: 232 K/UL (ref 150–450)
PMV BLD AUTO: 9.1 FL (ref 9.2–12.9)
POCT GLUCOSE: 165 MG/DL (ref 70–110)
POCT GLUCOSE: 345 MG/DL (ref 70–110)
POTASSIUM SERPL-SCNC: 3.8 MMOL/L (ref 3.5–5.1)
PROT SERPL-MCNC: 6.6 G/DL (ref 6–8.4)
RBC # BLD AUTO: 3.77 M/UL (ref 4–5.4)
SODIUM SERPL-SCNC: 141 MMOL/L (ref 136–145)
WBC # BLD AUTO: 4.63 K/UL (ref 3.9–12.7)

## 2022-04-09 PROCEDURE — 36415 COLL VENOUS BLD VENIPUNCTURE: CPT | Performed by: STUDENT IN AN ORGANIZED HEALTH CARE EDUCATION/TRAINING PROGRAM

## 2022-04-09 PROCEDURE — 97530 THERAPEUTIC ACTIVITIES: CPT

## 2022-04-09 PROCEDURE — 25000003 PHARM REV CODE 250: Performed by: STUDENT IN AN ORGANIZED HEALTH CARE EDUCATION/TRAINING PROGRAM

## 2022-04-09 PROCEDURE — S4991 NICOTINE PATCH NONLEGEND: HCPCS | Performed by: STUDENT IN AN ORGANIZED HEALTH CARE EDUCATION/TRAINING PROGRAM

## 2022-04-09 PROCEDURE — 11000001 HC ACUTE MED/SURG PRIVATE ROOM

## 2022-04-09 PROCEDURE — 97535 SELF CARE MNGMENT TRAINING: CPT

## 2022-04-09 PROCEDURE — 63600175 PHARM REV CODE 636 W HCPCS: Performed by: STUDENT IN AN ORGANIZED HEALTH CARE EDUCATION/TRAINING PROGRAM

## 2022-04-09 PROCEDURE — C9399 UNCLASSIFIED DRUGS OR BIOLOG: HCPCS | Performed by: STUDENT IN AN ORGANIZED HEALTH CARE EDUCATION/TRAINING PROGRAM

## 2022-04-09 PROCEDURE — 94761 N-INVAS EAR/PLS OXIMETRY MLT: CPT

## 2022-04-09 PROCEDURE — 25500020 PHARM REV CODE 255: Performed by: INTERNAL MEDICINE

## 2022-04-09 PROCEDURE — 27000221 HC OXYGEN, UP TO 24 HOURS

## 2022-04-09 PROCEDURE — A9585 GADOBUTROL INJECTION: HCPCS | Performed by: INTERNAL MEDICINE

## 2022-04-09 PROCEDURE — 80053 COMPREHEN METABOLIC PANEL: CPT | Performed by: STUDENT IN AN ORGANIZED HEALTH CARE EDUCATION/TRAINING PROGRAM

## 2022-04-09 PROCEDURE — 99900035 HC TECH TIME PER 15 MIN (STAT)

## 2022-04-09 PROCEDURE — 85025 COMPLETE CBC W/AUTO DIFF WBC: CPT | Performed by: STUDENT IN AN ORGANIZED HEALTH CARE EDUCATION/TRAINING PROGRAM

## 2022-04-09 RX ORDER — GADOBUTROL 604.72 MG/ML
6 INJECTION INTRAVENOUS
Status: COMPLETED | OUTPATIENT
Start: 2022-04-09 | End: 2022-04-09

## 2022-04-09 RX ADMIN — LOSARTAN POTASSIUM 50 MG: 50 TABLET, FILM COATED ORAL at 09:04

## 2022-04-09 RX ADMIN — PREGABALIN 150 MG: 75 CAPSULE ORAL at 09:04

## 2022-04-09 RX ADMIN — NICOTINE 1 PATCH: 21 PATCH, EXTENDED RELEASE TRANSDERMAL at 09:04

## 2022-04-09 RX ADMIN — GADOBUTROL 6 ML: 604.72 INJECTION INTRAVENOUS at 04:04

## 2022-04-09 RX ADMIN — ASPIRIN 81 MG CHEWABLE TABLET 81 MG: 81 TABLET CHEWABLE at 09:04

## 2022-04-09 RX ADMIN — TOPIRAMATE 50 MG: 25 TABLET, FILM COATED ORAL at 10:04

## 2022-04-09 RX ADMIN — ZONISAMIDE 100 MG: 100 CAPSULE ORAL at 09:04

## 2022-04-09 RX ADMIN — LIDOCAINE 1 PATCH: 50 PATCH CUTANEOUS at 09:04

## 2022-04-09 RX ADMIN — HYDROCHLOROTHIAZIDE 12.5 MG: 12.5 TABLET ORAL at 09:04

## 2022-04-09 RX ADMIN — INSULIN DETEMIR 35 UNITS: 100 INJECTION, SOLUTION SUBCUTANEOUS at 10:04

## 2022-04-09 RX ADMIN — QUETIAPINE FUMARATE 300 MG: 100 TABLET ORAL at 08:04

## 2022-04-09 RX ADMIN — CLOPIDOGREL 75 MG: 75 TABLET, FILM COATED ORAL at 09:04

## 2022-04-09 RX ADMIN — DULOXETINE 90 MG: 30 CAPSULE, DELAYED RELEASE ORAL at 09:04

## 2022-04-09 RX ADMIN — HYDROCODONE BITARTRATE AND ACETAMINOPHEN 1 TABLET: 5; 325 TABLET ORAL at 04:04

## 2022-04-09 RX ADMIN — ENOXAPARIN SODIUM 40 MG: 100 INJECTION SUBCUTANEOUS at 04:04

## 2022-04-09 RX ADMIN — INSULIN ASPART 4 UNITS: 100 INJECTION, SOLUTION INTRAVENOUS; SUBCUTANEOUS at 08:04

## 2022-04-09 RX ADMIN — TOPIRAMATE 50 MG: 25 TABLET, FILM COATED ORAL at 08:04

## 2022-04-09 RX ADMIN — VERAPAMIL HYDROCHLORIDE 180 MG: 180 TABLET ORAL at 08:04

## 2022-04-09 RX ADMIN — PREGABALIN 150 MG: 75 CAPSULE ORAL at 08:04

## 2022-04-09 RX ADMIN — ATORVASTATIN CALCIUM 40 MG: 40 TABLET, FILM COATED ORAL at 09:04

## 2022-04-09 NOTE — PLAN OF CARE
Problem: Occupational Therapy  Goal: Occupational Therapy Goal  Description: Goals to be met by: 5/6/22     Patient will increase functional independence with ADLs by performing:    UE Dressing with Modified [place].  LE Dressing with Modified [place].  Grooming while standing with Modified [place].  Toileting from toilet with Modified [place] for hygiene and clothing management.   Supine to sit with Modified [place].  Step transfer with Modified [place]  Toilet transfer to toilet with Modified [place].  Increased functional strength to WFL for self care skills and functional mobility.  Upper extremity exercise program x10 reps per handout, with independence.    Outcome: Ongoing, Progressing   Pt found in SF & agreeable to OT this date.  Pt on O2 at 2L & perf the following:  -sup-->EOB via bed rail use w/ SBA  -standing via RW w/ Min A for fxnl mobility w/in room w/ CGA for balance, Min A for DME mgmt & Mod vc's throughout for RW proximity/gait sequencing ; required signif increased time   -G/H standing at sink w/ CGA for standing safety & heavy WBing through B elbows for entirety of tasks; post LOB x 2 when attempting to return to upright stance via placement of B hands on RW ; required Min-Mod A for recovery  -t/f via RW-->b/s chair w/ vc's throughout for safe positioning/proper UE placement & Min A for controlled descent  -standing via RW w/ CGA for doffing brief w/ Mod A & ayden hygiene w/ Min-Mod A for dynamic balance  Edu/tx re: general safety techs, HEP & S/S CVA. Pt verbalized understanding.  Pt left UIC w/ alarm & nsg made aware.     There is expectation of returning to prior level of function to maintain independence avoiding readmission.      Pt's clinical condition meets full inpatient rehab criteria. Inpatient rehab will provide to total interdisciplinary treatment approach needed. Pt is at high risk of unplanned readmission due to fall risk and lack of 24 hour  caregiver in prior setting.       Pt is able to tolerate 3 hours of daily therapy. Pt is pleasant and motivated to return to prior level of function.       Pt presents w/ decreased overall endurance/conditioning, balance/mobility & coordination w/ subsequent decline in (I)/safety w/ BADLs, fxnl mobility & fxnl t/f's.  OT 5x/wk to increase phys/fxnl status & maximize potential to achieve established goals for d/c-->IPR.

## 2022-04-09 NOTE — PROGRESS NOTES
[facility] Medicine Progress Note    Primary Team: [facility] Hospitalist Team B  Attending Physician: Tanvir Snyder MD  Resident: Everardo  Intern: [facility] Day: 3 days    Chief Complaint: Right-sided weakness noted at ~14:00 on 2022    Subjective:      Patient seen and examined by me this morning. She reports doing well overall with improved strength and decreasing numbness to her right leg.  She denies any other complaints including fever, chills, dizzness, CP, palpitations, SOB, cough, n/v, abdominal pain, or diarrhea.    Objective:   Last 24 Hour Vital Signs:  BP  Min: 96/55  Max: 139/77  Temp  Av.4 °F (36.3 °C)  Min: 96.7 °F (35.9 °C)  Max: 98 °F (36.7 °C)  Pulse  Av.9  Min: 85  Max: 104  Resp  Av.8  Min: 18  Max: 20  SpO2  Av.2 %  Min: 92 %  Max: 98 %    Intake/Output Summary (Last 24 hours) at 2022 0731  Last data filed at 2022 1200  Gross per 24 hour   Intake --   Output 600 ml   Net -600 ml      Physical Examination:  Physical Exam   Constitutional: No distress.   HENT:   Head: Normocephalic and atraumatic.   Right Ear: External ear normal.   Left Ear: External ear normal.   Nose: Nose normal. No rhinorrhea or nasal congestion.   Mouth/Throat: Mucous membranes are moist. No oropharyngeal exudate or posterior oropharyngeal erythema. Oropharynx is clear.   Eyes: Pupils are equal, round, and reactive to light. Conjunctivae are normal. Right eye exhibits no discharge. Left eye exhibits no discharge. No scleral icterus.   Cardiovascular: Normal rate, regular rhythm, normal heart sounds and normal pulses. Exam reveals no gallop and no friction rub.   No murmur heard.  Pulmonary/Chest: Effort normal and breath sounds normal. No respiratory distress. She has no wheezes. She has no rhonchi. She has no rales.   Abdominal: Soft. Bowel sounds are normal. She exhibits no distension. There is no abdominal tenderness. There is no rebound and no guarding.   Musculoskeletal:          General: No swelling. Normal range of motion.      Cervical back: Normal range of motion and neck supple. No rigidity.      Right lower leg: No edema.      Left lower leg: No edema.   Neurological: She is alert. She has intact cranial nerves. She displays weakness. GCS eye subscore is 4. GCS verbal subscore is 5. GCS motor subscore is 6.   RUE proximal muscle strength 4/5, LUE proximal muscle strength 5/5  RUE hand  strength 3/5, LUE hand  strength 5/5  RLE proximal muscle strength 4/5, LLE proximal muscle strength 5/5   Skin: Skin is warm and dry.   Nursing note and vitals reviewed.  Laboratory:  Recent Labs   Lab 04/07/22  0346 04/08/22  0438 04/09/22  0508   WBC 4.62 4.12 4.63   HGB 10.4* 10.5* 10.3*   HCT 32.9* 33.9* 34.2*    256 232   MCV 88 88 91   RDW 14.7* 14.6* 14.5    140 141   K 3.5 3.9 3.8    107 109   CO2 23 22* 21*   BUN 12 12 13   CREATININE 0.7 0.7 0.7   * 188* 151*   PROT 6.7 6.1 6.6   ALBUMIN 3.3* 3.1* 3.1*   BILITOT 0.3 0.4 0.3   AST 14 12 12   ALKPHOS 124 127 125   ALT 14 17 16     I have personally reviewed the above laboratory studies.     Imaging:  -reviewed     Current Medications:     Scheduled:   aspirin  81 mg Oral Daily    atorvastatin  40 mg Oral Daily    clopidogreL  75 mg Oral Daily    DULoxetine  90 mg Oral Daily    enoxaparin  40 mg Subcutaneous Daily    hydroCHLOROthiazide  12.5 mg Oral Daily    insulin detemir U-100  35 Units Subcutaneous Daily    LIDOcaine  1 patch Transdermal Q24H    losartan  50 mg Oral Daily    nicotine  1 patch Transdermal Daily    pregabalin  150 mg Oral BID    QUEtiapine  300 mg Oral QHS    topiramate  50 mg Oral BID    verapamiL  180 mg Oral Nightly    zonisamide  100 mg Oral Daily         Infusions:         Assessment:     Chayito Chung is a 71 y.o. female with a PMHx of HTN, HLD, T2DM, COPD, seizures, migraines, history of colon cancer, major depressive disorder, and generalized anxiety disorder  presenting with right-sided weakness noted upon waking at 14:00 on 4/5 with an unknown last known normal.  Upon arrival to the ED, she was stroke activated and underwent CTH, which was negative for acute infarct, hemorrhage, or mass.  Initial workup was significant for an elevated CK at 1400.  Rhabdo improved with IVF.  CTA Head and Neck, MRI-Brain completed; both showed subacute infarct in right YUDITH distribution.  MRI of L- and C-Spine completed.  Patient now pending MRI of T-Spine.  Pending MRI results, she can be discharged home with home health PT/OT.    Plan:     Subacute Cerebrovascular Accident with acute right-sided weakness  -woke up around 14:00 on 4/5 with weakness in her upper and lower extremities; presented to ED around 17:00 with no clear last known normal  -Code Stroke activated upon arrival to ED; Vascular Neurology also consulted  -CBC with slight normocytic anemia, CMP with hyperglycemia and chronically low bicarbonate  -TSH, PT/INR, troponin I all WNL  -Lipid panel with LDL 82, HDL 49, TGs 115, total cholesterol 154  -CTH negative for acute infarct, hemorrhage, or mass  -EKG with normal sinus rhythm  -Plavix-loaded in ED, already on ASA 81mg daily at home  -CTA Head and Neck completed, showed late acute/early chronic infarct in R YUDITH distribution, pulmonary nodules  -TTE with Bubble Study completed, showed LVEF of 60% with grade I diastolic dysfunction and no evidence of intracardiac shunting  -MRI-Brain w/wo contrast showed small subacute right frontal cortical infarct with suggested small-volume petechial hemorrhage, as well as previous CVAs  -MRI-Cervical, -Lumbar Spine with no evidence of acute pathology that could cause her symptoms  -MRI-Thoracic Spine pending  -cleared for diet by SLP  -PT/OT recommending IPR vs SNF; currently pursuing HH with home PT/OT  -will need OP Neurology follow-up  -telemetry ordered  -continuing atorvastatin 40mg daily  -continuing Plavix 75mg daily for duration  of three weeks  -continuing ASA 81mg daily    Headache and Neck Pain  -patient complaining of bifrontal headache since awakening on 4/5  -no meningeal signs on exam, CTH and MRI negative for mass, significant hemorrhage, or meningeal enhancement  -HA improved today, however patient complaining of neck pain that has been present for months  -tried cyclobenzaprine 10mg as one-time dose, but did not help  -Lidocaine patches relieved neck pain; will prescribe at discharge  -Tylenol PRN ordered      Traumatic Rhabdomyolysis, resolved  -CPK at 1408 on presentation, improved to 811 on 4/6 and 444 on 4/7  -stopped IVF as patient is tolerating PO     Essential Hypertension  -home regimen includes HCTZ 12.5mg daily, losartan 100mg daily, and and verapamil 180mg daily  -home HCTZ restarted; home losartan restarted but at 50mg  -home verapamil 180mg restarted     Hyperlipidemia  -patient on pravastatin 20mg daily at home  -lipid panel done on arrival showing overall good control with LDL slightly above goal at 82  -continuing high-intensity statin therapy with atorvastatin 40mg daily     Chronic Back Pain  -patient reports long-standing history of lower back pain  -continuing home Norco PRN  -given history of malignancy and smoking history, as well as incongruence of symptoms with imaging, will obtain MRI of entire spine     Type II Diabetes Mellitus, complicated by peripheral neuropathy  -last A1c in 12/2021 was 7.3%; repeat A1c on admission 10.3%  -home regimen includes metformin 1000mg BID; holding  -continuing atorvastatin 40mg daily  -continuing home Lyrica 150mg BID  -increasing detemir to 35U today  -SSI in house     COPD, Stage Unknown  -home regimen includes albuterol, Duo-Nebs, and Trelegy  -not on home oxygen; SpO2 at goal while on room air during initial evaluation  -currently on low-flow NC for comfort at ~2L  -currently smoking tobacco, trying to quit  -Duo-Nebs ordered PRN     Seizure Disorder  -on topiramate  50mg BID, zonisamide 200mg BID at home  -EEG obtained, was WNL     Migraines  -continuing home topiramate 50mg BID     Depression  -home regimen includes Cymbalta 90mg daily, Seroquel 300mg qhs  -continuing home Cymbalta, Seroquel     Anxiety   -home regimen includes Cymbalta 90mg daily, Seroquel 300mg qhs, and Xanax 0.5mg BID PRN  -holding home Seroquel, Xanax  -continuing home Cymbalta     Health Care Maintenance  - Screening tests needed: A1c, colonoscopy (due 7/2022)     Ppx: Lovenox  Diet: Cardiac Diabetic  Code: Full     Disposition: pending MRI of T-spine; if MRI completed, can be discharged home with home health PT/OT    Sundeep Frank M.D.  [facility] Internal Medicine PGY-1    [facility] Medicine Hospitalist Pager numbers:   [facility] Hospitalist Medicine Team A (Bita/Sydni): [phone]  [facility] Hospitalist Medicine Team B (Lillian/Danny):  [phone]

## 2022-04-09 NOTE — PT/OT/SLP PROGRESS
Occupational Therapy   Treatment    Name: Chayito Chung  MRN: [id number]  Admitting Diagnosis:  Right leg weakness       Recommendations:     Discharge Recommendations: rehabilitation facility  Discharge Equipment Recommendations:   (TBD)  Barriers to discharge:  Decreased caregiver support    Assessment:    There is expectation of returning to prior level of function to maintain independence avoiding readmission.      Pt's clinical condition meets full inpatient rehab criteria. Inpatient rehab will provide to total interdisciplinary treatment approach needed. Pt is at high risk of unplanned readmission due to fall risk and lack of 24 hour caregiver in prior setting.       Pt is able to tolerate 3 hours of daily therapy. Pt is pleasant and motivated to return to prior level of function.     Pt presents w/ decreased overall endurance/conditioning, balance/mobility & coordination w/ subsequent decline in (I)/safety w/ BADLs, fxnl mobility & fxnl t/f's.  OT 5x/wk to increase phys/fxnl status & maximize potential to achieve established goals for d/c-->IPR    Chayito Chung is a 71 y.o. female with a medical diagnosis of Right leg weakness.  She presents with . Performance deficits affecting function are weakness, impaired endurance, impaired sensation, impaired self care skills, impaired balance, gait instability, impaired functional mobilty, decreased safety awareness, pain, decreased ROM, impaired cardiopulmonary response to activity, decreased upper extremity function, decreased lower extremity function.     Rehab Prognosis:  Good; patient would benefit from acute skilled OT services to address these deficits and reach maximum level of function.       Plan:     Patient to be seen 5 x/week to address the above listed problems via self-care/home management, therapeutic activities, therapeutic exercises, neuromuscular re-education  · Plan of Care Expires: 05/06/22  · Plan of Care Reviewed with: patient    Subjective  "    Pain/Comfort:  · Pain Rating 1:  ("its just sore")  · Location - Orientation 1: generalized  · Location 1:  (neck & back)  · Pain Addressed 1: Reposition, Distraction  · Pain Rating Post-Intervention 1:  ("still sore")    Objective:     Communicated with: nspraveen prior to session.  Patient found HOB elevated with bed alarm, telemetry, PureWick, peripheral IV, oxygen upon OT entry to room.    General Precautions: Standard, fall, respiratory   Orthopedic Precautions:N/A   Braces: N/A  Respiratory Status: Nasal cannula, flow 2 L/min     Occupational Performance:     Bed Mobility:    · Patient completed Supine to Sit with stand by assistance and with side rail     Functional Mobility/Transfers:  · Patient completed Sit <> Stand Transfer with minimum assistance  with  rolling walker   · Patient completed Bed <> Chair Transfer using Step Transfer technique with minimum assistance with rolling walker  · Functional Mobility: see tx note below    Activities of Daily Living:  · Grooming: minimum assistance and moderate assistance for standing balance      AMPA 6 Click ADL: 15    Treatment & Education:  Pt found in SF & agreeable to OT this date.  Pt on O2 at 2L & perf the following:  -sup-->EOB via bed rail use w/ SBA  -standing via RW w/ Min A for fxnl mobility w/in room w/ CGA for balance, Min A for DME mgmt & Mod vc's throughout for RW proximity/gait sequencing ; required signif increased time   -G/H standing at sink w/ CGA for standing safety & heavy WBing through B elbows for entirety of tasks; post LOB x 2 when attempting to return to upright stance via placement of B hands on RW ; required Min-Mod A for recovery  -t/f via RW-->b/s chair w/ vc's throughout for safe positioning/proper UE placement & Min A for controlled descent  -standing via RW w/ CGA for doffing brief w/ Mod A & ayden hygiene w/ Min-Mod A for dynamic balance  Edu/tx re: general safety techs, HEP & S/S CVA. Pt verbalized understanding.  Pt left UIC " w/ alarm & nsg made aware.     There is expectation of returning to prior level of function to maintain independence avoiding readmission.      Pt's clinical condition meets full inpatient rehab criteria. Inpatient rehab will provide to total interdisciplinary treatment approach needed. Pt is at high risk of unplanned readmission due to fall risk and lack of 24 hour caregiver in prior setting.       Pt is able to tolerate 3 hours of daily therapy. Pt is pleasant and motivated to return to prior level of function.         Patient left up in chair with all lines intact, call button in reach, bed alarm on, chair alarm on and nsg notifiedEducation:      GOALS:   Multidisciplinary Problems     Occupational Therapy Goals        Problem: Occupational Therapy    Goal Priority Disciplines Outcome Interventions   Occupational Therapy Goal     OT, PT/OT Ongoing, Progressing    Description: Goals to be met by: 5/6/22     Patient will increase functional independence with ADLs by performing:    UE Dressing with Modified [place].  LE Dressing with Modified [place].  Grooming while standing with Modified [place].  Toileting from toilet with Modified [place] for hygiene and clothing management.   Supine to sit with Modified [place].  Step transfer with Modified [place]  Toilet transfer to toilet with Modified [place].  Increased functional strength to WFL for self care skills and functional mobility.  Upper extremity exercise program x10 reps per handout, with independence.                     Time Tracking:     OT Date of Treatment: 04/09/22  OT Start Time: 1108  OT Stop Time: 1148  OT Total Time (min): 40 min    Billable Minutes:Self Care/Home Management 30  Therapeutic Activity 10  Total Time 40    OT/SHA: OT     SHA Visit Number: 0    4/9/2022

## 2022-04-09 NOTE — PROGRESS NOTES
The sw spoke to Maggie at [facility] [phone] on call. The sw informed her the pt's being d/c'd today and needs hh. The sw faxed her the pt's info and hh orders to her at [phone]. She states she will work on getting the pt a hh agency and she will leave a message for this sw and states the agency will call the pt to arrange a time for them to come out to admit her. She states they do not call the pt's to give them the name of the hh agency arranged with.     3:37 pm The sw just received a call from Maggie at [facility] and the pt has been staffed with [facility] and they will admit her tomorrow. If the pt doesn't d/c today the weekend Case Manger can call [facility] to notify them tomorrow she didn't d/c today.

## 2022-04-09 NOTE — PLAN OF CARE
Plan of care reviewed. No acute distress or complaints this shift. Will continue current plan of care. Able to change positions independently.

## 2022-04-10 VITALS
HEIGHT: 62 IN | OXYGEN SATURATION: 95 % | BODY MASS INDEX: 24.06 KG/M2 | RESPIRATION RATE: 20 BRPM | SYSTOLIC BLOOD PRESSURE: 101 MMHG | HEART RATE: 105 BPM | TEMPERATURE: 98 F | DIASTOLIC BLOOD PRESSURE: 64 MMHG | WEIGHT: 130.75 LBS

## 2022-04-10 LAB
ALBUMIN SERPL BCP-MCNC: 3 G/DL (ref 3.5–5.2)
ALP SERPL-CCNC: 123 U/L (ref 55–135)
ALT SERPL W/O P-5'-P-CCNC: 18 U/L (ref 10–44)
ANION GAP SERPL CALC-SCNC: 11 MMOL/L (ref 8–16)
ANISOCYTOSIS BLD QL SMEAR: SLIGHT
AST SERPL-CCNC: 16 U/L (ref 10–40)
BASOPHILS # BLD AUTO: 0.04 K/UL (ref 0–0.2)
BASOPHILS NFR BLD: 0.9 % (ref 0–1.9)
BILIRUB SERPL-MCNC: 0.3 MG/DL (ref 0.1–1)
BUN SERPL-MCNC: 14 MG/DL (ref 8–23)
BURR CELLS BLD QL SMEAR: ABNORMAL
CALCIUM SERPL-MCNC: 8.8 MG/DL (ref 8.7–10.5)
CHLORIDE SERPL-SCNC: 106 MMOL/L (ref 95–110)
CO2 SERPL-SCNC: 18 MMOL/L (ref 23–29)
CREAT SERPL-MCNC: 0.7 MG/DL (ref 0.5–1.4)
DIFFERENTIAL METHOD: ABNORMAL
EOSINOPHIL # BLD AUTO: 0.1 K/UL (ref 0–0.5)
EOSINOPHIL NFR BLD: 2 % (ref 0–8)
ERYTHROCYTE [DISTWIDTH] IN BLOOD BY AUTOMATED COUNT: 14.5 % (ref 11.5–14.5)
EST. GFR  (AFRICAN AMERICAN): >60 ML/MIN/1.73 M^2
EST. GFR  (NON AFRICAN AMERICAN): >60 ML/MIN/1.73 M^2
GLUCOSE SERPL-MCNC: 325 MG/DL (ref 70–110)
HCT VFR BLD AUTO: 30.7 % (ref 37–48.5)
HGB BLD-MCNC: 9.4 G/DL (ref 12–16)
HYPOCHROMIA BLD QL SMEAR: ABNORMAL
IMM GRANULOCYTES # BLD AUTO: 0.01 K/UL (ref 0–0.04)
IMM GRANULOCYTES NFR BLD AUTO: 0.2 % (ref 0–0.5)
LYMPHOCYTES # BLD AUTO: 1.4 K/UL (ref 1–4.8)
LYMPHOCYTES NFR BLD: 31.8 % (ref 18–48)
MCH RBC QN AUTO: 27.9 PG (ref 27–31)
MCHC RBC AUTO-ENTMCNC: 30.6 G/DL (ref 32–36)
MCV RBC AUTO: 91 FL (ref 82–98)
MONOCYTES # BLD AUTO: 0.4 K/UL (ref 0.3–1)
MONOCYTES NFR BLD: 9.3 % (ref 4–15)
NEUTROPHILS # BLD AUTO: 2.5 K/UL (ref 1.8–7.7)
NEUTROPHILS NFR BLD: 55.8 % (ref 38–73)
NRBC BLD-RTO: 0 /100 WBC
OVALOCYTES BLD QL SMEAR: ABNORMAL
PLATELET # BLD AUTO: 197 K/UL (ref 150–450)
PLATELET BLD QL SMEAR: ABNORMAL
PMV BLD AUTO: 10.8 FL (ref 9.2–12.9)
POCT GLUCOSE: 244 MG/DL (ref 70–110)
POCT GLUCOSE: 294 MG/DL (ref 70–110)
POCT GLUCOSE: 370 MG/DL (ref 70–110)
POIKILOCYTOSIS BLD QL SMEAR: SLIGHT
POLYCHROMASIA BLD QL SMEAR: ABNORMAL
POTASSIUM SERPL-SCNC: 4.1 MMOL/L (ref 3.5–5.1)
PROT SERPL-MCNC: 6.5 G/DL (ref 6–8.4)
RBC # BLD AUTO: 3.37 M/UL (ref 4–5.4)
SODIUM SERPL-SCNC: 135 MMOL/L (ref 136–145)
WBC # BLD AUTO: 4.4 K/UL (ref 3.9–12.7)

## 2022-04-10 PROCEDURE — 63600175 PHARM REV CODE 636 W HCPCS: Performed by: STUDENT IN AN ORGANIZED HEALTH CARE EDUCATION/TRAINING PROGRAM

## 2022-04-10 PROCEDURE — 25000003 PHARM REV CODE 250: Performed by: STUDENT IN AN ORGANIZED HEALTH CARE EDUCATION/TRAINING PROGRAM

## 2022-04-10 PROCEDURE — S4991 NICOTINE PATCH NONLEGEND: HCPCS | Performed by: STUDENT IN AN ORGANIZED HEALTH CARE EDUCATION/TRAINING PROGRAM

## 2022-04-10 PROCEDURE — 36415 COLL VENOUS BLD VENIPUNCTURE: CPT | Performed by: STUDENT IN AN ORGANIZED HEALTH CARE EDUCATION/TRAINING PROGRAM

## 2022-04-10 PROCEDURE — 25000003 PHARM REV CODE 250

## 2022-04-10 PROCEDURE — 94761 N-INVAS EAR/PLS OXIMETRY MLT: CPT

## 2022-04-10 PROCEDURE — 80053 COMPREHEN METABOLIC PANEL: CPT | Performed by: STUDENT IN AN ORGANIZED HEALTH CARE EDUCATION/TRAINING PROGRAM

## 2022-04-10 PROCEDURE — 85025 COMPLETE CBC W/AUTO DIFF WBC: CPT | Performed by: STUDENT IN AN ORGANIZED HEALTH CARE EDUCATION/TRAINING PROGRAM

## 2022-04-10 PROCEDURE — 27000221 HC OXYGEN, UP TO 24 HOURS

## 2022-04-10 RX ORDER — NAPROXEN SODIUM 220 MG/1
81 TABLET, FILM COATED ORAL DAILY
Qty: 90 TABLET | Refills: 3 | Status: SHIPPED | OUTPATIENT
Start: 2022-04-10 | End: 2023-04-10

## 2022-04-10 RX ORDER — CLOPIDOGREL BISULFATE 75 MG/1
75 TABLET ORAL DAILY
Qty: 15 TABLET | Refills: 0 | Status: SHIPPED | OUTPATIENT
Start: 2022-04-10 | End: 2022-05-12

## 2022-04-10 RX ORDER — ATORVASTATIN CALCIUM 40 MG/1
40 TABLET, FILM COATED ORAL DAILY
Qty: 90 TABLET | Refills: 3 | Status: SHIPPED | OUTPATIENT
Start: 2022-04-10 | End: 2023-04-10

## 2022-04-10 RX ORDER — DOCUSATE SODIUM 100 MG/1
100 CAPSULE, LIQUID FILLED ORAL 2 TIMES DAILY
Status: DISCONTINUED | OUTPATIENT
Start: 2022-04-10 | End: 2022-04-10

## 2022-04-10 RX ORDER — GLYCERIN 1 G/1
1 SUPPOSITORY RECTAL ONCE AS NEEDED
Status: DISCONTINUED | OUTPATIENT
Start: 2022-04-10 | End: 2022-04-10 | Stop reason: HOSPADM

## 2022-04-10 RX ORDER — LIDOCAINE 50 MG/G
1 PATCH TOPICAL DAILY
Qty: 30 PATCH | Refills: 0 | Status: SHIPPED | OUTPATIENT
Start: 2022-04-10 | End: 2022-04-13 | Stop reason: SDUPTHER

## 2022-04-10 RX ORDER — POLYETHYLENE GLYCOL 3350 17 G/17G
17 POWDER, FOR SOLUTION ORAL DAILY
Status: DISCONTINUED | OUTPATIENT
Start: 2022-04-10 | End: 2022-04-10 | Stop reason: HOSPADM

## 2022-04-10 RX ADMIN — TOPIRAMATE 50 MG: 25 TABLET, FILM COATED ORAL at 10:04

## 2022-04-10 RX ADMIN — ENOXAPARIN SODIUM 40 MG: 100 INJECTION SUBCUTANEOUS at 05:04

## 2022-04-10 RX ADMIN — HYDROCHLOROTHIAZIDE 12.5 MG: 12.5 TABLET ORAL at 10:04

## 2022-04-10 RX ADMIN — ASPIRIN 81 MG CHEWABLE TABLET 81 MG: 81 TABLET CHEWABLE at 10:04

## 2022-04-10 RX ADMIN — LIDOCAINE 1 PATCH: 50 PATCH CUTANEOUS at 10:04

## 2022-04-10 RX ADMIN — ZONISAMIDE 100 MG: 100 CAPSULE ORAL at 10:04

## 2022-04-10 RX ADMIN — CLOPIDOGREL 75 MG: 75 TABLET, FILM COATED ORAL at 10:04

## 2022-04-10 RX ADMIN — ATORVASTATIN CALCIUM 40 MG: 40 TABLET, FILM COATED ORAL at 10:04

## 2022-04-10 RX ADMIN — POLYETHYLENE GLYCOL 3350 17 G: 17 POWDER, FOR SOLUTION ORAL at 10:04

## 2022-04-10 RX ADMIN — LOSARTAN POTASSIUM 50 MG: 50 TABLET, FILM COATED ORAL at 10:04

## 2022-04-10 RX ADMIN — NICOTINE 1 PATCH: 21 PATCH, EXTENDED RELEASE TRANSDERMAL at 10:04

## 2022-04-10 RX ADMIN — DULOXETINE 90 MG: 30 CAPSULE, DELAYED RELEASE ORAL at 10:04

## 2022-04-10 RX ADMIN — INSULIN DETEMIR 35 UNITS: 100 INJECTION, SOLUTION SUBCUTANEOUS at 10:04

## 2022-04-10 RX ADMIN — PREGABALIN 150 MG: 75 CAPSULE ORAL at 10:04

## 2022-04-10 NOTE — PLAN OF CARE
Problem: Adult Inpatient Plan of Care  Goal: Plan of Care Review  4/10/2022 1229 by Catherine Lagunas RN  Outcome: Met  4/10/2022 0846 by Catherine Lagunas RN  Outcome: Ongoing, Progressing  Goal: Patient-Specific Goal (Individualized)  Outcome: Met  Goal: Absence of Hospital-Acquired Illness or Injury  Outcome: Met  Goal: Optimal Comfort and Wellbeing  Outcome: Met  Goal: Readiness for Transition of Care  Outcome: Met     Problem: Infection  Goal: Absence of Infection Signs and Symptoms  Outcome: Met     Problem: Adjustment to Illness (Stroke, Ischemic/Transient Ischemic Attack)  Goal: Optimal Coping  Outcome: Met     Problem: Bowel Elimination Impaired (Stroke, Ischemic/Transient Ischemic Attack)  Goal: Effective Bowel Elimination  Outcome: Met     Problem: Cerebral Tissue Perfusion (Stroke, Ischemic/Transient Ischemic Attack)  Goal: Optimal Cerebral Tissue Perfusion  Outcome: Met     Problem: Cognitive Impairment (Stroke, Ischemic/Transient Ischemic Attack)  Goal: Optimal Cognitive Function  Outcome: Met     Problem: Communication Impairment (Stroke, Ischemic/Transient Ischemic Attack)  Goal: Improved Communication Skills  Outcome: Met     Problem: Functional Ability Impaired (Stroke, Ischemic/Transient Ischemic Attack)  Goal: Optimal Functional Ability  Outcome: Met     Problem: Respiratory Compromise (Stroke, Ischemic/Transient Ischemic Attack)  Goal: Effective Oxygenation and Ventilation  Outcome: Met     Problem: Sensorimotor Impairment (Stroke, Ischemic/Transient Ischemic Attack)  Goal: Improved Sensorimotor Function  Outcome: Met     Problem: Swallowing Impairment (Stroke, Ischemic/Transient Ischemic Attack)  Goal: Optimal Eating and Swallowing without Aspiration  Outcome: Met     Problem: Urinary Elimination Impaired (Stroke, Ischemic/Transient Ischemic Attack)  Goal: Effective Urinary Elimination  Outcome: Met     Problem: Diabetes Comorbidity  Goal: Blood Glucose Level Within Targeted Range  Outcome: Met      Problem: Occupational Therapy  Goal: Occupational Therapy Goal  Description: Goals to be met by: 22     Patient will increase functional independence with ADLs by performing:    UE Dressing with Modified [place].  LE Dressing with Modified [place].  Grooming while standing with Modified [place].  Toileting from toilet with Modified [place] for hygiene and clothing management.   Supine to sit with Modified [place].  Step transfer with Modified [place]  Toilet transfer to toilet with Modified [place].  Increased functional strength to WFL for self care skills and functional mobility.  Upper extremity exercise program x10 reps per handout, with independence.    Outcome: Met     Problem: Physical Therapy  Goal: Physical Therapy Goal  Description: Goals to be met by: 22     Patient will increase functional independence with mobility by performin. Supine <> sit with Modified [place]  2. Sit to stand transfer with Stand-by Assistance  3. Bed to chair transfer with Stand-by Assistance using Rolling Walker  4. Gait  x 25 feet with Contact Guard Assistance using Rolling Walker.   5. Lower extremity exercise program x 10 reps per handout, with supervision    Outcome: Met     Problem: Breathing Pattern Ineffective  Goal: Effective Breathing Pattern  Outcome: Met     Problem: SLP  Goal: SLP Goal  Description: Short Term Goals:   1. Pt will complete functional, simple money/time management tasks with 75% acc min cues.   2. Pt will name 12 items in a concrete category given no time constraints across 2 categories.  3. Pt will complete sequencing and mental manipulation tasks with 80% acc given 1 repetition.     Outcome: Met     Problem: Fall Injury Risk  Goal: Absence of Fall and Fall-Related Injury  Outcome: Met     Problem: Asthma Comorbidity  Goal: Maintenance of Asthma Control  Outcome: Met     Problem: COPD (Chronic Obstructive Pulmonary Disease) Comorbidity  Goal:  Maintenance of COPD Symptom Control  Outcome: Met     Problem: Hypertension Comorbidity  Goal: Blood Pressure in Desired Range  Outcome: Met     Problem: Seizure Disorder Comorbidity  Goal: Maintenance of Seizure Control  Outcome: Met     Problem: Pain Chronic (Persistent) (Comorbidity Management)  Goal: Acceptable Pain Control and Functional Ability  Outcome: Met

## 2022-04-10 NOTE — PT/OT/SLP PROGRESS
Physical Therapy      Patient Name:  Chayito Chung   MRN:  [id number]    Patient declined tx at this time secondary to being discharged and just want to wait to go home..

## 2022-04-10 NOTE — PROGRESS NOTES
Future Appointments   Date Time Provider Department Center   4/11/2022 10:00 AM MD TAYE Dunbar III   4/19/2022 11:00 AM Mye Cano [facility] [place] [facility]   4/20/2022  9:00 AM Randell Mtz MD [facility] CARDIO [place] [facility]   5/19/2022  9:00 AM Fritz Cadena MD [facility] NEURO [place] [facility]   6/16/2022  9:40 AM Cameron Valladares III, MD [facility] [place]

## 2022-04-10 NOTE — PROGRESS NOTES
[facility] Medicine Progress Note    Primary Team: [facility] Hospitalist Team B  Attending Physician: Williams Delgado MD  Resident: Everardo  Intern: [facility] Day: 4 days    Chief Complaint: Right-sided weakness noted at ~14:00 on 2022    Subjective:      Patient seen and examined by me this morning. She is eager to be discharged home.  She denies any other complaints including fever, chills, dizzness, CP, palpitations, SOB, cough, n/v, abdominal pain, or diarrhea.    Objective:   Last 24 Hour Vital Signs:  BP  Min: 108/64  Max: 150/85  Temp  Av.1 °F (36.7 °C)  Min: 97.4 °F (36.3 °C)  Max: 98.7 °F (37.1 °C)  Pulse  Av.2  Min: 90  Max: 102  Resp  Av.2  Min: 18  Max: 19  SpO2  Av.4 %  Min: 92 %  Max: 98 %    Intake/Output Summary (Last 24 hours) at 4/10/2022 0722  Last data filed at 4/10/2022 0500  Gross per 24 hour   Intake 240 ml   Output 800 ml   Net -560 ml      Physical Examination:  Physical Exam   Constitutional: No distress.   HENT:   Head: Normocephalic and atraumatic.   Right Ear: External ear normal.   Left Ear: External ear normal.   Nose: Nose normal. No rhinorrhea or nasal congestion.   Mouth/Throat: Mucous membranes are moist. No oropharyngeal exudate or posterior oropharyngeal erythema. Oropharynx is clear.   Eyes: Pupils are equal, round, and reactive to light. Conjunctivae are normal. Right eye exhibits no discharge. Left eye exhibits no discharge. No scleral icterus.   Cardiovascular: Normal rate, regular rhythm, normal heart sounds and normal pulses. Exam reveals no gallop and no friction rub.   No murmur heard.  Pulmonary/Chest: Effort normal and breath sounds normal. No respiratory distress. She has no wheezes. She has no rhonchi. She has no rales.   Abdominal: Soft. Bowel sounds are normal. She exhibits no distension. There is no abdominal tenderness. There is no rebound and no guarding.   Musculoskeletal:         General: No swelling. Normal range of motion.       Cervical back: Normal range of motion and neck supple. No rigidity.      Right lower leg: No edema.      Left lower leg: No edema.   Neurological: She is alert. She has intact cranial nerves. She displays weakness. GCS eye subscore is 4. GCS verbal subscore is 5. GCS motor subscore is 6.   RUE proximal muscle strength 4/5, LUE proximal muscle strength 5/5  RUE hand  strength 3/5, LUE hand  strength 5/5  RLE proximal muscle strength 4/5, LLE proximal muscle strength 5/5   Skin: Skin is warm and dry.   Nursing note and vitals reviewed.  Laboratory:  Recent Labs   Lab 04/08/22  0438 04/09/22  0508 04/10/22  0350   WBC 4.12 4.63 4.40   HGB 10.5* 10.3* 9.4*   HCT 33.9* 34.2* 30.7*    232 197   MCV 88 91 91   RDW 14.6* 14.5 14.5    141 135*   K 3.9 3.8 4.1    109 106   CO2 22* 21* 18*   BUN 12 13 14   CREATININE 0.7 0.7 0.7   * 151* 325*   PROT 6.1 6.6 6.5   ALBUMIN 3.1* 3.1* 3.0*   BILITOT 0.4 0.3 0.3   AST 12 12 16   ALKPHOS 127 125 123   ALT 17 16 18     I have personally reviewed the above laboratory studies.     Imaging:  -reviewed     Current Medications:     Scheduled:   aspirin  81 mg Oral Daily    atorvastatin  40 mg Oral Daily    clopidogreL  75 mg Oral Daily    DULoxetine  90 mg Oral Daily    enoxaparin  40 mg Subcutaneous Daily    hydroCHLOROthiazide  12.5 mg Oral Daily    insulin detemir U-100  35 Units Subcutaneous Daily    LIDOcaine  1 patch Transdermal Q24H    losartan  50 mg Oral Daily    nicotine  1 patch Transdermal Daily    polyethylene glycol  17 g Oral Daily    pregabalin  150 mg Oral BID    QUEtiapine  300 mg Oral QHS    topiramate  50 mg Oral BID    verapamiL  180 mg Oral Nightly    zonisamide  100 mg Oral Daily         Infusions:         Assessment:     Chayito Chung is a 71 y.o. female with a PMHx of HTN, HLD, T2DM, COPD, seizures, migraines, history of colon cancer, major depressive disorder, and generalized anxiety disorder presenting with  right-sided weakness noted upon waking at 14:00 on 4/5 with an unknown last known normal.  Upon arrival to the ED, she was stroke activated and underwent CTH, which was negative for acute infarct, hemorrhage, or mass.  Initial workup was significant for an elevated CK at 1400.  Rhabdo improved with IVF.  CTA Head and Neck, MRI-Brain completed; both showed subacute infarct in right YUDITH distribution.  MRI of L-, T-, and C-Spine completed.  MRI of her spine was largely negative for acute pathology.  Incidentally, it was noted that she had a chronic L1 compression fracture that was present on imaging from 2020.  She will be discharged home with home health PT/OT.    Plan:     Subacute Cerebrovascular Accident with acute right-sided weakness  -woke up around 14:00 on 4/5 with weakness in her upper and lower extremities; presented to ED around 17:00 with no clear last known normal  -Code Stroke activated upon arrival to ED; Vascular Neurology also consulted  -CBC with slight normocytic anemia, CMP with hyperglycemia and chronically low bicarbonate  -TSH, PT/INR, troponin I all WNL  -Lipid panel with LDL 82, HDL 49, TGs 115, total cholesterol 154  -CTH negative for acute infarct, hemorrhage, or mass  -EKG with normal sinus rhythm  -Plavix-loaded in ED, already on ASA 81mg daily at home  -CTA Head and Neck completed, showed late acute/early chronic infarct in R YUDITH distribution, pulmonary nodules  -TTE with Bubble Study completed, showed LVEF of 60% with grade I diastolic dysfunction and no evidence of intracardiac shunting  -MRI-Brain w/wo contrast showed small subacute right frontal cortical infarct with suggested small-volume petechial hemorrhage, as well as previous CVAs  -MRI-Cervical, -Thoracic, and -Lumbar Spine with no evidence of acute pathology that could cause her symptoms  -cleared for diet by SLP  -PT/OT recommending IPR vs SNF; currently pursuing HH with home PT/OT based on patient preference  -will need OP  Neurology follow-up  -telemetry ordered  -continuing atorvastatin 40mg daily  -continuing Plavix 75mg daily for duration of three weeks  -continuing ASA 81mg daily    Headache and Neck Pain  -patient complaining of bifrontal headache since awakening on 4/5  -no meningeal signs on exam, CTH and MRI negative for mass, significant hemorrhage, or meningeal enhancement  -HA improved today, however patient complaining of neck pain that has been present for months  -tried cyclobenzaprine 10mg as one-time dose, but did not help  -Lidocaine patches relieved neck pain; will prescribe at discharge  -Tylenol PRN ordered      Traumatic Rhabdomyolysis, resolved  -CPK at 1408 on presentation, improved to 811 on 4/6 and 444 on 4/7  -stopped IVF as patient is tolerating PO    Chronic L1 Vertebral Compression Fracture  -noted incidentally on MRI of her T-spine; noted as far back as 2017 on imaging per chart review  -no further inpatient intervention needed  -outpatient follow-up with spine specialist recommended     Essential Hypertension  -home regimen includes HCTZ 12.5mg daily, losartan 100mg daily, and and verapamil 180mg daily  -home HCTZ restarted; home losartan restarted but at 50mg  -home verapamil 180mg restarted     Hyperlipidemia  -patient on pravastatin 20mg daily at home  -lipid panel done on arrival showing overall good control with LDL slightly above goal at 82  -continuing high-intensity statin therapy with atorvastatin 40mg daily     Chronic Back Pain  -patient reports long-standing history of lower back pain  -continuing home Norco PRN  -given history of malignancy and smoking history, as well as incongruence of symptoms with imaging, will obtain MRI of entire spine     Type II Diabetes Mellitus, complicated by peripheral neuropathy  -last A1c in 12/2021 was 7.3%; repeat A1c on admission 10.3%  -home regimen includes metformin 1000mg BID; holding  -continuing atorvastatin 40mg daily  -continuing home Lyrica 150mg  BID  -on Lantus 35U daily while in house  -SSI in house     COPD, Stage Unknown  -home regimen includes albuterol, Duo-Nebs, and Trelegy  -not on home oxygen; SpO2 at goal while on room air during initial evaluation  -currently on low-flow NC for comfort at ~2L  -currently smoking tobacco, trying to quit  -Duo-Nebs ordered PRN     Seizure Disorder  -on topiramate 50mg BID, zonisamide 200mg BID at home  -EEG obtained, was WNL  -will need Neurology follow-up at discharge     Migraines  -continuing home topiramate 50mg BID     Depression  -home regimen includes Cymbalta 90mg daily, Seroquel 300mg qhs  -continuing home Cymbalta, Seroquel     Anxiety   -home regimen includes Cymbalta 90mg daily, Seroquel 300mg qhs, and Xanax 0.5mg BID PRN  -holding home Seroquel, Xanax  -continuing home Cymbalta     Health Care Maintenance  - Screening tests needed: A1c, colonoscopy (due 7/2022)     Ppx: Lovenox  Diet: Cardiac Diabetic  Code: Full     Disposition: pending MRI of T-spine; if MRI completed, can be discharged home with home health PT/OT    Sundeep Frank M.D.  [facility] Internal Medicine PGY-1    [facility] Medicine Hospitalist Pager numbers:   [facility] Hospitalist Medicine Team A (Bita/Sydni): [phone]  [facility] Hospitalist Medicine Team B (Lillian/Danny):  [phone]

## 2022-04-10 NOTE — H&P
[facility] Hospital Medicine Discharge Summary    Primary Team: [facility] Hospitalist Team B  Attending Physician: Williams Delgado MD  Resident: Everardo  Intern: Monika    Date of Admit: 4/5/2022  Date of Discharge: 4/10/2022    Discharge to: Home with Home PT/OT  Condition: Improved, Stable    Discharge Diagnoses     Patient Active Problem List   Diagnosis    Coronary artery disease of native artery of native heart with stable angina pectoris    COPD (chronic obstructive pulmonary disease)    Tobacco abuse, 1ppd, 50 years    Major depressive disorder in partial remission    Solitary pulmonary nodule s/p resection 4/2012    Neuropathy    Hypoglycemia associated with type 2 diabetes mellitus    Adrenal nodule    Hypertension associated with diabetes    Hyperlipidemia associated with type 2 diabetes mellitus    S/P PTCA (percutaneous transluminal coronary angioplasty)    Lumbar facet arthropathy    Sinus tachycardia    Iron deficiency anemia    Steroid-induced gastritis    Chronic diastolic congestive heart failure    History of colon cancer    Vitamin D deficiency    Osteopenia of lumbar spine    Incisional hernia, without obstruction or gangrene    History of partial gastrectomy    Seizure disorder    Lower extremity weakness    Impaired ambulation    Cervical myelopathy    Anxiety    Type 2 diabetes mellitus, with long-term current use of insulin    Hypertension    Migraine headache    CAD (coronary artery disease)    Aortic atherosclerosis    Sacroiliitis, not elsewhere classified    Right leg weakness    History of stroke with residual deficit    Mild malnutrition    CVA (cerebral vascular accident)       Consultants and Procedures     Consultants:  Vascular Neurology  PT, OT    Procedures:   EEG    Imaging:  MRI-Brain w/wo contrast  MRI of C-, T-, and L-spine w/wo contrast  CXR    Brief History of Present Illness & Summary of Hospital Course      Chayito Chung is a 71 y.o. female  "with a PMHx of HTN, HLD, T2DM, COPD, seizures, migraines, history of colon cancer, major depressive disorder, and generalized anxiety disorder. The patient presented on 4/5/2022 for evaluation of right-sided weakness.     She reports going to bed around midnight on 4/4 and waking up on the floor around 14:00 on 4/5.  Upon awakening, she noted that her right lower extremity was weak and "dragging."  She was able to help herself up off the floor to call for help.  She has difficulty ambulating and typically uses a rollator for assistance at home, but found that she was having even more trouble than usual.  On presentation, she also complains of a sore throat, a cough with sometimes-green, sometimes-white sputum, decreased appetite with weight loss for the "past few months," and one week of dysuria.    She was admitted to [facility] for post-stroke care and treatment of mild rhabdomyolysis.  She was started on IV normal saline with interval improvement in her CK levels.  MRI-Brain was performed, showed a subacute infarct in her right YUDITH distribution that was incongruent with her symptoms of RUE and RLE weakness.  TTE with bubble study was performed, which showed a LVEF of 60% and no intracardiac shunting.  CTA of her Head and Neck was performed, which showed the right-sided subacute YUDIHT infarct and multiple solid pulmonary nodules but no significant carotid stenosis.  EEG was obtained, which was unremarkable.  Given continued symptoms of weakness and paresthesia in her RLE, MRI w/wo contrast was performed of her entire spine.  The spine MRI studies were without acute findings but did show a chronic L1 compression fracture.    She worked with PT, OT, and SLP while in house.  She showed improvement.  Placement was initially sought in inpatient rehab, but the patient wanted to return home so home PT/OT were established instead.    On day of discharge, she was afebrile with stable vital signs.  She was discharged home " with home PT/OT and close follow up.  She was encouraged to follow up with her Neurologist for possible EMU admission given her unusual falls at home.    For the full HPI please refer to the History & Physical from this admission.    Hospital Course By Problem with Pertinent Findings     Cerebrovascular Accident with acute right-sided weakness  -woke up around 14:00 on 4/5 with weakness in her upper and lower extremities; presented to ED around 17:00 with no clear last known normal  -Code Stroke activated upon arrival to ED; Vascular Neurology also consulted  -CBC with slight normocytic anemia, CMP with hyperglycemia and chronically low bicarbonate  -TSH, PT/INR, troponin I all WNL  -Lipid panel with LDL 82, HDL 49, TGs 115, total cholesterol 154  -CTH negative for acute infarct, hemorrhage, or mass  -EKG with normal sinus rhythm  -loaded with Plavix upon arrival; home low-dose ASA continued  -CTA Head and Neck negative for significant stenosis  -TTE with Bubble Study showed LVEF of 60%, no intracardiac shunting  -MRI-Brain w/wo contrast showed subacute R YUDITH infarct, which was incongruent with patient's RUE and RLE weakness  -MRI of entire spine performed as patient's weakness was incongruent with brain imaging; no acute findings noted  -PT/OT/SLP worked with patient, recommended IPR admission however patient wanted to return home instead  -home PT/OT established for patient  -high-intensity statin therapy started; discharged with Lipitor 40mg daily  -discharged on Plavix 75mg daily for a therapy duration of 21 days total; after completion, will be on ASA monotherapy per Vascular Neurology recs  -continued ASA 81mg daily  -will follow up with Neurology, Vascular Neurology     Traumatic Rhabdomyolysis, resolved  -CPK at 1408 on presentation  -given IV fluids  -CPK trended down, rhabdomyolysis resolved     Essential Hypertension  -home regimen includes HCTZ 12.5mg daily, losartan 100mg daily, and and verapamil 180mg  daily  -holding home meds given concern for possible CVA to allow for permissive HTN     Hyperlipidemia  -previously on pravastatin 20mg daily at home  -lipid panel done on arrival showing overall good control with LDL slightly above goal at 82  -started on Lipitor 40mg daily and continued at discharge     Headache and Neck Pain  -reported bifrontal headache since awakening on 4/5  -no meningeal signs on exam, CTH and MRI negative for mass, significant hemorrhage, or meningeal enhancement  -HA improved early in hospitalization, however patient complained of of neck pain that has been present for months  -tried cyclobenzaprine 10mg as one-time dose, but did not help  -Lidocaine patches relieved neck pain; prescribed at discharge    Chronic Back Pain  -patient reports long-standing history of lower back pain  -continued home Norco PRN    Chronic L1 Vertebral Compression Fracture  -noted incidentally on MRI of her T-spine; noted as far back as 2017 on imaging per chart review  -no further inpatient intervention needed  -can follow up outpatient with spine specialist     Type II Diabetes Mellitus, complicated by peripheral neuropathy  -last A1c in 12/2021 was 7.3%; repeat A1c on admission 10.3%  -home regimen includes metformin 1000mg BID; holding  -continued atorvastatin 40mg daily  -continued home Lyrica 150mg BID  -on Lantus 35U daily while in house  -SSI in house     COPD, Stage Unknown  -home regimen includes albuterol, Duo-Nebs, and Trelegy  -not on home oxygen; SpO2 at goal while on room air during initial evaluation  -was on low-flow NC for comfort at ~2L; no true medical indication for oxygen noted  -currently smoking tobacco, trying to quit  -smoking cessation counseling offered  -Duo-Nebs ordered PRN    Pulmonary Nodules  -noted incidentally on CTA Head and Neck  -has longstanding smoking history  -follows with Pulmonologist at [facility]     Seizure Disorder  -continued home topiramate 50mg BID, zonisamide 200mg  "BID  -EEG done while inpatient did not show any seizure activity  -patient may benefit from EMU admission arranged by Neurologist     Migraines  -continued home topiramate 50mg BID     Depression  -home regimen includes Cymbalta 90mg daily, Seroquel 300mg qhs  -continued home Cymbalta, Seroquel     Anxiety   -home regimen includes Cymbalta 90mg daily, Seroquel 300mg qhs, and Xanax 0.5mg BID PRN  -held home Xanax  -continued home Cymbalta, Seroquel     Health Care Maintenance  - Screening tests needed: A1c, colonoscopy (due 7/2022)    Discharge Medications        Medication List      START taking these medications    aspirin 81 MG Chew  Take 1 tablet (81 mg total) by mouth once daily.  Replaces: aspirin 81 MG EC tablet     atorvastatin 40 MG tablet  Commonly known as: LIPITOR  Take 1 tablet (40 mg total) by mouth once daily.     clopidogreL 75 mg tablet  Commonly known as: PLAVIX  Take 1 tablet (75 mg total) by mouth once daily. Take medicine for 15 days.     LIDOcaine 5 %  Commonly known as: LIDODERM  Place 1 patch onto the skin once daily. Remove & Discard patch within 12 hours or as directed by MD        CONTINUE taking these medications    albuterol 90 mcg/actuation inhaler  Commonly known as: PROVENTIL/VENTOLIN HFA  INHALE 2 PUFFS BY MOUTH INTO THE LUNGS EVERY 6 HOURS AS NEEDED FOR WHEEZING     albuterol-ipratropium 2.5 mg-0.5 mg/3 mL nebulizer solution  Commonly known as: DUO-NEB  Take 3 mLs by nebulization every 4 (four) hours as needed for Wheezing or Shortness of Breath. Rescue     alendronate 70 MG tablet  Commonly known as: FOSAMAX  Take 1 tablet (70 mg total) by mouth every 7 days.     BD ULTRA-FINE MINI PEN NEEDLE 31 gauge x 3/16" Ndle  Generic drug: pen needle, diabetic     benzonatate 200 MG capsule  Commonly known as: TESSALON  Take 1 capsule (200 mg total) by mouth 3 (three) times daily as needed for Cough.     diclofenac sodium 1 % Gel  Commonly known as: VOLTAREN  Apply 2 g topically 4 (four) times " daily.     * DULoxetine 60 MG capsule  Commonly known as: CYMBALTA     * DULoxetine 30 MG capsule  Commonly known as: CYMBALTA     ferrous sulfate 325 mg (65 mg iron) Tab tablet  Commonly known as: FEOSOL  Take 1 tablet (325 mg total) by mouth once daily.     fluticasone propionate 50 mcg/actuation nasal spray  Commonly known as: FLONASE  2 sprays (100 mcg total) by Each Nare route once daily.     hydroCHLOROthiazide 12.5 mg capsule  Commonly known as: MICROZIDE  Take 1 capsule (12.5 mg total) by mouth once daily.     HYDROcodone-acetaminophen  mg per tablet  Commonly known as: NORCO  Take 1 tablet by mouth every 6 (six) hours as needed for Pain.     insulin lispro protamin-lispro 100 unit/mL (75-25) Inpn     losartan 50 MG tablet  Commonly known as: COZAAR  Take 1 tablet (50 mg total) by mouth once daily.     magnesium oxide 400 mg (241.3 mg magnesium) tablet  Commonly known as: MAG-OX     metFORMIN 1000 MG tablet  Commonly known as: GLUCOPHAGE     nitroGLYCERIN 0.4 MG SL tablet  Commonly known as: NITROSTAT  Place 1 tablet (0.4 mg total) under the tongue every 5 (five) minutes as needed for Chest pain.     ondansetron 4 MG Tbdl  Commonly known as: ZOFRAN-ODT  Take 1 tablet (4 mg total) by mouth every 8 (eight) hours as needed (nausea).     potassium chloride SA 20 MEQ tablet  Commonly known as: K-DUR,KLOR-CON  Take 1 tablet (20 mEq total) by mouth once daily.     pregabalin 150 MG capsule  Commonly known as: LYRICA     QUEtiapine 300 MG Tab  Commonly known as: SEROQUEL     tiZANidine 4 MG tablet  Commonly known as: ZANAFLEX     topiramate 50 MG tablet  Commonly known as: TOPAMAX     TRELEGY ELLIPTA 200-62.5-25 mcg inhaler  Generic drug: fluticasone-umeclidin-vilanter     verapamiL 180 MG CR tablet  Commonly known as: CALAN-SR  Take 1 tablet (180 mg total) by mouth every evening.     zonisamide 100 MG Cap  Commonly known as: ZONEGRAN         * This list has 2 medication(s) that are the same as other  medications prescribed for you. Read the directions carefully, and ask your doctor or other care provider to review them with you.            STOP taking these medications    aspirin 81 MG EC tablet  Commonly known as: ECOTRIN  Replaced by: aspirin 81 MG Chew     CHANTIX STARTING MONTH BOX 0.5 mg (11)- 1 mg (42) tablet  Generic drug: varenicline     cholestyramine (with sugar) 4 gram Powd     LORazepam 0.5 MG tablet  Commonly known as: ATIVAN     pravastatin 40 MG tablet  Commonly known as: PRAVACHOL     varenicline 1 mg Tab  Commonly known as: CHANTIX           Where to Get Your Medications      These medications were sent to [id number] - [place] [address]  [address] AT [facility]  [address] [address]    Phone: [phone]   · aspirin 81 MG Chew  · atorvastatin 40 MG tablet  · clopidogreL 75 mg tablet  · LIDOcaine 5 %          Discharge Information:   Diet:  Cardiac    Physical Activity:  As tolerated             Instructions:  1. Take all medications as prescribed  2. Keep all follow-up appointments  3. Return to the hospital or call your primary care physicians if any worsening symptoms such as fever, chest pain, shortness of breath, return of symptoms, or any other concerns.    Follow-Up Appointments:  PCP  Neurology  Pulmonology  Vascular Neurology    Sundeep Frank M.D.  \[facility]\"" Internal Medicine PGY-1

## 2022-04-10 NOTE — PLAN OF CARE
CM spoke with pt per [facility] prior to d/c   pt for d/c to home today   per report from [facility] - pt set up with [facility] for Home Health  [facility] - confirmed with Jean Carlos nurse on call -- pt is discharging to home today   no dme ordered   pt has transportation to home       Future Appointments   Date Time Provider Department Center   4/11/2022 10:00 AM MD TAYE Dunbar III   4/19/2022 11:00 AM Mey Cano [facility] SMOKE [facility]   4/20/2022  9:00 AM Randell Mtz MD [facility] CARDIO [facility]   5/19/2022  9:00 AM Fritz Cadena MD [facility] NEURO [facility]   6/16/2022  9:40 AM MD NAMAN Dunbar III [facility]     discharging nurse will review d/c meds and instructions.          04/10/22 0902   Final Note   Assessment Type Final Discharge Note   Anticipated Discharge Disposition Home-Health  ([facility] )   What phone number can be called within the next 1-3 days to see how you are doing after discharge? [phone]   Hospital Resources/Appts/Education Provided Appointments scheduled and added to AVS;Post-Acute resouces added to AVS   Post-Acute Status   Post-Acute Authorization Home Health   Home Health Status Set-up Complete/Auth obtained  (per [facility])   Discharge Delays None known at this time

## 2022-04-11 ENCOUNTER — OFFICE VISIT (OUTPATIENT)
Dept: INTERNAL MEDICINE | Facility: CLINIC | Age: 72
End: 2022-04-11
Payer: MEDICARE

## 2022-04-11 ENCOUNTER — PATIENT OUTREACH (OUTPATIENT)
Dept: ADMINISTRATIVE | Facility: CLINIC | Age: 72
End: 2022-04-11
Payer: MEDICARE

## 2022-04-11 VITALS
BODY MASS INDEX: 24.75 KG/M2 | DIASTOLIC BLOOD PRESSURE: 78 MMHG | WEIGHT: 134.5 LBS | HEIGHT: 62 IN | SYSTOLIC BLOOD PRESSURE: 108 MMHG

## 2022-04-11 DIAGNOSIS — Z79.4 TYPE 2 DIABETES MELLITUS WITH OTHER CIRCULATORY COMPLICATION, WITH LONG-TERM CURRENT USE OF INSULIN: Primary | ICD-10-CM

## 2022-04-11 DIAGNOSIS — F33.41 RECURRENT MAJOR DEPRESSIVE DISORDER, IN PARTIAL REMISSION: ICD-10-CM

## 2022-04-11 DIAGNOSIS — G95.9 CERVICAL MYELOPATHY: ICD-10-CM

## 2022-04-11 DIAGNOSIS — I70.0 AORTIC ATHEROSCLEROSIS: ICD-10-CM

## 2022-04-11 DIAGNOSIS — I50.32 CHRONIC DIASTOLIC CONGESTIVE HEART FAILURE: ICD-10-CM

## 2022-04-11 DIAGNOSIS — E11.59 TYPE 2 DIABETES MELLITUS WITH OTHER CIRCULATORY COMPLICATION, WITH LONG-TERM CURRENT USE OF INSULIN: Primary | ICD-10-CM

## 2022-04-11 DIAGNOSIS — I25.118 CORONARY ARTERY DISEASE OF NATIVE ARTERY OF NATIVE HEART WITH STABLE ANGINA PECTORIS: ICD-10-CM

## 2022-04-11 PROBLEM — M46.1 SACROILIITIS, NOT ELSEWHERE CLASSIFIED: Status: RESOLVED | Noted: 2021-12-16 | Resolved: 2022-04-11

## 2022-04-11 PROCEDURE — 1111F DSCHRG MED/CURRENT MED MERGE: CPT | Mod: CPTII,S$GLB,, | Performed by: INTERNAL MEDICINE

## 2022-04-11 PROCEDURE — 1157F PR ADVANCE CARE PLAN OR EQUIV PRESENT IN MEDICAL RECORD: ICD-10-PCS | Mod: CPTII,S$GLB,, | Performed by: INTERNAL MEDICINE

## 2022-04-11 PROCEDURE — 3078F PR MOST RECENT DIASTOLIC BLOOD PRESSURE < 80 MM HG: ICD-10-PCS | Mod: CPTII,S$GLB,, | Performed by: INTERNAL MEDICINE

## 2022-04-11 PROCEDURE — 1111F PR DISCHARGE MEDS RECONCILED W/ CURRENT OUTPATIENT MED LIST: ICD-10-PCS | Mod: CPTII,S$GLB,, | Performed by: INTERNAL MEDICINE

## 2022-04-11 PROCEDURE — 3288F FALL RISK ASSESSMENT DOCD: CPT | Mod: CPTII,S$GLB,, | Performed by: INTERNAL MEDICINE

## 2022-04-11 PROCEDURE — 3046F HEMOGLOBIN A1C LEVEL >9.0%: CPT | Mod: CPTII,S$GLB,, | Performed by: INTERNAL MEDICINE

## 2022-04-11 PROCEDURE — 99215 PR OFFICE/OUTPT VISIT, EST, LEVL V, 40-54 MIN: ICD-10-PCS | Mod: S$GLB,,, | Performed by: INTERNAL MEDICINE

## 2022-04-11 PROCEDURE — 3288F PR FALLS RISK ASSESSMENT DOCUMENTED: ICD-10-PCS | Mod: CPTII,S$GLB,, | Performed by: INTERNAL MEDICINE

## 2022-04-11 PROCEDURE — 99999 PR PBB SHADOW E&M-EST. PATIENT-LVL III: ICD-10-PCS | Mod: PBBFAC,,, | Performed by: INTERNAL MEDICINE

## 2022-04-11 PROCEDURE — 3078F DIAST BP <80 MM HG: CPT | Mod: CPTII,S$GLB,, | Performed by: INTERNAL MEDICINE

## 2022-04-11 PROCEDURE — 99999 PR PBB SHADOW E&M-EST. PATIENT-LVL III: CPT | Mod: PBBFAC,,, | Performed by: INTERNAL MEDICINE

## 2022-04-11 PROCEDURE — 3074F PR MOST RECENT SYSTOLIC BLOOD PRESSURE < 130 MM HG: ICD-10-PCS | Mod: CPTII,S$GLB,, | Performed by: INTERNAL MEDICINE

## 2022-04-11 PROCEDURE — 3008F PR BODY MASS INDEX (BMI) DOCUMENTED: ICD-10-PCS | Mod: CPTII,S$GLB,, | Performed by: INTERNAL MEDICINE

## 2022-04-11 PROCEDURE — 1126F PR PAIN SEVERITY QUANTIFIED, NO PAIN PRESENT: ICD-10-PCS | Mod: CPTII,S$GLB,, | Performed by: INTERNAL MEDICINE

## 2022-04-11 PROCEDURE — 1101F PR PT FALLS ASSESS DOC 0-1 FALLS W/OUT INJ PAST YR: ICD-10-PCS | Mod: CPTII,S$GLB,, | Performed by: INTERNAL MEDICINE

## 2022-04-11 PROCEDURE — 3074F SYST BP LT 130 MM HG: CPT | Mod: CPTII,S$GLB,, | Performed by: INTERNAL MEDICINE

## 2022-04-11 PROCEDURE — 3008F BODY MASS INDEX DOCD: CPT | Mod: CPTII,S$GLB,, | Performed by: INTERNAL MEDICINE

## 2022-04-11 PROCEDURE — 99499 RISK ADDL DX/OHS AUDIT: ICD-10-PCS | Mod: S$GLB,,, | Performed by: INTERNAL MEDICINE

## 2022-04-11 PROCEDURE — 1101F PT FALLS ASSESS-DOCD LE1/YR: CPT | Mod: CPTII,S$GLB,, | Performed by: INTERNAL MEDICINE

## 2022-04-11 PROCEDURE — 99499 UNLISTED E&M SERVICE: CPT | Mod: S$GLB,,, | Performed by: INTERNAL MEDICINE

## 2022-04-11 PROCEDURE — 3046F PR MOST RECENT HEMOGLOBIN A1C LEVEL > 9.0%: ICD-10-PCS | Mod: CPTII,S$GLB,, | Performed by: INTERNAL MEDICINE

## 2022-04-11 PROCEDURE — 1126F AMNT PAIN NOTED NONE PRSNT: CPT | Mod: CPTII,S$GLB,, | Performed by: INTERNAL MEDICINE

## 2022-04-11 PROCEDURE — 1157F ADVNC CARE PLAN IN RCRD: CPT | Mod: CPTII,S$GLB,, | Performed by: INTERNAL MEDICINE

## 2022-04-11 PROCEDURE — 99215 OFFICE O/P EST HI 40 MIN: CPT | Mod: S$GLB,,, | Performed by: INTERNAL MEDICINE

## 2022-04-11 NOTE — PATIENT INSTRUCTIONS
We were happy to see you today      For your Medication   THEY SWITCHED THE PRAVASTATIN FOR ATORVASTATIN For more information about side effects please visit medlineplus.gov      For your Referrals  PLEASE SEE YOUR NEUROLOGY AND CARDIOLOGIST     For your Vaccinations    We gave your the following vaccines  Please obtain the following vaccines at the pharmacy  PNEUMONIA         Please return to clinic in    As previously scheduled    For the constipation we will want you to take MIRALAX   This is a laxative that comes in a packet or a bottle   You will take one dose 2-3 times a day UNTIL you start having one soft bowel  Movement per day  Once you achieve this you can back down to one dose daily for 6 months   Please make sure you decrease your intake of white food/drinks like white bread, milk, plain cheese and processed food / fast foods. Instead increase your intake of fruits and vegetables.

## 2022-04-11 NOTE — PROGRESS NOTES
Assessment:       1. Type 2 diabetes mellitus with other circulatory complication, with long-term current use of insulin    2. Recurrent major depressive disorder, in partial remission    3. Cervical myelopathy    4. Chronic diastolic congestive heart failure    5. Coronary artery disease of native artery of native heart with stable angina pectoris    6. Aortic atherosclerosis        Plan:         Chayito was seen today for hospital follow up and stroke.    Diagnoses and all orders for this visit:    Type 2 diabetes mellitus with other circulatory complication, with long-term current use of insulin  -     Microalbumin/Creatinine Ratio, Urine; Future    Recurrent major depressive disorder, in partial remission    Cervical myelopathy    Chronic diastolic congestive heart failure    Coronary artery disease of native artery of native heart with stable angina pectoris    Aortic atherosclerosis    Chronic  Controlled  Patient is at goal today   I have reviewed lifestyle modification to achieve/maintain goals  We will continue the current medication regimen as listed below  Patient will follow up in 3 months       I spent at least 40 mins with preparing to see the patient, obtaining and/or revieweing separately obtained history, preforming a examination and evaluation, counseling, communicating with other health care professional, documenting clinical information in the electronic health record,  and/or coordinating care.             Subjective:       Patient ID: Chayito Chung is a 71 y.o. female.    Chief Complaint: Hospital Follow Up and Stroke        Hospital follow up         Admin/Discharge:     Reason for admission:   presented on 4/5/2022 for evaluation of right-sided weaknessadmitted to [facility] for post-stroke care and treatment of mild rhabdomyolysis.  She was started on IV normal saline with interval improvement in her CK levels    Brief History:     Pertinent Lab:   CBC with slight normocytic anemia, CMP with  "hyperglycemia and chronically low bicarbonate  -TSH, PT/INR, troponin I all WNL  -Lipid panel with LDL 82, HDL 49, TGs 115, total cholesterol 154    Pertinent Imaging;  EKG with normal sinus rhythm  MRI-Brain was performed, showed a subacute infarct in her right YUDITH distribution that was incongruent with her symptoms of RUE and RLE weakness  TTE with bubble study was performed, which showed a LVEF of 60% and no intracardiac shunting  CTA of her Head and Neck was performed, which showed the right-sided subacute YUDITH infarct and multiple solid pulmonary nodules but no significant carotid stenosis   EEG was obtained, which was unremarkable  MRI w/wo contrast was performed of her entire spine.    The spine MRI studies were without acute findings but did show a chronic L1 compression fracture.    Discharge Plan;   She worked with PT, OT, and SLP while in house.  She showed improvement.  Placement was initially sought in inpatient rehab, but the patient wanted to return home so home PT/OT were established instead.  encouraged to follow up with her Neurologist for possible EMU admission given her unusual falls at home.  can follow up outpatient with spine specialist  Neurology  Pulmonology  Vascular Neurology    Discharge pertinent meds:  -high-intensity statin therapy started; discharged with Lipitor 40mg daily  -discharged on Plavix 75mg daily for a therapy duration of 21 days total; after completion, will be on ASA monotherapy per Vascular Neurology recs    Labs/Imaging pending at the time of discharged: none     Since Discharge: GENERALIZED WEAKNESS             HPI  Review of Systems   All other systems reviewed and are negative.        Objective:     /78 (BP Location: Right arm, Patient Position: Sitting, BP Method: Medium (Manual))   Ht 5' 2" (1.575 m)   Wt 61 kg (134 lb 7.7 oz)   LMP  (LMP Unknown)   BMI 24.60 kg/m²       Wt Readings from Last 1 Encounters:   04/11/22 1005 61 kg (134 lb 7.7 oz)       BMI " Readings from Last 1 Encounters:   04/11/22 24.60 kg/m²            Physical Exam  Vitals and nursing note reviewed.   Constitutional:       General: She is not in acute distress.     Appearance: She is well-developed. She is not diaphoretic.   HENT:      Head: Normocephalic.      Nose: Nose normal.   Eyes:      General:         Right eye: No discharge.         Left eye: No discharge.      Conjunctiva/sclera: Conjunctivae normal.      Pupils: Pupils are equal, round, and reactive to light.   Cardiovascular:      Rate and Rhythm: Normal rate and regular rhythm.      Heart sounds: Normal heart sounds. No murmur heard.    No friction rub. No gallop.   Pulmonary:      Effort: Pulmonary effort is normal. No respiratory distress.   Abdominal:      General: Bowel sounds are normal. There is no distension.      Palpations: Abdomen is soft.   Musculoskeletal:         General: No deformity. Normal range of motion.      Cervical back: Normal range of motion.   Skin:     General: Skin is warm.   Neurological:      Mental Status: She is alert and oriented to person, place, and time.      Cranial Nerves: No cranial nerve deficit.      Comments: GENERALIZED WEAKNESS              Future Appointments   Date Time Provider Department Center   4/19/2022 11:00 AM Mey Cano [facility] SMOKE [place] [facility]   4/20/2022  9:00 AM Randell Mtz MD [facility] CARDIO [place] [facility]   5/19/2022  9:00 AM Fritz Cadena MD [facility] NEURO [place] [facility]   6/16/2022  9:40 AM Cameron Valladares III, MD [facility]     Health Maintenance Due   Topic Date Due    Foot Exam  08/26/2020    Pneumococcal Vaccines (Age 65+) (2 - PCV) 09/28/2021    Diabetes Urine Screening  01/05/2022           Medication List with Changes/Refills   Current Medications    ALBUTEROL (PROVENTIL/VENTOLIN HFA) 90 MCG/ACTUATION INHALER    INHALE 2 PUFFS BY MOUTH INTO THE LUNGS EVERY 6 HOURS AS NEEDED FOR WHEEZING    ALBUTEROL-IPRATROPIUM (DUO-NEB) 2.5 MG-0.5 MG/3 ML NEBULIZER SOLUTION     "Take 3 mLs by nebulization every 4 (four) hours as needed for Wheezing or Shortness of Breath. Rescue    ALENDRONATE (FOSAMAX) 70 MG TABLET    Take 1 tablet (70 mg total) by mouth every 7 days.    ASPIRIN 81 MG CHEW    Take 1 tablet (81 mg total) by mouth once daily.    ATORVASTATIN (LIPITOR) 40 MG TABLET    Take 1 tablet (40 mg total) by mouth once daily.    BD ULTRA-FINE MINI PEN NEEDLE 31 GAUGE X 3/16" NDLE        BENZONATATE (TESSALON) 200 MG CAPSULE    Take 1 capsule (200 mg total) by mouth 3 (three) times daily as needed for Cough.    CLOPIDOGREL (PLAVIX) 75 MG TABLET    Take 1 tablet (75 mg total) by mouth once daily. Take medicine for 15 days.    DICLOFENAC SODIUM (VOLTAREN) 1 % GEL    Apply 2 g topically 4 (four) times daily.    DULOXETINE (CYMBALTA) 30 MG CAPSULE    Take 30 mg by mouth once daily.    DULOXETINE (CYMBALTA) 60 MG CAPSULE    Take 90 mg by mouth once daily.     FERROUS SULFATE (FEOSOL) 325 MG (65 MG IRON) TAB TABLET    Take 1 tablet (325 mg total) by mouth once daily.    FLUTICASONE PROPIONATE (FLONASE) 50 MCG/ACTUATION NASAL SPRAY    2 sprays (100 mcg total) by Each Nare route once daily.    HYDROCHLOROTHIAZIDE (MICROZIDE) 12.5 MG CAPSULE    Take 1 capsule (12.5 mg total) by mouth once daily.    HYDROCODONE-ACETAMINOPHEN (NORCO)  MG PER TABLET    Take 1 tablet by mouth every 6 (six) hours as needed for Pain.    INSULIN LISPRO PROTAMIN-LISPRO 100 UNIT/ML (75-25) INPN    Inject 35 Units into the skin 2 (two) times a day.    LIDOCAINE (LIDODERM) 5 %    Place 1 patch onto the skin once daily. Remove & Discard patch within 12 hours or as directed by MD    LOSARTAN (COZAAR) 50 MG TABLET    Take 1 tablet (50 mg total) by mouth once daily.    MAGNESIUM OXIDE (MAG-OX) 400 MG (241.3 MG MAGNESIUM) TABLET    Take 400 mg by mouth once daily.    METFORMIN (GLUCOPHAGE) 1000 MG TABLET    Take 1,000 mg by mouth 2 (two) times daily.    NITROGLYCERIN (NITROSTAT) 0.4 MG SL TABLET    Place 1 tablet (0.4 mg " total) under the tongue every 5 (five) minutes as needed for Chest pain.    ONDANSETRON (ZOFRAN-ODT) 4 MG TBDL    Take 1 tablet (4 mg total) by mouth every 8 (eight) hours as needed (nausea).    POTASSIUM CHLORIDE SA (K-DUR,KLOR-CON) 20 MEQ TABLET    Take 1 tablet (20 mEq total) by mouth once daily.    PREGABALIN (LYRICA) 150 MG CAPSULE    Take 150 mg by mouth 2 (two) times daily as needed.    QUETIAPINE (SEROQUEL) 300 MG TAB    300 mg nightly.     TIZANIDINE (ZANAFLEX) 4 MG TABLET        TOPIRAMATE (TOPAMAX) 50 MG TABLET    Take 50 mg by mouth 2 (two) times daily.    TRELEGY ELLIPTA 200-62.5-25 MCG INHALER        VERAPAMIL (CALAN-SR) 180 MG CR TABLET    Take 1 tablet (180 mg total) by mouth every evening.    ZONISAMIDE (ZONEGRAN) 100 MG CAP    Take by mouth once daily.         Disclaimer:  This note has been generated using voice-recognition software. There may be grammatical or spelling errors that have been missed during proof-reading

## 2022-04-12 ENCOUNTER — CLINICAL SUPPORT (OUTPATIENT)
Dept: CARDIOLOGY | Facility: HOSPITAL | Age: 72
End: 2022-04-12
Attending: STUDENT IN AN ORGANIZED HEALTH CARE EDUCATION/TRAINING PROGRAM
Payer: MEDICARE

## 2022-04-12 DIAGNOSIS — I63.9 CEREBROVASCULAR ACCIDENT (CVA), UNSPECIFIED MECHANISM: ICD-10-CM

## 2022-04-12 DIAGNOSIS — I67.848 OTHER CEREBROVASCULAR VASOSPASM AND VASOCONSTRICTION: ICD-10-CM

## 2022-04-12 NOTE — DISCHARGE SUMMARY
[facility] Hospital Medicine Discharge Summary    Primary Team: [facility] Hospitalist Team B  Attending Physician: Williams Delgado MD  Resident: Everardo  Intern: Monika    Date of Admit: 4/5/2022  Date of Discharge: 4/10/2022    Discharge to: Home with Home PT/OT  Condition: Improved, Stable    Discharge Diagnoses     Patient Active Problem List   Diagnosis    Coronary artery disease of native artery of native heart with stable angina pectoris    COPD (chronic obstructive pulmonary disease)    Tobacco abuse, 1ppd, 50 years    Major depressive disorder in partial remission    Solitary pulmonary nodule s/p resection 4/2012    Neuropathy    Hypoglycemia associated with type 2 diabetes mellitus    Adrenal nodule    Hypertension associated with diabetes    Hyperlipidemia associated with type 2 diabetes mellitus    S/P PTCA (percutaneous transluminal coronary angioplasty)    Lumbar facet arthropathy    Sinus tachycardia    Iron deficiency anemia    Steroid-induced gastritis    Chronic diastolic congestive heart failure    History of colon cancer    Vitamin D deficiency    Osteopenia of lumbar spine    Incisional hernia, without obstruction or gangrene    History of partial gastrectomy    Seizure disorder    Lower extremity weakness    Impaired ambulation    Cervical myelopathy    Anxiety    Type 2 diabetes mellitus, with long-term current use of insulin    Hypertension    Migraine headache    CAD (coronary artery disease)    Aortic atherosclerosis    Sacroiliitis, not elsewhere classified    Right leg weakness    History of stroke with residual deficit    Mild malnutrition    CVA (cerebral vascular accident)       Consultants and Procedures     Consultants:  Vascular Neurology  PT, OT    Procedures:   EEG    Imaging:  MRI-Brain w/wo contrast  MRI of C-, T-, and L-spine w/wo contrast  CXR    Brief History of Present Illness & Summary of Hospital Course      Chayito Chung is a 71 y.o. female  "with a PMHx of HTN, HLD, T2DM, COPD, seizures, migraines, history of colon cancer, major depressive disorder, and generalized anxiety disorder. The patient presented on 4/5/2022 for evaluation of right-sided weakness.     She reports going to bed around midnight on 4/4 and waking up on the floor around 14:00 on 4/5.  Upon awakening, she noted that her right lower extremity was weak and "dragging."  She was able to help herself up off the floor to call for help.  She has difficulty ambulating and typically uses a rollator for assistance at home, but found that she was having even more trouble than usual.  On presentation, she also complains of a sore throat, a cough with sometimes-green, sometimes-white sputum, decreased appetite with weight loss for the "past few months," and one week of dysuria.    She was admitted to [facility] for post-stroke care and treatment of mild rhabdomyolysis.  She was started on IV normal saline with interval improvement in her CK levels.  MRI-Brain was performed, showed a subacute infarct in her right YUDITH distribution that was incongruent with her symptoms of RUE and RLE weakness.  TTE with bubble study was performed, which showed a LVEF of 60% and no intracardiac shunting.  CTA of her Head and Neck was performed, which showed the right-sided subacute YUDITH infarct and multiple solid pulmonary nodules but no significant carotid stenosis.  EEG was obtained, which was unremarkable.  Given continued symptoms of weakness and paresthesia in her RLE, MRI w/wo contrast was performed of her entire spine.  The spine MRI studies were without acute findings but did show a chronic L1 compression fracture.    She worked with PT, OT, and SLP while in house.  She showed improvement.  Placement was initially sought in inpatient rehab, but the patient wanted to return home so home PT/OT were established instead.    On day of discharge, she was afebrile with stable vital signs.  She was discharged home " with home PT/OT and close follow up.  She was encouraged to follow up with her Neurologist for possible EMU admission given her unusual falls at home.    For the full HPI please refer to the History & Physical from this admission.    Hospital Course By Problem with Pertinent Findings     Cerebrovascular Accident with acute right-sided weakness  -woke up around 14:00 on 4/5 with weakness in her upper and lower extremities; presented to ED around 17:00 with no clear last known normal  -Code Stroke activated upon arrival to ED; Vascular Neurology also consulted  -CBC with slight normocytic anemia, CMP with hyperglycemia and chronically low bicarbonate  -TSH, PT/INR, troponin I all WNL  -Lipid panel with LDL 82, HDL 49, TGs 115, total cholesterol 154  -CTH negative for acute infarct, hemorrhage, or mass  -EKG with normal sinus rhythm  -loaded with Plavix upon arrival; home low-dose ASA continued  -CTA Head and Neck negative for significant stenosis  -TTE with Bubble Study showed LVEF of 60%, no intracardiac shunting  -MRI-Brain w/wo contrast showed subacute R YUDITH infarct, which was incongruent with patient's RUE and RLE weakness  -MRI of entire spine performed as patient's weakness was incongruent with brain imaging; no acute findings noted  -PT/OT/SLP worked with patient, recommended IPR admission however patient wanted to return home instead  -home PT/OT established for patient  -high-intensity statin therapy started; discharged with Lipitor 40mg daily  -discharged on Plavix 75mg daily for a therapy duration of 21 days total; after completion, will be on ASA monotherapy per Vascular Neurology recs  -continued ASA 81mg daily  -will follow up with Neurology, Vascular Neurology     Traumatic Rhabdomyolysis, resolved  -CPK at 1408 on presentation  -given IV fluids  -CPK trended down, rhabdomyolysis resolved     Essential Hypertension  -home regimen includes HCTZ 12.5mg daily, losartan 100mg daily, and and verapamil 180mg  daily  -holding home meds given concern for possible CVA to allow for permissive HTN     Hyperlipidemia  -previously on pravastatin 20mg daily at home  -lipid panel done on arrival showing overall good control with LDL slightly above goal at 82  -started on Lipitor 40mg daily and continued at discharge     Headache and Neck Pain  -reported bifrontal headache since awakening on 4/5  -no meningeal signs on exam, CTH and MRI negative for mass, significant hemorrhage, or meningeal enhancement  -HA improved early in hospitalization, however patient complained of of neck pain that has been present for months  -tried cyclobenzaprine 10mg as one-time dose, but did not help  -Lidocaine patches relieved neck pain; prescribed at discharge    Chronic Back Pain  -patient reports long-standing history of lower back pain  -continued home Norco PRN    Chronic L1 Vertebral Compression Fracture  -noted incidentally on MRI of her T-spine; noted as far back as 2017 on imaging per chart review  -no further inpatient intervention needed  -can follow up outpatient with spine specialist     Type II Diabetes Mellitus, complicated by peripheral neuropathy  -last A1c in 12/2021 was 7.3%; repeat A1c on admission 10.3%  -home regimen includes metformin 1000mg BID; holding  -continued atorvastatin 40mg daily  -continued home Lyrica 150mg BID  -on Lantus 35U daily while in house  -SSI in house     COPD, Stage Unknown  -home regimen includes albuterol, Duo-Nebs, and Trelegy  -not on home oxygen; SpO2 at goal while on room air during initial evaluation  -was on low-flow NC for comfort at ~2L; no true medical indication for oxygen noted  -currently smoking tobacco, trying to quit  -smoking cessation counseling offered  -Duo-Nebs ordered PRN    Pulmonary Nodules  -noted incidentally on CTA Head and Neck  -has longstanding smoking history  -follows with Pulmonologist at [facility]     Seizure Disorder  -continued home topiramate 50mg BID, zonisamide 200mg  "BID  -EEG done while inpatient did not show any seizure activity  -patient may benefit from EMU admission arranged by Neurologist     Migraines  -continued home topiramate 50mg BID     Depression  -home regimen includes Cymbalta 90mg daily, Seroquel 300mg qhs  -continued home Cymbalta, Seroquel     Anxiety   -home regimen includes Cymbalta 90mg daily, Seroquel 300mg qhs, and Xanax 0.5mg BID PRN  -held home Xanax  -continued home Cymbalta, Seroquel     Health Care Maintenance  - Screening tests needed: A1c, colonoscopy (due 7/2022)    Discharge Medications        Medication List      START taking these medications    aspirin 81 MG Chew  Take 1 tablet (81 mg total) by mouth once daily.  Replaces: aspirin 81 MG EC tablet     atorvastatin 40 MG tablet  Commonly known as: LIPITOR  Take 1 tablet (40 mg total) by mouth once daily.     clopidogreL 75 mg tablet  Commonly known as: PLAVIX  Take 1 tablet (75 mg total) by mouth once daily. Take medicine for 15 days.     LIDOcaine 5 %  Commonly known as: LIDODERM  Place 1 patch onto the skin once daily. Remove & Discard patch within 12 hours or as directed by MD        CONTINUE taking these medications    albuterol 90 mcg/actuation inhaler  Commonly known as: PROVENTIL/VENTOLIN HFA  INHALE 2 PUFFS BY MOUTH INTO THE LUNGS EVERY 6 HOURS AS NEEDED FOR WHEEZING     albuterol-ipratropium 2.5 mg-0.5 mg/3 mL nebulizer solution  Commonly known as: DUO-NEB  Take 3 mLs by nebulization every 4 (four) hours as needed for Wheezing or Shortness of Breath. Rescue     alendronate 70 MG tablet  Commonly known as: FOSAMAX  Take 1 tablet (70 mg total) by mouth every 7 days.     BD ULTRA-FINE MINI PEN NEEDLE 31 gauge x 3/16" Ndle  Generic drug: pen needle, diabetic     benzonatate 200 MG capsule  Commonly known as: TESSALON  Take 1 capsule (200 mg total) by mouth 3 (three) times daily as needed for Cough.     diclofenac sodium 1 % Gel  Commonly known as: VOLTAREN  Apply 2 g topically 4 (four) times " daily.     * DULoxetine 60 MG capsule  Commonly known as: CYMBALTA     * DULoxetine 30 MG capsule  Commonly known as: CYMBALTA     ferrous sulfate 325 mg (65 mg iron) Tab tablet  Commonly known as: FEOSOL  Take 1 tablet (325 mg total) by mouth once daily.     fluticasone propionate 50 mcg/actuation nasal spray  Commonly known as: FLONASE  2 sprays (100 mcg total) by Each Nare route once daily.     hydroCHLOROthiazide 12.5 mg capsule  Commonly known as: MICROZIDE  Take 1 capsule (12.5 mg total) by mouth once daily.     HYDROcodone-acetaminophen  mg per tablet  Commonly known as: NORCO  Take 1 tablet by mouth every 6 (six) hours as needed for Pain.     insulin lispro protamin-lispro 100 unit/mL (75-25) Inpn     losartan 50 MG tablet  Commonly known as: COZAAR  Take 1 tablet (50 mg total) by mouth once daily.     magnesium oxide 400 mg (241.3 mg magnesium) tablet  Commonly known as: MAG-OX     metFORMIN 1000 MG tablet  Commonly known as: GLUCOPHAGE     nitroGLYCERIN 0.4 MG SL tablet  Commonly known as: NITROSTAT  Place 1 tablet (0.4 mg total) under the tongue every 5 (five) minutes as needed for Chest pain.     ondansetron 4 MG Tbdl  Commonly known as: ZOFRAN-ODT  Take 1 tablet (4 mg total) by mouth every 8 (eight) hours as needed (nausea).     potassium chloride SA 20 MEQ tablet  Commonly known as: K-DUR,KLOR-CON  Take 1 tablet (20 mEq total) by mouth once daily.     pregabalin 150 MG capsule  Commonly known as: LYRICA     QUEtiapine 300 MG Tab  Commonly known as: SEROQUEL     tiZANidine 4 MG tablet  Commonly known as: ZANAFLEX     topiramate 50 MG tablet  Commonly known as: TOPAMAX     TRELEGY ELLIPTA 200-62.5-25 mcg inhaler  Generic drug: fluticasone-umeclidin-vilanter     verapamiL 180 MG CR tablet  Commonly known as: CALAN-SR  Take 1 tablet (180 mg total) by mouth every evening.     zonisamide 100 MG Cap  Commonly known as: ZONEGRAN         * This list has 2 medication(s) that are the same as other  medications prescribed for you. Read the directions carefully, and ask your doctor or other care provider to review them with you.            STOP taking these medications    aspirin 81 MG EC tablet  Commonly known as: ECOTRIN  Replaced by: aspirin 81 MG Chew     CHANTIX STARTING MONTH BOX 0.5 mg (11)- 1 mg (42) tablet  Generic drug: varenicline     cholestyramine (with sugar) 4 gram Powd     LORazepam 0.5 MG tablet  Commonly known as: ATIVAN     pravastatin 40 MG tablet  Commonly known as: PRAVACHOL     varenicline 1 mg Tab  Commonly known as: CHANTIX           Where to Get Your Medications      These medications were sent to [id number] - [place]  [address] AT [facility]  [address] [address]    Phone: [phone]   · aspirin 81 MG Chew  · atorvastatin 40 MG tablet  · clopidogreL 75 mg tablet  · LIDOcaine 5 %          Discharge Information:   Diet:  Cardiac    Physical Activity:  As tolerated             Instructions:  1. Take all medications as prescribed  2. Keep all follow-up appointments  3. Return to the hospital or call your primary care physicians if any worsening symptoms such as fever, chest pain, shortness of breath, return of symptoms, or any other concerns.    Follow-Up Appointments:  PCP  Neurology  Pulmonology  Vascular Neurology    Sundeep Frank M.D.  [facility] Internal Medicine PGY-1

## 2022-04-13 RX ORDER — LIDOCAINE 50 MG/G
1 PATCH TOPICAL DAILY
Qty: 30 PATCH | Refills: 0 | Status: SHIPPED | OUTPATIENT
Start: 2022-04-13 | End: 2022-04-14 | Stop reason: SDUPTHER

## 2022-04-13 NOTE — TELEPHONE ENCOUNTER
----- Message from Melchor Sanchez sent at 4/13/2022  2:24 PM CDT -----  Type:  Needs Medical Advice    Who Called: self  Reason:requesting lidocaine patches for pain inn her neck   Would the patient rather a call back or a response via [facility]? #call  Best Call Back Number:[phone]  Additional Information: none

## 2022-04-14 ENCOUNTER — TELEPHONE (OUTPATIENT)
Dept: INTERNAL MEDICINE | Facility: CLINIC | Age: 72
End: 2022-04-14
Payer: MEDICARE

## 2022-04-14 RX ORDER — LIDOCAINE 50 MG/G
1 PATCH TOPICAL DAILY
Qty: 30 PATCH | Refills: 0 | Status: SHIPPED | OUTPATIENT
Start: 2022-04-14 | End: 2022-05-09

## 2022-04-14 NOTE — TELEPHONE ENCOUNTER
----- Message from Hattie Dalton sent at 4/14/2022 11:26 AM CDT -----  Contact: [phone]  Type:  Needs Medical Advice    Who Called: Pt   Reason for call: regarding the Pharmacy is waiting on a Response from the Dr to Fill LIDOcaine (LIDODERM) 5 %  Pharmacy name and phone #:  [facility]  Would the patient rather a call back or a response via [facility]? Call back  Best Call Back Number: [phone]

## 2022-04-18 ENCOUNTER — TELEPHONE (OUTPATIENT)
Dept: INTERNAL MEDICINE | Facility: CLINIC | Age: 72
End: 2022-04-18
Payer: MEDICARE

## 2022-04-18 NOTE — TELEPHONE ENCOUNTER
----- Message from Aliza Meyers sent at 4/18/2022  8:22 AM CDT -----  Regarding: Call back  Contact: [phone]  Who Called: PT     Patient is calling to talk to nurse in regards to her LIDOcaine (LIDODERM) 5 % 30 patch and needs a prior authorization. Patient says she is in severe pain. Please advice

## 2022-04-18 NOTE — TELEPHONE ENCOUNTER
[facility] [place]   Team Member- Radha Garcia informed me that the medication is not in stock and will be in tomorrow.    I informed the patient and she voiced understanding.

## 2022-04-18 NOTE — TELEPHONE ENCOUNTER
Informed the patient that the pharmacy is currently working on her Prior Authorization form. PT voiced understanding.

## 2022-04-18 NOTE — TELEPHONE ENCOUNTER
[facility]     Pharmacy Staff: RENEE         I to Renee and she said that the pharmacy is currently working on the patient's prior authorization form.

## 2022-04-18 NOTE — TELEPHONE ENCOUNTER
Mrs. Chung stated that her niece was not allowed to  her prescription because the pharmacy stated that the patient need a prior auth completed.

## 2022-04-19 ENCOUNTER — PATIENT OUTREACH (OUTPATIENT)
Dept: ADMINISTRATIVE | Facility: OTHER | Age: 72
End: 2022-04-19
Payer: MEDICARE

## 2022-04-19 ENCOUNTER — TELEPHONE (OUTPATIENT)
Dept: PHARMACY | Facility: CLINIC | Age: 72
End: 2022-04-19
Payer: MEDICARE

## 2022-04-19 NOTE — PROGRESS NOTES
Health Maintenance Due   Topic Date Due    Foot Exam  08/26/2020    Pneumococcal Vaccines (Age 65+) (2 - PCV) 09/28/2021    Diabetes Urine Screening  01/05/2022     Updates were requested from care everywhere.  Chart was reviewed for overdue Proactive [facility] Encounters (RALF) topics (CRS, Breast Cancer Screening, Eye exam)  Health Maintenance has been updated.  LINKS immunization registry triggered.  Immunizations were reconciled.

## 2022-04-19 NOTE — PROGRESS NOTES
IP Liaison - Final Visit Note    Patient: Chayito Chung  MRN:  [id number]  Date of Service:  4/19/2022  Completed by:  NILAY Christy    Reason for Visit   Patient presents with    IP Liaison Chart Review       Patient Summary     Discharge Date: 4/10/2022  Discharge telephone number/address: [phone] / [address] [facility] [address]  Follow up provider: Curt Valladares III, MD / Randell Mtz MD / Fritz Cadena MD  Follow up appointments: 4/11/2022 @ 10:00am / 4/20/2022 @ 9:00am / 5/19/2022 @ 9:00am  Home Health agency & telephone number: [facility]  DME ordered &  name: n/a  Assigned OPCM RN/SW: n/a  Report sent to follow up team (PCP/OPCM) via in basket message: n/a  Community Resources arranged including agency name & contact info: referred pt to [facility] for [employer]      NILAY Christy

## 2022-04-21 ENCOUNTER — TELEPHONE (OUTPATIENT)
Dept: CARDIOLOGY | Facility: CLINIC | Age: 72
End: 2022-04-21
Payer: MEDICARE

## 2022-04-21 ENCOUNTER — TELEPHONE (OUTPATIENT)
Dept: FAMILY MEDICINE | Facility: CLINIC | Age: 72
End: 2022-04-21
Payer: MEDICARE

## 2022-04-21 NOTE — TELEPHONE ENCOUNTER
[phone]    Gave the patient the number for Cardio clinic and informed her to call them so someone can assist her with her Holter monitor.

## 2022-04-21 NOTE — TELEPHONE ENCOUNTER
Returned call to patient. No answer.  Left callback message.      ----- Message from Jimena Kumari sent at 4/21/2022 11:18 AM CDT -----  Regarding: Patient call back  Who called:MARYBEL RODRIGUEZ [[id number]]    What is the request in detail: Patient is requesting a call back. Pt was recently discharged from the hospital with a stroke. She states she was scheduled to see a different provider yesterday but she refused. She would like a follow up appt sooner than soonest appt in July. She would lie to further discuss.   Please advise.    Can the clinic reply by [facility]? No    Best call back number: [phone]     Additional Information: N/A

## 2022-04-21 NOTE — TELEPHONE ENCOUNTER
----- Message from Zamzam Monroy sent at 4/21/2022 10:24 AM CDT -----  Type:  Patient Returning Call    Who Called:Pt   Would the patient rather a call back or a response via [facility]? Call   Best Call Back Number:[phone]  Additional Information:     Pt would like a call back regarding heart monitor   Pt stated she doesn't know how to put it on

## 2022-04-29 ENCOUNTER — EXTERNAL HOME HEALTH (OUTPATIENT)
Dept: HOME HEALTH SERVICES | Facility: HOSPITAL | Age: 72
End: 2022-04-29
Payer: MEDICARE

## 2022-04-30 NOTE — SUBJECTIVE & OBJECTIVE
"Principal Problem:Cervical myelopathy    Principal Orthopedic Problem: s/p PCF C3-T1    Interval History: NAEON. CPAP overnight. 94% on 3L NC this am. Sergio PO. Pt still reporting difficulty urinatiny. 4 steps with PT.     Review of patient's allergies indicates:  No Known Allergies    Current Facility-Administered Medications   Medication    acetaminophen tablet 1,000 mg    amitriptyline tablet 20 mg    dextrose 50% injection 12.5 g    dextrose 50% injection 25 g    docusate sodium capsule 50 mg    DULoxetine DR capsule 90 mg    fluticasone propionate 50 mcg/actuation nasal spray 100 mcg    glucagon (human recombinant) injection 1 mg    glucose chewable tablet 16 g    glucose chewable tablet 24 g    hydroCHLOROthiazide tablet 25 mg    insulin aspart U-100 pen 1-10 Units    insulin detemir U-100 pen 20 Units    methocarbamoL tablet 500 mg    ondansetron injection 8 mg    oxyCODONE immediate release tablet 5 mg    potassium chloride SA CR tablet 20 mEq    pravastatin tablet 20 mg    pregabalin capsule 75 mg    senna-docusate 8.6-50 mg per tablet 1 tablet    sodium chloride 0.9% flush 5 mL    verapamiL CR tablet 240 mg    zonisamide capsule 200 mg     Objective:     Vital Signs (Most Recent):  Temp: 98.3 °F (36.8 °C) (07/28/20 0820)  Pulse: (!) 115 (07/28/20 0820)  Resp: 18 (07/28/20 0419)  BP: (!) 143/92 (07/28/20 0820)  SpO2: 95 % (07/28/20 0820) Vital Signs (24h Range):  Temp:  [96 °F (35.6 °C)-98.3 °F (36.8 °C)] 98.3 °F (36.8 °C)  Pulse:  [104-119] 115  Resp:  [14-18] 18  SpO2:  [91 %-96 %] 95 %  BP: (135-150)/(81-93) 143/92     Weight: 66.7 kg (147 lb)  Height: 5' 2" (157.5 cm)  Body mass index is 26.89 kg/m².      Intake/Output Summary (Last 24 hours) at 7/28/2020 0945  Last data filed at 7/28/2020 0500  Gross per 24 hour   Intake 700 ml   Output 1475 ml   Net -775 ml       Ortho/SPM Exam     C collar in place  Posterior cervical dressing in place c/d/i  Drain with 50ccs of SS output to " gravity   Neuro exam stable       Significant Labs:   CBC:   Recent Labs   Lab 07/27/20  0359 07/28/20  0442   WBC 5.39 6.83   HGB 11.6* 12.3   HCT 38.8 39.8    247     CMP:   Recent Labs   Lab 07/27/20  0359 07/28/20  0442   * 134*   K 3.5 3.5   CL 99 98   CO2 25 23   * 209*   BUN 9 10   CREATININE 0.7 0.8   CALCIUM 9.8 10.9*   ANIONGAP 10 13   EGFRNONAA >60.0 >60.0     All pertinent labs within the past 24 hours have been reviewed.    Significant Imaging: I have reviewed and interpreted all pertinent imaging results/findings.   Patient/Caregiver provided printed discharge information.

## 2022-05-02 ENCOUNTER — TELEPHONE (OUTPATIENT)
Dept: SMOKING CESSATION | Facility: CLINIC | Age: 72
End: 2022-05-02
Payer: MEDICARE

## 2022-05-02 NOTE — TELEPHONE ENCOUNTER
Attempted to call patient for their smoking cessation appointment. Left a message with my contact information to reschedule the appointment  Mey Cano [phone].

## 2022-05-09 ENCOUNTER — HOSPITAL ENCOUNTER (INPATIENT)
Facility: HOSPITAL | Age: 72
LOS: 4 days | Discharge: HOME | DRG: 565 | End: 2022-05-13
Attending: EMERGENCY MEDICINE | Admitting: INTERNAL MEDICINE
Payer: MEDICARE

## 2022-05-09 DIAGNOSIS — R29.898 WEAKNESS OF BOTH LOWER EXTREMITIES: ICD-10-CM

## 2022-05-09 DIAGNOSIS — R33.9 URINARY RETENTION: ICD-10-CM

## 2022-05-09 DIAGNOSIS — R29.6 RECURRENT FALLS: ICD-10-CM

## 2022-05-09 DIAGNOSIS — Z79.4 TYPE 2 DIABETES MELLITUS WITH OTHER CIRCULATORY COMPLICATION, WITH LONG-TERM CURRENT USE OF INSULIN: ICD-10-CM

## 2022-05-09 DIAGNOSIS — R05.9 COUGH: ICD-10-CM
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
## 2022-05-09 DIAGNOSIS — B37.49 CANDIDAL UTI (URINARY TRACT INFECTION): ICD-10-CM

## 2022-05-09 DIAGNOSIS — I63.9 CEREBROVASCULAR ACCIDENT (CVA), UNSPECIFIED MECHANISM: ICD-10-CM

## 2022-05-09 DIAGNOSIS — E11.59 TYPE 2 DIABETES MELLITUS WITH OTHER CIRCULATORY COMPLICATION, WITH LONG-TERM CURRENT USE OF INSULIN: ICD-10-CM

## 2022-05-09 DIAGNOSIS — M54.9 DORSALGIA, UNSPECIFIED: ICD-10-CM

## 2022-05-09 DIAGNOSIS — R29.898 WEAKNESS OF LEFT LOWER EXTREMITY: ICD-10-CM

## 2022-05-09 DIAGNOSIS — R53.81 PHYSICAL DECONDITIONING: ICD-10-CM

## 2022-05-09 DIAGNOSIS — T79.6XXA TRAUMATIC RHABDOMYOLYSIS, INITIAL ENCOUNTER: Primary | ICD-10-CM

## 2022-05-09 DIAGNOSIS — M21.371 FOOT DROP, RIGHT: ICD-10-CM

## 2022-05-09 DIAGNOSIS — R53.1 WEAKNESS: ICD-10-CM

## 2022-05-09 DIAGNOSIS — R29.898 RIGHT LEG WEAKNESS: ICD-10-CM

## 2022-05-09 DIAGNOSIS — R26.2 IMPAIRED AMBULATION: ICD-10-CM

## 2022-05-09 LAB
ALBUMIN SERPL BCP-MCNC: 3.4 G/DL (ref 3.5–5.2)
ALP SERPL-CCNC: 139 U/L (ref 55–135)
ALT SERPL W/O P-5'-P-CCNC: 22 U/L (ref 10–44)
ANION GAP SERPL CALC-SCNC: 13 MMOL/L (ref 8–16)
AST SERPL-CCNC: 50 U/L (ref 10–40)
B-OH-BUTYR BLD STRIP-SCNC: 0.1 MMOL/L (ref 0–0.5)
BASOPHILS # BLD AUTO: 0.01 K/UL (ref 0–0.2)
BASOPHILS NFR BLD: 0.1 % (ref 0–1.9)
BILIRUB SERPL-MCNC: 0.2 MG/DL (ref 0.1–1)
BILIRUB UR QL STRIP: NEGATIVE
BUN SERPL-MCNC: 27 MG/DL (ref 8–23)
CALCIUM SERPL-MCNC: 9.2 MG/DL (ref 8.7–10.5)
CHLORIDE SERPL-SCNC: 107 MMOL/L (ref 95–110)
CK SERPL-CCNC: 3832 U/L (ref 20–180)
CLARITY UR: ABNORMAL
CO2 SERPL-SCNC: 18 MMOL/L (ref 23–29)
COLOR UR: YELLOW
CREAT SERPL-MCNC: 1.3 MG/DL (ref 0.5–1.4)
CTP QC/QA: YES
DIFFERENTIAL METHOD: ABNORMAL
EOSINOPHIL # BLD AUTO: 0.1 K/UL (ref 0–0.5)
EOSINOPHIL NFR BLD: 0.7 % (ref 0–8)
ERYTHROCYTE [DISTWIDTH] IN BLOOD BY AUTOMATED COUNT: 16.3 % (ref 11.5–14.5)
EST. GFR  (AFRICAN AMERICAN): 48 ML/MIN/1.73 M^2
EST. GFR  (NON AFRICAN AMERICAN): 42 ML/MIN/1.73 M^2
ESTIMATED AVG GLUCOSE: 235 MG/DL (ref 68–131)
FERRITIN SERPL-MCNC: 13 NG/ML (ref 20–300)
GLUCOSE SERPL-MCNC: 188 MG/DL (ref 70–110)
GLUCOSE UR QL STRIP: ABNORMAL
HBA1C MFR BLD: 9.8 % (ref 4–5.6)
HCT VFR BLD AUTO: 29.5 % (ref 37–48.5)
HGB BLD-MCNC: 8.6 G/DL (ref 12–16)
HGB UR QL STRIP: NEGATIVE
HYALINE CASTS #/AREA URNS LPF: 8 /LPF
IMM GRANULOCYTES # BLD AUTO: 0.04 K/UL (ref 0–0.04)
IMM GRANULOCYTES NFR BLD AUTO: 0.5 % (ref 0–0.5)
KETONES UR QL STRIP: NEGATIVE
LEUKOCYTE ESTERASE UR QL STRIP: ABNORMAL
LYMPHOCYTES # BLD AUTO: 1.4 K/UL (ref 1–4.8)
LYMPHOCYTES NFR BLD: 18.6 % (ref 18–48)
MAGNESIUM SERPL-MCNC: 1.8 MG/DL (ref 1.6–2.6)
MCH RBC QN AUTO: 25.1 PG (ref 27–31)
MCHC RBC AUTO-ENTMCNC: 29.2 G/DL (ref 32–36)
MCV RBC AUTO: 86 FL (ref 82–98)
MICROSCOPIC COMMENT: ABNORMAL
MONOCYTES # BLD AUTO: 0.7 K/UL (ref 0.3–1)
MONOCYTES NFR BLD: 9 % (ref 4–15)
NEUTROPHILS # BLD AUTO: 5.3 K/UL (ref 1.8–7.7)
NEUTROPHILS NFR BLD: 71.1 % (ref 38–73)
NITRITE UR QL STRIP: NEGATIVE
NRBC BLD-RTO: 0 /100 WBC
PH UR STRIP: 5 [PH] (ref 5–8)
PLATELET # BLD AUTO: 233 K/UL (ref 150–450)
PMV BLD AUTO: 10.5 FL (ref 9.2–12.9)
POCT GLUCOSE: 125 MG/DL (ref 70–110)
POCT GLUCOSE: 180 MG/DL (ref 70–110)
POCT GLUCOSE: 265 MG/DL (ref 70–110)
POTASSIUM SERPL-SCNC: 4.6 MMOL/L (ref 3.5–5.1)
PROT SERPL-MCNC: 7.3 G/DL (ref 6–8.4)
PROT UR QL STRIP: ABNORMAL
RBC # BLD AUTO: 3.43 M/UL (ref 4–5.4)
SARS-COV-2 RDRP RESP QL NAA+PROBE: NEGATIVE
SODIUM SERPL-SCNC: 138 MMOL/L (ref 136–145)
SP GR UR STRIP: 1.01 (ref 1–1.03)
TROPONIN I SERPL DL<=0.01 NG/ML-MCNC: <0.006 NG/ML (ref 0–0.03)
URN SPEC COLLECT METH UR: ABNORMAL
UROBILINOGEN UR STRIP-ACNC: NEGATIVE EU/DL
WBC # BLD AUTO: 7.43 K/UL (ref 3.9–12.7)
WBC #/AREA URNS HPF: 16 /HPF (ref 0–5)
YEAST URNS QL MICRO: ABNORMAL

## 2022-05-09 PROCEDURE — 82962 GLUCOSE BLOOD TEST: CPT

## 2022-05-09 PROCEDURE — 85025 COMPLETE CBC W/AUTO DIFF WBC: CPT | Performed by: EMERGENCY MEDICINE

## 2022-05-09 PROCEDURE — 93010 EKG 12-LEAD: ICD-10-PCS | Mod: ,,, | Performed by: INTERNAL MEDICINE

## 2022-05-09 PROCEDURE — 99285 EMERGENCY DEPT VISIT HI MDM: CPT | Mod: 25

## 2022-05-09 PROCEDURE — 82550 ASSAY OF CK (CPK): CPT | Performed by: EMERGENCY MEDICINE

## 2022-05-09 PROCEDURE — 96360 HYDRATION IV INFUSION INIT: CPT

## 2022-05-09 PROCEDURE — 81000 URINALYSIS NONAUTO W/SCOPE: CPT | Performed by: EMERGENCY MEDICINE

## 2022-05-09 PROCEDURE — 11000001 HC ACUTE MED/SURG PRIVATE ROOM

## 2022-05-09 PROCEDURE — 93010 ELECTROCARDIOGRAM REPORT: CPT | Mod: ,,, | Performed by: INTERNAL MEDICINE

## 2022-05-09 PROCEDURE — 83735 ASSAY OF MAGNESIUM: CPT | Performed by: EMERGENCY MEDICINE

## 2022-05-09 PROCEDURE — 96372 THER/PROPH/DIAG INJ SC/IM: CPT | Performed by: STUDENT IN AN ORGANIZED HEALTH CARE EDUCATION/TRAINING PROGRAM

## 2022-05-09 PROCEDURE — 25000003 PHARM REV CODE 250: Performed by: EMERGENCY MEDICINE

## 2022-05-09 PROCEDURE — 80053 COMPREHEN METABOLIC PANEL: CPT | Performed by: EMERGENCY MEDICINE

## 2022-05-09 PROCEDURE — 87106 FUNGI IDENTIFICATION YEAST: CPT | Performed by: EMERGENCY MEDICINE

## 2022-05-09 PROCEDURE — 93005 ELECTROCARDIOGRAM TRACING: CPT

## 2022-05-09 PROCEDURE — 25000003 PHARM REV CODE 250: Performed by: STUDENT IN AN ORGANIZED HEALTH CARE EDUCATION/TRAINING PROGRAM

## 2022-05-09 PROCEDURE — 87086 URINE CULTURE/COLONY COUNT: CPT | Performed by: EMERGENCY MEDICINE

## 2022-05-09 PROCEDURE — 82010 KETONE BODYS QUAN: CPT | Performed by: EMERGENCY MEDICINE

## 2022-05-09 PROCEDURE — 87088 URINE BACTERIA CULTURE: CPT | Performed by: EMERGENCY MEDICINE

## 2022-05-09 PROCEDURE — 63600175 PHARM REV CODE 636 W HCPCS: Performed by: STUDENT IN AN ORGANIZED HEALTH CARE EDUCATION/TRAINING PROGRAM

## 2022-05-09 PROCEDURE — 83036 HEMOGLOBIN GLYCOSYLATED A1C: CPT | Performed by: STUDENT IN AN ORGANIZED HEALTH CARE EDUCATION/TRAINING PROGRAM

## 2022-05-09 PROCEDURE — 82607 VITAMIN B-12: CPT | Performed by: STUDENT IN AN ORGANIZED HEALTH CARE EDUCATION/TRAINING PROGRAM

## 2022-05-09 PROCEDURE — 82746 ASSAY OF FOLIC ACID SERUM: CPT | Performed by: STUDENT IN AN ORGANIZED HEALTH CARE EDUCATION/TRAINING PROGRAM

## 2022-05-09 PROCEDURE — 82728 ASSAY OF FERRITIN: CPT | Performed by: STUDENT IN AN ORGANIZED HEALTH CARE EDUCATION/TRAINING PROGRAM

## 2022-05-09 PROCEDURE — U0002 COVID-19 LAB TEST NON-CDC: HCPCS | Performed by: EMERGENCY MEDICINE

## 2022-05-09 PROCEDURE — 84466 ASSAY OF TRANSFERRIN: CPT | Performed by: STUDENT IN AN ORGANIZED HEALTH CARE EDUCATION/TRAINING PROGRAM

## 2022-05-09 PROCEDURE — 84484 ASSAY OF TROPONIN QUANT: CPT | Performed by: EMERGENCY MEDICINE

## 2022-05-09 RX ORDER — HYDROCHLOROTHIAZIDE 12.5 MG/1
12.5 TABLET ORAL DAILY
Refills: 3 | Status: DISCONTINUED | OUTPATIENT
Start: 2022-05-10 | End: 2022-05-13 | Stop reason: HOSPADM

## 2022-05-09 RX ORDER — GLUCAGON 1 MG
1 KIT INJECTION
Status: DISCONTINUED | OUTPATIENT
Start: 2022-05-09 | End: 2022-05-10

## 2022-05-09 RX ORDER — QUETIAPINE FUMARATE 400 MG/1
400 TABLET, FILM COATED ORAL NIGHTLY
Status: ON HOLD | COMMUNITY
Start: 2022-03-31 | End: 2022-05-12 | Stop reason: HOSPADM

## 2022-05-09 RX ORDER — FLUCONAZOLE 150 MG/1
150 TABLET ORAL
Status: COMPLETED | OUTPATIENT
Start: 2022-05-09 | End: 2022-05-09

## 2022-05-09 RX ORDER — SODIUM CHLORIDE, SODIUM LACTATE, POTASSIUM CHLORIDE, CALCIUM CHLORIDE 600; 310; 30; 20 MG/100ML; MG/100ML; MG/100ML; MG/100ML
INJECTION, SOLUTION INTRAVENOUS CONTINUOUS
Status: DISCONTINUED | OUTPATIENT
Start: 2022-05-09 | End: 2022-05-10

## 2022-05-09 RX ORDER — ENOXAPARIN SODIUM 100 MG/ML
40 INJECTION SUBCUTANEOUS EVERY 24 HOURS
Status: DISCONTINUED | OUTPATIENT
Start: 2022-05-09 | End: 2022-05-13 | Stop reason: HOSPADM

## 2022-05-09 RX ORDER — VERAPAMIL HYDROCHLORIDE 180 MG/1
180 TABLET, FILM COATED, EXTENDED RELEASE ORAL NIGHTLY
Status: DISCONTINUED | OUTPATIENT
Start: 2022-05-09 | End: 2022-05-13 | Stop reason: HOSPADM

## 2022-05-09 RX ORDER — LOSARTAN POTASSIUM 50 MG/1
100 TABLET ORAL DAILY
Status: DISCONTINUED | OUTPATIENT
Start: 2022-05-10 | End: 2022-05-13 | Stop reason: HOSPADM

## 2022-05-09 RX ORDER — TALC
6 POWDER (GRAM) TOPICAL NIGHTLY PRN
Status: DISCONTINUED | OUTPATIENT
Start: 2022-05-09 | End: 2022-05-13 | Stop reason: HOSPADM

## 2022-05-09 RX ORDER — FLUTICASONE PROPIONATE 50 MCG
2 SPRAY, SUSPENSION (ML) NASAL DAILY
Status: DISCONTINUED | OUTPATIENT
Start: 2022-05-10 | End: 2022-05-13 | Stop reason: HOSPADM

## 2022-05-09 RX ORDER — CLOPIDOGREL BISULFATE 75 MG/1
75 TABLET ORAL DAILY
Status: DISCONTINUED | OUTPATIENT
Start: 2022-05-10 | End: 2022-05-13 | Stop reason: HOSPADM

## 2022-05-09 RX ORDER — SODIUM CHLORIDE 0.9 % (FLUSH) 0.9 %
10 SYRINGE (ML) INJECTION
Status: DISCONTINUED | OUTPATIENT
Start: 2022-05-09 | End: 2022-05-13 | Stop reason: HOSPADM

## 2022-05-09 RX ORDER — NAPROXEN SODIUM 220 MG/1
81 TABLET, FILM COATED ORAL DAILY
Status: DISCONTINUED | OUTPATIENT
Start: 2022-05-10 | End: 2022-05-13 | Stop reason: HOSPADM

## 2022-05-09 RX ORDER — INSULIN ASPART 100 [IU]/ML
1-10 INJECTION, SOLUTION INTRAVENOUS; SUBCUTANEOUS
Status: DISCONTINUED | OUTPATIENT
Start: 2022-05-09 | End: 2022-05-13 | Stop reason: HOSPADM

## 2022-05-09 RX ORDER — DULOXETIN HYDROCHLORIDE 30 MG/1
90 CAPSULE, DELAYED RELEASE ORAL DAILY
Status: DISCONTINUED | OUTPATIENT
Start: 2022-05-10 | End: 2022-05-13 | Stop reason: HOSPADM

## 2022-05-09 RX ORDER — IBUPROFEN 200 MG
24 TABLET ORAL
Status: DISCONTINUED | OUTPATIENT
Start: 2022-05-09 | End: 2022-05-10

## 2022-05-09 RX ORDER — IPRATROPIUM BROMIDE AND ALBUTEROL SULFATE 2.5; .5 MG/3ML; MG/3ML
3 SOLUTION RESPIRATORY (INHALATION) EVERY 4 HOURS PRN
Status: ON HOLD | COMMUNITY
End: 2022-09-01 | Stop reason: CLARIF

## 2022-05-09 RX ORDER — ACETAMINOPHEN 325 MG/1
650 TABLET ORAL EVERY 6 HOURS PRN
Status: DISCONTINUED | OUTPATIENT
Start: 2022-05-09 | End: 2022-05-13 | Stop reason: HOSPADM

## 2022-05-09 RX ORDER — ATORVASTATIN CALCIUM 40 MG/1
40 TABLET, FILM COATED ORAL DAILY
Status: DISCONTINUED | OUTPATIENT
Start: 2022-05-10 | End: 2022-05-13 | Stop reason: HOSPADM

## 2022-05-09 RX ORDER — ZONISAMIDE 100 MG/1
200 CAPSULE ORAL 2 TIMES DAILY
Status: DISCONTINUED | OUTPATIENT
Start: 2022-05-09 | End: 2022-05-13 | Stop reason: HOSPADM

## 2022-05-09 RX ORDER — IBUPROFEN 200 MG
16 TABLET ORAL
Status: DISCONTINUED | OUTPATIENT
Start: 2022-05-09 | End: 2022-05-10

## 2022-05-09 RX ORDER — HYDROCODONE BITARTRATE AND ACETAMINOPHEN 7.5; 325 MG/1; MG/1
1 TABLET ORAL 2 TIMES DAILY PRN
Status: ON HOLD | COMMUNITY
Start: 2022-05-04 | End: 2022-05-12 | Stop reason: HOSPADM

## 2022-05-09 RX ORDER — PREGABALIN 75 MG/1
150 CAPSULE ORAL 2 TIMES DAILY PRN
Status: DISCONTINUED | OUTPATIENT
Start: 2022-05-09 | End: 2022-05-10

## 2022-05-09 RX ORDER — LOSARTAN POTASSIUM 50 MG/1
50 TABLET ORAL DAILY
Status: DISCONTINUED | OUTPATIENT
Start: 2022-05-10 | End: 2022-05-09

## 2022-05-09 RX ORDER — INSULIN GLARGINE 100 [IU]/ML
35 INJECTION, SOLUTION SUBCUTANEOUS NIGHTLY
Status: ON HOLD | COMMUNITY
Start: 2022-03-25 | End: 2022-05-12 | Stop reason: SDUPTHER

## 2022-05-09 RX ORDER — INSULIN LISPRO 100 [IU]/ML
35 INJECTION, SOLUTION INTRAVENOUS; SUBCUTANEOUS 2 TIMES DAILY
Status: ON HOLD | COMMUNITY
Start: 2022-03-24 | End: 2022-05-12 | Stop reason: SDUPTHER

## 2022-05-09 RX ORDER — PRAVASTATIN SODIUM 40 MG/1
40 TABLET ORAL DAILY
Status: DISCONTINUED | OUTPATIENT
Start: 2022-05-10 | End: 2022-05-09

## 2022-05-09 RX ORDER — TOPIRAMATE 25 MG/1
50 TABLET ORAL 2 TIMES DAILY
Status: DISCONTINUED | OUTPATIENT
Start: 2022-05-09 | End: 2022-05-13 | Stop reason: HOSPADM

## 2022-05-09 RX ORDER — HYDROCODONE BITARTRATE AND ACETAMINOPHEN 5; 325 MG/1; MG/1
1 TABLET ORAL EVERY 4 HOURS PRN
Status: DISCONTINUED | OUTPATIENT
Start: 2022-05-09 | End: 2022-05-10

## 2022-05-09 RX ORDER — IPRATROPIUM BROMIDE AND ALBUTEROL SULFATE 2.5; .5 MG/3ML; MG/3ML
3 SOLUTION RESPIRATORY (INHALATION) EVERY 6 HOURS PRN
Status: DISCONTINUED | OUTPATIENT
Start: 2022-05-09 | End: 2022-05-13 | Stop reason: HOSPADM

## 2022-05-09 RX ADMIN — VERAPAMIL HYDROCHLORIDE 180 MG: 180 TABLET ORAL at 09:05

## 2022-05-09 RX ADMIN — FLUCONAZOLE 150 MG: 150 TABLET ORAL at 06:05

## 2022-05-09 RX ADMIN — ZONISAMIDE 200 MG: 100 CAPSULE ORAL at 11:05

## 2022-05-09 RX ADMIN — ACETAMINOPHEN 650 MG: 325 TABLET ORAL at 09:05

## 2022-05-09 RX ADMIN — SODIUM CHLORIDE 1000 ML: 0.9 INJECTION, SOLUTION INTRAVENOUS at 03:05

## 2022-05-09 RX ADMIN — TOPIRAMATE 50 MG: 25 TABLET, FILM COATED ORAL at 11:05

## 2022-05-09 RX ADMIN — HYDROCODONE BITARTRATE AND ACETAMINOPHEN 1 TABLET: 5; 325 TABLET ORAL at 11:05

## 2022-05-09 RX ADMIN — ENOXAPARIN SODIUM 40 MG: 100 INJECTION SUBCUTANEOUS at 09:05

## 2022-05-09 RX ADMIN — SODIUM CHLORIDE, SODIUM LACTATE, POTASSIUM CHLORIDE, AND CALCIUM CHLORIDE: .6; .31; .03; .02 INJECTION, SOLUTION INTRAVENOUS at 09:05

## 2022-05-09 NOTE — ED NOTES
Patient placed on bedpan. Unable to void. Stated she has had to pee all day but has not been able to. Bladder distended. MD notified. Orders received.

## 2022-05-09 NOTE — ED TRIAGE NOTES
Patient arrived by  EMS. Report from EMS was that patient was at home when she fell, hit her head, weak, unable to get up. 911 called. Patient denied pain with EMS. Patient also told EMS her blood sugar was over 500 last night and she took her long acting insulin. With EMS blood sugar was 306.   Patient told RN after EMS left that she has a headache, neck pain (C spine tender), denies LOC, weakness and numbness to her legs. +kuszmaul breathing. Denies CP, SOB, dizziness, vision changes, worst HA of her life. Wears 2 L NC at home. On 2 L NC here.

## 2022-05-09 NOTE — ED PROVIDER NOTES
Encounter Date: 2022       History     Chief Complaint   Patient presents with    Weakness     Pt arrives EMS from home at [facility], pt complains of frequent falls pt aaox3, alos complains of generalized weakness      71-year-old female with history of recent CVA with right-sided weakness, coronary disease, COPD, diabetes, hypertension, hyperlipidemia presents for weakness and falls.  Patient states she woke up this morning and felt very unsteady.  She states she kept falling forward in spite of repeat attempts to stand.  During 1 of her fall she hit her head on the ground very hard.  She is complaining of posterior neck pain.    The history is provided by the patient and medical records.     Review of patient's allergies indicates:  No Known Allergies  Past Medical History:   Diagnosis Date    Acute coronary syndrome     Acute hypoxemic respiratory failure 2017    Anxiety     Asthma     Cancer     colon    Cataracts, both eyes     Chest pain at rest     Chest pain of uncertain etiology 2015    Colon cancer 1988    COPD (chronic obstructive pulmonary disease)     Coronary artery disease     Depression     Diabetes mellitus     Dyspnea on exertion 11/15/2018    Elevated brain natriuretic peptide (BNP) level 11/15/2018    Elevated cholesterol     Hypertension     Swelling     Syncope and collapse 2016     Past Surgical History:   Procedure Laterality Date    ABDOMINAL SURGERY      BREAST BIOPSY      benign unsure what side     SECTION, CLASSIC      COLON SURGERY      COLONOSCOPY  2019    repeat in 5 years    CORONARY ANGIOPLASTY WITH STENT PLACEMENT  3 months ago     x2, Hysterectomy, Lung surgery, Partial stomach removed, Part of colon removed for rectal cancer    GASTRECTOMY      HEMORRHOID SURGERY      HYSTERECTOMY      @28yrs of age    LUNG BIOPSY      OOPHORECTOMY      @28yrs of age    POSTERIOR FUSION OF CERVICAL SPINE WITH  LAMINECTOMY N/A 7/23/2020    Procedure: LAMINECTOMY, SPINE, CERVICAL, WITH POSTERIOR FUSION C3-T1 ;  Surgeon: Herson Tate MD;  Location: [address];  Service: Neurosurgery;  Laterality: N/A;    REPAIR OF INCARCERATED INCISIONAL HERNIA WITHOUT HISTORY OF PRIOR REPAIR N/A 2/7/2022    Procedure: REPAIR, HERNIA, INCISIONAL, INCARCERATED, WITHOUT HISTORY OF PRIOR REPAIR;  Surgeon: Simon King MD;  Location: [facility];  Service: General;  Laterality: N/A;     Family History   Problem Relation Age of Onset    Cancer Mother     Diabetes Mother     Hypertension Mother     Breast cancer Mother     Heart disease Father     Lung cancer Father     Depression Sister     Hypertension Sister     Diabetes Mellitus Sister     Cancer Maternal Aunt      Social History     Tobacco Use    Smoking status: Former Smoker     Packs/day: 0.50     Years: 55.00     Pack years: 27.50     Types: Cigarettes     Start date: 1967    Smokeless tobacco: Never Used    Tobacco comment: Enrolled in the [employer] on 10/5/15 ([employer] Member ID # [id number]).  Ambulatory referral to Smoking Cessation program.   Substance Use Topics    Alcohol use: Yes     Alcohol/week: 3.0 standard drinks     Types: 3 Glasses of wine per week     Comment: 1 every 3 months    Drug use: No     Review of Systems   Constitutional: Negative for chills and fever.   HENT: Negative for congestion, ear pain, rhinorrhea and sore throat.    Eyes: Negative for visual disturbance.   Respiratory: Negative for cough and shortness of breath.    Cardiovascular: Negative for chest pain.   Gastrointestinal: Negative for abdominal pain, diarrhea, nausea and vomiting.   Genitourinary: Negative for dysuria and hematuria.   Musculoskeletal: Positive for neck pain. Negative for arthralgias and myalgias.   Skin: Negative for pallor and rash.   Neurological: Positive for weakness. Negative for numbness and headaches.   All other systems reviewed and are  negative.      Physical Exam     Initial Vitals [05/09/22 1423]   BP Pulse Resp Temp SpO2   115/67 102 18 98.1 °F (36.7 °C) 95 %      MAP       --         Physical Exam    Nursing note and vitals reviewed.  Constitutional: She appears well-developed and well-nourished. No distress.   HENT:   Head: Normocephalic and atraumatic.   Mouth/Throat: Oropharynx is clear and moist.   Eyes: Conjunctivae and EOM are normal. Pupils are equal, round, and reactive to light.   Neck: Neck supple.   C-collar in place.  Midline cervical tenderness noted   Normal range of motion.  Cardiovascular: Normal rate, regular rhythm and intact distal pulses.   Pulmonary/Chest: Breath sounds normal. No stridor. No respiratory distress.   Abdominal: Abdomen is soft. She exhibits no distension. There is no abdominal tenderness.   Musculoskeletal:         General: No tenderness or edema. Normal range of motion.      Cervical back: Normal range of motion and neck supple.     Neurological: She is alert and oriented to person, place, and time. A sensory deficit is present.   CN 2-12 appear grossly intact.  Patient has bilateral lower extremity numbness below the knees.  She has 1/5 strength of her bilateral lower extremities.  Upper extremities have good strength equal bilaterally.  She has difficulty with finger-to-nose bilateral upper extremities.  Unable to perform in the lower extremities due to weakness.   Skin: Skin is warm and dry.   Psychiatric: She has a normal mood and affect.         ED Course   Procedures  Labs Reviewed   CBC W/ AUTO DIFFERENTIAL - Abnormal; Notable for the following components:       Result Value    RBC 3.43 (*)     Hemoglobin 8.6 (*)     Hematocrit 29.5 (*)     MCH 25.1 (*)     MCHC 29.2 (*)     RDW 16.3 (*)     All other components within normal limits   COMPREHENSIVE METABOLIC PANEL - Abnormal; Notable for the following components:    CO2 18 (*)     Glucose 188 (*)     BUN 27 (*)     Albumin 3.4 (*)     Alkaline  Phosphatase 139 (*)     AST 50 (*)     eGFR if  48 (*)     eGFR if non  42 (*)     All other components within normal limits   URINALYSIS, REFLEX TO URINE CULTURE - Abnormal; Notable for the following components:    Appearance, UA Hazy (*)     Protein, UA Trace (*)     Glucose, UA 1+ (*)     Leukocytes, UA 1+ (*)     All other components within normal limits    Narrative:     Specimen Source->Urine   URINALYSIS MICROSCOPIC - Abnormal; Notable for the following components:    WBC, UA 16 (*)     Yeast, UA Many (*)     Hyaline Casts, UA 8 (*)     All other components within normal limits    Narrative:     Specimen Source->Urine   POCT GLUCOSE - Abnormal; Notable for the following components:    POCT Glucose 180 (*)     All other components within normal limits   CULTURE, URINE   BETA - HYDROXYBUTYRATE, SERUM   TROPONIN I   TROPONIN I   MAGNESIUM   MAGNESIUM   CK   SARS-COV-2 RDRP GENE     EKG Readings: (Independently Interpreted)   Initial Reading: No STEMI. Rhythm: Normal Sinus Rhythm. Heart Rate: 94. Ectopy: No Ectopy. Conduction: Normal. ST Segments: Normal ST Segments. T Waves: Normal. Axis: Normal. Clinical Impression: Normal Sinus Rhythm     ECG Results          EKG 12-lead (In process)  Result time 05/09/22 16:19:08    In process by Interface, Lab In [facility] (05/09/22 16:19:08)                 Narrative:    Test Reason : R53.1,    Vent. Rate : 094 BPM     Atrial Rate : 094 BPM     P-R Int : 126 ms          QRS Dur : 084 ms      QT Int : 354 ms       P-R-T Axes : 078 059 030 degrees     QTc Int : 442 ms    Normal sinus rhythm  Normal ECG  When compared with ECG of 05-APR-2022 18:00,  No significant change was found    Referred By: [email or web address]   SELF           Confirmed By:                             Imaging Results          CT Cervical Spine Without Contrast (Final result)  Result time 05/09/22 16:58:17    Final result by Salomon Palmer MD (05/09/22 16:58:17)                  Impression:      1. No acute abnormality  2. Multilevel a chronic and postoperative changes.  See above comments.      Electronically signed by: Salomon Palmer  Date:    05/09/2022  Time:    16:58             Narrative:    EXAMINATION:  CT CERVICAL SPINE WITHOUT CONTRAST    CLINICAL HISTORY:  Neck trauma (Age >= 65y);    TECHNIQUE:  Low dose axial CT images through the cervical spine, with sagittal and coronal reformations.  Contrast was not administered.    COMPARISON:  04/08/2022    FINDINGS:  No acute cervical fractures are detected.  Slight straightening of normal cervical lordosis.  Posterior fusion hardware from C3 through T1.    Moderate disc space narrowing at C5-6, C6-7 and C3-4.  Mild narrowing elsewhere.    Nine ectomy defect from C3 through C7.    Decompression of the central canal.    Severe foraminal narrowing at C3-4 on the left, C4-5 bilaterally C5-6 bilaterally.  Moderate foraminal narrowing bilaterally at C6-7 and C3-4 on the right.    Limited evaluation of the intraspinal contents demonstrates no hematoma or mass.Paraspinal soft tissues exhibit no acute abnormalities.    Metallic artifact.                               CT Head Without Contrast (Final result)  Result time 05/09/22 16:47:41    Final result by Salomon Palmer MD (05/09/22 16:47:41)                 Impression:      No acute abnormality.      Electronically signed by: Salomon Palmer  Date:    05/09/2022  Time:    16:47             Narrative:    EXAMINATION:  CT HEAD WITHOUT CONTRAST    CLINICAL HISTORY:  Head trauma, minor (Age >= 65y);Neuro deficit, acute, stroke suspected;    TECHNIQUE:  Low dose axial CT images obtained throughout the head without intravenous contrast. Sagittal and coronal reconstructions were performed.    COMPARISON:  04/06/2022    FINDINGS:  Intracranial compartment:    Ventricles and sulci are normal in size for age without evidence of hydrocephalus. No extra-axial blood or fluid collections.    The brain  parenchyma appears normal. No parenchymal mass, hemorrhage, edema or major vascular distribution infarct.    Skull/extracranial contents (limited evaluation): No fracture. Mastoid air cells and paranasal sinuses are essentially clear.                              X-Rays:   Independently Interpreted Readings:   Other Readings:  Imaging Results          CT Cervical Spine Without Contrast (Final result)  Result time 05/09/22   16:58:17    Final result by Salomon Palmer MD (05/09/22 16:58:17)                 Impression:      1. No acute abnormality  2. Multilevel a chronic and postoperative changes.  See above comments.      Electronically signed by: Salomon Palmer  Date:    05/09/2022  Time:    16:58             Narrative:    EXAMINATION:  CT CERVICAL SPINE WITHOUT CONTRAST    CLINICAL HISTORY:  Neck trauma (Age >= 65y);    TECHNIQUE:  Low dose axial CT images through the cervical spine, with sagittal and   coronal reformations.  Contrast was not administered.    COMPARISON:  04/08/2022    FINDINGS:  No acute cervical fractures are detected.  Slight straightening of normal   cervical lordosis.  Posterior fusion hardware from C3 through T1.    Moderate disc space narrowing at C5-6, C6-7 and C3-4.  Mild narrowing   elsewhere.    Nine ectomy defect from C3 through C7.    Decompression of the central canal.    Severe foraminal narrowing at C3-4 on the left, C4-5 bilaterally C5-6   bilaterally.  Moderate foraminal narrowing bilaterally at C6-7 and C3-4 on   the right.    Limited evaluation of the intraspinal contents demonstrates no hematoma or   mass.Paraspinal soft tissues exhibit no acute abnormalities.    Metallic artifact.                               CT Head Without Contrast (Final result)  Result time 05/09/22 16:47:41    Final result by Salomon Palmer MD (05/09/22 16:47:41)                 Impression:      No acute abnormality.      Electronically signed by: Salomon Palmer  Date:    05/09/2022  Time:    16:47              Narrative:    EXAMINATION:  CT HEAD WITHOUT CONTRAST    CLINICAL HISTORY:  Head trauma, minor (Age >= 65y);Neuro deficit, acute, stroke suspected;    TECHNIQUE:  Low dose axial CT images obtained throughout the head without intravenous   contrast. Sagittal and coronal reconstructions were performed.    COMPARISON:  04/06/2022    FINDINGS:  Intracranial compartment:    Ventricles and sulci are normal in size for age without evidence of   hydrocephalus. No extra-axial blood or fluid collections.    The brain parenchyma appears normal. No parenchymal mass, hemorrhage,   edema or major vascular distribution infarct.    Skull/extracranial contents (limited evaluation): No fracture. Mastoid air   cells and paranasal sinuses are essentially clear.                              Medications   fluconazole tablet 150 mg (has no administration in time range)   sodium chloride 0.9% bolus 1,000 mL (0 mLs Intravenous Stopped 5/9/22 1635)     Medical Decision Making:   History:   Old Medical Records: I decided to obtain old medical records.  Initial Assessment:   Well-appearing nontoxic normal vitals.  Patient left in C-collar due to midline cervical tenderness  Differential Diagnosis:   Concern for CVA, intracranial hemorrhage, C-spine fracture  Independently Interpreted Test(s):   I have ordered and independently interpreted EKG Reading(s) - see prior notes  Clinical Tests:   Lab Tests: Ordered and Reviewed  The following lab test(s) were unremarkable: CBC, CMP and Troponin       <> Summary of Lab: Candidal UTI  Radiological Study: Ordered and Reviewed  Medical Tests: Ordered and Reviewed  ED Management:  1427 re-evaluated patient observed moving the right lower extremity although with poor effort.  Patient states she still unable to move her left lower extremity and reports painful paresthesias below the knee.  Patient unable to urinate, Grullon catheter placed for retention with 1 L output, candidal UTI noted and treated  with Diflucan.  Case discussed with hospital medicine for admission, possible need for neurology consultation and lumbar puncture for Guillain-[place], MRI                      Clinical Impression:   Final diagnoses:  [R53.1] Weakness  [R29.898] Weakness of left lower extremity (Primary)  [R33.9] Urinary retention  [W19.XXXA] Fall, initial encounter  [B37.49] Candidal UTI (urinary tract infection)          ED Disposition Condition    Observation               Will Alves,   05/09/22 3935

## 2022-05-09 NOTE — ED NOTES
MD notified of patient's C spine tenderness, hematoma to R side of forehead, and kuszmaul breathing.

## 2022-05-09 NOTE — Clinical Note
Diagnosis: Weakness of left lower extremity [[id number]]   Future Attending Provider: LIZZETTE SANTIAGO [[id number]]   Admitting Provider:: LIZZETTE SANTIAGO [[id number]]   Special Needs:: No Special Needs [1]

## 2022-05-10 ENCOUNTER — PATIENT OUTREACH (OUTPATIENT)
Dept: ADMINISTRATIVE | Facility: OTHER | Age: 72
End: 2022-05-10
Payer: MEDICARE

## 2022-05-10 LAB
ALBUMIN SERPL BCP-MCNC: 3.1 G/DL (ref 3.5–5.2)
ALP SERPL-CCNC: 136 U/L (ref 55–135)
ALT SERPL W/O P-5'-P-CCNC: 21 U/L (ref 10–44)
ANION GAP SERPL CALC-SCNC: 11 MMOL/L (ref 8–16)
AST SERPL-CCNC: 32 U/L (ref 10–40)
BASOPHILS # BLD AUTO: 0.02 K/UL (ref 0–0.2)
BASOPHILS NFR BLD: 0.4 % (ref 0–1.9)
BILIRUB SERPL-MCNC: 0.2 MG/DL (ref 0.1–1)
BUN SERPL-MCNC: 15 MG/DL (ref 8–23)
CALCIUM SERPL-MCNC: 9.4 MG/DL (ref 8.7–10.5)
CHLORIDE SERPL-SCNC: 106 MMOL/L (ref 95–110)
CK SERPL-CCNC: 2265 U/L (ref 20–180)
CO2 SERPL-SCNC: 22 MMOL/L (ref 23–29)
CREAT SERPL-MCNC: 0.7 MG/DL (ref 0.5–1.4)
DIFFERENTIAL METHOD: ABNORMAL
EOSINOPHIL # BLD AUTO: 0.1 K/UL (ref 0–0.5)
EOSINOPHIL NFR BLD: 2.2 % (ref 0–8)
ERYTHROCYTE [DISTWIDTH] IN BLOOD BY AUTOMATED COUNT: 16.5 % (ref 11.5–14.5)
EST. GFR  (AFRICAN AMERICAN): >60 ML/MIN/1.73 M^2
EST. GFR  (NON AFRICAN AMERICAN): >60 ML/MIN/1.73 M^2
FOLATE SERPL-MCNC: 10 NG/ML (ref 4–24)
GLUCOSE SERPL-MCNC: 260 MG/DL (ref 70–110)
HCT VFR BLD AUTO: 28.8 % (ref 37–48.5)
HGB BLD-MCNC: 8.6 G/DL (ref 12–16)
IMM GRANULOCYTES # BLD AUTO: 0.02 K/UL (ref 0–0.04)
IMM GRANULOCYTES NFR BLD AUTO: 0.4 % (ref 0–0.5)
IRON SERPL-MCNC: 14 UG/DL (ref 30–160)
LYMPHOCYTES # BLD AUTO: 1.3 K/UL (ref 1–4.8)
LYMPHOCYTES NFR BLD: 24.5 % (ref 18–48)
MAGNESIUM SERPL-MCNC: 1.6 MG/DL (ref 1.6–2.6)
MCH RBC QN AUTO: 25.1 PG (ref 27–31)
MCHC RBC AUTO-ENTMCNC: 29.9 G/DL (ref 32–36)
MCV RBC AUTO: 84 FL (ref 82–98)
MONOCYTES # BLD AUTO: 0.5 K/UL (ref 0.3–1)
MONOCYTES NFR BLD: 8.6 % (ref 4–15)
NEUTROPHILS # BLD AUTO: 3.4 K/UL (ref 1.8–7.7)
NEUTROPHILS NFR BLD: 63.9 % (ref 38–73)
NRBC BLD-RTO: 0 /100 WBC
PHOSPHATE SERPL-MCNC: 2.9 MG/DL (ref 2.7–4.5)
PLATELET # BLD AUTO: 222 K/UL (ref 150–450)
PMV BLD AUTO: 10 FL (ref 9.2–12.9)
POCT GLUCOSE: 165 MG/DL (ref 70–110)
POCT GLUCOSE: 229 MG/DL (ref 70–110)
POCT GLUCOSE: 272 MG/DL (ref 70–110)
POCT GLUCOSE: 316 MG/DL (ref 70–110)
POTASSIUM SERPL-SCNC: 4.2 MMOL/L (ref 3.5–5.1)
PROT SERPL-MCNC: 6.7 G/DL (ref 6–8.4)
RBC # BLD AUTO: 3.43 M/UL (ref 4–5.4)
SATURATED IRON: 2 % (ref 20–50)
SODIUM SERPL-SCNC: 139 MMOL/L (ref 136–145)
TOTAL IRON BINDING CAPACITY: 582 UG/DL (ref 250–450)
TRANSFERRIN SERPL-MCNC: 393 MG/DL (ref 200–375)
VIT B12 SERPL-MCNC: 342 PG/ML (ref 210–950)
WBC # BLD AUTO: 5.34 K/UL (ref 3.9–12.7)

## 2022-05-10 PROCEDURE — 80053 COMPREHEN METABOLIC PANEL: CPT | Performed by: STUDENT IN AN ORGANIZED HEALTH CARE EDUCATION/TRAINING PROGRAM

## 2022-05-10 PROCEDURE — A9585 GADOBUTROL INJECTION: HCPCS | Performed by: INTERNAL MEDICINE

## 2022-05-10 PROCEDURE — 63700000 PHARM REV CODE 250 ALT 637 W/O HCPCS: Performed by: STUDENT IN AN ORGANIZED HEALTH CARE EDUCATION/TRAINING PROGRAM

## 2022-05-10 PROCEDURE — 99222 PR INITIAL HOSPITAL CARE,LEVL II: ICD-10-PCS | Mod: ,,, | Performed by: PSYCHIATRY & NEUROLOGY

## 2022-05-10 PROCEDURE — 63600175 PHARM REV CODE 636 W HCPCS: Performed by: STUDENT IN AN ORGANIZED HEALTH CARE EDUCATION/TRAINING PROGRAM

## 2022-05-10 PROCEDURE — 97165 OT EVAL LOW COMPLEX 30 MIN: CPT

## 2022-05-10 PROCEDURE — 25000242 PHARM REV CODE 250 ALT 637 W/ HCPCS: Performed by: STUDENT IN AN ORGANIZED HEALTH CARE EDUCATION/TRAINING PROGRAM

## 2022-05-10 PROCEDURE — 27000221 HC OXYGEN, UP TO 24 HOURS

## 2022-05-10 PROCEDURE — C9399 UNCLASSIFIED DRUGS OR BIOLOG: HCPCS | Performed by: STUDENT IN AN ORGANIZED HEALTH CARE EDUCATION/TRAINING PROGRAM

## 2022-05-10 PROCEDURE — 82550 ASSAY OF CK (CPK): CPT | Performed by: STUDENT IN AN ORGANIZED HEALTH CARE EDUCATION/TRAINING PROGRAM

## 2022-05-10 PROCEDURE — 99900035 HC TECH TIME PER 15 MIN (STAT)

## 2022-05-10 PROCEDURE — 87040 BLOOD CULTURE FOR BACTERIA: CPT | Performed by: STUDENT IN AN ORGANIZED HEALTH CARE EDUCATION/TRAINING PROGRAM

## 2022-05-10 PROCEDURE — 84100 ASSAY OF PHOSPHORUS: CPT | Performed by: STUDENT IN AN ORGANIZED HEALTH CARE EDUCATION/TRAINING PROGRAM

## 2022-05-10 PROCEDURE — 25000003 PHARM REV CODE 250: Performed by: STUDENT IN AN ORGANIZED HEALTH CARE EDUCATION/TRAINING PROGRAM

## 2022-05-10 PROCEDURE — 94761 N-INVAS EAR/PLS OXIMETRY MLT: CPT

## 2022-05-10 PROCEDURE — 36415 COLL VENOUS BLD VENIPUNCTURE: CPT | Performed by: STUDENT IN AN ORGANIZED HEALTH CARE EDUCATION/TRAINING PROGRAM

## 2022-05-10 PROCEDURE — 11000001 HC ACUTE MED/SURG PRIVATE ROOM

## 2022-05-10 PROCEDURE — 25000003 PHARM REV CODE 250: Performed by: INTERNAL MEDICINE

## 2022-05-10 PROCEDURE — 85025 COMPLETE CBC W/AUTO DIFF WBC: CPT | Performed by: STUDENT IN AN ORGANIZED HEALTH CARE EDUCATION/TRAINING PROGRAM

## 2022-05-10 PROCEDURE — 83735 ASSAY OF MAGNESIUM: CPT | Performed by: STUDENT IN AN ORGANIZED HEALTH CARE EDUCATION/TRAINING PROGRAM

## 2022-05-10 PROCEDURE — 97530 THERAPEUTIC ACTIVITIES: CPT

## 2022-05-10 PROCEDURE — 99222 1ST HOSP IP/OBS MODERATE 55: CPT | Mod: ,,, | Performed by: PSYCHIATRY & NEUROLOGY

## 2022-05-10 PROCEDURE — 25500020 PHARM REV CODE 255: Performed by: INTERNAL MEDICINE

## 2022-05-10 RX ORDER — FLUCONAZOLE 100 MG/1
200 TABLET ORAL DAILY
Status: DISCONTINUED | OUTPATIENT
Start: 2022-05-10 | End: 2022-05-13 | Stop reason: HOSPADM

## 2022-05-10 RX ORDER — MUPIROCIN 20 MG/G
OINTMENT TOPICAL 2 TIMES DAILY
Status: DISCONTINUED | OUTPATIENT
Start: 2022-05-10 | End: 2022-05-13 | Stop reason: HOSPADM

## 2022-05-10 RX ORDER — GADOBUTROL 604.72 MG/ML
6 INJECTION INTRAVENOUS
Status: COMPLETED | OUTPATIENT
Start: 2022-05-10 | End: 2022-05-10

## 2022-05-10 RX ORDER — SODIUM CHLORIDE, SODIUM LACTATE, POTASSIUM CHLORIDE, CALCIUM CHLORIDE 600; 310; 30; 20 MG/100ML; MG/100ML; MG/100ML; MG/100ML
INJECTION, SOLUTION INTRAVENOUS CONTINUOUS
Status: DISCONTINUED | OUTPATIENT
Start: 2022-05-10 | End: 2022-05-12

## 2022-05-10 RX ORDER — HYDROCODONE BITARTRATE AND ACETAMINOPHEN 5; 325 MG/1; MG/1
1 TABLET ORAL EVERY 8 HOURS PRN
Status: DISCONTINUED | OUTPATIENT
Start: 2022-05-10 | End: 2022-05-13 | Stop reason: HOSPADM

## 2022-05-10 RX ORDER — PREGABALIN 75 MG/1
75 CAPSULE ORAL 2 TIMES DAILY PRN
Status: DISCONTINUED | OUTPATIENT
Start: 2022-05-10 | End: 2022-05-13 | Stop reason: HOSPADM

## 2022-05-10 RX ADMIN — ASPIRIN 81 MG CHEWABLE TABLET 81 MG: 81 TABLET CHEWABLE at 08:05

## 2022-05-10 RX ADMIN — SODIUM CHLORIDE, SODIUM LACTATE, POTASSIUM CHLORIDE, AND CALCIUM CHLORIDE: .6; .31; .03; .02 INJECTION, SOLUTION INTRAVENOUS at 05:05

## 2022-05-10 RX ADMIN — TOPIRAMATE 50 MG: 25 TABLET, FILM COATED ORAL at 09:05

## 2022-05-10 RX ADMIN — TOPIRAMATE 50 MG: 25 TABLET, FILM COATED ORAL at 08:05

## 2022-05-10 RX ADMIN — SODIUM CHLORIDE, SODIUM LACTATE, POTASSIUM CHLORIDE, AND CALCIUM CHLORIDE: .6; .31; .03; .02 INJECTION, SOLUTION INTRAVENOUS at 10:05

## 2022-05-10 RX ADMIN — FLUCONAZOLE 200 MG: 100 TABLET ORAL at 10:05

## 2022-05-10 RX ADMIN — VERAPAMIL HYDROCHLORIDE 180 MG: 180 TABLET ORAL at 09:05

## 2022-05-10 RX ADMIN — DULOXETINE 90 MG: 30 CAPSULE, DELAYED RELEASE ORAL at 08:05

## 2022-05-10 RX ADMIN — SODIUM CHLORIDE, SODIUM LACTATE, POTASSIUM CHLORIDE, AND CALCIUM CHLORIDE: .6; .31; .03; .02 INJECTION, SOLUTION INTRAVENOUS at 11:05

## 2022-05-10 RX ADMIN — ZONISAMIDE 200 MG: 100 CAPSULE ORAL at 09:05

## 2022-05-10 RX ADMIN — ATORVASTATIN CALCIUM 40 MG: 40 TABLET, FILM COATED ORAL at 08:05

## 2022-05-10 RX ADMIN — ENOXAPARIN SODIUM 40 MG: 100 INJECTION SUBCUTANEOUS at 04:05

## 2022-05-10 RX ADMIN — CLOPIDOGREL 75 MG: 75 TABLET, FILM COATED ORAL at 08:05

## 2022-05-10 RX ADMIN — ZONISAMIDE 200 MG: 100 CAPSULE ORAL at 08:05

## 2022-05-10 RX ADMIN — MUPIROCIN: 20 OINTMENT TOPICAL at 08:05

## 2022-05-10 RX ADMIN — HYDROCHLOROTHIAZIDE 12.5 MG: 12.5 TABLET ORAL at 08:05

## 2022-05-10 RX ADMIN — INSULIN DETEMIR 17 UNITS: 100 INJECTION, SOLUTION SUBCUTANEOUS at 09:05

## 2022-05-10 RX ADMIN — LOSARTAN POTASSIUM 100 MG: 50 TABLET, FILM COATED ORAL at 08:05

## 2022-05-10 RX ADMIN — INSULIN ASPART 3 UNITS: 100 INJECTION, SOLUTION INTRAVENOUS; SUBCUTANEOUS at 09:05

## 2022-05-10 RX ADMIN — HYDROCODONE BITARTRATE AND ACETAMINOPHEN 1 TABLET: 5; 325 TABLET ORAL at 11:05

## 2022-05-10 RX ADMIN — INSULIN ASPART 2 UNITS: 100 INJECTION, SOLUTION INTRAVENOUS; SUBCUTANEOUS at 05:05

## 2022-05-10 RX ADMIN — INSULIN ASPART 8 UNITS: 100 INJECTION, SOLUTION INTRAVENOUS; SUBCUTANEOUS at 11:05

## 2022-05-10 RX ADMIN — PREGABALIN 75 MG: 75 CAPSULE ORAL at 09:05

## 2022-05-10 RX ADMIN — CEFTRIAXONE 1 G: 1 INJECTION, SOLUTION INTRAVENOUS at 08:05

## 2022-05-10 RX ADMIN — FLUTICASONE PROPIONATE 100 MCG: 50 SPRAY, METERED NASAL at 09:05

## 2022-05-10 RX ADMIN — MUPIROCIN: 20 OINTMENT TOPICAL at 09:05

## 2022-05-10 RX ADMIN — GADOBUTROL 6 ML: 604.72 INJECTION INTRAVENOUS at 03:05

## 2022-05-10 NOTE — PROGRESS NOTES
05/10/2022 @ 2:25 PM- RSW attempted to meet with patient to complete SDOH and liaison assessment. RSW unable to complete initial visit due to pt sleeping and not responding to RSW attempt to wake pt up. RSW will follow up with patient at a later time.    05/11/2022 @ 11:38AM - RSW attempted to meet with patient for initial visit. RSW unable to meet with pt due to pt ALEC. RSW will follow up with patient at a later time.      IP Liaison - Initial Visit Note    Patient: Chayito Chung  MRN:  [id number]  Date of Service:  5/11/2022  Completed by:  NILAY Christy    Reason for Visit   Patient presents with    IP Liaison Initial Visit       RSW met with patient at bedside in order to complete SDOH questionnaire and liaison assessment.  Pt has identified no social barriers to care. Per pt, pt is not in need of resources at this time.    The following were addressed during this visit:  - Review SDOH Questions   - Complete patient assessment   - Complete initial visit with patient        Patient Summary     IP Liaison Patient Assessment    General  Level of Caregiver support: Member independent and does not need caregiver assistance  Have you had to make a decision between paying for any of the following in the last 2 months?: None  Transportation means: Family  Employment status: Not employed  Assessments  Was the PHQ Depression Screening completed this visit?: Yes  Was the JAZMIN-7 Screening completed this visit?: No         NILAY Christy

## 2022-05-10 NOTE — PLAN OF CARE
Problem: Infection  Goal: Absence of Infection Signs and Symptoms  Outcome: Ongoing, Progressing     Problem: Adult Inpatient Plan of Care  Goal: Plan of Care Review  Outcome: Ongoing, Progressing  Goal: Patient-Specific Goal (Individualized)  Outcome: Ongoing, Progressing  Goal: Absence of Hospital-Acquired Illness or Injury  Outcome: Ongoing, Progressing  Goal: Optimal Comfort and Wellbeing  Outcome: Ongoing, Progressing  Goal: Readiness for Transition of Care  Outcome: Ongoing, Progressing     Problem: Diabetes Comorbidity  Goal: Blood Glucose Level Within Targeted Range  Outcome: Ongoing, Progressing

## 2022-05-10 NOTE — ED NOTES
RN spoke to the admit team: Due to patient's blood sugars dropping without treatment, MD asked to hold night time insulin dose tonight.   Providers ok with patient going to the floor with her HR in the 1teens. Believes patient to be dry. Instructed RN to start fluids and tele monitoring ordered.

## 2022-05-10 NOTE — PLAN OF CARE
"  Problem: Occupational Therapy  Goal: Occupational Therapy Goal  Description: Goals to be met by: 6/10/22     Patient will increase functional independence with ADLs by performing:    LE Dressing with Minimal Assistance.  Grooming while standing with Minimal Assistance.  Toileting from toilet with Minimal Assistance for hygiene and clothing management.   Supine to sit with Minimal Assistance.  Step transfer with Minimal Assistance  Toilet transfer to toilet with Minimal Assistance.  Increased functional strength to WFL for self care skills and functional mobility.  Upper extremity exercise program x10 reps per handout, with independence.    Outcome: Ongoing, Progressing     Pt with decreased ax tolerance this date 2/2 reports of significant LLE pain (hip). Pt reports, "I have been falling since I left here the last time." Pt currently requires significant assist for ADLs and functional mobility. Pt with residual R sided deficits from previous CVA, however, demonstrating LUE weakness as well. Sister present reports pt significantly far from baseline. Recs TBD, however, most likely will require post acute placement IPR vs SNF. Pt and sister report pt was mod I and performing IADLs as well prior to hospitalization   "

## 2022-05-10 NOTE — PLAN OF CARE
SW met with pt at bedside to discuss dc planning.     Pt confirmed information on chart. Pt resides in home alone. Pt is independent in ADLs. Pt has rollator and bath bench. Pt reported that she has HH services but she does not remember the name of the agency. Pt reported upon dc her niece, Jeanette will transport her home. Pt reported that if the recommendations are for pt to go to SNF she is agreeable. Pt reported that she is also agreeable to just go home if recommended. SW wrote contact information on board for any questions or concerns.SW will continue to follow pt throughout her transitions of care and assist with any dc needs.     Requested sooner PCP follow up.     Jeanette (Niece) [phone]  Jodie (Sister) [phone]    Future Appointments   Date Time Provider Department Center   5/18/2022 11:00 AM Jeri Matos PA-C [facility] NEUROSU [facility]   5/19/2022  9:00 AM Fritz Cadena MD [facility] NEURO [address]   6/16/2022  9:40 AM Cameron Valladares III, MD [facility]         05/10/22 0912   Discharge Assessment   Assessment Type Discharge Planning Assessment   Confirmed/corrected address, phone number and insurance Yes   Confirmed Demographics Correct on Facesheet   Source of Information patient   Communicated MICKY with patient/caregiver Yes   Lives With alone   Prior to hospitilization cognitive status: Alert/Oriented   Current cognitive status: Alert/Oriented   Walking or Climbing Stairs Difficulty ambulation difficulty, requires equipment   Dressing/Bathing Difficulty bathing difficulty, requires equipment   Equipment Currently Used at Home rollator;bath bench   Patient currently being followed by outpatient case management? No   Do you currently have service(s) that help you manage your care at home? No   Do you take prescription medications? Yes   Do you have prescription coverage? Yes   Coverage PHN   Do you have any problems affording any of your prescribed medications? No   Is the patient  taking medications as prescribed? yes   Who is going to help you get home at discharge? Jeanette (Niece) [phone]   How do you get to doctors appointments? family or friend will provide   Are you on dialysis? No   Do you take coumadin? No   Discharge Plan A Home Health;Home with family   Discharge Plan B Skilled Nursing Facility   DME Needed Upon Discharge    (TBD)   Discharge Plan discussed with: Patient   Discharge Barriers Identified None

## 2022-05-10 NOTE — PT/OT/SLP EVAL
"Occupational Therapy   Evaluation/Treatment     Name: Chayito Chung  MRN: [id number]  Admitting Diagnosis:  Weakness of both lower extremities  Recent Surgery: * No surgery found *      Recommendations:     Discharge Recommendations:  (TBD)  Discharge Equipment Recommendations:   (TBD)  Barriers to discharge:  Decreased caregiver support    Assessment:     Chayito Chung is a 71 y.o. female with a medical diagnosis of Weakness of both lower extremities.  She presents with The primary encounter diagnosis was Traumatic rhabdomyolysis, initial encounter. Diagnoses of Weakness of both lower extremities, Right leg weakness, Weakness, Weakness of left lower extremity, Urinary retention, Fall, initial encounter, Candidal UTI (urinary tract infection), Cough, Type 2 diabetes mellitus with other circulatory complication, with long-term current use of insulin, and Cerebrovascular accident (CVA), unspecified mechanism were also pertinent to this visit.  . Performance deficits affecting function: weakness, gait instability, impaired balance, impaired endurance, impaired self care skills, impaired functional mobilty, decreased upper extremity function, decreased lower extremity function, impaired coordination, decreased safety awareness, pain, decreased coordination, impaired cognition, decreased ROM.    Pt with decreased ax tolerance this date 2/2 reports of significant LLE pain (hip). Pt reports, "I have been falling since I left here the last time." Pt currently requires significant assist for ADLs and functional mobility. Pt with residual R sided deficits from previous CVA, however, demonstrating LUE weakness as well. Sister present reports pt significantly far from baseline. Recs TBD, however, most likely will require post acute placement IPR vs SNF. Pt and sister report pt was mod I and performing IADLs as well prior to hospitalization     Rehab Prognosis: Good; patient would benefit from acute skilled OT services to address " "these deficits and reach maximum level of function.       Plan:     Patient to be seen 5 x/week to address the above listed problems via self-care/home management, therapeutic activities, therapeutic exercises  · Plan of Care Expires: 06/10/22  · Plan of Care Reviewed with: patient, sibling    Subjective     Chief Complaint: pt reporting significant pain in L hip; states, "I've been falling since I left here the last time."   Patient/Family Comments/goals: return to mod I/independent PLOF; sister present reports that pt has episodes of falling, however, was able to do things independently, and even cooked her family a meal on Sunday prior to admit     Occupational Profile:  Living Environment: alone, ILF, 3rd floor apartment, elevator access, t/sc ombo  Previous level of function: reports mod I/independent with only use of rollator   Equipment Used at Home:  rollator (reports access to RWand TTB that she does not use)  Assistance upon Discharge: limited     Pain/Comfort:  · Pain Rating 1:  ("9-10")  · Location - Side 1: Left  · Location - Orientation 1: generalized  · Location 1: hip  · Pain Addressed 1: Reposition, Distraction, Cessation of Activity, Nurse notified  · Pain Rating Post-Intervention 1:  (did not rate)    Patients cultural, spiritual, Bahai conflicts given the current situation:      Objective:     Communicated with: brooks prior to session.   General Precautions: Standard, fall   Orthopedic Precautions:N/A   Braces: N/A    Occupational Performance:    Bed Mobility:    · Patient completed Rolling/Turning to Left with  maximal assistance  · Patient completed Scooting/Bridging with total assistance  · Patient completed Supine to Sit with maximal assistance  · Patient completed Sit to Supine with total assistance    Functional Mobility/Transfers:  · Patient completed Sit <> Stand Transfer with maximal assistance  with  rolling walker   · Functional Mobility: Max A limited lateral steps to HOB with RW; " limited WB on LLE; increased difficulty with advancement of extremity     Activities of Daily Living:  · Lower Body Dressing: dependence      Cognitive/Visual Perceptual:  WFL     Physical Exam:  Balance:    -       impaired 2/2 pain; poor sitting and standing  Postural examination/scapula alignment:    -       Rounded shoulders  -       Forward head  Dominant hand:    -       right  Upper Extremity Range of Motion:   BUE limited end shoulder range AROM; mario to achieve full range passively; WFL distally   Upper Extremity Strength:  Pt with increased pain while EOB; difficulty to assess; shaking/tremulous noted throughout session; resistiveness/tone appearing present when assessing LUE, but unsure if it was impaired command following vs perseverating over pain    Strength:  Appears WFL for pt's needs   Fine Motor Coordination:  Impaired L     AMPAC 6 Click ADL:  AMPAC Total Score: 13    Treatment & Education:  Pt reporting significant pain during session and requires encouragement to participate  Increased assist for all axs, bed level and while seated EOB; pt with trunk rotation to L while seated EOB and requires assist to correct to midline   Tremulous/shaking noted in UEs while EOB and during performance of testing   Max A required to stand from EOB with RW; increased time spent in squat stance and increased assist to achieve upright posture   Remains rotated to L in stance   Max A to laterally step to HOB with RW and for balance   Poor tolerance noted and pt abruptly return to sitting position   Noted pt with inability to properly WB on LLE   Total a to laterally scoot to HOB while seated EOB   Returned to supine position   Education:    Patient left supine with all lines intact, call button in reach, bed alarm on, nsg notified and sister present    GOALS:   Multidisciplinary Problems     Occupational Therapy Goals        Problem: Occupational Therapy    Goal Priority Disciplines Outcome Interventions    Occupational Therapy Goal     OT, PT/OT Ongoing, Progressing    Description: Goals to be met by: 6/10/22     Patient will increase functional independence with ADLs by performing:    LE Dressing with Minimal Assistance.  Grooming while standing with Minimal Assistance.  Toileting from toilet with Minimal Assistance for hygiene and clothing management.   Supine to sit with Minimal Assistance.  Step transfer with Minimal Assistance  Toilet transfer to toilet with Minimal Assistance.  Increased functional strength to WFL for self care skills and functional mobility.  Upper extremity exercise program x10 reps per handout, with independence.                     History:     Past Medical History:   Diagnosis Date    Acute coronary syndrome     Acute hypoxemic respiratory failure 2017    Anxiety     Asthma     Cancer     colon    Cataracts, both eyes     Chest pain at rest     Chest pain of uncertain etiology 2015    Colon cancer 1988    COPD (chronic obstructive pulmonary disease)     Coronary artery disease     Depression     Diabetes mellitus     Dyspnea on exertion 11/15/2018    Elevated brain natriuretic peptide (BNP) level 11/15/2018    Elevated cholesterol     Hypertension     Swelling     Syncope and collapse 2016       Past Surgical History:   Procedure Laterality Date    ABDOMINAL SURGERY      BREAST BIOPSY      benign unsure what side     SECTION, CLASSIC      COLON SURGERY      COLONOSCOPY  2019    repeat in 5 years    CORONARY ANGIOPLASTY WITH STENT PLACEMENT  3 months ago     x2, Hysterectomy, Lung surgery, Partial stomach removed, Part of colon removed for rectal cancer    GASTRECTOMY      HEMORRHOID SURGERY      HYSTERECTOMY      @28yrs of age    LUNG BIOPSY      OOPHORECTOMY      @28yrs of age    POSTERIOR FUSION OF CERVICAL SPINE WITH LAMINECTOMY N/A 2020    Procedure: LAMINECTOMY, SPINE, CERVICAL, WITH POSTERIOR FUSION C3-T1 ;   Surgeon: Herson Tate MD;  Location: [facility] OR [address] [place] [address];  Service: Neurosurgery;  Laterality: N/A;    REPAIR OF INCARCERATED INCISIONAL HERNIA WITHOUT HISTORY OF PRIOR REPAIR N/A 2/7/2022    Procedure: REPAIR, HERNIA, INCISIONAL, INCARCERATED, WITHOUT HISTORY OF PRIOR REPAIR;  Surgeon: Simon King MD;  Location: [facility];  Service: General;  Laterality: N/A;       Time Tracking:     OT Date of Treatment: 05/10/22  OT Start Time: 1056  OT Stop Time: 1122  OT Total Time (min): 26 min    Billable Minutes:Evaluation 10  Therapeutic Activity 16    5/10/2022

## 2022-05-10 NOTE — H&P
[facility] Medicine H&P Note     Admitting Team: [facility] Hospitalist Team B  Attending Physician: Lillian Ramey MD  Resident: Dr. Perez  Intern: Dr. Tijerina    Date of Admit: 5/9/2022    Chief Complaint     Frequent falls x1 day    Subjective:      History of Present Illness:  Chayito Chung is a 71 y.o. female who  has a past medical history of Acute coronary syndrome, Acute hypoxemic respiratory failure (5/1/2017), anxiety, seizures, asthma, Cancer, Cataracts, both eyes, Chest pain at rest, Chest pain of uncertain etiology (12/6/2015), Colon cancer (1988), COPD (chronic obstructive pulmonary disease), Coronary artery disease, Depression, Diabetes mellitus, Dyspnea on exertion (11/15/2018), Elevated brain natriuretic peptide (BNP) level (11/15/2018), Elevated cholesterol, Hypertension, Swelling, and Syncope and collapse (11/8/2016) presented to [facility] on 5/9/2022 with a primary complaint of frequent falls, that started suddenly at 10 AM this morning. Patient reports that she has been admitted to the hospital a months ago for similar complaints and discharged home with PT/OT. Her weakness has improved and she could walk up until this morning, when she suddenly started to fall. She fell down five times totally, and did hit her head. She says she has been complaining of some ''burning and itching with urineation'' and started to cough up sputum as well. She is a chronic smoker, but her cough is new. She takes all of her home medications regularly, and has not made any changes most recently. Is independent with ADLs at a baseline. Reports having some lightheadedness and vertigo as well. Mentions having positive history of seizures, but does not remember having any shaking movements of her body today. No headaches. No fever or chills. No rash.          Past Medical History:  Past Medical History:   Diagnosis Date    Acute coronary syndrome     Acute hypoxemic respiratory failure 5/1/2017     Anxiety     Asthma     Cancer     colon    Cataracts, both eyes     Chest pain at rest     Chest pain of uncertain etiology 2015    Colon cancer 1988    COPD (chronic obstructive pulmonary disease)     Coronary artery disease     Depression     Diabetes mellitus     Dyspnea on exertion 11/15/2018    Elevated brain natriuretic peptide (BNP) level 11/15/2018    Elevated cholesterol     Hypertension     Swelling     Syncope and collapse 2016     Past Surgical History:  Past Surgical History:   Procedure Laterality Date    ABDOMINAL SURGERY      BREAST BIOPSY      benign unsure what side     SECTION, CLASSIC      COLON SURGERY      COLONOSCOPY  2019    repeat in 5 years    CORONARY ANGIOPLASTY WITH STENT PLACEMENT  3 months ago     x2, Hysterectomy, Lung surgery, Partial stomach removed, Part of colon removed for rectal cancer    GASTRECTOMY      HEMORRHOID SURGERY      HYSTERECTOMY      @28yrs of age    LUNG BIOPSY      OOPHORECTOMY      @28yrs of age    POSTERIOR FUSION OF CERVICAL SPINE WITH LAMINECTOMY N/A 2020    Procedure: LAMINECTOMY, SPINE, CERVICAL, WITH POSTERIOR FUSION C3-T1 ;  Surgeon: Herson Tate MD;  Location: [address];  Service: Neurosurgery;  Laterality: N/A;    REPAIR OF INCARCERATED INCISIONAL HERNIA WITHOUT HISTORY OF PRIOR REPAIR N/A 2022    Procedure: REPAIR, HERNIA, INCISIONAL, INCARCERATED, WITHOUT HISTORY OF PRIOR REPAIR;  Surgeon: Simon King MD;  Location: [facility];  Service: General;  Laterality: N/A;     Allergies:  Review of patient's allergies indicates:  No Known Allergies     Home Medications:  Prior to Admission medications    Medication Sig Start Date End Date Taking? Authorizing Provider   albuterol (PROVENTIL/VENTOLIN HFA) 90 mcg/actuation inhaler INHALE 2 PUFFS BY MOUTH INTO THE LUNGS EVERY 6 HOURS AS NEEDED FOR WHEEZING 21   Alexa Evans MD   albuterol-ipratropium (DUO-NEB) 2.5 mg-0.5  "mg/3 mL nebulizer solution Take 3 mLs by nebulization every 4 (four) hours as needed for Wheezing or Shortness of Breath. Rescue 7/29/20 7/29/21  Mai Calderón MD   alendronate (FOSAMAX) 70 MG tablet Take 1 tablet (70 mg total) by mouth every 7 days. 11/19/21 11/19/22  Mariam Seo NP   aspirin 81 MG Chew Take 1 tablet (81 mg total) by mouth once daily. 4/10/22 4/10/23  Sundeep Marc MD   atorvastatin (LIPITOR) 40 MG tablet Take 1 tablet (40 mg total) by mouth once daily. 4/10/22 4/10/23  Sundeep Marc MD   BD ULTRA-FINE MINI PEN NEEDLE 31 gauge x 3/16" Ndle  12/23/19   Historical Provider   benzonatate (TESSALON) 200 MG capsule Take 1 capsule (200 mg total) by mouth 3 (three) times daily as needed for Cough. 12/10/21   Rodrick Lim III, MD   clopidogreL (PLAVIX) 75 mg tablet Take 1 tablet (75 mg total) by mouth once daily. Take medicine for 15 days. 4/10/22 4/10/23  Sundeep Marc MD   diclofenac sodium (VOLTAREN) 1 % Gel Apply 2 g topically 4 (four) times daily. 11/18/21   Cameron Valladares III, MD   DULoxetine (CYMBALTA) 30 MG capsule Take 30 mg by mouth once daily. 7/2/21   Historical Provider   duloxetine (CYMBALTA) 60 MG capsule Take 90 mg by mouth once daily.     Historical Provider   ferrous sulfate (FEOSOL) 325 mg (65 mg iron) Tab tablet Take 1 tablet (325 mg total) by mouth once daily. 5/13/20   Brien Stapleton,    fluticasone propionate (FLONASE) 50 mcg/actuation nasal spray 2 sprays (100 mcg total) by Each Nare route once daily. 6/4/19   Sonia Freeman MD   hydroCHLOROthiazide (MICROZIDE) 12.5 mg capsule Take 1 capsule (12.5 mg total) by mouth once daily. 11/2/21 11/2/22  Gaurav Malin MD   HYDROcodone-acetaminophen (NORCO)  mg per tablet Take 1 tablet by mouth every 6 (six) hours as needed for Pain. 8/7/20   Suzette Malone MD   insulin lispro protamin-lispro 100 unit/mL (75-25) InPn Inject 35 Units into the skin 2 (two) times a day. " 11/22/21   Historical Provider   LIDOcaine (LIDODERM) 5 % Place 1 patch onto the skin once daily. Remove & Discard patch within 12 hours or as directed by MD 4/14/22 5/20/22  Cameron Valladares III, MD   losartan (COZAAR) 50 MG tablet Take 1 tablet (50 mg total) by mouth once daily. 12/7/21 12/7/22  Gaurav Malin MD   magnesium oxide (MAG-OX) 400 mg (241.3 mg magnesium) tablet Take 400 mg by mouth once daily.    Historical Provider   metFORMIN (GLUCOPHAGE) 1000 MG tablet Take 1,000 mg by mouth 2 (two) times daily. 6/7/21   Historical Provider   nitroGLYCERIN (NITROSTAT) 0.4 MG SL tablet Place 1 tablet (0.4 mg total) under the tongue every 5 (five) minutes as needed for Chest pain. 10/7/21   Gaurav Malin MD   ondansetron (ZOFRAN-ODT) 4 MG TbDL Take 1 tablet (4 mg total) by mouth every 8 (eight) hours as needed (nausea). 5/17/21   Alexa Evans MD   potassium chloride SA (K-DUR,KLOR-CON) 20 MEQ tablet Take 1 tablet (20 mEq total) by mouth once daily. 3/15/22   Cameron Valladares III, MD   pregabalin (LYRICA) 150 MG capsule Take 150 mg by mouth 2 (two) times daily as needed. 9/14/21   Historical Provider   quetiapine (SEROQUEL) 300 MG Tab 300 mg nightly.  6/10/17   Historical Provider   tiZANidine (ZANAFLEX) 4 MG tablet  12/7/21   Historical Provider   topiramate (TOPAMAX) 50 MG tablet Take 50 mg by mouth 2 (two) times daily.    Historical Provider   TRELEGY ELLIPTA 200-62.5-25 mcg inhaler  10/17/21   Historical Provider   verapamiL (CALAN-SR) 180 MG CR tablet Take 1 tablet (180 mg total) by mouth every evening. 7/9/21 7/9/22  Alexa Evans MD   zonisamide (ZONEGRAN) 100 MG Cap Take by mouth once daily. 10/6/21   Historical Provider   pravastatin (PRAVACHOL) 40 MG tablet Take 0.5 tablets (20 mg total) by mouth once daily.  Patient taking differently: Take 40 mg by mouth once daily. 2/8/21 4/10/22  Alexa Evans MD     Family History:  Family History   Problem Relation Age of Onset    Cancer Mother      "Diabetes Mother     Hypertension Mother     Breast cancer Mother     Heart disease Father     Lung cancer Father     Depression Sister     Hypertension Sister     Diabetes Mellitus Sister     Cancer Maternal Aunt      Social History:  Social History     Tobacco Use    Smoking status: Former Smoker     Packs/day: 0.50     Years: 55.00     Pack years: 27.50     Types: Cigarettes     Start date:     Smokeless tobacco: Never Used    Tobacco comment: Enrolled in the [employer] on 10/5/15 ([employer] Member ID # [id number]).  Ambulatory referral to Smoking Cessation program.   Substance Use Topics    Alcohol use: Yes     Alcohol/week: 3.0 standard drinks     Types: 3 Glasses of wine per week     Comment: 1 every 3 months    Drug use: No     Review of Systems:  Negative except as per above    Health Care Maintenance :   Primary Care Physician: Crut Valladares III, MD   Immunizations:   Immunization History   Administered Date(s) Administered    COVID-19 Vaccine 2021    COVID-19, MRNA, LN-S, PF (MODERNA FULL 0.5 ML DOSE) 2021, 2021, 2021    DTaP 2015    Influenza (FLUAD) - Quadrivalent - Adjuvanted - PF *Preferred* (65+) 2021    Influenza (FLUAD) - Trivalent - Adjuvanted - PF (65+) 2018, 2020    Influenza - High Dose - PF (65 years and older) 10/10/2019    Influenza - Quadrivalent - PF *Preferred* (6 months and older) 2015    Influenza - Trivalent - PF (ADULT) 2014, 2016    PPD Test 2020    Pneumococcal Polysaccharide - 23 Valent 2019, 2020    Tdap 2015    Zoster 2013    Zoster Recombinant 2018, 2019           Objective:   Last 24 Hour Vital Signs:  BP  Min: 109/64  Max: 154/78  Temp  Av.4 °F (36.9 °C)  Min: 98.1 °F (36.7 °C)  Max: 98.8 °F (37.1 °C)  Pulse  Av.5  Min: 102  Max: 117  Resp  Av.5  Min: 16  Max: 22  SpO2  Av.8 %  Min: 91 %  Max: 98 %  Height  Av' 2" (157.5 cm)  " "Min: 5' 2" (157.5 cm)  Max: 5' 2" (157.5 cm)  Weight  Av.8 kg (134 lb)  Min: 60.8 kg (134 lb)  Max: 60.8 kg (134 lb)  Body mass index is 24.51 kg/m².  I/O last 3 completed shifts:  In: -   Out: 1000 [Urine:1000]  Physical Examination:    Constitutional: She appears well-developed and well-nourished. No distress.   HENT:   Head: Normocephalic and atraumatic.   Mouth/Throat: Oropharynx is clear and moist.   Eyes: Conjunctivae and EOM are normal. Pupils are equal, round, and reactive to light.   Neck: Neck supple.   C-collar in place.  Midline cervical tenderness noted   Normal range of motion.  Cardiovascular: Normal rate, regular rhythm and intact distal pulses.   Pulmonary/Chest: Breath sounds normal. No stridor. No respiratory distress.   Abdominal: Abdomen is soft. She exhibits no distension. There is no abdominal tenderness.   Musculoskeletal:         General: No tenderness or edema. Normal range of motion.      Cervical back: Normal range of motion and neck supple.   Neurological: She is alert and oriented to person, place, and time. A sensory deficit is present.   CN 2-12 intact throughout   BL UE 5/5 strength with normal sensorium  BL LE 2/5 strength with normal sensorium  L Patellar tendon 2+; R Patellar tendon +1  Skin: Skin is warm and dry.   Psychiatric: She has a normal mood and affect.     Laboratory:  Most Recent Data:  CBC:   Lab Results   Component Value Date    WBC 7.43 2022    HGB 8.6 (L) 2022    HCT 29.5 (L) 2022     2022    MCV 86 2022    RDW 16.3 (H) 2022     WBC Differential:BMP:   Lab Results   Component Value Date     2022    K 4.6 2022     2022    CO2 18 (L) 2022    BUN 27 (H) 2022    CREATININE 1.3 2022     (H) 2022    CALCIUM 9.2 2022    MG 1.8 2022    PHOS 3.3 2022     LFTs:   Lab Results   Component Value Date    PROT 7.3 2022    ALBUMIN 3.4 (L) 2022 "    BILITOT 0.2 05/09/2022    AST 50 (H) 05/09/2022    ALKPHOS 139 (H) 05/09/2022    ALT 22 05/09/2022     Coags:   Lab Results   Component Value Date    INR 1.0 04/06/2022     FLP:   Lab Results   Component Value Date    CHOL 154 04/05/2022    HDL 49 04/05/2022    LDLCALC 82.0 04/05/2022    TRIG 115 04/05/2022    CHOLHDL 31.8 04/05/2022     DM:   Lab Results   Component Value Date    HGBA1C 10.3 (H) 04/06/2022    HGBA1C 7.3 (H) 12/03/2021    HGBA1C 8.2 (H) 01/05/2021    LDLCALC 82.0 04/05/2022    CREATININE 1.3 05/09/2022     Thyroid:   Lab Results   Component Value Date    TSH 0.420 04/05/2022    FREET4 0.92 03/02/2021    F0XHJPX 9.6 08/04/2017     Anemia:   Lab Results   Component Value Date    IRON 50 10/21/2020    TIBC 502 (H) 10/21/2020    FERRITIN 4 (L) 05/12/2020    PUXYMFJJ24 296 05/12/2020    FOLATE 11.3 05/12/2020     Cardiac:   Lab Results   Component Value Date    TROPONINI <0.006 05/09/2022    CKTOTAL 97 12/15/2012    BNP <10 12/09/2021     Urinalysis:   Lab Results   Component Value Date    LABURIN No significant growth 03/25/2021    COLORU Yellow 05/09/2022    CLARITYU Clear 09/28/2020    SPECGRAV 1.015 05/09/2022    NITRITE Negative 05/09/2022    GLUCOSEU 50 (A) 06/10/2013    KETONESU Negative 05/09/2022    UROBILINOGEN Negative 05/09/2022    BLOODU NEG 06/10/2013    WBCUA 16 (H) 05/09/2022     Trended Lab Data:  Recent Labs   Lab 05/09/22  1518   WBC 7.43   HGB 8.6*   HCT 29.5*      MCV 86   RDW 16.3*      K 4.6      CO2 18*   BUN 27*   CREATININE 1.3   *   PROT 7.3   ALBUMIN 3.4*   BILITOT 0.2   AST 50*   ALKPHOS 139*   ALT 22     Trended Cardiac Data:  Recent Labs   Lab 05/09/22  1530   TROPONINI <0.006     Microbiology Data:  Microbiology Results (last 7 days)     Procedure Component Value Units Date/Time    Urine culture [[id number]] Collected: 05/09/22 1637    Order Status: No result Specimen: Urine Updated: 05/09/22 1366           Other Results:  EKG (my  interpretation):   ECG Results          EKG 12-lead (In process)  Result time 05/09/22 16:19:08    In process by Interface, Lab [facility] (05/09/22 16:19:08)                 Narrative:    Test Reason : R53.1,    Vent. Rate : 094 BPM     Atrial Rate : 094 BPM     P-R Int : 126 ms          QRS Dur : 084 ms      QT Int : 354 ms       P-R-T Axes : 078 059 030 degrees     QTc Int : 442 ms    Normal sinus rhythm  Normal ECG  When compared with ECG of 05-APR-2022 18:00,  No significant change was found    Referred By: [email or web address]   SELF           Confirmed By:                                Radiology:  CT Head Without Contrast    Result Date: 5/9/2022  EXAMINATION: CT HEAD WITHOUT CONTRAST CLINICAL HISTORY: Head trauma, minor (Age >= 65y);Neuro deficit, acute, stroke suspected; TECHNIQUE: Low dose axial CT images obtained throughout the head without intravenous contrast. Sagittal and coronal reconstructions were performed. COMPARISON: 04/06/2022 FINDINGS: Intracranial compartment: Ventricles and sulci are normal in size for age without evidence of hydrocephalus. No extra-axial blood or fluid collections. The brain parenchyma appears normal. No parenchymal mass, hemorrhage, edema or major vascular distribution infarct. Skull/extracranial contents (limited evaluation): No fracture. Mastoid air cells and paranasal sinuses are essentially clear.     No acute abnormality. Electronically signed by: Salomon Escobar Date:    05/09/2022 Time:    16:47    CT Cervical Spine Without Contrast    Result Date: 5/9/2022  EXAMINATION: CT CERVICAL SPINE WITHOUT CONTRAST CLINICAL HISTORY: Neck trauma (Age >= 65y); TECHNIQUE: Low dose axial CT images through the cervical spine, with sagittal and coronal reformations.  Contrast was not administered. COMPARISON: 04/08/2022 FINDINGS: No acute cervical fractures are detected.  Slight straightening of normal cervical lordosis.  Posterior fusion hardware from C3 through T1. Moderate disc space  narrowing at C5-6, C6-7 and C3-4.  Mild narrowing elsewhere. Nine ectomy defect from C3 through C7. Decompression of the central canal. Severe foraminal narrowing at C3-4 on the left, C4-5 bilaterally C5-6 bilaterally.  Moderate foraminal narrowing bilaterally at C6-7 and C3-4 on the right. Limited evaluation of the intraspinal contents demonstrates no hematoma or mass.Paraspinal soft tissues exhibit no acute abnormalities. Metallic artifact.     1. No acute abnormality 2. Multilevel a chronic and postoperative changes.  See above comments. Electronically signed by: Salomon Escobar Date:    05/09/2022 Time:    16:58       Assessment:     Chayito Chung is a 71 y.o. female with PMHx   Patient Active Problem List    Diagnosis Date Noted    Physical deconditioning 05/09/2022    Traumatic rhabdomyolysis 05/09/2022    CVA (cerebral vascular accident)     Mild malnutrition 04/06/2022    Right leg weakness 04/05/2022    History of stroke with residual deficit 04/05/2022    Aortic atherosclerosis 11/02/2021    Cervical myelopathy 07/23/2020    Anxiety 07/23/2020    Hypertension 07/23/2020    Migraine headache 07/23/2020    CAD (coronary artery disease) 07/23/2020    Impaired ambulation     History of partial gastrectomy 12/16/2019    Seizure disorder 12/16/2019    Incisional hernia, without obstruction or gangrene 11/27/2019    Type 2 diabetes mellitus, with long-term current use of insulin 11/12/2019    Osteopenia of lumbar spine 08/27/2019    Vitamin D deficiency 07/26/2019    History of colon cancer 07/25/2019    Chronic diastolic congestive heart failure 11/15/2018    Steroid-induced gastritis 04/25/2017    Sinus tachycardia 10/11/2016    Iron deficiency anemia 10/11/2016    Weakness of both lower extremities 08/26/2016    Lumbar facet arthropathy 08/02/2016    S/P PTCA (percutaneous transluminal coronary angioplasty) 10/20/2015    Hypertension associated with diabetes     Hyperlipidemia  associated with type 2 diabetes mellitus     Hypoglycemia associated with type 2 diabetes mellitus 09/20/2014    Major depressive disorder in partial remission 12/17/2012    Solitary pulmonary nodule s/p resection 4/2012 12/17/2012    Neuropathy 12/17/2012    Coronary artery disease of native artery of native heart with stable angina pectoris 12/16/2012    COPD (chronic obstructive pulmonary disease) 12/16/2012    Tobacco abuse, 1ppd, 50 years 12/16/2012        Plan:     Traumatic Rhabdomyolysis   #Bilateral lower extremity weakness  #Hx of Vertebral compression fracture  - started this morning 10AM; fell multiple times  - CT Head wo contrast: no acute abnormality  - CT Cervical Spine wo contrast: No acute abnormality  - CPK 3832  -  ml/hr   - Consider MRI spine  - Considering Neurology consult  - PT/OT    Productive cough  # Hx of COPD  -Duo-Nebs ordered PRN  - CXR pending    UTI:    # Hx of urinary retention  - ED: Grullon inserted   - Abnormal UA with many yeast, WBC 16, Hyaline casts 8    Hx of Cerebrovascular Accident with acute right-sided weakness  - MRI-Brain w/wo contrast showed subacute R YUDITH infarct, which was incongruent with patient's RUE and RLE weakness  - MRI of entire spine performed as patient's weakness was incongruent with brain imaging; no acute findings noted  - continue ASA, Plavix  - Neurochecks q4h    Normocytic Anemia:  - H/H 8.6/29.5  - MCV 86  - Iron panel, B12, Folate  - Holding iron pills inpatient    Essential Hypertension  -continue home regimen includes HCTZ 12.5mg daily, losartan 100mg daily, and and verapamil 180mg daily     Hyperlipidemia  - continue Lipitor 40     Chronic Back Pain  -patient reports long-standing history of lower back pain  -continued home Hasty PRN     Hx of Chronic L1 Vertebral Compression Fracture  -noted incidentally on MRI of her T-spine; noted as far back as 2017 on imaging per chart review  -no further inpatient intervention needed  -can follow  up outpatient with spine specialist    Pulmonary Nodules  -noted incidentally on CTA Head and Neck  -has longstanding smoking history  -follows with Pulmonologist at [facility]     Seizure Disorder  -continued home topiramate 50mg BID, zonisamide 200mg BID  -EEG done while inpatient did not show any seizure activity  -patient may benefit from EMU admission arranged by Neurologist    Type II Diabetes Mellitus, complicated by peripheral neuropathy  -last A1c in 12/2021 was 7.3%; repeat A1c on admission 10.3%  -home regimen includes metformin 1000mg BID; holding inpatient  -continued home Lyrica 150 mg BID  -on Lantus 35U daily while in house, 17U inpatient  -SSI in house     Migraines  -continued home topiramate 50mg BID     Depression  -home regimen includes Cymbalta 90mg daily,   -Seroquel 300mg qhs PRN  -continued home Cymbalta,      Anxiety   -home regimen includes Cymbalta 90mg daily, Seroquel 300mg qhs, and Xanax 0.5mg BID PRN  -will include PRN    Diet: diabetic  Ppx: Lovenox  Code: Full    Disposition: admit to inpatient for observation    Santo Tijerina MD  [facility] Internal Medicine HO-I    [facility] Medicine Hospitalist Pager numbers:   [facility] Hospitalist Medicine Team A (Bita/Sydni): [phone]  [facility] Hospitalist Medicine Team B (Lillian/Danny):  [phone]

## 2022-05-10 NOTE — PROGRESS NOTES
"[facility] Medicine Progress Note    Primary Team: [facility] Hospitalist Team B  Attending Physician: Lillian Snyder MD  Resident: Dr. Silva  Intern: Dr. Tijerina    Subjective:      Overnight patient having some bilateral leg pain  Today patient feeling well, still complaining bilateral leg pain  Denies HA, fever, chills, chest pain, SOB, abdominal pain, N/V/D.      Objective:     Last 24 Hour Vital Signs:  BP  Min: 109/64  Max: 154/78  Temp  Av.5 °F (36.9 °C)  Min: 97.8 °F (36.6 °C)  Max: 98.9 °F (37.2 °C)  Pulse  Av.5  Min: 94  Max: 119  Resp  Av.7  Min: 16  Max: 22  SpO2  Av.2 %  Min: 91 %  Max: 100 %  Height  Av' 2" (157.5 cm)  Min: 5' 2" (157.5 cm)  Max: 5' 2" (157.5 cm)  Weight  Av.3 kg (130 lb 11.4 oz)  Min: 57.8 kg (127 lb 6.8 oz)  Max: 60.8 kg (134 lb)    Intake/Output Summary (Last 24 hours) at 5/10/2022 0821  Last data filed at 5/10/2022 0531  Gross per 24 hour   Intake 120 ml   Output 3050 ml   Net -2930 ml      Physical Examination:     Constitutional: She appears well-developed and well-nourished. No distress.   HENT:   Head: Normocephalic and atraumatic.   Mouth/Throat: Oropharynx is clear and moist.   Eyes: Conjunctivae and EOM are normal. Pupils are equal, round, and reactive to light.   Neck: Neck supple.   C-collar in place.  Midline cervical tenderness noted   Normal range of motion.  Cardiovascular: Normal rate, regular rhythm and intact distal pulses.   Pulmonary/Chest: Breath sounds normal. No stridor. No respiratory distress.   Abdominal: Abdomen is soft. She exhibits no distension. There is no abdominal tenderness.   Musculoskeletal:         General: No tenderness or edema. Normal range of motion.      Cervical back: Normal range of motion and neck supple.   Neurological: She is alert and oriented to person, place, and time. A sensory deficit is present.   CN 2-12 intact throughout   BL UE 5/5 strength with normal sensorium  BL LE 2/5 strength with normal sensorium  L " Patellar tendon 2+; R Patellar tendon +1  Skin: Skin is warm and dry.   Psychiatric: She has a normal mood and affect.     Laboratory:  Laboratory Data Reviewed: yes  Pertinent Findings:  Recent Labs   Lab 05/09/22  1518 05/10/22  0441   WBC 7.43 5.34   HGB 8.6* 8.6*   HCT 29.5* 28.8*    222   MCV 86 84   RDW 16.3* 16.5*    139   K 4.6 4.2    106   CO2 18* 22*   BUN 27* 15   CREATININE 1.3 0.7   * 260*   PROT 7.3 6.7   ALBUMIN 3.4* 3.1*   BILITOT 0.2 0.2   AST 50* 32   ALKPHOS 139* 136*   ALT 22 21       Microbiology Data Reviewed: yes  Pertinent Findings:  Microbiology Results (last 7 days)     Procedure Component Value Units Date/Time    Blood culture [[id number]]     Order Status: Sent Specimen: Blood     Urine culture [[id number]] Collected: 05/09/22 1637    Order Status: No result Specimen: Urine Updated: 05/09/22 5711           Other Results:  EKG (my interpretation):   ECG Results          EKG 12-lead (Final result)  Result time 05/09/22 23:16:31    Final result by Interface, Lab In [facility] (05/09/22 23:16:31)                 Narrative:    Test Reason : R53.1,    Vent. Rate : 094 BPM     Atrial Rate : 094 BPM     P-R Int : 126 ms          QRS Dur : 084 ms      QT Int : 354 ms       P-R-T Axes : 078 059 030 degrees     QTc Int : 442 ms    Normal sinus rhythm  Normal ECG  When compared with ECG of 05-APR-2022 18:00,  No significant change was found  Confirmed by Clyde Hernandez MD ([id number]) on 5/9/2022 11:16:26 PM    Referred By: [email or web address]   SELF           Confirmed By:Clyde Hernandez MD                             Results for orders placed or performed during the hospital encounter of 05/09/22   EKG 12-lead    Collection Time: 05/09/22  2:30 PM    Narrative    Test Reason : R53.1,    Vent. Rate : 094 BPM     Atrial Rate : 094 BPM     P-R Int : 126 ms          QRS Dur : 084 ms      QT Int : 354 ms       P-R-T Axes : 078 059 030 degrees     QTc Int : 442 ms    Normal sinus rhythm  Normal  ECG  When compared with ECG of 05-APR-2022 18:00,  No significant change was found  Confirmed by Clyde Hernandez MD ([id number]) on 5/9/2022 11:16:26 PM    Referred By: FRANCISCO   SELF           Confirmed By:Clyde Hernandez MD      Radiology Data Reviewed: yes  Pertinent Findings:  X-Ray Hips Bilateral 2 View Inc AP Pelvis    Result Date: 5/9/2022  EXAMINATION: XR HIPS BILATERAL 2 VIEW INCL AP PELVIS CLINICAL HISTORY: hip pain, s/p multiple falls;  Other symptoms and signs involving the musculoskeletal system TECHNIQUE: AP view of the pelvis and frogleg lateral views of both hips were performed. COMPARISON: None. FINDINGS: Examination limited due to osseous demineralization and bowel gas. Noting this, no definite evidence of acute displaced fracture or dislocation is appreciated.  Degenerative findings are noted involving the spine, sacroiliac joints, pubic symphysis, and hip joints.  Phleboliths appear to project within the pelvis.  No definite acute soft tissue abnormalities appreciated.     Noting limitations above, no definite evidence of acute displaced fracture or dislocation identified. Electronically signed by: Bert Sandra Date:    05/09/2022 Time:    20:57    CT Head Without Contrast    Result Date: 5/9/2022  EXAMINATION: CT HEAD WITHOUT CONTRAST CLINICAL HISTORY: Head trauma, minor (Age >= 65y);Neuro deficit, acute, stroke suspected; TECHNIQUE: Low dose axial CT images obtained throughout the head without intravenous contrast. Sagittal and coronal reconstructions were performed. COMPARISON: 04/06/2022 FINDINGS: Intracranial compartment: Ventricles and sulci are normal in size for age without evidence of hydrocephalus. No extra-axial blood or fluid collections. The brain parenchyma appears normal. No parenchymal mass, hemorrhage, edema or major vascular distribution infarct. Skull/extracranial contents (limited evaluation): No fracture. Mastoid air cells and paranasal sinuses are essentially clear.     No  acute abnormality. Electronically signed by: Salomon Escobar Date:    05/09/2022 Time:    16:47    CT Cervical Spine Without Contrast    Result Date: 5/9/2022  EXAMINATION: CT CERVICAL SPINE WITHOUT CONTRAST CLINICAL HISTORY: Neck trauma (Age >= 65y); TECHNIQUE: Low dose axial CT images through the cervical spine, with sagittal and coronal reformations.  Contrast was not administered. COMPARISON: 04/08/2022 FINDINGS: No acute cervical fractures are detected.  Slight straightening of normal cervical lordosis.  Posterior fusion hardware from C3 through T1. Moderate disc space narrowing at C5-6, C6-7 and C3-4.  Mild narrowing elsewhere. Nine ectomy defect from C3 through C7. Decompression of the central canal. Severe foraminal narrowing at C3-4 on the left, C4-5 bilaterally C5-6 bilaterally.  Moderate foraminal narrowing bilaterally at C6-7 and C3-4 on the right. Limited evaluation of the intraspinal contents demonstrates no hematoma or mass.Paraspinal soft tissues exhibit no acute abnormalities. Metallic artifact.     1. No acute abnormality 2. Multilevel a chronic and postoperative changes.  See above comments. Electronically signed by: Salomon Escobar Date:    05/09/2022 Time:    16:58    X-Ray Chest AP Portable    Result Date: 5/9/2022  EXAMINATION: XR CHEST AP PORTABLE CLINICAL HISTORY: Cough; Cough, unspecified TECHNIQUE: Single frontal view of the chest was performed. COMPARISON: Chest radiograph 04/05/2022, 21:48 hours. FINDINGS: Cardiomediastinal contour appears grossly unchanged.  Background coarse interstitial increased attenuation is again noted.  New platelike opacities are noted in the mid lower lung zones.  No definite pneumothorax or large volume pleural effusion. No acute findings are noted visualized abdomen.  Postoperative sequela again noted.  Osseous soft tissue structures appear definite acute change.  Postoperative sequela involving the cervical and upper thoracic spine, as before.     Linear  platelike opacities at the lung bases favored to represent atelectasis. Electronically signed by: Bert Sandra Date:    05/09/2022 Time:    20:58       Current Medications:     Scheduled:   aspirin  81 mg Oral Daily    atorvastatin  40 mg Oral Daily    cefTRIAXone (ROCEPHIN) IVPB  1 g Intravenous Q24H    clopidogreL  75 mg Oral Daily    DULoxetine  90 mg Oral Daily    enoxaparin  40 mg Subcutaneous Daily    fluticasone propionate  2 spray Each Nostril Daily    hydroCHLOROthiazide  12.5 mg Oral Daily    insulin detemir U-100  17 Units Subcutaneous QHS    losartan  100 mg Oral Daily    mupirocin   Nasal BID    topiramate  50 mg Oral BID    verapamiL  180 mg Oral QHS    zonisamide  200 mg Oral BID         Infusions:   lactated ringers 150 mL/hr at 05/10/22 0507        PRN:  acetaminophen, albuterol-ipratropium, dextrose 10%, HYDROcodone-acetaminophen, insulin aspart U-100, melatonin, pregabalin, sodium chloride 0.9%     Antibiotics and Day Number of Therapy:  Antibiotics (From admission, onward)            Start     Stop Route Frequency Ordered    05/10/22 0900  cefTRIAXone (ROCEPHIN) 1 g/50 mL D5W IVPB         -- IV Every 24 hours (non-standard times) 05/10/22 0019    05/10/22 0900  mupirocin 2 % ointment         05/15 0859 Nasl 2 times daily 05/10/22 0021           Lines and Day Number of Therapy:      Assessment:     Chayito Chung is a 71 y.o. female with a PMHx of     Patient Active Problem List    Diagnosis Date Noted    Multiple falls 05/09/2022    Physical deconditioning 05/09/2022    Traumatic rhabdomyolysis 05/09/2022    CVA (cerebral vascular accident)     Mild malnutrition 04/06/2022    Right leg weakness 04/05/2022    History of stroke with residual deficit 04/05/2022    Aortic atherosclerosis 11/02/2021    Cervical myelopathy 07/23/2020    Anxiety 07/23/2020    Hypertension 07/23/2020    Migraine headache 07/23/2020    CAD (coronary artery disease) 07/23/2020    Impaired  ambulation     History of partial gastrectomy 12/16/2019    Seizure disorder 12/16/2019    Incisional hernia, without obstruction or gangrene 11/27/2019    Type 2 diabetes mellitus, with long-term current use of insulin 11/12/2019    Osteopenia of lumbar spine 08/27/2019    Vitamin D deficiency 07/26/2019    History of colon cancer 07/25/2019    Chronic diastolic congestive heart failure 11/15/2018    Steroid-induced gastritis 04/25/2017    Sinus tachycardia 10/11/2016    Iron deficiency anemia 10/11/2016    Weakness of both lower extremities 08/26/2016    Lumbar facet arthropathy 08/02/2016    S/P PTCA (percutaneous transluminal coronary angioplasty) 10/20/2015    Hypertension associated with diabetes     Hyperlipidemia associated with type 2 diabetes mellitus     Hypoglycemia associated with type 2 diabetes mellitus 09/20/2014    Major depressive disorder in partial remission 12/17/2012    Solitary pulmonary nodule s/p resection 4/2012 12/17/2012    Neuropathy 12/17/2012    Coronary artery disease of native artery of native heart with stable angina pectoris 12/16/2012    COPD (chronic obstructive pulmonary disease) 12/16/2012    Tobacco abuse, 1ppd, 50 years 12/16/2012       Plan:     Traumatic Rhabdomyolysis   #Bilateral lower extremity weakness  #Hx of Vertebral compression fracture  - started this morning 10AM; fell multiple times  - CT Head wo contrast: no acute abnormality  - CT Cervical Spine wo contrast: No acute abnormality  - CPK 3832  -  ml/hr   - MRI Brain wwo contrast pending  - Considering Neurology consult  - PT/OT     Productive cough  # Hx of COPD  -Duo-Nebs ordered PRN  - CXR Linear platelike opacities at the lung bases favored to represent atelectasis.     UTI:    # Hx of urinary retention  - ED: Grullon inserted   - Abnormal UA with many yeast, WBC 16, Hyaline casts 8     Cerebrovascular Accident with acute right-sided weakness  - on previous admit: MRI-Brain w/wo  contrast showed subacute R YUDITH infarct, which was incongruent with patient's RUE and RLE weakness  - on previous admit: MRI of entire spine performed as patient's weakness was incongruent with brain imaging; no acute findings noted  - continue ASA, Plavix  - Neurochecks q4h     Normocytic Anemia:  - H/H 8.6/29.5  - MCV 86  - Ferritin 13, Iron 14, Transferrin 393, TIBC 582  - B12, Folate - wnl      Essential Hypertension  -continue home regimen includes HCTZ 12.5mg daily, losartan 100mg daily, and and verapamil 180mg daily     Hyperlipidemia  - continue Lipitor 40      Chronic Back Pain  -patient reports long-standing history of lower back pain  -continued home Norco PRN     Chronic L1 Vertebral Compression Fracture  -noted incidentally on MRI of her T-spine; noted as far back as 2017 on imaging per chart review  -no further inpatient intervention needed  -can follow up outpatient with spine specialist     Pulmonary Nodules  -noted incidentally on CTA Head and Neck  -has longstanding smoking history  -follows with Pulmonologist at [facility]     Seizure Disorder  -continued home topiramate 50mg BID, zonisamide 200mg BID  -EEG done while inpatient did not show any seizure activity  -patient may benefit from EMU admission arranged by Neurologist     Type II Diabetes Mellitus, complicated by peripheral neuropathy  -last A1c in 12/2021 was 7.3%; repeat A1c on admission 10.3%, 9.8 most recent  -home regimen includes metformin 1000mg BID; holding inpatient  -continued home Lyrica 150 mg BID  -on Lantus 35U daily while in house, 17U inpatient  -SSI in house     Migraines  -continued home topiramate 50mg BID     Depression  -home regimen includes Cymbalta 90mg daily,   -Seroquel 300mg qhs PRN  -continued home Cymbalta,      Anxiety   -home regimen includes Cymbalta 90mg daily, Seroquel 300mg qhs, and Xanax 0.5mg BID PRN  -will include PRN     Diet: diabetic  Ppx: Lovenox  Code: Full     Disposition: admit to inpatient for  observation     Santo Tijerina MD  [facility] Internal Medicine HO-I     [facility] Medicine Hospitalist Pager numbers:   [facility] Hospitalist Medicine Team A (Bita/Sdyni):          [phone]  [facility] Hospitalist Medicine Team B (Lillian/Danny):

## 2022-05-10 NOTE — PROGRESS NOTES
05/09/22 2312   Admission   Initial VN Admission Questions Complete   Communication Issues? None   Shift   Virtual Nurse - Patient Verbalized Approval Of Camera Use   Safety/Activity   Patient Rounds bed in low position;call light in patient/parent reach;visualized patient   Safety Promotion/Fall Prevention Fall Risk reviewed with patient/family;side rails raised x 2;instructed to call staff for mobility

## 2022-05-10 NOTE — PT/OT/SLP PROGRESS
Physical Therapy      Patient Name:  Chayito Chung   MRN:  [id number]    Patient not seen today secondary to  (Patient ALEC in MRI.). Will follow-up as able.  5/10/2022

## 2022-05-10 NOTE — PHARMACY MED REC
"  [facility]           Pharmacy  Admission Medication History     The home medication history was taken by Li Mclaughlin.      Medication history obtained from Medications listed below were obtained from: Patient/family    Based on information gathered for medication list, you may go to "Admission" then "Reconcile Home Medications" tabs to review and/or act upon those items.      The home medication list has been updated by the Pharmacy department.    Please read ALL comments highlighted in yellow.    Please address this information as you see fit.     Feel free to contact us if you have any questions or require assistance.    The medications listed below were removed from the home medication list.  Please reorder if appropriate:     Patient reports NOT TAKING the following medication(s):  o Tessalon 200mg  o lidoderm 5%      No current facility-administered medications on file prior to encounter.     Current Outpatient Medications on File Prior to Encounter   Medication Sig Dispense Refill    albuterol (PROVENTIL/VENTOLIN HFA) 90 mcg/actuation inhaler INHALE 2 PUFFS BY MOUTH INTO THE LUNGS EVERY 6 HOURS AS NEEDED FOR WHEEZING (Patient taking differently: Inhale 2 puffs into the lungs every 6 (six) hours as needed for Wheezing.) 25.5 g 0    albuterol-ipratropium (DUO-NEB) 2.5 mg-0.5 mg/3 mL nebulizer solution Take 3 mLs by nebulization every 4 (four) hours as needed for Wheezing or Shortness of Breath. Rescue      alendronate (FOSAMAX) 70 MG tablet Take 1 tablet (70 mg total) by mouth every 7 days. (Patient taking differently: Take 70 mg by mouth every Sunday.) 12 tablet 3    aspirin 81 MG Chew Take 1 tablet (81 mg total) by mouth once daily. 90 tablet 3    atorvastatin (LIPITOR) 40 MG tablet Take 1 tablet (40 mg total) by mouth once daily. 90 tablet 3    clopidogreL (PLAVIX) 75 mg tablet Take 1 tablet (75 mg total) by mouth once daily. Take medicine for 15 days. 15 tablet 0    " diclofenac sodium (VOLTAREN) 1 % Gel Apply 2 g topically 4 (four) times daily. 100 g 0    DULoxetine (CYMBALTA) 30 MG capsule Take 30 mg by mouth once daily. Take with 60mg capsule for total of 90mg daily      duloxetine (CYMBALTA) 60 MG capsule Take 60 mg by mouth once daily. Take with 30mg for a total of 90mg daily      ferrous sulfate (FEOSOL) 325 mg (65 mg iron) Tab tablet Take 1 tablet (325 mg total) by mouth once daily. 30 tablet 3    fluticasone propionate (FLONASE) 50 mcg/actuation nasal spray 2 sprays (100 mcg total) by Each Nare route once daily. 1 Bottle 12    hydroCHLOROthiazide (MICROZIDE) 12.5 mg capsule Take 1 capsule (12.5 mg total) by mouth once daily. 90 capsule 3    HYDROcodone-acetaminophen (NORCO) 7.5-325 mg per tablet Take 1 tablet by mouth 2 (two) times daily as needed.      insulin lispro 100 unit/mL pen Inject 35 Units into the skin 2 (two) times a day.      LANTUS SOLOSTAR U-100 INSULIN glargine 100 units/mL (3mL) SubQ pen Inject 35 Units into the skin nightly.      losartan (COZAAR) 50 MG tablet Take 1 tablet (50 mg total) by mouth once daily. 90 tablet 3    magnesium oxide (MAG-OX) 400 mg (241.3 mg magnesium) tablet Take 400 mg by mouth once daily.      metFORMIN (GLUCOPHAGE) 1000 MG tablet Take 1,000 mg by mouth 2 (two) times daily.      nitroGLYCERIN (NITROSTAT) 0.4 MG SL tablet Place 1 tablet (0.4 mg total) under the tongue every 5 (five) minutes as needed for Chest pain. 100 tablet 1    ondansetron (ZOFRAN-ODT) 4 MG TbDL Take 1 tablet (4 mg total) by mouth every 8 (eight) hours as needed (nausea). 14 tablet 1    potassium chloride SA (K-DUR,KLOR-CON) 20 MEQ tablet Take 1 tablet (20 mEq total) by mouth once daily. 90 tablet 3    pregabalin (LYRICA) 150 MG capsule Take 150 mg by mouth 2 (two) times daily as needed.      QUEtiapine (SEROQUEL) 400 MG tablet Take 400 mg by mouth nightly.      tiZANidine (ZANAFLEX) 4 MG tablet Take 4 mg by mouth 2 (two) times a day.       "topiramate (TOPAMAX) 50 MG tablet Take 50 mg by mouth 2 (two) times daily.      TRELEGY ELLIPTA 200-62.5-25 mcg inhaler Inhale 1 puff into the lungs once daily.      verapamiL (CALAN-SR) 180 MG CR tablet Take 1 tablet (180 mg total) by mouth every evening. 30 tablet 11    zonisamide (ZONEGRAN) 100 MG Cap Take 400 mg by mouth once daily.      BD ULTRA-FINE MINI PEN NEEDLE 31 gauge x 3/16" Ndle       insulin lispro protamin-lispro 100 unit/mL (75-25) InPn Inject 35 Units into the skin 2 (two) times a day.      [DISCONTINUED] albuterol-ipratropium (DUO-NEB) 2.5 mg-0.5 mg/3 mL nebulizer solution Take 3 mLs by nebulization every 4 (four) hours as needed for Wheezing or Shortness of Breath. Rescue 1 Box 0    [DISCONTINUED] benzonatate (TESSALON) 200 MG capsule Take 1 capsule (200 mg total) by mouth 3 (three) times daily as needed for Cough. 30 capsule 0    [DISCONTINUED] HYDROcodone-acetaminophen (NORCO)  mg per tablet Take 1 tablet by mouth every 6 (six) hours as needed for Pain. 40 tablet 0    [DISCONTINUED] LIDOcaine (LIDODERM) 5 % Place 1 patch onto the skin once daily. Remove & Discard patch within 12 hours or as directed by MD 30 patch 0    [DISCONTINUED] pravastatin (PRAVACHOL) 40 MG tablet Take 0.5 tablets (20 mg total) by mouth once daily. (Patient taking differently: Take 40 mg by mouth once daily.) 90 tablet 1    [DISCONTINUED] quetiapine (SEROQUEL) 300 MG Tab 300 mg nightly.          Please address this information as you see fit.  Feel free to contact us if you have any questions or require assistance.    Li Mclaughlin  [phone]                .          "

## 2022-05-10 NOTE — CONSULTS
NEUROLOGY FLOOR CONSULT    Reason for consult:  Weakness/falls    Informant:  Patient, patient's sister       Other sources of information : chart review    HPI:   Chayito Chung is a 71 y.o. F with PMHx of Cervical radiculopathy, chronic lumbar back pain, DM with PNP, CAD, COPD, subacute R YUDITH infarct, Syncopal events on zonisamide, Colon cancer, HLD who is admitted to  due to recurrent unwitnessed falls. At baseline she lives by herself and uses a rollator for gait assistance. Yesterday she had at least 5 unwitnessed falls. Patient is a poor historian, does not recall having any LOC. She states she was weak every time she tried to get up. After the 5th fall she called EMS. Labs remarkable for H/H 8.6/29.5, CPK 3832, UA with many yeast, WBC 16, Hyaline casts 8. CTH, c spine negative for acute fx.  Patient follows with Dr Anguiano neurologist at [facility]. Last visit on 3/3/22. Next visit scheduled for 6/8/22. Also [place] with Dr Cadena with [place] at [facility] [place]. That referral was placed after a recent admission on 4/5/22 when she presented with some R sided weakness and MRI showed subacute R YUDITH infarct, which is incongruent.   Currently alert, following commands. Complaining of pain in lateral aspect of left leg.  Of note, polypharmacy noted: on tizanidine, lyrica, cymbalta for neuropathic pain. On Zonizamide for syncopal events (Dr Anguiano with suspicion of seizures. EEG in last admission negative). Also on topamax for migraine and Norco  prn for pain. Unclear if she was taking prescribed seroquel at bedtime.     ROS: Per HPI    Histories:     Allergies:  Patient has no known allergies.    Current Medications:    Current Facility-Administered Medications   Medication Dose Route Frequency Provider Last Rate Last Admin    acetaminophen tablet 650 mg  650 mg Oral Q6H PRN Marcella Silva MD   650 mg at 05/09/22 7459    albuterol-ipratropium 2.5 mg-0.5 mg/3 mL nebulizer solution  3 mL  3 mL Nebulization Q6H PRN Marcella Silva MD        aspirin chewable tablet 81 mg  81 mg Oral Daily Marcella Silva MD        atorvastatin tablet 40 mg  40 mg Oral Daily Marcella Silva MD        cefTRIAXone (ROCEPHIN) 1 g/50 mL D5W IVPB  1 g Intravenous Q24H Marcella Silva MD        clopidogreL tablet 75 mg  75 mg Oral Daily Marcella Silva MD        dextrose 10% bolus 125 mL  12.5 g Intravenous PRN Marcella Silva MD        DULoxetine DR capsule 90 mg  90 mg Oral Daily Marcella Silva MD        enoxaparin injection 40 mg  40 mg Subcutaneous Daily Marcella Silva MD   40 mg at 05/09/22 2143    fluticasone propionate 50 mcg/actuation nasal spray 100 mcg  2 spray Each Nostril Daily Marcella Silva MD        hydroCHLOROthiazide tablet 12.5 mg  12.5 mg Oral Daily Marcella Silva MD        HYDROcodone-acetaminophen 5-325 mg per tablet 1 tablet  1 tablet Oral Q4H PRN Marcella Silva MD   1 tablet at 05/09/22 2318    insulin aspart U-100 pen 1-10 Units  1-10 Units Subcutaneous QID (AC + HS) PRN Marcella Silva MD        insulin detemir U-100 pen 17 Units  17 Units Subcutaneous QHS Marcella Silva MD        lactated ringers infusion   Intravenous Continuous Marcella Silva  mL/hr at 05/10/22 0507 New Bag at 05/10/22 0507    losartan tablet 100 mg  100 mg Oral Daily Marcella Silva MD        melatonin tablet 6 mg  6 mg Oral Nightly PRN Marcella Silva MD        mupirocin 2 % ointment   Nasal BID Williams Delgado MD        pregabalin capsule 150 mg  150 mg Oral BID PRN Marcella Silva MD        sodium chloride 0.9% flush 10 mL  10 mL Intravenous PRN Marcella Silva MD        topiramate tablet 50 mg  50 mg Oral BID Marcella Silva MD   50 mg at 05/09/22 2318    verapamiL CR tablet 180 mg  180 mg Oral QHS Marcella Silva MD   180 mg at 05/09/22 2143    zonisamide capsule 200 mg  200 mg Oral BID Marcella Silva MD   200 mg at 05/09/22 2318       Past Medical/Surgical/Family History:  Medical:   Past Medical History:   Diagnosis Date    Acute coronary syndrome     Acute  hypoxemic respiratory failure 2017    Anxiety     Asthma     Cancer     colon    Cataracts, both eyes     Chest pain at rest     Chest pain of uncertain etiology 2015    Colon cancer 1988    COPD (chronic obstructive pulmonary disease)     Coronary artery disease     Depression     Diabetes mellitus     Dyspnea on exertion 11/15/2018    Elevated brain natriuretic peptide (BNP) level 11/15/2018    Elevated cholesterol     Hypertension     Swelling     Syncope and collapse 2016      Surgeries:   Past Surgical History:   Procedure Laterality Date    ABDOMINAL SURGERY      BREAST BIOPSY      benign unsure what side     SECTION, CLASSIC      COLON SURGERY      COLONOSCOPY  2019    repeat in 5 years    CORONARY ANGIOPLASTY WITH STENT PLACEMENT  3 months ago     x2, Hysterectomy, Lung surgery, Partial stomach removed, Part of colon removed for rectal cancer    GASTRECTOMY      HEMORRHOID SURGERY      HYSTERECTOMY      @28yrs of age    LUNG BIOPSY      OOPHORECTOMY      @28yrs of age    POSTERIOR FUSION OF CERVICAL SPINE WITH LAMINECTOMY N/A 2020    Procedure: LAMINECTOMY, SPINE, CERVICAL, WITH POSTERIOR FUSION C3-T1 ;  Surgeon: Herson Tate MD;  Location: [facility] OR [address] [place] [address];  Service: Neurosurgery;  Laterality: N/A;    REPAIR OF INCARCERATED INCISIONAL HERNIA WITHOUT HISTORY OF PRIOR REPAIR N/A 2022    Procedure: REPAIR, HERNIA, INCISIONAL, INCARCERATED, WITHOUT HISTORY OF PRIOR REPAIR;  Surgeon: Simon King MD;  Location: [facility];  Service: General;  Laterality: N/A;      Family:   Family History   Problem Relation Age of Onset    Cancer Mother     Diabetes Mother     Hypertension Mother     Breast cancer Mother     Heart disease Father     Lung cancer Father     Depression Sister     Hypertension Sister     Diabetes Mellitus Sister     Cancer Maternal Aunt        Social History:  Lives by herself  Rollator at baseline  On  HH  Current smoker      Current Evaluation:     Vital Signs:   Vitals:    05/10/22 0739   BP: 121/75   Pulse: 94   Resp: 18   Temp: 97.8 °F (36.6 °C)      ORIENTATION: Oriented to self, month, year and situation    LANGUAGE: Poor historian but No aphasia, no dysarthria    CRANIAL NERVES:  PERRL,EOMI, intact V1-V3, no facial asymmetry, uvula midline, tongue protrusion intact, SCM 5/5 bilaterally    MOTOR:  No Pronator drift UEs  Able to resist gravity with RLE. LLE exam limited due to pain in lateral aspect of leg, but able to move with slow flex/extention of knee due to pain    Tone: normal    DTR'S:  2+ bilaterally throughout   Patellar 1+, Ankles 0  No clonus  Flexor plantar response bilaterally    SENSORY:  normal to light touch and temperature throughout    CEREBELLAR/GAIT:  Finger to nose:normal  Heel to shin:normal with RLE  Gait: Not explored    LABORATORY STUDIES:  Recent Results (from the past 24 hour(s))   CBC auto differential    Collection Time: 05/09/22  3:18 PM   Result Value Ref Range    WBC 7.43 3.90 - 12.70 K/uL    RBC 3.43 (L) 4.00 - 5.40 M/uL    Hemoglobin 8.6 (L) 12.0 - 16.0 g/dL    Hematocrit 29.5 (L) 37.0 - 48.5 %    MCV 86 82 - 98 fL    MCH 25.1 (L) 27.0 - 31.0 pg    MCHC 29.2 (L) 32.0 - 36.0 g/dL    RDW 16.3 (H) 11.5 - 14.5 %    Platelets 233 150 - 450 K/uL    MPV 10.5 9.2 - 12.9 fL    Immature Granulocytes 0.5 0.0 - 0.5 %    Gran # (ANC) 5.3 1.8 - 7.7 K/uL    Immature Grans (Abs) 0.04 0.00 - 0.04 K/uL    Lymph # 1.4 1.0 - 4.8 K/uL    Mono # 0.7 0.3 - 1.0 K/uL    Eos # 0.1 0.0 - 0.5 K/uL    Baso # 0.01 0.00 - 0.20 K/uL    nRBC 0 0 /100 WBC    Gran % 71.1 38.0 - 73.0 %    Lymph % 18.6 18.0 - 48.0 %    Mono % 9.0 4.0 - 15.0 %    Eosinophil % 0.7 0.0 - 8.0 %    Basophil % 0.1 0.0 - 1.9 %    Differential Method Automated    Comprehensive metabolic panel    Collection Time: 05/09/22  3:18 PM   Result Value Ref Range    Sodium 138 136 - 145 mmol/L    Potassium 4.6 3.5 - 5.1 mmol/L    Chloride 107  95 - 110 mmol/L    CO2 18 (L) 23 - 29 mmol/L    Glucose 188 (H) 70 - 110 mg/dL    BUN 27 (H) 8 - 23 mg/dL    Creatinine 1.3 0.5 - 1.4 mg/dL    Calcium 9.2 8.7 - 10.5 mg/dL    Total Protein 7.3 6.0 - 8.4 g/dL    Albumin 3.4 (L) 3.5 - 5.2 g/dL    Total Bilirubin 0.2 0.1 - 1.0 mg/dL    Alkaline Phosphatase 139 (H) 55 - 135 U/L    AST 50 (H) 10 - 40 U/L    ALT 22 10 - 44 U/L    Anion Gap 13 8 - 16 mmol/L    eGFR if African American 48 (A) >60 mL/min/1.73 m^2    eGFR if non African American 42 (A) >60 mL/min/1.73 m^2   Beta - Hydroxybutyrate, Serum    Collection Time: 05/09/22  3:18 PM   Result Value Ref Range    Beta-Hydroxybutyrate 0.1 0.0 - 0.5 mmol/L   Magnesium    Collection Time: 05/09/22  3:18 PM   Result Value Ref Range    Magnesium 1.8 1.6 - 2.6 mg/dL   Troponin I    Collection Time: 05/09/22  3:30 PM   Result Value Ref Range    Troponin I <0.006 0.000 - 0.026 ng/mL   POCT glucose    Collection Time: 05/09/22  3:48 PM   Result Value Ref Range    POCT Glucose 180 (H) 70 - 110 mg/dL   Urinalysis, Reflex to Urine Culture Urine, Clean Catch    Collection Time: 05/09/22  4:37 PM    Specimen: Urine   Result Value Ref Range    Specimen UA Urine, Catheterized     Color, UA Yellow Yellow, Straw, Mya    Appearance, UA Hazy (A) Clear    pH, UA 5.0 5.0 - 8.0    Specific Gravity, UA 1.015 1.005 - 1.030    Protein, UA Trace (A) Negative    Glucose, UA 1+ (A) Negative    Ketones, UA Negative Negative    Bilirubin (UA) Negative Negative    Occult Blood UA Negative Negative    Nitrite, UA Negative Negative    Urobilinogen, UA Negative <2.0 EU/dL    Leukocytes, UA 1+ (A) Negative   Urinalysis Microscopic    Collection Time: 05/09/22  4:37 PM   Result Value Ref Range    WBC, UA 16 (H) 0 - 5 /hpf    Yeast, UA Many (A) None    Hyaline Casts, UA 8 (A) 0-1/lpf /lpf    Microscopic Comment SEE COMMENT    CPK    Collection Time: 05/09/22  6:38 PM   Result Value Ref Range    CPK 3832 (H) 20 - 180 U/L   POCT COVID-19 Rapid Screening     Collection Time: 05/09/22  7:12 PM   Result Value Ref Range    POC Rapid COVID Negative Negative     Acceptable Yes    POCT glucose    Collection Time: 05/09/22  8:13 PM   Result Value Ref Range    POCT Glucose 125 (H) 70 - 110 mg/dL   Hemoglobin A1c    Collection Time: 05/09/22  9:28 PM   Result Value Ref Range    Hemoglobin A1C 9.8 (H) 4.0 - 5.6 %    Estimated Avg Glucose 235 (H) 68 - 131 mg/dL   Iron and TIBC    Collection Time: 05/09/22  9:28 PM   Result Value Ref Range    Iron 14 (L) 30 - 160 ug/dL    Transferrin 393 (H) 200 - 375 mg/dL    TIBC 582 (H) 250 - 450 ug/dL    Saturated Iron 2 (L) 20 - 50 %   Ferritin    Collection Time: 05/09/22  9:28 PM   Result Value Ref Range    Ferritin 13 (L) 20.0 - 300.0 ng/mL   Vitamin B12    Collection Time: 05/09/22  9:28 PM   Result Value Ref Range    Vitamin B-12 342 210 - 950 pg/mL   Folate    Collection Time: 05/09/22  9:28 PM   Result Value Ref Range    Folate 10.0 4.0 - 24.0 ng/mL   POCT glucose    Collection Time: 05/09/22 11:01 PM   Result Value Ref Range    POCT Glucose 265 (H) 70 - 110 mg/dL   CBC auto differential    Collection Time: 05/10/22  4:41 AM   Result Value Ref Range    WBC 5.34 3.90 - 12.70 K/uL    RBC 3.43 (L) 4.00 - 5.40 M/uL    Hemoglobin 8.6 (L) 12.0 - 16.0 g/dL    Hematocrit 28.8 (L) 37.0 - 48.5 %    MCV 84 82 - 98 fL    MCH 25.1 (L) 27.0 - 31.0 pg    MCHC 29.9 (L) 32.0 - 36.0 g/dL    RDW 16.5 (H) 11.5 - 14.5 %    Platelets 222 150 - 450 K/uL    MPV 10.0 9.2 - 12.9 fL    Immature Granulocytes 0.4 0.0 - 0.5 %    Gran # (ANC) 3.4 1.8 - 7.7 K/uL    Immature Grans (Abs) 0.02 0.00 - 0.04 K/uL    Lymph # 1.3 1.0 - 4.8 K/uL    Mono # 0.5 0.3 - 1.0 K/uL    Eos # 0.1 0.0 - 0.5 K/uL    Baso # 0.02 0.00 - 0.20 K/uL    nRBC 0 0 /100 WBC    Gran % 63.9 38.0 - 73.0 %    Lymph % 24.5 18.0 - 48.0 %    Mono % 8.6 4.0 - 15.0 %    Eosinophil % 2.2 0.0 - 8.0 %    Basophil % 0.4 0.0 - 1.9 %    Differential Method Automated    Comprehensive metabolic  panel    Collection Time: 05/10/22  4:41 AM   Result Value Ref Range    Sodium 139 136 - 145 mmol/L    Potassium 4.2 3.5 - 5.1 mmol/L    Chloride 106 95 - 110 mmol/L    CO2 22 (L) 23 - 29 mmol/L    Glucose 260 (H) 70 - 110 mg/dL    BUN 15 8 - 23 mg/dL    Creatinine 0.7 0.5 - 1.4 mg/dL    Calcium 9.4 8.7 - 10.5 mg/dL    Total Protein 6.7 6.0 - 8.4 g/dL    Albumin 3.1 (L) 3.5 - 5.2 g/dL    Total Bilirubin 0.2 0.1 - 1.0 mg/dL    Alkaline Phosphatase 136 (H) 55 - 135 U/L    AST 32 10 - 40 U/L    ALT 21 10 - 44 U/L    Anion Gap 11 8 - 16 mmol/L    eGFR if African American >60 >60 mL/min/1.73 m^2    eGFR if non African American >60 >60 mL/min/1.73 m^2   Magnesium    Collection Time: 05/10/22  4:41 AM   Result Value Ref Range    Magnesium 1.6 1.6 - 2.6 mg/dL   Phosphorus    Collection Time: 05/10/22  4:41 AM   Result Value Ref Range    Phosphorus 2.9 2.7 - 4.5 mg/dL   CK    Collection Time: 05/10/22  4:41 AM   Result Value Ref Range    CPK 2265 (H) 20 - 180 U/L   POCT glucose    Collection Time: 05/10/22  6:00 AM   Result Value Ref Range    POCT Glucose 229 (H) 70 - 110 mg/dL       RADIOLOGY STUDIES:  I have personally reviewed the images performed.    MRI C spine 4/8/22  1. Degenerative changes of the thoracic spine detailed above.  Moderate spinal canal stenosis and moderate to severe neural foraminal narrowing noted at T9-T10.  2. Patchy bibasilar airspace opacification, likely atelectasis.   3. Chronic L1 vertebral body compression fracture.      MRI T spine 4/9/22  1. Degenerative changes of the thoracic spine detailed above.  Moderate spinal canal stenosis and moderate to severe neural foraminal narrowing noted at T9-T10.  2. Patchy bibasilar airspace opacification, likely atelectasis.  Consider further evaluation with PA and lateral chest x-ray.  3. Chronic L1 vertebral body compression fracture.    MRI L spine 4/8/22  Significant distention of the bladder, outlet obstruction not excluded.  No central canal  stenosis.  Chronic mild retropulsion of inferior endplate of L1 causing no greater than mild central canal narrowing.  Facet joint osseous hypertrophy and enhancement at the bilateral facet joints of L4-5 worse on the left consistent with active degenerative change.  Bilateral L5 pars defect.    Assessment/Plan:   71 y.o. F with PMHx of Cervical radiculopathy, chronic lumbar back pain, DM with PNP, CAD, COPD, subacute R YUDITH infarct, Syncopal events on zonisamide, Colon cancer, HLD who is admitted to IM due to recurrent unwitnessed falls, seems not associated to LOC. Etiology likely related to chronic cervical moderate spinal canal stenosis and multilevel moderate to severe neural foraminal narrowing with radiculopathy on top of UTI. Polypharmacy is likely a contributing factor to generalized weakness.   -Brain MRI and Lumbar spine ordered. Will f/up results. Primary Team place a consult for NS as well.  -Unclear if NCS/EMG has been performed in the past. Unfortunately we don't have access to [facility] chart.  -Recommend on hold some of the sedating medications (On lyrica, cymbalta, tizanidine, norco)  -Continue Zonizamide as Dr Anguiano has concerns for seizures as etiology of prior syncopal events. Continue topamax.  -DAPT was recommended for 21 days, then monotherapy with Aspirin. Discontinue Plavix if there is no other indication. Continue statin.  -PT/OT evaluation. On HH. Please discuss the need for 24 hrs supervision versus HH versus inpatient rehab.  -Next visit with Dr Anguiano scheduled for 6/8/22. [place] [facility] [place]  -Management of rest of medical issues per Primary team.  -Will follow MRI results. Call us if any question arises.    Differential diagnosis was explained to the patient's primary team. All questions were answered.    Case seen and discussed with Dr. Plasencia  Appreciate the consult.     Salma Rios MD  [facility]  [id number]    Addendum 8pm:  Lumbar spine MRI negative for acute abnormalities, cord enhancement. Degenerative changes similar to recent exam most notably at L4-5 where there is some inflammatory facet enhancement. Bilateral L5-S1 moderate foraminal narrowing.  Brain MRI reviewed as well. No new ischemic event. Stable evolving subacute small right frontal cerebral cortical infarct with a small amount of petechial microhemorrhage. Background of mild atrophy and periventricular white matter changes and chronic right cerebellar lacunar infarct    No further recommendations. Call if any question arises.

## 2022-05-11 LAB
ALBUMIN SERPL BCP-MCNC: 3.1 G/DL (ref 3.5–5.2)
ALP SERPL-CCNC: 133 U/L (ref 55–135)
ALT SERPL W/O P-5'-P-CCNC: 24 U/L (ref 10–44)
ANION GAP SERPL CALC-SCNC: 11 MMOL/L (ref 8–16)
AST SERPL-CCNC: 22 U/L (ref 10–40)
BACTERIA UR CULT: ABNORMAL
BASOPHILS # BLD AUTO: 0.03 K/UL (ref 0–0.2)
BASOPHILS NFR BLD: 0.6 % (ref 0–1.9)
BILIRUB SERPL-MCNC: 0.2 MG/DL (ref 0.1–1)
BUN SERPL-MCNC: 6 MG/DL (ref 8–23)
CALCIUM SERPL-MCNC: 9.8 MG/DL (ref 8.7–10.5)
CHLORIDE SERPL-SCNC: 107 MMOL/L (ref 95–110)
CO2 SERPL-SCNC: 21 MMOL/L (ref 23–29)
CREAT SERPL-MCNC: 0.6 MG/DL (ref 0.5–1.4)
CRP SERPL-MCNC: 9.7 MG/L (ref 0–8.2)
DIFFERENTIAL METHOD: ABNORMAL
EOSINOPHIL # BLD AUTO: 0.1 K/UL (ref 0–0.5)
EOSINOPHIL NFR BLD: 2.9 % (ref 0–8)
ERYTHROCYTE [DISTWIDTH] IN BLOOD BY AUTOMATED COUNT: 16.1 % (ref 11.5–14.5)
ERYTHROCYTE [SEDIMENTATION RATE] IN BLOOD BY WESTERGREN METHOD: 62 MM/HR (ref 0–20)
EST. GFR  (AFRICAN AMERICAN): >60 ML/MIN/1.73 M^2
EST. GFR  (NON AFRICAN AMERICAN): >60 ML/MIN/1.73 M^2
GLUCOSE SERPL-MCNC: 122 MG/DL (ref 70–110)
HCT VFR BLD AUTO: 29.8 % (ref 37–48.5)
HGB BLD-MCNC: 9.1 G/DL (ref 12–16)
IMM GRANULOCYTES # BLD AUTO: 0.01 K/UL (ref 0–0.04)
IMM GRANULOCYTES NFR BLD AUTO: 0.2 % (ref 0–0.5)
LDH SERPL L TO P-CCNC: 283 U/L (ref 110–260)
LYMPHOCYTES # BLD AUTO: 1.5 K/UL (ref 1–4.8)
LYMPHOCYTES NFR BLD: 31.5 % (ref 18–48)
MAGNESIUM SERPL-MCNC: 1.2 MG/DL (ref 1.6–2.6)
MCH RBC QN AUTO: 25.4 PG (ref 27–31)
MCHC RBC AUTO-ENTMCNC: 30.5 G/DL (ref 32–36)
MCV RBC AUTO: 83 FL (ref 82–98)
MONOCYTES # BLD AUTO: 0.5 K/UL (ref 0.3–1)
MONOCYTES NFR BLD: 10.3 % (ref 4–15)
NEUTROPHILS # BLD AUTO: 2.7 K/UL (ref 1.8–7.7)
NEUTROPHILS NFR BLD: 54.5 % (ref 38–73)
NRBC BLD-RTO: 0 /100 WBC
PHOSPHATE SERPL-MCNC: 3.4 MG/DL (ref 2.7–4.5)
PLATELET # BLD AUTO: 248 K/UL (ref 150–450)
PMV BLD AUTO: 10 FL (ref 9.2–12.9)
POCT GLUCOSE: 110 MG/DL (ref 70–110)
POCT GLUCOSE: 168 MG/DL (ref 70–110)
POCT GLUCOSE: 287 MG/DL (ref 70–110)
POCT GLUCOSE: 325 MG/DL (ref 70–110)
POTASSIUM SERPL-SCNC: 4 MMOL/L (ref 3.5–5.1)
PROT SERPL-MCNC: 6.9 G/DL (ref 6–8.4)
RBC # BLD AUTO: 3.58 M/UL (ref 4–5.4)
RHEUMATOID FACT SERPL-ACNC: <13 IU/ML (ref 0–15)
SODIUM SERPL-SCNC: 139 MMOL/L (ref 136–145)
WBC # BLD AUTO: 4.86 K/UL (ref 3.9–12.7)

## 2022-05-11 PROCEDURE — 27000221 HC OXYGEN, UP TO 24 HOURS

## 2022-05-11 PROCEDURE — 99223 PR INITIAL HOSPITAL CARE,LEVL III: ICD-10-PCS | Mod: ,,, | Performed by: NEUROLOGICAL SURGERY

## 2022-05-11 PROCEDURE — 86140 C-REACTIVE PROTEIN: CPT

## 2022-05-11 PROCEDURE — 97530 THERAPEUTIC ACTIVITIES: CPT

## 2022-05-11 PROCEDURE — 63600175 PHARM REV CODE 636 W HCPCS: Performed by: STUDENT IN AN ORGANIZED HEALTH CARE EDUCATION/TRAINING PROGRAM

## 2022-05-11 PROCEDURE — 85025 COMPLETE CBC W/AUTO DIFF WBC: CPT | Performed by: STUDENT IN AN ORGANIZED HEALTH CARE EDUCATION/TRAINING PROGRAM

## 2022-05-11 PROCEDURE — 94761 N-INVAS EAR/PLS OXIMETRY MLT: CPT

## 2022-05-11 PROCEDURE — 99223 1ST HOSP IP/OBS HIGH 75: CPT | Mod: ,,, | Performed by: NEUROLOGICAL SURGERY

## 2022-05-11 PROCEDURE — 83735 ASSAY OF MAGNESIUM: CPT | Performed by: STUDENT IN AN ORGANIZED HEALTH CARE EDUCATION/TRAINING PROGRAM

## 2022-05-11 PROCEDURE — 85652 RBC SED RATE AUTOMATED: CPT | Performed by: STUDENT IN AN ORGANIZED HEALTH CARE EDUCATION/TRAINING PROGRAM

## 2022-05-11 PROCEDURE — 25000003 PHARM REV CODE 250: Performed by: STUDENT IN AN ORGANIZED HEALTH CARE EDUCATION/TRAINING PROGRAM

## 2022-05-11 PROCEDURE — 86038 ANTINUCLEAR ANTIBODIES: CPT

## 2022-05-11 PROCEDURE — 36415 COLL VENOUS BLD VENIPUNCTURE: CPT | Performed by: STUDENT IN AN ORGANIZED HEALTH CARE EDUCATION/TRAINING PROGRAM

## 2022-05-11 PROCEDURE — 80053 COMPREHEN METABOLIC PANEL: CPT | Performed by: STUDENT IN AN ORGANIZED HEALTH CARE EDUCATION/TRAINING PROGRAM

## 2022-05-11 PROCEDURE — 63700000 PHARM REV CODE 250 ALT 637 W/O HCPCS: Performed by: STUDENT IN AN ORGANIZED HEALTH CARE EDUCATION/TRAINING PROGRAM

## 2022-05-11 PROCEDURE — 82085 ASSAY OF ALDOLASE: CPT

## 2022-05-11 PROCEDURE — 86039 ANTINUCLEAR ANTIBODIES (ANA): CPT

## 2022-05-11 PROCEDURE — 11000001 HC ACUTE MED/SURG PRIVATE ROOM

## 2022-05-11 PROCEDURE — 86235 NUCLEAR ANTIGEN ANTIBODY: CPT | Mod: 59

## 2022-05-11 PROCEDURE — 97161 PT EVAL LOW COMPLEX 20 MIN: CPT

## 2022-05-11 PROCEDURE — 83615 LACTATE (LD) (LDH) ENZYME: CPT

## 2022-05-11 PROCEDURE — 86431 RHEUMATOID FACTOR QUANT: CPT

## 2022-05-11 PROCEDURE — 99900035 HC TECH TIME PER 15 MIN (STAT)

## 2022-05-11 PROCEDURE — 25000003 PHARM REV CODE 250: Performed by: INTERNAL MEDICINE

## 2022-05-11 PROCEDURE — 84100 ASSAY OF PHOSPHORUS: CPT | Performed by: STUDENT IN AN ORGANIZED HEALTH CARE EDUCATION/TRAINING PROGRAM

## 2022-05-11 RX ORDER — MAGNESIUM SULFATE HEPTAHYDRATE 40 MG/ML
2 INJECTION, SOLUTION INTRAVENOUS
Status: DISCONTINUED | OUTPATIENT
Start: 2022-05-11 | End: 2022-05-11

## 2022-05-11 RX ORDER — MAGNESIUM SULFATE HEPTAHYDRATE 40 MG/ML
2 INJECTION, SOLUTION INTRAVENOUS
Status: COMPLETED | OUTPATIENT
Start: 2022-05-11 | End: 2022-05-11

## 2022-05-11 RX ADMIN — TOPIRAMATE 50 MG: 25 TABLET, FILM COATED ORAL at 09:05

## 2022-05-11 RX ADMIN — ENOXAPARIN SODIUM 40 MG: 100 INJECTION SUBCUTANEOUS at 04:05

## 2022-05-11 RX ADMIN — MAGNESIUM SULFATE 2 G: 2 INJECTION INTRAVENOUS at 11:05

## 2022-05-11 RX ADMIN — MAGNESIUM SULFATE 2 G: 2 INJECTION INTRAVENOUS at 03:05

## 2022-05-11 RX ADMIN — INSULIN ASPART 4 UNITS: 100 INJECTION, SOLUTION INTRAVENOUS; SUBCUTANEOUS at 09:05

## 2022-05-11 RX ADMIN — ATORVASTATIN CALCIUM 40 MG: 40 TABLET, FILM COATED ORAL at 08:05

## 2022-05-11 RX ADMIN — ZONISAMIDE 200 MG: 100 CAPSULE ORAL at 09:05

## 2022-05-11 RX ADMIN — CLOPIDOGREL 75 MG: 75 TABLET, FILM COATED ORAL at 08:05

## 2022-05-11 RX ADMIN — INSULIN ASPART 6 UNITS: 100 INJECTION, SOLUTION INTRAVENOUS; SUBCUTANEOUS at 05:05

## 2022-05-11 RX ADMIN — INSULIN DETEMIR 17 UNITS: 100 INJECTION, SOLUTION SUBCUTANEOUS at 09:05

## 2022-05-11 RX ADMIN — TOPIRAMATE 50 MG: 25 TABLET, FILM COATED ORAL at 08:05

## 2022-05-11 RX ADMIN — MUPIROCIN: 20 OINTMENT TOPICAL at 09:05

## 2022-05-11 RX ADMIN — MUPIROCIN: 20 OINTMENT TOPICAL at 08:05

## 2022-05-11 RX ADMIN — HYDROCODONE BITARTRATE AND ACETAMINOPHEN 1 TABLET: 5; 325 TABLET ORAL at 11:05

## 2022-05-11 RX ADMIN — ZONISAMIDE 200 MG: 100 CAPSULE ORAL at 08:05

## 2022-05-11 RX ADMIN — LOSARTAN POTASSIUM 100 MG: 50 TABLET, FILM COATED ORAL at 08:05

## 2022-05-11 RX ADMIN — CEFTRIAXONE 1 G: 1 INJECTION, SOLUTION INTRAVENOUS at 08:05

## 2022-05-11 RX ADMIN — DULOXETINE 90 MG: 30 CAPSULE, DELAYED RELEASE ORAL at 08:05

## 2022-05-11 RX ADMIN — ASPIRIN 81 MG CHEWABLE TABLET 81 MG: 81 TABLET CHEWABLE at 08:05

## 2022-05-11 RX ADMIN — SODIUM CHLORIDE, SODIUM LACTATE, POTASSIUM CHLORIDE, AND CALCIUM CHLORIDE: .6; .31; .03; .02 INJECTION, SOLUTION INTRAVENOUS at 05:05

## 2022-05-11 RX ADMIN — VERAPAMIL HYDROCHLORIDE 180 MG: 180 TABLET ORAL at 09:05

## 2022-05-11 RX ADMIN — FLUCONAZOLE 200 MG: 100 TABLET ORAL at 08:05

## 2022-05-11 RX ADMIN — HYDROCHLOROTHIAZIDE 12.5 MG: 12.5 TABLET ORAL at 08:05

## 2022-05-11 RX ADMIN — MAGNESIUM SULFATE 2 G: 2 INJECTION INTRAVENOUS at 09:05

## 2022-05-11 NOTE — PROGRESS NOTES
[facility] Medicine Progress Note    Primary Team: [facility] Hospitalist Team B  Attending Physician: Lillian Snyder MD  Resident: Dr. Silva  Intern: Dr. Tijerina    Subjective:      Strength slightly improved in bilateral lower extremities.   Today patient feeling well, still complaining bilateral leg pain  Denies HA, fever, chills, chest pain, SOB, abdominal pain, N/V/D.      Objective:     Last 24 Hour Vital Signs:  BP  Min: 105/59  Max: 139/91  Temp  Av.9 °F (36.6 °C)  Min: 97.2 °F (36.2 °C)  Max: 98.8 °F (37.1 °C)  Pulse  Av.1  Min: 89  Max: 106  Resp  Av.6  Min: 16  Max: 22  SpO2  Av.2 %  Min: 89 %  Max: 100 %    Intake/Output Summary (Last 24 hours) at 2022 0836  Last data filed at 2022 0600  Gross per 24 hour   Intake 1650 ml   Output 4850 ml   Net -3200 ml      Physical Examination:     Constitutional: She appears well-developed and well-nourished. No distress.   HENT:   Head: Normocephalic and atraumatic.   Mouth/Throat: Oropharynx is clear and moist.   Eyes: Conjunctivae and EOM are normal. Pupils are equal, round, and reactive to light.   Neck: Neck supple.   Midline cervical tenderness noted   Normal range of motion.  Cardiovascular: Normal rate, regular rhythm and intact distal pulses.   Pulmonary/Chest: Breath sounds normal. No stridor. No respiratory distress.   Abdominal: Abdomen is soft. She exhibits no distension. There is no abdominal tenderness.   Musculoskeletal:         General: No tenderness or edema. Normal range of motion.      Cervical back: Normal range of motion and neck supple.   Neurological: She is alert and oriented to person, place, and time. A sensory deficit is present.   CN 2-12 intact throughout   BL UE 5/5 strength with normal sensorium  BL LE 2/5 strength with normal sensorium  L Patellar tendon 2+; R Patellar tendon +1  Skin: Skin is warm and dry.   Psychiatric: She has a normal mood and affect.     Laboratory:  Laboratory Data Reviewed: yes  Pertinent  Findings:  Recent Labs   Lab 05/09/22  1518 05/10/22  0441 05/11/22  0440   WBC 7.43 5.34 4.86   HGB 8.6* 8.6* 9.1*   HCT 29.5* 28.8* 29.8*    222 248   MCV 86 84 83   RDW 16.3* 16.5* 16.1*    139 139   K 4.6 4.2 4.0    106 107   CO2 18* 22* 21*   BUN 27* 15 6*   CREATININE 1.3 0.7 0.6   * 260* 122*   PROT 7.3 6.7 6.9   ALBUMIN 3.4* 3.1* 3.1*   BILITOT 0.2 0.2 0.2   AST 50* 32 22   ALKPHOS 139* 136* 133   ALT 22 21 24       Microbiology Data Reviewed: yes  Pertinent Findings:  Microbiology Results (last 7 days)     Procedure Component Value Units Date/Time    Blood culture [[id number]] Collected: 05/10/22 0825    Order Status: Completed Specimen: Blood Updated: 05/11/22 0515     Blood Culture, Routine No Growth to date    Urine culture [[id number]] Collected: 05/09/22 1637    Order Status: Completed Specimen: Urine Updated: 05/11/22 0324     Urine Culture, Routine Further report to follow    Narrative:      Specimen Source->Urine           Other Results:  EKG (my interpretation):   ECG Results          EKG 12-lead (Final result)  Result time 05/09/22 23:16:31    Final result by Interface, Lab In [facility] (05/09/22 23:16:31)                 Narrative:    Test Reason : R53.1,    Vent. Rate : 094 BPM     Atrial Rate : 094 BPM     P-R Int : 126 ms          QRS Dur : 084 ms      QT Int : 354 ms       P-R-T Axes : 078 059 030 degrees     QTc Int : 442 ms    Normal sinus rhythm  Normal ECG  When compared with ECG of 05-APR-2022 18:00,  No significant change was found  Confirmed by Clyde Hernandez MD ([id number]) on 5/9/2022 11:16:26 PM    Referred By: [email or web address]   SELF           Confirmed By:Clyde Hernandez MD                             Results for orders placed or performed during the hospital encounter of 05/09/22   EKG 12-lead    Collection Time: 05/09/22  2:30 PM    Narrative    Test Reason : R53.1,    Vent. Rate : 094 BPM     Atrial Rate : 094 BPM     P-R Int : 126 ms          QRS Dur : 084 ms       QT Int : 354 ms       P-R-T Axes : 078 059 030 degrees     QTc Int : 442 ms    Normal sinus rhythm  Normal ECG  When compared with ECG of 05-APR-2022 18:00,  No significant change was found  Confirmed by Clyde Hernandez MD ([id number]) on 5/9/2022 11:16:26 PM    Referred By: FRANCISCO   SELF           Confirmed By:Clyde Hernandez MD      Radiology Data Reviewed: yes  Pertinent Findings:  X-Ray Hips Bilateral 2 View Inc AP Pelvis    Result Date: 5/9/2022  EXAMINATION: XR HIPS BILATERAL 2 VIEW INCL AP PELVIS CLINICAL HISTORY: hip pain, s/p multiple falls;  Other symptoms and signs involving the musculoskeletal system TECHNIQUE: AP view of the pelvis and frogleg lateral views of both hips were performed. COMPARISON: None. FINDINGS: Examination limited due to osseous demineralization and bowel gas. Noting this, no definite evidence of acute displaced fracture or dislocation is appreciated.  Degenerative findings are noted involving the spine, sacroiliac joints, pubic symphysis, and hip joints.  Phleboliths appear to project within the pelvis.  No definite acute soft tissue abnormalities appreciated.     Noting limitations above, no definite evidence of acute displaced fracture or dislocation identified. Electronically signed by: Bert Sandra Date:    05/09/2022 Time:    20:57    CT Head Without Contrast    Result Date: 5/9/2022  EXAMINATION: CT HEAD WITHOUT CONTRAST CLINICAL HISTORY: Head trauma, minor (Age >= 65y);Neuro deficit, acute, stroke suspected; TECHNIQUE: Low dose axial CT images obtained throughout the head without intravenous contrast. Sagittal and coronal reconstructions were performed. COMPARISON: 04/06/2022 FINDINGS: Intracranial compartment: Ventricles and sulci are normal in size for age without evidence of hydrocephalus. No extra-axial blood or fluid collections. The brain parenchyma appears normal. No parenchymal mass, hemorrhage, edema or major vascular distribution infarct. Skull/extracranial contents  (limited evaluation): No fracture. Mastoid air cells and paranasal sinuses are essentially clear.     No acute abnormality. Electronically signed by: Salomon Escobar Date:    05/09/2022 Time:    16:47    CT Cervical Spine Without Contrast    Result Date: 5/9/2022  EXAMINATION: CT CERVICAL SPINE WITHOUT CONTRAST CLINICAL HISTORY: Neck trauma (Age >= 65y); TECHNIQUE: Low dose axial CT images through the cervical spine, with sagittal and coronal reformations.  Contrast was not administered. COMPARISON: 04/08/2022 FINDINGS: No acute cervical fractures are detected.  Slight straightening of normal cervical lordosis.  Posterior fusion hardware from C3 through T1. Moderate disc space narrowing at C5-6, C6-7 and C3-4.  Mild narrowing elsewhere. Nine ectomy defect from C3 through C7. Decompression of the central canal. Severe foraminal narrowing at C3-4 on the left, C4-5 bilaterally C5-6 bilaterally.  Moderate foraminal narrowing bilaterally at C6-7 and C3-4 on the right. Limited evaluation of the intraspinal contents demonstrates no hematoma or mass.Paraspinal soft tissues exhibit no acute abnormalities. Metallic artifact.     1. No acute abnormality 2. Multilevel a chronic and postoperative changes.  See above comments. Electronically signed by: Salomon Escobra Date:    05/09/2022 Time:    16:58    X-Ray Chest AP Portable    Result Date: 5/9/2022  EXAMINATION: XR CHEST AP PORTABLE CLINICAL HISTORY: Cough; Cough, unspecified TECHNIQUE: Single frontal view of the chest was performed. COMPARISON: Chest radiograph 04/05/2022, 21:48 hours. FINDINGS: Cardiomediastinal contour appears grossly unchanged.  Background coarse interstitial increased attenuation is again noted.  New platelike opacities are noted in the mid lower lung zones.  No definite pneumothorax or large volume pleural effusion. No acute findings are noted visualized abdomen.  Postoperative sequela again noted.  Osseous soft tissue structures appear definite acute  change.  Postoperative sequela involving the cervical and upper thoracic spine, as before.     Linear platelike opacities at the lung bases favored to represent atelectasis. Electronically signed by: Bert Sandra Date:    05/09/2022 Time:    20:58       Current Medications:     Scheduled:   aspirin  81 mg Oral Daily    atorvastatin  40 mg Oral Daily    cefTRIAXone (ROCEPHIN) IVPB  1 g Intravenous Q24H    clopidogreL  75 mg Oral Daily    DULoxetine  90 mg Oral Daily    enoxaparin  40 mg Subcutaneous Daily    fluconazole  200 mg Oral Daily    fluticasone propionate  2 spray Each Nostril Daily    hydroCHLOROthiazide  12.5 mg Oral Daily    insulin detemir U-100  17 Units Subcutaneous QHS    losartan  100 mg Oral Daily    magnesium sulfate IVPB  2 g Intravenous Q2H    mupirocin   Nasal BID    topiramate  50 mg Oral BID    verapamiL  180 mg Oral QHS    zonisamide  200 mg Oral BID         Infusions:   lactated ringers 150 mL/hr at 05/11/22 0500        PRN:  acetaminophen, albuterol-ipratropium, dextrose 10%, HYDROcodone-acetaminophen, insulin aspart U-100, melatonin, pregabalin, sodium chloride 0.9%     Antibiotics and Day Number of Therapy:  Antibiotics (From admission, onward)            Start     Stop Route Frequency Ordered    05/10/22 0900  cefTRIAXone (ROCEPHIN) 1 g/50 mL D5W IVPB         -- IV Every 24 hours (non-standard times) 05/10/22 0019    05/10/22 0900  mupirocin 2 % ointment         05/15 0859 Nasl 2 times daily 05/10/22 0021           Lines and Day Number of Therapy:      Assessment:     Chayito Chung is a 71 y.o. female with a PMHx of     Patient Active Problem List    Diagnosis Date Noted    Multiple falls 05/09/2022    Physical deconditioning 05/09/2022    Traumatic rhabdomyolysis 05/09/2022    CVA (cerebral vascular accident)     Mild malnutrition 04/06/2022    Right leg weakness 04/05/2022    History of stroke with residual deficit 04/05/2022    Aortic atherosclerosis  11/02/2021    Cervical myelopathy 07/23/2020    Anxiety 07/23/2020    Hypertension 07/23/2020    Migraine headache 07/23/2020    CAD (coronary artery disease) 07/23/2020    Impaired ambulation     History of partial gastrectomy 12/16/2019    Seizure disorder 12/16/2019    Incisional hernia, without obstruction or gangrene 11/27/2019    Type 2 diabetes mellitus, with long-term current use of insulin 11/12/2019    Osteopenia of lumbar spine 08/27/2019    Vitamin D deficiency 07/26/2019    History of colon cancer 07/25/2019    Chronic diastolic congestive heart failure 11/15/2018    Steroid-induced gastritis 04/25/2017    Sinus tachycardia 10/11/2016    Iron deficiency anemia 10/11/2016    Weakness of both lower extremities 08/26/2016    Lumbar facet arthropathy 08/02/2016    S/P PTCA (percutaneous transluminal coronary angioplasty) 10/20/2015    Hypertension associated with diabetes     Hyperlipidemia associated with type 2 diabetes mellitus     Hypoglycemia associated with type 2 diabetes mellitus 09/20/2014    Major depressive disorder in partial remission 12/17/2012    Solitary pulmonary nodule s/p resection 4/2012 12/17/2012    Neuropathy 12/17/2012    Coronary artery disease of native artery of native heart with stable angina pectoris 12/16/2012    COPD (chronic obstructive pulmonary disease) 12/16/2012    Tobacco abuse, 1ppd, 50 years 12/16/2012       Plan:     Traumatic Rhabdomyolysis   #Bilateral lower extremity weakness  #Hx of Vertebral compression fracture  - started this morning 10AM; fell multiple times  - CT Head wo contrast: no acute abnormality  - CT Cervical Spine wo contrast: No acute abnormality  - CPK 3832  -  ml/hr   - MRI Spine - Degenerative changes as described above, similar to recent exam most notably at L4-5 where there is some inflammatory facet enhancement. Bilateral L5-S1 moderate foraminal narrowing.  - MRI Brain wwo contrast - Stable evolving subacute  small right frontal cerebral cortical infarct with a small amount of petechial microhemorrhage.   Background of mild atrophy and periventricular white matter changes and chronic right cerebellar lacunar infarct  - Considering Neurology consult  - PT/OT  - Neurology recommends stopping all sedating medications     Productive cough  # Hx of COPD  -Duo-Nebs ordered PRN  - CXR Linear platelike opacities at the lung bases favored to represent atelectasis.     UTI:    # Hx of urinary retention  - ED: Grullon inserted; Blood culture - NGTD  - Abnormal UA with many yeast, WBC 16, Hyaline casts 8     Cerebrovascular Accident with acute right-sided weakness  - on previous admit: MRI-Brain w/wo contrast showed subacute R YUDITH infarct, which was incongruent with patient's RUE and RLE weakness  - on previous admit: MRI of entire spine performed as patient's weakness was incongruent with brain imaging; no acute findings noted  - continue ASA, Plavix  - Neurochecks q4h     Normocytic Anemia:  - H/H 8.6/29.5  - MCV 86  - Ferritin 13, Iron 14, Transferrin 393, TIBC 582  - B12, Folate - wnl      Essential Hypertension  -continue home regimen includes HCTZ 12.5mg daily, losartan 100mg daily, and and verapamil 180mg daily     Hyperlipidemia  - continue Lipitor 40      Chronic Back Pain  -patient reports long-standing history of lower back pain  -continued home Norco PRN     Chronic L1 Vertebral Compression Fracture  -noted incidentally on MRI of her T-spine; noted as far back as 2017 on imaging per chart review  -no further inpatient intervention needed  -can follow up outpatient with spine specialist     Pulmonary Nodules  -noted incidentally on CTA Head and Neck  -has longstanding smoking history  -follows with Pulmonologist at [facility]     Seizure Disorder  -continued home topiramate 50mg BID, zonisamide 200mg BID  -EEG done while inpatient did not show any seizure activity  -patient may benefit from EMU admission arranged by  Neurologist     Type II Diabetes Mellitus, complicated by peripheral neuropathy  -last A1c in 12/2021 was 7.3%; repeat A1c on admission 10.3%, 9.8 most recent  -home regimen includes metformin 1000mg BID; holding inpatient  -continued home Lyrica 150 mg BID  -on Lantus 35U daily while in house, 17U inpatient  -SSI in house     Migraines  -continued home topiramate 50mg BID     Depression  -home regimen includes Cymbalta 90mg daily,   -Seroquel 300mg qhs PRN  -continued home Cymbalta,      Anxiety   -home regimen includes Cymbalta 90mg daily, Seroquel 300mg qhs, and Xanax 0.5mg BID PRN  -will include PRN     Diet: diabetic  Ppx: Lovenox  Code: Full     Disposition: admit to inpatient for observation     Santo Tijerina MD  [facility] Internal Medicine HO-I     [facility] Medicine Hospitalist Pager numbers:   [facility] Hospitalist Medicine Team A (Bita/Sydni):          [phone]  [facility] Hospitalist Medicine Team B (Lillian/Danny):

## 2022-05-11 NOTE — PLAN OF CARE
Problem: Occupational Therapy  Goal: Occupational Therapy Goal  Description: Goals to be met by: 6/10/22     Patient will increase functional independence with ADLs by performing:    LE Dressing with Minimal Assistance.  Grooming while standing with Minimal Assistance.  Toileting from toilet with Minimal Assistance for hygiene and clothing management.   Supine to sit with Minimal Assistance.  Step transfer with Minimal Assistance  Toilet transfer to toilet with Minimal Assistance.  Increased functional strength to [facility] for self care skills and functional mobility.  Upper extremity exercise program x10 reps per handout, with independence.    Outcome: Ongoing, Progressing     Pt with increased performance today's date and less pain. Continues, however, to require increased assist. Pt also demo's impaired cognition/delayed/slowing cognition. Recommending SNF at d/c at this time

## 2022-05-11 NOTE — PLAN OF CARE
SW sent SNF referrals via [employer].       05/11/22 1628   Post-Acute Status   Post-Acute Authorization Placement

## 2022-05-11 NOTE — PROGRESS NOTES
Today CT of the lumbar spine reviewed.  After reviewing prior lumbar spine MRI from 2020, the patient already had a bilateral L5 pars lysis with foraminal stenosis.  Imaging from 2013 was showing similar degree of L5-S1 spondylolisthesis and foraminal stenosis.    No acute process explaining patient's walking difficulties  Patient has chronic myelopathy  She seems to have chronic lumbosacral foraminal stenosis  She has a right footdrop  Will order AFO brace, right side, with mobilization  No acute neurosurgical intervention indicated at this time.  Will sign off  Recommending outpatient follow-up with Neurosurgery.

## 2022-05-11 NOTE — PLAN OF CARE
.Patient on oxygen with documented flow.  Will attempt to wean per O2 order protocol.  Will continue to monitor.

## 2022-05-11 NOTE — PLAN OF CARE
[facility] called in LOCET and faxed PASSR.     Awaiting 142.    Future Appointments   Date Time Provider Department Center   5/11/2022  6:00 PM [facility] CT1 LIMIT 450 LBS [facility] CT SCAN [facility]   5/18/2022 11:00 AM Jeri Matos PA-C [facility] NEUROSU [facility]   5/19/2022  9:00 AM Fritz Cadena MD [facility] NEURO [address]   5/19/2022 10:00 AM Cameron Valladares III, MD [facility] IM [place]   6/16/2022  9:40 AM Cameron Valladares III, MD [facility] IM [place]          05/11/22 1642   Post-Acute Status   Post-Acute Authorization Placement

## 2022-05-11 NOTE — PLAN OF CARE
Plan of care reviewed with patient, understanding verbalized. Pt remains ST on tele. PRN pregabalin administered. BG monitored and SSI administered per orders. Con LR @ 150 maintained.  No acute distress noted. Frequent weight shift encouraged and assistance provided as needed. Instructed to call for any assistance, understanding verbalized.  Bed alarm on, call light in reach, fall precautions in place. Will continue current plan of care.

## 2022-05-11 NOTE — CONSULTS
NEUROSURGICAL INPATIENT CONSULTATION NOTE    DATE OF SERVICE:  05/11/2022    ATTENDING PHYSICIAN:  Scar Pandey MD    CONSULT REQUESTED BY:  Hospital medicine    REASON FOR CONSULT:  Difficulty walking    SUBJECTIVE:    HISTORY OF PRESENT ILLNESS:  This is a very pleasant 71 y.o. female, poor historian, COPD, smoker, CAD, type 2 diabetes, history of cerebral vascular accident.  She has a history of C3-T1 laminectomy and posterior instrumented fusion for cervical myelopathy that was done at the outside hospital.  She reports that she had that surgery more than 2 years ago.  However she does not know the date including the month and the year.  She knows that she is in the hospital.  She was admitted after falling and hitting her head.  She reports that she has become weaker.  She reports having gait imbalance.  She reports having chronic gait difficulty and chronic bilateral upper extremity and hand weakness and numbness.  She has a Grullon catheter.  Complains of left more than right leg pain.  She has right dorsiflexion weakness.  It is unclear if she knows how long she has been having the right foot drop.    I called the daughter on the phone and she reports that she did not have right dorsiflexion weakness prior to this admission.              PAST MEDICAL HISTORY:  Active Ambulatory Problems     Diagnosis Date Noted    Coronary artery disease of native artery of native heart with stable angina pectoris 12/16/2012    COPD (chronic obstructive pulmonary disease) 12/16/2012    Tobacco abuse, 1ppd, 50 years 12/16/2012    Major depressive disorder in partial remission 12/17/2012    Solitary pulmonary nodule s/p resection 4/2012 12/17/2012    Neuropathy 12/17/2012    Hypoglycemia associated with type 2 diabetes mellitus 09/20/2014    Hypertension associated with diabetes     Hyperlipidemia associated with type 2 diabetes mellitus     S/P PTCA (percutaneous transluminal coronary angioplasty) 10/20/2015     Lumbar facet arthropathy 08/02/2016    Weakness of both lower extremities 08/26/2016    Sinus tachycardia 10/11/2016    Iron deficiency anemia 10/11/2016    Steroid-induced gastritis 04/25/2017    Chronic diastolic congestive heart failure 11/15/2018    History of colon cancer 07/25/2019    Vitamin D deficiency 07/26/2019    Osteopenia of lumbar spine 08/27/2019    Incisional hernia, without obstruction or gangrene 11/27/2019    History of partial gastrectomy 12/16/2019    Seizure disorder 12/16/2019    Impaired ambulation     Cervical myelopathy 07/23/2020    Anxiety 07/23/2020    Type 2 diabetes mellitus, with long-term current use of insulin 11/12/2019    Hypertension 07/23/2020    Migraine headache 07/23/2020    CAD (coronary artery disease) 07/23/2020    Aortic atherosclerosis 11/02/2021    Right leg weakness 04/05/2022    History of stroke with residual deficit 04/05/2022    Mild malnutrition 04/06/2022    CVA (cerebral vascular accident)      Resolved Ambulatory Problems     Diagnosis Date Noted    Chest pain 12/15/2012    Hypertension, 1998 12/16/2012    Migraines 12/17/2012    JAZMIN (generalized anxiety disorder) 12/17/2012    Anemia, Hgb 11.2 12/17/2012    Hypotension, unspecified 05/03/2013    Adrenal nodule 09/20/2014    Altered mental status 09/22/2014    Uncontrolled type 2 diabetes mellitus with hyperglycemia, with long-term current use of insulin 09/22/2014    Sepsis 01/20/2015    Chest pain at rest     Swelling     Acute coronary syndrome     Chest pain of uncertain etiology 12/06/2015    Chronic bilateral low back pain with left-sided sciatica 08/26/2016    Poor posture 08/26/2016    Decreased functional mobility 08/26/2016    Syncope and collapse 11/08/2016    Acute hypoxemic respiratory failure 05/01/2017    Dyspnea on exertion 11/15/2018    Elevated brain natriuretic peptide (BNP) level 11/15/2018    Hypoglycemia 05/12/2020    Lower extremity weakness  2020    Sacroiliitis, not elsewhere classified 2021     Past Medical History:   Diagnosis Date    Asthma     Cancer     Cataracts, both eyes     Colon cancer     Coronary artery disease     Depression     Diabetes mellitus     Elevated cholesterol        PAST SURGICAL HISTORY:  Past Surgical History:   Procedure Laterality Date    ABDOMINAL SURGERY      BREAST BIOPSY      benign unsure what side     SECTION, CLASSIC      COLON SURGERY      COLONOSCOPY  2019    repeat in 5 years    CORONARY ANGIOPLASTY WITH STENT PLACEMENT  3 months ago     x2, Hysterectomy, Lung surgery, Partial stomach removed, Part of colon removed for rectal cancer    GASTRECTOMY      HEMORRHOID SURGERY      HYSTERECTOMY      @28yrs of age    LUNG BIOPSY      OOPHORECTOMY      @28yrs of age    POSTERIOR FUSION OF CERVICAL SPINE WITH LAMINECTOMY N/A 2020    Procedure: LAMINECTOMY, SPINE, CERVICAL, WITH POSTERIOR FUSION C3-T1 ;  Surgeon: Herson Tate MD;  Location: [facility] OR [address] [place] [address];  Service: Neurosurgery;  Laterality: N/A;    REPAIR OF INCARCERATED INCISIONAL HERNIA WITHOUT HISTORY OF PRIOR REPAIR N/A 2022    Procedure: REPAIR, HERNIA, INCISIONAL, INCARCERATED, WITHOUT HISTORY OF PRIOR REPAIR;  Surgeon: Simon King MD;  Location: [facility];  Service: General;  Laterality: N/A;       SOCIAL HISTORY:   Social History     Socioeconomic History    Marital status: Single   Tobacco Use    Smoking status: Former Smoker     Packs/day: 0.50     Years: 55.00     Pack years: 27.50     Types: Cigarettes     Start date:     Smokeless tobacco: Never Used    Tobacco comment: Enrolled in the [employer] on 10/5/15 ([employer] Member ID # [id number]).  Ambulatory referral to Smoking Cessation program.   Substance and Sexual Activity    Alcohol use: Yes     Alcohol/week: 3.0 standard drinks     Types: 3 Glasses of wine per week     Comment: 1 every 3 months    Drug use: No    Sexual  activity: Yes     Partners: Male     Birth control/protection: None     Comment: last drink yesterday afternoon   Social History Narrative    Pulmonology Dr. Chung([facility]) for COPD     Endo- Dr. Damaso Delgado for Diabetes Mellitus    GI- Dr. Arias- History of partial gastrectomy, gastritis    Heme/Onc-  Anemia and history of colon cancer     Psych- Dr. Cotto ([facility])- Depression/Anxiety. PTSD    Dr. Knox- Pain Management    Dr. Anguiano Neurology- Seizure disorder    Dr. Malin- s/p percutaneous transluminal coronary angioplasty          Social Determinants of Health     Financial Resource Strain: Low Risk     Difficulty of Paying Living Expenses: Not hard at all   Food Insecurity: No Food Insecurity    Worried About Running Out of Food in the Last Year: Never true    Ran Out of Food in the Last Year: Never true   Transportation Needs: No Transportation Needs    Lack of Transportation (Medical): No    Lack of Transportation (Non-Medical): No   Housing Stability: Low Risk     Unable to Pay for Housing in the Last Year: No    Number of Places Lived in the Last Year: 1    Unstable Housing in the Last Year: No       FAMILY HISTORY:  Family History   Problem Relation Age of Onset    Cancer Mother     Diabetes Mother     Hypertension Mother     Breast cancer Mother     Heart disease Father     Lung cancer Father     Depression Sister     Hypertension Sister     Diabetes Mellitus Sister     Cancer Maternal Aunt        CURRENTS MEDICATIONS:    No current facility-administered medications on file prior to encounter.     Current Outpatient Medications on File Prior to Encounter   Medication Sig Dispense Refill    albuterol (PROVENTIL/VENTOLIN HFA) 90 mcg/actuation inhaler INHALE 2 PUFFS BY MOUTH INTO THE LUNGS EVERY 6 HOURS AS NEEDED FOR WHEEZING (Patient taking differently: Inhale 2 puffs into the lungs every 6 (six) hours as needed for Wheezing.) 25.5 g 0    albuterol-ipratropium  (DUO-NEB) 2.5 mg-0.5 mg/3 mL nebulizer solution Take 3 mLs by nebulization every 4 (four) hours as needed for Wheezing or Shortness of Breath. Rescue      alendronate (FOSAMAX) 70 MG tablet Take 1 tablet (70 mg total) by mouth every 7 days. (Patient taking differently: Take 70 mg by mouth every Sunday.) 12 tablet 3    aspirin 81 MG Chew Take 1 tablet (81 mg total) by mouth once daily. 90 tablet 3    atorvastatin (LIPITOR) 40 MG tablet Take 1 tablet (40 mg total) by mouth once daily. 90 tablet 3    clopidogreL (PLAVIX) 75 mg tablet Take 1 tablet (75 mg total) by mouth once daily. Take medicine for 15 days. 15 tablet 0    diclofenac sodium (VOLTAREN) 1 % Gel Apply 2 g topically 4 (four) times daily. 100 g 0    DULoxetine (CYMBALTA) 30 MG capsule Take 30 mg by mouth once daily. Take with 60mg capsule for total of 90mg daily      duloxetine (CYMBALTA) 60 MG capsule Take 60 mg by mouth once daily. Take with 30mg for a total of 90mg daily      ferrous sulfate (FEOSOL) 325 mg (65 mg iron) Tab tablet Take 1 tablet (325 mg total) by mouth once daily. 30 tablet 3    fluticasone propionate (FLONASE) 50 mcg/actuation nasal spray 2 sprays (100 mcg total) by Each Nare route once daily. 1 Bottle 12    hydroCHLOROthiazide (MICROZIDE) 12.5 mg capsule Take 1 capsule (12.5 mg total) by mouth once daily. 90 capsule 3    HYDROcodone-acetaminophen (NORCO) 7.5-325 mg per tablet Take 1 tablet by mouth 2 (two) times daily as needed.      insulin lispro 100 unit/mL pen Inject 35 Units into the skin 2 (two) times a day.      LANTUS SOLOSTAR U-100 INSULIN glargine 100 units/mL (3mL) SubQ pen Inject 35 Units into the skin nightly.      losartan (COZAAR) 50 MG tablet Take 1 tablet (50 mg total) by mouth once daily. 90 tablet 3    magnesium oxide (MAG-OX) 400 mg (241.3 mg magnesium) tablet Take 400 mg by mouth once daily.      metFORMIN (GLUCOPHAGE) 1000 MG tablet Take 1,000 mg by mouth 2 (two) times daily.      nitroGLYCERIN  "(NITROSTAT) 0.4 MG SL tablet Place 1 tablet (0.4 mg total) under the tongue every 5 (five) minutes as needed for Chest pain. 100 tablet 1    ondansetron (ZOFRAN-ODT) 4 MG TbDL Take 1 tablet (4 mg total) by mouth every 8 (eight) hours as needed (nausea). 14 tablet 1    potassium chloride SA (K-DUR,KLOR-CON) 20 MEQ tablet Take 1 tablet (20 mEq total) by mouth once daily. 90 tablet 3    pregabalin (LYRICA) 150 MG capsule Take 150 mg by mouth 2 (two) times daily as needed.      QUEtiapine (SEROQUEL) 400 MG tablet Take 400 mg by mouth nightly.      tiZANidine (ZANAFLEX) 4 MG tablet Take 4 mg by mouth 2 (two) times a day.      topiramate (TOPAMAX) 50 MG tablet Take 50 mg by mouth 2 (two) times daily.      TRELEGY ELLIPTA 200-62.5-25 mcg inhaler Inhale 1 puff into the lungs once daily.      verapamiL (CALAN-SR) 180 MG CR tablet Take 1 tablet (180 mg total) by mouth every evening. 30 tablet 11    zonisamide (ZONEGRAN) 100 MG Cap Take 400 mg by mouth once daily.      BD ULTRA-FINE MINI PEN NEEDLE 31 gauge x 3/16" Ndle       insulin lispro protamin-lispro 100 unit/mL (75-25) InPn Inject 35 Units into the skin 2 (two) times a day.      [DISCONTINUED] pravastatin (PRAVACHOL) 40 MG tablet Take 0.5 tablets (20 mg total) by mouth once daily. (Patient taking differently: Take 40 mg by mouth once daily.) 90 tablet 1        aspirin  81 mg Oral Daily    atorvastatin  40 mg Oral Daily    cefTRIAXone (ROCEPHIN) IVPB  1 g Intravenous Q24H    clopidogreL  75 mg Oral Daily    DULoxetine  90 mg Oral Daily    enoxaparin  40 mg Subcutaneous Daily    fluconazole  200 mg Oral Daily    fluticasone propionate  2 spray Each Nostril Daily    hydroCHLOROthiazide  12.5 mg Oral Daily    insulin detemir U-100  17 Units Subcutaneous QHS    losartan  100 mg Oral Daily    magnesium sulfate IVPB  2 g Intravenous Q2H    mupirocin   Nasal BID    topiramate  50 mg Oral BID    verapamiL  180 mg Oral QHS    zonisamide  200 mg Oral " BID       ALLERGIES:  Review of patient's allergies indicates:  No Known Allergies    REVIEW OF SYSTEMS:  Review of Systems   Constitutional: Negative for diaphoresis, fever and weight loss.   Respiratory: Negative for shortness of breath.    Cardiovascular: Negative for chest pain.   Gastrointestinal: Negative for blood in stool.   Genitourinary: Negative for hematuria.   Endo/Heme/Allergies: Does not bruise/bleed easily.   All other systems reviewed and are negative.      OBJECTIVE:    PHYSICAL EXAMINATION:   Vitals:    05/11/22 0758   BP: 115/75   Pulse: 106   Resp: 18   Temp: 98.8 °F (37.1 °C)       Physical Exam:  Vitals reviewed.    Constitutional: She appears well-developed and well-nourished.     Eyes: Pupils are equal, round, and reactive to light. Conjunctivae and EOM are normal.     Cardiovascular: Normal distal pulses and no edema.     Abdominal: Soft.     Skin: Skin displays no rash on trunk and no rash on extremities. Skin displays no lesions on trunk and no lesions on extremities.     Psych/Behavior: She is alert. She is oriented to person, place, and time. She has a normal mood and affect.     Musculoskeletal:        Neck: Range of motion is limited.     Neurological:        DTRs: Tricep reflexes are 2+ on the right side and 2+ on the left side. Bicep reflexes are 2+ on the right side and 2+ on the left side. Brachioradialis reflexes are 2+ on the right side and 2+ on the left side. Patellar reflexes are 3+ on the right side and 3+ on the left side. Achilles reflexes are 3+ on the right side and 3+ on the left side.       Back Exam     Tenderness   The patient is experiencing no tenderness.     Range of Motion   Extension: normal   Flexion: normal   Lateral bend right: normal   Lateral bend left: normal   Rotation right: normal   Rotation left: normal     Muscle Strength   Right Quadriceps:  5/5   Left Quadriceps:  5/5   Right Hamstrings:  5/5   Left Hamstrings:  5/5     Tests   Straight leg raise  right: negative  Straight leg raise left: negative    Other   Toe walk: normal  Heel walk: normal                Neurologic Exam     Mental Status   Oriented to person, place, and time.   Speech: speech is normal   Level of consciousness: alert    Cranial Nerves   Cranial nerves II through XII intact.     CN III, IV, VI   Pupils are equal, round, and reactive to light.  Extraocular motions are normal.     Motor Exam   Muscle bulk: normal  Overall muscle tone: normal    Strength   Right deltoid: 5/5  Left deltoid: 5/5  Right biceps: 5/5  Left biceps: 5/5  Right triceps: 5/5  Left triceps: 5/5  Right wrist flexion: 5/5  Left wrist flexion: 5/5  Right wrist extension: 5/5  Left wrist extension: 5/5  Right interossei: 4/5  Left interossei: 4/5  Right iliopsoas: 5/5  Left iliopsoas: 5/5  Right quadriceps: 5/5  Left quadriceps: 5/5  Right hamstrin/5  Left hamstrin/5  Right anterior tibial: 0/5  Left anterior tibial: 5/5  Right posterior tibial: 2/5  Left posterior tibial: 5/5  Right peroneal: 0/5  Left peroneal: 5/5  Right gastroc: 5/5  Left gastroc: 5/5    Sensory Exam   Light touch normal.   Pinprick normal.     Gait, Coordination, and Reflexes     Coordination   Finger to nose coordination: normal    Reflexes   Right brachioradialis: 2+  Left brachioradialis: 2+  Right biceps: 2+  Left biceps: 2+  Right triceps: 2+  Left triceps: 2+  Right patellar: 3+  Left patellar: 3+  Right achilles: 3+  Left achilles: 3+  Right plantar: normal  Left plantar: normal  Right Atkinson: absent  Left Atkinson: absent  Right ankle clonus: absent  Left ankle clonus: absent      DIAGNOSTIC DATA:    Recent Labs     22  1518 05/10/22  0441 22  0440   HGB 8.6* 8.6* 9.1*   HCT 29.5* 28.8* 29.8*   MCV 86 84 83   WBC 7.43 5.34 4.86    222 248    139 139   K 4.6 4.2 4.0    106 107   * 260* 122*   BUN 27* 15 6*   CREATININE 1.3 0.7 0.6   CALCIUM 9.2 9.4 9.8   MG 1.8 1.6 1.2*   ALT 22 21 24   AST 50* 32  22   ALBUMIN 3.4* 3.1* 3.1*   BILITOT 0.2 0.2 0.2       Microbiology Results (last 7 days)     Procedure Component Value Units Date/Time    Blood culture [[id number]] Collected: 05/10/22 0825    Order Status: Completed Specimen: Blood Updated: 05/11/22 0515     Blood Culture, Routine No Growth to date    Urine culture [[id number]] Collected: 05/09/22 1637    Order Status: Completed Specimen: Urine Updated: 05/11/22 0324     Urine Culture, Routine Further report to follow    Narrative:      Specimen Source->Urine          I personally interpreted the following imaging:  Cervical spine MRI reviewed showing status post C3-T1 laminectomy and posterior instrumented fusion, no significant residual stenosis, chronic myelomalacia posteriorly at C3-4  Cervical CT shows pseudoarthrosis with lucency around the T1 screws due to non fusion at C7-T1, the cervical spine from C3-C6 appears well fused  Thoracic spine MRI reviewed showing diffuse spondylosis without significant stenosis, no intramedullary signal change  Lumbar spine MRI reviewed showing L4-5 isthmic spondylolisthesis with bilateral foraminal stenosis, grade 1. Increased STIR signal in the bilateral pars of L5    ASSESMENT:  This is a 71 y.o. female with   Status post cervical fusion  Chronic myelopathy  Isthmic spondylolisthesis at L5-S1 with bilateral foraminal stenosis  L5 radiculopathy bilateral  New onset of right footdrop    PLAN:  It is unclear if the the pars fractures at L5 is an acute finding versus chronic finding.  However the MRI shows increased STIR signal in the bilateral pars of L5 which would be more consistent with an acute fracture.  Also the patient has a new onset of right footdrop, increased back pain and bilateral leg pain following a fall.    I will order a CT of the lumbar, thin cuts, spine to better assess the L5 pars fracture    Following    Scar Pandey MD  Cell: [phone]

## 2022-05-11 NOTE — PT/OT/SLP PROGRESS
Occupational Therapy   Treatment    Name: Chayito Chung  MRN: [id number]  Admitting Diagnosis:  Weakness of both lower extremities       Recommendations:     Discharge Recommendations: nursing facility, skilled  Discharge Equipment Recommendations:   (TBD)  Barriers to discharge:  Decreased caregiver support    Assessment:     Chayito Chung is a 71 y.o. female with a medical diagnosis of Weakness of both lower extremities.  She presents with The primary encounter diagnosis was Traumatic rhabdomyolysis, initial encounter. Diagnoses of Weakness of both lower extremities, Right leg weakness, Weakness, Weakness of left lower extremity, Urinary retention, Fall, initial encounter, Candidal UTI (urinary tract infection), Cough, Type 2 diabetes mellitus with other circulatory complication, with long-term current use of insulin, Cerebrovascular accident (CVA), unspecified mechanism, Weakness of both lower extremities, Dorsalgia, unspecified, Physical deconditioning, and Foot drop, right were also pertinent to this visit.  . Performance deficits affecting function are weakness, impaired balance, impaired endurance, gait instability, impaired sensation, impaired self care skills, impaired functional mobilty, pain, decreased lower extremity function, decreased upper extremity function, decreased ROM, impaired coordination, decreased safety awareness, impaired cognition, impaired fine motor, decreased coordination.        Pt with increased performance today's date and less pain. Continues, however, to require increased assist. Pt also demo's impaired cognition/delayed/slowing cognition. Recommending SNF at d/c at this time     Rehab Prognosis:  Good; patient would benefit from acute skilled OT services to address these deficits and reach maximum level of function.       Plan:     Patient to be seen 5 x/week to address the above listed problems via self-care/home management, therapeutic activities, therapeutic exercises  · Plan of  Care Expires: 06/10/22  · Plan of Care Reviewed with: patient    Subjective     Pain/Comfort:  · Pain Rating 1:  (did not rate)  · Location - Side 1: Left  · Location - Orientation 1: generalized  · Location 1: leg  · Pain Addressed 1: Reposition, Distraction, Cessation of Activity, Nurse notified  · Pain Rating Post-Intervention 1:  (did not rate)    Objective:     Communicated with: nsg prior to session.   General Precautions: Standard, fall   Orthopedic Precautions:N/A   Braces: N/A     Occupational Performance:     Bed Mobility:    · Pt UIC     Functional Mobility/Transfers:  · Patient completed Sit <> Stand Transfer with contact guard assistance and minimum assistance  with  rolling walker   · Functional Mobility: Min/Mod A with RW; increased time during 2 trials     Activities of Daily Living:  · Lower Body Dressing: moderate assistance nancy R sock; able to nancy L sock; significantly increased time       AMPAC 6 Click ADL: 17    Treatment & Education:  Pt found UIC   Pt demo's impaired cognition/command following/slow processing   Pt performing LBD donning socks; increased time and difficulty noted; assisted with RLE donning; verbal cues required to nancy L sock   Stood trials from b/s chair with maximal cues for hand placement , scooting to edge of chair   Max verbal cues for mobility, cues for 3 pt gait and assist with RW management   Difficulty with WB over LLE for advancement of RLE ; difficulty following commands for gait and mobility for safety   Demo'd UE therex to perform while seated in b/s chair   Discussed d/c recs     Patient left up in chair with all lines intact, call button in reach, chair alarm on and nsg notifiedEducation:      GOALS:   Multidisciplinary Problems     Occupational Therapy Goals        Problem: Occupational Therapy    Goal Priority Disciplines Outcome Interventions   Occupational Therapy Goal     OT, PT/OT Ongoing, Progressing    Description: Goals to be met by: 6/10/22      Patient will increase functional independence with ADLs by performing:    LE Dressing with Minimal Assistance.  Grooming while standing with Minimal Assistance.  Toileting from toilet with Minimal Assistance for hygiene and clothing management.   Supine to sit with Minimal Assistance.  Step transfer with Minimal Assistance  Toilet transfer to toilet with Minimal Assistance.  Increased functional strength to WFL for self care skills and functional mobility.  Upper extremity exercise program x10 reps per handout, with independence.                     Time Tracking:     OT Date of Treatment: 05/11/22  OT Start Time: 1018  OT Stop Time: 1050  OT Total Time (min): 32 min    Billable Minutes:Therapeutic Activity 23     OT/SHA: OT     SHA Visit Number: 0    5/11/2022

## 2022-05-12 ENCOUNTER — TELEPHONE (OUTPATIENT)
Dept: PODIATRY | Facility: CLINIC | Age: 72
End: 2022-05-12
Payer: MEDICARE

## 2022-05-12 ENCOUNTER — PATIENT OUTREACH (OUTPATIENT)
Dept: ADMINISTRATIVE | Facility: OTHER | Age: 72
End: 2022-05-12
Payer: MEDICARE

## 2022-05-12 LAB
ALBUMIN SERPL BCP-MCNC: 3 G/DL (ref 3.5–5.2)
ALP SERPL-CCNC: 137 U/L (ref 55–135)
ALT SERPL W/O P-5'-P-CCNC: 17 U/L (ref 10–44)
ANA PATTERN 1: NORMAL
ANA SER QL IF: POSITIVE
ANA TITR SER IF: NORMAL {TITER}
ANION GAP SERPL CALC-SCNC: 12 MMOL/L (ref 8–16)
AST SERPL-CCNC: 15 U/L (ref 10–40)
BASOPHILS # BLD AUTO: 0.02 K/UL (ref 0–0.2)
BASOPHILS NFR BLD: 0.5 % (ref 0–1.9)
BILIRUB SERPL-MCNC: 0.2 MG/DL (ref 0.1–1)
BUN SERPL-MCNC: 7 MG/DL (ref 8–23)
CALCIUM SERPL-MCNC: 9.3 MG/DL (ref 8.7–10.5)
CHLORIDE SERPL-SCNC: 107 MMOL/L (ref 95–110)
CO2 SERPL-SCNC: 21 MMOL/L (ref 23–29)
CREAT SERPL-MCNC: 0.7 MG/DL (ref 0.5–1.4)
DIFFERENTIAL METHOD: ABNORMAL
EOSINOPHIL # BLD AUTO: 0.1 K/UL (ref 0–0.5)
EOSINOPHIL NFR BLD: 2.7 % (ref 0–8)
ERYTHROCYTE [DISTWIDTH] IN BLOOD BY AUTOMATED COUNT: 16 % (ref 11.5–14.5)
EST. GFR  (AFRICAN AMERICAN): >60 ML/MIN/1.73 M^2
EST. GFR  (NON AFRICAN AMERICAN): >60 ML/MIN/1.73 M^2
GLUCOSE SERPL-MCNC: 153 MG/DL (ref 70–110)
HCT VFR BLD AUTO: 30 % (ref 37–48.5)
HGB BLD-MCNC: 9.1 G/DL (ref 12–16)
IMM GRANULOCYTES # BLD AUTO: 0.01 K/UL (ref 0–0.04)
IMM GRANULOCYTES NFR BLD AUTO: 0.2 % (ref 0–0.5)
LYMPHOCYTES # BLD AUTO: 1.3 K/UL (ref 1–4.8)
LYMPHOCYTES NFR BLD: 30.2 % (ref 18–48)
MAGNESIUM SERPL-MCNC: 1.5 MG/DL (ref 1.6–2.6)
MCH RBC QN AUTO: 24.9 PG (ref 27–31)
MCHC RBC AUTO-ENTMCNC: 30.3 G/DL (ref 32–36)
MCV RBC AUTO: 82 FL (ref 82–98)
MONOCYTES # BLD AUTO: 0.5 K/UL (ref 0.3–1)
MONOCYTES NFR BLD: 11.7 % (ref 4–15)
NEUTROPHILS # BLD AUTO: 2.4 K/UL (ref 1.8–7.7)
NEUTROPHILS NFR BLD: 54.7 % (ref 38–73)
NRBC BLD-RTO: 0 /100 WBC
PHOSPHATE SERPL-MCNC: 3.7 MG/DL (ref 2.7–4.5)
PLATELET # BLD AUTO: 219 K/UL (ref 150–450)
PMV BLD AUTO: 9.2 FL (ref 9.2–12.9)
POCT GLUCOSE: 155 MG/DL (ref 70–110)
POCT GLUCOSE: 168 MG/DL (ref 70–110)
POCT GLUCOSE: 254 MG/DL (ref 70–110)
POCT GLUCOSE: 395 MG/DL (ref 70–110)
POTASSIUM SERPL-SCNC: 3.9 MMOL/L (ref 3.5–5.1)
PROT SERPL-MCNC: 6.8 G/DL (ref 6–8.4)
RBC # BLD AUTO: 3.65 M/UL (ref 4–5.4)
SODIUM SERPL-SCNC: 140 MMOL/L (ref 136–145)
WBC # BLD AUTO: 4.43 K/UL (ref 3.9–12.7)

## 2022-05-12 PROCEDURE — 36415 COLL VENOUS BLD VENIPUNCTURE: CPT | Performed by: STUDENT IN AN ORGANIZED HEALTH CARE EDUCATION/TRAINING PROGRAM

## 2022-05-12 PROCEDURE — 85025 COMPLETE CBC W/AUTO DIFF WBC: CPT | Performed by: STUDENT IN AN ORGANIZED HEALTH CARE EDUCATION/TRAINING PROGRAM

## 2022-05-12 PROCEDURE — 83735 ASSAY OF MAGNESIUM: CPT | Performed by: STUDENT IN AN ORGANIZED HEALTH CARE EDUCATION/TRAINING PROGRAM

## 2022-05-12 PROCEDURE — 94761 N-INVAS EAR/PLS OXIMETRY MLT: CPT

## 2022-05-12 PROCEDURE — 97530 THERAPEUTIC ACTIVITIES: CPT | Mod: CQ

## 2022-05-12 PROCEDURE — 25000003 PHARM REV CODE 250: Performed by: STUDENT IN AN ORGANIZED HEALTH CARE EDUCATION/TRAINING PROGRAM

## 2022-05-12 PROCEDURE — 27000221 HC OXYGEN, UP TO 24 HOURS

## 2022-05-12 PROCEDURE — 99900035 HC TECH TIME PER 15 MIN (STAT)

## 2022-05-12 PROCEDURE — 25000003 PHARM REV CODE 250: Performed by: INTERNAL MEDICINE

## 2022-05-12 PROCEDURE — 11000001 HC ACUTE MED/SURG PRIVATE ROOM

## 2022-05-12 PROCEDURE — 63700000 PHARM REV CODE 250 ALT 637 W/O HCPCS: Performed by: STUDENT IN AN ORGANIZED HEALTH CARE EDUCATION/TRAINING PROGRAM

## 2022-05-12 PROCEDURE — 80053 COMPREHEN METABOLIC PANEL: CPT | Performed by: STUDENT IN AN ORGANIZED HEALTH CARE EDUCATION/TRAINING PROGRAM

## 2022-05-12 PROCEDURE — 84100 ASSAY OF PHOSPHORUS: CPT | Performed by: STUDENT IN AN ORGANIZED HEALTH CARE EDUCATION/TRAINING PROGRAM

## 2022-05-12 PROCEDURE — 63600175 PHARM REV CODE 636 W HCPCS: Performed by: STUDENT IN AN ORGANIZED HEALTH CARE EDUCATION/TRAINING PROGRAM

## 2022-05-12 PROCEDURE — 63600175 PHARM REV CODE 636 W HCPCS

## 2022-05-12 PROCEDURE — 97535 SELF CARE MNGMENT TRAINING: CPT

## 2022-05-12 RX ORDER — INSULIN LISPRO 100 [IU]/ML
5 INJECTION, SOLUTION INTRAVENOUS; SUBCUTANEOUS
Qty: 15 ML | Refills: 0 | Status: ON HOLD | OUTPATIENT
Start: 2022-05-12 | End: 2022-09-01 | Stop reason: CLARIF

## 2022-05-12 RX ORDER — INSULIN GLARGINE 100 [IU]/ML
20 INJECTION, SOLUTION SUBCUTANEOUS NIGHTLY
Qty: 18 ML | Refills: 0 | Status: ON HOLD | OUTPATIENT
Start: 2022-05-12 | End: 2022-08-29

## 2022-05-12 RX ORDER — PREDNISONE 20 MG/1
40 TABLET ORAL DAILY
Status: DISCONTINUED | OUTPATIENT
Start: 2022-05-12 | End: 2022-05-13 | Stop reason: HOSPADM

## 2022-05-12 RX ORDER — NAPROXEN SODIUM 220 MG/1
81 TABLET, FILM COATED ORAL DAILY
Qty: 90 TABLET | Refills: 3 | OUTPATIENT
Start: 2022-05-12 | End: 2023-05-12

## 2022-05-12 RX ORDER — PREDNISONE 20 MG/1
40 TABLET ORAL DAILY
Qty: 8 TABLET | Refills: 0 | OUTPATIENT
Start: 2022-05-13 | End: 2022-05-17

## 2022-05-12 RX ORDER — TOPIRAMATE 25 MG/1
50 TABLET ORAL 2 TIMES DAILY
Status: CANCELLED | OUTPATIENT
Start: 2022-05-12

## 2022-05-12 RX ORDER — DULOXETIN HYDROCHLORIDE 30 MG/1
90 CAPSULE, DELAYED RELEASE ORAL DAILY
Status: CANCELLED | OUTPATIENT
Start: 2022-05-13

## 2022-05-12 RX ORDER — PREDNISONE 20 MG/1
40 TABLET ORAL DAILY
Qty: 8 TABLET | Refills: 0 | Status: SHIPPED | OUTPATIENT
Start: 2022-05-13 | End: 2022-05-17

## 2022-05-12 RX ORDER — CLOPIDOGREL BISULFATE 75 MG/1
75 TABLET ORAL DAILY
Qty: 21 TABLET | Refills: 0 | OUTPATIENT
Start: 2022-05-13 | End: 2022-06-03

## 2022-05-12 RX ADMIN — MUPIROCIN: 20 OINTMENT TOPICAL at 09:05

## 2022-05-12 RX ADMIN — CEFTRIAXONE 1 G: 1 INJECTION, SOLUTION INTRAVENOUS at 09:05

## 2022-05-12 RX ADMIN — INSULIN DETEMIR 17 UNITS: 100 INJECTION, SOLUTION SUBCUTANEOUS at 08:05

## 2022-05-12 RX ADMIN — ZONISAMIDE 200 MG: 100 CAPSULE ORAL at 08:05

## 2022-05-12 RX ADMIN — VERAPAMIL HYDROCHLORIDE 180 MG: 180 TABLET ORAL at 08:05

## 2022-05-12 RX ADMIN — PREDNISONE 40 MG: 20 TABLET ORAL at 12:05

## 2022-05-12 RX ADMIN — ACETAMINOPHEN 650 MG: 325 TABLET ORAL at 08:05

## 2022-05-12 RX ADMIN — HYDROCODONE BITARTRATE AND ACETAMINOPHEN 1 TABLET: 5; 325 TABLET ORAL at 09:05

## 2022-05-12 RX ADMIN — INSULIN ASPART 6 UNITS: 100 INJECTION, SOLUTION INTRAVENOUS; SUBCUTANEOUS at 05:05

## 2022-05-12 RX ADMIN — ASPIRIN 81 MG CHEWABLE TABLET 81 MG: 81 TABLET CHEWABLE at 09:05

## 2022-05-12 RX ADMIN — FLUCONAZOLE 200 MG: 100 TABLET ORAL at 09:05

## 2022-05-12 RX ADMIN — SODIUM CHLORIDE, SODIUM LACTATE, POTASSIUM CHLORIDE, AND CALCIUM CHLORIDE: .6; .31; .03; .02 INJECTION, SOLUTION INTRAVENOUS at 02:05

## 2022-05-12 RX ADMIN — MUPIROCIN: 20 OINTMENT TOPICAL at 08:05

## 2022-05-12 RX ADMIN — TOPIRAMATE 50 MG: 25 TABLET, FILM COATED ORAL at 08:05

## 2022-05-12 RX ADMIN — TOPIRAMATE 50 MG: 25 TABLET, FILM COATED ORAL at 09:05

## 2022-05-12 RX ADMIN — ATORVASTATIN CALCIUM 40 MG: 40 TABLET, FILM COATED ORAL at 09:05

## 2022-05-12 RX ADMIN — HYDROCHLOROTHIAZIDE 12.5 MG: 12.5 TABLET ORAL at 09:05

## 2022-05-12 RX ADMIN — ENOXAPARIN SODIUM 40 MG: 100 INJECTION SUBCUTANEOUS at 05:05

## 2022-05-12 RX ADMIN — CLOPIDOGREL 75 MG: 75 TABLET, FILM COATED ORAL at 09:05

## 2022-05-12 RX ADMIN — ZONISAMIDE 200 MG: 100 CAPSULE ORAL at 09:05

## 2022-05-12 RX ADMIN — LOSARTAN POTASSIUM 100 MG: 50 TABLET, FILM COATED ORAL at 09:05

## 2022-05-12 RX ADMIN — INSULIN ASPART 5 UNITS: 100 INJECTION, SOLUTION INTRAVENOUS; SUBCUTANEOUS at 08:05

## 2022-05-12 RX ADMIN — DULOXETINE 90 MG: 30 CAPSULE, DELAYED RELEASE ORAL at 09:05

## 2022-05-12 RX ADMIN — HYDROCODONE BITARTRATE AND ACETAMINOPHEN 1 TABLET: 5; 325 TABLET ORAL at 07:05

## 2022-05-12 NOTE — PLAN OF CARE
[facility]    FACILITY TRANSFER ORDERS      Patient Name: Chayito Chung  YOB: 1950    PCP: Curt Valladares III, MD   PCP Address: [address] [place] [address] / [place] [address]  PCP Phone Number: [phone]  PCP Fax: [phone]    Encounter Date: 05/13/2022    Admit to: [facility]    Vital Signs:  Routine    Diagnoses:   Active Hospital Problems    Diagnosis  POA    *Weakness of both lower extremities [R29.898]  Yes    Multiple falls [R29.6]  Not Applicable    Physical deconditioning [R53.81]  Yes    Traumatic rhabdomyolysis [T79.6XXA]  Yes    History of stroke with residual deficit [I69.30]  Not Applicable    Hypertension [I10]  Yes    Cervical myelopathy [G95.9]  Yes    Seizure disorder [G40.909]  Yes    Type 2 diabetes mellitus, with long-term current use of insulin [E11.9, Z79.4]  Not Applicable    Hyperlipidemia associated with type 2 diabetes mellitus [E11.69, E78.5]  Yes    Neuropathy [G62.9]  Yes    Coronary artery disease of native artery of native heart with stable angina pectoris [I25.118]  Yes    COPD (chronic obstructive pulmonary disease) [J44.9]  Yes    Tobacco abuse, 1ppd, 50 years [Z72.0]  Yes      Resolved Hospital Problems   No resolved problems to display.       Allergies:Review of patient's allergies indicates:  No Known Allergies    Diet: cardiac diet and diabetic diet: 1800 calorie    Activities: Activity as tolerated    Nursing: Assistance with mobility, POCT glucose    Labs: as per facility    CONSULTS:    Physical Therapy to evaluate and treat.  and Occupational Therapy to evaluate and treat.    MISCELLANEOUS CARE:  Diabetes Care:   SN to perform and educate Diabetic management with blood glucose monitoring:, Fingerstick blood sugar AC and HS and Report CBG < 60 or > 350 to physician.    WOUND CARE ORDERS  None    Medications: Review discharge medications with patient and family and provide education.      Insulin Glargine 20U nightly  Insuline  lispro 5U TID with meals    Current Discharge Medication List      START taking these medications    Details   predniSONE (DELTASONE) 20 MG tablet Take 2 tablets (40 mg total) by mouth once daily. for 4 days  Qty: 8 tablet, Refills: 0         CONTINUE these medications which have CHANGED    Details   insulin lispro 100 unit/mL pen Inject 5 Units into the skin 3 (three) times daily after meals.  Qty: 15 mL, Refills: 0      LANTUS SOLOSTAR U-100 INSULIN glargine 100 units/mL (3mL) SubQ pen Inject 20 Units into the skin nightly.  Qty: 18 mL, Refills: 0         CONTINUE these medications which have NOT CHANGED    Details   albuterol (PROVENTIL/VENTOLIN HFA) 90 mcg/actuation inhaler INHALE 2 PUFFS BY MOUTH INTO THE LUNGS EVERY 6 HOURS AS NEEDED FOR WHEEZING  Qty: 25.5 g, Refills: 0    Comments: **Patient requests 90 days supply**  Further Refill from Pulm      albuterol-ipratropium (DUO-NEB) 2.5 mg-0.5 mg/3 mL nebulizer solution Take 3 mLs by nebulization every 4 (four) hours as needed for Wheezing or Shortness of Breath. Rescue      alendronate (FOSAMAX) 70 MG tablet Take 1 tablet (70 mg total) by mouth every 7 days.  Qty: 12 tablet, Refills: 3    Associated Diagnoses: Osteoporosis, unspecified osteoporosis type, unspecified pathological fracture presence      aspirin 81 MG Chew Take 1 tablet (81 mg total) by mouth once daily.  Qty: 90 tablet, Refills: 3      atorvastatin (LIPITOR) 40 MG tablet Take 1 tablet (40 mg total) by mouth once daily.  Qty: 90 tablet, Refills: 3      diclofenac sodium (VOLTAREN) 1 % Gel Apply 2 g topically 4 (four) times daily.  Qty: 100 g, Refills: 0    Associated Diagnoses: Neck pain      duloxetine (CYMBALTA) 60 MG capsule Take 60 mg by mouth once daily. Take with 30mg for a total of 90mg daily      ferrous sulfate (FEOSOL) 325 mg (65 mg iron) Tab tablet Take 1 tablet (325 mg total) by mouth once daily.  Qty: 30 tablet, Refills: 3      fluticasone propionate (FLONASE) 50 mcg/actuation nasal  "spray 2 sprays (100 mcg total) by Each Nare route once daily.  Qty: 1 Bottle, Refills: 12      hydroCHLOROthiazide (MICROZIDE) 12.5 mg capsule Take 1 capsule (12.5 mg total) by mouth once daily.  Qty: 90 capsule, Refills: 3      losartan (COZAAR) 50 MG tablet Take 1 tablet (50 mg total) by mouth once daily.  Qty: 90 tablet, Refills: 3      magnesium oxide (MAG-OX) 400 mg (241.3 mg magnesium) tablet Take 400 mg by mouth once daily.      metFORMIN (GLUCOPHAGE) 1000 MG tablet Take 1,000 mg by mouth 2 (two) times daily.      nitroGLYCERIN (NITROSTAT) 0.4 MG SL tablet Place 1 tablet (0.4 mg total) under the tongue every 5 (five) minutes as needed for Chest pain.  Qty: 100 tablet, Refills: 1      ondansetron (ZOFRAN-ODT) 4 MG TbDL Take 1 tablet (4 mg total) by mouth every 8 (eight) hours as needed (nausea).  Qty: 14 tablet, Refills: 1    Associated Diagnoses: Other migraine without status migrainosus, not intractable      potassium chloride SA (K-DUR,KLOR-CON) 20 MEQ tablet Take 1 tablet (20 mEq total) by mouth once daily.  Qty: 90 tablet, Refills: 3    Associated Diagnoses: Hypertension associated with diabetes      topiramate (TOPAMAX) 50 MG tablet Take 50 mg by mouth 2 (two) times daily.      TRELEGY ELLIPTA 200-62.5-25 mcg inhaler Inhale 1 puff into the lungs once daily.      verapamiL (CALAN-SR) 180 MG CR tablet Take 1 tablet (180 mg total) by mouth every evening.  Qty: 30 tablet, Refills: 11    Comments: .      zonisamide (ZONEGRAN) 100 MG Cap Take 400 mg by mouth once daily.      BD ULTRA-FINE MINI PEN NEEDLE 31 gauge x 3/16" Ndle          STOP taking these medications       clopidogreL (PLAVIX) 75 mg tablet Comments:   Reason for Stopping:         HYDROcodone-acetaminophen (NORCO) 7.5-325 mg per tablet Comments:   Reason for Stopping:         pregabalin (LYRICA) 150 MG capsule Comments:   Reason for Stopping:         QUEtiapine (SEROQUEL) 400 MG tablet Comments:   Reason for Stopping:         tiZANidine (ZANAFLEX) " 4 MG tablet Comments:   Reason for Stopping:         insulin lispro protamin-lispro 100 unit/mL (75-25) InPn Comments:   Reason for Stopping:         pravastatin (PRAVACHOL) 40 MG tablet Comments:   Reason for Stopping:                Immunizations Administered as of 5/13/2022     Name Date Dose VIS Date Route Exp Date    COVID-19, MRNA, LN-S, PF (Moderna) 11/29/2021 0.25 Unknown unit -- -- --    Lot: [id number]     COVID-19, MRNA, LN-S, PF (Moderna) 2/9/2021 -- -- Intramuscular --    Site: Left deltoid     Lot: [id number]     COVID-19, MRNA, LN-S, PF (Moderna) 1/12/2021 -- -- Intramuscular --    Site: Left deltoid     Lot: [id number]           This patient has had both covid vaccinations and booster.     Some patients may experience side effects after vaccination.  These may include fever, headache, muscle or joint aches.  Most symptoms resolve with 24-48 hours and do not require urgent medical evaluation unless they persist for more than 72 hours or symptoms are concerning for an unrelated medical condition.          _________________________________  Rachel Guzmán MD  05/13/2022

## 2022-05-12 NOTE — PLAN OF CARE
NEW called [facility] [phone] and there was no answer. NEW left voice  message for a return call. NEW will follow up at a later time.       Future Appointments   Date Time Provider Department Center   5/18/2022 11:00 AM Jeri Matos PA-C [facility]U [place] [facility]   5/19/2022  9:00 AM Fritz Cadena MD [facility] [address]URO [address]   5/19/2022 10:00 AM Cameron Valladares III, MD [facility] [place] [facility]\"" [place]   6/16/2022  9:40 [facility] [place]          05/12/22 0753   Post-Acute Status   Post-Acute Authorization Placement

## 2022-05-12 NOTE — PLAN OF CARE
NEW called Beulah [phone] with [facility]. Beulah reported that they are willing to accept pt to facility. Beulah reported that she will contact pt daughter tomorrow to schedule time to sign paper. NEW will follow up with Beulah tomorrow morning. NEW will continue to follow pt throughout her transitions of care and assist with any dc needs.     Future Appointments   Date Time Provider Department Center   5/18/2022 11:00 AM Jeri Matos PA-C [facility]U [place] [facility]   5/19/2022  9:00 AM Fritz Cadena MD [facility] [address]URO [address]   5/19/2022 10:00 [facility] [place]   6/16/2022  9:40 AM [facility] [place]        05/12/22 1545   Post-Acute Status   Post-Acute Authorization Placement

## 2022-05-12 NOTE — PLAN OF CARE
SW faxed SNF orders to N for review.     Future Appointments   Date Time Provider Department Center   5/18/2022 11:00 AM Jeri Matos PA-C [facility] [place] [facility]   5/19/2022  9:00 AM Fritz Cadena MD [facility] [address]URO [address]   5/19/2022 10:00 AM Cameron Valladares III, MD [facility] [place]   6/16/2022  9:40 AM Cameron Valladares III, MD [facility] [place]        05/12/22 0814   Post-Acute Status   Post-Acute Authorization Placement

## 2022-05-12 NOTE — PLAN OF CARE
Problem: Physical Therapy  Goal: Physical Therapy Goal  Description: Goals to be met by: 2022     Patient will increase functional independence with mobility by performin. Supine to sit with MInimal Assistance  2. Sit to supine with MInimal Assistance  3. Rolling with Supervision.  4. Sit to stand transfer with Minimal Assistance using Rolling Walker  5. Bed to chair transfer with Minimal Assistance using Rolling Walker  6. Gait  x 50 feet with Minimal Assistance using Rolling Walker.   7. Lower extremity exercise program 2x10 reps with assistance as needed    Outcome: Ongoing, Progressing   Continue working toward goals.

## 2022-05-12 NOTE — PT/OT/SLP PROGRESS
Physical Therapy Treatment    Patient Name:  Chayito Chung   MRN:  [id number]    Recommendations:     Discharge Recommendations:  nursing facility, skilled   Discharge Equipment Recommendations:  ((will need R AFO for foot drop; remaining equip TBD))   Barriers to discharge: Decreased caregiver support    Assessment:     Chayito Chung is a 71 y.o. female admitted with a medical diagnosis of Weakness of both lower extremities.  She presents with the following impairments/functional limitations:  weakness, impaired endurance, impaired self care skills, impaired functional mobilty, gait instability, impaired balance, decreased coordination, decreased upper extremity function, decreased lower extremity function, decreased safety awareness, decreased ROM.  Pt would continue to benefit from P.T. To address impairments listed above.  .    Rehab Prognosis: Fair; patient would benefit from acute skilled PT services to address these deficits and reach maximum level of function.    Recent Surgery: * No surgery found *      Plan:     During this hospitalization, patient to be seen 5 x/week to address the identified rehab impairments via gait training, therapeutic activities, therapeutic exercises, neuromuscular re-education and progress toward the following goals:    · Plan of Care Expires:  06/11/22    Subjective       Patient/Family Comments/goals: Pt agreed to tx.  Pain/Comfort:  · Pain Rating 1: 0/10  · Pain Rating Post-Intervention 1: 0/10      Objective:     Communicated with RN (Maria G) prior to session.  Patient found supine with bed alarm, telemetry, peripheral IV upon PT entry to room.     General Precautions: Standard, fall   Orthopedic Precautions:N/A   Braces:         Functional Mobility:  · Bed Mobility:     · Rolling Left:  contact guard assistance, minimum assistance and x2  · Rolling Right: contact guard assistance, minimum assistance and x2  · Scooting: contact guard assistance to EOB  · Supine to Sit: stand  by assistance and contact guard assistance with HOB elevated and B/R for assist  · Transfers:     · Sit to Stand:  minimum assistance and moderate assistance with rolling walker  · Bed to Chair: minimum assistance and moderate assistance with  rolling walker  using  Step Transfer  · Gait: 4 small turning steps from EOB to b/s chair with RW and Mod for RW management and stability/balance.  Shaking of BLE throughout stepping with vc's and Mod A for w/s Left to step with RLE secondary to decreased hip/knee flexion and R drop foot.  Posterior lean toward end of gait requiring assistance for trunk control.  · Balance: sitting fair/fair+, standing fair-/poor, gait poor+      AM-PAC 6 CLICK MOBILITY  Turning over in bed (including adjusting bedclothes, sheets and blankets)?: 3  Sitting down on and standing up from a chair with arms (e.g., wheelchair, bedside commode, etc.): 3  Moving from lying on back to sitting on the side of the bed?: 3  Moving to and from a bed to a chair (including a wheelchair)?: 2  Need to walk in hospital room?: 2  Climbing 3-5 steps with a railing?: 1  Basic Mobility Total Score: 14       Therapeutic Activities and Exercises:   BLE shaking and decreased trunk control with static standing x 1 min requiring Julissa initially and declining to require Max A secondary to posterior lean.  Seated BLE therex: AP(no DF on R PROM), hip flexion decreased on R, LAQs all 10 reps x 2 with assistance at end range for increased ROM, R > L. Trunk stabilization exercises performed x 5 reps each plane. Pt remained up in b/s chair.    Patient left up in chair with all lines intact, call button in reach, chair alarm on and RN notified..    GOALS:   Multidisciplinary Problems     Physical Therapy Goals        Problem: Physical Therapy    Goal Priority Disciplines Outcome Goal Variances Interventions   Physical Therapy Goal     PT, PT/OT Ongoing, Progressing     Description: Goals to be met by: 6/11/2022     Patient will  increase functional independence with mobility by performin. Supine to sit with MInimal Assistance  2. Sit to supine with MInimal Assistance  3. Rolling with Supervision.  4. Sit to stand transfer with Minimal Assistance using Rolling Walker  5. Bed to chair transfer with Minimal Assistance using Rolling Walker  6. Gait  x 50 feet with Minimal Assistance using Rolling Walker.   7. Lower extremity exercise program 2x10 reps with assistance as needed                     Time Tracking:     PT Received On: 22  PT Start Time: 1240     PT Stop Time: 1304  PT Total Time (min): 24 min     Billable Minutes: Therapeutic Activity 24    Treatment Type: Treatment  PT/PTA: PTA     PTA Visit Number: 1     2022

## 2022-05-12 NOTE — TELEPHONE ENCOUNTER
----- Message from Lashon Wright sent at 5/12/2022 12:46 PM CDT -----  Regarding: Hosp f/u  Patient is discharging from [facility] and is needing a hospital f/u with Dr Pandey in Neurosurgery requested by DC Nurse.  Patient is currently scheduled with ANNE Matos on 5/18 which was scheduled from a previous admit.  Dr Pandey saw patient in hospital.  Please reschedule as appropriate and message me back so Discharge Nurse can advise patient prior to discharge.      DX: Weakness of both lower extremities    ThanksMalia  Access Navigator/Discharge

## 2022-05-12 NOTE — PT/OT/SLP PROGRESS
Occupational Therapy   Treatment    Name: Chayito Chung  MRN: [id number]  Admitting Diagnosis:  Weakness of both lower extremities       Recommendations:     Discharge Recommendations: nursing facility, skilled  Discharge Equipment Recommendations:   (TBD)  Barriers to discharge:  Decreased caregiver support    Assessment:     Chayito Chung is a 71 y.o. female with a medical diagnosis of Weakness of both lower extremities.  She presents with deconditioning, limited participation in therapy. Performance deficits affecting function are weakness, impaired endurance, impaired functional mobilty, gait instability, impaired self care skills, impaired balance, decreased lower extremity function, decreased upper extremity function, impaired cardiopulmonary response to activity.     Rehab Prognosis:  Good and Fair; patient would benefit from acute skilled OT services to address these deficits and reach maximum level of function.       Plan:     Patient to be seen 5 x/week to address the above listed problems via self-care/home management, therapeutic activities, therapeutic exercises  · Plan of Care Expires: 06/10/22  · Plan of Care Reviewed with: patient, family    Subjective     Pain/Comfort:  · Pain Rating 1: 0/10    Objective:     Communicated with: nsg prior to session.  Patient found up in chair with chair check, peripheral IV upon OT entry to room.    General Precautions: Standard, fall   Orthopedic Precautions:N/A   Braces: N/A  Respiratory Status: NC present in room but pt sleeping in chair with no NC donned, spO2 97% while seated, dipped to 92% after tranfsfer back to bed and to 87% after scooting to HOB with NC donned.      Occupational Performance:     Bed Mobility:    · Patient completed Rolling/Turning to Left with  supervision  · Patient completed Scooting/Bridging with supervision  · Patient completed Sit to Supine with supervision     Functional Mobility/Transfers:  · Patient completed Sit <> Stand Transfer  with minimum assistance and moderate assistance  with  hand-held assist   · Patient completed Bed <> Chair Transfer using Stand Pivot technique with minimum assistance with hand-held assist  Functional Mobility: Pt with fair to fair- dynamic seated and standing balance. Flexed stance throughout transfer and did not follow commands to complete transfer in full upright stance, mod v/c for proximity to bed prior to sitting     Activities of Daily Living:  · Grooming: stand by assistance to apply lotion to BLE  · Lower Body Dressing: moderate assistance pt able to doff B socks with moderately increased time, required assist to don B socks      [facility] 6 Click ADL: 17    Treatment & Education:  Pt educated on role of OT and POC.   Pt performing skills as listed above.     Patient left HOB elevated with all lines intact, call button in reach, bed alarm on, nsg notified and nsg presentEducation:      GOALS:   Multidisciplinary Problems     Occupational Therapy Goals        Problem: Occupational Therapy    Goal Priority Disciplines Outcome Interventions   Occupational Therapy Goal     OT, PT/OT Ongoing, Progressing    Description: Goals to be met by: 6/10/22     Patient will increase functional independence with ADLs by performing:    LE Dressing with Minimal Assistance.  Grooming while standing with Minimal Assistance.  Toileting from toilet with Minimal Assistance for hygiene and clothing management.   Supine to sit with Minimal Assistance.  Step transfer with Minimal Assistance  Toilet transfer to toilet with Minimal Assistance.  Increased functional strength to WFL for self care skills and functional mobility.  Upper extremity exercise program x10 reps per handout, with independence.                     Time Tracking:     OT Date of Treatment: 05/12/22  OT Start Time: 1436  OT Stop Time: 1452  OT Total Time (min): 16 min    Billable Minutes:Self Care/Home Management 16    OT/SHA: OT     SHA Visit Number:  0    5/12/2022

## 2022-05-12 NOTE — PROGRESS NOTES
RSW met with patient to discuss discharge and additional patient barriers to care. Pt identified no additional social barriers to care. Per pt, pt is not in need of resources at this time.    The following were addressed during this visit:  -Complete follow-up with patient    NILAY Christy

## 2022-05-12 NOTE — NURSING
Pt would like her daughter or sister to be called when rounds are made in the morning so they can be included in the plan of care.   Roopa (daughter) [phone]   Wesleykaitlynn (Sister) [phone]

## 2022-05-12 NOTE — PLAN OF CARE
Problem: Occupational Therapy  Goal: Occupational Therapy Goal  Description: Goals to be met by: 6/10/22     Patient will increase functional independence with ADLs by performing:    LE Dressing with Minimal Assistance.  Grooming while standing with Minimal Assistance.  Toileting from toilet with Minimal Assistance for hygiene and clothing management.   Supine to sit with Minimal Assistance.  Step transfer with Minimal Assistance  Toilet transfer to toilet with Minimal Assistance.  Increased functional strength to WFL for self care skills and functional mobility.  Upper extremity exercise program x10 reps per handout, with independence.    Outcome: Ongoing, Progressing   Pt progressing towards OT goals. Cont OT POC

## 2022-05-12 NOTE — PLAN OF CARE
I spoke with pt and her sister Pura via [employer].  Their SNF preference is [facility].  They would like for the facility to reach out to her daughter Dawn Camacho [phone] to sign paperwork.  I called Alicia with [employer] and she is reaching out to daughter now.  Pura was also asking to speak with [facility] team.  Dr. Guzmán will speak with her.  CM will continue to follow.    Also request SNF orders from [facility] team.    Future Appointments   Date Time Provider Department Center   5/18/2022 11:00 AM Jeri Matos PA-C [facility] NE[facility] [place] [facility]   5/19/2022  9:00 AM Fritz Cadena MD [facility] NEURO [address]   5/19/2022 10:00 AM Cameron Valladares III, MD [facility] [place]   6/16/2022  9:40 AM Cameron Valladares III, MD [facility] [place]        05/12/22 1217   Post-Acute Status   Post-Acute Authorization Placement     Kunal Hagen, RN   Supervisor Case Management-[place]  [phone]

## 2022-05-12 NOTE — PT/OT/SLP PROGRESS
Physical Therapy Evaluation    Patient Name:  Chayito Chung   MRN:  [id number]    Recommendations:     Discharge Recommendations:  nursing facility, skilled   Discharge Equipment Recommendations:  (will need R AFO for foot drop; remaining equip TBD)   Barriers to discharge: Decreased caregiver support    Assessment:     Chayito Chung is a 71 y.o. female admitted with a medical diagnosis of Weakness of both lower extremities.  She presents with the following impairments/functional limitations:  weakness, impaired endurance, impaired sensation, impaired self care skills, impaired functional mobilty, gait instability, impaired balance, decreased lower extremity function, decreased upper extremity function, decreased coordination, impaired cognition, impaired fine motor, decreased safety awareness, decreased ROM pt demonstrated BLE weakness, delayed/impaired cognition and requires increased physical assistance at this time; will recommend SNF to maximize functional status..    Rehab Prognosis: Good; patient would benefit from acute skilled PT services to address these deficits and reach maximum level of function.    Recent Surgery: * No surgery found *      Plan:     During this hospitalization, patient to be seen 5 x/week to address the identified rehab impairments via gait training, therapeutic activities, therapeutic exercises, neuromuscular re-education and progress toward the following goals:    · Plan of Care Expires:  06/11/22    Subjective     Chief Complaint: L leg pain  Patient/Family Comments/goals: return to [facility]  Pain/Comfort:  · Pain Rating 1:  (did not rate)  · Location - Side 1: Left  · Location - Orientation 1: generalized  · Location 1: leg  · Pain Addressed 1: Reposition, Distraction, Cessation of Activity, Nurse notified  · Pain Rating Post-Intervention 1:  (did not rate)    Living Environment:   Pt lives alone, ILF, 3rd floor apartment, elevator access, t/sc ombo  Previous level of function:  reports mod I/independent with only use of rollator   Equipment Used at Home:  rollator (reports access to RWand TTB that she does not use)  Assistance upon Discharge: limited     Patients cultural, spiritual, [employer] conflicts given the current situation:  no    Objective:     Communicated with nurse prior to session.  Patient found up in chair with casiano catheter, peripheral IV upon PT entry to room.     General Precautions: Standard, fall   Orthopedic Precautions:N/A   Braces: will need R AFO    Exam:  Cognition: delayed with processing/impaired command following  Posture: Rounded shoulders, FHP  Gross Motor Coordination: impaired 2/2 weakness  Sensation: diminished to lt touch Bilat lower legs/feet; pt reports numbness B feet, tingling in feet and distal LEs  BLE ROM; passively WFL  RLE strength: hip fl~4-, hip ext~3, hip abd~2+, hip add~2+ to 3; knee ext~4, knee fl~3+ to 4-; df~2-, pf~3  LLE strength: hip fl~3+, hip ext~2+ to 3, hip abd/add~2+, knee ext~4-, knee fl~3+ to 4-, df~4-, pf~3     Functional Mobility:  · Transfers:     · Sit to Stand:  contact guard assistance and minimum assistance with rolling walker; max VCs for hand placement and scooting to edge of chair  · Gait: Patient required min/modA using RW with max VCs for proper advancement, hand and foot placement with 3 pt gait; increased time for completion of amb ~5-6ft forward and back x 2 trials; short step length, toe drag R, LLE tends to fatigue with knee trembling- decreased wt shift LLE in order to advance RLE; difficulty with following commands for proper mobility with RW  · Balance: sit~fair, stand~fair-, amb~poor+    AM-PAC 6 CLICK MOBILITY  Total Score: 14       Therapeutic Activities and Exercises:   Pt educated in role of PT/POC, use of RW for safety with mobility with max VCs as described above, demonstrated BLE therex to perform while seated in b/s chair, discussed d/c recs    Patient left up in chair with all lines intact, call  button in reach, chair alarm on and nurse notified..    GOALS:   Multidisciplinary Problems     Physical Therapy Goals        Problem: Physical Therapy    Goal Priority Disciplines Outcome Goal Variances Interventions   Physical Therapy Goal     PT, PT/OT Ongoing, Progressing     Description: Goals to be met by: 2022     Patient will increase functional independence with mobility by performin. Supine to sit with MInimal Assistance  2. Sit to supine with MInimal Assistance  3. Rolling with Supervision.  4. Sit to stand transfer with Minimal Assistance using Rolling Walker  5. Bed to chair transfer with Minimal Assistance using Rolling Walker  6. Gait  x 50 feet with Minimal Assistance using Rolling Walker.   7. Lower extremity exercise program 2x10 reps with assistance as needed                     Time Tracking:     PT Received On: 22  PT Start Time: 1018     PT Stop Time: 1049  PT Total Time (min): 31 min     Billable Minutes: Evaluation 18       PT/PTA: PT     PTA Visit Number: 0     2022

## 2022-05-12 NOTE — PROGRESS NOTES
[facility] Medicine Progress Note    Primary Team: [facility] Hospitalist Team B  Attending Physician: Lillian Snyder MD  Resident: Dr. Silva  Intern: Dr. Tijerina    Subjective:      Strength slightly improved in bilateral lower extremities, still complaining bilateral leg pain     Denies HA, fever, chills, chest pain, SOB, abdominal pain, N/V/D.      Objective:     Last 24 Hour Vital Signs:  BP  Min: 109/60  Max: 163/65  Temp  Av.6 °F (36.4 °C)  Min: 96.7 °F (35.9 °C)  Max: 98.2 °F (36.8 °C)  Pulse  Av.7  Min: 86  Max: 101  Resp  Av.2  Min: 16  Max: 20  SpO2  Av.3 %  Min: 90 %  Max: 98 %    Intake/Output Summary (Last 24 hours) at 2022 0822  Last data filed at 2022 0631  Gross per 24 hour   Intake 3246.25 ml   Output 2650 ml   Net 596.25 ml      Physical Examination:     Constitutional: She appears well-developed and well-nourished. No distress.   HENT:   Head: Normocephalic and atraumatic.   Mouth/Throat: Oropharynx is clear and moist.   Eyes: Conjunctivae and EOM are normal. Pupils are equal, round, and reactive to light.   Neck: Neck supple.   Midline cervical tenderness noted   Normal range of motion.  Cardiovascular: Normal rate, regular rhythm and intact distal pulses.   Pulmonary/Chest: Breath sounds normal. No stridor. No respiratory distress.   Abdominal: Abdomen is soft. She exhibits no distension. There is no abdominal tenderness.   Musculoskeletal:         General: No tenderness or edema. Normal range of motion.      Cervical back: Normal range of motion and neck supple.   Neurological: She is alert and oriented to person, place, and time. A sensory deficit is present.   CN 2-12 intact throughout   BL UE 5/5 strength with normal sensorium  BL LE 2/5 strength with normal sensorium  L Patellar tendon 2+; R Patellar tendon +1  Skin: Skin is warm and dry.   Psychiatric: She has a normal mood and affect.     Laboratory:  Laboratory Data Reviewed: yes  Pertinent Findings:  Recent Labs    Lab 05/10/22  0441 05/11/22  0440 05/12/22  0247   WBC 5.34 4.86 4.43   HGB 8.6* 9.1* 9.1*   HCT 28.8* 29.8* 30.0*    248 219   MCV 84 83 82   RDW 16.5* 16.1* 16.0*    139 140   K 4.2 4.0 3.9    107 107   CO2 22* 21* 21*   BUN 15 6* 7*   CREATININE 0.7 0.6 0.7   * 122* 153*   PROT 6.7 6.9 6.8   ALBUMIN 3.1* 3.1* 3.0*   BILITOT 0.2 0.2 0.2   AST 32 22 15   ALKPHOS 136* 133 137*   ALT 21 24 17       Microbiology Data Reviewed: yes  Pertinent Findings:  Microbiology Results (last 7 days)     Procedure Component Value Units Date/Time    Blood culture [[id number]] Collected: 05/10/22 0825    Order Status: Completed Specimen: Blood Updated: 05/11/22 2212     Blood Culture, Routine No Growth to date      No Growth to date    Urine culture [[id number]]  (Abnormal) Collected: 05/09/22 1637    Order Status: Completed Specimen: Urine Updated: 05/11/22 1037     Urine Culture, Routine ALBERTA LUSITANIAE  > 100,000 cfu/ml  Treatment of asymptomatic candiduria is not recommended (except for   specific populations). Candida isolated in the urine typically   represents colonization. If an indwelling urinary catheter is present  it should be removed or replaced.      Narrative:      Specimen Source->Urine           Other Results:  EKG (my interpretation):   ECG Results          EKG 12-lead (Final result)  Result time 05/09/22 23:16:31    Final result by Interface, Lab In [facility] (05/09/22 23:16:31)                 Narrative:    Test Reason : R53.1,    Vent. Rate : 094 BPM     Atrial Rate : 094 BPM     P-R Int : 126 ms          QRS Dur : 084 ms      QT Int : 354 ms       P-R-T Axes : 078 059 030 degrees     QTc Int : 442 ms    Normal sinus rhythm  Normal ECG  When compared with ECG of 05-APR-2022 18:00,  No significant change was found  Confirmed by Clyde Hernandez MD [id number]) on 5/9/2022 11:16:26 PM    Referred By: FRANCISCO   SELF           Confirmed By:Clyde Hernandez MD                             Results  for orders placed or performed during the hospital encounter of 05/09/22   EKG 12-lead    Collection Time: 05/09/22  2:30 PM    Narrative    Test Reason : R53.1,    Vent. Rate : 094 BPM     Atrial Rate : 094 BPM     P-R Int : 126 ms          QRS Dur : 084 ms      QT Int : 354 ms       P-R-T Axes : 078 059 030 degrees     QTc Int : 442 ms    Normal sinus rhythm  Normal ECG  When compared with ECG of 05-APR-2022 18:00,  No significant change was found  Confirmed by Clyde Hernandez MD ([id number]) on 5/9/2022 11:16:26 PM    Referred By: FRANCISCO   SELF           Confirmed By:Clyde Hernandez MD      Radiology Data Reviewed: yes  Pertinent Findings:  X-Ray Hips Bilateral 2 View Inc AP Pelvis    Result Date: 5/9/2022  EXAMINATION: XR HIPS BILATERAL 2 VIEW INCL AP PELVIS CLINICAL HISTORY: hip pain, s/p multiple falls;  Other symptoms and signs involving the musculoskeletal system TECHNIQUE: AP view of the pelvis and frogleg lateral views of both hips were performed. COMPARISON: None. FINDINGS: Examination limited due to osseous demineralization and bowel gas. Noting this, no definite evidence of acute displaced fracture or dislocation is appreciated.  Degenerative findings are noted involving the spine, sacroiliac joints, pubic symphysis, and hip joints.  Phleboliths appear to project within the pelvis.  No definite acute soft tissue abnormalities appreciated.     Noting limitations above, no definite evidence of acute displaced fracture or dislocation identified. Electronically signed by: Bert Sandra Date:    05/09/2022 Time:    20:57    CT Head Without Contrast    Result Date: 5/9/2022  EXAMINATION: CT HEAD WITHOUT CONTRAST CLINICAL HISTORY: Head trauma, minor (Age >= 65y);Neuro deficit, acute, stroke suspected; TECHNIQUE: Low dose axial CT images obtained throughout the head without intravenous contrast. Sagittal and coronal reconstructions were performed. COMPARISON: 04/06/2022 FINDINGS: Intracranial compartment:  Ventricles and sulci are normal in size for age without evidence of hydrocephalus. No extra-axial blood or fluid collections. The brain parenchyma appears normal. No parenchymal mass, hemorrhage, edema or major vascular distribution infarct. Skull/extracranial contents (limited evaluation): No fracture. Mastoid air cells and paranasal sinuses are essentially clear.     No acute abnormality. Electronically signed by: Salomon Escobar Date:    05/09/2022 Time:    16:47    CT Cervical Spine Without Contrast    Result Date: 5/9/2022  EXAMINATION: CT CERVICAL SPINE WITHOUT CONTRAST CLINICAL HISTORY: Neck trauma (Age >= 65y); TECHNIQUE: Low dose axial CT images through the cervical spine, with sagittal and coronal reformations.  Contrast was not administered. COMPARISON: 04/08/2022 FINDINGS: No acute cervical fractures are detected.  Slight straightening of normal cervical lordosis.  Posterior fusion hardware from C3 through T1. Moderate disc space narrowing at C5-6, C6-7 and C3-4.  Mild narrowing elsewhere. Nine ectomy defect from C3 through C7. Decompression of the central canal. Severe foraminal narrowing at C3-4 on the left, C4-5 bilaterally C5-6 bilaterally.  Moderate foraminal narrowing bilaterally at C6-7 and C3-4 on the right. Limited evaluation of the intraspinal contents demonstrates no hematoma or mass.Paraspinal soft tissues exhibit no acute abnormalities. Metallic artifact.     1. No acute abnormality 2. Multilevel a chronic and postoperative changes.  See above comments. Electronically signed by: Salomon Escobar Date:    05/09/2022 Time:    16:58    X-Ray Chest AP Portable    Result Date: 5/9/2022  EXAMINATION: XR CHEST AP PORTABLE CLINICAL HISTORY: Cough; Cough, unspecified TECHNIQUE: Single frontal view of the chest was performed. COMPARISON: Chest radiograph 04/05/2022, 21:48 hours. FINDINGS: Cardiomediastinal contour appears grossly unchanged.  Background coarse interstitial increased attenuation is again  noted.  New platelike opacities are noted in the mid lower lung zones.  No definite pneumothorax or large volume pleural effusion. No acute findings are noted visualized abdomen.  Postoperative sequela again noted.  Osseous soft tissue structures appear definite acute change.  Postoperative sequela involving the cervical and upper thoracic spine, as before.     Linear platelike opacities at the lung bases favored to represent atelectasis. Electronically signed by: Bert Sandra Date:    05/09/2022 Time:    20:58       Current Medications:     Scheduled:   aspirin  81 mg Oral Daily    atorvastatin  40 mg Oral Daily    cefTRIAXone (ROCEPHIN) IVPB  1 g Intravenous Q24H    clopidogreL  75 mg Oral Daily    DULoxetine  90 mg Oral Daily    enoxaparin  40 mg Subcutaneous Daily    fluconazole  200 mg Oral Daily    fluticasone propionate  2 spray Each Nostril Daily    hydroCHLOROthiazide  12.5 mg Oral Daily    insulin detemir U-100  17 Units Subcutaneous QHS    losartan  100 mg Oral Daily    mupirocin   Nasal BID    topiramate  50 mg Oral BID    verapamiL  180 mg Oral QHS    zonisamide  200 mg Oral BID         Infusions:   lactated ringers 150 mL/hr at 05/12/22 0631        PRN:  acetaminophen, albuterol-ipratropium, dextrose 10%, HYDROcodone-acetaminophen, insulin aspart U-100, melatonin, pregabalin, sodium chloride 0.9%     Antibiotics and Day Number of Therapy:  Antibiotics (From admission, onward)            Start     Stop Route Frequency Ordered    05/10/22 0900  cefTRIAXone (ROCEPHIN) 1 g/50 mL D5W IVPB         -- IV Every 24 hours (non-standard times) 05/10/22 0019    05/10/22 0900  mupirocin 2 % ointment         05/15 0859 Nasl 2 times daily 05/10/22 0021           Lines and Day Number of Therapy:      Assessment:     Chayito Chung is a 71 y.o. female with a PMHx of     Patient Active Problem List    Diagnosis Date Noted    Multiple falls 05/09/2022    Physical deconditioning 05/09/2022     Traumatic rhabdomyolysis 05/09/2022    CVA (cerebral vascular accident)     Mild malnutrition 04/06/2022    Right leg weakness 04/05/2022    History of stroke with residual deficit 04/05/2022    Aortic atherosclerosis 11/02/2021    Cervical myelopathy 07/23/2020    Anxiety 07/23/2020    Hypertension 07/23/2020    Migraine headache 07/23/2020    CAD (coronary artery disease) 07/23/2020    Impaired ambulation     History of partial gastrectomy 12/16/2019    Seizure disorder 12/16/2019    Incisional hernia, without obstruction or gangrene 11/27/2019    Type 2 diabetes mellitus, with long-term current use of insulin 11/12/2019    Osteopenia of lumbar spine 08/27/2019    Vitamin D deficiency 07/26/2019    History of colon cancer 07/25/2019    Chronic diastolic congestive heart failure 11/15/2018    Steroid-induced gastritis 04/25/2017    Sinus tachycardia 10/11/2016    Iron deficiency anemia 10/11/2016    Weakness of both lower extremities 08/26/2016    Lumbar facet arthropathy 08/02/2016    S/P PTCA (percutaneous transluminal coronary angioplasty) 10/20/2015    Hypertension associated with diabetes     Hyperlipidemia associated with type 2 diabetes mellitus     Hypoglycemia associated with type 2 diabetes mellitus 09/20/2014    Major depressive disorder in partial remission 12/17/2012    Solitary pulmonary nodule s/p resection 4/2012 12/17/2012    Neuropathy 12/17/2012    Coronary artery disease of native artery of native heart with stable angina pectoris 12/16/2012    COPD (chronic obstructive pulmonary disease) 12/16/2012    Tobacco abuse, 1ppd, 50 years 12/16/2012       Plan:     Traumatic Rhabdomyolysis   #Bilateral lower extremity weakness  #Hx of Vertebral compression fracture  - started this morning 10AM; fell multiple times  - CT Head wo contrast: no acute abnormality  - CT Cervical Spine wo contrast: No acute abnormality  - CPK 3832  -  ml/hr   - MRI Spine - Degenerative  changes as described above, similar to recent exam most notably at L4-5 where there is some inflammatory facet enhancement. Bilateral L5-S1 moderate foraminal narrowing.  - MRI Brain wwo contrast - Stable evolving subacute small right frontal cerebral cortical infarct with a small amount of petechial microhemorrhage.   Background of mild atrophy and periventricular white matter changes and chronic right cerebellar lacunar infarct  - Neurosurgery consulted: MRI findings might be concerning for acute fracture of L5 vertebra in a setting of new onset R footdrop and back pain  - CT Lumbar spine wo contrast: Bilateral L5 spondylolysis with mild anterolisthesis of L5 on S1, stable when compared to multiple previous exams. Stable remote compression fracture of the L1 vertebral body.  No acute fractures.  - PT/OT  - CRP 9.7, Sed Rate 62,   - Aldolase pending   -   - CAROL pending, RF negative, Aldolase pending  - Neurology recommends stopping all sedating medications     Productive cough  # Hx of COPD  -Duo-Nebs ordered PRN  -CXR Linear platelike opacities at the lung bases favored to represent atelectasis.     UTI:    # Hx of urinary retention  - ED: Grullon inserted; Blood culture - NGTD  - Abnormal UA with many candidal yeast, WBC 16, Hyaline casts 8     Cerebrovascular Accident with acute right-sided weakness  - on previous admit: MRI-Brain w/wo contrast showed subacute R YUDITH infarct, which was incongruent with patient's RUE and RLE weakness  - on previous admit: MRI of entire spine performed as patient's weakness was incongruent with brain imaging; no acute findings noted  - continue ASA, Plavix  - Neurochecks q4h     Normocytic Anemia:  - H/H 8.6/29.5  - MCV 86  - Ferritin 13, Iron 14, Transferrin 393, TIBC 582  - B12, Folate - wnl      Essential Hypertension  -continue home regimen includes HCTZ 12.5 mg daily, losartan 100mg daily, and verapamil 180 mg daily     Hyperlipidemia  - continue Lipitor 40      Chronic  Back Pain  -patient reports long-standing history of lower back pain  -continued home Norco PRN     Chronic L1 Vertebral Compression Fracture  -noted incidentally on MRI of her T-spine; noted as far back as 2017 on imaging per chart review  -no further inpatient intervention needed  -can follow up outpatient with spine specialist     Pulmonary Nodules  -noted incidentally on CTA Head and Neck  -has longstanding smoking history  -follows with Pulmonologist at [facility]     Seizure Disorder  -continued home topiramate 50mg BID, zonisamide 200mg BID  -EEG done while inpatient did not show any seizure activity  -patient may benefit from EMU admission arranged by Neurologist     Type II Diabetes Mellitus, complicated by peripheral neuropathy  -last A1c in 12/2021 was 7.3%; repeat A1c on admission 10.3%, 9.8 most recent  -home regimen includes metformin 1000mg BID; holding inpatient  -continued home Lyrica 150 mg BID  -on Lantus 35U daily while in house, 17U inpatient  -SSI in house     Migraines  -continued home topiramate 50mg BID     Depression  -home regimen includes Cymbalta 90mg daily,   -Seroquel 300mg qhs PRN  -continued home Cymbalta,      Anxiety   -home regimen includes Cymbalta 90mg daily, Seroquel 300mg qhs, and Xanax 0.5mg BID PRN  -will include PRN     Diet: diabetic  Ppx: Lovenox  Code: Full     Disposition: discharge   Santo Tijerina MD  [facility]     hospitals Medicine Hospitalist Pager numbers:   hospitals Hospitalist Medicine Team A (Bita/Sydni):          [phone]  hospitals Hospitalist Medicine Team B (Lillian/Danny):

## 2022-05-12 NOTE — NURSING
Patient is awake and orientedx4. Care plan explained to patient and sister; verbalized understanding. On 2L N/C, O2 saturation at 93-97%. Hooked to heart monitor, running normal sinus rhythm at 86-93bpm. Grullon in place draining clear yellow urine to gravity. No N/V/D during shift. Due medications given. C/O pain to back and neck, PRN Norco administered per MAR with maximum relief. Accuchecks completed. Encouraged to turn every 2 hours as tolerated. Maintained on fall risk precautions. Bed in lowest position, bed alarm on, call light/personal items within reach and instructed to call for help when needed.

## 2022-05-12 NOTE — PLAN OF CARE
NEW contacted Alicia with [employer] [phone]. Alicia reported that she will review pt as soon as she can for the following facilities, [facility], [facility], and [facility].     Future Appointments   Date Time Provider Department Center   5/18/2022 11:00 AM Jeri Matos PA-C [facility] [place] [facility]   5/19/2022  9:00 AM Fritz Cadena MD [facility]   5/19/2022 10:00 AM Cameron Valladares III, MD [facility]   6/16/2022  9:40 AM Cameron Valladares III, MD [facility]        05/12/22 0924   Post-Acute Status   Post-Acute Authorization Placement

## 2022-05-13 VITALS
SYSTOLIC BLOOD PRESSURE: 123 MMHG | RESPIRATION RATE: 18 BRPM | HEIGHT: 62 IN | TEMPERATURE: 98 F | DIASTOLIC BLOOD PRESSURE: 67 MMHG | BODY MASS INDEX: 23.45 KG/M2 | OXYGEN SATURATION: 96 % | WEIGHT: 127.44 LBS | HEART RATE: 106 BPM

## 2022-05-13 LAB
ALBUMIN SERPL BCP-MCNC: 3.5 G/DL (ref 3.5–5.2)
ALDOLASE SERPL-CCNC: 8.1 U/L (ref 1.2–7.6)
ALP SERPL-CCNC: 157 U/L (ref 55–135)
ALT SERPL W/O P-5'-P-CCNC: 16 U/L (ref 10–44)
ANION GAP SERPL CALC-SCNC: 14 MMOL/L (ref 8–16)
AST SERPL-CCNC: 14 U/L (ref 10–40)
BASOPHILS # BLD AUTO: 0.01 K/UL (ref 0–0.2)
BASOPHILS NFR BLD: 0.3 % (ref 0–1.9)
BILIRUB SERPL-MCNC: 0.3 MG/DL (ref 0.1–1)
BUN SERPL-MCNC: 12 MG/DL (ref 8–23)
CALCIUM SERPL-MCNC: 9.5 MG/DL (ref 8.7–10.5)
CHLORIDE SERPL-SCNC: 103 MMOL/L (ref 95–110)
CO2 SERPL-SCNC: 18 MMOL/L (ref 23–29)
CREAT SERPL-MCNC: 0.7 MG/DL (ref 0.5–1.4)
DIFFERENTIAL METHOD: ABNORMAL
EOSINOPHIL # BLD AUTO: 0 K/UL (ref 0–0.5)
EOSINOPHIL NFR BLD: 0 % (ref 0–8)
ERYTHROCYTE [DISTWIDTH] IN BLOOD BY AUTOMATED COUNT: 15.8 % (ref 11.5–14.5)
EST. GFR  (AFRICAN AMERICAN): >60 ML/MIN/1.73 M^2
EST. GFR  (NON AFRICAN AMERICAN): >60 ML/MIN/1.73 M^2
GLUCOSE SERPL-MCNC: 336 MG/DL (ref 70–110)
HCT VFR BLD AUTO: 32.5 % (ref 37–48.5)
HGB BLD-MCNC: 9.6 G/DL (ref 12–16)
IMM GRANULOCYTES # BLD AUTO: 0 K/UL (ref 0–0.04)
IMM GRANULOCYTES NFR BLD AUTO: 0 % (ref 0–0.5)
LYMPHOCYTES # BLD AUTO: 0.7 K/UL (ref 1–4.8)
LYMPHOCYTES NFR BLD: 18.7 % (ref 18–48)
MAGNESIUM SERPL-MCNC: 1.4 MG/DL (ref 1.6–2.6)
MCH RBC QN AUTO: 24.5 PG (ref 27–31)
MCHC RBC AUTO-ENTMCNC: 29.5 G/DL (ref 32–36)
MCV RBC AUTO: 83 FL (ref 82–98)
MONOCYTES # BLD AUTO: 0.2 K/UL (ref 0.3–1)
MONOCYTES NFR BLD: 3.9 % (ref 4–15)
NEUTROPHILS # BLD AUTO: 3 K/UL (ref 1.8–7.7)
NEUTROPHILS NFR BLD: 77.1 % (ref 38–73)
NRBC BLD-RTO: 0 /100 WBC
PHOSPHATE SERPL-MCNC: 3.9 MG/DL (ref 2.7–4.5)
PLATELET # BLD AUTO: 264 K/UL (ref 150–450)
PMV BLD AUTO: 9.9 FL (ref 9.2–12.9)
POCT GLUCOSE: 315 MG/DL (ref 70–110)
POCT GLUCOSE: 407 MG/DL (ref 70–110)
POTASSIUM SERPL-SCNC: 4.3 MMOL/L (ref 3.5–5.1)
PROT SERPL-MCNC: 7.2 G/DL (ref 6–8.4)
RBC # BLD AUTO: 3.92 M/UL (ref 4–5.4)
SODIUM SERPL-SCNC: 135 MMOL/L (ref 136–145)
WBC # BLD AUTO: 3.85 K/UL (ref 3.9–12.7)

## 2022-05-13 PROCEDURE — 63600175 PHARM REV CODE 636 W HCPCS

## 2022-05-13 PROCEDURE — 83735 ASSAY OF MAGNESIUM: CPT | Performed by: STUDENT IN AN ORGANIZED HEALTH CARE EDUCATION/TRAINING PROGRAM

## 2022-05-13 PROCEDURE — 63600175 PHARM REV CODE 636 W HCPCS: Performed by: STUDENT IN AN ORGANIZED HEALTH CARE EDUCATION/TRAINING PROGRAM

## 2022-05-13 PROCEDURE — 63700000 PHARM REV CODE 250 ALT 637 W/O HCPCS: Performed by: STUDENT IN AN ORGANIZED HEALTH CARE EDUCATION/TRAINING PROGRAM

## 2022-05-13 PROCEDURE — 97530 THERAPEUTIC ACTIVITIES: CPT | Mod: CQ

## 2022-05-13 PROCEDURE — 84100 ASSAY OF PHOSPHORUS: CPT | Performed by: STUDENT IN AN ORGANIZED HEALTH CARE EDUCATION/TRAINING PROGRAM

## 2022-05-13 PROCEDURE — 36415 COLL VENOUS BLD VENIPUNCTURE: CPT | Performed by: STUDENT IN AN ORGANIZED HEALTH CARE EDUCATION/TRAINING PROGRAM

## 2022-05-13 PROCEDURE — 85025 COMPLETE CBC W/AUTO DIFF WBC: CPT | Performed by: STUDENT IN AN ORGANIZED HEALTH CARE EDUCATION/TRAINING PROGRAM

## 2022-05-13 PROCEDURE — 25000242 PHARM REV CODE 250 ALT 637 W/ HCPCS: Performed by: STUDENT IN AN ORGANIZED HEALTH CARE EDUCATION/TRAINING PROGRAM

## 2022-05-13 PROCEDURE — 25000003 PHARM REV CODE 250: Performed by: STUDENT IN AN ORGANIZED HEALTH CARE EDUCATION/TRAINING PROGRAM

## 2022-05-13 PROCEDURE — 80053 COMPREHEN METABOLIC PANEL: CPT | Performed by: STUDENT IN AN ORGANIZED HEALTH CARE EDUCATION/TRAINING PROGRAM

## 2022-05-13 PROCEDURE — 94761 N-INVAS EAR/PLS OXIMETRY MLT: CPT

## 2022-05-13 PROCEDURE — 97116 GAIT TRAINING THERAPY: CPT | Mod: CQ

## 2022-05-13 RX ORDER — MAGNESIUM SULFATE HEPTAHYDRATE 40 MG/ML
2 INJECTION, SOLUTION INTRAVENOUS
Status: COMPLETED | OUTPATIENT
Start: 2022-05-13 | End: 2022-05-13

## 2022-05-13 RX ADMIN — INSULIN ASPART 10 UNITS: 100 INJECTION, SOLUTION INTRAVENOUS; SUBCUTANEOUS at 06:05

## 2022-05-13 RX ADMIN — ZONISAMIDE 200 MG: 100 CAPSULE ORAL at 10:05

## 2022-05-13 RX ADMIN — MAGNESIUM SULFATE 2 G: 2 INJECTION INTRAVENOUS at 08:05

## 2022-05-13 RX ADMIN — DULOXETINE 90 MG: 30 CAPSULE, DELAYED RELEASE ORAL at 08:05

## 2022-05-13 RX ADMIN — MUPIROCIN: 20 OINTMENT TOPICAL at 08:05

## 2022-05-13 RX ADMIN — PREDNISONE 40 MG: 20 TABLET ORAL at 08:05

## 2022-05-13 RX ADMIN — ASPIRIN 81 MG CHEWABLE TABLET 81 MG: 81 TABLET CHEWABLE at 08:05

## 2022-05-13 RX ADMIN — HYDROCHLOROTHIAZIDE 12.5 MG: 12.5 TABLET ORAL at 10:05

## 2022-05-13 RX ADMIN — CEFTRIAXONE 1 G: 1 INJECTION, SOLUTION INTRAVENOUS at 12:05

## 2022-05-13 RX ADMIN — ATORVASTATIN CALCIUM 40 MG: 40 TABLET, FILM COATED ORAL at 08:05

## 2022-05-13 RX ADMIN — MAGNESIUM SULFATE 2 G: 2 INJECTION INTRAVENOUS at 10:05

## 2022-05-13 RX ADMIN — FLUCONAZOLE 200 MG: 100 TABLET ORAL at 08:05

## 2022-05-13 RX ADMIN — FLUTICASONE PROPIONATE 100 MCG: 50 SPRAY, METERED NASAL at 12:05

## 2022-05-13 RX ADMIN — LOSARTAN POTASSIUM 100 MG: 50 TABLET, FILM COATED ORAL at 08:05

## 2022-05-13 RX ADMIN — CLOPIDOGREL 75 MG: 75 TABLET, FILM COATED ORAL at 08:05

## 2022-05-13 RX ADMIN — TOPIRAMATE 50 MG: 25 TABLET, FILM COATED ORAL at 08:05

## 2022-05-13 NOTE — PT/OT/SLP PROGRESS
Occupational Therapy  Visit Attempt    Patient Name:  Chayito Chung   MRN:  [id number]    Patient not seen today secondary to Patient fatigue (9:32 - Patient reporting fatigue and declining OT.).     5/13/2022

## 2022-05-13 NOTE — PLAN OF CARE
Pt will dc to [facility]. SW awaiting report and room number. Pt signed and completed patients choice form. SW placed patients choice form in blue folder at nursing station.     Room: 807A  Report:[phone]  Transportation: SW setup ambulance transport set for 5pm via [facility]. Pending ETA. SW called Zuleyma with [facility] and cancelled ambulance transport. SW setup wheelchair transport with O2 for 5pm. ETA pending. TRANSPORT SET WITH [facility] ETA 19:00      SW will continue to follow pt throughout her transitions of care and assist with any dc needs.     SW called [facility] three times with no answer.     NEW spoke with Beulah with [facility]. Beulah provided clearance for pt to dc to facility.     Future Appointments   Date Time Provider Department Center   5/18/2022 11:00 AM Jeri Matos PA-C [facility] [place] [facility]   5/19/2022  9:00 AM Fritz Cadena MD [facility] NEURO [address]   5/19/2022 10:00 AM MD TAYE Dunbar III   6/16/2022  9:40 AM MD TAYE Dunbar III        05/13/22 1412   Final Note   Assessment Type Final Discharge Note   Anticipated Discharge Disposition SNF   Hospital Resources/Appts/Education Provided Appointments scheduled by Navigator/Coordinator   Post-Acute Status   Discharge Delays None known at this time

## 2022-05-13 NOTE — PLAN OF CARE
SW met with pt at bedside to discuss dc planning. Pt was actively on phone with her partner. SW discussed dc plans with both pt and her partner.       SW expressed that she spoke with [facility] on yesterday expressing that they will contact pt daughter today to sign admission paper work. SW expressed that she has spoken with PHN and auth has been received. SW expressed that she will contact pts daughter to inform her to be by her phone awaiting [facility] call. SW reported that she will follow up with pt at a later time.     SW called pts [facility] [phone] and there was no answer. SW left voice message for a return call.     SW called [facility] to speak with admissions. SW was made aware that admissions are in a morning meeting at this time. SW will follow up at a later time. SW will continue to follow pt throughout her transitions of care and assist with any dc needs.       NEW received callback from April [phone] with PHN. April reported that she will update auth date and send it to [facility].     Future Appointments   Date Time Provider Department Center   5/18/2022 11:00 AM Jeri Matos PA-C [facility] [place] [facility]   5/19/2022  9:00 AM Fritz Cadena MD [facility] NEURO [address]   5/19/2022 10:00 AM Cameron Valladares III, MD [facility] [place]   6/16/2022  9:40 AM Cameron Valladares III, MD [facility] [place]        05/13/22 0929   Post-Acute Status   Post-Acute Authorization Placement

## 2022-05-13 NOTE — PT/OT/SLP PROGRESS
Physical Therapy Treatment    Patient Name:  Chayito Chung   MRN:  [id number]    Recommendations:     Discharge Recommendations:  nursing facility, skilled   Discharge Equipment Recommendations:  ((will need R AFO for foot drop; remaining equip TBD))   Barriers to discharge: decreased caregiver support    Assessment:     Chayito Chung is a 71 y.o. female admitted with a medical diagnosis of Weakness of both lower extremities.  She presents with the following impairments/functional limitations:  weakness, impaired endurance, impaired self care skills, impaired functional mobilty, gait instability, impaired balance, decreased coordination, decreased upper extremity function, decreased lower extremity function, decreased safety awareness, decreased ROM.  Pt would continue to benefit from P.T. To address impairments listed above.  .    Rehab Prognosis: Fair; patient would benefit from acute skilled PT services to address these deficits and reach maximum level of function.    Recent Surgery: * No surgery found *      Plan:     During this hospitalization, patient to be seen 5 x/week to address the identified rehab impairments via gait training, therapeutic activities, therapeutic exercises, neuromuscular re-education and progress toward the following goals:    · Plan of Care Expires:  06/11/22    Subjective       Patient/Family Comments/goals: Pt agreed to tx.  Pain/Comfort:  · Pain Rating 1: 0/10  · Pain Rating Post-Intervention 1: 0/10      Objective:     Communicated with RN (Josué) prior to session.  Patient found supine with bed alarm, peripheral IV, telemetry upon PT entry to room.     General Precautions: Standard, fall   Orthopedic Precautions:N/A   Braces:    Respiratory Status: Room air     Functional Mobility:  · Bed Mobility:     · Rolling Right: stand by assistance increased time to perform  · Scooting: stand by assistance and to EOB and fwd/back in b/s chair  · Supine to Sit: stand by assistance and HOB  elevated and use of b/r with increased time to perform  · Transfers:     · Sit to Stand:  minimum assistance with rolling walker  · Bed to Chair: minimum assistance with  rolling walker  using  Step Transfer  · Gait: 4 turning steps to b/s chair then 12ft with RW and Julissa for RW management and stability.  Seated rest between bouts secondary to decreased strength/endurance  · Balance: sitting fair+, standing fair/fair-, gait fair-      AM-PAC 6 CLICK MOBILITY  Turning over in bed (including adjusting bedclothes, sheets and blankets)?: 3  Sitting down on and standing up from a chair with arms (e.g., wheelchair, bedside commode, etc.): 3  Moving from lying on back to sitting on the side of the bed?: 3  Moving to and from a bed to a chair (including a wheelchair)?: 3  Need to walk in hospital room?: 2  Climbing 3-5 steps with a railing?: 1  Basic Mobility Total Score: 15       Therapeutic Activities and Exercises:   Pt requires SBA for scooting to EOB or edge of chair secondary to needing vc's at times because of scooting too close to edge.  Pt demonstrated improved trunk control, foot placement, and RW management from yesterday, but still  requires Julissa for balance/safety with occasional RW management.  Decreased R hip/knee flexion and no R DF during swing with increased w/s left to advance RLE. R knee flexion during stance with knee recurvatum when pt extends R knee.  Pt does still require assistance for RW management and stability even though there was improvement today. Increased time for all functional mobility secondary to pt moving slowly. AP (PROM R)Heelslides  Hip ABD/ADD and QS with LEs elevated in recliner x 15 reps    Patient left up in chair with all lines intact, call button in reach, chair alarm on and RN notified..    GOALS:   Multidisciplinary Problems     Physical Therapy Goals        Problem: Physical Therapy    Goal Priority Disciplines Outcome Goal Variances Interventions   Physical Therapy Goal      PT, PT/OT Ongoing, Progressing     Description: Goals to be met by: 2022     Patient will increase functional independence with mobility by performin. Supine to sit with MInimal Assistance  2. Sit to supine with MInimal Assistance  3. Rolling with Supervision.  4. Sit to stand transfer with Minimal Assistance using Rolling Walker  5. Bed to chair transfer with Minimal Assistance using Rolling Walker  6. Gait  x 50 feet with Minimal Assistance using Rolling Walker.   7. Lower extremity exercise program 2x10 reps with assistance as needed                     Time Tracking:     PT Received On: 22  PT Start Time: 1411     PT Stop Time: 1435  PT Total Time (min): 24 min     Billable Minutes: Gait Training 12 and Therapeutic Activity 12    Treatment Type: Treatment  PT/PTA: PTA     PTA Visit Number: 2     2022

## 2022-05-13 NOTE — PLAN OF CARE
SW received message via [employer] from [facility]. SW was made aware that pts daughter was faxed admissions forms. SW sent documents as required via [employer]. SW requested facility transfer order from doctor. At this time we are awaiting pts daughter to complete admissions forms and to fax back to facility.     SW will continue to follow pt throughout his transitions of care and assist with any dc needs.     Future Appointments   Date Time Provider Department Center   5/18/2022 11:00 AM Jeri Matos PA-C [facility]U [place] [facility]   5/19/2022  9:00 AM Fritz Cadena MD [facility] NEURO [address]   5/19/2022 10:00 AM Cameron Valladares III, MD [facility] [place]   6/16/2022  9:40 AM Cameron Valladares III, MD [facility] [place]        05/13/22 1208   Post-Acute Status   Post-Acute Authorization Placement

## 2022-05-13 NOTE — DISCHARGE SUMMARY
[facility] Hospital Medicine Discharge Summary    Primary Team: [facility] Hospitalist Team B  Attending Physician: Williams Delgado MD  Resident: Brant  Intern:     Date of Admit: 5/9/2022  Date of Discharge: 5/13/2022    Discharge to: [facility]  Condition: Stable    Discharge Diagnoses     Patient Active Problem List   Diagnosis    Coronary artery disease of native artery of native heart with stable angina pectoris    COPD (chronic obstructive pulmonary disease)    Tobacco abuse, 1ppd, 50 years    Major depressive disorder in partial remission    Solitary pulmonary nodule s/p resection 4/2012    Neuropathy    Hypoglycemia associated with type 2 diabetes mellitus    Hypertension associated with diabetes    Hyperlipidemia associated with type 2 diabetes mellitus    S/P PTCA (percutaneous transluminal coronary angioplasty)    Lumbar facet arthropathy    Weakness of both lower extremities    Sinus tachycardia    Iron deficiency anemia    Steroid-induced gastritis    Chronic diastolic congestive heart failure    History of colon cancer    Vitamin D deficiency    Osteopenia of lumbar spine    Incisional hernia, without obstruction or gangrene    History of partial gastrectomy    Seizure disorder    Impaired ambulation    Cervical myelopathy    Anxiety    Type 2 diabetes mellitus, with long-term current use of insulin    Hypertension    Migraine headache    CAD (coronary artery disease)    Aortic atherosclerosis    Right leg weakness    History of stroke with residual deficit    Mild malnutrition    CVA (cerebral vascular accident)    Multiple falls    Physical deconditioning    Traumatic rhabdomyolysis     Consultants and Procedures     Consultants:  Neurology  Neurosurgery    Procedures:   None    Imaging:  CT Head wo contrast  CT cervical spine wo contrast  XR Hips  CXR  MRI Lumba spine wwo contrast,   MRI brain wo contrast  CT Lumbar spine wo contrast.    Brief History of Present  Illness      Chayito Chung is a 70 y/o female with PMHx of cervical radiculopathy, chronic lumbar back pain, subacute R YUDITH infarct, anxiety, seizure disorder, asthma, COPD, CAD, T2DM with polyneuropathy, HTN, HLD, colon cancer, frequent falls, who presented to hospital for ongoing falls. Patient reported that she has been admitted to the hospital a months ago for similar complaints and discharged home with PT/OT. She has BLE weakness at baseline. Her weakness has improved and she could walk up until this morning, when she suddenly started to fall. She fell down five times total, did hit her head. She also reported some dysuria for few days prior to presentation. Afebrile, no leukocytosis, UA with many yeast. Neurology was consulted who believes presentation was likely related to chronic cervical moderate spinal canal stenosis and multilevel moderate to severe neural foraminal narrowing with radiculopathy on top of UTI. Polypharmacy with multiple sedating medications likely contributing. Neurosurgery consulted who reviewed patient's new and prior imaging: patient already had a bilateral L5 pars lysis with foraminal stenosis.  Imaging from 2013 was showing similar degree of L5-S1 spondylolisthesis and foraminal stenosis. Per neurosurgery, patient has chronic myelopathy and chronic lumbosacral foraminal stenosis; no acute neurosurgical intervention indicated at this time. Ordered AFO brace for right foot drop. Given proximal muscle weakness, considering myositis as possible cause of BLE weakness and frequent falls. ESR elevated, CRP mildly elevated, CAROL 1:160, RF negative, , aldolase 8.1. PT/OT worked with patient during admission. She was treated with CTX for UTI, fluconazole for candiduria, but discontinued on discharge. Patient discharged back to her NH with short course of prednisone and referral to rheumatology. Patient stable and with no complaints on discharge.    General: A&Ox3, sitting up comfortably in  bed, conversational  HEENT: PERRL, EOMI, moist mucus membranes, no facial asymmetry noted  Cardiovascular: Regular rate and rhythm, normal S1 and S2, no murmurs, rubs or gallops  Pulm: CTAB, no wheezes or crackles; no respiratory distress  Abdomen: Soft, non-tender, non-distended  Skin: No rashes or erythema noted, no ecchymosis  Extremities: Atraumatic, no cyanosis or edema  Psychiatric: Normal mood and affect, thought content normal  Neurological: A&Ox3, 3/5 strength in RLE and LLE, but movement of LLE limited by pain; flexor plantar response in BLE, normal sensation grossly    For the full HPI please refer to the History & Physical from this admission.    Hospital Course By Problem with Pertinent Findings     Traumatic Rhabdomyolysis   #Bilateral lower extremity weakness  #Hx of Vertebral compression fracture  - CT Head wo contrast: no acute abnormality  - CT Cervical Spine wo contrast: No acute abnormality  - MRI Spine - Degenerative changes as described above, similar to recent exam most notably at L4-5 where there is some inflammatory facet enhancement. Bilateral L5-S1 moderate foraminal narrowing.  - MRI Brain wwo contrast - Stable evolving subacute small right frontal cerebral cortical infarct with a small amount of petechial microhemorrhage. Background of mild atrophy and periventricular white matter changes and chronic right cerebellar lacunar infarct  - Neurosurgery consulted: MRI findings might be concerning for acute fracture of L5 vertebra in a setting of new onset R footdrop and back pain, ordered AFO brace for right foot drop  - CT Lumbar spine wo contrast: Bilateral L5 spondylolysis with mild anterolisthesis of L5 on S1, stable when compared to multiple previous exams. Stable remote compression fracture of the L1 vertebral body. No acute fractures.  - PT/OT  - possible myositis?  - CPK 3832 -> 2265  - CRP 9.7, Sed Rate 62  - Aldolase 8.1  -   - CAROL positive 1:160  - ESR/CRP mildly elevated  -  Neurology recommends stopping all sedating medications  - discharged on short course of prednisone and referral to rheumatology     Productive cough  # Hx of COPD  - Duo-Nebs ordered PRN  - CXR Linear platelike opacities at the lung bases favored to represent atelectasis.  - discharged on short course of prednisone     UTI:    # Hx of urinary retention  - ED: Grullon inserted; Blood culture - NGTD  - Abnormal UA with many candidal yeast, WBC 16, Hyaline casts 8  - received CTX and fluconazole while inpatient  - denies UTI symptoms currently     Cerebrovascular Accident with acute right-sided weakness  - on previous admit: MRI-Brain w/wo contrast showed subacute R YUDITH infarct, which was incongruent with patient's RUE and RLE weakness  - on previous admit: MRI of entire spine performed as patient's weakness was incongruent with brain imaging; no acute findings noted  - continue ASA  - per neurology, no need for DAPT     Normocytic Anemia:  - H/H 8.6/29.5  - MCV 86  - Ferritin 13, Iron 14, Transferrin 393, TIBC 582  - B12, Folate - wnl   - Hb stable     Essential Hypertension  - continue home regimen includes HCTZ 12.5 mg daily, losartan 100mg daily, and verapamil 180 mg daily     Hyperlipidemia  - continue Lipitor 40      Chronic Back Pain  - patient reports long-standing history of lower back pain     Chronic L1 Vertebral Compression Fracture  - noted incidentally on MRI of her T-spine; noted as far back as 2017 on imaging per chart review  - no further inpatient intervention needed  - can follow up outpatient with spine specialist     Pulmonary Nodules  - noted incidentally on CTA Head and Neck  - has longstanding smoking history  - [facility] with [facility] at [facility]  - follow up on discharge     Seizure Disorder  - continued home topiramate 50mg BID, zonisamide 200mg BID  - EEG done while inpatient did not show any seizure activity     Type II Diabetes Mellitus, complicated by peripheral neuropathy  - last A1c in  12/2021 was 7.3%; repeat A1c on admission 10.3%, 9.8 most recent  - home regimen includes metformin 1000mg BID; holding inpatient  - continued home Lyrica 150 mg BID  - on Lantus 35U daily while in house, 17U inpatient  - SSI in house  - continue home metformin 1000mg BID, changed glargine to 20U nightly + lispro 5U TID with meals  - referral placed to endocrinology     Migraines  - continued home topiramate 50mg BID     Depression  - home regimen includes Cymbalta 90mg daily,   - Seroquel 300mg qhs PRN  - continued home Cymbalta on discharge     Anxiety   - home regimen includes Cymbalta 90mg daily, Seroquel 300mg qhs, and Xanax 0.5mg BID PRN  - discontinued seroquel and xanax due to sedating effects and polypharmacy    Discharge Medications        Medication List      START taking these medications    predniSONE 20 MG tablet  Commonly known as: DELTASONE  Take 2 tablets (40 mg total) by mouth once daily. for 4 days        CHANGE how you take these medications    alendronate 70 MG tablet  Commonly known as: FOSAMAX  Take 1 tablet (70 mg total) by mouth every 7 days.  What changed: when to take this     DULoxetine 60 MG capsule  Commonly known as: CYMBALTA  What changed: Another medication with the same name was removed. Continue taking this medication, and follow the directions you see here.     insulin lispro 100 unit/mL pen  Inject 5 Units into the skin 3 (three) times daily after meals.  What changed:   · how much to take  · when to take this     LANTUS SOLOSTAR U-100 INSULIN glargine 100 units/mL (3mL) SubQ pen  Generic drug: insulin  Inject 20 Units into the skin nightly.  What changed: how much to take        CONTINUE taking these medications    albuterol 90 mcg/actuation inhaler  Commonly known as: PROVENTIL/VENTOLIN HFA  INHALE 2 PUFFS BY MOUTH INTO THE LUNGS EVERY 6 HOURS AS NEEDED FOR WHEEZING     albuterol-ipratropium 2.5 mg-0.5 mg/3 mL nebulizer solution  Commonly known as: DUO-NEB     aspirin 81 MG  "Chew  Take 1 tablet (81 mg total) by mouth once daily.     atorvastatin 40 MG tablet  Commonly known as: LIPITOR  Take 1 tablet (40 mg total) by mouth once daily.     BD ULTRA-FINE MINI PEN NEEDLE 31 gauge x 3/16" Ndle  Generic drug: pen needle, diabetic     diclofenac sodium 1 % Gel  Commonly known as: VOLTAREN  Apply 2 g topically 4 (four) times daily.     ferrous sulfate 325 mg (65 mg iron) Tab tablet  Commonly known as: FEOSOL  Take 1 tablet (325 mg total) by mouth once daily.     fluticasone propionate 50 mcg/actuation nasal spray  Commonly known as: FLONASE  2 sprays (100 mcg total) by Each Nare route once daily.     hydroCHLOROthiazide 12.5 mg capsule  Commonly known as: MICROZIDE  Take 1 capsule (12.5 mg total) by mouth once daily.     losartan 50 MG tablet  Commonly known as: COZAAR  Take 1 tablet (50 mg total) by mouth once daily.     magnesium oxide 400 mg (241.3 mg magnesium) tablet  Commonly known as: MAG-OX     metFORMIN 1000 MG tablet  Commonly known as: GLUCOPHAGE     nitroGLYCERIN 0.4 MG SL tablet  Commonly known as: NITROSTAT  Place 1 tablet (0.4 mg total) under the tongue every 5 (five) minutes as needed for Chest pain.     ondansetron 4 MG Tbdl  Commonly known as: ZOFRAN-ODT  Take 1 tablet (4 mg total) by mouth every 8 (eight) hours as needed (nausea).     potassium chloride SA 20 MEQ tablet  Commonly known as: K-DUR,KLOR-CON  Take 1 tablet (20 mEq total) by mouth once daily.     topiramate 50 MG tablet  Commonly known as: TOPAMAX     TRELEGY ELLIPTA 200-62.5-25 mcg inhaler  Generic drug: fluticasone-umeclidin-vilanter     verapamiL 180 MG CR tablet  Commonly known as: CALAN-SR  Take 1 tablet (180 mg total) by mouth every evening.     zonisamide 100 MG Cap  Commonly known as: ZONEGRAN        STOP taking these medications    clopidogreL 75 mg tablet  Commonly known as: PLAVIX     HYDROcodone-acetaminophen 7.5-325 mg per tablet  Commonly known as: NORCO     insulin lispro protamin-lispro 100 " unit/mL (75-25) Inpn     pravastatin 40 MG tablet  Commonly known as: PRAVACHOL     pregabalin 150 MG capsule  Commonly known as: LYRICA     QUEtiapine 400 MG tablet  Commonly known as: SEROQUEL     tiZANidine 4 MG tablet  Commonly known as: ZANAFLEX           Where to Get Your Medications      These medications were sent to [facility]  [address], [place] [address]    Hours: Mon-Fri, 8a-5:30p Phone: [phone]   · insulin lispro 100 unit/mL pen  · LANTUS SOLOSTAR U-100 INSULIN glargine 100 units/mL (3mL) SubQ pen  · predniSONE 20 MG tablet         Discharge Information:     Diet:  Diabetic    Physical Activity:  As tolerated             Instructions:  1. Take all medications as prescribed  2. Keep all follow-up appointments  3. Return to the hospital or call your primary care physicians if any worsening symptoms such as fever, chest pain, shortness of breath, return of symptoms, or any other concerns.    Follow-Up Appointments:  PCP  Neurology  Neurosurgery  Endocrinology  Rheumatology    Rachel Guzmán MD  \[facility]\"" Internal Medicine HO-I  \[facility]\"" Internal Medicine Team B

## 2022-05-15 LAB — BACTERIA BLD CULT: NORMAL

## 2022-05-16 ENCOUNTER — PATIENT OUTREACH (OUTPATIENT)
Dept: ADMINISTRATIVE | Facility: CLINIC | Age: 72
End: 2022-05-16
Payer: MEDICARE

## 2022-05-16 LAB
ANTI SM ANTIBODY: 0.07 RATIO (ref 0–0.99)
ANTI SM/RNP ANTIBODY: 0.07 RATIO (ref 0–0.99)
ANTI-SM INTERPRETATION: NEGATIVE
ANTI-SM/RNP INTERPRETATION: NEGATIVE
ANTI-SSA ANTIBODY: 0.06 RATIO (ref 0–0.99)
ANTI-SSA INTERPRETATION: NEGATIVE
ANTI-SSB ANTIBODY: 0.06 RATIO (ref 0–0.99)
ANTI-SSB INTERPRETATION: NEGATIVE
DSDNA AB SER-ACNC: NORMAL [IU]/ML

## 2022-05-17 ENCOUNTER — TELEPHONE (OUTPATIENT)
Dept: ADMINISTRATIVE | Facility: OTHER | Age: 72
End: 2022-05-17
Payer: MEDICARE

## 2022-05-18 ENCOUNTER — PATIENT OUTREACH (OUTPATIENT)
Dept: ADMINISTRATIVE | Facility: OTHER | Age: 72
End: 2022-05-18
Payer: MEDICARE

## 2022-05-18 NOTE — PROGRESS NOTES
IP Liaison - Final Visit Note    Patient: Chayito Chung  MRN:  [id number]  Date of Service:  5/18/2022  Completed by:  NILAY Christy    Reason for Visit   Patient presents with    IP Liaison Chart Review        Patient Summary     Discharge Date: 5/13/2022  Discharge telephone number/address: [phone] / [facility]  Follow up provider: Fritz Cadena MD / Curt Valladares III, MD  Follow up appointments: 5/19/2022 @ 9:00am / 5/19/2022 @ 10:00am  Home Health agency & telephone number: n/a  DME ordered &  name: n/a  Assigned OPCM RN/SW: n/a  Report sent to follow up team (PCP/OPCM) via in basket message: n/a  Community Resources arranged including agency name & contact info: n/a      NILAY Christy

## 2022-05-20 ENCOUNTER — EXTERNAL HOSPITAL ADMISSION (OUTPATIENT)
Dept: SKILLED NURSING FACILITY | Facility: HOSPITAL | Age: 72
End: 2022-05-20
Payer: MEDICARE

## 2022-05-20 DIAGNOSIS — R29.6 RECURRENT FALLS: Primary | ICD-10-CM

## 2022-05-20 PROCEDURE — 99306 1ST NF CARE HIGH MDM 50: CPT | Mod: ,,, | Performed by: INTERNAL MEDICINE

## 2022-05-20 PROCEDURE — 99306 PR NURSING FACILITY CARE, INIT, HIGH SEVERITY: ICD-10-PCS | Mod: ,,, | Performed by: INTERNAL MEDICINE

## 2022-05-20 NOTE — PROGRESS NOTES
[facility]   New Visit Progress Note   Recent Hospital Discharge     PRESENTING HISTORY     Chief Complaint/Reason for Admission:  Follow up Hospital Discharge   PCP: Curt Valladares III, MD    History of Present Illness:  Ms. Chayito Chung is a 71 y.o. female who was recently admitted to the hospital.Chayito Chung is a 71 y.o. female who  has a past medical history of Acute coronary syndrome, Acute hypoxemic respiratory failure (5/1/2017), anxiety, seizures, asthma, Cancer, Cataracts, both eyes, Chest pain at rest, Chest pain of uncertain etiology (12/6/2015), Colon cancer (1988), COPD (chronic obstructive pulmonary disease), Coronary artery disease, Depression, Diabetes mellitus, Dyspnea on exertion (11/15/2018), Elevated brain natriuretic peptide (BNP) level (11/15/2018), Elevated cholesterol, Hypertension, Swelling, and Syncope and collapse (11/8/2016) presented to [facility] on 5/9/2022 with a primary complaint of frequent falls, that started suddenly at 10 AM this morning. Patient reports that she has been admitted to the hospital a months ago for similar complaints and discharged home with PT/OT. Her weakness has improved and she could walk up until this morning, when she suddenly started to fall. She fell down five times totally, and did hit her head. She says she has been complaining of some ''burning and itching with urineation'' and started to cough up sputum as well. She is a chronic smoker, but her cough is new. She takes all of her home medications regularly, and has not made any changes most recently. Is independent with ADLs at a baseline. Reports having some lightheadedness and vertigo as well. Mentions having positive history of seizures, but does not remember having any shaking movements of her body today. No headaches. No fever or chills. No rash.            ___________________________________________________________________    Today: New admit,  several falls at home. Strength slightly improved in bilateral lower extremities, still complaining bilateral leg pain         Review of Systems  General ROS: negative for chills, fever or weight loss  Psychological ROS: negative for hallucination, depression or suicidal ideation  Ophthalmic ROS: negative for blurry vision, photophobia or eye pain  ENT ROS: negative for epistaxis, sore throat or rhinorrhea  Respiratory ROS: no cough, shortness of breath, or wheezing  Cardiovascular ROS: no chest pain or dyspnea on exertion  Gastrointestinal ROS: no abdominal pain, change in bowel habits, or black/ bloody stools  Genito-Urinary ROS: no dysuria, trouble voiding, or hematuria  Musculoskeletal ROS: negative for gait disturbance or muscular weakness  Neurological ROS: no syncope or seizures; no ataxia  Dermatological ROS: negative for pruritis, rash and jaundice          PAST HISTORY:     Past Medical History:   Diagnosis Date    Acute coronary syndrome     Acute hypoxemic respiratory failure 2017    Anxiety     Asthma     Cancer     colon    Cataracts, both eyes     Chest pain at rest     Chest pain of uncertain etiology 2015    Colon cancer 1988    COPD (chronic obstructive pulmonary disease)     Coronary artery disease     Depression     Diabetes mellitus     Dyspnea on exertion 11/15/2018    Elevated brain natriuretic peptide (BNP) level 11/15/2018    Elevated cholesterol     Hypertension     Swelling     Syncope and collapse 2016       Past Surgical History:   Procedure Laterality Date    ABDOMINAL SURGERY      BREAST BIOPSY      benign unsure what side     SECTION, CLASSIC      COLON SURGERY      COLONOSCOPY  2019    repeat in 5 years    CORONARY ANGIOPLASTY WITH STENT PLACEMENT  3 months ago     x2, Hysterectomy, Lung surgery, Partial stomach removed, Part of colon removed for rectal cancer    GASTRECTOMY      HEMORRHOID SURGERY      HYSTERECTOMY    "   @28yrs of age    LUNG BIOPSY      OOPHORECTOMY      @28yrs of age    POSTERIOR FUSION OF CERVICAL SPINE WITH LAMINECTOMY N/A 7/23/2020    Procedure: LAMINECTOMY, SPINE, CERVICAL, WITH POSTERIOR FUSION C3-T1 ;  Surgeon: Herson Tate MD;  Location: [address];  Service: Neurosurgery;  Laterality: N/A;    REPAIR OF INCARCERATED INCISIONAL HERNIA WITHOUT HISTORY OF PRIOR REPAIR N/A 2/7/2022    Procedure: REPAIR, HERNIA, INCISIONAL, INCARCERATED, WITHOUT HISTORY OF PRIOR REPAIR;  Surgeon: Simon King MD;  Location: [facility];  Service: General;  Laterality: N/A;       Family History   Problem Relation Age of Onset    Cancer Mother     Diabetes Mother     Hypertension Mother     Breast cancer Mother     Heart disease Father     Lung cancer Father     Depression Sister     Hypertension Sister     Diabetes Mellitus Sister     Cancer Maternal Aunt          MEDICATIONS & ALLERGIES:     Current Outpatient Medications on File Prior to Visit   Medication Sig Dispense Refill    albuterol (PROVENTIL/VENTOLIN HFA) 90 mcg/actuation inhaler INHALE 2 PUFFS BY MOUTH INTO THE LUNGS EVERY 6 HOURS AS NEEDED FOR WHEEZING (Patient taking differently: Inhale 2 puffs into the lungs every 6 (six) hours as needed for Wheezing.) 25.5 g 0    albuterol-ipratropium (DUO-NEB) 2.5 mg-0.5 mg/3 mL nebulizer solution Take 3 mLs by nebulization every 4 (four) hours as needed for Wheezing or Shortness of Breath. Rescue      alendronate (FOSAMAX) 70 MG tablet Take 1 tablet (70 mg total) by mouth every 7 days. (Patient taking differently: Take 70 mg by mouth every Sunday.) 12 tablet 3    aspirin 81 MG Chew Take 1 tablet (81 mg total) by mouth once daily. 90 tablet 3    atorvastatin (LIPITOR) 40 MG tablet Take 1 tablet (40 mg total) by mouth once daily. 90 tablet 3    BD ULTRA-FINE MINI PEN NEEDLE 31 gauge x 3/16" Ndle       diclofenac sodium (VOLTAREN) 1 % Gel Apply 2 g topically 4 (four) times daily. 100 g 0    " duloxetine (CYMBALTA) 60 MG capsule Take 60 mg by mouth once daily. Take with 30mg for a total of 90mg daily      ferrous sulfate (FEOSOL) 325 mg (65 mg iron) Tab tablet Take 1 tablet (325 mg total) by mouth once daily. 30 tablet 3    fluticasone propionate (FLONASE) 50 mcg/actuation nasal spray 2 sprays (100 mcg total) by Each Nare route once daily. 1 Bottle 12    hydroCHLOROthiazide (MICROZIDE) 12.5 mg capsule Take 1 capsule (12.5 mg total) by mouth once daily. 90 capsule 3    insulin lispro 100 unit/mL pen Inject 5 Units into the skin 3 (three) times daily after meals. 15 mL 0    LANTUS SOLOSTAR U-100 INSULIN glargine 100 units/mL (3mL) SubQ pen Inject 20 Units into the skin nightly. 18 mL 0    losartan (COZAAR) 50 MG tablet Take 1 tablet (50 mg total) by mouth once daily. 90 tablet 3    magnesium oxide (MAG-OX) 400 mg (241.3 mg magnesium) tablet Take 400 mg by mouth once daily.      metFORMIN (GLUCOPHAGE) 1000 MG tablet Take 1,000 mg by mouth 2 (two) times daily.      nitroGLYCERIN (NITROSTAT) 0.4 MG SL tablet Place 1 tablet (0.4 mg total) under the tongue every 5 (five) minutes as needed for Chest pain. 100 tablet 1    ondansetron (ZOFRAN-ODT) 4 MG TbDL Take 1 tablet (4 mg total) by mouth every 8 (eight) hours as needed (nausea). 14 tablet 1    potassium chloride SA (K-DUR,KLOR-CON) 20 MEQ tablet Take 1 tablet (20 mEq total) by mouth once daily. 90 tablet 3    topiramate (TOPAMAX) 50 MG tablet Take 50 mg by mouth 2 (two) times daily.      TRELEGY ELLIPTA 200-62.5-25 mcg inhaler Inhale 1 puff into the lungs once daily.      verapamiL (CALAN-SR) 180 MG CR tablet Take 1 tablet (180 mg total) by mouth every evening. 30 tablet 11    zonisamide (ZONEGRAN) 100 MG Cap Take 400 mg by mouth once daily.      [DISCONTINUED] clopidogreL (PLAVIX) 75 mg tablet Take 1 tablet (75 mg total) by mouth once daily. Take medicine for 15 days. 15 tablet 0    [DISCONTINUED] pravastatin (PRAVACHOL) 40 MG tablet Take 0.5  tablets (20 mg total) by mouth once daily. (Patient taking differently: Take 40 mg by mouth once daily.) 90 tablet 1     No current facility-administered medications on file prior to visit.        Review of patient's allergies indicates:  No Known Allergies    OBJECTIVE:     Vital Signs:  LMP  (LMP Unknown)   Wt Readings from Last 1 Encounters:   05/09/22 2259 57.8 kg (127 lb 6.8 oz)   05/09/22 1423 60.8 kg (134 lb)     There is no height or weight on file to calculate BMI.        Physical Exam:  LMP  (LMP Unknown)   General appearance: alert, cooperative, no distress  Constitutional:Oriented to person, place, and time  + appears well-developed and well-nourished.   HEENT: Normocephalic, atraumatic, neck symmetrical, no nasal discharge   Eyes: conjunctivae/corneas clear, PERRL, EOM's intact  Lungs: clear to auscultation bilaterally, no dullness to percussion bilaterally  Heart: regular rate and rhythm without rub; no displacement of the PMI   Abdomen: soft, non-tender; bowel sounds normoactive; no organomegaly  Extremities: extremities symmetric; no clubbing, cyanosis, or edema  Integument: Skin color, texture, turgor normal; no rashes; hair distrubution normal  Neurologic: Alert and oriented X 3, normal strength, normal coordination and gait  Psychiatric: no pressured speech; normal affect; no evidence of impaired cognition     Laboratory  Lab Results   Component Value Date    WBC 3.85 (L) 05/13/2022    HGB 9.6 (L) 05/13/2022    HCT 32.5 (L) 05/13/2022    MCV 83 05/13/2022     05/13/2022     BMP  Lab Results   Component Value Date     (L) 05/13/2022    K 4.3 05/13/2022     05/13/2022    CO2 18 (L) 05/13/2022    BUN 12 05/13/2022    CREATININE 0.7 05/13/2022    CALCIUM 9.5 05/13/2022    ANIONGAP 14 05/13/2022    ESTGFRAFRICA >60 05/13/2022    EGFRNONAA >60 05/13/2022     Lab Results   Component Value Date    ALT 16 05/13/2022    AST 14 05/13/2022    ALKPHOS 157 (H) 05/13/2022    BILITOT 0.3  05/13/2022     Lab Results   Component Value Date    INR 1.0 04/06/2022    INR 1.0 04/05/2022    INR 0.9 05/12/2020     Lab Results   Component Value Date    HGBA1C 9.8 (H) 05/09/2022       Diagnostic Results:        TRANSITION OF CARE:          ASSESSMENT & PLAN:     HIGH RISK CONDITION(S):  Traumatic Rhabdomyolysis   #Bilateral lower extremity weakness  #Hx of Vertebral compression fracture  - started this morning 10AM; fell multiple times  - CT Head wo contrast: no acute abnormality  - CT Cervical Spine wo contrast: No acute abnormality  - CPK 3832  -  ml/hr   - MRI Spine - Degenerative changes as described above, similar to recent exam most notably at L4-5 where there is some inflammatory facet enhancement. Bilateral L5-S1 moderate foraminal narrowing.  - MRI Brain wwo contrast - Stable evolving subacute small right frontal cerebral cortical infarct with a small amount of petechial microhemorrhage.   Background of mild atrophy and periventricular white matter changes and chronic right cerebellar lacunar infarct  - Neurosurgery consulted: MRI findings might be concerning for acute fracture of L5 vertebra in a setting of new onset R footdrop and back pain  - CT Lumbar spine wo contrast: Bilateral L5 spondylolysis with mild anterolisthesis of L5 on S1, stable when compared to multiple previous exams. Stable remote compression fracture of the L1 vertebral body.  No acute fractures.  - PT/OT  - CRP 9.7, Sed Rate 62,   - Aldolase pending   -   - CAROL pending, RF negative, Aldolase pending  - Neurology recommends stopping all sedating medications     Productive cough  # Hx of COPD  -Duo-Nebs ordered PRN  -CXR Linear platelike opacities at the lung bases favored to represent atelectasis.     UTI:    # Hx of urinary retention  - ED: Grullon inserted; Blood culture - NGTD  - Abnormal UA with many candidal yeast, WBC 16, Hyaline casts 8     Cerebrovascular Accident with acute right-sided weakness  - on previous  admit: MRI-Brain w/wo contrast showed subacute R YUDITH infarct, which was incongruent with patient's RUE and RLE weakness  - on previous admit: MRI of entire spine performed as patient's weakness was incongruent with brain imaging; no acute findings noted  - continue ASA, Plavix  - Neurochecks q4h     Normocytic Anemia:  - H/H 8.6/29.5  - MCV 86  - Ferritin 13, Iron 14, Transferrin 393, TIBC 582  - B12, Folate - wnl      Essential Hypertension  -continue home regimen includes HCTZ 12.5 mg daily, losartan 100mg daily, and verapamil 180 mg daily     Hyperlipidemia  - continue Lipitor 40      Chronic Back Pain  -patient reports long-standing history of lower back pain  -continued home Norco PRN     Chronic L1 Vertebral Compression Fracture  -noted incidentally on MRI of her T-spine; noted as far back as 2017 on imaging per chart review  -no further inpatient intervention needed  -can follow up outpatient with spine specialist     Pulmonary Nodules  -noted incidentally on CTA Head and Neck  -has longstanding smoking history  -follows with Pulmonologist at [facility]     Seizure Disorder  -continued home topiramate 50mg BID, zonisamide 200mg BID  -EEG done while inpatient did not show any seizure activity  -patient may benefit from EMU admission arranged by Neurologist     Type II Diabetes Mellitus, complicated by peripheral neuropathy  -last A1c in 12/2021 was 7.3%; repeat A1c on admission 10.3%, 9.8 most recent  -home regimen includes metformin 1000mg BID; holding inpatient  -continued home Lyrica 150 mg BID  -on Lantus 35U daily while in house, 17U inpatient  -SSI in house     Migraines  -continued home topiramate 50mg BID     Depression  -home regimen includes Cymbalta 90mg daily,   -Seroquel 300mg qhs PRN  -continued home Cymbalta,      Anxiety   -home regimen includes Cymbalta 90mg daily, Seroquel 300mg qhs, and Xanax 0.5mg BID PRN  -will include PRN  Instructions for the patient:      Scheduled Follow-up :  Future  "Appointments   Date Time Provider Department Center   6/16/2022  9:40 AM Cameron Valladares III, MD [facility]       Post Visit Medication List:     Medication List          Accurate as of May 20, 2022 11:26 AM. If you have any questions, ask your nurse or doctor.            CHANGE how you take these medications    alendronate 70 MG tablet  Commonly known as: FOSAMAX  Take 1 tablet (70 mg total) by mouth every 7 days.  What changed: when to take this        CONTINUE taking these medications    albuterol 90 mcg/actuation inhaler  Commonly known as: PROVENTIL/VENTOLIN HFA  INHALE 2 PUFFS BY MOUTH INTO THE LUNGS EVERY 6 HOURS AS NEEDED FOR WHEEZING     albuterol-ipratropium 2.5 mg-0.5 mg/3 mL nebulizer solution  Commonly known as: DUO-NEB     aspirin 81 MG Chew  Take 1 tablet (81 mg total) by mouth once daily.     atorvastatin 40 MG tablet  Commonly known as: LIPITOR  Take 1 tablet (40 mg total) by mouth once daily.     BD ULTRA-FINE MINI PEN NEEDLE 31 gauge x 3/16" Ndle  Generic drug: pen needle, diabetic     diclofenac sodium 1 % Gel  Commonly known as: VOLTAREN  Apply 2 g topically 4 (four) times daily.     DULoxetine 60 MG capsule  Commonly known as: CYMBALTA     ferrous sulfate 325 mg (65 mg iron) Tab tablet  Commonly known as: FEOSOL  Take 1 tablet (325 mg total) by mouth once daily.     fluticasone propionate 50 mcg/actuation nasal spray  Commonly known as: FLONASE  2 sprays (100 mcg total) by Each Nare route once daily.     hydroCHLOROthiazide 12.5 mg capsule  Commonly known as: MICROZIDE  Take 1 capsule (12.5 mg total) by mouth once daily.     insulin lispro 100 unit/mL pen  Inject 5 Units into the skin 3 (three) times daily after meals.     LANTUS SOLOSTAR U-100 INSULIN glargine 100 units/mL (3mL) SubQ pen  Generic drug: insulin  Inject 20 Units into the skin nightly.     losartan 50 MG tablet  Commonly known as: COZAAR  Take 1 tablet (50 mg total) by mouth once daily.     magnesium oxide 400 mg (241.3 mg " magnesium) tablet  Commonly known as: MAG-OX     metFORMIN 1000 MG tablet  Commonly known as: GLUCOPHAGE     nitroGLYCERIN 0.4 MG SL tablet  Commonly known as: NITROSTAT  Place 1 tablet (0.4 mg total) under the tongue every 5 (five) minutes as needed for Chest pain.     ondansetron 4 MG Tbdl  Commonly known as: ZOFRAN-ODT  Take 1 tablet (4 mg total) by mouth every 8 (eight) hours as needed (nausea).     potassium chloride SA 20 MEQ tablet  Commonly known as: K-DUR,KLOR-CON  Take 1 tablet (20 mEq total) by mouth once daily.     topiramate 50 MG tablet  Commonly known as: TOPAMAX     TRELEGY ELLIPTA 200-62.5-25 mcg inhaler  Generic drug: fluticasone-umeclidin-vilanter     verapamiL 180 MG CR tablet  Commonly known as: CALAN-SR  Take 1 tablet (180 mg total) by mouth every evening.     zonisamide 100 MG Cap  Commonly known as: ZONEGRAN            Signing Physician:  Kaden Reid MD

## 2022-06-03 PROCEDURE — G0180 MD CERTIFICATION HHA PATIENT: HCPCS | Mod: ,,, | Performed by: INTERNAL MEDICINE

## 2022-06-03 PROCEDURE — G0180 PR HOME HEALTH MD CERTIFICATION: ICD-10-PCS | Mod: ,,, | Performed by: INTERNAL MEDICINE

## 2022-06-08 ENCOUNTER — OFFICE VISIT (OUTPATIENT)
Dept: CARDIOLOGY | Facility: CLINIC | Age: 72
End: 2022-06-08
Payer: MEDICARE

## 2022-06-08 VITALS
SYSTOLIC BLOOD PRESSURE: 114 MMHG | OXYGEN SATURATION: 98 % | DIASTOLIC BLOOD PRESSURE: 75 MMHG | BODY MASS INDEX: 23.39 KG/M2 | HEART RATE: 110 BPM | WEIGHT: 127.88 LBS

## 2022-06-08 DIAGNOSIS — Z79.4 TYPE 2 DIABETES MELLITUS WITHOUT COMPLICATION, WITH LONG-TERM CURRENT USE OF INSULIN: ICD-10-CM

## 2022-06-08 DIAGNOSIS — T79.6XXD TRAUMATIC RHABDOMYOLYSIS, SUBSEQUENT ENCOUNTER: ICD-10-CM

## 2022-06-08 DIAGNOSIS — R00.2 PALPITATIONS: Primary | ICD-10-CM

## 2022-06-08 DIAGNOSIS — E11.9 TYPE 2 DIABETES MELLITUS WITHOUT COMPLICATION, WITH LONG-TERM CURRENT USE OF INSULIN: ICD-10-CM

## 2022-06-08 DIAGNOSIS — I10 PRIMARY HYPERTENSION: ICD-10-CM

## 2022-06-08 DIAGNOSIS — I25.118 CORONARY ARTERY DISEASE OF NATIVE ARTERY OF NATIVE HEART WITH STABLE ANGINA PECTORIS: ICD-10-CM

## 2022-06-08 DIAGNOSIS — I27.20 PULMONARY HYPERTENSION: ICD-10-CM

## 2022-06-08 DIAGNOSIS — I70.0 AORTIC ATHEROSCLEROSIS: ICD-10-CM

## 2022-06-08 DIAGNOSIS — E11.69 HYPERLIPIDEMIA ASSOCIATED WITH TYPE 2 DIABETES MELLITUS: ICD-10-CM

## 2022-06-08 DIAGNOSIS — E78.5 HYPERLIPIDEMIA ASSOCIATED WITH TYPE 2 DIABETES MELLITUS: ICD-10-CM

## 2022-06-08 DIAGNOSIS — R00.0 SINUS TACHYCARDIA: ICD-10-CM

## 2022-06-08 PROCEDURE — 99999 PR PBB SHADOW E&M-EST. PATIENT-LVL III: CPT | Mod: PBBFAC,,, | Performed by: INTERNAL MEDICINE

## 2022-06-08 PROCEDURE — 99499 UNLISTED E&M SERVICE: CPT | Mod: S$GLB,,, | Performed by: INTERNAL MEDICINE

## 2022-06-08 PROCEDURE — 99213 OFFICE O/P EST LOW 20 MIN: CPT | Mod: PBBFAC,PO | Performed by: INTERNAL MEDICINE

## 2022-06-08 PROCEDURE — 1111F PR DISCHARGE MEDS RECONCILED W/ CURRENT OUTPATIENT MED LIST: ICD-10-PCS | Mod: CPTII,S$GLB,, | Performed by: INTERNAL MEDICINE

## 2022-06-08 PROCEDURE — 99215 OFFICE O/P EST HI 40 MIN: CPT | Mod: S$GLB,,, | Performed by: INTERNAL MEDICINE

## 2022-06-08 PROCEDURE — 1111F DSCHRG MED/CURRENT MED MERGE: CPT | Mod: CPTII,S$GLB,, | Performed by: INTERNAL MEDICINE

## 2022-06-08 PROCEDURE — 99499 RISK ADDL DX/OHS AUDIT: ICD-10-PCS | Mod: S$GLB,,, | Performed by: INTERNAL MEDICINE

## 2022-06-08 PROCEDURE — 99215 PR OFFICE/OUTPT VISIT, EST, LEVL V, 40-54 MIN: ICD-10-PCS | Mod: S$GLB,,, | Performed by: INTERNAL MEDICINE

## 2022-06-08 PROCEDURE — 99999 PR PBB SHADOW E&M-EST. PATIENT-LVL III: ICD-10-PCS | Mod: PBBFAC,,, | Performed by: INTERNAL MEDICINE

## 2022-06-08 RX ORDER — TIZANIDINE HYDROCHLORIDE 4 MG/1
CAPSULE, GELATIN COATED ORAL
COMMUNITY
End: 2022-08-23 | Stop reason: DRUGHIGH

## 2022-06-08 RX ORDER — HYDROCODONE BITARTRATE AND ACETAMINOPHEN 7.5; 3 MG/1; MG/1
1 TABLET ORAL 2 TIMES DAILY PRN
COMMUNITY
End: 2022-12-21 | Stop reason: DRUGHIGH

## 2022-06-08 NOTE — PROGRESS NOTES
[facility]     Subjective:    Patient ID:  Chayito Chung is a 71 y.o. female who presents for follow-up of Coronary Artery Disease, Shortness of Breath, Hypertension, and Diabetes Mellitus    Review of patient's allergies indicates:  No Known Allergies   She was hospitalized for frequent falls.  She was diagnosed with spinal stenosis lumbar position with myelopathy.  She had rhabdomyolysis with CPK 3830 to IU per L. she did not have renal failure.  Her MRI suggested a subacute stroke right frontal lobe area.  She did not have any focal deficits correlating with that abnormality.  She was sent home with a 30 day event monitor that she could not figure out how to use and she sent it back.  Her echo showed normal ejection fraction with pulmonary hypertension.    She has a history of coronary disease with PTCA 7 years ago at [facility].  Details are not in epic.  She is on treatment for hypertension and hyperlipidemia.  Her blood pressure is normal today.  Her last hemoglobin A1c is 9.8.  She is reporting dyspnea with exertion.    She has complaints of dizziness.  She has complaints of leg swelling.  Her heart rate is in the 110 range.  She is not on beta-blocker but does take verapamil for hypertension.  She is on HCTZ 12.5 mg. She does take aspirin.  Her left leg is working better.  She does use a walker currently.        Review of Systems   Constitutional: Negative for chills, decreased appetite, diaphoresis, fever, malaise/fatigue, night sweats, weight gain and weight loss.   HENT: Negative for congestion, ear discharge, ear pain, hearing loss, hoarse voice, nosebleeds, odynophagia, sore throat, stridor and tinnitus.    Eyes: Negative for blurred vision, discharge, double vision, pain, photophobia, redness, vision loss in left eye, vision loss in right eye, visual disturbance and visual halos.   Cardiovascular: Positive for dyspnea  on exertion and palpitations. Negative for chest pain, claudication, cyanosis, irregular heartbeat, leg swelling, near-syncope, orthopnea, paroxysmal nocturnal dyspnea and syncope.   Respiratory: Positive for shortness of breath. Negative for cough, hemoptysis, sleep disturbances due to breathing, snoring, sputum production and wheezing.    Endocrine: Negative for cold intolerance, heat intolerance, polydipsia, polyphagia and polyuria.   Hematologic/Lymphatic: Negative for adenopathy and bleeding problem. Does not bruise/bleed easily.   Skin: Negative for color change, dry skin, flushing, itching, nail changes, poor wound healing, rash, skin cancer, suspicious lesions and unusual hair distribution.   Musculoskeletal: Positive for muscle weakness. Negative for arthritis, back pain, falls, gout, joint pain, joint swelling, muscle cramps, myalgias, neck pain and stiffness.   Gastrointestinal: Negative for bloating, abdominal pain, anorexia, change in bowel habit, bowel incontinence, constipation, diarrhea, dysphagia, excessive appetite, flatus, heartburn, hematemesis, hematochezia, hemorrhoids, jaundice, melena, nausea and vomiting.   Genitourinary: Negative for bladder incontinence, decreased libido, dysuria, flank pain, frequency, genital sores, hematuria, hesitancy, incomplete emptying, nocturia and urgency.   Neurological: Positive for disturbances in coordination and loss of balance. Negative for aphonia, brief paralysis, difficulty with concentration, excessive daytime sleepiness, dizziness, focal weakness, headaches, light-headedness, numbness, paresthesias, seizures, sensory change, tremors, vertigo and weakness.   Psychiatric/Behavioral: Negative for altered mental status, depression, hallucinations, memory loss, substance abuse, suicidal ideas and thoughts of violence. The patient does not have insomnia and is not nervous/anxious.    Allergic/Immunologic: Negative for hives and persistent infections.         Objective:       Vitals:    06/08/22 1411   BP: 114/75   BP Location: Left arm   Pulse: 110   SpO2: 98%   Weight: 58 kg (127 lb 13.9 oz)    Physical Exam  Constitutional:       General: She is not in acute distress.     Appearance: She is well-developed. She is not diaphoretic.   HENT:      Head: Normocephalic and atraumatic.      Nose: Nose normal.   Eyes:      General: No scleral icterus.        Right eye: No discharge.      Conjunctiva/sclera: Conjunctivae normal.      Pupils: Pupils are equal, round, and reactive to light.   Neck:      Thyroid: No thyromegaly.      Vascular: No JVD.      Trachea: No tracheal deviation.   Cardiovascular:      Rate and Rhythm: Normal rate and regular rhythm.      Pulses:           Carotid pulses are 2+ on the right side and 2+ on the left side.       Radial pulses are 2+ on the right side and 2+ on the left side.        Dorsalis pedis pulses are 1+ on the right side and 1+ on the left side.        Posterior tibial pulses are 1+ on the right side and 1+ on the left side.      Heart sounds: Normal heart sounds. No murmur heard.    No friction rub. No gallop.   Pulmonary:      Effort: Pulmonary effort is normal. No respiratory distress.      Breath sounds: Normal breath sounds. No stridor. No wheezing or rales.   Chest:      Chest wall: No tenderness.   Abdominal:      General: Bowel sounds are normal. There is no distension.      Palpations: Abdomen is soft. There is no mass.      Tenderness: There is no abdominal tenderness. There is no guarding or rebound.   Musculoskeletal:         General: No tenderness.      Cervical back: Normal range of motion and neck supple.   Lymphadenopathy:      Cervical: No cervical adenopathy.   Skin:     General: Skin is warm and dry.      Coloration: Skin is not pale.      Findings: No erythema or rash.   Neurological:      Mental Status: She is alert and oriented to person, place, and time.      Cranial Nerves: No cranial nerve deficit.       Coordination: Coordination normal.   Psychiatric:         Behavior: Behavior normal.         Thought Content: Thought content normal.         Judgment: Judgment normal.           Assessment:       1. Palpitations    2. Coronary artery disease of native artery of native heart with stable angina pectoris    3. Hyperlipidemia associated with type 2 diabetes mellitus    4. Primary hypertension    5. Sinus tachycardia    6. Pulmonary hypertension    7. Type 2 diabetes mellitus without complication, with long-term current use of insulin    8. Traumatic rhabdomyolysis, subsequent encounter    9. Aortic atherosclerosis      Results for orders placed or performed during the hospital encounter of 05/09/22   Urine culture    Specimen: Urine   Result Value Ref Range    Urine Culture, Routine (A)      CANDIDA LUSITANIAE  > 100,000 cfu/ml  Treatment of asymptomatic candiduria is not recommended (except for   specific populations). Candida isolated in the urine typically   represents colonization. If an indwelling urinary catheter is present  it should be removed or replaced.     Blood culture    Specimen: Blood   Result Value Ref Range    Blood Culture, Routine No growth after 5 days.    CBC auto differential   Result Value Ref Range    WBC 7.43 3.90 - 12.70 K/uL    RBC 3.43 (L) 4.00 - 5.40 M/uL    Hemoglobin 8.6 (L) 12.0 - 16.0 g/dL    Hematocrit 29.5 (L) 37.0 - 48.5 %    MCV 86 82 - 98 fL    MCH 25.1 (L) 27.0 - 31.0 pg    MCHC 29.2 (L) 32.0 - 36.0 g/dL    RDW 16.3 (H) 11.5 - 14.5 %    Platelets 233 150 - 450 K/uL    MPV 10.5 9.2 - 12.9 fL    Immature Granulocytes 0.5 0.0 - 0.5 %    Gran # (ANC) 5.3 1.8 - 7.7 K/uL    Immature Grans (Abs) 0.04 0.00 - 0.04 K/uL    Lymph # 1.4 1.0 - 4.8 K/uL    Mono # 0.7 0.3 - 1.0 K/uL    Eos # 0.1 0.0 - 0.5 K/uL    Baso # 0.01 0.00 - 0.20 K/uL    nRBC 0 0 /100 WBC    Gran % 71.1 38.0 - 73.0 %    Lymph % 18.6 18.0 - 48.0 %    Mono % 9.0 4.0 - 15.0 %    Eosinophil % 0.7 0.0 - 8.0 %    Basophil %  0.1 0.0 - 1.9 %    Differential Method Automated    Comprehensive metabolic panel   Result Value Ref Range    Sodium 138 136 - 145 mmol/L    Potassium 4.6 3.5 - 5.1 mmol/L    Chloride 107 95 - 110 mmol/L    CO2 18 (L) 23 - 29 mmol/L    Glucose 188 (H) 70 - 110 mg/dL    BUN 27 (H) 8 - 23 mg/dL    Creatinine 1.3 0.5 - 1.4 mg/dL    Calcium 9.2 8.7 - 10.5 mg/dL    Total Protein 7.3 6.0 - 8.4 g/dL    Albumin 3.4 (L) 3.5 - 5.2 g/dL    Total Bilirubin 0.2 0.1 - 1.0 mg/dL    Alkaline Phosphatase 139 (H) 55 - 135 U/L    AST 50 (H) 10 - 40 U/L    ALT 22 10 - 44 U/L    Anion Gap 13 8 - 16 mmol/L    eGFR if African American 48 (A) >60 mL/min/1.73 m^2    eGFR if non African American 42 (A) >60 mL/min/1.73 m^2   Beta - Hydroxybutyrate, Serum   Result Value Ref Range    Beta-Hydroxybutyrate 0.1 0.0 - 0.5 mmol/L   Urinalysis, Reflex to Urine Culture Urine, Clean Catch    Specimen: Urine   Result Value Ref Range    Specimen UA Urine, Catheterized     Color, UA Yellow Yellow, Straw, Mya    Appearance, UA Hazy (A) Clear    pH, UA 5.0 5.0 - 8.0    Specific Gravity, UA 1.015 1.005 - 1.030    Protein, UA Trace (A) Negative    Glucose, UA 1+ (A) Negative    Ketones, UA Negative Negative    Bilirubin (UA) Negative Negative    Occult Blood UA Negative Negative    Nitrite, UA Negative Negative    Urobilinogen, UA Negative <2.0 EU/dL    Leukocytes, UA 1+ (A) Negative   Troponin I   Result Value Ref Range    Troponin I <0.006 0.000 - 0.026 ng/mL   Magnesium   Result Value Ref Range    Magnesium 1.8 1.6 - 2.6 mg/dL   Urinalysis Microscopic   Result Value Ref Range    WBC, UA 16 (H) 0 - 5 /hpf    Yeast, UA Many (A) None    Hyaline Casts, UA 8 (A) 0-1/lpf /lpf    Microscopic Comment SEE COMMENT    CPK   Result Value Ref Range    CPK 3832 (H) 20 - 180 U/L   Hemoglobin A1c   Result Value Ref Range    Hemoglobin A1C 9.8 (H) 4.0 - 5.6 %    Estimated Avg Glucose 235 (H) 68 - 131 mg/dL   Iron and TIBC   Result Value Ref Range    Iron 14 (L) 30 - 160  ug/dL    Transferrin 393 (H) 200 - 375 mg/dL    TIBC 582 (H) 250 - 450 ug/dL    Saturated Iron 2 (L) 20 - 50 %   Ferritin   Result Value Ref Range    Ferritin 13 (L) 20.0 - 300.0 ng/mL   Vitamin B12   Result Value Ref Range    Vitamin B-12 342 210 - 950 pg/mL   Folate   Result Value Ref Range    Folate 10.0 4.0 - 24.0 ng/mL   CBC auto differential   Result Value Ref Range    WBC 5.34 3.90 - 12.70 K/uL    RBC 3.43 (L) 4.00 - 5.40 M/uL    Hemoglobin 8.6 (L) 12.0 - 16.0 g/dL    Hematocrit 28.8 (L) 37.0 - 48.5 %    MCV 84 82 - 98 fL    MCH 25.1 (L) 27.0 - 31.0 pg    MCHC 29.9 (L) 32.0 - 36.0 g/dL    RDW 16.5 (H) 11.5 - 14.5 %    Platelets 222 150 - 450 K/uL    MPV 10.0 9.2 - 12.9 fL    Immature Granulocytes 0.4 0.0 - 0.5 %    Gran # (ANC) 3.4 1.8 - 7.7 K/uL    Immature Grans (Abs) 0.02 0.00 - 0.04 K/uL    Lymph # 1.3 1.0 - 4.8 K/uL    Mono # 0.5 0.3 - 1.0 K/uL    Eos # 0.1 0.0 - 0.5 K/uL    Baso # 0.02 0.00 - 0.20 K/uL    nRBC 0 0 /100 WBC    Gran % 63.9 38.0 - 73.0 %    Lymph % 24.5 18.0 - 48.0 %    Mono % 8.6 4.0 - 15.0 %    Eosinophil % 2.2 0.0 - 8.0 %    Basophil % 0.4 0.0 - 1.9 %    Differential Method Automated    Comprehensive metabolic panel   Result Value Ref Range    Sodium 139 136 - 145 mmol/L    Potassium 4.2 3.5 - 5.1 mmol/L    Chloride 106 95 - 110 mmol/L    CO2 22 (L) 23 - 29 mmol/L    Glucose 260 (H) 70 - 110 mg/dL    BUN 15 8 - 23 mg/dL    Creatinine 0.7 0.5 - 1.4 mg/dL    Calcium 9.4 8.7 - 10.5 mg/dL    Total Protein 6.7 6.0 - 8.4 g/dL    Albumin 3.1 (L) 3.5 - 5.2 g/dL    Total Bilirubin 0.2 0.1 - 1.0 mg/dL    Alkaline Phosphatase 136 (H) 55 - 135 U/L    AST 32 10 - 40 U/L    ALT 21 10 - 44 U/L    Anion Gap 11 8 - 16 mmol/L    eGFR if African American >60 >60 mL/min/1.73 m^2    eGFR if non African American >60 >60 mL/min/1.73 m^2   Magnesium   Result Value Ref Range    Magnesium 1.6 1.6 - 2.6 mg/dL   Phosphorus   Result Value Ref Range    Phosphorus 2.9 2.7 - 4.5 mg/dL   CK   Result Value Ref Range     CPK 2265 (H) 20 - 180 U/L   CBC auto differential   Result Value Ref Range    WBC 4.86 3.90 - 12.70 K/uL    RBC 3.58 (L) 4.00 - 5.40 M/uL    Hemoglobin 9.1 (L) 12.0 - 16.0 g/dL    Hematocrit 29.8 (L) 37.0 - 48.5 %    MCV 83 82 - 98 fL    MCH 25.4 (L) 27.0 - 31.0 pg    MCHC 30.5 (L) 32.0 - 36.0 g/dL    RDW 16.1 (H) 11.5 - 14.5 %    Platelets 248 150 - 450 K/uL    MPV 10.0 9.2 - 12.9 fL    Immature Granulocytes 0.2 0.0 - 0.5 %    Gran # (ANC) 2.7 1.8 - 7.7 K/uL    Immature Grans (Abs) 0.01 0.00 - 0.04 K/uL    Lymph # 1.5 1.0 - 4.8 K/uL    Mono # 0.5 0.3 - 1.0 K/uL    Eos # 0.1 0.0 - 0.5 K/uL    Baso # 0.03 0.00 - 0.20 K/uL    nRBC 0 0 /100 WBC    Gran % 54.5 38.0 - 73.0 %    Lymph % 31.5 18.0 - 48.0 %    Mono % 10.3 4.0 - 15.0 %    Eosinophil % 2.9 0.0 - 8.0 %    Basophil % 0.6 0.0 - 1.9 %    Differential Method Automated    Comprehensive metabolic panel   Result Value Ref Range    Sodium 139 136 - 145 mmol/L    Potassium 4.0 3.5 - 5.1 mmol/L    Chloride 107 95 - 110 mmol/L    CO2 21 (L) 23 - 29 mmol/L    Glucose 122 (H) 70 - 110 mg/dL    BUN 6 (L) 8 - 23 mg/dL    Creatinine 0.6 0.5 - 1.4 mg/dL    Calcium 9.8 8.7 - 10.5 mg/dL    Total Protein 6.9 6.0 - 8.4 g/dL    Albumin 3.1 (L) 3.5 - 5.2 g/dL    Total Bilirubin 0.2 0.1 - 1.0 mg/dL    Alkaline Phosphatase 133 55 - 135 U/L    AST 22 10 - 40 U/L    ALT 24 10 - 44 U/L    Anion Gap 11 8 - 16 mmol/L    eGFR if African American >60 >60 mL/min/1.73 m^2    eGFR if non African American >60 >60 mL/min/1.73 m^2   Magnesium   Result Value Ref Range    Magnesium 1.2 (L) 1.6 - 2.6 mg/dL   Phosphorus   Result Value Ref Range    Phosphorus 3.4 2.7 - 4.5 mg/dL   C-reactive protein   Result Value Ref Range    CRP 9.7 (H) 0.0 - 8.2 mg/L   Sedimentation rate   Result Value Ref Range    Sed Rate 62 (H) 0 - 20 mm/Hr   Lactate dehydrogenase   Result Value Ref Range     (H) 110 - 260 U/L   Aldolase   Result Value Ref Range    Aldolase 8.1 (H) 1.2 - 7.6 U/L   Rheumatoid factor   Result  Value Ref Range    Rheumatoid Factor <13.0 0.0 - 15.0 IU/mL   CAROL   Result Value Ref Range    CAROL Screen Positive (A) Negative <1:80   CBC auto differential   Result Value Ref Range    WBC 4.43 3.90 - 12.70 K/uL    RBC 3.65 (L) 4.00 - 5.40 M/uL    Hemoglobin 9.1 (L) 12.0 - 16.0 g/dL    Hematocrit 30.0 (L) 37.0 - 48.5 %    MCV 82 82 - 98 fL    MCH 24.9 (L) 27.0 - 31.0 pg    MCHC 30.3 (L) 32.0 - 36.0 g/dL    RDW 16.0 (H) 11.5 - 14.5 %    Platelets 219 150 - 450 K/uL    MPV 9.2 9.2 - 12.9 fL    Immature Granulocytes 0.2 0.0 - 0.5 %    Gran # (ANC) 2.4 1.8 - 7.7 K/uL    Immature Grans (Abs) 0.01 0.00 - 0.04 K/uL    Lymph # 1.3 1.0 - 4.8 K/uL    Mono # 0.5 0.3 - 1.0 K/uL    Eos # 0.1 0.0 - 0.5 K/uL    Baso # 0.02 0.00 - 0.20 K/uL    nRBC 0 0 /100 WBC    Gran % 54.7 38.0 - 73.0 %    Lymph % 30.2 18.0 - 48.0 %    Mono % 11.7 4.0 - 15.0 %    Eosinophil % 2.7 0.0 - 8.0 %    Basophil % 0.5 0.0 - 1.9 %    Differential Method Automated    Comprehensive metabolic panel   Result Value Ref Range    Sodium 140 136 - 145 mmol/L    Potassium 3.9 3.5 - 5.1 mmol/L    Chloride 107 95 - 110 mmol/L    CO2 21 (L) 23 - 29 mmol/L    Glucose 153 (H) 70 - 110 mg/dL    BUN 7 (L) 8 - 23 mg/dL    Creatinine 0.7 0.5 - 1.4 mg/dL    Calcium 9.3 8.7 - 10.5 mg/dL    Total Protein 6.8 6.0 - 8.4 g/dL    Albumin 3.0 (L) 3.5 - 5.2 g/dL    Total Bilirubin 0.2 0.1 - 1.0 mg/dL    Alkaline Phosphatase 137 (H) 55 - 135 U/L    AST 15 10 - 40 U/L    ALT 17 10 - 44 U/L    Anion Gap 12 8 - 16 mmol/L    eGFR if African American >60 >60 mL/min/1.73 m^2    eGFR if non African American >60 >60 mL/min/1.73 m^2   Magnesium   Result Value Ref Range    Magnesium 1.5 (L) 1.6 - 2.6 mg/dL   Phosphorus   Result Value Ref Range    Phosphorus 3.7 2.7 - 4.5 mg/dL   CAROL Profile   Result Value Ref Range    Anti Sm Antibody 0.07 0.00 - 0.99 Ratio    Anti-Sm Interpretation Negative Negative    Anti-SSA Antibody 0.06 0.00 - 0.99 Ratio    Anti-SSA Interpretation Negative Negative     Anti-SSB Antibody 0.06 0.00 - 0.99 Ratio    Anti-SSB Interpretation Negative Negative    ds DNA Ab Negative 1:10 Negative 1:10    Anti Sm/RNP Antibody 0.07 0.00 - 0.99 Ratio    Anti-Sm/RNP Interpretation Negative Negative   CAROL Pattern 1   Result Value Ref Range    CAROL PATTERN 1 Speckled    CAROL Titer 1   Result Value Ref Range    CAROL Titer 1 1:160    CBC auto differential   Result Value Ref Range    WBC 3.85 (L) 3.90 - 12.70 K/uL    RBC 3.92 (L) 4.00 - 5.40 M/uL    Hemoglobin 9.6 (L) 12.0 - 16.0 g/dL    Hematocrit 32.5 (L) 37.0 - 48.5 %    MCV 83 82 - 98 fL    MCH 24.5 (L) 27.0 - 31.0 pg    MCHC 29.5 (L) 32.0 - 36.0 g/dL    RDW 15.8 (H) 11.5 - 14.5 %    Platelets 264 150 - 450 K/uL    MPV 9.9 9.2 - 12.9 fL    Immature Granulocytes 0.0 0.0 - 0.5 %    Gran # (ANC) 3.0 1.8 - 7.7 K/uL    Immature Grans (Abs) 0.00 0.00 - 0.04 K/uL    Lymph # 0.7 (L) 1.0 - 4.8 K/uL    Mono # 0.2 (L) 0.3 - 1.0 K/uL    Eos # 0.0 0.0 - 0.5 K/uL    Baso # 0.01 0.00 - 0.20 K/uL    nRBC 0 0 /100 WBC    Gran % 77.1 (H) 38.0 - 73.0 %    Lymph % 18.7 18.0 - 48.0 %    Mono % 3.9 (L) 4.0 - 15.0 %    Eosinophil % 0.0 0.0 - 8.0 %    Basophil % 0.3 0.0 - 1.9 %    Differential Method Automated    Comprehensive metabolic panel   Result Value Ref Range    Sodium 135 (L) 136 - 145 mmol/L    Potassium 4.3 3.5 - 5.1 mmol/L    Chloride 103 95 - 110 mmol/L    CO2 18 (L) 23 - 29 mmol/L    Glucose 336 (H) 70 - 110 mg/dL    BUN 12 8 - 23 mg/dL    Creatinine 0.7 0.5 - 1.4 mg/dL    Calcium 9.5 8.7 - 10.5 mg/dL    Total Protein 7.2 6.0 - 8.4 g/dL    Albumin 3.5 3.5 - 5.2 g/dL    Total Bilirubin 0.3 0.1 - 1.0 mg/dL    Alkaline Phosphatase 157 (H) 55 - 135 U/L    AST 14 10 - 40 U/L    ALT 16 10 - 44 U/L    Anion Gap 14 8 - 16 mmol/L    eGFR if African American >60 >60 mL/min/1.73 m^2    eGFR if non African American >60 >60 mL/min/1.73 m^2   Magnesium   Result Value Ref Range    Magnesium 1.4 (L) 1.6 - 2.6 mg/dL   Phosphorus   Result Value Ref Range    Phosphorus 3.9  2.7 - 4.5 mg/dL   POCT COVID-19 Rapid Screening   Result Value Ref Range    POC Rapid COVID Negative Negative     Acceptable Yes    POCT glucose   Result Value Ref Range    POCT Glucose 180 (H) 70 - 110 mg/dL   POCT glucose   Result Value Ref Range    POCT Glucose 125 (H) 70 - 110 mg/dL   POCT glucose   Result Value Ref Range    POCT Glucose 265 (H) 70 - 110 mg/dL   POCT glucose   Result Value Ref Range    POCT Glucose 229 (H) 70 - 110 mg/dL   POCT glucose   Result Value Ref Range    POCT Glucose 316 (H) 70 - 110 mg/dL   POCT glucose   Result Value Ref Range    POCT Glucose 165 (H) 70 - 110 mg/dL   POCT glucose   Result Value Ref Range    POCT Glucose 272 (H) 70 - 110 mg/dL   POCT glucose   Result Value Ref Range    POCT Glucose 110 70 - 110 mg/dL   POCT glucose   Result Value Ref Range    POCT Glucose 168 (H) 70 - 110 mg/dL   POCT glucose   Result Value Ref Range    POCT Glucose 287 (H) 70 - 110 mg/dL   POCT glucose   Result Value Ref Range    POCT Glucose 325 (H) 70 - 110 mg/dL   POCT glucose   Result Value Ref Range    POCT Glucose 155 (H) 70 - 110 mg/dL   POCT glucose   Result Value Ref Range    POCT Glucose 168 (H) 70 - 110 mg/dL   POCT glucose   Result Value Ref Range    POCT Glucose 254 (H) 70 - 110 mg/dL   POCT glucose   Result Value Ref Range    POCT Glucose 395 (H) 70 - 110 mg/dL   POCT glucose   Result Value Ref Range    POCT Glucose 315 (H) 70 - 110 mg/dL   POCT glucose   Result Value Ref Range    POCT Glucose 407 (H) 70 - 110 mg/dL         Current Outpatient Medications:     albuterol (PROVENTIL/VENTOLIN HFA) 90 mcg/actuation inhaler, INHALE 2 PUFFS BY MOUTH INTO THE LUNGS EVERY 6 HOURS AS NEEDED FOR WHEEZING (Patient taking differently: Inhale 2 puffs into the lungs every 6 (six) hours as needed for Wheezing.), Disp: 25.5 g, Rfl: 0    albuterol-ipratropium (DUO-NEB) 2.5 mg-0.5 mg/3 mL nebulizer solution, Take 3 mLs by nebulization every 4 (four) hours as needed for Wheezing or  "Shortness of Breath. Rescue, Disp: , Rfl:     alendronate (FOSAMAX) 70 MG tablet, Take 1 tablet (70 mg total) by mouth every 7 days. (Patient taking differently: Take 70 mg by mouth every Sunday.), Disp: 12 tablet, Rfl: 3    aspirin 81 MG Chew, Take 1 tablet (81 mg total) by mouth once daily., Disp: 90 tablet, Rfl: 3    atorvastatin (LIPITOR) 40 MG tablet, Take 1 tablet (40 mg total) by mouth once daily., Disp: 90 tablet, Rfl: 3    BD ULTRA-FINE MINI PEN NEEDLE 31 gauge x 3/16" Ndle, , Disp: , Rfl:     diclofenac sodium (VOLTAREN) 1 % Gel, Apply 2 g topically 4 (four) times daily., Disp: 100 g, Rfl: 0    duloxetine (CYMBALTA) 60 MG capsule, Take 60 mg by mouth once daily. Take with 30mg for a total of 90mg daily, Disp: , Rfl:     ferrous sulfate (FEOSOL) 325 mg (65 mg iron) Tab tablet, Take 1 tablet (325 mg total) by mouth once daily., Disp: 30 tablet, Rfl: 3    fluticasone propionate (FLONASE) 50 mcg/actuation nasal spray, 2 sprays (100 mcg total) by Each Nare route once daily., Disp: 1 Bottle, Rfl: 12    hydroCHLOROthiazide (MICROZIDE) 12.5 mg capsule, Take 1 capsule (12.5 mg total) by mouth once daily., Disp: 90 capsule, Rfl: 3    HYDROcodone-acetaminophen 7.5-300 mg Tab, Take by mouth., Disp: , Rfl:     insulin lispro 100 unit/mL pen, Inject 5 Units into the skin 3 (three) times daily after meals., Disp: 15 mL, Rfl: 0    LANTUS SOLOSTAR U-100 INSULIN glargine 100 units/mL (3mL) SubQ pen, Inject 20 Units into the skin nightly., Disp: 18 mL, Rfl: 0    losartan (COZAAR) 50 MG tablet, Take 1 tablet (50 mg total) by mouth once daily., Disp: 90 tablet, Rfl: 3    metFORMIN (GLUCOPHAGE) 1000 MG tablet, Take 1,000 mg by mouth 2 (two) times daily., Disp: , Rfl:     nitroGLYCERIN (NITROSTAT) 0.4 MG SL tablet, Place 1 tablet (0.4 mg total) under the tongue every 5 (five) minutes as needed for Chest pain., Disp: 100 tablet, Rfl: 1    ondansetron (ZOFRAN-ODT) 4 MG TbDL, Take 1 tablet (4 mg total) by mouth " every 8 (eight) hours as needed (nausea)., Disp: 14 tablet, Rfl: 1    potassium chloride SA (K-DUR,KLOR-CON) 20 MEQ tablet, Take 1 tablet (20 mEq total) by mouth once daily., Disp: 90 tablet, Rfl: 3    QUEtiapine (SEROQUEL) 300 MG Tab, Take 1 tablet (300 mg total) by mouth every evening., Disp: 90 tablet, Rfl: 5    tiZANidine 4 mg Cap, Take by mouth., Disp: , Rfl:     topiramate (TOPAMAX) 50 MG tablet, Take 50 mg by mouth 2 (two) times daily., Disp: , Rfl:     TRELEGY ELLIPTA 200-62.5-25 mcg inhaler, Inhale 1 puff into the lungs once daily., Disp: , Rfl:     verapamiL (CALAN-SR) 180 MG CR tablet, Take 1 tablet (180 mg total) by mouth every evening., Disp: 30 tablet, Rfl: 11    zonisamide (ZONEGRAN) 100 MG Cap, Take 400 mg by mouth once daily., Disp: , Rfl:     magnesium oxide (MAG-OX) 400 mg (241.3 mg magnesium) tablet, Take 400 mg by mouth once daily., Disp: , Rfl:      Lab Results   Component Value Date    WBC 3.85 (L) 05/13/2022    RBC 3.92 (L) 05/13/2022    HGB 9.6 (L) 05/13/2022    HCT 32.5 (L) 05/13/2022    MCV 83 05/13/2022    MCH 24.5 (L) 05/13/2022    MCHC 29.5 (L) 05/13/2022    RDW 15.8 (H) 05/13/2022     05/13/2022    MPV 9.9 05/13/2022    GRAN 3.0 05/13/2022    GRAN 77.1 (H) 05/13/2022    LYMPH 0.7 (L) 05/13/2022    LYMPH 18.7 05/13/2022    MONO 0.2 (L) 05/13/2022    MONO 3.9 (L) 05/13/2022    EOS 0.0 05/13/2022    BASO 0.01 05/13/2022    EOSINOPHIL 0.0 05/13/2022    BASOPHIL 0.3 05/13/2022    MG 1.4 (L) 05/13/2022        CMP  Lab Results   Component Value Date     (L) 05/13/2022    K 4.3 05/13/2022     05/13/2022    CO2 18 (L) 05/13/2022     (H) 05/13/2022    BUN 12 05/13/2022    CREATININE 0.7 05/13/2022    CALCIUM 9.5 05/13/2022    PROT 7.2 05/13/2022    ALBUMIN 3.5 05/13/2022    BILITOT 0.3 05/13/2022    ALKPHOS 157 (H) 05/13/2022    AST 14 05/13/2022    ALT 16 05/13/2022    ANIONGAP 14 05/13/2022    ESTGFRAFRICA >60 05/13/2022    EGFRNONAA >60 05/13/2022        Lab  Results   Component Value Date    LABBLOO No growth after 5 days. 05/10/2022    LABURIN (A) 05/09/2022     CANDIDA LUSITANIAE  > 100,000 cfu/ml  Treatment of asymptomatic candiduria is not recommended (except for   specific populations). Candida isolated in the urine typically   represents colonization. If an indwelling urinary catheter is present  it should be removed or replaced.              Results for orders placed or performed during the hospital encounter of 05/09/22   EKG 12-lead    Collection Time: 05/09/22  2:30 PM    Narrative    Test Reason : R53.1,    Vent. Rate : 094 BPM     Atrial Rate : 094 BPM     P-R Int : 126 ms          QRS Dur : 084 ms      QT Int : 354 ms       P-R-T Axes : 078 059 030 degrees     QTc Int : 442 ms    Normal sinus rhythm  Normal ECG  When compared with ECG of 05-APR-2022 18:00,  No significant change was found  Confirmed by Clyde Hernandez MD ([id number]) on 5/9/2022 11:16:26 PM    Referred By: [email or web address]   SELF           Confirmed By:Clyde Hernandez MD                  Plan:       Problem List Items Addressed This Visit        Pulmonary    Pulmonary hypertension     Pulmonary artery systolic pressure 47 mm Hg on last echo while hospitalized recently.  Her previous echoes did not show elevated pulmonary pressures.  She does have dyspnea on exertion.  Diuretics not advised              Cardiac/Vascular    Coronary artery disease of native artery of native heart with stable angina pectoris     Her her coronary intervention was 7 years ago with stenting.  She does report dyspnea with exertion.  No chest pain reported.  Most recent stress test 2020-normal perfusion study with normal ejection fraction.           Hyperlipidemia associated with type 2 diabetes mellitus     She is on atorvastatin 40 mg per day.  Most recent LDL 82, HDL 49, triglycerides 115. Medication to be increased on next visit.           Sinus tachycardia     She has complained of rapid heartbeats in the past.  A Holter  monitor is ordered.  Labs reviewed, TSH borderline low on last lab assessment.           Hypertension     Condition controlled.  Her blood pressure is normal today.  She is taking 180 mg of verapamil, losartan 50 mg, HCTZ 12.5 mg.           Aortic atherosclerosis     Noted on imaging studies of the abdomen.  Condition stable.              Endocrine    Type 2 diabetes mellitus, with long-term current use of insulin     Most recent hemoglobin A1c 9.8.  Her GFR is greater than 60. Condition not ideally controlled.                Orthopedic    Traumatic rhabdomyolysis     Noted on recent admission CPKs 3832 international units/liter.  She now walks with a walker.  She still has left leg weakness.  Condition improved.             Other Visit Diagnoses     Palpitations    -  Primary    Relevant Orders    Holter monitor - 24 hour               Records reviewed from most recent hospitalization.  Although there was an MR I abnormality suggestive of subacute stroke she denies neurologic deficits other than left leg weakness likely due to spine disease.    Beta-blockers will be a consideration if she has a lot of sinus tachycardia on her monitor given her complaints of palpitations.  She does use bronchodilator therapy.           Randell Mtz MD  06/09/2022   2:45 PM

## 2022-06-09 PROBLEM — I27.20 PULMONARY HYPERTENSION: Status: ACTIVE | Noted: 2022-06-09

## 2022-06-09 NOTE — ASSESSMENT & PLAN NOTE
Condition controlled.  Her blood pressure is normal today.  She is taking 180 mg of verapamil, losartan 50 mg, HCTZ 12.5 mg.

## 2022-06-09 NOTE — ASSESSMENT & PLAN NOTE
She has complained of rapid heartbeats in the past.  A Holter monitor is ordered.  Labs reviewed, TSH borderline low on last lab assessment.

## 2022-06-09 NOTE — ASSESSMENT & PLAN NOTE
Her her coronary intervention was 7 years ago with stenting.  She does report dyspnea with exertion.  No chest pain reported.  Most recent stress test 2020-normal perfusion study with normal ejection fraction.

## 2022-06-09 NOTE — ASSESSMENT & PLAN NOTE
She is on atorvastatin 40 mg per day.  Most recent LDL 82, HDL 49, triglycerides 115. Medication to be increased on next visit.

## 2022-06-09 NOTE — ASSESSMENT & PLAN NOTE
Noted on recent admission CPKs 3832 international units/liter.  She now walks with a walker.  She still has left leg weakness.  Condition improved.

## 2022-06-09 NOTE — ASSESSMENT & PLAN NOTE
Pulmonary artery systolic pressure 47 mm Hg on last echo while hospitalized recently.  Her previous echoes did not show elevated pulmonary pressures.  She does have dyspnea on exertion.  Diuretics not advised

## 2022-06-10 ENCOUNTER — HOSPITAL ENCOUNTER (OUTPATIENT)
Dept: CARDIOLOGY | Facility: HOSPITAL | Age: 72
Discharge: HOME | End: 2022-06-10
Attending: INTERNAL MEDICINE
Payer: MEDICARE

## 2022-06-10 DIAGNOSIS — R00.2 PALPITATIONS: ICD-10-CM

## 2022-06-10 PROCEDURE — 93227 XTRNL ECG REC<48 HR R&I: CPT | Mod: ,,, | Performed by: INTERNAL MEDICINE

## 2022-06-10 PROCEDURE — 93227 HOLTER MONITOR - 24 HOUR (CUPID ONLY): ICD-10-PCS | Mod: ,,, | Performed by: INTERNAL MEDICINE

## 2022-06-10 PROCEDURE — 93225 XTRNL ECG REC<48 HRS REC: CPT

## 2022-06-14 LAB
OHS CV EVENT MONITOR DAY: 0
OHS CV HOLTER LENGTH DECIMAL HOURS: 24
OHS CV HOLTER LENGTH HOURS: 24
OHS CV HOLTER LENGTH MINUTES: 0
OHS CV HOLTER SINUS AVERAGE HR: 116
OHS CV HOLTER SINUS MAX HR: 133
OHS CV HOLTER SINUS MIN HR: 93

## 2022-06-16 ENCOUNTER — LAB VISIT (OUTPATIENT)
Dept: LAB | Facility: HOSPITAL | Age: 72
End: 2022-06-16
Attending: INTERNAL MEDICINE
Payer: MEDICARE

## 2022-06-16 ENCOUNTER — OFFICE VISIT (OUTPATIENT)
Dept: INTERNAL MEDICINE | Facility: CLINIC | Age: 72
End: 2022-06-16
Payer: MEDICARE

## 2022-06-16 ENCOUNTER — TELEPHONE (OUTPATIENT)
Dept: CARDIOLOGY | Facility: CLINIC | Age: 72
End: 2022-06-16
Payer: MEDICARE

## 2022-06-16 VITALS
HEART RATE: 104 BPM | BODY MASS INDEX: 24.09 KG/M2 | OXYGEN SATURATION: 98 % | SYSTOLIC BLOOD PRESSURE: 126 MMHG | HEIGHT: 62 IN | DIASTOLIC BLOOD PRESSURE: 68 MMHG | WEIGHT: 130.94 LBS

## 2022-06-16 DIAGNOSIS — E11.59 TYPE 2 DIABETES MELLITUS WITH OTHER CIRCULATORY COMPLICATION, WITH LONG-TERM CURRENT USE OF INSULIN: ICD-10-CM

## 2022-06-16 DIAGNOSIS — Z79.4 TYPE 2 DIABETES MELLITUS WITH OTHER CIRCULATORY COMPLICATION, WITH LONG-TERM CURRENT USE OF INSULIN: ICD-10-CM

## 2022-06-16 DIAGNOSIS — E11.59 TYPE 2 DIABETES MELLITUS WITH OTHER CIRCULATORY COMPLICATION, WITH LONG-TERM CURRENT USE OF INSULIN: Primary | ICD-10-CM

## 2022-06-16 DIAGNOSIS — Z11.52 ENCOUNTER FOR SCREENING FOR COVID-19: ICD-10-CM

## 2022-06-16 DIAGNOSIS — Z79.4 TYPE 2 DIABETES MELLITUS WITH OTHER CIRCULATORY COMPLICATION, WITH LONG-TERM CURRENT USE OF INSULIN: Primary | ICD-10-CM

## 2022-06-16 LAB
ALBUMIN/CREAT UR: 8.3 UG/MG (ref 0–30)
CREAT UR-MCNC: 109 MG/DL (ref 15–325)
CTP QC/QA: YES
MICROALBUMIN UR DL<=1MG/L-MCNC: 9 UG/ML
SARS-COV-2 RDRP RESP QL NAA+PROBE: NEGATIVE

## 2022-06-16 PROCEDURE — 3008F BODY MASS INDEX DOCD: CPT | Mod: CPTII,S$GLB,, | Performed by: INTERNAL MEDICINE

## 2022-06-16 PROCEDURE — 1100F PTFALLS ASSESS-DOCD GE2>/YR: CPT | Mod: CPTII,S$GLB,, | Performed by: INTERNAL MEDICINE

## 2022-06-16 PROCEDURE — 3074F SYST BP LT 130 MM HG: CPT | Mod: CPTII,S$GLB,, | Performed by: INTERNAL MEDICINE

## 2022-06-16 PROCEDURE — 3074F PR MOST RECENT SYSTOLIC BLOOD PRESSURE < 130 MM HG: ICD-10-PCS | Mod: CPTII,S$GLB,, | Performed by: INTERNAL MEDICINE

## 2022-06-16 PROCEDURE — 1160F PR REVIEW ALL MEDS BY PRESCRIBER/CLIN PHARMACIST DOCUMENTED: ICD-10-PCS | Mod: CPTII,S$GLB,, | Performed by: INTERNAL MEDICINE

## 2022-06-16 PROCEDURE — 3061F PR NEG MICROALBUMINURIA RESULT DOCUMENTED/REVIEW: ICD-10-PCS | Mod: CPTII,S$GLB,, | Performed by: INTERNAL MEDICINE

## 2022-06-16 PROCEDURE — 3046F HEMOGLOBIN A1C LEVEL >9.0%: CPT | Mod: CPTII,S$GLB,, | Performed by: INTERNAL MEDICINE

## 2022-06-16 PROCEDURE — 1160F RVW MEDS BY RX/DR IN RCRD: CPT | Mod: CPTII,S$GLB,, | Performed by: INTERNAL MEDICINE

## 2022-06-16 PROCEDURE — 99999 PR PBB SHADOW E&M-EST. PATIENT-LVL III: ICD-10-PCS | Mod: PBBFAC,,, | Performed by: INTERNAL MEDICINE

## 2022-06-16 PROCEDURE — 1100F PR PT FALLS ASSESS DOC 2+ FALLS/FALL W/INJURY/YR: ICD-10-PCS | Mod: CPTII,S$GLB,, | Performed by: INTERNAL MEDICINE

## 2022-06-16 PROCEDURE — 99214 OFFICE O/P EST MOD 30 MIN: CPT | Mod: S$GLB,,, | Performed by: INTERNAL MEDICINE

## 2022-06-16 PROCEDURE — U0002 COVID-19 LAB TEST NON-CDC: HCPCS | Mod: QW,S$GLB,, | Performed by: INTERNAL MEDICINE

## 2022-06-16 PROCEDURE — 3288F PR FALLS RISK ASSESSMENT DOCUMENTED: ICD-10-PCS | Mod: CPTII,S$GLB,, | Performed by: INTERNAL MEDICINE

## 2022-06-16 PROCEDURE — 1157F ADVNC CARE PLAN IN RCRD: CPT | Mod: CPTII,S$GLB,, | Performed by: INTERNAL MEDICINE

## 2022-06-16 PROCEDURE — 3078F PR MOST RECENT DIASTOLIC BLOOD PRESSURE < 80 MM HG: ICD-10-PCS | Mod: CPTII,S$GLB,, | Performed by: INTERNAL MEDICINE

## 2022-06-16 PROCEDURE — 82570 ASSAY OF URINE CREATININE: CPT | Performed by: INTERNAL MEDICINE

## 2022-06-16 PROCEDURE — 3078F DIAST BP <80 MM HG: CPT | Mod: CPTII,S$GLB,, | Performed by: INTERNAL MEDICINE

## 2022-06-16 PROCEDURE — 3008F PR BODY MASS INDEX (BMI) DOCUMENTED: ICD-10-PCS | Mod: CPTII,S$GLB,, | Performed by: INTERNAL MEDICINE

## 2022-06-16 PROCEDURE — 1125F PR PAIN SEVERITY QUANTIFIED, PAIN PRESENT: ICD-10-PCS | Mod: CPTII,S$GLB,, | Performed by: INTERNAL MEDICINE

## 2022-06-16 PROCEDURE — 1125F AMNT PAIN NOTED PAIN PRSNT: CPT | Mod: CPTII,S$GLB,, | Performed by: INTERNAL MEDICINE

## 2022-06-16 PROCEDURE — 99999 PR PBB SHADOW E&M-EST. PATIENT-LVL III: CPT | Mod: PBBFAC,,, | Performed by: INTERNAL MEDICINE

## 2022-06-16 PROCEDURE — 3066F PR DOCUMENTATION OF TREATMENT FOR NEPHROPATHY: ICD-10-PCS | Mod: CPTII,S$GLB,, | Performed by: INTERNAL MEDICINE

## 2022-06-16 PROCEDURE — 3288F FALL RISK ASSESSMENT DOCD: CPT | Mod: CPTII,S$GLB,, | Performed by: INTERNAL MEDICINE

## 2022-06-16 PROCEDURE — 1159F PR MEDICATION LIST DOCUMENTED IN MEDICAL RECORD: ICD-10-PCS | Mod: CPTII,S$GLB,, | Performed by: INTERNAL MEDICINE

## 2022-06-16 PROCEDURE — U0002: ICD-10-PCS | Mod: QW,S$GLB,, | Performed by: INTERNAL MEDICINE

## 2022-06-16 PROCEDURE — 3066F NEPHROPATHY DOC TX: CPT | Mod: CPTII,S$GLB,, | Performed by: INTERNAL MEDICINE

## 2022-06-16 PROCEDURE — 1157F PR ADVANCE CARE PLAN OR EQUIV PRESENT IN MEDICAL RECORD: ICD-10-PCS | Mod: CPTII,S$GLB,, | Performed by: INTERNAL MEDICINE

## 2022-06-16 PROCEDURE — 99214 PR OFFICE/OUTPT VISIT, EST, LEVL IV, 30-39 MIN: ICD-10-PCS | Mod: S$GLB,,, | Performed by: INTERNAL MEDICINE

## 2022-06-16 PROCEDURE — 3046F PR MOST RECENT HEMOGLOBIN A1C LEVEL > 9.0%: ICD-10-PCS | Mod: CPTII,S$GLB,, | Performed by: INTERNAL MEDICINE

## 2022-06-16 PROCEDURE — 1159F MED LIST DOCD IN RCRD: CPT | Mod: CPTII,S$GLB,, | Performed by: INTERNAL MEDICINE

## 2022-06-16 PROCEDURE — 3061F NEG MICROALBUMINURIA REV: CPT | Mod: CPTII,S$GLB,, | Performed by: INTERNAL MEDICINE

## 2022-06-16 NOTE — PROGRESS NOTES
Assessment:       1. Type 2 diabetes mellitus with other circulatory complication, with long-term current use of insulin    2. Encounter for screening for COVID-19        Plan:         Chayito was seen today for diabetes, leg swelling and foot swelling.    Diagnoses and all orders for this visit:    Type 2 diabetes mellitus with other circulatory complication, with long-term current use of insulin  -     Microalbumin/Creatinine Ratio, Urine; Future    Encounter for screening for COVID-19  -     POCT COVID-19 Rapid Screening; Future  -     POCT COVID-19 Rapid Screening          Subjective:       Patient ID: Chayito Chung is a 71 y.o. female.    Chief Complaint: Diabetes, Leg Swelling, and Foot Swelling        Hospital follow up     Admin/Discharge:   5/9-5/14    Reason for admission:   weakness     Brief History:   presented to hospital for ongoing falls, Neurology was consulted who believes presentation was likely related to chronic cervical moderate spinal canal stenosis and multilevel moderate to severe neural foraminal narrowing with radiculopathy . Polypharmacy with multiple sedating medications likely contributing. rec'd no acute neurosurgical intervention indicated at this time.  Ordered AFO brace for right foot drop. She was treated with CTX for UTI. fluconazole for candiduria, but discontinued on discharge. EEG done while inpatient did not show any seizure activity    Pertinent Lab:   ESR elevated, CRP mildly elevated, CAROL 1:160, RF negative, , aldolase 8.1. prednisone and referral to rheumatology.    Pertinent Imaging;  CT Head wo contrast: no acute abnormality  - CT Cervical Spine wo contrast: No acute abnormality  - MRI Spine - Degenerative changes as described above, similar to recent exam most notably at L4-5 where there is some inflammatory facet enhancement. Bilateral L5-S1 moderate foraminal narrowing.  - MRI Brain wwo contrast - Stable evolving subacute small right frontal cerebral cortical  infarct with a small amount of petechial microhemorrhage. Background of mild atrophy and periventricular white matter changes and chronic right cerebellar lacunar infarct  - Neurosurgery consulted: MRI findings might be concerning for acute fracture of L5 vertebra in a setting of new onset R footdrop and back pain, ordered AFO brace for right foot drop  - CT Lumbar spine wo contrast: Bilateral L5 spondylolysis with mild anterolisthesis of L5 on S1, stable when compared to multiple previous exams. Stable remote compression fracture of the L1 vertebral body. No acute fractures.    Discharge Plan;   per neurology, no need for DAPT    Discharge pertinent meds:      CHANGE how you take these medications           alendronate 70 MG tablet  Commonly known as: FOSAMAX  Take 1 tablet (70 mg total) by mouth every 7 days.  What changed: when to take this      DULoxetine 60 MG capsule  Commonly known as: CYMBALTA  What changed: Another medication with the same name was removed. Continue taking this medication, and follow the directions you see here.      insulin lispro 100 unit/mL pen  Inject 5 Units into the skin 3 (three) times daily after meals.  What changed:   · how much to take  · when to take this      LANTUS SOLOSTAR U-100 INSULIN glargine 100 units/mL (3mL) SubQ pen  Generic drug: insulin  Inject 20 Units into the skin nightly.  What changed: how much to take        clopidogreL 75 mg tablet  Commonly known as: PLAVIX      HYDROcodone-acetaminophen 7.5-325 mg per tablet  Commonly known as: NORCO      insulin lispro protamin-lispro 100 unit/mL (75-25) Inpn      pravastatin 40 MG tablet  Commonly known as: PRAVACHOL      pregabalin 150 MG capsule  Commonly known as: LYRICA      QUEtiapine 400 MG tablet  Commonly known as: SEROQUEL      tiZANidine 4 MG tablet  Commonly known as: ZANAFLEX            Labs/Imaging pending at the time of discharged:   None     Since Discharge:   [facility]      HPI  Review of  "Systems   All other systems reviewed and are negative.        Objective:     /68 (BP Location: Right arm, Patient Position: Sitting, BP Method: Medium (Manual))   Pulse 104   Ht 5' 2" (1.575 m)   Wt 59.4 kg (130 lb 15.3 oz)   LMP  (LMP Unknown)   SpO2 98%   BMI 23.95 kg/m²       Wt Readings from Last 1 Encounters:   06/16/22 0959 59.4 kg (130 lb 15.3 oz)       BMI Readings from Last 1 Encounters:   06/16/22 23.95 kg/m²            Physical Exam  Nursing note reviewed.   Constitutional:       General: She is not in acute distress.     Appearance: Normal appearance. She is not ill-appearing, toxic-appearing or diaphoretic.   HENT:      Head: Normocephalic.   Eyes:      Conjunctiva/sclera: Conjunctivae normal.   Pulmonary:      Effort: Pulmonary effort is normal. No respiratory distress.   Neurological:      General: No focal deficit present.      Mental Status: She is alert and oriented to person, place, and time.   Psychiatric:         Mood and Affect: Mood normal.         Behavior: Behavior normal.         Thought Content: Thought content normal.         Judgment: Judgment normal.             Future Appointments   Date Time Provider Department Center   7/14/2022 10:00 AM Cameron Valladares III, MD [facility]   9/6/2022  7:30 AM Gaurav Malin MD [facility] [place] [facility]         Medication List with Changes/Refills   Current Medications    ALBUTEROL (PROVENTIL/VENTOLIN HFA) 90 MCG/ACTUATION INHALER    INHALE 2 PUFFS BY MOUTH INTO THE LUNGS EVERY 6 HOURS AS NEEDED FOR WHEEZING    ALBUTEROL-IPRATROPIUM (DUO-NEB) 2.5 MG-0.5 MG/3 ML NEBULIZER SOLUTION    Take 3 mLs by nebulization every 4 (four) hours as needed for Wheezing or Shortness of Breath. Rescue    ALENDRONATE (FOSAMAX) 70 MG TABLET    Take 1 tablet (70 mg total) by mouth every 7 days.    ASPIRIN 81 MG CHEW    Take 1 tablet (81 mg total) by mouth once daily.    ATORVASTATIN (LIPITOR) 40 MG TABLET    Take 1 tablet (40 mg total) by mouth once " "daily.    BD ULTRA-FINE MINI PEN NEEDLE 31 GAUGE X 3/16" NDLE        DICLOFENAC SODIUM (VOLTAREN) 1 % GEL    Apply 2 g topically 4 (four) times daily.    DULOXETINE (CYMBALTA) 60 MG CAPSULE    Take 60 mg by mouth once daily. Take with 30mg for a total of 90mg daily    FERROUS SULFATE (FEOSOL) 325 MG (65 MG IRON) TAB TABLET    Take 1 tablet (325 mg total) by mouth once daily.    FLUTICASONE PROPIONATE (FLONASE) 50 MCG/ACTUATION NASAL SPRAY    2 sprays (100 mcg total) by Each Nare route once daily.    HYDROCHLOROTHIAZIDE (MICROZIDE) 12.5 MG CAPSULE    Take 1 capsule (12.5 mg total) by mouth once daily.    HYDROCODONE-ACETAMINOPHEN 7.5-300 MG TAB    Take by mouth.    INSULIN LISPRO 100 UNIT/ML PEN    Inject 5 Units into the skin 3 (three) times daily after meals.    LANTUS SOLOSTAR U-100 INSULIN GLARGINE 100 UNITS/ML (3ML) SUBQ PEN    Inject 20 Units into the skin nightly.    LOSARTAN (COZAAR) 50 MG TABLET    Take 1 tablet (50 mg total) by mouth once daily.    MAGNESIUM OXIDE (MAG-OX) 400 MG (241.3 MG MAGNESIUM) TABLET    Take 400 mg by mouth once daily.    METFORMIN (GLUCOPHAGE) 1000 MG TABLET    Take 1,000 mg by mouth 2 (two) times daily.    NITROGLYCERIN (NITROSTAT) 0.4 MG SL TABLET    Place 1 tablet (0.4 mg total) under the tongue every 5 (five) minutes as needed for Chest pain.    ONDANSETRON (ZOFRAN-ODT) 4 MG TBDL    Take 1 tablet (4 mg total) by mouth every 8 (eight) hours as needed (nausea).    POTASSIUM CHLORIDE SA (K-DUR,KLOR-CON) 20 MEQ TABLET    Take 1 tablet (20 mEq total) by mouth once daily.    QUETIAPINE (SEROQUEL) 300 MG TAB    Take 1 tablet (300 mg total) by mouth every evening.    TIZANIDINE 4 MG CAP    Take by mouth.    TOPIRAMATE (TOPAMAX) 50 MG TABLET    Take 50 mg by mouth 2 (two) times daily.    TRELEGY ELLIPTA 200-62.5-25 MCG INHALER    Inhale 1 puff into the lungs once daily.    VERAPAMIL (CALAN-SR) 180 MG CR TABLET    Take 1 tablet (180 mg total) by mouth every evening.    ZONISAMIDE " (ZONEGRAN) 100 MG CAP    Take 400 mg by mouth once daily.         Disclaimer:  This note has been generated using voice-recognition software. There may be grammatical or spelling errors that have been missed during proof-reading

## 2022-06-16 NOTE — TELEPHONE ENCOUNTER
Spoke with patient.  Patient has appointment with Dr Mtz.  Patient would like to reschedule with Dr Malin since she has seen him for a number of years.  Appointment rescheduled and confirmed with patient.    ----- Message from Teresa Tim sent at 6/16/2022  9:11 AM CDT -----  Contact: patient [phone]  Patient calling to speak with you regarding scheduling a follow-up appointment   Please advise

## 2022-06-22 ENCOUNTER — TELEPHONE (OUTPATIENT)
Dept: CARDIOLOGY | Facility: CLINIC | Age: 72
End: 2022-06-22
Payer: MEDICARE

## 2022-06-22 NOTE — TELEPHONE ENCOUNTER
I called the patient to report results of Holter.  She reported that her blood pressure was low yesterday checked by home health nurses.  She is requesting a follow-up visit.  I will help her get one.

## 2022-06-23 ENCOUNTER — TELEPHONE (OUTPATIENT)
Dept: CARDIOLOGY | Facility: CLINIC | Age: 72
End: 2022-06-23
Payer: MEDICARE

## 2022-06-23 NOTE — TELEPHONE ENCOUNTER
Appointment booked and patient aware.  Appointment slip mailed to the patient at her home address per patient request.

## 2022-06-23 NOTE — TELEPHONE ENCOUNTER
----- Message from Randell Mtz MD sent at 6/22/2022  7:51 AM CDT -----  Please assist her in getting a follow-up appointment.  I spoke to her this morning.

## 2022-07-06 ENCOUNTER — EXTERNAL HOME HEALTH (OUTPATIENT)
Dept: HOME HEALTH SERVICES | Facility: HOSPITAL | Age: 72
End: 2022-07-06
Payer: MEDICARE

## 2022-07-14 ENCOUNTER — OFFICE VISIT (OUTPATIENT)
Dept: INTERNAL MEDICINE | Facility: CLINIC | Age: 72
End: 2022-07-14
Payer: MEDICARE

## 2022-07-14 ENCOUNTER — LAB VISIT (OUTPATIENT)
Dept: LAB | Facility: HOSPITAL | Age: 72
End: 2022-07-14
Attending: INTERNAL MEDICINE
Payer: MEDICARE

## 2022-07-14 VITALS
HEIGHT: 62 IN | OXYGEN SATURATION: 93 % | HEART RATE: 107 BPM | BODY MASS INDEX: 23.17 KG/M2 | DIASTOLIC BLOOD PRESSURE: 60 MMHG | SYSTOLIC BLOOD PRESSURE: 119 MMHG | WEIGHT: 125.88 LBS

## 2022-07-14 DIAGNOSIS — I15.2 HYPERTENSION ASSOCIATED WITH DIABETES: Primary | ICD-10-CM

## 2022-07-14 DIAGNOSIS — I25.118 CORONARY ARTERY DISEASE OF NATIVE ARTERY OF NATIVE HEART WITH STABLE ANGINA PECTORIS: ICD-10-CM

## 2022-07-14 DIAGNOSIS — I50.32 CHRONIC DIASTOLIC CONGESTIVE HEART FAILURE: ICD-10-CM

## 2022-07-14 DIAGNOSIS — E11.65 TYPE 2 DIABETES MELLITUS WITH HYPERGLYCEMIA, WITH LONG-TERM CURRENT USE OF INSULIN: ICD-10-CM

## 2022-07-14 DIAGNOSIS — E11.59 HYPERTENSION ASSOCIATED WITH DIABETES: Primary | ICD-10-CM

## 2022-07-14 DIAGNOSIS — F33.41 RECURRENT MAJOR DEPRESSIVE DISORDER, IN PARTIAL REMISSION: ICD-10-CM

## 2022-07-14 DIAGNOSIS — E11.69 HYPERLIPIDEMIA ASSOCIATED WITH TYPE 2 DIABETES MELLITUS: ICD-10-CM

## 2022-07-14 DIAGNOSIS — I63.9 CEREBROVASCULAR ACCIDENT (CVA), UNSPECIFIED MECHANISM: ICD-10-CM

## 2022-07-14 DIAGNOSIS — Z79.4 TYPE 2 DIABETES MELLITUS WITH HYPERGLYCEMIA, WITH LONG-TERM CURRENT USE OF INSULIN: ICD-10-CM

## 2022-07-14 DIAGNOSIS — E78.5 HYPERLIPIDEMIA ASSOCIATED WITH TYPE 2 DIABETES MELLITUS: ICD-10-CM

## 2022-07-14 DIAGNOSIS — G40.909 SEIZURE DISORDER: ICD-10-CM

## 2022-07-14 DIAGNOSIS — J44.9 CHRONIC OBSTRUCTIVE PULMONARY DISEASE, UNSPECIFIED COPD TYPE: ICD-10-CM

## 2022-07-14 DIAGNOSIS — D50.9 IRON DEFICIENCY ANEMIA, UNSPECIFIED IRON DEFICIENCY ANEMIA TYPE: ICD-10-CM

## 2022-07-14 DIAGNOSIS — I70.0 AORTIC ATHEROSCLEROSIS: ICD-10-CM

## 2022-07-14 LAB
CHOLEST SERPL-MCNC: 142 MG/DL (ref 120–199)
CHOLEST/HDLC SERPL: 3.2 {RATIO} (ref 2–5)
ESTIMATED AVG GLUCOSE: 212 MG/DL (ref 68–131)
HBA1C MFR BLD: 9 % (ref 4–5.6)
HDLC SERPL-MCNC: 45 MG/DL (ref 40–75)
HDLC SERPL: 31.7 % (ref 20–50)
LDLC SERPL CALC-MCNC: 78 MG/DL (ref 63–159)
NONHDLC SERPL-MCNC: 97 MG/DL
TRIGL SERPL-MCNC: 95 MG/DL (ref 30–150)

## 2022-07-14 PROCEDURE — 3008F BODY MASS INDEX DOCD: CPT | Mod: CPTII,S$GLB,, | Performed by: INTERNAL MEDICINE

## 2022-07-14 PROCEDURE — 1160F PR REVIEW ALL MEDS BY PRESCRIBER/CLIN PHARMACIST DOCUMENTED: ICD-10-PCS | Mod: CPTII,S$GLB,, | Performed by: INTERNAL MEDICINE

## 2022-07-14 PROCEDURE — 3066F PR DOCUMENTATION OF TREATMENT FOR NEPHROPATHY: ICD-10-PCS | Mod: CPTII,S$GLB,, | Performed by: INTERNAL MEDICINE

## 2022-07-14 PROCEDURE — 3052F HG A1C>EQUAL 8.0%<EQUAL 9.0%: CPT | Mod: CPTII,S$GLB,, | Performed by: INTERNAL MEDICINE

## 2022-07-14 PROCEDURE — 99499 UNLISTED E&M SERVICE: CPT | Mod: S$GLB,,, | Performed by: INTERNAL MEDICINE

## 2022-07-14 PROCEDURE — 3288F PR FALLS RISK ASSESSMENT DOCUMENTED: ICD-10-PCS | Mod: CPTII,S$GLB,, | Performed by: INTERNAL MEDICINE

## 2022-07-14 PROCEDURE — 1101F PT FALLS ASSESS-DOCD LE1/YR: CPT | Mod: CPTII,S$GLB,, | Performed by: INTERNAL MEDICINE

## 2022-07-14 PROCEDURE — 99999 PR PBB SHADOW E&M-EST. PATIENT-LVL III: ICD-10-PCS | Mod: PBBFAC,,, | Performed by: INTERNAL MEDICINE

## 2022-07-14 PROCEDURE — 3074F PR MOST RECENT SYSTOLIC BLOOD PRESSURE < 130 MM HG: ICD-10-PCS | Mod: CPTII,S$GLB,, | Performed by: INTERNAL MEDICINE

## 2022-07-14 PROCEDURE — 80061 LIPID PANEL: CPT | Performed by: INTERNAL MEDICINE

## 2022-07-14 PROCEDURE — 36415 COLL VENOUS BLD VENIPUNCTURE: CPT | Mod: PO | Performed by: INTERNAL MEDICINE

## 2022-07-14 PROCEDURE — 3078F DIAST BP <80 MM HG: CPT | Mod: CPTII,S$GLB,, | Performed by: INTERNAL MEDICINE

## 2022-07-14 PROCEDURE — 1101F PR PT FALLS ASSESS DOC 0-1 FALLS W/OUT INJ PAST YR: ICD-10-PCS | Mod: CPTII,S$GLB,, | Performed by: INTERNAL MEDICINE

## 2022-07-14 PROCEDURE — 3061F NEG MICROALBUMINURIA REV: CPT | Mod: CPTII,S$GLB,, | Performed by: INTERNAL MEDICINE

## 2022-07-14 PROCEDURE — 1126F AMNT PAIN NOTED NONE PRSNT: CPT | Mod: CPTII,S$GLB,, | Performed by: INTERNAL MEDICINE

## 2022-07-14 PROCEDURE — 3288F FALL RISK ASSESSMENT DOCD: CPT | Mod: CPTII,S$GLB,, | Performed by: INTERNAL MEDICINE

## 2022-07-14 PROCEDURE — 3008F PR BODY MASS INDEX (BMI) DOCUMENTED: ICD-10-PCS | Mod: CPTII,S$GLB,, | Performed by: INTERNAL MEDICINE

## 2022-07-14 PROCEDURE — 99999 PR PBB SHADOW E&M-EST. PATIENT-LVL III: CPT | Mod: PBBFAC,,, | Performed by: INTERNAL MEDICINE

## 2022-07-14 PROCEDURE — 1157F PR ADVANCE CARE PLAN OR EQUIV PRESENT IN MEDICAL RECORD: ICD-10-PCS | Mod: CPTII,S$GLB,, | Performed by: INTERNAL MEDICINE

## 2022-07-14 PROCEDURE — 3074F SYST BP LT 130 MM HG: CPT | Mod: CPTII,S$GLB,, | Performed by: INTERNAL MEDICINE

## 2022-07-14 PROCEDURE — 99214 PR OFFICE/OUTPT VISIT, EST, LEVL IV, 30-39 MIN: ICD-10-PCS | Mod: S$GLB,,, | Performed by: INTERNAL MEDICINE

## 2022-07-14 PROCEDURE — 1157F ADVNC CARE PLAN IN RCRD: CPT | Mod: CPTII,S$GLB,, | Performed by: INTERNAL MEDICINE

## 2022-07-14 PROCEDURE — 1126F PR PAIN SEVERITY QUANTIFIED, NO PAIN PRESENT: ICD-10-PCS | Mod: CPTII,S$GLB,, | Performed by: INTERNAL MEDICINE

## 2022-07-14 PROCEDURE — 99499 RISK ADDL DX/OHS AUDIT: ICD-10-PCS | Mod: S$GLB,,, | Performed by: INTERNAL MEDICINE

## 2022-07-14 PROCEDURE — 1159F MED LIST DOCD IN RCRD: CPT | Mod: CPTII,S$GLB,, | Performed by: INTERNAL MEDICINE

## 2022-07-14 PROCEDURE — 99214 OFFICE O/P EST MOD 30 MIN: CPT | Mod: S$GLB,,, | Performed by: INTERNAL MEDICINE

## 2022-07-14 PROCEDURE — 3061F PR NEG MICROALBUMINURIA RESULT DOCUMENTED/REVIEW: ICD-10-PCS | Mod: CPTII,S$GLB,, | Performed by: INTERNAL MEDICINE

## 2022-07-14 PROCEDURE — 3052F PR MOST RECENT HEMOGLOBIN A1C LEVEL 8.0 - < 9.0%: ICD-10-PCS | Mod: CPTII,S$GLB,, | Performed by: INTERNAL MEDICINE

## 2022-07-14 PROCEDURE — 1159F PR MEDICATION LIST DOCUMENTED IN MEDICAL RECORD: ICD-10-PCS | Mod: CPTII,S$GLB,, | Performed by: INTERNAL MEDICINE

## 2022-07-14 PROCEDURE — 3078F PR MOST RECENT DIASTOLIC BLOOD PRESSURE < 80 MM HG: ICD-10-PCS | Mod: CPTII,S$GLB,, | Performed by: INTERNAL MEDICINE

## 2022-07-14 PROCEDURE — 83036 HEMOGLOBIN GLYCOSYLATED A1C: CPT | Performed by: INTERNAL MEDICINE

## 2022-07-14 PROCEDURE — 1160F RVW MEDS BY RX/DR IN RCRD: CPT | Mod: CPTII,S$GLB,, | Performed by: INTERNAL MEDICINE

## 2022-07-14 PROCEDURE — 3066F NEPHROPATHY DOC TX: CPT | Mod: CPTII,S$GLB,, | Performed by: INTERNAL MEDICINE

## 2022-07-14 NOTE — PROGRESS NOTES
Assessment:       1. Hypertension associated with diabetes    2. Recurrent major depressive disorder, in partial remission    3. Hyperlipidemia associated with type 2 diabetes mellitus    4. Chronic obstructive pulmonary disease, unspecified COPD type    5. Coronary artery disease of native artery of native heart with stable angina pectoris    6. Chronic diastolic congestive heart failure    7. Iron deficiency anemia, unspecified iron deficiency anemia type    8. Seizure disorder    9. Type 2 diabetes mellitus with hyperglycemia, with long-term current use of insulin    10. Aortic atherosclerosis    11. Cerebrovascular accident (CVA), unspecified mechanism        Plan:         Chayito was seen today for follow-up.    Diagnoses and all orders for this visit:    Hypertension associated with diabetes  Chronic  Controlled  Patient is at goal today   I have reviewed lifestyle modification to achieve/maintain goals  We will continue the current medication regimen as listed below  Patient will follow up in 4 weeks  Recurrent major depressive disorder, in partial remission    Hyperlipidemia associated with type 2 diabetes mellitus    Chronic obstructive pulmonary disease, unspecified COPD type    Coronary artery disease of native artery of native heart with stable angina pectoris    Chronic diastolic congestive heart failure    Iron deficiency anemia, unspecified iron deficiency anemia type    Seizure disorder    Type 2 diabetes mellitus with hyperglycemia, with long-term current use of insulin  Chronic  Uncontrolled  Patient is at goal today   I have reviewed lifestyle modification to achieve/maintain goals  We will continue the current medication regimen as listed below  Patient will follow up in 4 weeks  -     Hemoglobin A1C; Future  -     Lipid Panel; Future    Aortic atherosclerosis    Cerebrovascular accident (CVA), unspecified mechanism      Check LABS  ADJUST ACCORDINGLY     Subjective:       Patient ID: Chayito BARTON Sheldon  "is a 71 y.o. female.    Chief Complaint: Follow-up (4 week)  came 1hr late for 20 min appointment   Patient reports feeling well.  She recently had a biopsy of the lung yesterday.  She is feeling okay no shortness of breaths chest pain or hemoptysis currently the notion labs performed prior to visit.  She is overdue for follow-up of diabetes.    HPI  Review of Systems   All other systems reviewed and are negative.        Objective:     /60 (BP Location: Right arm, Patient Position: Sitting, BP Method: Medium (Manual))   Pulse 107   Ht 5' 2" (1.575 m)   Wt 57.1 kg (125 lb 14.1 oz)   LMP  (LMP Unknown)   SpO2 (!) 93%   BMI 23.02 kg/m²       Wt Readings from Last 1 Encounters:   07/14/22 1102 57.1 kg (125 lb 14.1 oz)       BMI Readings from Last 1 Encounters:   07/14/22 23.02 kg/m²            Physical Exam  Nursing note reviewed.   Constitutional:       General: She is not in acute distress.     Appearance: Normal appearance. She is not ill-appearing, toxic-appearing or diaphoretic.   HENT:      Head: Normocephalic.   Eyes:      Conjunctiva/sclera: Conjunctivae normal.   Pulmonary:      Effort: Pulmonary effort is normal. No respiratory distress.   Neurological:      General: No focal deficit present.      Mental Status: She is alert and oriented to person, place, and time.   Psychiatric:         Mood and Affect: Mood normal.         Behavior: Behavior normal.         Thought Content: Thought content normal.         Judgment: Judgment normal.             Future Appointments   Date Time Provider Department Center   8/1/2022 11:00 AM Randell Mtz MD [facility] [place] [facility]   9/6/2022  7:30 AM Gaurav Malin MD [facility] [place] [facility]   10/11/2022  8:45 AM LAB, [facility]   10/14/2022  9:20 AM Cameron Valladares III, MD [facility]         Medication List with Changes/Refills   Current Medications    ALBUTEROL (PROVENTIL/VENTOLIN HFA) 90 MCG/ACTUATION INHALER    INHALE 2 PUFFS BY " "MOUTH INTO THE LUNGS EVERY 6 HOURS AS NEEDED FOR WHEEZING    ALBUTEROL-IPRATROPIUM (DUO-NEB) 2.5 MG-0.5 MG/3 ML NEBULIZER SOLUTION    Take 3 mLs by nebulization every 4 (four) hours as needed for Wheezing or Shortness of Breath. Rescue    ALENDRONATE (FOSAMAX) 70 MG TABLET    Take 1 tablet (70 mg total) by mouth every 7 days.    ASPIRIN 81 MG CHEW    Take 1 tablet (81 mg total) by mouth once daily.    ATORVASTATIN (LIPITOR) 40 MG TABLET    Take 1 tablet (40 mg total) by mouth once daily.    BD ULTRA-FINE MINI PEN NEEDLE 31 GAUGE X 3/16" NDLE        DICLOFENAC SODIUM (VOLTAREN) 1 % GEL    Apply 2 g topically 4 (four) times daily.    DULOXETINE (CYMBALTA) 60 MG CAPSULE    Take 60 mg by mouth once daily. Take with 30mg for a total of 90mg daily    FERROUS SULFATE (FEOSOL) 325 MG (65 MG IRON) TAB TABLET    Take 1 tablet (325 mg total) by mouth once daily.    FLUTICASONE PROPIONATE (FLONASE) 50 MCG/ACTUATION NASAL SPRAY    2 sprays (100 mcg total) by Each Nare route once daily.    HYDROCHLOROTHIAZIDE (MICROZIDE) 12.5 MG CAPSULE    Take 1 capsule (12.5 mg total) by mouth once daily.    HYDROCODONE-ACETAMINOPHEN 7.5-300 MG TAB    Take by mouth.    INSULIN LISPRO 100 UNIT/ML PEN    Inject 5 Units into the skin 3 (three) times daily after meals.    LANTUS SOLOSTAR U-100 INSULIN GLARGINE 100 UNITS/ML (3ML) SUBQ PEN    Inject 20 Units into the skin nightly.    LOSARTAN (COZAAR) 50 MG TABLET    Take 1 tablet (50 mg total) by mouth once daily.    MAGNESIUM OXIDE (MAG-OX) 400 MG (241.3 MG MAGNESIUM) TABLET    Take 400 mg by mouth once daily.    METFORMIN (GLUCOPHAGE) 1000 MG TABLET    Take 1,000 mg by mouth 2 (two) times daily.    NITROGLYCERIN (NITROSTAT) 0.4 MG SL TABLET    Place 1 tablet (0.4 mg total) under the tongue every 5 (five) minutes as needed for Chest pain.    ONDANSETRON (ZOFRAN-ODT) 4 MG TBDL    Take 1 tablet (4 mg total) by mouth every 8 (eight) hours as needed (nausea).    POTASSIUM CHLORIDE SA (K-DUR,KLOR-CON) " 20 MEQ TABLET    Take 1 tablet (20 mEq total) by mouth once daily.    QUETIAPINE (SEROQUEL) 300 MG TAB    Take 1 tablet (300 mg total) by mouth every evening.    TIZANIDINE 4 MG CAP    Take by mouth.    TOPIRAMATE (TOPAMAX) 50 MG TABLET    Take 50 mg by mouth 2 (two) times daily.    TRELEGY ELLIPTA 200-62.5-25 MCG INHALER    Inhale 1 puff into the lungs once daily.    VERAPAMIL (CALAN-SR) 180 MG CR TABLET    Take 1 tablet (180 mg total) by mouth every evening.    ZONISAMIDE (ZONEGRAN) 100 MG CAP    Take 400 mg by mouth once daily.         Disclaimer:  This note has been generated using voice-recognition software. There may be grammatical or spelling errors that have been missed during proof-reading

## 2022-07-14 NOTE — PATIENT INSTRUCTIONS
We were happy to see you today    For your Testing  Please have your labs and/or imaging test done  today and in 3 motnhs         For your Medication   No changes  For more information about side effects please visit medlineplus.gov        For your Referrals  none      For your Vaccinations  We gave your the following vaccines  Please obtain the following vaccines at the pharmacy          Please return to clinic in    Follow up for 3 months Office Visit with prelabs.        Extra resources

## 2022-07-20 ENCOUNTER — TELEPHONE (OUTPATIENT)
Dept: INTERNAL MEDICINE | Facility: CLINIC | Age: 72
End: 2022-07-20
Payer: MEDICARE

## 2022-07-20 NOTE — TELEPHONE ENCOUNTER
----- Message from Cameron Valladares III, MD sent at 7/19/2022  4:38 PM CDT -----  Can we set up audio visit

## 2022-07-20 NOTE — TELEPHONE ENCOUNTER
----- Message from Elissa Kent Patient Care Assistant sent at 7/20/2022  1:09 PM CDT -----  Type:  Patient Returning Call    Who Called: pt  Who Left Message for Patient: office  Does the patient know what this is regarding?: yes  Would the patient rather a call back or a response via [facility]?  Please call  Best Call Back Number: [phone]   Additional Information:

## 2022-07-25 ENCOUNTER — TELEPHONE (OUTPATIENT)
Dept: INTERNAL MEDICINE | Facility: CLINIC | Age: 72
End: 2022-07-25
Payer: MEDICARE

## 2022-07-25 DIAGNOSIS — C34.90 MALIGNANT NEOPLASM OF LUNG, UNSPECIFIED LATERALITY, UNSPECIFIED PART OF LUNG: ICD-10-CM

## 2022-07-25 NOTE — TELEPHONE ENCOUNTER
Patient was informed last week that she has LUNG Cancer and needs to start chemo as soon as possible. Pt stated that she still has not heard from anyone and would like to start chemo ASAP.

## 2022-08-01 ENCOUNTER — OFFICE VISIT (OUTPATIENT)
Dept: INTERNAL MEDICINE | Facility: CLINIC | Age: 72
End: 2022-08-01
Payer: MEDICARE

## 2022-08-01 VITALS
WEIGHT: 122.56 LBS | DIASTOLIC BLOOD PRESSURE: 68 MMHG | HEIGHT: 62 IN | BODY MASS INDEX: 22.55 KG/M2 | SYSTOLIC BLOOD PRESSURE: 114 MMHG | OXYGEN SATURATION: 98 % | HEART RATE: 110 BPM

## 2022-08-01 DIAGNOSIS — E11.42 DIABETIC PERIPHERAL NEUROPATHY: Primary | ICD-10-CM

## 2022-08-01 PROCEDURE — 3008F PR BODY MASS INDEX (BMI) DOCUMENTED: ICD-10-PCS | Mod: CPTII,S$GLB,, | Performed by: INTERNAL MEDICINE

## 2022-08-01 PROCEDURE — 3066F NEPHROPATHY DOC TX: CPT | Mod: CPTII,S$GLB,, | Performed by: INTERNAL MEDICINE

## 2022-08-01 PROCEDURE — 99213 PR OFFICE/OUTPT VISIT, EST, LEVL III, 20-29 MIN: ICD-10-PCS | Mod: S$GLB,,, | Performed by: INTERNAL MEDICINE

## 2022-08-01 PROCEDURE — 3288F PR FALLS RISK ASSESSMENT DOCUMENTED: ICD-10-PCS | Mod: CPTII,S$GLB,, | Performed by: INTERNAL MEDICINE

## 2022-08-01 PROCEDURE — 1125F AMNT PAIN NOTED PAIN PRSNT: CPT | Mod: CPTII,S$GLB,, | Performed by: INTERNAL MEDICINE

## 2022-08-01 PROCEDURE — 1159F PR MEDICATION LIST DOCUMENTED IN MEDICAL RECORD: ICD-10-PCS | Mod: CPTII,S$GLB,, | Performed by: INTERNAL MEDICINE

## 2022-08-01 PROCEDURE — 99213 OFFICE O/P EST LOW 20 MIN: CPT | Mod: S$GLB,,, | Performed by: INTERNAL MEDICINE

## 2022-08-01 PROCEDURE — 3008F BODY MASS INDEX DOCD: CPT | Mod: CPTII,S$GLB,, | Performed by: INTERNAL MEDICINE

## 2022-08-01 PROCEDURE — 3074F SYST BP LT 130 MM HG: CPT | Mod: CPTII,S$GLB,, | Performed by: INTERNAL MEDICINE

## 2022-08-01 PROCEDURE — 1160F PR REVIEW ALL MEDS BY PRESCRIBER/CLIN PHARMACIST DOCUMENTED: ICD-10-PCS | Mod: CPTII,S$GLB,, | Performed by: INTERNAL MEDICINE

## 2022-08-01 PROCEDURE — 3066F PR DOCUMENTATION OF TREATMENT FOR NEPHROPATHY: ICD-10-PCS | Mod: CPTII,S$GLB,, | Performed by: INTERNAL MEDICINE

## 2022-08-01 PROCEDURE — 99999 PR PBB SHADOW E&M-EST. PATIENT-LVL III: CPT | Mod: PBBFAC,,, | Performed by: INTERNAL MEDICINE

## 2022-08-01 PROCEDURE — 3061F NEG MICROALBUMINURIA REV: CPT | Mod: CPTII,S$GLB,, | Performed by: INTERNAL MEDICINE

## 2022-08-01 PROCEDURE — 3288F FALL RISK ASSESSMENT DOCD: CPT | Mod: CPTII,S$GLB,, | Performed by: INTERNAL MEDICINE

## 2022-08-01 PROCEDURE — 3074F PR MOST RECENT SYSTOLIC BLOOD PRESSURE < 130 MM HG: ICD-10-PCS | Mod: CPTII,S$GLB,, | Performed by: INTERNAL MEDICINE

## 2022-08-01 PROCEDURE — 3052F PR MOST RECENT HEMOGLOBIN A1C LEVEL 8.0 - < 9.0%: ICD-10-PCS | Mod: CPTII,S$GLB,, | Performed by: INTERNAL MEDICINE

## 2022-08-01 PROCEDURE — 3052F HG A1C>EQUAL 8.0%<EQUAL 9.0%: CPT | Mod: CPTII,S$GLB,, | Performed by: INTERNAL MEDICINE

## 2022-08-01 PROCEDURE — 1157F PR ADVANCE CARE PLAN OR EQUIV PRESENT IN MEDICAL RECORD: ICD-10-PCS | Mod: CPTII,S$GLB,, | Performed by: INTERNAL MEDICINE

## 2022-08-01 PROCEDURE — 1101F PR PT FALLS ASSESS DOC 0-1 FALLS W/OUT INJ PAST YR: ICD-10-PCS | Mod: CPTII,S$GLB,, | Performed by: INTERNAL MEDICINE

## 2022-08-01 PROCEDURE — 1101F PT FALLS ASSESS-DOCD LE1/YR: CPT | Mod: CPTII,S$GLB,, | Performed by: INTERNAL MEDICINE

## 2022-08-01 PROCEDURE — 1160F RVW MEDS BY RX/DR IN RCRD: CPT | Mod: CPTII,S$GLB,, | Performed by: INTERNAL MEDICINE

## 2022-08-01 PROCEDURE — 99999 PR PBB SHADOW E&M-EST. PATIENT-LVL III: ICD-10-PCS | Mod: PBBFAC,,, | Performed by: INTERNAL MEDICINE

## 2022-08-01 PROCEDURE — 3061F PR NEG MICROALBUMINURIA RESULT DOCUMENTED/REVIEW: ICD-10-PCS | Mod: CPTII,S$GLB,, | Performed by: INTERNAL MEDICINE

## 2022-08-01 PROCEDURE — 3078F DIAST BP <80 MM HG: CPT | Mod: CPTII,S$GLB,, | Performed by: INTERNAL MEDICINE

## 2022-08-01 PROCEDURE — 1157F ADVNC CARE PLAN IN RCRD: CPT | Mod: CPTII,S$GLB,, | Performed by: INTERNAL MEDICINE

## 2022-08-01 PROCEDURE — 1125F PR PAIN SEVERITY QUANTIFIED, PAIN PRESENT: ICD-10-PCS | Mod: CPTII,S$GLB,, | Performed by: INTERNAL MEDICINE

## 2022-08-01 PROCEDURE — 3078F PR MOST RECENT DIASTOLIC BLOOD PRESSURE < 80 MM HG: ICD-10-PCS | Mod: CPTII,S$GLB,, | Performed by: INTERNAL MEDICINE

## 2022-08-01 PROCEDURE — 1159F MED LIST DOCD IN RCRD: CPT | Mod: CPTII,S$GLB,, | Performed by: INTERNAL MEDICINE

## 2022-08-01 NOTE — PROGRESS NOTES
"Assessment:       1. Diabetic peripheral neuropathy        Plan:         Chayito was seen today for hypertension and diabetes.    Diagnoses and all orders for this visit:    Diabetic peripheral neuropathy    will check with psych to resume cymbalta and lydrica for pain    reviewed signs and symptoms that should prompt return to provider or evaluation in the ED      Subjective:       Patient ID: Chayito Chung is a 71 y.o. female.    Chief Complaint: Hypertension and Diabetes      PATIENT HAS  Pain in the feet bilaterall she was d/c of cymablta and cut lycria dose for unclear reasons by private psych.     HPI  Review of Systems   All other systems reviewed and are negative.        Objective:     /68 (BP Location: Right arm, Patient Position: Sitting, BP Method: Medium (Manual))   Pulse 110   Ht 5' 2" (1.575 m)   Wt 55.6 kg (122 lb 9.2 oz)   LMP  (LMP Unknown)   SpO2 98%   BMI 22.42 kg/m²       Wt Readings from Last 1 Encounters:   08/01/22 1443 55.6 kg (122 lb 9.2 oz)       BMI Readings from Last 1 Encounters:   08/01/22 22.42 kg/m²            Physical Exam  HENT:      Head: Normocephalic.   Cardiovascular:      Rate and Rhythm: Normal rate and regular rhythm.      Pulses:           Dorsalis pedis pulses are 2+ on the right side and 2+ on the left side.        Posterior tibial pulses are 2+ on the right side and 2+ on the left side.      Heart sounds: No murmur heard.    No friction rub. No gallop.   Pulmonary:      Effort: Pulmonary effort is normal. No respiratory distress.      Breath sounds: Normal breath sounds. No stridor.   Abdominal:      General: Abdomen is flat. There is no distension.   Musculoskeletal:      Right foot: Normal range of motion. No deformity or bunion.      Left foot: Normal range of motion. No deformity or bunion.   Feet:      Right foot:      Protective Sensation: 10 sites tested. 10 sites sensed.      Skin integrity: No ulcer, blister, skin breakdown, erythema or warmth.      " "Left foot:      Protective Sensation: 10 sites tested. 10 sites sensed.      Skin integrity: No ulcer, blister, skin breakdown, erythema or warmth.   Neurological:      General: No focal deficit present.      Mental Status: She is alert and oriented to person, place, and time.             Future Appointments   Date Time Provider Department Center   8/22/2022 10:40 AM MD TAYE Dunbar III [facility]   9/6/2022  7:30 AM Gaurav Malin MD [facility]   10/11/2022  8:45 AM LAB, [facility]   10/14/2022  9:20 AM MD TAYE Dunbar III [facility]         Medication List with Changes/Refills   Current Medications    ALBUTEROL (PROVENTIL/VENTOLIN HFA) 90 MCG/ACTUATION INHALER    INHALE 2 PUFFS BY MOUTH INTO THE LUNGS EVERY 6 HOURS AS NEEDED FOR WHEEZING    ALBUTEROL-IPRATROPIUM (DUO-NEB) 2.5 MG-0.5 MG/3 ML NEBULIZER SOLUTION    Take 3 mLs by nebulization every 4 (four) hours as needed for Wheezing or Shortness of Breath. Rescue    ALENDRONATE (FOSAMAX) 70 MG TABLET    Take 1 tablet (70 mg total) by mouth every 7 days.    ASPIRIN 81 MG CHEW    Take 1 tablet (81 mg total) by mouth once daily.    ATORVASTATIN (LIPITOR) 40 MG TABLET    Take 1 tablet (40 mg total) by mouth once daily.    BD ULTRA-FINE MINI PEN NEEDLE 31 GAUGE X 3/16" NDLE        DICLOFENAC SODIUM (VOLTAREN) 1 % GEL    Apply 2 g topically 4 (four) times daily.    DULOXETINE (CYMBALTA) 60 MG CAPSULE    Take 60 mg by mouth once daily. Take with 30mg for a total of 90mg daily    FERROUS SULFATE (FEOSOL) 325 MG (65 MG IRON) TAB TABLET    Take 1 tablet (325 mg total) by mouth once daily.    FLUTICASONE PROPIONATE (FLONASE) 50 MCG/ACTUATION NASAL SPRAY    2 sprays (100 mcg total) by Each Nare route once daily.    HYDROCHLOROTHIAZIDE (MICROZIDE) 12.5 MG CAPSULE    Take 1 capsule (12.5 mg total) by mouth once daily.    HYDROCODONE-ACETAMINOPHEN 7.5-300 MG TAB    Take by mouth.    INSULIN LISPRO 100 UNIT/ML PEN    Inject 5 " Units into the skin 3 (three) times daily after meals.    LANTUS SOLOSTAR U-100 INSULIN GLARGINE 100 UNITS/ML (3ML) SUBQ PEN    Inject 20 Units into the skin nightly.    LOSARTAN (COZAAR) 50 MG TABLET    Take 1 tablet (50 mg total) by mouth once daily.    MAGNESIUM OXIDE (MAG-OX) 400 MG (241.3 MG MAGNESIUM) TABLET    Take 400 mg by mouth once daily.    METFORMIN (GLUCOPHAGE) 1000 MG TABLET    Take 1,000 mg by mouth 2 (two) times daily.    NITROGLYCERIN (NITROSTAT) 0.4 MG SL TABLET    Place 1 tablet (0.4 mg total) under the tongue every 5 (five) minutes as needed for Chest pain.    ONDANSETRON (ZOFRAN-ODT) 4 MG TBDL    Take 1 tablet (4 mg total) by mouth every 8 (eight) hours as needed (nausea).    POTASSIUM CHLORIDE SA (K-DUR,KLOR-CON) 20 MEQ TABLET    Take 1 tablet (20 mEq total) by mouth once daily.    QUETIAPINE (SEROQUEL) 300 MG TAB    Take 1 tablet (300 mg total) by mouth every evening.    TIZANIDINE 4 MG CAP    Take by mouth.    TOPIRAMATE (TOPAMAX) 50 MG TABLET    Take 50 mg by mouth 2 (two) times daily.    TRELEGY ELLIPTA 200-62.5-25 MCG INHALER    Inhale 1 puff into the lungs once daily.    VERAPAMIL (CALAN-SR) 180 MG CR TABLET    Take 1 tablet (180 mg total) by mouth every evening.    ZONISAMIDE (ZONEGRAN) 100 MG CAP    Take 400 mg by mouth once daily.         Disclaimer:  This note has been generated using voice-recognition software. There may be grammatical or spelling errors that have been missed during proof-reading

## 2022-08-02 PROCEDURE — G0179 PR HOME HEALTH MD RECERTIFICATION: ICD-10-PCS | Mod: ,,, | Performed by: INTERNAL MEDICINE

## 2022-08-02 PROCEDURE — G0179 MD RECERTIFICATION HHA PT: HCPCS | Mod: ,,, | Performed by: INTERNAL MEDICINE

## 2022-08-05 ENCOUNTER — PATIENT OUTREACH (OUTPATIENT)
Dept: ADMINISTRATIVE | Facility: OTHER | Age: 72
End: 2022-08-05
Payer: MEDICARE

## 2022-08-11 ENCOUNTER — TELEPHONE (OUTPATIENT)
Dept: ORTHOPEDICS | Facility: CLINIC | Age: 72
End: 2022-08-11
Payer: MEDICARE

## 2022-08-11 DIAGNOSIS — M50.30 DDD (DEGENERATIVE DISC DISEASE), CERVICAL: Primary | ICD-10-CM

## 2022-08-11 NOTE — TELEPHONE ENCOUNTER
Spoke to patient in regards to scheduling an appointment for upper back pain. Patient schedule to see Dr. Ellis. On 08/16/2022 for 11:45am. Patient stated thank you. Thanks.

## 2022-08-14 ENCOUNTER — DOCUMENT SCAN (OUTPATIENT)
Dept: HOME HEALTH SERVICES | Facility: HOSPITAL | Age: 72
End: 2022-08-14
Payer: MEDICARE

## 2022-08-16 ENCOUNTER — OFFICE VISIT (OUTPATIENT)
Dept: ORTHOPEDICS | Facility: CLINIC | Age: 72
End: 2022-08-16
Payer: MEDICARE

## 2022-08-16 ENCOUNTER — HOSPITAL ENCOUNTER (OUTPATIENT)
Dept: RADIOLOGY | Facility: HOSPITAL | Age: 72
Discharge: HOME | End: 2022-08-16
Attending: ORTHOPAEDIC SURGERY
Payer: MEDICARE

## 2022-08-16 VITALS — BODY MASS INDEX: 23.29 KG/M2 | WEIGHT: 126.56 LBS | HEIGHT: 62 IN

## 2022-08-16 DIAGNOSIS — M50.30 DDD (DEGENERATIVE DISC DISEASE), CERVICAL: ICD-10-CM

## 2022-08-16 DIAGNOSIS — Z98.1 S/P CERVICAL SPINAL FUSION: Primary | ICD-10-CM

## 2022-08-16 DIAGNOSIS — M47.812 CERVICAL SPONDYLOSIS: ICD-10-CM

## 2022-08-16 PROCEDURE — 1157F ADVNC CARE PLAN IN RCRD: CPT | Mod: CPTII,S$GLB,, | Performed by: ORTHOPAEDIC SURGERY

## 2022-08-16 PROCEDURE — 3288F PR FALLS RISK ASSESSMENT DOCUMENTED: ICD-10-PCS | Mod: CPTII,S$GLB,, | Performed by: ORTHOPAEDIC SURGERY

## 2022-08-16 PROCEDURE — 99213 PR OFFICE/OUTPT VISIT, EST, LEVL III, 20-29 MIN: ICD-10-PCS | Mod: S$GLB,,, | Performed by: ORTHOPAEDIC SURGERY

## 2022-08-16 PROCEDURE — 1157F PR ADVANCE CARE PLAN OR EQUIV PRESENT IN MEDICAL RECORD: ICD-10-PCS | Mod: CPTII,S$GLB,, | Performed by: ORTHOPAEDIC SURGERY

## 2022-08-16 PROCEDURE — 3066F NEPHROPATHY DOC TX: CPT | Mod: CPTII,S$GLB,, | Performed by: ORTHOPAEDIC SURGERY

## 2022-08-16 PROCEDURE — 99213 OFFICE O/P EST LOW 20 MIN: CPT | Mod: S$GLB,,, | Performed by: ORTHOPAEDIC SURGERY

## 2022-08-16 PROCEDURE — 72040 X-RAY EXAM NECK SPINE 2-3 VW: CPT | Mod: TC

## 2022-08-16 PROCEDURE — 72040 XR CERVICAL SPINE FLEXION  AND EXTENSION ONLY: ICD-10-PCS | Mod: 26,,, | Performed by: RADIOLOGY

## 2022-08-16 PROCEDURE — 3052F PR MOST RECENT HEMOGLOBIN A1C LEVEL 8.0 - < 9.0%: ICD-10-PCS | Mod: CPTII,S$GLB,, | Performed by: ORTHOPAEDIC SURGERY

## 2022-08-16 PROCEDURE — 3008F BODY MASS INDEX DOCD: CPT | Mod: CPTII,S$GLB,, | Performed by: ORTHOPAEDIC SURGERY

## 2022-08-16 PROCEDURE — 1101F PR PT FALLS ASSESS DOC 0-1 FALLS W/OUT INJ PAST YR: ICD-10-PCS | Mod: CPTII,S$GLB,, | Performed by: ORTHOPAEDIC SURGERY

## 2022-08-16 PROCEDURE — 3061F NEG MICROALBUMINURIA REV: CPT | Mod: CPTII,S$GLB,, | Performed by: ORTHOPAEDIC SURGERY

## 2022-08-16 PROCEDURE — 3052F HG A1C>EQUAL 8.0%<EQUAL 9.0%: CPT | Mod: CPTII,S$GLB,, | Performed by: ORTHOPAEDIC SURGERY

## 2022-08-16 PROCEDURE — 99999 PR PBB SHADOW E&M-EST. PATIENT-LVL III: ICD-10-PCS | Mod: PBBFAC,,, | Performed by: ORTHOPAEDIC SURGERY

## 2022-08-16 PROCEDURE — 3288F FALL RISK ASSESSMENT DOCD: CPT | Mod: CPTII,S$GLB,, | Performed by: ORTHOPAEDIC SURGERY

## 2022-08-16 PROCEDURE — 1160F RVW MEDS BY RX/DR IN RCRD: CPT | Mod: CPTII,S$GLB,, | Performed by: ORTHOPAEDIC SURGERY

## 2022-08-16 PROCEDURE — 99999 PR PBB SHADOW E&M-EST. PATIENT-LVL III: CPT | Mod: PBBFAC,,, | Performed by: ORTHOPAEDIC SURGERY

## 2022-08-16 PROCEDURE — 1159F MED LIST DOCD IN RCRD: CPT | Mod: CPTII,S$GLB,, | Performed by: ORTHOPAEDIC SURGERY

## 2022-08-16 PROCEDURE — 1101F PT FALLS ASSESS-DOCD LE1/YR: CPT | Mod: CPTII,S$GLB,, | Performed by: ORTHOPAEDIC SURGERY

## 2022-08-16 PROCEDURE — 1125F AMNT PAIN NOTED PAIN PRSNT: CPT | Mod: CPTII,S$GLB,, | Performed by: ORTHOPAEDIC SURGERY

## 2022-08-16 PROCEDURE — 3008F PR BODY MASS INDEX (BMI) DOCUMENTED: ICD-10-PCS | Mod: CPTII,S$GLB,, | Performed by: ORTHOPAEDIC SURGERY

## 2022-08-16 PROCEDURE — 1160F PR REVIEW ALL MEDS BY PRESCRIBER/CLIN PHARMACIST DOCUMENTED: ICD-10-PCS | Mod: CPTII,S$GLB,, | Performed by: ORTHOPAEDIC SURGERY

## 2022-08-16 PROCEDURE — 3061F PR NEG MICROALBUMINURIA RESULT DOCUMENTED/REVIEW: ICD-10-PCS | Mod: CPTII,S$GLB,, | Performed by: ORTHOPAEDIC SURGERY

## 2022-08-16 PROCEDURE — 1159F PR MEDICATION LIST DOCUMENTED IN MEDICAL RECORD: ICD-10-PCS | Mod: CPTII,S$GLB,, | Performed by: ORTHOPAEDIC SURGERY

## 2022-08-16 PROCEDURE — 72040 X-RAY EXAM NECK SPINE 2-3 VW: CPT | Mod: 26,,, | Performed by: RADIOLOGY

## 2022-08-16 PROCEDURE — 1125F PR PAIN SEVERITY QUANTIFIED, PAIN PRESENT: ICD-10-PCS | Mod: CPTII,S$GLB,, | Performed by: ORTHOPAEDIC SURGERY

## 2022-08-16 PROCEDURE — 3066F PR DOCUMENTATION OF TREATMENT FOR NEPHROPATHY: ICD-10-PCS | Mod: CPTII,S$GLB,, | Performed by: ORTHOPAEDIC SURGERY

## 2022-08-16 NOTE — PROGRESS NOTES
DATE: 2022  PATIENT: Chayito Chung    Attending Physician: Martinez Ellis M.D.    CHIEF COMPLAINT: neck pain    HISTORY:  Chayito Chung is a 71 y.o. female w/ a hx of C3-T1 PCDF on 20 with Dr. Tate presents for initial evaluation of neck pain (Neck - 10. The pain has been present for 3-4 months without trauma. The patient describes the pain as dull but it does not go down arms. The pain is worse with activity and improved by rest. There is no associated numbness and tingling. There is subjective weakness. Prior treatments have included norco (from her Pain Management doctor), but no PT, CLARISSE or surgery.     She was recently diagnosed with lung adenocarcinoma and will start chemotherapy soon.    The Patient denies myelopathic symptoms such as handwriting changes or difficulty with buttons/coins/keys. Denies perineal paresthesias, bowel/bladder dysfunction.    The patient smokes 1ppd for 55 years; she has DM (HA1C of 9). She does not endorse IVDU. The patient is not on any blood thinners and does not take chronic narcotics. She is a retired cook.    PAST MEDICAL/SURGICAL HISTORY:  Past Medical History:   Diagnosis Date    Acute coronary syndrome     Acute hypoxemic respiratory failure 2017    Anxiety     Asthma     Cancer     colon    Cataracts, both eyes     Chest pain at rest     Chest pain of uncertain etiology 2015    Colon cancer 1988    COPD (chronic obstructive pulmonary disease)     Coronary artery disease     Depression     Diabetes mellitus     Dyspnea on exertion 11/15/2018    Elevated brain natriuretic peptide (BNP) level 11/15/2018    Elevated cholesterol     Hypertension     Swelling     Syncope and collapse 2016     Past Surgical History:   Procedure Laterality Date    ABDOMINAL SURGERY      BREAST BIOPSY      benign unsure what side     SECTION, CLASSIC      COLON SURGERY      COLONOSCOPY  2019    repeat in 5 years    CORONARY  "ANGIOPLASTY WITH STENT PLACEMENT  3 months ago     x2, Hysterectomy, Lung surgery, Partial stomach removed, Part of colon removed for rectal cancer    GASTRECTOMY      HEMORRHOID SURGERY      HYSTERECTOMY      @28yrs of age    LUNG BIOPSY      OOPHORECTOMY      @28yrs of age    POSTERIOR FUSION OF CERVICAL SPINE WITH LAMINECTOMY N/A 2020    Procedure: LAMINECTOMY, SPINE, CERVICAL, WITH POSTERIOR FUSION C3-T1 ;  Surgeon: Herson Tate MD;  Location: [address];  Service: Neurosurgery;  Laterality: N/A;    REPAIR OF INCARCERATED INCISIONAL HERNIA WITHOUT HISTORY OF PRIOR REPAIR N/A 2022    Procedure: REPAIR, HERNIA, INCISIONAL, INCARCERATED, WITHOUT HISTORY OF PRIOR REPAIR;  Surgeon: Simon King MD;  Location: [facility];  Service: General;  Laterality: N/A;       Current Medications:   Current Outpatient Medications:     albuterol (PROVENTIL/VENTOLIN HFA) 90 mcg/actuation inhaler, INHALE 2 PUFFS BY MOUTH INTO THE LUNGS EVERY 6 HOURS AS NEEDED FOR WHEEZING (Patient taking differently: Inhale 2 puffs into the lungs every 6 (six) hours as needed for Wheezing.), Disp: 25.5 g, Rfl: 0    albuterol-ipratropium (DUO-NEB) 2.5 mg-0.5 mg/3 mL nebulizer solution, Take 3 mLs by nebulization every 4 (four) hours as needed for Wheezing or Shortness of Breath. Rescue, Disp: , Rfl:     alendronate (FOSAMAX) 70 MG tablet, Take 1 tablet (70 mg total) by mouth every 7 days. (Patient taking differently: Take 70 mg by mouth every .), Disp: 12 tablet, Rfl: 3    aspirin 81 MG Chew, Take 1 tablet (81 mg total) by mouth once daily., Disp: 90 tablet, Rfl: 3    atorvastatin (LIPITOR) 40 MG tablet, Take 1 tablet (40 mg total) by mouth once daily., Disp: 90 tablet, Rfl: 3    BD ULTRA-FINE MINI PEN NEEDLE 31 gauge x 3/16" Ndle, , Disp: , Rfl:     diclofenac sodium (VOLTAREN) 1 % Gel, Apply 2 g topically 4 (four) times daily., Disp: 100 g, Rfl: 0    duloxetine (CYMBALTA) 60 MG capsule, Take 60 mg by " mouth once daily. Take with 30mg for a total of 90mg daily, Disp: , Rfl:     ferrous sulfate (FEOSOL) 325 mg (65 mg iron) Tab tablet, Take 1 tablet (325 mg total) by mouth once daily., Disp: 30 tablet, Rfl: 3    fluticasone propionate (FLONASE) 50 mcg/actuation nasal spray, 2 sprays (100 mcg total) by Each Nare route once daily., Disp: 1 Bottle, Rfl: 12    hydroCHLOROthiazide (MICROZIDE) 12.5 mg capsule, Take 1 capsule (12.5 mg total) by mouth once daily., Disp: 90 capsule, Rfl: 3    HYDROcodone-acetaminophen 7.5-300 mg Tab, Take by mouth., Disp: , Rfl:     losartan (COZAAR) 50 MG tablet, Take 1 tablet (50 mg total) by mouth once daily., Disp: 90 tablet, Rfl: 3    magnesium oxide (MAG-OX) 400 mg (241.3 mg magnesium) tablet, Take 400 mg by mouth once daily., Disp: , Rfl:     metFORMIN (GLUCOPHAGE) 1000 MG tablet, Take 1,000 mg by mouth 2 (two) times daily., Disp: , Rfl:     nitroGLYCERIN (NITROSTAT) 0.4 MG SL tablet, Place 1 tablet (0.4 mg total) under the tongue every 5 (five) minutes as needed for Chest pain., Disp: 100 tablet, Rfl: 1    ondansetron (ZOFRAN-ODT) 4 MG TbDL, Take 1 tablet (4 mg total) by mouth every 8 (eight) hours as needed (nausea)., Disp: 14 tablet, Rfl: 1    potassium chloride SA (K-DUR,KLOR-CON) 20 MEQ tablet, Take 1 tablet (20 mEq total) by mouth once daily., Disp: 90 tablet, Rfl: 3    QUEtiapine (SEROQUEL) 300 MG Tab, Take 1 tablet (300 mg total) by mouth every evening., Disp: 90 tablet, Rfl: 5    tiZANidine 4 mg Cap, Take by mouth., Disp: , Rfl:     topiramate (TOPAMAX) 50 MG tablet, Take 50 mg by mouth 2 (two) times daily., Disp: , Rfl:     TRELEGY ELLIPTA 200-62.5-25 mcg inhaler, Inhale 1 puff into the lungs once daily., Disp: , Rfl:     zonisamide (ZONEGRAN) 100 MG Cap, Take 400 mg by mouth once daily., Disp: , Rfl:     insulin lispro 100 unit/mL pen, Inject 5 Units into the skin 3 (three) times daily after meals., Disp: 15 mL, Rfl: 0    LANTUS SOLOSTAR U-100 INSULIN  glargine 100 units/mL (3mL) SubQ pen, Inject 20 Units into the skin nightly., Disp: 18 mL, Rfl: 0    verapamiL (CALAN-SR) 180 MG CR tablet, Take 1 tablet (180 mg total) by mouth every evening., Disp: 30 tablet, Rfl: 11    Social History:   Social History     Socioeconomic History    Marital status: Single   Tobacco Use    Smoking status: Current Every Day Smoker     Years: 55.00     Types: Cigarettes     Start date: 1967    Smokeless tobacco: Never Used    Tobacco comment: Enrolled in the [employer] on 10/5/15 ([employer] Member ID # [id number]).  Ambulatory referral to Smoking Cessation program.   Substance and Sexual Activity    Alcohol use: Yes     Alcohol/week: 3.0 standard drinks     Types: 3 Glasses of wine per week     Comment: 1 every 3 months    Drug use: No    Sexual activity: Yes     Partners: Male     Birth control/protection: None     Comment: last drink yesterday afternoon   Social History Narrative    Pulmonology Dr. Chung([facility]) for COPD     Endo- Dr. Damaso Delgado for Diabetes Mellitus    GI- Dr. Arias- History of partial gastrectomy, gastritis    Heme/Onc-  Anemia and history of colon cancer     Psych- Dr. Cotto ([facility])- Depression/Anxiety. PTSD    Dr. Knox- Pain Management    Dr. Anguiano Neurology- Seizure disorder    Dr. Malin- s/p percutaneous transluminal coronary angioplasty          Social Determinants of Health     Financial Resource Strain: Low Risk     Difficulty of Paying Living Expenses: Not hard at all   Food Insecurity: No Food Insecurity    Worried About Running Out of Food in the Last Year: Never true    Ran Out of Food in the Last Year: Never true   Transportation Needs: No Transportation Needs    Lack of Transportation (Medical): No    Lack of Transportation (Non-Medical): No   Physical Activity: Inactive    Days of Exercise per Week: 0 days    Minutes of Exercise per Session: 0 min   Stress: No Stress Concern Present    Feeling of Stress : Not  "at all   Social Connections: Unknown    Frequency of Communication with Friends and Family: Three times a week    Frequency of Social Gatherings with Friends and Family: Twice a week    Attends [employer] Services: Never    Active Member of Clubs or Organizations: No    Attends Club or Organization Meetings: Never   Housing Stability: Low Risk     Unable to Pay for Housing in the Last Year: No    Number of Places Lived in the Last Year: 1    Unstable Housing in the Last Year: No       REVIEW OF SYSTEMS:  Constitution: Negative. Negative for chills, fever and night sweats.   Cardiovascular: Negative for chest pain and syncope.   Respiratory: Negative for cough and shortness of breath.   Gastrointestinal: See HPI. Negative for nausea/vomiting. Negative for abdominal pain.  Genitourinary: See HPI. Negative for discoloration or dysuria.  Skin: Negative for dry skin, itching and rash.   Hematologic/Lymphatic: negative for bleeding/clotting disorders.   Musculoskeletal: Negative for falls and muscle weakness.   Neurological: See HPI. no history of seizures. no history of cranial surgery or shunts.  Endocrine: Negative for polydipsia, polyphagia and polyuria.   Allergic/Immunologic: Negative for hives and persistent infections.  Psychiatric/Behavioral: Negative for depression and insomnia.         EXAM:  Ht 5' 2" (1.575 m)   Wt 57.4 kg (126 lb 8.7 oz)   LMP  (LMP Unknown)   BMI 23.15 kg/m²     General: The patient is a 71 y.o. female in no apparent distress, the patient is orientatied to person, place and time.  Psych: Normal mood and affect  HEENT: Vision grossly intact, hearing intact to the spoken word.  Lungs: Respirations unlabored.  Gait: Normal station and gait, no difficulty with toe or heel walk.   Skin: Cervical skin negative for rashes, lesions, hairy patches. PCDF scar present.  Range of motion: Cervical range of motion is acceptable. There is posterior cervical tenderness to palpation.  Spinal " Balance: Global saggital and coronal spinal balance acceptable, no significant for scoliosis and kyphosis.  Musculoskeletal: No pain with the range of motion of the bilateral shoulders and elbows. Normal bulk and contour of the bilateral hands.  Vascular: Bilateral hands warm and well perfused, Radial pulses 2+ bilaterally.  Neurological: Normal strength and tone in all major motor groups in the bilateral upper and lower extremities except for L 4/5 in WE/WF. Normal sensation to light touch in the C5-T1 and L2-S1 dermatomes bilaterally.  Deep tendon reflexes symmetric 2+ in the bilateral upper and lower extremities.  Negative Inverted Radial Reflex and Atkinson's bilaterally. Negative Babinski bilaterally.     IMAGING:   Today I independently reviewed the following images and my interpretations are as follows:    AP, Lat and Flex/Ex  upright C-spine films demonstrate C3-T1 PCDF without complication.    CT scan showed C3-T1 PCDF without hardware complication.    MRI cervical showed decompression of spinal cord.    Body mass index is 23.15 kg/m².  Hemoglobin A1C   Date Value Ref Range Status   07/14/2022 9.0 (H) 4.0 - 5.6 % Final     Comment:     ADA Screening Guidelines:  5.7-6.4%  Consistent with prediabetes  >or=6.5%  Consistent with diabetes    High levels of fetal hemoglobin interfere with the HbA1C  assay. Heterozygous hemoglobin variants (HbS, HgC, etc)do  not significantly interfere with this assay.   However, presence of multiple variants may affect accuracy.     05/09/2022 9.8 (H) 4.0 - 5.6 % Final     Comment:     ADA Screening Guidelines:  5.7-6.4%  Consistent with prediabetes  >or=6.5%  Consistent with diabetes    High levels of fetal hemoglobin interfere with the HbA1C  assay. Heterozygous hemoglobin variants (HbS, HgC, etc)do  not significantly interfere with this assay.   However, presence of multiple variants may affect accuracy.     04/06/2022 10.3 (H) 4.0 - 5.6 % Final     Comment:     ADA Screening  Guidelines:  5.7-6.4%  Consistent with prediabetes  >or=6.5%  Consistent with diabetes    High levels of fetal hemoglobin interfere with the HbA1C  assay. Heterozygous hemoglobin variants (HbS, HgC, etc)do  not significantly interfere with this assay.   However, presence of multiple variants may affect accuracy.     12/16/2012 11.7 (H) 4.8 - 5.6 % Final     Comment:              Increased risk for diabetes: 5.7 - 6.4           Diabetes: >6.4           Glycemic control for adults with diabetes: <7.0  **In order to standardize %HbA1c results worldwide, as of October 11, 2010,  the %HbA1c is being calculated using the master equation recommended in the  consensus statement adopted by the ADA (American Diabetes Assoc), EASD  (European Assoc for the Study of Diabetes), IFCC (International Federation  of Clinical Chemistry and Laboratory Medicine) and IDF (International  Diabetes Federation). Result units: %HgbA1c (DCCT/NGSP).  In common with other methods, Hb A1C values may not accurately reflect mean  blood glucose in patients with hemoglobin variants (HgbF, HgbS and HgbC).  Any cause of shortened erythrocyte survival will reduce exposure of  erythrocytes to glucose with a consequent decrease in HbA1c (%) values, even  though the time-averaged blood glucose level may be elevated. Causes of  shortened erythrocyte lifetime might be hemolytic anemia or other hemolytic  diseases, homozygous sickle cell trait, pregnancy, recent significant or  chronic blood loss, etc. Caution should be used when interpreting the HbA1c  results from patients with these conditions.       ASSESSMENT/PLAN:  S/P cervical spinal fusion  -     Ambulatory referral/consult to Pain Clinic; Future; Expected date: 08/23/2022    Cervical spondylosis  -     Ambulatory referral/consult to Pain Clinic; Future; Expected date: 08/23/2022  -     Ambulatory referral/consult to Physical/Occupational Therapy; Future; Expected date: 08/23/2022      Follow up if  symptoms worsen or fail to improve.    Patient has cervical DDD and spondylosis with hx of C3-T1 PCDF. I discussed the natural history of their diagnoses as well as surgical and nonsurgical treatment options. I educated the patient on the importance of core/back strengthening, correct posture, bending/lifting ergonomics, and low-impact aerobic exercises (walking, elliptical, and aquatherapy). Continue medications. I will refer the patient to PT for neck ROM. Refer to pain management for possible injections. Patient will follow up PRN.    Martinez Ellis MD  Orthopaedic Spine Surgeon  Department of Orthopaedic Surgery  [phone]

## 2022-08-17 ENCOUNTER — OFFICE VISIT (OUTPATIENT)
Dept: CARDIOLOGY | Facility: CLINIC | Age: 72
End: 2022-08-17
Payer: MEDICARE

## 2022-08-17 VITALS
BODY MASS INDEX: 23.32 KG/M2 | WEIGHT: 126.75 LBS | SYSTOLIC BLOOD PRESSURE: 110 MMHG | HEIGHT: 62 IN | HEART RATE: 121 BPM | OXYGEN SATURATION: 96 % | DIASTOLIC BLOOD PRESSURE: 73 MMHG

## 2022-08-17 DIAGNOSIS — I50.32 CHRONIC DIASTOLIC CONGESTIVE HEART FAILURE: ICD-10-CM

## 2022-08-17 DIAGNOSIS — I15.2 HYPERTENSION ASSOCIATED WITH DIABETES: ICD-10-CM

## 2022-08-17 DIAGNOSIS — E11.59 HYPERTENSION ASSOCIATED WITH DIABETES: ICD-10-CM

## 2022-08-17 DIAGNOSIS — E78.5 HYPERLIPIDEMIA ASSOCIATED WITH TYPE 2 DIABETES MELLITUS: ICD-10-CM

## 2022-08-17 DIAGNOSIS — E11.69 HYPERLIPIDEMIA ASSOCIATED WITH TYPE 2 DIABETES MELLITUS: ICD-10-CM

## 2022-08-17 DIAGNOSIS — I70.0 AORTIC ATHEROSCLEROSIS: ICD-10-CM

## 2022-08-17 DIAGNOSIS — R07.9 CHEST PAIN OF UNCERTAIN ETIOLOGY: ICD-10-CM

## 2022-08-17 DIAGNOSIS — R00.0 SINUS TACHYCARDIA: ICD-10-CM

## 2022-08-17 DIAGNOSIS — I27.20 PULMONARY HYPERTENSION: ICD-10-CM

## 2022-08-17 DIAGNOSIS — R07.9 CHEST PAIN, UNSPECIFIED TYPE: Primary | ICD-10-CM

## 2022-08-17 DIAGNOSIS — C34.91 MALIGNANT NEOPLASM OF RIGHT LUNG, UNSPECIFIED PART OF LUNG: ICD-10-CM

## 2022-08-17 DIAGNOSIS — Z98.61 S/P PTCA (PERCUTANEOUS TRANSLUMINAL CORONARY ANGIOPLASTY): ICD-10-CM

## 2022-08-17 DIAGNOSIS — I25.118 CORONARY ARTERY DISEASE OF NATIVE ARTERY OF NATIVE HEART WITH STABLE ANGINA PECTORIS: ICD-10-CM

## 2022-08-17 PROCEDURE — 3066F NEPHROPATHY DOC TX: CPT | Mod: CPTII,S$GLB,, | Performed by: INTERNAL MEDICINE

## 2022-08-17 PROCEDURE — 3078F DIAST BP <80 MM HG: CPT | Mod: CPTII,S$GLB,, | Performed by: INTERNAL MEDICINE

## 2022-08-17 PROCEDURE — 99214 PR OFFICE/OUTPT VISIT, EST, LEVL IV, 30-39 MIN: ICD-10-PCS | Mod: S$GLB,,, | Performed by: INTERNAL MEDICINE

## 2022-08-17 PROCEDURE — 1159F MED LIST DOCD IN RCRD: CPT | Mod: CPTII,S$GLB,, | Performed by: INTERNAL MEDICINE

## 2022-08-17 PROCEDURE — 1160F RVW MEDS BY RX/DR IN RCRD: CPT | Mod: CPTII,S$GLB,, | Performed by: INTERNAL MEDICINE

## 2022-08-17 PROCEDURE — 99499 UNLISTED E&M SERVICE: CPT | Mod: S$GLB,,, | Performed by: INTERNAL MEDICINE

## 2022-08-17 PROCEDURE — 3066F PR DOCUMENTATION OF TREATMENT FOR NEPHROPATHY: ICD-10-PCS | Mod: CPTII,S$GLB,, | Performed by: INTERNAL MEDICINE

## 2022-08-17 PROCEDURE — 1157F ADVNC CARE PLAN IN RCRD: CPT | Mod: CPTII,S$GLB,, | Performed by: INTERNAL MEDICINE

## 2022-08-17 PROCEDURE — 99214 OFFICE O/P EST MOD 30 MIN: CPT | Mod: S$GLB,,, | Performed by: INTERNAL MEDICINE

## 2022-08-17 PROCEDURE — 1157F PR ADVANCE CARE PLAN OR EQUIV PRESENT IN MEDICAL RECORD: ICD-10-PCS | Mod: CPTII,S$GLB,, | Performed by: INTERNAL MEDICINE

## 2022-08-17 PROCEDURE — 99999 PR PBB SHADOW E&M-EST. PATIENT-LVL III: ICD-10-PCS | Mod: PBBFAC,,, | Performed by: INTERNAL MEDICINE

## 2022-08-17 PROCEDURE — 3052F PR MOST RECENT HEMOGLOBIN A1C LEVEL 8.0 - < 9.0%: ICD-10-PCS | Mod: CPTII,S$GLB,, | Performed by: INTERNAL MEDICINE

## 2022-08-17 PROCEDURE — 99499 RISK ADDL DX/OHS AUDIT: ICD-10-PCS | Mod: S$GLB,,, | Performed by: INTERNAL MEDICINE

## 2022-08-17 PROCEDURE — 1160F PR REVIEW ALL MEDS BY PRESCRIBER/CLIN PHARMACIST DOCUMENTED: ICD-10-PCS | Mod: CPTII,S$GLB,, | Performed by: INTERNAL MEDICINE

## 2022-08-17 PROCEDURE — 1126F PR PAIN SEVERITY QUANTIFIED, NO PAIN PRESENT: ICD-10-PCS | Mod: CPTII,S$GLB,, | Performed by: INTERNAL MEDICINE

## 2022-08-17 PROCEDURE — 3008F BODY MASS INDEX DOCD: CPT | Mod: CPTII,S$GLB,, | Performed by: INTERNAL MEDICINE

## 2022-08-17 PROCEDURE — 3061F NEG MICROALBUMINURIA REV: CPT | Mod: CPTII,S$GLB,, | Performed by: INTERNAL MEDICINE

## 2022-08-17 PROCEDURE — 3074F PR MOST RECENT SYSTOLIC BLOOD PRESSURE < 130 MM HG: ICD-10-PCS | Mod: CPTII,S$GLB,, | Performed by: INTERNAL MEDICINE

## 2022-08-17 PROCEDURE — 3078F PR MOST RECENT DIASTOLIC BLOOD PRESSURE < 80 MM HG: ICD-10-PCS | Mod: CPTII,S$GLB,, | Performed by: INTERNAL MEDICINE

## 2022-08-17 PROCEDURE — 93000 EKG 12-LEAD: ICD-10-PCS | Mod: S$GLB,,, | Performed by: INTERNAL MEDICINE

## 2022-08-17 PROCEDURE — 99999 PR PBB SHADOW E&M-EST. PATIENT-LVL III: CPT | Mod: PBBFAC,,, | Performed by: INTERNAL MEDICINE

## 2022-08-17 PROCEDURE — 1159F PR MEDICATION LIST DOCUMENTED IN MEDICAL RECORD: ICD-10-PCS | Mod: CPTII,S$GLB,, | Performed by: INTERNAL MEDICINE

## 2022-08-17 PROCEDURE — 3008F PR BODY MASS INDEX (BMI) DOCUMENTED: ICD-10-PCS | Mod: CPTII,S$GLB,, | Performed by: INTERNAL MEDICINE

## 2022-08-17 PROCEDURE — 1126F AMNT PAIN NOTED NONE PRSNT: CPT | Mod: CPTII,S$GLB,, | Performed by: INTERNAL MEDICINE

## 2022-08-17 PROCEDURE — 3074F SYST BP LT 130 MM HG: CPT | Mod: CPTII,S$GLB,, | Performed by: INTERNAL MEDICINE

## 2022-08-17 PROCEDURE — 3061F PR NEG MICROALBUMINURIA RESULT DOCUMENTED/REVIEW: ICD-10-PCS | Mod: CPTII,S$GLB,, | Performed by: INTERNAL MEDICINE

## 2022-08-17 PROCEDURE — 3052F HG A1C>EQUAL 8.0%<EQUAL 9.0%: CPT | Mod: CPTII,S$GLB,, | Performed by: INTERNAL MEDICINE

## 2022-08-17 PROCEDURE — 93000 ELECTROCARDIOGRAM COMPLETE: CPT | Mod: S$GLB,,, | Performed by: INTERNAL MEDICINE

## 2022-08-17 NOTE — PROGRESS NOTES
[facility] Cardiology     Subjective:    Patient ID:  Chayito Chung is a 71 y.o. female who presents for follow-up of Coronary Artery Disease, Hyperlipidemia, Diabetes Mellitus, Chest Pain, and Hypertension    Review of patient's allergies indicates:  No Known Allergies   She states there was confirmed right lung adenocarcinoma 3 weeks ago.  She started radiation today.  There are still considerations of chemotherapy and possibly surgery.  She is very concerned.  There is a family history of lung cancer in her father.    She is complaining of chest pain.  She states that she required usage of nitroglycerin.  She took 3 of them.  It seem to help.  She is on calcium channel blockers as well as losartan and hydrochlorothiazide for hypertension.  Her blood pressure is normal today.  She is diabetic.    Her chest pain is anterior discomfort.  She states she has it all the time.  Her 1st radiation was today.  Her last echo was April 2022 confirming normal ejection fraction with mild pulmonary hypertension.    The patient's electrocardiogram reviewed and independently interpreted by myself today confirms sinus tachycardia heart rate 115 beats per minute.  There are nonspecific ST changes somewhat diffusely.      Review of Systems   Constitutional: Negative for chills, decreased appetite, diaphoresis, fever, malaise/fatigue, night sweats, weight gain and weight loss.   HENT: Negative for congestion, ear discharge, ear pain, hearing loss, hoarse voice, nosebleeds, odynophagia, sore throat, stridor and tinnitus.    Eyes: Negative for blurred vision, discharge, double vision, pain, photophobia, redness, vision loss in left eye, vision loss in right eye, visual disturbance and visual halos.   Cardiovascular: Positive for chest pain and dyspnea on exertion. Negative for claudication, cyanosis, irregular heartbeat, leg swelling, near-syncope, orthopnea,  "palpitations, paroxysmal nocturnal dyspnea and syncope.   Respiratory: Positive for shortness of breath. Negative for cough, hemoptysis, sleep disturbances due to breathing, snoring, sputum production and wheezing.    Endocrine: Negative for cold intolerance, heat intolerance, polydipsia, polyphagia and polyuria.   Hematologic/Lymphatic: Negative for adenopathy and bleeding problem. Does not bruise/bleed easily.   Skin: Negative for color change, dry skin, flushing, itching, nail changes, poor wound healing, rash, skin cancer, suspicious lesions and unusual hair distribution.   Musculoskeletal: Negative for arthritis, back pain, falls, gout, joint pain, joint swelling, muscle cramps, muscle weakness, myalgias, neck pain and stiffness.   Gastrointestinal: Negative for bloating, abdominal pain, anorexia, change in bowel habit, bowel incontinence, constipation, diarrhea, dysphagia, excessive appetite, flatus, heartburn, hematemesis, hematochezia, hemorrhoids, jaundice, melena, nausea and vomiting.   Genitourinary: Negative for bladder incontinence, decreased libido, dysuria, flank pain, frequency, genital sores, hematuria, hesitancy, incomplete emptying, nocturia and urgency.   Neurological: Positive for headaches. Negative for aphonia, brief paralysis, difficulty with concentration, disturbances in coordination, excessive daytime sleepiness, dizziness, focal weakness, light-headedness, loss of balance, numbness, paresthesias, seizures, sensory change, tremors, vertigo and weakness.   Psychiatric/Behavioral: Negative for altered mental status, depression, hallucinations, memory loss, substance abuse, suicidal ideas and thoughts of violence. The patient is nervous/anxious. The patient does not have insomnia.    Allergic/Immunologic: Negative for hives and persistent infections.        Objective:       Vitals:    08/17/22 1445   BP: 110/73   Pulse: (!) 121   SpO2: 96%   Weight: 57.5 kg (126 lb 12.2 oz)   Height: 5' 2" " (1.575 m)    Physical Exam  Constitutional:       General: She is not in acute distress.     Appearance: She is well-developed. She is not diaphoretic.   HENT:      Head: Normocephalic and atraumatic.      Nose: Nose normal.   Eyes:      General: No scleral icterus.        Right eye: No discharge.      Conjunctiva/sclera: Conjunctivae normal.      Pupils: Pupils are equal, round, and reactive to light.   Neck:      Thyroid: No thyromegaly.      Vascular: No JVD.      Trachea: No tracheal deviation.   Cardiovascular:      Rate and Rhythm: Normal rate and regular rhythm.      Pulses:           Carotid pulses are 2+ on the right side and 2+ on the left side.       Radial pulses are 2+ on the right side and 2+ on the left side.        Dorsalis pedis pulses are 1+ on the right side and 1+ on the left side.        Posterior tibial pulses are 1+ on the right side and 1+ on the left side.      Heart sounds: Normal heart sounds. No murmur heard.    No friction rub. No gallop.   Pulmonary:      Effort: Pulmonary effort is normal. No respiratory distress.      Breath sounds: Normal breath sounds. No stridor. No wheezing or rales.   Chest:      Chest wall: No tenderness.   Abdominal:      General: Bowel sounds are normal. There is no distension.      Palpations: Abdomen is soft. There is no mass.      Tenderness: There is no abdominal tenderness. There is no guarding or rebound.   Musculoskeletal:         General: No tenderness.      Cervical back: Normal range of motion and neck supple.   Lymphadenopathy:      Cervical: No cervical adenopathy.   Skin:     General: Skin is warm and dry.      Coloration: Skin is not pale.      Findings: No erythema or rash.   Neurological:      Mental Status: She is alert and oriented to person, place, and time.      Cranial Nerves: No cranial nerve deficit.      Coordination: Coordination normal.   Psychiatric:         Behavior: Behavior normal.         Thought Content: Thought content normal.          Judgment: Judgment normal.           Assessment:       1. Chest pain, unspecified type    2. Sinus tachycardia    3. S/P PTCA (percutaneous transluminal coronary angioplasty)    4. Hypertension associated with diabetes    5. Hyperlipidemia associated with type 2 diabetes mellitus    6. Coronary artery disease of native artery of native heart with stable angina pectoris    7. Chronic diastolic congestive heart failure    8. Aortic atherosclerosis    9. Malignant neoplasm of right lung, unspecified part of lung    10. Pulmonary hypertension    11. Chest pain of uncertain etiology      Results for orders placed or performed in visit on 07/14/22   Hemoglobin A1C   Result Value Ref Range    Hemoglobin A1C 9.0 (H) 4.0 - 5.6 %    Estimated Avg Glucose 212 (H) 68 - 131 mg/dL   Lipid Panel   Result Value Ref Range    Cholesterol 142 120 - 199 mg/dL    Triglycerides 95 30 - 150 mg/dL    HDL 45 40 - 75 mg/dL    LDL Cholesterol 78.0 63.0 - 159.0 mg/dL    HDL/Cholesterol Ratio 31.7 20.0 - 50.0 %    Total Cholesterol/HDL Ratio 3.2 2.0 - 5.0    Non-HDL Cholesterol 97 mg/dL         Current Outpatient Medications:     albuterol (PROVENTIL/VENTOLIN HFA) 90 mcg/actuation inhaler, INHALE 2 PUFFS BY MOUTH INTO THE LUNGS EVERY 6 HOURS AS NEEDED FOR WHEEZING (Patient taking differently: Inhale 2 puffs into the lungs every 6 (six) hours as needed for Wheezing.), Disp: 25.5 g, Rfl: 0    albuterol-ipratropium (DUO-NEB) 2.5 mg-0.5 mg/3 mL nebulizer solution, Take 3 mLs by nebulization every 4 (four) hours as needed for Wheezing or Shortness of Breath. Rescue, Disp: , Rfl:     alendronate (FOSAMAX) 70 MG tablet, Take 1 tablet (70 mg total) by mouth every 7 days. (Patient taking differently: Take 70 mg by mouth every Sunday.), Disp: 12 tablet, Rfl: 3    aspirin 81 MG Chew, Take 1 tablet (81 mg total) by mouth once daily., Disp: 90 tablet, Rfl: 3    atorvastatin (LIPITOR) 40 MG tablet, Take 1 tablet (40 mg total) by mouth once  "daily., Disp: 90 tablet, Rfl: 3    BD ULTRA-FINE MINI PEN NEEDLE 31 gauge x 3/16" Ndle, , Disp: , Rfl:     diclofenac sodium (VOLTAREN) 1 % Gel, Apply 2 g topically 4 (four) times daily., Disp: 100 g, Rfl: 0    duloxetine (CYMBALTA) 60 MG capsule, Take 60 mg by mouth once daily. Take with 30mg for a total of 90mg daily, Disp: , Rfl:     ferrous sulfate (FEOSOL) 325 mg (65 mg iron) Tab tablet, Take 1 tablet (325 mg total) by mouth once daily., Disp: 30 tablet, Rfl: 3    fluticasone propionate (FLONASE) 50 mcg/actuation nasal spray, 2 sprays (100 mcg total) by Each Nare route once daily., Disp: 1 Bottle, Rfl: 12    hydroCHLOROthiazide (MICROZIDE) 12.5 mg capsule, Take 1 capsule (12.5 mg total) by mouth once daily., Disp: 90 capsule, Rfl: 3    HYDROcodone-acetaminophen 7.5-300 mg Tab, Take by mouth., Disp: , Rfl:     insulin lispro 100 unit/mL pen, Inject 5 Units into the skin 3 (three) times daily after meals., Disp: 15 mL, Rfl: 0    LANTUS SOLOSTAR U-100 INSULIN glargine 100 units/mL (3mL) SubQ pen, Inject 20 Units into the skin nightly., Disp: 18 mL, Rfl: 0    losartan (COZAAR) 50 MG tablet, Take 1 tablet (50 mg total) by mouth once daily., Disp: 90 tablet, Rfl: 3    magnesium oxide (MAG-OX) 400 mg (241.3 mg magnesium) tablet, Take 400 mg by mouth once daily., Disp: , Rfl:     metFORMIN (GLUCOPHAGE) 1000 MG tablet, Take 1,000 mg by mouth 2 (two) times daily., Disp: , Rfl:     nitroGLYCERIN (NITROSTAT) 0.4 MG SL tablet, Place 1 tablet (0.4 mg total) under the tongue every 5 (five) minutes as needed for Chest pain., Disp: 100 tablet, Rfl: 1    ondansetron (ZOFRAN-ODT) 4 MG TbDL, Take 1 tablet (4 mg total) by mouth every 8 (eight) hours as needed (nausea)., Disp: 14 tablet, Rfl: 1    potassium chloride SA (K-DUR,KLOR-CON) 20 MEQ tablet, Take 1 tablet (20 mEq total) by mouth once daily., Disp: 90 tablet, Rfl: 3    QUEtiapine (SEROQUEL) 300 MG Tab, Take 1 tablet (300 mg total) by mouth every evening., " Disp: 90 tablet, Rfl: 5    tiZANidine 4 mg Cap, Take by mouth., Disp: , Rfl:     topiramate (TOPAMAX) 50 MG tablet, Take 50 mg by mouth 2 (two) times daily., Disp: , Rfl:     TRELEGY ELLIPTA 200-62.5-25 mcg inhaler, Inhale 1 puff into the lungs once daily., Disp: , Rfl:     verapamiL (CALAN-SR) 180 MG CR tablet, Take 1 tablet (180 mg total) by mouth every evening., Disp: 30 tablet, Rfl: 11    zonisamide (ZONEGRAN) 100 MG Cap, Take 400 mg by mouth once daily., Disp: , Rfl:      Lab Results   Component Value Date    WBC 3.85 (L) 05/13/2022    RBC 3.92 (L) 05/13/2022    HGB 9.6 (L) 05/13/2022    HCT 32.5 (L) 05/13/2022    MCV 83 05/13/2022    MCH 24.5 (L) 05/13/2022    MCHC 29.5 (L) 05/13/2022    RDW 15.8 (H) 05/13/2022     05/13/2022    MPV 9.9 05/13/2022    GRAN 3.0 05/13/2022    GRAN 77.1 (H) 05/13/2022    LYMPH 0.7 (L) 05/13/2022    LYMPH 18.7 05/13/2022    MONO 0.2 (L) 05/13/2022    MONO 3.9 (L) 05/13/2022    EOS 0.0 05/13/2022    BASO 0.01 05/13/2022    EOSINOPHIL 0.0 05/13/2022    BASOPHIL 0.3 05/13/2022    MG 1.4 (L) 05/13/2022        CMP  Lab Results   Component Value Date     (L) 05/13/2022    K 4.3 05/13/2022     05/13/2022    CO2 18 (L) 05/13/2022     (H) 05/13/2022    BUN 12 05/13/2022    CREATININE 0.7 05/13/2022    CALCIUM 9.5 05/13/2022    PROT 7.2 05/13/2022    ALBUMIN 3.5 05/13/2022    BILITOT 0.3 05/13/2022    ALKPHOS 157 (H) 05/13/2022    AST 14 05/13/2022    ALT 16 05/13/2022    ANIONGAP 14 05/13/2022    ESTGFRAFRICA >60 05/13/2022    EGFRNONAA >60 05/13/2022        Lab Results   Component Value Date    LABBLOO No growth after 5 days. 05/10/2022    LABURIN (A) 05/09/2022     CANDIDA LUSITANIAE  > 100,000 cfu/ml  Treatment of asymptomatic candiduria is not recommended (except for   specific populations). Candida isolated in the urine typically   represents colonization. If an indwelling urinary catheter is present  it should be removed or replaced.              Results  for orders placed or performed in visit on 08/17/22   EKG 12-lead    Collection Time: 08/17/22  2:27 PM    Narrative    Test Reason : R07.9,    Vent. Rate : 115 BPM     Atrial Rate : 115 BPM     P-R Int : 124 ms          QRS Dur : 076 ms      QT Int : 290 ms       P-R-T Axes : 064 043 -39 degrees     QTc Int : 401 ms    Sinus tachycardia  Possible Left atrial enlargement  Low voltage QRS  Nonspecific T wave abnormality  Abnormal ECG  When compared with ECG of 09-MAY-2022 14:30,  Nonspecific T wave abnormality, worse in Inferior leads  Nonspecific T wave abnormality now evident in Anterior-lateral leads  Confirmed by Amanda Dowell MD ([id number]) on 8/18/2022 9:41:28 AM    Referred By:  Smith           Confirmed By:Amanda Dowell MD                  Plan:       Problem List Items Addressed This Visit        Pulmonary    Pulmonary hypertension     Her previous echo showed PA pressures in the 40s.  Mild pulmonary hypertension supported by echo findings.              Cardiac/Vascular    Coronary artery disease of native artery of native heart with stable angina pectoris     Her last stress test was 2020-normal perfusion scan.  I do not advise a repeat stress test despite complaints of chest pain on evaluation today.           Hypertension associated with diabetes     Her blood pressure today is 110/73.  She is on verapamil 120 S are daily, losartan 50 mg for hypertension.  Condition controlled.           Hyperlipidemia associated with type 2 diabetes mellitus     Atorvastatin 40 mg to continue.  Most recent LDL 82 mg%.  No changes made today.           S/P PTCA (percutaneous transluminal coronary angioplasty)     There was previous treatment greater than 7 years ago at an outside facility.           Chest pain of uncertain etiology     Based on her Electrocardiogram today and report of symptoms, I do not think it is angina.  She has tried nitroglycerin.  I do not object for her to continue nitro as needed.  Given pulmonary  issues currently no aggressive evaluation advised at this time.           Sinus tachycardia     Heart rate today 115 beats per minute.  A Holter was completed June 2022 which showed maximum sinus tachycardia heart rate 133 beats per minute, rare PACs and PVCs without SVT or AFib.           Chronic diastolic congestive heart failure     She is short of breath.  There is a lot of anxiety.  Adding diuretics not advised at this time.               Aortic atherosclerosis     Condition stable.              Oncology    Lung cancer     She started radiation today.  Adenocarcinoma diagnosis confirmed.             Other Visit Diagnoses     Chest pain, unspecified type    -  Primary    Relevant Orders    EKG 12-lead (Completed)             The patient is appropriately very stressed out about her lung cancer diagnosis and treatment.  Her Electrocardiogram today does not support acute myocardial ischemia or any arrhythmia.  Reassurance provided.  I am not convinced her chest pain is angina.  She has normal ejection fraction by previous evaluation.     I will see her in 3 months.           Randell Mtz MD  08/18/2022   3:24 PM

## 2022-08-18 NOTE — ASSESSMENT & PLAN NOTE
Heart rate today 115 beats per minute.  A Holter was completed June 2022 which showed maximum sinus tachycardia heart rate 133 beats per minute, rare PACs and PVCs without SVT or AFib.

## 2022-08-18 NOTE — ASSESSMENT & PLAN NOTE
Based on her Electrocardiogram today and report of symptoms, I do not think it is angina.  She has tried nitroglycerin.  I do not object for her to continue nitro as needed.  Given pulmonary issues currently no aggressive evaluation advised at this time.

## 2022-08-18 NOTE — ASSESSMENT & PLAN NOTE
Her previous echo showed PA pressures in the 40s.  Mild pulmonary hypertension supported by echo findings.

## 2022-08-18 NOTE — ASSESSMENT & PLAN NOTE
She is short of breath.  There is a lot of anxiety.  Adding diuretics not advised at this time.

## 2022-08-18 NOTE — ASSESSMENT & PLAN NOTE
Her blood pressure today is 110/73.  She is on verapamil 120 S are daily, losartan 50 mg for hypertension.  Condition controlled.

## 2022-08-18 NOTE — ASSESSMENT & PLAN NOTE
Her last stress test was 2020-normal perfusion scan.  I do not advise a repeat stress test despite complaints of chest pain on evaluation today.

## 2022-08-22 ENCOUNTER — OFFICE VISIT (OUTPATIENT)
Dept: INTERNAL MEDICINE | Facility: CLINIC | Age: 72
End: 2022-08-22
Payer: MEDICARE

## 2022-08-22 ENCOUNTER — HOSPITAL ENCOUNTER (INPATIENT)
Facility: HOSPITAL | Age: 72
LOS: 15 days | Discharge: HOME HEALTH | DRG: 190 | End: 2022-09-08
Attending: STUDENT IN AN ORGANIZED HEALTH CARE EDUCATION/TRAINING PROGRAM | Admitting: STUDENT IN AN ORGANIZED HEALTH CARE EDUCATION/TRAINING PROGRAM
Payer: MEDICARE

## 2022-08-22 VITALS
WEIGHT: 124.31 LBS | OXYGEN SATURATION: 91 % | DIASTOLIC BLOOD PRESSURE: 52 MMHG | HEART RATE: 131 BPM | HEIGHT: 62 IN | BODY MASS INDEX: 22.88 KG/M2 | SYSTOLIC BLOOD PRESSURE: 120 MMHG

## 2022-08-22 DIAGNOSIS — R00.0 TACHYCARDIA: ICD-10-CM

## 2022-08-22 DIAGNOSIS — R91.1 SOLITARY PULMONARY NODULE: ICD-10-CM

## 2022-08-22 DIAGNOSIS — R22.43 LOCALIZED SWELLING, MASS, OR LUMP OF LOWER EXTREMITY, BILATERAL: ICD-10-CM

## 2022-08-22 DIAGNOSIS — E78.5 HYPERLIPIDEMIA ASSOCIATED WITH TYPE 2 DIABETES MELLITUS: ICD-10-CM

## 2022-08-22 DIAGNOSIS — Z72.0 TOBACCO ABUSE: ICD-10-CM

## 2022-08-22 DIAGNOSIS — J42 CHRONIC BRONCHITIS, UNSPECIFIED CHRONIC BRONCHITIS TYPE: ICD-10-CM

## 2022-08-22 DIAGNOSIS — I15.2 HYPERTENSION ASSOCIATED WITH DIABETES: ICD-10-CM

## 2022-08-22 DIAGNOSIS — E44.0 MODERATE MALNUTRITION: ICD-10-CM

## 2022-08-22 DIAGNOSIS — R06.02 SHORTNESS OF BREATH: ICD-10-CM

## 2022-08-22 DIAGNOSIS — Z98.61 S/P PTCA (PERCUTANEOUS TRANSLUMINAL CORONARY ANGIOPLASTY): ICD-10-CM

## 2022-08-22 DIAGNOSIS — R07.9 CHEST PAIN, UNSPECIFIED TYPE: ICD-10-CM

## 2022-08-22 DIAGNOSIS — J41.0 SIMPLE CHRONIC BRONCHITIS: ICD-10-CM

## 2022-08-22 DIAGNOSIS — J44.1 CHRONIC OBSTRUCTIVE PULMONARY DISEASE WITH ACUTE EXACERBATION: ICD-10-CM

## 2022-08-22 DIAGNOSIS — E11.69 HYPERLIPIDEMIA ASSOCIATED WITH TYPE 2 DIABETES MELLITUS: ICD-10-CM

## 2022-08-22 DIAGNOSIS — I25.118 CORONARY ARTERY DISEASE OF NATIVE ARTERY OF NATIVE HEART WITH STABLE ANGINA PECTORIS: ICD-10-CM

## 2022-08-22 DIAGNOSIS — C34.91 ADENOCARCINOMA OF LUNG, RIGHT: ICD-10-CM

## 2022-08-22 DIAGNOSIS — F33.41 RECURRENT MAJOR DEPRESSIVE DISORDER, IN PARTIAL REMISSION: ICD-10-CM

## 2022-08-22 DIAGNOSIS — J47.1 BRONCHIECTASIS WITH ACUTE EXACERBATION: ICD-10-CM

## 2022-08-22 DIAGNOSIS — M81.0 OSTEOPOROSIS, UNSPECIFIED OSTEOPOROSIS TYPE, UNSPECIFIED PATHOLOGICAL FRACTURE PRESENCE: ICD-10-CM

## 2022-08-22 DIAGNOSIS — C34.90 MALIGNANT NEOPLASM OF LUNG, UNSPECIFIED LATERALITY, UNSPECIFIED PART OF LUNG: ICD-10-CM

## 2022-08-22 DIAGNOSIS — C34.91 PRIMARY LUNG ADENOCARCINOMA, RIGHT: ICD-10-CM

## 2022-08-22 DIAGNOSIS — R07.89 OTHER CHEST PAIN: ICD-10-CM

## 2022-08-22 DIAGNOSIS — R93.5 ABNORMAL CT OF THE ABDOMEN: ICD-10-CM

## 2022-08-22 DIAGNOSIS — J96.21 ACUTE ON CHRONIC RESPIRATORY FAILURE WITH HYPOXIA: ICD-10-CM

## 2022-08-22 DIAGNOSIS — R06.02 SOB (SHORTNESS OF BREATH): Primary | ICD-10-CM

## 2022-08-22 DIAGNOSIS — F32.A DEPRESSIVE DISORDER: ICD-10-CM

## 2022-08-22 DIAGNOSIS — M54.2 NECK PAIN: ICD-10-CM

## 2022-08-22 DIAGNOSIS — J98.4 RESTRICTIVE LUNG DISEASE: ICD-10-CM

## 2022-08-22 DIAGNOSIS — E11.59 HYPERTENSION ASSOCIATED WITH DIABETES: ICD-10-CM

## 2022-08-22 DIAGNOSIS — R14.0 ABDOMINAL DISTENSION: ICD-10-CM

## 2022-08-22 DIAGNOSIS — J84.9 INTERSTITIAL LUNG DISEASE: ICD-10-CM

## 2022-08-22 DIAGNOSIS — R06.02 SOB (SHORTNESS OF BREATH): ICD-10-CM

## 2022-08-22 DIAGNOSIS — D64.9 ANEMIA REQUIRING TRANSFUSIONS: Primary | ICD-10-CM

## 2022-08-22 DIAGNOSIS — R07.9 CHEST PAIN: ICD-10-CM

## 2022-08-22 PROBLEM — S36.892A TRAUMATIC MESENTERIC HEMATOMA: Status: ACTIVE | Noted: 2019-10-16

## 2022-08-22 PROBLEM — G56.20 ULNAR NEUROPATHY: Status: ACTIVE | Noted: 2022-08-22

## 2022-08-22 PROBLEM — I65.29 STENOSIS OF CAROTID ARTERY: Status: ACTIVE | Noted: 2022-08-22

## 2022-08-22 PROBLEM — I34.1 MITRAL VALVE PROLAPSE: Status: ACTIVE | Noted: 2022-08-22

## 2022-08-22 PROBLEM — C18.9 MALIGNANT NEOPLASM OF COLON: Status: ACTIVE | Noted: 2022-08-22

## 2022-08-22 PROBLEM — K26.9 DUODENAL ULCER: Status: ACTIVE | Noted: 2022-08-22

## 2022-08-22 PROBLEM — S36.899A TRAUMATIC HEMOPERITONEUM: Status: ACTIVE | Noted: 2019-10-16

## 2022-08-22 PROBLEM — S39.91XA BLUNT ABDOMINAL TRAUMA: Status: ACTIVE | Noted: 2019-10-16

## 2022-08-22 PROBLEM — L51.9 ERYTHEMA MULTIFORME: Status: ACTIVE | Noted: 2022-08-22

## 2022-08-22 PROBLEM — K92.2 GASTROINTESTINAL HEMORRHAGE: Status: ACTIVE | Noted: 2022-08-22

## 2022-08-22 PROBLEM — N63.0 MASS OF BREAST: Status: ACTIVE | Noted: 2022-08-22

## 2022-08-22 PROBLEM — S36.113A LIVER INJURY, LACERATION: Status: ACTIVE | Noted: 2019-10-16

## 2022-08-22 PROBLEM — K91.89 BILE LEAK, POSTOPERATIVE: Status: ACTIVE | Noted: 2019-11-06

## 2022-08-22 PROBLEM — Q85.9 HAMARTOMA OF LUNG: Status: ACTIVE | Noted: 2022-06-29

## 2022-08-22 PROBLEM — K83.8 BILE LEAK, POSTOPERATIVE: Status: ACTIVE | Noted: 2019-11-06

## 2022-08-22 PROBLEM — G47.30 SLEEP APNEA: Status: ACTIVE | Noted: 2019-11-04

## 2022-08-22 PROBLEM — S32.009A LUMBAR TRANSVERSE PROCESS FRACTURE: Status: ACTIVE | Noted: 2019-10-29

## 2022-08-22 PROBLEM — J47.9 BRONCHIECTASIS: Status: ACTIVE | Noted: 2022-08-22

## 2022-08-22 PROBLEM — K85.90 PANCREATITIS: Status: ACTIVE | Noted: 2022-08-22

## 2022-08-22 LAB
ABO + RH BLD: NORMAL
ALBUMIN SERPL BCP-MCNC: 3.4 G/DL (ref 3.5–5.2)
ALP SERPL-CCNC: 147 U/L (ref 55–135)
ALT SERPL W/O P-5'-P-CCNC: 7 U/L (ref 10–44)
ANION GAP SERPL CALC-SCNC: 11 MMOL/L (ref 8–16)
APTT BLDCRRT: 25.2 SEC (ref 21–32)
AST SERPL-CCNC: 10 U/L (ref 10–40)
BASOPHILS # BLD AUTO: 0.02 K/UL (ref 0–0.2)
BASOPHILS NFR BLD: 0.3 % (ref 0–1.9)
BILIRUB SERPL-MCNC: 0.2 MG/DL (ref 0.1–1)
BLD GP AB SCN CELLS X3 SERPL QL: NORMAL
BLD PROD TYP BPU: NORMAL
BLOOD UNIT EXPIRATION DATE: NORMAL
BLOOD UNIT TYPE CODE: 5100
BLOOD UNIT TYPE: NORMAL
BNP SERPL-MCNC: 72 PG/ML (ref 0–99)
BUN SERPL-MCNC: 10 MG/DL (ref 8–23)
CALCIUM SERPL-MCNC: 8.9 MG/DL (ref 8.7–10.5)
CHLORIDE SERPL-SCNC: 110 MMOL/L (ref 95–110)
CO2 SERPL-SCNC: 18 MMOL/L (ref 23–29)
CODING SYSTEM: NORMAL
CREAT SERPL-MCNC: 0.6 MG/DL (ref 0.5–1.4)
DIFFERENTIAL METHOD: ABNORMAL
DISPENSE STATUS: NORMAL
EOSINOPHIL # BLD AUTO: 0.1 K/UL (ref 0–0.5)
EOSINOPHIL NFR BLD: 1.2 % (ref 0–8)
ERYTHROCYTE [DISTWIDTH] IN BLOOD BY AUTOMATED COUNT: 20.7 % (ref 11.5–14.5)
EST. GFR  (NO RACE VARIABLE): >60 ML/MIN/1.73 M^2
GLUCOSE SERPL-MCNC: 120 MG/DL (ref 70–110)
HCT VFR BLD AUTO: 24.3 % (ref 37–48.5)
HGB BLD-MCNC: 6.8 G/DL (ref 12–16)
IMM GRANULOCYTES # BLD AUTO: 0.03 K/UL (ref 0–0.04)
IMM GRANULOCYTES NFR BLD AUTO: 0.4 % (ref 0–0.5)
INR PPP: 1 (ref 0.8–1.2)
INR PPP: 1 (ref 0.8–1.2)
LYMPHOCYTES # BLD AUTO: 0.8 K/UL (ref 1–4.8)
LYMPHOCYTES NFR BLD: 10.4 % (ref 18–48)
MCH RBC QN AUTO: 20.6 PG (ref 27–31)
MCHC RBC AUTO-ENTMCNC: 28 G/DL (ref 32–36)
MCV RBC AUTO: 74 FL (ref 82–98)
MONOCYTES # BLD AUTO: 0.6 K/UL (ref 0.3–1)
MONOCYTES NFR BLD: 7.7 % (ref 4–15)
NEUTROPHILS # BLD AUTO: 5.9 K/UL (ref 1.8–7.7)
NEUTROPHILS NFR BLD: 80 % (ref 38–73)
NRBC BLD-RTO: 0 /100 WBC
PLATELET # BLD AUTO: 246 K/UL (ref 150–450)
PMV BLD AUTO: 10.2 FL (ref 9.2–12.9)
POTASSIUM SERPL-SCNC: 3.4 MMOL/L (ref 3.5–5.1)
PROT SERPL-MCNC: 7.7 G/DL (ref 6–8.4)
PROTHROMBIN TIME: 10.4 SEC (ref 9–12.5)
PROTHROMBIN TIME: 10.6 SEC (ref 9–12.5)
RBC # BLD AUTO: 3.3 M/UL (ref 4–5.4)
SARS-COV-2 RDRP RESP QL NAA+PROBE: NEGATIVE
SODIUM SERPL-SCNC: 139 MMOL/L (ref 136–145)
TRANS ERYTHROCYTES VOL PATIENT: NORMAL ML
TROPONIN I SERPL DL<=0.01 NG/ML-MCNC: <0.006 NG/ML (ref 0–0.03)
TSH SERPL DL<=0.005 MIU/L-ACNC: 1.28 UIU/ML (ref 0.4–4)
WBC # BLD AUTO: 7.4 K/UL (ref 3.9–12.7)

## 2022-08-22 PROCEDURE — 1101F PR PT FALLS ASSESS DOC 0-1 FALLS W/OUT INJ PAST YR: ICD-10-PCS | Mod: CPTII,S$GLB,, | Performed by: INTERNAL MEDICINE

## 2022-08-22 PROCEDURE — 85730 THROMBOPLASTIN TIME PARTIAL: CPT | Performed by: PHYSICIAN ASSISTANT

## 2022-08-22 PROCEDURE — 96375 TX/PRO/DX INJ NEW DRUG ADDON: CPT

## 2022-08-22 PROCEDURE — 84443 ASSAY THYROID STIM HORMONE: CPT | Performed by: EMERGENCY MEDICINE

## 2022-08-22 PROCEDURE — 1157F PR ADVANCE CARE PLAN OR EQUIV PRESENT IN MEDICAL RECORD: ICD-10-PCS | Mod: CPTII,S$GLB,, | Performed by: INTERNAL MEDICINE

## 2022-08-22 PROCEDURE — 85025 COMPLETE CBC W/AUTO DIFF WBC: CPT | Performed by: EMERGENCY MEDICINE

## 2022-08-22 PROCEDURE — 36430 TRANSFUSION BLD/BLD COMPNT: CPT

## 2022-08-22 PROCEDURE — 3052F HG A1C>EQUAL 8.0%<EQUAL 9.0%: CPT | Mod: CPTII,S$GLB,, | Performed by: INTERNAL MEDICINE

## 2022-08-22 PROCEDURE — 99499 UNLISTED E&M SERVICE: CPT | Mod: S$GLB,,, | Performed by: INTERNAL MEDICINE

## 2022-08-22 PROCEDURE — 3078F PR MOST RECENT DIASTOLIC BLOOD PRESSURE < 80 MM HG: ICD-10-PCS | Mod: CPTII,S$GLB,, | Performed by: INTERNAL MEDICINE

## 2022-08-22 PROCEDURE — 93010 EKG 12-LEAD: ICD-10-PCS | Mod: ,,, | Performed by: INTERNAL MEDICINE

## 2022-08-22 PROCEDURE — P9021 RED BLOOD CELLS UNIT: HCPCS | Performed by: PHYSICIAN ASSISTANT

## 2022-08-22 PROCEDURE — 3061F PR NEG MICROALBUMINURIA RESULT DOCUMENTED/REVIEW: ICD-10-PCS | Mod: CPTII,S$GLB,, | Performed by: INTERNAL MEDICINE

## 2022-08-22 PROCEDURE — G0378 HOSPITAL OBSERVATION PER HR: HCPCS

## 2022-08-22 PROCEDURE — 96374 THER/PROPH/DIAG INJ IV PUSH: CPT

## 2022-08-22 PROCEDURE — 3066F PR DOCUMENTATION OF TREATMENT FOR NEPHROPATHY: ICD-10-PCS | Mod: CPTII,S$GLB,, | Performed by: INTERNAL MEDICINE

## 2022-08-22 PROCEDURE — 93005 ELECTROCARDIOGRAM TRACING: CPT

## 2022-08-22 PROCEDURE — 36410 PR VENIPUNC NEED PHYS SKILL,DX OR RX: ICD-10-PCS | Mod: ,,, | Performed by: STUDENT IN AN ORGANIZED HEALTH CARE EDUCATION/TRAINING PROGRAM

## 2022-08-22 PROCEDURE — 93010 ELECTROCARDIOGRAM REPORT: CPT | Mod: ,,, | Performed by: INTERNAL MEDICINE

## 2022-08-22 PROCEDURE — 3074F PR MOST RECENT SYSTOLIC BLOOD PRESSURE < 130 MM HG: ICD-10-PCS | Mod: CPTII,S$GLB,, | Performed by: INTERNAL MEDICINE

## 2022-08-22 PROCEDURE — 86803 HEPATITIS C AB TEST: CPT | Performed by: PHYSICIAN ASSISTANT

## 2022-08-22 PROCEDURE — 1126F PR PAIN SEVERITY QUANTIFIED, NO PAIN PRESENT: ICD-10-PCS | Mod: CPTII,S$GLB,, | Performed by: INTERNAL MEDICINE

## 2022-08-22 PROCEDURE — 76937 US GUIDE VASCULAR ACCESS: CPT | Mod: 26,,, | Performed by: STUDENT IN AN ORGANIZED HEALTH CARE EDUCATION/TRAINING PROGRAM

## 2022-08-22 PROCEDURE — 99499 RISK ADDL DX/OHS AUDIT: ICD-10-PCS | Mod: S$GLB,,, | Performed by: INTERNAL MEDICINE

## 2022-08-22 PROCEDURE — 3052F PR MOST RECENT HEMOGLOBIN A1C LEVEL 8.0 - < 9.0%: ICD-10-PCS | Mod: CPTII,S$GLB,, | Performed by: INTERNAL MEDICINE

## 2022-08-22 PROCEDURE — U0002 COVID-19 LAB TEST NON-CDC: HCPCS | Performed by: HOSPITALIST

## 2022-08-22 PROCEDURE — 1160F RVW MEDS BY RX/DR IN RCRD: CPT | Mod: CPTII,S$GLB,, | Performed by: INTERNAL MEDICINE

## 2022-08-22 PROCEDURE — 1159F MED LIST DOCD IN RCRD: CPT | Mod: CPTII,S$GLB,, | Performed by: INTERNAL MEDICINE

## 2022-08-22 PROCEDURE — 80053 COMPREHEN METABOLIC PANEL: CPT | Performed by: EMERGENCY MEDICINE

## 2022-08-22 PROCEDURE — 3066F NEPHROPATHY DOC TX: CPT | Mod: CPTII,S$GLB,, | Performed by: INTERNAL MEDICINE

## 2022-08-22 PROCEDURE — 25500020 PHARM REV CODE 255: Performed by: STUDENT IN AN ORGANIZED HEALTH CARE EDUCATION/TRAINING PROGRAM

## 2022-08-22 PROCEDURE — 99215 PR OFFICE/OUTPT VISIT, EST, LEVL V, 40-54 MIN: ICD-10-PCS | Mod: S$GLB,,, | Performed by: INTERNAL MEDICINE

## 2022-08-22 PROCEDURE — 99999 PR PBB SHADOW E&M-EST. PATIENT-LVL III: ICD-10-PCS | Mod: PBBFAC,,, | Performed by: INTERNAL MEDICINE

## 2022-08-22 PROCEDURE — 86901 BLOOD TYPING SEROLOGIC RH(D): CPT | Performed by: PHYSICIAN ASSISTANT

## 2022-08-22 PROCEDURE — 1157F ADVNC CARE PLAN IN RCRD: CPT | Mod: CPTII,S$GLB,, | Performed by: INTERNAL MEDICINE

## 2022-08-22 PROCEDURE — 99999 PR PBB SHADOW E&M-EST. PATIENT-LVL III: CPT | Mod: PBBFAC,,, | Performed by: INTERNAL MEDICINE

## 2022-08-22 PROCEDURE — 85610 PROTHROMBIN TIME: CPT | Mod: 91 | Performed by: PHYSICIAN ASSISTANT

## 2022-08-22 PROCEDURE — 87389 HIV-1 AG W/HIV-1&-2 AB AG IA: CPT | Performed by: PHYSICIAN ASSISTANT

## 2022-08-22 PROCEDURE — 86920 COMPATIBILITY TEST SPIN: CPT | Performed by: PHYSICIAN ASSISTANT

## 2022-08-22 PROCEDURE — 1159F PR MEDICATION LIST DOCUMENTED IN MEDICAL RECORD: ICD-10-PCS | Mod: CPTII,S$GLB,, | Performed by: INTERNAL MEDICINE

## 2022-08-22 PROCEDURE — 99215 OFFICE O/P EST HI 40 MIN: CPT | Mod: S$GLB,,, | Performed by: INTERNAL MEDICINE

## 2022-08-22 PROCEDURE — 76937 PR  US GUIDE, VASCULAR ACCESS: ICD-10-PCS | Mod: 26,,, | Performed by: STUDENT IN AN ORGANIZED HEALTH CARE EDUCATION/TRAINING PROGRAM

## 2022-08-22 PROCEDURE — 99285 EMERGENCY DEPT VISIT HI MDM: CPT | Mod: 25

## 2022-08-22 PROCEDURE — 3288F PR FALLS RISK ASSESSMENT DOCUMENTED: ICD-10-PCS | Mod: CPTII,S$GLB,, | Performed by: INTERNAL MEDICINE

## 2022-08-22 PROCEDURE — 83880 ASSAY OF NATRIURETIC PEPTIDE: CPT | Performed by: EMERGENCY MEDICINE

## 2022-08-22 PROCEDURE — 36410 VNPNXR 3YR/> PHY/QHP DX/THER: CPT | Mod: ,,, | Performed by: STUDENT IN AN ORGANIZED HEALTH CARE EDUCATION/TRAINING PROGRAM

## 2022-08-22 PROCEDURE — 3061F NEG MICROALBUMINURIA REV: CPT | Mod: CPTII,S$GLB,, | Performed by: INTERNAL MEDICINE

## 2022-08-22 PROCEDURE — 3288F FALL RISK ASSESSMENT DOCD: CPT | Mod: CPTII,S$GLB,, | Performed by: INTERNAL MEDICINE

## 2022-08-22 PROCEDURE — 1101F PT FALLS ASSESS-DOCD LE1/YR: CPT | Mod: CPTII,S$GLB,, | Performed by: INTERNAL MEDICINE

## 2022-08-22 PROCEDURE — 3008F BODY MASS INDEX DOCD: CPT | Mod: CPTII,S$GLB,, | Performed by: INTERNAL MEDICINE

## 2022-08-22 PROCEDURE — U0002 COVID-19 LAB TEST NON-CDC: HCPCS | Performed by: PHYSICIAN ASSISTANT

## 2022-08-22 PROCEDURE — 1126F AMNT PAIN NOTED NONE PRSNT: CPT | Mod: CPTII,S$GLB,, | Performed by: INTERNAL MEDICINE

## 2022-08-22 PROCEDURE — 84484 ASSAY OF TROPONIN QUANT: CPT | Performed by: EMERGENCY MEDICINE

## 2022-08-22 PROCEDURE — 85610 PROTHROMBIN TIME: CPT | Performed by: EMERGENCY MEDICINE

## 2022-08-22 PROCEDURE — 99285 PR EMERGENCY DEPT VISIT,LEVEL V: ICD-10-PCS | Mod: 25,,, | Performed by: STUDENT IN AN ORGANIZED HEALTH CARE EDUCATION/TRAINING PROGRAM

## 2022-08-22 PROCEDURE — 3008F PR BODY MASS INDEX (BMI) DOCUMENTED: ICD-10-PCS | Mod: CPTII,S$GLB,, | Performed by: INTERNAL MEDICINE

## 2022-08-22 PROCEDURE — 1160F PR REVIEW ALL MEDS BY PRESCRIBER/CLIN PHARMACIST DOCUMENTED: ICD-10-PCS | Mod: CPTII,S$GLB,, | Performed by: INTERNAL MEDICINE

## 2022-08-22 PROCEDURE — 3078F DIAST BP <80 MM HG: CPT | Mod: CPTII,S$GLB,, | Performed by: INTERNAL MEDICINE

## 2022-08-22 PROCEDURE — 99285 EMERGENCY DEPT VISIT HI MDM: CPT | Mod: 25,,, | Performed by: STUDENT IN AN ORGANIZED HEALTH CARE EDUCATION/TRAINING PROGRAM

## 2022-08-22 PROCEDURE — 63600175 PHARM REV CODE 636 W HCPCS: Performed by: PHYSICIAN ASSISTANT

## 2022-08-22 PROCEDURE — 3074F SYST BP LT 130 MM HG: CPT | Mod: CPTII,S$GLB,, | Performed by: INTERNAL MEDICINE

## 2022-08-22 RX ORDER — HYDROMORPHONE HYDROCHLORIDE 1 MG/ML
0.2 INJECTION, SOLUTION INTRAMUSCULAR; INTRAVENOUS; SUBCUTANEOUS
Status: COMPLETED | OUTPATIENT
Start: 2022-08-22 | End: 2022-08-22

## 2022-08-22 RX ORDER — ONDANSETRON 2 MG/ML
4 INJECTION INTRAMUSCULAR; INTRAVENOUS
Status: COMPLETED | OUTPATIENT
Start: 2022-08-22 | End: 2022-08-22

## 2022-08-22 RX ORDER — SERTRALINE HYDROCHLORIDE 100 MG/1
150 TABLET, FILM COATED ORAL DAILY
COMMUNITY
Start: 2022-08-03

## 2022-08-22 RX ORDER — BUSPIRONE HYDROCHLORIDE 15 MG/1
15 TABLET ORAL 2 TIMES DAILY
COMMUNITY
Start: 2022-08-03

## 2022-08-22 RX ORDER — PREGABALIN 150 MG/1
150 CAPSULE ORAL 3 TIMES DAILY
COMMUNITY
Start: 2022-08-08

## 2022-08-22 RX ORDER — FUROSEMIDE 20 MG/1
20 TABLET ORAL DAILY
COMMUNITY
Start: 2022-01-07 | End: 2022-08-23

## 2022-08-22 RX ORDER — HYDROCODONE BITARTRATE AND ACETAMINOPHEN 500; 5 MG/1; MG/1
TABLET ORAL
Status: DISCONTINUED | OUTPATIENT
Start: 2022-08-22 | End: 2022-09-02

## 2022-08-22 RX ADMIN — IOHEXOL 100 ML: 350 INJECTION, SOLUTION INTRAVENOUS at 09:08

## 2022-08-22 RX ADMIN — ONDANSETRON 4 MG: 2 INJECTION INTRAMUSCULAR; INTRAVENOUS at 08:08

## 2022-08-22 RX ADMIN — HYDROMORPHONE HYDROCHLORIDE 0.2 MG: 1 INJECTION, SOLUTION INTRAMUSCULAR; INTRAVENOUS; SUBCUTANEOUS at 08:08

## 2022-08-22 NOTE — ED PROVIDER NOTES
Encounter Date: 8/22/2022       History     Chief Complaint   Patient presents with    Multiple complaints     Sent here for admission    Shortness of Breath     Lung cancer on radiation, on chemo    Cough       The history is provided by the patient and medical records. No  was used.     Chayito Chung is a 71 y.o. female with medical history of CAD with stent, Tobacco abuse, T2DM, HTN, HLD, Diastolic HF, Lung cancer on radiation at  presenting to the ED with multiple complaints.     Reports worsening SOB after undergoing radiation therapy for her lung cancer today at . She [place] a [place] [facility] [place] to the [place] after she was hypoxic and tachycardic in clinic. Additionally reports concerns that her blood glucose is low. Reports persistent non-productive cough since her radiation treatments began. She additionally reports having abdominal pain and feels that she is a little bit distended. Denies fever, chest pain, vomiting, urinary or bowel movement changes. She is not on chemotherapy. Reports being on daily ASA. Reports history of blood transfusions. No melena or hematochezia.     Review of patient's allergies indicates:  No Known Allergies  Past Medical History:   Diagnosis Date    Acute coronary syndrome     Acute hypoxemic respiratory failure 5/1/2017    Anxiety     Asthma     Cancer     colon    Cataracts, both eyes     Chest pain at rest     Chest pain of uncertain etiology 12/6/2015    Colon cancer 1988    COPD (chronic obstructive pulmonary disease)     Coronary artery disease     Depression     Diabetes mellitus     Dyspnea on exertion 11/15/2018    Elevated brain natriuretic peptide (BNP) level 11/15/2018    Elevated cholesterol     Hypertension     Swelling     Syncope and collapse 11/8/2016     Past Surgical History:   Procedure Laterality Date    ABDOMINAL SURGERY      BREAST BIOPSY      benign unsure what side      SECTION, CLASSIC      COLON SURGERY      COLONOSCOPY  2019    repeat in 5 years    CORONARY ANGIOPLASTY WITH STENT PLACEMENT  3 months ago     x2, Hysterectomy, Lung surgery, Partial stomach removed, Part of colon removed for rectal cancer    GASTRECTOMY      HEMORRHOID SURGERY      HYSTERECTOMY      @28yrs of age    LUNG BIOPSY      OOPHORECTOMY      @28yrs of age    POSTERIOR FUSION OF CERVICAL SPINE WITH LAMINECTOMY N/A 2020    Procedure: LAMINECTOMY, SPINE, CERVICAL, WITH POSTERIOR FUSION C3-T1 ;  Surgeon: Herson Tate MD;  Location: [address];  Service: Neurosurgery;  Laterality: N/A;    REPAIR OF INCARCERATED INCISIONAL HERNIA WITHOUT HISTORY OF PRIOR REPAIR N/A 2022    Procedure: REPAIR, HERNIA, INCISIONAL, INCARCERATED, WITHOUT HISTORY OF PRIOR REPAIR;  Surgeon: Simon King MD;  Location: [facility];  Service: General;  Laterality: N/A;     Family History   Problem Relation Age of Onset    Cancer Mother     Diabetes Mother     Hypertension Mother     Breast cancer Mother     Heart disease Father     Lung cancer Father     Depression Sister     Hypertension Sister     Diabetes Mellitus Sister     Cancer Maternal Aunt      Social History     Tobacco Use    Smoking status: Former Smoker     Years: 55.00     Types: Cigarettes     Start date:     Smokeless tobacco: Never Used    Tobacco comment: Enrolled in the [employer] on 10/5/15 ([employer] Member ID # [id number]).  Ambulatory referral to Smoking Cessation program.   Substance Use Topics    Alcohol use: Yes     Alcohol/week: 3.0 standard drinks     Types: 3 Glasses of wine per week     Comment: 1 every 3 months    Drug use: No     Review of Systems   Constitutional: Negative for fever.   HENT: Negative for sore throat.    Eyes: Negative for pain.   Respiratory: Positive for cough and shortness of breath.    Cardiovascular: Positive for palpitations. Negative for chest pain.    Gastrointestinal: Positive for abdominal pain. Negative for nausea.   Genitourinary: Negative for dysuria.   Musculoskeletal: Negative for back pain.   Skin: Negative for rash.   Neurological: Negative for weakness.   Hematological: Does not bruise/bleed easily.       Physical Exam     Initial Vitals [08/22/22 1617]   BP Pulse Resp Temp SpO2   (!) 118/56 (!) 124 (!) 24 98 °F (36.7 °C) 96 %      MAP       --         Physical Exam    Constitutional: She appears well-developed and well-nourished. She is not diaphoretic. No distress.   HENT:   Head: Normocephalic and atraumatic.   Mouth/Throat: Oropharynx is clear and moist. No oropharyngeal exudate.   Eyes: Conjunctivae and EOM are normal. Pupils are equal, round, and reactive to light. No scleral icterus.   Neck: Neck supple.   Normal range of motion.  Cardiovascular: Regular rhythm. Tachycardia present.    Pulmonary/Chest: Breath sounds normal. Tachypnea noted. No respiratory distress. She has no wheezes. She has no rales.   POx 96% on RA   Abdominal: Abdomen is soft. She exhibits no distension. There is abdominal tenderness (upper abdomen). There is no rebound.   Musculoskeletal:         General: No tenderness or edema. Normal range of motion.      Cervical back: Normal range of motion and neck supple.     Neurological: She is alert and oriented to person, place, and time. She has normal strength. No sensory deficit.   Skin: Skin is warm and dry. No rash noted. No erythema.   Psychiatric: She has a normal mood and affect.       ED Course   ED US Guided Misc Procedure    Date/Time: 8/22/2022 9:30 PM  Performed by: John Fair DO  Authorized by: Arianne Velázquez MD     Procedure:  Ultrasound-guided peripheral venous cannulation  Indication:  Failed or difficult IV access   Right   Antecubital  Procedure:  Dynamic ultrasound guidance used, Candidate vein examined with linear probe - confirmed collapsibility, lack of pulsatility, and proper anatomic location. and  Using aseptic technique, IV catheter inserted with flash of blood noted, flow of venous blood confirmed.  Catheter gauge:  20   Flushes easily and without pain. Patient tolerated procedure well.  Complications:  None  Charge?:  Yes      Labs Reviewed   CBC W/ AUTO DIFFERENTIAL - Abnormal; Notable for the following components:       Result Value    RBC 3.30 (*)     Hemoglobin 6.8 (*)     Hematocrit 24.3 (*)     MCV 74 (*)     MCH 20.6 (*)     MCHC 28.0 (*)     RDW 20.7 (*)     Lymph # 0.8 (*)     Gran % 80.0 (*)     Lymph % 10.4 (*)     All other components within normal limits    Narrative:     Release to patient->Immediate   COMPREHENSIVE METABOLIC PANEL - Abnormal; Notable for the following components:    Potassium 3.4 (*)     CO2 18 (*)     Glucose 120 (*)     Albumin 3.4 (*)     Alkaline Phosphatase 147 (*)     ALT 7 (*)     All other components within normal limits    Narrative:     Release to patient->Immediate   PROTIME-INR    Narrative:     Release to patient->Immediate   TSH    Narrative:     Release to patient->Immediate   TROPONIN I    Narrative:     Release to patient->Immediate   B-TYPE NATRIURETIC PEPTIDE    Narrative:     Release to patient->Immediate   PROTIME-INR   APTT   SARS-COV-2 RNA AMPLIFICATION, QUAL    Narrative:     Is the patient symptomatic?->No  Is this needed for pre-procedure or pre-op testing?->No   HIV 1 / 2 ANTIBODY   HEPATITIS C ANTIBODY   SARS-COV-2 RDRP GENE   TYPE & SCREEN   PREPARE RBC SOFT          Imaging Results           CTA Chest Non-Coronary (PE Study) (Final result)  Result time 08/22/22 22:41:26    Final result by Brandyn Sullivan MD (08/22/22 22:41:26)                 Impression:      No evidence of pulmonary thromboembolism.    Multiple enlarged right hilar nodes and interval enlargement of bilateral solid pulmonary nodules compared to prior CTA chest.  These findings are consistent with known metastatic lung cancer.    Mild small bowel distension in the upper  abdomen without discrete transition point.  Findings may be related to ileus or less likely partial small bowel obstruction.    Bilateral adrenal nodules.    Additional findings as above.    This report was flagged in Epic as abnormal.    Electronically signed by resident: Sharda Chavez  Date:    08/22/2022  Time:    21:51    Electronically signed by: Brandyn Sullivan MD  Date:    08/22/2022  Time:    22:41             Narrative:    EXAMINATION:  CTA CHEST NON CORONARY; CT ABDOMEN PELVIS WITH CONTRAST    CLINICAL HISTORY:  Pulmonary embolism (PE) suspected, high prob;; Abdominal abscess/infection suspected;Bowel obstruction suspected;    TECHNIQUE:  Low dose axial images, sagittal and coronal reformations were obtained from the thoracic inlet to the pubic symphysis following the IV administration of 100 mL of Omnipaque 350.  Contrast timing was optimized to evaluate the pulmonary arteries.  Maximum intensity projection images were provided of the chest for review.    COMPARISON:  CT lumbar spine 05/11/2022.  CTA chest 11/18/2021.  CT cervical spine 05/09/2022.  CT renal stone study 03/17/2021.    FINDINGS:  CTA CHEST:    Pulmonary vasculature: Satisfactory opacification of the pulmonary arterial system with no filling defect to the segmental level.    Aorta: Left-sided aortic arch.  No aneurysm.  Atherosclerosis of the aortic arch, coronary arteries, and descending aorta.    Base of Neck: No significant abnormality.    Thoracic soft tissues: Unremarkable    Heart: No cardiomegaly.  Trace pericardial effusion.    Martha/Mediastinum: Multiple prominent right hilar nodes, the largest measures approximately 1.7 cm (axial series 2, image 212).    Airways: The large airways are patent. No foci of endobronchial filling.    Lungs/Pleura: Centrilobular emphysema with upper lobe predominance.  Diffuse ground-glass attenuation throughout the bilateral lungs.  Multiple solid pulmonary nodules bilaterally consistent with  metastatic disease.  2.1 x 1.2 cm spiculated mass in the right upper lobe (axial series 2, image 243), previously measured 1.2 x 1.0 cm.  0.8 x 0.7 cm solid nodule in the apicoposterior segment of the left upper lobe (axial series 2, image 152), previously measured 0.5 cm.    Esophagus: Unremarkable    CT ABDOMEN PELVIS:    Abdomen: Mild hepatomegaly.  Stable scattered hypodensities throughout the liver, too small to characterize.  Gallbladder surgically absent.  Prominent extrahepatic bile duct and minimal intrahepatic ductal dilatation, similar to prior exam and likely sequela of cholecystectomy.  Stable bilateral adrenal nodules.  Spleen, pancreas, and kidneys are unremarkable.    Stable postsurgical changes of partial gastrectomy.  Multiple loops of dilated small bowel in the upper abdomen with no obvious transition point.  No evidence of inflammation.  Appendix is surgically absent.  Uterus is surgically absent.  No bladder wall thickening.    Abdominal wall: Postsurgical changes of ventral hernia repair with soft tissue stranding along the surgical site.  Tiny fat containing umbilical hernia.    Bones: Stable compression deformity of L1.  Bilateral pars defects at L5.  Postoperative changes in the cervical spine.  Old right-sided rib fractures.  No acute fracture. No suspicious lytic or sclerotic lesions.                                CT Abdomen Pelvis With Contrast (Final result)  Result time 08/22/22 22:41:26    Final result by Brandyn Sullivan MD (08/22/22 22:41:26)                 Impression:      No evidence of pulmonary thromboembolism.    Multiple enlarged right hilar nodes and interval enlargement of bilateral solid pulmonary nodules compared to prior CTA chest.  These findings are consistent with known metastatic lung cancer.    Mild small bowel distension in the upper abdomen without discrete transition point.  Findings may be related to ileus or less likely partial small bowel  obstruction.    Bilateral adrenal nodules.    Additional findings as above.    This report was flagged in Epic as abnormal.    Electronically signed by resident: Sharda Chavez  Date:    08/22/2022  Time:    21:51    Electronically signed by: Brandyn Sullivan MD  Date:    08/22/2022  Time:    22:41             Narrative:    EXAMINATION:  CTA CHEST NON CORONARY; CT ABDOMEN PELVIS WITH CONTRAST    CLINICAL HISTORY:  Pulmonary embolism (PE) suspected, high prob;; Abdominal abscess/infection suspected;Bowel obstruction suspected;    TECHNIQUE:  Low dose axial images, sagittal and coronal reformations were obtained from the thoracic inlet to the pubic symphysis following the IV administration of 100 mL of Omnipaque 350.  Contrast timing was optimized to evaluate the pulmonary arteries.  Maximum intensity projection images were provided of the chest for review.    COMPARISON:  CT lumbar spine 05/11/2022.  CTA chest 11/18/2021.  CT cervical spine 05/09/2022.  CT renal stone study 03/17/2021.    FINDINGS:  CTA CHEST:    Pulmonary vasculature: Satisfactory opacification of the pulmonary arterial system with no filling defect to the segmental level.    Aorta: Left-sided aortic arch.  No aneurysm.  Atherosclerosis of the aortic arch, coronary arteries, and descending aorta.    Base of Neck: No significant abnormality.    Thoracic soft tissues: Unremarkable    Heart: No cardiomegaly.  Trace pericardial effusion.    Martha/Mediastinum: Multiple prominent right hilar nodes, the largest measures approximately 1.7 cm (axial series 2, image 212).    Airways: The large airways are patent. No foci of endobronchial filling.    Lungs/Pleura: Centrilobular emphysema with upper lobe predominance.  Diffuse ground-glass attenuation throughout the bilateral lungs.  Multiple solid pulmonary nodules bilaterally consistent with metastatic disease.  2.1 x 1.2 cm spiculated mass in the right upper lobe (axial series 2, image 243), previously  measured 1.2 x 1.0 cm.  0.8 x 0.7 cm solid nodule in the apicoposterior segment of the left upper lobe (axial series 2, image 152), previously measured 0.5 cm.    Esophagus: Unremarkable    CT ABDOMEN PELVIS:    Abdomen: Mild hepatomegaly.  Stable scattered hypodensities throughout the liver, too small to characterize.  Gallbladder surgically absent.  Prominent extrahepatic bile duct and minimal intrahepatic ductal dilatation, similar to prior exam and likely sequela of cholecystectomy.  Stable bilateral adrenal nodules.  Spleen, pancreas, and kidneys are unremarkable.    Stable postsurgical changes of partial gastrectomy.  Multiple loops of dilated small bowel in the upper abdomen with no obvious transition point.  No evidence of inflammation.  Appendix is surgically absent.  Uterus is surgically absent.  No bladder wall thickening.    Abdominal wall: Postsurgical changes of ventral hernia repair with soft tissue stranding along the surgical site.  Tiny fat containing umbilical hernia.    Bones: Stable compression deformity of L1.  Bilateral pars defects at L5.  Postoperative changes in the cervical spine.  Old right-sided rib fractures.  No acute fracture. No suspicious lytic or sclerotic lesions.                               X-Ray Chest AP Portable (Final result)  Result time 08/22/22 18:34:00    Final result by Alphonse Alex MD (08/22/22 18:34:00)                 Impression:      1. Interstitial findings may reflect edema or other nonspecific pneumonitis particularly projected over the right mid to lower lung zones.  Correlation is advised.      Electronically signed by: Alphonse Alex MD  Date:    08/22/2022  Time:    18:34             Narrative:    EXAMINATION:  XR CHEST AP PORTABLE    CLINICAL HISTORY:  Chest Pain;    TECHNIQUE:  Single frontal view of the chest was performed.    COMPARISON:  05/09/2022    FINDINGS:  The cardiomediastinal silhouette is not enlarged, stable in configuration since the  previous exam..  There is no pleural effusion.  The trachea is midline.  The lungs are symmetrically expanded bilaterally with coarse interstitial attenuation, more conspicuous on the right.  There is prominence of the right perihilar region.  There is bilateral basilar subsegmental atelectasis or scarring.  No large focal consolidation seen.  There is no pneumothorax.  The osseous structures are remarkable for degenerative changes.  Surgical change noted of the epigastric region.  There are surgical changes of the cervical spine..                                 Medications   0.9%  NaCl infusion (for blood administration) (has no administration in time range)   HYDROmorphone injection 0.2 mg (0.2 mg Intravenous Given 8/22/22 2010)   ondansetron injection 4 mg (4 mg Intravenous Given 8/22/22 2010)   iohexoL (OMNIPAQUE 350) injection 100 mL (100 mLs Intravenous Given 8/22/22 2125)     Medical Decision Making:   History:   Old Medical Records: I decided to obtain old medical records.  Old Records Summarized: records from clinic visits and records from previous admission(s).  Independently Interpreted Test(s):   I have ordered and independently interpreted EKG Reading(s) - see summary below       <> Summary of EKG Reading(s): Sinus tachycardia 126 bpm. No STEMI  Clinical Tests:   Lab Tests: Ordered and Reviewed  Radiological Study: Ordered and Reviewed  Medical Tests: Ordered and Reviewed  Other:   I have discussed this case with another health care provider.       <> Summary of the Discussion: General Surgery, Hospital medicine       APC / Resident Notes:   71 y.o. female with medical history of CAD with stent, Tobacco abuse, T2DM, HTN, HLD, Diastolic HF, Lung cancer on radiation at  presenting to the ED for worsening SOB after radiation therapy today. Additionally notes abdominal pain and persistent cough. DDx includes but not limited to cardiac arrhythmia, ACS, CHF, pulmonary embolism, pneumonia, viral syndrome,  pneumonitis from radiotherapy, symptomatic anemia, malignancy, bowel obstruction.     Work-up reviewed HBG 6.8. T&S and consent obtained. 1 unit RBC ordered for transfusion. No melena or hematochezia on rectal exam. Cardiac markers negative. CTA negative for PE. Multiple pulmonary nodules. Possible ileus versus partial SBO seen. General surgery consulted and will see the patient. HM admitted for further management. Patient expresses understanding and agreeable to the plan. I have discussed the care of this patient with my supervising physician.        Attending Attestation:     Physician Attestation Statement for NP/PA:       Other NP/PA Attestation Additions:      Medical Decision Making: Attending Attestation:   I oversaw the PA/NP's evaluation of the patient and have evaluated the patient myself. The case was discussed with the PA/NP.   Please see the PA/NP note for further details regarding the patient's history, physical exam, any pertinent imaging or lab results or consultations.  I have reviewed and agree with the documented findings, interpretation of studies and results, and plan of care. I was present for the key portions of any procedure(s) performed and immediately available for the remainder of the procedure(s).  Any exceptions are noted below.  John Fair, DO    71F with active radiation for lung cancer with dyspnea, has radiation pneumonitis most likely but also invidentally noted to have hgb <7 with increasing dyspnea (could be related) and associated tachycardia, no GI bleeding and rectal neg per PA, has required transfusions in the past, anemia likely secondary to other chronic disease rather than acute bleeding  Sinus tach on ekg  CTPE, bl LE dopplers, 1 unit PRBCs  Pending admission                      Clinical Impression:   Final diagnoses:  [R06.02] SOB (shortness of breath)  [R06.02] Shortness of breath  [R22.43] Localized swelling, mass, or lump of lower extremity, bilateral  [C34.90]  Malignant neoplasm of lung, unspecified laterality, unspecified part of lung  [D64.9] Anemia requiring transfusions (Primary)  [R93.5] Abnormal CT of the abdomen          ED Disposition Condition    Observation               Chapincito Stafford PA-C  08/22/22 7023       John Fair DO  08/23/22 1053

## 2022-08-22 NOTE — PROGRESS NOTES
Assessment:       1. SOB (shortness of breath)    2. Other chest pain    3. Coronary artery disease of native artery of native heart with stable angina pectoris    4. Chronic bronchitis, unspecified chronic bronchitis type    5. Tobacco abuse, 1ppd, 50 years    6. Recurrent major depressive disorder, in partial remission    7. Solitary pulmonary nodule s/p resection 4/2012    8. S/P PTCA (percutaneous transluminal coronary angioplasty)    9. Hypertension associated with diabetes    10. Hyperlipidemia associated with type 2 diabetes mellitus    11. Chest pain, unspecified type    12. Tachycardia        Plan:         Chayito was seen today for follow-up.    Diagnoses and all orders for this visit:    SOB (shortness of breath)    Other chest pain  -     Cancel: CTA Chest Non Coronary; Future    Coronary artery disease of native artery of native heart with stable angina pectoris    Chronic bronchitis, unspecified chronic bronchitis type    Tobacco abuse, 1ppd, 50 years    Recurrent major depressive disorder, in partial remission    Solitary pulmonary nodule s/p resection 4/2012    S/P PTCA (percutaneous transluminal coronary angioplasty)    Hypertension associated with diabetes    Hyperlipidemia associated with type 2 diabetes mellitus    Chest pain, unspecified type    Tachycardia      Acute  New Problem : high risk   Unclear Etiology  Unclear prognosis  Ddx would include ADHF, ACS, VTE/PE  I explained to the patient the Potential injury or illness that could pose a threat to life or bodily function  Patient is agreeable to plan       Subjective:       Patient ID: Chayito Chung is a 71 y.o. female.    Chief Complaint: Follow-up      Patient just finished radiation tx at  today at 1 pm. She states that she has chest pain and shortness of breath that is chronic. She has faituge symptoms. BG in 60s on the dexcom in the office. After drinking coca cola , BG remained in the 60s.       HPI    Review of Systems   All other  "systems reviewed and are negative.            Health Maintenance Due   Topic Date Due    Pneumococcal Vaccines (Age 65+) (3 - PCV) 09/28/2021    COVID-19 Vaccine (4 - Booster for Moderna series) 03/29/2022         Objective:     BP (!) 120/52 (BP Location: Right arm, Patient Position: Sitting, BP Method: Medium (Manual))   Pulse (!) 131   Ht 5' 2" (1.575 m)   Wt 56.4 kg (124 lb 5.4 oz)   LMP  (LMP Unknown)   SpO2 (!) 91%   BMI 22.74 kg/m²     Vitals 8/22/2022 8/17/2022 8/16/2022 8/1/2022 7/14/2022   Height 62 62 62 62 62   Weight (lbs) 124.34 126.76 126.54 122.58 125.88   BMI (kg/m2) 22.7 23.2 23.1 22.4 23                Physical Exam  Nursing note reviewed.   Constitutional:       General: She is not in acute distress.     Appearance: Normal appearance. She is ill-appearing. She is not toxic-appearing or diaphoretic.   HENT:      Head: Normocephalic.   Eyes:      Conjunctiva/sclera: Conjunctivae normal.   Pulmonary:      Effort: No respiratory distress.      Comments: Tachypnea   Neurological:      General: No focal deficit present.      Mental Status: She is alert and oriented to person, place, and time.   Psychiatric:         Mood and Affect: Mood normal.         Behavior: Behavior normal.         Thought Content: Thought content normal.         Judgment: Judgment normal.             Future Appointments   Date Time Provider Department Center   9/6/2022  7:30 AM Gaurav Malin MD [facility] CARD[facility]   9/15/2022  8:00 AM Bryan Leslie PT [facility] OP [facility]   10/11/2022  8:45 AM LAB, [facility] [place]   10/14/2022  9:20 AM Cameron Valladares III, MD [facility] [place]         Medication List with Changes/Refills   Current Medications    ALBUTEROL (PROVENTIL/VENTOLIN HFA) 90 MCG/ACTUATION INHALER    INHALE 2 PUFFS BY MOUTH INTO THE LUNGS EVERY 6 HOURS AS NEEDED FOR WHEEZING    ALBUTEROL-IPRATROPIUM (DUO-NEB) 2.5 MG-0.5 MG/3 ML NEBULIZER SOLUTION    Take 3 mLs by nebulization every 4 (four) " "hours as needed for Wheezing or Shortness of Breath. Rescue    ALENDRONATE (FOSAMAX) 70 MG TABLET    Take 1 tablet (70 mg total) by mouth every 7 days.    ASPIRIN 81 MG CHEW    Take 1 tablet (81 mg total) by mouth once daily.    ATORVASTATIN (LIPITOR) 40 MG TABLET    Take 1 tablet (40 mg total) by mouth once daily.    BD ULTRA-FINE MINI PEN NEEDLE 31 GAUGE X 3/16" NDLE        DICLOFENAC SODIUM (VOLTAREN) 1 % GEL    Apply 2 g topically 4 (four) times daily.    DULOXETINE (CYMBALTA) 60 MG CAPSULE    Take 60 mg by mouth once daily. Take with 30mg for a total of 90mg daily    FERROUS SULFATE (FEOSOL) 325 MG (65 MG IRON) TAB TABLET    Take 1 tablet (325 mg total) by mouth once daily.    FLUTICASONE PROPIONATE (FLONASE) 50 MCG/ACTUATION NASAL SPRAY    2 sprays (100 mcg total) by Each Nare route once daily.    HYDROCHLOROTHIAZIDE (MICROZIDE) 12.5 MG CAPSULE    Take 1 capsule (12.5 mg total) by mouth once daily.    HYDROCODONE-ACETAMINOPHEN 7.5-300 MG TAB    Take by mouth.    INSULIN LISPRO 100 UNIT/ML PEN    Inject 5 Units into the skin 3 (three) times daily after meals.    LANTUS SOLOSTAR U-100 INSULIN GLARGINE 100 UNITS/ML (3ML) SUBQ PEN    Inject 20 Units into the skin nightly.    LOSARTAN (COZAAR) 50 MG TABLET    Take 1 tablet (50 mg total) by mouth once daily.    MAGNESIUM OXIDE (MAG-OX) 400 MG (241.3 MG MAGNESIUM) TABLET    Take 400 mg by mouth once daily.    METFORMIN (GLUCOPHAGE) 1000 MG TABLET    Take 1,000 mg by mouth 2 (two) times daily.    NITROGLYCERIN (NITROSTAT) 0.4 MG SL TABLET    Place 1 tablet (0.4 mg total) under the tongue every 5 (five) minutes as needed for Chest pain.    ONDANSETRON (ZOFRAN-ODT) 4 MG TBDL    Take 1 tablet (4 mg total) by mouth every 8 (eight) hours as needed (nausea).    POTASSIUM CHLORIDE SA (K-DUR,KLOR-CON) 20 MEQ TABLET    Take 1 tablet (20 mEq total) by mouth once daily.    QUETIAPINE (SEROQUEL) 300 MG TAB    Take 1 tablet (300 mg total) by mouth every evening.    TIZANIDINE 4 MG " CAP    Take by mouth.    TOPIRAMATE (TOPAMAX) 50 MG TABLET    Take 50 mg by mouth 2 (two) times daily.    TRELEGY ELLIPTA 200-62.5-25 MCG INHALER    Inhale 1 puff into the lungs once daily.    VERAPAMIL (CALAN-SR) 180 MG CR TABLET    Take 1 tablet (180 mg total) by mouth every evening.    ZONISAMIDE (ZONEGRAN) 100 MG CAP    Take 400 mg by mouth once daily.         Disclaimer:  This note has been generated using voice-recognition software. There may be grammatical or spelling errors that have been missed during proof-reading

## 2022-08-22 NOTE — FIRST PROVIDER EVALUATION
Emergency Department TeleTriage Encounter Note      CHIEF COMPLAINT    Chief Complaint   Patient presents with    Multiple complaints     Sent here for admission    Shortness of Breath     Lung cancer on radiation, on chemo    Cough       VITAL SIGNS   Initial Vitals [08/22/22 1617]   BP Pulse Resp Temp SpO2   (!) 118/56 (!) 124 (!) 24 98 °F (36.7 °C) 96 %      MAP       --            ALLERGIES    Review of patient's allergies indicates:  No Known Allergies    PROVIDER TRIAGE NOTE  The patient was sent by her primary care physician for admission and further workup.  The concern is for PE, DVT, and a multitude of other issues.  The patient states that she has had shortness of breath, leg swelling, and significant fatigue recently.      ORDERS  Labs Reviewed   HIV 1 / 2 ANTIBODY   HEPATITIS C ANTIBODY   CBC W/ AUTO DIFFERENTIAL   COMPREHENSIVE METABOLIC PANEL   PROTIME-INR   TSH   TROPONIN I   B-TYPE NATRIURETIC PEPTIDE       ED Orders (720h ago, onward)    Start Ordered     Status Ordering Provider    08/22/22 1638 08/22/22 1637  POCT COVID-19 Rapid Screening  Once         Ordered JUNIOR HAYWOOD    08/22/22 1637 08/22/22 1636  US Lower Extremity Veins Bilateral  1 time imaging         Ordered JUNIOR HAYWOOD    08/22/22 1636 08/22/22 1636  CTA Chest Non-Coronary (PE Study)  1 time imaging         Ordered JUNIOR HAYWOOD    08/22/22 1635 08/22/22 1636  Saline lock IV  Once         Ordered JUNIOR HAYWOOD    08/22/22 1635 08/22/22 1636  Pulse Oximetry Continuous  Continuous         Ordered JUNIOR HAYWOOD    08/22/22 1635 08/22/22 1636  Cardiac Monitoring - Adult  Continuous        Comments: Notify Physician If:    Ordered JUNIOR HAYWOOD    08/22/22 1635 08/22/22 1636  EKG 12-lead  Once         Ordered JUNIOR HAYWOOD    08/22/22 1635 08/22/22 1636  CBC auto differential  STAT         Ordered JUNIOR HAYWOOD    08/22/22 1635 08/22/22 1636  Comprehensive metabolic panel  STAT         Ordered JUNIOR HAYWOOD     08/22/22 1635 08/22/22 1636  X-Ray Chest AP Portable  1 time imaging         Ordered JUNIOR HAYWOOD    08/22/22 1635 08/22/22 1636  Protime-INR  STAT         Ordered JUNIOR HAYWOOD    08/22/22 1635 08/22/22 1636  TSH  STAT         Ordered JUNIOR HAYWOOD    08/22/22 1635 08/22/22 1636  Troponin I  STAT         Ordered JUNIOR HAYWOOD    08/22/22 1635 08/22/22 1636  B-Type natriuretic peptide (BNP)  STAT         Ordered JUNIOR HAYWOOD    08/22/22 1620 08/22/22 1620  HIV 1/2 Ag/Ab (4th Gen)  STAT         Acknowledged DHAVAL BAÑUELOS    08/22/22 1620 08/22/22 1620  Hepatitis C Antibody  STAT         Acknowledged DHAVAL BAÑUELOS    08/22/22 1620 08/22/22 1620  EKG 12-lead  Once         Completed by KOKI MCDONALD on 8/22/2022 at  4:25 PM DHAVAL BAÑUELOS            Virtual Visit Note: The provider triage portion of this emergency department evaluation and documentation was performed via [employer], a HIPAA-compliant telemedicine application, in concert with a tele-presenter in the room. A face to face patient evaluation with one of my colleagues will occur once the patient is placed in an emergency department room.      DISCLAIMER: This note was prepared with [employer] voice recognition transcription software. Garbled syntax, mangled pronouns, and other bizarre constructions may be attributed to that software system.

## 2022-08-23 PROBLEM — S39.91XA BLUNT ABDOMINAL TRAUMA: Status: RESOLVED | Noted: 2019-10-16 | Resolved: 2022-08-23

## 2022-08-23 PROBLEM — K83.8 BILE LEAK, POSTOPERATIVE: Status: RESOLVED | Noted: 2019-11-06 | Resolved: 2022-08-23

## 2022-08-23 PROBLEM — K92.2 GASTROINTESTINAL HEMORRHAGE: Status: RESOLVED | Noted: 2022-08-22 | Resolved: 2022-08-23

## 2022-08-23 PROBLEM — C18.9 MALIGNANT NEOPLASM OF COLON: Status: RESOLVED | Noted: 2022-08-22 | Resolved: 2022-08-23

## 2022-08-23 PROBLEM — L51.9 ERYTHEMA MULTIFORME: Status: RESOLVED | Noted: 2022-08-22 | Resolved: 2022-08-23

## 2022-08-23 PROBLEM — S36.899A TRAUMATIC HEMOPERITONEUM: Status: RESOLVED | Noted: 2019-10-16 | Resolved: 2022-08-23

## 2022-08-23 PROBLEM — K26.9 DUODENAL ULCER: Status: RESOLVED | Noted: 2022-08-22 | Resolved: 2022-08-23

## 2022-08-23 PROBLEM — T79.6XXA TRAUMATIC RHABDOMYOLYSIS: Status: RESOLVED | Noted: 2022-05-09 | Resolved: 2022-08-23

## 2022-08-23 PROBLEM — S32.009A LUMBAR TRANSVERSE PROCESS FRACTURE: Status: RESOLVED | Noted: 2019-10-29 | Resolved: 2022-08-23

## 2022-08-23 PROBLEM — S36.113A LIVER INJURY, LACERATION: Status: RESOLVED | Noted: 2019-10-16 | Resolved: 2022-08-23

## 2022-08-23 PROBLEM — S36.892A TRAUMATIC MESENTERIC HEMATOMA: Status: RESOLVED | Noted: 2019-10-16 | Resolved: 2022-08-23

## 2022-08-23 PROBLEM — C34.91 ADENOCARCINOMA OF LUNG, RIGHT: Status: ACTIVE | Noted: 2022-07-25

## 2022-08-23 PROBLEM — K91.89 BILE LEAK, POSTOPERATIVE: Status: RESOLVED | Noted: 2019-11-06 | Resolved: 2022-08-23

## 2022-08-23 LAB
ALBUMIN SERPL BCP-MCNC: 3.1 G/DL (ref 3.5–5.2)
ALLENS TEST: ABNORMAL
ALP SERPL-CCNC: 120 U/L (ref 55–135)
ALT SERPL W/O P-5'-P-CCNC: 7 U/L (ref 10–44)
ANION GAP SERPL CALC-SCNC: 7 MMOL/L (ref 8–16)
AST SERPL-CCNC: 10 U/L (ref 10–40)
BASOPHILS # BLD AUTO: 0.01 K/UL (ref 0–0.2)
BASOPHILS NFR BLD: 0.2 % (ref 0–1.9)
BILIRUB SERPL-MCNC: 0.4 MG/DL (ref 0.1–1)
BUN SERPL-MCNC: 5 MG/DL (ref 8–23)
CALCIUM SERPL-MCNC: 8.6 MG/DL (ref 8.7–10.5)
CHLORIDE SERPL-SCNC: 111 MMOL/L (ref 95–110)
CO2 SERPL-SCNC: 19 MMOL/L (ref 23–29)
CREAT SERPL-MCNC: 0.5 MG/DL (ref 0.5–1.4)
CTP QC/QA: YES
DELSYS: ABNORMAL
DIFFERENTIAL METHOD: ABNORMAL
EOSINOPHIL # BLD AUTO: 0 K/UL (ref 0–0.5)
EOSINOPHIL NFR BLD: 0.5 % (ref 0–8)
ERYTHROCYTE [DISTWIDTH] IN BLOOD BY AUTOMATED COUNT: 21.7 % (ref 11.5–14.5)
EST. GFR  (NO RACE VARIABLE): >60 ML/MIN/1.73 M^2
GLUCOSE SERPL-MCNC: 135 MG/DL (ref 70–110)
HCO3 UR-SCNC: 18.7 MMOL/L (ref 24–28)
HCT VFR BLD AUTO: 25 % (ref 37–48.5)
HCV AB SERPL QL IA: NEGATIVE
HGB BLD-MCNC: 7.4 G/DL (ref 12–16)
HIV 1+2 AB+HIV1 P24 AG SERPL QL IA: NEGATIVE
IMM GRANULOCYTES # BLD AUTO: 0.04 K/UL (ref 0–0.04)
IMM GRANULOCYTES NFR BLD AUTO: 0.7 % (ref 0–0.5)
LYMPHOCYTES # BLD AUTO: 0.4 K/UL (ref 1–4.8)
LYMPHOCYTES NFR BLD: 6.8 % (ref 18–48)
MAGNESIUM SERPL-MCNC: 1.7 MG/DL (ref 1.6–2.6)
MCH RBC QN AUTO: 22.6 PG (ref 27–31)
MCHC RBC AUTO-ENTMCNC: 29.6 G/DL (ref 32–36)
MCV RBC AUTO: 77 FL (ref 82–98)
MODE: ABNORMAL
MONOCYTES # BLD AUTO: 0.1 K/UL (ref 0.3–1)
MONOCYTES NFR BLD: 1.7 % (ref 4–15)
NEUTROPHILS # BLD AUTO: 5.3 K/UL (ref 1.8–7.7)
NEUTROPHILS NFR BLD: 90.1 % (ref 38–73)
NRBC BLD-RTO: 0 /100 WBC
PCO2 BLDA: 34.9 MMHG (ref 35–45)
PH SMN: 7.34 [PH] (ref 7.35–7.45)
PHOSPHATE SERPL-MCNC: 2.4 MG/DL (ref 2.7–4.5)
PLATELET # BLD AUTO: 208 K/UL (ref 150–450)
PMV BLD AUTO: 10.5 FL (ref 9.2–12.9)
PO2 BLDA: 70 MMHG (ref 40–60)
POC BE: -7 MMOL/L
POC SATURATED O2: 93 % (ref 95–100)
POC TCO2: 20 MMOL/L (ref 24–29)
POCT GLUCOSE: 285 MG/DL (ref 70–110)
POCT GLUCOSE: 316 MG/DL (ref 70–110)
POCT GLUCOSE: 73 MG/DL (ref 70–110)
POTASSIUM SERPL-SCNC: 3.5 MMOL/L (ref 3.5–5.1)
PROT SERPL-MCNC: 7 G/DL (ref 6–8.4)
RBC # BLD AUTO: 3.27 M/UL (ref 4–5.4)
SAMPLE: ABNORMAL
SARS-COV-2 RDRP RESP QL NAA+PROBE: NEGATIVE
SITE: ABNORMAL
SODIUM SERPL-SCNC: 137 MMOL/L (ref 136–145)
WBC # BLD AUTO: 5.92 K/UL (ref 3.9–12.7)

## 2022-08-23 PROCEDURE — 97116 GAIT TRAINING THERAPY: CPT

## 2022-08-23 PROCEDURE — 99223 1ST HOSP IP/OBS HIGH 75: CPT | Mod: ,,, | Performed by: HOSPITALIST

## 2022-08-23 PROCEDURE — 25000242 PHARM REV CODE 250 ALT 637 W/ HCPCS: Performed by: HOSPITALIST

## 2022-08-23 PROCEDURE — 82803 BLOOD GASES ANY COMBINATION: CPT

## 2022-08-23 PROCEDURE — 97165 OT EVAL LOW COMPLEX 30 MIN: CPT

## 2022-08-23 PROCEDURE — 63600175 PHARM REV CODE 636 W HCPCS: Performed by: HOSPITALIST

## 2022-08-23 PROCEDURE — 96372 THER/PROPH/DIAG INJ SC/IM: CPT | Performed by: HOSPITALIST

## 2022-08-23 PROCEDURE — 99900035 HC TECH TIME PER 15 MIN (STAT)

## 2022-08-23 PROCEDURE — 99223 PR INITIAL HOSPITAL CARE,LEVL III: ICD-10-PCS | Mod: ,,, | Performed by: HOSPITALIST

## 2022-08-23 PROCEDURE — 25000003 PHARM REV CODE 250: Performed by: STUDENT IN AN ORGANIZED HEALTH CARE EDUCATION/TRAINING PROGRAM

## 2022-08-23 PROCEDURE — 97162 PT EVAL MOD COMPLEX 30 MIN: CPT

## 2022-08-23 PROCEDURE — 25000003 PHARM REV CODE 250: Performed by: HOSPITALIST

## 2022-08-23 PROCEDURE — 85025 COMPLETE CBC W/AUTO DIFF WBC: CPT | Performed by: HOSPITALIST

## 2022-08-23 PROCEDURE — 97535 SELF CARE MNGMENT TRAINING: CPT

## 2022-08-23 PROCEDURE — 80053 COMPREHEN METABOLIC PANEL: CPT | Performed by: HOSPITALIST

## 2022-08-23 PROCEDURE — S5010 5% DEXTROSE AND 0.45% SALINE: HCPCS | Performed by: HOSPITALIST

## 2022-08-23 PROCEDURE — G0378 HOSPITAL OBSERVATION PER HR: HCPCS

## 2022-08-23 PROCEDURE — 83735 ASSAY OF MAGNESIUM: CPT | Performed by: HOSPITALIST

## 2022-08-23 PROCEDURE — 25500020 PHARM REV CODE 255: Performed by: STUDENT IN AN ORGANIZED HEALTH CARE EDUCATION/TRAINING PROGRAM

## 2022-08-23 PROCEDURE — 94761 N-INVAS EAR/PLS OXIMETRY MLT: CPT

## 2022-08-23 PROCEDURE — 94640 AIRWAY INHALATION TREATMENT: CPT | Mod: XB

## 2022-08-23 PROCEDURE — 27000221 HC OXYGEN, UP TO 24 HOURS

## 2022-08-23 PROCEDURE — 94640 AIRWAY INHALATION TREATMENT: CPT

## 2022-08-23 PROCEDURE — 84100 ASSAY OF PHOSPHORUS: CPT | Performed by: HOSPITALIST

## 2022-08-23 RX ORDER — TIZANIDINE 4 MG/1
4 TABLET ORAL 2 TIMES DAILY PRN
Status: DISCONTINUED | OUTPATIENT
Start: 2022-08-23 | End: 2022-09-08 | Stop reason: HOSPADM

## 2022-08-23 RX ORDER — SODIUM CHLORIDE 0.9 % (FLUSH) 0.9 %
10 SYRINGE (ML) INJECTION EVERY 6 HOURS PRN
Status: DISCONTINUED | OUTPATIENT
Start: 2022-08-23 | End: 2022-09-08 | Stop reason: HOSPADM

## 2022-08-23 RX ORDER — INSULIN ASPART 100 [IU]/ML
1-10 INJECTION, SOLUTION INTRAVENOUS; SUBCUTANEOUS
Status: DISCONTINUED | OUTPATIENT
Start: 2022-08-23 | End: 2022-09-08 | Stop reason: HOSPADM

## 2022-08-23 RX ORDER — FLUTICASONE FUROATE AND VILANTEROL 100; 25 UG/1; UG/1
1 POWDER RESPIRATORY (INHALATION) DAILY
Status: DISCONTINUED | OUTPATIENT
Start: 2022-08-23 | End: 2022-09-08 | Stop reason: HOSPADM

## 2022-08-23 RX ORDER — BENZONATATE 100 MG/1
200 CAPSULE ORAL 3 TIMES DAILY
Status: DISCONTINUED | OUTPATIENT
Start: 2022-08-23 | End: 2022-09-08 | Stop reason: HOSPADM

## 2022-08-23 RX ORDER — VERAPAMIL HYDROCHLORIDE 180 MG/1
180 TABLET, FILM COATED, EXTENDED RELEASE ORAL NIGHTLY
Status: DISCONTINUED | OUTPATIENT
Start: 2022-08-23 | End: 2022-09-08 | Stop reason: HOSPADM

## 2022-08-23 RX ORDER — DEXTROSE MONOHYDRATE AND SODIUM CHLORIDE 5; .45 G/100ML; G/100ML
INJECTION, SOLUTION INTRAVENOUS CONTINUOUS
Status: DISCONTINUED | OUTPATIENT
Start: 2022-08-23 | End: 2022-08-24

## 2022-08-23 RX ORDER — GLUCAGON 1 MG
1 KIT INJECTION
Status: DISCONTINUED | OUTPATIENT
Start: 2022-08-23 | End: 2022-09-08 | Stop reason: HOSPADM

## 2022-08-23 RX ORDER — ATORVASTATIN CALCIUM 20 MG/1
40 TABLET, FILM COATED ORAL DAILY
Status: DISCONTINUED | OUTPATIENT
Start: 2022-08-23 | End: 2022-09-08 | Stop reason: HOSPADM

## 2022-08-23 RX ORDER — PREDNISONE 20 MG/1
40 TABLET ORAL DAILY
Status: DISPENSED | OUTPATIENT
Start: 2022-08-23 | End: 2022-08-28

## 2022-08-23 RX ORDER — TIZANIDINE 4 MG/1
4 TABLET ORAL 2 TIMES DAILY PRN
COMMUNITY
Start: 2022-08-04 | End: 2022-09-14

## 2022-08-23 RX ORDER — POLYETHYLENE GLYCOL 3350 17 G/17G
17 POWDER, FOR SOLUTION ORAL DAILY
Status: DISCONTINUED | OUTPATIENT
Start: 2022-08-23 | End: 2022-09-08 | Stop reason: HOSPADM

## 2022-08-23 RX ORDER — AMOXICILLIN 250 MG
1 CAPSULE ORAL 2 TIMES DAILY
Status: DISCONTINUED | OUTPATIENT
Start: 2022-08-23 | End: 2022-09-08 | Stop reason: HOSPADM

## 2022-08-23 RX ORDER — PREGABALIN 75 MG/1
150 CAPSULE ORAL 3 TIMES DAILY
Status: DISCONTINUED | OUTPATIENT
Start: 2022-08-23 | End: 2022-09-08 | Stop reason: HOSPADM

## 2022-08-23 RX ORDER — NITROGLYCERIN 0.4 MG/1
0.4 TABLET SUBLINGUAL EVERY 5 MIN PRN
Status: DISCONTINUED | OUTPATIENT
Start: 2022-08-23 | End: 2022-09-08 | Stop reason: HOSPADM

## 2022-08-23 RX ORDER — NAPROXEN SODIUM 220 MG/1
81 TABLET, FILM COATED ORAL DAILY
Status: DISCONTINUED | OUTPATIENT
Start: 2022-08-23 | End: 2022-09-08 | Stop reason: HOSPADM

## 2022-08-23 RX ORDER — ACETAMINOPHEN 325 MG/1
650 TABLET ORAL EVERY 4 HOURS PRN
Status: DISCONTINUED | OUTPATIENT
Start: 2022-08-23 | End: 2022-09-08 | Stop reason: HOSPADM

## 2022-08-23 RX ORDER — LANOLIN ALCOHOL/MO/W.PET/CERES
400 CREAM (GRAM) TOPICAL DAILY
Status: DISCONTINUED | OUTPATIENT
Start: 2022-08-23 | End: 2022-09-08 | Stop reason: HOSPADM

## 2022-08-23 RX ORDER — TALC
6 POWDER (GRAM) TOPICAL NIGHTLY PRN
Status: DISCONTINUED | OUTPATIENT
Start: 2022-08-23 | End: 2022-09-08 | Stop reason: HOSPADM

## 2022-08-23 RX ORDER — INSULIN ASPART 100 [IU]/ML
1-10 INJECTION, SOLUTION INTRAVENOUS; SUBCUTANEOUS EVERY 6 HOURS PRN
Status: DISCONTINUED | OUTPATIENT
Start: 2022-08-23 | End: 2022-08-23

## 2022-08-23 RX ORDER — HYDROCODONE BITARTRATE AND ACETAMINOPHEN 7.5; 325 MG/1; MG/1
1 TABLET ORAL EVERY 6 HOURS PRN
Status: DISCONTINUED | OUTPATIENT
Start: 2022-08-23 | End: 2022-08-29

## 2022-08-23 RX ORDER — ENOXAPARIN SODIUM 100 MG/ML
40 INJECTION SUBCUTANEOUS EVERY 24 HOURS
Status: DISCONTINUED | OUTPATIENT
Start: 2022-08-23 | End: 2022-09-08 | Stop reason: HOSPADM

## 2022-08-23 RX ORDER — AZITHROMYCIN 250 MG/1
500 TABLET, FILM COATED ORAL DAILY
Status: DISCONTINUED | OUTPATIENT
Start: 2022-08-23 | End: 2022-08-25

## 2022-08-23 RX ORDER — HYDROCODONE BITARTRATE AND ACETAMINOPHEN 7.5; 325 MG/1; MG/1
1 TABLET ORAL EVERY 8 HOURS PRN
Status: DISCONTINUED | OUTPATIENT
Start: 2022-08-23 | End: 2022-08-23

## 2022-08-23 RX ORDER — QUETIAPINE FUMARATE 400 MG/1
400 TABLET, FILM COATED ORAL NIGHTLY
COMMUNITY
Start: 2022-08-04

## 2022-08-23 RX ORDER — PROCHLORPERAZINE EDISYLATE 5 MG/ML
5 INJECTION INTRAMUSCULAR; INTRAVENOUS EVERY 6 HOURS PRN
Status: DISCONTINUED | OUTPATIENT
Start: 2022-08-23 | End: 2022-09-08 | Stop reason: HOSPADM

## 2022-08-23 RX ORDER — PROMETHAZINE HYDROCHLORIDE AND CODEINE PHOSPHATE 6.25; 1 MG/5ML; MG/5ML
7.5 SOLUTION ORAL EVERY 4 HOURS PRN
Status: DISCONTINUED | OUTPATIENT
Start: 2022-08-23 | End: 2022-09-08 | Stop reason: HOSPADM

## 2022-08-23 RX ORDER — GUAIFENESIN 600 MG/1
600 TABLET, EXTENDED RELEASE ORAL 2 TIMES DAILY
Status: DISCONTINUED | OUTPATIENT
Start: 2022-08-23 | End: 2022-09-08 | Stop reason: HOSPADM

## 2022-08-23 RX ORDER — SERTRALINE HYDROCHLORIDE 50 MG/1
150 TABLET, FILM COATED ORAL DAILY
Status: DISCONTINUED | OUTPATIENT
Start: 2022-08-24 | End: 2022-09-08 | Stop reason: HOSPADM

## 2022-08-23 RX ORDER — LANOLIN ALCOHOL/MO/W.PET/CERES
1 CREAM (GRAM) TOPICAL DAILY
Refills: 3 | Status: DISCONTINUED | OUTPATIENT
Start: 2022-08-23 | End: 2022-09-08 | Stop reason: HOSPADM

## 2022-08-23 RX ORDER — INSULIN LISPRO 100 [IU]/ML
35 INJECTION, SUSPENSION SUBCUTANEOUS
Status: ON HOLD | COMMUNITY
Start: 2022-06-28 | End: 2022-09-01 | Stop reason: HOSPADM

## 2022-08-23 RX ORDER — NALOXONE HCL 0.4 MG/ML
0.02 VIAL (ML) INJECTION
Status: DISCONTINUED | OUTPATIENT
Start: 2022-08-23 | End: 2022-09-08 | Stop reason: HOSPADM

## 2022-08-23 RX ORDER — POTASSIUM CHLORIDE 20 MEQ/1
20 TABLET, EXTENDED RELEASE ORAL DAILY
Status: DISCONTINUED | OUTPATIENT
Start: 2022-08-23 | End: 2022-08-31

## 2022-08-23 RX ORDER — ONDANSETRON 2 MG/ML
4 INJECTION INTRAMUSCULAR; INTRAVENOUS EVERY 8 HOURS PRN
Status: DISCONTINUED | OUTPATIENT
Start: 2022-08-23 | End: 2022-09-08 | Stop reason: HOSPADM

## 2022-08-23 RX ORDER — QUETIAPINE FUMARATE 100 MG/1
400 TABLET, FILM COATED ORAL NIGHTLY
Status: DISCONTINUED | OUTPATIENT
Start: 2022-08-23 | End: 2022-09-08 | Stop reason: HOSPADM

## 2022-08-23 RX ORDER — IPRATROPIUM BROMIDE AND ALBUTEROL SULFATE 2.5; .5 MG/3ML; MG/3ML
3 SOLUTION RESPIRATORY (INHALATION)
Status: DISCONTINUED | OUTPATIENT
Start: 2022-08-23 | End: 2022-08-24

## 2022-08-23 RX ADMIN — TIZANIDINE 4 MG: 2 TABLET ORAL at 01:08

## 2022-08-23 RX ADMIN — QUETIAPINE FUMARATE 400 MG: 100 TABLET ORAL at 09:08

## 2022-08-23 RX ADMIN — IPRATROPIUM BROMIDE AND ALBUTEROL SULFATE 3 ML: 2.5; .5 SOLUTION RESPIRATORY (INHALATION) at 01:08

## 2022-08-23 RX ADMIN — DIATRIZOATE MEGLUMINE AND DIATRIZOATE SODIUM 100 ML: 660; 100 LIQUID ORAL; RECTAL at 11:08

## 2022-08-23 RX ADMIN — PREGABALIN 150 MG: 75 CAPSULE ORAL at 09:08

## 2022-08-23 RX ADMIN — IPRATROPIUM BROMIDE AND ALBUTEROL SULFATE 3 ML: 2.5; .5 SOLUTION RESPIRATORY (INHALATION) at 11:08

## 2022-08-23 RX ADMIN — BENZONATATE 200 MG: 100 CAPSULE ORAL at 10:08

## 2022-08-23 RX ADMIN — IPRATROPIUM BROMIDE AND ALBUTEROL SULFATE 3 ML: 2.5; .5 SOLUTION RESPIRATORY (INHALATION) at 08:08

## 2022-08-23 RX ADMIN — IPRATROPIUM BROMIDE AND ALBUTEROL SULFATE 3 ML: 2.5; .5 SOLUTION RESPIRATORY (INHALATION) at 03:08

## 2022-08-23 RX ADMIN — DEXTROSE 125 ML: 10 SOLUTION INTRAVENOUS at 01:08

## 2022-08-23 RX ADMIN — INSULIN ASPART 2 UNITS: 100 INJECTION, SOLUTION INTRAVENOUS; SUBCUTANEOUS at 09:08

## 2022-08-23 RX ADMIN — METHYLPREDNISOLONE SODIUM SUCCINATE 60 MG: 40 INJECTION, POWDER, FOR SOLUTION INTRAMUSCULAR; INTRAVENOUS at 01:08

## 2022-08-23 RX ADMIN — HYDROCODONE BITARTRATE AND ACETAMINOPHEN 1 TABLET: 7.5; 325 TABLET ORAL at 01:08

## 2022-08-23 RX ADMIN — PREGABALIN 150 MG: 75 CAPSULE ORAL at 10:08

## 2022-08-23 RX ADMIN — INSULIN ASPART 6 UNITS: 100 INJECTION, SOLUTION INTRAVENOUS; SUBCUTANEOUS at 07:08

## 2022-08-23 RX ADMIN — DEXTROSE AND SODIUM CHLORIDE: 5; .45 INJECTION, SOLUTION INTRAVENOUS at 03:08

## 2022-08-23 RX ADMIN — IPRATROPIUM BROMIDE AND ALBUTEROL SULFATE 3 ML: 2.5; .5 SOLUTION RESPIRATORY (INHALATION) at 07:08

## 2022-08-23 RX ADMIN — FLUTICASONE FUROATE AND VILANTEROL TRIFENATATE 1 PUFF: 100; 25 POWDER RESPIRATORY (INHALATION) at 07:08

## 2022-08-23 RX ADMIN — HYDROCODONE BITARTRATE AND ACETAMINOPHEN 1 TABLET: 7.5; 325 TABLET ORAL at 10:08

## 2022-08-23 RX ADMIN — BENZONATATE 200 MG: 100 CAPSULE ORAL at 03:08

## 2022-08-23 RX ADMIN — PREGABALIN 150 MG: 75 CAPSULE ORAL at 03:08

## 2022-08-23 RX ADMIN — SENNOSIDES AND DOCUSATE SODIUM 1 TABLET: 50; 8.6 TABLET ORAL at 09:08

## 2022-08-23 RX ADMIN — HYDROCODONE BITARTRATE AND ACETAMINOPHEN 1 TABLET: 7.5; 325 TABLET ORAL at 07:08

## 2022-08-23 RX ADMIN — INSULIN ASPART 8 UNITS: 100 INJECTION, SOLUTION INTRAVENOUS; SUBCUTANEOUS at 12:08

## 2022-08-23 RX ADMIN — GUAIFENESIN 600 MG: 600 TABLET, EXTENDED RELEASE ORAL at 09:08

## 2022-08-23 RX ADMIN — BENZONATATE 200 MG: 100 CAPSULE ORAL at 09:08

## 2022-08-23 RX ADMIN — ENOXAPARIN SODIUM 40 MG: 100 INJECTION SUBCUTANEOUS at 05:08

## 2022-08-23 RX ADMIN — ONDANSETRON 4 MG: 2 INJECTION INTRAMUSCULAR; INTRAVENOUS at 03:08

## 2022-08-23 RX ADMIN — INSULIN DETEMIR 12 UNITS: 100 INJECTION, SOLUTION SUBCUTANEOUS at 09:08

## 2022-08-23 NOTE — H&P
[facility] - Emergency Dept  [facility] Medicine  History & Physical    Patient Name: Chayito Chung  MRN: [id number]  Patient Class: OP- Observation  Admission Date: 8/22/2022  Attending Physician: Arianne Velázquez MD [facility]  Admitting Physician: Adryan Newell MD    Primary Care Provider: Curt Valladares III, MD         Patient information was obtained from patient, past medical records and ER records.     Subjective:     Principal Problem:Chronic obstructive pulmonary disease with acute exacerbation    Chief Complaint:   Chief Complaint   Patient presents with    Multiple complaints     Sent here for admission    Shortness of Breath     Lung cancer on radiation, on chemo    Cough        HPI: 71-year-old  woman with a history of right lung adenocarcinoma, COPD, restrictive lung disease, tobacco abuse, CAD status post PCI, type 2 diabetes on insulin, chronic neck pain who presents to the emergency department from primary care clinic for complaints of dyspnea and abdominal distension.    She is followed by Oncology and Radiation Oncology at [facility], undergoing 4 cycles of radiation therapy last of which is Wednesday 8/24.  Completed radiation on Monday but then was also seen in primary care clinic and noted to be fatigued tachycardic and tachypneic reporting a persistent cough after her radiation treatment she also reported neck pain and headache.  She has a chronic cough but reports that symptoms are worse today, she is not taking any home oxygen she has home inhalers but does not seem that she has taken any inhalers or nebulizer treatments.  She feels more short of breath than her baseline    She also reports over the last week progressive abdominal distension and discomfort states that today she has had 1 watery bowel movement has been having irregular bowel movements but when she does have 1 is often a loose BM.    During the ED workup hemoglobin noted to be critical at 6.8 patient has  been consented in 1 unit of PRBCs is infusing now during my interview, CT a chest and CT abdomen pelvis completed negative for PE but abdominal imaging displays distended bowel without a transition point possible ileus versus developing SBO.  General surgery consult completed in ED      Past Medical History:   Diagnosis Date    Acute coronary syndrome     Acute hypoxemic respiratory failure 2017    Anxiety     Asthma     Cancer     colon    Cataracts, both eyes     Chest pain at rest     Chest pain of uncertain etiology 2015    Colon cancer 1988    COPD (chronic obstructive pulmonary disease)     Coronary artery disease     Depression     Diabetes mellitus     Dyspnea on exertion 11/15/2018    Elevated brain natriuretic peptide (BNP) level 11/15/2018    Elevated cholesterol     Hypertension     Swelling     Syncope and collapse 2016       Past Surgical History:   Procedure Laterality Date    ABDOMINAL SURGERY      BREAST BIOPSY      benign unsure what side     SECTION, CLASSIC      COLON SURGERY      COLONOSCOPY  2019    repeat in 5 years    CORONARY ANGIOPLASTY WITH STENT PLACEMENT  3 months ago     x2, Hysterectomy, Lung surgery, Partial stomach removed, Part of colon removed for rectal cancer    GASTRECTOMY      HEMORRHOID SURGERY      HYSTERECTOMY      @28yrs of age    LUNG BIOPSY      OOPHORECTOMY      @28yrs of age    POSTERIOR FUSION OF CERVICAL SPINE WITH LAMINECTOMY N/A 2020    Procedure: LAMINECTOMY, SPINE, CERVICAL, WITH POSTERIOR FUSION C3-T1 ;  Surgeon: Herson Tate MD;  Location: [facility] OR [address] [place] [address];  Service: Neurosurgery;  Laterality: N/A;    REPAIR OF INCARCERATED INCISIONAL HERNIA WITHOUT HISTORY OF PRIOR REPAIR N/A 2022    Procedure: REPAIR, HERNIA, INCISIONAL, INCARCERATED, WITHOUT HISTORY OF PRIOR REPAIR;  Surgeon: Simon King MD;  Location: [facility] OR;  Service: General;  Laterality: N/A;       Review of patient's  allergies indicates:  No Known Allergies    No current facility-administered medications on file prior to encounter.     Current Outpatient Medications on File Prior to Encounter   Medication Sig    albuterol (PROVENTIL/VENTOLIN HFA) 90 mcg/actuation inhaler INHALE 2 PUFFS BY MOUTH INTO THE LUNGS EVERY 6 HOURS AS NEEDED FOR WHEEZING (Patient taking differently: Inhale 2 puffs into the lungs every 6 (six) hours as needed for Wheezing.)    alendronate (FOSAMAX) 70 MG tablet Take 1 tablet (70 mg total) by mouth every 7 days. (Patient taking differently: Take 70 mg by mouth every Sunday.)    aspirin 81 MG Chew Take 1 tablet (81 mg total) by mouth once daily.    atorvastatin (LIPITOR) 40 MG tablet Take 1 tablet (40 mg total) by mouth once daily.    ferrous sulfate (FEOSOL) 325 mg (65 mg iron) Tab tablet Take 1 tablet (325 mg total) by mouth once daily.    HUMALOG MIX 75-25,U-100,INSULN 100 unit/mL (75-25) Susp Inject 25 Units into the skin 2 (two) times daily before meals.    hydroCHLOROthiazide (MICROZIDE) 12.5 mg capsule Take 1 capsule (12.5 mg total) by mouth once daily.    HYDROcodone-acetaminophen 7.5-300 mg Tab Take 1 tablet by mouth 2 (two) times daily as needed (chronic neck pain).    LANTUS SOLOSTAR U-100 INSULIN glargine 100 units/mL (3mL) SubQ pen Inject 20 Units into the skin nightly. (Patient taking differently: Inject 25 Units into the skin nightly.)    losartan (COZAAR) 50 MG tablet Take 1 tablet (50 mg total) by mouth once daily.    magnesium oxide (MAG-OX) 400 mg (241.3 mg magnesium) tablet Take 400 mg by mouth once daily.    metFORMIN (GLUCOPHAGE) 1000 MG tablet Take 1,000 mg by mouth 2 (two) times daily.    pregabalin (LYRICA) 150 MG capsule Take 150 mg by mouth 3 (three) times daily.    QUEtiapine (SEROQUEL) 400 MG tablet Take 400 mg by mouth nightly.    sertraline (ZOLOFT) 100 MG tablet Take 150 mg by mouth once daily at 6am.    tiZANidine (ZANAFLEX) 4 MG tablet Take 4 mg by mouth 2  "(two) times daily as needed for Muscle spasms.    TRELEGY ELLIPTA 200-62.5-25 mcg inhaler Inhale 1 puff into the lungs once daily.    verapamiL (CALAN-SR) 180 MG CR tablet Take 1 tablet (180 mg total) by mouth every evening.    [DISCONTINUED] furosemide (LASIX) 20 MG tablet Take 20 mg by mouth once daily at 6am.    albuterol-ipratropium (DUO-NEB) 2.5 mg-0.5 mg/3 mL nebulizer solution Take 3 mLs by nebulization every 4 (four) hours as needed for Wheezing or Shortness of Breath. Rescue    BD ULTRA-FINE MINI PEN NEEDLE 31 gauge x 3/16" Ndle     busPIRone (BUSPAR) 15 MG tablet Take 15 mg by mouth 2 (two) times daily.    diclofenac sodium (VOLTAREN) 1 % Gel Apply 2 g topically 4 (four) times daily.    fluticasone propionate (FLONASE) 50 mcg/actuation nasal spray 2 sprays (100 mcg total) by Each Nare route once daily.    insulin lispro 100 unit/mL pen Inject 5 Units into the skin 3 (three) times daily after meals.    nitroGLYCERIN (NITROSTAT) 0.4 MG SL tablet Place 1 tablet (0.4 mg total) under the tongue every 5 (five) minutes as needed for Chest pain.    ondansetron (ZOFRAN-ODT) 4 MG TbDL Take 1 tablet (4 mg total) by mouth every 8 (eight) hours as needed (nausea).    potassium chloride SA (K-DUR,KLOR-CON) 20 MEQ tablet Take 1 tablet (20 mEq total) by mouth once daily.    topiramate (TOPAMAX) 50 MG tablet Take 50 mg by mouth 2 (two) times daily.    zonisamide (ZONEGRAN) 100 MG Cap Take 400 mg by mouth once daily.    [DISCONTINUED] clopidogreL (PLAVIX) 75 mg tablet Take 1 tablet (75 mg total) by mouth once daily. Take medicine for 15 days.    [DISCONTINUED] duloxetine (CYMBALTA) 60 MG capsule Take 60 mg by mouth once daily. Take with 30mg for a total of 90mg daily    [DISCONTINUED] pravastatin (PRAVACHOL) 40 MG tablet Take 0.5 tablets (20 mg total) by mouth once daily. (Patient taking differently: Take 40 mg by mouth once daily.)    [DISCONTINUED] QUEtiapine (SEROQUEL) 300 MG Tab Take 1 tablet (300 mg " total) by mouth every evening.    [DISCONTINUED] tiZANidine 4 mg Cap Take by mouth.     Family History       Problem Relation (Age of Onset)    Breast cancer Mother    Cancer Mother, Maternal Aunt    Depression Sister    Diabetes Mother    Diabetes Mellitus Sister    Heart disease Father    Hypertension Mother, Sister    Lung cancer Father          Tobacco Use    Smoking status: Former Smoker     Years: 55.00     Types: Cigarettes     Start date: 1967    Smokeless tobacco: Never Used    Tobacco comment: Enrolled in the [employer] on 10/5/15 ([employer] Member ID # [id number]).  Ambulatory referral to Smoking Cessation program.   Substance and Sexual Activity    Alcohol use: Yes     Alcohol/week: 3.0 standard drinks     Types: 3 Glasses of wine per week     Comment: 1 every 3 months    Drug use: No    Sexual activity: Yes     Partners: Male     Birth control/protection: None     Comment: last drink yesterday afternoon     Review of Systems   Constitutional:  Positive for activity change and fatigue.   HENT:  Negative for sore throat and trouble swallowing.    Respiratory:  Positive for cough, shortness of breath and wheezing.    Cardiovascular:  Negative for chest pain, palpitations and leg swelling.   Gastrointestinal:  Positive for abdominal pain and nausea. Negative for vomiting.   Genitourinary:  Negative for dysuria and flank pain.   Musculoskeletal:  Positive for neck pain. Negative for back pain.   Skin:  Negative for rash.   Neurological:  Positive for headaches.   Psychiatric/Behavioral:  Negative for confusion.    Objective:     Vital Signs (Most Recent):  Temp: 98.1 °F (36.7 °C) (08/23/22 0133)  Pulse: 70 (08/23/22 0133)  Resp: 18 (08/23/22 0133)  BP: (!) 149/77 (08/23/22 0133)  SpO2: 97 % (08/23/22 0133)   Vital Signs (24h Range):  Temp:  [98 °F (36.7 °C)-98.8 °F (37.1 °C)] 98.1 °F (36.7 °C)  Pulse:  [] 70  Resp:  [18-34] 18  SpO2:  [91 %-100 %] 97 %  BP: (118-152)/(52-83) 149/77     Weight: 56.2  kg (124 lb)  Body mass index is 22.68 kg/m².    Physical Exam  Vitals and nursing note reviewed.   Constitutional:       General: She is in acute distress.      Appearance: She is ill-appearing.   HENT:      Head: Normocephalic and atraumatic.      Mouth/Throat:      Pharynx: No oropharyngeal exudate.   Eyes:      General: No scleral icterus.     Pupils: Pupils are equal, round, and reactive to light.   Cardiovascular:      Rate and Rhythm: Regular rhythm. Tachycardia present.      Pulses: Normal pulses.   Pulmonary:      Breath sounds: No rales.      Comments: Patient has bilateral inspiratory expiratory wheezing in with coughing spells wheezing is worse, patient is tachypneic  Abdominal:      General: Bowel sounds are normal. There is distension.      Palpations: Abdomen is soft.      Tenderness: There is abdominal tenderness. There is no guarding or rebound.      Hernia: No hernia is present.   Musculoskeletal:      Right lower leg: No edema.      Left lower leg: No edema.   Skin:     General: Skin is warm and dry.   Neurological:      General: No focal deficit present.      Mental Status: She is alert.         CRANIAL NERVES     CN III, IV, VI   Pupils are equal, round, and reactive to light.     Significant Labs: All pertinent labs within the past 24 hours have been reviewed.  CBC:   Recent Labs   Lab 08/22/22  1651   WBC 7.40   HGB 6.8*   HCT 24.3*        CMP:   Recent Labs   Lab 08/22/22  1651      K 3.4*      CO2 18*   *   BUN 10   CREATININE 0.6   CALCIUM 8.9   PROT 7.7   ALBUMIN 3.4*   BILITOT 0.2   ALKPHOS 147*   AST 10   ALT 7*   ANIONGAP 11     Lactic Acid: No results for input(s): LACTATE in the last 48 hours.  Lipase: No results for input(s): LIPASE in the last 48 hours.  Urine Studies: No results for input(s): COLORU, APPEARANCEUA, PHUR, SPECGRAV, PROTEINUA, GLUCUA, KETONESU, BILIRUBINUA, OCCULTUA, NITRITE, UROBILINOGEN, LEUKOCYTESUR, RBCUA, WBCUA, BACTERIA, SQUAMEPITHEL,  HYALINECASTS in the last 48 hours.    Invalid input(s): LILIANE    Significant Imaging: I have reviewed all pertinent imaging results/findings within the past 24 hours.    CTA CHEST NON CORONARY; CT ABDOMEN PELVIS WITH CONTRAST     CLINICAL HISTORY:  Pulmonary embolism (PE) suspected, high prob;; Abdominal abscess/infection suspected;Bowel obstruction suspected;     TECHNIQUE:  Low dose axial images, sagittal and coronal reformations were obtained from the thoracic inlet to the pubic symphysis following the IV administration of 100 mL of Omnipaque 350.  Contrast timing was optimized to evaluate the pulmonary arteries.  Maximum intensity projection images were provided of the chest for review.     COMPARISON:  CT lumbar spine 05/11/2022.  CTA chest 11/18/2021.  CT cervical spine 05/09/2022.  CT renal stone study 03/17/2021.     FINDINGS:  CTA CHEST:     Pulmonary vasculature: Satisfactory opacification of the pulmonary arterial system with no filling defect to the segmental level.     Aorta: Left-sided aortic arch.  No aneurysm.  Atherosclerosis of the aortic arch, coronary arteries, and descending aorta.     Base of Neck: No significant abnormality.     Thoracic soft tissues: Unremarkable     Heart: No cardiomegaly.  Trace pericardial effusion.     Martha/Mediastinum: Multiple prominent right hilar nodes, the largest measures approximately 1.7 cm (axial series 2, image 212).     Airways: The large airways are patent. No foci of endobronchial filling.     Lungs/Pleura: Centrilobular emphysema with upper lobe predominance.  Diffuse ground-glass attenuation throughout the bilateral lungs.  Multiple solid pulmonary nodules bilaterally consistent with metastatic disease.  2.1 x 1.2 cm spiculated mass in the right upper lobe (axial series 2, image 243), previously measured 1.2 x 1.0 cm.  0.8 x 0.7 cm solid nodule in the apicoposterior segment of the left upper lobe (axial series 2, image 152), previously measured 0.5  cm.     Esophagus: Unremarkable     CT ABDOMEN PELVIS:     Abdomen: Mild hepatomegaly.  Stable scattered hypodensities throughout the liver, too small to characterize.  Gallbladder surgically absent.  Prominent extrahepatic bile duct and minimal intrahepatic ductal dilatation, similar to prior exam and likely sequela of cholecystectomy.  Stable bilateral adrenal nodules.  Spleen, pancreas, and kidneys are unremarkable.     Stable postsurgical changes of partial gastrectomy.  Multiple loops of dilated small bowel in the upper abdomen with no obvious transition point.  No evidence of inflammation.  Appendix is surgically absent.  Uterus is surgically absent.  No bladder wall thickening.     Abdominal wall: Postsurgical changes of ventral hernia repair with soft tissue stranding along the surgical site.  Tiny fat containing umbilical hernia.     Bones: Stable compression deformity of L1.  Bilateral pars defects at L5.  Postoperative changes in the cervical spine.  Old right-sided rib fractures.  No acute fracture. No suspicious lytic or sclerotic lesions.     Impression:     No evidence of pulmonary thromboembolism.     Multiple enlarged right hilar nodes and interval enlargement of bilateral solid pulmonary nodules compared to prior CTA chest.  These findings are consistent with known metastatic lung cancer.     Mild small bowel distension in the upper abdomen without discrete transition point.  Findings may be related to ileus or less likely partial small bowel obstruction.     Bilateral adrenal nodules.     Additional findings as above.     This report was flagged in Epic as abnormal.     Electronically signed by resident: Sharda Chavez  Date:                                            08/22/2022  Time:                                           21:51     Electronically signed by: Brandyn Sullivan MD  Date:                                            08/22/2022  Time:                                            22:41      Assessment/Plan:     * Chronic obstructive pulmonary disease with acute exacerbation  -worsening dyspnea and cough after radiation treatment  -give 60mg IV methylpred now  --> Prednisone 40mg po daily x 4 days   -apply Oxygen 1-2L now - patient with hypoxia during coughing spells  -Schedule DUO Nebs  -Respiratory culture - pt reports increased sputum volume  -Azithromycin 500mg x 3 days   -Use BREO in place of home TRELEGY      Abdominal distension  Ileus vs developing SBO  -s/p CT scan  -pt with bowel sounds and had 1 loose BM earlier during day  -Gen Surgery recs keeping pt NPO - and possible gastrograffin challenge in AM      Adenocarcinoma of lung, right  F/u with [facility] - oncology and rad-onc when stable for discharge.  She may need to reschedule next radiation appointment in light of current hospitalization.   -See care everywhere for records/notes      Iron deficiency anemia  Patient with worsening acute on chronic anemia   -Receiving 1unit PRBC  -trend in AM  -continue iron supplement.     Coronary artery disease of native artery of native heart with stable angina pectoris  Continue asa/statin  -pt not on beta blocker   -EKG w/o ischemic changes. Negative troponin x 1      Type 2 diabetes mellitus, with long-term current use of insulin  Patient's FSGs are uncontrolled due to hypoglycemia on current medication regimen.  Last A1c reviewed-   Lab Results   Component Value Date    HGBA1C 9.0 (H) 07/14/2022     Most recent fingerstick glucose reviewed-   Recent Labs   Lab 08/23/22  0050   POCTGLUCOSE 73     Current correctional scale  Medium  Maintain anti-hyperglycemic dose as follows-   Antihyperglycemics (From admission, onward)            Start     Stop Route Frequency Ordered    08/23/22 2100  insulin detemir U-100 pen 25 Units         -- SubQ Nightly 08/23/22 0045    08/23/22 0232  insulin aspart U-100 pen 1-10 Units         -- SubQ Before meals & nightly PRN 08/23/22 0137        Hold Oral  hypoglycemics while patient is in the hospital.    At home patient reports using a Combo insulin - HUMALOG 75/25 - 35 units BID AC  And LANTUS insulin 25 units Nightly   -Continue Basal insulin 25 units Levemir Nightly  -Moderate dose sliding scale  -When able to eat she may require 5-10units AC TID as she will be receiving prednisone also       VTE Risk Mitigation (From admission, onward)         Ordered     enoxaparin injection 40 mg  Daily         08/23/22 0137     IP VTE HIGH RISK PATIENT  Once         08/23/22 0137     Place sequential compression device  Until discontinued         08/23/22 0137                   Adryan Newell MD  Department of Hospital Medicine   [facility] - Emergency Dept

## 2022-08-23 NOTE — ASSESSMENT & PLAN NOTE
F/u with [facility] - oncology and rad-onc when stable for discharge.  She may need to reschedule next radiation appointment in light of current hospitalization.   -See care everywhere for records/notes

## 2022-08-23 NOTE — CONSULTS
[facility] Emergency Dept  General Surgery  Consult Note    Patient Name: Chayito Chung  MRN: [id number]  Code Status: Prior  Admission Date: 8/22/2022  Hospital Length of Stay: 0 days  Attending Physician: No att. providers found  Primary Care Provider: Curt Valladares III, MD    Patient information was obtained from patient, past medical records, ER records and primary team.     Inpatient consult to General surgery  Consult performed by: Mani Jeffries MD  Consult ordered by: Chapincito Stafford PA-C        Subjective:     Principal Problem: Lung cancer    History of Present Illness: 71-year-old female with multiple medical co-morbidities including CAD complicated by ACS, COPD, metastatic lung cancer currently undergoing medical therapy, previous colon cancer s/p resection, gastric ulcer s/p partial gastrectomy, and open incisional hernia repair with mesh who presents today from clinic with severe shortness of breath. She notes she has been short of breath over the last several weeks which has progressively getting worse. She also notes some vague mid abdominal pain that has been going on for over a week associated with some diarrhea today and a few episodes of vomiting over the weekend but none now. Denies fevers, chills, or any other complaints at this time.       No current facility-administered medications on file prior to encounter.     Current Outpatient Medications on File Prior to Encounter   Medication Sig    furosemide (LASIX) 20 MG tablet Take 20 mg by mouth once daily at 6am.    albuterol (PROVENTIL/VENTOLIN HFA) 90 mcg/actuation inhaler INHALE 2 PUFFS BY MOUTH INTO THE LUNGS EVERY 6 HOURS AS NEEDED FOR WHEEZING (Patient taking differently: Inhale 2 puffs into the lungs every 6 (six) hours as needed for Wheezing.)    albuterol-ipratropium (DUO-NEB) 2.5 mg-0.5 mg/3 mL nebulizer solution Take 3 mLs by nebulization every 4 (four) hours as needed for Wheezing or Shortness of Breath. Rescue    alendronate (FOSAMAX) 70  "MG tablet Take 1 tablet (70 mg total) by mouth every 7 days. (Patient taking differently: Take 70 mg by mouth every Sunday.)    aspirin 81 MG Chew Take 1 tablet (81 mg total) by mouth once daily.    atorvastatin (LIPITOR) 40 MG tablet Take 1 tablet (40 mg total) by mouth once daily.    BD ULTRA-FINE MINI PEN NEEDLE 31 gauge x 3/16" Ndle     busPIRone (BUSPAR) 15 MG tablet Take 15 mg by mouth 2 (two) times daily.    diclofenac sodium (VOLTAREN) 1 % Gel Apply 2 g topically 4 (four) times daily.    duloxetine (CYMBALTA) 60 MG capsule Take 60 mg by mouth once daily. Take with 30mg for a total of 90mg daily    ferrous sulfate (FEOSOL) 325 mg (65 mg iron) Tab tablet Take 1 tablet (325 mg total) by mouth once daily.    fluticasone propionate (FLONASE) 50 mcg/actuation nasal spray 2 sprays (100 mcg total) by Each Nare route once daily.    hydroCHLOROthiazide (MICROZIDE) 12.5 mg capsule Take 1 capsule (12.5 mg total) by mouth once daily.    HYDROcodone-acetaminophen 7.5-300 mg Tab Take by mouth.    insulin lispro 100 unit/mL pen Inject 5 Units into the skin 3 (three) times daily after meals.    LANTUS SOLOSTAR U-100 INSULIN glargine 100 units/mL (3mL) SubQ pen Inject 20 Units into the skin nightly.    losartan (COZAAR) 50 MG tablet Take 1 tablet (50 mg total) by mouth once daily.    magnesium oxide (MAG-OX) 400 mg (241.3 mg magnesium) tablet Take 400 mg by mouth once daily.    metFORMIN (GLUCOPHAGE) 1000 MG tablet Take 1,000 mg by mouth 2 (two) times daily.    nitroGLYCERIN (NITROSTAT) 0.4 MG SL tablet Place 1 tablet (0.4 mg total) under the tongue every 5 (five) minutes as needed for Chest pain.    ondansetron (ZOFRAN-ODT) 4 MG TbDL Take 1 tablet (4 mg total) by mouth every 8 (eight) hours as needed (nausea).    potassium chloride SA (K-DUR,KLOR-CON) 20 MEQ tablet Take 1 tablet (20 mEq total) by mouth once daily.    pregabalin (LYRICA) 150 MG capsule Take 150 mg by mouth 3 (three) times daily.    " QUEtiapine (SEROQUEL) 300 MG Tab Take 1 tablet (300 mg total) by mouth every evening.    sertraline (ZOLOFT) 100 MG tablet Take 150 mg by mouth once daily at 6am.    tiZANidine 4 mg Cap Take by mouth.    topiramate (TOPAMAX) 50 MG tablet Take 50 mg by mouth 2 (two) times daily.    TRELEGY ELLIPTA 200-62.5-25 mcg inhaler Inhale 1 puff into the lungs once daily.    verapamiL (CALAN-SR) 180 MG CR tablet Take 1 tablet (180 mg total) by mouth every evening.    zonisamide (ZONEGRAN) 100 MG Cap Take 400 mg by mouth once daily.    [DISCONTINUED] clopidogreL (PLAVIX) 75 mg tablet Take 1 tablet (75 mg total) by mouth once daily. Take medicine for 15 days.    [DISCONTINUED] pravastatin (PRAVACHOL) 40 MG tablet Take 0.5 tablets (20 mg total) by mouth once daily. (Patient taking differently: Take 40 mg by mouth once daily.)       Review of patient's allergies indicates:  No Known Allergies    Past Medical History:   Diagnosis Date    Acute coronary syndrome     Acute hypoxemic respiratory failure 2017    Anxiety     Asthma     Cancer     colon    Cataracts, both eyes     Chest pain at rest     Chest pain of uncertain etiology 2015    Colon cancer 1988    COPD (chronic obstructive pulmonary disease)     Coronary artery disease     Depression     Diabetes mellitus     Dyspnea on exertion 11/15/2018    Elevated brain natriuretic peptide (BNP) level 11/15/2018    Elevated cholesterol     Hypertension     Swelling     Syncope and collapse 2016     Past Surgical History:   Procedure Laterality Date    ABDOMINAL SURGERY      BREAST BIOPSY      benign unsure what side     SECTION, CLASSIC      COLON SURGERY      COLONOSCOPY  2019    repeat in 5 years    CORONARY ANGIOPLASTY WITH STENT PLACEMENT  3 months ago     x2, Hysterectomy, Lung surgery, Partial stomach removed, Part of colon removed for rectal cancer    GASTRECTOMY      HEMORRHOID SURGERY      HYSTERECTOMY       @28yrs of age    LUNG BIOPSY      OOPHORECTOMY      @28yrs of age    POSTERIOR FUSION OF CERVICAL SPINE WITH LAMINECTOMY N/A 7/23/2020    Procedure: LAMINECTOMY, SPINE, CERVICAL, WITH POSTERIOR FUSION C3-T1 ;  Surgeon: Herson Tate MD;  Location: [facility] OR [address] [place] [address];  Service: Neurosurgery;  Laterality: N/A;    REPAIR OF INCARCERATED INCISIONAL HERNIA WITHOUT HISTORY OF PRIOR REPAIR N/A 2/7/2022    Procedure: REPAIR, HERNIA, INCISIONAL, INCARCERATED, WITHOUT HISTORY OF PRIOR REPAIR;  Surgeon: Smion King MD;  Location: [facility];  Service: General;  Laterality: N/A;     Family History       Problem Relation (Age of Onset)    Breast cancer Mother    Cancer Mother, Maternal Aunt    Depression Sister    Diabetes Mother    Diabetes Mellitus Sister    Heart disease Father    Hypertension Mother, Sister    Lung cancer Father          Tobacco Use    Smoking status: Former Smoker     Years: 55.00     Types: Cigarettes     Start date: 1967    Smokeless tobacco: Never Used    Tobacco comment: Enrolled in the [employer] on 10/5/15 ([employer] Member ID # [id number]).  Ambulatory referral to Smoking Cessation program.   Substance and Sexual Activity    Alcohol use: Yes     Alcohol/week: 3.0 standard drinks     Types: 3 Glasses of wine per week     Comment: 1 every 3 months    Drug use: No    Sexual activity: Yes     Partners: Male     Birth control/protection: None     Comment: last drink yesterday afternoon     Review of Systems   Constitutional:  Negative for chills and fever.   HENT:  Negative for sore throat.    Eyes:  Negative for redness.   Respiratory:  Positive for shortness of breath.    Cardiovascular:  Negative for chest pain.   Gastrointestinal:  Positive for abdominal pain, diarrhea, nausea and vomiting.   Endocrine: Negative for polyuria.   Genitourinary:  Negative for dysuria.   Musculoskeletal:  Negative for back pain.   Skin:  Negative for wound.   Neurological:  Negative for headaches.    Psychiatric/Behavioral:  Negative for agitation.    Objective:     Vital Signs (Most Recent):  Temp: 98.7 °F (37.1 °C) (08/22/22 2315)  Pulse: (!) 114 (08/22/22 2315)  Resp: (!) 22 (08/22/22 2315)  BP: 130/79 (08/22/22 2315)  SpO2: 98 % (08/22/22 2315)   Vital Signs (24h Range):  Temp:  [98 °F (36.7 °C)-98.8 °F (37.1 °C)] 98.7 °F (37.1 °C)  Pulse:  [113-131] 114  Resp:  [19-34] 22  SpO2:  [91 %-98 %] 98 %  BP: (118-152)/(52-83) 130/79     Weight: 56.2 kg (124 lb)  Body mass index is 22.68 kg/m².    Physical Exam  Vitals and nursing note reviewed.   HENT:      Head: Normocephalic and atraumatic.      Right Ear: External ear normal.      Left Ear: External ear normal.      Mouth/Throat:      Mouth: Mucous membranes are moist.   Eyes:      Conjunctiva/sclera: Conjunctivae normal.   Cardiovascular:      Rate and Rhythm: Regular rhythm. Tachycardia present.   Pulmonary:      Effort: Pulmonary effort is normal. Tachypnea present.   Abdominal:      General: There is distension (mild abdominal distension, tympany to percussion).      Tenderness: There is no abdominal tenderness. There is no guarding or rebound.      Hernia: No hernia is present.      Comments: Large midline laparotomy incision, well healed.    Musculoskeletal:      Cervical back: Normal range of motion.      Right lower leg: Edema present.      Left lower leg: Edema present.      Comments: 1-2+ pitting edema.    Neurological:      General: No focal deficit present.      Mental Status: She is alert.   Psychiatric:         Mood and Affect: Mood normal.         Behavior: Behavior normal.       Significant Labs:  I have reviewed all pertinent lab results within the past 24 hours.  CBC:   Recent Labs   Lab 08/22/22  1651   WBC 7.40   RBC 3.30*   HGB 6.8*   HCT 24.3*      MCV 74*   MCH 20.6*   MCHC 28.0*     CMP:   Recent Labs   Lab 08/22/22  1651   *   CALCIUM 8.9   ALBUMIN 3.4*   PROT 7.7      K 3.4*   CO2 18*      BUN 10    CREATININE 0.6   ALKPHOS 147*   ALT 7*   AST 10   BILITOT 0.2     Coagulation:   Recent Labs   Lab 08/22/22  2137   LABPROT 10.6   INR 1.0   APTT 25.2     Cardiac markers:   Recent Labs   Lab 08/22/22  1651   TROPONINI <0.006     No results for input(s): COLORU, CLARITYU, SPECGRAV, PHUR, PROTEINUA, GLUCOSEU, BILIRUBINCON, BLOODU, WBCU, RBCU, BACTERIA, MUCUS, NITRITE, LEUKOCYTESUR, UROBILINOGEN, HYALINECASTS in the last 168 hours.    Significant Diagnostics:  CT PE: no VTE    CT abdomen/pelvis: some mildly dilated loops of small bowel, no evidence of bowel obstruction or transition point. No free air or ascites.       Assessment/Plan:     Abdominal distension  71-year-old female with multiple medical co-morbidities including CAD, COPD, and metastatic lung cancer as well as previous partial gastrectomy for ulcer disease, colon cancer s/p resection, and multiple incisional hernia repairs who presents with shortness of breath. Also noted to have mild abdominal pain for a week as well as diarrhea today. CT shows a few loops of mildly dilated loops of small bowel but no real obstructive pattern. No free air, no ascites, no real indication for surgical intervention. She may be constipated, however.     -admit to medicine and continue workup for shortness of breath  -NPO for now, plan for gastrograffin challenge tomorrow  -place NG tube if vomiting. No vomiting currently.   -call surgery if change in clinical status.       VTE Risk Mitigation (From admission, onward)    None          Thank you for your consult. I will follow-up with patient. Please contact us if you have any additional questions.    Mani Jeffries MD  General Surgery  [facility]

## 2022-08-23 NOTE — HPI
71-year-old female with multiple medical co-morbidities including CAD complicated by ACS, COPD, metastatic lung cancer currently undergoing medical therapy, previous colon cancer s/p resection, gastric ulcer s/p partial gastrectomy, and open incisional hernia repair with mesh who presents today from clinic with severe shortness of breath. She notes she has been short of breath over the last several weeks which has progressively getting worse. She also notes some vague mid abdominal pain that has been going on for over a week associated with some diarrhea today and a few episodes of vomiting over the weekend but none now. Denies fevers, chills, or any other complaints at this time.

## 2022-08-23 NOTE — SUBJECTIVE & OBJECTIVE
"No current facility-administered medications on file prior to encounter.     Current Outpatient Medications on File Prior to Encounter   Medication Sig    furosemide (LASIX) 20 MG tablet Take 20 mg by mouth once daily at 6am.    albuterol (PROVENTIL/VENTOLIN HFA) 90 mcg/actuation inhaler INHALE 2 PUFFS BY MOUTH INTO THE LUNGS EVERY 6 HOURS AS NEEDED FOR WHEEZING (Patient taking differently: Inhale 2 puffs into the lungs every 6 (six) hours as needed for Wheezing.)    albuterol-ipratropium (DUO-NEB) 2.5 mg-0.5 mg/3 mL nebulizer solution Take 3 mLs by nebulization every 4 (four) hours as needed for Wheezing or Shortness of Breath. Rescue    alendronate (FOSAMAX) 70 MG tablet Take 1 tablet (70 mg total) by mouth every 7 days. (Patient taking differently: Take 70 mg by mouth every Sunday.)    aspirin 81 MG Chew Take 1 tablet (81 mg total) by mouth once daily.    atorvastatin (LIPITOR) 40 MG tablet Take 1 tablet (40 mg total) by mouth once daily.    BD ULTRA-FINE MINI PEN NEEDLE 31 gauge x 3/16" Ndle     busPIRone (BUSPAR) 15 MG tablet Take 15 mg by mouth 2 (two) times daily.    diclofenac sodium (VOLTAREN) 1 % Gel Apply 2 g topically 4 (four) times daily.    duloxetine (CYMBALTA) 60 MG capsule Take 60 mg by mouth once daily. Take with 30mg for a total of 90mg daily    ferrous sulfate (FEOSOL) 325 mg (65 mg iron) Tab tablet Take 1 tablet (325 mg total) by mouth once daily.    fluticasone propionate (FLONASE) 50 mcg/actuation nasal spray 2 sprays (100 mcg total) by Each Nare route once daily.    hydroCHLOROthiazide (MICROZIDE) 12.5 mg capsule Take 1 capsule (12.5 mg total) by mouth once daily.    HYDROcodone-acetaminophen 7.5-300 mg Tab Take by mouth.    insulin lispro 100 unit/mL pen Inject 5 Units into the skin 3 (three) times daily after meals.    LANTUS SOLOSTAR U-100 INSULIN glargine 100 units/mL (3mL) SubQ pen Inject 20 Units into the skin nightly.    losartan (COZAAR) 50 MG tablet Take 1 tablet (50 mg total) by " mouth once daily.    magnesium oxide (MAG-OX) 400 mg (241.3 mg magnesium) tablet Take 400 mg by mouth once daily.    metFORMIN (GLUCOPHAGE) 1000 MG tablet Take 1,000 mg by mouth 2 (two) times daily.    nitroGLYCERIN (NITROSTAT) 0.4 MG SL tablet Place 1 tablet (0.4 mg total) under the tongue every 5 (five) minutes as needed for Chest pain.    ondansetron (ZOFRAN-ODT) 4 MG TbDL Take 1 tablet (4 mg total) by mouth every 8 (eight) hours as needed (nausea).    potassium chloride SA (K-DUR,KLOR-CON) 20 MEQ tablet Take 1 tablet (20 mEq total) by mouth once daily.    pregabalin (LYRICA) 150 MG capsule Take 150 mg by mouth 3 (three) times daily.    QUEtiapine (SEROQUEL) 300 MG Tab Take 1 tablet (300 mg total) by mouth every evening.    sertraline (ZOLOFT) 100 MG tablet Take 150 mg by mouth once daily at 6am.    tiZANidine 4 mg Cap Take by mouth.    topiramate (TOPAMAX) 50 MG tablet Take 50 mg by mouth 2 (two) times daily.    TRELEGY ELLIPTA 200-62.5-25 mcg inhaler Inhale 1 puff into the lungs once daily.    verapamiL (CALAN-SR) 180 MG CR tablet Take 1 tablet (180 mg total) by mouth every evening.    zonisamide (ZONEGRAN) 100 MG Cap Take 400 mg by mouth once daily.    [DISCONTINUED] clopidogreL (PLAVIX) 75 mg tablet Take 1 tablet (75 mg total) by mouth once daily. Take medicine for 15 days.    [DISCONTINUED] pravastatin (PRAVACHOL) 40 MG tablet Take 0.5 tablets (20 mg total) by mouth once daily. (Patient taking differently: Take 40 mg by mouth once daily.)       Review of patient's allergies indicates:  No Known Allergies    Past Medical History:   Diagnosis Date    Acute coronary syndrome     Acute hypoxemic respiratory failure 5/1/2017    Anxiety     Asthma     Cancer     colon    Cataracts, both eyes     Chest pain at rest     Chest pain of uncertain etiology 12/6/2015    Colon cancer 1988    COPD (chronic obstructive pulmonary disease)     Coronary artery disease     Depression     Diabetes mellitus     Dyspnea on  exertion 11/15/2018    Elevated brain natriuretic peptide (BNP) level 11/15/2018    Elevated cholesterol     Hypertension     Swelling     Syncope and collapse 2016     Past Surgical History:   Procedure Laterality Date    ABDOMINAL SURGERY      BREAST BIOPSY      benign unsure what side     SECTION, CLASSIC      COLON SURGERY      COLONOSCOPY  2019    repeat in 5 years    CORONARY ANGIOPLASTY WITH STENT PLACEMENT  3 months ago     x2, Hysterectomy, Lung surgery, Partial stomach removed, Part of colon removed for rectal cancer    GASTRECTOMY      HEMORRHOID SURGERY      HYSTERECTOMY      @28yrs of age    LUNG BIOPSY      OOPHORECTOMY      @28yrs of age    POSTERIOR FUSION OF CERVICAL SPINE WITH LAMINECTOMY N/A 2020    Procedure: LAMINECTOMY, SPINE, CERVICAL, WITH POSTERIOR FUSION C3-T1 ;  Surgeon: Herson Tate MD;  Location: [facility] OR [address] [place] [address];  Service: Neurosurgery;  Laterality: N/A;    REPAIR OF INCARCERATED INCISIONAL HERNIA WITHOUT HISTORY OF PRIOR REPAIR N/A 2022    Procedure: REPAIR, HERNIA, INCISIONAL, INCARCERATED, WITHOUT HISTORY OF PRIOR REPAIR;  Surgeon: Simon King MD;  Location: [facility];  Service: General;  Laterality: N/A;     Family History       Problem Relation (Age of Onset)    Breast cancer Mother    Cancer Mother, Maternal Aunt    Depression Sister    Diabetes Mother    Diabetes Mellitus Sister    Heart disease Father    Hypertension Mother, Sister    Lung cancer Father          Tobacco Use    Smoking status: Former Smoker     Years: 55.00     Types: Cigarettes     Start date:     Smokeless tobacco: Never Used    Tobacco comment: Enrolled in the [employer] on 10/5/15 ([employer] Member ID # [id number]).  Ambulatory referral to Smoking Cessation program.   Substance and Sexual Activity    Alcohol use: Yes     Alcohol/week: 3.0 standard drinks     Types: 3 Glasses of wine per week     Comment: 1 every 3 months    Drug use: No    Sexual activity: Yes      Partners: Male     Birth control/protection: None     Comment: last drink yesterday afternoon     Review of Systems   Constitutional:  Negative for chills and fever.   HENT:  Negative for sore throat.    Eyes:  Negative for redness.   Respiratory:  Positive for shortness of breath.    Cardiovascular:  Negative for chest pain.   Gastrointestinal:  Positive for abdominal pain, diarrhea, nausea and vomiting.   Endocrine: Negative for polyuria.   Genitourinary:  Negative for dysuria.   Musculoskeletal:  Negative for back pain.   Skin:  Negative for wound.   Neurological:  Negative for headaches.   Psychiatric/Behavioral:  Negative for agitation.    Objective:     Vital Signs (Most Recent):  Temp: 98.7 °F (37.1 °C) (08/22/22 2315)  Pulse: (!) 114 (08/22/22 2315)  Resp: (!) 22 (08/22/22 2315)  BP: 130/79 (08/22/22 2315)  SpO2: 98 % (08/22/22 2315)   Vital Signs (24h Range):  Temp:  [98 °F (36.7 °C)-98.8 °F (37.1 °C)] 98.7 °F (37.1 °C)  Pulse:  [113-131] 114  Resp:  [19-34] 22  SpO2:  [91 %-98 %] 98 %  BP: (118-152)/(52-83) 130/79     Weight: 56.2 kg (124 lb)  Body mass index is 22.68 kg/m².    Physical Exam  Vitals and nursing note reviewed.   HENT:      Head: Normocephalic and atraumatic.      Right Ear: External ear normal.      Left Ear: External ear normal.      Mouth/Throat:      Mouth: Mucous membranes are moist.   Eyes:      Conjunctiva/sclera: Conjunctivae normal.   Cardiovascular:      Rate and Rhythm: Regular rhythm. Tachycardia present.   Pulmonary:      Effort: Pulmonary effort is normal. Tachypnea present.   Abdominal:      General: There is distension (mild abdominal distension, tympany to percussion).      Tenderness: There is no abdominal tenderness. There is no guarding or rebound.      Hernia: No hernia is present.      Comments: Large midline laparotomy incision, well healed.    Musculoskeletal:      Cervical back: Normal range of motion.      Right lower leg: Edema present.      Left lower leg: Edema  present.      Comments: 1-2+ pitting edema.    Neurological:      General: No focal deficit present.      Mental Status: She is alert.   Psychiatric:         Mood and Affect: Mood normal.         Behavior: Behavior normal.       Significant Labs:  I have reviewed all pertinent lab results within the past 24 hours.  CBC:   Recent Labs   Lab 08/22/22  1651   WBC 7.40   RBC 3.30*   HGB 6.8*   HCT 24.3*      MCV 74*   MCH 20.6*   MCHC 28.0*     CMP:   Recent Labs   Lab 08/22/22  1651   *   CALCIUM 8.9   ALBUMIN 3.4*   PROT 7.7      K 3.4*   CO2 18*      BUN 10   CREATININE 0.6   ALKPHOS 147*   ALT 7*   AST 10   BILITOT 0.2     Coagulation:   Recent Labs   Lab 08/22/22  2137   LABPROT 10.6   INR 1.0   APTT 25.2     Cardiac markers:   Recent Labs   Lab 08/22/22  1651   TROPONINI <0.006     No results for input(s): COLORU, CLARITYU, SPECGRAV, PHUR, PROTEINUA, GLUCOSEU, BILIRUBINCON, BLOODU, WBCU, RBCU, BACTERIA, MUCUS, NITRITE, LEUKOCYTESUR, UROBILINOGEN, HYALINECASTS in the last 168 hours.    Significant Diagnostics:  CT PE: no VTE    CT abdomen/pelvis: some mildly dilated loops of small bowel, no evidence of bowel obstruction or transition point. No free air or ascites.

## 2022-08-23 NOTE — ED NOTES
Pt with cough at this time and requesting cough medication. Notified MD. Blood infusing at this time and pt tolerating well. 150cc/hr noted on pump.

## 2022-08-23 NOTE — NURSING
Paged surgery, patient pain increased, also patient asking why xrays cancelled, she had GG earlier, this nurse just spoke with xray and they said orders were cancelled.   Patient is being transferred to [facility]

## 2022-08-23 NOTE — PLAN OF CARE
Patient AAOX4, call light and belongings in reach, siderails up x2.  Pain increased currently, nausea relieved with current regimen, remains NPO, sliding scale insulin as per orders.  Remains free from falls and safety maintained.

## 2022-08-23 NOTE — HPI
71-year-old  woman with a history of right lung adenocarcinoma, COPD, restrictive lung disease, tobacco abuse, CAD status post PCI, type 2 diabetes on insulin, chronic neck pain who presents to the emergency department from primary care clinic for complaints of dyspnea and abdominal distension.    She is followed by Oncology and Radiation Oncology at [facility], undergoing 4 cycles of radiation therapy last of which is Wednesday 8/24.  Completed radiation on Monday but then was also seen in primary care clinic and noted to be fatigued tachycardic and tachypneic reporting a persistent cough after her radiation treatment she also reported neck pain and headache.  She has a chronic cough but reports that symptoms are worse today, she is not taking any home oxygen she has home inhalers but does not seem that she has taken any inhalers or nebulizer treatments.  She feels more short of breath than her baseline    She also reports over the last week progressive abdominal distension and discomfort states that today she has had 1 watery bowel movement has been having irregular bowel movements but when she does have 1 is often a loose BM.    During the ED workup hemoglobin noted to be critical at 6.8 patient has been consented in 1 unit of PRBCs is infusing now during my interview, CT a chest and CT abdomen pelvis completed negative for PE but abdominal imaging displays distended bowel without a transition point possible ileus versus developing SBO.  General surgery consult completed in ED

## 2022-08-23 NOTE — ASSESSMENT & PLAN NOTE
Patient's FSGs are uncontrolled due to hypoglycemia on current medication regimen.  Last A1c reviewed-   Lab Results   Component Value Date    HGBA1C 9.0 (H) 07/14/2022     Most recent fingerstick glucose reviewed-   Recent Labs   Lab 08/23/22  0050   POCTGLUCOSE 73     Current correctional scale  Medium  Maintain anti-hyperglycemic dose as follows-   Antihyperglycemics (From admission, onward)            Start     Stop Route Frequency Ordered    08/23/22 2100  insulin detemir U-100 pen 25 Units         -- SubQ Nightly 08/23/22 0045    08/23/22 0232  insulin aspart U-100 pen 1-10 Units         -- SubQ Before meals & nightly PRN 08/23/22 0137        Hold Oral hypoglycemics while patient is in the hospital.    At home patient reports using a Combo insulin - HUMALOG 75/25 - 35 units BID AC  And LANTUS insulin 25 units Nightly   -Continue Basal insulin 25 units Levemir Nightly  -Moderate dose sliding scale  -When able to eat she may require 5-10units AC TID as she will be receiving prednisone also

## 2022-08-23 NOTE — PT/OT/SLP EVAL
Occupational Therapy   Co-Evaluation    Name: Chayito Chung  MRN: [id number]  Admitting Diagnosis:  Chronic obstructive pulmonary disease with acute exacerbation  Recent Surgery: * No surgery found *      Recommendations:     Discharge Recommendations: nursing facility, skilled  Discharge Equipment Recommendations:  BSC  Barriers to discharge: Decreased caregiver support   (increased skilled (A) required)    Assessment:     Chayito Chung is a 71 y.o. female with a medical diagnosis of Chronic obstructive pulmonary disease with acute exacerbation. Pt agreeable to PT/OT session with good participation and motivation. She was most limited on this date due to decreased tolerance to activity, SOB, and generalized weakness requiring Min-mod (A) during performance of therapeutic activities. Pt remains limited in self-care, functional mobility, and ADLs and is currently not performing tasks at [facility]. Due to pt living alone, her hx of multiple falls, and level of skilled (A) required, she is not safe to D/C home at this time. Once medically stable, OT recommending SNF  in order to maximize independence with functional activities, reduce caregiver burden, and facilitate safe discharge.Performance deficits affecting function: weakness, impaired endurance, impaired self care skills, impaired sensation, impaired functional mobility, gait instability, decreased lower extremity function, decreased upper extremity function, decreased coordination, impaired balance, decreased safety awareness, impaired coordination, impaired skin, edema, impaired cardiopulmonary response to activity, impaired cognition.      Rehab Prognosis: Good; patient would benefit from acute skilled OT services to address these deficits and reach maximum level of function.       Plan:     Patient to be seen 3 x/week to address the above listed problems via self-care/home management, therapeutic activities, therapeutic exercises  · Plan of Care Expires:  "09/22/22  · Plan of Care Reviewed with: patient, family    Subjective   "I fell one time and didn't remember how I fell"  Chief Complaint: SOB   Patient/Family Comments/goals: to return to PLOF    Occupational Profile:  Living Environment: Pt lives alone in an apartment located in an (I) living facility. She has a t/s combo with shower chair.  Previous level of function: PTA, pt was Mod (I) in ADLs and functional mobility. She uses a rollator for ambulation and (+) drives.  Roles and Routines: spending time with family  Equipment Used at Home:  grab bar, wheelchair, shower chair, rollator (owns but does not use RW)  Assistance upon Discharge: family    Pain/Comfort:  Pain Rating 1: 0/10  Pain Rating Post-Intervention 1: 0/10    Patients cultural, spiritual, Voodoo conflicts given the current situation: no    Objective:   Co-treatment performed due to patient's multiple deficits requiring two skilled therapists to appropriately and safely assess patient's strength and endurance while facilitating functional tasks in addition to accommodating for patient's activity tolerance.   Communicated with: RN  prior to session.  Patient found HOB elevated with blood pressure cuff, peripheral IV, PureWick, telemetry, pulse ox (continuous) upon OT entry to room.    Additional staff present: AFUA Hawthorne    General Precautions: Standard, fall, aspiration   Orthopedic Precautions:N/A   Braces: N/A  Respiratory Status: Room air    Occupational Performance:    Bed Mobility:    · Patient completed Scooting on EOB with contact guard assistance  · Patient completed Supine <> Sit with moderate assistance  · Pt tolerated sitting on EOB for ~15 minutes with CGA progressing to SBA    Functional Mobility/Transfers:  · Patient completed Sit <> Stand Transfer with minimum assistance  with  rolling walker   · Functional Mobility: Pt performed x3 lateral side steps toward the right with Min (A) and RW  · Cues for increased DONNA, sequencing, " RW mgmt, and upright posture    Activities of Daily Living:  · Lower Body Dressing: minimum assistance required to thread sock onto R toes   · Pt performed trunk flexion to don/doff socks with cueing for deep breathing technique d/t SOB    Cognitive/Visual Perceptual:  Cognitive/Psychosocial Skills:     -       Oriented to: Person, Place, Time and Situation   -       Follows Commands/attention:Follows multistep  commands  -       Communication: clear/fluent  -       Memory: No Deficits noted  -       Safety awareness/insight to disability: impaired   -       Mood/Affect/Coping skills/emotional control: Cooperative and Pleasant    Physical Exam:  Balance:    -       poor+ standing balance with RW; fair/fair+ sitting balance  Postural examination/scapula alignment:    -       Rounded shoulders  -       Forward head  Edema:  Moderate R UE forearm-hand  Dominant hand:    -       Right  Upper Extremity Range of Motion:     -       Right Upper Extremity: WFL  -       Left Upper Extremity: WFL  Upper Extremity Strength:    -       Right Upper Extremity: 3/5  -       Left Upper Extremity: 3+/5   Strength:    -       Right Upper Extremity: WFL  -       Left Upper Extremity: WFL  Fine Motor Coordination:    -       Intact  Left hand, manipulation of objects and Right hand, manipulation of objects  Gross motor coordination:   WFL    AMPAC 6 Click ADL:  AMPAC Total Score: 15    Treatment & Education:  Provided education regarding role of OT, POC, & discharge recommendations with pt and family verbalizing understanding.  Pt had no further questions & when asked whether there were any concerns pt reported none.    Education:    Patient left HOB elevated with all lines intact, call button in reach, RN notified and family present    GOALS:   Multidisciplinary Problems     Occupational Therapy Goals        Problem: Occupational Therapy    Goal Priority Disciplines Outcome Interventions   Occupational Therapy Goal     OT, PT/OT  Ongoing, Progressing    Description: Goals to be met by: 22     Patient will increase functional independence with ADLs by performing:    UE Dressing with Modified [place].  LE Dressing with Modified [place].  Grooming while seated with Modified [place].  Toileting from toilet/bedside commode with Stand-by Assistance for hygiene and clothing management.   Sitting at edge of bed x15 minutes with Supervision.  Toilet transfer to toilet/bedside commode with Stand-by Assistance and LRAD.                     History:     Past Medical History:   Diagnosis Date    Acute coronary syndrome     Acute hypoxemic respiratory failure 2017    Anxiety     Asthma     Cancer     colon    Cataracts, both eyes     Chest pain at rest     Chest pain of uncertain etiology 2015    Colon cancer 1988    COPD (chronic obstructive pulmonary disease)     Coronary artery disease     Depression     Diabetes mellitus     Dyspnea on exertion 11/15/2018    Elevated brain natriuretic peptide (BNP) level 11/15/2018    Elevated cholesterol     Hypertension     Swelling     Syncope and collapse 2016         Past Surgical History:   Procedure Laterality Date    ABDOMINAL SURGERY      BREAST BIOPSY      benign unsure what side     SECTION, CLASSIC      COLON SURGERY      COLONOSCOPY  2019    repeat in 5 years    CORONARY ANGIOPLASTY WITH STENT PLACEMENT  3 months ago     x2, Hysterectomy, Lung surgery, Partial stomach removed, Part of colon removed for rectal cancer    GASTRECTOMY      HEMORRHOID SURGERY      HYSTERECTOMY      @28yrs of age    LUNG BIOPSY      OOPHORECTOMY      @28yrs of age    POSTERIOR FUSION OF CERVICAL SPINE WITH LAMINECTOMY N/A 2020    Procedure: LAMINECTOMY, SPINE, CERVICAL, WITH POSTERIOR FUSION C3-T1 ;  Surgeon: Herson Tate MD;  Location: [facility] OR [address] [place] [address];  Service: Neurosurgery;  Laterality: N/A;    REPAIR OF INCARCERATED INCISIONAL  HERNIA WITHOUT HISTORY OF PRIOR REPAIR N/A 2/7/2022    Procedure: REPAIR, HERNIA, INCISIONAL, INCARCERATED, WITHOUT HISTORY OF PRIOR REPAIR;  Surgeon: Simon King MD;  Location: [facility];  Service: General;  Laterality: N/A;       Time Tracking:     OT Date of Treatment: 08/23/22  OT Start Time: 1434  OT Stop Time: 1500  OT Total Time (min): 26 min    Billable Minutes:Evaluation 16  Self Care/Home Management 10    8/23/2022

## 2022-08-23 NOTE — ASSESSMENT & PLAN NOTE
Ileus vs developing SBO  -s/p CT scan  -pt with bowel sounds and had 1 loose BM earlier during day  -Gen Surgery recs keeping pt NPO - and possible gastrograffin challenge in AM

## 2022-08-23 NOTE — TREATMENT PLAN
GENERAL SURGERY    Recommend gastrografin challenge this AM. Will place orders.     Please mix 100 cc gastrograffin with 50 cc saline. Patient does not have NGT, will need to drink contrast mix by mouth. We will obtain KUB at 4 hours and 8 hours (may have to confirm timing with X ray tech).    Place NGT for uncontrolled nausea, vomiting, or abdominal pain and page general surgery.     Nicole Deng MD  General Surgery PGY4

## 2022-08-23 NOTE — ED NOTES
Patient resting quietly in bed with eyes closed, respirations even and unlaboured, VSS, NAD noted, easily arousable to verbal stimuli. Patient offers no complaints at present, awaiting MD orders/disposition. Bed rails up x2 with bed locked in lowest position, call light in reach, will continue to monitor. ADMIT evaluation continues.

## 2022-08-23 NOTE — SUBJECTIVE & OBJECTIVE
Past Medical History:   Diagnosis Date    Acute coronary syndrome     Acute hypoxemic respiratory failure 2017    Anxiety     Asthma     Cancer     colon    Cataracts, both eyes     Chest pain at rest     Chest pain of uncertain etiology 2015    Colon cancer 1988    COPD (chronic obstructive pulmonary disease)     Coronary artery disease     Depression     Diabetes mellitus     Dyspnea on exertion 11/15/2018    Elevated brain natriuretic peptide (BNP) level 11/15/2018    Elevated cholesterol     Hypertension     Swelling     Syncope and collapse 2016       Past Surgical History:   Procedure Laterality Date    ABDOMINAL SURGERY      BREAST BIOPSY      benign unsure what side     SECTION, CLASSIC      COLON SURGERY      COLONOSCOPY  2019    repeat in 5 years    CORONARY ANGIOPLASTY WITH STENT PLACEMENT  3 months ago     x2, Hysterectomy, Lung surgery, Partial stomach removed, Part of colon removed for rectal cancer    GASTRECTOMY      HEMORRHOID SURGERY      HYSTERECTOMY      @28yrs of age    LUNG BIOPSY      OOPHORECTOMY      @28yrs of age    POSTERIOR FUSION OF CERVICAL SPINE WITH LAMINECTOMY N/A 2020    Procedure: LAMINECTOMY, SPINE, CERVICAL, WITH POSTERIOR FUSION C3-T1 ;  Surgeon: Herson Tate MD;  Location: [facility] OR [address] [place] [address];  Service: Neurosurgery;  Laterality: N/A;    REPAIR OF INCARCERATED INCISIONAL HERNIA WITHOUT HISTORY OF PRIOR REPAIR N/A 2022    Procedure: REPAIR, HERNIA, INCISIONAL, INCARCERATED, WITHOUT HISTORY OF PRIOR REPAIR;  Surgeon: Simon King MD;  Location: [facility];  Service: General;  Laterality: N/A;       Review of patient's allergies indicates:  No Known Allergies    No current facility-administered medications on file prior to encounter.     Current Outpatient Medications on File Prior to Encounter   Medication Sig    albuterol (PROVENTIL/VENTOLIN HFA) 90 mcg/actuation inhaler INHALE 2 PUFFS BY MOUTH INTO THE LUNGS EVERY 6 HOURS AS  NEEDED FOR WHEEZING (Patient taking differently: Inhale 2 puffs into the lungs every 6 (six) hours as needed for Wheezing.)    alendronate (FOSAMAX) 70 MG tablet Take 1 tablet (70 mg total) by mouth every 7 days. (Patient taking differently: Take 70 mg by mouth every Sunday.)    aspirin 81 MG Chew Take 1 tablet (81 mg total) by mouth once daily.    atorvastatin (LIPITOR) 40 MG tablet Take 1 tablet (40 mg total) by mouth once daily.    ferrous sulfate (FEOSOL) 325 mg (65 mg iron) Tab tablet Take 1 tablet (325 mg total) by mouth once daily.    HUMALOG MIX 75-25,U-100,INSULN 100 unit/mL (75-25) Susp Inject 25 Units into the skin 2 (two) times daily before meals.    hydroCHLOROthiazide (MICROZIDE) 12.5 mg capsule Take 1 capsule (12.5 mg total) by mouth once daily.    HYDROcodone-acetaminophen 7.5-300 mg Tab Take 1 tablet by mouth 2 (two) times daily as needed (chronic neck pain).    LANTUS SOLOSTAR U-100 INSULIN glargine 100 units/mL (3mL) SubQ pen Inject 20 Units into the skin nightly. (Patient taking differently: Inject 25 Units into the skin nightly.)    losartan (COZAAR) 50 MG tablet Take 1 tablet (50 mg total) by mouth once daily.    magnesium oxide (MAG-OX) 400 mg (241.3 mg magnesium) tablet Take 400 mg by mouth once daily.    metFORMIN (GLUCOPHAGE) 1000 MG tablet Take 1,000 mg by mouth 2 (two) times daily.    pregabalin (LYRICA) 150 MG capsule Take 150 mg by mouth 3 (three) times daily.    QUEtiapine (SEROQUEL) 400 MG tablet Take 400 mg by mouth nightly.    sertraline (ZOLOFT) 100 MG tablet Take 150 mg by mouth once daily at 6am.    tiZANidine (ZANAFLEX) 4 MG tablet Take 4 mg by mouth 2 (two) times daily as needed for Muscle spasms.    TRELEGY ELLIPTA 200-62.5-25 mcg inhaler Inhale 1 puff into the lungs once daily.    verapamiL (CALAN-SR) 180 MG CR tablet Take 1 tablet (180 mg total) by mouth every evening.    [DISCONTINUED] furosemide (LASIX) 20 MG tablet Take 20 mg by mouth once daily at 6am.     "albuterol-ipratropium (DUO-NEB) 2.5 mg-0.5 mg/3 mL nebulizer solution Take 3 mLs by nebulization every 4 (four) hours as needed for Wheezing or Shortness of Breath. Rescue    BD ULTRA-FINE MINI PEN NEEDLE 31 gauge x 3/16" Ndle     busPIRone (BUSPAR) 15 MG tablet Take 15 mg by mouth 2 (two) times daily.    diclofenac sodium (VOLTAREN) 1 % Gel Apply 2 g topically 4 (four) times daily.    fluticasone propionate (FLONASE) 50 mcg/actuation nasal spray 2 sprays (100 mcg total) by Each Nare route once daily.    insulin lispro 100 unit/mL pen Inject 5 Units into the skin 3 (three) times daily after meals.    nitroGLYCERIN (NITROSTAT) 0.4 MG SL tablet Place 1 tablet (0.4 mg total) under the tongue every 5 (five) minutes as needed for Chest pain.    ondansetron (ZOFRAN-ODT) 4 MG TbDL Take 1 tablet (4 mg total) by mouth every 8 (eight) hours as needed (nausea).    potassium chloride SA (K-DUR,KLOR-CON) 20 MEQ tablet Take 1 tablet (20 mEq total) by mouth once daily.    topiramate (TOPAMAX) 50 MG tablet Take 50 mg by mouth 2 (two) times daily.    zonisamide (ZONEGRAN) 100 MG Cap Take 400 mg by mouth once daily.    [DISCONTINUED] clopidogreL (PLAVIX) 75 mg tablet Take 1 tablet (75 mg total) by mouth once daily. Take medicine for 15 days.    [DISCONTINUED] duloxetine (CYMBALTA) 60 MG capsule Take 60 mg by mouth once daily. Take with 30mg for a total of 90mg daily    [DISCONTINUED] pravastatin (PRAVACHOL) 40 MG tablet Take 0.5 tablets (20 mg total) by mouth once daily. (Patient taking differently: Take 40 mg by mouth once daily.)    [DISCONTINUED] QUEtiapine (SEROQUEL) 300 MG Tab Take 1 tablet (300 mg total) by mouth every evening.    [DISCONTINUED] tiZANidine 4 mg Cap Take by mouth.     Family History       Problem Relation (Age of Onset)    Breast cancer Mother    Cancer Mother, Maternal Aunt    Depression Sister    Diabetes Mother    Diabetes Mellitus Sister    Heart disease Father    Hypertension Mother, Sister    Lung cancer " Father          Tobacco Use    Smoking status: Former Smoker     Years: 55.00     Types: Cigarettes     Start date: 1967    Smokeless tobacco: Never Used    Tobacco comment: Enrolled in the [employer] on 10/5/15 ([employer] Member ID # [id number]).  Ambulatory referral to Smoking Cessation program.   Substance and Sexual Activity    Alcohol use: Yes     Alcohol/week: 3.0 standard drinks     Types: 3 Glasses of wine per week     Comment: 1 every 3 months    Drug use: No    Sexual activity: Yes     Partners: Male     Birth control/protection: None     Comment: last drink yesterday afternoon     Review of Systems   Constitutional:  Positive for activity change and fatigue.   HENT:  Negative for sore throat and trouble swallowing.    Respiratory:  Positive for cough, shortness of breath and wheezing.    Cardiovascular:  Negative for chest pain, palpitations and leg swelling.   Gastrointestinal:  Positive for abdominal pain and nausea. Negative for vomiting.   Genitourinary:  Negative for dysuria and flank pain.   Musculoskeletal:  Positive for neck pain. Negative for back pain.   Skin:  Negative for rash.   Neurological:  Positive for headaches.   Psychiatric/Behavioral:  Negative for confusion.    Objective:     Vital Signs (Most Recent):  Temp: 98.1 °F (36.7 °C) (08/23/22 0133)  Pulse: 70 (08/23/22 0133)  Resp: 18 (08/23/22 0133)  BP: (!) 149/77 (08/23/22 0133)  SpO2: 97 % (08/23/22 0133)   Vital Signs (24h Range):  Temp:  [98 °F (36.7 °C)-98.8 °F (37.1 °C)] 98.1 °F (36.7 °C)  Pulse:  [] 70  Resp:  [18-34] 18  SpO2:  [91 %-100 %] 97 %  BP: (118-152)/(52-83) 149/77     Weight: 56.2 kg (124 lb)  Body mass index is 22.68 kg/m².    Physical Exam  Vitals and nursing note reviewed.   Constitutional:       General: She is in acute distress.      Appearance: She is ill-appearing.   HENT:      Head: Normocephalic and atraumatic.      Mouth/Throat:      Pharynx: No oropharyngeal exudate.   Eyes:      General: No scleral  icterus.     Pupils: Pupils are equal, round, and reactive to light.   Cardiovascular:      Rate and Rhythm: Regular rhythm. Tachycardia present.      Pulses: Normal pulses.   Pulmonary:      Breath sounds: No rales.      Comments: Patient has bilateral inspiratory expiratory wheezing in with coughing spells wheezing is worse, patient is tachypneic  Abdominal:      General: Bowel sounds are normal. There is distension.      Palpations: Abdomen is soft.      Tenderness: There is abdominal tenderness. There is no guarding or rebound.      Hernia: No hernia is present.   Musculoskeletal:      Right lower leg: No edema.      Left lower leg: No edema.   Skin:     General: Skin is warm and dry.   Neurological:      General: No focal deficit present.      Mental Status: She is alert.         CRANIAL NERVES     CN III, IV, VI   Pupils are equal, round, and reactive to light.     Significant Labs: All pertinent labs within the past 24 hours have been reviewed.  CBC:   Recent Labs   Lab 08/22/22  1651   WBC 7.40   HGB 6.8*   HCT 24.3*        CMP:   Recent Labs   Lab 08/22/22  1651      K 3.4*      CO2 18*   *   BUN 10   CREATININE 0.6   CALCIUM 8.9   PROT 7.7   ALBUMIN 3.4*   BILITOT 0.2   ALKPHOS 147*   AST 10   ALT 7*   ANIONGAP 11     Lactic Acid: No results for input(s): LACTATE in the last 48 hours.  Lipase: No results for input(s): LIPASE in the last 48 hours.  Urine Studies: No results for input(s): COLORU, APPEARANCEUA, PHUR, SPECGRAV, PROTEINUA, GLUCUA, KETONESU, BILIRUBINUA, OCCULTUA, NITRITE, UROBILINOGEN, LEUKOCYTESUR, RBCUA, WBCUA, BACTERIA, SQUAMEPITHEL, HYALINECASTS in the last 48 hours.    Invalid input(s): WRIGHTSUR    Significant Imaging: I have reviewed all pertinent imaging results/findings within the past 24 hours.    CTA CHEST NON CORONARY; CT ABDOMEN PELVIS WITH CONTRAST     CLINICAL HISTORY:  Pulmonary embolism (PE) suspected, high prob;; Abdominal abscess/infection  suspected;Bowel obstruction suspected;     TECHNIQUE:  Low dose axial images, sagittal and coronal reformations were obtained from the thoracic inlet to the pubic symphysis following the IV administration of 100 mL of Omnipaque 350.  Contrast timing was optimized to evaluate the pulmonary arteries.  Maximum intensity projection images were provided of the chest for review.     COMPARISON:  CT lumbar spine 05/11/2022.  CTA chest 11/18/2021.  CT cervical spine 05/09/2022.  CT renal stone study 03/17/2021.     FINDINGS:  CTA CHEST:     Pulmonary vasculature: Satisfactory opacification of the pulmonary arterial system with no filling defect to the segmental level.     Aorta: Left-sided aortic arch.  No aneurysm.  Atherosclerosis of the aortic arch, coronary arteries, and descending aorta.     Base of Neck: No significant abnormality.     Thoracic soft tissues: Unremarkable     Heart: No cardiomegaly.  Trace pericardial effusion.     Martha/Mediastinum: Multiple prominent right hilar nodes, the largest measures approximately 1.7 cm (axial series 2, image 212).     Airways: The large airways are patent. No foci of endobronchial filling.     Lungs/Pleura: Centrilobular emphysema with upper lobe predominance.  Diffuse ground-glass attenuation throughout the bilateral lungs.  Multiple solid pulmonary nodules bilaterally consistent with metastatic disease.  2.1 x 1.2 cm spiculated mass in the right upper lobe (axial series 2, image 243), previously measured 1.2 x 1.0 cm.  0.8 x 0.7 cm solid nodule in the apicoposterior segment of the left upper lobe (axial series 2, image 152), previously measured 0.5 cm.     Esophagus: Unremarkable     CT ABDOMEN PELVIS:     Abdomen: Mild hepatomegaly.  Stable scattered hypodensities throughout the liver, too small to characterize.  Gallbladder surgically absent.  Prominent extrahepatic bile duct and minimal intrahepatic ductal dilatation, similar to prior exam and likely sequela of  cholecystectomy.  Stable bilateral adrenal nodules.  Spleen, pancreas, and kidneys are unremarkable.     Stable postsurgical changes of partial gastrectomy.  Multiple loops of dilated small bowel in the upper abdomen with no obvious transition point.  No evidence of inflammation.  Appendix is surgically absent.  Uterus is surgically absent.  No bladder wall thickening.     Abdominal wall: Postsurgical changes of ventral hernia repair with soft tissue stranding along the surgical site.  Tiny fat containing umbilical hernia.     Bones: Stable compression deformity of L1.  Bilateral pars defects at L5.  Postoperative changes in the cervical spine.  Old right-sided rib fractures.  No acute fracture. No suspicious lytic or sclerotic lesions.     Impression:     No evidence of pulmonary thromboembolism.     Multiple enlarged right hilar nodes and interval enlargement of bilateral solid pulmonary nodules compared to prior CTA chest.  These findings are consistent with known metastatic lung cancer.     Mild small bowel distension in the upper abdomen without discrete transition point.  Findings may be related to ileus or less likely partial small bowel obstruction.     Bilateral adrenal nodules.     Additional findings as above.     This report was flagged in Epic as abnormal.     Electronically signed by resident: Sharda Chavez  Date:                                            08/22/2022  Time:                                           21:51     Electronically signed by: Brandyn Sullivan MD  Date:                                            08/22/2022  Time:                                           22:41

## 2022-08-23 NOTE — ED NOTES
Patient resting quietly in bed with eyes closed, respirations even and unlaboured/ pt remains on 2L nasal cannula, VSS, NAD noted, easily arousable to verbal stimuli. Patient offers no complaints at presnt, awaiting MD orders/disposition. Bed rails up x2 with bed locked in lowest position, call light in reach, pt remains on d5 1/2 ns @ 75 cc / hr to RFA. ADMIT evaluation continues.

## 2022-08-23 NOTE — PLAN OF CARE
Problem: Occupational Therapy  Goal: Occupational Therapy Goal  Description: Goals to be met by: 9/20/22     Patient will increase functional independence with ADLs by performing:    UE Dressing with Modified [place].  LE Dressing with Modified [place].  Grooming while seated with Modified [place].  Toileting from toilet/bedside commode with Stand-by Assistance for hygiene and clothing management.   Sitting at edge of bed x15 minutes with Supervision.  Toilet transfer to toilet/bedside commode with Stand-by Assistance and LRAD.    Outcome: Ongoing, Progressing   Goals set.

## 2022-08-23 NOTE — PT/OT/SLP PROGRESS
Physical Therapy Co-Evaluation and Co-Treatment    Patient Name:  Chayito Chung   MRN:  [id number]    Recommendations:     Discharge Recommendations:  nursing facility, skilled   Discharge Equipment Recommendations: bedside commode   Barriers to discharge: decreased functional mobility, fall risk and decreased caregiver support    Assessment:     Chayito Chung is a 71 y.o. female admitted with a medical diagnosis of Chronic obstructive pulmonary disease with acute exacerbation.  Pt demonstrates the below listed impairments with decreased tolerance to functional mobility, weakness and gait instability being the most limiting.  Pt demonstrates fair tolerance to out of bed mobility and is willing to perform side steps at EOB this day.  Pt nauseas at the end of the session.  Pt is not safe for home discharge at this time due to patient's status as: a fall risk and pt has no 24/7 assist and requires skilled PT.      Impairments and functional limitations:  weakness, impaired endurance, impaired self care skills, impaired functional mobility, gait instability, impaired balance, impaired cognition, decreased coordination, decreased lower extremity function, decreased upper extremity function.  These deficits affect their roles and responsibilities in which they were able to complete prior to admit.  Rehab Prognosis:   Good ; patient would benefit from acute skilled PT services 3 x/week to address these deficits, improve quality of life, focus on recovery of impairments, provide patient/caregiver education, reduce fall risk, and reach maximum level of function.  Pt is highly  motivated to participated in skilled PT.    Recent Surgery:   * No surgery found *      Plan:     During this hospitalization, patient to be seen 3 x/week to address the identified rehab impairments via gait training, therapeutic activities, therapeutic exercises, neuromuscular re-education and progress toward the following goals:    · Plan of Care  "Expires:  09/22/22    Subjective     Chief Complaint: "Do you have one of those bags for people to throw up in"  Patient/Family Comments/Goals: Progress to SNF  Pain/Comfort:  · Pain Rating 1: 0/10    Patients cultural, spiritual, Jainism conflicts given the current situation: no    Living Environment:  Patient lives alone  In an independent living facility, apartment, no steps, tub/shower.   Pt utilizes rollator for ambulation of home distances. Pt utilizes wheelchair for ambulation of community distances  Prior to admission, patient was independent with ADLs.   DME owned: grab bar, shower chair, rollator, wheelchair (has RW, does not use).   DME not currently used: n/a.   Upon discharge, patient will have assistance from family with no 24/7 assist.     Objective:     Communicated with nursing prior to session.  Patient found HOB elevated with blood pressure cuff, pulse ox (continuous), telemetry, PureWick, peripheral IV  upon PT entry to room.    General Precautions: Standard, aspiration, fall   Orthopedic Precautions:N/A   Braces: N/A   Oxygen Device:      Exams:  · Cognitive Exam:  Patient is oriented to Person, Place, Time and Situation  · Command following: Patient follows 100% of commands   · RLE ROM: WFL  · RLE Strength: WFL  · LLE ROM: WFL  · LLE Strength: WFL  · Postural Exam:  Patient presented with the following abnormalities:    · -       Rounded shoulders  · -       Forward head  · Sensation:  reports tingling in R foot and L hand     Functional Mobility:  · Bed Mobility:  Rolling Right: moderate assistance  · Scooting: contact guard assistance  · Supine to Sit: moderate assistance for LE management and trunk management  · Sit to Supine: moderate assistance for LE management and trunk management  · Head of bed position: HOB elevated    · Transfers:  Sit to Stand: minimum assistance with rolling walker with cues for hand placement and foot placement    · Gait: Patient ambulated x3 side steps to R " with rolling walker and minimum assistance. Patient demonstrates occasional unsteady gait, decreased step length, narrow base of support, decreased arm swing, flexed posture, decreased libby and tremors . All lines remained intact throughout ambulation trial.   · HR noted to increase to 130s  · Pt with nausea and fatigued     · Balance:   Position Score Time   Static Sitting FAIR+: Takes MINIMAL challenges n/a   Dynamic Sitting FAIR: Maintains without assist, but is unable to take any challenges n/a   Static Standing FAIR-: Maintains without assist but is inconsistent n/a   Dynamic Standing POOR+: MINIMAL assist to maintain n/a       Therapeutic Activities:  Patient educated on role of acute care PT and PT POC, safety while in hospital including calling nurse for mobility, call light usage, benefits of out of bed mobility, walker management, breathing technique, fall risk, assistive device use, bed mobility , transfers, gait technique, positioning, posture, risks of prolonged bed rest, possible discharge disposition  and benefits of continued PT by explanation and demonstration.    Patient demonstrates fair understanding of education provided this day.   Whiteboard updated   Increased time required to untangle patient from her lines.     Therapeutic Exercises:  n/a    AM-PAC 6 CLICK MOBILITY  Total Score:15     Patient left HOB elevated with all lines intact, call button in reach and family present.    GOALS:   Multidisciplinary Problems     Physical Therapy Goals        Problem: Physical Therapy    Goal Priority Disciplines Outcome Goal Variances Interventions   Physical Therapy Goal     PT, PT/OT Ongoing, Progressing     Description: Goals to be met by: 22    Patient will increase functional independence with mobility by performin. Supine to sit with supervision  2. Sit to supine with supervision  3. Sit to stand transfer with supervision  4. Gait  x 100 feet with supervision using LRAD as needed                      History:     Past Medical History:   Diagnosis Date    Acute coronary syndrome     Acute hypoxemic respiratory failure 2017    Anxiety     Asthma     Cancer     colon    Cataracts, both eyes     Chest pain at rest     Chest pain of uncertain etiology 2015    Colon cancer 1988    COPD (chronic obstructive pulmonary disease)     Coronary artery disease     Depression     Diabetes mellitus     Dyspnea on exertion 11/15/2018    Elevated brain natriuretic peptide (BNP) level 11/15/2018    Elevated cholesterol     Hypertension     Swelling     Syncope and collapse 2016       Past Surgical History:   Procedure Laterality Date    ABDOMINAL SURGERY      BREAST BIOPSY      benign unsure what side     SECTION, CLASSIC      COLON SURGERY      COLONOSCOPY  2019    repeat in 5 years    CORONARY ANGIOPLASTY WITH STENT PLACEMENT  3 months ago     x2, Hysterectomy, Lung surgery, Partial stomach removed, Part of colon removed for rectal cancer    GASTRECTOMY      HEMORRHOID SURGERY      HYSTERECTOMY      @28yrs of age    LUNG BIOPSY      OOPHORECTOMY      @28yrs of age    POSTERIOR FUSION OF CERVICAL SPINE WITH LAMINECTOMY N/A 2020    Procedure: LAMINECTOMY, SPINE, CERVICAL, WITH POSTERIOR FUSION C3-T1 ;  Surgeon: Herson Tate MD;  Location: [facility] OR [address] [place] [address];  Service: Neurosurgery;  Laterality: N/A;    REPAIR OF INCARCERATED INCISIONAL HERNIA WITHOUT HISTORY OF PRIOR REPAIR N/A 2022    Procedure: REPAIR, HERNIA, INCISIONAL, INCARCERATED, WITHOUT HISTORY OF PRIOR REPAIR;  Surgeon: Simon King MD;  Location: [facility];  Service: General;  Laterality: N/A;       Time Tracking:     PT Received On: 22  PT Start Time: 1434     PT Stop Time: 1500  PT Total Time (min): 26 min     Billable Minutes: Evaluation 12 and Gait Training 14    2022

## 2022-08-23 NOTE — ED NOTES
LOC: The patient is awake, alert, and oriented to self, place, time, and situation. Pt is calm and cooperative. Affect is appropriate.  Speech is appropriate and clear.     APPEARANCE: Patient resting comfortably in no acute distress.  Patient is clean and well groomed.    SKIN: The skin is warm and dry; color consistent with ethnicity.  Patient has normal skin turgor and moist mucus membranes.  Skin intact; no breakdown or bruising noted.     MUSCULOSKELETAL: Patient moving upper and lower extremities without difficulty; denies pain in the extremities or back.  Denies weakness.     RESPIRATORY: Airway is open and patent. Respirations spontaneous, even, labored. Pt reports SOB.  Patient has an increased effort and rate.  No accessory muscle use noted. Productive cough.    CARDIAC:  Sinus tachycardia noted on monitor.  No peripheral edema noted. No complaints of chest pain.      ABDOMEN: Soft and non tender to palpation.  No distention noted. Pt denies abdominal pain; denies nausea, vomiting, diarrhea, or constipation.    NEUROLOGIC: Eyes open spontaneously.  Behavior appropriate to situation.  Follows commands; facial expression symmetrical.  Purposeful motor response noted; normal sensation in all extremities. Pt denies headache; denies lightheadedness or dizziness; denies visual disturbances; denies loss of balance; denies unilateral weakness.

## 2022-08-23 NOTE — PLAN OF CARE
Problem: Physical Therapy  Goal: Physical Therapy Goal  Description: Goals to be met by: 22    Patient will increase functional independence with mobility by performin. Supine to sit with supervision  2. Sit to supine with supervision  3. Sit to stand transfer with supervision  4. Gait  x 100 feet with supervision using LRAD as needed    Outcome: Ongoing, Progressing     Pt tolerated PT session well.      All needs met, all questions answered.

## 2022-08-23 NOTE — ED TRIAGE NOTES
Chayito Chung, a 71 y.o. female presents to the ED w/ complaint of SOB, hx lung ca. Currently being treated w/ radiation. Sent here for admission.    Triage note:  Chief Complaint   Patient presents with    Multiple complaints     Sent here for admission    Shortness of Breath     Lung cancer on radiation, on chemo    Cough     Review of patient's allergies indicates:  No Known Allergies  Past Medical History:   Diagnosis Date    Acute coronary syndrome     Acute hypoxemic respiratory failure 5/1/2017    Anxiety     Asthma     Cancer     colon    Cataracts, both eyes     Chest pain at rest     Chest pain of uncertain etiology 12/6/2015    Colon cancer 1988    COPD (chronic obstructive pulmonary disease)     Coronary artery disease     Depression     Diabetes mellitus     Dyspnea on exertion 11/15/2018    Elevated brain natriuretic peptide (BNP) level 11/15/2018    Elevated cholesterol     Hypertension     Swelling     Syncope and collapse 11/8/2016

## 2022-08-23 NOTE — ASSESSMENT & PLAN NOTE
Patient with worsening acute on chronic anemia   -Receiving 1unit PRBC  -trend in AM  -continue iron supplement.

## 2022-08-24 PROBLEM — E44.0 MODERATE MALNUTRITION: Status: ACTIVE | Noted: 2022-08-24

## 2022-08-24 LAB
ALBUMIN SERPL BCP-MCNC: 2.7 G/DL (ref 3.5–5.2)
ALP SERPL-CCNC: 109 U/L (ref 55–135)
ALT SERPL W/O P-5'-P-CCNC: 7 U/L (ref 10–44)
ANION GAP SERPL CALC-SCNC: 5 MMOL/L (ref 8–16)
AST SERPL-CCNC: 8 U/L (ref 10–40)
BASOPHILS # BLD AUTO: 0.02 K/UL (ref 0–0.2)
BASOPHILS NFR BLD: 0.4 % (ref 0–1.9)
BILIRUB SERPL-MCNC: 0.3 MG/DL (ref 0.1–1)
BUN SERPL-MCNC: 5 MG/DL (ref 8–23)
CALCIUM SERPL-MCNC: 8.3 MG/DL (ref 8.7–10.5)
CHLORIDE SERPL-SCNC: 113 MMOL/L (ref 95–110)
CO2 SERPL-SCNC: 21 MMOL/L (ref 23–29)
CREAT SERPL-MCNC: 0.6 MG/DL (ref 0.5–1.4)
DIFFERENTIAL METHOD: ABNORMAL
EOSINOPHIL # BLD AUTO: 0.1 K/UL (ref 0–0.5)
EOSINOPHIL NFR BLD: 2.7 % (ref 0–8)
ERYTHROCYTE [DISTWIDTH] IN BLOOD BY AUTOMATED COUNT: 22.1 % (ref 11.5–14.5)
EST. GFR  (NO RACE VARIABLE): >60 ML/MIN/1.73 M^2
GLUCOSE SERPL-MCNC: 178 MG/DL (ref 70–110)
HCT VFR BLD AUTO: 25.5 % (ref 37–48.5)
HGB BLD-MCNC: 7.3 G/DL (ref 12–16)
IMM GRANULOCYTES # BLD AUTO: 0.01 K/UL (ref 0–0.04)
IMM GRANULOCYTES NFR BLD AUTO: 0.2 % (ref 0–0.5)
LYMPHOCYTES # BLD AUTO: 0.9 K/UL (ref 1–4.8)
LYMPHOCYTES NFR BLD: 18.6 % (ref 18–48)
MAGNESIUM SERPL-MCNC: 1.8 MG/DL (ref 1.6–2.6)
MCH RBC QN AUTO: 22 PG (ref 27–31)
MCHC RBC AUTO-ENTMCNC: 28.6 G/DL (ref 32–36)
MCV RBC AUTO: 77 FL (ref 82–98)
MONOCYTES # BLD AUTO: 0.5 K/UL (ref 0.3–1)
MONOCYTES NFR BLD: 9.9 % (ref 4–15)
NEUTROPHILS # BLD AUTO: 3.2 K/UL (ref 1.8–7.7)
NEUTROPHILS NFR BLD: 68.2 % (ref 38–73)
NRBC BLD-RTO: 0 /100 WBC
PHOSPHATE SERPL-MCNC: 2.4 MG/DL (ref 2.7–4.5)
PLATELET # BLD AUTO: 198 K/UL (ref 150–450)
PMV BLD AUTO: 10.2 FL (ref 9.2–12.9)
POCT GLUCOSE: 101 MG/DL (ref 70–110)
POCT GLUCOSE: 182 MG/DL (ref 70–110)
POCT GLUCOSE: 183 MG/DL (ref 70–110)
POCT GLUCOSE: 188 MG/DL (ref 70–110)
POCT GLUCOSE: 220 MG/DL (ref 70–110)
POCT GLUCOSE: 288 MG/DL (ref 70–110)
POTASSIUM SERPL-SCNC: 3.3 MMOL/L (ref 3.5–5.1)
PROT SERPL-MCNC: 6.4 G/DL (ref 6–8.4)
RBC # BLD AUTO: 3.32 M/UL (ref 4–5.4)
SODIUM SERPL-SCNC: 139 MMOL/L (ref 136–145)
WBC # BLD AUTO: 4.74 K/UL (ref 3.9–12.7)

## 2022-08-24 PROCEDURE — 63600175 PHARM REV CODE 636 W HCPCS: Performed by: STUDENT IN AN ORGANIZED HEALTH CARE EDUCATION/TRAINING PROGRAM

## 2022-08-24 PROCEDURE — 80053 COMPREHEN METABOLIC PANEL: CPT | Performed by: HOSPITALIST

## 2022-08-24 PROCEDURE — 20600001 HC STEP DOWN PRIVATE ROOM

## 2022-08-24 PROCEDURE — 25000003 PHARM REV CODE 250: Performed by: STUDENT IN AN ORGANIZED HEALTH CARE EDUCATION/TRAINING PROGRAM

## 2022-08-24 PROCEDURE — 83735 ASSAY OF MAGNESIUM: CPT | Performed by: HOSPITALIST

## 2022-08-24 PROCEDURE — 25000003 PHARM REV CODE 250: Performed by: HOSPITALIST

## 2022-08-24 PROCEDURE — 99233 SBSQ HOSP IP/OBS HIGH 50: CPT | Mod: GC,,, | Performed by: STUDENT IN AN ORGANIZED HEALTH CARE EDUCATION/TRAINING PROGRAM

## 2022-08-24 PROCEDURE — 63700000 PHARM REV CODE 250 ALT 637 W/O HCPCS: Performed by: HOSPITALIST

## 2022-08-24 PROCEDURE — 94640 AIRWAY INHALATION TREATMENT: CPT

## 2022-08-24 PROCEDURE — 27000221 HC OXYGEN, UP TO 24 HOURS

## 2022-08-24 PROCEDURE — 99233 PR SUBSEQUENT HOSPITAL CARE,LEVL III: ICD-10-PCS | Mod: ,,, | Performed by: STUDENT IN AN ORGANIZED HEALTH CARE EDUCATION/TRAINING PROGRAM

## 2022-08-24 PROCEDURE — 25000242 PHARM REV CODE 250 ALT 637 W/ HCPCS: Performed by: STUDENT IN AN ORGANIZED HEALTH CARE EDUCATION/TRAINING PROGRAM

## 2022-08-24 PROCEDURE — 99233 PR SUBSEQUENT HOSPITAL CARE,LEVL III: ICD-10-PCS | Mod: GC,,, | Performed by: STUDENT IN AN ORGANIZED HEALTH CARE EDUCATION/TRAINING PROGRAM

## 2022-08-24 PROCEDURE — 84100 ASSAY OF PHOSPHORUS: CPT | Performed by: HOSPITALIST

## 2022-08-24 PROCEDURE — 63600175 PHARM REV CODE 636 W HCPCS: Performed by: HOSPITALIST

## 2022-08-24 PROCEDURE — 99900031 HC PATIENT EDUCATION (STAT)

## 2022-08-24 PROCEDURE — 99233 SBSQ HOSP IP/OBS HIGH 50: CPT | Mod: ,,, | Performed by: STUDENT IN AN ORGANIZED HEALTH CARE EDUCATION/TRAINING PROGRAM

## 2022-08-24 PROCEDURE — 85025 COMPLETE CBC W/AUTO DIFF WBC: CPT | Performed by: HOSPITALIST

## 2022-08-24 PROCEDURE — 99900035 HC TECH TIME PER 15 MIN (STAT)

## 2022-08-24 PROCEDURE — 36415 COLL VENOUS BLD VENIPUNCTURE: CPT | Performed by: HOSPITALIST

## 2022-08-24 RX ORDER — IPRATROPIUM BROMIDE AND ALBUTEROL SULFATE 2.5; .5 MG/3ML; MG/3ML
3 SOLUTION RESPIRATORY (INHALATION) EVERY 4 HOURS PRN
Status: DISCONTINUED | OUTPATIENT
Start: 2022-08-24 | End: 2022-09-08 | Stop reason: HOSPADM

## 2022-08-24 RX ORDER — INSULIN ASPART 100 [IU]/ML
7 INJECTION, SOLUTION INTRAVENOUS; SUBCUTANEOUS
Status: DISCONTINUED | OUTPATIENT
Start: 2022-08-24 | End: 2022-09-04

## 2022-08-24 RX ORDER — INSULIN ASPART 100 [IU]/ML
5 INJECTION, SOLUTION INTRAVENOUS; SUBCUTANEOUS
Status: DISCONTINUED | OUTPATIENT
Start: 2022-08-24 | End: 2022-08-24

## 2022-08-24 RX ADMIN — HYDROCODONE BITARTRATE AND ACETAMINOPHEN 1 TABLET: 7.5; 325 TABLET ORAL at 09:08

## 2022-08-24 RX ADMIN — SENNOSIDES AND DOCUSATE SODIUM 1 TABLET: 50; 8.6 TABLET ORAL at 08:08

## 2022-08-24 RX ADMIN — ASPIRIN 81 MG CHEWABLE TABLET 81 MG: 81 TABLET CHEWABLE at 08:08

## 2022-08-24 RX ADMIN — PREGABALIN 150 MG: 75 CAPSULE ORAL at 08:08

## 2022-08-24 RX ADMIN — PROMETHAZINE HYDROCHLORIDE AND CODEINE PHOSPHATE 7.5 ML: 10; 6.25 SOLUTION ORAL at 03:08

## 2022-08-24 RX ADMIN — QUETIAPINE FUMARATE 400 MG: 100 TABLET ORAL at 08:08

## 2022-08-24 RX ADMIN — ONDANSETRON 4 MG: 2 INJECTION INTRAMUSCULAR; INTRAVENOUS at 05:08

## 2022-08-24 RX ADMIN — BENZONATATE 200 MG: 100 CAPSULE ORAL at 08:08

## 2022-08-24 RX ADMIN — VERAPAMIL HYDROCHLORIDE 180 MG: 180 TABLET, FILM COATED, EXTENDED RELEASE ORAL at 08:08

## 2022-08-24 RX ADMIN — AZITHROMYCIN MONOHYDRATE 500 MG: 250 TABLET ORAL at 08:08

## 2022-08-24 RX ADMIN — FLUTICASONE FUROATE AND VILANTEROL TRIFENATATE 1 PUFF: 100; 25 POWDER RESPIRATORY (INHALATION) at 09:08

## 2022-08-24 RX ADMIN — TIOTROPIUM BROMIDE INHALATION SPRAY 2 PUFF: 3.12 SPRAY, METERED RESPIRATORY (INHALATION) at 09:08

## 2022-08-24 RX ADMIN — GUAIFENESIN 600 MG: 600 TABLET, EXTENDED RELEASE ORAL at 08:08

## 2022-08-24 RX ADMIN — INSULIN ASPART 6 UNITS: 100 INJECTION, SOLUTION INTRAVENOUS; SUBCUTANEOUS at 12:08

## 2022-08-24 RX ADMIN — INSULIN ASPART 7 UNITS: 100 INJECTION, SOLUTION INTRAVENOUS; SUBCUTANEOUS at 05:08

## 2022-08-24 RX ADMIN — BENZONATATE 200 MG: 100 CAPSULE ORAL at 03:08

## 2022-08-24 RX ADMIN — PREDNISONE 40 MG: 20 TABLET ORAL at 08:08

## 2022-08-24 RX ADMIN — POTASSIUM CHLORIDE 20 MEQ: 1500 TABLET, EXTENDED RELEASE ORAL at 08:08

## 2022-08-24 RX ADMIN — POLYETHYLENE GLYCOL 3350 17 G: 17 POWDER, FOR SOLUTION ORAL at 08:08

## 2022-08-24 RX ADMIN — SERTRALINE HYDROCHLORIDE 150 MG: 50 TABLET ORAL at 08:08

## 2022-08-24 RX ADMIN — Medication 400 MG: at 08:08

## 2022-08-24 RX ADMIN — PREGABALIN 150 MG: 75 CAPSULE ORAL at 03:08

## 2022-08-24 RX ADMIN — FERROUS SULFATE TAB 325 MG (65 MG ELEMENTAL FE) 1 EACH: 325 (65 FE) TAB at 08:08

## 2022-08-24 RX ADMIN — ATORVASTATIN CALCIUM 40 MG: 20 TABLET, FILM COATED ORAL at 08:08

## 2022-08-24 RX ADMIN — INSULIN DETEMIR 25 UNITS: 100 INJECTION, SOLUTION SUBCUTANEOUS at 10:08

## 2022-08-24 RX ADMIN — ENOXAPARIN SODIUM 40 MG: 100 INJECTION SUBCUTANEOUS at 05:08

## 2022-08-24 NOTE — PLAN OF CARE
Pt aaox4 vswnl and c/o pain abd. Pain meds given per md order. Pt NPO and spoke to IM md and Gen Surg md who says day team will decide if she cah eat after reviewing her KUBS. Bed in low position and callbell within reach. Pt requested purwic. Accu ck at 3320=961. D5 1/2ns at 75cc/hr. 2 liters n/c o2 with sats >95%.telemetry maintained ST. Pt has appt for radiation at  [facility] on [facility] but IM md states will be postponed. Pt notified. Tessalon perrls given for cough.

## 2022-08-24 NOTE — PLAN OF CARE
Recommendations     1. When medically able, ADAT Diabetic diet with texture per SLP + Boost glucose control TID     2. RD to monitor and follow     Goals: Meet % EEN, EPN by RD f/u  Nutrition Goal Status: new  Communication of RD Recs:  (POC)

## 2022-08-24 NOTE — PLAN OF CARE
[facility] Trans[facility] Stepdown  Initial Discharge Assessment       Primary Care Provider: Curt Valladares III, MD    Admission Diagnosis: Shortness of breath [R06.02]  SOB (shortness of breath) [R06.02]  Abnormal CT of the abdomen [R93.5]  Chest pain [R07.9]  Anemia requiring transfusions [D64.9]  Localized swelling, mass, or lump of lower extremity, bilateral [R22.43]  Malignant neoplasm of lung, unspecified laterality, unspecified part of lung [C34.90]    Admission Date: 8/22/2022  Expected Discharge Date: 8/26/2022    Discharge Barriers Identified: None    Payor: [employer] MEDICARE / Plan: [employer] HEALTH / Product Type: Medicare Advantage /     Extended Emergency Contact Information  Primary Emergency Contact: Jodie Chung   [place]  Mobile Phone: [phone]  Relation: Sister  Secondary Emergency Contact: Marlin Chung   [facility]  Home Phone: [phone]  Mobile Phone: [phone]  Relation: Mother    Discharge Plan A: Home Health  Discharge Plan B: Skilled Nursing Facility      [id number] - [place] - [address] [facility] [address] [facility]  [address] [place] [address]  Phone: [phone] Fax: [phone]    [facility] - [place] - [address]  [address]  [place] [address]  Phone: [phone] Fax: [phone]    [facility]  - [place] - [address]  [address]  [place] [address]  Phone: [phone] Fax: [phone]    [facility] - [place] - [address] [facility] [address]  [place] [address]  Phone: [phone] Fax: [phone]      Initial Assessment (most recent)     Adult Discharge Assessment - 08/24/22 1117        Discharge Assessment    Assessment Type Discharge Planning Assessment     Confirmed/corrected address, phone number and insurance Yes     Confirmed Demographics  Correct on Facesheet     Source of Information patient     Reason For Admission COPD with acute exacerbation     Lives With alone     Facility Arrived From: home     Do you expect to return to your current living situation? Yes     Do you have help at home or someone to help you manage your care at home? Yes   neighbors    Who are your caregiver(s) and their phone number(s)? sister Jodie Chung [phone]     Prior to hospitilization cognitive status: Alert/Oriented     Current cognitive status: Alert/Oriented     Walking or Climbing Stairs Difficulty ambulation difficulty, requires equipment     Dressing/Bathing Difficulty bathing difficulty, requires equipment     Equipment Currently Used at Home rollator;walker, rolling;shower chair     Patient currently being followed by outpatient case management? No     Do you currently have service(s) that help you manage your care at home? No     Who is going to help you get home at discharge? brother     How do you get to doctors appointments? car, drives self     Are you on dialysis? No     Do you take coumadin? No     Discharge Plan A Home Health     Discharge Plan B Skilled Nursing Facility     DME Needed Upon Discharge  other (see comments)   TBD    Discharge Barriers Identified None               CM spoke with patient in [id number] for DISCHARGE PLANNING ASSESSMENT. Per patient, she lives alone in a single family, 3rd floor, elevator accessed apt with 0 steps to porch and point of entry.  Patient was independent with ADLS and DID use DME or in-home assistive equipment (see above).  Pt is not on dialysis or Coumadin, takes medications as prescribed / keeps refilled / has resources for all daily and prescriptive needs.  [facility] to bedside delivery.  Will have help from neighbors, sister, and other immediate family upon discharge.  All questions addressed.  Will continue to follow for course of hospitalization.    *Pt  states her brother can provide transportation home at time of d/c.    Julia Vanessa RN CM  [id number]  Case Management

## 2022-08-24 NOTE — ASSESSMENT & PLAN NOTE
Worsening dyspnea and cough after radiation treatment giving concern for radiation pneumonitis vs COPD exacerbation causing symptoms  - Continue Prednisone 40mg po daily x 4 days   - apply Oxygen 1-2L to achieve O2 sat 88-92% given hx of COPD  - Schedule DUO Nebs  - Azithromycin 500mg x 3 days   - Use BREO + spiriva in place of home TRELEGY  - Prometh-codeine for cough

## 2022-08-24 NOTE — ASSESSMENT & PLAN NOTE
Patient's FSGs are uncontrolled due to hypoglycemia on current medication regimen.  Last A1c reviewed-   Lab Results   Component Value Date    HGBA1C 9.0 (H) 07/14/2022     Most recent fingerstick glucose reviewed-   Recent Labs   Lab 08/23/22  1800 08/23/22  2148 08/24/22  0908 08/24/22  1233   POCTGLUCOSE 285* 220* 182* 288*     Current correctional scale  Medium  Maintain anti-hyperglycemic dose as follows-   Antihyperglycemics (From admission, onward)            Start     Stop Route Frequency Ordered    08/24/22 1645  insulin aspart U-100 pen 5 Units         -- SubQ 3 times daily with meals 08/24/22 1327    08/23/22 2100  insulin detemir U-100 pen 25 Units         -- SubQ Nightly 08/23/22 0045    08/23/22 0232  insulin aspart U-100 pen 1-10 Units         -- SubQ Before meals & nightly PRN 08/23/22 0137        Hold Oral hypoglycemics while patient is in the hospital.    At home patient reports using a Combo insulin - HUMALOG 75/25 - 35 units BID AC  And LANTUS insulin 25 units Nightly   - Continue Basal insulin 25 units Levemir Nightly  - Continue aspart 7u TID  - Moderate dose sliding scale

## 2022-08-24 NOTE — PLAN OF CARE
08/24/22 1518   Post-Acute Status   Post-Acute Authorization Placement   Post-Acute Placement Status Referrals Sent       SW met with Pt and Pt sister at bedside. Discussed therapy recs for SNF and provided list. Addressed questions and reported need to send referrals to obtain accepting options. The patient agreeable and will review the list for preferences asap.     SW [facility] via [facility]        SW completed the LOCET via phone. SW faxed [facility] to obtain the 142 for NH admission.      3:48 PM    [facility]- Declined the patient (Care Needs Exceed Current Capacity )      The SW will continue to follow.       Smita Braxton LMSW  Case Management [facility]  Ext: [phone]

## 2022-08-24 NOTE — ASSESSMENT & PLAN NOTE
Moderate Malnutrition  Nutrition Problem  Moderate Protein-Calorie Malnutrition  Malnutrition in the context of Chronic Illness    Related to (etiology):   Inadequate energy intake    Signs and Symptoms (as evidenced by):   Energy Intake: <75% of estimated energy requirement for >3 months  Body Fat Depletion: mild depletion of orbital, triceps  Muscle Mass Depletion: mild depletion of temples, clavicle region, interosseous muscle, lower extremities    Interventions/Recommendations (treatment strategy):  Collaboration of nutrition care with other providers  ONS    Nutrition Diagnosis Status:   New

## 2022-08-24 NOTE — TREATMENT PLAN
General Surgery Treatment Plan    Patient seen and examined. Passed gastrograffin challenge and had BM.    Would start clear liquid diet overnight. If she tolerates this without nausea/emesis, would advance diet as tolerated and keep her on healthy bowel regimen.     Luma Thompson MD  PGY-3, General Surgery  [facility]

## 2022-08-24 NOTE — PROGRESS NOTES
[facility]  General Surgery  Progress Note    Subjective:     History of Present Illness:  71-year-old female with multiple medical co-morbidities including CAD complicated by ACS, COPD, metastatic lung cancer currently undergoing medical therapy, previous colon cancer s/p resection, gastric ulcer s/p partial gastrectomy, and open incisional hernia repair with mesh who presents today from clinic with severe shortness of breath. She notes she has been short of breath over the last several weeks which has progressively getting worse. She also notes some vague mid abdominal pain that has been going on for over a week associated with some diarrhea today and a few episodes of vomiting over the weekend but none now. Denies fevers, chills, or any other complaints at this time.       Post-Op Info:  * No surgery found *         Interval History: NAEON. Afebrile. HDS. Reports feeling okay this morning. GGC yesterday with contrast in colon. Passing gas, no BM yet. Denies nausea. Asking for diet. Pain is controlled with current regimen. Adequate UOP. No new symptoms or concerns this morning. All questions answered.     Medications:  Continuous Infusions:   dextrose 5 % and 0.45 % NaCl 75 mL/hr at 08/23/22 0338     Scheduled Meds:   albuterol-ipratropium  3 mL Nebulization Q4H WAKE    aspirin  81 mg Oral Daily    atorvastatin  40 mg Oral Daily    azithromycin  500 mg Oral Daily    benzonatate  200 mg Oral TID    enoxaparin  40 mg Subcutaneous Daily    ferrous sulfate  1 tablet Oral Daily    fluticasone furoate-vilanteroL  1 puff Inhalation Daily    guaiFENesin  600 mg Oral BID    insulin detemir U-100  25 Units Subcutaneous QHS    magnesium oxide  400 mg Oral Daily    polyethylene glycol  17 g Oral Daily    potassium chloride SA  20 mEq Oral Daily    predniSONE  40 mg Oral Daily    pregabalin  150 mg Oral TID    QUEtiapine  400 mg Oral Nightly    senna-docusate 8.6-50 mg  1 tablet Oral BID     sertraline  150 mg Oral Daily    tiotropium bromide  2 puff Inhalation Daily    verapamiL  180 mg Oral QHS     PRN Meds:sodium chloride, acetaminophen, dextrose 10%, dextrose 10%, glucagon (human recombinant), HYDROcodone-acetaminophen, insulin aspart U-100, melatonin, naloxone, nitroGLYCERIN, ondansetron, prochlorperazine, promethazine-codeine 6.25-10 mg/5 ml, sodium chloride 0.9%, tiZANidine     Review of patient's allergies indicates:  No Known Allergies  Objective:     Vital Signs (Most Recent):  Temp: 98.2 °F (36.8 °C) (08/24/22 0400)  Pulse: (!) 118 (08/24/22 0451)  Resp: 18 (08/24/22 0451)  BP: 112/61 (08/24/22 0451)  SpO2: 96 % (08/24/22 0451)   Vital Signs (24h Range):  Temp:  [98 °F (36.7 °C)-98.8 °F (37.1 °C)] 98.2 °F (36.8 °C)  Pulse:  [] 118  Resp:  [14-24] 18  SpO2:  [92 %-98 %] 96 %  BP: (107-132)/(55-84) 112/61     Weight: 58.8 kg (129 lb 10.1 oz)  Body mass index is 23.71 kg/m².    Intake/Output - Last 3 Shifts         08/22 0700  08/23 0659 08/23 0700  08/24 0659 08/24 0700  08/25 0659    P.O.  150     I.V. (mL/kg)  1796.3 (30.5)     Blood 247.5      IV Piggyback 126.8      Total Intake(mL/kg) 374.3 (6.7) 1946.3 (33.1)     Urine (mL/kg/hr) 400 1500 (1.1)     Stool  0     Total Output 400 1500     Net -25.7 +446.3            Urine Occurrence  1 x     Stool Occurrence  0 x             Physical Exam  Vitals and nursing note reviewed.   Constitutional:       General: She is not in acute distress.     Appearance: She is not diaphoretic.      Comments: 2L O2 via NC   HENT:      Head: Normocephalic and atraumatic.      Mouth/Throat:      Mouth: Mucous membranes are moist.      Pharynx: Oropharynx is clear.   Eyes:      Extraocular Movements: Extraocular movements intact.      Conjunctiva/sclera: Conjunctivae normal.   Cardiovascular:      Rate and Rhythm: Tachycardia present.   Pulmonary:      Effort: Pulmonary effort is normal. No respiratory distress.   Abdominal:      Palpations: Abdomen is  soft.      Tenderness: There is no abdominal tenderness. There is no guarding or rebound.      Comments: Mildly distended. Soft. No TTP. No peritonitic signs  Well healed midline surgical scar noted   Musculoskeletal:         General: No deformity.      Cervical back: Normal range of motion.   Skin:     General: Skin is warm and dry.   Neurological:      Mental Status: She is alert.      Comments: Sleeping but arouses on exam        Significant Labs:  I have reviewed all pertinent lab results within the past 24 hours.    Significant Diagnostics:  I have reviewed all pertinent imaging results/findings within the past 24 hours.    Assessment/Plan:     Abdominal distension  Patient is a 71 year old female with multiple medical co-morbidities including CAD, COPD, and metastatic lung cancer as well as previous partial gastrectomy for ulcer disease, colon cancer s/p resection, and multiple incisional hernia repairs who presents with shortness of breath. Also noted to have mild abdominal pain for a week as well as diarrhea. CT shows a few loops of mildly dilated loops of small bowel but no real obstructive pattern. No free air, no ascites, no real indication for surgical intervention, gastrograffin challenge 8/23 with contrast through to sigmoid colon . She may be constipated, however.     - Okay for CLD   - Gastrograffin challenge 8/23 with contrast through to sigmoid colon  - Recommend suppository and bowel regimen  - Serial abdominal exams  - Will continue to follow   - Remainder of care per primary team  - Please contact general surgery with any questions, concerns, or clinical status changes      Case discussed with Dr. Rondon.      John Galicia PA-C  General Surgery  [facility]

## 2022-08-24 NOTE — HOSPITAL COURSE
Shortness of breath and cough improved and patient back to baseline respiratory wise but continued to require supplemental oxygen so ordered for home.   Patient was denied SNF by her insurance company. Decision appealed but SNF ultimately denied.  Patient discharged with home health.  She will resume care with her oncologist.

## 2022-08-24 NOTE — SUBJECTIVE & OBJECTIVE
Interval History: no acute events overnight. Patient states that she feels abdominal pain. On exam and interview, pain related to post-tussive abdominal muscle fatigue. She denies any other issues and reports improvement in breathing.     Review of Systems   Constitutional:  Negative for chills and fever.   HENT:  Negative for congestion and sore throat.    Eyes:  Negative for photophobia and visual disturbance.   Respiratory:  Positive for cough and shortness of breath (improving).    Cardiovascular:  Negative for chest pain and palpitations.   Gastrointestinal:  Positive for abdominal pain. Negative for constipation, diarrhea, nausea and vomiting.   Endocrine: Negative for cold intolerance and heat intolerance.   Genitourinary:  Negative for dysuria and hematuria.   Musculoskeletal:  Negative for arthralgias and myalgias.   Skin:  Negative for rash.   Allergic/Immunologic: Negative for environmental allergies and food allergies.   Neurological:  Negative for dizziness, seizures, syncope and headaches.   Hematological:  Negative for adenopathy. Does not bruise/bleed easily.   Psychiatric/Behavioral:  Negative for hallucinations and suicidal ideas.    Objective:     Vital Signs (Most Recent):  Temp: 97.9 °F (36.6 °C) (08/24/22 1305)  Pulse: (!) 114 (08/24/22 1305)  Resp: 18 (08/24/22 1305)  BP: 124/69 (08/24/22 1305)  SpO2: (!) 93 % (08/24/22 1305)   Vital Signs (24h Range):  Temp:  [97.9 °F (36.6 °C)-98.2 °F (36.8 °C)] 97.9 °F (36.6 °C)  Pulse:  [] 114  Resp:  [14-24] 18  SpO2:  [92 %-98 %] 93 %  BP: (107-132)/(55-84) 124/69     Weight: 58.8 kg (129 lb 10.1 oz)  Body mass index is 23.71 kg/m².    Intake/Output Summary (Last 24 hours) at 8/24/2022 1322  Last data filed at 8/24/2022 0400  Gross per 24 hour   Intake 1796.26 ml   Output 1000 ml   Net 796.26 ml      Physical Exam  Constitutional:       Appearance: She is ill-appearing.   Pulmonary:      Breath sounds: Wheezing present.   Abdominal:      General:  Bowel sounds are increased. There is no distension.      Tenderness: There is abdominal tenderness.       Significant Labs: All pertinent labs within the past 24 hours have been reviewed.  CBC:   Recent Labs   Lab 08/22/22  1651 08/23/22  0336 08/24/22  0716   WBC 7.40 5.92 4.74   HGB 6.8* 7.4* 7.3*   HCT 24.3* 25.0* 25.5*    208 198     CMP:   Recent Labs   Lab 08/22/22  1651 08/23/22  0336 08/24/22  0716    137 139   K 3.4* 3.5 3.3*    111* 113*   CO2 18* 19* 21*   * 135* 178*   BUN 10 5* 5*   CREATININE 0.6 0.5 0.6   CALCIUM 8.9 8.6* 8.3*   PROT 7.7 7.0 6.4   ALBUMIN 3.4* 3.1* 2.7*   BILITOT 0.2 0.4 0.3   ALKPHOS 147* 120 109   AST 10 10 8*   ALT 7* 7* 7*   ANIONGAP 11 7* 5*       Significant Imaging: I have reviewed all pertinent imaging results/findings within the past 24 hours.

## 2022-08-24 NOTE — PROGRESS NOTES
[facility]  Adult Nutrition  Progress Note    SUMMARY       Recommendations    1. When medically able, ADAT Diabetic diet with texture per SLP + Boost glucose control TID    2. RD to monitor and follow    Goals: Meet % EEN, EPN by RD f/u  Nutrition Goal Status: new  Communication of RD Recs:  (POC)    Assessment and Plan    Moderate Malnutrition  Nutrition Problem  Moderate Protein-Calorie Malnutrition  Malnutrition in the context of Chronic Illness    Related to (etiology):   Inadequate energy intake    Signs and Symptoms (as evidenced by):   Energy Intake: <75% of estimated energy requirement for >3 months  Body Fat Depletion: mild depletion of orbital, triceps  Muscle Mass Depletion: mild depletion of temples, clavicle region, interosseous muscle, lower extremities    Interventions/Recommendations (treatment strategy):  Collaboration of nutrition care with other providers  ONS    Nutrition Diagnosis Status:   New    Malnutrition Assessment  Weight Loss (Malnutrition):  (7% x 4 months)  Energy Intake (Malnutrition): less than 75% for greater than or equal to 3 months   Orbital Region (Subcutaneous Fat Loss): mild depletion  Upper Arm Region (Subcutaneous Fat Loss): mild depletion   [place] Region (Muscle Loss): mild depletion  Clavicle Bone Region (Muscle Loss): mild depletion  Clavicle and Acromion Bone Region (Muscle Loss): mild depletion  Dorsal Hand (Muscle Loss): mild depletion  Anterior Thigh Region (Muscle Loss): mild depletion  Posterior Calf Region (Muscle Loss): mild depletion      Reason for Assessment    Reason For Assessment:  (A1c)  Diagnosis:  (COPD)  Relevant Medical History: T2DM, Iron deficiency anemia, CAD, lung CA  Interdisciplinary Rounds: did not attend    General Information Comments:   Pt with PMH of right lung adenocarcinoma receiving radiation therapy, COPD and T2DM.   Pt reports decreased appetite in the past 3 months. Usually eat only 1 meal per day at home. Pt  "with elevated A1c at 9.0. Pt was compliant to insulin but not following diabetic diet. Carbohydrate counting education provided to pt with handout. Encourage PO intake, pt agreeable to ONS. Pt verbalized understanding. Per wt hx, noted wt loss of 10 lb (7%) x 4 months, which is not significant. NFPE completed today, noted mild muscle and fat depletion. Pt meet the criteria of moderate malnutrition in the context of chronic illness aeb decreased PO intake and muscle/fat depletion.     Nutrition Discharge Planning: Educated pt on CHO counting for people with diabetes on 8/24. Pt verbalized understanding. Emphasized the importance of diet adherence. Pt with no additional questions. No other needs identified. Left all education material with pt at bedside.    Nutrition Risk Screen    Nutrition Risk Screen: no indicators present    Nutrition/Diet History    Spiritual, Cultural Beliefs, Hoahaoism Practices, Values that Affect Care: no    Anthropometrics    Temp: 97.9 °F (36.6 °C)  Height Method: Stated  Height: 5' 2" (157.5 cm)  Height (inches): 62 in  Weight Method: Bed Scale  Weight: 58.8 kg (129 lb 10.1 oz)  Weight (lb): 129.63 lb  Ideal Body Weight (IBW), Female: 110 lb  % Ideal Body Weight, Female (lb): 117.85 %  BMI (Calculated): 23.7     Lab/Procedures/Meds    Pertinent Labs Reviewed: reviewed  Pertinent Labs Comments: K 3.3, glucose 178, BUN 5, AST 8, ALT 7  Pertinent Medications Reviewed: reviewed  Pertinent Medications Comments: insulin, ferrous sulfate, Mg oxide, KCl, polyehtylene glycol, senna-docusate, prednisone    Estimated/Assessed Needs    Weight Used For Calorie Calculations: 59 kg (130 lb 1.1 oz)  Energy Calorie Requirements (kcal): 6739-4939 kcal  Energy Need Method: Kcal/kg (25-30)  Protein Requirements: 59-70g (1-1.2g/kg)  Weight Used For Protein Calculations: 59 kg (130 lb 1.1 oz)  Fluid Requirements (mL): 1ml/kcal or per MD  Estimated Fluid Requirement Method: RDA Method  RDA Method (mL): 1475  CHO " Requirement: 184g      Nutrition Prescription Ordered    Current Diet Order: CLD    Evaluation of Received Nutrient/Fluid Intake    I/O: + 0.4L since admit  Energy Calories Required: not meeting needs  Protein Required: not meeting needs  Comments: LBM 8/23  Tolerance: tolerating    Nutrition Risk    Level of Risk/Frequency of Follow-up: low     Monitor and Evaluation    Food and Nutrient Intake: energy intake, food and beverage intake  Food and Nutrient Adminstration: diet order  Knowledge/Beliefs/Attitudes: food and nutrition knowledge/skill, beliefs and attitudes  Physical Activity and Function: nutrition-related ADLs and IADLs  Anthropometric Measurements: height/length, weight, weight change, body mass index  Biochemical Data, Medical Tests and Procedures: electrolyte and renal panel, glucose/endocrine profile, gastrointestinal profile, lipid profile, inflammatory profile  Nutrition-Focused Physical Findings: overall appearance     Nutrition Follow-Up    RD Follow-up?: Yes     David CUMMINGS

## 2022-08-24 NOTE — ASSESSMENT & PLAN NOTE
Patient is a 71 year old female with multiple medical co-morbidities including CAD, COPD, and metastatic lung cancer as well as previous partial gastrectomy for ulcer disease, colon cancer s/p resection, and multiple incisional hernia repairs who presents with shortness of breath. Also noted to have mild abdominal pain for a week as well as diarrhea. CT shows a few loops of mildly dilated loops of small bowel but no real obstructive pattern. No free air, no ascites, no real indication for surgical intervention, gastrograffin challenge 8/23 with contrast through to sigmoid colon . She may be constipated, however.     - Okay for CLD   - Gastrograffin challenge 8/23 with contrast through to sigmoid colon  - Recommend suppository and bowel regimen  - Serial abdominal exams  - Will continue to follow   - Remainder of care per primary team  - Please contact general surgery with any questions, concerns, or clinical status changes

## 2022-08-24 NOTE — SUBJECTIVE & OBJECTIVE
Interval History: NAEON. Afebrile. HDS. Reports feeling okay this morning. GGC yesterday with contrast in colon. Passing gas, no BM yet. Denies nausea. Asking for diet. Pain is controlled with current regimen. Adequate UOP. No new symptoms or concerns this morning. All questions answered.     Medications:  Continuous Infusions:   dextrose 5 % and 0.45 % NaCl 75 mL/hr at 08/23/22 0338     Scheduled Meds:   albuterol-ipratropium  3 mL Nebulization Q4H WAKE    aspirin  81 mg Oral Daily    atorvastatin  40 mg Oral Daily    azithromycin  500 mg Oral Daily    benzonatate  200 mg Oral TID    enoxaparin  40 mg Subcutaneous Daily    ferrous sulfate  1 tablet Oral Daily    fluticasone furoate-vilanteroL  1 puff Inhalation Daily    guaiFENesin  600 mg Oral BID    insulin detemir U-100  25 Units Subcutaneous QHS    magnesium oxide  400 mg Oral Daily    polyethylene glycol  17 g Oral Daily    potassium chloride SA  20 mEq Oral Daily    predniSONE  40 mg Oral Daily    pregabalin  150 mg Oral TID    QUEtiapine  400 mg Oral Nightly    senna-docusate 8.6-50 mg  1 tablet Oral BID    sertraline  150 mg Oral Daily    tiotropium bromide  2 puff Inhalation Daily    verapamiL  180 mg Oral QHS     PRN Meds:sodium chloride, acetaminophen, dextrose 10%, dextrose 10%, glucagon (human recombinant), HYDROcodone-acetaminophen, insulin aspart U-100, melatonin, naloxone, nitroGLYCERIN, ondansetron, prochlorperazine, promethazine-codeine 6.25-10 mg/5 ml, sodium chloride 0.9%, tiZANidine     Review of patient's allergies indicates:  No Known Allergies  Objective:     Vital Signs (Most Recent):  Temp: 98.2 °F (36.8 °C) (08/24/22 0400)  Pulse: (!) 118 (08/24/22 0451)  Resp: 18 (08/24/22 0451)  BP: 112/61 (08/24/22 0451)  SpO2: 96 % (08/24/22 0451)   Vital Signs (24h Range):  Temp:  [98 °F (36.7 °C)-98.8 °F (37.1 °C)] 98.2 °F (36.8 °C)  Pulse:  [] 118  Resp:  [14-24] 18  SpO2:  [92 %-98 %] 96 %  BP: (107-132)/(55-84) 112/61     Weight: 58.8 kg  (129 lb 10.1 oz)  Body mass index is 23.71 kg/m².    Intake/Output - Last 3 Shifts         08/22 0700 08/23 0659 08/23 0700 08/24 0659 08/24 0700 08/25 0659    P.O.  150     I.V. (mL/kg)  1796.3 (30.5)     Blood 247.5      IV Piggyback 126.8      Total Intake(mL/kg) 374.3 (6.7) 1946.3 (33.1)     Urine (mL/kg/hr) 400 1500 (1.1)     Stool  0     Total Output 400 1500     Net -25.7 +446.3            Urine Occurrence  1 x     Stool Occurrence  0 x             Physical Exam  Vitals and nursing note reviewed.   Constitutional:       General: She is not in acute distress.     Appearance: She is not diaphoretic.      Comments: 2L O2 via NC   HENT:      Head: Normocephalic and atraumatic.      Mouth/Throat:      Mouth: Mucous membranes are moist.      Pharynx: Oropharynx is clear.   Eyes:      Extraocular Movements: Extraocular movements intact.      Conjunctiva/sclera: Conjunctivae normal.   Cardiovascular:      Rate and Rhythm: Tachycardia present.   Pulmonary:      Effort: Pulmonary effort is normal. No respiratory distress.   Abdominal:      Palpations: Abdomen is soft.      Tenderness: There is no abdominal tenderness. There is no guarding or rebound.      Comments: Mildly distended. Soft. No TTP. No peritonitic signs  Well healed midline surgical scar noted   Musculoskeletal:         General: No deformity.      Cervical back: Normal range of motion.   Skin:     General: Skin is warm and dry.   Neurological:      Mental Status: She is alert.      Comments: Sleeping but arouses on exam        Significant Labs:  I have reviewed all pertinent lab results within the past 24 hours.    Significant Diagnostics:  I have reviewed all pertinent imaging results/findings within the past 24 hours.

## 2022-08-24 NOTE — ASSESSMENT & PLAN NOTE
Ileus vs developing SBO  - s/p gastrograffin study with contrast seen in bowel during second KUB  - Approved for [facility]D, will advance to [facility] tomorrow more than likely

## 2022-08-24 NOTE — PROGRESS NOTES
[facility] Medicine  Progress Note    Patient Name: Chayito Chung  MRN: [id number]  Patient Class: IP- Inpatient   Admission Date: 8/22/2022  Length of Stay: 0 days  Attending Physician: Arianne Velázquez MD  Primary Care Provider: Curt Valladares III, MD        Subjective:     Principal Problem:Chronic obstructive pulmonary disease with acute exacerbation        HPI:  71-year-old  woman with a history of right lung adenocarcinoma, COPD, restrictive lung disease, tobacco abuse, CAD status post PCI, type 2 diabetes on insulin, chronic neck pain who presents to the emergency department from primary care clinic for complaints of dyspnea and abdominal distension.    She is followed by Oncology and Radiation Oncology at [facility], undergoing 4 cycles of radiation therapy last of which is Wednesday 8/24.  Completed radiation on Monday but then was also seen in primary care clinic and noted to be fatigued tachycardic and tachypneic reporting a persistent cough after her radiation treatment she also reported neck pain and headache.  She has a chronic cough but reports that symptoms are worse today, she is not taking any home oxygen she has home inhalers but does not seem that she has taken any inhalers or nebulizer treatments.  She feels more short of breath than her baseline    She also reports over the last week progressive abdominal distension and discomfort states that today she has had 1 watery bowel movement has been having irregular bowel movements but when she does have 1 is often a loose BM.    During the ED workup hemoglobin noted to be critical at 6.8 patient has been consented in 1 unit of PRBCs is infusing now during my interview, CT a chest and CT abdomen pelvis completed negative for PE but abdominal imaging displays distended bowel without a transition point possible ileus versus developing SBO.  General surgery consult completed in ED      Overview/Hospital  Course:  Shortness of breath and cough improving, continued abdominal pain from excessive coughing.       Interval History: no acute events overnight. Patient states that she feels abdominal pain. On exam and interview, pain related to post-tussive abdominal muscle fatigue. She denies any other issues and reports improvement in breathing.     Review of Systems   Constitutional:  Negative for chills and fever.   HENT:  Negative for congestion and sore throat.    Eyes:  Negative for photophobia and visual disturbance.   Respiratory:  Positive for cough and shortness of breath (improving).    Cardiovascular:  Negative for chest pain and palpitations.   Gastrointestinal:  Positive for abdominal pain. Negative for constipation, diarrhea, nausea and vomiting.   Endocrine: Negative for cold intolerance and heat intolerance.   Genitourinary:  Negative for dysuria and hematuria.   Musculoskeletal:  Negative for arthralgias and myalgias.   Skin:  Negative for rash.   Allergic/Immunologic: Negative for environmental allergies and food allergies.   Neurological:  Negative for dizziness, seizures, syncope and headaches.   Hematological:  Negative for adenopathy. Does not bruise/bleed easily.   Psychiatric/Behavioral:  Negative for hallucinations and suicidal ideas.    Objective:     Vital Signs (Most Recent):  Temp: 97.9 °F (36.6 °C) (08/24/22 1305)  Pulse: (!) 114 (08/24/22 1305)  Resp: 18 (08/24/22 1305)  BP: 124/69 (08/24/22 1305)  SpO2: (!) 93 % (08/24/22 1305)   Vital Signs (24h Range):  Temp:  [97.9 °F (36.6 °C)-98.2 °F (36.8 °C)] 97.9 °F (36.6 °C)  Pulse:  [] 114  Resp:  [14-24] 18  SpO2:  [92 %-98 %] 93 %  BP: (107-132)/(55-84) 124/69     Weight: 58.8 kg (129 lb 10.1 oz)  Body mass index is 23.71 kg/m².    Intake/Output Summary (Last 24 hours) at 8/24/2022 1322  Last data filed at 8/24/2022 0400  Gross per 24 hour   Intake 1796.26 ml   Output 1000 ml   Net 796.26 ml      Physical Exam  Constitutional:        Appearance: She is ill-appearing.   Pulmonary:      Breath sounds: Wheezing present.   Abdominal:      General: Bowel sounds are increased. There is no distension.      Tenderness: There is abdominal tenderness.       Significant Labs: All pertinent labs within the past 24 hours have been reviewed.  CBC:   Recent Labs   Lab 08/22/22  1651 08/23/22  0336 08/24/22  0716   WBC 7.40 5.92 4.74   HGB 6.8* 7.4* 7.3*   HCT 24.3* 25.0* 25.5*    208 198     CMP:   Recent Labs   Lab 08/22/22  1651 08/23/22  0336 08/24/22  0716    137 139   K 3.4* 3.5 3.3*    111* 113*   CO2 18* 19* 21*   * 135* 178*   BUN 10 5* 5*   CREATININE 0.6 0.5 0.6   CALCIUM 8.9 8.6* 8.3*   PROT 7.7 7.0 6.4   ALBUMIN 3.4* 3.1* 2.7*   BILITOT 0.2 0.4 0.3   ALKPHOS 147* 120 109   AST 10 10 8*   ALT 7* 7* 7*   ANIONGAP 11 7* 5*       Significant Imaging: I have reviewed all pertinent imaging results/findings within the past 24 hours.      Assessment/Plan:      * Chronic obstructive pulmonary disease with acute exacerbation  Worsening dyspnea and cough after radiation treatment giving concern for radiation pneumonitis vs COPD exacerbation causing symptoms  - Continue Prednisone 40mg po daily x 4 days   - apply Oxygen 1-2L to achieve O2 sat 88-92% given hx of COPD  - Schedule DUO Nebs  - Azithromycin 500mg x 3 days   - Use BREO + spiriva in place of home TRELEGY  - Prometh-codeine for cough      Abdominal distension  Ileus vs developing SBO  - s/p gastrograffin study with contrast seen in bowel during second KUB  - Approved for CLD, will advance to [facility] tomorrow more than likely      Adenocarcinoma of lung, right  F/u with [facility] - oncology and rad-onc when stable for discharge.  She may need to reschedule next radiation appointment in light of current hospitalization.   -See care everywhere for records/notes      Type 2 diabetes mellitus, with long-term current use of insulin  Patient's FSGs are uncontrolled due to hypoglycemia on  current medication regimen.  Last A1c reviewed-   Lab Results   Component Value Date    HGBA1C 9.0 (H) 07/14/2022     Most recent fingerstick glucose reviewed-   Recent Labs   Lab 08/23/22  1800 08/23/22  2148 08/24/22  0908 08/24/22  1233   POCTGLUCOSE 285* 220* 182* 288*     Current correctional scale  Medium  Maintain anti-hyperglycemic dose as follows-   Antihyperglycemics (From admission, onward)            Start     Stop Route Frequency Ordered    08/24/22 1645  insulin aspart U-100 pen 5 Units         -- SubQ 3 times daily with meals 08/24/22 1327    08/23/22 2100  insulin detemir U-100 pen 25 Units         -- SubQ Nightly 08/23/22 0045    08/23/22 0232  insulin aspart U-100 pen 1-10 Units         -- SubQ Before meals & nightly PRN 08/23/22 0137        Hold Oral hypoglycemics while patient is in the hospital.    At home patient reports using a Combo insulin - HUMALOG 75/25 - 35 units BID AC  And LANTUS insulin 25 units Nightly   - Continue Basal insulin 25 units Levemir Nightly  - Continue aspart 7u TID  - Moderate dose sliding scale      Iron deficiency anemia  Patient with worsening acute on chronic anemia   - s/p 1unit PRBC in ED  - Hgb stable    Coronary artery disease of native artery of native heart with stable angina pectoris  Continue asa/statin  -pt not on beta blocker   -EKG w/o ischemic changes. Negative troponin x 1        VTE Risk Mitigation (From admission, onward)         Ordered     enoxaparin injection 40 mg  Daily         08/23/22 0137     IP VTE HIGH RISK PATIENT  Once         08/23/22 0137     Place sequential compression device  Until discontinued         08/23/22 0137                Discharge Planning   MICKY: 8/26/2022     Code Status: DNR   Is the patient medically ready for discharge?: No    Reason for patient still in hospital (select all that apply): Patient trending condition  Discharge Plan A: Home Health                  Arianne Velázquez MD  Department of Hospital Medicine   [facility] Step[facility]

## 2022-08-25 LAB
ALBUMIN SERPL BCP-MCNC: 2.6 G/DL (ref 3.5–5.2)
ALP SERPL-CCNC: 102 U/L (ref 55–135)
ALT SERPL W/O P-5'-P-CCNC: 8 U/L (ref 10–44)
ANION GAP SERPL CALC-SCNC: 6 MMOL/L (ref 8–16)
AST SERPL-CCNC: 12 U/L (ref 10–40)
BASOPHILS # BLD AUTO: 0.02 K/UL (ref 0–0.2)
BASOPHILS NFR BLD: 0.4 % (ref 0–1.9)
BILIRUB SERPL-MCNC: 0.3 MG/DL (ref 0.1–1)
BUN SERPL-MCNC: 4 MG/DL (ref 8–23)
CALCIUM SERPL-MCNC: 8.5 MG/DL (ref 8.7–10.5)
CHLORIDE SERPL-SCNC: 112 MMOL/L (ref 95–110)
CO2 SERPL-SCNC: 22 MMOL/L (ref 23–29)
CREAT SERPL-MCNC: 0.5 MG/DL (ref 0.5–1.4)
DIFFERENTIAL METHOD: ABNORMAL
EOSINOPHIL # BLD AUTO: 0.1 K/UL (ref 0–0.5)
EOSINOPHIL NFR BLD: 2.3 % (ref 0–8)
ERYTHROCYTE [DISTWIDTH] IN BLOOD BY AUTOMATED COUNT: 22.1 % (ref 11.5–14.5)
EST. GFR  (NO RACE VARIABLE): >60 ML/MIN/1.73 M^2
GLUCOSE SERPL-MCNC: 104 MG/DL (ref 70–110)
HCT VFR BLD AUTO: 25.1 % (ref 37–48.5)
HGB BLD-MCNC: 7.2 G/DL (ref 12–16)
IMM GRANULOCYTES # BLD AUTO: 0.01 K/UL (ref 0–0.04)
IMM GRANULOCYTES NFR BLD AUTO: 0.2 % (ref 0–0.5)
LYMPHOCYTES # BLD AUTO: 1.2 K/UL (ref 1–4.8)
LYMPHOCYTES NFR BLD: 22.6 % (ref 18–48)
MAGNESIUM SERPL-MCNC: 1.8 MG/DL (ref 1.6–2.6)
MCH RBC QN AUTO: 21.4 PG (ref 27–31)
MCHC RBC AUTO-ENTMCNC: 28.7 G/DL (ref 32–36)
MCV RBC AUTO: 75 FL (ref 82–98)
MONOCYTES # BLD AUTO: 0.5 K/UL (ref 0.3–1)
MONOCYTES NFR BLD: 9.7 % (ref 4–15)
NEUTROPHILS # BLD AUTO: 3.3 K/UL (ref 1.8–7.7)
NEUTROPHILS NFR BLD: 64.8 % (ref 38–73)
NRBC BLD-RTO: 0 /100 WBC
PHOSPHATE SERPL-MCNC: 2.3 MG/DL (ref 2.7–4.5)
PLATELET # BLD AUTO: 193 K/UL (ref 150–450)
PMV BLD AUTO: 9 FL (ref 9.2–12.9)
POCT GLUCOSE: 101 MG/DL (ref 70–110)
POCT GLUCOSE: 101 MG/DL (ref 70–110)
POCT GLUCOSE: 408 MG/DL (ref 70–110)
POCT GLUCOSE: 410 MG/DL (ref 70–110)
POTASSIUM SERPL-SCNC: 3.6 MMOL/L (ref 3.5–5.1)
PROT SERPL-MCNC: 6.2 G/DL (ref 6–8.4)
RBC # BLD AUTO: 3.36 M/UL (ref 4–5.4)
SODIUM SERPL-SCNC: 140 MMOL/L (ref 136–145)
WBC # BLD AUTO: 5.14 K/UL (ref 3.9–12.7)

## 2022-08-25 PROCEDURE — 85025 COMPLETE CBC W/AUTO DIFF WBC: CPT | Performed by: HOSPITALIST

## 2022-08-25 PROCEDURE — 94761 N-INVAS EAR/PLS OXIMETRY MLT: CPT

## 2022-08-25 PROCEDURE — 63600175 PHARM REV CODE 636 W HCPCS: Performed by: HOSPITALIST

## 2022-08-25 PROCEDURE — 25000003 PHARM REV CODE 250: Performed by: HOSPITALIST

## 2022-08-25 PROCEDURE — 80053 COMPREHEN METABOLIC PANEL: CPT | Performed by: HOSPITALIST

## 2022-08-25 PROCEDURE — 27000221 HC OXYGEN, UP TO 24 HOURS

## 2022-08-25 PROCEDURE — 20600001 HC STEP DOWN PRIVATE ROOM

## 2022-08-25 PROCEDURE — 63700000 PHARM REV CODE 250 ALT 637 W/O HCPCS: Performed by: HOSPITALIST

## 2022-08-25 PROCEDURE — 25000003 PHARM REV CODE 250: Performed by: STUDENT IN AN ORGANIZED HEALTH CARE EDUCATION/TRAINING PROGRAM

## 2022-08-25 PROCEDURE — 97535 SELF CARE MNGMENT TRAINING: CPT

## 2022-08-25 PROCEDURE — 94640 AIRWAY INHALATION TREATMENT: CPT

## 2022-08-25 PROCEDURE — 36415 COLL VENOUS BLD VENIPUNCTURE: CPT | Performed by: HOSPITALIST

## 2022-08-25 PROCEDURE — 83735 ASSAY OF MAGNESIUM: CPT | Performed by: HOSPITALIST

## 2022-08-25 PROCEDURE — 99232 SBSQ HOSP IP/OBS MODERATE 35: CPT | Mod: ,,, | Performed by: HOSPITALIST

## 2022-08-25 PROCEDURE — 97116 GAIT TRAINING THERAPY: CPT

## 2022-08-25 PROCEDURE — 99232 PR SUBSEQUENT HOSPITAL CARE,LEVL II: ICD-10-PCS | Mod: ,,, | Performed by: HOSPITALIST

## 2022-08-25 PROCEDURE — 84100 ASSAY OF PHOSPHORUS: CPT | Performed by: HOSPITALIST

## 2022-08-25 RX ORDER — INSULIN ASPART 100 [IU]/ML
10 INJECTION, SOLUTION INTRAVENOUS; SUBCUTANEOUS ONCE
Status: COMPLETED | OUTPATIENT
Start: 2022-08-25 | End: 2022-08-25

## 2022-08-25 RX ADMIN — QUETIAPINE FUMARATE 400 MG: 100 TABLET ORAL at 09:08

## 2022-08-25 RX ADMIN — BENZONATATE 200 MG: 100 CAPSULE ORAL at 02:08

## 2022-08-25 RX ADMIN — PREGABALIN 150 MG: 75 CAPSULE ORAL at 09:08

## 2022-08-25 RX ADMIN — BENZONATATE 200 MG: 100 CAPSULE ORAL at 09:08

## 2022-08-25 RX ADMIN — GUAIFENESIN 600 MG: 600 TABLET, EXTENDED RELEASE ORAL at 08:08

## 2022-08-25 RX ADMIN — TIOTROPIUM BROMIDE INHALATION SPRAY 2 PUFF: 3.12 SPRAY, METERED RESPIRATORY (INHALATION) at 09:08

## 2022-08-25 RX ADMIN — FERROUS SULFATE TAB 325 MG (65 MG ELEMENTAL FE) 1 EACH: 325 (65 FE) TAB at 08:08

## 2022-08-25 RX ADMIN — FLUTICASONE FUROATE AND VILANTEROL TRIFENATATE 1 PUFF: 100; 25 POWDER RESPIRATORY (INHALATION) at 09:08

## 2022-08-25 RX ADMIN — PREGABALIN 150 MG: 75 CAPSULE ORAL at 08:08

## 2022-08-25 RX ADMIN — PREDNISONE 40 MG: 20 TABLET ORAL at 08:08

## 2022-08-25 RX ADMIN — HYDROCODONE BITARTRATE AND ACETAMINOPHEN 1 TABLET: 7.5; 325 TABLET ORAL at 09:08

## 2022-08-25 RX ADMIN — PREGABALIN 150 MG: 75 CAPSULE ORAL at 02:08

## 2022-08-25 RX ADMIN — INSULIN ASPART 10 UNITS: 100 INJECTION, SOLUTION INTRAVENOUS; SUBCUTANEOUS at 06:08

## 2022-08-25 RX ADMIN — POLYETHYLENE GLYCOL 3350 17 G: 17 POWDER, FOR SOLUTION ORAL at 08:08

## 2022-08-25 RX ADMIN — VERAPAMIL HYDROCHLORIDE 180 MG: 180 TABLET, FILM COATED, EXTENDED RELEASE ORAL at 09:08

## 2022-08-25 RX ADMIN — BENZONATATE 200 MG: 100 CAPSULE ORAL at 08:08

## 2022-08-25 RX ADMIN — HYDROCODONE BITARTRATE AND ACETAMINOPHEN 1 TABLET: 7.5; 325 TABLET ORAL at 02:08

## 2022-08-25 RX ADMIN — HYDROCODONE BITARTRATE AND ACETAMINOPHEN 1 TABLET: 7.5; 325 TABLET ORAL at 08:08

## 2022-08-25 RX ADMIN — INSULIN DETEMIR 25 UNITS: 100 INJECTION, SOLUTION SUBCUTANEOUS at 08:08

## 2022-08-25 RX ADMIN — ASPIRIN 81 MG CHEWABLE TABLET 81 MG: 81 TABLET CHEWABLE at 08:08

## 2022-08-25 RX ADMIN — AZITHROMYCIN MONOHYDRATE 500 MG: 250 TABLET ORAL at 08:08

## 2022-08-25 RX ADMIN — SENNOSIDES AND DOCUSATE SODIUM 1 TABLET: 50; 8.6 TABLET ORAL at 08:08

## 2022-08-25 RX ADMIN — INSULIN ASPART 7 UNITS: 100 INJECTION, SOLUTION INTRAVENOUS; SUBCUTANEOUS at 05:08

## 2022-08-25 RX ADMIN — SERTRALINE HYDROCHLORIDE 150 MG: 50 TABLET ORAL at 05:08

## 2022-08-25 RX ADMIN — GUAIFENESIN 600 MG: 600 TABLET, EXTENDED RELEASE ORAL at 09:08

## 2022-08-25 RX ADMIN — INSULIN ASPART 10 UNITS: 100 INJECTION, SOLUTION INTRAVENOUS; SUBCUTANEOUS at 08:08

## 2022-08-25 RX ADMIN — POTASSIUM CHLORIDE 20 MEQ: 1500 TABLET, EXTENDED RELEASE ORAL at 08:08

## 2022-08-25 RX ADMIN — ENOXAPARIN SODIUM 40 MG: 100 INJECTION SUBCUTANEOUS at 05:08

## 2022-08-25 RX ADMIN — INSULIN ASPART 10 UNITS: 100 INJECTION, SOLUTION INTRAVENOUS; SUBCUTANEOUS at 05:08

## 2022-08-25 RX ADMIN — ATORVASTATIN CALCIUM 40 MG: 20 TABLET, FILM COATED ORAL at 08:08

## 2022-08-25 RX ADMIN — INSULIN ASPART 7 UNITS: 100 INJECTION, SOLUTION INTRAVENOUS; SUBCUTANEOUS at 08:08

## 2022-08-25 RX ADMIN — Medication 400 MG: at 08:08

## 2022-08-25 NOTE — PT/OT/SLP PROGRESS
Occupational Therapy   Co-Treatment    Name: Chayito Chung  MRN: [id number]  Admitting Diagnosis: Chronic obstructive pulmonary disease with acute exacerbation     Recommendations:     Discharge Recommendations: nursing facility, skilled  Discharge Equipment Recommendations: TBD  Barriers to discharge: none    Assessment:     Chayito Chung is a 71 y.o. female with a medical diagnosis of Chronic obstructive pulmonary disease with acute exacerbation. She presents with the following performance deficits affecting function: weakness, impaired endurance, impaired self care skills, decreased coordination, impaired functional mobility, gait instability, impaired balance. Patient motivated to participate in therapy and OOB activity. Patient presents with impaired balance and decreased activity tolerance requiring assist of 1 for mobility and standing ADLs.  At this time, patient will continue to benefit from acute skilled therapy intervention to address deficits/underlying impairments and progress towards prior level of function. After discharge, patient would benefit from continued skilled therapy intervention at SNF to progress more towards independence in ADLs and functional mobility before going home.    Rehab Prognosis:  Good; patient would benefit from acute skilled OT services to address these deficits and reach maximum level of function.       Plan:     Patient to be seen 3 x/week to address the above listed problems via self-care/home management, therapeutic activities, therapeutic exercises  · Plan of Care Expires: 09/22/22  · Plan of Care Reviewed with: patient    Subjective     Pain/Comfort:  · Pain Rating 1: 0/10    Objective:     Communicated with: RN prior to session. Patient found HOB elevated with PureWick, oxygen (visi monitor) upon OT entry to room.    Additional staff present: PT present for co-tx as appropriate for patient 2* medical complexities, decreased activity tolerance for 2 sessions, and level of  skilled assist needed for task completion.    General Precautions: Standard, fall   Orthopedic Precautions:N/A   Braces: N/A  Respiratory Status: Room air     Occupational Performance:     Bed Mobility:    · Patient completed Rolling/Turning to Right with minimum assistance  · Patient completed Scooting/Bridging with stand by assistance  · Patient completed Supine to Sit with minimum assistance     Functional Mobility/Transfers:  · Patient completed Sit <> Stand Transfer with contact guard assistance with RW   · Functional Mobility: Patient ambulated 16 ft with CGA with RW    Activities of Daily Living:  · Upper Body Dressing: supervision donning gown  · Lower Body Dressing: stand by assistance doffing and donning socks  · Toileting: minimum assistance for balance when standing to perform hygiene    [facility] 6 Click ADL: 20    Treatment & Education:   Therapist provided facilitation and instruction of proper body mechanics and fall prevention strategies during tasks listed above.   Instructed patient to sit in bedside chair daily to increase OOB/activity tolerance.   Instructed patient to use call light to have nursing staff assist with needs/transfers.   Discussed OT POC and answered all questions within OT scope of practice.   Whiteboard updated     Patient left up in chair with all lines intact, call button in reach and RN notifiedEducation:      GOALS:   Multidisciplinary Problems     Occupational Therapy Goals        Problem: Occupational Therapy    Goal Priority Disciplines Outcome Interventions   Occupational Therapy Goal     OT, PT/OT Ongoing, Progressing    Description: Goals to be met by: 9/20/22     Patient will increase functional independence with ADLs by performing:    UE Dressing with Modified [place].  LE Dressing with Modified [place].  Grooming while seated with Modified [place].  Toileting from toilet/bedside commode with Stand-by Assistance for hygiene and clothing management.    Sitting at edge of bed x15 minutes with Supervision.  Toilet transfer to toilet/bedside commode with Stand-by Assistance and LRAD.                     Time Tracking:     OT Date of Treatment: 08/25/22  OT Start Time: 0944  OT Stop Time: 1000  OT Total Time (min): 16 min    Billable Minutes:Self Care/Home Management 16    OT/SHA: OT     SHA Visit Number: 0    8/25/2022

## 2022-08-25 NOTE — PLAN OF CARE
71 year-old female admitted 8/22/22 for COPD exacerbation. Pt has hx of COPD, CAD, DM2.  -AAOx4, ambulates independently  -1 person assist to restroom  -diet transitioned to regular diet  -20 G Rt FA  -22 G Lt AC  -NC @ 2L  -H&H 7.2/25.1  -small amount of stool in purwick  -abdominal distention and pain  -frequent cough  -scheduled nebulizer treatments  -Accuchecks AC/HS, prandial insulin  -PT/OT today  -PRN Norco Q6  -pt sitting up in bed, bed in lowest position, wheels locked, non-skid socks in place, call light within reach

## 2022-08-25 NOTE — PT/OT/SLP PROGRESS
Physical Therapy  Co-Treatment with OT    Patient Name:  Chayito Chung   MRN:  [id number]    Recommendations:     Discharge Recommendations:  nursing facility, skilled   Discharge Equipment Recommendations:  (will determine DME Needs closer to discharge.)   Barriers to discharge: None    Assessment:     Chayito Chung is a 71 y.o. female admitted with a medical diagnosis of Chronic obstructive pulmonary disease with acute exacerbation.  She presents with the following impairments/functional limitations:  impaired endurance, impaired functional mobility, gait instability, impaired balance, decreased safety awareness, decreased lower extremity function, weakness pt tolerated treatment better being able to gait train farther and needing less assistance for mobility. Pt will benefit from cont skilled PT 3x/wk to progress physically. Pt will need SNF placement to maximize rehab potential.     Rehab Prognosis: Good; patient would benefit from acute skilled PT services to address these deficits and reach maximum level of function.    Recent Surgery: * No surgery found *      Plan:     During this hospitalization, patient to be seen 3 x/week to address the identified rehab impairments via gait training, therapeutic activities, therapeutic exercises and progress toward the following goals:    · Plan of Care Expires:  09/22/22    Subjective     Chief Complaint: pt stated that her purewick was clogged and that she needed to be changed.   Patient/Family Comments/goals:  To get better and go home.   Pain/Comfort:  · Pain Rating 1: 0/10  · Pain Rating Post-Intervention 1: 0/10      Objective:     Communicated with nurse prior to session.  Patient found supine with oxygen, pulse ox (continuous), blood pressure cuff, telemetry, PureWick (hep lock IV) upon PT entry to room.     General Precautions: Standard, fall, aspiration   Orthopedic Precautions:N/A   Braces:    Respiratory Status: Nasal cannula, flow 2 L/min     Functional  Mobility:  · Bed Mobility: pt needed verbal cues for hand placement and sequencing for functional mobility.     · Rolling Right: minimum assistance  · Supine to Sit: minimum assistance  ·   · Transfers:     · Sit to Stand:  contact guard assistance with rolling walker  ·   · Gait: pt received gait training ~ 16 ft with RW, O2, CGA.  ·    · Balance: pt was min assist standing balance at bedside performing pericare with OT. pt leaned backwards with standing.  pt used RW for balance.       AM-PAC 6 CLICK MOBILITY  Turning over in bed (including adjusting bedclothes, sheets and blankets)?: 3  Sitting down on and standing up from a chair with arms (e.g., wheelchair, bedside commode, etc.): 3  Moving from lying on back to sitting on the side of the bed?: 3  Moving to and from a bed to a chair (including a wheelchair)?: 3  Need to walk in hospital room?: 3  Climbing 3-5 steps with a railing?: 1  Basic Mobility Total Score: 16       Therapeutic Activities and Exercises:   pt received verbal instructions in PT POC and verbally expressed understanding of such.    Due to pt complex medical condition, the skill of 2 licensed therapists is needed to maximize treatment session and progression towards goals    Patient left up in chair with all lines intact, call button in reach and RN notified..    GOALS:   Multidisciplinary Problems     Physical Therapy Goals        Problem: Physical Therapy    Goal Priority Disciplines Outcome Goal Variances Interventions   Physical Therapy Goal     PT, PT/OT Ongoing, Progressing     Description: Goals to be met by: 22    Patient will increase functional independence with mobility by performin. Supine to sit with supervision -not met  2. Sit to supine with supervision -not met  3. Sit to stand transfer with supervision -not met  4. Gait  x 100 feet with supervision using LRAD as needed -not met                     Time Tracking:     PT Received On: 22  PT Start Time: 944     PT  Stop Time: 1000  PT Total Time (min): 16 min     Billable Minutes: Gait Training 16 min       PT/PTA: PT     PTA Visit Number: 0     08/25/2022

## 2022-08-25 NOTE — PROGRESS NOTES
Progress Note  Hospital Medicine         Patient Name: Chayito Chung  MRN: [id number]  Patient Class: IP- Inpatient     Admission Date: 8/22/2022    SUBJECTIVE:     Follow-up For:  Chronic obstructive pulmonary disease with acute exacerbation     Interval history/ROS:   8/25: advancing diet. No bowel movement today    HPI:  71-year-old  woman with a history of right lung adenocarcinoma, COPD, restrictive lung disease, tobacco abuse, CAD status post PCI, type 2 diabetes on insulin, chronic neck pain who presents to the emergency department from primary care clinic for complaints of dyspnea and abdominal distension.    She is followed by Oncology and Radiation Oncology at [facility], undergoing 4 cycles of radiation therapy last of which is Wednesday 8/24.  Completed radiation on Monday but then was also seen in primary care clinic and noted to be fatigued tachycardic and tachypneic reporting a persistent cough after her radiation treatment she also reported neck pain and headache.  She has a chronic cough but reports that symptoms are worse today, she is not taking any home oxygen she has home inhalers but does not seem that she has taken any inhalers or nebulizer treatments.  She feels more short of breath than her baseline    She also reports over the last week progressive abdominal distension and discomfort states that today she has had 1 watery bowel movement has been having irregular bowel movements but when she does have 1 is often a loose BM.    During the ED workup hemoglobin noted to be critical at 6.8 patient has been consented in 1 unit of PRBCs is infusing now during my interview, CT a chest and CT abdomen pelvis completed negative for PE but abdominal imaging displays distended bowel without a transition point possible ileus versus developing SBO.  General surgery consult completed in ED    OBJECTIVE:     Body mass index is 24.52 kg/m².    Vital Signs Range (Last 24H):  Temp:  [97 °F  (36.1 °C)-98.8 °F (37.1 °C)]   Pulse:  []   Resp:  [14-22]   BP: (119-151)/(66-84)   SpO2:  [91 %-98 %]     I & O (Last 24H):    Intake/Output Summary (Last 24 hours) at 8/25/2022 1526  Last data filed at 8/25/2022 1300  Gross per 24 hour   Intake 702 ml   Output 900 ml   Net -198 ml        Physical Exam:  Constitutional:       Appearance: She is ill-appearing.   Pulmonary:      Breath sounds: Wheezing present.   Abdominal:      General: Bowel sounds are increased. There is no distension.      Tenderness: There is abdominal tenderness.           Recent Labs   Lab 08/23/22  0336 08/24/22  0716 08/25/22  0651    139 140   K 3.5 3.3* 3.6   * 113* 112*   CO2 19* 21* 22*   BUN 5* 5* 4*   CREATININE 0.5 0.6 0.5   * 178* 104   CALCIUM 8.6* 8.3* 8.5*   MG 1.7 1.8 1.8   PHOS 2.4* 2.4* 2.3*     Recent Labs   Lab 08/22/22  1651 08/22/22  2137 08/23/22  0336 08/24/22  0716 08/25/22  0651   ALKPHOS 147*  --  120 109 102   ALT 7*  --  7* 7* 8*   AST 10  --  10 8* 12   ALBUMIN 3.4*  --  3.1* 2.7* 2.6*   PROT 7.7  --  7.0 6.4 6.2   BILITOT 0.2  --  0.4 0.3 0.3   INR 1.0 1.0  --   --   --        Recent Labs   Lab 08/23/22  0336 08/24/22  0716 08/25/22  0651   WBC 5.92 4.74 5.14   HGB 7.4* 7.3* 7.2*   HCT 25.0* 25.5* 25.1*    198 193   GRAN 90.1*  5.3 68.2  3.2 64.8  3.3   LYMPH 6.8*  0.4* 18.6  0.9* 22.6  1.2   MONO 1.7*  0.1* 9.9  0.5 9.7  0.5       Recent Labs   Lab 08/24/22  0908 08/24/22  1233 08/24/22  1735 08/24/22  2152 08/24/22  2214 08/25/22  0824   POCTGLUCOSE 182* 288* 188* 101 183* 101       Recent Labs   Lab 08/22/22  1651   TROPONINI <0.006       Diagnostic Results:  Labs: Reviewed    aspirin, 81 mg, Oral, Daily  atorvastatin, 40 mg, Oral, Daily  benzonatate, 200 mg, Oral, TID  enoxaparin, 40 mg, Subcutaneous, Daily  ferrous sulfate, 1 tablet, Oral, Daily  fluticasone furoate-vilanteroL, 1 puff, Inhalation, Daily  guaiFENesin, 600 mg, Oral, BID  insulin aspart U-100, 7 Units,  Subcutaneous, TIDWM  insulin detemir U-100, 25 Units, Subcutaneous, QHS  magnesium oxide, 400 mg, Oral, Daily  polyethylene glycol, 17 g, Oral, Daily  potassium chloride SA, 20 mEq, Oral, Daily  predniSONE, 40 mg, Oral, Daily  pregabalin, 150 mg, Oral, TID  QUEtiapine, 400 mg, Oral, Nightly  senna-docusate 8.6-50 mg, 1 tablet, Oral, BID  sertraline, 150 mg, Oral, Daily  tiotropium bromide, 2 puff, Inhalation, Daily  verapamiL, 180 mg, Oral, QHS        As Needed sodium chloride, acetaminophen, albuterol-ipratropium, dextrose 10%, dextrose 10%, glucagon (human recombinant), HYDROcodone-acetaminophen, insulin aspart U-100, melatonin, naloxone, nitroGLYCERIN, ondansetron, prochlorperazine, promethazine-codeine 6.25-10 mg/5 ml, sodium chloride 0.9%, tiZANidine    ASSESSMENT/PLAN:     Assessment: Chayito Chung is a 71 y.o. female here with:     Active Hospital Problems    Diagnosis  POA    *Chronic obstructive pulmonary disease with acute exacerbation [J44.1]  Yes    Moderate malnutrition [E44.0]  Yes    Abdominal distension [R14.0]  Yes    Adenocarcinoma of lung, right [C34.91]  Yes    Type 2 diabetes mellitus, with long-term current use of insulin [E11.9, Z79.4]  Not Applicable    Iron deficiency anemia [D50.9]  Yes    Major depressive disorder in partial remission [F32.4]  Yes    Neuropathy [G62.9]  Yes    Coronary artery disease of native artery of native heart with stable angina pectoris [I25.118]  Yes      Resolved Hospital Problems   No resolved problems to display.        Plan:       Chronic obstructive pulmonary disease with acute exacerbation  Worsening dyspnea and cough after radiation treatment giving concern for radiation pneumonitis vs COPD exacerbation causing symptoms  - Continue Prednisone 40mg po daily x 4 days   - apply Oxygen 1-2L to achieve O2 sat 88-92% given hx of COPD  - Schedule DUO Nebs  - Azithromycin 500mg x 3 days   - Use BREO + spiriva in place of home TRELEGY  - Prometh-codeine for  cough        Abdominal distension  Ileus vs developing SBO  - s/p gastrograffin study with contrast seen in bowel during second KUB  - Approved for [facility]D, will advance to [facility] tomorrow more than likely        Adenocarcinoma of lung, right  F/u with [facility] - oncology and rad-onc when stable for discharge.  She may need to reschedule next radiation appointment in light of current hospitalization.   -See care everywhere for records/notes        Type 2 diabetes mellitus, with long-term current use of insulin  Patient's FSGs are uncontrolled due to hypoglycemia on current medication regimen.  Last A1c reviewed-         Lab Results   Component Value Date     HGBA1C 9.0 (H) 07/14/2022      Most recent fingerstick glucose reviewed-          Recent Labs   Lab 08/23/22  1800 08/23/22  2148 08/24/22  0908 08/24/22  1233   POCTGLUCOSE 285* 220* 182* 288*      Current correctional scale  Medium  Maintain anti-hyperglycemic dose as follows-              Antihyperglycemics (From admission, onward)                 Start     Stop Route Frequency Ordered     08/24/22 1645   insulin aspart U-100 pen 5 Units         -- SubQ 3 times daily with meals 08/24/22 1327     08/23/22 2100   insulin detemir U-100 pen 25 Units         -- SubQ Nightly 08/23/22 0045     08/23/22 0232   insulin aspart U-100 pen 1-10 Units         -- SubQ Before meals & nightly PRN 08/23/22 0137          Hold Oral hypoglycemics while patient is in the hospital.     At home patient reports using a Combo insulin - HUMALOG 75/25 - 35 units BID AC  And LANTUS insulin 25 units Nightly   - Continue Basal insulin 25 units Levemir Nightly  - Continue aspart 7u TID  - Moderate dose sliding scale        Iron deficiency anemia  Patient with worsening acute on chronic anemia   - s/p 1unit PRBC in ED  - Hgb stable     Coronary artery disease of native artery of native heart with stable angina pectoris  Continue asa/statin  -pt not on beta blocker   -EKG w/o ischemic changes. Negative  troponin x 1               VTE Risk Mitigation (From admission, onward)                  Ordered        enoxaparin injection 40 mg  Daily         08/23/22 0137        IP VTE HIGH RISK PATIENT  Once         08/23/22 0137        Place sequential compression device  Until discontinued         08/23/22 0137                     Discharge Planning   MICKY: 8/26/2022     Code Status: DNR   Is the patient medically ready for discharge?: No    Reason for patient still in hospital (select all that apply): Patient trending condition  Discharge Plan A: Home Health              HIGH RISK CONDITION(S):  Patient has a condition that poses threat to life and bodily function: ileus and copd exacerbation          Roslyn Freeamn MD

## 2022-08-25 NOTE — PLAN OF CARE
Problem: Physical Therapy  Goal: Physical Therapy Goal  Description: Goals to be met by: 22    Patient will increase functional independence with mobility by performin. Supine to sit with supervision -not met  2. Sit to supine with supervision -not met  3. Sit to stand transfer with supervision -not met  4. Gait  x 100 feet with supervision using LRAD as needed -not met    Outcome: Ongoing, Progressing   Goals remain appropriate. 2022

## 2022-08-25 NOTE — PLAN OF CARE
8:50 AM     The SW uploaded the patient's PASSR and 142 Form into [facility] for review.       Smita Braxton LMSW  Case Management [facility]  Ext: [phone]

## 2022-08-26 LAB
POCT GLUCOSE: 105 MG/DL (ref 70–110)
POCT GLUCOSE: 254 MG/DL (ref 70–110)
POCT GLUCOSE: 351 MG/DL (ref 70–110)

## 2022-08-26 PROCEDURE — 94640 AIRWAY INHALATION TREATMENT: CPT

## 2022-08-26 PROCEDURE — 27000221 HC OXYGEN, UP TO 24 HOURS

## 2022-08-26 PROCEDURE — 99232 PR SUBSEQUENT HOSPITAL CARE,LEVL II: ICD-10-PCS | Mod: ,,, | Performed by: HOSPITALIST

## 2022-08-26 PROCEDURE — 63600175 PHARM REV CODE 636 W HCPCS: Performed by: HOSPITALIST

## 2022-08-26 PROCEDURE — 99900035 HC TECH TIME PER 15 MIN (STAT)

## 2022-08-26 PROCEDURE — 25000003 PHARM REV CODE 250: Performed by: HOSPITALIST

## 2022-08-26 PROCEDURE — 99232 SBSQ HOSP IP/OBS MODERATE 35: CPT | Mod: ,,, | Performed by: HOSPITALIST

## 2022-08-26 PROCEDURE — 25000003 PHARM REV CODE 250: Performed by: STUDENT IN AN ORGANIZED HEALTH CARE EDUCATION/TRAINING PROGRAM

## 2022-08-26 PROCEDURE — 20600001 HC STEP DOWN PRIVATE ROOM

## 2022-08-26 PROCEDURE — 94761 N-INVAS EAR/PLS OXIMETRY MLT: CPT

## 2022-08-26 RX ORDER — BISMUTH SUBSALICYLATE 525 MG/30ML
30 LIQUID ORAL DAILY PRN
Status: ON HOLD | COMMUNITY
End: 2022-09-01 | Stop reason: HOSPADM

## 2022-08-26 RX ORDER — ONDANSETRON HYDROCHLORIDE 4 MG/5ML
4 SOLUTION ORAL DAILY PRN
COMMUNITY

## 2022-08-26 RX ORDER — VERAPAMIL HYDROCHLORIDE 180 MG/1
180 TABLET, FILM COATED, EXTENDED RELEASE ORAL NIGHTLY
Status: ON HOLD | COMMUNITY
End: 2022-09-01 | Stop reason: CLARIF

## 2022-08-26 RX ADMIN — FERROUS SULFATE TAB 325 MG (65 MG ELEMENTAL FE) 1 EACH: 325 (65 FE) TAB at 08:08

## 2022-08-26 RX ADMIN — BENZONATATE 200 MG: 100 CAPSULE ORAL at 09:08

## 2022-08-26 RX ADMIN — QUETIAPINE FUMARATE 400 MG: 100 TABLET ORAL at 09:08

## 2022-08-26 RX ADMIN — INSULIN DETEMIR 25 UNITS: 100 INJECTION, SOLUTION SUBCUTANEOUS at 09:08

## 2022-08-26 RX ADMIN — ASPIRIN 81 MG CHEWABLE TABLET 81 MG: 81 TABLET CHEWABLE at 08:08

## 2022-08-26 RX ADMIN — FLUTICASONE FUROATE AND VILANTEROL TRIFENATATE 1 PUFF: 100; 25 POWDER RESPIRATORY (INHALATION) at 10:08

## 2022-08-26 RX ADMIN — TIOTROPIUM BROMIDE INHALATION SPRAY 2 PUFF: 3.12 SPRAY, METERED RESPIRATORY (INHALATION) at 10:08

## 2022-08-26 RX ADMIN — BENZONATATE 200 MG: 100 CAPSULE ORAL at 03:08

## 2022-08-26 RX ADMIN — GUAIFENESIN 600 MG: 600 TABLET, EXTENDED RELEASE ORAL at 08:08

## 2022-08-26 RX ADMIN — PREGABALIN 150 MG: 75 CAPSULE ORAL at 08:08

## 2022-08-26 RX ADMIN — INSULIN ASPART 8 UNITS: 100 INJECTION, SOLUTION INTRAVENOUS; SUBCUTANEOUS at 09:08

## 2022-08-26 RX ADMIN — POLYETHYLENE GLYCOL 3350 17 G: 17 POWDER, FOR SOLUTION ORAL at 08:08

## 2022-08-26 RX ADMIN — INSULIN ASPART 7 UNITS: 100 INJECTION, SOLUTION INTRAVENOUS; SUBCUTANEOUS at 05:08

## 2022-08-26 RX ADMIN — POTASSIUM CHLORIDE 20 MEQ: 1500 TABLET, EXTENDED RELEASE ORAL at 08:08

## 2022-08-26 RX ADMIN — PREGABALIN 150 MG: 75 CAPSULE ORAL at 03:08

## 2022-08-26 RX ADMIN — SENNOSIDES AND DOCUSATE SODIUM 1 TABLET: 50; 8.6 TABLET ORAL at 08:08

## 2022-08-26 RX ADMIN — INSULIN ASPART 6 UNITS: 100 INJECTION, SOLUTION INTRAVENOUS; SUBCUTANEOUS at 01:08

## 2022-08-26 RX ADMIN — INSULIN ASPART 7 UNITS: 100 INJECTION, SOLUTION INTRAVENOUS; SUBCUTANEOUS at 08:08

## 2022-08-26 RX ADMIN — INSULIN ASPART 7 UNITS: 100 INJECTION, SOLUTION INTRAVENOUS; SUBCUTANEOUS at 01:08

## 2022-08-26 RX ADMIN — GUAIFENESIN 600 MG: 600 TABLET, EXTENDED RELEASE ORAL at 09:08

## 2022-08-26 RX ADMIN — SENNOSIDES AND DOCUSATE SODIUM 1 TABLET: 50; 8.6 TABLET ORAL at 09:08

## 2022-08-26 RX ADMIN — BENZONATATE 200 MG: 100 CAPSULE ORAL at 08:08

## 2022-08-26 RX ADMIN — PREDNISONE 40 MG: 20 TABLET ORAL at 08:08

## 2022-08-26 RX ADMIN — HYDROCODONE BITARTRATE AND ACETAMINOPHEN 1 TABLET: 7.5; 325 TABLET ORAL at 12:08

## 2022-08-26 RX ADMIN — INSULIN ASPART 10 UNITS: 100 INJECTION, SOLUTION INTRAVENOUS; SUBCUTANEOUS at 05:08

## 2022-08-26 RX ADMIN — PREGABALIN 150 MG: 75 CAPSULE ORAL at 09:08

## 2022-08-26 RX ADMIN — Medication 400 MG: at 08:08

## 2022-08-26 RX ADMIN — ENOXAPARIN SODIUM 40 MG: 100 INJECTION SUBCUTANEOUS at 05:08

## 2022-08-26 RX ADMIN — MELATONIN TAB 3 MG 6 MG: 3 TAB at 09:08

## 2022-08-26 RX ADMIN — SERTRALINE HYDROCHLORIDE 150 MG: 50 TABLET ORAL at 05:08

## 2022-08-26 RX ADMIN — ATORVASTATIN CALCIUM 40 MG: 20 TABLET, FILM COATED ORAL at 08:08

## 2022-08-26 RX ADMIN — VERAPAMIL HYDROCHLORIDE 180 MG: 180 TABLET, FILM COATED, EXTENDED RELEASE ORAL at 09:08

## 2022-08-26 NOTE — PHARMACY MED REC
"Admission Medication History     The home medication history was taken by Christoph Mensah.    You may go to "Admission" then "Reconcile Home Medications" tabs to review and/or act upon these items.      The home medication list has been updated by the Pharmacy department.    Please read ALL comments highlighted in yellow.    Please address this information as you see fit.     Feel free to contact us if you have any questions or require assistance.      The medications listed below were removed from the home medication list. Please reorder if appropriate:  Patient reports no longer taking the following medication(s):   FERROUS SULFATE 325 MG TAB   MAGNESIUM OXIDE 400 MG TAB    Medications listed below were obtained from: Patient    Current Outpatient Medications on File Prior to Encounter   Medication Sig    albuterol (PROVENTIL/VENTOLIN HFA) 90 mcg/actuation inhaler   Inhale 2 puffs into the lungs every 6 (six) hours as needed for Wheezing.      alendronate (FOSAMAX) 70 MG tablet   Take 70 mg by mouth every Sunday.    ASA/acetaminophen/caffeine/pot (GOODY'S HEADACHE POWDER ORAL)   Take 1 packet by mouth daily as needed (Severe headache).    aspirin 81 MG Chew   Take 1 tablet (81 mg total) by mouth once daily.    atorvastatin (LIPITOR) 40 MG tablet   Take 1 tablet (40 mg total) by mouth once daily.    bismuth subsalicylate (PEPTO-BISMOL) 262 mg/15 mL suspension   Take 30 mLs by mouth daily as needed for Indigestion (Upset stomach).    busPIRone (BUSPAR) 15 MG tablet   Take 15 mg by mouth 2 (two) times daily.    diclofenac sodium (VOLTAREN) 1 % Gel Apply 2 g topically 4 (four) times daily.    HUMALOG MIX 75-25,U-100,INSULN 100 unit/mL (75-25) Susp   Inject 35 Units into the skin 2 (two) times daily before meals.    hydroCHLOROthiazide (MICROZIDE) 12.5 mg capsule   Take 12.5 mg by mouth every morning.    HYDROcodone-acetaminophen 7.5-300 mg Tab   Take 1 tablet by mouth 2 (two) times daily as needed (chronic " "neck pain).    LANTUS SOLOSTAR U-100 INSULIN glargine 100 units/mL (3mL) SubQ pen   Inject 25 Units into the skin nightly.    losartan (COZAAR) 50 MG tablet   Take 1 tablet (50 mg total) by mouth once daily.    metFORMIN (GLUCOPHAGE) 1000 MG tablet   Take 1,000 mg by mouth 2 (two) times daily.    nitroGLYCERIN (NITROSTAT) 0.4 MG SL tablet   Place 1 tablet (0.4 mg total) under the tongue every 5 (five) minutes as needed for Chest pain.    ondansetron (ZOFRAN) 4 mg/5 mL solution   Take 4 mg by mouth daily as needed for Nausea.    potassium chloride SA (K-DUR,KLOR-CON) 20 MEQ tablet   Take 20 mEq by mouth 2 (two) times daily.    pregabalin (LYRICA) 150 MG capsule   Take 150 mg by mouth 3 (three) times daily.    QUEtiapine (SEROQUEL) 400 MG tablet   Take 400 mg by mouth nightly.    sertraline (ZOLOFT) 100 MG tablet   Take 150 mg by mouth once daily at 6am.    tiZANidine (ZANAFLEX) 4 MG tablet   Take 4 mg by mouth 2 (two) times daily as needed for Muscle spasms.    TRELEGY ELLIPTA 200-62.5-25 mcg inhaler   Inhale 1 puff into the lungs once daily.    zonisamide (ZONEGRAN) 100 MG Cap   Take 200 mg by mouth 2 (two) times daily.    albuterol-ipratropium (DUO-NEB) 2.5 mg-0.5 mg/3 mL nebulizer solution   Take 3 mLs by nebulization every 4 (four) hours as needed for Wheezing or Shortness of Breath. Rescue    BD ULTRA-FINE MINI PEN NEEDLE 31 gauge x 3/16" Ndle       fluticasone propionate (FLONASE) 50 mcg/actuation nasal spray   2 sprays (100 mcg total) by Each Nare route once daily.    topiramate (TOPAMAX) 50 MG tablet   Take 50 mg by mouth 2 (two) times daily.    verapamiL (CALAN-SR) 180 MG CR tablet   Take 180 mg by mouth every evening.           Christoph Mensah [place]  EXT [id number]                  .          "

## 2022-08-26 NOTE — CONSULTS
Thank you for your consult to [facility]. We have reviewed the patient chart. This patient does not meet criteria for [facility] service at this time due to Patient has a condition likely to benefit from in-depth physical exam, or palpation / auscultation, or serial abdominal exams, or COPD exacerbation requiring exam to determine discharge stability. Will hand back to In-house service.     Susana Teran MD

## 2022-08-26 NOTE — PLAN OF CARE
[facility] currently does not have beds available until next week and [facility] has declined to accept patient.   Additional SNF referrals sent in [facility] to [facility] and [facility].

## 2022-08-26 NOTE — PLAN OF CARE
08/26/22 1315   Post-Acute Status   Post-Acute Authorization Placement   Post-Acute Placement Status Referrals Sent       The SW met with the patient and her sister at bedside to discuss d/c placement for SNF. The SW informed the patient and her sister that she has been declined at [facility] and [facility]. The SW discussed with the patient and her sister the need to send out more referrals. The patient informed the SW that she did not want to be referred to [facility] because she has had bad experiences at both facilities. The patient reported that if she does not find a decent SNF placement then she would go home with her sister with her home health. The patient's sister agreed that the patient would be able to come to her home if she cannot be placed at a SNF placement.     The [facility] through [facility].    3:27 PM  [facility] - Declined     3:48 PM  The SW spoke with Zuleyma from [facility] regarding the patient's d/c placement. [facility] does not have any available beds until next week. Zuleyma reviewed the patient's referral and informed the SW that the patient's placement barrier is her radiation treatment. The SW met with the patient at bedside to discuss her radiation treatment. The SW informed the patient that the barrier which is preventing her from placement is her radiation. The SW inquired about the patient's radiation treatment schedule. The patient reported that she only have one more visit. The patient stated that she only had a total of four visit to complete. The patient stated that she had already completed three treatments before entering the hospital.     The SW will continue to follow.       Smita Braxton LMSW  Case Management [facility]  Ext: [phone]

## 2022-08-26 NOTE — PLAN OF CARE
71 year-old female admitted 8/22/22 for COPD exacerbation. Pt has hx of COPD, CAD, DM2.  -AAOx4, ambulates independently  -1 person assist to restroom  -diet transitioned to regular diet  -20 G Rt FA  -22 G Lt AC  -NC @ 2L  -abdominal distention and pain  -1 liquid BM  -frequent cough  -scheduled nebulizer treatments  -Accuchecks AC/HS, prandial insulin  -PRN Norco Q6  -pt sitting up in bed, bed in lowest position, wheels locked, non-skid socks in place, call light within reach  -pending discharge, awaiting SNF placement

## 2022-08-26 NOTE — PROGRESS NOTES
Progress Note  Hospital Medicine         Patient Name: Chayito Chung  MRN: [id number]  Patient Class: IP- Inpatient     Admission Date: 8/22/2022    SUBJECTIVE:     Follow-up For:  Chronic obstructive pulmonary disease with acute exacerbation     Interval history/ROS:   8/26: on regular diet, positive bowel movements, abdomen benign - ileus has fully resolved. Breathing much better. No further wheezing. Now pending placement at SNF when available.     8/25: advancing diet. No bowel movement today    HPI:  71-year-old  woman with a history of right lung adenocarcinoma, COPD, restrictive lung disease, tobacco abuse, CAD status post PCI, type 2 diabetes on insulin, chronic neck pain who presents to the emergency department from primary care clinic for complaints of dyspnea and abdominal distension.    She is followed by Oncology and Radiation Oncology at [facility], undergoing 4 cycles of radiation therapy last of which is Wednesday 8/24.  Completed radiation on Monday but then was also seen in primary care clinic and noted to be fatigued tachycardic and tachypneic reporting a persistent cough after her radiation treatment she also reported neck pain and headache.  She has a chronic cough but reports that symptoms are worse today, she is not taking any home oxygen she has home inhalers but does not seem that she has taken any inhalers or nebulizer treatments.  She feels more short of breath than her baseline    She also reports over the last week progressive abdominal distension and discomfort states that today she has had 1 watery bowel movement has been having irregular bowel movements but when she does have 1 is often a loose BM.    During the ED workup hemoglobin noted to be critical at 6.8 patient has been consented in 1 unit of PRBCs is infusing now during my interview, CT a chest and CT abdomen pelvis completed negative for PE but abdominal imaging displays distended bowel without a transition  point possible ileus versus developing SBO.  General surgery consult completed in ED    OBJECTIVE:     Body mass index is 24.44 kg/m².    Vital Signs Range (Last 24H):  Temp:  [96 °F (35.6 °C)-99.1 °F (37.3 °C)]   Pulse:  []   Resp:  [18-25]   BP: (116-137)/(67-88)   SpO2:  [93 %-99 %]     I & O (Last 24H):    Intake/Output Summary (Last 24 hours) at 8/26/2022 1616  Last data filed at 8/26/2022 1100  Gross per 24 hour   Intake 600 ml   Output 500 ml   Net 100 ml        Physical Exam:  Constitutional:       Appearance: She is ill-appearing.   Pulmonary:      Breath sounds: No wheezing or crackles   Abdominal:      General: + bowel sound, nontender, nondistended.         Recent Labs   Lab 08/24/22  0716 08/25/22  0651    140   K 3.3* 3.6   * 112*   CO2 21* 22*   BUN 5* 4*   CREATININE 0.6 0.5   * 104   CALCIUM 8.3* 8.5*   MG 1.8 1.8   PHOS 2.4* 2.3*     Recent Labs   Lab 08/22/22  1651 08/22/22  2137 08/23/22  0336 08/24/22  0716 08/25/22  0651   ALKPHOS 147*  --  120 109 102   ALT 7*  --  7* 7* 8*   AST 10  --  10 8* 12   ALBUMIN 3.4*  --  3.1* 2.7* 2.6*   PROT 7.7  --  7.0 6.4 6.2   BILITOT 0.2  --  0.4 0.3 0.3   INR 1.0 1.0  --   --   --        Recent Labs   Lab 08/23/22  0336 08/24/22  0716 08/25/22  0651   WBC 5.92 4.74 5.14   HGB 7.4* 7.3* 7.2*   HCT 25.0* 25.5* 25.1*    198 193   GRAN 90.1*  5.3 68.2  3.2 64.8  3.3   LYMPH 6.8*  0.4* 18.6  0.9* 22.6  1.2   MONO 1.7*  0.1* 9.9  0.5 9.7  0.5       Recent Labs   Lab 08/25/22  1231 08/25/22  1718 08/25/22  1818 08/25/22  2052 08/26/22  0835 08/26/22  1207   POCTGLUCOSE 101 408* 410* 351* 105 254*       Recent Labs   Lab 08/22/22  1651   TROPONINI <0.006       Diagnostic Results:  Labs: Reviewed    aspirin, 81 mg, Oral, Daily  atorvastatin, 40 mg, Oral, Daily  benzonatate, 200 mg, Oral, TID  enoxaparin, 40 mg, Subcutaneous, Daily  ferrous sulfate, 1 tablet, Oral, Daily  fluticasone furoate-vilanteroL, 1 puff, Inhalation,  Daily  guaiFENesin, 600 mg, Oral, BID  insulin aspart U-100, 7 Units, Subcutaneous, TIDWM  insulin detemir U-100, 25 Units, Subcutaneous, QHS  magnesium oxide, 400 mg, Oral, Daily  polyethylene glycol, 17 g, Oral, Daily  potassium chloride SA, 20 mEq, Oral, Daily  predniSONE, 40 mg, Oral, Daily  pregabalin, 150 mg, Oral, TID  QUEtiapine, 400 mg, Oral, Nightly  senna-docusate 8.6-50 mg, 1 tablet, Oral, BID  sertraline, 150 mg, Oral, Daily  tiotropium bromide, 2 puff, Inhalation, Daily  verapamiL, 180 mg, Oral, QHS        As Needed sodium chloride, acetaminophen, albuterol-ipratropium, dextrose 10%, dextrose 10%, glucagon (human recombinant), HYDROcodone-acetaminophen, insulin aspart U-100, melatonin, naloxone, nitroGLYCERIN, ondansetron, prochlorperazine, promethazine-codeine 6.25-10 mg/5 ml, sodium chloride 0.9%, tiZANidine    ASSESSMENT/PLAN:     Assessment: Chayito Chung is a 71 y.o. female here with:     Active Hospital Problems    Diagnosis  POA    *Chronic obstructive pulmonary disease with acute exacerbation [J44.1]  Yes    Moderate malnutrition [E44.0]  Yes    Abdominal distension [R14.0]  Yes    Adenocarcinoma of lung, right [C34.91]  Yes    Type 2 diabetes mellitus, with long-term current use of insulin [E11.9, Z79.4]  Not Applicable    Iron deficiency anemia [D50.9]  Yes    Major depressive disorder in partial remission [F32.4]  Yes    Neuropathy [G62.9]  Yes    Coronary artery disease of native artery of native heart with stable angina pectoris [I25.118]  Yes      Resolved Hospital Problems   No resolved problems to display.        Plan:       Chronic obstructive pulmonary disease with acute exacerbation  Worsening dyspnea and cough after radiation treatment giving concern for radiation pneumonitis vs COPD exacerbation causing symptoms  - Continue Prednisone 40mg po daily x 4 days   - apply Oxygen 1-2L to achieve O2 sat 88-92%   - Schedule DUO Nebs  - Azithromycin 500mg x 3 days  completed  -  Use BREO + spiriva in place of home TRELEGY  - Prometh-codeine for cough PRN        Abdominal distension now resolved  Ileus vs developing SBO  - s/p gastrograffin study with contrast seen in bowel during second KUB  - on adult regular diet  - surgery has signed off.         Adenocarcinoma of lung, right  F/u with [facility] - oncology and rad-onc when stable for discharge.  She may need to reschedule next radiation appointment in light of current hospitalization.   -See care everywhere for records/notes        Type 2 diabetes mellitus, with long-term current use of insulin  Patient's FSGs are uncontrolled due to hypoglycemia on current medication regimen.  Last A1c reviewed-         Lab Results   Component Value Date     HGBA1C 9.0 (H) 07/14/2022      Most recent fingerstick glucose reviewed-          Recent Labs   Lab 08/23/22  1800 08/23/22  2148 08/24/22  0908 08/24/22  1233   POCTGLUCOSE 285* 220* 182* 288*      Current correctional scale  Medium  Maintain anti-hyperglycemic dose as follows-              Antihyperglycemics (From admission, onward)                 Start     Stop Route Frequency Ordered     08/24/22 1645   insulin aspart U-100 pen 5 Units         -- SubQ 3 times daily with meals 08/24/22 1327     08/23/22 2100   insulin detemir U-100 pen 25 Units         -- SubQ Nightly 08/23/22 0045     08/23/22 0232   insulin aspart U-100 pen 1-10 Units         -- SubQ Before meals & nightly PRN 08/23/22 0137          Hold Oral hypoglycemics while patient is in the hospital.     At home patient reports using a Combo insulin - HUMALOG 75/25 - 35 units BID AC  And LANTUS insulin 25 units Nightly   - Continue Basal insulin 25 units Levemir Nightly  - Continue aspart 7u TID  - Moderate dose sliding scale        Iron deficiency anemia  Patient with worsening acute on chronic anemia   - s/p 1unit PRBC in ED  - Hgb stable     Coronary artery disease of native artery of native heart with stable angina pectoris  Continue  asa/statin  -pt not on beta blocker   -EKG w/o ischemic changes. Negative troponin x 1               VTE Risk Mitigation (From admission, onward)                  Ordered        enoxaparin injection 40 mg  Daily         08/23/22 0137        IP VTE HIGH RISK PATIENT  Once         08/23/22 0137        Place sequential compression device  Until discontinued         08/23/22 0137                     Discharge Planning   MICKY: 8/26/2022     Code Status: DNR   Is the patient medically ready for discharge?: No    Reason for patient still in hospital (select all that apply): Patient trending condition  Discharge Plan A: Home Health              HIGH RISK CONDITION(S):  Patient has a condition that poses threat to life and bodily function: ileus and copd exacerbation          Roslyn Freeman MD

## 2022-08-27 LAB
CK MB SERPL-MCNC: <1 NG/ML (ref 0.1–6.5)
CK MB SERPL-RTO: NORMAL % (ref 0–5)
CK SERPL-CCNC: 25 U/L (ref 20–180)
POCT GLUCOSE: 161 MG/DL (ref 70–110)
POCT GLUCOSE: 259 MG/DL (ref 70–110)
POCT GLUCOSE: 325 MG/DL (ref 70–110)
POCT GLUCOSE: 326 MG/DL (ref 70–110)
POCT GLUCOSE: 360 MG/DL (ref 70–110)
TROPONIN I SERPL DL<=0.01 NG/ML-MCNC: <0.006 NG/ML (ref 0–0.03)

## 2022-08-27 PROCEDURE — 20600001 HC STEP DOWN PRIVATE ROOM

## 2022-08-27 PROCEDURE — 99232 SBSQ HOSP IP/OBS MODERATE 35: CPT | Mod: ,,, | Performed by: HOSPITALIST

## 2022-08-27 PROCEDURE — 82553 CREATINE MB FRACTION: CPT | Performed by: HOSPITALIST

## 2022-08-27 PROCEDURE — 84484 ASSAY OF TROPONIN QUANT: CPT | Performed by: HOSPITALIST

## 2022-08-27 PROCEDURE — 94640 AIRWAY INHALATION TREATMENT: CPT

## 2022-08-27 PROCEDURE — 93005 ELECTROCARDIOGRAM TRACING: CPT

## 2022-08-27 PROCEDURE — 25000242 PHARM REV CODE 250 ALT 637 W/ HCPCS: Performed by: HOSPITALIST

## 2022-08-27 PROCEDURE — 93010 ELECTROCARDIOGRAM REPORT: CPT | Mod: ,,, | Performed by: INTERNAL MEDICINE

## 2022-08-27 PROCEDURE — 25000003 PHARM REV CODE 250: Performed by: HOSPITALIST

## 2022-08-27 PROCEDURE — 25000003 PHARM REV CODE 250: Performed by: STUDENT IN AN ORGANIZED HEALTH CARE EDUCATION/TRAINING PROGRAM

## 2022-08-27 PROCEDURE — 63600175 PHARM REV CODE 636 W HCPCS: Performed by: HOSPITALIST

## 2022-08-27 PROCEDURE — 99232 PR SUBSEQUENT HOSPITAL CARE,LEVL II: ICD-10-PCS | Mod: ,,, | Performed by: HOSPITALIST

## 2022-08-27 PROCEDURE — 36415 COLL VENOUS BLD VENIPUNCTURE: CPT | Performed by: HOSPITALIST

## 2022-08-27 PROCEDURE — 93010 EKG 12-LEAD: ICD-10-PCS | Mod: ,,, | Performed by: INTERNAL MEDICINE

## 2022-08-27 RX ADMIN — PREGABALIN 150 MG: 75 CAPSULE ORAL at 08:08

## 2022-08-27 RX ADMIN — FLUTICASONE FUROATE AND VILANTEROL TRIFENATATE 1 PUFF: 100; 25 POWDER RESPIRATORY (INHALATION) at 09:08

## 2022-08-27 RX ADMIN — INSULIN ASPART 7 UNITS: 100 INJECTION, SOLUTION INTRAVENOUS; SUBCUTANEOUS at 05:08

## 2022-08-27 RX ADMIN — INSULIN ASPART 7 UNITS: 100 INJECTION, SOLUTION INTRAVENOUS; SUBCUTANEOUS at 01:08

## 2022-08-27 RX ADMIN — GUAIFENESIN 600 MG: 600 TABLET, EXTENDED RELEASE ORAL at 08:08

## 2022-08-27 RX ADMIN — BENZONATATE 200 MG: 100 CAPSULE ORAL at 09:08

## 2022-08-27 RX ADMIN — ENOXAPARIN SODIUM 40 MG: 100 INJECTION SUBCUTANEOUS at 05:08

## 2022-08-27 RX ADMIN — SENNOSIDES AND DOCUSATE SODIUM 1 TABLET: 50; 8.6 TABLET ORAL at 09:08

## 2022-08-27 RX ADMIN — ASPIRIN 81 MG CHEWABLE TABLET 81 MG: 81 TABLET CHEWABLE at 09:08

## 2022-08-27 RX ADMIN — NITROGLYCERIN 0.4 MG: 0.4 TABLET, ORALLY DISINTEGRATING SUBLINGUAL at 03:08

## 2022-08-27 RX ADMIN — PREDNISONE 40 MG: 20 TABLET ORAL at 09:08

## 2022-08-27 RX ADMIN — PREGABALIN 150 MG: 75 CAPSULE ORAL at 09:08

## 2022-08-27 RX ADMIN — FERROUS SULFATE TAB 325 MG (65 MG ELEMENTAL FE) 1 EACH: 325 (65 FE) TAB at 09:08

## 2022-08-27 RX ADMIN — INSULIN ASPART 7 UNITS: 100 INJECTION, SOLUTION INTRAVENOUS; SUBCUTANEOUS at 09:08

## 2022-08-27 RX ADMIN — HYDROCODONE BITARTRATE AND ACETAMINOPHEN 1 TABLET: 7.5; 325 TABLET ORAL at 12:08

## 2022-08-27 RX ADMIN — SERTRALINE HYDROCHLORIDE 150 MG: 50 TABLET ORAL at 04:08

## 2022-08-27 RX ADMIN — TIOTROPIUM BROMIDE INHALATION SPRAY 2 PUFF: 3.12 SPRAY, METERED RESPIRATORY (INHALATION) at 09:08

## 2022-08-27 RX ADMIN — BENZONATATE 200 MG: 100 CAPSULE ORAL at 08:08

## 2022-08-27 RX ADMIN — MELATONIN TAB 3 MG 6 MG: 3 TAB at 08:08

## 2022-08-27 RX ADMIN — INSULIN ASPART 2 UNITS: 100 INJECTION, SOLUTION INTRAVENOUS; SUBCUTANEOUS at 09:08

## 2022-08-27 RX ADMIN — INSULIN DETEMIR 25 UNITS: 100 INJECTION, SOLUTION SUBCUTANEOUS at 08:08

## 2022-08-27 RX ADMIN — INSULIN ASPART 6 UNITS: 100 INJECTION, SOLUTION INTRAVENOUS; SUBCUTANEOUS at 01:08

## 2022-08-27 RX ADMIN — POTASSIUM CHLORIDE 20 MEQ: 1500 TABLET, EXTENDED RELEASE ORAL at 09:08

## 2022-08-27 RX ADMIN — QUETIAPINE FUMARATE 400 MG: 100 TABLET ORAL at 08:08

## 2022-08-27 RX ADMIN — GUAIFENESIN 600 MG: 600 TABLET, EXTENDED RELEASE ORAL at 09:08

## 2022-08-27 RX ADMIN — INSULIN ASPART 4 UNITS: 100 INJECTION, SOLUTION INTRAVENOUS; SUBCUTANEOUS at 08:08

## 2022-08-27 RX ADMIN — HYDROCODONE BITARTRATE AND ACETAMINOPHEN 1 TABLET: 7.5; 325 TABLET ORAL at 04:08

## 2022-08-27 RX ADMIN — VERAPAMIL HYDROCHLORIDE 180 MG: 180 TABLET, FILM COATED, EXTENDED RELEASE ORAL at 08:08

## 2022-08-27 RX ADMIN — POLYETHYLENE GLYCOL 3350 17 G: 17 POWDER, FOR SOLUTION ORAL at 09:08

## 2022-08-27 RX ADMIN — Medication 400 MG: at 09:08

## 2022-08-27 RX ADMIN — BENZONATATE 200 MG: 100 CAPSULE ORAL at 03:08

## 2022-08-27 RX ADMIN — PREGABALIN 150 MG: 75 CAPSULE ORAL at 03:08

## 2022-08-27 RX ADMIN — SENNOSIDES AND DOCUSATE SODIUM 1 TABLET: 50; 8.6 TABLET ORAL at 08:08

## 2022-08-27 RX ADMIN — ATORVASTATIN CALCIUM 40 MG: 20 TABLET, FILM COATED ORAL at 09:08

## 2022-08-27 NOTE — PLAN OF CARE
71 year-old female admitted 8/22/22 for COPD exacerbation. Pt has hx of COPD, CAD, DM2.  -AAOx4, ambulates independently  -1 person assist to restroom  -regular diet  -20 G Rt FA  -22 G Lt AC  -NC @ 2L  -abdominal soft/non tender  -cough infrequent  -Accuchecks AC/HS, prandial insulin  -PRN Norco Q6  -pt sitting up in bed, bed in lowest position, wheels locked, non-skid socks in place, call light within reach  -pending discharge, awaiting SNF placement

## 2022-08-27 NOTE — PROGRESS NOTES
Pt complaining of left sided chest pain, pt states its a burning sensation that travels to back. 136/76 BP, 96 HR. Nitroglycerin 0.4 x2 given within 15 minutes. EKG ordered. Physician notified.     Physician will review EKG, and possibly order Troponin and CXR.

## 2022-08-28 LAB
ALBUMIN SERPL BCP-MCNC: 3.2 G/DL (ref 3.5–5.2)
ALP SERPL-CCNC: 112 U/L (ref 55–135)
ALT SERPL W/O P-5'-P-CCNC: 11 U/L (ref 10–44)
ANION GAP SERPL CALC-SCNC: 8 MMOL/L (ref 8–16)
AST SERPL-CCNC: 11 U/L (ref 10–40)
BASOPHILS # BLD AUTO: 0.02 K/UL (ref 0–0.2)
BASOPHILS NFR BLD: 0.3 % (ref 0–1.9)
BILIRUB SERPL-MCNC: 0.3 MG/DL (ref 0.1–1)
BUN SERPL-MCNC: 8 MG/DL (ref 8–23)
CALCIUM SERPL-MCNC: 9.4 MG/DL (ref 8.7–10.5)
CHLORIDE SERPL-SCNC: 106 MMOL/L (ref 95–110)
CO2 SERPL-SCNC: 24 MMOL/L (ref 23–29)
CREAT SERPL-MCNC: 0.6 MG/DL (ref 0.5–1.4)
DIFFERENTIAL METHOD: ABNORMAL
EOSINOPHIL # BLD AUTO: 0.2 K/UL (ref 0–0.5)
EOSINOPHIL NFR BLD: 2.9 % (ref 0–8)
ERYTHROCYTE [DISTWIDTH] IN BLOOD BY AUTOMATED COUNT: 24.6 % (ref 11.5–14.5)
EST. GFR  (NO RACE VARIABLE): >60 ML/MIN/1.73 M^2
GLUCOSE SERPL-MCNC: 146 MG/DL (ref 70–110)
HCT VFR BLD AUTO: 30.7 % (ref 37–48.5)
HGB BLD-MCNC: 9 G/DL (ref 12–16)
IMM GRANULOCYTES # BLD AUTO: 0.02 K/UL (ref 0–0.04)
IMM GRANULOCYTES NFR BLD AUTO: 0.3 % (ref 0–0.5)
LYMPHOCYTES # BLD AUTO: 1.7 K/UL (ref 1–4.8)
LYMPHOCYTES NFR BLD: 27 % (ref 18–48)
MCH RBC QN AUTO: 23 PG (ref 27–31)
MCHC RBC AUTO-ENTMCNC: 29.3 G/DL (ref 32–36)
MCV RBC AUTO: 78 FL (ref 82–98)
MONOCYTES # BLD AUTO: 0.6 K/UL (ref 0.3–1)
MONOCYTES NFR BLD: 9.6 % (ref 4–15)
NEUTROPHILS # BLD AUTO: 3.7 K/UL (ref 1.8–7.7)
NEUTROPHILS NFR BLD: 59.9 % (ref 38–73)
NRBC BLD-RTO: 1 /100 WBC
PLATELET # BLD AUTO: 238 K/UL (ref 150–450)
PMV BLD AUTO: 9.8 FL (ref 9.2–12.9)
POCT GLUCOSE: 103 MG/DL (ref 70–110)
POCT GLUCOSE: 106 MG/DL (ref 70–110)
POCT GLUCOSE: 141 MG/DL (ref 70–110)
POCT GLUCOSE: 194 MG/DL (ref 70–110)
POCT GLUCOSE: 344 MG/DL (ref 70–110)
POTASSIUM SERPL-SCNC: 4.1 MMOL/L (ref 3.5–5.1)
PROT SERPL-MCNC: 7.3 G/DL (ref 6–8.4)
RBC # BLD AUTO: 3.92 M/UL (ref 4–5.4)
SODIUM SERPL-SCNC: 138 MMOL/L (ref 136–145)
WBC # BLD AUTO: 6.22 K/UL (ref 3.9–12.7)

## 2022-08-28 PROCEDURE — 25000003 PHARM REV CODE 250: Performed by: STUDENT IN AN ORGANIZED HEALTH CARE EDUCATION/TRAINING PROGRAM

## 2022-08-28 PROCEDURE — 25000003 PHARM REV CODE 250: Performed by: HOSPITALIST

## 2022-08-28 PROCEDURE — 99232 SBSQ HOSP IP/OBS MODERATE 35: CPT | Mod: ,,, | Performed by: HOSPITALIST

## 2022-08-28 PROCEDURE — 63600175 PHARM REV CODE 636 W HCPCS: Performed by: HOSPITALIST

## 2022-08-28 PROCEDURE — 80053 COMPREHEN METABOLIC PANEL: CPT | Performed by: HOSPITALIST

## 2022-08-28 PROCEDURE — 20600001 HC STEP DOWN PRIVATE ROOM

## 2022-08-28 PROCEDURE — 99232 PR SUBSEQUENT HOSPITAL CARE,LEVL II: ICD-10-PCS | Mod: ,,, | Performed by: HOSPITALIST

## 2022-08-28 PROCEDURE — 36415 COLL VENOUS BLD VENIPUNCTURE: CPT | Performed by: HOSPITALIST

## 2022-08-28 PROCEDURE — 85025 COMPLETE CBC W/AUTO DIFF WBC: CPT | Performed by: HOSPITALIST

## 2022-08-28 RX ADMIN — QUETIAPINE FUMARATE 400 MG: 100 TABLET ORAL at 08:08

## 2022-08-28 RX ADMIN — HYDROCODONE BITARTRATE AND ACETAMINOPHEN 1 TABLET: 7.5; 325 TABLET ORAL at 04:08

## 2022-08-28 RX ADMIN — INSULIN ASPART 7 UNITS: 100 INJECTION, SOLUTION INTRAVENOUS; SUBCUTANEOUS at 05:08

## 2022-08-28 RX ADMIN — HYDROCODONE BITARTRATE AND ACETAMINOPHEN 1 TABLET: 7.5; 325 TABLET ORAL at 08:08

## 2022-08-28 RX ADMIN — ATORVASTATIN CALCIUM 40 MG: 20 TABLET, FILM COATED ORAL at 09:08

## 2022-08-28 RX ADMIN — BENZONATATE 200 MG: 100 CAPSULE ORAL at 09:08

## 2022-08-28 RX ADMIN — PREGABALIN 150 MG: 75 CAPSULE ORAL at 08:08

## 2022-08-28 RX ADMIN — PREGABALIN 150 MG: 75 CAPSULE ORAL at 02:08

## 2022-08-28 RX ADMIN — MELATONIN TAB 3 MG 6 MG: 3 TAB at 08:08

## 2022-08-28 RX ADMIN — BENZONATATE 200 MG: 100 CAPSULE ORAL at 02:08

## 2022-08-28 RX ADMIN — ASPIRIN 81 MG CHEWABLE TABLET 81 MG: 81 TABLET CHEWABLE at 09:08

## 2022-08-28 RX ADMIN — GUAIFENESIN 600 MG: 600 TABLET, EXTENDED RELEASE ORAL at 09:08

## 2022-08-28 RX ADMIN — SERTRALINE HYDROCHLORIDE 150 MG: 50 TABLET ORAL at 05:08

## 2022-08-28 RX ADMIN — INSULIN ASPART 7 UNITS: 100 INJECTION, SOLUTION INTRAVENOUS; SUBCUTANEOUS at 01:08

## 2022-08-28 RX ADMIN — INSULIN ASPART 7 UNITS: 100 INJECTION, SOLUTION INTRAVENOUS; SUBCUTANEOUS at 09:08

## 2022-08-28 RX ADMIN — INSULIN ASPART 2 UNITS: 100 INJECTION, SOLUTION INTRAVENOUS; SUBCUTANEOUS at 08:08

## 2022-08-28 RX ADMIN — TIOTROPIUM BROMIDE INHALATION SPRAY 2 PUFF: 3.12 SPRAY, METERED RESPIRATORY (INHALATION) at 08:08

## 2022-08-28 RX ADMIN — ENOXAPARIN SODIUM 40 MG: 100 INJECTION SUBCUTANEOUS at 05:08

## 2022-08-28 RX ADMIN — SENNOSIDES AND DOCUSATE SODIUM 1 TABLET: 50; 8.6 TABLET ORAL at 09:08

## 2022-08-28 RX ADMIN — HYDROCODONE BITARTRATE AND ACETAMINOPHEN 1 TABLET: 7.5; 325 TABLET ORAL at 02:08

## 2022-08-28 RX ADMIN — FERROUS SULFATE TAB 325 MG (65 MG ELEMENTAL FE) 1 EACH: 325 (65 FE) TAB at 09:08

## 2022-08-28 RX ADMIN — PREGABALIN 150 MG: 75 CAPSULE ORAL at 09:08

## 2022-08-28 RX ADMIN — Medication 400 MG: at 09:08

## 2022-08-28 RX ADMIN — VERAPAMIL HYDROCHLORIDE 180 MG: 180 TABLET, FILM COATED, EXTENDED RELEASE ORAL at 08:08

## 2022-08-28 RX ADMIN — FLUTICASONE FUROATE AND VILANTEROL TRIFENATATE 1 PUFF: 100; 25 POWDER RESPIRATORY (INHALATION) at 08:08

## 2022-08-28 RX ADMIN — SENNOSIDES AND DOCUSATE SODIUM 1 TABLET: 50; 8.6 TABLET ORAL at 08:08

## 2022-08-28 RX ADMIN — POTASSIUM CHLORIDE 20 MEQ: 1500 TABLET, EXTENDED RELEASE ORAL at 09:08

## 2022-08-28 RX ADMIN — GUAIFENESIN 600 MG: 600 TABLET, EXTENDED RELEASE ORAL at 08:08

## 2022-08-28 RX ADMIN — BENZONATATE 200 MG: 100 CAPSULE ORAL at 08:08

## 2022-08-28 RX ADMIN — INSULIN DETEMIR 25 UNITS: 100 INJECTION, SOLUTION SUBCUTANEOUS at 08:08

## 2022-08-28 NOTE — PROGRESS NOTES
Progress Note  Hospital Medicine         Patient Name: Chayito Chung  MRN: [id number]  Patient Class: IP- Inpatient     Admission Date: 8/22/2022    SUBJECTIVE:     Follow-up For:  Chronic obstructive pulmonary disease with acute exacerbation     Interval history/ROS:   8/28: on regular diet, normal bowel movements, no wheezing on exam - suspect she will need home oxygen  at discharge due to lung cancer history. 6 min walk test for AM pending SNF    8/26: on regular diet, positive bowel movements, abdomen benign - ileus has fully resolved. Breathing much better. No further wheezing. Now pending placement at SNF when available.     8/25: advancing diet. No bowel movement today    HPI:  71-year-old  woman with a history of right lung adenocarcinoma, COPD, restrictive lung disease, tobacco abuse, CAD status post PCI, type 2 diabetes on insulin, chronic neck pain who presents to the emergency department from primary care clinic for complaints of dyspnea and abdominal distension.    She is followed by Oncology and Radiation Oncology at [facility], undergoing 4 cycles of radiation therapy last of which is Wednesday 8/24.  Completed radiation on Monday but then was also seen in primary care clinic and noted to be fatigued tachycardic and tachypneic reporting a persistent cough after her radiation treatment she also reported neck pain and headache.  She has a chronic cough but reports that symptoms are worse today, she is not taking any home oxygen she has home inhalers but does not seem that she has taken any inhalers or nebulizer treatments.  She feels more short of breath than her baseline    She also reports over the last week progressive abdominal distension and discomfort states that today she has had 1 watery bowel movement has been having irregular bowel movements but when she does have 1 is often a loose BM.    During the ED workup hemoglobin noted to be critical at 6.8 patient has been consented  in 1 unit of PRBCs is infusing now during my interview, CT a chest and CT abdomen pelvis completed negative for PE but abdominal imaging displays distended bowel without a transition point possible ileus versus developing SBO.  General surgery consult completed in ED    OBJECTIVE:     Body mass index is 24.4 kg/m².    Vital Signs Range (Last 24H):  Temp:  [98.5 °F (36.9 °C)-99.1 °F (37.3 °C)]   Pulse:  []   Resp:  [16-21]   BP: (114-146)/(74-98)   SpO2:  [93 %-97 %]     I & O (Last 24H):    Intake/Output Summary (Last 24 hours) at 8/28/2022 1223  Last data filed at 8/27/2022 2000  Gross per 24 hour   Intake 150 ml   Output 500 ml   Net -350 ml          Physical Exam:  Constitutional:       Appearance: She is ill-appearing.   HENT:      Head: Normocephalic and atraumatic.      Mouth/Throat:      Pharynx: No oropharyngeal exudate.   Eyes:      General: No scleral icterus.     Pupils: Pupils are equal, round, and reactive to light.   Pulmonary:      Breath sounds: No wheezing or crackles   Abdominal:      General: + bowel sound, nontender, nondistended.     Musculoskeletal:      Right lower leg: No edema.      Left lower leg: No edema.   Skin:     General: Skin is warm and dry.   Neurological:      General: No focal deficit present.      Mental Status: She is alert.     No results for input(s): NA, K, CL, CO2, BUN, CREATININE, GLU, CALCIUM, MG, PHOS in the last 72 hours.    Recent Labs   Lab 08/22/22  1651 08/22/22  2137 08/23/22  0336 08/24/22  0716 08/25/22  0651   ALKPHOS 147*  --  120 109 102   ALT 7*  --  7* 7* 8*   AST 10  --  10 8* 12   ALBUMIN 3.4*  --  3.1* 2.7* 2.6*   PROT 7.7  --  7.0 6.4 6.2   BILITOT 0.2  --  0.4 0.3 0.3   INR 1.0 1.0  --   --   --          Recent Labs   Lab 08/23/22  0336 08/24/22  0716 08/25/22  0651   WBC 5.92 4.74 5.14   HGB 7.4* 7.3* 7.2*   HCT 25.0* 25.5* 25.1*    198 193   GRAN 90.1*  5.3 68.2  3.2 64.8  3.3   LYMPH 6.8*  0.4* 18.6  0.9* 22.6  1.2   MONO 1.7*   0.1* 9.9  0.5 9.7  0.5         Recent Labs   Lab 08/26/22  2147 08/27/22  0856 08/27/22  1218 08/27/22  1714 08/27/22  2026 08/28/22  0840   POCTGLUCOSE 344* 161* 259* 325* 326* 106         Recent Labs   Lab 08/22/22  1651 08/27/22  1706   TROPONINI <0.006 <0.006         Diagnostic Results:  Labs: Reviewed    aspirin, 81 mg, Oral, Daily  atorvastatin, 40 mg, Oral, Daily  benzonatate, 200 mg, Oral, TID  enoxaparin, 40 mg, Subcutaneous, Daily  ferrous sulfate, 1 tablet, Oral, Daily  fluticasone furoate-vilanteroL, 1 puff, Inhalation, Daily  guaiFENesin, 600 mg, Oral, BID  insulin aspart U-100, 7 Units, Subcutaneous, TIDWM  insulin detemir U-100, 25 Units, Subcutaneous, QHS  magnesium oxide, 400 mg, Oral, Daily  polyethylene glycol, 17 g, Oral, Daily  potassium chloride SA, 20 mEq, Oral, Daily  pregabalin, 150 mg, Oral, TID  QUEtiapine, 400 mg, Oral, Nightly  senna-docusate 8.6-50 mg, 1 tablet, Oral, BID  sertraline, 150 mg, Oral, Daily  tiotropium bromide, 2 puff, Inhalation, Daily  verapamiL, 180 mg, Oral, QHS      As Needed sodium chloride, acetaminophen, albuterol-ipratropium, dextrose 10%, dextrose 10%, glucagon (human recombinant), HYDROcodone-acetaminophen, insulin aspart U-100, melatonin, naloxone, nitroGLYCERIN, ondansetron, prochlorperazine, promethazine-codeine 6.25-10 mg/5 ml, sodium chloride 0.9%, tiZANidine    ASSESSMENT/PLAN:     Assessment: Chayito Chung is a 71 y.o. female here with:     Active Hospital Problems    Diagnosis  POA    *Chronic obstructive pulmonary disease with acute exacerbation [J44.1]  Yes    Moderate malnutrition [E44.0]  Yes    Abdominal distension [R14.0]  Yes    Adenocarcinoma of lung, right [C34.91]  Yes    Type 2 diabetes mellitus, with long-term current use of insulin [E11.9, Z79.4]  Not Applicable    Iron deficiency anemia [D50.9]  Yes    Major depressive disorder in partial remission [F32.4]  Yes    Neuropathy [G62.9]  Yes    Coronary artery disease of native artery of  native heart with stable angina pectoris [I25.118]  Yes      Resolved Hospital Problems   No resolved problems to display.        Plan:       Chronic obstructive pulmonary disease with acute exacerbation - Resolved  Worsening dyspnea and cough after radiation treatment giving concern for radiation pneumonitis vs COPD exacerbation causing symptoms  - Continue Prednisone 40mg po daily x 4 days - completed  - apply Oxygen 1-2L to achieve O2 sat 88-92%   - Schedule DUO Nebs no PRN  - Azithromycin 500mg x 3 days  completed  - Use BREO + spiriva in place of home TRELEGY  - Prometh-codeine for cough PRN  - suspect she will need home oxygen due to lung malignancy         Abdominal distension now resolved  Ileus vs developing SBO  - s/p gastrograffin study with contrast seen in bowel during second KUB  - on adult regular diet  - surgery has signed off.         Adenocarcinoma of lung, right  F/u with [facility] - oncology and rad-onc when stable for discharge.  She may need to reschedule next radiation appointment in light of current hospitalization.   -See care everywhere for records/notes        Type 2 diabetes mellitus, with long-term current use of insulin  Patient's FSGs are uncontrolled due to hypoglycemia on current medication regimen.               Antihyperglycemics (From admission, onward)                            Start     Stop Route Frequency Ordered     08/24/22 1645   insulin aspart U-100 pen 5 Units         -- SubQ 3 times daily with meals 08/24/22 1327     08/23/22 2100   insulin detemir U-100 pen 25 Units         -- SubQ Nightly 08/23/22 0045     08/23/22 0232   insulin aspart U-100 pen 1-10 Units         -- SubQ Before meals & nightly PRN 08/23/22 0137          Hold Oral hypoglycemics while patient is in the hospital.     At home patient reports using a Combo insulin - HUMALOG 75/25 - 35 units BID AC  And LANTUS insulin 25 units Nightly   - Continue Basal insulin 25 units Levemir Nightly  - Continue aspart 7u  TID  - Moderate dose sliding scale        Iron deficiency anemia  Patient with chronic anemia   - s/p 1unit PRBC in ED  - Hgb stable     Coronary artery disease of native artery of native heart with stable angina pectoris  Continue asa/statin  -pt not on beta blocker   -EKG w/o ischemic changes. Negative troponin x 1               VTE Risk Mitigation (From admission, onward)                  Ordered        enoxaparin injection 40 mg  Daily         08/23/22 0137        IP VTE HIGH RISK PATIENT  Once         08/23/22 0137        Place sequential compression device  Until discontinued         08/23/22 0137                     Discharge Planning   MICKY: 8/26/2022     Code Status: DNR   Is the patient medically ready for discharge?: No    Reason for patient still in hospital (select all that apply): Patient trending condition  Discharge Plan A: SNF pending             HIGH RISK CONDITION(S):  Patient has a condition that poses threat to life and bodily function: ileus and copd exacerbation          Roslyn Freeman MD

## 2022-08-28 NOTE — PLAN OF CARE
Admit 8/22 for COPD acute exacerbation  -AAO4, VSS/afebrile, on 2L NC, denies pain  -scheduled mucinex, tessalon noemi, and inhaler  -labs reviewed; H/H 7.2/25.1  -ilius resolved, abdomen soft/nontender  -accucheck achs, ss per mar  -stand-by assist to ambulate  -fall precautions maintained, call bell in reach  -waiting on SNF placement

## 2022-08-28 NOTE — PLAN OF CARE
VSS  No signs of evident distress  Pain medication admin as per MAR  Pt. Up to bedside commode with one person assist.  Non slip socks worn when OOB  Right FA and left FA PIV removed.  Right FA 22g PIV placed.  Dressing CDI  2L nasal canula  Accuchecks ordered AC&HS.  CBG WDL.  Mealtime insulin admin as per MAR  Pt eating 100% of meals.  Bed in lowest locked position.  Call light within reach.  Instructed to raymon for assistance.  Pt. Expressed understanding.  Educated on plan of care.

## 2022-08-28 NOTE — PROGRESS NOTES
Progress Note  Hospital Medicine         Patient Name: Chayito Chung  MRN: [id number]  Patient Class: IP- Inpatient     Admission Date: 8/22/2022    SUBJECTIVE:     Follow-up For:  Chronic obstructive pulmonary disease with acute exacerbation     Interval history/ROS:   8/27: on regular diet, normal bowel movements, no wheezing on exam - suspect she will need home oxygen  at discharge due to lung cancer history. 6 min walk test for AM pending SNF    8/26: on regular diet, positive bowel movements, abdomen benign - ileus has fully resolved. Breathing much better. No further wheezing. Now pending placement at SNF when available.     8/25: advancing diet. No bowel movement today    HPI:  71-year-old  woman with a history of right lung adenocarcinoma, COPD, restrictive lung disease, tobacco abuse, CAD status post PCI, type 2 diabetes on insulin, chronic neck pain who presents to the emergency department from primary care clinic for complaints of dyspnea and abdominal distension.    She is followed by Oncology and Radiation Oncology at [facility], undergoing 4 cycles of radiation therapy last of which is Wednesday 8/24.  Completed radiation on Monday but then was also seen in primary care clinic and noted to be fatigued tachycardic and tachypneic reporting a persistent cough after her radiation treatment she also reported neck pain and headache.  She has a chronic cough but reports that symptoms are worse today, she is not taking any home oxygen she has home inhalers but does not seem that she has taken any inhalers or nebulizer treatments.  She feels more short of breath than her baseline    She also reports over the last week progressive abdominal distension and discomfort states that today she has had 1 watery bowel movement has been having irregular bowel movements but when she does have 1 is often a loose BM.    During the ED workup hemoglobin noted to be critical at 6.8 patient has been consented  in 1 unit of PRBCs is infusing now during my interview, CT a chest and CT abdomen pelvis completed negative for PE but abdominal imaging displays distended bowel without a transition point possible ileus versus developing SBO.  General surgery consult completed in ED    OBJECTIVE:     Body mass index is 24.4 kg/m².    Vital Signs Range (Last 24H):  Temp:  [98.5 °F (36.9 °C)-99.1 °F (37.3 °C)]   Pulse:  []   Resp:  [16-21]   BP: (114-146)/(74-98)   SpO2:  [93 %-97 %]     I & O (Last 24H):    Intake/Output Summary (Last 24 hours) at 8/28/2022 1229  Last data filed at 8/27/2022 2000  Gross per 24 hour   Intake 150 ml   Output 500 ml   Net -350 ml          Physical Exam:  Constitutional:       Appearance: She is ill-appearing.   HENT:      Head: Normocephalic and atraumatic.      Mouth/Throat:      Pharynx: No oropharyngeal exudate.   Eyes:      General: No scleral icterus.     Pupils: Pupils are equal, round, and reactive to light.   Pulmonary:      Breath sounds: No wheezing or crackles   Abdominal:      General: + bowel sound, nontender, nondistended.     Musculoskeletal:      Right lower leg: No edema.      Left lower leg: No edema.   Skin:     General: Skin is warm and dry.   Neurological:      General: No focal deficit present.      Mental Status: She is alert.     No results for input(s): NA, K, CL, CO2, BUN, CREATININE, GLU, CALCIUM, MG, PHOS in the last 72 hours.    Recent Labs   Lab 08/22/22  1651 08/22/22  2137 08/23/22  0336 08/24/22  0716 08/25/22  0651   ALKPHOS 147*  --  120 109 102   ALT 7*  --  7* 7* 8*   AST 10  --  10 8* 12   ALBUMIN 3.4*  --  3.1* 2.7* 2.6*   PROT 7.7  --  7.0 6.4 6.2   BILITOT 0.2  --  0.4 0.3 0.3   INR 1.0 1.0  --   --   --          Recent Labs   Lab 08/23/22  0336 08/24/22  0716 08/25/22  0651   WBC 5.92 4.74 5.14   HGB 7.4* 7.3* 7.2*   HCT 25.0* 25.5* 25.1*    198 193   GRAN 90.1*  5.3 68.2  3.2 64.8  3.3   LYMPH 6.8*  0.4* 18.6  0.9* 22.6  1.2   MONO 1.7*   0.1* 9.9  0.5 9.7  0.5         Recent Labs   Lab 08/26/22  2147 08/27/22  0856 08/27/22  1218 08/27/22  1714 08/27/22  2026 08/28/22  0840   POCTGLUCOSE 344* 161* 259* 325* 326* 106         Recent Labs   Lab 08/22/22  1651 08/27/22  1706   TROPONINI <0.006 <0.006         Diagnostic Results:  Labs: Reviewed    aspirin, 81 mg, Oral, Daily  atorvastatin, 40 mg, Oral, Daily  benzonatate, 200 mg, Oral, TID  enoxaparin, 40 mg, Subcutaneous, Daily  ferrous sulfate, 1 tablet, Oral, Daily  fluticasone furoate-vilanteroL, 1 puff, Inhalation, Daily  guaiFENesin, 600 mg, Oral, BID  insulin aspart U-100, 7 Units, Subcutaneous, TIDWM  insulin detemir U-100, 25 Units, Subcutaneous, QHS  magnesium oxide, 400 mg, Oral, Daily  polyethylene glycol, 17 g, Oral, Daily  potassium chloride SA, 20 mEq, Oral, Daily  pregabalin, 150 mg, Oral, TID  QUEtiapine, 400 mg, Oral, Nightly  senna-docusate 8.6-50 mg, 1 tablet, Oral, BID  sertraline, 150 mg, Oral, Daily  tiotropium bromide, 2 puff, Inhalation, Daily  verapamiL, 180 mg, Oral, QHS      As Needed sodium chloride, acetaminophen, albuterol-ipratropium, dextrose 10%, dextrose 10%, glucagon (human recombinant), HYDROcodone-acetaminophen, insulin aspart U-100, melatonin, naloxone, nitroGLYCERIN, ondansetron, prochlorperazine, promethazine-codeine 6.25-10 mg/5 ml, sodium chloride 0.9%, tiZANidine    ASSESSMENT/PLAN:     Assessment: Chayito Chung is a 71 y.o. female here with:     Active Hospital Problems    Diagnosis  POA    *Chronic obstructive pulmonary disease with acute exacerbation [J44.1]  Yes    Moderate malnutrition [E44.0]  Yes    Abdominal distension [R14.0]  Yes    Adenocarcinoma of lung, right [C34.91]  Yes    Type 2 diabetes mellitus, with long-term current use of insulin [E11.9, Z79.4]  Not Applicable    Iron deficiency anemia [D50.9]  Yes    Major depressive disorder in partial remission [F32.4]  Yes    Neuropathy [G62.9]  Yes    Coronary artery disease of native artery of  native heart with stable angina pectoris [I25.118]  Yes      Resolved Hospital Problems   No resolved problems to display.        Plan:       Chronic obstructive pulmonary disease with acute exacerbation - Resolved  Worsening dyspnea and cough after radiation treatment giving concern for radiation pneumonitis vs COPD exacerbation causing symptoms  - Continue Prednisone 40mg po daily x 4 days - completed  - apply Oxygen 1-2L to achieve O2 sat 88-92%   - Schedule DUO Nebs no PRN  - Azithromycin 500mg x 3 days  completed  - Use BREO + spiriva in place of home TRELEGY  - Prometh-codeine for cough PRN  - suspect she will need home oxygen due to lung malignancy         Abdominal distension now resolved  Ileus vs developing SBO  - s/p gastrograffin study with contrast seen in bowel during second KUB  - on adult regular diet  - surgery has signed off.         Adenocarcinoma of lung, right  F/u with [facility] - oncology and rad-onc when stable for discharge.  She may need to reschedule next radiation appointment in light of current hospitalization.   -See care everywhere for records/notes        Type 2 diabetes mellitus, with long-term current use of insulin  Patient's FSGs are uncontrolled due to hypoglycemia on current medication regimen.               Antihyperglycemics (From admission, onward)                            Start     Stop Route Frequency Ordered     08/24/22 1645   insulin aspart U-100 pen 5 Units         -- SubQ 3 times daily with meals 08/24/22 1327     08/23/22 2100   insulin detemir U-100 pen 25 Units         -- SubQ Nightly 08/23/22 0045     08/23/22 0232   insulin aspart U-100 pen 1-10 Units         -- SubQ Before meals & nightly PRN 08/23/22 0137          Hold Oral hypoglycemics while patient is in the hospital.     At home patient reports using a Combo insulin - HUMALOG 75/25 - 35 units BID AC  And LANTUS insulin 25 units Nightly   - Continue Basal insulin 25 units Levemir Nightly  - Continue aspart 7u  TID  - Moderate dose sliding scale        Iron deficiency anemia  Patient with chronic anemia   - s/p 1unit PRBC in ED  - Hgb stable     Coronary artery disease of native artery of native heart with stable angina pectoris  Continue asa/statin  -pt not on beta blocker   -EKG w/o ischemic changes. Negative troponin x 1               VTE Risk Mitigation (From admission, onward)                  Ordered        enoxaparin injection 40 mg  Daily         08/23/22 0137        IP VTE HIGH RISK PATIENT  Once         08/23/22 0137        Place sequential compression device  Until discontinued         08/23/22 0137                     Discharge Planning   MICKY: 8/26/2022     Code Status: DNR   Is the patient medically ready for discharge?: No    Reason for patient still in hospital (select all that apply): Patient trending condition  Discharge Plan A: SNF pending             HIGH RISK CONDITION(S):  Patient has a condition that poses threat to life and bodily function: ileus and copd exacerbation          Roslyn Freeman MD

## 2022-08-29 PROCEDURE — 97530 THERAPEUTIC ACTIVITIES: CPT | Mod: CQ

## 2022-08-29 PROCEDURE — 27000221 HC OXYGEN, UP TO 24 HOURS

## 2022-08-29 PROCEDURE — 97116 GAIT TRAINING THERAPY: CPT | Mod: CQ

## 2022-08-29 PROCEDURE — 20600001 HC STEP DOWN PRIVATE ROOM

## 2022-08-29 PROCEDURE — 63600175 PHARM REV CODE 636 W HCPCS: Performed by: HOSPITALIST

## 2022-08-29 PROCEDURE — 63600175 PHARM REV CODE 636 W HCPCS: Performed by: STUDENT IN AN ORGANIZED HEALTH CARE EDUCATION/TRAINING PROGRAM

## 2022-08-29 PROCEDURE — 99232 SBSQ HOSP IP/OBS MODERATE 35: CPT | Mod: ,,, | Performed by: HOSPITALIST

## 2022-08-29 PROCEDURE — 99232 PR SUBSEQUENT HOSPITAL CARE,LEVL II: ICD-10-PCS | Mod: ,,, | Performed by: HOSPITALIST

## 2022-08-29 PROCEDURE — 25000003 PHARM REV CODE 250: Performed by: HOSPITALIST

## 2022-08-29 PROCEDURE — 25000003 PHARM REV CODE 250: Performed by: STUDENT IN AN ORGANIZED HEALTH CARE EDUCATION/TRAINING PROGRAM

## 2022-08-29 RX ORDER — HYDROCODONE BITARTRATE AND ACETAMINOPHEN 7.5; 325 MG/15ML; MG/15ML
15 SOLUTION ORAL EVERY 6 HOURS PRN
Status: DISCONTINUED | OUTPATIENT
Start: 2022-08-29 | End: 2022-09-08 | Stop reason: HOSPADM

## 2022-08-29 RX ADMIN — VERAPAMIL HYDROCHLORIDE 180 MG: 180 TABLET, FILM COATED, EXTENDED RELEASE ORAL at 09:08

## 2022-08-29 RX ADMIN — PREGABALIN 150 MG: 75 CAPSULE ORAL at 08:08

## 2022-08-29 RX ADMIN — FERROUS SULFATE TAB 325 MG (65 MG ELEMENTAL FE) 1 EACH: 325 (65 FE) TAB at 08:08

## 2022-08-29 RX ADMIN — GUAIFENESIN 600 MG: 600 TABLET, EXTENDED RELEASE ORAL at 08:08

## 2022-08-29 RX ADMIN — PROMETHAZINE HYDROCHLORIDE AND CODEINE PHOSPHATE 7.5 ML: 10; 6.25 SOLUTION ORAL at 08:08

## 2022-08-29 RX ADMIN — FLUTICASONE FUROATE AND VILANTEROL TRIFENATATE 1 PUFF: 100; 25 POWDER RESPIRATORY (INHALATION) at 01:08

## 2022-08-29 RX ADMIN — SENNOSIDES AND DOCUSATE SODIUM 1 TABLET: 50; 8.6 TABLET ORAL at 09:08

## 2022-08-29 RX ADMIN — HYDROCODONE BITARTRATE AND ACETAMINOPHEN 15 ML: 7.5; 325 SOLUTION ORAL at 03:08

## 2022-08-29 RX ADMIN — QUETIAPINE FUMARATE 400 MG: 100 TABLET ORAL at 09:08

## 2022-08-29 RX ADMIN — ENOXAPARIN SODIUM 40 MG: 100 INJECTION SUBCUTANEOUS at 05:08

## 2022-08-29 RX ADMIN — SENNOSIDES AND DOCUSATE SODIUM 1 TABLET: 50; 8.6 TABLET ORAL at 08:08

## 2022-08-29 RX ADMIN — BENZONATATE 200 MG: 100 CAPSULE ORAL at 02:08

## 2022-08-29 RX ADMIN — ASPIRIN 81 MG CHEWABLE TABLET 81 MG: 81 TABLET CHEWABLE at 08:08

## 2022-08-29 RX ADMIN — POTASSIUM CHLORIDE 20 MEQ: 1500 TABLET, EXTENDED RELEASE ORAL at 08:08

## 2022-08-29 RX ADMIN — INSULIN ASPART 7 UNITS: 100 INJECTION, SOLUTION INTRAVENOUS; SUBCUTANEOUS at 01:08

## 2022-08-29 RX ADMIN — Medication 400 MG: at 08:08

## 2022-08-29 RX ADMIN — BENZONATATE 200 MG: 100 CAPSULE ORAL at 08:08

## 2022-08-29 RX ADMIN — ATORVASTATIN CALCIUM 40 MG: 20 TABLET, FILM COATED ORAL at 08:08

## 2022-08-29 RX ADMIN — SERTRALINE HYDROCHLORIDE 150 MG: 50 TABLET ORAL at 05:08

## 2022-08-29 RX ADMIN — POLYETHYLENE GLYCOL 3350 17 G: 17 POWDER, FOR SOLUTION ORAL at 08:08

## 2022-08-29 RX ADMIN — BENZONATATE 200 MG: 100 CAPSULE ORAL at 09:08

## 2022-08-29 RX ADMIN — INSULIN DETEMIR 25 UNITS: 100 INJECTION, SOLUTION SUBCUTANEOUS at 09:08

## 2022-08-29 RX ADMIN — PREGABALIN 150 MG: 75 CAPSULE ORAL at 02:08

## 2022-08-29 RX ADMIN — MELATONIN TAB 3 MG 6 MG: 3 TAB at 09:08

## 2022-08-29 RX ADMIN — INSULIN ASPART 7 UNITS: 100 INJECTION, SOLUTION INTRAVENOUS; SUBCUTANEOUS at 05:08

## 2022-08-29 RX ADMIN — PREGABALIN 150 MG: 75 CAPSULE ORAL at 09:08

## 2022-08-29 RX ADMIN — TIOTROPIUM BROMIDE INHALATION SPRAY 2 PUFF: 3.12 SPRAY, METERED RESPIRATORY (INHALATION) at 01:08

## 2022-08-29 RX ADMIN — PROMETHAZINE HYDROCHLORIDE AND CODEINE PHOSPHATE 7.5 ML: 10; 6.25 SOLUTION ORAL at 09:08

## 2022-08-29 RX ADMIN — INSULIN ASPART 10 UNITS: 100 INJECTION, SOLUTION INTRAVENOUS; SUBCUTANEOUS at 05:08

## 2022-08-29 RX ADMIN — GUAIFENESIN 600 MG: 600 TABLET, EXTENDED RELEASE ORAL at 09:08

## 2022-08-29 RX ADMIN — INSULIN ASPART 3 UNITS: 100 INJECTION, SOLUTION INTRAVENOUS; SUBCUTANEOUS at 09:08

## 2022-08-29 NOTE — PLAN OF CARE
10:30 AM    The SW contacted Zuleyma with [facility] at [phone] to follow-up regarding the patient's d/c placement.  Per Zuleyma, the patient has been accepted into [facility]. Zuleyma reported that she would be able to accept the patient tomorrow. Zuleyma stated that she was going to submit for auth today.     The SW notified the patient's treatment team about her acceptance into [facility].     The SW proceeded to the patient's room to discuss the patient's d/c placement. The SW informed the patient that she has been accepted into [facility]. The SW informed the patient that [facility]. The SW answered all questions regarding the patient's d/c placement.         1:11 PM  [facility] - Declined     [facility]- Declined     [facility] - willing to accept     [facility] - willing to accept        The SW will continue to follow.      Smita Braxton LMSW  Case Management [facility]  Ext: [phone]

## 2022-08-29 NOTE — PROGRESS NOTES
Home Oxygen Evaluation    Date Performed: 8/29/2022    1) Patient's Home O2 Sat on room air, while at rest: 86%        If O2 sats on room air at rest are 88% or below, patient qualifies. No additional testing needed. Document N/A in steps 2 and 3. If 89% or above, complete steps 2.      2) Patient's O2 Sat on room air while exercising: N/A        If O2 sats on room air while exercising remain 89% or above patient does not qualify, no further testing needed Document N/A in step 3. If O2 sats on room air while exercising are 88% or below, continue to step 3.      3) Patient's O2 Sat while exercising on O2: N/A         (Must show improvement from #2 for patients to qualify)    If O2 sats improve on oxygen, patient qualifies for portable oxygen. If not, the patient does not qualify.

## 2022-08-29 NOTE — CONSULTS
Thank you for your consult to [facility]. We have reviewed the patient chart. This patient does not meet criteria for [facility] service at this time due to Patient has a condition likely to benefit from in-depth physical exam, or palpation / auscultation, or serial abdominal exams, or COPD exacerbation requiring exam to determine discharge stability. . Will hand back to In-house service..

## 2022-08-29 NOTE — PLAN OF CARE
Admit 8/22 for COPD acute exacerbation  -AAO4, VSS/afebrile, on 2L NC, Norco q6 PRN for pain  -scheduled mucinex, tessalon noemi, and inhaler  -labs reviewed; H/H stable  -ilius resolved, abdomen soft/non tender; LBM 8/28  -accucheck achs, ss per mar  -stand-by assist to ambulate  -fall precautions maintained, call bell in reach  -waiting on SNF placement

## 2022-08-29 NOTE — PROGRESS NOTES
Progress Note  Hospital Medicine         Patient Name: Chayito Chung  MRN: [id number]  Patient Class: IP- Inpatient     Admission Date: 8/22/2022    SUBJECTIVE:     Follow-up For:  Chronic obstructive pulmonary disease with acute exacerbation     Interval history/ROS:   8/29: SNF    8/28: on regular diet, normal bowel movements, no wheezing on exam - suspect she will need home oxygen  at discharge due to lung cancer history. 6 min walk test for AM pending SNF    8/26: on regular diet, positive bowel movements, abdomen benign - ileus has fully resolved. Breathing much better. No further wheezing. Now pending placement at SNF when available.     8/25: advancing diet. No bowel movement today    HPI:  71-year-old  woman with a history of right lung adenocarcinoma, COPD, restrictive lung disease, tobacco abuse, CAD status post PCI, type 2 diabetes on insulin, chronic neck pain who presents to the emergency department from primary care clinic for complaints of dyspnea and abdominal distension.    She is followed by Oncology and Radiation Oncology at [facility], undergoing 4 cycles of radiation therapy last of which is Wednesday 8/24.  Completed radiation on Monday but then was also seen in primary care clinic and noted to be fatigued tachycardic and tachypneic reporting a persistent cough after her radiation treatment she also reported neck pain and headache.  She has a chronic cough but reports that symptoms are worse today, she is not taking any home oxygen she has home inhalers but does not seem that she has taken any inhalers or nebulizer treatments.  She feels more short of breath than her baseline    She also reports over the last week progressive abdominal distension and discomfort states that today she has had 1 watery bowel movement has been having irregular bowel movements but when she does have 1 is often a loose BM.    During the ED workup hemoglobin noted to be critical at 6.8 patient has  been consented in 1 unit of PRBCs is infusing now during my interview, CT a chest and CT abdomen pelvis completed negative for PE but abdominal imaging displays distended bowel without a transition point possible ileus versus developing SBO.  General surgery consult completed in ED    OBJECTIVE:     Body mass index is 23.83 kg/m².    Vital Signs Range (Last 24H):  Temp:  [98 °F (36.7 °C)-98.8 °F (37.1 °C)]   Pulse:  []   Resp:  [14-22]   BP: (108-140)/(71-88)   SpO2:  [85 %-95 %]     I & O (Last 24H):  No intake or output data in the 24 hours ending 08/29/22 1410       Physical Exam:  Constitutional:       Appearance: She is ill-appearing.   HENT:      Head: Normocephalic and atraumatic.      Mouth/Throat:      Pharynx: No oropharyngeal exudate.   Eyes:      General: No scleral icterus.     Pupils: Pupils are equal, round, and reactive to light.   Pulmonary:      Breath sounds: No wheezing or crackles   Abdominal:      General: + bowel sound, nontender, nondistended.     Musculoskeletal:      Right lower leg: No edema.      Left lower leg: No edema.   Skin:     General: Skin is warm and dry.   Neurological:      General: No focal deficit present.      Mental Status: She is alert.     Recent Labs   Lab 08/28/22  1309      K 4.1      CO2 24   BUN 8   CREATININE 0.6   *   CALCIUM 9.4       Recent Labs   Lab 08/22/22  1651 08/22/22  2137 08/23/22  0336 08/24/22  0716 08/25/22  0651 08/28/22  1309   ALKPHOS 147*  --    < > 109 102 112   ALT 7*  --    < > 7* 8* 11   AST 10  --    < > 8* 12 11   ALBUMIN 3.4*  --    < > 2.7* 2.6* 3.2*   PROT 7.7  --    < > 6.4 6.2 7.3   BILITOT 0.2  --    < > 0.3 0.3 0.3   INR 1.0 1.0  --   --   --   --     < > = values in this interval not displayed.         Recent Labs   Lab 08/24/22  0716 08/25/22  0651 08/28/22  1309   WBC 4.74 5.14 6.22   HGB 7.3* 7.2* 9.0*   HCT 25.5* 25.1* 30.7*    193 238   GRAN 68.2  3.2 64.8  3.3 59.9  3.7   LYMPH 18.6  0.9*  22.6  1.2 27.0  1.7   MONO 9.9  0.5 9.7  0.5 9.6  0.6         Recent Labs   Lab 08/27/22  1714 08/27/22  2026 08/28/22  0840 08/28/22  1331 08/28/22  1719 08/28/22 2009   POCTGLUCOSE 325* 326* 106 141* 103 194*         Recent Labs   Lab 08/22/22  1651 08/27/22  1706   TROPONINI <0.006 <0.006         Diagnostic Results:  Labs: Reviewed    aspirin, 81 mg, Oral, Daily  atorvastatin, 40 mg, Oral, Daily  benzonatate, 200 mg, Oral, TID  enoxaparin, 40 mg, Subcutaneous, Daily  ferrous sulfate, 1 tablet, Oral, Daily  fluticasone furoate-vilanteroL, 1 puff, Inhalation, Daily  guaiFENesin, 600 mg, Oral, BID  insulin aspart U-100, 7 Units, Subcutaneous, TIDWM  insulin detemir U-100, 25 Units, Subcutaneous, QHS  magnesium oxide, 400 mg, Oral, Daily  polyethylene glycol, 17 g, Oral, Daily  potassium chloride SA, 20 mEq, Oral, Daily  pregabalin, 150 mg, Oral, TID  QUEtiapine, 400 mg, Oral, Nightly  senna-docusate 8.6-50 mg, 1 tablet, Oral, BID  sertraline, 150 mg, Oral, Daily  tiotropium bromide, 2 puff, Inhalation, Daily  verapamiL, 180 mg, Oral, QHS      As Needed sodium chloride, acetaminophen, albuterol-ipratropium, dextrose 10%, dextrose 10%, glucagon (human recombinant), HYDROcodone-acetaminophen, insulin aspart U-100, melatonin, naloxone, nitroGLYCERIN, ondansetron, prochlorperazine, promethazine-codeine 6.25-10 mg/5 ml, sodium chloride 0.9%, tiZANidine    ASSESSMENT/PLAN:     Assessment: Chayito Chung is a 71 y.o. female here with:     Active Hospital Problems    Diagnosis  POA    *Chronic obstructive pulmonary disease with acute exacerbation [J44.1]  Yes    Moderate malnutrition [E44.0]  Yes    Abdominal distension [R14.0]  Yes    Adenocarcinoma of lung, right [C34.91]  Yes    Type 2 diabetes mellitus, with long-term current use of insulin [E11.9, Z79.4]  Not Applicable    Iron deficiency anemia [D50.9]  Yes    Major depressive disorder in partial remission [F32.4]  Yes    Neuropathy [G62.9]  Yes    Coronary  artery disease of native artery of native heart with stable angina pectoris [I25.118]  Yes      Resolved Hospital Problems   No resolved problems to display.        Plan:       Chronic obstructive pulmonary disease with acute exacerbation - Resolved  Worsening dyspnea and cough after radiation treatment giving concern for radiation pneumonitis vs COPD exacerbation causing symptoms  - Continue Prednisone 40mg po daily x 4 days - completed  - apply Oxygen 1-2L to achieve O2 sat 88-92%   - Schedule DUO Nebs no PRN  - Azithromycin 500mg x 3 days  completed  - Use BREO + spiriva in place of home TRELEGY  - Prometh-codeine for cough PRN  - suspect she will need home oxygen due to lung malignancy         Abdominal distension now resolved  Ileus vs developing SBO  - s/p gastrograffin study with contrast seen in bowel during second KUB  - on adult regular diet  - surgery has signed off.         Adenocarcinoma of lung, right  F/u with [facility] - oncology and rad-onc when stable for discharge.  She may need to reschedule next radiation appointment in light of current hospitalization.   -See care everywhere for records/notes        Type 2 diabetes mellitus, with long-term current use of insulin  Patient's FSGs are uncontrolled due to hypoglycemia on current medication regimen.               Antihyperglycemics (From admission, onward)                            Start     Stop Route Frequency Ordered     08/24/22 1645   insulin aspart U-100 pen 5 Units         -- SubQ 3 times daily with meals 08/24/22 1327     08/23/22 2100   insulin detemir U-100 pen 25 Units         -- SubQ Nightly 08/23/22 0045     08/23/22 0232   insulin aspart U-100 pen 1-10 Units         -- SubQ Before meals & nightly PRN 08/23/22 0137          Hold Oral hypoglycemics while patient is in the hospital.     At home patient reports using a Combo insulin - HUMALOG 75/25 - 35 units BID AC  And LANTUS insulin 25 units Nightly   - Continue Basal insulin 25 units  Levemir Nightly  - Continue aspart 7u TID  - Moderate dose sliding scale        Iron deficiency anemia  Patient with chronic anemia   - s/p 1unit PRBC in ED  - Hgb stable     Coronary artery disease of native artery of native heart with stable angina pectoris  Continue asa/statin  -pt not on beta blocker   -EKG w/o ischemic changes. Negative troponin x 1               VTE Risk Mitigation (From admission, onward)                  Ordered        enoxaparin injection 40 mg  Daily         08/23/22 0137        IP VTE HIGH RISK PATIENT  Once         08/23/22 0137        Place sequential compression device  Until discontinued         08/23/22 0137                     Discharge Planning   MICKY: 8/26/2022     Code Status: DNR   Is the patient medically ready for discharge?: No    Reason for patient still in hospital (select all that apply): Patient trending condition  Discharge Plan A: SNF pending             HIGH RISK CONDITION(S):  Patient has a condition that poses threat to life and bodily function: ileus and copd exacerbation          Roslyn Freeman MD

## 2022-08-29 NOTE — PLAN OF CARE
Patient worked with PT this afternoon - OOB to chair for several hours. Evaluated for home oxygen - patient qualifies d/t oxygen sats <88% at rest. PRN Lortab given for pain with relief obtained. Plan is for patient to d/c to SNF tomorrow. Family at bedside throughout shift.

## 2022-08-30 LAB
POCT GLUCOSE: 114 MG/DL (ref 70–110)
POCT GLUCOSE: 118 MG/DL (ref 70–110)
POCT GLUCOSE: 125 MG/DL (ref 70–110)
POCT GLUCOSE: 130 MG/DL (ref 70–110)
POCT GLUCOSE: 257 MG/DL (ref 70–110)
POCT GLUCOSE: 276 MG/DL (ref 70–110)
POCT GLUCOSE: 451 MG/DL (ref 70–110)
POCT GLUCOSE: 476 MG/DL (ref 70–110)
POCT GLUCOSE: 74 MG/DL (ref 70–110)

## 2022-08-30 PROCEDURE — 99232 SBSQ HOSP IP/OBS MODERATE 35: CPT | Mod: ,,, | Performed by: HOSPITALIST

## 2022-08-30 PROCEDURE — 97110 THERAPEUTIC EXERCISES: CPT

## 2022-08-30 PROCEDURE — 99232 PR SUBSEQUENT HOSPITAL CARE,LEVL II: ICD-10-PCS | Mod: ,,, | Performed by: HOSPITALIST

## 2022-08-30 PROCEDURE — 63600175 PHARM REV CODE 636 W HCPCS: Performed by: HOSPITALIST

## 2022-08-30 PROCEDURE — 25000003 PHARM REV CODE 250: Performed by: HOSPITALIST

## 2022-08-30 PROCEDURE — 25000003 PHARM REV CODE 250: Performed by: STUDENT IN AN ORGANIZED HEALTH CARE EDUCATION/TRAINING PROGRAM

## 2022-08-30 PROCEDURE — 20600001 HC STEP DOWN PRIVATE ROOM

## 2022-08-30 PROCEDURE — 97535 SELF CARE MNGMENT TRAINING: CPT

## 2022-08-30 RX ORDER — NAPROXEN SODIUM 220 MG/1
81 TABLET, FILM COATED ORAL DAILY
Status: CANCELLED | OUTPATIENT
Start: 2022-08-31

## 2022-08-30 RX ORDER — GUAIFENESIN 600 MG/1
600 TABLET, EXTENDED RELEASE ORAL 2 TIMES DAILY
Status: CANCELLED | OUTPATIENT
Start: 2022-08-30

## 2022-08-30 RX ORDER — SODIUM CHLORIDE 0.9 % (FLUSH) 0.9 %
10 SYRINGE (ML) INJECTION EVERY 6 HOURS PRN
Status: CANCELLED | OUTPATIENT
Start: 2022-08-30

## 2022-08-30 RX ORDER — ONDANSETRON 2 MG/ML
4 INJECTION INTRAMUSCULAR; INTRAVENOUS EVERY 8 HOURS PRN
Status: CANCELLED | OUTPATIENT
Start: 2022-08-30

## 2022-08-30 RX ORDER — INSULIN ASPART 100 [IU]/ML
7 INJECTION, SOLUTION INTRAVENOUS; SUBCUTANEOUS
Status: CANCELLED | OUTPATIENT
Start: 2022-08-30

## 2022-08-30 RX ORDER — ACETAMINOPHEN 325 MG/1
650 TABLET ORAL EVERY 6 HOURS PRN
Status: CANCELLED | OUTPATIENT
Start: 2022-08-30

## 2022-08-30 RX ORDER — CALCIUM CARBONATE 200(500)MG
500 TABLET,CHEWABLE ORAL 2 TIMES DAILY PRN
Status: CANCELLED | OUTPATIENT
Start: 2022-08-30

## 2022-08-30 RX ORDER — ACETAMINOPHEN 325 MG/1
650 TABLET ORAL EVERY 4 HOURS PRN
Status: CANCELLED | OUTPATIENT
Start: 2022-08-30

## 2022-08-30 RX ORDER — POTASSIUM CHLORIDE 20 MEQ/1
20 TABLET, EXTENDED RELEASE ORAL DAILY
Status: CANCELLED | OUTPATIENT
Start: 2022-08-31

## 2022-08-30 RX ORDER — SERTRALINE HYDROCHLORIDE 50 MG/1
150 TABLET, FILM COATED ORAL DAILY
Status: CANCELLED | OUTPATIENT
Start: 2022-08-31

## 2022-08-30 RX ORDER — TALC
6 POWDER (GRAM) TOPICAL NIGHTLY PRN
Status: CANCELLED | OUTPATIENT
Start: 2022-08-30

## 2022-08-30 RX ORDER — ATORVASTATIN CALCIUM 20 MG/1
40 TABLET, FILM COATED ORAL DAILY
Status: CANCELLED | OUTPATIENT
Start: 2022-08-31

## 2022-08-30 RX ORDER — VERAPAMIL HYDROCHLORIDE 180 MG/1
180 TABLET, FILM COATED, EXTENDED RELEASE ORAL NIGHTLY
Status: CANCELLED | OUTPATIENT
Start: 2022-08-30

## 2022-08-30 RX ORDER — PREGABALIN 75 MG/1
150 CAPSULE ORAL 3 TIMES DAILY
Status: CANCELLED | OUTPATIENT
Start: 2022-08-30

## 2022-08-30 RX ORDER — POLYETHYLENE GLYCOL 3350 17 G/17G
17 POWDER, FOR SOLUTION ORAL DAILY
Status: CANCELLED | OUTPATIENT
Start: 2022-08-31

## 2022-08-30 RX ORDER — LANOLIN ALCOHOL/MO/W.PET/CERES
400 CREAM (GRAM) TOPICAL DAILY
Status: CANCELLED | OUTPATIENT
Start: 2022-08-31

## 2022-08-30 RX ORDER — INSULIN ASPART 100 [IU]/ML
1-10 INJECTION, SOLUTION INTRAVENOUS; SUBCUTANEOUS
Status: CANCELLED | OUTPATIENT
Start: 2022-08-30

## 2022-08-30 RX ORDER — ENOXAPARIN SODIUM 100 MG/ML
40 INJECTION SUBCUTANEOUS EVERY 24 HOURS
Status: CANCELLED | OUTPATIENT
Start: 2022-08-30

## 2022-08-30 RX ORDER — IPRATROPIUM BROMIDE AND ALBUTEROL SULFATE 2.5; .5 MG/3ML; MG/3ML
3 SOLUTION RESPIRATORY (INHALATION) EVERY 4 HOURS PRN
Status: CANCELLED | OUTPATIENT
Start: 2022-08-30

## 2022-08-30 RX ORDER — QUETIAPINE FUMARATE 100 MG/1
400 TABLET, FILM COATED ORAL NIGHTLY
Status: CANCELLED | OUTPATIENT
Start: 2022-08-30

## 2022-08-30 RX ORDER — LANOLIN ALCOHOL/MO/W.PET/CERES
1 CREAM (GRAM) TOPICAL DAILY
Status: CANCELLED | OUTPATIENT
Start: 2022-08-31

## 2022-08-30 RX ORDER — BENZONATATE 100 MG/1
200 CAPSULE ORAL 3 TIMES DAILY
Status: CANCELLED | OUTPATIENT
Start: 2022-08-30

## 2022-08-30 RX ORDER — AMOXICILLIN 250 MG
1 CAPSULE ORAL 2 TIMES DAILY
Status: CANCELLED | OUTPATIENT
Start: 2022-08-30

## 2022-08-30 RX ORDER — GLUCAGON 1 MG
1 KIT INJECTION
Status: CANCELLED | OUTPATIENT
Start: 2022-08-30

## 2022-08-30 RX ORDER — NITROGLYCERIN 0.4 MG/1
0.4 TABLET SUBLINGUAL EVERY 5 MIN PRN
Status: CANCELLED | OUTPATIENT
Start: 2022-08-30

## 2022-08-30 RX ORDER — PROCHLORPERAZINE EDISYLATE 5 MG/ML
5 INJECTION INTRAMUSCULAR; INTRAVENOUS EVERY 6 HOURS PRN
Status: CANCELLED | OUTPATIENT
Start: 2022-08-30

## 2022-08-30 RX ORDER — HYDROCODONE BITARTRATE AND ACETAMINOPHEN 7.5; 325 MG/15ML; MG/15ML
15 SOLUTION ORAL EVERY 6 HOURS PRN
Status: CANCELLED | OUTPATIENT
Start: 2022-08-30

## 2022-08-30 RX ORDER — TIZANIDINE 4 MG/1
4 TABLET ORAL 2 TIMES DAILY PRN
Status: CANCELLED | OUTPATIENT
Start: 2022-08-30

## 2022-08-30 RX ORDER — FLUTICASONE FUROATE AND VILANTEROL 100; 25 UG/1; UG/1
1 POWDER RESPIRATORY (INHALATION) DAILY
Status: CANCELLED | OUTPATIENT
Start: 2022-08-31

## 2022-08-30 RX ORDER — PROMETHAZINE HYDROCHLORIDE AND CODEINE PHOSPHATE 6.25; 1 MG/5ML; MG/5ML
7.5 SOLUTION ORAL EVERY 4 HOURS PRN
Status: CANCELLED | OUTPATIENT
Start: 2022-08-30

## 2022-08-30 RX ORDER — NALOXONE HCL 0.4 MG/ML
0.02 VIAL (ML) INJECTION
Status: CANCELLED | OUTPATIENT
Start: 2022-08-30

## 2022-08-30 RX ORDER — HYDROCODONE BITARTRATE AND ACETAMINOPHEN 500; 5 MG/1; MG/1
TABLET ORAL
Status: CANCELLED | OUTPATIENT
Start: 2022-08-30

## 2022-08-30 RX ADMIN — INSULIN DETEMIR 25 UNITS: 100 INJECTION, SOLUTION SUBCUTANEOUS at 09:08

## 2022-08-30 RX ADMIN — INSULIN ASPART 3 UNITS: 100 INJECTION, SOLUTION INTRAVENOUS; SUBCUTANEOUS at 09:08

## 2022-08-30 RX ADMIN — POTASSIUM CHLORIDE 20 MEQ: 1500 TABLET, EXTENDED RELEASE ORAL at 08:08

## 2022-08-30 RX ADMIN — PREGABALIN 150 MG: 75 CAPSULE ORAL at 08:08

## 2022-08-30 RX ADMIN — HYDROCODONE BITARTRATE AND ACETAMINOPHEN 15 ML: 7.5; 325 SOLUTION ORAL at 08:08

## 2022-08-30 RX ADMIN — TIOTROPIUM BROMIDE INHALATION SPRAY 2 PUFF: 3.12 SPRAY, METERED RESPIRATORY (INHALATION) at 08:08

## 2022-08-30 RX ADMIN — FERROUS SULFATE TAB 325 MG (65 MG ELEMENTAL FE) 1 EACH: 325 (65 FE) TAB at 08:08

## 2022-08-30 RX ADMIN — SENNOSIDES AND DOCUSATE SODIUM 1 TABLET: 50; 8.6 TABLET ORAL at 08:08

## 2022-08-30 RX ADMIN — GUAIFENESIN 600 MG: 600 TABLET, EXTENDED RELEASE ORAL at 08:08

## 2022-08-30 RX ADMIN — INSULIN ASPART 7 UNITS: 100 INJECTION, SOLUTION INTRAVENOUS; SUBCUTANEOUS at 01:08

## 2022-08-30 RX ADMIN — QUETIAPINE FUMARATE 400 MG: 100 TABLET ORAL at 08:08

## 2022-08-30 RX ADMIN — SERTRALINE HYDROCHLORIDE 150 MG: 50 TABLET ORAL at 05:08

## 2022-08-30 RX ADMIN — FLUTICASONE FUROATE AND VILANTEROL TRIFENATATE 1 PUFF: 100; 25 POWDER RESPIRATORY (INHALATION) at 08:08

## 2022-08-30 RX ADMIN — ATORVASTATIN CALCIUM 40 MG: 20 TABLET, FILM COATED ORAL at 08:08

## 2022-08-30 RX ADMIN — ASPIRIN 81 MG CHEWABLE TABLET 81 MG: 81 TABLET CHEWABLE at 08:08

## 2022-08-30 RX ADMIN — BENZONATATE 200 MG: 100 CAPSULE ORAL at 08:08

## 2022-08-30 RX ADMIN — BENZONATATE 200 MG: 100 CAPSULE ORAL at 02:08

## 2022-08-30 RX ADMIN — Medication 400 MG: at 08:08

## 2022-08-30 RX ADMIN — ENOXAPARIN SODIUM 40 MG: 100 INJECTION SUBCUTANEOUS at 06:08

## 2022-08-30 RX ADMIN — PREGABALIN 150 MG: 75 CAPSULE ORAL at 02:08

## 2022-08-30 RX ADMIN — VERAPAMIL HYDROCHLORIDE 180 MG: 180 TABLET, FILM COATED, EXTENDED RELEASE ORAL at 08:08

## 2022-08-30 RX ADMIN — PROMETHAZINE HYDROCHLORIDE AND CODEINE PHOSPHATE 7.5 ML: 10; 6.25 SOLUTION ORAL at 08:08

## 2022-08-30 RX ADMIN — INSULIN ASPART 7 UNITS: 100 INJECTION, SOLUTION INTRAVENOUS; SUBCUTANEOUS at 06:08

## 2022-08-30 NOTE — PROGRESS NOTES
Progress Note  Hospital Medicine         Patient Name: Chayito Chung  MRN: [id number]  Patient Class: IP- Inpatient     Admission Date: 8/22/2022    SUBJECTIVE:     Follow-up For:  Chronic obstructive pulmonary disease with acute exacerbation     Interval history/ROS:   8/30: SNF pending    8/29: SNF    8/28: on regular diet, normal bowel movements, no wheezing on exam - suspect she will need home oxygen  at discharge due to lung cancer history. 6 min walk test for AM pending SNF    8/26: on regular diet, positive bowel movements, abdomen benign - ileus has fully resolved. Breathing much better. No further wheezing. Now pending placement at SNF when available.     8/25: advancing diet. No bowel movement today    HPI:  71-year-old  woman with a history of right lung adenocarcinoma, COPD, restrictive lung disease, tobacco abuse, CAD status post PCI, type 2 diabetes on insulin, chronic neck pain who presents to the emergency department from primary care clinic for complaints of dyspnea and abdominal distension.    She is followed by Oncology and Radiation Oncology at [facility], undergoing 4 cycles of radiation therapy last of which is Wednesday 8/24.  Completed radiation on Monday but then was also seen in primary care clinic and noted to be fatigued tachycardic and tachypneic reporting a persistent cough after her radiation treatment she also reported neck pain and headache.  She has a chronic cough but reports that symptoms are worse today, she is not taking any home oxygen she has home inhalers but does not seem that she has taken any inhalers or nebulizer treatments.  She feels more short of breath than her baseline    She also reports over the last week progressive abdominal distension and discomfort states that today she has had 1 watery bowel movement has been having irregular bowel movements but when she does have 1 is often a loose BM.    During the ED workup hemoglobin noted to be critical  at 6.8 patient has been consented in 1 unit of PRBCs is infusing now during my interview, CT a chest and CT abdomen pelvis completed negative for PE but abdominal imaging displays distended bowel without a transition point possible ileus versus developing SBO.  General surgery consult completed in ED    OBJECTIVE:     Body mass index is 23.87 kg/m².    Vital Signs Range (Last 24H):  Temp:  [98.2 °F (36.8 °C)-98.8 °F (37.1 °C)]   Pulse:  [102-115]   Resp:  [18-22]   BP: (111-134)/(61-71)   SpO2:  [92 %-97 %]     I & O (Last 24H):    Intake/Output Summary (Last 24 hours) at 8/30/2022 1250  Last data filed at 8/30/2022 0500  Gross per 24 hour   Intake --   Output 500 ml   Net -500 ml          Physical Exam:  Constitutional:       Appearance: She is ill-appearing.   HENT:      Head: Normocephalic and atraumatic.      Mouth/Throat:      Pharynx: No oropharyngeal exudate.   Eyes:      General: No scleral icterus.     Pupils: Pupils are equal, round, and reactive to light.   Pulmonary:      Breath sounds: No wheezing or crackles   Abdominal:      General: + bowel sound, nontender, nondistended.     Musculoskeletal:      Right lower leg: No edema.      Left lower leg: No edema.   Skin:     General: Skin is warm and dry.   Neurological:      General: No focal deficit present.      Mental Status: She is alert.     Recent Labs   Lab 08/28/22  1309      K 4.1      CO2 24   BUN 8   CREATININE 0.6   *   CALCIUM 9.4       Recent Labs   Lab 08/24/22  0716 08/25/22  0651 08/28/22  1309   ALKPHOS 109 102 112   ALT 7* 8* 11   AST 8* 12 11   ALBUMIN 2.7* 2.6* 3.2*   PROT 6.4 6.2 7.3   BILITOT 0.3 0.3 0.3         Recent Labs   Lab 08/24/22  0716 08/25/22  0651 08/28/22  1309   WBC 4.74 5.14 6.22   HGB 7.3* 7.2* 9.0*   HCT 25.5* 25.1* 30.7*    193 238   GRAN 68.2  3.2 64.8  3.3 59.9  3.7   LYMPH 18.6  0.9* 22.6  1.2 27.0  1.7   MONO 9.9  0.5 9.7  0.5 9.6  0.6         Recent Labs   Lab 08/29/22  0854  08/29/22  1317 08/29/22  1724 08/29/22  1726 08/29/22  2134 08/30/22  0833   POCTGLUCOSE 74 114* 476* 451* 257* 118*         Recent Labs   Lab 08/27/22  1706   TROPONINI <0.006         Diagnostic Results:  Labs: Reviewed    aspirin, 81 mg, Oral, Daily  atorvastatin, 40 mg, Oral, Daily  benzonatate, 200 mg, Oral, TID  enoxaparin, 40 mg, Subcutaneous, Daily  ferrous sulfate, 1 tablet, Oral, Daily  fluticasone furoate-vilanteroL, 1 puff, Inhalation, Daily  guaiFENesin, 600 mg, Oral, BID  insulin aspart U-100, 7 Units, Subcutaneous, TIDWM  insulin detemir U-100, 25 Units, Subcutaneous, QHS  magnesium oxide, 400 mg, Oral, Daily  polyethylene glycol, 17 g, Oral, Daily  potassium chloride SA, 20 mEq, Oral, Daily  pregabalin, 150 mg, Oral, TID  QUEtiapine, 400 mg, Oral, Nightly  senna-docusate 8.6-50 mg, 1 tablet, Oral, BID  sertraline, 150 mg, Oral, Daily  tiotropium bromide, 2 puff, Inhalation, Daily  verapamiL, 180 mg, Oral, QHS      As Needed sodium chloride, acetaminophen, albuterol-ipratropium, dextrose 10%, dextrose 10%, glucagon (human recombinant), hydrocodone-apap 7.5-325 MG/15 ML, insulin aspart U-100, melatonin, naloxone, nitroGLYCERIN, ondansetron, prochlorperazine, promethazine-codeine 6.25-10 mg/5 ml, sodium chloride 0.9%, tiZANidine    ASSESSMENT/PLAN:     Assessment: Chayito Chung is a 71 y.o. female here with:     Active Hospital Problems    Diagnosis  POA    *Chronic obstructive pulmonary disease with acute exacerbation [J44.1]  Yes    Moderate malnutrition [E44.0]  Yes    Abdominal distension [R14.0]  Yes    Adenocarcinoma of lung, right [C34.91]  Yes    Type 2 diabetes mellitus, with long-term current use of insulin [E11.9, Z79.4]  Not Applicable    Iron deficiency anemia [D50.9]  Yes    Major depressive disorder in partial remission [F32.4]  Yes    Neuropathy [G62.9]  Yes    Coronary artery disease of native artery of native heart with stable angina pectoris [I25.118]  Yes      Resolved Hospital  Problems   No resolved problems to display.        Plan:       Chronic obstructive pulmonary disease with acute exacerbation - Resolved  Worsening dyspnea and cough after radiation treatment giving concern for radiation pneumonitis vs COPD exacerbation causing symptoms  - Continue Prednisone 40mg po daily x 4 days - completed  - apply Oxygen 1-2L to achieve O2 sat 88-92%   - Schedule DUO Nebs no PRN  - Azithromycin 500mg x 3 days  completed  - Use BREO + spiriva in place of home TRELEGY  - Prometh-codeine for cough PRN  - suspect she will need home oxygen due to lung malignancy         Abdominal distension now resolved  Ileus vs developing SBO  - s/p gastrograffin study with contrast seen in bowel during second KUB  - on adult regular diet  - surgery has signed off.         Adenocarcinoma of lung, right  F/u with [facility] - oncology and rad-onc when stable for discharge.  She may need to reschedule next radiation appointment in light of current hospitalization.   -See care everywhere for records/notes        Type 2 diabetes mellitus, with long-term current use of insulin  Patient's FSGs are uncontrolled due to hypoglycemia on current medication regimen.               Antihyperglycemics (From admission, onward)                            Start     Stop Route Frequency Ordered     08/24/22 1645   insulin aspart U-100 pen 5 Units         -- SubQ 3 times daily with meals 08/24/22 1327     08/23/22 2100   insulin detemir U-100 pen 25 Units         -- SubQ Nightly 08/23/22 0045     08/23/22 0232   insulin aspart U-100 pen 1-10 Units         -- SubQ Before meals & nightly PRN 08/23/22 0137          Hold Oral hypoglycemics while patient is in the hospital.     At home patient reports using a Combo insulin - HUMALOG 75/25 - 35 units BID AC  And LANTUS insulin 25 units Nightly   - Continue Basal insulin 25 units Levemir Nightly  - Continue aspart 7u TID  - Moderate dose sliding scale        Iron deficiency anemia  Patient with  chronic anemia   - s/p 1unit PRBC in ED  - Hgb stable     Coronary artery disease of native artery of native heart with stable angina pectoris  Continue asa/statin  -pt not on beta blocker   -EKG w/o ischemic changes. Negative troponin x 1               VTE Risk Mitigation (From admission, onward)                  Ordered        enoxaparin injection 40 mg  Daily         08/23/22 0137        IP VTE HIGH RISK PATIENT  Once         08/23/22 0137        Place sequential compression device  Until discontinued         08/23/22 0137                     Discharge Planning   MICKY: 8/26/2022     Code Status: DNR   Is the patient medically ready for discharge?: No    Reason for patient still in hospital (select all that apply): Patient trending condition  Discharge Plan A: SNF pending             HIGH RISK CONDITION(S):  Patient has a condition that poses threat to life and bodily function: ileus and copd exacerbation          Roslyn Freeman MD

## 2022-08-30 NOTE — PT/OT/SLP PROGRESS
Occupational Therapy Treatment    Patient Name:  Chayito Chung   MRN:  [id number]  Admit Date: 8/22/2022  Admitting Diagnosis:  Chronic obstructive pulmonary disease with acute exacerbation   Length of Stay: 6 days  Recent Surgery: * No surgery found *      Recommendations:     Discharge Recommendations: nursing facility, skilled  Discharge Equipment Recommendations:  none  Barriers to discharge:  None    Plan:     Patient to be seen 3 x/week to address the above listed problems via self-care/home management, therapeutic activities, therapeutic exercises  Plan of Care Expires: 09/22/22  Plan of Care Reviewed with: patient    Assessment:   Chayito Chung is a 71 y.o. female with a medical diagnosis of Chronic obstructive pulmonary disease with acute exacerbation.  She presents with the following performance deficits affecting function: weakness, impaired endurance, impaired balance, impaired self care skills, impaired functional mobility, gait instability, decreased lower extremity function, decreased upper extremity function, decreased safety awareness, impaired cardiopulmonary response to activity, impaired coordination.      Pt tolerated session fairly this date and was willing to participate. Demonstrating continued increased fatigue, impaired endurance, decreased activity tolerance, increased weakness, impaired balance, impaired cardiopulmonary response to activity and decreased safety awareness, requiring increased time and assistance to complete functional tasks. Patient observed with increased fatigue and WOB during functional tasks with minimal exertion. Education of energy conservation techniques and seated rest breaks. Patient is progressing towards established goals, and continues to benefit from acute skilled OT services to increase functional performance and improve quality of life. OT to continue to recommend SNF at discharge to improve pt functional independence and increase patient safety before  "returning home.      Rehab Prognosis: Good; patient would benefit from acute skilled OT services to address these deficits and reach maximum level of function.        Subjective   Communicated with: Nurse prior to session.  Patient found up in chair with oxygen, peripheral IV upon OT entry to room. Pt agreeable to participate at this time.    Patient: " Do you know what will happen next? " -re SNF appeal, answered within OT scope of practice    Pain/Comfort:  Pain Rating 1: 0/10  Pain Rating Post-Intervention 1: 0/10    Objective:   Patient found with: oxygen, peripheral IV   General Precautions: Standard, Cardiac fall   Orthopedic Precautions:N/A   Braces: N/A   Oxygen Device: Nasal Cannula 2L  Vitals: /77 (Patient Position: Lying)   Pulse 101   Temp 98.7 °F (37.1 °C) (Oral)   Resp (!) 22   Ht 5' 2" (1.575 m)   Wt 59.2 kg (130 lb 8.2 oz)   LMP  (LMP Unknown)   SpO2 (!) 93%   Breastfeeding No   BMI 23.87 kg/m²     Outcome Measures:  AMPA 6 Click ADL: 20    Cognition:   Alert and Cooperative  Command following: follows one-step commands  Communication: clear/fluent    Occupational Performance:  Bed Mobility:    Not assessed    Functional Mobility/Transfers:  Patient completed Sit <> Stand Transfer from EOB with stand by assistance  with  rolling walker x 3 trials  Static Standing Balance: SBA  Dynamic Standing Balance: SBA-CGA  Patient completed functional mobility of household distance ~15ft with SBA using RW.      Activities of Daily Living:  Grooming: stand by assistance to perform oral care and wash face in stance at sink  Upper Body Dressing: minimum assistance to doff/don gown    AMPAC 6 Click ADL:  AMPAC Total Score: 20    Treatment & Education:  -Pt education on OT role and POC.  -Importance of E/OOB activity with staff assistance, emphasis on daily participation  -BUE therex in stance 2 x 10 with seated rest break bw sets: chest press, overhead press and cross body punch  -Safety during " functional transfer and mobility ensured  -Patient provided with education on importance of Bilateral UB/LB integration during functional tasks for improvement in functional performance.   -Education provided/reviewed, questions answered within OT scope of practice.   -Patient demonstrates understanding and learning this date.         Patient left up in chair with all lines intact, call button in reach, and nurse notified    GOALS:   Multidisciplinary Problems       Occupational Therapy Goals          Problem: Occupational Therapy    Goal Priority Disciplines Outcome Interventions   Occupational Therapy Goal     OT, PT/OT Ongoing, Progressing    Description: Goals to be met by: 9/20/22     Patient will increase functional independence with ADLs by performing:    UE Dressing with Modified Waller.  LE Dressing with Modified Waller.  Grooming while seated with Modified Waller.  Toileting from toilet/bedside commode with Stand-by Assistance for hygiene and clothing management.   Sitting at edge of bed x15 minutes with Supervision.  Toilet transfer to toilet/bedside commode with Stand-by Assistance and LRAD.                         Time Tracking:     OT Date of Treatment: 08/30/22  OT Start Time: 1447  OT Stop Time: 1515  OT Total Time (min): 28 min    Billable Minutes:Self Care/Home Management 14  Therapeutic Exercise 14      8/30/2022

## 2022-08-30 NOTE — PLAN OF CARE
Patient OOB to chair all day - worked with OT. Denies pain this shift and coughing less today. Tessalon pearls and Mucinex continued. Patient awaiting completion of appeal for SNF placement.

## 2022-08-30 NOTE — PT/OT/SLP PROGRESS
Physical Therapy  Pt Not Seen    Patient Name:  Chayito Chung   MRN:  [id number]    2:00 pm  Patient not seen today secondary to  Other (Comment) (D/C orders in place). Will follow-up on next scheduled visit if pt not discharged from hospital.    Elena Oliveros, PTA  8/30/2022

## 2022-08-30 NOTE — PLAN OF CARE
9:46 AM  The SW contacted Sharda at [facility] [place] to follow-up regarding scheduling the peer to peer on behalf of the patient. The peer to peer has been scheduled for August 30, 2022 from 12:00 pm -3:00 pm. The SW provided the contact information for Dr. Freeman to Sharda.  The SW notified the patient's MD via secure chat.     2:27 PM  The SW and CM received a secure chat from the patient's MD stating that the patient may not receive skilled placement. The SW met with the patient at bedside to follow-up regarding d/c placement. The patient stated that she wanted to complete an appeal for skilled placement.      2:29 pm  The SW contacted Sharda at [phone] with [facility] [place] to discuss the patient's appeal. Per Sharda, the patient must first receive a official denial letter to begin the appeals process.          The SW will continue to follow.     Smita Braxton LMSW  Case Management [facility]  Ext: [phone]

## 2022-08-31 LAB
ALBUMIN SERPL BCP-MCNC: 2.7 G/DL (ref 3.5–5.2)
ANION GAP SERPL CALC-SCNC: 8 MMOL/L (ref 8–16)
BUN SERPL-MCNC: 11 MG/DL (ref 8–23)
CALCIUM SERPL-MCNC: 9.2 MG/DL (ref 8.7–10.5)
CHLORIDE SERPL-SCNC: 104 MMOL/L (ref 95–110)
CO2 SERPL-SCNC: 24 MMOL/L (ref 23–29)
CREAT SERPL-MCNC: 0.7 MG/DL (ref 0.5–1.4)
EST. GFR  (NO RACE VARIABLE): >60 ML/MIN/1.73 M^2
GLUCOSE SERPL-MCNC: 212 MG/DL (ref 70–110)
PHOSPHATE SERPL-MCNC: 3.4 MG/DL (ref 2.7–4.5)
POCT GLUCOSE: 129 MG/DL (ref 70–110)
POCT GLUCOSE: 175 MG/DL (ref 70–110)
POCT GLUCOSE: 185 MG/DL (ref 70–110)
POCT GLUCOSE: 256 MG/DL (ref 70–110)
POTASSIUM SERPL-SCNC: 3.8 MMOL/L (ref 3.5–5.1)
SODIUM SERPL-SCNC: 136 MMOL/L (ref 136–145)

## 2022-08-31 PROCEDURE — 94640 AIRWAY INHALATION TREATMENT: CPT

## 2022-08-31 PROCEDURE — 99900035 HC TECH TIME PER 15 MIN (STAT)

## 2022-08-31 PROCEDURE — 25000003 PHARM REV CODE 250: Performed by: STUDENT IN AN ORGANIZED HEALTH CARE EDUCATION/TRAINING PROGRAM

## 2022-08-31 PROCEDURE — 97116 GAIT TRAINING THERAPY: CPT | Mod: CQ

## 2022-08-31 PROCEDURE — 36415 COLL VENOUS BLD VENIPUNCTURE: CPT | Performed by: HOSPITALIST

## 2022-08-31 PROCEDURE — 80069 RENAL FUNCTION PANEL: CPT | Performed by: HOSPITALIST

## 2022-08-31 PROCEDURE — 99232 PR SUBSEQUENT HOSPITAL CARE,LEVL II: ICD-10-PCS | Mod: ,,, | Performed by: HOSPITALIST

## 2022-08-31 PROCEDURE — 27000221 HC OXYGEN, UP TO 24 HOURS

## 2022-08-31 PROCEDURE — 63600175 PHARM REV CODE 636 W HCPCS: Performed by: HOSPITALIST

## 2022-08-31 PROCEDURE — 99232 SBSQ HOSP IP/OBS MODERATE 35: CPT | Mod: ,,, | Performed by: HOSPITALIST

## 2022-08-31 PROCEDURE — 20600001 HC STEP DOWN PRIVATE ROOM

## 2022-08-31 PROCEDURE — 25000003 PHARM REV CODE 250: Performed by: HOSPITALIST

## 2022-08-31 RX ADMIN — INSULIN ASPART 6 UNITS: 100 INJECTION, SOLUTION INTRAVENOUS; SUBCUTANEOUS at 05:08

## 2022-08-31 RX ADMIN — PREGABALIN 150 MG: 75 CAPSULE ORAL at 08:08

## 2022-08-31 RX ADMIN — HYDROCODONE BITARTRATE AND ACETAMINOPHEN 15 ML: 7.5; 325 SOLUTION ORAL at 08:08

## 2022-08-31 RX ADMIN — GUAIFENESIN 600 MG: 600 TABLET, EXTENDED RELEASE ORAL at 08:08

## 2022-08-31 RX ADMIN — ENOXAPARIN SODIUM 40 MG: 100 INJECTION SUBCUTANEOUS at 03:08

## 2022-08-31 RX ADMIN — ASPIRIN 81 MG CHEWABLE TABLET 81 MG: 81 TABLET CHEWABLE at 08:08

## 2022-08-31 RX ADMIN — INSULIN ASPART 2 UNITS: 100 INJECTION, SOLUTION INTRAVENOUS; SUBCUTANEOUS at 09:08

## 2022-08-31 RX ADMIN — INSULIN ASPART 7 UNITS: 100 INJECTION, SOLUTION INTRAVENOUS; SUBCUTANEOUS at 05:08

## 2022-08-31 RX ADMIN — BENZONATATE 200 MG: 100 CAPSULE ORAL at 08:08

## 2022-08-31 RX ADMIN — PREGABALIN 150 MG: 75 CAPSULE ORAL at 03:08

## 2022-08-31 RX ADMIN — BENZONATATE 200 MG: 100 CAPSULE ORAL at 03:08

## 2022-08-31 RX ADMIN — INSULIN ASPART 7 UNITS: 100 INJECTION, SOLUTION INTRAVENOUS; SUBCUTANEOUS at 12:08

## 2022-08-31 RX ADMIN — INSULIN ASPART 2 UNITS: 100 INJECTION, SOLUTION INTRAVENOUS; SUBCUTANEOUS at 12:08

## 2022-08-31 RX ADMIN — PROMETHAZINE HYDROCHLORIDE AND CODEINE PHOSPHATE 7.5 ML: 10; 6.25 SOLUTION ORAL at 08:08

## 2022-08-31 RX ADMIN — INSULIN DETEMIR 25 UNITS: 100 INJECTION, SOLUTION SUBCUTANEOUS at 08:08

## 2022-08-31 RX ADMIN — TIOTROPIUM BROMIDE INHALATION SPRAY 2 PUFF: 3.12 SPRAY, METERED RESPIRATORY (INHALATION) at 10:08

## 2022-08-31 RX ADMIN — SENNOSIDES AND DOCUSATE SODIUM 1 TABLET: 50; 8.6 TABLET ORAL at 08:08

## 2022-08-31 RX ADMIN — QUETIAPINE FUMARATE 400 MG: 100 TABLET ORAL at 08:08

## 2022-08-31 RX ADMIN — Medication 400 MG: at 08:08

## 2022-08-31 RX ADMIN — VERAPAMIL HYDROCHLORIDE 180 MG: 180 TABLET, FILM COATED, EXTENDED RELEASE ORAL at 09:08

## 2022-08-31 RX ADMIN — MELATONIN TAB 3 MG 6 MG: 3 TAB at 12:08

## 2022-08-31 RX ADMIN — INSULIN ASPART 7 UNITS: 100 INJECTION, SOLUTION INTRAVENOUS; SUBCUTANEOUS at 09:08

## 2022-08-31 RX ADMIN — MELATONIN TAB 3 MG 6 MG: 3 TAB at 08:08

## 2022-08-31 RX ADMIN — FLUTICASONE FUROATE AND VILANTEROL TRIFENATATE 1 PUFF: 100; 25 POWDER RESPIRATORY (INHALATION) at 10:08

## 2022-08-31 RX ADMIN — ATORVASTATIN CALCIUM 40 MG: 20 TABLET, FILM COATED ORAL at 08:08

## 2022-08-31 RX ADMIN — FERROUS SULFATE TAB 325 MG (65 MG ELEMENTAL FE) 1 EACH: 325 (65 FE) TAB at 08:08

## 2022-08-31 RX ADMIN — SERTRALINE HYDROCHLORIDE 150 MG: 50 TABLET ORAL at 05:08

## 2022-08-31 NOTE — PT/OT/SLP PROGRESS
Physical Therapy Treatment    Patient Name:  Chayito Chung   MRN:  [id number]  Admitting Diagnosis: Chronic obstructive pulmonary disease with acute exacerbation  Recent Surgery: * No surgery found *      Recommendations:     Discharge Recommendations:  nursing facility, skilled   Discharge Equipment Recommendations:  (TBD)   Barriers to discharge: None    Plan:     During this hospitalization, patient to be seen 3 x/week to address the above listed problems via gait training, therapeutic activities, therapeutic exercises  Plan of Care Expires:  09/22/22  Plan of Care Reviewed with: patient    This Plan of care has been discussed with the patient who was involved in its development and understands and is in agreement with the identified goals and treatment plan    Subjective     Communicated with nurse (Arielle MEEHAN) prior to session.     Patient comments: Pt with c/o dizziness during tx session  Pain/Comfort:  Pain Rating 1: 0/10  Pain Rating Post-Intervention 1: 0/10    Objective:     Patient found with: oxygen    Patient found sup in bed with HOB elevated upon PT entry to room, agreeable to treatment.  No family present in the room.    Respiratory Status: Nasal cannula, flow 2 L/min    General Precautions: Standard, fall   Orthopedic Precautions:N/A   Braces: N/A       BED MOBILITY (vc's for hand placement sequencing of task):        Rolling to the R:  NT.       Rolling to the L:  NT.        Sup > sit at the EOB:  SBA for safety, exiting on the R side.  Increased time required.       Sit > sup:  Not performed 2* pt left seated up in the chair.       Scooting hips to EOB with SBA                  SITTING AT THE EDGE OF THE BED    Assistance Level Required: SBA for safety with B UE support   Postural deviations noted: flexed trunk   Encouraged: upright posture, B feet flat on floor        TRANSFERS  (vc's for hand placement, sequencing of task and safety)   Patient completed Sit <> Stand Transfer from EOB/toilet seat/BS  chair with min A for safety with rolling walker x3 trial(s)   Patient completed Stand <> Sit Transfer to BS chair/toilet seat with CGA for balance and safety with rolling walker      GAIT: within room   Patient ambulated: 40ft x2 trials   Patient required: min/CGA for balance and safety   Patient used:  Rolling Walker   Gait Pattern observed: reciprocal gait   Gait Deviation(s): decreased step length, decreased stride length, and instability, with one LOB     Comments: vc's for upright posture, placement of AD, directional guidance, and safety    EDUCATION  Patient provided with daily orientation and goals of this PT session. They were educated to call for assistance and to transfer with hospital staff only.  Also, pt was educated on the effects of prolonged immobility and the importance of performing OOB activity and exercises to promote healing and reduce recovery time    Whiteboard updated with correct mobility information. RN/PCT notified.  Pt safe to transfer OOB/BTB and amb short distances with RN/PCT: Use RW with min/CGA.    Patient left up in chair, with  all lines intact, call button in reach, and nurse notified    AM-PAC 6 CLICK MOBILITY  Turning over in bed (including adjusting bedclothes, sheets and blankets)?: 3  Sitting down on and standing up from a chair with arms (e.g., wheelchair, bedside commode, etc.): 3  Moving from lying on back to sitting on the side of the bed?: 3  Moving to and from a bed to a chair (including a wheelchair)?: 3  Need to walk in hospital room?: 3  Climbing 3-5 steps with a railing?: 1  Basic Mobility Total Score: 16     Assessment:     Chayito Chung is a 71 y.o. female admitted with a medical diagnosis of Chronic obstructive pulmonary disease with acute exacerbation.  She presents with the following impairments/functional limitations:  weakness, impaired endurance, impaired self care skills, impaired functional mobility, gait instability, impaired balance, decreased upper  extremity function, decreased lower extremity function. requiring light assistance and verbal cues for bed mob, transfers, gait to prevent falls due to weakness, instability, fatigue, dizziness.   In light of pt's current functional level and deficits, it is anticipated that pt will need to participate in an intense rehab program consisting of PT and OT in order to achieve full rehab potential to return to previous level of function and roles.  Pt remains motivated to participate in PT session and will cont to benefit from skilled PT intervention.      Rehab Prognosis:  Good; patient would benefit from acute skilled PT services to address these deficits and reach maximum level of function.      GOALS:   Multidisciplinary Problems       Physical Therapy Goals          Problem: Physical Therapy    Goal Priority Disciplines Outcome Goal Variances Interventions   Physical Therapy Goal     PT, PT/OT Ongoing, Progressing     Description: Goals to be met by: 22    Patient will increase functional independence with mobility by performin. Supine to sit with supervision -not met  2. Sit to supine with supervision -not met  3. Sit to stand transfer with supervision -not met  4. Gait  x 100 feet with supervision using LRAD as needed -not met                         Time Tracking:     PT Received On: 22  PT Start Time: 1114     PT Stop Time: 1131  PT Total Time (min): 17 min     Billable Minutes: Gait Training 17    Treatment Type: Treatment  PT/PTA: PTA     PTA Visit Number: 2       ROCIO Vegas.  Pager [phone]    2022    .

## 2022-08-31 NOTE — PROGRESS NOTES
Progress Note  Hospital Medicine         Patient Name: Chayito Chung  MRN: [id number]  Patient Class: IP- Inpatient     Admission Date: 8/22/2022    SUBJECTIVE:     Follow-up For:  Chronic obstructive pulmonary disease with acute exacerbation     Interval history/ROS:   8/31: appeals for snf    8/30: SNF pending    8/29: SNF    8/28: on regular diet, normal bowel movements, no wheezing on exam - suspect she will need home oxygen  at discharge due to lung cancer history. 6 min walk test for AM pending SNF    8/26: on regular diet, positive bowel movements, abdomen benign - ileus has fully resolved. Breathing much better. No further wheezing. Now pending placement at SNF when available.     8/25: advancing diet. No bowel movement today    HPI:  71-year-old  woman with a history of right lung adenocarcinoma, COPD, restrictive lung disease, tobacco abuse, CAD status post PCI, type 2 diabetes on insulin, chronic neck pain who presents to the emergency department from primary care clinic for complaints of dyspnea and abdominal distension.    She is followed by Oncology and Radiation Oncology at [facility], undergoing 4 cycles of radiation therapy last of which is Wednesday 8/24.  Completed radiation on Monday but then was also seen in primary care clinic and noted to be fatigued tachycardic and tachypneic reporting a persistent cough after her radiation treatment she also reported neck pain and headache.  She has a chronic cough but reports that symptoms are worse today, she is not taking any home oxygen she has home inhalers but does not seem that she has taken any inhalers or nebulizer treatments.  She feels more short of breath than her baseline    She also reports over the last week progressive abdominal distension and discomfort states that today she has had 1 watery bowel movement has been having irregular bowel movements but when she does have 1 is often a loose BM.    During the ED workup  hemoglobin noted to be critical at 6.8 patient has been consented in 1 unit of PRBCs is infusing now during my interview, CT a chest and CT abdomen pelvis completed negative for PE but abdominal imaging displays distended bowel without a transition point possible ileus versus developing SBO.  General surgery consult completed in ED    OBJECTIVE:     Body mass index is 23.87 kg/m².    Vital Signs Range (Last 24H):  Temp:  [98.1 °F (36.7 °C)-98.4 °F (36.9 °C)]   Pulse:  []   Resp:  [16-20]   BP: (114-126)/(63-68)   SpO2:  [92 %-95 %]     I & O (Last 24H):    Intake/Output Summary (Last 24 hours) at 8/31/2022 1700  Last data filed at 8/31/2022 0600  Gross per 24 hour   Intake 1520 ml   Output 1000 ml   Net 520 ml          Physical Exam:  Constitutional:       Appearance: She is ill-appearing.   HENT:      Head: Normocephalic and atraumatic.      Mouth/Throat:      Pharynx: No oropharyngeal exudate.   Eyes:      General: No scleral icterus.     Pupils: Pupils are equal, round, and reactive to light.   Pulmonary:      Breath sounds: No wheezing or crackles   Abdominal:      General: + bowel sound, nontender, nondistended.     Musculoskeletal:      Right lower leg: No edema.      Left lower leg: No edema.   Skin:     General: Skin is warm and dry.   Neurological:      General: No focal deficit present.      Mental Status: She is alert.     Recent Labs   Lab 08/31/22  1103      K 3.8      CO2 24   BUN 11   CREATININE 0.7   *   CALCIUM 9.2   PHOS 3.4       Recent Labs   Lab 08/25/22  0651 08/28/22  1309 08/31/22  1103   ALKPHOS 102 112  --    ALT 8* 11  --    AST 12 11  --    ALBUMIN 2.6* 3.2* 2.7*   PROT 6.2 7.3  --    BILITOT 0.3 0.3  --          Recent Labs   Lab 08/25/22  0651 08/28/22  1309   WBC 5.14 6.22   HGB 7.2* 9.0*   HCT 25.1* 30.7*    238   GRAN 64.8  3.3 59.9  3.7   LYMPH 22.6  1.2 27.0  1.7   MONO 9.7  0.5 9.6  0.6         Recent Labs   Lab 08/30/22  0833 08/30/22  1300  08/30/22  1809 08/30/22  2106 08/31/22  0828 08/31/22  1219   POCTGLUCOSE 118* 125* 130* 276* 185* 175*         Recent Labs   Lab 08/27/22  1706   TROPONINI <0.006         Diagnostic Results:  Labs: Reviewed    aspirin, 81 mg, Oral, Daily  atorvastatin, 40 mg, Oral, Daily  benzonatate, 200 mg, Oral, TID  enoxaparin, 40 mg, Subcutaneous, Daily  ferrous sulfate, 1 tablet, Oral, Daily  fluticasone furoate-vilanteroL, 1 puff, Inhalation, Daily  guaiFENesin, 600 mg, Oral, BID  insulin aspart U-100, 7 Units, Subcutaneous, TIDWM  insulin detemir U-100, 25 Units, Subcutaneous, QHS  magnesium oxide, 400 mg, Oral, Daily  polyethylene glycol, 17 g, Oral, Daily  pregabalin, 150 mg, Oral, TID  QUEtiapine, 400 mg, Oral, Nightly  senna-docusate 8.6-50 mg, 1 tablet, Oral, BID  sertraline, 150 mg, Oral, Daily  tiotropium bromide, 2 puff, Inhalation, Daily  verapamiL, 180 mg, Oral, QHS      As Needed sodium chloride, acetaminophen, albuterol-ipratropium, dextrose 10%, dextrose 10%, glucagon (human recombinant), hydrocodone-apap 7.5-325 MG/15 ML, insulin aspart U-100, melatonin, naloxone, nitroGLYCERIN, ondansetron, prochlorperazine, promethazine-codeine 6.25-10 mg/5 ml, sodium chloride 0.9%, tiZANidine    ASSESSMENT/PLAN:     Assessment: Chayito Chung is a 71 y.o. female here with:     Active Hospital Problems    Diagnosis  POA    *Chronic obstructive pulmonary disease with acute exacerbation [J44.1]  Yes    Moderate malnutrition [E44.0]  Yes    Abdominal distension [R14.0]  Yes    Adenocarcinoma of lung, right [C34.91]  Yes    Type 2 diabetes mellitus, with long-term current use of insulin [E11.9, Z79.4]  Not Applicable    Iron deficiency anemia [D50.9]  Yes    Major depressive disorder in partial remission [F32.4]  Yes    Neuropathy [G62.9]  Yes    Coronary artery disease of native artery of native heart with stable angina pectoris [I25.118]  Yes      Resolved Hospital Problems   No resolved problems to display.        Plan:        Chronic obstructive pulmonary disease with acute exacerbation - Resolved  Worsening dyspnea and cough after radiation treatment giving concern for radiation pneumonitis vs COPD exacerbation causing symptoms  - Continue Prednisone 40mg po daily x 4 days - completed  - apply Oxygen 1-2L to achieve O2 sat 88-92%   - Schedule DUO Nebs no PRN  - Azithromycin 500mg x 3 days  completed  - Use BREO + spiriva in place of home TRELEGY  - Prometh-codeine for cough PRN  - suspect she will need home oxygen due to lung malignancy         Abdominal distension now resolved  Ileus vs developing SBO  - s/p gastrograffin study with contrast seen in bowel during second KUB  - on adult regular diet  - surgery has signed off.         Adenocarcinoma of lung, right  F/u with [facility] - oncology and rad-onc when stable for discharge.  She may need to reschedule next radiation appointment in light of current hospitalization.   -See care everywhere for records/notes        Type 2 diabetes mellitus, with long-term current use of insulin  Patient's FSGs are uncontrolled due to hypoglycemia on current medication regimen.               Antihyperglycemics (From admission, onward)                            Start     Stop Route Frequency Ordered     08/24/22 1645   insulin aspart U-100 pen 5 Units         -- SubQ 3 times daily with meals 08/24/22 1327     08/23/22 2100   insulin detemir U-100 pen 25 Units         -- SubQ Nightly 08/23/22 0045     08/23/22 0232   insulin aspart U-100 pen 1-10 Units         -- SubQ Before meals & nightly PRN 08/23/22 0137          Hold Oral hypoglycemics while patient is in the hospital.     At home patient reports using a Combo insulin - HUMALOG 75/25 - 35 units BID AC  And LANTUS insulin 25 units Nightly   - Continue Basal insulin 25 units Levemir Nightly  - Continue aspart 7u TID  - Moderate dose sliding scale        Iron deficiency anemia  Patient with chronic anemia   - s/p 1unit PRBC in ED  - Hgb stable      Coronary artery disease of native artery of native heart with stable angina pectoris  Continue asa/statin  -pt not on beta blocker   -EKG w/o ischemic changes. Negative troponin x 1               VTE Risk Mitigation (From admission, onward)                  Ordered        enoxaparin injection 40 mg  Daily         08/23/22 0137        IP VTE HIGH RISK PATIENT  Once         08/23/22 0137        Place sequential compression device  Until discontinued         08/23/22 0137                     Discharge Planning   MICKY: 8/26/2022     Code Status: DNR   Is the patient medically ready for discharge?: No    Reason for patient still in hospital (select all that apply): Patient trending condition  Discharge Plan A: SNF pending             HIGH RISK CONDITION(S):  Patient has a condition that poses threat to life and bodily function: ileus and copd exacerbation          Roslyn Freeman MD

## 2022-08-31 NOTE — PLAN OF CARE
AAOx4, VSS, SpO2 > 92% on 2L NC.   RFA 22g PIV saline locked. Dressing CDI.   Insurance denied snf. Appeal pending.   BG checks achs. Meal time and SSI administered per MAR.   No issues this shift.   Worked with PT.   Up with stand by assistance. Wears non slip socks when oob. Free from falls/injuries.   Bed in lowest, locked position. Bed rails up x2. Call light and personal belongings within reach.   Pt educated on plan of care. Verbalized understanding.

## 2022-08-31 NOTE — PLAN OF CARE
Pt aaox4 o/n. Pt oob to chair. Pt instructed to call for assistance c needs to avoid injury. Pt cough controlled c Tesselon Pearles and prn codeine/promethazine. Pt endorses moderate abdominal pain relieved c prn lortab. VSS o/n. Pt currently awaiting result from denial appeal for SNF.

## 2022-08-31 NOTE — PLAN OF CARE
The SW received a phone call from Renita with [facility] stating that the patient has been declined SNF placement. Renita informed the SW about the appeal process with [facility].       The SW met with the patient at bedside to discuss her denial for Skilled placement. The patient reported that she wanted to submit an appeal.  The patient stated that her sister just had surgery on her arm; therefore she want to see if she is able to get into a facility.       The SW faxed an appeal on the patient's behalf to [facility] at [phone] .     The SW notified the patient's treatment team via secure chat regarding the patient's appeal.       1:20 PM    Your fax has been successfully sent to [phone] at [phone].  ------------------------------------------------------------  From: [id number]  ------------------------------------------------------------  8/31/2022 9:49:05 AM Transmission Record          Sent to [phone] with remote ID "          Result: (0/339;2/2) Unknown          Page record: NONE SENT          Elapsed time: 00:47 on channel 50    8/31/2022 9:54:58 AM Transmission Record          Sent to [phone] with remote ID "          Result: (0/339;2/2) Unknown          Page record: 1 - 7          Elapsed time: 03:19 on channel 62    8/31/2022 10:03:23 AM Transmission Record          Sent to [phone] with remote ID "          Result: (0/339;0/0) Success          Page record: 1 - 16          Elapsed time: 06:26 on channel 30    3:17 PM  The SW spoke with Bibi with [facility] to follow-up regarding the patient's appeal. Bibi requested updated clinicals on the patient.  The SW faxed updated clinicals to [phone] for review.     The SW will continue to follow.    Smita Braxton LMSW  Case Management [facility]  Ext: [phone]

## 2022-09-01 ENCOUNTER — EXTERNAL HOME HEALTH (OUTPATIENT)
Dept: HOME HEALTH SERVICES | Facility: HOSPITAL | Age: 72
End: 2022-09-01
Payer: MEDICARE

## 2022-09-01 LAB
POCT GLUCOSE: 163 MG/DL (ref 70–110)
POCT GLUCOSE: 176 MG/DL (ref 70–110)
POCT GLUCOSE: 99 MG/DL (ref 70–110)

## 2022-09-01 PROCEDURE — 25000003 PHARM REV CODE 250: Performed by: STUDENT IN AN ORGANIZED HEALTH CARE EDUCATION/TRAINING PROGRAM

## 2022-09-01 PROCEDURE — 99232 PR SUBSEQUENT HOSPITAL CARE,LEVL II: ICD-10-PCS | Mod: ,,, | Performed by: STUDENT IN AN ORGANIZED HEALTH CARE EDUCATION/TRAINING PROGRAM

## 2022-09-01 PROCEDURE — 63600175 PHARM REV CODE 636 W HCPCS: Performed by: HOSPITALIST

## 2022-09-01 PROCEDURE — 94640 AIRWAY INHALATION TREATMENT: CPT

## 2022-09-01 PROCEDURE — 99900035 HC TECH TIME PER 15 MIN (STAT)

## 2022-09-01 PROCEDURE — 99232 SBSQ HOSP IP/OBS MODERATE 35: CPT | Mod: ,,, | Performed by: STUDENT IN AN ORGANIZED HEALTH CARE EDUCATION/TRAINING PROGRAM

## 2022-09-01 PROCEDURE — 97535 SELF CARE MNGMENT TRAINING: CPT

## 2022-09-01 PROCEDURE — 27000221 HC OXYGEN, UP TO 24 HOURS

## 2022-09-01 PROCEDURE — 94761 N-INVAS EAR/PLS OXIMETRY MLT: CPT

## 2022-09-01 PROCEDURE — 25000003 PHARM REV CODE 250: Performed by: HOSPITALIST

## 2022-09-01 PROCEDURE — 20600001 HC STEP DOWN PRIVATE ROOM

## 2022-09-01 RX ORDER — DICLOFENAC SODIUM 10 MG/G
2 GEL TOPICAL 4 TIMES DAILY PRN
Qty: 100 G | Refills: 0
Start: 2022-09-01

## 2022-09-01 RX ORDER — INSULIN GLARGINE 100 [IU]/ML
25 INJECTION, SOLUTION SUBCUTANEOUS NIGHTLY
Qty: 22.5 ML | Refills: 0
Start: 2022-09-01 | End: 2022-09-08 | Stop reason: SDUPTHER

## 2022-09-01 RX ORDER — LACTULOSE 10 G/15ML
30 SOLUTION ORAL 3 TIMES DAILY
Status: DISCONTINUED | OUTPATIENT
Start: 2022-09-01 | End: 2022-09-02

## 2022-09-01 RX ORDER — ALENDRONATE SODIUM 70 MG/1
70 TABLET ORAL
Qty: 12 TABLET | Refills: 3
Start: 2022-09-04 | End: 2023-09-04

## 2022-09-01 RX ADMIN — INSULIN DETEMIR 25 UNITS: 100 INJECTION, SOLUTION SUBCUTANEOUS at 09:09

## 2022-09-01 RX ADMIN — TIOTROPIUM BROMIDE INHALATION SPRAY 2 PUFF: 3.12 SPRAY, METERED RESPIRATORY (INHALATION) at 08:09

## 2022-09-01 RX ADMIN — ATORVASTATIN CALCIUM 40 MG: 20 TABLET, FILM COATED ORAL at 09:09

## 2022-09-01 RX ADMIN — GUAIFENESIN 600 MG: 600 TABLET, EXTENDED RELEASE ORAL at 07:09

## 2022-09-01 RX ADMIN — INSULIN ASPART 7 UNITS: 100 INJECTION, SOLUTION INTRAVENOUS; SUBCUTANEOUS at 05:09

## 2022-09-01 RX ADMIN — SERTRALINE HYDROCHLORIDE 150 MG: 50 TABLET ORAL at 06:09

## 2022-09-01 RX ADMIN — ASPIRIN 81 MG CHEWABLE TABLET 81 MG: 81 TABLET CHEWABLE at 09:09

## 2022-09-01 RX ADMIN — BENZONATATE 200 MG: 100 CAPSULE ORAL at 09:09

## 2022-09-01 RX ADMIN — INSULIN ASPART 7 UNITS: 100 INJECTION, SOLUTION INTRAVENOUS; SUBCUTANEOUS at 12:09

## 2022-09-01 RX ADMIN — QUETIAPINE FUMARATE 400 MG: 100 TABLET ORAL at 09:09

## 2022-09-01 RX ADMIN — PREGABALIN 150 MG: 75 CAPSULE ORAL at 09:09

## 2022-09-01 RX ADMIN — HYDROCODONE BITARTRATE AND ACETAMINOPHEN 15 ML: 7.5; 325 SOLUTION ORAL at 09:09

## 2022-09-01 RX ADMIN — Medication 400 MG: at 09:09

## 2022-09-01 RX ADMIN — ENOXAPARIN SODIUM 40 MG: 100 INJECTION SUBCUTANEOUS at 05:09

## 2022-09-01 RX ADMIN — SENNOSIDES AND DOCUSATE SODIUM 1 TABLET: 50; 8.6 TABLET ORAL at 09:09

## 2022-09-01 RX ADMIN — LACTULOSE 30 G: 20 SOLUTION ORAL at 05:09

## 2022-09-01 RX ADMIN — PREGABALIN 150 MG: 75 CAPSULE ORAL at 03:09

## 2022-09-01 RX ADMIN — INSULIN ASPART 7 UNITS: 100 INJECTION, SOLUTION INTRAVENOUS; SUBCUTANEOUS at 09:09

## 2022-09-01 RX ADMIN — GUAIFENESIN 600 MG: 600 TABLET, EXTENDED RELEASE ORAL at 09:09

## 2022-09-01 RX ADMIN — MELATONIN TAB 3 MG 6 MG: 3 TAB at 09:09

## 2022-09-01 RX ADMIN — FERROUS SULFATE TAB 325 MG (65 MG ELEMENTAL FE) 1 EACH: 325 (65 FE) TAB at 09:09

## 2022-09-01 RX ADMIN — INSULIN ASPART 1 UNITS: 100 INJECTION, SOLUTION INTRAVENOUS; SUBCUTANEOUS at 09:09

## 2022-09-01 RX ADMIN — BENZONATATE 200 MG: 100 CAPSULE ORAL at 03:09

## 2022-09-01 RX ADMIN — FLUTICASONE FUROATE AND VILANTEROL TRIFENATATE 1 PUFF: 100; 25 POWDER RESPIRATORY (INHALATION) at 08:09

## 2022-09-01 RX ADMIN — HYDROCODONE BITARTRATE AND ACETAMINOPHEN 15 ML: 7.5; 325 SOLUTION ORAL at 01:09

## 2022-09-01 RX ADMIN — BENZONATATE 200 MG: 100 CAPSULE ORAL at 07:09

## 2022-09-01 RX ADMIN — VERAPAMIL HYDROCHLORIDE 180 MG: 180 TABLET, FILM COATED, EXTENDED RELEASE ORAL at 09:09

## 2022-09-01 NOTE — PLAN OF CARE
9:25 AM  The SW spoke with Gabrielle to follow-up regarding the patient's appeal. The patient's MD has agreed to a peer to peer. The patient has been scheduled a peer to peer for today at 9-1-22 for 3:00 pm. In the event the MD is unable to reach the patient's MD then the peer to peer will be scheduled for tomorrow 9-2-22 from 12:00pm- 2:00pm.       3:15 PM  The SW met with the patient to follow-up regarding the patient's d/c placement. The SW informed the patient that her doctor has a peer to peer scheduled for today 3-19-22 for 3:00 pm.      The SW will continue to follow.    Smita Braxton LMSW  Case Management [facility]  Ext: [phone]

## 2022-09-01 NOTE — ASSESSMENT & PLAN NOTE
Ileus vs developing SBO  - s/p gastrograffin study with contrast seen in bowel during second KUB. Advanced from CLD to Regular diet during admission  - Advanced to regular diet

## 2022-09-01 NOTE — PROGRESS NOTES
[facility] Medicine  Progress Note    Patient Name: Chayito Chung  MRN: [id number]  Patient Class: IP- Inpatient   Admission Date: 8/22/2022  Length of Stay: 8 days  Attending Physician: Arianne Velázquez MD  Primary Care Provider: Curt Valladares III, MD        Subjective:     Principal Problem:Chronic obstructive pulmonary disease with acute exacerbation        HPI:  71-year-old  woman with a history of right lung adenocarcinoma, COPD, restrictive lung disease, tobacco abuse, CAD status post PCI, type 2 diabetes on insulin, chronic neck pain who presents to the emergency department from primary care clinic for complaints of dyspnea and abdominal distension.    She is followed by Oncology and Radiation Oncology at [facility], undergoing 4 cycles of radiation therapy last of which is Wednesday 8/24.  Completed radiation on Monday but then was also seen in primary care clinic and noted to be fatigued tachycardic and tachypneic reporting a persistent cough after her radiation treatment she also reported neck pain and headache.  She has a chronic cough but reports that symptoms are worse today, she is not taking any home oxygen she has home inhalers but does not seem that she has taken any inhalers or nebulizer treatments.  She feels more short of breath than her baseline    She also reports over the last week progressive abdominal distension and discomfort states that today she has had 1 watery bowel movement has been having irregular bowel movements but when she does have 1 is often a loose BM.    During the ED workup hemoglobin noted to be critical at 6.8 patient has been consented in 1 unit of PRBCs is infusing now during my interview, CT a chest and CT abdomen pelvis completed negative for PE but abdominal imaging displays distended bowel without a transition point possible ileus versus developing SBO.  General surgery consult completed in ED      Overview/Hospital  Course:  Shortness of breath and cough improved and patient back to baseline respiratory wise. Patient was denied SNF by her insurance company.      Interval History: No acute events overnight. Pending discussion with PHN over patient's SNF denial. Currently she is able to ambulate to the restroom with assistance of nursing/PT. She has not been allowed to walk the halls yet based off of her physical needs and examinations by PT. Otherwise, she reports her last BM being two days ago. No wheezing on exam    Review of Systems   Constitutional:  Negative for chills and fever.   HENT:  Negative for congestion and sore throat.    Eyes:  Negative for photophobia and visual disturbance.   Respiratory:  Positive for cough (occasional). Negative for shortness of breath.    Cardiovascular:  Negative for chest pain and palpitations.   Gastrointestinal:  Negative for abdominal pain, constipation, diarrhea, nausea and vomiting.   Endocrine: Negative for cold intolerance and heat intolerance.   Genitourinary:  Negative for dysuria and hematuria.   Musculoskeletal:  Negative for arthralgias and myalgias.   Skin:  Negative for rash.   Allergic/Immunologic: Negative for environmental allergies and food allergies.   Neurological:  Positive for weakness. Negative for dizziness, seizures, syncope and headaches.   Hematological:  Negative for adenopathy. Does not bruise/bleed easily.   Psychiatric/Behavioral:  Negative for hallucinations and suicidal ideas.    Objective:     Vital Signs (Most Recent):  Temp: 98.2 °F (36.8 °C) (09/01/22 1133)  Pulse: 99 (09/01/22 1230)  Resp: 15 (09/01/22 1314)  BP: 135/88 (09/01/22 1230)  SpO2: 97 % (09/01/22 1230) Vital Signs (24h Range):  Temp:  [98 °F (36.7 °C)-98.2 °F (36.8 °C)] 98.2 °F (36.8 °C)  Pulse:  [73-99] 99  Resp:  [15-18] 15  SpO2:  [92 %-97 %] 97 %  BP: (121-153)/(63-88) 135/88     Weight: 59.6 kg (131 lb 6.3 oz)  Body mass index is 24.03 kg/m².    Intake/Output Summary (Last 24 hours) at  9/1/2022 1426  Last data filed at 9/1/2022 1156  Gross per 24 hour   Intake 1010 ml   Output 2350 ml   Net -1340 ml      Physical Exam  Constitutional:       Appearance: She is well-developed.   HENT:      Head: Normocephalic and atraumatic.   Eyes:      Conjunctiva/sclera: Conjunctivae normal.      Pupils: Pupils are equal, round, and reactive to light.   Neck:      Thyroid: No thyromegaly.      Vascular: No JVD.   Cardiovascular:      Rate and Rhythm: Normal rate and regular rhythm.      Heart sounds: Normal heart sounds. No murmur heard.    No friction rub. No gallop.   Pulmonary:      Effort: Pulmonary effort is normal.      Breath sounds: Normal breath sounds. No wheezing or rales.   Abdominal:      General: Bowel sounds are normal. There is no distension.      Palpations: Abdomen is soft.      Tenderness: There is no abdominal tenderness. There is no guarding or rebound.   Musculoskeletal:         General: No tenderness. Normal range of motion.      Cervical back: Neck supple.   Skin:     General: Skin is warm and dry.   Neurological:      Mental Status: She is alert and oriented to person, place, and time.      Cranial Nerves: No cranial nerve deficit.   Psychiatric:         Behavior: Behavior normal.       Significant Labs: All pertinent labs within the past 24 hours have been reviewed.  CBC: No results for input(s): WBC, HGB, HCT, PLT in the last 48 hours.  CMP:   Recent Labs   Lab 08/31/22  1103      K 3.8      CO2 24   *   BUN 11   CREATININE 0.7   CALCIUM 9.2   ALBUMIN 2.7*   ANIONGAP 8       Significant Imaging: I have reviewed all pertinent imaging results/findings within the past 24 hours.      Assessment/Plan:      * Chronic obstructive pulmonary disease with acute exacerbation  RESOLVED  Worsening dyspnea and cough after radiation treatment giving concern for radiation pneumonitis vs COPD exacerbation causing symptoms  - Completed Prednisone 40mg po daily x 5 days   - apply Oxygen  1-2L to achieve O2 sat 88-92% given hx of COPD  - PRN DUO Nebs  - Completed Azithromycin 500mg x 3 days   - Use BREO + spiriva in place of home TRELEGY  - Prometh-codeine for cough      Abdominal distension  Ileus vs developing SBO  - s/p gastrograffin study with contrast seen in bowel during second KUB. Advanced from CLD to Regular diet during admission  - Advanced to regular diet    Adenocarcinoma of lung, right  F/u with [facility] - oncology and rad-onc when stable for discharge.  She may need to reschedule next radiation appointment in light of current hospitalization.   -See care everywhere for records/notes      Type 2 diabetes mellitus, with long-term current use of insulin  Patient's FSGs are uncontrolled due to hypoglycemia on current medication regimen.  Last A1c reviewed-   Lab Results   Component Value Date    HGBA1C 9.0 (H) 07/14/2022     Most recent fingerstick glucose reviewed-   Recent Labs   Lab 08/23/22  1800 08/23/22  2148 08/24/22  0908 08/24/22  1233   POCTGLUCOSE 285* 220* 182* 288*     Current correctional scale  Medium  Maintain anti-hyperglycemic dose as follows-   Antihyperglycemics (From admission, onward)            Start     Stop Route Frequency Ordered    08/24/22 1645  insulin aspart U-100 pen 5 Units         -- SubQ 3 times daily with meals 08/24/22 1327    08/23/22 2100  insulin detemir U-100 pen 25 Units         -- SubQ Nightly 08/23/22 0045    08/23/22 0232  insulin aspart U-100 pen 1-10 Units         -- SubQ Before meals & nightly PRN 08/23/22 0137        Hold Oral hypoglycemics while patient is in the hospital.    At home patient reports using a Combo insulin - HUMALOG 75/25 - 35 units BID AC  And LANTUS insulin 25 units Nightly   - Continue Basal insulin 25 units Levemir Nightly  - Continue aspart 7u TID  - Moderate dose sliding scale      Iron deficiency anemia  Patient with worsening acute on chronic anemia   - s/p 1unit PRBC in ED  - Hgb stable    Coronary artery disease of native  artery of native heart with stable angina pectoris  Continue asa/statin  -pt not on beta blocker   -EKG w/o ischemic changes. Negative troponin x 1        VTE Risk Mitigation (From admission, onward)         Ordered     enoxaparin injection 40 mg  Daily         08/23/22 0137     IP VTE HIGH RISK PATIENT  Once         08/23/22 0137     Place sequential compression device  Until discontinued         08/23/22 0137                Discharge Planning   MICKY: 9/1/2022     Code Status: DNR   Is the patient medically ready for discharge?: No    Reason for patient still in hospital (select all that apply): Patient trending condition  Discharge Plan A: Home Health                  Arianne Velázquez MD  Department of Hospital Medicine   [facility] - Transplant Stepdown

## 2022-09-01 NOTE — PT/OT/SLP PROGRESS
Occupational Therapy   Treatment    Name: Chayito Chung  MRN: [id number]  Admitting Diagnosis:  Chronic obstructive pulmonary disease with acute exacerbation       Recommendations:     Discharge Recommendations: nursing facility, skilled  Discharge Equipment Recommendations:   (TBD)  Barriers to discharge:  None    Assessment:     Chayito Chung is a 71 y.o. female with a medical diagnosis of Chronic obstructive pulmonary disease with acute exacerbation. Pt agreeable to OT session and tolerated fairly well. However, pt became weak during transfer to bedside chair and experienced posterior LOB requiring Mod (A) to recover and promote slow controlled descent onto chair. Pt remains limited in self-care, functional mobility, and ADLs and is currently not performing tasks at [facility]. Goals remain appropriate. Performance deficits affecting function are weakness, impaired endurance, impaired self care skills, impaired functional mobility, gait instability, impaired balance, decreased upper extremity function, decreased lower extremity function, decreased safety awareness.     Rehab Prognosis:  Good; patient would benefit from acute skilled OT services to address these deficits and reach maximum level of function.       Plan:     Patient to be seen 3 x/week to address the above listed problems via self-care/home management, therapeutic activities, therapeutic exercises  Plan of Care Expires: 10/22/22  Plan of Care Reviewed with: patient    Subjective     Pain/Comfort:  Pain Rating 1: 0/10  Pain Rating Post-Intervention 1: 0/10    Objective:     Communicated with: RN prior to session.  Patient found HOB elevated with oxygen upon OT entry to room.    General Precautions: Standard, fall   Orthopedic Precautions:N/A   Braces: N/A  Respiratory Status: Nasal cannula, flow 2 L/min     Occupational Performance:     Bed Mobility:    Patient completed Scooting/Bridging with supervision  Patient completed Supine to Sit with supervision      Functional Mobility/Transfers:  Sit <> Stand Transfer with stand by assistance  with  rolling walker   From EOB and toilet SBA  Pt experienced 1 LOB during transfer > BS chair and required Mod (A) to sit  Toilet Transfer, including functional ambulation EOB>toilet>bedside chair with stand by assistance with  rolling walker  Step transfer > bedside chair intitially SBA however when turning pt with LOB requiring Mod (A) for safety    Activities of Daily Living:  Grooming: stand by assistance to wash hands and face in standing at sink with RW  Toileting: contact guard assistance for balance/endurance as pt performed trunk flexion to cleanse anteriorly in standing with RW      [facility] 6 Click ADL: 21    Treatment & Education:  -Education on energy conservation and task modification to maximize safety and (I) during ADLs and mobility  -Education on importance of OOB activity to improve overall activity tolerance and promote recovery  -Pt educated to call for assistance and to transfer with hospital staff only  -Provided education regarding role of OT, POC, & discharge recommendations with pt verbalizing understanding.  Pt had no further questions & when asked whether there were any concerns pt reported none.     Patient left up in chair with all lines intact, call button in reach, and RN notified    GOALS:   Multidisciplinary Problems       Occupational Therapy Goals          Problem: Occupational Therapy    Goal Priority Disciplines Outcome Interventions   Occupational Therapy Goal     OT, PT/OT Ongoing, Progressing    Description: Goals to be met by: 9/20/22     Patient will increase functional independence with ADLs by performing:    UE Dressing with Modified [place].  LE Dressing with Modified [place].  Grooming while seated with Modified [place].  Toileting from toilet/bedside commode with Stand-by Assistance for hygiene and clothing management.   Sitting at edge of bed x15 minutes with  Supervision.  Toilet transfer to toilet/bedside commode with Stand-by Assistance and LRAD.                         Time Tracking:     OT Date of Treatment: 09/01/22  OT Start Time: 1137  OT Stop Time: 1150  OT Total Time (min): 13 min    Billable Minutes:Self Care/Home Management 13    OT/SHA: OT     SHA Visit Number: 0    9/1/2022

## 2022-09-01 NOTE — SUBJECTIVE & OBJECTIVE
Interval History: No acute events overnight. Pending discussion with PHN over patient's SNF denial. Currently she is able to ambulate to the restroom with assistance of nursing/PT. She has not been allowed to walk the halls yet based off of her physical needs and examinations by PT. Otherwise, she reports her last BM being two days ago. No wheezing on exam    Review of Systems   Constitutional:  Negative for chills and fever.   HENT:  Negative for congestion and sore throat.    Eyes:  Negative for photophobia and visual disturbance.   Respiratory:  Positive for cough (occasional). Negative for shortness of breath.    Cardiovascular:  Negative for chest pain and palpitations.   Gastrointestinal:  Negative for abdominal pain, constipation, diarrhea, nausea and vomiting.   Endocrine: Negative for cold intolerance and heat intolerance.   Genitourinary:  Negative for dysuria and hematuria.   Musculoskeletal:  Negative for arthralgias and myalgias.   Skin:  Negative for rash.   Allergic/Immunologic: Negative for environmental allergies and food allergies.   Neurological:  Positive for weakness. Negative for dizziness, seizures, syncope and headaches.   Hematological:  Negative for adenopathy. Does not bruise/bleed easily.   Psychiatric/Behavioral:  Negative for hallucinations and suicidal ideas.    Objective:     Vital Signs (Most Recent):  Temp: 98.2 °F (36.8 °C) (09/01/22 1133)  Pulse: 99 (09/01/22 1230)  Resp: 15 (09/01/22 1314)  BP: 135/88 (09/01/22 1230)  SpO2: 97 % (09/01/22 1230) Vital Signs (24h Range):  Temp:  [98 °F (36.7 °C)-98.2 °F (36.8 °C)] 98.2 °F (36.8 °C)  Pulse:  [73-99] 99  Resp:  [15-18] 15  SpO2:  [92 %-97 %] 97 %  BP: (121-153)/(63-88) 135/88     Weight: 59.6 kg (131 lb 6.3 oz)  Body mass index is 24.03 kg/m².    Intake/Output Summary (Last 24 hours) at 9/1/2022 1426  Last data filed at 9/1/2022 1156  Gross per 24 hour   Intake 1010 ml   Output 2350 ml   Net -1340 ml      Physical  Exam  Constitutional:       Appearance: She is well-developed.   HENT:      Head: Normocephalic and atraumatic.   Eyes:      Conjunctiva/sclera: Conjunctivae normal.      Pupils: Pupils are equal, round, and reactive to light.   Neck:      Thyroid: No thyromegaly.      Vascular: No JVD.   Cardiovascular:      Rate and Rhythm: Normal rate and regular rhythm.      Heart sounds: Normal heart sounds. No murmur heard.    No friction rub. No gallop.   Pulmonary:      Effort: Pulmonary effort is normal.      Breath sounds: Normal breath sounds. No wheezing or rales.   Abdominal:      General: Bowel sounds are normal. There is no distension.      Palpations: Abdomen is soft.      Tenderness: There is no abdominal tenderness. There is no guarding or rebound.   Musculoskeletal:         General: No tenderness. Normal range of motion.      Cervical back: Neck supple.   Skin:     General: Skin is warm and dry.   Neurological:      Mental Status: She is alert and oriented to person, place, and time.      Cranial Nerves: No cranial nerve deficit.   Psychiatric:         Behavior: Behavior normal.       Significant Labs: All pertinent labs within the past 24 hours have been reviewed.  CBC: No results for input(s): WBC, HGB, HCT, PLT in the last 48 hours.  CMP:   Recent Labs   Lab 08/31/22  1103      K 3.8      CO2 24   *   BUN 11   CREATININE 0.7   CALCIUM 9.2   ALBUMIN 2.7*   ANIONGAP 8       Significant Imaging: I have reviewed all pertinent imaging results/findings within the past 24 hours.

## 2022-09-01 NOTE — PROGRESS NOTES
[facility] - Transplant Stepdown  Adult Nutrition  Progress Note    SUMMARY       Recommendations    1. When medically able, ADAT Diabetic diet with texture per SLP + Boost glucose control TID     2. RD to monitor and follow    Goals: Meet % EEN, EPN by RD f/u  Nutrition Goal Status: progressing towards goal  Communication of RD Recs:  (POC)    Assessment and Plan    Moderate malnutrition  Moderate Malnutrition  Nutrition Problem  Moderate Protein-Calorie Malnutrition  Malnutrition in the context of Chronic Illness    Related to (etiology):   Inadequate energy intake    Signs and Symptoms (as evidenced by):   Energy Intake: <75% of estimated energy requirement for >3 months  Body Fat Depletion: mild depletion of orbital, triceps  Muscle Mass Depletion: mild depletion of temples, clavicle region, interosseous muscle, lower extremities    Interventions/Recommendations (treatment strategy):  Collaboration of nutrition care with other providers  ONS    Nutrition Diagnosis Status:   Continue    Malnutrition Assessment  Weight Loss (Malnutrition):  (7% x 4 months)  Energy Intake (Malnutrition): less than 75% for greater than or equal to 3 months   Orbital Region (Subcutaneous Fat Loss): mild depletion  Upper Arm Region (Subcutaneous Fat Loss): mild depletion   Baptist Region (Muscle Loss): mild depletion  Clavicle Bone Region (Muscle Loss): mild depletion  Clavicle and Acromion Bone Region (Muscle Loss): mild depletion  Dorsal Hand (Muscle Loss): mild depletion  Anterior Thigh Region (Muscle Loss): mild depletion  Posterior Calf Region (Muscle Loss): mild depletion     Reason for Assessment    Reason For Assessment: RD follow-up  Diagnosis:  (COPD)  Relevant Medical History: T2DM, Iron deficiency anemia, CAD  Interdisciplinary Rounds: did not attend    General Information Comments:   Pt with PMH of right lung adenocarcinoma receiving radiation therapy, COPD and T2DM.   (9/1) Pt reports food appetite, tolerate 75% of  "meals. Denies N/V, chewing/swallowing difficulties. Wt stable since admit.   (8/24)  Pt reports decreased appetite in the past 3 months. Usually eat only 1 meal per day at home. Pt with elevated A1c at 9.0. Pt was compliant to insulin but not following diabetic diet. Carbohydrate counting education provided to pt with handout. Encourage PO intake, pt agreeable to ONS. Pt verbalized understanding. Per wt hx, noted wt loss of 10 lb (7%) x 4 months, which is not significant. NFPE completed today, noted mild muscle and fat depletion. Pt meet the criteria of moderate malnutrition in the context of chronic illness aeb decreased PO intake and muscle/fat depletion.     Nutrition Discharge Planning: Educated pt on CHO counting for people with diabetes on 8/24. Pt verbalized understanding. Emphasized the importance of diet adherence. Pt with no additional questions. No other needs identified. Left all education material with pt at bedside.    Nutrition Risk Screen    Nutrition Risk Screen: no indicators present    Nutrition/Diet History    Spiritual, Cultural Beliefs, Congregational Practices, Values that Affect Care: no    Anthropometrics    Temp: 98.7 °F (37.1 °C)  Height Method: Stated  Height: 5' 2" (157.5 cm)  Height (inches): 62 in  Weight Method: Bed Scale  Weight: 59.6 kg (131 lb 6.3 oz)  Weight (lb): 131.4 lb  Ideal Body Weight (IBW), Female: 110 lb  % Ideal Body Weight, Female (lb): 117.85 %  BMI (Calculated): 24     Lab/Procedures/Meds    Pertinent Labs Reviewed: reviewed  Pertinent Labs Comments: glucose 212  Pertinent Medications Reviewed: reviewed  Pertinent Medications Comments: insulin, lactulose, ferrous sulfate, Mg oxide, polyethylene glycol    Estimated/Assessed Needs    Weight Used For Calorie Calculations: 59 kg (130 lb 1.1 oz)  Energy Calorie Requirements (kcal): 8568-3926 kcal  Energy Need Method: Kcal/kg (25-30)  Protein Requirements: 59-70g (1-1.2g/kg)  Weight Used For Protein Calculations: 59 kg (130 lb " 1.1 oz)  Fluid Requirements (mL): 1ml/kcal or per MD  Estimated Fluid Requirement Method: RDA Method  RDA Method (mL): 1475  CHO Requirement: 184g    Nutrition Prescription Ordered    Current Diet Order: Regular    Evaluation of Received Nutrient/Fluid Intake    Energy Calories Required: not meeting needs  Protein Required: not meeting needs  Comments: LBM 8/29  Tolerance: tolerating    Nutrition Risk    Level of Risk/Frequency of Follow-up: low     Monitor and Evaluation    Food and Nutrient Intake: energy intake, food and beverage intake  Food and Nutrient Adminstration: diet order  Knowledge/Beliefs/Attitudes: food and nutrition knowledge/skill, beliefs and attitudes  Physical Activity and Function: nutrition-related ADLs and IADLs  Anthropometric Measurements: height/length, weight, weight change, body mass index  Biochemical Data, Medical Tests and Procedures: electrolyte and renal panel, glucose/endocrine profile, gastrointestinal profile, lipid profile, inflammatory profile  Nutrition-Focused Physical Findings: overall appearance     Nutrition Follow-Up    RD Follow-up?: Yes    David CUMMINGS

## 2022-09-01 NOTE — PLAN OF CARE
Pt aaox4. Pt instructed to call for assistance c needs to avoid injury. VSS o/n. Pt c/o cough and abd pain; prn lortab and codeine/promethazine given as ordered c relief. Pt denied SNF placement; appeal in process. Pts questions and concerns addressed.

## 2022-09-01 NOTE — ASSESSMENT & PLAN NOTE
RESOLVED  Worsening dyspnea and cough after radiation treatment giving concern for radiation pneumonitis vs COPD exacerbation causing symptoms  - Completed Prednisone 40mg po daily x 5 days   - apply Oxygen 1-2L to achieve O2 sat 88-92% given hx of COPD  - PRN DUO Nebs  - Completed Azithromycin 500mg x 3 days   - Use BREO + spiriva in place of home TRELEGY  - Prometh-codeine for cough

## 2022-09-02 LAB
POCT GLUCOSE: 100 MG/DL (ref 70–110)
POCT GLUCOSE: 179 MG/DL (ref 70–110)
POCT GLUCOSE: 197 MG/DL (ref 70–110)
POCT GLUCOSE: 217 MG/DL (ref 70–110)

## 2022-09-02 PROCEDURE — 94761 N-INVAS EAR/PLS OXIMETRY MLT: CPT

## 2022-09-02 PROCEDURE — 97535 SELF CARE MNGMENT TRAINING: CPT

## 2022-09-02 PROCEDURE — 94640 AIRWAY INHALATION TREATMENT: CPT

## 2022-09-02 PROCEDURE — 20600001 HC STEP DOWN PRIVATE ROOM

## 2022-09-02 PROCEDURE — 25000003 PHARM REV CODE 250: Performed by: STUDENT IN AN ORGANIZED HEALTH CARE EDUCATION/TRAINING PROGRAM

## 2022-09-02 PROCEDURE — 97116 GAIT TRAINING THERAPY: CPT

## 2022-09-02 PROCEDURE — 25000242 PHARM REV CODE 250 ALT 637 W/ HCPCS: Performed by: STUDENT IN AN ORGANIZED HEALTH CARE EDUCATION/TRAINING PROGRAM

## 2022-09-02 PROCEDURE — 63600175 PHARM REV CODE 636 W HCPCS: Performed by: HOSPITALIST

## 2022-09-02 PROCEDURE — 27000221 HC OXYGEN, UP TO 24 HOURS

## 2022-09-02 PROCEDURE — 99231 PR SUBSEQUENT HOSPITAL CARE,LEVL I: ICD-10-PCS | Mod: ,,, | Performed by: STUDENT IN AN ORGANIZED HEALTH CARE EDUCATION/TRAINING PROGRAM

## 2022-09-02 PROCEDURE — 25000003 PHARM REV CODE 250: Performed by: HOSPITALIST

## 2022-09-02 PROCEDURE — 99231 SBSQ HOSP IP/OBS SF/LOW 25: CPT | Mod: ,,, | Performed by: STUDENT IN AN ORGANIZED HEALTH CARE EDUCATION/TRAINING PROGRAM

## 2022-09-02 RX ADMIN — VERAPAMIL HYDROCHLORIDE 180 MG: 180 TABLET, FILM COATED, EXTENDED RELEASE ORAL at 08:09

## 2022-09-02 RX ADMIN — INSULIN ASPART 2 UNITS: 100 INJECTION, SOLUTION INTRAVENOUS; SUBCUTANEOUS at 09:09

## 2022-09-02 RX ADMIN — ENOXAPARIN SODIUM 40 MG: 100 INJECTION SUBCUTANEOUS at 05:09

## 2022-09-02 RX ADMIN — ASPIRIN 81 MG CHEWABLE TABLET 81 MG: 81 TABLET CHEWABLE at 08:09

## 2022-09-02 RX ADMIN — HYDROCODONE BITARTRATE AND ACETAMINOPHEN 15 ML: 7.5; 325 SOLUTION ORAL at 02:09

## 2022-09-02 RX ADMIN — INSULIN ASPART 7 UNITS: 100 INJECTION, SOLUTION INTRAVENOUS; SUBCUTANEOUS at 05:09

## 2022-09-02 RX ADMIN — POLYETHYLENE GLYCOL 3350 17 G: 17 POWDER, FOR SOLUTION ORAL at 08:09

## 2022-09-02 RX ADMIN — INSULIN DETEMIR 25 UNITS: 100 INJECTION, SOLUTION SUBCUTANEOUS at 09:09

## 2022-09-02 RX ADMIN — QUETIAPINE FUMARATE 400 MG: 100 TABLET ORAL at 08:09

## 2022-09-02 RX ADMIN — Medication 400 MG: at 08:09

## 2022-09-02 RX ADMIN — SENNOSIDES AND DOCUSATE SODIUM 1 TABLET: 50; 8.6 TABLET ORAL at 08:09

## 2022-09-02 RX ADMIN — BENZONATATE 200 MG: 100 CAPSULE ORAL at 08:09

## 2022-09-02 RX ADMIN — ATORVASTATIN CALCIUM 40 MG: 20 TABLET, FILM COATED ORAL at 08:09

## 2022-09-02 RX ADMIN — INSULIN ASPART 7 UNITS: 100 INJECTION, SOLUTION INTRAVENOUS; SUBCUTANEOUS at 08:09

## 2022-09-02 RX ADMIN — HYDROCODONE BITARTRATE AND ACETAMINOPHEN 15 ML: 7.5; 325 SOLUTION ORAL at 10:09

## 2022-09-02 RX ADMIN — GUAIFENESIN 600 MG: 600 TABLET, EXTENDED RELEASE ORAL at 08:09

## 2022-09-02 RX ADMIN — FLUTICASONE FUROATE AND VILANTEROL TRIFENATATE 1 PUFF: 100; 25 POWDER RESPIRATORY (INHALATION) at 09:09

## 2022-09-02 RX ADMIN — MELATONIN TAB 3 MG 6 MG: 3 TAB at 08:09

## 2022-09-02 RX ADMIN — SERTRALINE HYDROCHLORIDE 150 MG: 50 TABLET ORAL at 05:09

## 2022-09-02 RX ADMIN — PREGABALIN 150 MG: 75 CAPSULE ORAL at 02:09

## 2022-09-02 RX ADMIN — PREGABALIN 150 MG: 75 CAPSULE ORAL at 08:09

## 2022-09-02 RX ADMIN — BENZONATATE 200 MG: 100 CAPSULE ORAL at 02:09

## 2022-09-02 RX ADMIN — INSULIN ASPART 7 UNITS: 100 INJECTION, SOLUTION INTRAVENOUS; SUBCUTANEOUS at 01:09

## 2022-09-02 RX ADMIN — TIOTROPIUM BROMIDE INHALATION SPRAY 2 PUFF: 3.12 SPRAY, METERED RESPIRATORY (INHALATION) at 09:09

## 2022-09-02 RX ADMIN — FERROUS SULFATE TAB 325 MG (65 MG ELEMENTAL FE) 1 EACH: 325 (65 FE) TAB at 08:09

## 2022-09-02 NOTE — PLAN OF CARE
Problem: Adult Inpatient Plan of Care  Goal: Plan of Care Review  Outcome: Ongoing, Progressing  Goal: Patient-Specific Goal (Individualized)  Outcome: Ongoing, Progressing  Goal: Absence of Hospital-Acquired Illness or Injury  Outcome: Ongoing, Progressing  Goal: Optimal Comfort and Wellbeing  Outcome: Ongoing, Progressing  Goal: Readiness for Transition of Care  Outcome: Ongoing, Progressing     Problem: Infection  Goal: Absence of Infection Signs and Symptoms  Outcome: Ongoing, Progressing     Problem: Skin Injury Risk Increased  Goal: Skin Health and Integrity  Outcome: Ongoing, Progressing     Problem: Diabetes Comorbidity  Goal: Blood Glucose Level Within Targeted Range  Outcome: Ongoing, Progressing     Problem: Fall Injury Risk  Goal: Absence of Fall and Fall-Related Injury  Outcome: Ongoing, Progressing   [id number]  Neuro: A&Ox4, calm and cooperative with care, slightly hard of hearing   CV: VSS  Resp: VSS on 2L via nasal cannula. Lungs sounds diminished to bilateral lower lobes on auscultation. Occasional congested cough. Deep breathing encouraged.   Skin: Skin warm, dry, and intact. Frequent weight shift and active ROM encouraged. Heels floated bilaterally when in bed.    GI/: Continent of bowel and bladder.   M/S: Standby assist, no device needed. Frequent ambulation promoted.   Pain: Complained of 9/10 pain this afternoon, PRN hydrocodone given per MAR to good effect, 2/10 on reassessment.     Frequently rounded, call bell and personal items within reach. For further information please refer to flowsheet.

## 2022-09-02 NOTE — PLAN OF CARE
Problem: Physical Therapy  Goal: Physical Therapy Goal  Description: Goals to be met by: 22    Patient will increase functional independence with mobility by performin. Supine to sit with supervision -not met  2. Sit to supine with supervision -not met  3. Sit to stand transfer with supervision and RW-not met  4. Gait  x 100 feet with supervision using LRAD as needed -not met    Outcome: Ongoing, Progressing   Goals continue to remain appropriate. 2022

## 2022-09-02 NOTE — CONSULTS
[facility] Transplant [facility] Medicine  Telemedicine Consult Note    Patient Name: Chayito Chung  MRN: [id number]  Admission Date: 8/22/2022  Hospital Length of Stay: 8 days  Attending Physician: Arianne Velázquez MD   Primary Care Provider: Curt Valladares III, MD         Thank you for your consult to [facility]. We have reviewed the patient chart. This patient does not meet criteria for [facility] service at this time due to Patient has a condition likely to benefit from in-depth physical exam and palpation / auscultation. Will hand back to In-house service..        Pili Carlson MD  Department of Hospital Medicine   [facility]

## 2022-09-02 NOTE — PLAN OF CARE
- Pt AAOx4, afebrile, VSS, 2L via NC satting above 92%  - PRN pain medication administered x1  - PRN melatonin   - ACHS accuchecks.  at bedtime. Scheduled 25 units detemir and PRN 1 unit SSI administered.   - Pt refusing bowel regimen, states having large BM this evening  - Bed in lowest locked position, call light and personal items in reach, nonskid socks on, verbalized understanding to call for assistance

## 2022-09-02 NOTE — PT/OT/SLP PROGRESS
Physical Therapy Treatment    Patient Name:  Chayito Chung   MRN:  [id number]    Recommendations:     Discharge Recommendations:  nursing facility, skilled   Discharge Equipment Recommendations: walker, rolling   Barriers to discharge: None    Assessment:     Chayito Chung is a 71 y.o. female admitted with a medical diagnosis of Chronic obstructive pulmonary disease with acute exacerbation.  She presents with the following impairments/functional limitations:  weakness, impaired endurance, gait instability Pt tolerated PT treatment well today as she was able to walk 158' with one break. Pt will benefit from cont skilled PT 3x/wk to progress physically. Pt lives alone in an independent living facility and will need to be mod I to be able to return home, pt will need SNF placement to maximize rehab potential.        Rehab Prognosis: Good; patient would benefit from acute skilled PT services to address these deficits and reach maximum level of function.    Recent Surgery: * No surgery found *      Plan:     During this hospitalization, patient to be seen 3 x/week to address the identified rehab impairments via gait training, therapeutic activities and progress toward the following goals:    Plan of Care Expires:  09/22/22    Subjective     Chief Complaint: being tired   Patient/Family Comments/goals: to go to home and to get stronger  Pain/Comfort:  Pain Rating 1: 0/10  Pain Rating Post-Intervention 1: 0/10      Objective:     Communicated with nurse prior to session.  Patient found up in chair with pulse ox (continuous), oxygen (VISI, hep lock IV) upon PT entry to room.     General Precautions: Standard, fall   Orthopedic Precautions:N/A   Braces:    Respiratory Status: Nasal cannula, flow 2 L/min     Functional Mobility:  Transfers:     Sit to Stand:  contact guard assistance with rolling walker x 2 reps . Pt received verbal cues on hand placement for safety with transfers.  Gait: Pt gait trained with CGA, RW with O2  and a mask on for 86'ft before sitting break, 72' after break total of 158'. Chair followed pt while walking.       AM-PAC 6 CLICK MOBILITY  Turning over in bed (including adjusting bedclothes, sheets and blankets)?: 3  Sitting down on and standing up from a chair with arms (e.g., wheelchair, bedside commode, etc.): 3  Moving from lying on back to sitting on the side of the bed?: 3  Moving to and from a bed to a chair (including a wheelchair)?: 3  Need to walk in hospital room?: 3  Climbing 3-5 steps with a railing?: 2  Basic Mobility Total Score: 17       Therapeutic Activities and Exercises:   Pt was educated verbally on role of PT and POC. Pt expressed verbal understanding.    Patient left up in chair with all lines intact and call button in reach..    GOALS:   Multidisciplinary Problems       Physical Therapy Goals          Problem: Physical Therapy    Goal Priority Disciplines Outcome Goal Variances Interventions   Physical Therapy Goal     PT, PT/OT Ongoing, Progressing     Description: Goals to be met by: 22    Patient will increase functional independence with mobility by performin. Supine to sit with supervision -not met  2. Sit to supine with supervision -not met  3. Sit to stand transfer with supervision -not met  4. Gait  x 100 feet with supervision using LRAD as needed -not met                         Time Tracking:     PT Received On: 22  PT Start Time: 1326     PT Stop Time: 1345  PT Total Time (min): 19 min     Billable Minutes: Gait Training 19 minutes    Treatment Type: Treatment  PT/PTA: PT     PTA Visit Number: 0     2022

## 2022-09-02 NOTE — ASSESSMENT & PLAN NOTE
Secondary to constipation from opioids which she takes for her lung CA  - s/p gastrograffin study with contrast seen in bowel during second KUB. Advanced from CLD to Regular diet during admission  - Advanced to regular diet  - Last BM yesterday

## 2022-09-02 NOTE — PT/OT/SLP PROGRESS
"Occupational Therapy   Treatment    Name: Chayito Chung  MRN: [id number]  Admitting Diagnosis:  Chronic obstructive pulmonary disease with acute exacerbation       Recommendations:     Discharge Recommendations: nursing facility, skilled  Discharge Equipment Recommendations:  none  Barriers to discharge:  None    Assessment:     Chayito Chung is a 71 y.o. female with a medical diagnosis of Chronic obstructive pulmonary disease with acute exacerbation. Patient tolerated OT session fair. She fatigued with ambulation. She required contact guard assistance for toilet transfer/task along with grooming task in standing. Performance deficits affecting function are weakness, impaired functional mobility, impaired cardiopulmonary response to activity, gait instability, impaired balance, impaired endurance, impaired self care skills.     Rehab Prognosis:  Good; patient would benefit from acute skilled OT services to address these deficits and reach maximum level of function.       Plan:     Patient to be seen 3 x/week to address the above listed problems via self-care/home management, therapeutic activities, therapeutic exercises  Plan of Care Expires: 10/22/22  Plan of Care Reviewed with: patient    Subjective     "I am done"     Pain/Comfort:  Pain Rating 1: 0/10    Objective:     Communicated with: RN prior to session.  Patient found up in chair with oxygen upon OT entry to room.    General Precautions: Standard, fall   Orthopedic Precautions:N/A   Braces: N/A  Respiratory Status: Nasal cannula, flow 2 L/min     Occupational Performance:     Functional Mobility/Transfers:  Patient completed Sit <> Stand Transfer with contact guard assistance  with  rolling walker   Patient completed Toilet Transfer Step Transfer technique with contact guard assistance with  rolling walker onto regular toilet   Functional Mobility: patient ambulated 10 feet with rolling walker at contact guard assistance and then 24 feet with rolling walker " at contact guard assistance     Activities of Daily Living:  Grooming: contact guard assistance standing at sink for oral care, washing face and washing hands   Toileting: contact guard assistance completed on regular toilet       [facility] 6 Click ADL: 19    Treatment & Education:  -Patient educated on hand placement for transfers   -Patient educated on rolling walker placement for ADLs  -Patient educated on sitting up in chair throughout day   -Patient educated on calling for assistance when needed  -Patient educated on role of OT and plan of care     Patient left up in chair with all lines intact, call button in reach, and RN notified    GOALS:   Multidisciplinary Problems       Occupational Therapy Goals          Problem: Occupational Therapy    Goal Priority Disciplines Outcome Interventions   Occupational Therapy Goal     OT, PT/OT Ongoing, Progressing    Description: Goals to be met by: 9/20/22     Patient will increase functional independence with ADLs by performing:    UE Dressing with Modified Mon[place].  LE Dressing with Modified [place]ument.  Grooming while seated with Modified [place].  Toileting from toilet/bedside commode with Stand-by Assistance for hygiene and clothing management.   Sitting at edge of bed x15 minutes with Supervision.  Toilet transfer to toilet/bedside commode with Stand-by Assistance and LRAD.                         Time Tracking:     OT Date of Treatment: 09/02/22  OT Start Time: 0947  OT Stop Time: 1000  OT Total Time (min): 13 min    Billable Minutes:Self Care/Home Management 13          [facility] Visit Number: 0    9/2/2022

## 2022-09-02 NOTE — PROGRESS NOTES
[facility] Medicine  Progress Note    Patient Name: Chayito Chung  MRN: [id number]  Patient Class: IP- Inpatient   Admission Date: 8/22/2022  Length of Stay: 9 days  Attending Physician: Arianne Velázquez MD  Primary Care Provider: Curt Valladares III, MD        Subjective:     Principal Problem:Chronic obstructive pulmonary disease with acute exacerbation        HPI:  71-year-old  woman with a history of right lung adenocarcinoma, COPD, restrictive lung disease, tobacco abuse, CAD status post PCI, type 2 diabetes on insulin, chronic neck pain who presents to the emergency department from primary care clinic for complaints of dyspnea and abdominal distension.    She is followed by Oncology and Radiation Oncology at [facility], undergoing 4 cycles of radiation therapy last of which is Wednesday 8/24.  Completed radiation on Monday but then was also seen in primary care clinic and noted to be fatigued tachycardic and tachypneic reporting a persistent cough after her radiation treatment she also reported neck pain and headache.  She has a chronic cough but reports that symptoms are worse today, she is not taking any home oxygen she has home inhalers but does not seem that she has taken any inhalers or nebulizer treatments.  She feels more short of breath than her baseline    She also reports over the last week progressive abdominal distension and discomfort states that today she has had 1 watery bowel movement has been having irregular bowel movements but when she does have 1 is often a loose BM.    During the ED workup hemoglobin noted to be critical at 6.8 patient has been consented in 1 unit of PRBCs is infusing now during my interview, CT a chest and CT abdomen pelvis completed negative for PE but abdominal imaging displays distended bowel without a transition point possible ileus versus developing SBO.  General surgery consult completed in ED      Overview/Hospital  Course:  Shortness of breath and cough improved and patient back to baseline respiratory wise. Patient was denied SNF by her insurance company. Spoke with [facility] representative 9/2, will await their approval/denial      Interval History: No acute events overnight. Spoke with [facility] rep today who stated he would refer her case back for approval vs denial. Patient otherwise has no complaints. Had BM last night    Review of Systems   Constitutional:  Negative for chills and fever.   HENT:  Negative for congestion and sore throat.    Eyes:  Negative for photophobia and visual disturbance.   Respiratory:  Positive for cough (occasional). Negative for shortness of breath.    Cardiovascular:  Negative for chest pain and palpitations.   Gastrointestinal:  Negative for abdominal pain, constipation, diarrhea, nausea and vomiting.   Endocrine: Negative for cold intolerance and heat intolerance.   Genitourinary:  Negative for dysuria and hematuria.   Musculoskeletal:  Negative for arthralgias and myalgias.   Skin:  Negative for rash.   Allergic/Immunologic: Negative for environmental allergies and food allergies.   Neurological:  Positive for weakness. Negative for dizziness, seizures, syncope and headaches.   Hematological:  Negative for adenopathy. Does not bruise/bleed easily.   Psychiatric/Behavioral:  Negative for hallucinations and suicidal ideas.    Objective:     Vital Signs (Most Recent):  Temp: 98.8 °F (37.1 °C) (09/02/22 1135)  Pulse: 99 (09/02/22 1135)  Resp: 18 (09/02/22 1135)  BP: 116/63 (09/02/22 1135)  SpO2: (!) 93 % (09/02/22 1135) Vital Signs (24h Range):  Temp:  [98.5 °F (36.9 °C)-98.8 °F (37.1 °C)] 98.8 °F (37.1 °C)  Pulse:  [] 99  Resp:  [17-22] 18  SpO2:  [93 %-96 %] 93 %  BP: (116-138)/(63-91) 116/63     Weight: 59.8 kg (131 lb 13.4 oz)  Body mass index is 24.11 kg/m².    Intake/Output Summary (Last 24 hours) at 9/2/2022 1422  Last data filed at 9/2/2022 0500  Gross per 24 hour   Intake 770 ml   Output  900 ml   Net -130 ml        Physical Exam  Constitutional:       Appearance: She is well-developed.   HENT:      Head: Normocephalic and atraumatic.   Eyes:      Conjunctiva/sclera: Conjunctivae normal.      Pupils: Pupils are equal, round, and reactive to light.   Neck:      Thyroid: No thyromegaly.      Vascular: No JVD.   Cardiovascular:      Rate and Rhythm: Normal rate and regular rhythm.      Heart sounds: Normal heart sounds. No murmur heard.    No friction rub. No gallop.   Pulmonary:      Effort: Pulmonary effort is normal.      Breath sounds: Normal breath sounds. No wheezing or rales.   Abdominal:      General: Bowel sounds are normal. There is no distension.      Palpations: Abdomen is soft.      Tenderness: There is no abdominal tenderness. There is no guarding or rebound.   Musculoskeletal:         General: No tenderness. Normal range of motion.      Cervical back: Neck supple.   Skin:     General: Skin is warm and dry.   Neurological:      Mental Status: She is alert and oriented to person, place, and time.      Cranial Nerves: No cranial nerve deficit.   Psychiatric:         Behavior: Behavior normal.       Significant Labs: All pertinent labs within the past 24 hours have been reviewed.  CBC: No results for input(s): WBC, HGB, HCT, PLT in the last 48 hours.  CMP:   No results for input(s): NA, K, CL, CO2, GLU, BUN, CREATININE, CALCIUM, PROT, ALBUMIN, BILITOT, ALKPHOS, AST, ALT, ANIONGAP, EGFRNONAA in the last 48 hours.    Invalid input(s): ESTGFAFR[place]      Significant Imaging: I have reviewed all pertinent imaging results/findings within the past 24 hours.      Assessment/Plan:      * Chronic obstructive pulmonary disease with acute exacerbation  RESOLVED  Worsening dyspnea and cough after radiation treatment giving concern for radiation pneumonitis vs COPD exacerbation causing symptoms  - Completed Prednisone 40mg po daily x 5 days   - apply Oxygen 1-2L to achieve O2 sat 88-92% given hx of COPD  -  PRN DUO Nebs  - Completed Azithromycin 500mg x 3 days   - Use BREO + spiriva in place of home TRELEGY  - Prometh-codeine for cough      Abdominal distension  Secondary to constipation from opioids which she takes for her lung CA  - s/p gastrograffin study with contrast seen in bowel during second KUB. Advanced from CLD to Regular diet during admission  - Advanced to regular diet  - Last BM yesterday    Adenocarcinoma of lung, right  F/u with [facility] - oncology and rad-onc when stable for discharge.  She may need to reschedule next radiation appointment in light of current hospitalization.   -See care everywhere for records/notes      Type 2 diabetes mellitus, with long-term current use of insulin  Patient's FSGs are uncontrolled due to hypoglycemia on current medication regimen.  Last A1c reviewed-   Lab Results   Component Value Date    HGBA1C 9.0 (H) 07/14/2022     Most recent fingerstick glucose reviewed-   Recent Labs   Lab 08/23/22  1800 08/23/22  2148 08/24/22  0908 08/24/22  1233   POCTGLUCOSE 285* 220* 182* 288*     Current correctional scale  Medium  Maintain anti-hyperglycemic dose as follows-   Antihyperglycemics (From admission, onward)            Start     Stop Route Frequency Ordered    08/24/22 1645  insulin aspart U-100 pen 5 Units         -- SubQ 3 times daily with meals 08/24/22 1327    08/23/22 2100  insulin detemir U-100 pen 25 Units         -- SubQ Nightly 08/23/22 0045    08/23/22 0232  insulin aspart U-100 pen 1-10 Units         -- SubQ Before meals & nightly PRN 08/23/22 0137        Hold Oral hypoglycemics while patient is in the hospital.    At home patient reports using a Combo insulin - HUMALOG 75/25 - 35 units BID AC  And LANTUS insulin 25 units Nightly   - Continue Basal insulin 25 units Levemir Nightly  - Continue aspart 7u TID  - Moderate dose sliding scale      Iron deficiency anemia  Patient with worsening acute on chronic anemia   - s/p 1unit PRBC in ED  - Hgb stable    Coronary artery  disease of native artery of native heart with stable angina pectoris  Continue asa/statin  -pt not on beta blocker   -EKG w/o ischemic changes. Negative troponin x 1        VTE Risk Mitigation (From admission, onward)         Ordered     enoxaparin injection 40 mg  Daily         08/23/22 0137     IP VTE HIGH RISK PATIENT  Once         08/23/22 0137     Place sequential compression device  Until discontinued         08/23/22 0137                Discharge Planning   MICKY: 9/2/2022     Code Status: DNR   Is the patient medically ready for discharge?: Yes    Reason for patient still in hospital (select all that apply): Patient trending condition  Discharge Plan A: Home Health                  Arianne Velázquez MD  Department of Hospital Medicine   [facility] - Transplant Stepdown

## 2022-09-02 NOTE — SUBJECTIVE & OBJECTIVE
Interval History: No acute events overnight. Spoke with [employer] rep today who stated he would refer her case back for approval vs denial. Patient otherwise has no complaints. Had BM last night    Review of Systems   Constitutional:  Negative for chills and fever.   HENT:  Negative for congestion and sore throat.    Eyes:  Negative for photophobia and visual disturbance.   Respiratory:  Positive for cough (occasional). Negative for shortness of breath.    Cardiovascular:  Negative for chest pain and palpitations.   Gastrointestinal:  Negative for abdominal pain, constipation, diarrhea, nausea and vomiting.   Endocrine: Negative for cold intolerance and heat intolerance.   Genitourinary:  Negative for dysuria and hematuria.   Musculoskeletal:  Negative for arthralgias and myalgias.   Skin:  Negative for rash.   Allergic/Immunologic: Negative for environmental allergies and food allergies.   Neurological:  Positive for weakness. Negative for dizziness, seizures, syncope and headaches.   Hematological:  Negative for adenopathy. Does not bruise/bleed easily.   Psychiatric/Behavioral:  Negative for hallucinations and suicidal ideas.    Objective:     Vital Signs (Most Recent):  Temp: 98.8 °F (37.1 °C) (09/02/22 1135)  Pulse: 99 (09/02/22 1135)  Resp: 18 (09/02/22 1135)  BP: 116/63 (09/02/22 1135)  SpO2: (!) 93 % (09/02/22 1135) Vital Signs (24h Range):  Temp:  [98.5 °F (36.9 °C)-98.8 °F (37.1 °C)] 98.8 °F (37.1 °C)  Pulse:  [] 99  Resp:  [17-22] 18  SpO2:  [93 %-96 %] 93 %  BP: (116-138)/(63-91) 116/63     Weight: 59.8 kg (131 lb 13.4 oz)  Body mass index is 24.11 kg/m².    Intake/Output Summary (Last 24 hours) at 9/2/2022 1422  Last data filed at 9/2/2022 0500  Gross per 24 hour   Intake 770 ml   Output 900 ml   Net -130 ml        Physical Exam  Constitutional:       Appearance: She is well-developed.   HENT:      Head: Normocephalic and atraumatic.   Eyes:      Conjunctiva/sclera: Conjunctivae normal.      Pupils:  Pupils are equal, round, and reactive to light.   Neck:      Thyroid: No thyromegaly.      Vascular: No JVD.   Cardiovascular:      Rate and Rhythm: Normal rate and regular rhythm.      Heart sounds: Normal heart sounds. No murmur heard.    No friction rub. No gallop.   Pulmonary:      Effort: Pulmonary effort is normal.      Breath sounds: Normal breath sounds. No wheezing or rales.   Abdominal:      General: Bowel sounds are normal. There is no distension.      Palpations: Abdomen is soft.      Tenderness: There is no abdominal tenderness. There is no guarding or rebound.   Musculoskeletal:         General: No tenderness. Normal range of motion.      Cervical back: Neck supple.   Skin:     General: Skin is warm and dry.   Neurological:      Mental Status: She is alert and oriented to person, place, and time.      Cranial Nerves: No cranial nerve deficit.   Psychiatric:         Behavior: Behavior normal.       Significant Labs: All pertinent labs within the past 24 hours have been reviewed.  CBC: No results for input(s): WBC, HGB, HCT, PLT in the last 48 hours.  CMP:   No results for input(s): NA, K, CL, CO2, GLU, BUN, CREATININE, CALCIUM, PROT, ALBUMIN, BILITOT, ALKPHOS, AST, ALT, ANIONGAP, EGFRNONAA in the last 48 hours.    Invalid input(s): ESTGFA[place]      Significant Imaging: I have reviewed all pertinent imaging results/findings within the past 24 hours.

## 2022-09-02 NOTE — PLAN OF CARE
Peer-peer completed per Dr. Dwyer. No updates from [facility] regarding SNF decision. Patient to remain Inpatient pending decision, if denied she will discharge home to care of family. Will continue to follow.

## 2022-09-02 NOTE — PLAN OF CARE
SW called Renita brumfield/ANJALI seeking clarity on pt's P2P, Staff reports since PHN MD wasn't able to complete P2P on 09/01, the alternative date 09/02 between 12:00 pm -2:00 pm. CM team to follow.      Jeri Alcala, DRAKE  Case Management   [facility]   Ext. [phone]

## 2022-09-03 PROBLEM — J96.21 ACUTE ON CHRONIC RESPIRATORY FAILURE WITH HYPOXIA: Status: ACTIVE | Noted: 2022-09-03

## 2022-09-03 LAB — POCT GLUCOSE: 216 MG/DL (ref 70–110)

## 2022-09-03 PROCEDURE — 20600001 HC STEP DOWN PRIVATE ROOM

## 2022-09-03 PROCEDURE — 99233 SBSQ HOSP IP/OBS HIGH 50: CPT | Mod: 95,,, | Performed by: INTERNAL MEDICINE

## 2022-09-03 PROCEDURE — 99233 PR SUBSEQUENT HOSPITAL CARE,LEVL III: ICD-10-PCS | Mod: 95,,, | Performed by: INTERNAL MEDICINE

## 2022-09-03 PROCEDURE — 25000003 PHARM REV CODE 250: Performed by: STUDENT IN AN ORGANIZED HEALTH CARE EDUCATION/TRAINING PROGRAM

## 2022-09-03 PROCEDURE — 99900035 HC TECH TIME PER 15 MIN (STAT)

## 2022-09-03 PROCEDURE — 94640 AIRWAY INHALATION TREATMENT: CPT

## 2022-09-03 PROCEDURE — 63600175 PHARM REV CODE 636 W HCPCS: Performed by: HOSPITALIST

## 2022-09-03 PROCEDURE — 25000003 PHARM REV CODE 250: Performed by: HOSPITALIST

## 2022-09-03 RX ORDER — BISACODYL 10 MG
10 SUPPOSITORY, RECTAL RECTAL DAILY PRN
Status: DISCONTINUED | OUTPATIENT
Start: 2022-09-03 | End: 2022-09-08 | Stop reason: HOSPADM

## 2022-09-03 RX ADMIN — INSULIN ASPART 6 UNITS: 100 INJECTION, SOLUTION INTRAVENOUS; SUBCUTANEOUS at 05:09

## 2022-09-03 RX ADMIN — INSULIN ASPART 6 UNITS: 100 INJECTION, SOLUTION INTRAVENOUS; SUBCUTANEOUS at 01:09

## 2022-09-03 RX ADMIN — GUAIFENESIN 600 MG: 600 TABLET, EXTENDED RELEASE ORAL at 08:09

## 2022-09-03 RX ADMIN — HYDROCODONE BITARTRATE AND ACETAMINOPHEN 15 ML: 7.5; 325 SOLUTION ORAL at 09:09

## 2022-09-03 RX ADMIN — BENZONATATE 200 MG: 100 CAPSULE ORAL at 04:09

## 2022-09-03 RX ADMIN — PREGABALIN 150 MG: 75 CAPSULE ORAL at 08:09

## 2022-09-03 RX ADMIN — INSULIN ASPART 7 UNITS: 100 INJECTION, SOLUTION INTRAVENOUS; SUBCUTANEOUS at 05:09

## 2022-09-03 RX ADMIN — BENZONATATE 200 MG: 100 CAPSULE ORAL at 08:09

## 2022-09-03 RX ADMIN — FERROUS SULFATE TAB 325 MG (65 MG ELEMENTAL FE) 1 EACH: 325 (65 FE) TAB at 08:09

## 2022-09-03 RX ADMIN — SENNOSIDES AND DOCUSATE SODIUM 1 TABLET: 50; 8.6 TABLET ORAL at 08:09

## 2022-09-03 RX ADMIN — VERAPAMIL HYDROCHLORIDE 180 MG: 180 TABLET, FILM COATED, EXTENDED RELEASE ORAL at 08:09

## 2022-09-03 RX ADMIN — QUETIAPINE FUMARATE 400 MG: 100 TABLET ORAL at 08:09

## 2022-09-03 RX ADMIN — Medication 400 MG: at 08:09

## 2022-09-03 RX ADMIN — ASPIRIN 81 MG CHEWABLE TABLET 81 MG: 81 TABLET CHEWABLE at 08:09

## 2022-09-03 RX ADMIN — FLUTICASONE FUROATE AND VILANTEROL TRIFENATATE 1 PUFF: 100; 25 POWDER RESPIRATORY (INHALATION) at 08:09

## 2022-09-03 RX ADMIN — SERTRALINE HYDROCHLORIDE 150 MG: 50 TABLET ORAL at 06:09

## 2022-09-03 RX ADMIN — TIOTROPIUM BROMIDE INHALATION SPRAY 2 PUFF: 3.12 SPRAY, METERED RESPIRATORY (INHALATION) at 08:09

## 2022-09-03 RX ADMIN — ATORVASTATIN CALCIUM 40 MG: 20 TABLET, FILM COATED ORAL at 08:09

## 2022-09-03 RX ADMIN — MELATONIN TAB 3 MG 6 MG: 3 TAB at 08:09

## 2022-09-03 RX ADMIN — INSULIN ASPART 7 UNITS: 100 INJECTION, SOLUTION INTRAVENOUS; SUBCUTANEOUS at 12:09

## 2022-09-03 RX ADMIN — INSULIN ASPART 7 UNITS: 100 INJECTION, SOLUTION INTRAVENOUS; SUBCUTANEOUS at 09:09

## 2022-09-03 RX ADMIN — ENOXAPARIN SODIUM 40 MG: 100 INJECTION SUBCUTANEOUS at 04:09

## 2022-09-03 RX ADMIN — INSULIN DETEMIR 25 UNITS: 100 INJECTION, SOLUTION SUBCUTANEOUS at 10:09

## 2022-09-03 RX ADMIN — POLYETHYLENE GLYCOL 3350 17 G: 17 POWDER, FOR SOLUTION ORAL at 08:09

## 2022-09-03 RX ADMIN — HYDROCODONE BITARTRATE AND ACETAMINOPHEN 15 ML: 7.5; 325 SOLUTION ORAL at 04:09

## 2022-09-03 RX ADMIN — INSULIN DETEMIR 25 UNITS: 100 INJECTION, SOLUTION SUBCUTANEOUS at 08:09

## 2022-09-03 RX ADMIN — PREGABALIN 150 MG: 75 CAPSULE ORAL at 04:09

## 2022-09-03 NOTE — ASSESSMENT & PLAN NOTE
Worsening dyspnea and cough after radiation treatment giving concern for radiation pneumonitis vs COPD exacerbation causing symptoms  - Completed Prednisone 40mg po daily x 5 days   - Oxygen 1-2L to achieve O2 sat 88-92% given hx of COPD  - PRN DUO Nebs  - Completed Azithromycin 500mg x 3 days   - Use BREO + spiriva in place of home TRELEGY  - Prometh-codeine for cough

## 2022-09-03 NOTE — CONSULTS
[facility] - Transplant [facility] Medicine  Telemedicine Consult Note    Patient Name: Chayito Chung  MRN: [id number]  Admission Date: 8/22/2022  Hospital Length of Stay: 9 days  Attending Physician: Arianne Velázquez MD   Primary Care Provider: Curt Valladares III, MD       Thank you for your consult to [facility]. We have reviewed the patient chart. This patient does meet criteria for [facility] service at this time. Will assume care on 09/03/22 at 7AM.          Pili Carlson MD  Department of Hospital Medicine   [facility]

## 2022-09-03 NOTE — ASSESSMENT & PLAN NOTE
Secondary to constipation from opioids which she takes for her lung CA  - s/p gastrograffin study with contrast seen in bowel during second KUB. Advanced from CLD to Regular diet during admission  - Advanced to regular diet  - Continue bowel regimen

## 2022-09-03 NOTE — ASSESSMENT & PLAN NOTE
Patient's FSGs are uncontrolled due to hypoglycemia on current medication regimen.  Current correctional scale  Medium  Maintain anti-hyperglycemic dose as follows-   Antihyperglycemics (From admission, onward)    Start     Stop Route Frequency Ordered    08/24/22 1645  insulin aspart U-100 pen 7 Units         -- SubQ 3 times daily with meals 08/24/22 1329    08/23/22 2100  insulin detemir U-100 pen 25 Units         -- SubQ Nightly 08/23/22 0045    08/23/22 0232  insulin aspart U-100 pen 1-10 Units         -- SubQ Before meals & nightly PRN 08/23/22 0137        Hold Oral hypoglycemics while patient is in the hospital.  At home patient reports using a Combo insulin - HUMALOG 75/25 - 35 units BID AC  And LANTUS insulin 25 units Nightly

## 2022-09-03 NOTE — ASSESSMENT & PLAN NOTE
Patient reports that she was on home O2 for 4 years until one year ago (was discontinued by previous Pulmonologist at [facility])  Now with COPD and lung CA will eva need home O2  Check ambulatory SpO2 on 2 LPM due to dyspnea on exertion while on O2

## 2022-09-03 NOTE — PROGRESS NOTES
[facility] - [facility] Medicine  Telemedicine Progress Note    Patient Name: Chayito Chung  MRN: [id number]  Patient Class: IP- Inpatient   Admission Date: 8/22/2022  Length of Stay: 10 days  Attending Physician: Pili Carlson MD  Primary Care Provider: Curt Valladares III, MD      Subjective:     Principal Problem:Chronic obstructive pulmonary disease with acute exacerbation    HPI:  71-year-old  woman with a history of right lung adenocarcinoma, COPD, restrictive lung disease, tobacco abuse, CAD status post PCI, type 2 diabetes on insulin, chronic neck pain who presents to the emergency department from primary care clinic for complaints of dyspnea and abdominal distension.    She is followed by Oncology and Radiation Oncology at [facility], undergoing 4 cycles of radiation therapy last of which is Wednesday 8/24.  Completed radiation on Monday but then was also seen in primary care clinic and noted to be fatigued tachycardic and tachypneic reporting a persistent cough after her radiation treatment she also reported neck pain and headache.  She has a chronic cough but reports that symptoms are worse today, she is not taking any home oxygen she has home inhalers but does not seem that she has taken any inhalers or nebulizer treatments.  She feels more short of breath than her baseline    She also reports over the last week progressive abdominal distension and discomfort states that today she has had 1 watery bowel movement has been having irregular bowel movements but when she does have 1 is often a loose BM.    During the ED workup hemoglobin noted to be critical at 6.8 patient has been consented in 1 unit of PRBCs is infusing now during my interview, CT a chest and CT abdomen pelvis completed negative for PE but abdominal imaging displays distended bowel without a transition point possible ileus versus developing SBO.  General surgery consult completed in  ED      Overview/Hospital Course:  Shortness of breath and cough improved and patient back to baseline respiratory wise. Patient was denied SNF by her insurance company. Spoke with [employer] representative 9/2, will await their approval/denial      Telemedicine  This service was provided by Virtual Visit.    Patient was transferred to [facility] on:  9/3/2022    Chief Complaint   Patient presents with    Multiple complaints     Sent here for admission    Shortness of Breath     Lung cancer on radiation, on chemo    Cough     The patient location is: [id number] A   Admitted 8/22/2022  5:49 PM  Present with the patient at the time of the telemed/virtual assessment: Telepresenter    Interval History / Events Overnight:   The patient is able to provide adequate history. Additional history was obtained from past medical records. No significant events reported by Nursing. Stable at rest on 2 LMP; increased shortness of breath on exertion  Patient complains of dyspnea. Symptoms have been unchanged since yesterday. Associated symptoms include: shortness of breath on exertion, fatigue, and malaise. Symptoms are stable.     Lab test(s) reviewed: sCr stable    Review of Systems   Constitutional:  Negative for fever.   Respiratory:  Positive for shortness of breath.    Cardiovascular:  Negative for chest pain.     Objective:     Vital Signs (Most Recent):  Temp: 98.6 °F (37 °C) (09/03/22 0805)  Pulse: 89 (09/03/22 0805)  Resp: 20 (09/03/22 0805)  BP: (!) 108/59 (09/03/22 0805)  SpO2: 95 % (09/03/22 0805)   Vital Signs (24h Range):  Temp:  [98 °F (36.7 °C)-98.6 °F (37 °C)] 98.6 °F (37 °C)  Pulse:  [] 89  Resp:  [16-25] 20  SpO2:  [93 %-100 %] 95 %  BP: (101-123)/(59-70) 108/59     Weight: 59.9 kg (132 lb 0.9 oz)  Body mass index is 24.15 kg/m².    Intake/Output Summary (Last 24 hours) at 9/3/2022 1202  Last data filed at 9/3/2022 0500  Gross per 24 hour   Intake 420 ml   Output 500 ml   Net -80 ml       Physical Exam  Constitutional:       General: She is not in acute distress.     Appearance: Normal appearance.   Eyes:      General: Lids are normal. No scleral icterus.        Right eye: No discharge.         Left eye: No discharge.      Conjunctiva/sclera: Conjunctivae normal.   Neck:      Trachea: Phonation normal.   Cardiovascular:      Rate and Rhythm: Normal rate.      Comments: Monitor / Vital signs reviewed at time of visit  Pulmonary:      Effort: Pulmonary effort is normal. No tachypnea, accessory muscle usage or respiratory distress.   Abdominal:      General: There is no distension.   Neurological:      Mental Status: She is alert. She is not disoriented.   Psychiatric:         Attention and Perception: Attention normal.         Behavior: Behavior is cooperative.       Significant Labs:   Recent Labs   Lab 04/06/22  0619 05/09/22 2128 07/14/22  1133   HGBA1C 10.3* 9.8* 9.0*     Recent Labs   Lab 09/02/22  1736 09/02/22  2151 09/03/22  0750   POCTGLUCOSE 197* 217* 216*     Recent Labs   Lab 08/28/22  1309   WBC 6.22   HGB 9.0*   HCT 30.7*        Recent Labs   Lab 08/28/22  1309   GRAN 59.9  3.7   LYMPH 27.0  1.7   MONO 9.6  0.6   EOS 0.2     Recent Labs   Lab 08/28/22  1309 08/31/22  1103    136   K 4.1 3.8    104   CO2 24 24   BUN 8 11   CREATININE 0.6 0.7   * 212*   CALCIUM 9.4 9.2   ALBUMIN 3.2* 2.7*   PHOS  --  3.4     Recent Labs   Lab 08/28/22  1309   ALKPHOS 112   ALT 11   AST 11   PROT 7.3   BILITOT 0.3     Recent Labs   Lab 08/27/22  1706   CPK 25   TROPONINI <0.006     Recent Labs   Lab 05/09/22 2128 05/11/22  1115   FERRITIN 13*  --    SEDRATE  --  62*     SARS-CoV2 (COVID-19) Qualitative PCR (no units)   Date Value   02/04/2022 Not Detected   08/18/2020 Not detected   08/11/2020 Not detected   08/04/2020 Not detected   07/21/2020 Not Detected     SARS-CoV-2 RNA, Amplification, Qual (no units)   Date Value   08/22/2022 Negative   05/25/2020 Negative   05/12/2020  Negative     POC Rapid COVID (no units)   Date Value   08/23/2022 Negative   06/16/2022 Negative   05/09/2022 Negative   12/09/2021 Negative       ECG Results              EKG 12-lead (Final result)  Result time 08/23/22 15:27:00      Final result by Interface, Lab In [facility] (08/23/22 15:27:00)                   Narrative:    Test Reason : R06.02,    Vent. Rate : 119 BPM     Atrial Rate : 119 BPM     P-R Int : 124 ms          QRS Dur : 074 ms      QT Int : 314 ms       P-R-T Axes : 069 067 -64 degrees     QTc Int : 441 ms    Sinus tachycardia  Nonspecific ST and/or T wave abnormalities  Abnormal ECG  When compared with ECG of 22-AUG-2022 16:19,  No significant change was found  Confirmed by Rosy Abdullahi MD ([id number]) on 8/23/2022 3:26:53 PM    Referred By: [email or web address]   SELF           Confirmed By:Rosy Abdullahi MD                                     EKG 12-lead (Final result)  Result time 08/23/22 13:36:20      Final result by Interface, Lab In [facility] (08/23/22 13:36:20)                   Narrative:    Test Reason : R06.02,    Vent. Rate : 126 BPM     Atrial Rate : 126 BPM     P-R Int : 126 ms          QRS Dur : 076 ms      QT Int : 312 ms       P-R-T Axes : 070 053 -46 degrees     QTc Int : 451 ms    Sinus tachycardia  Nonspecific ST and T wave abnormality  Abnormal ECG  When compared with ECG of 17-AUG-2022 14:27,  Nonspecific T wave abnormality, improved in Lateral leads  Confirmed by JOHANNA RUSSO MD ([id number]) on 8/23/2022 1:36:16 PM    Referred By: [email or web address]   SELF           Confirmed By:JOHANNA RUSSO MD                                    Results for orders placed during the hospital encounter of 04/05/22    Echo    Interpretation Summary  · The left ventricle is normal in size with mild concentric hypertrophy and normal systolic function.  · The estimated ejection fraction is 60%.  · Mild mitral regurgitation.  · Grade I left ventricular diastolic dysfunction.  · Mild tricuspid regurgitation.  · The estimated PA  systolic pressure is 47 mmHg.  · Normal right ventricular size with normal right ventricular systolic function.  · There is mild pulmonary hypertension.  · There is no evidence of intracardiac shunting.      X-Ray Chest AP Portable  Narrative: EXAMINATION:  XR CHEST AP PORTABLE    CLINICAL HISTORY:  chest pain; Simple chronic bronchitis    TECHNIQUE:  Single frontal view of the chest was performed.    COMPARISON:  08/22/2022    FINDINGS:  Cardiac silhouette is within normal limits.    Bibasilar mild airspace disease right greater than left may be associated with atelectasis.  Mild infiltrate and pneumonitis not excluded.  Follow-up recommended.    Postoperative changes near the gastroesophageal junction.  Postop changes of the cervical spine.    No acute osseous abnormality.    No effusion or pneumothorax.  Impression: Mild bibasilar airspace disease right greater than left may be associated with atelectasis.  Mild infiltrate/pneumonitis is not excluded.  Follow-up is recommended.    Electronically signed by: Salomon Escobar  Date:    08/27/2022  Time:    18:05      Labs and Imaging within the last 24 hours listed above were reviewed.       Diet: Diet Adult Regular (IDDSI Level 7)  Significant LDAs:   IV Access Type: Peripheral  Urinary Catheter Indication if present: Patient Does Not Have Urinary Catheter  Other Lines/Tubes/Drains:    HIGH RISK CONDITION(S):   Patient has a condition that poses threat to life and bodily function: Severe Respiratory Distress     Goals of Care:    Previous admission:  5/9/22  Likely prognosis:  Fair  Code Status: DNR  Comfort Only: No  Hospice: No  Goals at discharge: remain at home, with physician follow-up    Discharge Planning   MICKY: 9/6/2022     Code Status: DNR   Is the patient medically ready for discharge?: Yes    Reason for patient still in hospital (select all that apply): Pending disposition  Discharge Plan A: Home Health        Assessment/Plan:      * Chronic obstructive  pulmonary disease with acute exacerbation  Worsening dyspnea and cough after radiation treatment giving concern for radiation pneumonitis vs COPD exacerbation causing symptoms  - Completed Prednisone 40mg po daily x 5 days   - Oxygen 1-2L to achieve O2 sat 88-92% given hx of COPD  - PRN DUO Nebs  - Completed Azithromycin 500mg x 3 days   - Use BREO + spiriva in place of home TRELEGY  - Prometh-codeine for cough    Acute on chronic respiratory failure with hypoxia  Patient reports that she was on home O2 for 4 years until one year ago (was discontinued by previous Pulmonologist at [facility])  Now with COPD and lung CA will eva need home O2  Check ambulatory SpO2 on 2 LPM due to dyspnea on exertion while on O2    Moderate malnutrition  Nutrition consulted. Most recent weight and BMI monitored-      Malnutrition (Moderate to Severe)  Weight Loss (Malnutrition):  (7% x 4 months)  Energy Intake (Malnutrition): less than 75% for greater than or equal to 3 months        Measurements:  Wt Readings from Last 1 Encounters:   09/03/22 59.9 kg (132 lb 0.9 oz)   Body mass index is 24.15 kg/m².    Recommendations: Recommendation/Intervention: 1.  Goals: Meet % EEN, EPN by RD f/u    Patient has been screened and assessed by RD. RD will follow patient.      Abdominal distension  Secondary to constipation from opioids which she takes for her lung CA  - s/p gastrograffin study with contrast seen in bowel during second KUB. Advanced from CLD to Regular diet during admission  - Advanced to regular diet  - Continue bowel regimen    Adenocarcinoma of lung, right  F/u with [facility] - oncology and rad-onc when stable for discharge.  She needs to reschedule next radiation appointment in light of current hospitalization.   -See care everywhere for records/notes    Type 2 diabetes mellitus, with long-term current use of insulin  Patient's FSGs are uncontrolled due to hypoglycemia on current medication regimen.  Current correctional scale   Medium  Maintain anti-hyperglycemic dose as follows-   Antihyperglycemics (From admission, onward)    Start     Stop Route Frequency Ordered    08/24/22 1645  insulin aspart U-100 pen 7 Units         -- SubQ 3 times daily with meals 08/24/22 1329    08/23/22 2100  insulin detemir U-100 pen 25 Units         -- SubQ Nightly 08/23/22 0045    08/23/22 0232  insulin aspart U-100 pen 1-10 Units         -- SubQ Before meals & nightly PRN 08/23/22 0137        Hold Oral hypoglycemics while patient is in the hospital.  At home patient reports using a Combo insulin - HUMALOG 75/25 - 35 units BID AC  And LANTUS insulin 25 units Nightly     Iron deficiency anemia  Patient with worsening acute on chronic anemia   - s/p 1unit PRBC in ED  - Hgb stable    Major depressive disorder in partial remission  Continuing current management.    Coronary artery disease of native artery of native heart with stable angina pectoris  Continue ASA/statin  -not on beta blocker   -EKG w/o ischemic changes. Negative troponin x 1        Active Hospital Problems    Diagnosis  POA    *Chronic obstructive pulmonary disease with acute exacerbation [J44.1]  Yes    Acute on chronic respiratory failure with hypoxia [J96.21]  Yes    Moderate malnutrition [E44.0]  Yes    Abdominal distension [R14.0]  Yes    Adenocarcinoma of lung, right [C34.91]  Yes    Type 2 diabetes mellitus, with long-term current use of insulin [E11.9, Z79.4]  Not Applicable    Iron deficiency anemia [D50.9]  Yes    Major depressive disorder in partial remission [F32.4]  Yes    Neuropathy [G62.9]  Yes    Coronary artery disease of native artery of native heart with stable angina pectoris [I25.118]  Yes      Resolved Hospital Problems   No resolved problems to display.       Inpatient Medications Prescribed for Management of Current Problems:     Scheduled Meds:    aspirin  81 mg Oral Daily    atorvastatin  40 mg Oral Daily    benzonatate  200 mg Oral TID    enoxaparin  40 mg  Subcutaneous Daily    ferrous sulfate  1 tablet Oral Daily    fluticasone furoate-vilanteroL  1 puff Inhalation Daily    guaiFENesin  600 mg Oral BID    insulin aspart U-100  7 Units Subcutaneous TIDWM    insulin detemir U-100  25 Units Subcutaneous QHS    magnesium oxide  400 mg Oral Daily    polyethylene glycol  17 g Oral Daily    pregabalin  150 mg Oral TID    QUEtiapine  400 mg Oral Nightly    senna-docusate 8.6-50 mg  1 tablet Oral BID    sertraline  150 mg Oral Daily    tiotropium bromide  2 puff Inhalation Daily    verapamiL  180 mg Oral QHS     Continuous Infusions:   As Needed: acetaminophen, albuterol-ipratropium, bisacodyL, dextrose 10%, dextrose 10%, glucagon (human recombinant), hydrocodone-apap 7.5-325 MG/15 ML, insulin aspart U-100, melatonin, naloxone, nitroGLYCERIN, ondansetron, prochlorperazine, promethazine-codeine 6.25-10 mg/5 ml, sodium chloride 0.9%, tiZANidine    VTE Risk Mitigation (From admission, onward)         Ordered     enoxaparin injection 40 mg  Daily         08/23/22 0137     IP VTE HIGH RISK PATIENT  Once         08/23/22 0137     Place sequential compression device  Until discontinued         08/23/22 0137              I have assessed these finding virtually using telemed platform and with assistance of bedside nurse     The attending portion of this evaluation, treatment, and documentation was performed per Pili Carlson MD via Telemedicine AudioVisual using the secure [employer] software platform with 2 way audio/video. The provider was located off-site and the patient is located in the hospital. The aforementioned video software was utilized to document the relevant history and physical exam    Pili Carlson MD  Department of Hospital Medicine   [facility] - Transplant Stepdown

## 2022-09-03 NOTE — ASSESSMENT & PLAN NOTE
Nutrition consulted. Most recent weight and BMI monitored-      Malnutrition (Moderate to Severe)  Weight Loss (Malnutrition):  (7% x 4 months)  Energy Intake (Malnutrition): less than 75% for greater than or equal to 3 months        Measurements:  Wt Readings from Last 1 Encounters:   09/03/22 59.9 kg (132 lb 0.9 oz)   Body mass index is 24.15 kg/m².    Recommendations: Recommendation/Intervention: 1.  Goals: Meet % EEN, EPN by RD f/u    Patient has been screened and assessed by RD. RD will follow patient.

## 2022-09-03 NOTE — ASSESSMENT & PLAN NOTE
F/u with [facility] - oncology and rad-onc when stable for discharge.  She needs to reschedule next radiation appointment in light of current hospitalization.   -See care everywhere for records/notes

## 2022-09-03 NOTE — SUBJECTIVE & OBJECTIVE
Telemedicine  This service was provided by Virtual Visit.    Patient was transferred to [facility] on:  9/3/2022    Chief Complaint   Patient presents with    Multiple complaints     Sent here for admission    Shortness of Breath     Lung cancer on radiation, on chemo    Cough     The patient location is: [id number] A   Admitted 8/22/2022  5:49 PM  Present with the patient at the time of the telemed/virtual assessment: Telepresenter    Interval History / Events Overnight:   The patient is able to provide adequate history. Additional history was obtained from past medical records. No significant events reported by Nursing. Stable at rest on 2 LMP; increased shortness of breath on exertion  Patient complains of dyspnea. Symptoms have been unchanged since yesterday. Associated symptoms include: shortness of breath on exertion, fatigue, and malaise. Symptoms are stable.     Lab test(s) reviewed: sCr stable    Review of Systems   Constitutional:  Negative for fever.   Respiratory:  Positive for shortness of breath.    Cardiovascular:  Negative for chest pain.     Objective:     Vital Signs (Most Recent):  Temp: 98.6 °F (37 °C) (09/03/22 0805)  Pulse: 89 (09/03/22 0805)  Resp: 20 (09/03/22 0805)  BP: (!) 108/59 (09/03/22 0805)  SpO2: 95 % (09/03/22 0805)   Vital Signs (24h Range):  Temp:  [98 °F (36.7 °C)-98.6 °F (37 °C)] 98.6 °F (37 °C)  Pulse:  [] 89  Resp:  [16-25] 20  SpO2:  [93 %-100 %] 95 %  BP: (101-123)/(59-70) 108/59     Weight: 59.9 kg (132 lb 0.9 oz)  Body mass index is 24.15 kg/m².    Intake/Output Summary (Last 24 hours) at 9/3/2022 1202  Last data filed at 9/3/2022 0500  Gross per 24 hour   Intake 420 ml   Output 500 ml   Net -80 ml      Physical Exam  Constitutional:       General: She is not in acute distress.     Appearance: Normal appearance.   Eyes:      General: Lids are normal. No scleral icterus.        Right eye: No discharge.         Left eye: No discharge.       Conjunctiva/sclera: Conjunctivae normal.   Neck:      Trachea: Phonation normal.   Cardiovascular:      Rate and Rhythm: Normal rate.      Comments: Monitor / Vital signs reviewed at time of visit  Pulmonary:      Effort: Pulmonary effort is normal. No tachypnea, accessory muscle usage or respiratory distress.   Abdominal:      General: There is no distension.   Neurological:      Mental Status: She is alert. She is not disoriented.   Psychiatric:         Attention and Perception: Attention normal.         Behavior: Behavior is cooperative.       Significant Labs:   Recent Labs   Lab 04/06/22  0619 05/09/22 2128 07/14/22  1133   HGBA1C 10.3* 9.8* 9.0*     Recent Labs   Lab 09/02/22  1736 09/02/22  2151 09/03/22  0750   POCTGLUCOSE 197* 217* 216*     Recent Labs   Lab 08/28/22  1309   WBC 6.22   HGB 9.0*   HCT 30.7*        Recent Labs   Lab 08/28/22  1309   GRAN 59.9  3.7   LYMPH 27.0  1.7   MONO 9.6  0.6   EOS 0.2     Recent Labs   Lab 08/28/22  1309 08/31/22  1103    136   K 4.1 3.8    104   CO2 24 24   BUN 8 11   CREATININE 0.6 0.7   * 212*   CALCIUM 9.4 9.2   ALBUMIN 3.2* 2.7*   PHOS  --  3.4     Recent Labs   Lab 08/28/22  1309   ALKPHOS 112   ALT 11   AST 11   PROT 7.3   BILITOT 0.3     Recent Labs   Lab 08/27/22  1706   CPK 25   TROPONINI <0.006     Recent Labs   Lab 05/09/22 2128 05/11/22  1115   FERRITIN 13*  --    SEDRATE  --  62*     SARS-CoV2 (COVID-19) Qualitative PCR (no units)   Date Value   02/04/2022 Not Detected   08/18/2020 Not detected   08/11/2020 Not detected   08/04/2020 Not detected   07/21/2020 Not Detected     SARS-CoV-2 RNA, Amplification, Qual (no units)   Date Value   08/22/2022 Negative   05/25/2020 Negative   05/12/2020 Negative     POC Rapid COVID (no units)   Date Value   08/23/2022 Negative   06/16/2022 Negative   05/09/2022 Negative   12/09/2021 Negative       ECG Results              EKG 12-lead (Final result)  Result time 08/23/22 15:27:00      Final  result by Interface, Lab In [facility] (08/23/22 15:27:00)                   Narrative:    Test Reason : R06.02,    Vent. Rate : 119 BPM     Atrial Rate : 119 BPM     P-R Int : 124 ms          QRS Dur : 074 ms      QT Int : 314 ms       P-R-T Axes : 069 067 -64 degrees     QTc Int : 441 ms    Sinus tachycardia  Nonspecific ST and/or T wave abnormalities  Abnormal ECG  When compared with ECG of 22-AUG-2022 16:19,  No significant change was found  Confirmed by Rosy Abdullahi MD ([id number]) on 8/23/2022 3:26:53 PM    Referred By: [email or web address]   SELF           Confirmed By:Rosy Abdullahi MD                                     EKG 12-lead (Final result)  Result time 08/23/22 13:36:20      Final result by Interface, Lab In [facility] (08/23/22 13:36:20)                   Narrative:    Test Reason : R06.02,    Vent. Rate : 126 BPM     Atrial Rate : 126 BPM     P-R Int : 126 ms          QRS Dur : 076 ms      QT Int : 312 ms       P-R-T Axes : 070 053 -46 degrees     QTc Int : 451 ms    Sinus tachycardia  Nonspecific ST and T wave abnormality  Abnormal ECG  When compared with ECG of 17-AUG-2022 14:27,  Nonspecific T wave abnormality, improved in Lateral leads  Confirmed by JOHANNA RUSSO MD ([id number]) on 8/23/2022 1:36:16 PM    Referred By: FRANCISCO   SELF           Confirmed By:JOHANNA RUSSO MD                                    Results for orders placed during the hospital encounter of 04/05/22    Echo    Interpretation Summary  · The left ventricle is normal in size with mild concentric hypertrophy and normal systolic function.  · The estimated ejection fraction is 60%.  · Mild mitral regurgitation.  · Grade I left ventricular diastolic dysfunction.  · Mild tricuspid regurgitation.  · The estimated PA systolic pressure is 47 mmHg.  · Normal right ventricular size with normal right ventricular systolic function.  · There is mild pulmonary hypertension.  · There is no evidence of intracardiac shunting.      X-Ray Chest AP Portable  Narrative:  EXAMINATION:  XR CHEST AP PORTABLE    CLINICAL HISTORY:  chest pain; Simple chronic bronchitis    TECHNIQUE:  Single frontal view of the chest was performed.    COMPARISON:  08/22/2022    FINDINGS:  Cardiac silhouette is within normal limits.    Bibasilar mild airspace disease right greater than left may be associated with atelectasis.  Mild infiltrate and pneumonitis not excluded.  Follow-up recommended.    Postoperative changes near the gastroesophageal junction.  Postop changes of the cervical spine.    No acute osseous abnormality.    No effusion or pneumothorax.  Impression: Mild bibasilar airspace disease right greater than left may be associated with atelectasis.  Mild infiltrate/pneumonitis is not excluded.  Follow-up is recommended.    Electronically signed by: Salomon Escobar  Date:    08/27/2022  Time:    18:05      Labs and Imaging within the last 24 hours listed above were reviewed.       Diet: Diet Adult Regular (IDDSI Level 7)  Significant LDAs:   IV Access Type: Peripheral  Urinary Catheter Indication if present: Patient Does Not Have Urinary Catheter  Other Lines/Tubes/Drains:    HIGH RISK CONDITION(S):   Patient has a condition that poses threat to life and bodily function: Severe Respiratory Distress     Goals of Care:    Previous admission:  5/9/22  Likely prognosis:  Fair  Code Status: DNR  Comfort Only: No  Hospice: No  Goals at discharge: remain at home, with physician follow-up    Discharge Planning   MICKY: 9/6/2022     Code Status: DNR   Is the patient medically ready for discharge?: Yes    Reason for patient still in hospital (select all that apply): Pending disposition  Discharge Plan A: Home Health

## 2022-09-03 NOTE — NURSING TRANSFER
Pt AOX4, VSS on RA, afebrile.   No c/o pain/N/V.   Accucheck ACHS. Elevated. SSI and levemir administered.   Skin intact  Pt in lowest settings, call light w/in reach, nonskid socks when ambulating, remains free from falls. NAD. WCTM.

## 2022-09-04 LAB
ALBUMIN SERPL BCP-MCNC: 2.7 G/DL (ref 3.5–5.2)
ANION GAP SERPL CALC-SCNC: 6 MMOL/L (ref 8–16)
BASOPHILS # BLD AUTO: 0.02 K/UL (ref 0–0.2)
BASOPHILS NFR BLD: 0.5 % (ref 0–1.9)
BUN SERPL-MCNC: 8 MG/DL (ref 8–23)
CALCIUM SERPL-MCNC: 8.7 MG/DL (ref 8.7–10.5)
CHLORIDE SERPL-SCNC: 108 MMOL/L (ref 95–110)
CO2 SERPL-SCNC: 24 MMOL/L (ref 23–29)
CREAT SERPL-MCNC: 0.6 MG/DL (ref 0.5–1.4)
DIFFERENTIAL METHOD: ABNORMAL
EOSINOPHIL # BLD AUTO: 0.1 K/UL (ref 0–0.5)
EOSINOPHIL NFR BLD: 2.9 % (ref 0–8)
ERYTHROCYTE [DISTWIDTH] IN BLOOD BY AUTOMATED COUNT: 28 % (ref 11.5–14.5)
EST. GFR  (NO RACE VARIABLE): >60 ML/MIN/1.73 M^2
GLUCOSE SERPL-MCNC: 154 MG/DL (ref 70–110)
HCT VFR BLD AUTO: 29.5 % (ref 37–48.5)
HGB BLD-MCNC: 8.3 G/DL (ref 12–16)
IMM GRANULOCYTES # BLD AUTO: 0.01 K/UL (ref 0–0.04)
IMM GRANULOCYTES NFR BLD AUTO: 0.2 % (ref 0–0.5)
LYMPHOCYTES # BLD AUTO: 0.9 K/UL (ref 1–4.8)
LYMPHOCYTES NFR BLD: 22.5 % (ref 18–48)
MAGNESIUM SERPL-MCNC: 1.7 MG/DL (ref 1.6–2.6)
MCH RBC QN AUTO: 23.4 PG (ref 27–31)
MCHC RBC AUTO-ENTMCNC: 28.1 G/DL (ref 32–36)
MCV RBC AUTO: 83 FL (ref 82–98)
MONOCYTES # BLD AUTO: 0.5 K/UL (ref 0.3–1)
MONOCYTES NFR BLD: 11.5 % (ref 4–15)
NEUTROPHILS # BLD AUTO: 2.6 K/UL (ref 1.8–7.7)
NEUTROPHILS NFR BLD: 62.4 % (ref 38–73)
NRBC BLD-RTO: 0 /100 WBC
PHOSPHATE SERPL-MCNC: 3 MG/DL (ref 2.7–4.5)
PLATELET # BLD AUTO: 212 K/UL (ref 150–450)
PMV BLD AUTO: 9.7 FL (ref 9.2–12.9)
POCT GLUCOSE: 165 MG/DL (ref 70–110)
POCT GLUCOSE: 170 MG/DL (ref 70–110)
POCT GLUCOSE: 254 MG/DL (ref 70–110)
POCT GLUCOSE: 278 MG/DL (ref 70–110)
POCT GLUCOSE: 294 MG/DL (ref 70–110)
POCT GLUCOSE: 299 MG/DL (ref 70–110)
POTASSIUM SERPL-SCNC: 4.1 MMOL/L (ref 3.5–5.1)
RBC # BLD AUTO: 3.54 M/UL (ref 4–5.4)
SODIUM SERPL-SCNC: 138 MMOL/L (ref 136–145)
WBC # BLD AUTO: 4.17 K/UL (ref 3.9–12.7)

## 2022-09-04 PROCEDURE — 83735 ASSAY OF MAGNESIUM: CPT | Performed by: INTERNAL MEDICINE

## 2022-09-04 PROCEDURE — 99233 PR SUBSEQUENT HOSPITAL CARE,LEVL III: ICD-10-PCS | Mod: 95,,, | Performed by: INTERNAL MEDICINE

## 2022-09-04 PROCEDURE — 63600175 PHARM REV CODE 636 W HCPCS: Performed by: HOSPITALIST

## 2022-09-04 PROCEDURE — 20600001 HC STEP DOWN PRIVATE ROOM

## 2022-09-04 PROCEDURE — 85025 COMPLETE CBC W/AUTO DIFF WBC: CPT | Performed by: INTERNAL MEDICINE

## 2022-09-04 PROCEDURE — 25000003 PHARM REV CODE 250: Performed by: STUDENT IN AN ORGANIZED HEALTH CARE EDUCATION/TRAINING PROGRAM

## 2022-09-04 PROCEDURE — 36415 COLL VENOUS BLD VENIPUNCTURE: CPT | Performed by: INTERNAL MEDICINE

## 2022-09-04 PROCEDURE — 99233 SBSQ HOSP IP/OBS HIGH 50: CPT | Mod: 95,,, | Performed by: INTERNAL MEDICINE

## 2022-09-04 PROCEDURE — 80069 RENAL FUNCTION PANEL: CPT | Performed by: INTERNAL MEDICINE

## 2022-09-04 PROCEDURE — 25000003 PHARM REV CODE 250: Performed by: HOSPITALIST

## 2022-09-04 RX ORDER — INSULIN ASPART 100 [IU]/ML
8 INJECTION, SOLUTION INTRAVENOUS; SUBCUTANEOUS
Status: DISCONTINUED | OUTPATIENT
Start: 2022-09-04 | End: 2022-09-08 | Stop reason: HOSPADM

## 2022-09-04 RX ADMIN — TIOTROPIUM BROMIDE INHALATION SPRAY 2 PUFF: 3.12 SPRAY, METERED RESPIRATORY (INHALATION) at 09:09

## 2022-09-04 RX ADMIN — INSULIN ASPART 7 UNITS: 100 INJECTION, SOLUTION INTRAVENOUS; SUBCUTANEOUS at 11:09

## 2022-09-04 RX ADMIN — INSULIN ASPART 2 UNITS: 100 INJECTION, SOLUTION INTRAVENOUS; SUBCUTANEOUS at 08:09

## 2022-09-04 RX ADMIN — ENOXAPARIN SODIUM 40 MG: 100 INJECTION SUBCUTANEOUS at 05:09

## 2022-09-04 RX ADMIN — INSULIN ASPART 3 UNITS: 100 INJECTION, SOLUTION INTRAVENOUS; SUBCUTANEOUS at 08:09

## 2022-09-04 RX ADMIN — QUETIAPINE FUMARATE 400 MG: 100 TABLET ORAL at 08:09

## 2022-09-04 RX ADMIN — PREGABALIN 150 MG: 75 CAPSULE ORAL at 02:09

## 2022-09-04 RX ADMIN — ATORVASTATIN CALCIUM 40 MG: 20 TABLET, FILM COATED ORAL at 09:09

## 2022-09-04 RX ADMIN — GUAIFENESIN 600 MG: 600 TABLET, EXTENDED RELEASE ORAL at 09:09

## 2022-09-04 RX ADMIN — HYDROCODONE BITARTRATE AND ACETAMINOPHEN 15 ML: 7.5; 325 SOLUTION ORAL at 05:09

## 2022-09-04 RX ADMIN — ASPIRIN 81 MG CHEWABLE TABLET 81 MG: 81 TABLET CHEWABLE at 09:09

## 2022-09-04 RX ADMIN — PREGABALIN 150 MG: 75 CAPSULE ORAL at 09:09

## 2022-09-04 RX ADMIN — BENZONATATE 200 MG: 100 CAPSULE ORAL at 08:09

## 2022-09-04 RX ADMIN — FERROUS SULFATE TAB 325 MG (65 MG ELEMENTAL FE) 1 EACH: 325 (65 FE) TAB at 09:09

## 2022-09-04 RX ADMIN — BENZONATATE 200 MG: 100 CAPSULE ORAL at 09:09

## 2022-09-04 RX ADMIN — FLUTICASONE FUROATE AND VILANTEROL TRIFENATATE 1 PUFF: 100; 25 POWDER RESPIRATORY (INHALATION) at 09:09

## 2022-09-04 RX ADMIN — INSULIN ASPART 6 UNITS: 100 INJECTION, SOLUTION INTRAVENOUS; SUBCUTANEOUS at 06:09

## 2022-09-04 RX ADMIN — SERTRALINE HYDROCHLORIDE 150 MG: 50 TABLET ORAL at 06:09

## 2022-09-04 RX ADMIN — HYDROCODONE BITARTRATE AND ACETAMINOPHEN 15 ML: 7.5; 325 SOLUTION ORAL at 11:09

## 2022-09-04 RX ADMIN — INSULIN ASPART 7 UNITS: 100 INJECTION, SOLUTION INTRAVENOUS; SUBCUTANEOUS at 08:09

## 2022-09-04 RX ADMIN — INSULIN ASPART 8 UNITS: 100 INJECTION, SOLUTION INTRAVENOUS; SUBCUTANEOUS at 06:09

## 2022-09-04 RX ADMIN — VERAPAMIL HYDROCHLORIDE 180 MG: 180 TABLET, FILM COATED, EXTENDED RELEASE ORAL at 08:09

## 2022-09-04 RX ADMIN — BENZONATATE 200 MG: 100 CAPSULE ORAL at 02:09

## 2022-09-04 RX ADMIN — PREGABALIN 150 MG: 75 CAPSULE ORAL at 08:09

## 2022-09-04 RX ADMIN — INSULIN ASPART 2 UNITS: 100 INJECTION, SOLUTION INTRAVENOUS; SUBCUTANEOUS at 01:09

## 2022-09-04 RX ADMIN — GUAIFENESIN 600 MG: 600 TABLET, EXTENDED RELEASE ORAL at 08:09

## 2022-09-04 RX ADMIN — Medication 400 MG: at 09:09

## 2022-09-04 RX ADMIN — SENNOSIDES AND DOCUSATE SODIUM 1 TABLET: 50; 8.6 TABLET ORAL at 08:09

## 2022-09-04 NOTE — PLAN OF CARE
Pt AOX4, VSS on 2LNC, afebrile.   PRN norco administered for chronic back pain.  Accucheck ACHS. SSI administered.  Awaiting approval for SNF by insurance company   Skin intact.Pt in lowest settings, call light w/in reach, nonskid socks when ambulating, remains free from falls. NAD. WCTM.

## 2022-09-04 NOTE — SUBJECTIVE & OBJECTIVE
Telemedicine  This service was provided by Virtual Visit.    Patient was transferred to [facility] on:  9/3/2022    Chief Complaint   Patient presents with    Multiple complaints     Sent here for admission    Shortness of Breath     Lung cancer on radiation, on chemo    Cough     The patient location is: [id number] A   Admitted 8/22/2022  5:49 PM  Present with the patient at the time of the telemed/virtual assessment: Telepresenter    Interval History / Events Overnight:   The patient is able to provide adequate history. Additional history was obtained from past medical records. No significant events reported by Nursing. Stable at rest on 2 LMP NC.  Patient complains of dyspnea. Symptoms have been unchanged since yesterday. Associated symptoms include: shortness of breath on exertion, fatigue, and malaise. Symptoms are stable.     Lab test(s) reviewed: sCr stable    Review of Systems   Constitutional:  Negative for fever.   Respiratory:  Positive for shortness of breath.    Cardiovascular:  Negative for chest pain.     Objective:     Vital Signs (Most Recent):  Temp: 98.3 °F (36.8 °C) (09/04/22 0901)  Pulse: 98 (09/04/22 0901)  Resp: 20 (09/04/22 0901)  BP: 107/62 (09/04/22 0901)  SpO2: 98 % (09/04/22 0901)   Vital Signs (24h Range):  Temp:  [98.3 °F (36.8 °C)-99.2 °F (37.3 °C)] 98.3 °F (36.8 °C)  Pulse:  [] 98  Resp:  [16-20] 20  SpO2:  [91 %-98 %] 98 %  BP: (107-126)/(56-88) 107/62     Weight: 59.9 kg (132 lb 0.9 oz)  Body mass index is 24.15 kg/m².    Intake/Output Summary (Last 24 hours) at 9/4/2022 1210  Last data filed at 9/4/2022 0600  Gross per 24 hour   Intake --   Output 300 ml   Net -300 ml        Physical Exam  Constitutional:       General: She is not in acute distress.     Appearance: Normal appearance.   Eyes:      General: Lids are normal. No scleral icterus.        Right eye: No discharge.         Left eye: No discharge.      Conjunctiva/sclera: Conjunctivae normal.   Neck:       Trachea: Phonation normal.   Cardiovascular:      Rate and Rhythm: Normal rate.      Comments: Monitor / Vital signs reviewed at time of visit  Pulmonary:      Effort: Pulmonary effort is normal. No tachypnea, accessory muscle usage or respiratory distress.   Abdominal:      General: There is no distension.   Neurological:      Mental Status: She is alert. She is not disoriented.   Psychiatric:         Attention and Perception: Attention normal.         Behavior: Behavior is cooperative.       Significant Labs:   Recent Labs   Lab 04/06/22  0619 05/09/22 2128 07/14/22  1133   HGBA1C 10.3* 9.8* 9.0*       Recent Labs   Lab 09/03/22  0750 09/03/22  1257 09/03/22  1725   POCTGLUCOSE 216* 278* 299*       Recent Labs   Lab 08/28/22  1309 09/04/22  0822   WBC 6.22 4.17   HGB 9.0* 8.3*   HCT 30.7* 29.5*    212       Recent Labs   Lab 08/28/22  1309 09/04/22  0822   GRAN 59.9  3.7 62.4  2.6   LYMPH 27.0  1.7 22.5  0.9*   MONO 9.6  0.6 11.5  0.5   EOS 0.2 0.1       Recent Labs   Lab 08/28/22  1309 08/31/22  1103 09/04/22  0822    136 138   K 4.1 3.8 4.1    104 108   CO2 24 24 24   BUN 8 11 8   CREATININE 0.6 0.7 0.6   * 212* 154*   CALCIUM 9.4 9.2 8.7   ALBUMIN 3.2* 2.7* 2.7*   MG  --   --  1.7   PHOS  --  3.4 3.0       Recent Labs   Lab 08/28/22  1309   ALKPHOS 112   ALT 11   AST 11   PROT 7.3   BILITOT 0.3       Recent Labs   Lab 05/09/22 2128 05/11/22  1115   FERRITIN 13*  --    SEDRATE  --  62*       SARS-CoV2 (COVID-19) Qualitative PCR (no units)   Date Value   02/04/2022 Not Detected   08/18/2020 Not detected   08/11/2020 Not detected   08/04/2020 Not detected   07/21/2020 Not Detected     SARS-CoV-2 RNA, Amplification, Qual (no units)   Date Value   08/22/2022 Negative   05/25/2020 Negative   05/12/2020 Negative     POC Rapid COVID (no units)   Date Value   08/23/2022 Negative   06/16/2022 Negative   05/09/2022 Negative   12/09/2021 Negative       ECG Results              EKG  12-lead (Final result)  Result time 08/23/22 15:27:00      Final result by Interface, Lab In [facility] (08/23/22 15:27:00)                   Narrative:    Test Reason : R06.02,    Vent. Rate : 119 BPM     Atrial Rate : 119 BPM     P-R Int : 124 ms          QRS Dur : 074 ms      QT Int : 314 ms       P-R-T Axes : 069 067 -64 degrees     QTc Int : 441 ms    Sinus tachycardia  Nonspecific ST and/or T wave abnormalities  Abnormal ECG  When compared with ECG of 22-AUG-2022 16:19,  No significant change was found  Confirmed by Rosy Abdullahi MD ([id number]) on 8/23/2022 3:26:53 PM    Referred By: [email or web address]   SELF           Confirmed By:Rosy Abdullahi MD                                     EKG 12-lead (Final result)  Result time 08/23/22 13:36:20      Final result by Interface, Lab In [facility] (08/23/22 13:36:20)                   Narrative:    Test Reason : R06.02,    Vent. Rate : 126 BPM     Atrial Rate : 126 BPM     P-R Int : 126 ms          QRS Dur : 076 ms      QT Int : 312 ms       P-R-T Axes : 070 053 -46 degrees     QTc Int : 451 ms    Sinus tachycardia  Nonspecific ST and T wave abnormality  Abnormal ECG  When compared with ECG of 17-AUG-2022 14:27,  Nonspecific T wave abnormality, improved in Lateral leads  Confirmed by JOHANNA RUSSO MD ([id number]) on 8/23/2022 1:36:16 PM    Referred By: [email or web address]   SELF           Confirmed By:JOHANNA RUSSO MD                                    Results for orders placed during the hospital encounter of 04/05/22    Echo    Interpretation Summary  · The left ventricle is normal in size with mild concentric hypertrophy and normal systolic function.  · The estimated ejection fraction is 60%.  · Mild mitral regurgitation.  · Grade I left ventricular diastolic dysfunction.  · Mild tricuspid regurgitation.  · The estimated PA systolic pressure is 47 mmHg.  · Normal right ventricular size with normal right ventricular systolic function.  · There is mild pulmonary hypertension.  · There is no evidence  of intracardiac shunting.      X-Ray Chest AP Portable  Narrative: EXAMINATION:  XR CHEST AP PORTABLE    CLINICAL HISTORY:  chest pain; Simple chronic bronchitis    TECHNIQUE:  Single frontal view of the chest was performed.    COMPARISON:  08/22/2022    FINDINGS:  Cardiac silhouette is within normal limits.    Bibasilar mild airspace disease right greater than left may be associated with atelectasis.  Mild infiltrate and pneumonitis not excluded.  Follow-up recommended.    Postoperative changes near the gastroesophageal junction.  Postop changes of the cervical spine.    No acute osseous abnormality.    No effusion or pneumothorax.  Impression: Mild bibasilar airspace disease right greater than left may be associated with atelectasis.  Mild infiltrate/pneumonitis is not excluded.  Follow-up is recommended.    Electronically signed by: Salomon Escobar  Date:    08/27/2022  Time:    18:05      Labs and Imaging within the last 24 hours listed above were reviewed.       Diet: Diet Adult Regular (IDDSI Level 7)  Significant LDAs:   IV Access Type: Peripheral  Urinary Catheter Indication if present: Patient Does Not Have Urinary Catheter  Other Lines/Tubes/Drains:    HIGH RISK CONDITION(S):   Patient has a condition that poses threat to life and bodily function: Severe Respiratory Distress     Goals of Care:    Previous admission:  5/9/22  Likely prognosis:  Fair  Code Status: DNR  Comfort Only: No  Hospice: No  Goals at discharge: remain at home, with physician follow-up    Discharge Planning   MICKY: 9/6/2022     Code Status: DNR   Is the patient medically ready for discharge?: Yes    Reason for patient still in hospital (select all that apply): Pending disposition  Discharge Plan A: Home Health

## 2022-09-05 LAB
POCT GLUCOSE: 104 MG/DL (ref 70–110)
POCT GLUCOSE: 130 MG/DL (ref 70–110)
POCT GLUCOSE: 142 MG/DL (ref 70–110)
POCT GLUCOSE: 147 MG/DL (ref 70–110)
POCT GLUCOSE: 150 MG/DL (ref 70–110)

## 2022-09-05 PROCEDURE — 27000221 HC OXYGEN, UP TO 24 HOURS

## 2022-09-05 PROCEDURE — 20600001 HC STEP DOWN PRIVATE ROOM

## 2022-09-05 PROCEDURE — 25000003 PHARM REV CODE 250: Performed by: HOSPITALIST

## 2022-09-05 PROCEDURE — 27100171 HC OXYGEN HIGH FLOW UP TO 24 HOURS

## 2022-09-05 PROCEDURE — 25000242 PHARM REV CODE 250 ALT 637 W/ HCPCS: Performed by: STUDENT IN AN ORGANIZED HEALTH CARE EDUCATION/TRAINING PROGRAM

## 2022-09-05 PROCEDURE — 94640 AIRWAY INHALATION TREATMENT: CPT

## 2022-09-05 PROCEDURE — 63600175 PHARM REV CODE 636 W HCPCS: Performed by: HOSPITALIST

## 2022-09-05 PROCEDURE — 25000003 PHARM REV CODE 250: Performed by: STUDENT IN AN ORGANIZED HEALTH CARE EDUCATION/TRAINING PROGRAM

## 2022-09-05 PROCEDURE — 99900035 HC TECH TIME PER 15 MIN (STAT)

## 2022-09-05 PROCEDURE — 94761 N-INVAS EAR/PLS OXIMETRY MLT: CPT

## 2022-09-05 PROCEDURE — 99233 PR SUBSEQUENT HOSPITAL CARE,LEVL III: ICD-10-PCS | Mod: 95,,, | Performed by: INTERNAL MEDICINE

## 2022-09-05 PROCEDURE — 99233 SBSQ HOSP IP/OBS HIGH 50: CPT | Mod: 95,,, | Performed by: INTERNAL MEDICINE

## 2022-09-05 RX ADMIN — BENZONATATE 200 MG: 100 CAPSULE ORAL at 09:09

## 2022-09-05 RX ADMIN — ASPIRIN 81 MG CHEWABLE TABLET 81 MG: 81 TABLET CHEWABLE at 09:09

## 2022-09-05 RX ADMIN — Medication 400 MG: at 09:09

## 2022-09-05 RX ADMIN — BENZONATATE 200 MG: 100 CAPSULE ORAL at 04:09

## 2022-09-05 RX ADMIN — FLUTICASONE FUROATE AND VILANTEROL TRIFENATATE 1 PUFF: 100; 25 POWDER RESPIRATORY (INHALATION) at 07:09

## 2022-09-05 RX ADMIN — FERROUS SULFATE TAB 325 MG (65 MG ELEMENTAL FE) 1 EACH: 325 (65 FE) TAB at 09:09

## 2022-09-05 RX ADMIN — HYDROCODONE BITARTRATE AND ACETAMINOPHEN 15 ML: 7.5; 325 SOLUTION ORAL at 06:09

## 2022-09-05 RX ADMIN — HYDROCODONE BITARTRATE AND ACETAMINOPHEN 15 ML: 7.5; 325 SOLUTION ORAL at 09:09

## 2022-09-05 RX ADMIN — HYDROCODONE BITARTRATE AND ACETAMINOPHEN 15 ML: 7.5; 325 SOLUTION ORAL at 02:09

## 2022-09-05 RX ADMIN — SERTRALINE HYDROCHLORIDE 150 MG: 50 TABLET ORAL at 06:09

## 2022-09-05 RX ADMIN — INSULIN ASPART 8 UNITS: 100 INJECTION, SOLUTION INTRAVENOUS; SUBCUTANEOUS at 01:09

## 2022-09-05 RX ADMIN — PREGABALIN 150 MG: 75 CAPSULE ORAL at 09:09

## 2022-09-05 RX ADMIN — PREGABALIN 150 MG: 75 CAPSULE ORAL at 04:09

## 2022-09-05 RX ADMIN — INSULIN ASPART 8 UNITS: 100 INJECTION, SOLUTION INTRAVENOUS; SUBCUTANEOUS at 06:09

## 2022-09-05 RX ADMIN — GUAIFENESIN 600 MG: 600 TABLET, EXTENDED RELEASE ORAL at 09:09

## 2022-09-05 RX ADMIN — ENOXAPARIN SODIUM 40 MG: 100 INJECTION SUBCUTANEOUS at 04:09

## 2022-09-05 RX ADMIN — VERAPAMIL HYDROCHLORIDE 180 MG: 180 TABLET, FILM COATED, EXTENDED RELEASE ORAL at 09:09

## 2022-09-05 RX ADMIN — TIOTROPIUM BROMIDE INHALATION SPRAY 2 PUFF: 3.12 SPRAY, METERED RESPIRATORY (INHALATION) at 07:09

## 2022-09-05 RX ADMIN — ATORVASTATIN CALCIUM 40 MG: 20 TABLET, FILM COATED ORAL at 09:09

## 2022-09-05 RX ADMIN — IPRATROPIUM BROMIDE AND ALBUTEROL SULFATE 3 ML: .5; 3 SOLUTION RESPIRATORY (INHALATION) at 09:09

## 2022-09-05 RX ADMIN — QUETIAPINE FUMARATE 400 MG: 100 TABLET ORAL at 09:09

## 2022-09-05 RX ADMIN — INSULIN ASPART 8 UNITS: 100 INJECTION, SOLUTION INTRAVENOUS; SUBCUTANEOUS at 09:09

## 2022-09-05 NOTE — PROGRESS NOTES
[facility] Medicine  Telemedicine Progress Note    Patient Name: Chayito Chung  MRN: [id number]  Patient Class: IP- Inpatient   Admission Date: 8/22/2022  Length of Stay: 11 days  Attending Physician: Pili Carlson MD  Primary Care Provider: Curt Valladares III, MD      Subjective:     Principal Problem:Chronic obstructive pulmonary disease with acute exacerbation    HPI:  71-year-old  woman with a history of right lung adenocarcinoma, COPD, restrictive lung disease, tobacco abuse, CAD status post PCI, type 2 diabetes on insulin, chronic neck pain who presents to the emergency department from primary care clinic for complaints of dyspnea and abdominal distension.    She is followed by Oncology and Radiation Oncology at [facility], undergoing 4 cycles of radiation therapy last of which is Wednesday 8/24.  Completed radiation on Monday but then was also seen in primary care clinic and noted to be fatigued tachycardic and tachypneic reporting a persistent cough after her radiation treatment she also reported neck pain and headache.  She has a chronic cough but reports that symptoms are worse today, she is not taking any home oxygen she has home inhalers but does not seem that she has taken any inhalers or nebulizer treatments.  She feels more short of breath than her baseline    She also reports over the last week progressive abdominal distension and discomfort states that today she has had 1 watery bowel movement has been having irregular bowel movements but when she does have 1 is often a loose BM.    During the ED workup hemoglobin noted to be critical at 6.8 patient has been consented in 1 unit of PRBCs is infusing now during my interview, CT a chest and CT abdomen pelvis completed negative for PE but abdominal imaging displays distended bowel without a transition point possible ileus versus developing SBO.  General surgery consult completed in  ED      Overview/Hospital Course:  Shortness of breath and cough improved and patient back to baseline respiratory wise. Patient was denied SNF by her insurance company. Spoke with [employer] representative 9/2, will await their approval/denial      Telemedicine  This service was provided by Virtual Visit.    Patient was transferred to [facility] on:  9/3/2022    Chief Complaint   Patient presents with    Multiple complaints     Sent here for admission    Shortness of Breath     Lung cancer on radiation, on chemo    Cough     The patient location is: [id number] A   Admitted 8/22/2022  5:49 PM  Present with the patient at the time of the telemed/virtual assessment: Telepresenter    Interval History / Events Overnight:   The patient is able to provide adequate history. Additional history was obtained from past medical records. No significant events reported by Nursing. Stable at rest on 2 LMP NC.  Patient complains of dyspnea. Symptoms have been unchanged since yesterday. Associated symptoms include: shortness of breath on exertion, fatigue, and malaise. Symptoms are stable.     Lab test(s) reviewed: sCr stable    Review of Systems   Constitutional:  Negative for fever.   Respiratory:  Positive for shortness of breath.    Cardiovascular:  Negative for chest pain.     Objective:     Vital Signs (Most Recent):  Temp: 98.3 °F (36.8 °C) (09/04/22 0901)  Pulse: 98 (09/04/22 0901)  Resp: 20 (09/04/22 0901)  BP: 107/62 (09/04/22 0901)  SpO2: 98 % (09/04/22 0901)   Vital Signs (24h Range):  Temp:  [98.3 °F (36.8 °C)-99.2 °F (37.3 °C)] 98.3 °F (36.8 °C)  Pulse:  [] 98  Resp:  [16-20] 20  SpO2:  [91 %-98 %] 98 %  BP: (107-126)/(56-88) 107/62     Weight: 59.9 kg (132 lb 0.9 oz)  Body mass index is 24.15 kg/m².    Intake/Output Summary (Last 24 hours) at 9/4/2022 1210  Last data filed at 9/4/2022 0600  Gross per 24 hour   Intake --   Output 300 ml   Net -300 ml        Physical Exam  Constitutional:        General: She is not in acute distress.     Appearance: Normal appearance.   Eyes:      General: Lids are normal. No scleral icterus.        Right eye: No discharge.         Left eye: No discharge.      Conjunctiva/sclera: Conjunctivae normal.   Neck:      Trachea: Phonation normal.   Cardiovascular:      Rate and Rhythm: Normal rate.      Comments: Monitor / Vital signs reviewed at time of visit  Pulmonary:      Effort: Pulmonary effort is normal. No tachypnea, accessory muscle usage or respiratory distress.   Abdominal:      General: There is no distension.   Neurological:      Mental Status: She is alert. She is not disoriented.   Psychiatric:         Attention and Perception: Attention normal.         Behavior: Behavior is cooperative.       Significant Labs:   Recent Labs   Lab 04/06/22  0619 05/09/22 2128 07/14/22  1133   HGBA1C 10.3* 9.8* 9.0*       Recent Labs   Lab 09/03/22  0750 09/03/22  1257 09/03/22  1725   POCTGLUCOSE 216* 278* 299*       Recent Labs   Lab 08/28/22  1309 09/04/22  0822   WBC 6.22 4.17   HGB 9.0* 8.3*   HCT 30.7* 29.5*    212       Recent Labs   Lab 08/28/22  1309 09/04/22  0822   GRAN 59.9  3.7 62.4  2.6   LYMPH 27.0  1.7 22.5  0.9*   MONO 9.6  0.6 11.5  0.5   EOS 0.2 0.1       Recent Labs   Lab 08/28/22  1309 08/31/22  1103 09/04/22  0822    136 138   K 4.1 3.8 4.1    104 108   CO2 24 24 24   BUN 8 11 8   CREATININE 0.6 0.7 0.6   * 212* 154*   CALCIUM 9.4 9.2 8.7   ALBUMIN 3.2* 2.7* 2.7*   MG  --   --  1.7   PHOS  --  3.4 3.0       Recent Labs   Lab 08/28/22  1309   ALKPHOS 112   ALT 11   AST 11   PROT 7.3   BILITOT 0.3       Recent Labs   Lab 05/09/22 2128 05/11/22  1115   FERRITIN 13*  --    SEDRATE  --  62*       SARS-CoV2 (COVID-19) Qualitative PCR (no units)   Date Value   02/04/2022 Not Detected   08/18/2020 Not detected   08/11/2020 Not detected   08/04/2020 Not detected   07/21/2020 Not Detected     SARS-CoV-2 RNA, Amplification, Qual (no  units)   Date Value   08/22/2022 Negative   05/25/2020 Negative   05/12/2020 Negative     POC Rapid COVID (no units)   Date Value   08/23/2022 Negative   06/16/2022 Negative   05/09/2022 Negative   12/09/2021 Negative       ECG Results              EKG 12-lead (Final result)  Result time 08/23/22 15:27:00      Final result by Interface, Lab In [facility] (08/23/22 15:27:00)                   Narrative:    Test Reason : R06.02,    Vent. Rate : 119 BPM     Atrial Rate : 119 BPM     P-R Int : 124 ms          QRS Dur : 074 ms      QT Int : 314 ms       P-R-T Axes : 069 067 -64 degrees     QTc Int : 441 ms    Sinus tachycardia  Nonspecific ST and/or T wave abnormalities  Abnormal ECG  When compared with ECG of 22-AUG-2022 16:19,  No significant change was found  Confirmed by Rosy Abdullahi MD ([id number]) on 8/23/2022 3:26:53 PM    Referred By: [email or web address]   SELF           Confirmed By:Rosy Abdullahi MD                                     EKG 12-lead (Final result)  Result time 08/23/22 13:36:20      Final result by Interface, Lab In [facility] (08/23/22 13:36:20)                   Narrative:    Test Reason : R06.02,    Vent. Rate : 126 BPM     Atrial Rate : 126 BPM     P-R Int : 126 ms          QRS Dur : 076 ms      QT Int : 312 ms       P-R-T Axes : 070 053 -46 degrees     QTc Int : 451 ms    Sinus tachycardia  Nonspecific ST and T wave abnormality  Abnormal ECG  When compared with ECG of 17-AUG-2022 14:27,  Nonspecific T wave abnormality, improved in Lateral leads  Confirmed by JOHANNA RUSSO MD ([id number]) on 8/23/2022 1:36:16 PM    Referred By: [email or web address]   SELF           Confirmed By:JOHANNA RUSSO MD                                    Results for orders placed during the hospital encounter of 04/05/22    Echo    Interpretation Summary  · The left ventricle is normal in size with mild concentric hypertrophy and normal systolic function.  · The estimated ejection fraction is 60%.  · Mild mitral regurgitation.  · Grade I left ventricular  diastolic dysfunction.  · Mild tricuspid regurgitation.  · The estimated PA systolic pressure is 47 mmHg.  · Normal right ventricular size with normal right ventricular systolic function.  · There is mild pulmonary hypertension.  · There is no evidence of intracardiac shunting.      X-Ray Chest AP Portable  Narrative: EXAMINATION:  XR CHEST AP PORTABLE    CLINICAL HISTORY:  chest pain; Simple chronic bronchitis    TECHNIQUE:  Single frontal view of the chest was performed.    COMPARISON:  08/22/2022    FINDINGS:  Cardiac silhouette is within normal limits.    Bibasilar mild airspace disease right greater than left may be associated with atelectasis.  Mild infiltrate and pneumonitis not excluded.  Follow-up recommended.    Postoperative changes near the gastroesophageal junction.  Postop changes of the cervical spine.    No acute osseous abnormality.    No effusion or pneumothorax.  Impression: Mild bibasilar airspace disease right greater than left may be associated with atelectasis.  Mild infiltrate/pneumonitis is not excluded.  Follow-up is recommended.    Electronically signed by: Salomon Escobar  Date:    08/27/2022  Time:    18:05      Labs and Imaging within the last 24 hours listed above were reviewed.       Diet: Diet Adult Regular (IDDSI Level 7)  Significant LDAs:   IV Access Type: Peripheral  Urinary Catheter Indication if present: Patient Does Not Have Urinary Catheter  Other Lines/Tubes/Drains:    HIGH RISK CONDITION(S):   Patient has a condition that poses threat to life and bodily function: Severe Respiratory Distress     Goals of Care:    Previous admission:  5/9/22  Likely prognosis:  Fair  Code Status: DNR  Comfort Only: No  Hospice: No  Goals at discharge: remain at home, with physician follow-up    Discharge Planning   MICKY: 9/6/2022     Code Status: DNR   Is the patient medically ready for discharge?: Yes    Reason for patient still in hospital (select all that apply): Pending  disposition  Discharge Plan A: Home Health        Assessment/Plan:      * Chronic obstructive pulmonary disease with acute exacerbation  Worsening dyspnea and cough after radiation treatment giving concern for radiation pneumonitis vs COPD exacerbation causing symptoms  - Completed Prednisone 40mg po daily x 5 days   - Oxygen 1-2L to achieve O2 sat 88-92% given hx of COPD  - PRN DUO Nebs  - Completed Azithromycin 500mg x 3 days   - Use BREO + spiriva in place of home TRELEGY  - Prometh-codeine for cough    Acute on chronic respiratory failure with hypoxia  Patient reports that she was on home O2 for 4 years until one year ago (was discontinued by previous Pulmonologist at [facility])  Now with COPD and lung CA will eva need home O2  Check ambulatory SpO2 on 2 LPM due to dyspnea on exertion while on O2    Moderate malnutrition  Nutrition consulted. Most recent weight and BMI monitored-      Malnutrition (Moderate to Severe)  Weight Loss (Malnutrition):  (7% x 4 months)  Energy Intake (Malnutrition): less than 75% for greater than or equal to 3 months        Measurements:  Wt Readings from Last 1 Encounters:   09/04/22 59.9 kg (132 lb 0.9 oz)   Body mass index is 24.15 kg/m².    Recommendations: Recommendation/Intervention: 1.  Goals: Meet % EEN, EPN by RD f/u    Patient has been screened and assessed by RD. RD will follow patient.      Abdominal distension  Secondary to constipation from opioids which she takes for her lung CA  - s/p gastrograffin study with contrast seen in bowel during second KUB. Advanced from CLD to Regular diet during admission  - Advanced to regular diet  - Continue bowel regimen    Adenocarcinoma of lung, right  F/u with [facility] - oncology and rad-onc when stable for discharge.    She needs to reschedule next radiation appointment in light of current hospitalization.     Type 2 diabetes mellitus, with long-term current use of insulin  Patient's FSGs are uncontrolled due to hypoglycemia on  current medication regimen.  Current correctional scale  Medium  Increase anti-hyperglycemic dose as follows-   Antihyperglycemics (From admission, onward)    Start     Stop Route Frequency Ordered    09/04/22 2100  insulin detemir U-100 pen 26 Units         -- SubQ Nightly 09/04/22 1213    09/04/22 1645  insulin aspart U-100 pen 8 Units         -- SubQ 3 times daily with meals 09/04/22 1213    08/23/22 0232  insulin aspart U-100 pen 1-10 Units         -- SubQ Before meals & nightly PRN 08/23/22 0137        Hold Oral hypoglycemics while patient is in the hospital.  At home patient reports using a Combo insulin - HUMALOG 75/25 - 35 units BID AC  Anded basal insulin Nightly     Iron deficiency anemia  Patient with worsening acute on chronic anemia   - s/p 1unit PRBC in ED  - Hgb stable    Major depressive disorder in partial remission  Continuing current management.    Coronary artery disease of native artery of native heart with stable angina pectoris  Continue ASA/statin  -not on beta blocker   -EKG w/o ischemic changes. Negative troponin x 1        Active Hospital Problems    Diagnosis  POA    *Chronic obstructive pulmonary disease with acute exacerbation [J44.1]  Yes    Acute on chronic respiratory failure with hypoxia [J96.21]  Yes    Moderate malnutrition [E44.0]  Yes    Abdominal distension [R14.0]  Yes    Adenocarcinoma of lung, right [C34.91]  Yes    Type 2 diabetes mellitus, with long-term current use of insulin [E11.9, Z79.4]  Not Applicable    Iron deficiency anemia [D50.9]  Yes    Major depressive disorder in partial remission [F32.4]  Yes    Neuropathy [G62.9]  Yes    Coronary artery disease of native artery of native heart with stable angina pectoris [I25.118]  Yes      Resolved Hospital Problems   No resolved problems to display.       Inpatient Medications Prescribed for Management of Current Problems:     Scheduled Meds:    aspirin  81 mg Oral Daily    atorvastatin  40 mg Oral Daily     benzonatate  200 mg Oral TID    enoxaparin  40 mg Subcutaneous Daily    ferrous sulfate  1 tablet Oral Daily    fluticasone furoate-vilanteroL  1 puff Inhalation Daily    guaiFENesin  600 mg Oral BID    insulin aspart U-100  8 Units Subcutaneous TIDWM    insulin detemir U-100  26 Units Subcutaneous QHS    magnesium oxide  400 mg Oral Daily    polyethylene glycol  17 g Oral Daily    pregabalin  150 mg Oral TID    QUEtiapine  400 mg Oral Nightly    senna-docusate 8.6-50 mg  1 tablet Oral BID    sertraline  150 mg Oral Daily    tiotropium bromide  2 puff Inhalation Daily    verapamiL  180 mg Oral QHS     Continuous Infusions:   As Needed: acetaminophen, albuterol-ipratropium, bisacodyL, dextrose 10%, dextrose 10%, glucagon (human recombinant), hydrocodone-apap 7.5-325 MG/15 ML, insulin aspart U-100, melatonin, naloxone, nitroGLYCERIN, ondansetron, prochlorperazine, promethazine-codeine 6.25-10 mg/5 ml, sodium chloride 0.9%, tiZANidine    VTE Risk Mitigation (From admission, onward)         Ordered     enoxaparin injection 40 mg  Daily         08/23/22 0137     IP VTE HIGH RISK PATIENT  Once         08/23/22 0137     Place sequential compression device  Until discontinued         08/23/22 0137              I have assessed these finding virtually using telemed platform and with assistance of bedside nurse     The attending portion of this evaluation, treatment, and documentation was performed per Pili Carlson MD via Telemedicine AudioVisual using the secure [employer] software platform with 2 way audio/video. The provider was located off-site and the patient is located in the hospital. The aforementioned video software was utilized to document the relevant history and physical exam    Pili Carlson MD  Department of Hospital Medicine   [facility] - Transplant Stepdown

## 2022-09-05 NOTE — ASSESSMENT & PLAN NOTE
Patient's FSGs are uncontrolled due to hypoglycemia on current medication regimen.  Current correctional scale  Medium  Increase anti-hyperglycemic dose as follows-   Antihyperglycemics (From admission, onward)    Start     Stop Route Frequency Ordered    09/04/22 2100  insulin detemir U-100 pen 26 Units         -- SubQ Nightly 09/04/22 1213    09/04/22 1645  insulin aspart U-100 pen 8 Units         -- SubQ 3 times daily with meals 09/04/22 1213    08/23/22 0232  insulin aspart U-100 pen 1-10 Units         -- SubQ Before meals & nightly PRN 08/23/22 0137        Hold Oral hypoglycemics while patient is in the hospital.  At home patient reports using a Combo insulin - HUMALOG 75/25 - 35 units BID AC  Anded basal insulin Nightly

## 2022-09-05 NOTE — ASSESSMENT & PLAN NOTE
Nutrition consulted. Most recent weight and BMI monitored-      Malnutrition (Moderate to Severe)  Weight Loss (Malnutrition):  (7% x 4 months)  Energy Intake (Malnutrition): less than 75% for greater than or equal to 3 months        Measurements:  Wt Readings from Last 1 Encounters:   09/04/22 59.9 kg (132 lb 0.9 oz)   Body mass index is 24.15 kg/m².    Recommendations: Recommendation/Intervention: 1.  Goals: Meet % EEN, EPN by RD f/u    Patient has been screened and assessed by RD. RD will follow patient.

## 2022-09-05 NOTE — ASSESSMENT & PLAN NOTE
Patient reports that she was on home O2 for 4 years until one year ago (was discontinued by previous Pulmonologist at [facility])  Now with COPD and lung CA will eva need home O2  Check ambulatory SpO2 on 2 LPM due to dyspnea on exertion while on O2

## 2022-09-05 NOTE — SUBJECTIVE & OBJECTIVE
Telemedicine  This service was provided by Virtual Visit.    Patient was transferred to [facility] on:  9/3/2022    Chief Complaint   Patient presents with    Multiple complaints     Sent here for admission    Shortness of Breath     Lung cancer on radiation, on chemo    Cough     The patient location is: [id number] A   Admitted 8/22/2022  5:49 PM  Present with the patient at the time of the telemed/virtual assessment: Telepresenter    Interval History / Events Overnight:   The patient is able to provide adequate history. Additional history was obtained from past medical records. No significant events reported by Nursing. Stable at rest on 3 LMP NC - received Norco this AM  Patient complains of dyspnea. Symptoms have been unchanged since yesterday. Associated symptoms include: shortness of breath on exertion, fatigue, and malaise. Symptoms are stable.     Lab test(s) reviewed: sCr stable    Review of Systems   Constitutional:  Negative for fever.   Respiratory:  Positive for shortness of breath.    Cardiovascular:  Negative for chest pain.     Objective:     Vital Signs (Most Recent):  Temp: 98.4 °F (36.9 °C) (09/05/22 0701)  Pulse: 94 (09/05/22 0724)  Resp: 16 (09/05/22 0724)  BP: 110/63 (09/05/22 0701)  SpO2: (!) 92 % (09/05/22 0724)   Vital Signs (24h Range):  Temp:  [98 °F (36.7 °C)-98.6 °F (37 °C)] 98.4 °F (36.9 °C)  Pulse:  [] 94  Resp:  [16-18] 16  SpO2:  [89 %-98 %] 92 %  BP: (110-147)/(63-75) 110/63     Weight: 59.9 kg (132 lb 0.9 oz)  Body mass index is 24.15 kg/m².    Intake/Output Summary (Last 24 hours) at 9/5/2022 1205  Last data filed at 9/5/2022 0801  Gross per 24 hour   Intake 450 ml   Output 1400 ml   Net -950 ml        Physical Exam  Constitutional:       General: She is not in acute distress.     Appearance: Normal appearance.   Eyes:      General: Lids are normal. No scleral icterus.        Right eye: No discharge.         Left eye: No discharge.      Conjunctiva/sclera:  Conjunctivae normal.   Neck:      Trachea: Phonation normal.   Cardiovascular:      Rate and Rhythm: Normal rate.      Comments: Monitor / Vital signs reviewed at time of visit  Pulmonary:      Effort: Pulmonary effort is normal. No tachypnea, accessory muscle usage or respiratory distress.   Abdominal:      General: There is no distension.   Neurological:      Mental Status: She is alert. She is not disoriented.   Psychiatric:         Attention and Perception: Attention normal.         Behavior: Behavior is cooperative.       Significant Labs:   Recent Labs   Lab 04/06/22 0619 05/09/22 2128 07/14/22  1133   HGBA1C 10.3* 9.8* 9.0*       Recent Labs   Lab 09/04/22 2043 09/05/22 0622 09/05/22 0719   POCTGLUCOSE 294* 130* 147*       Recent Labs   Lab 09/04/22 0822   WBC 4.17   HGB 8.3*   HCT 29.5*          Recent Labs   Lab 09/04/22 0822   GRAN 62.4  2.6   LYMPH 22.5  0.9*   MONO 11.5  0.5   EOS 0.1       Recent Labs   Lab 08/31/22  1103 09/04/22  0822    138   K 3.8 4.1    108   CO2 24 24   BUN 11 8   CREATININE 0.7 0.6   * 154*   CALCIUM 9.2 8.7   ALBUMIN 2.7* 2.7*   MG  --  1.7   PHOS 3.4 3.0       Recent Labs   Lab 05/09/22 2128 05/11/22  1115   FERRITIN 13*  --    SEDRATE  --  62*       SARS-CoV2 (COVID-19) Qualitative PCR (no units)   Date Value   02/04/2022 Not Detected   08/18/2020 Not detected   08/11/2020 Not detected   08/04/2020 Not detected   07/21/2020 Not Detected     SARS-CoV-2 RNA, Amplification, Qual (no units)   Date Value   08/22/2022 Negative   05/25/2020 Negative   05/12/2020 Negative     POC Rapid COVID (no units)   Date Value   08/23/2022 Negative   06/16/2022 Negative   05/09/2022 Negative   12/09/2021 Negative       ECG Results              EKG 12-lead (Final result)  Result time 08/23/22 15:27:00      Final result by Interface, Lab In [facility] (08/23/22 15:27:00)                   Narrative:    Test Reason : R06.02,    Vent. Rate : 119 BPM     Atrial Rate :  119 BPM     P-R Int : 124 ms          QRS Dur : 074 ms      QT Int : 314 ms       P-R-T Axes : 069 067 -64 degrees     QTc Int : 441 ms    Sinus tachycardia  Nonspecific ST and/or T wave abnormalities  Abnormal ECG  When compared with ECG of 22-AUG-2022 16:19,  No significant change was found  Confirmed by Rosy Abdullahi MD ([id number]) on 8/23/2022 3:26:53 PM    Referred By: [email or web address]   SELF           Confirmed By:Rosy Abdullahi MD                                     EKG 12-lead (Final result)  Result time 08/23/22 13:36:20      Final result by Interface, Lab In [facility] (08/23/22 13:36:20)                   Narrative:    Test Reason : R06.02,    Vent. Rate : 126 BPM     Atrial Rate : 126 BPM     P-R Int : 126 ms          QRS Dur : 076 ms      QT Int : 312 ms       P-R-T Axes : 070 053 -46 degrees     QTc Int : 451 ms    Sinus tachycardia  Nonspecific ST and T wave abnormality  Abnormal ECG  When compared with ECG of 17-AUG-2022 14:27,  Nonspecific T wave abnormality, improved in Lateral leads  Confirmed by JOHANNA RUSSO MD [id number]) on 8/23/2022 1:36:16 PM    Referred By: [email or web address]   SELF           Confirmed By:JOHANNA RUSSO MD                                    Results for orders placed during the hospital encounter of 04/05/22    Echo    Interpretation Summary  · The left ventricle is normal in size with mild concentric hypertrophy and normal systolic function.  · The estimated ejection fraction is 60%.  · Mild mitral regurgitation.  · Grade I left ventricular diastolic dysfunction.  · Mild tricuspid regurgitation.  · The estimated PA systolic pressure is 47 mmHg.  · Normal right ventricular size with normal right ventricular systolic function.  · There is mild pulmonary hypertension.  · There is no evidence of intracardiac shunting.      X-Ray Chest AP Portable  Narrative: EXAMINATION:  XR CHEST AP PORTABLE    CLINICAL HISTORY:  chest pain; Simple chronic bronchitis    TECHNIQUE:  Single frontal view of the chest was  performed.    COMPARISON:  08/22/2022    FINDINGS:  Cardiac silhouette is within normal limits.    Bibasilar mild airspace disease right greater than left may be associated with atelectasis.  Mild infiltrate and pneumonitis not excluded.  Follow-up recommended.    Postoperative changes near the gastroesophageal junction.  Postop changes of the cervical spine.    No acute osseous abnormality.    No effusion or pneumothorax.  Impression: Mild bibasilar airspace disease right greater than left may be associated with atelectasis.  Mild infiltrate/pneumonitis is not excluded.  Follow-up is recommended.    Electronically signed by: Salomon Escobar  Date:    08/27/2022  Time:    18:05      Labs and Imaging within the last 24 hours listed above were reviewed.       Diet: Diet Adult Regular (IDDSI Level 7)  Significant LDAs:   IV Access Type: Peripheral  Urinary Catheter Indication if present: Patient Does Not Have Urinary Catheter  Other Lines/Tubes/Drains:    HIGH RISK CONDITION(S):   Patient has a condition that poses threat to life and bodily function: Severe Respiratory Distress     Goals of Care:    Previous admission:  5/9/22  Likely prognosis:  Fair  Code Status: DNR  Comfort Only: No  Hospice: No  Goals at discharge: remain at home, with physician follow-up    Discharge Planning   MICKY: 9/7/2022     Code Status: DNR   Is the patient medically ready for discharge?: Yes    Reason for patient still in hospital (select all that apply): Pending disposition  Discharge Plan A: Home Health

## 2022-09-05 NOTE — ASSESSMENT & PLAN NOTE
F/u with [facility] - oncology and rad-onc when stable for discharge.    She needs to reschedule next radiation appointment in light of current hospitalization.

## 2022-09-06 LAB
ALBUMIN SERPL BCP-MCNC: 2.7 G/DL (ref 3.5–5.2)
ANION GAP SERPL CALC-SCNC: 7 MMOL/L (ref 8–16)
BASOPHILS # BLD AUTO: 0.04 K/UL (ref 0–0.2)
BASOPHILS NFR BLD: 0.9 % (ref 0–1.9)
BUN SERPL-MCNC: 13 MG/DL (ref 8–23)
CALCIUM SERPL-MCNC: 9.5 MG/DL (ref 8.7–10.5)
CHLORIDE SERPL-SCNC: 103 MMOL/L (ref 95–110)
CO2 SERPL-SCNC: 23 MMOL/L (ref 23–29)
CREAT SERPL-MCNC: 0.6 MG/DL (ref 0.5–1.4)
DIFFERENTIAL METHOD: ABNORMAL
EOSINOPHIL # BLD AUTO: 0.2 K/UL (ref 0–0.5)
EOSINOPHIL NFR BLD: 3.7 % (ref 0–8)
ERYTHROCYTE [DISTWIDTH] IN BLOOD BY AUTOMATED COUNT: 29.2 % (ref 11.5–14.5)
EST. GFR  (NO RACE VARIABLE): >60 ML/MIN/1.73 M^2
GLUCOSE SERPL-MCNC: 67 MG/DL (ref 70–110)
HCT VFR BLD AUTO: 28.4 % (ref 37–48.5)
HGB BLD-MCNC: 8.3 G/DL (ref 12–16)
IMM GRANULOCYTES # BLD AUTO: 0.02 K/UL (ref 0–0.04)
IMM GRANULOCYTES NFR BLD AUTO: 0.5 % (ref 0–0.5)
LYMPHOCYTES # BLD AUTO: 1 K/UL (ref 1–4.8)
LYMPHOCYTES NFR BLD: 23.4 % (ref 18–48)
MAGNESIUM SERPL-MCNC: 1.5 MG/DL (ref 1.6–2.6)
MCH RBC QN AUTO: 24.4 PG (ref 27–31)
MCHC RBC AUTO-ENTMCNC: 29.2 G/DL (ref 32–36)
MCV RBC AUTO: 84 FL (ref 82–98)
MONOCYTES # BLD AUTO: 0.5 K/UL (ref 0.3–1)
MONOCYTES NFR BLD: 12.4 % (ref 4–15)
NEUTROPHILS # BLD AUTO: 2.5 K/UL (ref 1.8–7.7)
NEUTROPHILS NFR BLD: 59.1 % (ref 38–73)
NRBC BLD-RTO: 0 /100 WBC
PHOSPHATE SERPL-MCNC: 5.4 MG/DL (ref 2.7–4.5)
PLATELET # BLD AUTO: 237 K/UL (ref 150–450)
PMV BLD AUTO: 10.1 FL (ref 9.2–12.9)
POCT GLUCOSE: 126 MG/DL (ref 70–110)
POCT GLUCOSE: 171 MG/DL (ref 70–110)
POCT GLUCOSE: 187 MG/DL (ref 70–110)
POCT GLUCOSE: 66 MG/DL (ref 70–110)
POCT GLUCOSE: 83 MG/DL (ref 70–110)
POTASSIUM SERPL-SCNC: 4 MMOL/L (ref 3.5–5.1)
RBC # BLD AUTO: 3.4 M/UL (ref 4–5.4)
SODIUM SERPL-SCNC: 133 MMOL/L (ref 136–145)
WBC # BLD AUTO: 4.27 K/UL (ref 3.9–12.7)

## 2022-09-06 PROCEDURE — 63600175 PHARM REV CODE 636 W HCPCS: Performed by: HOSPITALIST

## 2022-09-06 PROCEDURE — 83735 ASSAY OF MAGNESIUM: CPT | Performed by: INTERNAL MEDICINE

## 2022-09-06 PROCEDURE — 80069 RENAL FUNCTION PANEL: CPT | Performed by: INTERNAL MEDICINE

## 2022-09-06 PROCEDURE — 97535 SELF CARE MNGMENT TRAINING: CPT

## 2022-09-06 PROCEDURE — 99233 SBSQ HOSP IP/OBS HIGH 50: CPT | Mod: 95,,, | Performed by: INTERNAL MEDICINE

## 2022-09-06 PROCEDURE — 99233 PR SUBSEQUENT HOSPITAL CARE,LEVL III: ICD-10-PCS | Mod: 95,,, | Performed by: INTERNAL MEDICINE

## 2022-09-06 PROCEDURE — 97530 THERAPEUTIC ACTIVITIES: CPT

## 2022-09-06 PROCEDURE — 20600001 HC STEP DOWN PRIVATE ROOM

## 2022-09-06 PROCEDURE — 25000003 PHARM REV CODE 250: Performed by: STUDENT IN AN ORGANIZED HEALTH CARE EDUCATION/TRAINING PROGRAM

## 2022-09-06 PROCEDURE — 85025 COMPLETE CBC W/AUTO DIFF WBC: CPT | Performed by: INTERNAL MEDICINE

## 2022-09-06 PROCEDURE — 36415 COLL VENOUS BLD VENIPUNCTURE: CPT | Performed by: INTERNAL MEDICINE

## 2022-09-06 PROCEDURE — 25000003 PHARM REV CODE 250: Performed by: HOSPITALIST

## 2022-09-06 RX ADMIN — GUAIFENESIN 600 MG: 600 TABLET, EXTENDED RELEASE ORAL at 08:09

## 2022-09-06 RX ADMIN — BENZONATATE 200 MG: 100 CAPSULE ORAL at 08:09

## 2022-09-06 RX ADMIN — INSULIN ASPART 8 UNITS: 100 INJECTION, SOLUTION INTRAVENOUS; SUBCUTANEOUS at 05:09

## 2022-09-06 RX ADMIN — TIOTROPIUM BROMIDE INHALATION SPRAY 2 PUFF: 3.12 SPRAY, METERED RESPIRATORY (INHALATION) at 08:09

## 2022-09-06 RX ADMIN — PREGABALIN 150 MG: 75 CAPSULE ORAL at 08:09

## 2022-09-06 RX ADMIN — Medication 400 MG: at 08:09

## 2022-09-06 RX ADMIN — DEXTROSE 125 ML: 10 SOLUTION INTRAVENOUS at 01:09

## 2022-09-06 RX ADMIN — VERAPAMIL HYDROCHLORIDE 180 MG: 180 TABLET, FILM COATED, EXTENDED RELEASE ORAL at 08:09

## 2022-09-06 RX ADMIN — INSULIN ASPART 8 UNITS: 100 INJECTION, SOLUTION INTRAVENOUS; SUBCUTANEOUS at 08:09

## 2022-09-06 RX ADMIN — ASPIRIN 81 MG CHEWABLE TABLET 81 MG: 81 TABLET CHEWABLE at 08:09

## 2022-09-06 RX ADMIN — PREGABALIN 150 MG: 75 CAPSULE ORAL at 02:09

## 2022-09-06 RX ADMIN — ENOXAPARIN SODIUM 40 MG: 100 INJECTION SUBCUTANEOUS at 05:09

## 2022-09-06 RX ADMIN — ATORVASTATIN CALCIUM 40 MG: 20 TABLET, FILM COATED ORAL at 08:09

## 2022-09-06 RX ADMIN — FERROUS SULFATE TAB 325 MG (65 MG ELEMENTAL FE) 1 EACH: 325 (65 FE) TAB at 08:09

## 2022-09-06 RX ADMIN — FLUTICASONE FUROATE AND VILANTEROL TRIFENATATE 1 PUFF: 100; 25 POWDER RESPIRATORY (INHALATION) at 08:09

## 2022-09-06 RX ADMIN — SENNOSIDES AND DOCUSATE SODIUM 1 TABLET: 50; 8.6 TABLET ORAL at 08:09

## 2022-09-06 RX ADMIN — SERTRALINE HYDROCHLORIDE 150 MG: 50 TABLET ORAL at 08:09

## 2022-09-06 RX ADMIN — MELATONIN TAB 3 MG 6 MG: 3 TAB at 08:09

## 2022-09-06 RX ADMIN — HYDROCODONE BITARTRATE AND ACETAMINOPHEN 15 ML: 7.5; 325 SOLUTION ORAL at 02:09

## 2022-09-06 RX ADMIN — INSULIN ASPART 2 UNITS: 100 INJECTION, SOLUTION INTRAVENOUS; SUBCUTANEOUS at 08:09

## 2022-09-06 RX ADMIN — BENZONATATE 200 MG: 100 CAPSULE ORAL at 02:09

## 2022-09-06 RX ADMIN — QUETIAPINE FUMARATE 400 MG: 100 TABLET ORAL at 08:09

## 2022-09-06 NOTE — PT/OT/SLP PROGRESS
Occupational Therapy Treatment    Patient Name:  Chayito Chung   MRN:  [id number]  Admit Date: 8/22/2022  Admitting Diagnosis:  Chronic obstructive pulmonary disease with acute exacerbation   Length of Stay: 13 days  Recent Surgery: * No surgery found *      Recommendations:     Discharge Recommendations: nursing facility, skilled  Discharge Equipment Recommendations:  walker, rolling  Barriers to discharge:  None    Plan:     Patient to be seen 3 x/week to address the above listed problems via self-care/home management, therapeutic activities, therapeutic exercises  Plan of Care Expires: 10/22/22  Plan of Care Reviewed with: patient    Assessment:   Chayito Chung is a 71 y.o. female with a medical diagnosis of Chronic obstructive pulmonary disease with acute exacerbation.  She presents with the following performance deficits affecting function: weakness, impaired endurance, impaired self care skills, impaired functional mobility, gait instability, decreased lower extremity function, impaired coordination, impaired cardiopulmonary response to activity, impaired balance, decreased safety awareness, pain.      Pt tolerated session fairly this date and was willing to participate. Demonstrating continued increased fatigue, impaired endurance, decreased activity tolerance, impaired balance, impaired cardiopulmonary response to activity and decreased safety awareness, requiring increased time to complete functional tasks. Patient tolerated functional mobility of community distance, noted with impaired balance and increased WOB. Patient is progressing towards established goals, and continues to benefit from acute skilled OT services to increase functional performance and improve quality of life. OT to continue to recommend SNF at discharge to improve pt functional independence and increase patient safety before returning home.      Rehab Prognosis: Good; patient would benefit from acute skilled OT services to address these  "deficits and reach maximum level of function.        Subjective   Communicated with: Nurse prior to session.  Patient found HOB elevated with oxygen, peripheral IV upon OT entry to room. Pt agreeable to participate at this time.    Patient: " I'm just tired. "    Pain/Comfort:  Pain Rating 1: 0/10  Pain Rating Post-Intervention 1: 0/10    Objective:   Patient found with: oxygen, peripheral IV   General Precautions: Standard, Cardiac fall   Orthopedic Precautions:N/A   Braces: N/A   Oxygen Device: Nasal Cannula 2L  Vitals: /66   Pulse 95   Temp 98 °F (36.7 °C) (Oral)   Resp 16   Ht 5' 2" (1.575 m)   Wt 59.9 kg (132 lb 0.9 oz)   LMP  (LMP Unknown)   SpO2 (!) 92%   Breastfeeding No   BMI 24.15 kg/m²     Outcome Measures:  AMPA 6 Click ADL: 22    Cognition:   Alert and Cooperative  Command following: follows two-step commands  Communication: clear/fluent    Occupational Performance:  Bed Mobility:    Patient completed Rolling/Turning to Right with stand by assistance  Patient completed Supine to Sit with stand by assistance on R side of bed; HOB elevated  Scooting anteriorly to EOB to have both feet planted on floor: stand by assistance    Functional Mobility/Transfers:  Static Sitting EOB: SBA  Patient completed Sit <> Stand Transfer from EOB with stand by assistance  with  rolling walker   Patient completed stand from chair with SBA using RAW  Static Standing Balance: SBA  Dynamic Standing Balance: SBA  Patient completed functional mobility of household distance ~40ft with CGA using RW; required increased time due to impaired balance and increased WOB.  Patient completed functional mobility of community distance ~150ft with CGA using RW; required increased time due to slow gait, impaired balance and increased WOB.      Activities of Daily Living:  Grooming: stand by assistance to perform oral care and wash face in stance at sink  Declined additional needs    AMPAC 6 Click ADL:  AMPA Total Score: " 22    Treatment & Education:  -Pt education on OT role and POC.  -Importance of E/OOB activity with staff assistance, emphasis on daily participation  -Safety during functional transfer and mobility ensured  -Patient provided with education on importance of Bilateral UB/LB integration during functional tasks for improvement in functional performance.   -Education provided/reviewed, questions answered within OT scope of practice.   -Patient demonstrates understanding and learning this date.         Patient left up in chair with all lines intact, call button in reach, and nurse notified    GOALS:   Multidisciplinary Problems       Occupational Therapy Goals          Problem: Occupational Therapy    Goal Priority Disciplines Outcome Interventions   Occupational Therapy Goal     OT, PT/OT Ongoing, Progressing    Description: Goals to be met by: 9/20/22     Patient will increase functional independence with ADLs by performing:    UE Dressing with Modified [place].  LE Dressing with Modified [place].  Grooming while seated with Modified [place].  Toileting from toilet/bedside commode with Stand-by Assistance for hygiene and clothing management.   Sitting at edge of bed x15 minutes with Supervision.  Toilet transfer to toilet/bedside commode with Stand-by Assistance and LRAD.                         Time Tracking:     OT Date of Treatment: 09/06/22  OT Start Time: 0845  OT Stop Time: 0909  OT Total Time (min): 24 min    Billable Minutes:Self Care/Home Management 10  Therapeutic Activity 14      9/6/2022

## 2022-09-06 NOTE — ASSESSMENT & PLAN NOTE
Patient reports that she was on home O2 for 4 years until one year ago (was discontinued by previous Pulmonologist at [facility])  Now with COPD and lung CA will eva need home O2  Check ambulatory SpO2 on 2 LPM due to dyspnea on exertion while on O2  Increased O2 requirements on 9/5; CXR showed increased atelectasis. Trial of IS

## 2022-09-06 NOTE — ASSESSMENT & PLAN NOTE
Patient's FSGs are uncontrolled due to hypoglycemia on current medication regimen.  Current correctional scale  Medium  Maintain anti-hyperglycemic dose as follows-   Antihyperglycemics (From admission, onward)    Start     Stop Route Frequency Ordered    09/04/22 2100  insulin detemir U-100 pen 26 Units         -- SubQ Nightly 09/04/22 1213    09/04/22 1645  insulin aspart U-100 pen 8 Units         -- SubQ 3 times daily with meals 09/04/22 1213    08/23/22 0232  insulin aspart U-100 pen 1-10 Units         -- SubQ Before meals & nightly PRN 08/23/22 0137        Hold Oral hypoglycemics while patient is in the hospital.  At home patient reports using a Combo insulin - HUMALOG 75/25 - 35 units BID AC  Anded basal insulin Nightly

## 2022-09-06 NOTE — SUBJECTIVE & OBJECTIVE
Telemedicine  This service was provided by Virtual Visit.    Patient was transferred to [facility] on:  9/3/2022    Chief Complaint   Patient presents with    Multiple complaints     Sent here for admission    Shortness of Breath     Lung cancer on radiation, on chemo    Cough     The patient location is: [id number] A   Admitted 8/22/2022  5:49 PM  Present with the patient at the time of the telemed/virtual assessment: Telepresenter    Interval History / Events Overnight:   The patient is able to provide adequate history. Additional history was obtained from past medical records. No significant events reported by Nursing. Stable at rest on 2 LMP NC again  Patient complains of dyspnea. Symptoms have improved since yesterday. Associated symptoms include: shortness of breath on exertion, fatigue, and malaise. Symptoms are stable.     Lab test(s) reviewed: sCr stable    Review of Systems   Constitutional:  Negative for fever.   Respiratory:  Positive for shortness of breath.    Cardiovascular:  Negative for chest pain.     Objective:     Vital Signs (Most Recent):  Temp: 98.5 °F (36.9 °C) (09/06/22 1210)  Pulse: 92 (09/06/22 1210)  Resp: 18 (09/06/22 1210)  BP: 123/70 (09/06/22 1210)  SpO2: (!) 93 % (09/06/22 1210)   Vital Signs (24h Range):  Temp:  [98 °F (36.7 °C)-98.5 °F (36.9 °C)] 98.5 °F (36.9 °C)  Pulse:  [] 92  Resp:  [16-18] 18  SpO2:  [86 %-94 %] 93 %  BP: (106-127)/(56-70) 123/70     Weight: 59.9 kg (132 lb 0.9 oz)  Body mass index is 24.15 kg/m².    Intake/Output Summary (Last 24 hours) at 9/6/2022 1431  Last data filed at 9/5/2022 2300  Gross per 24 hour   Intake 440 ml   Output --   Net 440 ml        Physical Exam  Constitutional:       General: She is not in acute distress.     Appearance: Normal appearance.   Eyes:      General: Lids are normal. No scleral icterus.        Right eye: No discharge.         Left eye: No discharge.      Conjunctiva/sclera: Conjunctivae normal.   Neck:       Trachea: Phonation normal.   Cardiovascular:      Rate and Rhythm: Normal rate.      Comments: Monitor / Vital signs reviewed at time of visit  Pulmonary:      Effort: Pulmonary effort is normal. No tachypnea, accessory muscle usage or respiratory distress.   Abdominal:      General: There is no distension.   Neurological:      Mental Status: She is alert. She is not disoriented.   Psychiatric:         Attention and Perception: Attention normal.         Behavior: Behavior is cooperative.       Significant Labs:   Recent Labs   Lab 04/06/22  0619 05/09/22 2128 07/14/22  1133   HGBA1C 10.3* 9.8* 9.0*       Recent Labs   Lab 09/06/22  0814 09/06/22  1316 09/06/22  1404   POCTGLUCOSE 83 66* 171*       Recent Labs   Lab 09/04/22  0822 09/06/22  0652   WBC 4.17 4.27   HGB 8.3* 8.3*   HCT 29.5* 28.4*    237       Recent Labs   Lab 09/04/22 0822 09/06/22  0652   GRAN 62.4  2.6 59.1  2.5   LYMPH 22.5  0.9* 23.4  1.0   MONO 11.5  0.5 12.4  0.5   EOS 0.1 0.2       Recent Labs   Lab 08/31/22  1103 09/04/22  0822 09/06/22  0652    138 133*   K 3.8 4.1 4.0    108 103   CO2 24 24 23   BUN 11 8 13   CREATININE 0.7 0.6 0.6   * 154* 67*   CALCIUM 9.2 8.7 9.5   ALBUMIN 2.7* 2.7* 2.7*   MG  --  1.7 1.5*   PHOS 3.4 3.0 5.4*       Recent Labs   Lab 05/09/22 2128 05/11/22  1115   FERRITIN 13*  --    SEDRATE  --  62*       SARS-CoV2 (COVID-19) Qualitative PCR (no units)   Date Value   02/04/2022 Not Detected   08/18/2020 Not detected   08/11/2020 Not detected   08/04/2020 Not detected   07/21/2020 Not Detected     SARS-CoV-2 RNA, Amplification, Qual (no units)   Date Value   08/22/2022 Negative   05/25/2020 Negative   05/12/2020 Negative     POC Rapid COVID (no units)   Date Value   08/23/2022 Negative   06/16/2022 Negative   05/09/2022 Negative   12/09/2021 Negative       ECG Results              EKG 12-lead (Final result)  Result time 08/23/22 15:27:00      Final result by Interface, Lab In [facility]  (08/23/22 15:27:00)                   Narrative:    Test Reason : R06.02,    Vent. Rate : 119 BPM     Atrial Rate : 119 BPM     P-R Int : 124 ms          QRS Dur : 074 ms      QT Int : 314 ms       P-R-T Axes : 069 067 -64 degrees     QTc Int : 441 ms    Sinus tachycardia  Nonspecific ST and/or T wave abnormalities  Abnormal ECG  When compared with ECG of 22-AUG-2022 16:19,  No significant change was found  Confirmed by Rosy Abdullahi MD ([id number]) on 8/23/2022 3:26:53 PM    Referred By: FRANCISCO   SELF           Confirmed By:Rosy Abdullahi MD                                     EKG 12-lead (Final result)  Result time 08/23/22 13:36:20      Final result by Interface, Lab In [facility] (08/23/22 13:36:20)                   Narrative:    Test Reason : R06.02,    Vent. Rate : 126 BPM     Atrial Rate : 126 BPM     P-R Int : 126 ms          QRS Dur : 076 ms      QT Int : 312 ms       P-R-T Axes : 070 053 -46 degrees     QTc Int : 451 ms    Sinus tachycardia  Nonspecific ST and T wave abnormality  Abnormal ECG  When compared with ECG of 17-AUG-2022 14:27,  Nonspecific T wave abnormality, improved in Lateral leads  Confirmed by JOHANNA RUSSO MD ([id number]) on 8/23/2022 1:36:16 PM    Referred By: FRANCISCO   SELF           Confirmed By:JOHANNA RUSSO MD                                    Results for orders placed during the hospital encounter of 04/05/22    Echo    Interpretation Summary  · The left ventricle is normal in size with mild concentric hypertrophy and normal systolic function.  · The estimated ejection fraction is 60%.  · Mild mitral regurgitation.  · Grade I left ventricular diastolic dysfunction.  · Mild tricuspid regurgitation.  · The estimated PA systolic pressure is 47 mmHg.  · Normal right ventricular size with normal right ventricular systolic function.  · There is mild pulmonary hypertension.  · There is no evidence of intracardiac shunting.      X-Ray Chest 1 View  Narrative: EXAMINATION:  XR CHEST 1  VIEW    CLINICAL HISTORY:  hypoxia;    TECHNIQUE:  Single frontal view of the chest was performed.    COMPARISON:  08/27/2022    FINDINGS:  There is bilateral interstitial prominence, similar to prior study.  There is increase in bibasilar discoid opacification, likely atelectasis.  No pleural effusion or pneumothorax.  Cardiac silhouette is stable.  Note made of surgical clips about the EG junction.  Impression: As above.    Electronically signed by: Parviz Christy MD  Date:    09/05/2022  Time:    14:58      Labs and Imaging within the last 24 hours listed above were reviewed.       Diet: Diet Adult Regular (IDDSI Level 7)  Significant LDAs:   IV Access Type: Peripheral  Urinary Catheter Indication if present: Patient Does Not Have Urinary Catheter  Other Lines/Tubes/Drains:    HIGH RISK CONDITION(S):   Patient has a condition that poses threat to life and bodily function: Severe Respiratory Distress     Goals of Care:    Previous admission:  5/9/22  Likely prognosis:  Fair  Code Status: DNR  Comfort Only: No  Hospice: No  Goals at discharge: remain at home, with physician follow-up    Discharge Planning   MICKY: 9/7/2022     Code Status: DNR   Is the patient medically ready for discharge?: Yes    Reason for patient still in hospital (select all that apply): Pending disposition  Discharge Plan A: Home Health

## 2022-09-06 NOTE — PROGRESS NOTES
[facility] - Transplant [facility] Medicine  Telemedicine Progress Note    Patient Name: Chayito Chung  MRN: [id number]  Patient Class: IP- Inpatient   Admission Date: 8/22/2022  Length of Stay: 12 days  Attending Physician: Pili Carlson MD  Primary Care Provider: Curt Valladares III, MD      Subjective:     Principal Problem:Chronic obstructive pulmonary disease with acute exacerbation    HPI:  71-year-old  woman with a history of right lung adenocarcinoma, COPD, restrictive lung disease, tobacco abuse, CAD status post PCI, type 2 diabetes on insulin, chronic neck pain who presents to the emergency department from primary care clinic for complaints of dyspnea and abdominal distension.    She is followed by Oncology and Radiation Oncology at [facility], undergoing 4 cycles of radiation therapy last of which is Wednesday 8/24.  Completed radiation on Monday but then was also seen in primary care clinic and noted to be fatigued tachycardic and tachypneic reporting a persistent cough after her radiation treatment she also reported neck pain and headache.  She has a chronic cough but reports that symptoms are worse today, she is not taking any home oxygen she has home inhalers but does not seem that she has taken any inhalers or nebulizer treatments.  She feels more short of breath than her baseline    She also reports over the last week progressive abdominal distension and discomfort states that today she has had 1 watery bowel movement has been having irregular bowel movements but when she does have 1 is often a loose BM.    During the ED workup hemoglobin noted to be critical at 6.8 patient has been consented in 1 unit of PRBCs is infusing now during my interview, CT a chest and CT abdomen pelvis completed negative for PE but abdominal imaging displays distended bowel without a transition point possible ileus versus developing SBO.  General surgery consult completed in  ED      Overview/Hospital Course:  Shortness of breath and cough improved and patient back to baseline respiratory wise. Patient was denied SNF by her insurance company. Spoke with PH[employer] representative 9/2, will await their approval/denial      Telemedicine  This service was provided by Virtual Visit.    Patient was transferred to [facility] Medicine on:  9/3/2022    Chief Complaint   Patient presents with    Multiple complaints     Sent here for admission    Shortness of Breath     Lung cancer on radiation, on chemo    Cough     The patient location is: [id number]   Admitted 8/22/2022  5:49 PM  Present with the patient at the time of the telemed/virtual assessment: Telepresenter    Interval History / Events Overnight:   The patient is able to provide adequate history. Additional history was obtained from past medical records. No significant events reported by Nursing. Stable at rest on 3 LMP NC - received Norco this AM  Patient complains of dyspnea. Symptoms have been unchanged since yesterday. Associated symptoms include: shortness of breath on exertion, fatigue, and malaise. Symptoms are stable.     Lab test(s) reviewed: sCr stable    Review of Systems   Constitutional:  Negative for fever.   Respiratory:  Positive for shortness of breath.    Cardiovascular:  Negative for chest pain.     Objective:     Vital Signs (Most Recent):  Temp: 98.4 °F (36.9 °C) (09/05/22 0701)  Pulse: 94 (09/05/22 0724)  Resp: 16 (09/05/22 0724)  BP: 110/63 (09/05/22 0701)  SpO2: (!) 92 % (09/05/22 0724)   Vital Signs (24h Range):  Temp:  [98 °F (36.7 °C)-98.6 °F (37 °C)] 98.4 °F (36.9 °C)  Pulse:  [] 94  Resp:  [16-18] 16  SpO2:  [89 %-98 %] 92 %  BP: (110-147)/(63-75) 110/63     Weight: 59.9 kg (132 lb 0.9 oz)  Body mass index is 24.15 kg/m².    Intake/Output Summary (Last 24 hours) at 9/5/2022 1205  Last data filed at 9/5/2022 0801  Gross per 24 hour   Intake 450 ml   Output 1400 ml   Net -950 ml        Physical  Exam  Constitutional:       General: She is not in acute distress.     Appearance: Normal appearance.   Eyes:      General: Lids are normal. No scleral icterus.        Right eye: No discharge.         Left eye: No discharge.      Conjunctiva/sclera: Conjunctivae normal.   Neck:      Trachea: Phonation normal.   Cardiovascular:      Rate and Rhythm: Normal rate.      Comments: Monitor / Vital signs reviewed at time of visit  Pulmonary:      Effort: Pulmonary effort is normal. No tachypnea, accessory muscle usage or respiratory distress.   Abdominal:      General: There is no distension.   Neurological:      Mental Status: She is alert. She is not disoriented.   Psychiatric:         Attention and Perception: Attention normal.         Behavior: Behavior is cooperative.       Significant Labs:   Recent Labs   Lab 04/06/22 0619 05/09/22 2128 07/14/22  1133   HGBA1C 10.3* 9.8* 9.0*       Recent Labs   Lab 09/04/22 2043 09/05/22 0622 09/05/22  0719   POCTGLUCOSE 294* 130* 147*       Recent Labs   Lab 09/04/22 0822   WBC 4.17   HGB 8.3*   HCT 29.5*          Recent Labs   Lab 09/04/22 0822   GRAN 62.4  2.6   LYMPH 22.5  0.9*   MONO 11.5  0.5   EOS 0.1       Recent Labs   Lab 08/31/22  1103 09/04/22  0822    138   K 3.8 4.1    108   CO2 24 24   BUN 11 8   CREATININE 0.7 0.6   * 154*   CALCIUM 9.2 8.7   ALBUMIN 2.7* 2.7*   MG  --  1.7   PHOS 3.4 3.0       Recent Labs   Lab 05/09/22 2128 05/11/22  1115   FERRITIN 13*  --    SEDRATE  --  62*       SARS-CoV2 (COVID-19) Qualitative PCR (no units)   Date Value   02/04/2022 Not Detected   08/18/2020 Not detected   08/11/2020 Not detected   08/04/2020 Not detected   07/21/2020 Not Detected     SARS-CoV-2 RNA, Amplification, Qual (no units)   Date Value   08/22/2022 Negative   05/25/2020 Negative   05/12/2020 Negative     POC Rapid COVID (no units)   Date Value   08/23/2022 Negative   06/16/2022 Negative   05/09/2022 Negative   12/09/2021 Negative        ECG Results              EKG 12-lead (Final result)  Result time 08/23/22 15:27:00      Final result by Interface, Lab In [facility] (08/23/22 15:27:00)                   Narrative:    Test Reason : R06.02,    Vent. Rate : 119 BPM     Atrial Rate : 119 BPM     P-R Int : 124 ms          QRS Dur : 074 ms      QT Int : 314 ms       P-R-T Axes : 069 067 -64 degrees     QTc Int : 441 ms    Sinus tachycardia  Nonspecific ST and/or T wave abnormalities  Abnormal ECG  When compared with ECG of 22-AUG-2022 16:19,  No significant change was found  Confirmed by Rosy Abdullahi MD ([id number]) on 8/23/2022 3:26:53 PM    Referred By: [email or web address]   SELF           Confirmed By:Rosy Abdullahi MD                                     EKG 12-lead (Final result)  Result time 08/23/22 13:36:20      Final result by Interface, Lab In [facility] (08/23/22 13:36:20)                   Narrative:    Test Reason : R06.02,    Vent. Rate : 126 BPM     Atrial Rate : 126 BPM     P-R Int : 126 ms          QRS Dur : 076 ms      QT Int : 312 ms       P-R-T Axes : 070 053 -46 degrees     QTc Int : 451 ms    Sinus tachycardia  Nonspecific ST and T wave abnormality  Abnormal ECG  When compared with ECG of 17-AUG-2022 14:27,  Nonspecific T wave abnormality, improved in Lateral leads  Confirmed by JOHANNA RUSSO MD ([id number]) on 8/23/2022 1:36:16 PM    Referred By: [email or web address]   SELF           Confirmed By:JOHANNA RUSSO MD                                    Results for orders placed during the hospital encounter of 04/05/22    Echo    Interpretation Summary  · The left ventricle is normal in size with mild concentric hypertrophy and normal systolic function.  · The estimated ejection fraction is 60%.  · Mild mitral regurgitation.  · Grade I left ventricular diastolic dysfunction.  · Mild tricuspid regurgitation.  · The estimated PA systolic pressure is 47 mmHg.  · Normal right ventricular size with normal right ventricular systolic function.  · There is mild pulmonary  hypertension.  · There is no evidence of intracardiac shunting.      X-Ray Chest AP Portable  Narrative: EXAMINATION:  XR CHEST AP PORTABLE    CLINICAL HISTORY:  chest pain; Simple chronic bronchitis    TECHNIQUE:  Single frontal view of the chest was performed.    COMPARISON:  08/22/2022    FINDINGS:  Cardiac silhouette is within normal limits.    Bibasilar mild airspace disease right greater than left may be associated with atelectasis.  Mild infiltrate and pneumonitis not excluded.  Follow-up recommended.    Postoperative changes near the gastroesophageal junction.  Postop changes of the cervical spine.    No acute osseous abnormality.    No effusion or pneumothorax.  Impression: Mild bibasilar airspace disease right greater than left may be associated with atelectasis.  Mild infiltrate/pneumonitis is not excluded.  Follow-up is recommended.    Electronically signed by: Salomon Escobar  Date:    08/27/2022  Time:    18:05      Labs and Imaging within the last 24 hours listed above were reviewed.       Diet: Diet Adult Regular (IDDSI Level 7)  Significant LDAs:   IV Access Type: Peripheral  Urinary Catheter Indication if present: Patient Does Not Have Urinary Catheter  Other Lines/Tubes/Drains:    HIGH RISK CONDITION(S):   Patient has a condition that poses threat to life and bodily function: Severe Respiratory Distress     Goals of Care:    Previous admission:  5/9/22  Likely prognosis:  Fair  Code Status: DNR  Comfort Only: No  Hospice: No  Goals at discharge: remain at home, with physician follow-up    Discharge Planning   MICKY: 9/7/2022     Code Status: DNR   Is the patient medically ready for discharge?: Yes    Reason for patient still in hospital (select all that apply): Pending disposition  Discharge Plan A: Home Health          Assessment/Plan:      * Chronic obstructive pulmonary disease with acute exacerbation  Worsening dyspnea and cough after radiation treatment giving concern for radiation pneumonitis  vs COPD exacerbation causing symptoms  - Completed Prednisone 40mg po daily x 5 days   - Oxygen 1-2L to achieve O2 sat 88-92% given hx of COPD  - PRN DUO Nebs  - Completed Azithromycin 500mg x 3 days   - Use BREO + spiriva in place of home TRELEGY  - Prometh-codeine for cough    Acute on chronic respiratory failure with hypoxia  Patient reports that she was on home O2 for 4 years until one year ago (was discontinued by previous Pulmonologist at [facility])  Now with COPD and lung CA will eva need home O2  Check ambulatory SpO2 on 2 LPM due to dyspnea on exertion while on O2  Increased O2 requirements on 9/5; CXR showed increased atelectasis. Trial of IS    Moderate malnutrition  Nutrition consulted. Most recent weight and BMI monitored-      Malnutrition (Moderate to Severe)  Weight Loss (Malnutrition):  (7% x 4 months)  Energy Intake (Malnutrition): less than 75% for greater than or equal to 3 months        Measurements:  Wt Readings from Last 1 Encounters:   09/04/22 59.9 kg (132 lb 0.9 oz)   Body mass index is 24.15 kg/m².    Recommendations: Recommendation/Intervention: 1.  Goals: Meet % EEN, EPN by RD f/u    Patient has been screened and assessed by RD. RD will follow patient.      Abdominal distension  Secondary to constipation from opioids which she takes for her lung CA  - s/p gastrograffin study with contrast seen in bowel during second KUB. Advanced from CLD to Regular diet during admission  - Advanced to regular diet  - Continue bowel regimen    Adenocarcinoma of lung, right  F/u with [facility] - oncology and rad-onc when stable for discharge.    She needs to reschedule next radiation appointment in light of current hospitalization.     Type 2 diabetes mellitus, with long-term current use of insulin  Patient's FSGs are uncontrolled due to hypoglycemia on current medication regimen.  Current correctional scale  Medium  Maintain anti-hyperglycemic dose as follows-   Antihyperglycemics (From admission, onward)     Start     Stop Route Frequency Ordered    09/04/22 2100  insulin detemir U-100 pen 26 Units         -- SubQ Nightly 09/04/22 1213    09/04/22 1645  insulin aspart U-100 pen 8 Units         -- SubQ 3 times daily with meals 09/04/22 1213    08/23/22 0232  insulin aspart U-100 pen 1-10 Units         -- SubQ Before meals & nightly PRN 08/23/22 0137        Hold Oral hypoglycemics while patient is in the hospital.  At home patient reports using a Combo insulin - HUMALOG 75/25 - 35 units BID AC  Anded basal insulin Nightly     Iron deficiency anemia  Patient with worsening acute on chronic anemia   - s/p 1unit PRBC in ED  - Hgb stable    Major depressive disorder in partial remission  Continuing current management.    Coronary artery disease of native artery of native heart with stable angina pectoris  Continue ASA/statin  -not on beta blocker   -EKG w/o ischemic changes. Negative troponin x 1        Active Hospital Problems    Diagnosis  POA    *Chronic obstructive pulmonary disease with acute exacerbation [J44.1]  Yes    Acute on chronic respiratory failure with hypoxia [J96.21]  Yes    Moderate malnutrition [E44.0]  Yes    Abdominal distension [R14.0]  Yes    Adenocarcinoma of lung, right [C34.91]  Yes    Type 2 diabetes mellitus, with long-term current use of insulin [E11.9, Z79.4]  Not Applicable    Iron deficiency anemia [D50.9]  Yes    Major depressive disorder in partial remission [F32.4]  Yes    Neuropathy [G62.9]  Yes    Coronary artery disease of native artery of native heart with stable angina pectoris [I25.118]  Yes      Resolved Hospital Problems   No resolved problems to display.       Inpatient Medications Prescribed for Management of Current Problems:     Scheduled Meds:    aspirin  81 mg Oral Daily    atorvastatin  40 mg Oral Daily    benzonatate  200 mg Oral TID    enoxaparin  40 mg Subcutaneous Daily    ferrous sulfate  1 tablet Oral Daily    fluticasone furoate-vilanteroL  1 puff  Inhalation Daily    guaiFENesin  600 mg Oral BID    insulin aspart U-100  8 Units Subcutaneous TIDWM    insulin detemir U-100  26 Units Subcutaneous QHS    magnesium oxide  400 mg Oral Daily    polyethylene glycol  17 g Oral Daily    pregabalin  150 mg Oral TID    QUEtiapine  400 mg Oral Nightly    senna-docusate 8.6-50 mg  1 tablet Oral BID    sertraline  150 mg Oral Daily    tiotropium bromide  2 puff Inhalation Daily    verapamiL  180 mg Oral QHS     Continuous Infusions:   As Needed: acetaminophen, albuterol-ipratropium, bisacodyL, dextrose 10%, dextrose 10%, glucagon (human recombinant), hydrocodone-apap 7.5-325 MG/15 ML, insulin aspart U-100, melatonin, naloxone, nitroGLYCERIN, ondansetron, prochlorperazine, promethazine-codeine 6.25-10 mg/5 ml, sodium chloride 0.9%, tiZANidine    VTE Risk Mitigation (From admission, onward)         Ordered     enoxaparin injection 40 mg  Daily         08/23/22 0137     IP VTE HIGH RISK PATIENT  Once         08/23/22 0137     Place sequential compression device  Until discontinued         08/23/22 0137              I have assessed these finding virtually using telemed platform and with assistance of bedside nurse     The attending portion of this evaluation, treatment, and documentation was performed per Pili Carlson MD via Telemedicine AudioVisual using the secure [employer] software platform with 2 way audio/video. The provider was located off-site and the patient is located in the hospital. The aforementioned video software was utilized to document the relevant history and physical exam    Pili Carlson MD  Department of Hospital Medicine   [facility] - Transplant Stepdown

## 2022-09-06 NOTE — PLAN OF CARE
3:19 PM  The SW contacted [facility] at [phone] to discuss the patient's appeal. The SW spoke with Henny regarding the patient's appeal. Per Henny, the patient's appeal was still pending a decision.     The SW will continue to follow.       Smita Braxton LMSW  Case Management [facility]  Ext: [phone]

## 2022-09-07 LAB
POCT GLUCOSE: 123 MG/DL (ref 70–110)
POCT GLUCOSE: 174 MG/DL (ref 70–110)
POCT GLUCOSE: 203 MG/DL (ref 70–110)
POCT GLUCOSE: 210 MG/DL (ref 70–110)
POCT GLUCOSE: 232 MG/DL (ref 70–110)
POCT GLUCOSE: 234 MG/DL (ref 70–110)

## 2022-09-07 PROCEDURE — 63600175 PHARM REV CODE 636 W HCPCS: Performed by: HOSPITALIST

## 2022-09-07 PROCEDURE — 97116 GAIT TRAINING THERAPY: CPT | Mod: CQ

## 2022-09-07 PROCEDURE — 27000221 HC OXYGEN, UP TO 24 HOURS

## 2022-09-07 PROCEDURE — 99233 PR SUBSEQUENT HOSPITAL CARE,LEVL III: ICD-10-PCS | Mod: 95,,, | Performed by: INTERNAL MEDICINE

## 2022-09-07 PROCEDURE — 20600001 HC STEP DOWN PRIVATE ROOM

## 2022-09-07 PROCEDURE — 94761 N-INVAS EAR/PLS OXIMETRY MLT: CPT

## 2022-09-07 PROCEDURE — 25000003 PHARM REV CODE 250: Performed by: HOSPITALIST

## 2022-09-07 PROCEDURE — 99233 SBSQ HOSP IP/OBS HIGH 50: CPT | Mod: 95,,, | Performed by: INTERNAL MEDICINE

## 2022-09-07 PROCEDURE — 25000003 PHARM REV CODE 250: Performed by: STUDENT IN AN ORGANIZED HEALTH CARE EDUCATION/TRAINING PROGRAM

## 2022-09-07 PROCEDURE — 99900035 HC TECH TIME PER 15 MIN (STAT)

## 2022-09-07 RX ADMIN — INSULIN ASPART 4 UNITS: 100 INJECTION, SOLUTION INTRAVENOUS; SUBCUTANEOUS at 06:09

## 2022-09-07 RX ADMIN — ASPIRIN 81 MG CHEWABLE TABLET 81 MG: 81 TABLET CHEWABLE at 08:09

## 2022-09-07 RX ADMIN — INSULIN ASPART 8 UNITS: 100 INJECTION, SOLUTION INTRAVENOUS; SUBCUTANEOUS at 08:09

## 2022-09-07 RX ADMIN — INSULIN ASPART 8 UNITS: 100 INJECTION, SOLUTION INTRAVENOUS; SUBCUTANEOUS at 06:09

## 2022-09-07 RX ADMIN — TIOTROPIUM BROMIDE INHALATION SPRAY 2 PUFF: 3.12 SPRAY, METERED RESPIRATORY (INHALATION) at 08:09

## 2022-09-07 RX ADMIN — BENZONATATE 200 MG: 100 CAPSULE ORAL at 08:09

## 2022-09-07 RX ADMIN — PREGABALIN 150 MG: 75 CAPSULE ORAL at 08:09

## 2022-09-07 RX ADMIN — HYDROCODONE BITARTRATE AND ACETAMINOPHEN 15 ML: 7.5; 325 SOLUTION ORAL at 06:09

## 2022-09-07 RX ADMIN — ENOXAPARIN SODIUM 40 MG: 100 INJECTION SUBCUTANEOUS at 06:09

## 2022-09-07 RX ADMIN — MELATONIN TAB 3 MG 6 MG: 3 TAB at 08:09

## 2022-09-07 RX ADMIN — GUAIFENESIN 600 MG: 600 TABLET, EXTENDED RELEASE ORAL at 08:09

## 2022-09-07 RX ADMIN — QUETIAPINE FUMARATE 400 MG: 100 TABLET ORAL at 08:09

## 2022-09-07 RX ADMIN — BENZONATATE 200 MG: 100 CAPSULE ORAL at 02:09

## 2022-09-07 RX ADMIN — ATORVASTATIN CALCIUM 40 MG: 20 TABLET, FILM COATED ORAL at 08:09

## 2022-09-07 RX ADMIN — SENNOSIDES AND DOCUSATE SODIUM 1 TABLET: 50; 8.6 TABLET ORAL at 08:09

## 2022-09-07 RX ADMIN — VERAPAMIL HYDROCHLORIDE 180 MG: 180 TABLET, FILM COATED, EXTENDED RELEASE ORAL at 08:09

## 2022-09-07 RX ADMIN — INSULIN ASPART 2 UNITS: 100 INJECTION, SOLUTION INTRAVENOUS; SUBCUTANEOUS at 10:09

## 2022-09-07 RX ADMIN — SERTRALINE HYDROCHLORIDE 150 MG: 50 TABLET ORAL at 05:09

## 2022-09-07 RX ADMIN — INSULIN ASPART 8 UNITS: 100 INJECTION, SOLUTION INTRAVENOUS; SUBCUTANEOUS at 01:09

## 2022-09-07 RX ADMIN — Medication 400 MG: at 08:09

## 2022-09-07 RX ADMIN — FERROUS SULFATE TAB 325 MG (65 MG ELEMENTAL FE) 1 EACH: 325 (65 FE) TAB at 08:09

## 2022-09-07 RX ADMIN — FLUTICASONE FUROATE AND VILANTEROL TRIFENATATE 1 PUFF: 100; 25 POWDER RESPIRATORY (INHALATION) at 08:09

## 2022-09-07 RX ADMIN — INSULIN ASPART 2 UNITS: 100 INJECTION, SOLUTION INTRAVENOUS; SUBCUTANEOUS at 01:09

## 2022-09-07 RX ADMIN — PREGABALIN 150 MG: 75 CAPSULE ORAL at 02:09

## 2022-09-07 NOTE — PROGRESS NOTES
Home Oxygen Evaluation    Date Performed: 2022    1) Patient's Home O2 Sat on room air, while at rest: 90        If O2 sats on room air at rest are 88% or below, patient qualifies. No additional testing needed. Document N/A in steps 2 and 3. If 89% or above, complete steps 2.      2) Patient's O2 Sat on room air while exercisin        If O2 sats on room air while exercising remain 89% or above patient does not qualify, no further testing needed Document N/A in step 3. If O2 sats on room air while exercising are 88% or below, continue to step 3.      3) Patient's O2 Sat while exercising on O2: 92 at 2 LPM         (Must show improvement from #2 for patients to qualify)    If O2 sats improve on oxygen, patient qualifies for portable oxygen. If not, the patient does not qualify.

## 2022-09-07 NOTE — NURSING
Pt AAOx4, VSS, afebrile. SNF appeal denied. Pt to d/c home [facility]son with HH. Pt qualifies for home oxygen. Pt worked with PT and was up in chair all day. Bed in low/locked position, call light/personal belongings w/in reach, non slip socks in place, pt up independently with walker, pt remains free from falls, WCTM.

## 2022-09-07 NOTE — ASSESSMENT & PLAN NOTE
Patient's FSGs are uncontrolled due to hypoglycemia on current medication regimen.  Current correctional scale  Medium  Decrease anti-hyperglycemic dose as follows-   Antihyperglycemics (From admission, onward)    Start     Stop Route Frequency Ordered    09/06/22 2100  insulin detemir U-100 pen 23 Units         -- SubQ Nightly 09/06/22 1404    09/04/22 1645  insulin aspart U-100 pen 8 Units         -- SubQ 3 times daily with meals 09/04/22 1213    08/23/22 0232  insulin aspart U-100 pen 1-10 Units         -- SubQ Before meals & nightly PRN 08/23/22 0137        Hold Oral hypoglycemics while patient is in the hospital.  At home patient reports using a Combo insulin - HUMALOG 75/25 - 35 units BID AC  Anded basal insulin Nightly

## 2022-09-07 NOTE — PROGRESS NOTES
[facility] - Transplant [facility]  Telemedicine Progress Note    Patient Name: Chayito Chung  MRN: [id number]  Patient Class: IP- Inpatient   Admission Date: 8/22/2022  Length of Stay: 13 days  Attending Physician: Pili Carlson MD  Primary Care Provider: Curt Valladares III, MD    Subjective:     Principal Problem:Chronic obstructive pulmonary disease with acute exacerbation    HPI:  71-year-old  woman with a history of right lung adenocarcinoma, COPD, restrictive lung disease, tobacco abuse, CAD status post PCI, type 2 diabetes on insulin, chronic neck pain who presents to the emergency department from primary care clinic for complaints of dyspnea and abdominal distension.    She is followed by Oncology and Radiation Oncology at [facility], undergoing 4 cycles of radiation therapy last of which is Wednesday 8/24.  Completed radiation on Monday but then was also seen in primary care clinic and noted to be fatigued tachycardic and tachypneic reporting a persistent cough after her radiation treatment she also reported neck pain and headache.  She has a chronic cough but reports that symptoms are worse today, she is not taking any home oxygen she has home inhalers but does not seem that she has taken any inhalers or nebulizer treatments.  She feels more short of breath than her baseline    She also reports over the last week progressive abdominal distension and discomfort states that today she has had 1 watery bowel movement has been having irregular bowel movements but when she does have 1 is often a loose BM.    During the ED workup hemoglobin noted to be critical at 6.8 patient has been consented in 1 unit of PRBCs is infusing now during my interview, CT a chest and CT abdomen pelvis completed negative for PE but abdominal imaging displays distended bowel without a transition point possible ileus versus developing SBO.  General surgery consult completed in  ED      Overview/Hospital Course:  Shortness of breath and cough improved and patient back to baseline respiratory wise. Patient was denied SNF by her insurance company. Spoke with PHN representative 9/2, will await their approval/denial      Telemedicine  This service was provided by Virtual Visit.    Patient was transferred to [facility] Medicine on:  9/3/2022    Chief Complaint   Patient presents with    Multiple complaints     Sent here for admission    Shortness of Breath     Lung cancer on radiation, on chemo    Cough     The patient location is: [id number]   Admitted 8/22/2022  5:49 PM  Present with the patient at the time of the telemed/virtual assessment: Telepresenter    Interval History / Events Overnight:   The patient is able to provide adequate history. Additional history was obtained from past medical records. No significant events reported by Nursing. Stable at rest on 2 LMP NC again  Patient complains of dyspnea. Symptoms have improved since yesterday. Associated symptoms include: shortness of breath on exertion, fatigue, and malaise. Symptoms are stable.     Lab test(s) reviewed: sCr stable    Review of Systems   Constitutional:  Negative for fever.   Respiratory:  Positive for shortness of breath.    Cardiovascular:  Negative for chest pain.     Objective:     Vital Signs (Most Recent):  Temp: 98.5 °F (36.9 °C) (09/06/22 1210)  Pulse: 92 (09/06/22 1210)  Resp: 18 (09/06/22 1210)  BP: 123/70 (09/06/22 1210)  SpO2: (!) 93 % (09/06/22 1210)   Vital Signs (24h Range):  Temp:  [98 °F (36.7 °C)-98.5 °F (36.9 °C)] 98.5 °F (36.9 °C)  Pulse:  [] 92  Resp:  [16-18] 18  SpO2:  [86 %-94 %] 93 %  BP: (106-127)/(56-70) 123/70     Weight: 59.9 kg (132 lb 0.9 oz)  Body mass index is 24.15 kg/m².    Intake/Output Summary (Last 24 hours) at 9/6/2022 1431  Last data filed at 9/5/2022 2300  Gross per 24 hour   Intake 440 ml   Output --   Net 440 ml        Physical Exam  Constitutional:        General: She is not in acute distress.     Appearance: Normal appearance.   Eyes:      General: Lids are normal. No scleral icterus.        Right eye: No discharge.         Left eye: No discharge.      Conjunctiva/sclera: Conjunctivae normal.   Neck:      Trachea: Phonation normal.   Cardiovascular:      Rate and Rhythm: Normal rate.      Comments: Monitor / Vital signs reviewed at time of visit  Pulmonary:      Effort: Pulmonary effort is normal. No tachypnea, accessory muscle usage or respiratory distress.   Abdominal:      General: There is no distension.   Neurological:      Mental Status: She is alert. She is not disoriented.   Psychiatric:         Attention and Perception: Attention normal.         Behavior: Behavior is cooperative.       Significant Labs:   Recent Labs   Lab 04/06/22  0619 05/09/22 2128 07/14/22  1133   HGBA1C 10.3* 9.8* 9.0*       Recent Labs   Lab 09/06/22  0814 09/06/22  1316 09/06/22  1404   POCTGLUCOSE 83 66* 171*       Recent Labs   Lab 09/04/22  0822 09/06/22  0652   WBC 4.17 4.27   HGB 8.3* 8.3*   HCT 29.5* 28.4*    237       Recent Labs   Lab 09/04/22  0822 09/06/22  0652   GRAN 62.4  2.6 59.1  2.5   LYMPH 22.5  0.9* 23.4  1.0   MONO 11.5  0.5 12.4  0.5   EOS 0.1 0.2       Recent Labs   Lab 08/31/22  1103 09/04/22  0822 09/06/22  0652    138 133*   K 3.8 4.1 4.0    108 103   CO2 24 24 23   BUN 11 8 13   CREATININE 0.7 0.6 0.6   * 154* 67*   CALCIUM 9.2 8.7 9.5   ALBUMIN 2.7* 2.7* 2.7*   MG  --  1.7 1.5*   PHOS 3.4 3.0 5.4*       Recent Labs   Lab 05/09/22 2128 05/11/22  1115   FERRITIN 13*  --    SEDRATE  --  62*       SARS-CoV2 (COVID-19) Qualitative PCR (no units)   Date Value   02/04/2022 Not Detected   08/18/2020 Not detected   08/11/2020 Not detected   08/04/2020 Not detected   07/21/2020 Not Detected     SARS-CoV-2 RNA, Amplification, Qual (no units)   Date Value   08/22/2022 Negative   05/25/2020 Negative   05/12/2020 Negative     POC Rapid  COVID (no units)   Date Value   08/23/2022 Negative   06/16/2022 Negative   05/09/2022 Negative   12/09/2021 Negative       ECG Results              EKG 12-lead (Final result)  Result time 08/23/22 15:27:00      Final result by Interface, Lab In [facility] (08/23/22 15:27:00)                   Narrative:    Test Reason : R06.02,    Vent. Rate : 119 BPM     Atrial Rate : 119 BPM     P-R Int : 124 ms          QRS Dur : 074 ms      QT Int : 314 ms       P-R-T Axes : 069 067 -64 degrees     QTc Int : 441 ms    Sinus tachycardia  Nonspecific ST and/or T wave abnormalities  Abnormal ECG  When compared with ECG of 22-AUG-2022 16:19,  No significant change was found  Confirmed by Rosy Abdullahi MD ([id number]) on 8/23/2022 3:26:53 PM    Referred By: [email or web address]   SELF           Confirmed By:Rosy Abdullahi MD                                     EKG 12-lead (Final result)  Result time 08/23/22 13:36:20      Final result by Interface, Lab In [facility] (08/23/22 13:36:20)                   Narrative:    Test Reason : R06.02,    Vent. Rate : 126 BPM     Atrial Rate : 126 BPM     P-R Int : 126 ms          QRS Dur : 076 ms      QT Int : 312 ms       P-R-T Axes : 070 053 -46 degrees     QTc Int : 451 ms    Sinus tachycardia  Nonspecific ST and T wave abnormality  Abnormal ECG  When compared with ECG of 17-AUG-2022 14:27,  Nonspecific T wave abnormality, improved in Lateral leads  Confirmed by JOHANNA RUSSO MD ([id number]) on 8/23/2022 1:36:16 PM    Referred By: [email or web address]   SELF           Confirmed By:JOHANNA RUSSO MD                                    Results for orders placed during the hospital encounter of 04/05/22    Echo    Interpretation Summary  · The left ventricle is normal in size with mild concentric hypertrophy and normal systolic function.  · The estimated ejection fraction is 60%.  · Mild mitral regurgitation.  · Grade I left ventricular diastolic dysfunction.  · Mild tricuspid regurgitation.  · The estimated PA systolic pressure is 47  mmHg.  · Normal right ventricular size with normal right ventricular systolic function.  · There is mild pulmonary hypertension.  · There is no evidence of intracardiac shunting.      X-Ray Chest 1 View  Narrative: EXAMINATION:  XR CHEST 1 VIEW    CLINICAL HISTORY:  hypoxia;    TECHNIQUE:  Single frontal view of the chest was performed.    COMPARISON:  08/27/2022    FINDINGS:  There is bilateral interstitial prominence, similar to prior study.  There is increase in bibasilar discoid opacification, likely atelectasis.  No pleural effusion or pneumothorax.  Cardiac silhouette is stable.  Note made of surgical clips about the EG junction.  Impression: As above.    Electronically signed by: Parviz Christy MD  Date:    09/05/2022  Time:    14:58      Labs and Imaging within the last 24 hours listed above were reviewed.       Diet: Diet Adult Regular (IDDSI Level 7)  Significant LDAs:   IV Access Type: Peripheral  Urinary Catheter Indication if present: Patient Does Not Have Urinary Catheter  Other Lines/Tubes/Drains:    HIGH RISK CONDITION(S):   Patient has a condition that poses threat to life and bodily function: Severe Respiratory Distress     Goals of Care:    Previous admission:  5/9/22  Likely prognosis:  Fair  Code Status: DNR  Comfort Only: No  Hospice: No  Goals at discharge: remain at home, with physician follow-up    Discharge Planning   MICKY: 9/7/2022     Code Status: DNR   Is the patient medically ready for discharge?: Yes    Reason for patient still in hospital (select all that apply): Pending disposition  Discharge Plan A: Home Health          Assessment/Plan:      * Chronic obstructive pulmonary disease with acute exacerbation  Worsening dyspnea and cough after radiation treatment giving concern for radiation pneumonitis vs COPD exacerbation causing symptoms  - Completed Prednisone 40mg po daily x 5 days   - Oxygen 1-2L to achieve O2 sat 88-92% given hx of COPD  - PRN DUO Nebs  - Completed Azithromycin  500mg x 3 days   - Use BREO + spiriva in place of home TRELEGY  - Prometh-codeine for cough    Acute on chronic respiratory failure with hypoxia  Patient reports that she was on home O2 for 4 years until one year ago (was discontinued by previous Pulmonologist at [facility])  Now with COPD and lung CA will eva need home O2  Check ambulatory SpO2 on 2 LPM due to dyspnea on exertion while on O2  Increased O2 requirements on 9/5; CXR showed increased atelectasis. Trial of IS  SpO2 improved 9/6    Moderate malnutrition  Nutrition consulted. Most recent weight and BMI monitored-      Malnutrition (Moderate to Severe)  Weight Loss (Malnutrition):  (7% x 4 months)  Energy Intake (Malnutrition): less than 75% for greater than or equal to 3 months        Measurements:  Wt Readings from Last 1 Encounters:   09/04/22 59.9 kg (132 lb 0.9 oz)   Body mass index is 24.15 kg/m².    Recommendations: Recommendation/Intervention: 1.  Goals: Meet % EEN, EPN by RD f/u    Patient has been screened and assessed by RD. RD will follow patient.      Abdominal distension  Secondary to constipation from opioids which she takes for her lung CA  - s/p gastrograffin study with contrast seen in bowel during second KUB. Advanced from CLD to Regular diet during admission  - Advanced to regular diet  - Continue bowel regimen    Adenocarcinoma of lung, right  F/u with [facility] - oncology and rad-onc when stable for discharge.    She needs to reschedule next radiation appointment in light of current hospitalization.     Type 2 diabetes mellitus, with long-term current use of insulin  Patient's FSGs are uncontrolled due to hypoglycemia on current medication regimen.  Current correctional scale  Medium  Decrease anti-hyperglycemic dose as follows-   Antihyperglycemics (From admission, onward)    Start     Stop Route Frequency Ordered    09/06/22 2100  insulin detemir U-100 pen 23 Units         -- SubQ Nightly 09/06/22 1404    09/04/22 1645  insulin aspart  U-100 pen 8 Units         -- SubQ 3 times daily with meals 09/04/22 1213    08/23/22 0232  insulin aspart U-100 pen 1-10 Units         -- SubQ Before meals & nightly PRN 08/23/22 0137        Hold Oral hypoglycemics while patient is in the hospital.  At home patient reports using a Combo insulin - HUMALOG 75/25 - 35 units BID AC  Anded basal insulin Nightly     Iron deficiency anemia  Patient with worsening acute on chronic anemia   - s/p 1unit PRBC in ED  - Hgb stable    Major depressive disorder in partial remission  Continuing current management.    Coronary artery disease of native artery of native heart with stable angina pectoris  Continue ASA/statin  -not on beta blocker   -EKG w/o ischemic changes. Negative troponin x 1        Active Hospital Problems    Diagnosis  POA    *Chronic obstructive pulmonary disease with acute exacerbation [J44.1]  Yes    Acute on chronic respiratory failure with hypoxia [J96.21]  Yes    Moderate malnutrition [E44.0]  Yes    Abdominal distension [R14.0]  Yes    Adenocarcinoma of lung, right [C34.91]  Yes    Type 2 diabetes mellitus, with long-term current use of insulin [E11.9, Z79.4]  Not Applicable    Iron deficiency anemia [D50.9]  Yes    Major depressive disorder in partial remission [F32.4]  Yes    Neuropathy [G62.9]  Yes    Coronary artery disease of native artery of native heart with stable angina pectoris [I25.118]  Yes      Resolved Hospital Problems   No resolved problems to display.       Inpatient Medications Prescribed for Management of Current Problems:     Scheduled Meds:    aspirin  81 mg Oral Daily    atorvastatin  40 mg Oral Daily    benzonatate  200 mg Oral TID    enoxaparin  40 mg Subcutaneous Daily    ferrous sulfate  1 tablet Oral Daily    fluticasone furoate-vilanteroL  1 puff Inhalation Daily    guaiFENesin  600 mg Oral BID    insulin aspart U-100  8 Units Subcutaneous TIDWM    insulin detemir U-100  23 Units Subcutaneous QHS    magnesium  oxide  400 mg Oral Daily    polyethylene glycol  17 g Oral Daily    pregabalin  150 mg Oral TID    QUEtiapine  400 mg Oral Nightly    senna-docusate 8.6-50 mg  1 tablet Oral BID    sertraline  150 mg Oral Daily    tiotropium bromide  2 puff Inhalation Daily    verapamiL  180 mg Oral QHS     Continuous Infusions:   As Needed: acetaminophen, albuterol-ipratropium, bisacodyL, dextrose 10%, dextrose 10%, glucagon (human recombinant), hydrocodone-apap 7.5-325 MG/15 ML, insulin aspart U-100, melatonin, naloxone, nitroGLYCERIN, ondansetron, prochlorperazine, promethazine-codeine 6.25-10 mg/5 ml, sodium chloride 0.9%, tiZANidine    VTE Risk Mitigation (From admission, onward)         Ordered     enoxaparin injection 40 mg  Daily         08/23/22 0137     IP VTE HIGH RISK PATIENT  Once         08/23/22 0137     Place sequential compression device  Until discontinued         08/23/22 0137              I have assessed these finding virtually using telemed platform and with assistance of bedside nurse     The attending portion of this evaluation, treatment, and documentation was performed per Pili Carlson MD via Telemedicine AudioVisual using the secure "[employer] software platform with 2 way audio/video. The provider was located off-site and the patient is located in the hospital. The aforementioned video software was utilized to document the relevant history and physical exam    Pili Carlson MD  Department of Hospital Medicine   [facility] - Transplant Stepdown

## 2022-09-07 NOTE — PLAN OF CARE
Problem: Adult Inpatient Plan of Care  Goal: Plan of Care Review  Outcome: Ongoing, Progressing  Goal: Patient-Specific Goal (Individualized)  Outcome: Ongoing, Progressing  Goal: Absence of Hospital-Acquired Illness or Injury  Outcome: Ongoing, Progressing  Goal: Optimal Comfort and Wellbeing  Outcome: Ongoing, Progressing  Goal: Readiness for Transition of Care  Outcome: Ongoing, Progressing     Problem: Infection  Goal: Absence of Infection Signs and Symptoms  Outcome: Ongoing, Progressing     Problem: Skin Injury Risk Increased  Goal: Skin Health and Integrity  Outcome: Ongoing, Progressing     Problem: Diabetes Comorbidity  Goal: Blood Glucose Level Within Targeted Range  Outcome: Ongoing, Progressing     Problem: Fall Injury Risk  Goal: Absence of Fall and Fall-Related Injury  Outcome: Ongoing, Progressing  No complaints of pain nor discomfort throughout the night . Pt blood glucose remained stable.

## 2022-09-07 NOTE — ASSESSMENT & PLAN NOTE
Patient reports that she was on home O2 for 4 years until one year ago (was discontinued by previous Pulmonologist at [facility])  Now with COPD and lung CA will eva need home O2  Check ambulatory SpO2 on 2 LPM due to dyspnea on exertion while on O2  Increased O2 requirements on 9/5; CXR showed increased atelectasis. Trial of IS  SpO2 improved 9/6

## 2022-09-07 NOTE — PLAN OF CARE
Recommendations    Rec Diabetic diet (A1c 9.0)    If PO intake <50%, add Boost glucose control BID     RD to monitor and follow     Goals: Meet % EEN, EPN by RD f/u  Nutrition Goal Status: goal met  Communication of RD Recs:  (POC)

## 2022-09-07 NOTE — PLAN OF CARE
1:04 PM    The SW contacted [facility] and spoke with Gabrielle regarding the patient's appeal. Per Gabrielle, the patient's appeal has been denied for SNF placement. The SW met with the patient and sister at bedside to provide a update regarding the patient's appeal. The SW informed the patient and her sister that the patient has been denied by [facility]. The patient stated that she wanted to contact [facility] to discuss her denial. The SW along with the patient contacted Gabrielle at [phone] with [facility] to discuss the patient's denial for placement.  Gabrielle informed the patient and the SW that John will automatic complete a review of her case.      The SW notified the patient's treatment team regarding the patient's denial for skilled placement.       The SW will continue to follow.       Smita Braxton LMSW  Case Management [facility]  Ext: [phone]

## 2022-09-07 NOTE — PT/OT/SLP PROGRESS
Physical Therapy Treatment    Patient Name:  Chayito Chung   MRN:  [id number]  Admitting Diagnosis: Chronic obstructive pulmonary disease with acute exacerbation  Recent Surgery: * No surgery found *      Recommendations:     Discharge Recommendations:  nursing facility, skilled   Discharge Equipment Recommendations: walker, rolling   Barriers to discharge: None    Plan:     During this hospitalization, patient to be seen 3 x/week to address the above listed problems via gait training, therapeutic activities  Plan of Care Expires:  09/22/22  Plan of Care Reviewed with: patient    This Plan of care has been discussed with the patient who was involved in its development and understands and is in agreement with the identified goals and treatment plan    Subjective     Communicated with nurse (Brittni DU) prior to session.     Patient comments: Pt with c/o mild SOB during gait  Pain/Comfort:  Pain Rating 1: 0/10  Pain Rating Post-Intervention 1: 0/10    Objective:     Patient found with: oxygen    Patient found up in the chair upon PT entry to room, agreeable to treatment.  NO family present in the room.    Respiratory Status: Nasal cannula, flow 2 L/min  (Pt's nurse requests PTA amb pt without supplemental O2 to check SpO2    General Precautions: Standard, fall   Orthopedic Precautions:N/A   Braces: N/A       BED MOBILITY        NP 2* pt found/left seated up in the chair     TRANSFERS  (vc's for hand placement, sequencing of task and safety)   Patient completed Sit <> Stand Transfer from BS chair with SBA for safety with rolling walker x2 trial(s)   Patient completed Stand <> Sit Transfer to BS chair with SBA for safety with rolling walker      GAIT: in hallway   Patient ambulated: 45ft, 80ft, and 60ft  (pt's SpO2 drops to 85% - 77% while on room air then increases to 92% while on 2 LPM via NC during gait trial.  96% at rest with 2LPM)  Nurse was notified   Patient required: SBA for safety   Patient used:  Rolling  Walker   Gait Pattern observed: reciprocal gait   Gait Deviation(s): increased libby, decreased step length, and decreased stride length    Comments: vc's for decreased libby, appropriate breathing, and safety    EDUCATION  Patient provided with daily orientation and goals of this PT session. They were educated to call for assistance and to transfer with hospital staff only.  Also, pt was educated on the effects of prolonged immobility and the importance of performing OOB activity and exercises to promote healing and reduce recovery time    Whiteboard updated with correct mobility information. RN/PCT notified.  Pt safe to transfer OOB/BTB and amb with RN/PCT: Use RW with SBA.    Patient left up in chair, with  all lines intact, call button in reach, and nurse notified    AM-PAC 6 CLICK MOBILITY  Turning over in bed (including adjusting bedclothes, sheets and blankets)?: 3  Sitting down on and standing up from a chair with arms (e.g., wheelchair, bedside commode, etc.): 3  Moving from lying on back to sitting on the side of the bed?: 3  Moving to and from a bed to a chair (including a wheelchair)?: 3  Need to walk in hospital room?: 3  Climbing 3-5 steps with a railing?: 2  Basic Mobility Total Score: 17     Assessment:     Chayito Chung is a 71 y.o. female admitted with a medical diagnosis of Chronic obstructive pulmonary disease with acute exacerbation.  She presents with the following impairments/functional limitations:  weakness, impaired endurance, impaired self care skills, impaired functional mobility, gait instability, impaired balance, decreased coordination, decreased upper extremity function, decreased lower extremity function, decreased safety awareness, edema, impaired cardiopulmonary response to activity. requiring light assistance and verbal cues for transfers and gait to prevent falls due to weakness, fatigue, mild SOB, and instability.   In light of pt's current functional level and deficits, it  is anticipated that pt will need to participate in an intense rehab program consisting of PT and OT in order to achieve full rehab potential to return to previous level of function and roles.  Pt remains motivated to participate in PT session and will cont to benefit from skilled PT intervention.      Rehab Prognosis:  Good; patient would benefit from acute skilled PT services to address these deficits and reach maximum level of function.      GOALS:   Multidisciplinary Problems       Physical Therapy Goals          Problem: Physical Therapy    Goal Priority Disciplines Outcome Goal Variances Interventions   Physical Therapy Goal     PT, PT/OT Ongoing, Progressing     Description: Goals to be met by: 22    Patient will increase functional independence with mobility by performin. Supine to sit with supervision -not met  2. Sit to supine with supervision -not met  3. Sit to stand transfer with supervision -not met  4. Gait  x 100 feet with supervision using LRAD as needed -not met                         Time Tracking:     PT Received On: 22  PT Start Time: 1520     PT Stop Time: 1539  PT Total Time (min): 19 min     Billable Minutes: Gait Training 19    Treatment Type: Treatment  PT/PTA: PTA     PTA Visit Number: 1       ROCIO Vegas.  Pager [phone]    2022    .

## 2022-09-07 NOTE — SUBJECTIVE & OBJECTIVE
Telemedicine  This service was provided by Virtual Visit.    Patient was transferred to [facility] on:  9/3/2022    Chief Complaint   Patient presents with    Multiple complaints     Sent here for admission    Shortness of Breath     Lung cancer on radiation, on chemo    Cough     The patient location is: [address] A   Admitted 8/22/2022  5:49 PM  Present with the patient at the time of the telemed/virtual assessment: Telepresenter    Interval History / Events Overnight:   The patient is able to provide adequate history. Additional history was obtained from past medical records. No significant events reported by Nursing. Stable at rest on 2 LMP NC   Patient complains of dyspnea. Symptoms have improved since yesterday. Associated symptoms include: shortness of breath on exertion, fatigue, and malaise. Symptoms are stable.     Lab test(s) reviewed: sCr stable    Review of Systems   Constitutional:  Negative for fever.   Respiratory:  Positive for shortness of breath.    Cardiovascular:  Negative for chest pain.     Objective:     Vital Signs (Most Recent):  Temp: 98.6 °F (37 °C) (09/07/22 0814)  Pulse: 98 (09/07/22 0802)  Resp: 16 (09/07/22 0802)  BP: 126/69 (09/07/22 0802)  SpO2: 98 % (09/07/22 0802)   Vital Signs (24h Range):  Temp:  [98.1 °F (36.7 °C)-98.7 °F (37.1 °C)] 98.6 °F (37 °C)  Pulse:  [] 98  Resp:  [12-18] 16  SpO2:  [92 %-98 %] 98 %  BP: (104-140)/(56-71) 126/69     Weight: 61.1 kg (134 lb 11.2 oz)  Body mass index is 24.64 kg/m².    Intake/Output Summary (Last 24 hours) at 9/7/2022 1120  Last data filed at 9/6/2022 1807  Gross per 24 hour   Intake 104.09 ml   Output --   Net 104.09 ml        Physical Exam  Constitutional:       General: She is not in acute distress.     Appearance: Normal appearance.   Eyes:      General: Lids are normal. No scleral icterus.        Right eye: No discharge.         Left eye: No discharge.      Conjunctiva/sclera: Conjunctivae normal.   Neck:       Trachea: Phonation normal.   Cardiovascular:      Rate and Rhythm: Normal rate.      Comments: Monitor / Vital signs reviewed at time of visit  Pulmonary:      Effort: Pulmonary effort is normal. No tachypnea, accessory muscle usage or respiratory distress.   Abdominal:      General: There is no distension.   Neurological:      Mental Status: She is alert. She is not disoriented.   Psychiatric:         Attention and Perception: Attention normal.         Behavior: Behavior is cooperative.       Significant Labs:   Recent Labs   Lab 04/06/22  0619 05/09/22 2128 07/14/22  1133   HGBA1C 10.3* 9.8* 9.0*       Recent Labs   Lab 09/06/22  1926 09/07/22  0447 09/07/22  0808   POCTGLUCOSE 232* 123* 210*       Recent Labs   Lab 09/04/22  0822 09/06/22  0652   WBC 4.17 4.27   HGB 8.3* 8.3*   HCT 29.5* 28.4*    237       Recent Labs   Lab 09/04/22 0822 09/06/22  0652   GRAN 62.4  2.6 59.1  2.5   LYMPH 22.5  0.9* 23.4  1.0   MONO 11.5  0.5 12.4  0.5   EOS 0.1 0.2       Recent Labs   Lab 09/04/22  0822 09/06/22  0652    133*   K 4.1 4.0    103   CO2 24 23   BUN 8 13   CREATININE 0.6 0.6   * 67*   CALCIUM 8.7 9.5   ALBUMIN 2.7* 2.7*   MG 1.7 1.5*   PHOS 3.0 5.4*       Recent Labs   Lab 05/09/22 2128 05/11/22  1115   FERRITIN 13*  --    SEDRATE  --  62*       SARS-CoV2 (COVID-19) Qualitative PCR (no units)   Date Value   02/04/2022 Not Detected   08/18/2020 Not detected   08/11/2020 Not detected   08/04/2020 Not detected   07/21/2020 Not Detected     SARS-CoV-2 RNA, Amplification, Qual (no units)   Date Value   08/22/2022 Negative   05/25/2020 Negative   05/12/2020 Negative     POC Rapid COVID (no units)   Date Value   08/23/2022 Negative   06/16/2022 Negative   05/09/2022 Negative   12/09/2021 Negative       ECG Results              EKG 12-lead (Final result)  Result time 08/23/22 15:27:00      Final result by Interface, Lab In [facility] (08/23/22 15:27:00)                   Narrative:    Test  Reason : R06.02,    Vent. Rate : 119 BPM     Atrial Rate : 119 BPM     P-R Int : 124 ms          QRS Dur : 074 ms      QT Int : 314 ms       P-R-T Axes : 069 067 -64 degrees     QTc Int : 441 ms    Sinus tachycardia  Nonspecific ST and/or T wave abnormalities  Abnormal ECG  When compared with ECG of 22-AUG-2022 16:19,  No significant change was found  Confirmed by Rosy Abdullahi MD ([id number]) on 8/23/2022 3:26:53 PM    Referred By: [email or web address]   SELF           Confirmed By:Rosy Abdullahi MD                                     EKG 12-lead (Final result)  Result time 08/23/22 13:36:20      Final result by Interface, Lab In [facility] (08/23/22 13:36:20)                   Narrative:    Test Reason : R06.02,    Vent. Rate : 126 BPM     Atrial Rate : 126 BPM     P-R Int : 126 ms          QRS Dur : 076 ms      QT Int : 312 ms       P-R-T Axes : 070 053 -46 degrees     QTc Int : 451 ms    Sinus tachycardia  Nonspecific ST and T wave abnormality  Abnormal ECG  When compared with ECG of 17-AUG-2022 14:27,  Nonspecific T wave abnormality, improved in Lateral leads  Confirmed by JOHANNA RUSSO MD ([id number]) on 8/23/2022 1:36:16 PM    Referred By: FRANCISCO   SELF           Confirmed By:JOHANNA RUSSO MD                                    Results for orders placed during the hospital encounter of 04/05/22    Echo    Interpretation Summary  · The left ventricle is normal in size with mild concentric hypertrophy and normal systolic function.  · The estimated ejection fraction is 60%.  · Mild mitral regurgitation.  · Grade I left ventricular diastolic dysfunction.  · Mild tricuspid regurgitation.  · The estimated PA systolic pressure is 47 mmHg.  · Normal right ventricular size with normal right ventricular systolic function.  · There is mild pulmonary hypertension.  · There is no evidence of intracardiac shunting.      X-Ray Chest 1 View  Narrative: EXAMINATION:  XR CHEST 1 VIEW    CLINICAL HISTORY:  hypoxia;    TECHNIQUE:  Single frontal view  of the chest was performed.    COMPARISON:  08/27/2022    FINDINGS:  There is bilateral interstitial prominence, similar to prior study.  There is increase in bibasilar discoid opacification, likely atelectasis.  No pleural effusion or pneumothorax.  Cardiac silhouette is stable.  Note made of surgical clips about the EG junction.  Impression: As above.    Electronically signed by: Parviz Christy MD  Date:    09/05/2022  Time:    14:58      Labs and Imaging within the last 24 hours listed above were reviewed.       Diet: Diet Adult Regular (IDDSI Level 7)  Significant LDAs:   IV Access Type: Peripheral  Urinary Catheter Indication if present: Patient Does Not Have Urinary Catheter  Other Lines/Tubes/Drains:    HIGH RISK CONDITION(S):   Patient has a condition that poses threat to life and bodily function: Severe Respiratory Distress     Goals of Care:    Previous admission:  5/9/22  Likely prognosis:  Fair  Code Status: DNR  Comfort Only: No  Hospice: No  Goals at discharge: remain at home, with physician follow-up    Discharge Planning   MICKY: 9/7/2022     Code Status: DNR   Is the patient medically ready for discharge?: Yes    Reason for patient still in hospital (select all that apply): Pending disposition  Discharge Plan A: Home Health

## 2022-09-07 NOTE — PROGRESS NOTES
[address] - Transplant Stepdown  Adult Nutrition  Progress Note    SUMMARY       Recommendations    Rec Diabetic diet (A1c 9.0)    If PO intake <50%, add Boost glucose control BID     RD to monitor and follow     Goals: Meet % EEN, EPN by RD f/u  Nutrition Goal Status: goal met  Communication of RD Recs:  (POC)    Assessment and Plan    Moderate malnutrition  Moderate Malnutrition  Nutrition Problem  Moderate Protein-Calorie Malnutrition  Malnutrition in the context of Chronic Illness    Related to (etiology):   Inadequate energy intake    Signs and Symptoms (as evidenced by):   Energy Intake: <75% of estimated energy requirement for >3 months  Body Fat Depletion: mild depletion of orbital, triceps  Muscle Mass Depletion: mild depletion of temples, clavicle region, interosseous muscle, lower extremities    Interventions/Recommendations (treatment strategy):  Collaboration of nutrition care with other providers  ONS    Nutrition Diagnosis Status:   Resolved    Malnutrition Assessment  Weight Loss (Malnutrition):  (7% x 4 months)  Energy Intake (Malnutrition): less than 75% for greater than or equal to 3 months   Orbital Region (Subcutaneous Fat Loss): mild depletion  Upper Arm Region (Subcutaneous Fat Loss): mild depletion   Synagogue Region (Muscle Loss): mild depletion  Clavicle Bone Region (Muscle Loss): mild depletion  Clavicle and Acromion Bone Region (Muscle Loss): mild depletion  Dorsal Hand (Muscle Loss): mild depletion  Anterior Thigh Region (Muscle Loss): mild depletion  Posterior Calf Region (Muscle Loss): mild depletion     Reason for Assessment    Reason For Assessment: RD follow-up  Diagnosis:  (COPD)  Relevant Medical History: T2DM, Iron deficiency anemia, CAD  Interdisciplinary Rounds: did not attend    General Information Comments:   (9/7) Pt with good PO intake, tolerating 100% of meals. Able to move around. Wt stable.   (9/1) Pt reports food appetite, tolerate 75% of meals. Denies N/V,  "chewing/swallowing difficulties. Wt stable since admit.   (8/24)  Pt reports decreased appetite in the past 3 months. Usually eat only 1 meal per day at home. Pt with elevated A1c at 9.0. Pt was compliant to insulin but not following diabetic diet. Carbohydrate counting education provided to pt with handout. Encourage PO intake, pt agreeable to ONS. Pt verbalized understanding. Per wt hx, noted wt loss of 10 lb (7%) x 4 months, which is not significant. NFPE completed today, noted mild muscle and fat depletion. Pt meet the criteria of moderate malnutrition in the context of chronic illness aeb decreased PO intake and muscle/fat depletion.     Nutrition Discharge Planning: Educated pt on CHO counting for people with diabetes on 8/24. Pt verbalized understanding. Emphasized the importance of diet adherence. Pt with no additional questions. No other needs identified. Left all education material with pt at bedside.    Nutrition Risk Screen    Nutrition Risk Screen: no indicators present    Nutrition/Diet History    Spiritual, Cultural Beliefs, Gnosticism Practices, Values that Affect Care: no    Anthropometrics    Temp: 98.1 °F (36.7 °C)  Height Method: Stated  Height: 5' 2" (157.5 cm)  Height (inches): 62 in  Weight Method: Bed Scale  Weight: 61.1 kg (134 lb 11.2 oz)  Weight (lb): 134.7 lb  Ideal Body Weight (IBW), Female: 110 lb  % Ideal Body Weight, Female (lb): 117.85 %  BMI (Calculated): 24.6     Lab/Procedures/Meds    Pertinent Labs Reviewed: reviewed  Pertinent Labs Comments: glucose 67, Na 133, P 5.4  Pertinent Medications Reviewed: reviewed  Pertinent Medications Comments: insulin, ferrous sulfate, polyethylene glycol    Estimated/Assessed Needs    Weight Used For Calorie Calculations: 59 kg (130 lb 1.1 oz)  Energy Calorie Requirements (kcal): 0835-3532 kcal  Energy Need Method: Kcal/kg (25-30)  Protein Requirements: 59-70g (1-1.2g/kg)  Weight Used For Protein Calculations: 59 kg (130 lb 1.1 oz)  Fluid " Requirements (mL): 1ml/kcal or per MD  Estimated Fluid Requirement Method: RDA Method  RDA Method (mL): 1475  CHO Requirement: 184g    Nutrition Prescription Ordered    Current Diet Order: Regular    Evaluation of Received Nutrient/Fluid Intake    I/O: - 2.4 L since admit  Energy Calories Required: not meeting needs  Protein Required: not meeting needs  Comments: LBM 9/6  Tolerance: tolerating    Nutrition Risk    Level of Risk/Frequency of Follow-up: low     Monitor and Evaluation    Food and Nutrient Intake: energy intake, food and beverage intake  Food and Nutrient Adminstration: diet order  Knowledge/Beliefs/Attitudes: food and nutrition knowledge/skill, beliefs and attitudes  Physical Activity and Function: nutrition-related ADLs and IADLs  Anthropometric Measurements: height/length, weight, weight change, body mass index  Biochemical Data, Medical Tests and Procedures: electrolyte and renal panel, glucose/endocrine profile, gastrointestinal profile, lipid profile, inflammatory profile  Nutrition-Focused Physical Findings: overall appearance     Nutrition Follow-Up    RD Follow-up?: Yes    David CUMMINGS

## 2022-09-08 VITALS
SYSTOLIC BLOOD PRESSURE: 133 MMHG | HEART RATE: 85 BPM | OXYGEN SATURATION: 90 % | BODY MASS INDEX: 24.78 KG/M2 | WEIGHT: 134.69 LBS | DIASTOLIC BLOOD PRESSURE: 74 MMHG | HEIGHT: 62 IN | TEMPERATURE: 98 F | RESPIRATION RATE: 18 BRPM

## 2022-09-08 LAB
ALBUMIN SERPL BCP-MCNC: 2.7 G/DL (ref 3.5–5.2)
ANION GAP SERPL CALC-SCNC: 8 MMOL/L (ref 8–16)
BASOPHILS # BLD AUTO: 0.03 K/UL (ref 0–0.2)
BASOPHILS NFR BLD: 0.8 % (ref 0–1.9)
BUN SERPL-MCNC: 14 MG/DL (ref 8–23)
CALCIUM SERPL-MCNC: 9.1 MG/DL (ref 8.7–10.5)
CHLORIDE SERPL-SCNC: 110 MMOL/L (ref 95–110)
CO2 SERPL-SCNC: 22 MMOL/L (ref 23–29)
CREAT SERPL-MCNC: 0.6 MG/DL (ref 0.5–1.4)
DIFFERENTIAL METHOD: ABNORMAL
EOSINOPHIL # BLD AUTO: 0.2 K/UL (ref 0–0.5)
EOSINOPHIL NFR BLD: 4.3 % (ref 0–8)
ERYTHROCYTE [DISTWIDTH] IN BLOOD BY AUTOMATED COUNT: 30.1 % (ref 11.5–14.5)
EST. GFR  (NO RACE VARIABLE): >60 ML/MIN/1.73 M^2
GLUCOSE SERPL-MCNC: 113 MG/DL (ref 70–110)
HCT VFR BLD AUTO: 29.2 % (ref 37–48.5)
HGB BLD-MCNC: 8.5 G/DL (ref 12–16)
IMM GRANULOCYTES # BLD AUTO: 0.01 K/UL (ref 0–0.04)
IMM GRANULOCYTES NFR BLD AUTO: 0.3 % (ref 0–0.5)
LYMPHOCYTES # BLD AUTO: 1 K/UL (ref 1–4.8)
LYMPHOCYTES NFR BLD: 27.8 % (ref 18–48)
MCH RBC QN AUTO: 24.3 PG (ref 27–31)
MCHC RBC AUTO-ENTMCNC: 29.1 G/DL (ref 32–36)
MCV RBC AUTO: 83 FL (ref 82–98)
MONOCYTES # BLD AUTO: 0.5 K/UL (ref 0.3–1)
MONOCYTES NFR BLD: 12.4 % (ref 4–15)
NEUTROPHILS # BLD AUTO: 2 K/UL (ref 1.8–7.7)
NEUTROPHILS NFR BLD: 54.4 % (ref 38–73)
NRBC BLD-RTO: 0 /100 WBC
PHOSPHATE SERPL-MCNC: 4 MG/DL (ref 2.7–4.5)
PLATELET # BLD AUTO: 226 K/UL (ref 150–450)
PMV BLD AUTO: 9 FL (ref 9.2–12.9)
POCT GLUCOSE: 111 MG/DL (ref 70–110)
POCT GLUCOSE: 81 MG/DL (ref 70–110)
POTASSIUM SERPL-SCNC: 3.7 MMOL/L (ref 3.5–5.1)
RBC # BLD AUTO: 3.5 M/UL (ref 4–5.4)
SODIUM SERPL-SCNC: 140 MMOL/L (ref 136–145)
WBC # BLD AUTO: 3.7 K/UL (ref 3.9–12.7)

## 2022-09-08 PROCEDURE — 25000003 PHARM REV CODE 250: Performed by: STUDENT IN AN ORGANIZED HEALTH CARE EDUCATION/TRAINING PROGRAM

## 2022-09-08 PROCEDURE — 99239 HOSP IP/OBS DSCHRG MGMT >30: CPT | Mod: 95,,, | Performed by: INTERNAL MEDICINE

## 2022-09-08 PROCEDURE — 25000003 PHARM REV CODE 250: Performed by: HOSPITALIST

## 2022-09-08 PROCEDURE — 97535 SELF CARE MNGMENT TRAINING: CPT

## 2022-09-08 PROCEDURE — 99239 PR HOSPITAL DISCHARGE DAY,>30 MIN: ICD-10-PCS | Mod: 95,,, | Performed by: INTERNAL MEDICINE

## 2022-09-08 PROCEDURE — 80069 RENAL FUNCTION PANEL: CPT | Performed by: INTERNAL MEDICINE

## 2022-09-08 PROCEDURE — 36415 COLL VENOUS BLD VENIPUNCTURE: CPT | Performed by: INTERNAL MEDICINE

## 2022-09-08 PROCEDURE — 85025 COMPLETE CBC W/AUTO DIFF WBC: CPT | Performed by: INTERNAL MEDICINE

## 2022-09-08 RX ORDER — INSULIN GLARGINE 100 [IU]/ML
20 INJECTION, SOLUTION SUBCUTANEOUS NIGHTLY
Qty: 22.5 ML | Refills: 0 | COMMUNITY
Start: 2022-09-08

## 2022-09-08 RX ORDER — HYDROCHLOROTHIAZIDE 12.5 MG/1
12.5 CAPSULE ORAL DAILY
Qty: 90 CAPSULE | Refills: 3 | COMMUNITY
Start: 2022-09-08 | End: 2022-09-14

## 2022-09-08 RX ORDER — INSULIN LISPRO 100 [IU]/ML
35 INJECTION, SUSPENSION SUBCUTANEOUS
Qty: 21 ML | Refills: 11 | COMMUNITY
Start: 2022-09-08

## 2022-09-08 RX ORDER — VERAPAMIL HYDROCHLORIDE 180 MG/1
180 TABLET, FILM COATED, EXTENDED RELEASE ORAL NIGHTLY
Qty: 30 TABLET | Refills: 11 | COMMUNITY
Start: 2022-09-08 | End: 2022-09-14

## 2022-09-08 RX ORDER — LOSARTAN POTASSIUM 50 MG/1
50 TABLET ORAL DAILY
Qty: 90 TABLET | Refills: 3 | COMMUNITY
Start: 2022-09-08 | End: 2022-09-14

## 2022-09-08 RX ORDER — ALBUTEROL SULFATE 90 UG/1
2 AEROSOL, METERED RESPIRATORY (INHALATION) EVERY 4 HOURS PRN
Qty: 25.5 G | Refills: 3 | Status: SHIPPED | OUTPATIENT
Start: 2022-09-08

## 2022-09-08 RX ORDER — POTASSIUM CHLORIDE 20 MEQ/1
20 TABLET, EXTENDED RELEASE ORAL DAILY
Qty: 90 TABLET | Refills: 3 | COMMUNITY
Start: 2022-09-08

## 2022-09-08 RX ADMIN — FLUTICASONE FUROATE AND VILANTEROL TRIFENATATE 1 PUFF: 100; 25 POWDER RESPIRATORY (INHALATION) at 09:09

## 2022-09-08 RX ADMIN — GUAIFENESIN 600 MG: 600 TABLET, EXTENDED RELEASE ORAL at 09:09

## 2022-09-08 RX ADMIN — TIOTROPIUM BROMIDE INHALATION SPRAY 2 PUFF: 3.12 SPRAY, METERED RESPIRATORY (INHALATION) at 09:09

## 2022-09-08 RX ADMIN — FERROUS SULFATE TAB 325 MG (65 MG ELEMENTAL FE) 1 EACH: 325 (65 FE) TAB at 09:09

## 2022-09-08 RX ADMIN — SERTRALINE HYDROCHLORIDE 150 MG: 50 TABLET ORAL at 05:09

## 2022-09-08 RX ADMIN — Medication 400 MG: at 09:09

## 2022-09-08 RX ADMIN — PREGABALIN 150 MG: 75 CAPSULE ORAL at 09:09

## 2022-09-08 RX ADMIN — INSULIN ASPART 8 UNITS: 100 INJECTION, SOLUTION INTRAVENOUS; SUBCUTANEOUS at 09:09

## 2022-09-08 RX ADMIN — ASPIRIN 81 MG CHEWABLE TABLET 81 MG: 81 TABLET CHEWABLE at 09:09

## 2022-09-08 RX ADMIN — ATORVASTATIN CALCIUM 40 MG: 20 TABLET, FILM COATED ORAL at 09:09

## 2022-09-08 RX ADMIN — BENZONATATE 200 MG: 100 CAPSULE ORAL at 09:09

## 2022-09-08 RX ADMIN — HYDROCODONE BITARTRATE AND ACETAMINOPHEN 15 ML: 7.5; 325 SOLUTION ORAL at 09:09

## 2022-09-08 NOTE — CARE UPDATE
"RAPID RESPONSE NURSE CHART REVIEW        Chart Reviewed: 09/08/2022, 12:34 AM    MRN: [id number]  Bed: 04279/02012 A    Dx: Chronic obstructive pulmonary disease with acute exacerbation    Chayito Chung has a past medical history of Acute coronary syndrome, Acute hypoxemic respiratory failure, Anxiety, Asthma, Cancer, Cataracts, both eyes, Chest pain at rest, Chest pain of uncertain etiology, Colon cancer, COPD (chronic obstructive pulmonary disease), Coronary artery disease, Depression, Diabetes mellitus, Dyspnea on exertion, Elevated brain natriuretic peptide (BNP) level, Elevated cholesterol, Hypertension, Swelling, and Syncope and collapse.    Last VS: /60 (BP Location: Left arm, Patient Position: Lying)   Pulse 106   Temp 98.6 °F (37 °C) (Oral)   Resp 18   Ht 5' 2" (1.575 m)   Wt 61.1 kg (134 lb 11.2 oz)   LMP  (LMP Unknown)   SpO2 (!) 94%   Breastfeeding No   BMI 24.64 kg/m²     24H Vital Sign Range:  Temp:  [96 °F (35.6 °C)-98.6 °F (37 °C)]   Pulse:  []   Resp:  [15-18]   BP: (104-154)/(56-80)   SpO2:  [93 %-98 %]     Level of Consciousness (AVPU): alert    Recent Labs     09/06/22  0652   WBC 4.27   HGB 8.3*   HCT 28.4*          Recent Labs     09/06/22  0652   *   K 4.0      CO2 23   CREATININE 0.6   GLU 67*   PHOS 5.4*   MG 1.5*        No results for input(s): PH, PCO2, PO2, HCO3, POCSATURATED, BE in the last 72 hours.     OXYGEN:  Flow (L/min): 2  Oxygen Concentration (%): 28  O2 Device (Oxygen Therapy): nasal cannula    MEWS score: 2    Rounding completed with charge ANDERSON Martinez. contacted for tachycardia. No additional concerns verbalized at this time. Instructed to call [phone] for further concerns or assistance.    Aurora Clark RN       "

## 2022-09-08 NOTE — PLAN OF CARE
09/08/22 0857   Post-Acute Status   Post-Acute Authorization HME   HME Status (!) Pending Delivery     The patient is currently waiting on delivery of her home O2 from [facility].       9:35 AM  The SW is awaiting home health orders from the patient's MD.    10:31 AM   The SW met with the patient at bedside to discuss the patient's d/c plans. The SW discussed with the patient her d/c plans for home health with home O2.  The SW inquired about the patient's radiation schedule. The patient reported that she only has one more treatment. The patient reported that she goes to [facility] her radiation treatment. The patient stated that Dr. Edmondson is her MD and the contact number is [phone]. The patient reported that she was going to contact her doctor once she is released to obtain a date for her last radiation treatment.         The SW will continue to follow.     Smita Braxton LMSW  Case Management [facility]  Ext: [phone]

## 2022-09-08 NOTE — PLAN OF CARE
Plan of care reviewed with the patient at the beginning of the shift. Pt admitted with copd exacerbation. O2 at 2L nasal canula. Pt currently on home O2. Fall precautions maintained. She is up independently without difficulty. Pt remained free from falls and injury this shift. Bed locked in lowest position, side rails up x2, call light within reach. Instructed pt to call for assistance as needed. Pt verbalized understanding. Vitals stable. Pt afebrile overnight. No acute issues overnight. Will continue to monitor. Potential discharge today with home health.

## 2022-09-08 NOTE — PLAN OF CARE
[facility]      HOME HEALTH ORDERS  FACE TO FACE ENCOUNTER    Patient Name: Chayito Chung  YOB: 1950    PCP: Curt Valladares III, MD   PCP Address: [address] / [place] [address]  PCP Phone Number: [phone]  PCP Fax: [phone]    Encounter Date: 8/22/22    Admit to Home Health    Diagnoses:  Active Hospital Problems    Diagnosis  POA    *Chronic obstructive pulmonary disease with acute exacerbation [J44.1]  Yes    Acute on chronic respiratory failure with hypoxia [J96.21]  Yes    Moderate malnutrition [E44.0]  Yes    Abdominal distension [R14.0]  Yes    Adenocarcinoma of lung, right [C34.91]  Yes    Type 2 diabetes mellitus, with long-term current use of insulin [E11.9, Z79.4]  Not Applicable    Iron deficiency anemia [D50.9]  Yes    Major depressive disorder in partial remission [F32.4]  Yes    Neuropathy [G62.9]  Yes    Coronary artery disease of native artery of native heart with stable angina pectoris [I25.118]  Yes      Resolved Hospital Problems   No resolved problems to display.       Follow Up Appointments:  Future Appointments   Date Time Provider Department Center   9/15/2022  8[facility]   10/11/2022  8:45 AM LAB, [facility] [facility]   10/14/2022  9:20 AM Cameron Valladares III, MD [facility]       Allergies:Review of patient's allergies indicates:  No Known Allergies    Medications: Review discharge medications with patient and family and provide education.    Current Facility-Administered Medications   Medication Dose Route Frequency Provider Last Rate Last Admin    acetaminophen tablet 650 mg  650 mg Oral Q4H PRN Adryan Newell MD        albuterol-ipratropium 2.5 mg-0.5 mg/3 mL nebulizer solution 3 mL  3 mL Nebulization Q4H PRN Arianne Velázqeuz MD   3 mL at 09/05/22 2124    aspirin chewable tablet 81 mg  81 mg Oral Daily Adryan Newell MD   81 mg at 09/08/22 0907    atorvastatin tablet 40 mg  40 mg Oral  Daily Adryan Newell MD   40 mg at 09/08/22 0907    benzonatate capsule 200 mg  200 mg Oral TID Adryan Newell MD   200 mg at 09/08/22 0907    bisacodyL suppository 10 mg  10 mg Rectal Daily PRN Pili Carlson MD        dextrose 10% bolus 125 mL  12.5 g Intravenous PRN Adryan Newell MD   Stopped at 09/06/22 1329    dextrose 10% bolus 250 mL  25 g Intravenous PRN Adryan Newell MD        enoxaparin injection 40 mg  40 mg Subcutaneous Daily Adryan Newell MD   40 mg at 09/07/22 1804    ferrous sulfate tablet 1 each  1 tablet Oral Daily Adryan Newell MD   1 each at 09/08/22 0907    fluticasone furoate-vilanteroL 100-25 mcg/dose diskus inhaler 1 puff  1 puff Inhalation Daily Adryan Newell MD   1 puff at 09/08/22 0909    glucagon (human recombinant) injection 1 mg  1 mg Intramuscular PRN Adryan Newell MD        guaiFENesin 12 hr tablet 600 mg  600 mg Oral BID Adryan Newell MD   600 mg at 09/08/22 0912    hydrocodone-apap 7.5-325 MG/15 ML oral solution 15 mL  15 mL Oral Q6H PRN Arianne Velázquez MD   15 mL at 09/08/22 0953    insulin aspart U-100 pen 1-10 Units  1-10 Units Subcutaneous QID (AC + HS) PRN Adryan Newell MD   2 Units at 09/07/22 2210    insulin aspart U-100 pen 8 Units  8 Units Subcutaneous TIDWM Pili Carlson MD   8 Units at 09/08/22 0908    insulin detemir U-100 pen 23 Units  23 Units Subcutaneous QHS Pili Carlson MD   23 Units at 09/07/22 2210    magnesium oxide tablet 400 mg  400 mg Oral Daily Adryan Newell MD   400 mg at 09/08/22 0907    melatonin tablet 6 mg  6 mg Oral Nightly PRN Adryan Newell MD   6 mg at 09/07/22 2017    naloxone 0.4 mg/mL injection 0.02 mg  0.02 mg Intravenous PRN Adryan Newell MD        nitroGLYCERIN SL tablet 0.4 mg  0.4 mg Sublingual Q5 Min PRN Adryan Newell MD   0.4 mg at 08/27/22 1521    ondansetron injection 4 mg  4 mg Intravenous Q8H PRN Adryan Newell MD   4 mg at 08/24/22 1733    polyethylene glycol packet 17  g  17 g Oral Daily Adryan Newell MD   17 g at 09/03/22 0855    pregabalin capsule 150 mg  150 mg Oral TID Adryan Newell MD   150 mg at 09/08/22 0907    prochlorperazine injection Soln 5 mg  5 mg Intravenous Q6H PRN Adryan Newell MD        promethazine-codeine 6.25-10 mg/5 ml syrup 7.5 mL  7.5 mL Oral Q4H PRN Adryan Newell MD   7.5 mL at 08/31/22 2044    QUEtiapine tablet 400 mg  400 mg Oral Nightly Adryan Newell MD   400 mg at 09/07/22 2009    senna-docusate 8.6-50 mg per tablet 1 tablet  1 tablet Oral BID Adryan Newell MD   1 tablet at 09/07/22 2009    sertraline tablet 150 mg  150 mg Oral Daily Adryan Newell MD   150 mg at 09/08/22 0542    sodium chloride 0.9% flush 10 mL  10 mL Intravenous Q6H PRN Adryan Newell MD        tiotropium bromide 2.5 mcg/actuation inhaler 2 puff  2 puff Inhalation Daily Arianne Velázquez MD   2 puff at 09/08/22 0913    tiZANidine tablet 4 mg  4 mg Oral BID PRN Adryan Newell MD   4 mg at 08/23/22 0148    verapamiL CR tablet 180 mg  180 mg Oral QHS Adryan Newell MD   180 mg at 09/07/22 2009     Current Discharge Medication List        CONTINUE these medications which have CHANGED    Details   albuterol (PROVENTIL/VENTOLIN HFA) 90 mcg/actuation inhaler Inhale 2 puffs into the lungs every 4 (four) hours as needed for Wheezing or Shortness of Breath. Rescue  Qty: 25.5 g, Refills: 3    Comments: **Patient requests 90 days supply**  Further Refill from Pul      alendronate (FOSAMAX) 70 MG tablet Take 1 tablet (70 mg total) by mouth every Sunday.  Qty: 12 tablet, Refills: 3    Associated Diagnoses: Osteoporosis, unspecified osteoporosis type, unspecified pathological fracture presence      diclofenac sodium (VOLTAREN) 1 % Gel Apply 2 g topically 4 (four) times daily as needed.  Qty: 100 g, Refills: 0    Associated Diagnoses: Neck pain      hydroCHLOROthiazide (MICROZIDE) 12.5 mg capsule Take 1 capsule (12.5 mg total) by mouth once daily. HOLD MED UNTIL  FOLLOW-UP WITH DR. ROJAS (PCP)  Qty: 90 capsule, Refills: 3    Comments: .      LANTUS SOLOSTAR U-100 INSULIN glargine 100 units/mL SubQ pen Inject 20 Units into the skin every evening.  Qty: 22.5 mL, Refills: 0      losartan (COZAAR) 50 MG tablet Take 1 tablet (50 mg total) by mouth once daily. DO NOT TAKE UNTIL YOU FOLLOW-UP WITH DR. ROJAS (PCP).  Qty: 90 tablet, Refills: 3    Comments: .      potassium chloride SA (K-DUR,KLOR-CON) 20 MEQ tablet Take 1 tablet (20 mEq total) by mouth once daily.  Qty: 90 tablet, Refills: 3    Associated Diagnoses: Hypertension associated with diabetes      verapamiL (CALAN-SR) 180 MG CR tablet Take 1 tablet (180 mg total) by mouth every evening.  Qty: 30 tablet, Refills: 11    Comments: .           CONTINUE these medications which have NOT CHANGED    Details   ASA/acetaminophen/caffeine/pot (GOODY'S HEADACHE POWDER ORAL) Take 1 packet by mouth daily as needed (Severe headache).      aspirin 81 MG Chew Take 1 tablet (81 mg total) by mouth once daily.  Qty: 90 tablet, Refills: 3      atorvastatin (LIPITOR) 40 MG tablet Take 1 tablet (40 mg total) by mouth once daily.  Qty: 90 tablet, Refills: 3      busPIRone (BUSPAR) 15 MG tablet Take 15 mg by mouth 2 (two) times daily.      HYDROcodone-acetaminophen 7.5-300 mg Tab Take 1 tablet by mouth 2 (two) times daily as needed (chronic neck pain).      metFORMIN (GLUCOPHAGE) 1000 MG tablet Take 1,000 mg by mouth 2 (two) times daily.      nitroGLYCERIN (NITROSTAT) 0.4 MG SL tablet Place 1 tablet (0.4 mg total) under the tongue every 5 (five) minutes as needed for Chest pain.  Qty: 100 tablet, Refills: 1      ondansetron (ZOFRAN) 4 mg/5 mL solution Take 4 mg by mouth daily as needed for Nausea.      pregabalin (LYRICA) 150 MG capsule Take 150 mg by mouth 3 (three) times daily.      QUEtiapine (SEROQUEL) 400 MG tablet Take 400 mg by mouth nightly.      sertraline (ZOLOFT) 100 MG tablet Take 150 mg by mouth once daily at 6am.      tiZANidine  "(ZANAFLEX) 4 MG tablet Take 4 mg by mouth 2 (two) times daily as needed for Muscle spasms.      TRELEGY ELLIPTA 200-62.5-25 mcg inhaler Inhale 1 puff into the lungs once daily.      zonisamide (ZONEGRAN) 100 MG Cap Take 200 mg by mouth 2 (two) times daily.      BD ULTRA-FINE MINI PEN NEEDLE 31 gauge x 3/16" Ndle       insulin lispro protamine-insulin lispro (HUMALOG MIX 75-25,U-100,INSULN) 100 unit/mL (75-25) Susp Inject 35 Units into the skin 2 (two) times daily before meals.  Qty: 21 mL, Refills: 11      topiramate (TOPAMAX) 50 MG tablet Take 50 mg by mouth 2 (two) times daily.           STOP taking these medications       bismuth subsalicylate (PEPTO-BISMOL) 262 mg/15 mL suspension Comments:   Reason for Stopping:         clopidogreL (PLAVIX) 75 mg tablet Comments:   Reason for Stopping:         ferrous sulfate (FEOSOL) 325 mg (65 mg iron) Tab tablet Comments:   Reason for Stopping:         magnesium oxide (MAG-OX) 400 mg (241.3 mg magnesium) tablet Comments:   Reason for Stopping:         pravastatin (PRAVACHOL) 40 MG tablet Comments:   Reason for Stopping:                 I have seen and examined this patient within the last 30 days. My clinical findings that support the need for the home health skilled services and home bound status are the following:no   Weakness/numbness causing balance and gait disturbance due to COPD Exacerbation, Weakness/Debility, and Malignancy/Cancer making it taxing to leave home.  Medical restrictions requiring assistance of another human to leave home due to  Home oxygen requirement.     Diet:   cardiac diet and diabetic diet 2000 calorie    Labs:  N/A    Referrals/ Consults  Physical Therapy to evaluate and treat. Evaluate for home safety and equipment needs; Establish/upgrade home exercise program. Perform / instruct on therapeutic exercises, gait training, transfer training, and Range of Motion.  Occupational Therapy to evaluate and treat. Evaluate home environment for safety and " equipment needs. Perform/Instruct on transfers, ADL training, ROM, and therapeutic exercises.  Aide to provide assistance with personal care, ADLs, and vital signs.    Activities:   activity as tolerated    Nursing:   Agency to admit patient within 24 hours of hospital discharge unless specified on physician order or at patient request    SN to complete comprehensive assessment including routine vital signs. Instruct on disease process and s/s of complications to report to MD. Review/verify medication list sent home with the patient at time of discharge  and instruct patient/caregiver as needed. Frequency may be adjusted depending on start of care date.     Skilled nurse to perform up to 3 visits PRN for symptoms related to diagnosis    Notify MD if SBP > 160 or < 90; DBP > 90 or < 50; HR > 120 or < 50; Temp > 101; O2 < 88%    Ok to schedule additional visits based on staff availability and patient request on consecutive days within the home health episode.    When multiple disciplines ordered:    Start of Care occurs on Sunday - Wednesday schedule remaining discipline evaluations as ordered on separate consecutive days following the start of care.    Thursday SOC -schedule subsequent evaluations Friday and Monday the following week.     Friday - Saturday SOC - schedule subsequent discipline evaluations on consecutive days starting Monday of the following week.    For all post-discharge communication and subsequent orders please contact patient's primary care physician. If unable to reach primary care physician or do not receive response within 30 minutes, please contact [facility] [phone] for clinical staff order clarification    Miscellaneous   Routine Skin for Bedridden Patients: Instruct patient/caregiver to apply moisture barrier cream to all skin folds and wet areas in perineal area daily and after baths and all bowel movements.  Diabetic Care:   SN to perform and educate Diabetic management with blood  glucose monitoring: and Report CBG < 60 or > 350 to physician.  Home Oxygen:  Oxygen at 2 L/min nasal canula to be used:  Continuously., Assess oxygen saturation via pulse oximeter as needed for increase in SOB., Notify physician if oxygen saturation less than 88%, and Consult respiratory therapy for instruction on home oxygen use    Home Health Aide:  Nursing Three times weekly, Physical Therapy Three times weekly, Occupational Therapy Three times weekly, and Home Health Aide Three times weekly      I certify that this patient is confined to her home and needs intermittent skilled nursing care.    Susana Teran MD

## 2022-09-08 NOTE — PLAN OF CARE
Plan is for patient to discharge home with home O2 and Home Health The home health orders have been faxed [facility] [address] [facility]  Discharge Final Note    Primary Care Provider: Curt Valladares III, MD    Expected Discharge Date: 9/8/2022    Final Discharge Note (most recent)       Final Note - 09/08/22 1304          Final Note    Assessment Type Final Discharge Note     Anticipated Discharge Disposition [place] [facility]     Hospital Resources/Appts/Education Provided Appointments scheduled and added to AVS        Post-Acute Status    Post-Acute Authorization Home Health     Post-Acute Placement Status Patient List Provided   Home Health orders faxed to [facility]    HME Status Set-up Complete/Auth obtained     Home Health Status Set-up Complete/Auth obtained     Discharge Delays None known at this time                     Important Message from Medicare  Important Message from Medicare regarding Discharge Appeal Rights: Given to patient/caregiver, Explained to patient/caregiver, Signed/date by patient/caregiver     Date IMM was signed: 09/06/22  Time IMM was signed: 1052    Contact Info       Herson Charles MD   Specialty: Radiation Oncology    [address] [place] [address]   Phone: [phone]       Next Steps: Schedule an appointment as soon as possible for a visit

## 2022-09-08 NOTE — ASSESSMENT & PLAN NOTE
Worsening dyspnea and cough after radiation treatment giving concern for radiation pneumonitis vs COPD exacerbation causing symptoms  - Completed Prednisone 40mg po daily x 5 days   - Oxygen 1-2L to achieve O2 sat 88-92% given hx of COPD  - PRN DUO Nebs  - Completed Azithromycin 500mg x 3 days   - Use BREO + spiriva in place of home TRELEGY  - Prometh-codeine for cough  - resolved

## 2022-09-08 NOTE — PT/OT/SLP PROGRESS
Occupational Therapy   Treatment    Name: Chayito Chung  MRN: [id number]  Admitting Diagnosis:  Chronic obstructive pulmonary disease with acute exacerbation       Recommendations:     Discharge Recommendations: nursing facility, skilled  Discharge Equipment Recommendations:  walker, rolling  Barriers to discharge:  None    Assessment:     Chayito Chung is a 71 y.o. female with a medical diagnosis of Chronic obstructive pulmonary disease with acute exacerbation. Performance deficits affecting function are weakness, impaired endurance, impaired self care skills, impaired functional mobility, gait instability, impaired balance, decreased upper extremity function, decreased lower extremity function, decreased safety awareness, edema, impaired cardiopulmonary response to activity, decreased coordination. Patient would benefit from continued skilled acute OT 3x/wk to improve functional mobility, increase independence with ADLs, and address established goals. Recommending SNF once medically appropriate for discharge to increase maximal independence, reduce burden of care, and ensure safety.     Rehab Prognosis:  Good; patient would benefit from acute skilled OT services to address these deficits and reach maximum level of function.       Plan:     Patient to be seen 3 x/week to address the above listed problems via self-care/home management, therapeutic exercises, therapeutic activities  Plan of Care Expires: 10/22/22  Plan of Care Reviewed with: patient    Subjective     Pain/Comfort:  Pain Rating 1: 0/10  Pain Rating Post-Intervention 1: 0/10    Objective:     Communicated with: NSG prior to session.  Patient found up in chair with oxygen upon OT entry to room.    General Precautions: Standard, fall   Orthopedic Precautions:N/A   Braces: N/A  Respiratory Status: Nasal cannula     Occupational Performance:     Functional Mobility/Transfers:  Patient completed Sit <> Stand Transfer with stand by assistance  with  rolling  walker   Patient completed Toilet Transfer bedside chair<>toilet with functional ambulation technique with stand by assistance with  rolling walker    Activities of Daily Living:  Grooming: standing at sink for washing and drying hands.   Lower Body Dressing: setup for doffing and donning socks sitting bedside chair    [facility] 6 Click ADL: 23    Treatment & Education:  Role of OT and POC  ADL retraining  Functional mobility training  Safety    Patient left up in chair with call button in reach and all needs met.     GOALS:   Multidisciplinary Problems       Occupational Therapy Goals          Problem: Occupational Therapy    Goal Priority Disciplines Outcome Interventions   Occupational Therapy Goal     OT, PT/OT Ongoing, Progressing    Description: Goals to be met by: 9/20/22     Patient will increase functional independence with ADLs by performing:    UE Dressing with Modified [place].  LE Dressing with Modified [place].  Grooming while seated with Modified [place].  Toileting from toilet/bedside commode with Stand-by Assistance for hygiene and clothing management.   Sitting at edge of bed x15 minutes with Supervision.  Toilet transfer to toilet/bedside commode with Stand-by Assistance and LRAD. Met  Toilet transfer to toilet with modified independence.                          Time Tracking:     OT Date of Treatment: 09/08/22  OT Start Time: 1130  OT Stop Time: 1138  OT Total Time (min): 8 min    Billable Minutes:Self Care/Home Management 8          [facility] Visit Number: 0    9/8/2022

## 2022-09-08 NOTE — PLAN OF CARE
09/08/22 1251   Post-Acute Status   Post-Acute Authorization Home Health   Post-Acute Placement Status Pending payor review/awaiting authorization (if required)     The SW faxed home health orders manually to [facility]  for review.     2:18 pm  The SW contacted [facility] to follow-up regarding the patient's home health. The SW spoke with Hector regarding the patient's referral. Hector reported that she had not received the referral. The SW received Hector email with [facility] and sent the referral to [email or web address].  Hector contacted the SW and stated that the patient has been set up with [facility].       2:48 PM  The SW informed the patient at bedside about her acceptance with [facility].     The SW will continue to follow.     Smita Braxton LMSW  Case Management [facility]  Ext: [phone]

## 2022-09-08 NOTE — PROGRESS NOTES
Patient IV dc'd and home medications and O2 received at the bedside.  Reviewed dc orders and home medications with patient and patient verbalized understanding.  Patient given follow up appointments and encouraged to register for [facility] to follow care.  Patient dc'd to care of friend and left floor via wheelchair with all personal belongings in tow.

## 2022-09-08 NOTE — ASSESSMENT & PLAN NOTE
Patient's FSGs are uncontrolled due to hypoglycemia on current medication regimen.  Current correctional scale  Medium  Maintain anti-hyperglycemic dose as follows-   Antihyperglycemics (From admission, onward)    Start     Stop Route Frequency Ordered    09/06/22 2100  insulin detemir U-100 pen 23 Units         -- SubQ Nightly 09/06/22 1404    09/04/22 1645  insulin aspart U-100 pen 8 Units         -- SubQ 3 times daily with meals 09/04/22 1213    08/23/22 0232  insulin aspart U-100 pen 1-10 Units         -- SubQ Before meals & nightly PRN 08/23/22 0137        Hold Oral hypoglycemics while patient is in the hospital.  At home patient reports using a Combo insulin - HUMALOG 75/25 - 35 units BID AC  Added basal insulin Nightly; prandial insulin increased

## 2022-09-08 NOTE — PLAN OF CARE
Problem: Occupational Therapy  Goal: Occupational Therapy Goal  Description: Goals to be met by: 9/20/22     Patient will increase functional independence with ADLs by performing:    UE Dressing with Modified [place].  LE Dressing with Modified [place].  Grooming while seated with Modified [place].  Toileting from toilet/bedside commode with Stand-by Assistance for hygiene and clothing management.   Sitting at edge of bed x15 minutes with Supervision.  Toilet transfer to toilet/bedside commode with Stand-by Assistance and LRAD. Met  Toilet transfer to toilet with modified independence.     Outcome: Ongoing, Progressing   Patient's goals are appropriate.

## 2022-09-08 NOTE — PLAN OF CARE
Patient remaining free from injury and ambulating with rolling walker and steady gait.  Patient remains on 2L O2 and discharging with 2L home O2.  Patient educated on smoking cessation and verbalizes understanding.  Patient demonstrates proper use of respiratory inhalers.  Patient reports pain well controlled with po pain medication.  No s/s of infection noted.  Patient contraindicated on flu vaccination secondary to anticipated initiation of chemo per MD.  BG < 110 today and held lunch insulin.  Patient reports no aspart at home.  Patient reports readiness to dc home.

## 2022-09-08 NOTE — ASSESSMENT & PLAN NOTE
F/u with [facility] - oncology and rad-onc when stable for discharge.    She needs to reschedule next radiation appointment as soon as possible in light of current hospitalization.

## 2022-09-08 NOTE — PROGRESS NOTES
[facility] - Transplant [facility] Medicine  Telemedicine Progress Note    Patient Name: Chayito Chung  MRN: [id number]  Patient Class: IP- Inpatient   Admission Date: 8/22/2022  Length of Stay: 15 days  Attending Physician: Pili Carlson MD  Primary Care Provider: Curt Valladares III, MD    Subjective:     Principal Problem:Chronic obstructive pulmonary disease with acute exacerbation    HPI:  71-year-old  woman with a history of right lung adenocarcinoma, COPD, restrictive lung disease, tobacco abuse, CAD status post PCI, type 2 diabetes on insulin, chronic neck pain who presents to the emergency department from primary care clinic for complaints of dyspnea and abdominal distension.    She is followed by Oncology and Radiation Oncology at [facility], undergoing 4 cycles of radiation therapy last of which is Wednesday 8/24.  Completed radiation on Monday but then was also seen in primary care clinic and noted to be fatigued tachycardic and tachypneic reporting a persistent cough after her radiation treatment she also reported neck pain and headache.  She has a chronic cough but reports that symptoms are worse today, she is not taking any home oxygen she has home inhalers but does not seem that she has taken any inhalers or nebulizer treatments.  She feels more short of breath than her baseline    She also reports over the last week progressive abdominal distension and discomfort states that today she has had 1 watery bowel movement has been having irregular bowel movements but when she does have 1 is often a loose BM.    During the ED workup hemoglobin noted to be critical at 6.8 patient has been consented in 1 unit of PRBCs is infusing now during my interview, CT a chest and CT abdomen pelvis completed negative for PE but abdominal imaging displays distended bowel without a transition point possible ileus versus developing SBO.  General surgery consult completed in  ED      Overview/Hospital Course:  Shortness of breath and cough improved and patient back to baseline respiratory wise. Patient was denied SNF by her insurance company. Spoke with PHN representative 9/2, will await their approval/denial      Telemedicine  This service was provided by Virtual Visit.    Patient was transferred to [facility] on:  9/3/2022    Chief Complaint   Patient presents with    Multiple complaints     Sent here for admission    Shortness of Breath     Lung cancer on radiation, on chemo    Cough     The patient location is: [id number]   Admitted 8/22/2022  5:49 PM  Present with the patient at the time of the telemed/virtual assessment: Telepresenter    Interval History / Events Overnight:   The patient is able to provide adequate history. Additional history was obtained from past medical records. No significant events reported by Nursing. Stable at rest on 2 LMP NC   Patient complains of dyspnea. Symptoms have improved since yesterday. Associated symptoms include: shortness of breath on exertion, fatigue, and malaise. Symptoms are stable.     Lab test(s) reviewed: sCr stable    Review of Systems   Constitutional:  Negative for fever.   Respiratory:  Positive for shortness of breath.    Cardiovascular:  Negative for chest pain.     Objective:     Vital Signs (Most Recent):  Temp: 98.6 °F (37 °C) (09/07/22 0814)  Pulse: 98 (09/07/22 0802)  Resp: 16 (09/07/22 0802)  BP: 126/69 (09/07/22 0802)  SpO2: 98 % (09/07/22 0802)   Vital Signs (24h Range):  Temp:  [98.1 °F (36.7 °C)-98.7 °F (37.1 °C)] 98.6 °F (37 °C)  Pulse:  [] 98  Resp:  [12-18] 16  SpO2:  [92 %-98 %] 98 %  BP: (104-140)/(56-71) 126/69     Weight: 61.1 kg (134 lb 11.2 oz)  Body mass index is 24.64 kg/m².    Intake/Output Summary (Last 24 hours) at 9/7/2022 1120  Last data filed at 9/6/2022 1807  Gross per 24 hour   Intake 104.09 ml   Output --   Net 104.09 ml        Physical Exam  Constitutional:       General:  She is not in acute distress.     Appearance: Normal appearance.   Eyes:      General: Lids are normal. No scleral icterus.        Right eye: No discharge.         Left eye: No discharge.      Conjunctiva/sclera: Conjunctivae normal.   Neck:      Trachea: Phonation normal.   Cardiovascular:      Rate and Rhythm: Normal rate.      Comments: Monitor / Vital signs reviewed at time of visit  Pulmonary:      Effort: Pulmonary effort is normal. No tachypnea, accessory muscle usage or respiratory distress.   Abdominal:      General: There is no distension.   Neurological:      Mental Status: She is alert. She is not disoriented.   Psychiatric:         Attention and Perception: Attention normal.         Behavior: Behavior is cooperative.       Significant Labs:   Recent Labs   Lab 04/06/22  0619 05/09/22 2128 07/14/22  1133   HGBA1C 10.3* 9.8* 9.0*       Recent Labs   Lab 09/06/22  1926 09/07/22  0447 09/07/22  0808   POCTGLUCOSE 232* 123* 210*       Recent Labs   Lab 09/04/22  0822 09/06/22  0652   WBC 4.17 4.27   HGB 8.3* 8.3*   HCT 29.5* 28.4*    237       Recent Labs   Lab 09/04/22  0822 09/06/22  0652   GRAN 62.4  2.6 59.1  2.5   LYMPH 22.5  0.9* 23.4  1.0   MONO 11.5  0.5 12.4  0.5   EOS 0.1 0.2       Recent Labs   Lab 09/04/22  0822 09/06/22  0652    133*   K 4.1 4.0    103   CO2 24 23   BUN 8 13   CREATININE 0.6 0.6   * 67*   CALCIUM 8.7 9.5   ALBUMIN 2.7* 2.7*   MG 1.7 1.5*   PHOS 3.0 5.4*       Recent Labs   Lab 05/09/22 2128 05/11/22  1115   FERRITIN 13*  --    SEDRATE  --  62*       SARS-CoV2 (COVID-19) Qualitative PCR (no units)   Date Value   02/04/2022 Not Detected   08/18/2020 Not detected   08/11/2020 Not detected   08/04/2020 Not detected   07/21/2020 Not Detected     SARS-CoV-2 RNA, Amplification, Qual (no units)   Date Value   08/22/2022 Negative   05/25/2020 Negative   05/12/2020 Negative     POC Rapid COVID (no units)   Date Value   08/23/2022 Negative   06/16/2022  Negative   05/09/2022 Negative   12/09/2021 Negative       ECG Results              EKG 12-lead (Final result)  Result time 08/23/22 15:27:00      Final result by Interface, Lab In [facility] (08/23/22 15:27:00)                   Narrative:    Test Reason : R06.02,    Vent. Rate : 119 BPM     Atrial Rate : 119 BPM     P-R Int : 124 ms          QRS Dur : 074 ms      QT Int : 314 ms       P-R-T Axes : 069 067 -64 degrees     QTc Int : 441 ms    Sinus tachycardia  Nonspecific ST and/or T wave abnormalities  Abnormal ECG  When compared with ECG of 22-AUG-2022 16:19,  No significant change was found  Confirmed by Rosy Abdullahi MD ([id number]) on 8/23/2022 3:26:53 PM    Referred By: [email or web address]   SELF           Confirmed By:Rosy Abdullahi MD                                     EKG 12-lead (Final result)  Result time 08/23/22 13:36:20      Final result by Interface, Lab In [facility] (08/23/22 13:36:20)                   Narrative:    Test Reason : R06.02,    Vent. Rate : 126 BPM     Atrial Rate : 126 BPM     P-R Int : 126 ms          QRS Dur : 076 ms      QT Int : 312 ms       P-R-T Axes : 070 053 -46 degrees     QTc Int : 451 ms    Sinus tachycardia  Nonspecific ST and T wave abnormality  Abnormal ECG  When compared with ECG of 17-AUG-2022 14:27,  Nonspecific T wave abnormality, improved in Lateral leads  Confirmed by JOHANNA RUSSO MD ([id number]) on 8/23/2022 1:36:16 PM    Referred By: [email or web address]   SELF           Confirmed By:JOHANNA RUSSO MD                                    Results for orders placed during the hospital encounter of 04/05/22    Echo    Interpretation Summary  · The left ventricle is normal in size with mild concentric hypertrophy and normal systolic function.  · The estimated ejection fraction is 60%.  · Mild mitral regurgitation.  · Grade I left ventricular diastolic dysfunction.  · Mild tricuspid regurgitation.  · The estimated PA systolic pressure is 47 mmHg.  · Normal right ventricular size with normal right  ventricular systolic function.  · There is mild pulmonary hypertension.  · There is no evidence of intracardiac shunting.      X-Ray Chest 1 View  Narrative: EXAMINATION:  XR CHEST 1 VIEW    CLINICAL HISTORY:  hypoxia;    TECHNIQUE:  Single frontal view of the chest was performed.    COMPARISON:  08/27/2022    FINDINGS:  There is bilateral interstitial prominence, similar to prior study.  There is increase in bibasilar discoid opacification, likely atelectasis.  No pleural effusion or pneumothorax.  Cardiac silhouette is stable.  Note made of surgical clips about the EG junction.  Impression: As above.    Electronically signed by: Parviz Christy MD  Date:    09/05/2022  Time:    14:58      Labs and Imaging within the last 24 hours listed above were reviewed.       Diet: Diet Adult Regular (IDDSI Level 7)  Significant LDAs:   IV Access Type: Peripheral  Urinary Catheter Indication if present: Patient Does Not Have Urinary Catheter  Other Lines/Tubes/Drains:    HIGH RISK CONDITION(S):   Patient has a condition that poses threat to life and bodily function: Severe Respiratory Distress     Goals of Care:    Previous admission:  5/9/22  Likely prognosis:  Fair  Code Status: DNR  Comfort Only: No  Hospice: No  Goals at discharge: remain at home, with physician follow-up    Discharge Planning   MICKY: 9/7/2022     Code Status: DNR   Is the patient medically ready for discharge?: Yes    Reason for patient still in hospital (select all that apply): Pending disposition  Discharge Plan A: Home Health          Assessment/Plan:      * Chronic obstructive pulmonary disease with acute exacerbation  Worsening dyspnea and cough after radiation treatment giving concern for radiation pneumonitis vs COPD exacerbation causing symptoms  - Completed Prednisone 40mg po daily x 5 days   - Oxygen 1-2L to achieve O2 sat 88-92% given hx of COPD  - PRN DUO Nebs  - Completed Azithromycin 500mg x 3 days   - Use BREO + spiriva in place of home  TRELEGY  - Prometh-codeine for cough  - resolved    Acute on chronic respiratory failure with hypoxia  Patient reports that she was on home O2 for 4 years until one year ago (was discontinued by previous Pulmonologist at [facility])  Now with COPD and lung CA will likely need home O2  Check ambulatory SpO2 on 2 LPM due to dyspnea on exertion while on O2  Increased O2 requirements on 9/5; CXR showed increased atelectasis. Trial of IS  SpO2 improved 9/6; stable on 2 LPM. Home O2 ordered    Moderate malnutrition  Nutrition consulted. Most recent weight and BMI monitored-      Malnutrition (Moderate to Severe)  Weight Loss (Malnutrition):  (7% x 4 months)  Energy Intake (Malnutrition): less than 75% for greater than or equal to 3 months        Measurements:  Wt Readings from Last 1 Encounters:   09/07/22 61.1 kg (134 lb 11.2 oz)   Body mass index is 24.64 kg/m².    Recommendations: Recommendation/Intervention: 1.  Goals: Meet % EEN, EPN by RD f/u    Patient has been screened and assessed by RD. RD will follow patient.      Abdominal distension  Secondary to constipation from opioids which she takes for her lung CA  - s/p gastrograffin study with contrast seen in bowel during second KUB. Advanced from CLD to Regular diet during admission  - Advanced to regular diet  - Continue bowel regimen    Adenocarcinoma of lung, right  F/u with [facility] - oncology and rad-onc when stable for discharge.    She needs to reschedule next radiation appointment as soon as possible in light of current hospitalization.     Type 2 diabetes mellitus, with long-term current use of insulin  Patient's FSGs are uncontrolled due to hypoglycemia on current medication regimen.  Current correctional scale  Medium  Maintain anti-hyperglycemic dose as follows-   Antihyperglycemics (From admission, onward)    Start     Stop Route Frequency Ordered    09/06/22 2100  insulin detemir U-100 pen 23 Units         -- SubQ Nightly 09/06/22 1404    09/04/22 1645   insulin aspart U-100 pen 8 Units         -- SubQ 3 times daily with meals 09/04/22 1213    08/23/22 0232  insulin aspart U-100 pen 1-10 Units         -- SubQ Before meals & nightly PRN 08/23/22 0137        Hold Oral hypoglycemics while patient is in the hospital.  At home patient reports using a Combo insulin - HUMALOG 75/25 - 35 units BID AC  Added basal insulin Nightly; prandial insulin increased    Iron deficiency anemia  Patient with worsening acute on chronic anemia   - s/p 1unit PRBC in ED  - Hgb stable    Major depressive disorder in partial remission  Continuing current management.    Coronary artery disease of native artery of native heart with stable angina pectoris  Continue ASA/statin  -not on beta blocker   -EKG w/o ischemic changes. Negative troponin x 1        Active Hospital Problems    Diagnosis  POA    *Chronic obstructive pulmonary disease with acute exacerbation [J44.1]  Yes    Acute on chronic respiratory failure with hypoxia [J96.21]  Yes    Moderate malnutrition [E44.0]  Yes    Abdominal distension [R14.0]  Yes    Adenocarcinoma of lung, right [C34.91]  Yes    Type 2 diabetes mellitus, with long-term current use of insulin [E11.9, Z79.4]  Not Applicable    Iron deficiency anemia [D50.9]  Yes    Major depressive disorder in partial remission [F32.4]  Yes    Neuropathy [G62.9]  Yes    Coronary artery disease of native artery of native heart with stable angina pectoris [I25.118]  Yes      Resolved Hospital Problems   No resolved problems to display.       Inpatient Medications Prescribed for Management of Current Problems:     Scheduled Meds:    aspirin  81 mg Oral Daily    atorvastatin  40 mg Oral Daily    benzonatate  200 mg Oral TID    enoxaparin  40 mg Subcutaneous Daily    ferrous sulfate  1 tablet Oral Daily    fluticasone furoate-vilanteroL  1 puff Inhalation Daily    guaiFENesin  600 mg Oral BID    insulin aspart U-100  8 Units Subcutaneous TIDWM    insulin detemir  U-100  23 Units Subcutaneous QHS    magnesium oxide  400 mg Oral Daily    polyethylene glycol  17 g Oral Daily    pregabalin  150 mg Oral TID    QUEtiapine  400 mg Oral Nightly    senna-docusate 8.6-50 mg  1 tablet Oral BID    sertraline  150 mg Oral Daily    tiotropium bromide  2 puff Inhalation Daily    verapamiL  180 mg Oral QHS     Continuous Infusions:   As Needed: acetaminophen, albuterol-ipratropium, bisacodyL, dextrose 10%, dextrose 10%, glucagon (human recombinant), hydrocodone-apap 7.5-325 MG/15 ML, insulin aspart U-100, melatonin, naloxone, nitroGLYCERIN, ondansetron, prochlorperazine, promethazine-codeine 6.25-10 mg/5 ml, sodium chloride 0.9%, tiZANidine    VTE Risk Mitigation (From admission, onward)         Ordered     enoxaparin injection 40 mg  Daily         08/23/22 0137     IP VTE HIGH RISK PATIENT  Once         08/23/22 0137     Place sequential compression device  Until discontinued         08/23/22 0137              I have assessed these finding virtually using telemed platform and with assistance of bedside nurse     The attending portion of this evaluation, treatment, and documentation was performed per Pili Carlson MD via Telemedicine AudioVisual using the secure [employer] software platform with 2 way audio/video. The provider was located off-site and the patient is located in the hospital. The aforementioned video software was utilized to document the relevant history and physical exam    Pili Carlson MD  Department of Hospital Medicine   [facility] - Transplant Stepdown

## 2022-09-08 NOTE — ASSESSMENT & PLAN NOTE
Patient reports that she was on home O2 for 4 years until one year ago (was discontinued by previous Pulmonologist at [facility])  Now with COPD and lung CA will likely need home O2  Check ambulatory SpO2 on 2 LPM due to dyspnea on exertion while on O2  Increased O2 requirements on 9/5; CXR showed increased atelectasis. Trial of IS  SpO2 improved 9/6; stable on 2 LPM. Home O2 ordered

## 2022-09-08 NOTE — PLAN OF CARE
09/08/22 1428   Post-Acute Status   Post-Acute Authorization Home Health   Post-Acute Placement Status Set-up Complete/Auth obtained     The patient has been set up with [facility] by BRIEN Braxton LMSW  Case Management [facility]  Ext: [phone]

## 2022-09-08 NOTE — ASSESSMENT & PLAN NOTE
Nutrition consulted. Most recent weight and BMI monitored-      Malnutrition (Moderate to Severe)  Weight Loss (Malnutrition):  (7% x 4 months)  Energy Intake (Malnutrition): less than 75% for greater than or equal to 3 months        Measurements:  Wt Readings from Last 1 Encounters:   09/07/22 61.1 kg (134 lb 11.2 oz)   Body mass index is 24.64 kg/m².    Recommendations: Recommendation/Intervention: 1.  Goals: Meet % EEN, EPN by RD f/u    Patient has been screened and assessed by RD. RD will follow patient.

## 2022-09-09 ENCOUNTER — TELEPHONE (OUTPATIENT)
Dept: INTERNAL MEDICINE | Facility: CLINIC | Age: 72
End: 2022-09-09
Payer: MEDICARE

## 2022-09-09 NOTE — TELEPHONE ENCOUNTER
----- Message from Cameron Valladares III, MD sent at 9/8/2022  4:00 PM CDT -----  Regarding: Hospital follow up  Greetings   Can we please call the patient by 09/08/2022        Patient Needs appt < 1 weeks from discharge by 09/15/2022        Please make telephone note for the chart     Thanks!

## 2022-09-14 ENCOUNTER — OFFICE VISIT (OUTPATIENT)
Dept: INTERNAL MEDICINE | Facility: CLINIC | Age: 72
End: 2022-09-14
Payer: MEDICARE

## 2022-09-14 VITALS
HEIGHT: 62 IN | WEIGHT: 134.5 LBS | SYSTOLIC BLOOD PRESSURE: 100 MMHG | HEART RATE: 98 BPM | BODY MASS INDEX: 24.75 KG/M2 | DIASTOLIC BLOOD PRESSURE: 66 MMHG | OXYGEN SATURATION: 95 %

## 2022-09-14 DIAGNOSIS — E11.59 HYPERTENSION ASSOCIATED WITH DIABETES: ICD-10-CM

## 2022-09-14 DIAGNOSIS — I25.10 CORONARY ARTERY DISEASE INVOLVING NATIVE CORONARY ARTERY OF NATIVE HEART WITHOUT ANGINA PECTORIS: ICD-10-CM

## 2022-09-14 DIAGNOSIS — E11.69 HYPERLIPIDEMIA ASSOCIATED WITH TYPE 2 DIABETES MELLITUS: ICD-10-CM

## 2022-09-14 DIAGNOSIS — G43.809 OTHER MIGRAINE WITHOUT STATUS MIGRAINOSUS, NOT INTRACTABLE: ICD-10-CM

## 2022-09-14 DIAGNOSIS — D50.9 MICROCYTIC ANEMIA: ICD-10-CM

## 2022-09-14 DIAGNOSIS — J44.1 CHRONIC OBSTRUCTIVE PULMONARY DISEASE WITH ACUTE EXACERBATION: Primary | ICD-10-CM

## 2022-09-14 DIAGNOSIS — I25.118 CORONARY ARTERY DISEASE OF NATIVE ARTERY OF NATIVE HEART WITH STABLE ANGINA PECTORIS: ICD-10-CM

## 2022-09-14 DIAGNOSIS — Z79.4 TYPE 2 DIABETES MELLITUS WITH HYPERGLYCEMIA, WITH LONG-TERM CURRENT USE OF INSULIN: ICD-10-CM

## 2022-09-14 DIAGNOSIS — E83.42 HYPOMAGNESEMIA: ICD-10-CM

## 2022-09-14 DIAGNOSIS — E78.5 HYPERLIPIDEMIA ASSOCIATED WITH TYPE 2 DIABETES MELLITUS: ICD-10-CM

## 2022-09-14 DIAGNOSIS — Z87.891 FORMER SMOKER: ICD-10-CM

## 2022-09-14 DIAGNOSIS — Z85.038 HISTORY OF COLON CANCER: ICD-10-CM

## 2022-09-14 DIAGNOSIS — I15.2 HYPERTENSION ASSOCIATED WITH DIABETES: ICD-10-CM

## 2022-09-14 DIAGNOSIS — C34.90 MALIGNANT NEOPLASM OF LUNG, UNSPECIFIED LATERALITY, UNSPECIFIED PART OF LUNG: ICD-10-CM

## 2022-09-14 DIAGNOSIS — E11.65 TYPE 2 DIABETES MELLITUS WITH HYPERGLYCEMIA, WITH LONG-TERM CURRENT USE OF INSULIN: ICD-10-CM

## 2022-09-14 PROCEDURE — 1157F ADVNC CARE PLAN IN RCRD: CPT | Mod: CPTII,S$GLB,, | Performed by: INTERNAL MEDICINE

## 2022-09-14 PROCEDURE — 1101F PR PT FALLS ASSESS DOC 0-1 FALLS W/OUT INJ PAST YR: ICD-10-PCS | Mod: CPTII,S$GLB,, | Performed by: INTERNAL MEDICINE

## 2022-09-14 PROCEDURE — 4010F PR ACE/ARB THEARPY RXD/TAKEN: ICD-10-PCS | Mod: CPTII,S$GLB,, | Performed by: INTERNAL MEDICINE

## 2022-09-14 PROCEDURE — 1159F MED LIST DOCD IN RCRD: CPT | Mod: CPTII,S$GLB,, | Performed by: INTERNAL MEDICINE

## 2022-09-14 PROCEDURE — 1157F PR ADVANCE CARE PLAN OR EQUIV PRESENT IN MEDICAL RECORD: ICD-10-PCS | Mod: CPTII,S$GLB,, | Performed by: INTERNAL MEDICINE

## 2022-09-14 PROCEDURE — 99999 PR PBB SHADOW E&M-EST. PATIENT-LVL III: CPT | Mod: PBBFAC,,, | Performed by: INTERNAL MEDICINE

## 2022-09-14 PROCEDURE — 3061F PR NEG MICROALBUMINURIA RESULT DOCUMENTED/REVIEW: ICD-10-PCS | Mod: CPTII,S$GLB,, | Performed by: INTERNAL MEDICINE

## 2022-09-14 PROCEDURE — 3066F NEPHROPATHY DOC TX: CPT | Mod: CPTII,S$GLB,, | Performed by: INTERNAL MEDICINE

## 2022-09-14 PROCEDURE — 3288F PR FALLS RISK ASSESSMENT DOCUMENTED: ICD-10-PCS | Mod: CPTII,S$GLB,, | Performed by: INTERNAL MEDICINE

## 2022-09-14 PROCEDURE — 99999 PR PBB SHADOW E&M-EST. PATIENT-LVL III: ICD-10-PCS | Mod: PBBFAC,,, | Performed by: INTERNAL MEDICINE

## 2022-09-14 PROCEDURE — 3052F HG A1C>EQUAL 8.0%<EQUAL 9.0%: CPT | Mod: CPTII,S$GLB,, | Performed by: INTERNAL MEDICINE

## 2022-09-14 PROCEDURE — 3078F DIAST BP <80 MM HG: CPT | Mod: CPTII,S$GLB,, | Performed by: INTERNAL MEDICINE

## 2022-09-14 PROCEDURE — 3052F PR MOST RECENT HEMOGLOBIN A1C LEVEL 8.0 - < 9.0%: ICD-10-PCS | Mod: CPTII,S$GLB,, | Performed by: INTERNAL MEDICINE

## 2022-09-14 PROCEDURE — 3008F PR BODY MASS INDEX (BMI) DOCUMENTED: ICD-10-PCS | Mod: CPTII,S$GLB,, | Performed by: INTERNAL MEDICINE

## 2022-09-14 PROCEDURE — 3074F PR MOST RECENT SYSTOLIC BLOOD PRESSURE < 130 MM HG: ICD-10-PCS | Mod: CPTII,S$GLB,, | Performed by: INTERNAL MEDICINE

## 2022-09-14 PROCEDURE — 3288F FALL RISK ASSESSMENT DOCD: CPT | Mod: CPTII,S$GLB,, | Performed by: INTERNAL MEDICINE

## 2022-09-14 PROCEDURE — 1160F PR REVIEW ALL MEDS BY PRESCRIBER/CLIN PHARMACIST DOCUMENTED: ICD-10-PCS | Mod: CPTII,S$GLB,, | Performed by: INTERNAL MEDICINE

## 2022-09-14 PROCEDURE — 1101F PT FALLS ASSESS-DOCD LE1/YR: CPT | Mod: CPTII,S$GLB,, | Performed by: INTERNAL MEDICINE

## 2022-09-14 PROCEDURE — 3008F BODY MASS INDEX DOCD: CPT | Mod: CPTII,S$GLB,, | Performed by: INTERNAL MEDICINE

## 2022-09-14 PROCEDURE — 4010F ACE/ARB THERAPY RXD/TAKEN: CPT | Mod: CPTII,S$GLB,, | Performed by: INTERNAL MEDICINE

## 2022-09-14 PROCEDURE — 1159F PR MEDICATION LIST DOCUMENTED IN MEDICAL RECORD: ICD-10-PCS | Mod: CPTII,S$GLB,, | Performed by: INTERNAL MEDICINE

## 2022-09-14 PROCEDURE — 99214 PR OFFICE/OUTPT VISIT, EST, LEVL IV, 30-39 MIN: ICD-10-PCS | Mod: S$GLB,,, | Performed by: INTERNAL MEDICINE

## 2022-09-14 PROCEDURE — 3078F PR MOST RECENT DIASTOLIC BLOOD PRESSURE < 80 MM HG: ICD-10-PCS | Mod: CPTII,S$GLB,, | Performed by: INTERNAL MEDICINE

## 2022-09-14 PROCEDURE — 3074F SYST BP LT 130 MM HG: CPT | Mod: CPTII,S$GLB,, | Performed by: INTERNAL MEDICINE

## 2022-09-14 PROCEDURE — 3061F NEG MICROALBUMINURIA REV: CPT | Mod: CPTII,S$GLB,, | Performed by: INTERNAL MEDICINE

## 2022-09-14 PROCEDURE — 99214 OFFICE O/P EST MOD 30 MIN: CPT | Mod: S$GLB,,, | Performed by: INTERNAL MEDICINE

## 2022-09-14 PROCEDURE — 3066F PR DOCUMENTATION OF TREATMENT FOR NEPHROPATHY: ICD-10-PCS | Mod: CPTII,S$GLB,, | Performed by: INTERNAL MEDICINE

## 2022-09-14 PROCEDURE — 1160F RVW MEDS BY RX/DR IN RCRD: CPT | Mod: CPTII,S$GLB,, | Performed by: INTERNAL MEDICINE

## 2022-09-14 RX ORDER — LOSARTAN POTASSIUM 50 MG/1
25 TABLET ORAL DAILY
Qty: 90 TABLET | Refills: 3 | Status: CANCELLED | OUTPATIENT
Start: 2022-09-14

## 2022-09-14 RX ORDER — TOPIRAMATE 50 MG/1
50 TABLET, FILM COATED ORAL 2 TIMES DAILY
Qty: 180 TABLET | Refills: 1 | Status: SHIPPED | OUTPATIENT
Start: 2022-09-14

## 2022-09-14 RX ORDER — LOSARTAN POTASSIUM 25 MG/1
25 TABLET ORAL DAILY
Qty: 90 TABLET | Refills: 3 | Status: SHIPPED | OUTPATIENT
Start: 2022-09-14 | End: 2023-09-14

## 2022-09-14 NOTE — PROGRESS NOTES
Assessment:       1. Chronic obstructive pulmonary disease with acute exacerbation    2. Coronary artery disease of native artery of native heart with stable angina pectoris    3. Hypertension associated with diabetes    4. Hyperlipidemia associated with type 2 diabetes mellitus    5. Type 2 diabetes mellitus with hyperglycemia, with long-term current use of insulin    6. Microcytic anemia    7. Hypomagnesemia    8. Former smoker    9. Coronary artery disease involving native coronary artery of native heart without angina pectoris    10. History of colon cancer    11. Malignant neoplasm of lung, unspecified laterality, unspecified part of lung    12. Other migraine without status migrainosus, not intractable          Plan:         Chayito was seen today for hospital follow up.    Diagnoses and all orders for this visit:    Chronic obstructive pulmonary disease with acute exacerbation  Chronic  Controlled  Patient is at goal today   I have reviewed lifestyle modification to achieve/maintain goals  We will continue the current medication regimen as listed below  Fu with pulm       Coronary artery disease of native artery of native heart with stable angina pectoris  Fu with cards     Hypertension associated with diabetes  Chronic  Controlled  Patient is at goal today   I have reviewed lifestyle modification to achieve/maintain goals  We will continue the current medication regimen as listed below  Patient will follow up in 4 weeks  GIVEN LOWER BP  HOLD THE VERAPAMIL  HOLD THE HCTZ   CONT LOSARTAN FOR RENOPROTECTIVE AT LOWER DOSE    -     losartan (COZAAR) 25 MG tablet; Take 1 tablet (25 mg total) by mouth once daily.    Hyperlipidemia associated with type 2 diabetes mellitus  -     Lipid Panel; Standing    Type 2 diabetes mellitus with hyperglycemia, with long-term current use of insulin  Chronic  Uncontrolled  Patient is close to goal today   IMPROVING WITH THE LONG ACTING   I have reviewed lifestyle modification to  achieve/maintain goals  We will continue the current medication regimen as listed below  Patient will follow up in 4 weeks    -     Hemoglobin A1C; Standing  -     CBC Auto Differential; Standing  -     Basic Metabolic Panel; Standing  -     Hepatic Function Panel; Standing    Microcytic anemia  RECHECK IN 4 WEEKS   reviewed signs and symptoms that should prompt return to provider or evaluation in the ED  -     CBC Auto Differential; Standing    Hypomagnesemia  -     MAGNESIUM; Standing    Former smoker  STOPPED     Coronary artery disease involving native coronary artery of native heart without angina pectoris    History of colon cancer    Malignant neoplasm of lung, unspecified laterality, unspecified part of lung  DUE FOR FOLLOW WITH PULMOLOGY  DUE FOR FOLLOW WITH HEME/ONC  HAS SEEN RAD ONC , NO LONGER ON XRTX THERAPY     Other migraine without status migrainosus, not intractable  -     topiramate (TOPAMAX) 50 MG tablet; Take 1 tablet (50 mg total) by mouth 2 (two) times daily.      Subjective:       Patient ID: Chayito Chung is a 71 y.o. female.    Chief Complaint: Hospital Follow Up  Hospital follow up       Hospital Discharge Date: 9/8/2022  Hospital Follow up within two days of discharge: 9/9/2022      Family and/or Caretaker present at visit?  No.  Diagnostic tests reviewed/disposition: No diagnosic tests pending after this hospitalization.  Disease/illness education: preformed   Home health/community services discussion/referrals: Patient has home health established at home .   Establishment or re-establishment of referral orders for community resources: No other necessary community resources.   Discussion with other health care providers: No discussion with other health care providers necessary.     Admin/Discharge:   Patient Class: IP- Inpatient     Admission Date: 8/22/2022  Length of Stay: 15 days    Reason for admission:   COPD exacerbation     Brief History:   presents to the emergency department  from primary care clinic for complaints of dyspnea and abdominal distension. Shortness of breath and cough improved and patient back to baseline respiratory wise. Patient was denied SNF by her insurance company.     Pertinent Lab:   During the ED workup hemoglobin noted to be critical at 6.8  1 unit of PRBCs is infused  Lab Results   Component Value Date    HGBA1C 9.0 (H) 07/14/2022     Lab Results   Component Value Date    WBC 3.70 (L) 09/08/2022    HGB 8.5 (L) 09/08/2022    HCT 29.2 (L) 09/08/2022     EKG Sinus tachycardia    Negative troponin x 1      Pertinent Imaging;  s/p gastrograffin study with contrast seen in bowel during second KUB   CT a chest and CT abdomen pelvis completed negative for PE but abdominal imaging displays distended bowel without a transition point possible ileus versus developing SBO.       The left ventricle is normal in size with mild concentric hypertrophy and normal systolic function.The estimated ejection fraction is 60%.Mild mitral regurgitation.Grade I left ventricular diastolic dysfunction.Mild tricuspid regurgitation.The estimated PA systolic pressure is 47 mmHg.  There is bilateral interstitial prominence, similar to prior study.  There is increase in bibasilar discoid opacification, likely atelectasis.  No pleural effusion or pneumothorax.  Cardiac silhouette is stable.  Note made of surgical clips about the EG junction.    Discharge Plan;   Fu PCP , pulm, heme/onc stable on 2 LPM. Home O2 ordered    Discharge pertinent meds:  Continued verapamil but hold, hctz, losartan 50 mg daily     Labs/Imaging pending at the time of discharged:   None     Since Discharge:   She reports one dark stool , none since discharge. No melena/hematochize lightheadness, dizziness. She has seen Rad/Onc. Due for heme/onc          HPI    Review of Systems   All other systems reviewed and are negative.          Health Maintenance Due   Topic Date Due    Pneumococcal Vaccines (Age 65+) (3 - PCV)  "09/28/2021    COVID-19 Vaccine (4 - Booster for Moderna series) 03/29/2022    Influenza Vaccine (1) 09/01/2022         Objective:     /66 (BP Location: Right arm, Patient Position: Sitting, BP Method: Medium (Manual))   Pulse 98   Ht 5' 2" (1.575 m)   Wt 61 kg (134 lb 7.7 oz)   LMP  (LMP Unknown)   SpO2 95%   BMI 24.60 kg/m²     Vitals 9/14/2022 9/7/2022 9/4/2022 9/3/2022 9/2/2022   Height 62 - - - -   Weight (lbs) 134.48 134.7 132.06 132.06 131.84   BMI (kg/m2) 24.6 24.6 24.1 24.1 24.1                Physical Exam  Nursing note reviewed.   Constitutional:       General: She is not in acute distress.     Appearance: Normal appearance. She is not toxic-appearing or diaphoretic.      Comments: Chronically ill appreaing    HENT:      Head: Normocephalic.   Eyes:      Conjunctiva/sclera: Conjunctivae normal.   Cardiovascular:      Rate and Rhythm: Normal rate and regular rhythm.   Pulmonary:      Effort: Pulmonary effort is normal. No respiratory distress.      Breath sounds: Rhonchi present.      Comments: O2 in plance   Neurological:      General: No focal deficit present.      Mental Status: She is alert and oriented to person, place, and time.   Psychiatric:         Mood and Affect: Mood normal.         Behavior: Behavior normal.         Thought Content: Thought content normal.         Judgment: Judgment normal.           Future Appointments   Date Time Provider Department [place]   10/11/2022  8:45 AM LAB, KEN K[facility]   10/14/2022  9:20 AM Cameron Valladares III, MD [facility]         Medication List with Changes/Refills   New Medications    LOSARTAN (COZAAR) 25 MG TABLET    Take 1 tablet (25 mg total) by mouth once daily.   Current Medications    ALBUTEROL (PROVENTIL/VENTOLIN HFA) 90 MCG/ACTUATION INHALER    Inhale 2 puffs into the lungs every 4 (four) hours as needed for Wheezing or Shortness of Breath. Rescue    ALENDRONATE (FOSAMAX) 70 MG TABLET    Take 1 tablet (70 mg total) by " "mouth every Sunday.    ASA/ACETAMINOPHEN/CAFFEINE/POT (GOODY'S HEADACHE POWDER ORAL)    Take 1 packet by mouth daily as needed (Severe headache).    ASPIRIN 81 MG CHEW    Take 1 tablet (81 mg total) by mouth once daily.    ATORVASTATIN (LIPITOR) 40 MG TABLET    Take 1 tablet (40 mg total) by mouth once daily.    BD ULTRA-FINE MINI PEN NEEDLE 31 GAUGE X 3/16" NDLE        BUSPIRONE (BUSPAR) 15 MG TABLET    Take 15 mg by mouth 2 (two) times daily.    DICLOFENAC SODIUM (VOLTAREN) 1 % GEL    Apply 2 g topically 4 (four) times daily as needed.    HYDROCODONE-ACETAMINOPHEN 7.5-300 MG TAB    Take 1 tablet by mouth 2 (two) times daily as needed (chronic neck pain).    INSULIN LISPRO PROTAMINE-INSULIN LISPRO (HUMALOG MIX 75-25,U-100,INSULN) 100 UNIT/ML (75-25) SUSP    Inject 35 Units into the skin 2 (two) times daily before meals.    LANTUS SOLOSTAR U-100 INSULIN GLARGINE 100 UNITS/ML SUBQ PEN    Inject 20 Units into the skin every evening.    METFORMIN (GLUCOPHAGE) 1000 MG TABLET    Take 1,000 mg by mouth 2 (two) times daily.    NITROGLYCERIN (NITROSTAT) 0.4 MG SL TABLET    Place 1 tablet (0.4 mg total) under the tongue every 5 (five) minutes as needed for Chest pain.    ONDANSETRON (ZOFRAN) 4 MG/5 ML SOLUTION    Take 4 mg by mouth daily as needed for Nausea.    POTASSIUM CHLORIDE SA (K-DUR,KLOR-CON) 20 MEQ TABLET    Take 1 tablet (20 mEq total) by mouth once daily.    PREGABALIN (LYRICA) 150 MG CAPSULE    Take 150 mg by mouth 3 (three) times daily.    QUETIAPINE (SEROQUEL) 400 MG TABLET    Take 400 mg by mouth nightly.    SERTRALINE (ZOLOFT) 100 MG TABLET    Take 150 mg by mouth once daily at 6am.    TRELEGY ELLIPTA 200-62.5-25 MCG INHALER    Inhale 1 puff into the lungs once daily.    ZONISAMIDE (ZONEGRAN) 100 MG CAP    Take 200 mg by mouth 2 (two) times daily.   Changed and/or Refilled Medications    Modified Medication Previous Medication    TOPIRAMATE (TOPAMAX) 50 MG TABLET topiramate (TOPAMAX) 50 MG tablet       Take 1 " tablet (50 mg total) by mouth 2 (two) times daily.    Take 50 mg by mouth 2 (two) times daily.   Discontinued Medications    HYDROCHLOROTHIAZIDE (MICROZIDE) 12.5 MG CAPSULE    Take 1 capsule (12.5 mg total) by mouth once daily. HOLD MED UNTIL FOLLOW-UP WITH DR. ROJAS (PCP)    LOSARTAN (COZAAR) 50 MG TABLET    Take 1 tablet (50 mg total) by mouth once daily. DO NOT TAKE UNTIL YOU FOLLOW-UP WITH DR. ROJAS (PCP).    TIZANIDINE (ZANAFLEX) 4 MG TABLET    Take 4 mg by mouth 2 (two) times daily as needed for Muscle spasms.    VERAPAMIL (CALAN-SR) 180 MG CR TABLET    Take 1 tablet (180 mg total) by mouth every evening.         Disclaimer:  This note has been generated using voice-recognition software. There may be grammatical or spelling errors that have been missed during proof-reading

## 2022-09-14 NOTE — PATIENT INSTRUCTIONS
We were happy to see you today    For your Testing  Please have your labs and/or imaging test done  prior to next visit         For your Medication     HOLD hctz and verapamil  Continue losartan 25 mg daily  For more information about side effects please visit medlineplus.gov        For your Referrals  Please follow up with your doctors for cancer        For your Vaccinations  We gave your the following vaccines    Please obtain the following vaccines at the pharmacy  Covid booster  Flu shot        Please return to clinic in    Follow up for As previously scheduled.        Extra resources

## 2022-09-15 ENCOUNTER — TELEPHONE (OUTPATIENT)
Dept: CARDIOLOGY | Facility: CLINIC | Age: 72
End: 2022-09-15
Payer: MEDICARE

## 2022-09-15 NOTE — TELEPHONE ENCOUNTER
Returned call to patient. No answer. Left callback message.      ----- Message from Krista Allred sent at 9/15/2022  8:29 AM CDT -----  Regarding: reschedule  Contact: [phone]  Patient is requesting a call back regarding she was in the hospital and missed her office visit. She would like to reschedule her appointment only with Dr. Malin.   Would the patient rather a call back or a response via [facility]?  Call   Best Call Back Number:  [phone]  Additional Information:

## 2022-09-15 NOTE — TELEPHONE ENCOUNTER
Returned call to patient.  No answer.  Left callback message.      ----- Message from Manuel Mclaughlin sent at 9/15/2022 10:31 AM CDT -----  Regarding: Returning Call  Patient states she is returning a missed call from provider's office.      Contact: [phone]

## 2022-09-16 NOTE — ASSESSMENT & PLAN NOTE
Patient's FSGs are uncontrolled due to hypoglycemia on current medication regimen.  Current correctional scale  Medium  Maintain anti-hyperglycemic dose as follows-   Antihyperglycemics (From admission, onward)    None        Hold Oral hypoglycemics while patient is in the hospital.  At home patient reports using a Combo insulin - HUMALOG 75/25 - 35 units BID AC  Added basal insulin Nightly; prandial insulin increased

## 2022-09-16 NOTE — ASSESSMENT & PLAN NOTE
Patient reports that she was on home O2 for 4 years until one year ago (was discontinued by previous Pulmonologist at [facility])  Now with COPD and lung CA will likely need home O2  Check ambulatory SpO2 on 2 LPM due to dyspnea on exertion while on O2  Increased O2 requirements on 9/5; CXR showed increased atelectasis. Trial of IS  SpO2 improved 9/6; stable on 2 LPM. Home O2 ordered

## 2022-09-16 NOTE — ASSESSMENT & PLAN NOTE
Nutrition consulted. Most recent weight and BMI monitored-      Malnutrition (Moderate to Severe)  Weight Loss (Malnutrition):  (7% x 4 months)  Energy Intake (Malnutrition): less than 75% for greater than or equal to 3 months        Measurements:  Wt Readings from Last 1 Encounters:   09/14/22 61 kg (134 lb 7.7 oz)   Body mass index is 24.64 kg/m².    Recommendations: Recommendation/Intervention: 1.  Goals: Meet % EEN, EPN by RD f/u    Patient has been screened and assessed by RD. RD will follow patient.

## 2022-09-16 NOTE — ASSESSMENT & PLAN NOTE
F/u with [facility] - oncology and rad-onc when stable for discharge.    She needs to reschedule next radiation appointment as soon as possible in light of current hospitalization.

## 2022-09-16 NOTE — DISCHARGE SUMMARY
[facility] - Transplant [facility] Medicine  Discharge Summary      Patient Name: Chayito Chung  MRN: [id number]  Patient Class: IP- Inpatient  Admission Date: 8/22/2022  Hospital Length of Stay: 15 days  Discharge Date and Time: 9/8/2022  3:50 PM  Attending Physician: No att. providers found   Discharging Provider: Susana Teran MD  Primary Care Provider: Curt Valladares III, MD      HPI:   71-year-old  woman with a history of right lung adenocarcinoma, COPD, restrictive lung disease, tobacco abuse, CAD status post PCI, type 2 diabetes on insulin, chronic neck pain who presents to the emergency department from primary care clinic for complaints of dyspnea and abdominal distension.    She is followed by Oncology and Radiation Oncology at [facility], undergoing 4 cycles of radiation therapy last of which is Wednesday 8/24.  Completed radiation on Monday but then was also seen in primary care clinic and noted to be fatigued tachycardic and tachypneic reporting a persistent cough after her radiation treatment she also reported neck pain and headache.  She has a chronic cough but reports that symptoms are worse today, she is not taking any home oxygen she has home inhalers but does not seem that she has taken any inhalers or nebulizer treatments.  She feels more short of breath than her baseline    She also reports over the last week progressive abdominal distension and discomfort states that today she has had 1 watery bowel movement has been having irregular bowel movements but when she does have 1 is often a loose BM.    During the ED workup hemoglobin noted to be critical at 6.8 patient has been consented in 1 unit of PRBCs is infusing now during my interview, CT a chest and CT abdomen pelvis completed negative for PE but abdominal imaging displays distended bowel without a transition point possible ileus versus developing SBO.  General surgery consult completed in ED      * No surgery found *       Hospital Course:   Shortness of breath and cough improved and patient back to baseline respiratory wise but continued to require supplemental oxygen so ordered for home.   Patient was denied SNF by her insurance company. Decision appealed but SNF ultimately denied.  Patient discharged with home health.  She will resume care with her oncologist.       Goals of Care Treatment Preferences:  Code Status: DNR    Living Will: Yes              Consults:   Consults (From admission, onward)        Status Ordering Provider     Inpatient virtual consult to Hospital Medicine  Once        Provider:  (Not yet assigned)    Completed MARY HERNÁNDEZ     Inpatient virtual consult to Hospital Medicine  Once        Provider:  (Not yet assigned)    Completed MARY HERNÁNDEZ     Inpatient virtual consult to Hospital Medicine  Once        Provider:  (Not yet assigned)    Completed MARIA TERESA MICHELE     Inpatient virtual consult to Hospital Medicine  Once        Provider:  (Not yet assigned)    Completed MARIA TERESA MICHELE     Inpatient consult to General surgery  Once        Provider:  (Not yet assigned)    Completed AUDRA KERR          * Chronic obstructive pulmonary disease with acute exacerbation  Worsening dyspnea and cough after radiation treatment giving concern for radiation pneumonitis vs COPD exacerbation causing symptoms  - Completed Prednisone 40mg po daily x 5 days   - Oxygen 1-2L to achieve O2 sat 88-92% given hx of COPD  - PRN DUO Nebs  - Completed Azithromycin 500mg x 3 days   - Use BREO + spiriva in place of home TRELEGY  - Prometh-codeine for cough  - resolved    Acute on chronic respiratory failure with hypoxia  Patient reports that she was on home O2 for 4 years until one year ago (was discontinued by previous Pulmonologist at [facility])  Now with COPD and lung CA will likely need home O2  Check ambulatory SpO2 on 2 LPM due to dyspnea on exertion while on O2  Increased O2 requirements on 9/5; CXR showed increased  atelectasis. Trial of IS  SpO2 improved 9/6; stable on 2 LPM. Home O2 ordered    Moderate malnutrition  Nutrition consulted. Most recent weight and BMI monitored-      Malnutrition (Moderate to Severe)  Weight Loss (Malnutrition):  (7% x 4 months)  Energy Intake (Malnutrition): less than 75% for greater than or equal to 3 months        Measurements:  Wt Readings from Last 1 Encounters:   09/14/22 61 kg (134 lb 7.7 oz)   Body mass index is 24.64 kg/m².    Recommendations: Recommendation/Intervention: 1.  Goals: Meet % EEN, EPN by RD f/u    Patient has been screened and assessed by RD. RD will follow patient.      Abdominal distension  Secondary to constipation from opioids which she takes for her lung CA  - s/p gastrograffin study with contrast seen in bowel during second KUB. Advanced from CLD to Regular diet during admission  - Advanced to regular diet  - Continue bowel regimen    Adenocarcinoma of lung, right  F/u with [facility] - oncology and rad-onc when stable for discharge.    She needs to reschedule next radiation appointment as soon as possible in light of current hospitalization.     Type 2 diabetes mellitus, with long-term current use of insulin  Patient's FSGs are uncontrolled due to hypoglycemia on current medication regimen.  Current correctional scale  Medium  Maintain anti-hyperglycemic dose as follows-   Antihyperglycemics (From admission, onward)    None        Hold Oral hypoglycemics while patient is in the hospital.  At home patient reports using a Combo insulin - HUMALOG 75/25 - 35 units BID AC  Added basal insulin Nightly; prandial insulin increased    Iron deficiency anemia  Patient with worsening acute on chronic anemia   - s/p 1unit PRBC in ED  - Hgb stable    Major depressive disorder in partial remission  Continuing current management.    Coronary artery disease of native artery of native heart with stable angina pectoris  Continue ASA/statin  -not on beta blocker   -EKG w/o ischemic  "changes. Negative troponin x 1      Final Active Diagnoses:    Diagnosis Date Noted POA    PRINCIPAL PROBLEM:  Chronic obstructive pulmonary disease with acute exacerbation [J44.1] 12/16/2012 Yes    Acute on chronic respiratory failure with hypoxia [J96.21] 09/03/2022 Yes    Moderate malnutrition [E44.0] 08/24/2022 Yes    Abdominal distension [R14.0] 08/22/2022 Yes    Adenocarcinoma of lung, right [C34.91] 07/25/2022 Yes    Type 2 diabetes mellitus, with long-term current use of insulin [E11.9, Z79.4] 11/12/2019 Not Applicable    Iron deficiency anemia [D50.9] 10/11/2016 Yes    Major depressive disorder in partial remission [F32.4] 12/17/2012 Yes    Neuropathy [G62.9] 12/17/2012 Yes    Coronary artery disease of native artery of native heart with stable angina pectoris [I25.118] 12/16/2012 Yes      Problems Resolved During this Admission:       Discharged Condition: stable    Disposition: Home-Health Care [facility] [place]    Follow Up:   Follow-up Information     Herson Charles MD. Schedule an appointment as soon as possible for a visit.    Specialty: Radiation Oncology  Contact information:  [address] [place] [id number] [phone]                       Patient Instructions:      OXYGEN FOR HOME USE     Order Specific Question Answer Comments   Liter Flow 2    Duration Continuous    Qualifying Test Performed at: Activity    Oxygen saturation at rest 90    Oxygen saturation with activity 85    Oxygen saturation with activity on oxygen 92    Portable mode: pulse dose acceptable    Mode: Portable concentrator    Route nasal cannula    Device: home concentrator with portable concentrator    Length of need (in months): 99 mos    Patient condition with qualifying saturation COPD    Height: 5' 2" (1.575 m)    Weight: 61.1 kg (134 lb 11.2 oz)    Alternative treatment measures have been tried or considered and deemed clinically ineffective. Yes      Diet diabetic     Notify your health care provider if you " experience any of the following:  temperature >100.4     Notify your health care provider if you experience any of the following:  persistent nausea and vomiting or diarrhea     Notify your health care provider if you experience any of the following:  severe uncontrolled pain     Notify your health care provider if you experience any of the following:  difficulty breathing or increased cough     Notify your health care provider if you experience any of the following:  severe persistent headache     Notify your health care provider if you experience any of the following:  worsening rash     Notify your health care provider if you experience any of the following:  persistent dizziness, light-headedness, or visual disturbances     Notify your health care provider if you experience any of the following:  increased confusion or weakness     Activity as tolerated       Significant Diagnostic Studies: as above    Pending Diagnostic Studies:     None         Medications:  Reconciled Home Medications:      Medication List      CHANGE how you take these medications    albuterol 90 mcg/actuation inhaler  Commonly known as: PROVENTIL/VENTOLIN HFA  Inhale 2 puffs into the lungs every 4 (four) hours as needed for Wheezing or Shortness of Breath. Rescue  What changed:   · when to take this  · reasons to take this  · additional instructions     diclofenac sodium 1 % Gel  Commonly known as: VOLTAREN  Apply 2 g topically 4 (four) times daily as needed.  What changed:   · when to take this  · reasons to take this     HumaLOG Mix 75-25(U-100)Insuln 100 unit/mL (75-25) Susp  Generic drug: insulin lispro protamine-insulin lispro  Inject 35 Units into the skin 2 (two) times daily before meals.  What changed: Another medication with the same name was removed. Continue taking this medication, and follow the directions you see here.     LANTUS SOLOSTAR U-100 INSULIN glargine 100 units/mL SubQ pen  Generic drug: insulin  Inject 20 Units into  "the skin every evening.  What changed: when to take this     potassium chloride SA 20 MEQ tablet  Commonly known as: K-DUR,KLOR-CON  Take 1 tablet (20 mEq total) by mouth once daily.  What changed: when to take this        CONTINUE taking these medications    alendronate 70 MG tablet  Commonly known as: FOSAMAX  Take 1 tablet (70 mg total) by mouth every Sunday.     aspirin 81 MG Chew  Take 1 tablet (81 mg total) by mouth once daily.     atorvastatin 40 MG tablet  Commonly known as: LIPITOR  Take 1 tablet (40 mg total) by mouth once daily.     BD ULTRA-FINE MINI PEN NEEDLE 31 gauge x 3/16" Ndle  Generic drug: pen needle, diabetic     busPIRone 15 MG tablet  Commonly known as: BUSPAR  Take 15 mg by mouth 2 (two) times daily.     GOODY'S HEADACHE POWDER ORAL  Take 1 packet by mouth daily as needed (Severe headache).     HYDROcodone-acetaminophen 7.5-300 mg Tab  Take 1 tablet by mouth 2 (two) times daily as needed (chronic neck pain).     metFORMIN 1000 MG tablet  Commonly known as: GLUCOPHAGE  Take 1,000 mg by mouth 2 (two) times daily.     nitroGLYCERIN 0.4 MG SL tablet  Commonly known as: NITROSTAT  Place 1 tablet (0.4 mg total) under the tongue every 5 (five) minutes as needed for Chest pain.     ondansetron 4 mg/5 mL solution  Commonly known as: ZOFRAN  Take 4 mg by mouth daily as needed for Nausea.     pregabalin 150 MG capsule  Commonly known as: LYRICA  Take 150 mg by mouth 3 (three) times daily.     QUEtiapine 400 MG tablet  Commonly known as: SEROQUEL  Take 400 mg by mouth nightly.     sertraline 100 MG tablet  Commonly known as: ZOLOFT  Take 150 mg by mouth once daily at 6am.     TRELEGY ELLIPTA 200-62.5-25 mcg inhaler  Generic drug: fluticasone-umeclidin-vilanter  Inhale 1 puff into the lungs once daily.     zonisamide 100 MG Cap  Commonly known as: ZONEGRAN  Take 200 mg by mouth 2 (two) times daily.        STOP taking these medications    clopidogreL 75 mg tablet  Commonly known as: PLAVIX   "   hydroCHLOROthiazide 12.5 mg capsule  Commonly known as: MICROZIDE     losartan 50 MG tablet  Commonly known as: COZAAR     Pepto-BismoL 262 mg/15 mL suspension  Generic drug: bismuth subsalicylate     pravastatin 40 MG tablet  Commonly known as: PRAVACHOL     tiZANidine 4 MG tablet  Commonly known as: ZANAFLEX     verapamiL 180 MG CR tablet  Commonly known as: CALAN-SR            Indwelling Lines/Drains at time of discharge:   Lines/Drains/Airways     None                 Time spent on the discharge of patient: 38 minutes         The attending portion of this evaluation, treatment, and documentation was performed per Susana Teran MD via Telemedicine AudioVisual using the secure [employer] software platform with 2 way audio/video. The provider was located off-site and the patient is located in the hospital. The aforementioned video software was utilized to document the relevant history and physical exam    Susana Teran MD  Department of Hospital Medicine  [facility] - Transplant Stepdown

## 2022-09-21 ENCOUNTER — TELEPHONE (OUTPATIENT)
Dept: CARDIOLOGY | Facility: CLINIC | Age: 72
End: 2022-09-21
Payer: MEDICARE

## 2022-09-21 NOTE — TELEPHONE ENCOUNTER
Spoke with patient.  Appointment scheduled with Dr Malin.        ----- Message from Luis Antonio Latif sent at 9/21/2022 10:59 AM CDT -----  Name of Who is Calling: MARYBEL RODRIGUEZ [[id number]]            What is the request in detail: Patient is requesting call back to get appointment for hospital f/u              Can the clinic reply by [facility]: no              [facility] [phone]

## 2022-10-01 PROCEDURE — G0179 MD RECERTIFICATION HHA PT: HCPCS | Mod: ,,, | Performed by: INTERNAL MEDICINE

## 2022-10-01 PROCEDURE — G0179 PR HOME HEALTH MD RECERTIFICATION: ICD-10-PCS | Mod: ,,, | Performed by: INTERNAL MEDICINE

## 2022-10-10 ENCOUNTER — DOCUMENT SCAN (OUTPATIENT)
Dept: HOME HEALTH SERVICES | Facility: HOSPITAL | Age: 72
End: 2022-10-10
Payer: MEDICARE

## 2022-10-11 ENCOUNTER — LAB VISIT (OUTPATIENT)
Dept: LAB | Facility: HOSPITAL | Age: 72
End: 2022-10-11
Attending: INTERNAL MEDICINE
Payer: MEDICARE

## 2022-10-11 DIAGNOSIS — D50.9 MICROCYTIC ANEMIA: ICD-10-CM

## 2022-10-11 DIAGNOSIS — E78.5 HYPERLIPIDEMIA ASSOCIATED WITH TYPE 2 DIABETES MELLITUS: ICD-10-CM

## 2022-10-11 DIAGNOSIS — E11.65 TYPE 2 DIABETES MELLITUS WITH HYPERGLYCEMIA, WITH LONG-TERM CURRENT USE OF INSULIN: ICD-10-CM

## 2022-10-11 DIAGNOSIS — Z79.4 TYPE 2 DIABETES MELLITUS WITH HYPERGLYCEMIA, WITH LONG-TERM CURRENT USE OF INSULIN: ICD-10-CM

## 2022-10-11 DIAGNOSIS — E83.42 HYPOMAGNESEMIA: ICD-10-CM

## 2022-10-11 DIAGNOSIS — E11.69 HYPERLIPIDEMIA ASSOCIATED WITH TYPE 2 DIABETES MELLITUS: ICD-10-CM

## 2022-10-11 LAB
ALBUMIN SERPL BCP-MCNC: 3.5 G/DL (ref 3.5–5.2)
ALP SERPL-CCNC: 135 U/L (ref 55–135)
ALT SERPL W/O P-5'-P-CCNC: 9 U/L (ref 10–44)
ANION GAP SERPL CALC-SCNC: 11 MMOL/L (ref 8–16)
AST SERPL-CCNC: 11 U/L (ref 10–40)
BASOPHILS # BLD AUTO: 0.02 K/UL (ref 0–0.2)
BASOPHILS NFR BLD: 0.3 % (ref 0–1.9)
BILIRUB DIRECT SERPL-MCNC: 0.1 MG/DL (ref 0.1–0.3)
BILIRUB SERPL-MCNC: 0.2 MG/DL (ref 0.1–1)
BUN SERPL-MCNC: 9 MG/DL (ref 8–23)
CALCIUM SERPL-MCNC: 9.5 MG/DL (ref 8.7–10.5)
CHLORIDE SERPL-SCNC: 109 MMOL/L (ref 95–110)
CHOLEST SERPL-MCNC: 104 MG/DL (ref 120–199)
CHOLEST/HDLC SERPL: 2.4 {RATIO} (ref 2–5)
CO2 SERPL-SCNC: 21 MMOL/L (ref 23–29)
CREAT SERPL-MCNC: 0.6 MG/DL (ref 0.5–1.4)
DIFFERENTIAL METHOD: ABNORMAL
EOSINOPHIL # BLD AUTO: 0.1 K/UL (ref 0–0.5)
EOSINOPHIL NFR BLD: 1.7 % (ref 0–8)
ERYTHROCYTE [DISTWIDTH] IN BLOOD BY AUTOMATED COUNT: 23.7 % (ref 11.5–14.5)
EST. GFR  (NO RACE VARIABLE): >60 ML/MIN/1.73 M^2
ESTIMATED AVG GLUCOSE: 189 MG/DL (ref 68–131)
GLUCOSE SERPL-MCNC: 87 MG/DL (ref 70–110)
HBA1C MFR BLD: 8.2 % (ref 4–5.6)
HCT VFR BLD AUTO: 33.2 % (ref 37–48.5)
HDLC SERPL-MCNC: 43 MG/DL (ref 40–75)
HDLC SERPL: 41.3 % (ref 20–50)
HGB BLD-MCNC: 9.3 G/DL (ref 12–16)
IMM GRANULOCYTES # BLD AUTO: 0.02 K/UL (ref 0–0.04)
IMM GRANULOCYTES NFR BLD AUTO: 0.3 % (ref 0–0.5)
LDLC SERPL CALC-MCNC: 43.4 MG/DL (ref 63–159)
LYMPHOCYTES # BLD AUTO: 1.2 K/UL (ref 1–4.8)
LYMPHOCYTES NFR BLD: 19.1 % (ref 18–48)
MAGNESIUM SERPL-MCNC: 1.6 MG/DL (ref 1.6–2.6)
MCH RBC QN AUTO: 23.8 PG (ref 27–31)
MCHC RBC AUTO-ENTMCNC: 28 G/DL (ref 32–36)
MCV RBC AUTO: 85 FL (ref 82–98)
MONOCYTES # BLD AUTO: 0.7 K/UL (ref 0.3–1)
MONOCYTES NFR BLD: 10.7 % (ref 4–15)
NEUTROPHILS # BLD AUTO: 4.3 K/UL (ref 1.8–7.7)
NEUTROPHILS NFR BLD: 67.9 % (ref 38–73)
NONHDLC SERPL-MCNC: 61 MG/DL
NRBC BLD-RTO: 0 /100 WBC
PLATELET # BLD AUTO: 264 K/UL (ref 150–450)
PMV BLD AUTO: 9.3 FL (ref 9.2–12.9)
POTASSIUM SERPL-SCNC: 3.9 MMOL/L (ref 3.5–5.1)
PROT SERPL-MCNC: 7.5 G/DL (ref 6–8.4)
RBC # BLD AUTO: 3.9 M/UL (ref 4–5.4)
SODIUM SERPL-SCNC: 141 MMOL/L (ref 136–145)
TRIGL SERPL-MCNC: 88 MG/DL (ref 30–150)
WBC # BLD AUTO: 6.38 K/UL (ref 3.9–12.7)

## 2022-10-11 PROCEDURE — 36415 COLL VENOUS BLD VENIPUNCTURE: CPT | Mod: PO | Performed by: INTERNAL MEDICINE

## 2022-10-11 PROCEDURE — 80076 HEPATIC FUNCTION PANEL: CPT | Performed by: INTERNAL MEDICINE

## 2022-10-11 PROCEDURE — 85025 COMPLETE CBC W/AUTO DIFF WBC: CPT | Performed by: INTERNAL MEDICINE

## 2022-10-11 PROCEDURE — 80061 LIPID PANEL: CPT | Performed by: INTERNAL MEDICINE

## 2022-10-11 PROCEDURE — 83735 ASSAY OF MAGNESIUM: CPT | Performed by: INTERNAL MEDICINE

## 2022-10-11 PROCEDURE — 80048 BASIC METABOLIC PNL TOTAL CA: CPT | Performed by: INTERNAL MEDICINE

## 2022-10-11 PROCEDURE — 83036 HEMOGLOBIN GLYCOSYLATED A1C: CPT | Performed by: INTERNAL MEDICINE

## 2022-10-14 ENCOUNTER — OFFICE VISIT (OUTPATIENT)
Dept: INTERNAL MEDICINE | Facility: CLINIC | Age: 72
End: 2022-10-14
Payer: MEDICARE

## 2022-10-14 VITALS
HEART RATE: 108 BPM | DIASTOLIC BLOOD PRESSURE: 78 MMHG | HEIGHT: 62 IN | WEIGHT: 126.56 LBS | BODY MASS INDEX: 23.29 KG/M2 | OXYGEN SATURATION: 96 % | SYSTOLIC BLOOD PRESSURE: 132 MMHG

## 2022-10-14 DIAGNOSIS — E11.59 HYPERTENSION ASSOCIATED WITH DIABETES: ICD-10-CM

## 2022-10-14 DIAGNOSIS — Z23 NEED FOR VACCINATION: ICD-10-CM

## 2022-10-14 DIAGNOSIS — I15.2 HYPERTENSION ASSOCIATED WITH DIABETES: ICD-10-CM

## 2022-10-14 DIAGNOSIS — Z79.4 TYPE 2 DIABETES MELLITUS WITH HYPERGLYCEMIA, WITH LONG-TERM CURRENT USE OF INSULIN: Primary | ICD-10-CM

## 2022-10-14 DIAGNOSIS — I25.10 CORONARY ARTERY DISEASE INVOLVING NATIVE CORONARY ARTERY OF NATIVE HEART WITHOUT ANGINA PECTORIS: ICD-10-CM

## 2022-10-14 DIAGNOSIS — E78.5 HYPERLIPIDEMIA ASSOCIATED WITH TYPE 2 DIABETES MELLITUS: ICD-10-CM

## 2022-10-14 DIAGNOSIS — E11.69 HYPERLIPIDEMIA ASSOCIATED WITH TYPE 2 DIABETES MELLITUS: ICD-10-CM

## 2022-10-14 DIAGNOSIS — E11.65 TYPE 2 DIABETES MELLITUS WITH HYPERGLYCEMIA, WITH LONG-TERM CURRENT USE OF INSULIN: Primary | ICD-10-CM

## 2022-10-14 PROCEDURE — 99999 PR PBB SHADOW E&M-EST. PATIENT-LVL III: ICD-10-PCS | Mod: PBBFAC,,, | Performed by: INTERNAL MEDICINE

## 2022-10-14 PROCEDURE — 3052F HG A1C>EQUAL 8.0%<EQUAL 9.0%: CPT | Mod: CPTII,S$GLB,, | Performed by: INTERNAL MEDICINE

## 2022-10-14 PROCEDURE — 3078F DIAST BP <80 MM HG: CPT | Mod: CPTII,S$GLB,, | Performed by: INTERNAL MEDICINE

## 2022-10-14 PROCEDURE — 4010F ACE/ARB THERAPY RXD/TAKEN: CPT | Mod: CPTII,S$GLB,, | Performed by: INTERNAL MEDICINE

## 2022-10-14 PROCEDURE — 1101F PT FALLS ASSESS-DOCD LE1/YR: CPT | Mod: CPTII,S$GLB,, | Performed by: INTERNAL MEDICINE

## 2022-10-14 PROCEDURE — G0009 PNEUMOCOCCAL CONJUGATE VACCINE 20-VALENT: ICD-10-PCS | Mod: S$GLB,,, | Performed by: INTERNAL MEDICINE

## 2022-10-14 PROCEDURE — 99215 PR OFFICE/OUTPT VISIT, EST, LEVL V, 40-54 MIN: ICD-10-PCS | Mod: 25,S$GLB,, | Performed by: INTERNAL MEDICINE

## 2022-10-14 PROCEDURE — 3052F PR MOST RECENT HEMOGLOBIN A1C LEVEL 8.0 - < 9.0%: ICD-10-PCS | Mod: CPTII,S$GLB,, | Performed by: INTERNAL MEDICINE

## 2022-10-14 PROCEDURE — 90677 PNEUMOCOCCAL CONJUGATE VACCINE 20-VALENT: ICD-10-PCS | Mod: S$GLB,,, | Performed by: INTERNAL MEDICINE

## 2022-10-14 PROCEDURE — 99999 PR PBB SHADOW E&M-EST. PATIENT-LVL III: CPT | Mod: PBBFAC,,, | Performed by: INTERNAL MEDICINE

## 2022-10-14 PROCEDURE — 3066F PR DOCUMENTATION OF TREATMENT FOR NEPHROPATHY: ICD-10-PCS | Mod: CPTII,S$GLB,, | Performed by: INTERNAL MEDICINE

## 2022-10-14 PROCEDURE — 1157F ADVNC CARE PLAN IN RCRD: CPT | Mod: CPTII,S$GLB,, | Performed by: INTERNAL MEDICINE

## 2022-10-14 PROCEDURE — 3075F PR MOST RECENT SYSTOLIC BLOOD PRESS GE 130-139MM HG: ICD-10-PCS | Mod: CPTII,S$GLB,, | Performed by: INTERNAL MEDICINE

## 2022-10-14 PROCEDURE — 90677 PCV20 VACCINE IM: CPT | Mod: S$GLB,,, | Performed by: INTERNAL MEDICINE

## 2022-10-14 PROCEDURE — 99215 OFFICE O/P EST HI 40 MIN: CPT | Mod: 25,S$GLB,, | Performed by: INTERNAL MEDICINE

## 2022-10-14 PROCEDURE — 1157F PR ADVANCE CARE PLAN OR EQUIV PRESENT IN MEDICAL RECORD: ICD-10-PCS | Mod: CPTII,S$GLB,, | Performed by: INTERNAL MEDICINE

## 2022-10-14 PROCEDURE — 1159F MED LIST DOCD IN RCRD: CPT | Mod: CPTII,S$GLB,, | Performed by: INTERNAL MEDICINE

## 2022-10-14 PROCEDURE — 3061F NEG MICROALBUMINURIA REV: CPT | Mod: CPTII,S$GLB,, | Performed by: INTERNAL MEDICINE

## 2022-10-14 PROCEDURE — 3075F SYST BP GE 130 - 139MM HG: CPT | Mod: CPTII,S$GLB,, | Performed by: INTERNAL MEDICINE

## 2022-10-14 PROCEDURE — 3288F PR FALLS RISK ASSESSMENT DOCUMENTED: ICD-10-PCS | Mod: CPTII,S$GLB,, | Performed by: INTERNAL MEDICINE

## 2022-10-14 PROCEDURE — G0009 ADMIN PNEUMOCOCCAL VACCINE: HCPCS | Mod: S$GLB,,, | Performed by: INTERNAL MEDICINE

## 2022-10-14 PROCEDURE — 3066F NEPHROPATHY DOC TX: CPT | Mod: CPTII,S$GLB,, | Performed by: INTERNAL MEDICINE

## 2022-10-14 PROCEDURE — 1160F PR REVIEW ALL MEDS BY PRESCRIBER/CLIN PHARMACIST DOCUMENTED: ICD-10-PCS | Mod: CPTII,S$GLB,, | Performed by: INTERNAL MEDICINE

## 2022-10-14 PROCEDURE — 1159F PR MEDICATION LIST DOCUMENTED IN MEDICAL RECORD: ICD-10-PCS | Mod: CPTII,S$GLB,, | Performed by: INTERNAL MEDICINE

## 2022-10-14 PROCEDURE — 3061F PR NEG MICROALBUMINURIA RESULT DOCUMENTED/REVIEW: ICD-10-PCS | Mod: CPTII,S$GLB,, | Performed by: INTERNAL MEDICINE

## 2022-10-14 PROCEDURE — 3288F FALL RISK ASSESSMENT DOCD: CPT | Mod: CPTII,S$GLB,, | Performed by: INTERNAL MEDICINE

## 2022-10-14 PROCEDURE — 3078F PR MOST RECENT DIASTOLIC BLOOD PRESSURE < 80 MM HG: ICD-10-PCS | Mod: CPTII,S$GLB,, | Performed by: INTERNAL MEDICINE

## 2022-10-14 PROCEDURE — 1160F RVW MEDS BY RX/DR IN RCRD: CPT | Mod: CPTII,S$GLB,, | Performed by: INTERNAL MEDICINE

## 2022-10-14 PROCEDURE — 1101F PR PT FALLS ASSESS DOC 0-1 FALLS W/OUT INJ PAST YR: ICD-10-PCS | Mod: CPTII,S$GLB,, | Performed by: INTERNAL MEDICINE

## 2022-10-14 PROCEDURE — 4010F PR ACE/ARB THEARPY RXD/TAKEN: ICD-10-PCS | Mod: CPTII,S$GLB,, | Performed by: INTERNAL MEDICINE

## 2022-10-14 RX ORDER — MIRTAZAPINE 15 MG/1
15 TABLET, FILM COATED ORAL DAILY
COMMUNITY
Start: 2022-09-26

## 2022-10-14 NOTE — PROGRESS NOTES
Assessment:       1. Type 2 diabetes mellitus with hyperglycemia, with long-term current use of insulin    2. Need for vaccination    3. Hypertension associated with diabetes    4. Hyperlipidemia associated with type 2 diabetes mellitus    5. Coronary artery disease involving native coronary artery of native heart without angina pectoris          Plan:         Chayito was seen today for follow-up.    Diagnoses and all orders for this visit:    Type 2 diabetes mellitus with hyperglycemia, with long-term current use of insulin  Chronic  Improving  Patient is not at goal today  I have reviewed lifestyle modification to achieve/maintain goals  We will continue the current medication regimen as listed below  Patient will follow up in 3 months     -     Hemoglobin A1C; Standing    Need for vaccination  -- I reviewed the patient vaccine history and provided the risk benefits and side effects of the vaccine.   -- I provided the patient a VIS sheet on the vaccine.     -     Pneumococcal Conjugate Vaccine (20 Valent) (IM)    Hypertension associated with diabetes  Chronic  Controlled  Patient is at goal today   I have reviewed lifestyle modification to achieve/maintain goals  We will continue the current medication regimen as listed below  Patient will follow up in 3 months       Hyperlipidemia associated with type 2 diabetes mellitus    Coronary artery disease involving native coronary artery of native heart without angina pectoris    She is following with her cardiology, pulmolonology, neurologist , psych, GI , hemon/onc doctors . Will try to contact endocrine doctor to coordinate care.        Subjective:       Patient ID: Chayito Chung is a 71 y.o. female.    Chief Complaint: Follow-up    Interim Hx   Patient has had follow up with her dr at . Her pulm said she needs another bx of the R lung new finding.. Mirtazpine started for sleep by psycha nd atogepant started by nuerology for migrane. Started on samples.       Chronic  problems    Patient Active Problem List   Diagnosis    Coronary artery disease of native artery of native heart with stable angina pectoris    Chronic obstructive pulmonary disease with acute exacerbation    Tobacco abuse, 1ppd, 50 years    Major depressive disorder in partial remission    Solitary pulmonary nodule s/p resection 4/2012    Neuropathy    Hypoglycemia associated with type 2 diabetes mellitus    Hypertension associated with diabetes    Hyperlipidemia associated with type 2 diabetes mellitus    S/P PTCA (percutaneous transluminal coronary angioplasty)    Chest pain of uncertain etiology    Lumbar facet arthropathy    Weakness of both lower extremities    Sinus tachycardia    Iron deficiency anemia    Steroid-induced gastritis    Chronic diastolic congestive heart failure    History of colon cancer    Vitamin D deficiency    Osteopenia of lumbar spine    Incisional hernia, without obstruction or gangrene    History of partial gastrectomy    Seizure disorder    Impaired ambulation    Cervical myelopathy    Anxiety    Type 2 diabetes mellitus, with long-term current use of insulin    Hypertension    Migraine headache    CAD (coronary artery disease)    Aortic atherosclerosis    Right leg weakness    History of stroke with residual deficit    Mild malnutrition    Recurrent falls    Physical deconditioning    Pulmonary hypertension    Adenocarcinoma of lung, right    Diabetic peripheral neuropathy    Abdominal distension    Restrictive lung disease    Mass of breast    Hamartoma of lung    Interstitial lung disease    Ulnar neuropathy    Stenosis of carotid artery    Primary lung adenocarcinoma, right    Mitral valve prolapse    Hyperlipidemia    Depressive disorder    Sleep apnea    Pancreatitis    Simple chronic bronchitis    Bronchiectasis    Moderate malnutrition    Acute on chronic respiratory failure with hypoxia       #Interim Hx     ED for cough negative covid- started taking last night   Cards - ref  "ent, medro dose, losartan      #Concerns Today          Diabetes  She presents for her follow-up diabetic visit. She has type 2 diabetes mellitus. Her disease course has been fluctuating. (SHE DOES HAVE DEXCOM) Symptoms are improving. Diabetic complications include a CVA, heart disease, peripheral neuropathy and PVD. Current diabetic treatment includes intensive insulin program and oral agent (monotherapy). She is compliant with treatment all of the time. She is following a diabetic diet. She has not had a previous visit with a dietitian. Her breakfast blood glucose range is generally >200 mg/dl. Her lunch blood glucose range is generally >200 mg/dl. Her dinner blood glucose range is generally >200 mg/dl. An ACE inhibitor/angiotensin II receptor blocker is being taken. She sees a podiatrist.Eye exam is current.   Hypertension  This is a chronic problem. The current episode started more than 1 year ago. The problem has been waxing and waning since onset. The problem is controlled. Treatments tried: WAS PREVIOUSLY ON HCTZ AND VERAPAMIL BUT STOPPED FOR HYPOTENSION. The current treatment provides significant improvement. There are no compliance problems.  Hypertensive end-organ damage includes CVA and PVD.     Review of Systems   All other systems reviewed and are negative.          Health Maintenance Due   Topic Date Due    Pneumococcal Vaccines (Age 65+) (3 - PCV) 09/28/2021    COVID-19 Vaccine (4 - Booster for Moderna series) 01/24/2022         Objective:     /78   Pulse 108   Ht 5' 2" (1.575 m)   Wt 57.4 kg (126 lb 8.7 oz)   LMP  (LMP Unknown)   SpO2 96%   BMI 23.15 kg/m²     Vitals 10/14/2022 9/14/2022 9/7/2022 9/4/2022 9/3/2022   Height 62 62 - - -   Weight (lbs) 126.54 134.48 134.7 132.06 132.06   BMI (kg/m2) 23.1 24.6 24.6 24.1 24.1                Physical Exam  Nursing note reviewed.   Constitutional:       Appearance: Normal appearance.      Comments: Chronically ill appearing    HENT:      Head: " "Normocephalic.   Eyes:      Conjunctiva/sclera: Conjunctivae normal.   Cardiovascular:      Rate and Rhythm: Normal rate.   Pulmonary:      Effort: Pulmonary effort is normal. No respiratory distress.      Comments: Oxygen in place   Neurological:      General: No focal deficit present.      Mental Status: She is alert and oriented to person, place, and time.   Psychiatric:         Mood and Affect: Mood normal.         Behavior: Behavior normal.         Thought Content: Thought content normal.         Judgment: Judgment normal.           Future Appointments   Date Time Provider Department Center   12/6/2022  9:00 AM Gaurav Malin MD [facility] CARDIO [facility]   1/10/2023  9:30 AM LAB, [facility] [place]   1/13/2023 11:00 AM Cameron Valladares III, MD [facility]         Medication List with Changes/Refills   Current Medications    ALBUTEROL (PROVENTIL/VENTOLIN HFA) 90 MCG/ACTUATION INHALER    Inhale 2 puffs into the lungs every 4 (four) hours as needed for Wheezing or Shortness of Breath. Rescue    ALENDRONATE (FOSAMAX) 70 MG TABLET    Take 1 tablet (70 mg total) by mouth every Sunday.    ASA/ACETAMINOPHEN/CAFFEINE/POT (GOODY'S HEADACHE POWDER ORAL)    Take 1 packet by mouth daily as needed (Severe headache).    ASPIRIN 81 MG CHEW    Take 1 tablet (81 mg total) by mouth once daily.    ATORVASTATIN (LIPITOR) 40 MG TABLET    Take 1 tablet (40 mg total) by mouth once daily.    BD ULTRA-FINE MINI PEN NEEDLE 31 GAUGE X 3/16" NDLE        BUSPIRONE (BUSPAR) 15 MG TABLET    Take 15 mg by mouth 2 (two) times daily.    DICLOFENAC SODIUM (VOLTAREN) 1 % GEL    Apply 2 g topically 4 (four) times daily as needed.    HYDROCODONE-ACETAMINOPHEN 7.5-300 MG TAB    Take 1 tablet by mouth 2 (two) times daily as needed (chronic neck pain).    INSULIN LISPRO PROTAMINE-INSULIN LISPRO (HUMALOG MIX 75-25,U-100,INSULN) 100 UNIT/ML (75-25) SUSP    Inject 35 Units into the skin 2 (two) times daily before meals.    LANTUS SOLOSTAR " U-100 INSULIN GLARGINE 100 UNITS/ML SUBQ PEN    Inject 20 Units into the skin every evening.    LOSARTAN (COZAAR) 25 MG TABLET    Take 1 tablet (25 mg total) by mouth once daily.    METFORMIN (GLUCOPHAGE) 1000 MG TABLET    Take 1,000 mg by mouth 2 (two) times daily.    MIRTAZAPINE (REMERON) 15 MG TABLET    Take 15 mg by mouth once daily.    NITROGLYCERIN (NITROSTAT) 0.4 MG SL TABLET    Place 1 tablet (0.4 mg total) under the tongue every 5 (five) minutes as needed for Chest pain.    ONDANSETRON (ZOFRAN) 4 MG/5 ML SOLUTION    Take 4 mg by mouth daily as needed for Nausea.    POTASSIUM CHLORIDE SA (K-DUR,KLOR-CON) 20 MEQ TABLET    Take 1 tablet (20 mEq total) by mouth once daily.    PREGABALIN (LYRICA) 150 MG CAPSULE    Take 150 mg by mouth 3 (three) times daily.    QUETIAPINE (SEROQUEL) 400 MG TABLET    Take 400 mg by mouth nightly.    SERTRALINE (ZOLOFT) 100 MG TABLET    Take 150 mg by mouth once daily at 6am.    TOPIRAMATE (TOPAMAX) 50 MG TABLET    Take 1 tablet (50 mg total) by mouth 2 (two) times daily.    TRELEGY ELLIPTA 200-62.5-25 MCG INHALER    Inhale 1 puff into the lungs once daily.    ZONISAMIDE (ZONEGRAN) 100 MG CAP    Take 200 mg by mouth 2 (two) times daily.         Disclaimer:  This note has been generated using voice-recognition software. There may be grammatical or spelling errors that have been missed during proof-reading

## 2022-11-03 ENCOUNTER — EXTERNAL HOME HEALTH (OUTPATIENT)
Dept: HOME HEALTH SERVICES | Facility: HOSPITAL | Age: 72
End: 2022-11-03
Payer: MEDICARE

## 2022-11-17 ENCOUNTER — OFFICE VISIT (OUTPATIENT)
Dept: INTERNAL MEDICINE | Facility: CLINIC | Age: 72
End: 2022-11-17
Payer: MEDICARE

## 2022-11-17 ENCOUNTER — HOSPITAL ENCOUNTER (OUTPATIENT)
Dept: RADIOLOGY | Facility: HOSPITAL | Age: 72
Discharge: HOME | End: 2022-11-17
Attending: INTERNAL MEDICINE
Payer: MEDICARE

## 2022-11-17 VITALS
BODY MASS INDEX: 23.37 KG/M2 | HEIGHT: 62 IN | OXYGEN SATURATION: 99 % | WEIGHT: 127 LBS | SYSTOLIC BLOOD PRESSURE: 134 MMHG | HEART RATE: 113 BPM | DIASTOLIC BLOOD PRESSURE: 79 MMHG

## 2022-11-17 DIAGNOSIS — Z12.31 ENCOUNTER FOR SCREENING MAMMOGRAM FOR BREAST CANCER: ICD-10-CM

## 2022-11-17 DIAGNOSIS — E11.65 TYPE 2 DIABETES MELLITUS WITH HYPERGLYCEMIA, WITH LONG-TERM CURRENT USE OF INSULIN: ICD-10-CM

## 2022-11-17 DIAGNOSIS — R50.9 FEVER, UNSPECIFIED FEVER CAUSE: Primary | ICD-10-CM

## 2022-11-17 DIAGNOSIS — R50.9 FEVER, UNSPECIFIED FEVER CAUSE: ICD-10-CM

## 2022-11-17 DIAGNOSIS — Z79.4 TYPE 2 DIABETES MELLITUS WITH HYPERGLYCEMIA, WITH LONG-TERM CURRENT USE OF INSULIN: ICD-10-CM

## 2022-11-17 PROCEDURE — 71046 XR CHEST PA AND LATERAL: ICD-10-PCS | Mod: 26,,, | Performed by: RADIOLOGY

## 2022-11-17 PROCEDURE — 1101F PR PT FALLS ASSESS DOC 0-1 FALLS W/OUT INJ PAST YR: ICD-10-PCS | Mod: CPTII,S$GLB,, | Performed by: INTERNAL MEDICINE

## 2022-11-17 PROCEDURE — 3288F PR FALLS RISK ASSESSMENT DOCUMENTED: ICD-10-PCS | Mod: CPTII,S$GLB,, | Performed by: INTERNAL MEDICINE

## 2022-11-17 PROCEDURE — 1126F PR PAIN SEVERITY QUANTIFIED, NO PAIN PRESENT: ICD-10-PCS | Mod: CPTII,S$GLB,, | Performed by: INTERNAL MEDICINE

## 2022-11-17 PROCEDURE — 74177 CT ABD & PELVIS W/CONTRAST: CPT | Mod: TC

## 2022-11-17 PROCEDURE — 1101F PT FALLS ASSESS-DOCD LE1/YR: CPT | Mod: CPTII,S$GLB,, | Performed by: INTERNAL MEDICINE

## 2022-11-17 PROCEDURE — 1157F ADVNC CARE PLAN IN RCRD: CPT | Mod: CPTII,S$GLB,, | Performed by: INTERNAL MEDICINE

## 2022-11-17 PROCEDURE — 74177 CT ABDOMEN PELVIS WITH CONTRAST: ICD-10-PCS | Mod: 26,,, | Performed by: RADIOLOGY

## 2022-11-17 PROCEDURE — 3066F PR DOCUMENTATION OF TREATMENT FOR NEPHROPATHY: ICD-10-PCS | Mod: CPTII,S$GLB,, | Performed by: INTERNAL MEDICINE

## 2022-11-17 PROCEDURE — 99999 PR PBB SHADOW E&M-EST. PATIENT-LVL V: ICD-10-PCS | Mod: PBBFAC,,, | Performed by: INTERNAL MEDICINE

## 2022-11-17 PROCEDURE — 3008F PR BODY MASS INDEX (BMI) DOCUMENTED: ICD-10-PCS | Mod: CPTII,S$GLB,, | Performed by: INTERNAL MEDICINE

## 2022-11-17 PROCEDURE — 99215 PR OFFICE/OUTPT VISIT, EST, LEVL V, 40-54 MIN: ICD-10-PCS | Mod: S$GLB,,, | Performed by: INTERNAL MEDICINE

## 2022-11-17 PROCEDURE — 71046 X-RAY EXAM CHEST 2 VIEWS: CPT | Mod: TC,FY

## 2022-11-17 PROCEDURE — 1159F MED LIST DOCD IN RCRD: CPT | Mod: CPTII,S$GLB,, | Performed by: INTERNAL MEDICINE

## 2022-11-17 PROCEDURE — 1160F RVW MEDS BY RX/DR IN RCRD: CPT | Mod: CPTII,S$GLB,, | Performed by: INTERNAL MEDICINE

## 2022-11-17 PROCEDURE — 4010F PR ACE/ARB THEARPY RXD/TAKEN: ICD-10-PCS | Mod: CPTII,S$GLB,, | Performed by: INTERNAL MEDICINE

## 2022-11-17 PROCEDURE — 3075F PR MOST RECENT SYSTOLIC BLOOD PRESS GE 130-139MM HG: ICD-10-PCS | Mod: CPTII,S$GLB,, | Performed by: INTERNAL MEDICINE

## 2022-11-17 PROCEDURE — 25500020 PHARM REV CODE 255: Performed by: INTERNAL MEDICINE

## 2022-11-17 PROCEDURE — 71046 X-RAY EXAM CHEST 2 VIEWS: CPT | Mod: 26,,, | Performed by: RADIOLOGY

## 2022-11-17 PROCEDURE — 3061F PR NEG MICROALBUMINURIA RESULT DOCUMENTED/REVIEW: ICD-10-PCS | Mod: CPTII,S$GLB,, | Performed by: INTERNAL MEDICINE

## 2022-11-17 PROCEDURE — 3066F NEPHROPATHY DOC TX: CPT | Mod: CPTII,S$GLB,, | Performed by: INTERNAL MEDICINE

## 2022-11-17 PROCEDURE — 1159F PR MEDICATION LIST DOCUMENTED IN MEDICAL RECORD: ICD-10-PCS | Mod: CPTII,S$GLB,, | Performed by: INTERNAL MEDICINE

## 2022-11-17 PROCEDURE — 74177 CT ABD & PELVIS W/CONTRAST: CPT | Mod: 26,,, | Performed by: RADIOLOGY

## 2022-11-17 PROCEDURE — 3075F SYST BP GE 130 - 139MM HG: CPT | Mod: CPTII,S$GLB,, | Performed by: INTERNAL MEDICINE

## 2022-11-17 PROCEDURE — 1160F PR REVIEW ALL MEDS BY PRESCRIBER/CLIN PHARMACIST DOCUMENTED: ICD-10-PCS | Mod: CPTII,S$GLB,, | Performed by: INTERNAL MEDICINE

## 2022-11-17 PROCEDURE — 3288F FALL RISK ASSESSMENT DOCD: CPT | Mod: CPTII,S$GLB,, | Performed by: INTERNAL MEDICINE

## 2022-11-17 PROCEDURE — 99999 PR PBB SHADOW E&M-EST. PATIENT-LVL V: CPT | Mod: PBBFAC,,, | Performed by: INTERNAL MEDICINE

## 2022-11-17 PROCEDURE — 3078F PR MOST RECENT DIASTOLIC BLOOD PRESSURE < 80 MM HG: ICD-10-PCS | Mod: CPTII,S$GLB,, | Performed by: INTERNAL MEDICINE

## 2022-11-17 PROCEDURE — 99215 OFFICE O/P EST HI 40 MIN: CPT | Mod: S$GLB,,, | Performed by: INTERNAL MEDICINE

## 2022-11-17 PROCEDURE — 3008F BODY MASS INDEX DOCD: CPT | Mod: CPTII,S$GLB,, | Performed by: INTERNAL MEDICINE

## 2022-11-17 PROCEDURE — 3052F PR MOST RECENT HEMOGLOBIN A1C LEVEL 8.0 - < 9.0%: ICD-10-PCS | Mod: CPTII,S$GLB,, | Performed by: INTERNAL MEDICINE

## 2022-11-17 PROCEDURE — 3078F DIAST BP <80 MM HG: CPT | Mod: CPTII,S$GLB,, | Performed by: INTERNAL MEDICINE

## 2022-11-17 PROCEDURE — 1157F PR ADVANCE CARE PLAN OR EQUIV PRESENT IN MEDICAL RECORD: ICD-10-PCS | Mod: CPTII,S$GLB,, | Performed by: INTERNAL MEDICINE

## 2022-11-17 PROCEDURE — 3052F HG A1C>EQUAL 8.0%<EQUAL 9.0%: CPT | Mod: CPTII,S$GLB,, | Performed by: INTERNAL MEDICINE

## 2022-11-17 PROCEDURE — 4010F ACE/ARB THERAPY RXD/TAKEN: CPT | Mod: CPTII,S$GLB,, | Performed by: INTERNAL MEDICINE

## 2022-11-17 PROCEDURE — 3061F NEG MICROALBUMINURIA REV: CPT | Mod: CPTII,S$GLB,, | Performed by: INTERNAL MEDICINE

## 2022-11-17 PROCEDURE — 1126F AMNT PAIN NOTED NONE PRSNT: CPT | Mod: CPTII,S$GLB,, | Performed by: INTERNAL MEDICINE

## 2022-11-17 RX ORDER — BLOOD-GLUCOSE,RECEIVER,CONT
1 EACH MISCELLANEOUS DAILY
Qty: 1 EACH | Refills: 1 | Status: SHIPPED | OUTPATIENT
Start: 2022-11-17 | End: 2023-11-17

## 2022-11-17 RX ORDER — BLOOD-GLUCOSE SENSOR
1 EACH MISCELLANEOUS DAILY
Qty: 1 EACH | Refills: 1 | Status: SHIPPED | OUTPATIENT
Start: 2022-11-17 | End: 2023-11-17

## 2022-11-17 RX ORDER — BLOOD-GLUCOSE TRANSMITTER
1 EACH MISCELLANEOUS DAILY
Qty: 1 EACH | Refills: 1 | Status: SHIPPED | OUTPATIENT
Start: 2022-11-17

## 2022-11-17 RX ORDER — INSULIN LISPRO 100 [IU]/ML
5 INJECTION, SOLUTION INTRAVENOUS; SUBCUTANEOUS
Qty: 4.5 ML | Refills: 0
Start: 2022-11-17 | End: 2023-11-17

## 2022-11-17 RX ADMIN — IOHEXOL 30 ML: 350 INJECTION, SOLUTION INTRAVENOUS at 02:11

## 2022-11-17 RX ADMIN — IOHEXOL 75 ML: 350 INJECTION, SOLUTION INTRAVENOUS at 04:11

## 2022-11-17 NOTE — PROGRESS NOTES
Assessment:       1. Fever, unspecified fever cause    2. Encounter for screening mammogram for breast cancer    3. Type 2 diabetes mellitus with hyperglycemia, with long-term current use of insulin          Plan:         Chayito was seen today for hospital follow up.    Diagnoses and all orders for this visit:    Fever, unspecified fever cause  -     Urine culture; Future  -     Urinalysis; Future  -     X-Ray Chest PA And Lateral; Future  -     CBC Auto Differential; Future  -     Procalcitonin; Future  -     C-REACTIVE PROTEIN; Future  -     CLOSTRIDIUM DIFFICILE; Future  -     CULTURE, BLOOD; Future  -     CT Abdomen Pelvis With Contrast; Future  -     Comprehensive Metabolic Panel; Future    Encounter for screening mammogram for breast cancer  -     Cancel: Mammo Digital Screening Bilat; Future    Type 2 diabetes mellitus with hyperglycemia, with long-term current use of insulin  -     blood-glucose sensor (DEXCOM G6 SENSOR) Tyra; 1 each by Misc.(Non-Drug; Combo Route) route once daily.  -     blood-glucose meter,continuous (DEXCOM G6 ) Misc; 1 each by Misc.(Non-Drug; Combo Route) route once daily.  -     blood-glucose transmitter (DEXCOM G6 TRANSMITTER) Tyra; 1 each by Misc.(Non-Drug; Combo Route) route once daily.    Other orders  -     insulin lispro 100 unit/mL injection; Inject 5 Units into the skin 3 (three) times daily before meals.        I spent at least 40 mins with preparing to see the patient, obtaining and/or revieweing separately obtained history, preforming a examination and evaluation, counseling, communicating with other health care professional, documenting clinical information in the electronic health record,  and/or coordinating care.             Subjective:       Patient ID: Chayito Chung is a 71 y.o. female.    Chief Complaint: Hospital Follow Up    Patient recently discharge from the hospital for pneumonia after lung bx dx with lung cacern. She was discharge on 11/3/22 . Yesterday  "she reports fever, nausea , voimiting and diarreha.     HPI    Review of Systems   Constitutional:  Positive for activity change, appetite change, chills, fatigue and fever. Negative for diaphoresis.   Respiratory:  Positive for cough.    Gastrointestinal:  Positive for abdominal distention, abdominal pain, change in bowel habit, diarrhea, nausea, vomiting and change in bowel habit. Negative for anal bleeding and blood in stool.   Genitourinary:  Negative for dysuria.           Health Maintenance Due   Topic Date Due    COVID-19 Vaccine (4 - Booster for Moderna series) 01/24/2022    Mammogram  12/15/2022         Objective:     /79 (BP Location: Right arm, Patient Position: Sitting, BP Method: Large (Manual))   Pulse (!) 113   Ht 5' 2" (1.575 m)   Wt 57.6 kg (126 lb 15.8 oz)   LMP  (LMP Unknown)   SpO2 99%   BMI 23.23 kg/m²     Vitals 11/17/2022 10/14/2022 9/14/2022 9/7/2022 9/4/2022   Height 62 62 62 - -   Weight (lbs) 126.99 126.54 134.48 134.7 132.06   BMI (kg/m2) 23.2 23.1 24.6 24.6 24.1            Physical Exam  Nursing note reviewed.   Constitutional:       General: She is not in acute distress.     Appearance: Normal appearance. She is not toxic-appearing.   HENT:      Head: Normocephalic.   Eyes:      Conjunctiva/sclera: Conjunctivae normal.   Pulmonary:      Effort: Pulmonary effort is normal. No respiratory distress.   Neurological:      General: No focal deficit present.      Mental Status: She is alert and oriented to person, place, and time.   Psychiatric:         Mood and Affect: Mood normal.         Behavior: Behavior normal.         Thought Content: Thought content normal.         Judgment: Judgment normal.           Future Appointments   Date Time Provider Department Center   11/17/2022  1:45 PM [facility]DC XR-B LIMIT 500 LBS [facility] XRAY OP [facility]   11/17/2022  2:30 PM [facility] CT1 LIMIT 450 LBS [facility] CT SCAN [facility]   12/6/2022  9:00 AM Gaurav Malin MD [facility] CARDIO [facility] " "  1/10/2023  9:30 AM [facility]   1/13/2023 11:00 AM Cameron Valladares III, MD [facility]         Medication List with Changes/Refills   New Medications    BLOOD-GLUCOSE METER,CONTINUOUS (DEXCOM G6 ) MISC    1 each by Misc.(Non-Drug; Combo Route) route once daily.    BLOOD-GLUCOSE SENSOR (DEXCOM G6 SENSOR) AUGUSTINE    1 each by Misc.(Non-Drug; Combo Route) route once daily.    BLOOD-GLUCOSE TRANSMITTER (DEXCOM G6 TRANSMITTER) AUGUSTINE    1 each by Misc.(Non-Drug; Combo Route) route once daily.    INSULIN LISPRO 100 UNIT/ML INJECTION    Inject 5 Units into the skin 3 (three) times daily before meals.   Current Medications    ALBUTEROL (PROVENTIL/VENTOLIN HFA) 90 MCG/ACTUATION INHALER    Inhale 2 puffs into the lungs every 4 (four) hours as needed for Wheezing or Shortness of Breath. Rescue    ALENDRONATE (FOSAMAX) 70 MG TABLET    Take 1 tablet (70 mg total) by mouth every Sunday.    ASPIRIN 81 MG CHEW    Take 1 tablet (81 mg total) by mouth once daily.    ATORVASTATIN (LIPITOR) 40 MG TABLET    Take 1 tablet (40 mg total) by mouth once daily.    BD ULTRA-FINE MINI PEN NEEDLE 31 GAUGE X 3/16" NDLE        BUSPIRONE (BUSPAR) 15 MG TABLET    Take 15 mg by mouth 2 (two) times daily.    DICLOFENAC SODIUM (VOLTAREN) 1 % GEL    Apply 2 g topically 4 (four) times daily as needed.    HYDROCODONE-ACETAMINOPHEN 7.5-300 MG TAB    Take 1 tablet by mouth 2 (two) times daily as needed (chronic neck pain).    INSULIN LISPRO PROTAMINE-INSULIN LISPRO (HUMALOG MIX 75-25,U-100,INSULN) 100 UNIT/ML (75-25) SUSP    Inject 35 Units into the skin 2 (two) times daily before meals.    LANTUS SOLOSTAR U-100 INSULIN GLARGINE 100 UNITS/ML SUBQ PEN    Inject 20 Units into the skin every evening.    LOSARTAN (COZAAR) 25 MG TABLET    Take 1 tablet (25 mg total) by mouth once daily.    METFORMIN (GLUCOPHAGE) 1000 MG TABLET    Take 1,000 mg by mouth 2 (two) times daily.    MIRTAZAPINE (REMERON) 15 MG TABLET    Take 15 mg by mouth " once daily.    NITROGLYCERIN (NITROSTAT) 0.4 MG SL TABLET    Place 1 tablet (0.4 mg total) under the tongue every 5 (five) minutes as needed for Chest pain.    ONDANSETRON (ZOFRAN) 4 MG/5 ML SOLUTION    Take 4 mg by mouth daily as needed for Nausea.    POTASSIUM CHLORIDE SA (K-DUR,KLOR-CON) 20 MEQ TABLET    Take 1 tablet (20 mEq total) by mouth once daily.    PREGABALIN (LYRICA) 150 MG CAPSULE    Take 150 mg by mouth 3 (three) times daily.    QUETIAPINE (SEROQUEL) 400 MG TABLET    Take 400 mg by mouth nightly.    SERTRALINE (ZOLOFT) 100 MG TABLET    Take 150 mg by mouth once daily at 6am.    TOPIRAMATE (TOPAMAX) 50 MG TABLET    Take 1 tablet (50 mg total) by mouth 2 (two) times daily.    TRELEGY ELLIPTA 200-62.5-25 MCG INHALER    Inhale 1 puff into the lungs once daily.    ZONISAMIDE (ZONEGRAN) 100 MG CAP    Take 200 mg by mouth 2 (two) times daily.   Discontinued Medications    ASA/ACETAMINOPHEN/CAFFEINE/POT (GOODY'S HEADACHE POWDER ORAL)    Take 1 packet by mouth daily as needed (Severe headache).         Disclaimer:  This note has been generated using voice-recognition software. There may be grammatical or spelling errors that have been missed during proof-reading

## 2022-12-02 ENCOUNTER — DOCUMENT SCAN (OUTPATIENT)
Dept: HOME HEALTH SERVICES | Facility: HOSPITAL | Age: 72
End: 2022-12-02
Payer: MEDICARE

## 2022-12-05 PROBLEM — J96.21 ACUTE ON CHRONIC RESPIRATORY FAILURE WITH HYPOXIA: Status: RESOLVED | Noted: 2022-09-03 | Resolved: 2022-12-05

## 2022-12-06 ENCOUNTER — DOCUMENT SCAN (OUTPATIENT)
Dept: HOME HEALTH SERVICES | Facility: HOSPITAL | Age: 72
End: 2022-12-06
Payer: MEDICARE

## 2022-12-06 ENCOUNTER — OFFICE VISIT (OUTPATIENT)
Dept: CARDIOLOGY | Facility: CLINIC | Age: 72
End: 2022-12-06
Payer: MEDICARE

## 2022-12-06 VITALS
BODY MASS INDEX: 22.78 KG/M2 | SYSTOLIC BLOOD PRESSURE: 127 MMHG | HEIGHT: 62 IN | OXYGEN SATURATION: 99 % | HEART RATE: 111 BPM | WEIGHT: 123.81 LBS | DIASTOLIC BLOOD PRESSURE: 85 MMHG

## 2022-12-06 DIAGNOSIS — E11.59 HYPERTENSION ASSOCIATED WITH DIABETES: ICD-10-CM

## 2022-12-06 DIAGNOSIS — J44.1 CHRONIC OBSTRUCTIVE PULMONARY DISEASE WITH ACUTE EXACERBATION: ICD-10-CM

## 2022-12-06 DIAGNOSIS — R07.9 CHEST PAIN OF UNCERTAIN ETIOLOGY: ICD-10-CM

## 2022-12-06 DIAGNOSIS — I34.1 MITRAL VALVE PROLAPSE: ICD-10-CM

## 2022-12-06 DIAGNOSIS — I25.118 CORONARY ARTERY DISEASE OF NATIVE ARTERY OF NATIVE HEART WITH STABLE ANGINA PECTORIS: ICD-10-CM

## 2022-12-06 DIAGNOSIS — I10 PRIMARY HYPERTENSION: ICD-10-CM

## 2022-12-06 DIAGNOSIS — I15.2 HYPERTENSION ASSOCIATED WITH DIABETES: ICD-10-CM

## 2022-12-06 DIAGNOSIS — Z98.61 S/P PTCA (PERCUTANEOUS TRANSLUMINAL CORONARY ANGIOPLASTY): Primary | ICD-10-CM

## 2022-12-06 DIAGNOSIS — C34.91 ADENOCARCINOMA OF LUNG, RIGHT: ICD-10-CM

## 2022-12-06 DIAGNOSIS — E78.00 PURE HYPERCHOLESTEROLEMIA: ICD-10-CM

## 2022-12-06 PROCEDURE — 1101F PT FALLS ASSESS-DOCD LE1/YR: CPT | Mod: CPTII,S$GLB,, | Performed by: INTERNAL MEDICINE

## 2022-12-06 PROCEDURE — 99213 PR OFFICE/OUTPT VISIT, EST, LEVL III, 20-29 MIN: ICD-10-PCS | Mod: 25,S$GLB,, | Performed by: INTERNAL MEDICINE

## 2022-12-06 PROCEDURE — 1157F PR ADVANCE CARE PLAN OR EQUIV PRESENT IN MEDICAL RECORD: ICD-10-PCS | Mod: CPTII,S$GLB,, | Performed by: INTERNAL MEDICINE

## 2022-12-06 PROCEDURE — 1157F ADVNC CARE PLAN IN RCRD: CPT | Mod: CPTII,S$GLB,, | Performed by: INTERNAL MEDICINE

## 2022-12-06 PROCEDURE — 3074F SYST BP LT 130 MM HG: CPT | Mod: CPTII,S$GLB,, | Performed by: INTERNAL MEDICINE

## 2022-12-06 PROCEDURE — 1126F AMNT PAIN NOTED NONE PRSNT: CPT | Mod: CPTII,S$GLB,, | Performed by: INTERNAL MEDICINE

## 2022-12-06 PROCEDURE — 3052F PR MOST RECENT HEMOGLOBIN A1C LEVEL 8.0 - < 9.0%: ICD-10-PCS | Mod: CPTII,S$GLB,, | Performed by: INTERNAL MEDICINE

## 2022-12-06 PROCEDURE — 3052F HG A1C>EQUAL 8.0%<EQUAL 9.0%: CPT | Mod: CPTII,S$GLB,, | Performed by: INTERNAL MEDICINE

## 2022-12-06 PROCEDURE — 1159F MED LIST DOCD IN RCRD: CPT | Mod: CPTII,S$GLB,, | Performed by: INTERNAL MEDICINE

## 2022-12-06 PROCEDURE — 4010F PR ACE/ARB THEARPY RXD/TAKEN: ICD-10-PCS | Mod: CPTII,S$GLB,, | Performed by: INTERNAL MEDICINE

## 2022-12-06 PROCEDURE — 99999 PR PBB SHADOW E&M-EST. PATIENT-LVL III: ICD-10-PCS | Mod: PBBFAC,,, | Performed by: INTERNAL MEDICINE

## 2022-12-06 PROCEDURE — 1159F PR MEDICATION LIST DOCUMENTED IN MEDICAL RECORD: ICD-10-PCS | Mod: CPTII,S$GLB,, | Performed by: INTERNAL MEDICINE

## 2022-12-06 PROCEDURE — 3288F FALL RISK ASSESSMENT DOCD: CPT | Mod: CPTII,S$GLB,, | Performed by: INTERNAL MEDICINE

## 2022-12-06 PROCEDURE — 1126F PR PAIN SEVERITY QUANTIFIED, NO PAIN PRESENT: ICD-10-PCS | Mod: CPTII,S$GLB,, | Performed by: INTERNAL MEDICINE

## 2022-12-06 PROCEDURE — 99213 OFFICE O/P EST LOW 20 MIN: CPT | Mod: 25,S$GLB,, | Performed by: INTERNAL MEDICINE

## 2022-12-06 PROCEDURE — 4010F ACE/ARB THERAPY RXD/TAKEN: CPT | Mod: CPTII,S$GLB,, | Performed by: INTERNAL MEDICINE

## 2022-12-06 PROCEDURE — 3074F PR MOST RECENT SYSTOLIC BLOOD PRESSURE < 130 MM HG: ICD-10-PCS | Mod: CPTII,S$GLB,, | Performed by: INTERNAL MEDICINE

## 2022-12-06 PROCEDURE — 93000 ELECTROCARDIOGRAM COMPLETE: CPT | Mod: S$GLB,,, | Performed by: INTERNAL MEDICINE

## 2022-12-06 PROCEDURE — 93000 EKG 12-LEAD: ICD-10-PCS | Mod: S$GLB,,, | Performed by: INTERNAL MEDICINE

## 2022-12-06 PROCEDURE — 1160F PR REVIEW ALL MEDS BY PRESCRIBER/CLIN PHARMACIST DOCUMENTED: ICD-10-PCS | Mod: CPTII,S$GLB,, | Performed by: INTERNAL MEDICINE

## 2022-12-06 PROCEDURE — 3079F DIAST BP 80-89 MM HG: CPT | Mod: CPTII,S$GLB,, | Performed by: INTERNAL MEDICINE

## 2022-12-06 PROCEDURE — 3079F PR MOST RECENT DIASTOLIC BLOOD PRESSURE 80-89 MM HG: ICD-10-PCS | Mod: CPTII,S$GLB,, | Performed by: INTERNAL MEDICINE

## 2022-12-06 PROCEDURE — 3066F NEPHROPATHY DOC TX: CPT | Mod: CPTII,S$GLB,, | Performed by: INTERNAL MEDICINE

## 2022-12-06 PROCEDURE — 3008F BODY MASS INDEX DOCD: CPT | Mod: CPTII,S$GLB,, | Performed by: INTERNAL MEDICINE

## 2022-12-06 PROCEDURE — 1160F RVW MEDS BY RX/DR IN RCRD: CPT | Mod: CPTII,S$GLB,, | Performed by: INTERNAL MEDICINE

## 2022-12-06 PROCEDURE — 3008F PR BODY MASS INDEX (BMI) DOCUMENTED: ICD-10-PCS | Mod: CPTII,S$GLB,, | Performed by: INTERNAL MEDICINE

## 2022-12-06 PROCEDURE — 3061F PR NEG MICROALBUMINURIA RESULT DOCUMENTED/REVIEW: ICD-10-PCS | Mod: CPTII,S$GLB,, | Performed by: INTERNAL MEDICINE

## 2022-12-06 PROCEDURE — 1101F PR PT FALLS ASSESS DOC 0-1 FALLS W/OUT INJ PAST YR: ICD-10-PCS | Mod: CPTII,S$GLB,, | Performed by: INTERNAL MEDICINE

## 2022-12-06 PROCEDURE — 3066F PR DOCUMENTATION OF TREATMENT FOR NEPHROPATHY: ICD-10-PCS | Mod: CPTII,S$GLB,, | Performed by: INTERNAL MEDICINE

## 2022-12-06 PROCEDURE — 3288F PR FALLS RISK ASSESSMENT DOCUMENTED: ICD-10-PCS | Mod: CPTII,S$GLB,, | Performed by: INTERNAL MEDICINE

## 2022-12-06 PROCEDURE — 3061F NEG MICROALBUMINURIA REV: CPT | Mod: CPTII,S$GLB,, | Performed by: INTERNAL MEDICINE

## 2022-12-06 PROCEDURE — 99999 PR PBB SHADOW E&M-EST. PATIENT-LVL III: CPT | Mod: PBBFAC,,, | Performed by: INTERNAL MEDICINE

## 2022-12-06 RX ORDER — CHOLECALCIFEROL (VITAMIN D3) 25 MCG
1000 TABLET ORAL
COMMUNITY

## 2022-12-06 NOTE — PROGRESS NOTES
Subjective:      Patient ID: Chayito Chung is a 72 y.o. female.    Chief Complaint: Follow-up    HPI:  Pt was dx with stage IV adenocarcinoma of the lung and has undergone radiation therapy and is now receiving chemotherapy.  Pt may also be on Opdivo.    Pt is also scheduled to receive iron infusions.    Pt feels OK except for fatigue and shortness of breath.    Pt has been on home oxygen for the past few months      Review of Systems   HENT:  Congestion: Pt took NTG SL on two occasions for chest pains not related to activity.    Cardiovascular:  Positive for chest pain and dyspnea on exertion. Negative for claudication, irregular heartbeat, leg swelling, near-syncope, orthopnea, palpitations and syncope.      Past Medical History:   Diagnosis Date    Acute coronary syndrome     Acute hypoxemic respiratory failure 2017    Anxiety     Asthma     Cancer     colon    Cataracts, both eyes     Chest pain at rest     Chest pain of uncertain etiology 2015    Colon cancer 1988    COPD (chronic obstructive pulmonary disease)     Coronary artery disease     Depression     Diabetes mellitus     Dyspnea on exertion 11/15/2018    Elevated brain natriuretic peptide (BNP) level 11/15/2018    Elevated cholesterol     Hypertension     Swelling     Syncope and collapse 2016        Past Surgical History:   Procedure Laterality Date    ABDOMINAL SURGERY      BREAST BIOPSY      benign unsure what side     SECTION, CLASSIC      COLON SURGERY      COLONOSCOPY  2019    repeat in 5 years    CORONARY ANGIOPLASTY WITH STENT PLACEMENT  3 months ago     x2, Hysterectomy, Lung surgery, Partial stomach removed, Part of colon removed for rectal cancer    GASTRECTOMY      HEMORRHOID SURGERY      HYSTERECTOMY      @28yrs of age    LUNG BIOPSY      OOPHORECTOMY      @28yrs of age    POSTERIOR FUSION OF CERVICAL SPINE WITH LAMINECTOMY N/A 2020    Procedure: LAMINECTOMY, SPINE, CERVICAL, WITH POSTERIOR  FUSION C3-T1 ;  Surgeon: Herson Tate MD;  Location: [facility] OR [address] [place] [address];  Service: Neurosurgery;  Laterality: N/A;    REPAIR OF INCARCERATED INCISIONAL HERNIA WITHOUT HISTORY OF PRIOR REPAIR N/A 2/7/2022    Procedure: REPAIR, HERNIA, INCISIONAL, INCARCERATED, WITHOUT HISTORY OF PRIOR REPAIR;  Surgeon: Simon King MD;  Location: [facility] OR;  Service: General;  Laterality: N/A;       Family History   Problem Relation Age of Onset    Cancer Mother     Diabetes Mother     Hypertension Mother     Breast cancer Mother     Heart disease Father     Lung cancer Father     Depression Sister     Hypertension Sister     Diabetes Mellitus Sister     Cancer Maternal Aunt        Social History     Socioeconomic History    Marital status: Single   Tobacco Use    Smoking status: Former     Years: 55.00     Types: Cigarettes     Start date: 1967    Smokeless tobacco: Never    Tobacco comments:     Enrolled in the [employer] on 10/5/15 (SCT Member ID # [id number]).  Ambulatory referral to Smoking Cessation program.   Substance and Sexual Activity    Alcohol use: Yes     Alcohol/week: 3.0 standard drinks     Types: 3 Glasses of wine per week     Comment: 1 every 3 months    Drug use: No    Sexual activity: Yes     Partners: Male     Birth control/protection: None     Comment: last drink yesterday afternoon   Social History Narrative    Pulmonology Dr. Chung([facility]) for COPD     Endo- Dr. Damaso Delgado for Diabetes Mellitus    GI- Dr. Arias- History of partial gastrectomy, gastritis    Heme/Onc-  Anemia and history of colon cancer     Psych- Dr. Cotto ([facility])- Depression/Anxiety. PTSD    Dr. Knox- Pain Management    Dr. Anguiano Neurology- Seizure disorder    Dr. Malin- s/p percutaneous transluminal coronary angioplasty          Social Determinants of Health     Financial Resource Strain: Low Risk     Difficulty of Paying Living Expenses: Not hard at all   Food Insecurity: No Food Insecurity    Worried About  "Running Out of Food in the Last Year: Never true    Ran Out of Food in the Last Year: Never true   Transportation Needs: No Transportation Needs    Lack of Transportation (Medical): No    Lack of Transportation (Non-Medical): No   Physical Activity: Inactive    Days of Exercise per Week: 0 days    Minutes of Exercise per Session: 0 min   Stress: No Stress Concern Present    Feeling of Stress : Not at all   Social Connections: Unknown    Frequency of Communication with Friends and Family: Three times a week    Frequency of Social Gatherings with Friends and Family: Twice a week    Attends L[employer] day Services: Never    Active Member of Clubs or Organizations: No    Attends Club or Organization Meetings: Never   Housing Stability: Low Risk     Unable to Pay for Housing in the Last Year: No    Number of Places Lived in the Last Year: 1    Unstable Housing in the Last Year: No       Current Outpatient Medications on File Prior to Visit   Medication Sig Dispense Refill    albuterol (PROVENTIL/VENTOLIN HFA) 90 mcg/actuation inhaler Inhale 2 puffs into the lungs every 4 (four) hours as needed for Wheezing or Shortness of Breath. Rescue 25.5 g 3    alendronate (FOSAMAX) 70 MG tablet Take 1 tablet (70 mg total) by mouth every Sunday. 12 tablet 3    aspirin 81 MG Chew Take 1 tablet (81 mg total) by mouth once daily. 90 tablet 3    atorvastatin (LIPITOR) 40 MG tablet Take 1 tablet (40 mg total) by mouth once daily. 90 tablet 3    BD ULTRA-FINE MINI PEN NEEDLE 31 gauge x 3/16" Ndle       blood-glucose meter,continuous (DEXCOM G6 ) Misc 1 each by Misc.(Non-Drug; Combo Route) route once daily. 1 each 1    blood-glucose sensor (DEXCOM G6 SENSOR) Tyra 1 each by Misc.(Non-Drug; Combo Route) route once daily. 1 each 1    blood-glucose transmitter (DEXCOM G6 TRANSMITTER) Tyra 1 each by Misc.(Non-Drug; Combo Route) route once daily. 1 each 1    busPIRone (BUSPAR) 15 MG tablet Take 15 mg by mouth 2 (two) times daily.      " diclofenac sodium (VOLTAREN) 1 % Gel Apply 2 g topically 4 (four) times daily as needed. 100 g 0    HYDROcodone-acetaminophen 7.5-300 mg Tab Take 1 tablet by mouth 2 (two) times daily as needed (chronic neck pain).      insulin lispro 100 unit/mL injection Inject 5 Units into the skin 3 (three) times daily before meals. 4.5 mL 0    insulin lispro protamine-insulin lispro (HUMALOG MIX 75-25,U-100,INSULN) 100 unit/mL (75-25) Susp Inject 35 Units into the skin 2 (two) times daily before meals. 21 mL 11    LANTUS SOLOSTAR U-100 INSULIN glargine 100 units/mL SubQ pen Inject 20 Units into the skin every evening. 22.5 mL 0    losartan (COZAAR) 25 MG tablet Take 1 tablet (25 mg total) by mouth once daily. 90 tablet 3    metFORMIN (GLUCOPHAGE) 1000 MG tablet Take 1,000 mg by mouth 2 (two) times daily.      mirtazapine (REMERON) 15 MG tablet Take 15 mg by mouth once daily.      nitroGLYCERIN (NITROSTAT) 0.4 MG SL tablet Place 1 tablet (0.4 mg total) under the tongue every 5 (five) minutes as needed for Chest pain. 100 tablet 1    ondansetron (ZOFRAN) 4 mg/5 mL solution Take 4 mg by mouth daily as needed for Nausea.      potassium chloride SA (K-DUR,KLOR-CON) 20 MEQ tablet Take 1 tablet (20 mEq total) by mouth once daily. 90 tablet 3    pregabalin (LYRICA) 150 MG capsule Take 150 mg by mouth 3 (three) times daily.      QUEtiapine (SEROQUEL) 400 MG tablet Take 400 mg by mouth nightly.      sertraline (ZOLOFT) 100 MG tablet Take 150 mg by mouth once daily at 6am.      topiramate (TOPAMAX) 50 MG tablet Take 1 tablet (50 mg total) by mouth 2 (two) times daily. 180 tablet 1    TRELEGY ELLIPTA 200-62.5-25 mcg inhaler Inhale 1 puff into the lungs once daily.      zonisamide (ZONEGRAN) 100 MG Cap Take 200 mg by mouth 2 (two) times daily.      vitamin D (VITAMIN D3) 1000 units Tab Take 1,000 Units by mouth.      [DISCONTINUED] clopidogreL (PLAVIX) 75 mg tablet Take 1 tablet (75 mg total) by mouth once daily. Take medicine for 15 days.  "15 tablet 0    [DISCONTINUED] pravastatin (PRAVACHOL) 40 MG tablet Take 0.5 tablets (20 mg total) by mouth once daily. (Patient taking differently: Take 40 mg by mouth once daily.) 90 tablet 1     No current facility-administered medications on file prior to visit.       Review of patient's allergies indicates:  No Known Allergies  Objective:     Vitals:    12/06/22 0924   BP: 127/85   BP Location: Left arm   Patient Position: Sitting   BP Method: Large (Automatic)   Pulse: (!) 111   SpO2: 99%   Weight: 56.1 kg (123 lb 12.6 oz)   Height: 5' 2" (1.575 m)        Physical Exam  Constitutional:       Appearance: She is well-developed.   Eyes:      General: No scleral icterus.  Neck:      Vascular: No carotid bruit or JVD.   Cardiovascular:      Rate and Rhythm: Regular rhythm. Tachycardia present.      Heart sounds: No murmur heard.    No gallop.   Pulmonary:      Breath sounds: Normal breath sounds.   Musculoskeletal:      Right lower leg: No edema.      Left lower leg: No edema.   Skin:     General: Skin is warm and dry.   Neurological:      Mental Status: She is alert and oriented to person, place, and time.   Psychiatric:         Behavior: Behavior normal.         Thought Content: Thought content normal.         Judgment: Judgment normal.        ECG today reviewed by me: sinus tachycardia, otherwise WNL        Lab Visit on 11/17/2022   Component Date Value Ref Range Status    Urine Culture, Routine 11/17/2022 Multiple organisms isolated. None in predominance.  Repeat if   Final    Urine Culture, Routine 11/17/2022 clinically necessary.   Final    Specimen UA 11/17/2022 Urine, Clean Catch   Final    Color, UA 11/17/2022 Yellow  Yellow, Straw, Mya Final    Appearance, UA 11/17/2022 Clear  Clear Final    pH, UA 11/17/2022 8.0  5.0 - 8.0 Final    Specific Gravity, UA 11/17/2022 1.010  1.005 - 1.030 Final    Protein, UA 11/17/2022 Negative  Negative Final    Glucose, UA 11/17/2022 3+ (A)  Negative Final    Ketones, UA " 11/17/2022 Negative  Negative Final    Bilirubin (UA) 11/17/2022 Negative  Negative Final    Occult Blood UA 11/17/2022 Negative  Negative Final    Nitrite, UA 11/17/2022 Negative  Negative Final    Urobilinogen, UA 11/17/2022 Negative  <2.0 EU/dL Final    Leukocytes, UA 11/17/2022 Negative  Negative Final    WBC, UA 11/17/2022 0  0 - 5 /hpf Final    Bacteria 11/17/2022 None  None-Occ /hpf Final    Yeast, UA 11/17/2022 None  None Final    Squam Epithel, UA 11/17/2022 0  /hpf Final    Microscopic Comment 11/17/2022 SEE COMMENT   Final   Lab Visit on 11/17/2022   Component Date Value Ref Range Status    WBC 11/17/2022 4.56  3.90 - 12.70 K/uL Final    RBC 11/17/2022 4.11  4.00 - 5.40 M/uL Final    Hemoglobin 11/17/2022 10.3 (L)  12.0 - 16.0 g/dL Final    Hematocrit 11/17/2022 35.8 (L)  37.0 - 48.5 % Final    MCV 11/17/2022 87  82 - 98 fL Final    MCH 11/17/2022 25.1 (L)  27.0 - 31.0 pg Final    MCHC 11/17/2022 28.8 (L)  32.0 - 36.0 g/dL Final    RDW 11/17/2022 23.7 (H)  11.5 - 14.5 % Final    Platelets 11/17/2022 301  150 - 450 K/uL Final    MPV 11/17/2022 9.3  9.2 - 12.9 fL Final    Immature Granulocytes 11/17/2022 0.0  0.0 - 0.5 % Final    Gran # (ANC) 11/17/2022 3.2  1.8 - 7.7 K/uL Final    Immature Grans (Abs) 11/17/2022 0.00  0.00 - 0.04 K/uL Final    Lymph # 11/17/2022 1.0  1.0 - 4.8 K/uL Final    Mono # 11/17/2022 0.3  0.3 - 1.0 K/uL Final    Eos # 11/17/2022 0.1  0.0 - 0.5 K/uL Final    Baso # 11/17/2022 0.03  0.00 - 0.20 K/uL Final    nRBC 11/17/2022 0  0 /100 WBC Final    Gran % 11/17/2022 70.9  38.0 - 73.0 % Final    Lymph % 11/17/2022 20.8  18.0 - 48.0 % Final    Mono % 11/17/2022 6.1  4.0 - 15.0 % Final    Eosinophil % 11/17/2022 1.5  0.0 - 8.0 % Final    Basophil % 11/17/2022 0.7  0.0 - 1.9 % Final    Platelet Estimate 11/17/2022 Appears normal   Final    Aniso 11/17/2022 Slight   Final    Hypo 11/17/2022 Occasional   Final    Differential Method 11/17/2022 Automated   Final    Procalcitonin 11/17/2022  0.02  <0.25 ng/mL Final    CRP 11/17/2022 15.6 (H)  0.0 - 8.2 mg/L Final    Blood Culture, Routine 11/17/2022 No growth after 5 days.   Final    Sodium 11/17/2022 140  136 - 145 mmol/L Final    Potassium 11/17/2022 3.9  3.5 - 5.1 mmol/L Final    Chloride 11/17/2022 109  95 - 110 mmol/L Final    CO2 11/17/2022 17 (L)  23 - 29 mmol/L Final    Glucose 11/17/2022 117 (H)  70 - 110 mg/dL Final    BUN 11/17/2022 6 (L)  8 - 23 mg/dL Final    Creatinine 11/17/2022 0.7  0.5 - 1.4 mg/dL Final    Calcium 11/17/2022 9.8  8.7 - 10.5 mg/dL Final    Total Protein 11/17/2022 8.4  6.0 - 8.4 g/dL Final    Albumin 11/17/2022 3.6  3.5 - 5.2 g/dL Final    Total Bilirubin 11/17/2022 0.2  0.1 - 1.0 mg/dL Final    Alkaline Phosphatase 11/17/2022 148 (H)  55 - 135 U/L Final    AST 11/17/2022 10  10 - 40 U/L Final    ALT 11/17/2022 10  10 - 44 U/L Final    Anion Gap 11/17/2022 14  8 - 16 mmol/L Final    eGFR 11/17/2022 >60  >60 mL/min/1.73 m^2 Final    POC Creatinine 11/17/2022 0.4 (L)  0.5 - 1.4 mg/dL Final    Sample 11/17/2022 VENOUS   Final   Lab Visit on 10/11/2022   Component Date Value Ref Range Status    Hemoglobin A1C 10/11/2022 8.2 (H)  4.0 - 5.6 % Final    Estimated Avg Glucose 10/11/2022 189 (H)  68 - 131 mg/dL Final    Cholesterol 10/11/2022 104 (L)  120 - 199 mg/dL Final    Triglycerides 10/11/2022 88  30 - 150 mg/dL Final    HDL 10/11/2022 43  40 - 75 mg/dL Final    LDL Cholesterol 10/11/2022 43.4 (L)  63.0 - 159.0 mg/dL Final    HDL/Cholesterol Ratio 10/11/2022 41.3  20.0 - 50.0 % Final    Total Cholesterol/HDL Ratio 10/11/2022 2.4  2.0 - 5.0 Final    Non-HDL Cholesterol 10/11/2022 61  mg/dL Final    WBC 10/11/2022 6.38  3.90 - 12.70 K/uL Final    RBC 10/11/2022 3.90 (L)  4.00 - 5.40 M/uL Final    Hemoglobin 10/11/2022 9.3 (L)  12.0 - 16.0 g/dL Final    Hematocrit 10/11/2022 33.2 (L)  37.0 - 48.5 % Final    MCV 10/11/2022 85  82 - 98 fL Final    MCH 10/11/2022 23.8 (L)  27.0 - 31.0 pg Final    MCHC 10/11/2022 28.0 (L)  32.0 -  36.0 g/dL Final    RDW 10/11/2022 23.7 (H)  11.5 - 14.5 % Final    Platelets 10/11/2022 264  150 - 450 K/uL Final    MPV 10/11/2022 9.3  9.2 - 12.9 fL Final    Immature Granulocytes 10/11/2022 0.3  0.0 - 0.5 % Final    Gran # (ANC) 10/11/2022 4.3  1.8 - 7.7 K/uL Final    Immature Grans (Abs) 10/11/2022 0.02  0.00 - 0.04 K/uL Final    Lymph # 10/11/2022 1.2  1.0 - 4.8 K/uL Final    Mono # 10/11/2022 0.7  0.3 - 1.0 K/uL Final    Eos # 10/11/2022 0.1  0.0 - 0.5 K/uL Final    Baso # 10/11/2022 0.02  0.00 - 0.20 K/uL Final    nRBC 10/11/2022 0  0 /100 WBC Final    Gran % 10/11/2022 67.9  38.0 - 73.0 % Final    Lymph % 10/11/2022 19.1  18.0 - 48.0 % Final    Mono % 10/11/2022 10.7  4.0 - 15.0 % Final    Eosinophil % 10/11/2022 1.7  0.0 - 8.0 % Final    Basophil % 10/11/2022 0.3  0.0 - 1.9 % Final    Differential Method 10/11/2022 Automated   Final    Sodium 10/11/2022 141  136 - 145 mmol/L Final    Potassium 10/11/2022 3.9  3.5 - 5.1 mmol/L Final    Chloride 10/11/2022 109  95 - 110 mmol/L Final    CO2 10/11/2022 21 (L)  23 - 29 mmol/L Final    Glucose 10/11/2022 87  70 - 110 mg/dL Final    BUN 10/11/2022 9  8 - 23 mg/dL Final    Creatinine 10/11/2022 0.6  0.5 - 1.4 mg/dL Final    Calcium 10/11/2022 9.5  8.7 - 10.5 mg/dL Final    Anion Gap 10/11/2022 11  8 - 16 mmol/L Final    eGFR 10/11/2022 >60.0  >60 mL/min/1.73 m^2 Final    Total Protein 10/11/2022 7.5  6.0 - 8.4 g/dL Final    Albumin 10/11/2022 3.5  3.5 - 5.2 g/dL Final    Total Bilirubin 10/11/2022 0.2  0.1 - 1.0 mg/dL Final    Bilirubin, Direct 10/11/2022 0.1  0.1 - 0.3 mg/dL Final    AST 10/11/2022 11  10 - 40 U/L Final    ALT 10/11/2022 9 (L)  10 - 44 U/L Final    Alkaline Phosphatase 10/11/2022 135  55 - 135 U/L Final    Magnesium 10/11/2022 1.6  1.6 - 2.6 mg/dL Final   No results displayed because visit has over 200 results.      Lab Visit on 07/14/2022   Component Date Value Ref Range Status    Hemoglobin A1C 07/14/2022 9.0 (H)  4.0 - 5.6 % Final    Estimated  Avg Glucose 07/14/2022 212 (H)  68 - 131 mg/dL Final    Cholesterol 07/14/2022 142  120 - 199 mg/dL Final    Triglycerides 07/14/2022 95  30 - 150 mg/dL Final    HDL 07/14/2022 45  40 - 75 mg/dL Final    LDL Cholesterol 07/14/2022 78.0  63.0 - 159.0 mg/dL Final    HDL/Cholesterol Ratio 07/14/2022 31.7  20.0 - 50.0 % Final    Total Cholesterol/HDL Ratio 07/14/2022 3.2  2.0 - 5.0 Final    Non-HDL Cholesterol 07/14/2022 97  mg/dL Final   Lab Visit on 06/16/2022   Component Date Value Ref Range Status    Microalbumin, Urine 06/16/2022 9.0  ug/mL Final    Creatinine, Urine 06/16/2022 109.0  15.0 - 325.0 mg/dL Final    Microalb/Creat Ratio 06/16/2022 8.3  0.0 - 30.0 ug/mg Final   Office Visit on 06/16/2022   Component Date Value Ref Range Status    POC Rapid COVID 06/16/2022 Negative  Negative Final     Acceptable 06/16/2022 Yes   Final   Hospital Outpatient Visit on 06/10/2022   Component Date Value Ref Range Status    Holter length hours 06/10/2022 24   Final    holter length minutes 06/10/2022 0   Final    holter length dec hours 06/10/2022 24.00   Final    Sinus min HR 06/10/2022 93   Final    Sinus max hr 06/10/2022 133   Final    Sinus avg hr 06/10/2022 116   Final    Event Monitor Day 06/10/2022 0   Final   (    Assessment:     1. S/P PTCA (percutaneous transluminal coronary angioplasty)    2. Mitral valve prolapse    3. Primary hypertension    4. Pure hypercholesterolemia    5. Hypertension associated with diabetes    6. Adenocarcinoma of lung, right    7. Chronic obstructive pulmonary disease with acute exacerbation    8. Chest pain of uncertain etiology    9. Coronary artery disease of native artery of native heart with stable angina pectoris      Plan:   Chayito was seen today for follow-up.    Diagnoses and all orders for this visit:    S/P PTCA (percutaneous transluminal coronary angioplasty)  -     IN OFFICE EKG 12-LEAD (to [facility])    Mitral valve prolapse  -     IN OFFICE EKG 12-LEAD (to  [facility])    Primary hypertension  -     IN OFFICE EKG 12-LEAD (to [facility])    Pure hypercholesterolemia  -     IN OFFICE EKG 12-LEAD (to [place])    Hypertension associated with diabetes  -     IN OFFICE EKG 12-LEAD (to [facility])    Adenocarcinoma of lung, right  -     IN OFFICE EKG 12-LEAD (to [facility])    Chronic obstructive pulmonary disease with acute exacerbation    Chest pain of uncertain etiology    Coronary artery disease of native artery of native heart with stable angina pectoris   Cardiac status is stable.    Same meds    F/u with oncologist and pulmonologist at [facility]    F/u with Dr Valladares    RTC 4 months      Follow up in about 4 months (around 4/6/2023).

## 2022-12-06 NOTE — PROGRESS NOTES
Subjective:      Patient ID: Chayito Chung is a 72 y.o. female.    Chief Complaint: Follow-up    HPI:  Pt was diagnosed with stage IV adenocarcinoma of the lung.  Pt received radiation and is now receiving chemotherapy and possibly Opdivo.      Review of Systems   Cardiovascular:  Positive for dyspnea on exertion. Negative for chest pain, claudication, irregular heartbeat, leg swelling, near-syncope, orthopnea, palpitations and syncope.      Past Medical History:   Diagnosis Date    Acute coronary syndrome     Acute hypoxemic respiratory failure 2017    Anxiety     Asthma     Cancer     colon    Cataracts, both eyes     Chest pain at rest     Chest pain of uncertain etiology 2015    Colon cancer 1988    COPD (chronic obstructive pulmonary disease)     Coronary artery disease     Depression     Diabetes mellitus     Dyspnea on exertion 11/15/2018    Elevated brain natriuretic peptide (BNP) level 11/15/2018    Elevated cholesterol     Hypertension     Swelling     Syncope and collapse 2016        Past Surgical History:   Procedure Laterality Date    ABDOMINAL SURGERY      BREAST BIOPSY      benign unsure what side     SECTION, CLASSIC      COLON SURGERY      COLONOSCOPY  2019    repeat in 5 years    CORONARY ANGIOPLASTY WITH STENT PLACEMENT  3 months ago     x2, Hysterectomy, Lung surgery, Partial stomach removed, Part of colon removed for rectal cancer    GASTRECTOMY      HEMORRHOID SURGERY      HYSTERECTOMY      @28yrs of age    LUNG BIOPSY      OOPHORECTOMY      @28yrs of age    POSTERIOR FUSION OF CERVICAL SPINE WITH LAMINECTOMY N/A 2020    Procedure: LAMINECTOMY, SPINE, CERVICAL, WITH POSTERIOR FUSION C3-T1 ;  Surgeon: Herson Tate MD;  Location: [facility] OR [address] [place] [address];  Service: Neurosurgery;  Laterality: N/A;    REPAIR OF INCARCERATED INCISIONAL HERNIA WITHOUT HISTORY OF PRIOR REPAIR N/A 2022    Procedure: REPAIR, HERNIA, INCISIONAL, INCARCERATED, WITHOUT HISTORY  OF PRIOR REPAIR;  Surgeon: Simon King MD;  Location: [facility] OR;  Service: General;  Laterality: N/A;       Family History   Problem Relation Age of Onset    Cancer Mother     Diabetes Mother     Hypertension Mother     Breast cancer Mother     Heart disease Father     Lung cancer Father     Depression Sister     Hypertension Sister     Diabetes Mellitus Sister     Cancer Maternal Aunt        Social History     Socioeconomic History    Marital status: Single   Tobacco Use    Smoking status: Former     Years: 55.00     Types: Cigarettes     Start date: 1967    Smokeless tobacco: Never    Tobacco comments:     Enrolled in the [employer] on 10/5/15 ([employer] Member ID # [id number]).  Ambulatory referral to Smoking Cessation program.   Substance and Sexual Activity    Alcohol use: Yes     Alcohol/week: 3.0 standard drinks     Types: 3 Glasses of wine per week     Comment: 1 every 3 months    Drug use: No    Sexual activity: Yes     Partners: Male     Birth control/protection: None     Comment: last drink yesterday afternoon   Social History Narrative    Pulmonology Dr. Chung([facility]) for COPD     Endo- Dr. Damaso Delgado for Diabetes Mellitus    GI- Dr. Arias- History of partial gastrectomy, gastritis    Heme/Onc-  Anemia and history of colon cancer     Psych- Dr. Cotto ([facility])- Depression/Anxiety. PTSD    Dr. Knox- Pain Management    Dr. Anguiano Neurology- Seizure disorder    Dr. Malin- s/p percutaneous transluminal coronary angioplasty          Social Determinants of Health     Financial Resource Strain: Low Risk     Difficulty of Paying Living Expenses: Not hard at all   Food Insecurity: No Food Insecurity    Worried About Running Out of Food in the Last Year: Never true    Ran Out of Food in the Last Year: Never true   Transportation Needs: No Transportation Needs    Lack of Transportation (Medical): No    Lack of Transportation (Non-Medical): No   Physical Activity: Inactive    Days of  "Exercise per Week: 0 days    Minutes of Exercise per Session: 0 min   Stress: No Stress Concern Present    Feeling of Stress : Not at all   Social Connections: Unknown    Frequency of Communication with Friends and Family: Three times a week    Frequency of Social Gatherings with Friends and Family: Twice a week    Attends [employer] Services: Never    Active Member of Clubs or Organizations: No    Attends Club or Organization Meetings: Never   Housing Stability: Low Risk     Unable to Pay for Housing in the Last Year: No    Number of Places Lived in the Last Year: 1    Unstable Housing in the Last Year: No       Current Outpatient Medications on File Prior to Visit   Medication Sig Dispense Refill    albuterol (PROVENTIL/VENTOLIN HFA) 90 mcg/actuation inhaler Inhale 2 puffs into the lungs every 4 (four) hours as needed for Wheezing or Shortness of Breath. Rescue 25.5 g 3    alendronate (FOSAMAX) 70 MG tablet Take 1 tablet (70 mg total) by mouth every Sunday. 12 tablet 3    aspirin 81 MG Chew Take 1 tablet (81 mg total) by mouth once daily. 90 tablet 3    atorvastatin (LIPITOR) 40 MG tablet Take 1 tablet (40 mg total) by mouth once daily. 90 tablet 3    BD ULTRA-FINE MINI PEN NEEDLE 31 gauge x 3/16" Ndle       blood-glucose meter,continuous (DEXCOM G6 ) Misc 1 each by Misc.(Non-Drug; Combo Route) route once daily. 1 each 1    blood-glucose sensor (DEXCOM G6 SENSOR) Tyra 1 each by Misc.(Non-Drug; Combo Route) route once daily. 1 each 1    blood-glucose transmitter (DEXCOM G6 TRANSMITTER) Tyra 1 each by Misc.(Non-Drug; Combo Route) route once daily. 1 each 1    busPIRone (BUSPAR) 15 MG tablet Take 15 mg by mouth 2 (two) times daily.      diclofenac sodium (VOLTAREN) 1 % Gel Apply 2 g topically 4 (four) times daily as needed. 100 g 0    HYDROcodone-acetaminophen 7.5-300 mg Tab Take 1 tablet by mouth 2 (two) times daily as needed (chronic neck pain).      insulin lispro 100 unit/mL injection Inject 5 Units into " the skin 3 (three) times daily before meals. 4.5 mL 0    insulin lispro protamine-insulin lispro (HUMALOG MIX 75-25,U-100,INSULN) 100 unit/mL (75-25) Susp Inject 35 Units into the skin 2 (two) times daily before meals. 21 mL 11    LANTUS SOLOSTAR U-100 INSULIN glargine 100 units/mL SubQ pen Inject 20 Units into the skin every evening. 22.5 mL 0    losartan (COZAAR) 25 MG tablet Take 1 tablet (25 mg total) by mouth once daily. 90 tablet 3    metFORMIN (GLUCOPHAGE) 1000 MG tablet Take 1,000 mg by mouth 2 (two) times daily.      mirtazapine (REMERON) 15 MG tablet Take 15 mg by mouth once daily.      nitroGLYCERIN (NITROSTAT) 0.4 MG SL tablet Place 1 tablet (0.4 mg total) under the tongue every 5 (five) minutes as needed for Chest pain. 100 tablet 1    ondansetron (ZOFRAN) 4 mg/5 mL solution Take 4 mg by mouth daily as needed for Nausea.      potassium chloride SA (K-DUR,KLOR-CON) 20 MEQ tablet Take 1 tablet (20 mEq total) by mouth once daily. 90 tablet 3    pregabalin (LYRICA) 150 MG capsule Take 150 mg by mouth 3 (three) times daily.      QUEtiapine (SEROQUEL) 400 MG tablet Take 400 mg by mouth nightly.      sertraline (ZOLOFT) 100 MG tablet Take 150 mg by mouth once daily at 6am.      topiramate (TOPAMAX) 50 MG tablet Take 1 tablet (50 mg total) by mouth 2 (two) times daily. 180 tablet 1    TRELEGY ELLIPTA 200-62.5-25 mcg inhaler Inhale 1 puff into the lungs once daily.      zonisamide (ZONEGRAN) 100 MG Cap Take 200 mg by mouth 2 (two) times daily.      vitamin D (VITAMIN D3) 1000 units Tab Take 1,000 Units by mouth.      [DISCONTINUED] clopidogreL (PLAVIX) 75 mg tablet Take 1 tablet (75 mg total) by mouth once daily. Take medicine for 15 days. 15 tablet 0    [DISCONTINUED] pravastatin (PRAVACHOL) 40 MG tablet Take 0.5 tablets (20 mg total) by mouth once daily. (Patient taking differently: Take 40 mg by mouth once daily.) 90 tablet 1     No current facility-administered medications on file prior to visit.  "      Review of patient's allergies indicates:  No Known Allergies  Objective:     Vitals:    12/06/22 0924   BP: 127/85   BP Location: Left arm   Patient Position: Sitting   BP Method: Large (Automatic)   Pulse: (!) 111   SpO2: 99%   Weight: 56.1 kg (123 lb 12.6 oz)   Height: 5' 2" (1.575 m)        Physical Exam  Constitutional:       Appearance: She is well-developed.   Eyes:      General: No scleral icterus.  Neck:      Vascular: No carotid bruit or JVD.   Cardiovascular:      Rate and Rhythm: Normal rate and regular rhythm.      Heart sounds: No murmur heard.    No gallop.   Pulmonary:      Breath sounds: Normal breath sounds.   Musculoskeletal:      Right lower leg: No edema.      Left lower leg: No edema.   Skin:     General: Skin is warm and dry.   Neurological:      Mental Status: She is alert and oriented to person, place, and time.   Psychiatric:         Behavior: Behavior normal.         Thought Content: Thought content normal.         Judgment: Judgment normal.        Assessment:   No diagnosis found.  Plan:   There are no diagnoses linked to this encounter.     No follow-ups on file.    "

## 2022-12-12 ENCOUNTER — TELEPHONE (OUTPATIENT)
Dept: FAMILY MEDICINE | Facility: CLINIC | Age: 72
End: 2022-12-12
Payer: MEDICARE

## 2022-12-12 DIAGNOSIS — Z12.31 SCREENING MAMMOGRAM, ENCOUNTER FOR: Primary | ICD-10-CM

## 2022-12-19 NOTE — ASSESSMENT & PLAN NOTE
Continue ASA/statin  -not on beta blocker   -EKG w/o ischemic changes. Negative troponin x 1   Nadine Tran from [facility] called stating pt blood sugar is high and if there are any new orders  On call notified via TC

## 2022-12-21 ENCOUNTER — HOSPITAL ENCOUNTER (EMERGENCY)
Facility: HOSPITAL | Age: 72
Discharge: HOME | End: 2022-12-21
Attending: STUDENT IN AN ORGANIZED HEALTH CARE EDUCATION/TRAINING PROGRAM
Payer: MEDICARE

## 2022-12-21 ENCOUNTER — TELEPHONE (OUTPATIENT)
Dept: FAMILY MEDICINE | Facility: CLINIC | Age: 72
End: 2022-12-21
Payer: MEDICARE

## 2022-12-21 VITALS
TEMPERATURE: 98 F | OXYGEN SATURATION: 97 % | SYSTOLIC BLOOD PRESSURE: 136 MMHG | HEART RATE: 102 BPM | HEIGHT: 62 IN | BODY MASS INDEX: 21.9 KG/M2 | DIASTOLIC BLOOD PRESSURE: 86 MMHG | RESPIRATION RATE: 20 BRPM | WEIGHT: 119 LBS

## 2022-12-21 DIAGNOSIS — Z12.31 SCREENING MAMMOGRAM, ENCOUNTER FOR: Primary | ICD-10-CM

## 2022-12-21 DIAGNOSIS — R05.9 COUGH: ICD-10-CM

## 2022-12-21 LAB
ALBUMIN SERPL BCP-MCNC: 3.5 G/DL (ref 3.5–5.2)
ALP SERPL-CCNC: 144 U/L (ref 55–135)
ALT SERPL W/O P-5'-P-CCNC: 10 U/L (ref 10–44)
ANION GAP SERPL CALC-SCNC: 10 MMOL/L (ref 8–16)
AST SERPL-CCNC: 13 U/L (ref 10–40)
BASOPHILS # BLD AUTO: 0.03 K/UL (ref 0–0.2)
BASOPHILS NFR BLD: 0.6 % (ref 0–1.9)
BILIRUB SERPL-MCNC: 0.3 MG/DL (ref 0.1–1)
BUN SERPL-MCNC: 4 MG/DL (ref 8–23)
CALCIUM SERPL-MCNC: 9.3 MG/DL (ref 8.7–10.5)
CHLORIDE SERPL-SCNC: 110 MMOL/L (ref 95–110)
CO2 SERPL-SCNC: 21 MMOL/L (ref 23–29)
CREAT SERPL-MCNC: 0.7 MG/DL (ref 0.5–1.4)
DIFFERENTIAL METHOD: ABNORMAL
EOSINOPHIL # BLD AUTO: 0.1 K/UL (ref 0–0.5)
EOSINOPHIL NFR BLD: 2.6 % (ref 0–8)
ERYTHROCYTE [DISTWIDTH] IN BLOOD BY AUTOMATED COUNT: 25.2 % (ref 11.5–14.5)
EST. GFR  (NO RACE VARIABLE): >60 ML/MIN/1.73 M^2
GLUCOSE SERPL-MCNC: 91 MG/DL (ref 70–110)
HCT VFR BLD AUTO: 34 % (ref 37–48.5)
HGB BLD-MCNC: 10 G/DL (ref 12–16)
IMM GRANULOCYTES # BLD AUTO: 0.02 K/UL (ref 0–0.04)
IMM GRANULOCYTES NFR BLD AUTO: 0.4 % (ref 0–0.5)
INFLUENZA A, MOLECULAR: NEGATIVE
INFLUENZA B, MOLECULAR: NEGATIVE
LYMPHOCYTES # BLD AUTO: 0.9 K/UL (ref 1–4.8)
LYMPHOCYTES NFR BLD: 16.1 % (ref 18–48)
MCH RBC QN AUTO: 25.2 PG (ref 27–31)
MCHC RBC AUTO-ENTMCNC: 29.4 G/DL (ref 32–36)
MCV RBC AUTO: 86 FL (ref 82–98)
MONOCYTES # BLD AUTO: 0.4 K/UL (ref 0.3–1)
MONOCYTES NFR BLD: 7.4 % (ref 4–15)
NEUTROPHILS # BLD AUTO: 3.9 K/UL (ref 1.8–7.7)
NEUTROPHILS NFR BLD: 72.9 % (ref 38–73)
NRBC BLD-RTO: 0 /100 WBC
PLATELET # BLD AUTO: 267 K/UL (ref 150–450)
PMV BLD AUTO: 8.9 FL (ref 9.2–12.9)
POTASSIUM SERPL-SCNC: 4 MMOL/L (ref 3.5–5.1)
PROT SERPL-MCNC: 7.5 G/DL (ref 6–8.4)
RBC # BLD AUTO: 3.97 M/UL (ref 4–5.4)
SARS-COV-2 RDRP RESP QL NAA+PROBE: NEGATIVE
SODIUM SERPL-SCNC: 141 MMOL/L (ref 136–145)
SPECIMEN SOURCE: NORMAL
WBC # BLD AUTO: 5.29 K/UL (ref 3.9–12.7)

## 2022-12-21 PROCEDURE — 80053 COMPREHEN METABOLIC PANEL: CPT | Performed by: STUDENT IN AN ORGANIZED HEALTH CARE EDUCATION/TRAINING PROGRAM

## 2022-12-21 PROCEDURE — 93005 ELECTROCARDIOGRAM TRACING: CPT

## 2022-12-21 PROCEDURE — 94640 AIRWAY INHALATION TREATMENT: CPT

## 2022-12-21 PROCEDURE — 99285 EMERGENCY DEPT VISIT HI MDM: CPT | Mod: 25

## 2022-12-21 PROCEDURE — 27000221 HC OXYGEN, UP TO 24 HOURS

## 2022-12-21 PROCEDURE — 25000003 PHARM REV CODE 250: Performed by: EMERGENCY MEDICINE

## 2022-12-21 PROCEDURE — 93010 EKG 12-LEAD: ICD-10-PCS | Mod: ,,, | Performed by: INTERNAL MEDICINE

## 2022-12-21 PROCEDURE — U0002 COVID-19 LAB TEST NON-CDC: HCPCS | Performed by: NURSE PRACTITIONER

## 2022-12-21 PROCEDURE — 25000003 PHARM REV CODE 250: Performed by: STUDENT IN AN ORGANIZED HEALTH CARE EDUCATION/TRAINING PROGRAM

## 2022-12-21 PROCEDURE — 25000242 PHARM REV CODE 250 ALT 637 W/ HCPCS: Performed by: STUDENT IN AN ORGANIZED HEALTH CARE EDUCATION/TRAINING PROGRAM

## 2022-12-21 PROCEDURE — 87502 INFLUENZA DNA AMP PROBE: CPT | Performed by: NURSE PRACTITIONER

## 2022-12-21 PROCEDURE — 25500020 PHARM REV CODE 255: Performed by: STUDENT IN AN ORGANIZED HEALTH CARE EDUCATION/TRAINING PROGRAM

## 2022-12-21 PROCEDURE — 93010 ELECTROCARDIOGRAM REPORT: CPT | Mod: ,,, | Performed by: INTERNAL MEDICINE

## 2022-12-21 PROCEDURE — 85025 COMPLETE CBC W/AUTO DIFF WBC: CPT | Performed by: STUDENT IN AN ORGANIZED HEALTH CARE EDUCATION/TRAINING PROGRAM

## 2022-12-21 RX ORDER — BENZONATATE 100 MG/1
100 CAPSULE ORAL 3 TIMES DAILY PRN
Qty: 20 CAPSULE | Refills: 0 | Status: SHIPPED | OUTPATIENT
Start: 2022-12-21 | End: 2022-12-31

## 2022-12-21 RX ORDER — ISOPROPYL ALCOHOL IN GLYCERIN 95 %-5 %
1 DROPS OTIC (EAR) DAILY PRN
COMMUNITY

## 2022-12-21 RX ORDER — ACETAMINOPHEN 500 MG
1000 TABLET ORAL
Status: COMPLETED | OUTPATIENT
Start: 2022-12-21 | End: 2022-12-21

## 2022-12-21 RX ORDER — BENZONATATE 100 MG/1
200 CAPSULE ORAL
Status: COMPLETED | OUTPATIENT
Start: 2022-12-21 | End: 2022-12-21

## 2022-12-21 RX ORDER — HYDROCODONE BITARTRATE AND ACETAMINOPHEN 7.5; 325 MG/1; MG/1
1 TABLET ORAL 2 TIMES DAILY PRN
COMMUNITY
Start: 2022-12-07

## 2022-12-21 RX ORDER — IPRATROPIUM BROMIDE AND ALBUTEROL SULFATE 2.5; .5 MG/3ML; MG/3ML
3 SOLUTION RESPIRATORY (INHALATION)
Status: COMPLETED | OUTPATIENT
Start: 2022-12-21 | End: 2022-12-21

## 2022-12-21 RX ADMIN — ACETAMINOPHEN 1000 MG: 500 TABLET ORAL at 12:12

## 2022-12-21 RX ADMIN — IPRATROPIUM BROMIDE AND ALBUTEROL SULFATE 3 ML: .5; 2.5 SOLUTION RESPIRATORY (INHALATION) at 01:12

## 2022-12-21 RX ADMIN — BENZONATATE 200 MG: 100 CAPSULE ORAL at 06:12

## 2022-12-21 RX ADMIN — IOHEXOL 100 ML: 350 INJECTION, SOLUTION INTRAVENOUS at 04:12

## 2022-12-21 NOTE — ED PROVIDER NOTES
NAME:  Chayito Chung  CSN:     [id number]  MRN:    [id number]  ADMIT DATE: 12/21/2022        eMERGENCY dEPARTMENT eNCOUnter    CHIEF COMPLAINT    Chief Complaint   Patient presents with    Cough     Cough and congestion over past several days with low grade fever, intermittent SOB. Wears O2 prn at home. + productive cough. Concerned about COVID. Presents awake, alert. Able to speak in full sentences. No distress.        HPI    Chayito Chung is a 72 y.o. female with a past medical history of  has a past medical history of Acute coronary syndrome, Acute hypoxemic respiratory failure (5/1/2017), Anxiety, Asthma, Cancer, Cataracts, both eyes, Chest pain at rest, Chest pain of uncertain etiology (12/6/2015), Colon cancer (1988), COPD (chronic obstructive pulmonary disease), Coronary artery disease, Depression, Diabetes mellitus, Dyspnea on exertion (11/15/2018), Elevated brain natriuretic peptide (BNP) level (11/15/2018), Elevated cholesterol, Hypertension, Swelling, and Syncope and collapse (11/8/2016).     she presents to the ED due to 6 days of increased cough, shortness of breath and sputum production.  Notes she was recently diagnosed with stage IV lung cancer and has received 1 infusion thus far.  Stopped smoking 2-3 months ago and does have a history of COPD.  Wears 2 L of oxygen chronically at all times.  Notes soreness throughout her chest due to persistent coughing.  Denies any history of blood clots in the past.  No recent sick contacts.  No nausea, vomiting or abdominal pain.      HPI       PAST MEDICAL HISTORY  Past Medical History:   Diagnosis Date    Acute coronary syndrome     Acute hypoxemic respiratory failure 5/1/2017    Anxiety     Asthma     Cancer     colon    Cataracts, both eyes     Chest pain at rest     Chest pain of uncertain etiology 12/6/2015    Colon cancer 1988    COPD (chronic obstructive pulmonary disease)     Coronary artery disease     Depression     Diabetes mellitus     Dyspnea on  exertion 11/15/2018    Elevated brain natriuretic peptide (BNP) level 11/15/2018    Elevated cholesterol     Hypertension     Swelling     Syncope and collapse 2016       SURGICAL HISTORY    Past Surgical History:   Procedure Laterality Date    ABDOMINAL SURGERY      BREAST BIOPSY      benign unsure what side     SECTION, CLASSIC      COLON SURGERY      COLONOSCOPY  2019    repeat in 5 years    CORONARY ANGIOPLASTY WITH STENT PLACEMENT  3 months ago     x2, Hysterectomy, Lung surgery, Partial stomach removed, Part of colon removed for rectal cancer    GASTRECTOMY      HEMORRHOID SURGERY      HYSTERECTOMY      @28yrs of age    LUNG BIOPSY      OOPHORECTOMY      @28yrs of age    POSTERIOR FUSION OF CERVICAL SPINE WITH LAMINECTOMY N/A 2020    Procedure: LAMINECTOMY, SPINE, CERVICAL, WITH POSTERIOR FUSION C3-T1 ;  Surgeon: Herson Tate MD;  Location: [facility] OR [address] [place] [address];  Service: Neurosurgery;  Laterality: N/A;    REPAIR OF INCARCERATED INCISIONAL HERNIA WITHOUT HISTORY OF PRIOR REPAIR N/A 2022    Procedure: REPAIR, HERNIA, INCISIONAL, INCARCERATED, WITHOUT HISTORY OF PRIOR REPAIR;  Surgeon: Simon King MD;  Location: [facility] OR;  Service: General;  Laterality: N/A;       FAMILY HISTORY    Family History   Problem Relation Age of Onset    Cancer Mother     Diabetes Mother     Hypertension Mother     Breast cancer Mother     Heart disease Father     Lung cancer Father     Depression Sister     Hypertension Sister     Diabetes Mellitus Sister     Cancer Maternal Aunt        SOCIAL HISTORY    Social History     Socioeconomic History    Marital status: Single   Tobacco Use    Smoking status: Former     Years: 55.00     Types: Cigarettes     Start date:     Smokeless tobacco: Never    Tobacco comments:     Enrolled in the [employer] on 10/5/15 ([employer] Member ID # [id number]).  Ambulatory referral to Smoking Cessation program.   Substance and Sexual Activity    Alcohol use: Yes      Alcohol/week: 3.0 standard drinks     Types: 3 Glasses of wine per week     Comment: 1 every 3 months    Drug use: No    Sexual activity: Yes     Partners: Male     Birth control/protection: None     Comment: last drink yesterday afternoon   Social History Narrative    Pulmonology Dr. Chung([facility]) for COPD     Endo- Dr. Damaso Delgado for Diabetes Mellitus    GI- Dr. Arias- History of partial gastrectomy, gastritis    Heme/Onc-  Anemia and history of colon cancer     Psych- Dr. Cotto ([facility])- Depression/Anxiety. PTSD    Dr. Knox- Pain Management    Dr. Anguiano Neurology- Seizure disorder    Dr. Malin- s/p percutaneous transluminal coronary angioplasty          Social Determinants of Health     Financial Resource Strain: Low Risk     Difficulty of Paying Living Expenses: Not hard at all   Food Insecurity: No Food Insecurity    Worried About Running Out of Food in the Last Year: Never true    Ran Out of Food in the Last Year: Never true   Transportation Needs: No Transportation Needs    Lack of Transportation (Medical): No    Lack of Transportation (Non-Medical): No   Physical Activity: Inactive    Days of Exercise per Week: 0 days    Minutes of Exercise per Session: 0 min   Stress: No Stress Concern Present    Feeling of Stress : Not at all   Social Connections: Unknown    Frequency of Communication with Friends and Family: Three times a week    Frequency of Social Gatherings with Friends and Family: Twice a week    Attends [employer] Services: Never    Active Member of Clubs or Organizations: No    Attends Club or Organization Meetings: Never   Housing Stability: Low Risk     Unable to Pay for Housing in the Last Year: No    Number of Places Lived in the Last Year: 1    Unstable Housing in the Last Year: No       MEDICATIONS  Current Outpatient Medications   Medication Instructions    albuterol (PROVENTIL/VENTOLIN HFA) 90 mcg/actuation inhaler 2 puffs, Inhalation, Every 4 hours PRN, Rescue     "alendronate (FOSAMAX) 70 mg, Oral, Every Sunday    aspirin-acetaminophen-caffeine (GOODY'S EXTRA STRENGTH) 500-325-65 mg PwPk 1 packet, Oral, Daily PRN    aspirin 81 mg, Oral, Daily    atorvastatin (LIPITOR) 40 mg, Oral, Daily    BD ULTRA-FINE MINI PEN NEEDLE 31 gauge x 3/16" Ndle No dose, route, or frequency recorded.    blood-glucose meter,continuous (DEXCOM G6 ) Misc 1 each, Misc.(Non-Drug; Combo Route), Daily    blood-glucose sensor (DEXCOM G6 SENSOR) Tyra 1 each, Misc.(Non-Drug; Combo Route), Daily    blood-glucose transmitter (DEXCOM G6 TRANSMITTER) Tyra 1 each, Misc.(Non-Drug; Combo Route), Daily    busPIRone (BUSPAR) 15 mg, Oral, 2 times daily    diclofenac sodium (VOLTAREN) 2 g, Topical (Top), 4 times daily PRN    HYDROcodone-acetaminophen (NORCO) 7.5-325 mg per tablet 1 tablet, Oral, 2 times daily PRN    insulin lispro protamine-insulin lispro (HUMALOG MIX 75-25,U-100,INSULN) 100 unit/mL (75-25) Susp 35 Units, Subcutaneous, 2 times daily before meals    insulin lispro 5 Units, Subcutaneous, 3 times daily before meals    LANTUS SOLOSTAR U-100 INSULIN 20 Units, Subcutaneous, Nightly    losartan (COZAAR) 25 mg, Oral, Daily    metFORMIN (GLUCOPHAGE) 1,000 mg, Oral, 2 times daily    mirtazapine (REMERON) 15 mg, Oral, Daily    nitroGLYCERIN (NITROSTAT) 0.4 mg, Sublingual, Every 5 min PRN    ondansetron (ZOFRAN) 4 mg, Oral, Daily PRN    potassium chloride SA (K-DUR,KLOR-CON) 20 MEQ tablet 20 mEq, Oral, Daily    pregabalin (LYRICA) 150 mg, Oral, 3 times daily    QUEtiapine (SEROQUEL) 400 mg, Oral, Nightly    sertraline (ZOLOFT) 150 mg, Oral, Daily    topiramate (TOPAMAX) 50 mg, Oral, 2 times daily    TRELEGY ELLIPTA 200-62.5-25 mcg inhaler 1 puff, Inhalation, Daily    vitamin D (VITAMIN D3) 1,000 Units, Oral    zonisamide (ZONEGRAN) 200 mg, Oral, 2 times daily       ALLERGIES    Review of patient's allergies indicates:  No Known Allergies      REVIEW OF SYSTEMS   Review of Systems   Constitutional:  " "Negative for fever.   HENT:  Negative for congestion, rhinorrhea and sore throat.    Respiratory:  Positive for cough, chest tightness and shortness of breath.    Cardiovascular:  Negative for chest pain.   Gastrointestinal:  Negative for nausea.   Genitourinary:  Negative for dysuria.   Musculoskeletal:  Negative for back pain.   Skin:  Negative for rash.   Neurological:  Negative for weakness.   Hematological:  Does not bruise/bleed easily.        PHYSICAL EXAM    Reviewed Triage Note    VITAL SIGNS:   ED Triage Vitals [12/21/22 1146]   Enc Vitals Group      BP (!) 153/91      Pulse (!) 117      Resp 18      Temp 99.2 °F (37.3 °C)      Temp src Oral      SpO2 97 %      Weight 119 lb      Height 5' 2"      Head Circumference       Peak Flow       Pain Score       Pain Loc       Pain Edu?       Excl. in GC?        Patient Vitals for the past 24 hrs:   BP Temp Temp src Pulse Resp SpO2 Height Weight   12/21/22 1502 137/82 -- -- 102 -- 96 % -- --   12/21/22 1350 -- -- -- 97 -- 98 % -- --   12/21/22 1347 -- -- -- 97 18 97 % -- --   12/21/22 1146 (!) 153/91 99.2 °F (37.3 °C) Oral (!) 117 18 97 % 5' 2" (1.575 m) 54 kg (119 lb)       Physical Exam    Nursing note and vitals reviewed.  Constitutional: She appears well-developed and well-nourished.   HENT:   Head: Normocephalic and atraumatic.   Eyes: EOM are normal. Pupils are equal, round, and reactive to light.   Neck: Neck supple.   Normal range of motion.  Cardiovascular:  Normal rate and regular rhythm.           Pulmonary/Chest: No respiratory distress. She has wheezes (trace, intermittent).   Abdominal: Abdomen is soft. There is no abdominal tenderness.   Musculoskeletal:         General: No edema. Normal range of motion.      Cervical back: Normal range of motion and neck supple.     Neurological: She is alert and oriented to person, place, and time.   Skin: Skin is warm and dry.   Psychiatric: She has a normal mood and affect.          EKG     Interpreted by EM " physician if performed:               LABS  Pertinent labs reviewed. (See chart for details)   Labs Reviewed   CBC W/ AUTO DIFFERENTIAL - Abnormal; Notable for the following components:       Result Value    RBC 3.97 (*)     Hemoglobin 10.0 (*)     Hematocrit 34.0 (*)     MCH 25.2 (*)     MCHC 29.4 (*)     RDW 25.2 (*)     MPV 8.9 (*)     Lymph # 0.9 (*)     Lymph % 16.1 (*)     All other components within normal limits   COMPREHENSIVE METABOLIC PANEL - Abnormal; Notable for the following components:    CO2 21 (*)     BUN 4 (*)     Alkaline Phosphatase 144 (*)     All other components within normal limits   INFLUENZA A & B BY MOLECULAR   SARS-COV-2 RNA AMPLIFICATION, QUAL         RADIOLOGY          Imaging Results    None           PROCEDURES    Procedures      ED COURSE & MEDICAL DECISION MAKING    Pertinent Labs & Imaging studies reviewed. (See chart for details and specific orders.)        Summary of review of records:   History adenocarcinoma of the right lung with last infusion on December 5th      Chayito Chung is a 72 y.o. female 6 days of increased cough, sputum production and shortness of breath.  Vitals stable.  On her home oxygen settings.  Will evaluate for underlying infection, electrolyte derangement, blood blood clot amongst others.          Medications   acetaminophen tablet 1,000 mg (1,000 mg Oral Given 12/21/22 1223)   albuterol-ipratropium 2.5 mg-0.5 mg/3 mL nebulizer solution 3 mL (3 mLs Nebulization Given 12/21/22 1347)       ED Course as of 12/21/22 1543   Wed Dec 21, 2022   1324 SARS-CoV-2 RNA, Amplification, Qual: Negative [HL]   1324 Influenza A, Molecular: Negative [HL]   1324 Influenza B, Molecular: Negative [HL]   1543 HEMOGLOBIN(!): 10.0  Chronic stable [HL]   1543 Comprehensive metabolic panel(!)  Acutely unremarkable [HL]   1543 At time of sign-out CTA of the chest as well as EKG are pending.  Please see ED update note for additional ED course and final disposition. [HL]      ED  Course User Index  [HL] Vilma Aranda DO         FINAL IMPRESSION    Final diagnoses:  [R05.9] Cough               DISCLAIMER: This note was prepared with [employer] voice recognition transcription software. Garbled syntax, mangled pronouns, and other bizarre constructions may be attributed to that software system.      DO Vilma Thomas DO  12/21/22 1548

## 2022-12-21 NOTE — PHARMACY MED REC
"Admission Medication History     The home medication history was taken by Savannah Cisneros CPhT.    Medication history obtained from, Patient Verified    You may go to "Admission" then "Reconcile Home Medications" tabs to review and/or act upon these items.     The home medication list has been updated by the Pharmacy department.   Please read ALL comments highlighted in yellow.   Please address this information as you see fit.    Feel free to contact us if you have any questions or require assistance.          Savannah Cisneros CPhT.  Ext [phone]               .        "

## 2022-12-23 ENCOUNTER — HOSPITAL ENCOUNTER (INPATIENT)
Facility: HOSPITAL | Age: 72
LOS: 3 days | Discharge: HOSPICE | DRG: 189 | End: 2022-12-28
Attending: STUDENT IN AN ORGANIZED HEALTH CARE EDUCATION/TRAINING PROGRAM | Admitting: STUDENT IN AN ORGANIZED HEALTH CARE EDUCATION/TRAINING PROGRAM
Payer: MEDICARE

## 2022-12-23 DIAGNOSIS — R00.0 TACHYCARDIA: ICD-10-CM

## 2022-12-23 DIAGNOSIS — J44.1 CHRONIC OBSTRUCTIVE PULMONARY DISEASE WITH ACUTE EXACERBATION: ICD-10-CM

## 2022-12-23 DIAGNOSIS — R07.9 CHEST PAIN: ICD-10-CM

## 2022-12-23 DIAGNOSIS — G93.40 ENCEPHALOPATHY: Primary | ICD-10-CM

## 2022-12-23 DIAGNOSIS — Z79.899 POLYPHARMACY: ICD-10-CM

## 2022-12-23 DIAGNOSIS — J44.9 CHRONIC OBSTRUCTIVE PULMONARY DISEASE, UNSPECIFIED COPD TYPE: ICD-10-CM

## 2022-12-23 PROBLEM — Z85.038 HISTORY OF COLON CANCER: Status: RESOLVED | Noted: 2019-07-25 | Resolved: 2022-12-23

## 2022-12-23 PROBLEM — I10 HYPERTENSION: Status: RESOLVED | Noted: 2020-07-23 | Resolved: 2022-12-23

## 2022-12-23 PROBLEM — R29.6 RECURRENT FALLS: Status: RESOLVED | Noted: 2022-05-09 | Resolved: 2022-12-23

## 2022-12-23 PROBLEM — E44.0 MODERATE MALNUTRITION: Status: RESOLVED | Noted: 2022-08-24 | Resolved: 2022-12-23

## 2022-12-23 PROBLEM — E44.1 MILD MALNUTRITION: Status: RESOLVED | Noted: 2022-04-06 | Resolved: 2022-12-23

## 2022-12-23 PROBLEM — E55.9 VITAMIN D DEFICIENCY: Status: RESOLVED | Noted: 2019-07-26 | Resolved: 2022-12-23

## 2022-12-23 PROBLEM — R29.898 RIGHT LEG WEAKNESS: Status: RESOLVED | Noted: 2022-04-05 | Resolved: 2022-12-23

## 2022-12-23 PROBLEM — R53.81 PHYSICAL DECONDITIONING: Status: RESOLVED | Noted: 2022-05-09 | Resolved: 2022-12-23

## 2022-12-23 PROBLEM — Q85.9 HAMARTOMA OF LUNG: Status: RESOLVED | Noted: 2022-06-29 | Resolved: 2022-12-23

## 2022-12-23 PROBLEM — J47.9 BRONCHIECTASIS: Status: RESOLVED | Noted: 2022-08-22 | Resolved: 2022-12-23

## 2022-12-23 PROBLEM — M85.88 OSTEOPENIA OF LUMBAR SPINE: Status: RESOLVED | Noted: 2019-08-27 | Resolved: 2022-12-23

## 2022-12-23 PROBLEM — F32.A DEPRESSIVE DISORDER: Status: RESOLVED | Noted: 2022-08-22 | Resolved: 2022-12-23

## 2022-12-23 PROBLEM — G95.9 CERVICAL MYELOPATHY: Status: RESOLVED | Noted: 2020-07-23 | Resolved: 2022-12-23

## 2022-12-23 PROBLEM — N63.0 MASS OF BREAST: Status: RESOLVED | Noted: 2022-08-22 | Resolved: 2022-12-23

## 2022-12-23 PROBLEM — K85.90 PANCREATITIS: Status: RESOLVED | Noted: 2022-08-22 | Resolved: 2022-12-23

## 2022-12-23 PROBLEM — I70.0 AORTIC ATHEROSCLEROSIS: Status: RESOLVED | Noted: 2021-11-02 | Resolved: 2022-12-23

## 2022-12-23 PROBLEM — Z79.4 TYPE 2 DIABETES MELLITUS, WITH LONG-TERM CURRENT USE OF INSULIN: Status: RESOLVED | Noted: 2019-11-12 | Resolved: 2022-12-23

## 2022-12-23 PROBLEM — G40.909 SEIZURE DISORDER: Status: RESOLVED | Noted: 2019-12-16 | Resolved: 2022-12-23

## 2022-12-23 PROBLEM — J41.0 SIMPLE CHRONIC BRONCHITIS: Status: RESOLVED | Noted: 2022-08-22 | Resolved: 2022-12-23

## 2022-12-23 PROBLEM — T38.0X5A STEROID-INDUCED GASTRITIS: Status: RESOLVED | Noted: 2017-04-25 | Resolved: 2022-12-23

## 2022-12-23 PROBLEM — E11.9 TYPE 2 DIABETES MELLITUS, WITH LONG-TERM CURRENT USE OF INSULIN: Status: RESOLVED | Noted: 2019-11-12 | Resolved: 2022-12-23

## 2022-12-23 PROBLEM — G43.909 MIGRAINE HEADACHE: Status: RESOLVED | Noted: 2020-07-23 | Resolved: 2022-12-23

## 2022-12-23 PROBLEM — G47.30 SLEEP APNEA: Status: RESOLVED | Noted: 2019-11-04 | Resolved: 2022-12-23

## 2022-12-23 PROBLEM — R06.02 SHORTNESS OF BREATH: Status: ACTIVE | Noted: 2018-11-15

## 2022-12-23 PROBLEM — I34.1 MITRAL VALVE PROLAPSE: Status: RESOLVED | Noted: 2022-08-22 | Resolved: 2022-12-23

## 2022-12-23 PROBLEM — G56.20 ULNAR NEUROPATHY: Status: RESOLVED | Noted: 2022-08-22 | Resolved: 2022-12-23

## 2022-12-23 PROBLEM — E11.42 DIABETIC PERIPHERAL NEUROPATHY: Status: RESOLVED | Noted: 2022-08-01 | Resolved: 2022-12-23

## 2022-12-23 PROBLEM — I65.29 STENOSIS OF CAROTID ARTERY: Status: RESOLVED | Noted: 2022-08-22 | Resolved: 2022-12-23

## 2022-12-23 PROBLEM — I69.30 HISTORY OF STROKE WITH RESIDUAL DEFICIT: Status: RESOLVED | Noted: 2022-04-05 | Resolved: 2022-12-23

## 2022-12-23 PROBLEM — K29.60 STEROID-INDUCED GASTRITIS: Status: RESOLVED | Noted: 2017-04-25 | Resolved: 2022-12-23

## 2022-12-23 PROBLEM — R14.0 ABDOMINAL DISTENSION: Status: RESOLVED | Noted: 2022-08-22 | Resolved: 2022-12-23

## 2022-12-23 PROBLEM — Z90.3 HISTORY OF PARTIAL GASTRECTOMY: Status: RESOLVED | Noted: 2019-12-16 | Resolved: 2022-12-23

## 2022-12-23 PROBLEM — K43.2 INCISIONAL HERNIA, WITHOUT OBSTRUCTION OR GANGRENE: Status: RESOLVED | Noted: 2019-11-27 | Resolved: 2022-12-23

## 2022-12-23 PROBLEM — I27.20 PULMONARY HYPERTENSION: Status: RESOLVED | Noted: 2022-06-09 | Resolved: 2022-12-23

## 2022-12-23 PROBLEM — J84.9 INTERSTITIAL LUNG DISEASE: Status: RESOLVED | Noted: 2022-06-29 | Resolved: 2022-12-23

## 2022-12-23 PROBLEM — J98.4 RESTRICTIVE LUNG DISEASE: Status: RESOLVED | Noted: 2022-08-22 | Resolved: 2022-12-23

## 2022-12-23 PROBLEM — F41.9 ANXIETY: Status: RESOLVED | Noted: 2020-07-23 | Resolved: 2022-12-23

## 2022-12-23 LAB
ALBUMIN SERPL BCP-MCNC: 3.1 G/DL (ref 3.5–5.2)
ALP SERPL-CCNC: 130 U/L (ref 55–135)
ALT SERPL W/O P-5'-P-CCNC: 8 U/L (ref 10–44)
ANION GAP SERPL CALC-SCNC: 9 MMOL/L (ref 8–16)
ANISOCYTOSIS BLD QL SMEAR: SLIGHT
AST SERPL-CCNC: 7 U/L (ref 10–40)
BACTERIA #/AREA URNS HPF: NORMAL /HPF
BASOPHILS # BLD AUTO: 0.02 K/UL (ref 0–0.2)
BASOPHILS NFR BLD: 0.3 % (ref 0–1.9)
BILIRUB SERPL-MCNC: 0.1 MG/DL (ref 0.1–1)
BILIRUB UR QL STRIP: NEGATIVE
BNP SERPL-MCNC: 27 PG/ML (ref 0–99)
BUN SERPL-MCNC: 12 MG/DL (ref 8–23)
CALCIUM SERPL-MCNC: 8.9 MG/DL (ref 8.7–10.5)
CHLORIDE SERPL-SCNC: 112 MMOL/L (ref 95–110)
CLARITY UR: CLEAR
CO2 SERPL-SCNC: 18 MMOL/L (ref 23–29)
COLOR UR: YELLOW
CREAT SERPL-MCNC: 0.7 MG/DL (ref 0.5–1.4)
DACRYOCYTES BLD QL SMEAR: ABNORMAL
DIFFERENTIAL METHOD: ABNORMAL
EOSINOPHIL # BLD AUTO: 0.1 K/UL (ref 0–0.5)
EOSINOPHIL NFR BLD: 2 % (ref 0–8)
ERYTHROCYTE [DISTWIDTH] IN BLOOD BY AUTOMATED COUNT: 24.4 % (ref 11.5–14.5)
EST. GFR  (NO RACE VARIABLE): >60 ML/MIN/1.73 M^2
GLUCOSE SERPL-MCNC: 148 MG/DL (ref 70–110)
GLUCOSE UR QL STRIP: ABNORMAL
HCT VFR BLD AUTO: 29.3 % (ref 37–48.5)
HGB BLD-MCNC: 8.8 G/DL (ref 12–16)
HGB UR QL STRIP: NEGATIVE
HYPOCHROMIA BLD QL SMEAR: ABNORMAL
IMM GRANULOCYTES # BLD AUTO: 0.01 K/UL (ref 0–0.04)
IMM GRANULOCYTES NFR BLD AUTO: 0.2 % (ref 0–0.5)
KETONES UR QL STRIP: NEGATIVE
LACTATE SERPL-SCNC: 2.2 MMOL/L (ref 0.5–2.2)
LEUKOCYTE ESTERASE UR QL STRIP: ABNORMAL
LYMPHOCYTES # BLD AUTO: 0.8 K/UL (ref 1–4.8)
LYMPHOCYTES NFR BLD: 12.5 % (ref 18–48)
MCH RBC QN AUTO: 26.1 PG (ref 27–31)
MCHC RBC AUTO-ENTMCNC: 30 G/DL (ref 32–36)
MCV RBC AUTO: 87 FL (ref 82–98)
MICROSCOPIC COMMENT: NORMAL
MONOCYTES # BLD AUTO: 0.6 K/UL (ref 0.3–1)
MONOCYTES NFR BLD: 9.2 % (ref 4–15)
NEUTROPHILS # BLD AUTO: 4.6 K/UL (ref 1.8–7.7)
NEUTROPHILS NFR BLD: 75.8 % (ref 38–73)
NITRITE UR QL STRIP: NEGATIVE
NRBC BLD-RTO: 0 /100 WBC
OVALOCYTES BLD QL SMEAR: ABNORMAL
PH UR STRIP: 6 [PH] (ref 5–8)
PLATELET # BLD AUTO: 209 K/UL (ref 150–450)
PLATELET BLD QL SMEAR: ABNORMAL
PMV BLD AUTO: 8.7 FL (ref 9.2–12.9)
POCT GLUCOSE: 186 MG/DL (ref 70–110)
POCT GLUCOSE: 227 MG/DL (ref 70–110)
POCT GLUCOSE: 44 MG/DL (ref 70–110)
POCT GLUCOSE: 48 MG/DL (ref 70–110)
POCT GLUCOSE: 63 MG/DL (ref 70–110)
POCT GLUCOSE: 75 MG/DL (ref 70–110)
POIKILOCYTOSIS BLD QL SMEAR: SLIGHT
POLYCHROMASIA BLD QL SMEAR: ABNORMAL
POTASSIUM SERPL-SCNC: 3.7 MMOL/L (ref 3.5–5.1)
PROT SERPL-MCNC: 6.6 G/DL (ref 6–8.4)
PROT UR QL STRIP: NEGATIVE
RBC # BLD AUTO: 3.37 M/UL (ref 4–5.4)
SCHISTOCYTES BLD QL SMEAR: ABNORMAL
SODIUM SERPL-SCNC: 139 MMOL/L (ref 136–145)
SP GR UR STRIP: 1.01 (ref 1–1.03)
SQUAMOUS #/AREA URNS HPF: 5 /HPF
TARGETS BLD QL SMEAR: ABNORMAL
TROPONIN I SERPL DL<=0.01 NG/ML-MCNC: <0.006 NG/ML (ref 0–0.03)
URN SPEC COLLECT METH UR: ABNORMAL
UROBILINOGEN UR STRIP-ACNC: NEGATIVE EU/DL
WBC # BLD AUTO: 6.09 K/UL (ref 3.9–12.7)
WBC #/AREA URNS HPF: 2 /HPF (ref 0–5)

## 2022-12-23 PROCEDURE — 96374 THER/PROPH/DIAG INJ IV PUSH: CPT

## 2022-12-23 PROCEDURE — 25000003 PHARM REV CODE 250: Performed by: NURSE PRACTITIONER

## 2022-12-23 PROCEDURE — G0378 HOSPITAL OBSERVATION PER HR: HCPCS

## 2022-12-23 PROCEDURE — 25000003 PHARM REV CODE 250: Performed by: STUDENT IN AN ORGANIZED HEALTH CARE EDUCATION/TRAINING PROGRAM

## 2022-12-23 PROCEDURE — 80053 COMPREHEN METABOLIC PANEL: CPT | Performed by: STUDENT IN AN ORGANIZED HEALTH CARE EDUCATION/TRAINING PROGRAM

## 2022-12-23 PROCEDURE — 84484 ASSAY OF TROPONIN QUANT: CPT | Performed by: STUDENT IN AN ORGANIZED HEALTH CARE EDUCATION/TRAINING PROGRAM

## 2022-12-23 PROCEDURE — 99900035 HC TECH TIME PER 15 MIN (STAT)

## 2022-12-23 PROCEDURE — 96372 THER/PROPH/DIAG INJ SC/IM: CPT | Performed by: NURSE PRACTITIONER

## 2022-12-23 PROCEDURE — 27000221 HC OXYGEN, UP TO 24 HOURS

## 2022-12-23 PROCEDURE — 96361 HYDRATE IV INFUSION ADD-ON: CPT

## 2022-12-23 PROCEDURE — 94761 N-INVAS EAR/PLS OXIMETRY MLT: CPT

## 2022-12-23 PROCEDURE — 87040 BLOOD CULTURE FOR BACTERIA: CPT | Performed by: STUDENT IN AN ORGANIZED HEALTH CARE EDUCATION/TRAINING PROGRAM

## 2022-12-23 PROCEDURE — 25000003 PHARM REV CODE 250: Performed by: CLINICAL NURSE SPECIALIST

## 2022-12-23 PROCEDURE — 83605 ASSAY OF LACTIC ACID: CPT | Performed by: STUDENT IN AN ORGANIZED HEALTH CARE EDUCATION/TRAINING PROGRAM

## 2022-12-23 PROCEDURE — A4216 STERILE WATER/SALINE, 10 ML: HCPCS | Performed by: NURSE PRACTITIONER

## 2022-12-23 PROCEDURE — 99285 EMERGENCY DEPT VISIT HI MDM: CPT | Mod: 25

## 2022-12-23 PROCEDURE — 83880 ASSAY OF NATRIURETIC PEPTIDE: CPT | Performed by: STUDENT IN AN ORGANIZED HEALTH CARE EDUCATION/TRAINING PROGRAM

## 2022-12-23 PROCEDURE — 85025 COMPLETE CBC W/AUTO DIFF WBC: CPT | Performed by: STUDENT IN AN ORGANIZED HEALTH CARE EDUCATION/TRAINING PROGRAM

## 2022-12-23 PROCEDURE — 82962 GLUCOSE BLOOD TEST: CPT

## 2022-12-23 PROCEDURE — 25000242 PHARM REV CODE 250 ALT 637 W/ HCPCS: Performed by: NURSE PRACTITIONER

## 2022-12-23 PROCEDURE — 94640 AIRWAY INHALATION TREATMENT: CPT

## 2022-12-23 PROCEDURE — 81000 URINALYSIS NONAUTO W/SCOPE: CPT | Performed by: STUDENT IN AN ORGANIZED HEALTH CARE EDUCATION/TRAINING PROGRAM

## 2022-12-23 PROCEDURE — 63600175 PHARM REV CODE 636 W HCPCS: Performed by: NURSE PRACTITIONER

## 2022-12-23 RX ORDER — PREGABALIN 75 MG/1
150 CAPSULE ORAL 3 TIMES DAILY
Status: DISCONTINUED | OUTPATIENT
Start: 2022-12-23 | End: 2022-12-28 | Stop reason: HOSPADM

## 2022-12-23 RX ORDER — POTASSIUM CHLORIDE 20 MEQ/1
20 TABLET, EXTENDED RELEASE ORAL DAILY
Status: DISCONTINUED | OUTPATIENT
Start: 2022-12-23 | End: 2022-12-28 | Stop reason: HOSPADM

## 2022-12-23 RX ORDER — PROCHLORPERAZINE EDISYLATE 5 MG/ML
5 INJECTION INTRAMUSCULAR; INTRAVENOUS EVERY 6 HOURS PRN
Status: DISCONTINUED | OUTPATIENT
Start: 2022-12-23 | End: 2022-12-28 | Stop reason: HOSPADM

## 2022-12-23 RX ORDER — INSULIN ASPART 100 [IU]/ML
0-5 INJECTION, SOLUTION INTRAVENOUS; SUBCUTANEOUS EVERY 6 HOURS PRN
Status: DISCONTINUED | OUTPATIENT
Start: 2022-12-23 | End: 2022-12-28 | Stop reason: HOSPADM

## 2022-12-23 RX ORDER — HYDROCODONE BITARTRATE AND ACETAMINOPHEN 7.5; 325 MG/1; MG/1
1 TABLET ORAL EVERY 6 HOURS PRN
Status: DISCONTINUED | OUTPATIENT
Start: 2022-12-23 | End: 2022-12-26

## 2022-12-23 RX ORDER — POLYETHYLENE GLYCOL 3350 17 G/17G
17 POWDER, FOR SOLUTION ORAL DAILY PRN
Status: DISCONTINUED | OUTPATIENT
Start: 2022-12-23 | End: 2022-12-28 | Stop reason: HOSPADM

## 2022-12-23 RX ORDER — IPRATROPIUM BROMIDE AND ALBUTEROL SULFATE 2.5; .5 MG/3ML; MG/3ML
3 SOLUTION RESPIRATORY (INHALATION) EVERY 4 HOURS PRN
Status: DISCONTINUED | OUTPATIENT
Start: 2022-12-23 | End: 2022-12-26

## 2022-12-23 RX ORDER — MIRTAZAPINE 7.5 MG/1
15 TABLET, FILM COATED ORAL DAILY
Status: DISCONTINUED | OUTPATIENT
Start: 2022-12-23 | End: 2022-12-28 | Stop reason: HOSPADM

## 2022-12-23 RX ORDER — BUSPIRONE HYDROCHLORIDE 5 MG/1
15 TABLET ORAL 2 TIMES DAILY
Status: DISCONTINUED | OUTPATIENT
Start: 2022-12-23 | End: 2022-12-28 | Stop reason: HOSPADM

## 2022-12-23 RX ORDER — ATORVASTATIN CALCIUM 40 MG/1
40 TABLET, FILM COATED ORAL DAILY
Status: DISCONTINUED | OUTPATIENT
Start: 2022-12-23 | End: 2022-12-28 | Stop reason: HOSPADM

## 2022-12-23 RX ORDER — GLUCAGON 1 MG
1 KIT INJECTION
Status: DISCONTINUED | OUTPATIENT
Start: 2022-12-23 | End: 2022-12-28 | Stop reason: HOSPADM

## 2022-12-23 RX ORDER — LOSARTAN POTASSIUM 25 MG/1
25 TABLET ORAL DAILY
Status: DISCONTINUED | OUTPATIENT
Start: 2022-12-23 | End: 2022-12-28 | Stop reason: HOSPADM

## 2022-12-23 RX ORDER — TOPIRAMATE 25 MG/1
50 TABLET ORAL 2 TIMES DAILY
Status: DISCONTINUED | OUTPATIENT
Start: 2022-12-23 | End: 2022-12-28 | Stop reason: HOSPADM

## 2022-12-23 RX ORDER — ACETAMINOPHEN 325 MG/1
650 TABLET ORAL EVERY 8 HOURS PRN
Status: DISCONTINUED | OUTPATIENT
Start: 2022-12-23 | End: 2022-12-28 | Stop reason: HOSPADM

## 2022-12-23 RX ORDER — NAPROXEN SODIUM 220 MG/1
81 TABLET, FILM COATED ORAL DAILY
Status: DISCONTINUED | OUTPATIENT
Start: 2022-12-23 | End: 2022-12-28 | Stop reason: HOSPADM

## 2022-12-23 RX ORDER — ONDANSETRON 2 MG/ML
4 INJECTION INTRAMUSCULAR; INTRAVENOUS EVERY 8 HOURS PRN
Status: DISCONTINUED | OUTPATIENT
Start: 2022-12-23 | End: 2022-12-28 | Stop reason: HOSPADM

## 2022-12-23 RX ORDER — QUETIAPINE FUMARATE 100 MG/1
400 TABLET, FILM COATED ORAL NIGHTLY
Status: DISCONTINUED | OUTPATIENT
Start: 2022-12-23 | End: 2022-12-28 | Stop reason: HOSPADM

## 2022-12-23 RX ORDER — NALOXONE HCL 0.4 MG/ML
0.02 VIAL (ML) INJECTION
Status: DISCONTINUED | OUTPATIENT
Start: 2022-12-23 | End: 2022-12-28 | Stop reason: HOSPADM

## 2022-12-23 RX ORDER — SODIUM CHLORIDE 0.9 % (FLUSH) 0.9 %
10 SYRINGE (ML) INJECTION EVERY 8 HOURS
Status: DISCONTINUED | OUTPATIENT
Start: 2022-12-23 | End: 2022-12-28 | Stop reason: HOSPADM

## 2022-12-23 RX ORDER — SERTRALINE HYDROCHLORIDE 50 MG/1
150 TABLET, FILM COATED ORAL DAILY
Status: DISCONTINUED | OUTPATIENT
Start: 2022-12-23 | End: 2022-12-28 | Stop reason: HOSPADM

## 2022-12-23 RX ORDER — ZONISAMIDE 100 MG/1
200 CAPSULE ORAL 2 TIMES DAILY
Status: DISCONTINUED | OUTPATIENT
Start: 2022-12-23 | End: 2022-12-28 | Stop reason: HOSPADM

## 2022-12-23 RX ORDER — TALC
6 POWDER (GRAM) TOPICAL NIGHTLY PRN
Status: DISCONTINUED | OUTPATIENT
Start: 2022-12-23 | End: 2022-12-28 | Stop reason: HOSPADM

## 2022-12-23 RX ORDER — CHOLECALCIFEROL (VITAMIN D3) 25 MCG
1000 TABLET ORAL DAILY
Status: DISCONTINUED | OUTPATIENT
Start: 2022-12-23 | End: 2022-12-28 | Stop reason: HOSPADM

## 2022-12-23 RX ORDER — BENZONATATE 100 MG/1
100 CAPSULE ORAL 3 TIMES DAILY PRN
Status: DISCONTINUED | OUTPATIENT
Start: 2022-12-23 | End: 2022-12-28 | Stop reason: HOSPADM

## 2022-12-23 RX ADMIN — SERTRALINE HYDROCHLORIDE 150 MG: 50 TABLET ORAL at 01:12

## 2022-12-23 RX ADMIN — MIRTAZAPINE 15 MG: 7.5 TABLET ORAL at 01:12

## 2022-12-23 RX ADMIN — BUSPIRONE HYDROCHLORIDE 15 MG: 5 TABLET ORAL at 01:12

## 2022-12-23 RX ADMIN — QUETIAPINE FUMARATE 400 MG: 100 TABLET ORAL at 08:12

## 2022-12-23 RX ADMIN — LOSARTAN POTASSIUM 25 MG: 25 TABLET, FILM COATED ORAL at 01:12

## 2022-12-23 RX ADMIN — HYDROCODONE BITARTRATE AND ACETAMINOPHEN 1 TABLET: 7.5; 325 TABLET ORAL at 09:12

## 2022-12-23 RX ADMIN — BUSPIRONE HYDROCHLORIDE 15 MG: 5 TABLET ORAL at 08:12

## 2022-12-23 RX ADMIN — DEXTROSE 125 ML: 10 SOLUTION INTRAVENOUS at 07:12

## 2022-12-23 RX ADMIN — TOPIRAMATE 50 MG: 25 TABLET, FILM COATED ORAL at 01:12

## 2022-12-23 RX ADMIN — Medication 10 ML: at 04:12

## 2022-12-23 RX ADMIN — ACETAMINOPHEN 650 MG: 325 TABLET ORAL at 01:12

## 2022-12-23 RX ADMIN — INSULIN ASPART 2 UNITS: 100 INJECTION, SOLUTION INTRAVENOUS; SUBCUTANEOUS at 08:12

## 2022-12-23 RX ADMIN — BENZONATATE 100 MG: 100 CAPSULE ORAL at 01:12

## 2022-12-23 RX ADMIN — PREGABALIN 150 MG: 75 CAPSULE ORAL at 04:12

## 2022-12-23 RX ADMIN — ZONISAMIDE 200 MG: 100 CAPSULE ORAL at 08:12

## 2022-12-23 RX ADMIN — Medication 10 ML: at 09:12

## 2022-12-23 RX ADMIN — TOPIRAMATE 50 MG: 25 TABLET, FILM COATED ORAL at 08:12

## 2022-12-23 RX ADMIN — SODIUM CHLORIDE 1620 ML: 0.9 INJECTION, SOLUTION INTRAVENOUS at 02:12

## 2022-12-23 RX ADMIN — ASPIRIN 81 MG CHEWABLE TABLET 81 MG: 81 TABLET CHEWABLE at 01:12

## 2022-12-23 RX ADMIN — POTASSIUM CHLORIDE 20 MEQ: 1500 TABLET, EXTENDED RELEASE ORAL at 01:12

## 2022-12-23 RX ADMIN — ATORVASTATIN CALCIUM 40 MG: 40 TABLET, FILM COATED ORAL at 04:12

## 2022-12-23 RX ADMIN — IPRATROPIUM BROMIDE AND ALBUTEROL SULFATE 3 ML: .5; 3 SOLUTION RESPIRATORY (INHALATION) at 07:12

## 2022-12-23 RX ADMIN — CHOLECALCIFEROL TAB 25 MCG (1000 UNIT) 1000 UNITS: 25 TAB at 01:12

## 2022-12-23 RX ADMIN — PREGABALIN 150 MG: 75 CAPSULE ORAL at 08:12

## 2022-12-23 RX ADMIN — ZONISAMIDE 200 MG: 100 CAPSULE ORAL at 01:12

## 2022-12-23 NOTE — ED NOTES
Bedside report received from ANDERSON Ramirez. Assumed care of pt. Pt's glucose 44   Pt consumed 3, 4 oz cups of juice   Reports she took her insulin PTA and unsure if she ate

## 2022-12-23 NOTE — PLAN OF CARE
Problem: Adult Inpatient Plan of Care  Goal: Plan of Care Review  Outcome: Ongoing, Progressing  Goal: Patient-Specific Goal (Individualized)  Outcome: Ongoing, Progressing  Goal: Absence of Hospital-Acquired Illness or Injury  Outcome: Ongoing, Progressing  Goal: Optimal Comfort and Wellbeing  Outcome: Ongoing, Progressing  Goal: Readiness for Transition of Care  Outcome: Ongoing, Progressing     Problem: Diabetes Comorbidity  Goal: Blood Glucose Level Within Targeted Range  Outcome: Ongoing, Progressing     Received from emergency department on stable conditions via stretcher. Care plan reviewed with patient, AAOx4, family at bedside, oxygen infusing via n/c, NSR per cardiac monitor, no red alarm noted. Denies any pain of discomfort, education provided on medication effect and side effect, orientation to room provided, voices understanding. Right upper chest port-a-cath present, site clean, dry and intact. (States had chemo on 12/2/2022 for the first time).    Call light within her reach, no apparent distress noted, bed in low position, bed alarm on, educated on the importance of calling as needed, voices understanding, stable at this time.

## 2022-12-23 NOTE — PLAN OF CARE
Pt arrived to unit. Introduced self as VN for this shift. Admission questions completed by VN. Educated pt on VTE risk, safety precautions, and VN's role in pt care. Opportunity given for pt's questions. All questions answered.

## 2022-12-23 NOTE — ASSESSMENT & PLAN NOTE
-History of stage IV adenocarcinoma of the lung, finished radiation, currently in middle of chemo    -Chest X-ray results shows Coarse accentuated interstitial markings bilaterally which may relate to low lung volumes, although superimposed component of interstitial edema or nonspecific pneumonitis not excluded. Nonspecific bilateral irregular subsegmental opacities throughout both lungs, most pronounced at the right lung base    -CTA results shows No evidence of acute pulmonary thromboembolism. Decreasing size of multiple pulmonary nodular opacities with persistent infiltrates.  Findings are most compatible with response to therapy. Diminished size of hilar adenopathy on the right. Increasing size of single nodule in the apical medial aspect of the right upper lobe previously 7 mm now 12 mm.      -Will continue breathing treatment     -Supplemental oxygen as needed

## 2022-12-23 NOTE — ASSESSMENT & PLAN NOTE
Patient is identified as having Diastolic (HFpEF) heart failure that is Chronic. CHF is currently controlled. Latest ECHO performed and demonstrates- Results for orders placed during the hospital encounter of 04/05/22    Echo    Interpretation Summary  · The left ventricle is normal in size with mild concentric hypertrophy and normal systolic function.  · The estimated ejection fraction is 60%.  · Mild mitral regurgitation.  · Grade I left ventricular diastolic dysfunction.  · Mild tricuspid regurgitation.  · The estimated PA systolic pressure is 47 mmHg.  · Normal right ventricular size with normal right ventricular systolic function.  · There is mild pulmonary hypertension.  · There is no evidence of intracardiac shunting.  . Continue ACE/ARB and monitor clinical status closely. Monitor on telemetry. Patient is on CHF pathway.  Monitor strict Is&Os and daily weights.  Place on fluid restriction of 1 L. Continue to stress to patient importance of self efficacy and  on diet for CHF. Last BNP reviewed- and noted below   Recent Labs   Lab 12/23/22  0238   BNP 27   .  -Stable

## 2022-12-23 NOTE — ED PROVIDER NOTES
Encounter Date: 2022       History     Chief Complaint   Patient presents with    Cough     Pt arrives via ems with complaints of having a cough for the last few days. Ems states that pt reported she took her cough meds, pain meds, and Seroquel. Pt appears very lethargic during triage but is alert with stimulation.       72-year-old female presents via EMS complaining of cough for the past few days.  Patient is altered and nonverbal currently so history is derived only from EMS and medical records.  Was seen recently in the emergency department with similar complaint.  She does have pertinent medical history including ACS, respiratory failure, COPD, stage IV adenocarcinoma of the lung, finished radiation, currently in middle of chemo.  At time of presentation she is lethargic however protecting airway.    The history is provided by the EMS personnel and medical records. History limited by: Altered mental status.  EMS complaining of cough  Review of patient's allergies indicates:  No Known Allergies  Past Medical History:   Diagnosis Date    Acute coronary syndrome     Acute hypoxemic respiratory failure 2017    Anxiety     Asthma     Cancer     colon    Cataracts, both eyes     Chest pain at rest     Chest pain of uncertain etiology 2015    Colon cancer 1988    COPD (chronic obstructive pulmonary disease)     Coronary artery disease     Depression     Diabetes mellitus     Dyspnea on exertion 11/15/2018    Elevated brain natriuretic peptide (BNP) level 11/15/2018    Elevated cholesterol     Hypertension     Swelling     Syncope and collapse 2016     Past Surgical History:   Procedure Laterality Date    ABDOMINAL SURGERY      BREAST BIOPSY      benign unsure what side     SECTION, CLASSIC      COLON SURGERY      COLONOSCOPY  2019    repeat in 5 years    CORONARY ANGIOPLASTY WITH STENT PLACEMENT  3 months ago     x2, Hysterectomy, Lung surgery, Partial stomach removed, Part  of colon removed for rectal cancer    GASTRECTOMY      HEMORRHOID SURGERY      HYSTERECTOMY      @28yrs of age    LUNG BIOPSY      OOPHORECTOMY      @28yrs of age    POSTERIOR FUSION OF CERVICAL SPINE WITH LAMINECTOMY N/A 7/23/2020    Procedure: LAMINECTOMY, SPINE, CERVICAL, WITH POSTERIOR FUSION C3-T1 ;  Surgeon: Herson Tate MD;  Location: [facility] OR [address] [place] [address];  Service: Neurosurgery;  Laterality: N/A;    REPAIR OF INCARCERATED INCISIONAL HERNIA WITHOUT HISTORY OF PRIOR REPAIR N/A 2/7/2022    Procedure: REPAIR, HERNIA, INCISIONAL, INCARCERATED, WITHOUT HISTORY OF PRIOR REPAIR;  Surgeon: Simon King MD;  Location: [facility];  Service: General;  Laterality: N/A;     Family History   Problem Relation Age of Onset    Cancer Mother     Diabetes Mother     Hypertension Mother     Breast cancer Mother     Heart disease Father     Lung cancer Father     Depression Sister     Hypertension Sister     Diabetes Mellitus Sister     Cancer Maternal Aunt      Social History     Tobacco Use    Smoking status: Former     Years: 55.00     Types: Cigarettes     Start date: 1967    Smokeless tobacco: Never    Tobacco comments:     Enrolled in the [employer] on 10/5/15 ([employer] Member ID # [id number]).  Ambulatory referral to Smoking Cessation program.   Substance Use Topics    Alcohol use: Yes     Alcohol/week: 3.0 standard drinks     Types: 3 Glasses of wine per week     Comment: 1 every 3 months    Drug use: No     Review of Systems   Unable to perform ROS: Mental status change     Physical Exam     Initial Vitals [12/23/22 0120]   BP Pulse Resp Temp SpO2   (!) 90/50 (!) 124 16 98.4 °F (36.9 °C) 98 %      MAP       --         Physical Exam    Nursing note and vitals reviewed.  Constitutional: She appears well-developed and well-nourished. She appears lethargic.  Non-toxic appearance. She has a sickly appearance. No distress.   HENT:   Head: Normocephalic and atraumatic.   Right Ear: External ear normal.   Left Ear: External ear  normal.   Nose: Nose normal.   Mouth/Throat: Oropharynx is clear and moist.   Eyes: Conjunctivae and EOM are normal. Pupils are equal, round, and reactive to light.   Neck: Neck supple.   Normal range of motion.  Cardiovascular:  Regular rhythm, normal heart sounds and intact distal pulses.   Tachycardia present.         Pulmonary/Chest: No accessory muscle usage or stridor. No tachypnea. No respiratory distress. She has wheezes in the right lower field and the left lower field. She has no rhonchi. She has no rales.   Abdominal: Abdomen is soft. Bowel sounds are normal. There is no abdominal tenderness.   Musculoskeletal:         General: No tenderness or edema. Normal range of motion.      Cervical back: Normal range of motion and neck supple.     Neurological: She has normal strength. She appears lethargic. No cranial nerve deficit or sensory deficit.   Skin: Skin is warm and dry. No rash noted.   Psychiatric:   Unable to assess due to altered mental status       ED Course   Procedures  Labs Reviewed   CBC W/ AUTO DIFFERENTIAL - Abnormal; Notable for the following components:       Result Value    RBC 3.37 (*)     Hemoglobin 8.8 (*)     Hematocrit 29.3 (*)     MCH 26.1 (*)     MCHC 30.0 (*)     RDW 24.4 (*)     MPV 8.7 (*)     Lymph # 0.8 (*)     Gran % 75.8 (*)     Lymph % 12.5 (*)     All other components within normal limits   COMPREHENSIVE METABOLIC PANEL - Abnormal; Notable for the following components:    Chloride 112 (*)     CO2 18 (*)     Glucose 148 (*)     Albumin 3.1 (*)     AST 7 (*)     ALT 8 (*)     All other components within normal limits   CULTURE, BLOOD   CULTURE, BLOOD   TROPONIN I   B-TYPE NATRIURETIC PEPTIDE   LACTIC ACID, PLASMA   URINALYSIS, REFLEX TO URINE CULTURE          Imaging Results              X-Ray Chest AP Portable (Final result)  Result time 12/23/22 03:12:17      Final result by Michael Cardoso MD (12/23/22 03:12:17)                   Impression:      Coarse accentuated  interstitial markings bilaterally which may relate to low lung volumes, although superimposed component of interstitial edema or nonspecific pneumonitis not excluded.    Nonspecific bilateral irregular subsegmental opacities throughout both lungs, most pronounced at the right lung base, better evaluated on recent CTA chest examination.  Findings may relate to atelectasis, scarring/post radiation change, aspiration, neoplasm, or infection.      Electronically signed by: Michael Cardoso MD  Date:    12/23/2022  Time:    03:12               Narrative:    EXAMINATION:  XR CHEST AP PORTABLE    CLINICAL HISTORY:  cough;    TECHNIQUE:  Single frontal view of the chest was performed.    COMPARISON:  CTA chest 12/21/2022, chest radiograph 11/17/2022    FINDINGS:  There is a right chest wall MediPort catheter with tip projecting over the distal SVC.  The cardiac silhouette is not significantly enlarged.  There is bilateral hilar prominence in this patient with known hilar lymph node enlargement.  Lung volumes are mildly diminished with resultant bronchovascular crowding.  There is increased interstitial attenuation bilaterally compared to prior radiograph examination.  There are bilateral patchy irregular opacities, most pronounced at the right lung base.  This appears more conspicuous from prior radiograph examination and better evaluated on most recent CTA chest examination.  No large volume of pleural fluid identified.  No definite pneumothorax identified.  Osseous structures demonstrate postsurgical change of the cervical spine.  Surgical clips project over the gastroesophageal junction.                                    X-Rays:   Independently Interpreted Readings:   Chest X-Ray: No significant change from prior   Medications   sodium chloride 0.9% bolus 1,620 mL 1,620 mL (1,620 mLs Intravenous New Bag 12/23/22 0251)     Medical Decision Making:   Independently Interpreted Test(s):   I have ordered and independently  interpreted X-rays - see prior notes.  Clinical Tests:   Lab Tests: Ordered and Reviewed  Radiological Study: Ordered and Reviewed  To this point, patient has been observed in the emergency department for 4-1/2 hours.  Has shown minimal improvement of symptoms related to her encephalopathy.  Will obtain CT head to rule out alternate cause.  Case has been discussed thoroughly with oncoming physician Dr. Michele who will reassess following return of CT head and determine ultimate disposition.                        Clinical Impression:   Final diagnoses:  [G93.40] Encephalopathy (Primary)  [Z79.899] Polypharmacy               Ronald Roth MD  12/23/22 0570

## 2022-12-23 NOTE — ASSESSMENT & PLAN NOTE
Patient's FSGs are uncontrolled due to hypoglycemia on current medication regimen.  Last A1c reviewed-   Lab Results   Component Value Date    HGBA1C 8.2 (H) 10/11/2022     Most recent fingerstick glucose reviewed-   Recent Labs   Lab 12/23/22  0653 12/23/22  0658 12/23/22  0713 12/23/22  0819   POCTGLUCOSE 48* 44* 63* 186*     Current correctional scale  Low  Decrease anti-hyperglycemic dose as follows-   Antihyperglycemics (From admission, onward)    Start     Stop Route Frequency Ordered    12/23/22 0723  insulin aspart U-100 pen 0-5 Units         -- SubQ Every 6 hours PRN 12/23/22 0623        Hold Oral hypoglycemics while patient is in the hospital.  -Hypoglycemia protocol in place  -Maintain blood glucose between 100-180  -Low insulin sliding scale with Accu-Checks

## 2022-12-23 NOTE — ED TRIAGE NOTES
Patient brought in by EMS from home after calling, herself, for a cough. Patient is very lethargic. EMS stated she took 3 different medications after calling EMS. States she took a cough medication, Seroquel and pain medicine. Pain does open eyes with stimulation but immediately falls back asleep. Only information gathered was that she has had a cough for a week and a half. Per chart patient was recently dx with lung cancer. Has history of COPD with 2 L NC continuous use.     Review of patient's allergies indicates:  No Known Allergies     Patient has verified the spelling of their name and  on armband.   APPEARANCE: Patient is lethargic but will wake when shaking or saying name loudly.  SKIN: Skin is normal for race, warm, and dry. Normal skin turgor and mucous membranes moist.  CARDIAC: Normal rate and rhythm, no murmur heard.   RESPIRATORY:Normal rate and effort.Respirations are equal and unlabored.  +cough, wheezes, SOB  GASTRO: Bowel sounds normal, abdomen is soft, no tenderness, and no abdominal distention.  MUSCLE: Full ROM. No bony tenderness or soft tissue tenderness. No obvious deformity.  MENTAL STATUS: lethargic  : Voids without complication

## 2022-12-23 NOTE — SUBJECTIVE & OBJECTIVE
Past Medical History:   Diagnosis Date    Acute coronary syndrome     Acute hypoxemic respiratory failure 2017    Anxiety     Asthma     Cancer     colon    Cataracts, both eyes     Chest pain at rest     Chest pain of uncertain etiology 2015    Colon cancer 1988    COPD (chronic obstructive pulmonary disease)     Coronary artery disease     Depression     Diabetes mellitus     Dyspnea on exertion 11/15/2018    Elevated brain natriuretic peptide (BNP) level 11/15/2018    Elevated cholesterol     Hypertension     Swelling     Syncope and collapse 2016       Past Surgical History:   Procedure Laterality Date    ABDOMINAL SURGERY      BREAST BIOPSY      benign unsure what side     SECTION, CLASSIC      COLON SURGERY      COLONOSCOPY  2019    repeat in 5 years    CORONARY ANGIOPLASTY WITH STENT PLACEMENT  3 months ago     x2, Hysterectomy, Lung surgery, Partial stomach removed, Part of colon removed for rectal cancer    GASTRECTOMY      HEMORRHOID SURGERY      HYSTERECTOMY      @28yrs of age    LUNG BIOPSY      OOPHORECTOMY      @28yrs of age    POSTERIOR FUSION OF CERVICAL SPINE WITH LAMINECTOMY N/A 2020    Procedure: LAMINECTOMY, SPINE, CERVICAL, WITH POSTERIOR FUSION C3-T1 ;  Surgeon: Herson Tate MD;  Location: [facility] OR [address] [place] [address];  Service: Neurosurgery;  Laterality: N/A;    REPAIR OF INCARCERATED INCISIONAL HERNIA WITHOUT HISTORY OF PRIOR REPAIR N/A 2022    Procedure: REPAIR, HERNIA, INCISIONAL, INCARCERATED, WITHOUT HISTORY OF PRIOR REPAIR;  Surgeon: Simon King MD;  Location: [facility];  Service: General;  Laterality: N/A;       Review of patient's allergies indicates:  No Known Allergies    No current facility-administered medications on file prior to encounter.     Current Outpatient Medications on File Prior to Encounter   Medication Sig    albuterol (PROVENTIL/VENTOLIN HFA) 90 mcg/actuation inhaler Inhale 2 puffs into the lungs every 4 (four) hours as needed  "for Wheezing or Shortness of Breath. Rescue    alendronate (FOSAMAX) 70 MG tablet Take 1 tablet (70 mg total) by mouth every Sunday.    aspirin 81 MG Chew Take 1 tablet (81 mg total) by mouth once daily.    aspirin-acetaminophen-caffeine (GOODY'S EXTRA STRENGTH) 500-325-65 mg PwPk Take 1 packet by mouth daily as needed.    atorvastatin (LIPITOR) 40 MG tablet Take 1 tablet (40 mg total) by mouth once daily.    BD ULTRA-FINE MINI PEN NEEDLE 31 gauge x 3/16" Ndle     benzonatate (TESSALON) 100 MG capsule Take 1 capsule (100 mg total) by mouth 3 (three) times daily as needed for Cough.    blood-glucose meter,continuous (DEXCOM G6 ) Misc 1 each by Misc.(Non-Drug; Combo Route) route once daily.    blood-glucose sensor (DEXCOM G6 SENSOR) Tyra 1 each by Misc.(Non-Drug; Combo Route) route once daily.    blood-glucose transmitter (DEXCOM G6 TRANSMITTER) Tyra 1 each by Misc.(Non-Drug; Combo Route) route once daily.    busPIRone (BUSPAR) 15 MG tablet Take 15 mg by mouth 2 (two) times daily.    diclofenac sodium (VOLTAREN) 1 % Gel Apply 2 g topically 4 (four) times daily as needed.    HYDROcodone-acetaminophen (NORCO) 7.5-325 mg per tablet Take 1 tablet by mouth 2 (two) times daily as needed.    insulin lispro 100 unit/mL injection Inject 5 Units into the skin 3 (three) times daily before meals.    insulin lispro protamine-insulin lispro (HUMALOG MIX 75-25,U-100,INSULN) 100 unit/mL (75-25) Susp Inject 35 Units into the skin 2 (two) times daily before meals.    LANTUS SOLOSTAR U-100 INSULIN glargine 100 units/mL SubQ pen Inject 20 Units into the skin every evening.    losartan (COZAAR) 25 MG tablet Take 1 tablet (25 mg total) by mouth once daily.    metFORMIN (GLUCOPHAGE) 1000 MG tablet Take 1,000 mg by mouth 2 (two) times daily.    mirtazapine (REMERON) 15 MG tablet Take 15 mg by mouth once daily.    nitroGLYCERIN (NITROSTAT) 0.4 MG SL tablet Place 1 tablet (0.4 mg total) under the tongue every 5 (five) minutes as needed " for Chest pain.    ondansetron (ZOFRAN) 4 mg/5 mL solution Take 4 mg by mouth daily as needed for Nausea.    potassium chloride SA (K-DUR,KLOR-CON) 20 MEQ tablet Take 1 tablet (20 mEq total) by mouth once daily.    pregabalin (LYRICA) 150 MG capsule Take 150 mg by mouth 3 (three) times daily.    QUEtiapine (SEROQUEL) 400 MG tablet Take 400 mg by mouth nightly.    sertraline (ZOLOFT) 100 MG tablet Take 150 mg by mouth once daily at 6am.    topiramate (TOPAMAX) 50 MG tablet Take 1 tablet (50 mg total) by mouth 2 (two) times daily.    TRELEGY ELLIPTA 200-62.5-25 mcg inhaler Inhale 1 puff into the lungs once daily.    vitamin D (VITAMIN D3) 1000 units Tab Take 1,000 Units by mouth.    zonisamide (ZONEGRAN) 100 MG Cap Take 200 mg by mouth 2 (two) times daily.    [DISCONTINUED] clopidogreL (PLAVIX) 75 mg tablet Take 1 tablet (75 mg total) by mouth once daily. Take medicine for 15 days.    [DISCONTINUED] pravastatin (PRAVACHOL) 40 MG tablet Take 0.5 tablets (20 mg total) by mouth once daily. (Patient taking differently: Take 40 mg by mouth once daily.)     Family History       Problem Relation (Age of Onset)    Breast cancer Mother    Cancer Mother, Maternal Aunt    Depression Sister    Diabetes Mother    Diabetes Mellitus Sister    Heart disease Father    Hypertension Mother, Sister    Lung cancer Father          Tobacco Use    Smoking status: Former     Years: 55.00     Types: Cigarettes     Start date: 1967    Smokeless tobacco: Never    Tobacco comments:     Enrolled in the [employer] on 10/5/15 ([employer] Member ID # [id number]).  Ambulatory referral to Smoking Cessation program.   Substance and Sexual Activity    Alcohol use: Yes     Alcohol/week: 3.0 standard drinks     Types: 3 Glasses of wine per week     Comment: 1 every 3 months    Drug use: No    Sexual activity: Yes     Partners: Male     Birth control/protection: None     Comment: last drink yesterday afternoon     Review of Systems   Constitutional:  Positive for  activity change, appetite change and fatigue.   HENT:  Positive for congestion.    Eyes: Negative.    Respiratory:  Positive for shortness of breath and wheezing.    Cardiovascular:  Positive for palpitations.   Gastrointestinal:  Positive for abdominal pain.   Endocrine: Negative.    Genitourinary: Negative.    Allergic/Immunologic: Negative.    Neurological:  Positive for weakness.   Psychiatric/Behavioral: Negative.     Objective:     Vital Signs (Most Recent):  Temp: 98.4 °F (36.9 °C) (12/23/22 0120)  Pulse: (!) 111 (12/23/22 0942)  Resp: (!) 22 (12/23/22 0942)  BP: 111/73 (12/23/22 0942)  SpO2: 96 % (12/23/22 0942)   Vital Signs (24h Range):  Temp:  [98.4 °F (36.9 °C)] 98.4 °F (36.9 °C)  Pulse:  [] 111  Resp:  [16-22] 22  SpO2:  [92 %-98 %] 96 %  BP: ()/(50-74) 111/73     Weight: 54 kg (119 lb)  Body mass index is 21.77 kg/m².    Physical Exam  Constitutional:       Appearance: Normal appearance.   HENT:      Head: Normocephalic.      Mouth/Throat:      Mouth: Mucous membranes are moist.   Eyes:      Pupils: Pupils are equal, round, and reactive to light.   Cardiovascular:      Rate and Rhythm: Tachycardia present.      Pulses: Normal pulses.      Heart sounds: Normal heart sounds.   Pulmonary:      Breath sounds: Wheezing and rhonchi present.      Comments: Wheezing in upper and lower right lung base  Abdominal:      General: Bowel sounds are normal.      Palpations: Abdomen is soft.      Tenderness: There is abdominal tenderness.   Skin:     General: Skin is warm and dry.   Neurological:      Mental Status: She is alert and oriented to person, place, and time.      Motor: Weakness present.   Psychiatric:         Mood and Affect: Mood normal.         Behavior: Behavior normal.         CRANIAL NERVES     CN III, IV, VI   Pupils are equal, round, and reactive to light.     Significant Labs: All pertinent labs within the past 24 hours have been reviewed.    Significant Imaging: I have reviewed all  pertinent imaging results/findings within the past 24 hours.  I have reviewed and interpreted all pertinent imaging results/findings within the past 24 hours.

## 2022-12-23 NOTE — H&P
[facility] Medicine  History & Physical    Patient Name: Chayito Chung  MRN: [id number]  Patient Class: OP- Observation  Admission Date: 12/23/2022  Attending Physician: Will Padilla, *   Primary Care Provider: Curt Valladares III, MD         Patient information was obtained from patient and ER records.     Subjective:     Principal Problem:Shortness of breath    Chief Complaint:   Chief Complaint   Patient presents with    Cough     Pt arrives via ems with complaints of having a cough for the last few days. Ems states that pt reported she took her cough meds, pain meds, and Seroquel. Pt appears very lethargic during triage but is alert with stimulation.          HPI: Patient is a 72-year-old female presents via EMS complaining of cough for the past few days. Patient has a pertinent medical history including ACS, respiratory failure, COPD, stage IV adenocarcinoma of the lung, finished radiation, currently in middle of chemo. Patient stated her symptoms of shortness of breath and cough started Thursday, patient stated she continued to have productive cough with yellow sputum production. Patient stated she took over the counter medication with no relief in symptoms. Patient denied fever or chills. Patient denies nausea, vomiting, or diarrhea. Patient denies hemoptysis, hematemesis or hematochezia.     On arrival to the emergency department patient chest x-ray shows coarse accentuated interstitial markings bilaterally which may relate to low lung volumes, although superimposed component of interstitial edema or nonspecific pneumonitis not excluded. Nonspecific bilateral irregular subsegmental opacities throughout both lungs, most pronounced at the right lung base, better evaluated on recent CTA chest examination.  Findings may relate to atelectasis, scarring/post radiation change, aspiration, neoplasm, or infection.    CTA  of chest results shows No evidence of acute pulmonary  thromboembolism. Decreasing size of multiple pulmonary nodular opacities with persistent infiltrates. Diminished size of hilar adenopathy on the right. Increasing size of single nodule in the apical medial aspect of the right upper lobe previously 7 mm now 12 mm.  Continued follow-up is urged.      On assessment patient is lethargic but is arousable and answer questions appropriately. Patient voiced lower abdominal pain after sustained coughing. Patient heart rate will increase to 115's to 120's after coughing. Patient has expiratory wheezing. No sputum production noted.  Awaiting CT head to assess encephalopathy.     Patient will be admitted to hospital services for further evaluation and medical management.       Past Medical History:   Diagnosis Date    Acute coronary syndrome     Acute hypoxemic respiratory failure 2017    Anxiety     Asthma     Cancer     colon    Cataracts, both eyes     Chest pain at rest     Chest pain of uncertain etiology 2015    Colon cancer     COPD (chronic obstructive pulmonary disease)     Coronary artery disease     Depression     Diabetes mellitus     Dyspnea on exertion 11/15/2018    Elevated brain natriuretic peptide (BNP) level 11/15/2018    Elevated cholesterol     Hypertension     Swelling     Syncope and collapse 2016       Past Surgical History:   Procedure Laterality Date    ABDOMINAL SURGERY      BREAST BIOPSY      benign unsure what side     SECTION, CLASSIC      COLON SURGERY      COLONOSCOPY  2019    repeat in 5 years    CORONARY ANGIOPLASTY WITH STENT PLACEMENT  3 months ago     x2, Hysterectomy, Lung surgery, Partial stomach removed, Part of colon removed for rectal cancer    GASTRECTOMY      HEMORRHOID SURGERY      HYSTERECTOMY      @28yrs of age    LUNG BIOPSY      OOPHORECTOMY      @28yrs of age    POSTERIOR FUSION OF CERVICAL SPINE WITH LAMINECTOMY N/A 2020    Procedure: LAMINECTOMY,  "SPINE, CERVICAL, WITH POSTERIOR FUSION C3-T1 ;  Surgeon: Herson Tate MD;  Location: [facility] OR [address] [place] [address];  Service: Neurosurgery;  Laterality: N/A;    REPAIR OF INCARCERATED INCISIONAL HERNIA WITHOUT HISTORY OF PRIOR REPAIR N/A 2/7/2022    Procedure: REPAIR, HERNIA, INCISIONAL, INCARCERATED, WITHOUT HISTORY OF PRIOR REPAIR;  Surgeon: Simon King MD;  Location: [facility] OR;  Service: General;  Laterality: N/A;       Review of patient's allergies indicates:  No Known Allergies    No current facility-administered medications on file prior to encounter.     Current Outpatient Medications on File Prior to Encounter   Medication Sig    albuterol (PROVENTIL/VENTOLIN HFA) 90 mcg/actuation inhaler Inhale 2 puffs into the lungs every 4 (four) hours as needed for Wheezing or Shortness of Breath. Rescue    alendronate (FOSAMAX) 70 MG tablet Take 1 tablet (70 mg total) by mouth every Sunday.    aspirin 81 MG Chew Take 1 tablet (81 mg total) by mouth once daily.    aspirin-acetaminophen-caffeine (GOODY'S EXTRA STRENGTH) 500-325-65 mg PwPk Take 1 packet by mouth daily as needed.    atorvastatin (LIPITOR) 40 MG tablet Take 1 tablet (40 mg total) by mouth once daily.    BD ULTRA-FINE MINI PEN NEEDLE 31 gauge x 3/16" Ndle     benzonatate (TESSALON) 100 MG capsule Take 1 capsule (100 mg total) by mouth 3 (three) times daily as needed for Cough.    blood-glucose meter,continuous (DEXCOM G6 ) Misc 1 each by Misc.(Non-Drug; Combo Route) route once daily.    blood-glucose sensor (DEXCOM G6 SENSOR) Tyra 1 each by Misc.(Non-Drug; Combo Route) route once daily.    blood-glucose transmitter (DEXCOM G6 TRANSMITTER) Tyra 1 each by Misc.(Non-Drug; Combo Route) route once daily.    busPIRone (BUSPAR) 15 MG tablet Take 15 mg by mouth 2 (two) times daily.    diclofenac sodium (VOLTAREN) 1 % Gel Apply 2 g topically 4 (four) times daily as needed.    HYDROcodone-acetaminophen (NORCO) 7.5-325 mg per tablet Take 1 tablet by " mouth 2 (two) times daily as needed.    insulin lispro 100 unit/mL injection Inject 5 Units into the skin 3 (three) times daily before meals.    insulin lispro protamine-insulin lispro (HUMALOG MIX 75-25,U-100,INSULN) 100 unit/mL (75-25) Susp Inject 35 Units into the skin 2 (two) times daily before meals.    LANTUS SOLOSTAR U-100 INSULIN glargine 100 units/mL SubQ pen Inject 20 Units into the skin every evening.    losartan (COZAAR) 25 MG tablet Take 1 tablet (25 mg total) by mouth once daily.    metFORMIN (GLUCOPHAGE) 1000 MG tablet Take 1,000 mg by mouth 2 (two) times daily.    mirtazapine (REMERON) 15 MG tablet Take 15 mg by mouth once daily.    nitroGLYCERIN (NITROSTAT) 0.4 MG SL tablet Place 1 tablet (0.4 mg total) under the tongue every 5 (five) minutes as needed for Chest pain.    ondansetron (ZOFRAN) 4 mg/5 mL solution Take 4 mg by mouth daily as needed for Nausea.    potassium chloride SA (K-DUR,KLOR-CON) 20 MEQ tablet Take 1 tablet (20 mEq total) by mouth once daily.    pregabalin (LYRICA) 150 MG capsule Take 150 mg by mouth 3 (three) times daily.    QUEtiapine (SEROQUEL) 400 MG tablet Take 400 mg by mouth nightly.    sertraline (ZOLOFT) 100 MG tablet Take 150 mg by mouth once daily at 6am.    topiramate (TOPAMAX) 50 MG tablet Take 1 tablet (50 mg total) by mouth 2 (two) times daily.    TRELEGY ELLIPTA 200-62.5-25 mcg inhaler Inhale 1 puff into the lungs once daily.    vitamin D (VITAMIN D3) 1000 units Tab Take 1,000 Units by mouth.    zonisamide (ZONEGRAN) 100 MG Cap Take 200 mg by mouth 2 (two) times daily.    [DISCONTINUED] clopidogreL (PLAVIX) 75 mg tablet Take 1 tablet (75 mg total) by mouth once daily. Take medicine for 15 days.    [DISCONTINUED] pravastatin (PRAVACHOL) 40 MG tablet Take 0.5 tablets (20 mg total) by mouth once daily. (Patient taking differently: Take 40 mg by mouth once daily.)     Family History       Problem Relation (Age of Onset)    Breast cancer Mother     Cancer Mother, Maternal Aunt    Depression Sister    Diabetes Mother    Diabetes Mellitus Sister    Heart disease Father    Hypertension Mother, Sister    Lung cancer Father          Tobacco Use    Smoking status: Former     Years: 55.00     Types: Cigarettes     Start date: 1967    Smokeless tobacco: Never    Tobacco comments:     Enrolled in the [employer] on 10/5/15 ([employer] Member ID # [id number]).  Ambulatory referral to Smoking Cessation program.   Substance and Sexual Activity    Alcohol use: Yes     Alcohol/week: 3.0 standard drinks     Types: 3 Glasses of wine per week     Comment: 1 every 3 months    Drug use: No    Sexual activity: Yes     Partners: Male     Birth control/protection: None     Comment: last drink yesterday afternoon     Review of Systems   Constitutional:  Positive for activity change, appetite change and fatigue.   HENT:  Positive for congestion.    Eyes: Negative.    Respiratory:  Positive for shortness of breath and wheezing.    Cardiovascular:  Positive for palpitations.   Gastrointestinal:  Positive for abdominal pain.   Endocrine: Negative.    Genitourinary: Negative.    Allergic/Immunologic: Negative.    Neurological:  Positive for weakness.   Psychiatric/Behavioral: Negative.     Objective:     Vital Signs (Most Recent):  Temp: 98.4 °F (36.9 °C) (12/23/22 0120)  Pulse: (!) 111 (12/23/22 0942)  Resp: (!) 22 (12/23/22 0942)  BP: 111/73 (12/23/22 0942)  SpO2: 96 % (12/23/22 0942)   Vital Signs (24h Range):  Temp:  [98.4 °F (36.9 °C)] 98.4 °F (36.9 °C)  Pulse:  [] 111  Resp:  [16-22] 22  SpO2:  [92 %-98 %] 96 %  BP: ()/(50-74) 111/73     Weight: 54 kg (119 lb)  Body mass index is 21.77 kg/m².    Physical Exam  Constitutional:       Appearance: Normal appearance.   HENT:      Head: Normocephalic.      Mouth/Throat:      Mouth: Mucous membranes are moist.   Eyes:      Pupils: Pupils are equal, round, and reactive to light.   Cardiovascular:      Rate and Rhythm:  Tachycardia present.      Pulses: Normal pulses.      Heart sounds: Normal heart sounds.   Pulmonary:      Breath sounds: Wheezing and rhonchi present.      Comments: Wheezing in upper and lower right lung base  Abdominal:      General: Bowel sounds are normal.      Palpations: Abdomen is soft.      Tenderness: There is abdominal tenderness.   Skin:     General: Skin is warm and dry.   Neurological:      Mental Status: She is alert and oriented to person, place, and time.      Motor: Weakness present.   Psychiatric:         Mood and Affect: Mood normal.         Behavior: Behavior normal.         CRANIAL NERVES     CN III, IV, VI   Pupils are equal, round, and reactive to light.     Significant Labs: All pertinent labs within the past 24 hours have been reviewed.    Significant Imaging: I have reviewed all pertinent imaging results/findings within the past 24 hours.  I have reviewed and interpreted all pertinent imaging results/findings within the past 24 hours.    Assessment/Plan:     * Shortness of breath  -Patient has c/o shortness of breath for a couple of days took over the counter medication with no relief      -History of stage IV adenocarcinoma of the lung, finished radiation, currently in middle of chemo    -Chest X-ray results shows Coarse accentuated interstitial markings bilaterally which may relate to low lung volumes, although superimposed component of interstitial edema or nonspecific pneumonitis not excluded. Nonspecific bilateral irregular subsegmental opacities throughout both lungs, most pronounced at the right lung base    -CTA results shows No evidence of acute pulmonary thromboembolism. Decreasing size of multiple pulmonary nodular opacities with persistent infiltrates.  Findings are most compatible with response to therapy. Diminished size of hilar adenopathy on the right. Increasing size of single nodule in the apical medial aspect of the right upper lobe previously 7 mm now 12 mm.      -Will  continue breathing treatment     -Supplemental oxygen as needed    Hyperlipidemia  continue atorvastatin       Adenocarcinoma of lung, right    -History of stage IV adenocarcinoma of the lung, finished radiation, currently in middle of chemo    -Chest X-ray results shows Coarse accentuated interstitial markings bilaterally which may relate to low lung volumes, although superimposed component of interstitial edema or nonspecific pneumonitis not excluded. Nonspecific bilateral irregular subsegmental opacities throughout both lungs, most pronounced at the right lung base    -CTA results shows No evidence of acute pulmonary thromboembolism. Decreasing size of multiple pulmonary nodular opacities with persistent infiltrates.  Findings are most compatible with response to therapy. Diminished size of hilar adenopathy on the right. Increasing size of single nodule in the apical medial aspect of the right upper lobe previously 7 mm now 12 mm.      -Will continue breathing treatment     -Supplemental oxygen as needed       CAD (coronary artery disease)  -Stable  -Continue telemetry monitoring  -Continue home dose ASA  -Continue atorvastatin     Chronic diastolic congestive heart failure  Patient is identified as having Diastolic (HFpEF) heart failure that is Chronic. CHF is currently controlled. Latest ECHO performed and demonstrates- Results for orders placed during the hospital encounter of 04/05/22    Echo    Interpretation Summary  · The left ventricle is normal in size with mild concentric hypertrophy and normal systolic function.  · The estimated ejection fraction is 60%.  · Mild mitral regurgitation.  · Grade I left ventricular diastolic dysfunction.  · Mild tricuspid regurgitation.  · The estimated PA systolic pressure is 47 mmHg.  · Normal right ventricular size with normal right ventricular systolic function.  · There is mild pulmonary hypertension.  · There is no evidence of intracardiac shunting.  . Continue  ACE/ARB and monitor clinical status closely. Monitor on telemetry. Patient is on CHF pathway.  Monitor strict Is&Os and daily weights.  Place on fluid restriction of 1 L. Continue to stress to patient importance of self efficacy and  on diet for CHF. Last BNP reviewed- and noted below   Recent Labs   Lab 12/23/22  0238   BNP 27   .  -Stable       Iron deficiency anemia  -Stable  -H/H is 8.8 and 29.3  -Will continue to monitor and transfuse below 7      Hypoglycemia associated with type 2 diabetes mellitus  Patient's FSGs are uncontrolled due to hypoglycemia on current medication regimen.  Last A1c reviewed-   Lab Results   Component Value Date    HGBA1C 8.2 (H) 10/11/2022     Most recent fingerstick glucose reviewed-   Recent Labs   Lab 12/23/22  0653 12/23/22  0658 12/23/22  0713 12/23/22  0819   POCTGLUCOSE 48* 44* 63* 186*     Current correctional scale  Low  Decrease anti-hyperglycemic dose as follows-   Antihyperglycemics (From admission, onward)    Start     Stop Route Frequency Ordered    12/23/22 0723  insulin aspart U-100 pen 0-5 Units         -- SubQ Every 6 hours PRN 12/23/22 0623        Hold Oral hypoglycemics while patient is in the hospital.  -Hypoglycemia protocol in place  -Maintain blood glucose between 100-180  -Low insulin sliding scale with Accu-Checks      Major depressive disorder in partial remission  Patient has persistent depression which is moderate and is currently controlled. Will Continue anti-depressant medications. We will not consult psychiatry at this time. Patient does not display psychosis at this time. Continue to monitor closely and adjust plan of care as needed.        Chronic obstructive pulmonary disease with acute exacerbation    -Patient has a history of COPD   -Monitor oxygen saturation, keep O2 above 90%  -Continue as need breathing treatment  -supplemental oxygen as need    -PCO2 (34.9), PO2 (70), O2 (93)       VTE Risk Mitigation (From admission, onward)          Ordered     IP VTE HIGH RISK PATIENT  Once         12/23/22 0623     Place sequential compression device  Until discontinued         12/23/22 0623                   Cici Sosa NP  Department of Hospital Medicine   [place] - Emergency Dept

## 2022-12-23 NOTE — ASSESSMENT & PLAN NOTE
-Patient has c/o shortness of breath for a couple of days took over the counter medication with no relief      -History of stage IV adenocarcinoma of the lung, finished radiation, currently in middle of chemo    -Chest X-ray results shows Coarse accentuated interstitial markings bilaterally which may relate to low lung volumes, although superimposed component of interstitial edema or nonspecific pneumonitis not excluded. Nonspecific bilateral irregular subsegmental opacities throughout both lungs, most pronounced at the right lung base    -CTA results shows No evidence of acute pulmonary thromboembolism. Decreasing size of multiple pulmonary nodular opacities with persistent infiltrates.  Findings are most compatible with response to therapy. Diminished size of hilar adenopathy on the right. Increasing size of single nodule in the apical medial aspect of the right upper lobe previously 7 mm now 12 mm.      -Will continue breathing treatment     -Supplemental oxygen as needed

## 2022-12-23 NOTE — ED NOTES
Patient awake. Starting to cough. States cough has been productive at home with yellow sputum. States she will have a coughing spell for about 10 minutes and then will stop for a period of time. This is second coughing spell heard in the ER. The two episodes were about 3 hours apart. Patient maintained oxygen level each time.

## 2022-12-23 NOTE — HPI
Patient is a 72-year-old female presents via EMS complaining of cough for the past few days. Patient has a pertinent medical history including ACS, respiratory failure, COPD, stage IV adenocarcinoma of the lung, finished radiation, currently in middle of chemo. Patient stated her symptoms of shortness of breath and cough started Thursday, patient stated she continued to have productive cough with yellow sputum production. Patient stated she took over the counter medication with no relief in symptoms. Patient denied fever or chills. Patient denies nausea, vomiting, or diarrhea. Patient denies hemoptysis, hematemesis or hematochezia.     On arrival to the emergency department patient chest x-ray shows coarse accentuated interstitial markings bilaterally which may relate to low lung volumes, although superimposed component of interstitial edema or nonspecific pneumonitis not excluded. Nonspecific bilateral irregular subsegmental opacities throughout both lungs, most pronounced at the right lung base, better evaluated on recent CTA chest examination.  Findings may relate to atelectasis, scarring/post radiation change, aspiration, neoplasm, or infection.    CTA  of chest results shows No evidence of acute pulmonary thromboembolism. Decreasing size of multiple pulmonary nodular opacities with persistent infiltrates. Diminished size of hilar adenopathy on the right. Increasing size of single nodule in the apical medial aspect of the right upper lobe previously 7 mm now 12 mm.  Continued follow-up is urged.      On assessment patient is lethargic but is arousable and answer questions appropriately. Patient voiced lower abdominal pain after sustained coughing. Patient heart rate will increase to 115's to 120's after coughing. Patient has expiratory wheezing. No sputum production noted.  Awaiting CT head to assess encephalopathy.     Patient will be admitted to hospital services for further evaluation and medical management.

## 2022-12-24 LAB
ALBUMIN SERPL BCP-MCNC: 2.9 G/DL (ref 3.5–5.2)
ALP SERPL-CCNC: 123 U/L (ref 55–135)
ALT SERPL W/O P-5'-P-CCNC: 7 U/L (ref 10–44)
ANION GAP SERPL CALC-SCNC: 6 MMOL/L (ref 8–16)
ANISOCYTOSIS BLD QL SMEAR: SLIGHT
AST SERPL-CCNC: 9 U/L (ref 10–40)
BASOPHILS # BLD AUTO: 0.02 K/UL (ref 0–0.2)
BASOPHILS NFR BLD: 0.7 % (ref 0–1.9)
BILIRUB SERPL-MCNC: 0.2 MG/DL (ref 0.1–1)
BUN SERPL-MCNC: 10 MG/DL (ref 8–23)
CALCIUM SERPL-MCNC: 8.8 MG/DL (ref 8.7–10.5)
CHLORIDE SERPL-SCNC: 112 MMOL/L (ref 95–110)
CO2 SERPL-SCNC: 19 MMOL/L (ref 23–29)
CREAT SERPL-MCNC: 0.7 MG/DL (ref 0.5–1.4)
DACRYOCYTES BLD QL SMEAR: ABNORMAL
DIFFERENTIAL METHOD: ABNORMAL
EOSINOPHIL # BLD AUTO: 0.1 K/UL (ref 0–0.5)
EOSINOPHIL NFR BLD: 3.6 % (ref 0–8)
ERYTHROCYTE [DISTWIDTH] IN BLOOD BY AUTOMATED COUNT: 24.2 % (ref 11.5–14.5)
EST. GFR  (NO RACE VARIABLE): >60 ML/MIN/1.73 M^2
GLUCOSE SERPL-MCNC: 263 MG/DL (ref 70–110)
HCT VFR BLD AUTO: 30.5 % (ref 37–48.5)
HGB BLD-MCNC: 8.9 G/DL (ref 12–16)
HYPOCHROMIA BLD QL SMEAR: ABNORMAL
IMM GRANULOCYTES # BLD AUTO: 0 K/UL (ref 0–0.04)
IMM GRANULOCYTES NFR BLD AUTO: 0 % (ref 0–0.5)
LYMPHOCYTES # BLD AUTO: 0.7 K/UL (ref 1–4.8)
LYMPHOCYTES NFR BLD: 23.6 % (ref 18–48)
MCH RBC QN AUTO: 25.7 PG (ref 27–31)
MCHC RBC AUTO-ENTMCNC: 29.2 G/DL (ref 32–36)
MCV RBC AUTO: 88 FL (ref 82–98)
MONOCYTES # BLD AUTO: 0.2 K/UL (ref 0.3–1)
MONOCYTES NFR BLD: 7.5 % (ref 4–15)
NEUTROPHILS # BLD AUTO: 1.8 K/UL (ref 1.8–7.7)
NEUTROPHILS NFR BLD: 64.6 % (ref 38–73)
NRBC BLD-RTO: 0 /100 WBC
OVALOCYTES BLD QL SMEAR: ABNORMAL
PLATELET # BLD AUTO: 222 K/UL (ref 150–450)
PLATELET BLD QL SMEAR: ABNORMAL
PMV BLD AUTO: 9.1 FL (ref 9.2–12.9)
POCT GLUCOSE: 211 MG/DL (ref 70–110)
POCT GLUCOSE: 245 MG/DL (ref 70–110)
POCT GLUCOSE: 260 MG/DL (ref 70–110)
POCT GLUCOSE: 319 MG/DL (ref 70–110)
POIKILOCYTOSIS BLD QL SMEAR: SLIGHT
POLYCHROMASIA BLD QL SMEAR: ABNORMAL
POTASSIUM SERPL-SCNC: 4.2 MMOL/L (ref 3.5–5.1)
PROT SERPL-MCNC: 6.4 G/DL (ref 6–8.4)
RBC # BLD AUTO: 3.46 M/UL (ref 4–5.4)
SODIUM SERPL-SCNC: 137 MMOL/L (ref 136–145)
TARGETS BLD QL SMEAR: ABNORMAL
WBC # BLD AUTO: 2.8 K/UL (ref 3.9–12.7)

## 2022-12-24 PROCEDURE — 99900035 HC TECH TIME PER 15 MIN (STAT)

## 2022-12-24 PROCEDURE — 94640 AIRWAY INHALATION TREATMENT: CPT

## 2022-12-24 PROCEDURE — 25000003 PHARM REV CODE 250: Performed by: NURSE PRACTITIONER

## 2022-12-24 PROCEDURE — 63600175 PHARM REV CODE 636 W HCPCS: Performed by: NURSE PRACTITIONER

## 2022-12-24 PROCEDURE — G0378 HOSPITAL OBSERVATION PER HR: HCPCS

## 2022-12-24 PROCEDURE — 85025 COMPLETE CBC W/AUTO DIFF WBC: CPT | Performed by: NURSE PRACTITIONER

## 2022-12-24 PROCEDURE — 36415 COLL VENOUS BLD VENIPUNCTURE: CPT | Performed by: NURSE PRACTITIONER

## 2022-12-24 PROCEDURE — 94640 AIRWAY INHALATION TREATMENT: CPT | Mod: XB

## 2022-12-24 PROCEDURE — 96372 THER/PROPH/DIAG INJ SC/IM: CPT | Performed by: NURSE PRACTITIONER

## 2022-12-24 PROCEDURE — 80053 COMPREHEN METABOLIC PANEL: CPT | Performed by: NURSE PRACTITIONER

## 2022-12-24 PROCEDURE — A4216 STERILE WATER/SALINE, 10 ML: HCPCS | Performed by: NURSE PRACTITIONER

## 2022-12-24 PROCEDURE — 25000003 PHARM REV CODE 250: Performed by: CLINICAL NURSE SPECIALIST

## 2022-12-24 PROCEDURE — 27000221 HC OXYGEN, UP TO 24 HOURS

## 2022-12-24 PROCEDURE — 94761 N-INVAS EAR/PLS OXIMETRY MLT: CPT

## 2022-12-24 PROCEDURE — 25000242 PHARM REV CODE 250 ALT 637 W/ HCPCS: Performed by: NURSE PRACTITIONER

## 2022-12-24 RX ORDER — CODEINE PHOSPHATE AND GUAIFENESIN 10; 100 MG/5ML; MG/5ML
5 SOLUTION ORAL ONCE
Status: COMPLETED | OUTPATIENT
Start: 2022-12-24 | End: 2022-12-24

## 2022-12-24 RX ORDER — GUAIFENESIN/DEXTROMETHORPHAN 100-10MG/5
10 SYRUP ORAL EVERY 4 HOURS PRN
Status: DISCONTINUED | OUTPATIENT
Start: 2022-12-24 | End: 2022-12-28 | Stop reason: HOSPADM

## 2022-12-24 RX ADMIN — TOPIRAMATE 50 MG: 25 TABLET, FILM COATED ORAL at 09:12

## 2022-12-24 RX ADMIN — BUSPIRONE HYDROCHLORIDE 15 MG: 5 TABLET ORAL at 08:12

## 2022-12-24 RX ADMIN — LOSARTAN POTASSIUM 25 MG: 25 TABLET, FILM COATED ORAL at 09:12

## 2022-12-24 RX ADMIN — PREGABALIN 150 MG: 75 CAPSULE ORAL at 02:12

## 2022-12-24 RX ADMIN — BENZONATATE 100 MG: 100 CAPSULE ORAL at 01:12

## 2022-12-24 RX ADMIN — HYDROCODONE BITARTRATE AND ACETAMINOPHEN 1 TABLET: 7.5; 325 TABLET ORAL at 04:12

## 2022-12-24 RX ADMIN — SERTRALINE HYDROCHLORIDE 150 MG: 50 TABLET ORAL at 09:12

## 2022-12-24 RX ADMIN — IPRATROPIUM BROMIDE AND ALBUTEROL SULFATE 3 ML: .5; 3 SOLUTION RESPIRATORY (INHALATION) at 03:12

## 2022-12-24 RX ADMIN — Medication 10 ML: at 10:12

## 2022-12-24 RX ADMIN — ASPIRIN 81 MG CHEWABLE TABLET 81 MG: 81 TABLET CHEWABLE at 09:12

## 2022-12-24 RX ADMIN — QUETIAPINE FUMARATE 400 MG: 100 TABLET ORAL at 08:12

## 2022-12-24 RX ADMIN — MIRTAZAPINE 15 MG: 7.5 TABLET ORAL at 09:12

## 2022-12-24 RX ADMIN — POTASSIUM CHLORIDE 20 MEQ: 1500 TABLET, EXTENDED RELEASE ORAL at 09:12

## 2022-12-24 RX ADMIN — GUAIFENESIN AND CODEINE PHOSPHATE 5 ML: 100; 10 SOLUTION ORAL at 10:12

## 2022-12-24 RX ADMIN — INSULIN ASPART 3 UNITS: 100 INJECTION, SOLUTION INTRAVENOUS; SUBCUTANEOUS at 04:12

## 2022-12-24 RX ADMIN — ZONISAMIDE 200 MG: 100 CAPSULE ORAL at 09:12

## 2022-12-24 RX ADMIN — PREGABALIN 150 MG: 75 CAPSULE ORAL at 08:12

## 2022-12-24 RX ADMIN — ZONISAMIDE 200 MG: 100 CAPSULE ORAL at 08:12

## 2022-12-24 RX ADMIN — GUAIFENESIN AND DEXTROMETHORPHAN 10 ML: 100; 10 SYRUP ORAL at 03:12

## 2022-12-24 RX ADMIN — HYDROCODONE BITARTRATE AND ACETAMINOPHEN 1 TABLET: 7.5; 325 TABLET ORAL at 09:12

## 2022-12-24 RX ADMIN — INSULIN ASPART 4 UNITS: 100 INJECTION, SOLUTION INTRAVENOUS; SUBCUTANEOUS at 12:12

## 2022-12-24 RX ADMIN — Medication 10 ML: at 05:12

## 2022-12-24 RX ADMIN — ATORVASTATIN CALCIUM 40 MG: 40 TABLET, FILM COATED ORAL at 09:12

## 2022-12-24 RX ADMIN — Medication 10 ML: at 02:12

## 2022-12-24 RX ADMIN — GUAIFENESIN AND DEXTROMETHORPHAN 10 ML: 100; 10 SYRUP ORAL at 08:12

## 2022-12-24 RX ADMIN — PREGABALIN 150 MG: 75 CAPSULE ORAL at 09:12

## 2022-12-24 RX ADMIN — IPRATROPIUM BROMIDE AND ALBUTEROL SULFATE 3 ML: .5; 3 SOLUTION RESPIRATORY (INHALATION) at 11:12

## 2022-12-24 RX ADMIN — CHOLECALCIFEROL TAB 25 MCG (1000 UNIT) 1000 UNITS: 25 TAB at 09:12

## 2022-12-24 RX ADMIN — GUAIFENESIN AND DEXTROMETHORPHAN 10 ML: 100; 10 SYRUP ORAL at 09:12

## 2022-12-24 RX ADMIN — BUSPIRONE HYDROCHLORIDE 15 MG: 5 TABLET ORAL at 09:12

## 2022-12-24 RX ADMIN — IPRATROPIUM BROMIDE AND ALBUTEROL SULFATE 3 ML: .5; 3 SOLUTION RESPIRATORY (INHALATION) at 07:12

## 2022-12-24 RX ADMIN — BENZONATATE 100 MG: 100 CAPSULE ORAL at 04:12

## 2022-12-24 NOTE — PROGRESS NOTES
12/24/22 0133   Nurse Notification   Charge Nurse Notified? Yes   Name of Charge Nurse Jez cintron RN and Zoraida Travis, RN   Bedside Nurse Notified? Yes   Name of Bedside Nurse Ortiz Null, RN   Nurse Notified Of Other   VIRTUAL NURSE: Patient MEWs score 6 at this time. Requested priority rounding by ICU charge, she states she will take a look.

## 2022-12-24 NOTE — NURSING
2025: Patient c/o pain to chest exacerbated with coughing. Unable to administer Q8hr PRN tylenol. Patient requesting home hydrocodone 7.5mg for pain. MD notified, and order received.    0021: notified by PCT that patient was having a coughing spell. Assessed patient, and noted dry cough, 's, tachypnea, R sided wheezing with coarse roseann breath sounds. Pt requesting robitussin PRN as she uses at home. MD notified.     0120: Pt agrees to take tessalon Perles due to continued cough.    0130: Patient resting comfortably, no acute distress, tachypnea improving, cough mildly improved.    0132: notified by VN of MEWS score of 6. Spoke with ICU charge RN about patient, will review chart.    0200: Patient resting comfortably, no acute distress.    0303: Attempted to call on call MD, directed to voicemail.    0308: Updated MD via secure chat regarding current patient status, mews score, and proactive round. Orders received for robitussin.     0325: ICU charge at bedside for evaluation, robitussin administered, respiratory notified for PRN treatment.

## 2022-12-24 NOTE — NURSING
"RAPID RESPONSE NURSE PROACTIVE ROUNDING NOTE     Time of Visit: 330    Admit Date: 2022  LOS: 0  Code Status: Full Code   Date of Visit: 2022  : 1950  Age: 72 y.o.  Sex: female  Race: Black or   Bed: K426/K426 A:   MRN: [id number]  Was the patient discharged from an ICU this admission? no   Was the patient discharged from a PACU within last 24 hours?  no  Did the patient receive conscious sedation/general anesthesia in last 24 hours?  no  Was the patient in the ED within the past 24 hours?  yes  Was the patient started on NIPPV within the past 24 hours?  no  Attending Physician: Will Padilla, *  Primary Service: Networked reference to record PCT     ASSESSMENT     Notified by  Frantz Ramirez .  Reason for alert: Tachycardia    Diagnosis: Shortness of breath    Abnormal Vital Signs: /62 (BP Location: Right arm, Patient Position: Lying)   Pulse (!) 111   Temp 97.9 °F (36.6 °C) (Oral)   Resp 20   Ht 5' 2" (1.575 m)   Wt 54 kg (119 lb)   LMP  (LMP Unknown)   SpO2 95%   Breastfeeding No   BMI 21.77 kg/m²      Clinical Issues: Respiratory    Patient  has a past medical history of Abdominal distension, Acute coronary syndrome, Acute hypoxemic respiratory failure, Anxiety, Aortic atherosclerosis, Asthma, Bronchiectasis, Cancer, Cataracts, both eyes, Cervical myelopathy, Chest pain at rest, Chest pain of uncertain etiology, Colon cancer, COPD (chronic obstructive pulmonary disease), Coronary artery disease, Coronary artery disease of native artery of native heart with stable angina pectoris, Depression, Depressive disorder, Diabetes mellitus, Diabetic peripheral neuropathy, Dyspnea on exertion, Elevated brain natriuretic peptide (BNP) level, Elevated cholesterol, Hamartoma of lung, History of partial gastrectomy, History of stroke with residual deficit, Hyperlipidemia associated with type 2 diabetes mellitus, Hypertension, Hypertension associated with " diabetes, Impaired ambulation, Incisional hernia, without obstruction or gangrene, Interstitial lung disease, Lumbar facet arthropathy, Mass of breast, Migraine headache, Mild malnutrition, Mitral valve prolapse, Moderate malnutrition, Neuropathy, Osteopenia of lumbar spine, Pancreatitis, Physical deconditioning, Pulmonary hypertension, Recurrent falls, Restrictive lung disease, Right leg weakness, S/P PTCA (percutaneous transluminal coronary angioplasty), Seizure disorder, Simple chronic bronchitis, Sinus tachycardia, Sleep apnea, Solitary pulmonary nodule s/p resection 4/2012, Stenosis of carotid artery, Swelling, Syncope and collapse, Tobacco abuse, 1ppd, 50 years, Type 2 diabetes mellitus, with long-term current use of insulin, Ulnar neuropathy, Vitamin D deficiency, and Weakness of both lower extremities.      On arrival pt laying in bed. AAOx4. Pt c/o SOB. On 4L NC, POX: 95%. VS: Temp-97.9, HR-111, BP: 121/62(83). On assessment lung sounds coarse R>L, with expiratory wheezes noted. No edema noted. UOP per external urinary cath: 300mL.   INTERVENTIONS/ RECOMMENDATIONS   -CXR  -labs: BNP  -Diuresis needed, lasix IVP  -Scheduled breathing treatments    Interventions:   - Pt repositioned in bed  - RT called per ANDERSON Alcantar for PRN breathing tx   - Team paged, with no response           Discussed plan of care with Ortiz BARRON.    PHYSICIAN ESCALATION     Yes/No  yes    Unable to reach NP after multiple attempts.    0800: Spoke with Dr Ramon requesting a pulmonary proactitve round be done, MD verbalizing he would see pt during morning rounds and speak with Hospitalist team about POC.    Disposition: Remain in room 426.    FOLLOW-UP     Call back the Rapid Response Nurse, CARRIE HAWKINS RN at [phone] for additional questions or concerns.

## 2022-12-24 NOTE — ASSESSMENT & PLAN NOTE
-Patient has a history of COPD   -Monitor oxygen saturation, keep O2 above 90%  -Continue as need breathing treatment  -supplemental oxygen as need    -PCO2 (34.9), PO2 (70), O2 (93)

## 2022-12-24 NOTE — PLAN OF CARE
Problem: Adult Inpatient Plan of Care  Goal: Plan of Care Review  Outcome: Ongoing, Progressing  Goal: Patient-Specific Goal (Individualized)  Outcome: Ongoing, Progressing  Goal: Absence of Hospital-Acquired Illness or Injury  Outcome: Ongoing, Progressing  Goal: Optimal Comfort and Wellbeing  Outcome: Ongoing, Progressing  Goal: Readiness for Transition of Care  Outcome: Ongoing, Progressing     Problem: Diabetes Comorbidity  Goal: Blood Glucose Level Within Targeted Range  Outcome: Ongoing, Progressing     Problem: Gas Exchange Impaired  Goal: Optimal Gas Exchange  Outcome: Ongoing, Progressing     Problem: Skin Injury Risk Increased  Goal: Skin Health and Integrity  Outcome: Ongoing, Progressing     Problem: Breathing Pattern Ineffective  Goal: Effective Breathing Pattern  Outcome: Ongoing, Progressing

## 2022-12-24 NOTE — SUBJECTIVE & OBJECTIVE
Past Medical History:   Diagnosis Date    Abdominal distension 8/22/2022    Acute coronary syndrome     Acute hypoxemic respiratory failure 5/1/2017    Anxiety     Aortic atherosclerosis 11/2/2021    Asthma     Bronchiectasis 8/22/2022    Cancer     colon    Cataracts, both eyes     Cervical myelopathy 7/23/2020    Chest pain at rest     Chest pain of uncertain etiology 12/6/2015    Colon cancer 1988    COPD (chronic obstructive pulmonary disease)     Coronary artery disease     Coronary artery disease of native artery of native heart with stable angina pectoris 12/16/2012    Depression     Depressive disorder 8/22/2022    Diabetes mellitus     Diabetic peripheral neuropathy 8/1/2022    Dyspnea on exertion 11/15/2018    Elevated brain natriuretic peptide (BNP) level 11/15/2018    Elevated cholesterol     Hamartoma of lung 6/29/2022    History of partial gastrectomy 12/16/2019    History of stroke with residual deficit 4/5/2022    Hyperlipidemia associated with type 2 diabetes mellitus     Hypertension     Hypertension associated with diabetes     Impaired ambulation     Incisional hernia, without obstruction or gangrene 11/27/2019    Interstitial lung disease 6/29/2022    Lumbar facet arthropathy 8/2/2016    Mass of breast 8/22/2022    Migraine headache 7/23/2020    Mild malnutrition 4/6/2022    Mitral valve prolapse 8/22/2022    Moderate malnutrition 8/24/2022    Neuropathy 12/17/2012    Osteopenia of lumbar spine 8/27/2019    Pancreatitis 8/22/2022    Physical deconditioning 5/9/2022    Pulmonary hypertension 6/9/2022    Recurrent falls 5/9/2022    Restrictive lung disease 8/22/2022    Right leg weakness 4/5/2022    S/P PTCA (percutaneous transluminal coronary angioplasty) 10/20/2015    Seizure disorder 12/16/2019    Simple chronic bronchitis 8/22/2022    Sinus tachycardia 10/11/2016    Sleep apnea 11/4/2019    Solitary pulmonary nodule s/p resection 4/2012 12/17/2012    Stenosis of carotid artery 8/22/2022     Swelling     Syncope and collapse 2016    Tobacco abuse, 1ppd, 50 years 2012    Type 2 diabetes mellitus, with long-term current use of insulin 2019    Ulnar neuropathy 2022    Vitamin D deficiency 2019    Weakness of both lower extremities 2016       Past Surgical History:   Procedure Laterality Date    ABDOMINAL SURGERY      BREAST BIOPSY      benign unsure what side     SECTION, CLASSIC      COLON SURGERY      COLONOSCOPY  2019    repeat in 5 years    CORONARY ANGIOPLASTY WITH STENT PLACEMENT  3 months ago     x2, Hysterectomy, Lung surgery, Partial stomach removed, Part of colon removed for rectal cancer    GASTRECTOMY      HEMORRHOID SURGERY      HYSTERECTOMY      @28yrs of age    LUNG BIOPSY      OOPHORECTOMY      @28yrs of age    POSTERIOR FUSION OF CERVICAL SPINE WITH LAMINECTOMY N/A 2020    Procedure: LAMINECTOMY, SPINE, CERVICAL, WITH POSTERIOR FUSION C3-T1 ;  Surgeon: Herson Tate MD;  Location: [facility] OR [address] [place] [address];  Service: Neurosurgery;  Laterality: N/A;    REPAIR OF INCARCERATED INCISIONAL HERNIA WITHOUT HISTORY OF PRIOR REPAIR N/A 2022    Procedure: REPAIR, HERNIA, INCISIONAL, INCARCERATED, WITHOUT HISTORY OF PRIOR REPAIR;  Surgeon: Simon King MD;  Location: [facility];  Service: General;  Laterality: N/A;       Review of patient's allergies indicates:  No Known Allergies    No current facility-administered medications on file prior to encounter.     Current Outpatient Medications on File Prior to Encounter   Medication Sig    albuterol (PROVENTIL/VENTOLIN HFA) 90 mcg/actuation inhaler Inhale 2 puffs into the lungs every 4 (four) hours as needed for Wheezing or Shortness of Breath. Rescue    alendronate (FOSAMAX) 70 MG tablet Take 1 tablet (70 mg total) by mouth every .    aspirin 81 MG Chew Take 1 tablet (81 mg total) by mouth once daily.    atorvastatin (LIPITOR) 40 MG tablet Take 1 tablet (40 mg total) by mouth once daily.  "   BD ULTRA-FINE MINI PEN NEEDLE 31 gauge x 3/16" Ndle     benzonatate (TESSALON) 100 MG capsule Take 1 capsule (100 mg total) by mouth 3 (three) times daily as needed for Cough.    blood-glucose meter,continuous (DEXCOM G6 ) Misc 1 each by Misc.(Non-Drug; Combo Route) route once daily.    blood-glucose sensor (DEXCOM G6 SENSOR) Tyra 1 each by Misc.(Non-Drug; Combo Route) route once daily.    blood-glucose transmitter (DEXCOM G6 TRANSMITTER) Tyra 1 each by Misc.(Non-Drug; Combo Route) route once daily.    busPIRone (BUSPAR) 15 MG tablet Take 15 mg by mouth 2 (two) times daily.    diclofenac sodium (VOLTAREN) 1 % Gel Apply 2 g topically 4 (four) times daily as needed.    HYDROcodone-acetaminophen (NORCO) 7.5-325 mg per tablet Take 1 tablet by mouth 2 (two) times daily as needed.    insulin lispro 100 unit/mL injection Inject 5 Units into the skin 3 (three) times daily before meals.    insulin lispro protamine-insulin lispro (HUMALOG MIX 75-25,U-100,INSULN) 100 unit/mL (75-25) Susp Inject 35 Units into the skin 2 (two) times daily before meals.    LANTUS SOLOSTAR U-100 INSULIN glargine 100 units/mL SubQ pen Inject 20 Units into the skin every evening.    losartan (COZAAR) 25 MG tablet Take 1 tablet (25 mg total) by mouth once daily.    metFORMIN (GLUCOPHAGE) 1000 MG tablet Take 1,000 mg by mouth 2 (two) times daily.    mirtazapine (REMERON) 15 MG tablet Take 15 mg by mouth once daily.    nitroGLYCERIN (NITROSTAT) 0.4 MG SL tablet Place 1 tablet (0.4 mg total) under the tongue every 5 (five) minutes as needed for Chest pain.    ondansetron (ZOFRAN) 4 mg/5 mL solution Take 4 mg by mouth daily as needed for Nausea.    potassium chloride SA (K-DUR,KLOR-CON) 20 MEQ tablet Take 1 tablet (20 mEq total) by mouth once daily.    pregabalin (LYRICA) 150 MG capsule Take 150 mg by mouth 3 (three) times daily.    QUEtiapine (SEROQUEL) 400 MG tablet Take 400 mg by mouth nightly.    sertraline (ZOLOFT) 100 MG tablet Take 150 " mg by mouth once daily at 6am.    topiramate (TOPAMAX) 50 MG tablet Take 1 tablet (50 mg total) by mouth 2 (two) times daily.    TRELEGY ELLIPTA 200-62.5-25 mcg inhaler Inhale 1 puff into the lungs once daily.    vitamin D (VITAMIN D3) 1000 units Tab Take 1,000 Units by mouth.    zonisamide (ZONEGRAN) 100 MG Cap Take 200 mg by mouth 2 (two) times daily.    aspirin-acetaminophen-caffeine (GOODY'S EXTRA STRENGTH) 500-325-65 mg PwPk Take 1 packet by mouth daily as needed.    [DISCONTINUED] clopidogreL (PLAVIX) 75 mg tablet Take 1 tablet (75 mg total) by mouth once daily. Take medicine for 15 days.    [DISCONTINUED] pravastatin (PRAVACHOL) 40 MG tablet Take 0.5 tablets (20 mg total) by mouth once daily. (Patient taking differently: Take 40 mg by mouth once daily.)     Family History       Problem Relation (Age of Onset)    Breast cancer Mother    Cancer Mother, Maternal Aunt    Depression Sister    Diabetes Mother    Diabetes Mellitus Sister    Heart disease Father    Hypertension Mother, Sister    Lung cancer Father          Tobacco Use    Smoking status: Former     Years: 55.00     Types: Cigarettes     Start date: 1967    Smokeless tobacco: Never    Tobacco comments:     Enrolled in the [employer] on 10/5/15 ([employer] Member ID # [id number]).  Ambulatory referral to Smoking Cessation program.   Substance and Sexual Activity    Alcohol use: Yes     Alcohol/week: 3.0 standard drinks     Types: 3 Glasses of wine per week     Comment: 1 every 3 months    Drug use: No    Sexual activity: Yes     Partners: Male     Birth control/protection: None     Comment: last drink yesterday afternoon     Review of Systems   Constitutional:  Positive for activity change, appetite change and fatigue.   HENT:  Positive for congestion.    Eyes: Negative.    Respiratory:  Positive for shortness of breath and wheezing.    Cardiovascular:  Positive for palpitations.   Gastrointestinal:  Positive for abdominal pain.   Endocrine: Negative.     Genitourinary: Negative.    Allergic/Immunologic: Negative.    Neurological:  Positive for weakness.   Psychiatric/Behavioral: Negative.     Objective:     Vital Signs (Most Recent):  Temp: 97.6 °F (36.4 °C) (12/24/22 0726)  Pulse: (!) 112 (12/24/22 0900)  Resp: 18 (12/24/22 0932)  BP: 109/63 (12/24/22 0900)  SpO2: (!) 90 % (12/24/22 0900)   Vital Signs (24h Range):  Temp:  [96 °F (35.6 °C)-98.4 °F (36.9 °C)] 97.6 °F (36.4 °C)  Pulse:  [] 112  Resp:  [18-26] 18  SpO2:  [86 %-98 %] 90 %  BP: ()/(53-93) 109/63     Weight: 54 kg (119 lb)  Body mass index is 21.77 kg/m².    Physical Exam  Constitutional:       Appearance: Normal appearance.   HENT:      Head: Normocephalic.      Mouth/Throat:      Mouth: Mucous membranes are moist.   Eyes:      Pupils: Pupils are equal, round, and reactive to light.   Cardiovascular:      Rate and Rhythm: Tachycardia present.      Pulses: Normal pulses.      Heart sounds: Normal heart sounds.   Pulmonary:      Breath sounds: Wheezing and rhonchi present.      Comments: Wheezing in upper and lower right lung base  Abdominal:      General: Bowel sounds are normal.      Palpations: Abdomen is soft.      Tenderness: There is abdominal tenderness.   Skin:     General: Skin is warm and dry.   Neurological:      Mental Status: She is alert and oriented to person, place, and time.      Motor: Weakness present.   Psychiatric:         Mood and Affect: Mood normal.         Behavior: Behavior normal.         CRANIAL NERVES     CN III, IV, VI   Pupils are equal, round, and reactive to light.     Significant Labs: All pertinent labs within the past 24 hours have been reviewed.    Significant Imaging: I have reviewed all pertinent imaging results/findings within the past 24 hours.  I have reviewed and interpreted all pertinent imaging results/findings within the past 24 hours.

## 2022-12-24 NOTE — CONSULTS
Thank you for your consult to [facility]. We have reviewed the patient chart. This patient does not meet criteria for [facility] service at this time due to Patient is a new admission. She was admitted on today, 12/23/2022 at 1137, please re-consult on tomorrow. Will hand back to In-house service.      Gracy Rodriguez, NETTA

## 2022-12-24 NOTE — ASSESSMENT & PLAN NOTE
Patient's FSGs are uncontrolled due to hypoglycemia on current medication regimen.  Last A1c reviewed-   Lab Results   Component Value Date    HGBA1C 8.2 (H) 10/11/2022     Most recent fingerstick glucose reviewed-   Recent Labs   Lab 12/23/22 2004 12/24/22  0018 12/24/22  0536 12/24/22  1126   POCTGLUCOSE 227* 211* 245* 319*     Current correctional scale  Low  Decrease anti-hyperglycemic dose as follows-   Antihyperglycemics (From admission, onward)    Start     Stop Route Frequency Ordered    12/23/22 0723  insulin aspart U-100 pen 0-5 Units         -- SubQ Every 6 hours PRN 12/23/22 0623        Hold Oral hypoglycemics while patient is in the hospital.  -Hypoglycemia protocol in place  -Maintain blood glucose between 100-180  -Low insulin sliding scale with Accu-Checks

## 2022-12-24 NOTE — HOSPITAL COURSE
Patient admitted on 12/23 for Shortness of breath. Patient seen and examined on a.m. rounds. Patient is lethargic but answer question appropriately. Patient voiced no pain at the time of assessment. Patient has rebound hyperglycemia.Vital signs are stable.

## 2022-12-24 NOTE — PROGRESS NOTES
[facility]  Progress Note    Patient Name: Chayito Chung  MRN: [id number]  Patient Class: OP- Observation   Admission Date: 12/23/2022  Length of Stay: 0 days  Attending Physician: Flor Cano MD  Primary Care Provider: Curt Valladares III, MD        Subjective:     Principal Problem:Shortness of breath        HPI:  Patient is a 72-year-old female presents via EMS complaining of cough for the past few days. Patient has a pertinent medical history including ACS, respiratory failure, COPD, stage IV adenocarcinoma of the lung, finished radiation, currently in middle of chemo. Patient stated her symptoms of shortness of breath and cough started Thursday, patient stated she continued to have productive cough with yellow sputum production. Patient stated she took over the counter medication with no relief in symptoms. Patient denied fever or chills. Patient denies nausea, vomiting, or diarrhea. Patient denies hemoptysis, hematemesis or hematochezia.     On arrival to the emergency department patient chest x-ray shows coarse accentuated interstitial markings bilaterally which may relate to low lung volumes, although superimposed component of interstitial edema or nonspecific pneumonitis not excluded. Nonspecific bilateral irregular subsegmental opacities throughout both lungs, most pronounced at the right lung base, better evaluated on recent CTA chest examination.  Findings may relate to atelectasis, scarring/post radiation change, aspiration, neoplasm, or infection.    CTA  of chest results shows No evidence of acute pulmonary thromboembolism. Decreasing size of multiple pulmonary nodular opacities with persistent infiltrates. Diminished size of hilar adenopathy on the right. Increasing size of single nodule in the apical medial aspect of the right upper lobe previously 7 mm now 12 mm.  Continued follow-up is urged.      On assessment patient is lethargic but is arousable and answer questions  appropriately. Patient voiced lower abdominal pain after sustained coughing. Patient heart rate will increase to 115's to 120's after coughing. Patient has expiratory wheezing. No sputum production noted.  Awaiting CT head to assess encephalopathy.     Patient will be admitted to hospital services for further evaluation and medical management.       Overview/Hospital Course:  Patient admitted on 12/23 for Shortness of breath       Past Medical History:   Diagnosis Date    Abdominal distension 8/22/2022    Acute coronary syndrome     Acute hypoxemic respiratory failure 5/1/2017    Anxiety     Aortic atherosclerosis 11/2/2021    Asthma     Bronchiectasis 8/22/2022    Cancer     colon    Cataracts, both eyes     Cervical myelopathy 7/23/2020    Chest pain at rest     Chest pain of uncertain etiology 12/6/2015    Colon cancer 1988    COPD (chronic obstructive pulmonary disease)     Coronary artery disease     Coronary artery disease of native artery of native heart with stable angina pectoris 12/16/2012    Depression     Depressive disorder 8/22/2022    Diabetes mellitus     Diabetic peripheral neuropathy 8/1/2022    Dyspnea on exertion 11/15/2018    Elevated brain natriuretic peptide (BNP) level 11/15/2018    Elevated cholesterol     Hamartoma of lung 6/29/2022    History of partial gastrectomy 12/16/2019    History of stroke with residual deficit 4/5/2022    Hyperlipidemia associated with type 2 diabetes mellitus     Hypertension     Hypertension associated with diabetes     Impaired ambulation     Incisional hernia, without obstruction or gangrene 11/27/2019    Interstitial lung disease 6/29/2022    Lumbar facet arthropathy 8/2/2016    Mass of breast 8/22/2022    Migraine headache 7/23/2020    Mild malnutrition 4/6/2022    Mitral valve prolapse 8/22/2022    Moderate malnutrition 8/24/2022    Neuropathy 12/17/2012    Osteopenia of lumbar spine 8/27/2019    Pancreatitis  2022    Physical deconditioning 2022    Pulmonary hypertension 2022    Recurrent falls 2022    Restrictive lung disease 2022    Right leg weakness 2022    S/P PTCA (percutaneous transluminal coronary angioplasty) 10/20/2015    Seizure disorder 2019    Simple chronic bronchitis 2022    Sinus tachycardia 10/11/2016    Sleep apnea 2019    Solitary pulmonary nodule s/p resection 2012    Stenosis of carotid artery 2022    Swelling     Syncope and collapse 2016    Tobacco abuse, 1ppd, 52 years 2012    Type 2 diabetes mellitus, with long-term current use of insulin 2019    Ulnar neuropathy 2022    Vitamin D deficiency 2019    Weakness of both lower extremities 2016       Past Surgical History:   Procedure Laterality Date    ABDOMINAL SURGERY      BREAST BIOPSY      benign unsure what side     SECTION, CLASSIC      COLON SURGERY      COLONOSCOPY  2019    repeat in 5 years    CORONARY ANGIOPLASTY WITH STENT PLACEMENT  3 months ago     x2, Hysterectomy, Lung surgery, Partial stomach removed, Part of colon removed for rectal cancer    GASTRECTOMY      HEMORRHOID SURGERY      HYSTERECTOMY      @28yrs of age    LUNG BIOPSY      OOPHORECTOMY      @28yrs of age    POSTERIOR FUSION OF CERVICAL SPINE WITH LAMINECTOMY N/A 2020    Procedure: LAMINECTOMY, SPINE, CERVICAL, WITH POSTERIOR FUSION C3-T1 ;  Surgeon: Herson Tate MD;  Location: [address];  Service: Neurosurgery;  Laterality: N/A;    REPAIR OF INCARCERATED INCISIONAL HERNIA WITHOUT HISTORY OF PRIOR REPAIR N/A 2022    Procedure: REPAIR, HERNIA, INCISIONAL, INCARCERATED, WITHOUT HISTORY OF PRIOR REPAIR;  Surgeon: Simon King MD;  Location: [facility];  Service: General;  Laterality: N/A;       Review of patient's allergies indicates:  No Known Allergies    No current facility-administered medications on file  "prior to encounter.     Current Outpatient Medications on File Prior to Encounter   Medication Sig    albuterol (PROVENTIL/VENTOLIN HFA) 90 mcg/actuation inhaler Inhale 2 puffs into the lungs every 4 (four) hours as needed for Wheezing or Shortness of Breath. Rescue    alendronate (FOSAMAX) 70 MG tablet Take 1 tablet (70 mg total) by mouth every Sunday.    aspirin 81 MG Chew Take 1 tablet (81 mg total) by mouth once daily.    atorvastatin (LIPITOR) 40 MG tablet Take 1 tablet (40 mg total) by mouth once daily.    BD ULTRA-FINE MINI PEN NEEDLE 31 gauge x 3/16" Ndle     benzonatate (TESSALON) 100 MG capsule Take 1 capsule (100 mg total) by mouth 3 (three) times daily as needed for Cough.    blood-glucose meter,continuous (DEXCOM G6 ) Misc 1 each by Misc.(Non-Drug; Combo Route) route once daily.    blood-glucose sensor (DEXCOM G6 SENSOR) Tyra 1 each by Misc.(Non-Drug; Combo Route) route once daily.    blood-glucose transmitter (DEXCOM G6 TRANSMITTER) Tyra 1 each by Misc.(Non-Drug; Combo Route) route once daily.    busPIRone (BUSPAR) 15 MG tablet Take 15 mg by mouth 2 (two) times daily.    diclofenac sodium (VOLTAREN) 1 % Gel Apply 2 g topically 4 (four) times daily as needed.    HYDROcodone-acetaminophen (NORCO) 7.5-325 mg per tablet Take 1 tablet by mouth 2 (two) times daily as needed.    insulin lispro 100 unit/mL injection Inject 5 Units into the skin 3 (three) times daily before meals.    insulin lispro protamine-insulin lispro (HUMALOG MIX 75-25,U-100,INSULN) 100 unit/mL (75-25) Susp Inject 35 Units into the skin 2 (two) times daily before meals.    LANTUS SOLOSTAR U-100 INSULIN glargine 100 units/mL SubQ pen Inject 20 Units into the skin every evening.    losartan (COZAAR) 25 MG tablet Take 1 tablet (25 mg total) by mouth once daily.    metFORMIN (GLUCOPHAGE) 1000 MG tablet Take 1,000 mg by mouth 2 (two) times daily.    mirtazapine (REMERON) 15 MG tablet Take 15 mg by mouth once daily. "    nitroGLYCERIN (NITROSTAT) 0.4 MG SL tablet Place 1 tablet (0.4 mg total) under the tongue every 5 (five) minutes as needed for Chest pain.    ondansetron (ZOFRAN) 4 mg/5 mL solution Take 4 mg by mouth daily as needed for Nausea.    potassium chloride SA (K-DUR,KLOR-CON) 20 MEQ tablet Take 1 tablet (20 mEq total) by mouth once daily.    pregabalin (LYRICA) 150 MG capsule Take 150 mg by mouth 3 (three) times daily.    QUEtiapine (SEROQUEL) 400 MG tablet Take 400 mg by mouth nightly.    sertraline (ZOLOFT) 100 MG tablet Take 150 mg by mouth once daily at 6am.    topiramate (TOPAMAX) 50 MG tablet Take 1 tablet (50 mg total) by mouth 2 (two) times daily.    TRELEGY ELLIPTA 200-62.5-25 mcg inhaler Inhale 1 puff into the lungs once daily.    vitamin D (VITAMIN D3) 1000 units Tab Take 1,000 Units by mouth.    zonisamide (ZONEGRAN) 100 MG Cap Take 200 mg by mouth 2 (two) times daily.    aspirin-acetaminophen-caffeine (GOODY'S EXTRA STRENGTH) 500-325-65 mg PwPk Take 1 packet by mouth daily as needed.    [DISCONTINUED] clopidogreL (PLAVIX) 75 mg tablet Take 1 tablet (75 mg total) by mouth once daily. Take medicine for 15 days.    [DISCONTINUED] pravastatin (PRAVACHOL) 40 MG tablet Take 0.5 tablets (20 mg total) by mouth once daily. (Patient taking differently: Take 40 mg by mouth once daily.)     Family History       Problem Relation (Age of Onset)    Breast cancer Mother    Cancer Mother, Maternal Aunt    Depression Sister    Diabetes Mother    Diabetes Mellitus Sister    Heart disease Father    Hypertension Mother, Sister    Lung cancer Father          Tobacco Use    Smoking status: Former     Years: 55.00     Types: Cigarettes     Start date: 1967    Smokeless tobacco: Never    Tobacco comments:     Enrolled in the [employer] on 10/5/15 ([employer] Member ID # [id number]).  Ambulatory referral to Smoking Cessation program.   Substance and Sexual Activity    Alcohol use: Yes     Alcohol/week: 3.0 standard  drinks     Types: 3 Glasses of wine per week     Comment: 1 every 3 months    Drug use: No    Sexual activity: Yes     Partners: Male     Birth control/protection: None     Comment: last drink yesterday afternoon     Review of Systems   Constitutional:  Positive for activity change, appetite change and fatigue.   HENT:  Positive for congestion.    Eyes: Negative.    Respiratory:  Positive for shortness of breath and wheezing.    Cardiovascular:  Positive for palpitations.   Gastrointestinal:  Positive for abdominal pain.   Endocrine: Negative.    Genitourinary: Negative.    Allergic/Immunologic: Negative.    Neurological:  Positive for weakness.   Psychiatric/Behavioral: Negative.     Objective:     Vital Signs (Most Recent):  Temp: 97.6 °F (36.4 °C) (12/24/22 0726)  Pulse: (!) 112 (12/24/22 0900)  Resp: 18 (12/24/22 0932)  BP: 109/63 (12/24/22 0900)  SpO2: (!) 90 % (12/24/22 0900)   Vital Signs (24h Range):  Temp:  [96 °F (35.6 °C)-98.4 °F (36.9 °C)] 97.6 °F (36.4 °C)  Pulse:  [] 112  Resp:  [18-26] 18  SpO2:  [86 %-98 %] 90 %  BP: ()/(53-93) 109/63     Weight: 54 kg (119 lb)  Body mass index is 21.77 kg/m².    Physical Exam  Constitutional:       Appearance: Normal appearance.   HENT:      Head: Normocephalic.      Mouth/Throat:      Mouth: Mucous membranes are moist.   Eyes:      Pupils: Pupils are equal, round, and reactive to light.   Cardiovascular:      Rate and Rhythm: Tachycardia present.      Pulses: Normal pulses.      Heart sounds: Normal heart sounds.   Pulmonary:      Breath sounds: Wheezing and rhonchi present.      Comments: Wheezing in upper and lower right lung base  Abdominal:      General: Bowel sounds are normal.      Palpations: Abdomen is soft.      Tenderness: There is abdominal tenderness.   Skin:     General: Skin is warm and dry.   Neurological:      Mental Status: She is alert and oriented to person, place, and time.      Motor: Weakness present.   Psychiatric:         Mood  and Affect: Mood normal.         Behavior: Behavior normal.         CRANIAL NERVES     CN III, IV, VI   Pupils are equal, round, and reactive to light.     Significant Labs: All pertinent labs within the past 24 hours have been reviewed.    Significant Imaging: I have reviewed all pertinent imaging results/findings within the past 24 hours.  I have reviewed and interpreted all pertinent imaging results/findings within the past 24 hours.      Assessment/Plan:      * Shortness of breath  -Patient has c/o shortness of breath for a couple of days took over the counter medication with no relief      -History of stage IV adenocarcinoma of the lung, finished radiation, currently in middle of chemo    -Chest X-ray results shows Coarse accentuated interstitial markings bilaterally which may relate to low lung volumes, although superimposed component of interstitial edema or nonspecific pneumonitis not excluded. Nonspecific bilateral irregular subsegmental opacities throughout both lungs, most pronounced at the right lung base    -CTA results shows No evidence of acute pulmonary thromboembolism. Decreasing size of multiple pulmonary nodular opacities with persistent infiltrates.  Findings are most compatible with response to therapy. Diminished size of hilar adenopathy on the right. Increasing size of single nodule in the apical medial aspect of the right upper lobe previously 7 mm now 12 mm.      -Will continue breathing treatment     -Supplemental oxygen as needed    Hyperlipidemia  continue atorvastatin       Adenocarcinoma of lung, right    -History of stage IV adenocarcinoma of the lung, finished radiation, currently in middle of chemo    -Chest X-ray results shows Coarse accentuated interstitial markings bilaterally which may relate to low lung volumes, although superimposed component of interstitial edema or nonspecific pneumonitis not excluded. Nonspecific bilateral irregular subsegmental opacities throughout both  lungs, most pronounced at the right lung base    -CTA results shows No evidence of acute pulmonary thromboembolism. Decreasing size of multiple pulmonary nodular opacities with persistent infiltrates.  Findings are most compatible with response to therapy. Diminished size of hilar adenopathy on the right. Increasing size of single nodule in the apical medial aspect of the right upper lobe previously 7 mm now 12 mm.      -Will continue breathing treatment     -Supplemental oxygen as needed       CAD (coronary artery disease)  -Stable  -Continue telemetry monitoring  -Continue home dose ASA  -Continue atorvastatin     Chronic diastolic congestive heart failure  Patient is identified as having Diastolic (HFpEF) heart failure that is Chronic. CHF is currently controlled. Latest ECHO performed and demonstrates- Results for orders placed during the hospital encounter of 04/05/22    Echo    Interpretation Summary  · The left ventricle is normal in size with mild concentric hypertrophy and normal systolic function.  · The estimated ejection fraction is 60%.  · Mild mitral regurgitation.  · Grade I left ventricular diastolic dysfunction.  · Mild tricuspid regurgitation.  · The estimated PA systolic pressure is 47 mmHg.  · Normal right ventricular size with normal right ventricular systolic function.  · There is mild pulmonary hypertension.  · There is no evidence of intracardiac shunting.  . Continue ACE/ARB and monitor clinical status closely. Monitor on telemetry. Patient is on CHF pathway.  Monitor strict Is&Os and daily weights.  Place on fluid restriction of 1 L. Continue to stress to patient importance of self efficacy and  on diet for CHF. Last BNP reviewed- and noted below   Recent Labs   Lab 12/23/22  0238   BNP 27   .  -Stable       Iron deficiency anemia  -Stable  -H/H is 8.9 and 30  -Will continue to monitor and transfuse below 7      Hypoglycemia associated with type 2 diabetes mellitus  Patient's FSGs  are uncontrolled due to hypoglycemia on current medication regimen.  Last A1c reviewed-   Lab Results   Component Value Date    HGBA1C 8.2 (H) 10/11/2022     Most recent fingerstick glucose reviewed-   Recent Labs   Lab 12/23/22 2004 12/24/22  0018 12/24/22  0536 12/24/22  1126   POCTGLUCOSE 227* 211* 245* 319*     Current correctional scale  Low  Decrease anti-hyperglycemic dose as follows-   Antihyperglycemics (From admission, onward)    Start     Stop Route Frequency Ordered    12/23/22 0723  insulin aspart U-100 pen 0-5 Units         -- SubQ Every 6 hours PRN 12/23/22 0623        Hold Oral hypoglycemics while patient is in the hospital.  -Hypoglycemia protocol in place  -Maintain blood glucose between 100-180  -Low insulin sliding scale with Accu-Checks      Major depressive disorder in partial remission  Patient has persistent depression which is moderate and is currently controlled. Will Continue anti-depressant medications. We will not consult psychiatry at this time. Patient does not display psychosis at this time. Continue to monitor closely and adjust plan of care as needed.        Chronic obstructive pulmonary disease with acute exacerbation    -Patient has a history of COPD   -Monitor oxygen saturation, keep O2 above 90%  -Continue as need breathing treatment  -supplemental oxygen as need    -PCO2 (34.9), PO2 (70), O2 (93)       VTE Risk Mitigation (From admission, onward)         Ordered     IP VTE HIGH RISK PATIENT  Once         12/23/22 0623     Place sequential compression device  Until discontinued         12/23/22 0623                Discharge Planning   MICKY:      Code Status: Full Code   Is the patient medically ready for discharge?:     Reason for patient still in hospital (select all that apply): Patient trending condition and Treatment                     Cici Sosa NP  Department of Hospital Medicine   [facility]

## 2022-12-24 NOTE — PROGRESS NOTES
[facility] Medicine  Progress Note    Patient Name: Chayito Chung  MRN: [id number]  Patient Class: OP- Observation   Admission Date: 12/23/2022  Length of Stay: 0 days  Attending Physician: Flor Cano MD  Primary Care Provider: Curt Valladares III, MD        Subjective:     Principal Problem:Shortness of breath        HPI:  Patient is a 72-year-old female presents via EMS complaining of cough for the past few days. Patient has a pertinent medical history including ACS, respiratory failure, COPD, stage IV adenocarcinoma of the lung, finished radiation, currently in middle of chemo. Patient stated her symptoms of shortness of breath and cough started Thursday, patient stated she continued to have productive cough with yellow sputum production. Patient stated she took over the counter medication with no relief in symptoms. Patient denied fever or chills. Patient denies nausea, vomiting, or diarrhea. Patient denies hemoptysis, hematemesis or hematochezia.     On arrival to the emergency department patient chest x-ray shows coarse accentuated interstitial markings bilaterally which may relate to low lung volumes, although superimposed component of interstitial edema or nonspecific pneumonitis not excluded. Nonspecific bilateral irregular subsegmental opacities throughout both lungs, most pronounced at the right lung base, better evaluated on recent CTA chest examination.  Findings may relate to atelectasis, scarring/post radiation change, aspiration, neoplasm, or infection.    CTA  of chest results shows No evidence of acute pulmonary thromboembolism. Decreasing size of multiple pulmonary nodular opacities with persistent infiltrates. Diminished size of hilar adenopathy on the right. Increasing size of single nodule in the apical medial aspect of the right upper lobe previously 7 mm now 12 mm.  Continued follow-up is urged.      On assessment patient is lethargic but is arousable and answer questions  appropriately. Patient voiced lower abdominal pain after sustained coughing. Patient heart rate will increase to 115's to 120's after coughing. Patient has expiratory wheezing. No sputum production noted.  Awaiting CT head to assess encephalopathy.     Patient will be admitted to hospital services for further evaluation and medical management.       Overview/Hospital Course:  Patient admitted on 12/23 for Shortness of breath. Patient seen and examined on a.m. rounds. Patient is lethargic but answer question appropriately. Patient voiced no pain at the time of assessment. Patient has rebound hyperglycemia.Vital signs are stable.       Past Medical History:   Diagnosis Date    Abdominal distension 8/22/2022    Acute coronary syndrome     Acute hypoxemic respiratory failure 5/1/2017    Anxiety     Aortic atherosclerosis 11/2/2021    Asthma     Bronchiectasis 8/22/2022    Cancer     colon    Cataracts, both eyes     Cervical myelopathy 7/23/2020    Chest pain at rest     Chest pain of uncertain etiology 12/6/2015    Colon cancer 1988    COPD (chronic obstructive pulmonary disease)     Coronary artery disease     Coronary artery disease of native artery of native heart with stable angina pectoris 12/16/2012    Depression     Depressive disorder 8/22/2022    Diabetes mellitus     Diabetic peripheral neuropathy 8/1/2022    Dyspnea on exertion 11/15/2018    Elevated brain natriuretic peptide (BNP) level 11/15/2018    Elevated cholesterol     Hamartoma of lung 6/29/2022    History of partial gastrectomy 12/16/2019    History of stroke with residual deficit 4/5/2022    Hyperlipidemia associated with type 2 diabetes mellitus     Hypertension     Hypertension associated with diabetes     Impaired ambulation     Incisional hernia, without obstruction or gangrene 11/27/2019    Interstitial lung disease 6/29/2022    Lumbar facet arthropathy 8/2/2016    Mass of breast 8/22/2022    Migraine  headache 2020    Mild malnutrition 2022    Mitral valve prolapse 2022    Moderate malnutrition 2022    Neuropathy 2012    Osteopenia of lumbar spine 2019    Pancreatitis 2022    Physical deconditioning 2022    Pulmonary hypertension 2022    Recurrent falls 2022    Restrictive lung disease 2022    Right leg weakness 2022    S/P PTCA (percutaneous transluminal coronary angioplasty) 10/20/2015    Seizure disorder 2019    Simple chronic bronchitis 2022    Sinus tachycardia 10/11/2016    Sleep apnea 2019    Solitary pulmonary nodule s/p resection 2012    Stenosis of carotid artery 2022    Swelling     Syncope and collapse 2016    Tobacco abuse, 1ppd, 50 years 2012    Type 2 diabetes mellitus, with long-term current use of insulin 2019    Ulnar neuropathy 2022    Vitamin D deficiency 2019    Weakness of both lower extremities 2016       Past Surgical History:   Procedure Laterality Date    ABDOMINAL SURGERY      BREAST BIOPSY      benign unsure what side     SECTION, CLASSIC      COLON SURGERY      COLONOSCOPY  2019    repeat in 5 years    CORONARY ANGIOPLASTY WITH STENT PLACEMENT  3 months ago     x2, Hysterectomy, Lung surgery, Partial stomach removed, Part of colon removed for rectal cancer    GASTRECTOMY      HEMORRHOID SURGERY      HYSTERECTOMY      @28yrs of age    LUNG BIOPSY      OOPHORECTOMY      @28yrs of age    POSTERIOR FUSION OF CERVICAL SPINE WITH LAMINECTOMY N/A 2020    Procedure: LAMINECTOMY, SPINE, CERVICAL, WITH POSTERIOR FUSION C3-T1 ;  Surgeon: Herson Tate MD;  Location: [facility] OR [address] [place] [address];  Service: Neurosurgery;  Laterality: N/A;    REPAIR OF INCARCERATED INCISIONAL HERNIA WITHOUT HISTORY OF PRIOR REPAIR N/A 2022    Procedure: REPAIR, HERNIA, INCISIONAL, INCARCERATED, WITHOUT HISTORY OF PRIOR REPAIR;   "Surgeon: Simon King MD;  Location: [facility] OR;  Service: General;  Laterality: N/A;       Review of patient's allergies indicates:  No Known Allergies    No current facility-administered medications on file prior to encounter.     Current Outpatient Medications on File Prior to Encounter   Medication Sig    albuterol (PROVENTIL/VENTOLIN HFA) 90 mcg/actuation inhaler Inhale 2 puffs into the lungs every 4 (four) hours as needed for Wheezing or Shortness of Breath. Rescue    alendronate (FOSAMAX) 70 MG tablet Take 1 tablet (70 mg total) by mouth every Sunday.    aspirin 81 MG Chew Take 1 tablet (81 mg total) by mouth once daily.    atorvastatin (LIPITOR) 40 MG tablet Take 1 tablet (40 mg total) by mouth once daily.    BD ULTRA-FINE MINI PEN NEEDLE 31 gauge x 3/16" Ndle     benzonatate (TESSALON) 100 MG capsule Take 1 capsule (100 mg total) by mouth 3 (three) times daily as needed for Cough.    blood-glucose meter,continuous (DEXCOM G6 ) Misc 1 each by Misc.(Non-Drug; Combo Route) route once daily.    blood-glucose sensor (DEXCOM G6 SENSOR) Tyra 1 each by Misc.(Non-Drug; Combo Route) route once daily.    blood-glucose transmitter (DEXCOM G6 TRANSMITTER) Tyra 1 each by Misc.(Non-Drug; Combo Route) route once daily.    busPIRone (BUSPAR) 15 MG tablet Take 15 mg by mouth 2 (two) times daily.    diclofenac sodium (VOLTAREN) 1 % Gel Apply 2 g topically 4 (four) times daily as needed.    HYDROcodone-acetaminophen (NORCO) 7.5-325 mg per tablet Take 1 tablet by mouth 2 (two) times daily as needed.    insulin lispro 100 unit/mL injection Inject 5 Units into the skin 3 (three) times daily before meals.    insulin lispro protamine-insulin lispro (HUMALOG MIX 75-25,U-100,INSULN) 100 unit/mL (75-25) Susp Inject 35 Units into the skin 2 (two) times daily before meals.    LANTUS SOLOSTAR U-100 INSULIN glargine 100 units/mL SubQ pen Inject 20 Units into the skin every evening.    losartan (COZAAR) 25 MG " tablet Take 1 tablet (25 mg total) by mouth once daily.    metFORMIN (GLUCOPHAGE) 1000 MG tablet Take 1,000 mg by mouth 2 (two) times daily.    mirtazapine (REMERON) 15 MG tablet Take 15 mg by mouth once daily.    nitroGLYCERIN (NITROSTAT) 0.4 MG SL tablet Place 1 tablet (0.4 mg total) under the tongue every 5 (five) minutes as needed for Chest pain.    ondansetron (ZOFRAN) 4 mg/5 mL solution Take 4 mg by mouth daily as needed for Nausea.    potassium chloride SA (K-DUR,KLOR-CON) 20 MEQ tablet Take 1 tablet (20 mEq total) by mouth once daily.    pregabalin (LYRICA) 150 MG capsule Take 150 mg by mouth 3 (three) times daily.    QUEtiapine (SEROQUEL) 400 MG tablet Take 400 mg by mouth nightly.    sertraline (ZOLOFT) 100 MG tablet Take 150 mg by mouth once daily at 6am.    topiramate (TOPAMAX) 50 MG tablet Take 1 tablet (50 mg total) by mouth 2 (two) times daily.    TRELEGY ELLIPTA 200-62.5-25 mcg inhaler Inhale 1 puff into the lungs once daily.    vitamin D (VITAMIN D3) 1000 units Tab Take 1,000 Units by mouth.    zonisamide (ZONEGRAN) 100 MG Cap Take 200 mg by mouth 2 (two) times daily.    aspirin-acetaminophen-caffeine (GOODY'S EXTRA STRENGTH) 500-325-65 mg PwPk Take 1 packet by mouth daily as needed.    [DISCONTINUED] clopidogreL (PLAVIX) 75 mg tablet Take 1 tablet (75 mg total) by mouth once daily. Take medicine for 15 days.    [DISCONTINUED] pravastatin (PRAVACHOL) 40 MG tablet Take 0.5 tablets (20 mg total) by mouth once daily. (Patient taking differently: Take 40 mg by mouth once daily.)     Family History       Problem Relation (Age of Onset)    Breast cancer Mother    Cancer Mother, Maternal Aunt    Depression Sister    Diabetes Mother    Diabetes Mellitus Sister    Heart disease Father    Hypertension Mother, Sister    Lung cancer Father          Tobacco Use    Smoking status: Former     Years: 55.00     Types: Cigarettes     Start date: 1967    Smokeless tobacco: Never    Tobacco comments:      Enrolled in the [employer] on 10/5/15 ([employer] Member ID # [id number]).  Ambulatory referral to Smoking Cessation program.   Substance and Sexual Activity    Alcohol use: Yes     Alcohol/week: 3.0 standard drinks     Types: 3 Glasses of wine per week     Comment: 1 every 3 months    Drug use: No    Sexual activity: Yes     Partners: Male     Birth control/protection: None     Comment: last drink yesterday afternoon     Review of Systems   Constitutional:  Positive for activity change, appetite change and fatigue.   HENT:  Positive for congestion.    Eyes: Negative.    Respiratory:  Positive for shortness of breath and wheezing.    Cardiovascular:  Positive for palpitations.   Gastrointestinal:  Positive for abdominal pain.   Endocrine: Negative.    Genitourinary: Negative.    Allergic/Immunologic: Negative.    Neurological:  Positive for weakness.   Psychiatric/Behavioral: Negative.     Objective:     Vital Signs (Most Recent):  Temp: 97.6 °F (36.4 °C) (12/24/22 0726)  Pulse: (!) 112 (12/24/22 0900)  Resp: 18 (12/24/22 0932)  BP: 109/63 (12/24/22 0900)  SpO2: (!) 90 % (12/24/22 0900)   Vital Signs (24h Range):  Temp:  [96 °F (35.6 °C)-98.4 °F (36.9 °C)] 97.6 °F (36.4 °C)  Pulse:  [] 112  Resp:  [18-26] 18  SpO2:  [86 %-98 %] 90 %  BP: ()/(53-93) 109/63     Weight: 54 kg (119 lb)  Body mass index is 21.77 kg/m².    Physical Exam  Constitutional:       Appearance: Normal appearance.   HENT:      Head: Normocephalic.      Mouth/Throat:      Mouth: Mucous membranes are moist.   Eyes:      Pupils: Pupils are equal, round, and reactive to light.   Cardiovascular:      Rate and Rhythm: Tachycardia present.      Pulses: Normal pulses.      Heart sounds: Normal heart sounds.   Pulmonary:      Breath sounds: Wheezing and rhonchi present.      Comments: Wheezing in upper and lower right lung base  Abdominal:      General: Bowel sounds are normal.      Palpations: Abdomen is soft.      Tenderness: There is  abdominal tenderness.   Skin:     General: Skin is warm and dry.   Neurological:      Mental Status: She is alert and oriented to person, place, and time.      Motor: Weakness present.   Psychiatric:         Mood and Affect: Mood normal.         Behavior: Behavior normal.         CRANIAL NERVES     CN III, IV, VI   Pupils are equal, round, and reactive to light.     Significant Labs: All pertinent labs within the past 24 hours have been reviewed.    Significant Imaging: I have reviewed all pertinent imaging results/findings within the past 24 hours.  I have reviewed and interpreted all pertinent imaging results/findings within the past 24 hours.      Assessment/Plan:      * Shortness of breath  -Patient has c/o shortness of breath for a couple of days took over the counter medication with no relief      -History of stage IV adenocarcinoma of the lung, finished radiation, currently in middle of chemo    -Chest X-ray results shows Coarse accentuated interstitial markings bilaterally which may relate to low lung volumes, although superimposed component of interstitial edema or nonspecific pneumonitis not excluded. Nonspecific bilateral irregular subsegmental opacities throughout both lungs, most pronounced at the right lung base    -CTA results shows No evidence of acute pulmonary thromboembolism. Decreasing size of multiple pulmonary nodular opacities with persistent infiltrates.  Findings are most compatible with response to therapy. Diminished size of hilar adenopathy on the right. Increasing size of single nodule in the apical medial aspect of the right upper lobe previously 7 mm now 12 mm.      -Will continue breathing treatment     -Supplemental oxygen as needed    Hyperlipidemia  continue atorvastatin       Adenocarcinoma of lung, right    -History of stage IV adenocarcinoma of the lung, finished radiation, currently in middle of chemo    -Chest X-ray results shows Coarse accentuated interstitial markings  bilaterally which may relate to low lung volumes, although superimposed component of interstitial edema or nonspecific pneumonitis not excluded. Nonspecific bilateral irregular subsegmental opacities throughout both lungs, most pronounced at the right lung base    -CTA results shows No evidence of acute pulmonary thromboembolism. Decreasing size of multiple pulmonary nodular opacities with persistent infiltrates.  Findings are most compatible with response to therapy. Diminished size of hilar adenopathy on the right. Increasing size of single nodule in the apical medial aspect of the right upper lobe previously 7 mm now 12 mm.      -Will continue breathing treatment     -Supplemental oxygen as needed       CAD (coronary artery disease)  -Stable  -Continue telemetry monitoring  -Continue home dose ASA  -Continue atorvastatin     Chronic diastolic congestive heart failure  Patient is identified as having Diastolic (HFpEF) heart failure that is Chronic. CHF is currently controlled. Latest ECHO performed and demonstrates- Results for orders placed during the hospital encounter of 04/05/22    Echo    Interpretation Summary  · The left ventricle is normal in size with mild concentric hypertrophy and normal systolic function.  · The estimated ejection fraction is 60%.  · Mild mitral regurgitation.  · Grade I left ventricular diastolic dysfunction.  · Mild tricuspid regurgitation.  · The estimated PA systolic pressure is 47 mmHg.  · Normal right ventricular size with normal right ventricular systolic function.  · There is mild pulmonary hypertension.  · There is no evidence of intracardiac shunting.  . Continue ACE/ARB and monitor clinical status closely. Monitor on telemetry. Patient is on CHF pathway.  Monitor strict Is&Os and daily weights.  Place on fluid restriction of 1 L. Continue to stress to patient importance of self efficacy and  on diet for CHF. Last BNP reviewed- and noted below   Recent Labs   Lab  12/23/22  0238   BNP 27   .  -Stable       Iron deficiency anemia  -Stable  -H/H is 8.9 and 30  -Will continue to monitor and transfuse below 7      Hypoglycemia associated with type 2 diabetes mellitus  Patient's FSGs are uncontrolled due to hypoglycemia on current medication regimen.  Last A1c reviewed-   Lab Results   Component Value Date    HGBA1C 8.2 (H) 10/11/2022     Most recent fingerstick glucose reviewed-   Recent Labs   Lab 12/23/22  2004 12/24/22  0018 12/24/22  0536 12/24/22  1126   POCTGLUCOSE 227* 211* 245* 319*     Current correctional scale  Low  Decrease anti-hyperglycemic dose as follows-   Antihyperglycemics (From admission, onward)    Start     Stop Route Frequency Ordered    12/23/22 0723  insulin aspart U-100 pen 0-5 Units         -- SubQ Every 6 hours PRN 12/23/22 0623        Hold Oral hypoglycemics while patient is in the hospital.  -Hypoglycemia protocol in place  -Maintain blood glucose between 100-180  -Low insulin sliding scale with Accu-Checks      Major depressive disorder in partial remission  Patient has persistent depression which is moderate and is currently controlled. Will Continue anti-depressant medications. We will not consult psychiatry at this time. Patient does not display psychosis at this time. Continue to monitor closely and adjust plan of care as needed.        Chronic obstructive pulmonary disease with acute exacerbation    -Patient has a history of COPD   -Monitor oxygen saturation, keep O2 above 90%  -Continue as need breathing treatment  -supplemental oxygen as need    -PCO2 (34.9), PO2 (70), O2 (93)       VTE Risk Mitigation (From admission, onward)         Ordered     IP VTE HIGH RISK PATIENT  Once         12/23/22 0623     Place sequential compression device  Until discontinued         12/23/22 0623                Discharge Planning   MICKY:      Code Status: Full Code   Is the patient medically ready for discharge?:     Reason for patient still in hospital (select  all that apply): Patient trending condition and Treatment                     Cici Sosa NP  Department of [facility] Medicine   [facility]

## 2022-12-25 PROBLEM — C34.91 ADENOCARCINOMA OF LUNG, RIGHT: Status: RESOLVED | Noted: 2022-07-25 | Resolved: 2022-12-25

## 2022-12-25 LAB
ALBUMIN SERPL BCP-MCNC: 2.9 G/DL (ref 3.5–5.2)
ALP SERPL-CCNC: 127 U/L (ref 55–135)
ALT SERPL W/O P-5'-P-CCNC: 9 U/L (ref 10–44)
ANION GAP SERPL CALC-SCNC: 6 MMOL/L (ref 8–16)
ANISOCYTOSIS BLD QL SMEAR: SLIGHT
AST SERPL-CCNC: 10 U/L (ref 10–40)
BASOPHILS # BLD AUTO: 0.02 K/UL (ref 0–0.2)
BASOPHILS NFR BLD: 0.5 % (ref 0–1.9)
BILIRUB SERPL-MCNC: 0.1 MG/DL (ref 0.1–1)
BUN SERPL-MCNC: 11 MG/DL (ref 8–23)
CALCIUM SERPL-MCNC: 8.6 MG/DL (ref 8.7–10.5)
CHLORIDE SERPL-SCNC: 108 MMOL/L (ref 95–110)
CO2 SERPL-SCNC: 22 MMOL/L (ref 23–29)
CREAT SERPL-MCNC: 0.7 MG/DL (ref 0.5–1.4)
DACRYOCYTES BLD QL SMEAR: ABNORMAL
DIFFERENTIAL METHOD: ABNORMAL
EOSINOPHIL # BLD AUTO: 0.1 K/UL (ref 0–0.5)
EOSINOPHIL NFR BLD: 2.5 % (ref 0–8)
ERYTHROCYTE [DISTWIDTH] IN BLOOD BY AUTOMATED COUNT: 23.9 % (ref 11.5–14.5)
EST. GFR  (NO RACE VARIABLE): >60 ML/MIN/1.73 M^2
GLUCOSE SERPL-MCNC: 291 MG/DL (ref 70–110)
HCT VFR BLD AUTO: 29.8 % (ref 37–48.5)
HGB BLD-MCNC: 8.7 G/DL (ref 12–16)
HYPOCHROMIA BLD QL SMEAR: ABNORMAL
IMM GRANULOCYTES # BLD AUTO: 0.01 K/UL (ref 0–0.04)
IMM GRANULOCYTES NFR BLD AUTO: 0.2 % (ref 0–0.5)
LYMPHOCYTES # BLD AUTO: 0.7 K/UL (ref 1–4.8)
LYMPHOCYTES NFR BLD: 15.3 % (ref 18–48)
MCH RBC QN AUTO: 25.5 PG (ref 27–31)
MCHC RBC AUTO-ENTMCNC: 29.2 G/DL (ref 32–36)
MCV RBC AUTO: 87 FL (ref 82–98)
MONOCYTES # BLD AUTO: 0.4 K/UL (ref 0.3–1)
MONOCYTES NFR BLD: 10.2 % (ref 4–15)
NEUTROPHILS # BLD AUTO: 3.1 K/UL (ref 1.8–7.7)
NEUTROPHILS NFR BLD: 71.3 % (ref 38–73)
NRBC BLD-RTO: 0 /100 WBC
OVALOCYTES BLD QL SMEAR: ABNORMAL
PLATELET # BLD AUTO: 205 K/UL (ref 150–450)
PLATELET BLD QL SMEAR: ABNORMAL
PMV BLD AUTO: 9.5 FL (ref 9.2–12.9)
POCT GLUCOSE: 214 MG/DL (ref 70–110)
POCT GLUCOSE: 240 MG/DL (ref 70–110)
POCT GLUCOSE: 248 MG/DL (ref 70–110)
POCT GLUCOSE: 294 MG/DL (ref 70–110)
POCT GLUCOSE: 313 MG/DL (ref 70–110)
POIKILOCYTOSIS BLD QL SMEAR: SLIGHT
POLYCHROMASIA BLD QL SMEAR: ABNORMAL
POTASSIUM SERPL-SCNC: 4.3 MMOL/L (ref 3.5–5.1)
PROT SERPL-MCNC: 6.5 G/DL (ref 6–8.4)
RBC # BLD AUTO: 3.41 M/UL (ref 4–5.4)
SCHISTOCYTES BLD QL SMEAR: ABNORMAL
SODIUM SERPL-SCNC: 136 MMOL/L (ref 136–145)
SPHEROCYTES BLD QL SMEAR: ABNORMAL
TARGETS BLD QL SMEAR: ABNORMAL
WBC # BLD AUTO: 4.32 K/UL (ref 3.9–12.7)

## 2022-12-25 PROCEDURE — 25000003 PHARM REV CODE 250: Performed by: NURSE PRACTITIONER

## 2022-12-25 PROCEDURE — 25000003 PHARM REV CODE 250: Performed by: CLINICAL NURSE SPECIALIST

## 2022-12-25 PROCEDURE — 27000221 HC OXYGEN, UP TO 24 HOURS

## 2022-12-25 PROCEDURE — 80053 COMPREHEN METABOLIC PANEL: CPT | Performed by: NURSE PRACTITIONER

## 2022-12-25 PROCEDURE — A4216 STERILE WATER/SALINE, 10 ML: HCPCS | Performed by: NURSE PRACTITIONER

## 2022-12-25 PROCEDURE — 36415 COLL VENOUS BLD VENIPUNCTURE: CPT | Performed by: NURSE PRACTITIONER

## 2022-12-25 PROCEDURE — 85025 COMPLETE CBC W/AUTO DIFF WBC: CPT | Performed by: NURSE PRACTITIONER

## 2022-12-25 PROCEDURE — 99900035 HC TECH TIME PER 15 MIN (STAT)

## 2022-12-25 PROCEDURE — 11000001 HC ACUTE MED/SURG PRIVATE ROOM

## 2022-12-25 RX ADMIN — LOSARTAN POTASSIUM 25 MG: 25 TABLET, FILM COATED ORAL at 08:12

## 2022-12-25 RX ADMIN — POTASSIUM CHLORIDE 20 MEQ: 1500 TABLET, EXTENDED RELEASE ORAL at 08:12

## 2022-12-25 RX ADMIN — PREGABALIN 150 MG: 75 CAPSULE ORAL at 02:12

## 2022-12-25 RX ADMIN — INSULIN ASPART 2 UNITS: 100 INJECTION, SOLUTION INTRAVENOUS; SUBCUTANEOUS at 12:12

## 2022-12-25 RX ADMIN — ZONISAMIDE 200 MG: 100 CAPSULE ORAL at 08:12

## 2022-12-25 RX ADMIN — INSULIN ASPART 3 UNITS: 100 INJECTION, SOLUTION INTRAVENOUS; SUBCUTANEOUS at 05:12

## 2022-12-25 RX ADMIN — ATORVASTATIN CALCIUM 40 MG: 40 TABLET, FILM COATED ORAL at 08:12

## 2022-12-25 RX ADMIN — PREGABALIN 150 MG: 75 CAPSULE ORAL at 08:12

## 2022-12-25 RX ADMIN — GUAIFENESIN AND DEXTROMETHORPHAN 10 ML: 100; 10 SYRUP ORAL at 08:12

## 2022-12-25 RX ADMIN — Medication 10 ML: at 05:12

## 2022-12-25 RX ADMIN — TOPIRAMATE 50 MG: 25 TABLET, FILM COATED ORAL at 08:12

## 2022-12-25 RX ADMIN — HYDROCODONE BITARTRATE AND ACETAMINOPHEN 1 TABLET: 7.5; 325 TABLET ORAL at 05:12

## 2022-12-25 RX ADMIN — ACETAMINOPHEN 650 MG: 325 TABLET ORAL at 05:12

## 2022-12-25 RX ADMIN — INSULIN ASPART 2 UNITS: 100 INJECTION, SOLUTION INTRAVENOUS; SUBCUTANEOUS at 05:12

## 2022-12-25 RX ADMIN — ASPIRIN 81 MG CHEWABLE TABLET 81 MG: 81 TABLET CHEWABLE at 08:12

## 2022-12-25 RX ADMIN — MIRTAZAPINE 15 MG: 7.5 TABLET ORAL at 08:12

## 2022-12-25 RX ADMIN — Medication 10 ML: at 02:12

## 2022-12-25 RX ADMIN — QUETIAPINE FUMARATE 400 MG: 100 TABLET ORAL at 08:12

## 2022-12-25 RX ADMIN — CHOLECALCIFEROL TAB 25 MCG (1000 UNIT) 1000 UNITS: 25 TAB at 08:12

## 2022-12-25 RX ADMIN — BUSPIRONE HYDROCHLORIDE 15 MG: 5 TABLET ORAL at 08:12

## 2022-12-25 RX ADMIN — GUAIFENESIN AND DEXTROMETHORPHAN 10 ML: 100; 10 SYRUP ORAL at 05:12

## 2022-12-25 RX ADMIN — GUAIFENESIN AND DEXTROMETHORPHAN 10 ML: 100; 10 SYRUP ORAL at 02:12

## 2022-12-25 RX ADMIN — SERTRALINE HYDROCHLORIDE 150 MG: 50 TABLET ORAL at 08:12

## 2022-12-25 RX ADMIN — Medication 10 ML: at 09:12

## 2022-12-25 NOTE — PROGRESS NOTES
[facility]  Progress Note    Patient Name: Chayito Chung  MRN: [id number]  Patient Class: OP- Observation   Admission Date: 12/23/2022  Length of Stay: 0 days  Attending Physician: Flor Cano MD  Primary Care Provider: Curt Valladares III, MD        Subjective:     Principal Problem:Shortness of breath        HPI:  Patient is a 72-year-old female presents via EMS complaining of cough for the past few days. Patient has a pertinent medical history including ACS, respiratory failure, COPD, stage IV adenocarcinoma of the lung, finished radiation, currently in middle of chemo. Patient stated her symptoms of shortness of breath and cough started Thursday, patient stated she continued to have productive cough with yellow sputum production. Patient stated she took over the counter medication with no relief in symptoms. Patient denied fever or chills. Patient denies nausea, vomiting, or diarrhea. Patient denies hemoptysis, hematemesis or hematochezia.     On arrival to the emergency department patient chest x-ray shows coarse accentuated interstitial markings bilaterally which may relate to low lung volumes, although superimposed component of interstitial edema or nonspecific pneumonitis not excluded. Nonspecific bilateral irregular subsegmental opacities throughout both lungs, most pronounced at the right lung base, better evaluated on recent CTA chest examination.  Findings may relate to atelectasis, scarring/post radiation change, aspiration, neoplasm, or infection.    CTA  of chest results shows No evidence of acute pulmonary thromboembolism. Decreasing size of multiple pulmonary nodular opacities with persistent infiltrates. Diminished size of hilar adenopathy on the right. Increasing size of single nodule in the apical medial aspect of the right upper lobe previously 7 mm now 12 mm.  Continued follow-up is urged.      On assessment patient is lethargic but is arousable and answer questions  appropriately. Patient voiced lower abdominal pain after sustained coughing. Patient heart rate will increase to 115's to 120's after coughing. Patient has expiratory wheezing. No sputum production noted.  Awaiting CT head to assess encephalopathy.     Patient will be admitted to hospital services for further evaluation and medical management.       Overview/Hospital Course:  Patient admitted on 12/23 for Shortness of breath. Patient seen and examined on a.m. rounds. Patient is lethargic but answer question appropriately. Patient voiced no pain at the time of assessment. Patient has rebound hyperglycemia.Vital signs are stable.       Interval History: Patient seen and examined on a.m. rounds. Patient is alert and oriented but slight sluggish. Patient will answer questions appropriately. Patient continues to need oxygen she will desat after trying to wean off O2. Will hold off on all narcotics for now until patient is more alert and stable.     Review of Systems   Constitutional:  Positive for activity change and fatigue.   HENT: Negative.     Eyes: Negative.    Respiratory: Negative.     Gastrointestinal: Negative.    Endocrine: Negative.    Genitourinary: Negative.    Musculoskeletal: Negative.    Skin: Negative.    Allergic/Immunologic: Negative.    Neurological:  Positive for weakness.   Psychiatric/Behavioral: Negative.     Objective:     Vital Signs (Most Recent):  Temp: 97.3 °F (36.3 °C) (12/25/22 0837)  Pulse: (!) 122 (12/25/22 0837)  Resp: 18 (12/25/22 0837)  BP: 109/68 (12/25/22 0837)  SpO2: 96 % (12/25/22 0837)   Vital Signs (24h Range):  Temp:  [97.1 °F (36.2 °C)-98.5 °F (36.9 °C)] 97.3 °F (36.3 °C)  Pulse:  [107-123] 122  Resp:  [17-21] 18  SpO2:  [90 %-96 %] 96 %  BP: (109-129)/(66-74) 109/68     Weight: 57.8 kg (127 lb 6.8 oz)  Body mass index is 23.31 kg/m².    Intake/Output Summary (Last 24 hours) at 12/25/2022 0922  Last data filed at 12/25/2022 0509  Gross per 24 hour   Intake 240 ml   Output 300  ml   Net -60 ml      Physical Exam  HENT:      Head: Normocephalic.      Nose: Nose normal.   Eyes:      Pupils: Pupils are equal, round, and reactive to light.   Cardiovascular:      Rate and Rhythm: Tachycardia present.   Abdominal:      General: Bowel sounds are normal.      Palpations: Abdomen is soft.   Musculoskeletal:         General: Normal range of motion.      Cervical back: Normal range of motion.   Skin:     Capillary Refill: Capillary refill takes less than 2 seconds.   Neurological:      Mental Status: She is alert.      Motor: Weakness present.   Psychiatric:         Mood and Affect: Mood normal.         Behavior: Behavior normal.       Significant Labs: All pertinent labs within the past 24 hours have been reviewed.    Significant Imaging: I have reviewed all pertinent imaging results/findings within the past 24 hours.  I have reviewed and interpreted all pertinent imaging results/findings within the past 24 hours.      Assessment/Plan:      * Shortness of breath  -Patient has c/o shortness of breath for a couple of days took over the counter medication with no relief      -History of stage IV adenocarcinoma of the lung, finished radiation, currently in middle of chemo    -Chest X-ray results shows Coarse accentuated interstitial markings bilaterally which may relate to low lung volumes, although superimposed component of interstitial edema or nonspecific pneumonitis not excluded. Nonspecific bilateral irregular subsegmental opacities throughout both lungs, most pronounced at the right lung base    -CTA results shows No evidence of acute pulmonary thromboembolism. Decreasing size of multiple pulmonary nodular opacities with persistent infiltrates.  Findings are most compatible with response to therapy. Diminished size of hilar adenopathy on the right. Increasing size of single nodule in the apical medial aspect of the right upper lobe previously 7 mm now 12 mm.      -Will continue breathing treatment      -Supplemental oxygen as needed    Hyperlipidemia  continue atorvastatin       Primary lung adenocarcinoma, right  -Follow by oncology  -Patient is on chemo    CAD (coronary artery disease)  -Stable  -Continue telemetry monitoring  -Continue home dose ASA  -Continue atorvastatin     Chronic diastolic congestive heart failure  Patient is identified as having Diastolic (HFpEF) heart failure that is Chronic. CHF is currently controlled. Latest ECHO performed and demonstrates- Results for orders placed during the hospital encounter of 04/05/22    Echo    Interpretation Summary  · The left ventricle is normal in size with mild concentric hypertrophy and normal systolic function.  · The estimated ejection fraction is 60%.  · Mild mitral regurgitation.  · Grade I left ventricular diastolic dysfunction.  · Mild tricuspid regurgitation.  · The estimated PA systolic pressure is 47 mmHg.  · Normal right ventricular size with normal right ventricular systolic function.  · There is mild pulmonary hypertension.  · There is no evidence of intracardiac shunting.  . Continue ACE/ARB and monitor clinical status closely. Monitor on telemetry. Patient is on CHF pathway.  Monitor strict Is&Os and daily weights.  Place on fluid restriction of 1 L. Continue to stress to patient importance of self efficacy and  on diet for CHF. Last BNP reviewed- and noted below   Recent Labs   Lab 12/23/22  0238   BNP 27   .  -Stable       Iron deficiency anemia  -Stable  -H/H is 8.9 and 30  -Will continue to monitor and transfuse below 7      Hypoglycemia associated with type 2 diabetes mellitus  Patient's FSGs are uncontrolled due to hypoglycemia on current medication regimen.  Last A1c reviewed-   Lab Results   Component Value Date    HGBA1C 8.2 (H) 10/11/2022     Most recent fingerstick glucose reviewed-   Recent Labs   Lab 12/24/22  1126 12/24/22  1634 12/25/22  0036 12/25/22  0517   POCTGLUCOSE 319* 260* 313* 294*     Current correctional  scale  Low  Decrease anti-hyperglycemic dose as follows-   Antihyperglycemics (From admission, onward)    Start     Stop Route Frequency Ordered    12/23/22 0723  insulin aspart U-100 pen 0-5 Units         -- SubQ Every 6 hours PRN 12/23/22 0623        Hold Oral hypoglycemics while patient is in the hospital.  -Hypoglycemia protocol in place  -Maintain blood glucose between 100-180  -Low insulin sliding scale with Accu-Checks      Major depressive disorder in partial remission  Patient has persistent depression which is moderate and is currently controlled. Will Continue anti-depressant medications. We will not consult psychiatry at this time. Patient does not display psychosis at this time. Continue to monitor closely and adjust plan of care as needed.        Chronic obstructive pulmonary disease with acute exacerbation    -Patient has a history of COPD   -Monitor oxygen saturation, keep O2 above 90%  -Continue as need breathing treatment  -supplemental oxygen as need    -try to wean O2  -Hold off on all narcotics for now   -PCO2 (34.9), PO2 (70), O2 (93)       VTE Risk Mitigation (From admission, onward)         Ordered     IP VTE HIGH RISK PATIENT  Once         12/23/22 0623     Place sequential compression device  Until discontinued         12/23/22 0623                Discharge Planning   MICKY:      Code Status: Full Code   Is the patient medically ready for discharge?:     Reason for patient still in hospital (select all that apply): Treatment and PT / OT recommendations                     Cici Sosa NP  Department of Hospital Medicine   [place] - Telemetry

## 2022-12-25 NOTE — SUBJECTIVE & OBJECTIVE
Interval History: Patient seen and examined on a.m. rounds. Patient is alert and oriented but slight sluggish. Patient will answer questions appropriately. Patient continues to need oxygen she will desat after trying to wean off O2. Will hold off on all narcotics for now until patient is more alert and stable.     Review of Systems   Constitutional:  Positive for activity change and fatigue.   HENT: Negative.     Eyes: Negative.    Respiratory: Negative.     Gastrointestinal: Negative.    Endocrine: Negative.    Genitourinary: Negative.    Musculoskeletal: Negative.    Skin: Negative.    Allergic/Immunologic: Negative.    Neurological:  Positive for weakness.   Psychiatric/Behavioral: Negative.     Objective:     Vital Signs (Most Recent):  Temp: 97.3 °F (36.3 °C) (12/25/22 0837)  Pulse: (!) 122 (12/25/22 0837)  Resp: 18 (12/25/22 0837)  BP: 109/68 (12/25/22 0837)  SpO2: 96 % (12/25/22 0837)   Vital Signs (24h Range):  Temp:  [97.1 °F (36.2 °C)-98.5 °F (36.9 °C)] 97.3 °F (36.3 °C)  Pulse:  [107-123] 122  Resp:  [17-21] 18  SpO2:  [90 %-96 %] 96 %  BP: (109-129)/(66-74) 109/68     Weight: 57.8 kg (127 lb 6.8 oz)  Body mass index is 23.31 kg/m².    Intake/Output Summary (Last 24 hours) at 12/25/2022 0919  Last data filed at 12/25/2022 0525  Gross per 24 hour   Intake 240 ml   Output 300 ml   Net -60 ml      Physical Exam  HENT:      Head: Normocephalic.      Nose: Nose normal.   Eyes:      Pupils: Pupils are equal, round, and reactive to light.   Cardiovascular:      Rate and Rhythm: Tachycardia present.   Abdominal:      General: Bowel sounds are normal.      Palpations: Abdomen is soft.   Musculoskeletal:         General: Normal range of motion.      Cervical back: Normal range of motion.   Skin:     Capillary Refill: Capillary refill takes less than 2 seconds.   Neurological:      Mental Status: She is alert.      Motor: Weakness present.   Psychiatric:         Mood and Affect: Mood normal.         Behavior:  Behavior normal.       Significant Labs: All pertinent labs within the past 24 hours have been reviewed.    Significant Imaging: I have reviewed all pertinent imaging results/findings within the past 24 hours.  I have reviewed and interpreted all pertinent imaging results/findings within the past 24 hours.

## 2022-12-25 NOTE — ASSESSMENT & PLAN NOTE
Patient's FSGs are uncontrolled due to hypoglycemia on current medication regimen.  Last A1c reviewed-   Lab Results   Component Value Date    HGBA1C 8.2 (H) 10/11/2022     Most recent fingerstick glucose reviewed-   Recent Labs   Lab 12/24/22  1126 12/24/22  1634 12/25/22  0036 12/25/22  0517   POCTGLUCOSE 319* 260* 313* 294*     Current correctional scale  Low  Decrease anti-hyperglycemic dose as follows-   Antihyperglycemics (From admission, onward)    Start     Stop Route Frequency Ordered    12/23/22 0723  insulin aspart U-100 pen 0-5 Units         -- SubQ Every 6 hours PRN 12/23/22 0623        Hold Oral hypoglycemics while patient is in the hospital.  -Hypoglycemia protocol in place  -Maintain blood glucose between 100-180  -Low insulin sliding scale with Accu-Checks

## 2022-12-25 NOTE — PLAN OF CARE
Problem: Adult Inpatient Plan of Care  Goal: Plan of Care Review  Outcome: Ongoing, Not Progressing  Goal: Patient-Specific Goal (Individualized)  Outcome: Ongoing, Not Progressing  Goal: Absence of Hospital-Acquired Illness or Injury  Outcome: Ongoing, Not Progressing  Goal: Optimal Comfort and Wellbeing  Outcome: Ongoing, Not Progressing  Goal: Readiness for Transition of Care  Outcome: Ongoing, Not Progressing     Problem: Diabetes Comorbidity  Goal: Blood Glucose Level Within Targeted Range  Outcome: Ongoing, Not Progressing     Problem: Skin Injury Risk Increased  Goal: Skin Health and Integrity  Outcome: Ongoing, Not Progressing     Problem: Gas Exchange Impaired  Goal: Optimal Gas Exchange  Outcome: Ongoing, Not Progressing     Problem: Breathing Pattern Ineffective  Goal: Effective Breathing Pattern  Outcome: Ongoing, Not Progressing

## 2022-12-25 NOTE — ASSESSMENT & PLAN NOTE
-Patient has a history of COPD   -Monitor oxygen saturation, keep O2 above 90%  -Continue as need breathing treatment  -supplemental oxygen as need    -try to wean O2  -Hold off on all narcotics for now   -PCO2 (34.9), PO2 (70), O2 (93)

## 2022-12-26 PROBLEM — C78.02 SECONDARY MALIGNANT NEOPLASM OF LEFT LUNG: Status: ACTIVE | Noted: 2022-12-26

## 2022-12-26 LAB
ALBUMIN SERPL BCP-MCNC: 3 G/DL (ref 3.5–5.2)
ALLENS TEST: ABNORMAL
ALP SERPL-CCNC: 129 U/L (ref 55–135)
ALT SERPL W/O P-5'-P-CCNC: 10 U/L (ref 10–44)
ANION GAP SERPL CALC-SCNC: 7 MMOL/L (ref 8–16)
ANISOCYTOSIS BLD QL SMEAR: SLIGHT
AST SERPL-CCNC: 12 U/L (ref 10–40)
BASOPHILS # BLD AUTO: 0.02 K/UL (ref 0–0.2)
BASOPHILS NFR BLD: 0.3 % (ref 0–1.9)
BILIRUB SERPL-MCNC: 0.2 MG/DL (ref 0.1–1)
BUN SERPL-MCNC: 9 MG/DL (ref 8–23)
CALCIUM SERPL-MCNC: 9.1 MG/DL (ref 8.7–10.5)
CHLORIDE SERPL-SCNC: 106 MMOL/L (ref 95–110)
CO2 SERPL-SCNC: 19 MMOL/L (ref 23–29)
CREAT SERPL-MCNC: 0.8 MG/DL (ref 0.5–1.4)
DACRYOCYTES BLD QL SMEAR: ABNORMAL
DELSYS: ABNORMAL
DIFFERENTIAL METHOD: ABNORMAL
EOSINOPHIL # BLD AUTO: 0.1 K/UL (ref 0–0.5)
EOSINOPHIL NFR BLD: 1.1 % (ref 0–8)
ERYTHROCYTE [DISTWIDTH] IN BLOOD BY AUTOMATED COUNT: 23.8 % (ref 11.5–14.5)
EST. GFR  (NO RACE VARIABLE): >60 ML/MIN/1.73 M^2
FLOW: 3
GLUCOSE SERPL-MCNC: 374 MG/DL (ref 70–110)
HCO3 UR-SCNC: 21.3 MMOL/L (ref 24–28)
HCT VFR BLD AUTO: 31.6 % (ref 37–48.5)
HGB BLD-MCNC: 9.2 G/DL (ref 12–16)
HYPOCHROMIA BLD QL SMEAR: ABNORMAL
IMM GRANULOCYTES # BLD AUTO: 0.02 K/UL (ref 0–0.04)
IMM GRANULOCYTES NFR BLD AUTO: 0.3 % (ref 0–0.5)
LYMPHOCYTES # BLD AUTO: 0.6 K/UL (ref 1–4.8)
LYMPHOCYTES NFR BLD: 8.8 % (ref 18–48)
MCH RBC QN AUTO: 25.4 PG (ref 27–31)
MCHC RBC AUTO-ENTMCNC: 29.1 G/DL (ref 32–36)
MCV RBC AUTO: 87 FL (ref 82–98)
MODE: ABNORMAL
MONOCYTES # BLD AUTO: 0.6 K/UL (ref 0.3–1)
MONOCYTES NFR BLD: 8.8 % (ref 4–15)
NEUTROPHILS # BLD AUTO: 5.9 K/UL (ref 1.8–7.7)
NEUTROPHILS NFR BLD: 80.7 % (ref 38–73)
NRBC BLD-RTO: 0 /100 WBC
OVALOCYTES BLD QL SMEAR: ABNORMAL
PCO2 BLDA: 39 MMHG (ref 35–45)
PH SMN: 7.34 [PH] (ref 7.35–7.45)
PLATELET # BLD AUTO: 213 K/UL (ref 150–450)
PLATELET BLD QL SMEAR: ABNORMAL
PMV BLD AUTO: 9.9 FL (ref 9.2–12.9)
PO2 BLDA: 68 MMHG (ref 80–100)
POC BE: -4 MMOL/L
POC SATURATED O2: 92 % (ref 95–100)
POC TCO2: 22 MMOL/L (ref 23–27)
POCT GLUCOSE: 290 MG/DL (ref 70–110)
POCT GLUCOSE: 302 MG/DL (ref 70–110)
POCT GLUCOSE: 383 MG/DL (ref 70–110)
POCT GLUCOSE: 409 MG/DL (ref 70–110)
POIKILOCYTOSIS BLD QL SMEAR: SLIGHT
POLYCHROMASIA BLD QL SMEAR: ABNORMAL
POTASSIUM SERPL-SCNC: 4.5 MMOL/L (ref 3.5–5.1)
PROCALCITONIN SERPL IA-MCNC: 0.1 NG/ML
PROT SERPL-MCNC: 6.8 G/DL (ref 6–8.4)
RBC # BLD AUTO: 3.62 M/UL (ref 4–5.4)
SAMPLE: ABNORMAL
SCHISTOCYTES BLD QL SMEAR: ABNORMAL
SITE: ABNORMAL
SODIUM SERPL-SCNC: 132 MMOL/L (ref 136–145)
SPHEROCYTES BLD QL SMEAR: ABNORMAL
T4 FREE SERPL-MCNC: 0.74 NG/DL (ref 0.71–1.51)
TARGETS BLD QL SMEAR: ABNORMAL
TROPONIN I SERPL DL<=0.01 NG/ML-MCNC: 0.01 NG/ML (ref 0–0.03)
TSH SERPL DL<=0.005 MIU/L-ACNC: 0.36 UIU/ML (ref 0.4–4)
WBC # BLD AUTO: 7.29 K/UL (ref 3.9–12.7)

## 2022-12-26 PROCEDURE — 85025 COMPLETE CBC W/AUTO DIFF WBC: CPT | Performed by: NURSE PRACTITIONER

## 2022-12-26 PROCEDURE — 94640 AIRWAY INHALATION TREATMENT: CPT | Mod: XB

## 2022-12-26 PROCEDURE — 80053 COMPREHEN METABOLIC PANEL: CPT | Performed by: NURSE PRACTITIONER

## 2022-12-26 PROCEDURE — 36415 COLL VENOUS BLD VENIPUNCTURE: CPT | Performed by: INTERNAL MEDICINE

## 2022-12-26 PROCEDURE — 11000001 HC ACUTE MED/SURG PRIVATE ROOM

## 2022-12-26 PROCEDURE — 84484 ASSAY OF TROPONIN QUANT: CPT | Performed by: NURSE PRACTITIONER

## 2022-12-26 PROCEDURE — 93010 ELECTROCARDIOGRAM REPORT: CPT | Mod: 76,,, | Performed by: INTERNAL MEDICINE

## 2022-12-26 PROCEDURE — 36415 COLL VENOUS BLD VENIPUNCTURE: CPT | Performed by: NURSE PRACTITIONER

## 2022-12-26 PROCEDURE — 84443 ASSAY THYROID STIM HORMONE: CPT | Performed by: INTERNAL MEDICINE

## 2022-12-26 PROCEDURE — 93010 EKG 12-LEAD: ICD-10-PCS | Mod: 76,,, | Performed by: INTERNAL MEDICINE

## 2022-12-26 PROCEDURE — 99223 PR INITIAL HOSPITAL CARE,LEVL III: ICD-10-PCS | Mod: ,,, | Performed by: INTERNAL MEDICINE

## 2022-12-26 PROCEDURE — 84439 ASSAY OF FREE THYROXINE: CPT | Performed by: INTERNAL MEDICINE

## 2022-12-26 PROCEDURE — 25000242 PHARM REV CODE 250 ALT 637 W/ HCPCS: Performed by: STUDENT IN AN ORGANIZED HEALTH CARE EDUCATION/TRAINING PROGRAM

## 2022-12-26 PROCEDURE — 94640 AIRWAY INHALATION TREATMENT: CPT

## 2022-12-26 PROCEDURE — 36600 WITHDRAWAL OF ARTERIAL BLOOD: CPT

## 2022-12-26 PROCEDURE — 96372 THER/PROPH/DIAG INJ SC/IM: CPT | Performed by: NURSE PRACTITIONER

## 2022-12-26 PROCEDURE — 27000221 HC OXYGEN, UP TO 24 HOURS

## 2022-12-26 PROCEDURE — 25000003 PHARM REV CODE 250: Performed by: NURSE PRACTITIONER

## 2022-12-26 PROCEDURE — 99900035 HC TECH TIME PER 15 MIN (STAT)

## 2022-12-26 PROCEDURE — 84145 PROCALCITONIN (PCT): CPT | Performed by: NURSE PRACTITIONER

## 2022-12-26 PROCEDURE — 93005 ELECTROCARDIOGRAM TRACING: CPT

## 2022-12-26 PROCEDURE — A4216 STERILE WATER/SALINE, 10 ML: HCPCS | Performed by: NURSE PRACTITIONER

## 2022-12-26 PROCEDURE — 92610 EVALUATE SWALLOWING FUNCTION: CPT

## 2022-12-26 PROCEDURE — 94761 N-INVAS EAR/PLS OXIMETRY MLT: CPT

## 2022-12-26 PROCEDURE — 99223 1ST HOSP IP/OBS HIGH 75: CPT | Mod: ,,, | Performed by: INTERNAL MEDICINE

## 2022-12-26 PROCEDURE — 82803 BLOOD GASES ANY COMBINATION: CPT

## 2022-12-26 PROCEDURE — 25000003 PHARM REV CODE 250: Performed by: CLINICAL NURSE SPECIALIST

## 2022-12-26 PROCEDURE — 63600175 PHARM REV CODE 636 W HCPCS: Performed by: NURSE PRACTITIONER

## 2022-12-26 RX ORDER — SODIUM CHLORIDE 9 MG/ML
INJECTION, SOLUTION INTRAVENOUS CONTINUOUS
Status: ACTIVE | OUTPATIENT
Start: 2022-12-26 | End: 2022-12-27

## 2022-12-26 RX ORDER — IPRATROPIUM BROMIDE AND ALBUTEROL SULFATE 2.5; .5 MG/3ML; MG/3ML
3 SOLUTION RESPIRATORY (INHALATION) EVERY 8 HOURS
Status: DISCONTINUED | OUTPATIENT
Start: 2022-12-26 | End: 2022-12-28 | Stop reason: HOSPADM

## 2022-12-26 RX ORDER — IPRATROPIUM BROMIDE AND ALBUTEROL SULFATE 2.5; .5 MG/3ML; MG/3ML
3 SOLUTION RESPIRATORY (INHALATION)
Status: DISCONTINUED | OUTPATIENT
Start: 2022-12-26 | End: 2022-12-26

## 2022-12-26 RX ORDER — INSULIN ASPART 100 [IU]/ML
3 INJECTION, SOLUTION INTRAVENOUS; SUBCUTANEOUS
Status: DISCONTINUED | OUTPATIENT
Start: 2022-12-26 | End: 2022-12-27

## 2022-12-26 RX ORDER — FLUTICASONE FUROATE AND VILANTEROL 100; 25 UG/1; UG/1
1 POWDER RESPIRATORY (INHALATION) DAILY
Status: DISCONTINUED | OUTPATIENT
Start: 2022-12-26 | End: 2022-12-28 | Stop reason: HOSPADM

## 2022-12-26 RX ORDER — SODIUM CHLORIDE FOR INHALATION 3 %
4 VIAL, NEBULIZER (ML) INHALATION EVERY 8 HOURS
Status: DISCONTINUED | OUTPATIENT
Start: 2022-12-26 | End: 2022-12-28 | Stop reason: HOSPADM

## 2022-12-26 RX ORDER — CEFTRIAXONE 2 G/50ML
2 INJECTION, SOLUTION INTRAVENOUS
Status: DISCONTINUED | OUTPATIENT
Start: 2022-12-27 | End: 2022-12-28 | Stop reason: HOSPADM

## 2022-12-26 RX ORDER — DOXYCYCLINE HYCLATE 100 MG
100 TABLET ORAL EVERY 12 HOURS
Status: DISCONTINUED | OUTPATIENT
Start: 2022-12-26 | End: 2022-12-28 | Stop reason: HOSPADM

## 2022-12-26 RX ORDER — LEVALBUTEROL INHALATION SOLUTION 0.63 MG/3ML
0.63 SOLUTION RESPIRATORY (INHALATION) EVERY 8 HOURS PRN
Status: DISCONTINUED | OUTPATIENT
Start: 2022-12-26 | End: 2022-12-28 | Stop reason: HOSPADM

## 2022-12-26 RX ADMIN — PREGABALIN 150 MG: 75 CAPSULE ORAL at 10:12

## 2022-12-26 RX ADMIN — FLUTICASONE FUROATE AND VILANTEROL TRIFENATATE 1 PUFF: 100; 25 POWDER RESPIRATORY (INHALATION) at 04:12

## 2022-12-26 RX ADMIN — INSULIN ASPART 3 UNITS: 100 INJECTION, SOLUTION INTRAVENOUS; SUBCUTANEOUS at 04:12

## 2022-12-26 RX ADMIN — HYDROCODONE BITARTRATE AND ACETAMINOPHEN 1 TABLET: 7.5; 325 TABLET ORAL at 12:12

## 2022-12-26 RX ADMIN — TOPIRAMATE 50 MG: 25 TABLET, FILM COATED ORAL at 09:12

## 2022-12-26 RX ADMIN — Medication 10 ML: at 06:12

## 2022-12-26 RX ADMIN — LOSARTAN POTASSIUM 25 MG: 25 TABLET, FILM COATED ORAL at 10:12

## 2022-12-26 RX ADMIN — INSULIN ASPART 2 UNITS: 100 INJECTION, SOLUTION INTRAVENOUS; SUBCUTANEOUS at 10:12

## 2022-12-26 RX ADMIN — BUSPIRONE HYDROCHLORIDE 15 MG: 5 TABLET ORAL at 09:12

## 2022-12-26 RX ADMIN — PREGABALIN 150 MG: 75 CAPSULE ORAL at 03:12

## 2022-12-26 RX ADMIN — SODIUM CHLORIDE: 0.9 INJECTION, SOLUTION INTRAVENOUS at 01:12

## 2022-12-26 RX ADMIN — GUAIFENESIN AND DEXTROMETHORPHAN 10 ML: 100; 10 SYRUP ORAL at 05:12

## 2022-12-26 RX ADMIN — DOXYCYCLINE HYCLATE 100 MG: 100 TABLET, COATED ORAL at 09:12

## 2022-12-26 RX ADMIN — SODIUM CHLORIDE SOLN NEBU 3% 4 ML: 3 NEBU SOLN at 04:12

## 2022-12-26 RX ADMIN — INSULIN ASPART 4 UNITS: 100 INJECTION, SOLUTION INTRAVENOUS; SUBCUTANEOUS at 12:12

## 2022-12-26 RX ADMIN — BUSPIRONE HYDROCHLORIDE 15 MG: 5 TABLET ORAL at 10:12

## 2022-12-26 RX ADMIN — SERTRALINE HYDROCHLORIDE 150 MG: 50 TABLET ORAL at 10:12

## 2022-12-26 RX ADMIN — PREGABALIN 150 MG: 75 CAPSULE ORAL at 09:12

## 2022-12-26 RX ADMIN — POTASSIUM CHLORIDE 20 MEQ: 1500 TABLET, EXTENDED RELEASE ORAL at 10:12

## 2022-12-26 RX ADMIN — ZONISAMIDE 200 MG: 100 CAPSULE ORAL at 09:12

## 2022-12-26 RX ADMIN — ZONISAMIDE 200 MG: 100 CAPSULE ORAL at 10:12

## 2022-12-26 RX ADMIN — MIRTAZAPINE 15 MG: 7.5 TABLET ORAL at 10:12

## 2022-12-26 RX ADMIN — IPRATROPIUM BROMIDE AND ALBUTEROL SULFATE 3 ML: 2.5; .5 SOLUTION RESPIRATORY (INHALATION) at 04:12

## 2022-12-26 RX ADMIN — ACETAMINOPHEN 650 MG: 325 TABLET ORAL at 05:12

## 2022-12-26 RX ADMIN — CHOLECALCIFEROL TAB 25 MCG (1000 UNIT) 1000 UNITS: 25 TAB at 10:12

## 2022-12-26 RX ADMIN — INSULIN ASPART 3 UNITS: 100 INJECTION, SOLUTION INTRAVENOUS; SUBCUTANEOUS at 12:12

## 2022-12-26 RX ADMIN — Medication 10 ML: at 01:12

## 2022-12-26 RX ADMIN — CEFTRIAXONE 1 G: 1 INJECTION, POWDER, FOR SOLUTION INTRAMUSCULAR; INTRAVENOUS at 10:12

## 2022-12-26 RX ADMIN — ATORVASTATIN CALCIUM 40 MG: 40 TABLET, FILM COATED ORAL at 10:12

## 2022-12-26 RX ADMIN — INSULIN DETEMIR 12 UNITS: 100 INJECTION, SOLUTION SUBCUTANEOUS at 09:12

## 2022-12-26 RX ADMIN — DOXYCYCLINE HYCLATE 100 MG: 100 TABLET, COATED ORAL at 10:12

## 2022-12-26 RX ADMIN — QUETIAPINE FUMARATE 400 MG: 100 TABLET ORAL at 09:12

## 2022-12-26 RX ADMIN — TOPIRAMATE 50 MG: 25 TABLET, FILM COATED ORAL at 10:12

## 2022-12-26 RX ADMIN — INSULIN ASPART 5 UNITS: 100 INJECTION, SOLUTION INTRAVENOUS; SUBCUTANEOUS at 07:12

## 2022-12-26 RX ADMIN — ASPIRIN 81 MG CHEWABLE TABLET 81 MG: 81 TABLET CHEWABLE at 10:12

## 2022-12-26 NOTE — CONSULTS
"[facility] - Telemetry  Pulmonology  Consult Note    Patient Name: Chayito Chung  MRN: [id number]  Admission Date: 12/23/2022  Hospital Length of Stay: 1 days  Code Status: DNR  Attending Physician: Flor Cano MD  Primary Care Provider: Curt Valladares III, MD   Principal Problem: Shortness of breath    Inpatient consult to Pulmonology  Consult performed by: Wagner Blanco MD  Consult ordered by: Margarita Troy NP        Subjective:     HPI:  72 year old F with hypertension, >40 pack year history of smoking and reported COPD (no PFTs to review, on Trelegy), and stage IV adenocarcinoma of the lungs (R sided primary) s/p chemotherapy. Patient has PRN O2 at home. Patient is currently on pembrolizumab. On 12/22 patient began to have dyspnea on exertion, cough productive of yellow sputum and wheezing. History is obtained from daughter at bedside and via chart review as patient is lethargic and unable to participate in encounter. Patient last received a chemotherapy infusion on 12/5. Her daughter remarks that her functional status has declined significantly. Patient is described as a "greedy eater" and her appetite during this hospitalization has all but disappeared completely. Patient requiring 3-4L O2 to maintain her O2 sats. She has become more encephalopathic in the 48h after admission, which is a ghosh decline from her baseline, per daughter.     Pulmonary consulted to assist in management in patient with advanced lung cancer and COPD.       Past Medical History:   Diagnosis Date    Abdominal distension 8/22/2022    Acute coronary syndrome     Acute hypoxemic respiratory failure 5/1/2017    Anxiety     Aortic atherosclerosis 11/2/2021    Asthma     Bronchiectasis 8/22/2022    Cancer     colon    Cataracts, both eyes     Cervical myelopathy 7/23/2020    Chest pain at rest     Chest pain of uncertain etiology 12/6/2015    Colon cancer 1988    COPD (chronic obstructive pulmonary disease)     Coronary artery " disease     Coronary artery disease of native artery of native heart with stable angina pectoris 2012    Depression     Depressive disorder 2022    Diabetes mellitus     Diabetic peripheral neuropathy 2022    Dyspnea on exertion 11/15/2018    Elevated brain natriuretic peptide (BNP) level 11/15/2018    Elevated cholesterol     Hamartoma of lung 2022    History of partial gastrectomy 2019    History of stroke with residual deficit 2022    Hyperlipidemia associated with type 2 diabetes mellitus     Hypertension     Hypertension associated with diabetes     Impaired ambulation     Incisional hernia, without obstruction or gangrene 2019    Interstitial lung disease 2022    Lumbar facet arthropathy 2016    Mass of breast 2022    Migraine headache 2020    Mild malnutrition 2022    Mitral valve prolapse 2022    Moderate malnutrition 2022    Neuropathy 2012    Osteopenia of lumbar spine 2019    Pancreatitis 2022    Physical deconditioning 2022    Pulmonary hypertension 2022    Recurrent falls 2022    Restrictive lung disease 2022    Right leg weakness 2022    S/P PTCA (percutaneous transluminal coronary angioplasty) 10/20/2015    Seizure disorder 2019    Simple chronic bronchitis 2022    Sinus tachycardia 10/11/2016    Sleep apnea 2019    Solitary pulmonary nodule s/p resection 2012    Stenosis of carotid artery 2022    Swelling     Syncope and collapse 2016    Tobacco abuse, 1ppd, 50 years 2012    Type 2 diabetes mellitus, with long-term current use of insulin 2019    Ulnar neuropathy 2022    Vitamin D deficiency 2019    Weakness of both lower extremities 2016       Past Surgical History:   Procedure Laterality Date    ABDOMINAL SURGERY      BREAST BIOPSY      benign unsure what side     SECTION, CLASSIC      COLON SURGERY      COLONOSCOPY   2019    repeat in 5 years    CORONARY ANGIOPLASTY WITH STENT PLACEMENT  3 months ago     x2, Hysterectomy, Lung surgery, Partial stomach removed, Part of colon removed for rectal cancer    GASTRECTOMY      HEMORRHOID SURGERY      HYSTERECTOMY      @28yrs of age    LUNG BIOPSY      OOPHORECTOMY      @28yrs of age    POSTERIOR FUSION OF CERVICAL SPINE WITH LAMINECTOMY N/A 2020    Procedure: LAMINECTOMY, SPINE, CERVICAL, WITH POSTERIOR FUSION C3-T1 ;  Surgeon: Herson Tate MD;  Location: [address];  Service: Neurosurgery;  Laterality: N/A;    REPAIR OF INCARCERATED INCISIONAL HERNIA WITHOUT HISTORY OF PRIOR REPAIR N/A 2022    Procedure: REPAIR, HERNIA, INCISIONAL, INCARCERATED, WITHOUT HISTORY OF PRIOR REPAIR;  Surgeon: Simon King MD;  Location: [facility] OR;  Service: General;  Laterality: N/A;       Review of patient's allergies indicates:  No Known Allergies    Family History       Problem Relation (Age of Onset)    Breast cancer Mother    Cancer Mother, Maternal Aunt    Depression Sister    Diabetes Mother    Diabetes Mellitus Sister    Heart disease Father    Hypertension Mother, Sister    Lung cancer Father          Tobacco Use    Smoking status: Former     Years: 55.00     Types: Cigarettes     Start date:     Smokeless tobacco: Never    Tobacco comments:     Enrolled in the [employer] on 10/5/15 (SCT Member ID # [id number]).  Ambulatory referral to Smoking Cessation program.   Substance and Sexual Activity    Alcohol use: Yes     Alcohol/week: 3.0 standard drinks     Types: 3 Glasses of wine per week     Comment: 1 every 3 months    Drug use: No    Sexual activity: Yes     Partners: Male     Birth control/protection: None     Comment: last drink yesterday afternoon         Review of Systems   Unable to perform ROS: Mental status change   Objective:     Vital Signs (Most Recent):  Temp: 97.9 °F (36.6 °C) (22 1218)  Pulse: (!) 121 (22 1218)  Resp: 18 (22  1218)  BP: 120/73 (12/26/22 1218)  SpO2: (!) 92 % (12/26/22 1229)   Vital Signs (24h Range):  Temp:  [97.1 °F (36.2 °C)-98.5 °F (36.9 °C)] 97.9 °F (36.6 °C)  Pulse:  [112-129] 121  Resp:  [18-22] 18  SpO2:  [91 %-96 %] 92 %  BP: (108-154)/(59-82) 120/73     Weight: 57.8 kg (127 lb 6.8 oz)  Body mass index is 23.31 kg/m².      Intake/Output Summary (Last 24 hours) at 12/26/2022 1408  Last data filed at 12/26/2022 0704  Gross per 24 hour   Intake 10 ml   Output 1900 ml   Net -1890 ml       Physical Exam  Vitals and nursing note reviewed.   Constitutional:       General: She is not in acute distress.     Appearance: She is not ill-appearing, toxic-appearing or diaphoretic.      Comments: Frail appearing F   HENT:      Head: Normocephalic and atraumatic.   Cardiovascular:      Rate and Rhythm: Normal rate and regular rhythm.   Pulmonary:      Effort: Pulmonary effort is normal. No respiratory distress.      Breath sounds: Normal breath sounds. No rales.   Abdominal:      General: Abdomen is flat. There is no distension.   Musculoskeletal:      Right lower leg: No edema.      Left lower leg: No edema.   Neurological:      Mental Status: She is disoriented.      Motor: Weakness present.       Vents:       Lines/Drains/Airways       Drain  Duration             Female External Urinary Catheter 12/23/22 1300 3 days              Peripheral Intravenous Line  Duration                  Peripheral IV - Single Lumen 12/24/22 2230 20 G Posterior;Right Forearm 1 day                    Significant Labs:    CBC/Anemia Profile:  Recent Labs   Lab 12/25/22  0322 12/26/22  0305   WBC 4.32 7.29   HGB 8.7* 9.2*   HCT 29.8* 31.6*    213   MCV 87 87   RDW 23.9* 23.8*        Chemistries:  Recent Labs   Lab 12/25/22  0322 12/26/22  0305    132*   K 4.3 4.5    106   CO2 22* 19*   BUN 11 9   CREATININE 0.7 0.8   CALCIUM 8.6* 9.1   ALBUMIN 2.9* 3.0*   PROT 6.5 6.8   BILITOT 0.1 0.2   ALKPHOS 127 129   ALT 9* 10   AST 10 12        All pertinent labs within the past 24 hours have been reviewed.    Significant Imaging:   I have reviewed all pertinent imaging results/findings within the past 24 hours.    Assessment/Plan:     Acute on chronic respiratory failure with hypoxemia  Patient with advanced adenocarcinoma presenting with acute on chronic hypoxemic respiratory failure. Patient is s/p radiation and currently on pembrolizumab, last infusion on 12/5.     Patient was initially stage IIa, but then additional L sided lesion biopsied found to be poorly differentiated adenocarcinoma as well. Unclear if this is true metastatic disease or a simultaneous primary, but making that determination seems unlikely at best, thus she is considered stage IV at this time.     CXR looks similar to September 2022 film. PET CT reviewed - numerous PET avid lesions in the RUL, mediastinum, and JEFF. Primary lesion smaller than prior, but many other lesions appear larger. Overall appears like she has progression of her disease despite treatment.     Recommendations/Plan  - Okay with Ceftriaxone monotherapy, but largely do not suspect infectious process    - sputum culture ordered  - Continue LABA/ICS (Breo on formulary inpatient) and LAMA (tiotropium)    - ordered  - nebulizer treatments q4h while awake for 8 doses, then PRN for wheezing and dyspnea (ordered)  - anti tussives PRN  - pulmonary toilet with CPT and 3% NaCl nebs(ordered)  - suspect lethargy/mental status may be polypharmacy (has a number of causative agents ordered, discontinued PRN opiates)    Discussed patients advancing disease process with daughter at bedside. Patient has expressed that she would not want aggressive measures should her health deteriorate. It is clear on our examination that patient is frail and in her current state, unlikely she would be able to undergo any further chemotherapy. Patient's daughter in agreement. Recommend palliative care consult, family aware of consult.   [facility] reaching out to primary oncologist at [facility] regarding patient's chemotherapy plans.           Thank you for your consult. I will sign off. Please contact us if you have any additional questions.     Wagner Blanco MD  Pulmonology  [facility]    Pt seen and examined with Pulmonary/Critical Care team and this note reviewed and validated with the following additional comments:    I met this patient for the first today. Although I have not seen her before, from the daughter's history and today's exam it seems like her health trajectory is rapidly downhill. She has recently lost a significant amount of weight and she appears frail. She coughed several times during the exam but was unable to clear her own sputum. Her daughter clearly opined that her mother DID NOT want any heroics at life's end. I would focus on what we can treat and ignore what we can't. Now would be a good time to focus on end-of-life planning. If she rallies with treatment of her COPD, great.     Alex Billings MD  Phone [phone]

## 2022-12-26 NOTE — CONSULTS
[facility] - Telemetry  Hematology/Oncology  Consult Note    Patient Name: Chayito Chung  MRN: [id number]  Admission Date: 12/23/2022  Hospital Length of Stay: 1 days  Code Status: DNR   Attending Provider: Flor Cano MD  Consulting Provider: Mani Norman MD  Primary Care Physician: Curt Valladares III, MD  Principal Problem:Shortness of breath    Inpatient consult to Telemedicine - Oncology  Consult performed by: Mani Norman MD  Consult ordered by: Mani Norman MD  Reason for consult: lung cancer        Subjective:     HPI:  72 year-old female with metastatic adenocarcinoma of lung on pembrolizumab was admitted on 12/23/22 for respiratory failure, encephalopathy. Consult is for lung cancer.       VIRTUAL TELENOTE    Start time:10:10am  Chief complaint: lung cancer  The patient location is: [facility]  The patient arrived at: 10:10am  Present with the patient at the time of the telemed/virtual assessment: nurse    End time: 10:20am    Total time spent with patient: 10 minutes    The attending portion of this evaluation, treatment, and documentation was performed per Mani Norman MD via Telemedicine AudioVisual using the secure Twyxt software platform with 2 way audio/video. The provider was located off-site and the patient is located in the hospital. The aforementioned video software was utilized to document the relevant history and physical exam.    - she states she is receiving pembrolizumab. She endorses fatigue, dyspnea upon exertion, weakness. She denies abdominal pain, diarrhea.    Oncology Treatment Plan:   [No matching plan found]    Medications:  Continuous Infusions:  Scheduled Meds:   aspirin  81 mg Oral Daily    atorvastatin  40 mg Oral Daily    busPIRone  15 mg Oral BID    cefTRIAXone (ROCEPHIN) IVPB  1 g Intravenous Q24H    doxycycline  100 mg Oral Q12H    insulin aspart U-100  3 Units Subcutaneous TIDWM    insulin detemir U-100  12 Units Subcutaneous QHS    losartan  25 mg Oral Daily     mirtazapine  15 mg Oral Daily    potassium chloride SA  20 mEq Oral Daily    pregabalin  150 mg Oral TID    QUEtiapine  400 mg Oral Nightly    sertraline  150 mg Oral Daily    sodium chloride 0.9%  10 mL Intravenous Q8H    topiramate  50 mg Oral BID    vitamin D  1,000 Units Oral Daily    zonisamide  200 mg Oral BID     PRN Meds:acetaminophen, albuterol-ipratropium, benzonatate, dextromethorphan-guaiFENesin  mg/5 ml, dextrose 10%, dextrose 10%, glucagon (human recombinant), HYDROcodone-acetaminophen, insulin aspart U-100, melatonin, naloxone, ondansetron, polyethylene glycol, prochlorperazine     Review of patient's allergies indicates:  No Known Allergies     Past Medical History:   Diagnosis Date    Abdominal distension 8/22/2022    Acute coronary syndrome     Acute hypoxemic respiratory failure 5/1/2017    Anxiety     Aortic atherosclerosis 11/2/2021    Asthma     Bronchiectasis 8/22/2022    Cancer     colon    Cataracts, both eyes     Cervical myelopathy 7/23/2020    Chest pain at rest     Chest pain of uncertain etiology 12/6/2015    Colon cancer 1988    COPD (chronic obstructive pulmonary disease)     Coronary artery disease     Coronary artery disease of native artery of native heart with stable angina pectoris 12/16/2012    Depression     Depressive disorder 8/22/2022    Diabetes mellitus     Diabetic peripheral neuropathy 8/1/2022    Dyspnea on exertion 11/15/2018    Elevated brain natriuretic peptide (BNP) level 11/15/2018    Elevated cholesterol     Hamartoma of lung 6/29/2022    History of partial gastrectomy 12/16/2019    History of stroke with residual deficit 4/5/2022    Hyperlipidemia associated with type 2 diabetes mellitus     Hypertension     Hypertension associated with diabetes     Impaired ambulation     Incisional hernia, without obstruction or gangrene 11/27/2019    Interstitial lung disease 6/29/2022    Lumbar facet arthropathy 8/2/2016    Mass  of breast 2022    Migraine headache 2020    Mild malnutrition 2022    Mitral valve prolapse 2022    Moderate malnutrition 2022    Neuropathy 2012    Osteopenia of lumbar spine 2019    Pancreatitis 2022    Physical deconditioning 2022    Pulmonary hypertension 2022    Recurrent falls 2022    Restrictive lung disease 2022    Right leg weakness 2022    S/P PTCA (percutaneous transluminal coronary angioplasty) 10/20/2015    Seizure disorder 2019    Simple chronic bronchitis 2022    Sinus tachycardia 10/11/2016    Sleep apnea 2019    Solitary pulmonary nodule s/p resection 2012    Stenosis of carotid artery 2022    Swelling     Syncope and collapse 2016    Tobacco abuse, 1ppd, 50 years 2012    Type 2 diabetes mellitus, with long-term current use of insulin 2019    Ulnar neuropathy 2022    Vitamin D deficiency 2019    Weakness of both lower extremities 2016     Past Surgical History:   Procedure Laterality Date    ABDOMINAL SURGERY      BREAST BIOPSY      benign unsure what side     SECTION, CLASSIC      COLON SURGERY      COLONOSCOPY  2019    repeat in 5 years    CORONARY ANGIOPLASTY WITH STENT PLACEMENT  3 months ago     x2, Hysterectomy, Lung surgery, Partial stomach removed, Part of colon removed for rectal cancer    GASTRECTOMY      HEMORRHOID SURGERY      HYSTERECTOMY      @28yrs of age    LUNG BIOPSY      OOPHORECTOMY      @28yrs of age    POSTERIOR FUSION OF CERVICAL SPINE WITH LAMINECTOMY N/A 2020    Procedure: LAMINECTOMY, SPINE, CERVICAL, WITH POSTERIOR FUSION C3-T1 ;  Surgeon: Herson Tate MD;  Location: [facility] OR [address] [place] [address];  Service: Neurosurgery;  Laterality: N/A;    REPAIR OF INCARCERATED INCISIONAL HERNIA WITHOUT HISTORY OF PRIOR REPAIR N/A 2022    Procedure: REPAIR, HERNIA, INCISIONAL, INCARCERATED, WITHOUT  HISTORY OF PRIOR REPAIR;  Surgeon: Simon King MD;  Location: [facility];  Service: General;  Laterality: N/A;     Family History       Problem Relation (Age of Onset)    Breast cancer Mother    Cancer Mother, Maternal Aunt    Depression Sister    Diabetes Mother    Diabetes Mellitus Sister    Heart disease Father    Hypertension Mother, Sister    Lung cancer Father          Tobacco Use    Smoking status: Former     Years: 55.00     Types: Cigarettes     Start date: 1967    Smokeless tobacco: Never    Tobacco comments:     Enrolled in the [employer] on 10/5/15 ([employer] Member ID # [id number]).  Ambulatory referral to Smoking Cessation program.   Substance and Sexual Activity    Alcohol use: Yes     Alcohol/week: 3.0 standard drinks     Types: 3 Glasses of wine per week     Comment: 1 every 3 months    Drug use: No    Sexual activity: Yes     Partners: Male     Birth control/protection: None     Comment: last drink yesterday afternoon       Review of Systems   Constitutional:  Positive for fatigue.   HENT:  Negative for sore throat.    Eyes:  Negative for visual disturbance.   Respiratory:  Positive for cough and shortness of breath.    Cardiovascular:  Negative for chest pain.   Gastrointestinal:  Negative for abdominal pain, constipation, diarrhea, nausea and vomiting.   Genitourinary:  Negative for dysuria.   Musculoskeletal:  Negative for back pain.   Skin:  Negative for rash.   Neurological:  Positive for weakness. Negative for headaches.   Hematological:  Negative for adenopathy.   Psychiatric/Behavioral:  The patient is not nervous/anxious.    Objective:     Vital Signs (Most Recent):  Temp: 98.5 °F (36.9 °C) (12/26/22 0735)  Pulse: (!) 129 (12/26/22 0735)  Resp: 18 (12/26/22 0735)  BP: (!) 124/59 (12/26/22 0735)  SpO2: (!) 91 % (12/26/22 0735)   Vital Signs (24h Range):  Temp:  [97.1 °F (36.2 °C)-98.5 °F (36.9 °C)] 98.5 °F (36.9 °C)  Pulse:  [] 129  Resp:  [18-22] 18  SpO2:  [91 %-96 %] 91 %  BP:  (108-154)/(59-82) 124/59     Weight: 57.8 kg (127 lb 6.8 oz)  Body mass index is 23.31 kg/m².  Body surface area is 1.59 meters squared.      Intake/Output Summary (Last 24 hours) at 12/26/2022 1022  Last data filed at 12/26/2022 0704  Gross per 24 hour   Intake 10 ml   Output 1900 ml   Net -1890 ml       Physical Exam  Deferred due to telemedicine visit.    Significant Labs:   CBC:   Recent Labs   Lab 12/25/22  0322 12/26/22  0305   WBC 4.32 7.29   HGB 8.7* 9.2*   HCT 29.8* 31.6*    213    and CMP:   Recent Labs   Lab 12/25/22  0322 12/26/22  0305    132*   K 4.3 4.5    106   CO2 22* 19*   * 374*   BUN 11 9   CREATININE 0.7 0.8   CALCIUM 8.6* 9.1   PROT 6.5 6.8   ALBUMIN 2.9* 3.0*   BILITOT 0.1 0.2   ALKPHOS 127 129   AST 10 12   ALT 9* 10   ANIONGAP 6* 7*       Diagnostic Results:  CTA chest (12/21/22): I have personally reviewed the images  1. No evidence of acute pulmonary thromboembolism.  2. Decreasing size of multiple pulmonary nodular opacities with persistent infiltrates.  Findings are most compatible with response to therapy.  3. Diminished size of hilar adenopathy on the right.  4. Increasing size of single nodule in the apical medial aspect of the right upper lobe previously 7 mm now 12 mm.      Assessment/Plan:     Primary lung adenocarcinoma, right  - initially diagnosed with stage 1 non-small-cell lung cancer of right lung (adenocarcinoma) s/p SBRT.  - recently diagnosed with stage 4 disease due to multiple enlarging bilateral pulmonary nodules.  - she has started pembrolizumab per outside chart review.  - she had previously seen Dr. Young, so I will arrange for a follow-up appointment with him following discharge.  - agree with palliative care consult  - I have ordered a TSH given her lethargy and her being on pembrolizumab, which can cause drug-induced hypothyroidism.    Secondary malignant neoplasm of left lung  - see above        Thank you for your consult.     Mani LIN  MD [facility]  Hematology/Oncology  [place] [facility]

## 2022-12-26 NOTE — ASSESSMENT & PLAN NOTE
Patient is identified as having Diastolic (HFpEF) heart failure that is Chronic. CHF is currently controlled. Latest ECHO performed and demonstrates- Results for orders placed during the hospital encounter of 04/05/22    Echo    Interpretation Summary  · The left ventricle is normal in size with mild concentric hypertrophy and normal systolic function.  · The estimated ejection fraction is 60%.  · Mild mitral regurgitation.  · Grade I left ventricular diastolic dysfunction.  · Mild tricuspid regurgitation.  · The estimated PA systolic pressure is 47 mmHg.  · Normal right ventricular size with normal right ventricular systolic function.  · There is mild pulmonary hypertension.  · There is no evidence of intracardiac shunting.  . Continue ACE/ARB and monitor clinical status closely. Monitor on telemetry. Patient is on CHF pathway.  Monitor strict Is&Os and daily weights.  Place on fluid restriction of 1 L. Continue to stress to patient importance of self efficacy and  on diet for CHF. Last BNP reviewed- and noted below   Recent Labs   Lab 12/23/22  6838   BNP 27   .  -Stable     -patient appears clinically dry with poor PO intake - IV hydration over the next 12 hours

## 2022-12-26 NOTE — ASSESSMENT & PLAN NOTE
-Patient has a history of COPD   -Monitor oxygen saturation, keep O2 above 90%  -Continue as need breathing treatment  -supplemental oxygen as need    -try to wean O2  -Hold off on all narcotics for now   -add doxycycline   -obtain abg

## 2022-12-26 NOTE — NURSING
Received notification that pt's hr is sustaining 130s. Upon assessment pt actively receiving breathing tx.  RT at bedside. In no acute distress. SIDRA Troy NP aware. Pending stat chest x ray. Care ongoing.

## 2022-12-26 NOTE — ASSESSMENT & PLAN NOTE
Patient with advanced adenocarcinoma presenting with acute on chronic hypoxemic respiratory failure. Patient is s/p radiation and currently on pembrolizumab, last infusion on 12/5.     Patient was initially stage IIa, but then additional L sided lesion biopsied found to be poorly differentiated adenocarcinoma as well. Unclear if this is true metastatic disease or a simultaneous primary, but making that determination seems unlikely at best, thus she is considered stage IV at this time.     CXR looks similar to September 2022 film. PET CT reviewed - numerous PET avid lesions in the RUL, mediastinum, and JEFF. Primary lesion smaller than prior, but many other lesions appear larger. Overall appears like she has progression of her disease despite treatment.     Recommendations/Plan  - Okay with Ceftriaxone monotherapy, but largely do not suspect infectious process    - sputum culture ordered  - Continue LABA/ICS (Breo on formulary inpatient) and LAMA (tiotropium)    - ordered  - nebulizer treatments q4h while awake for 8 doses, then PRN for wheezing and dyspnea (ordered)  - anti tussives PRN  - pulmonary toilet with CPT and 3% NaCl nebs(ordered)  - suspect lethargy/mental status may be polypharmacy (has a number of causative agents ordered, discontinued PRN opiates)    Discussed patients advancing disease process with daughter at bedside. Patient has expressed that she would not want aggressive measures should her health deteriorate. It is clear on our examination that patient is frail and in her current state, unlikely she would be able to undergo any further chemotherapy. Patient's daughter in agreement. Recommend palliative care consult, family aware of consult. Dr Norman reaching out to primary oncologist at [facility] regarding patient's chemotherapy plans.

## 2022-12-26 NOTE — PROGRESS NOTES
[facility] Medicine  Progress Note    Patient Name: Chayito Chung  MRN: [id number]  Patient Class: IP- Inpatient   Admission Date: 12/23/2022  Length of Stay: 1 days  Attending Physician: Flor Cano MD  Primary Care Provider: Curt Valladares III, MD        Subjective:     Principal Problem:Shortness of breath        HPI:  Patient is a 72-year-old female presents via EMS complaining of cough for the past few days. Patient has a pertinent medical history including ACS, respiratory failure, COPD, stage IV adenocarcinoma of the lung, finished radiation, currently in middle of chemo. Patient stated her symptoms of shortness of breath and cough started Thursday, patient stated she continued to have productive cough with yellow sputum production. Patient stated she took over the counter medication with no relief in symptoms. Patient denied fever or chills. Patient denies nausea, vomiting, or diarrhea. Patient denies hemoptysis, hematemesis or hematochezia.     On arrival to the emergency department patient chest x-ray shows coarse accentuated interstitial markings bilaterally which may relate to low lung volumes, although superimposed component of interstitial edema or nonspecific pneumonitis not excluded. Nonspecific bilateral irregular subsegmental opacities throughout both lungs, most pronounced at the right lung base, better evaluated on recent CTA chest examination.  Findings may relate to atelectasis, scarring/post radiation change, aspiration, neoplasm, or infection.    CTA  of chest results shows No evidence of acute pulmonary thromboembolism. Decreasing size of multiple pulmonary nodular opacities with persistent infiltrates. Diminished size of hilar adenopathy on the right. Increasing size of single nodule in the apical medial aspect of the right upper lobe previously 7 mm now 12 mm.  Continued follow-up is urged.      On assessment patient is lethargic but is arousable and answer questions  appropriately. Patient voiced lower abdominal pain after sustained coughing. Patient heart rate will increase to 115's to 120's after coughing. Patient has expiratory wheezing. No sputum production noted.  Awaiting CT head to assess encephalopathy.     Patient will be admitted to hospital services for further evaluation and medical management.       Overview/Hospital Course:  Patient admitted on 12/23 for Shortness of breath. Patient seen and examined on a.m. rounds. Patient is lethargic but answer question appropriately. Patient voiced no pain at the time of assessment. Patient has rebound hyperglycemia.Vital signs are stable.       Interval History: This am she makes incomprehensible sounds when I ask her her name. She is able to squeeze my hands bilaterally. She has very little PO intake.   She was made DNR this am per discussion this am with ALEJANDRA and Dr. Cano.   IVF ordered   ABG   Procal negative   Doxycycline ordered for antiinflammatory properties   Hemoc consult   Palliative consult   Pulm consult   Tachycardic- may be from nebs- switch to xopenex, hopefully IVF will help.   Hyperglycemic- will add basal, bolus, CSI   SLP eval to assess for aspiration       Review of Systems   Unable to perform ROS: Acuity of condition   Constitutional:  Positive for activity change and fatigue. Negative for fever.   Respiratory:  Positive for shortness of breath.    Skin: Negative.    Neurological:  Positive for weakness.   Objective:     Vital Signs (Most Recent):  Temp: 97.9 °F (36.6 °C) (12/26/22 1218)  Pulse: (!) 121 (12/26/22 1218)  Resp: 18 (12/26/22 1218)  BP: 120/73 (12/26/22 1218)  SpO2: (!) 92 % (12/26/22 1229)   Vital Signs (24h Range):  Temp:  [97.1 °F (36.2 °C)-98.5 °F (36.9 °C)] 97.9 °F (36.6 °C)  Pulse:  [112-129] 121  Resp:  [18-22] 18  SpO2:  [91 %-96 %] 92 %  BP: (108-154)/(59-82) 120/73     Weight: 57.8 kg (127 lb 6.8 oz)  Body mass index is 23.31 kg/m².    Intake/Output Summary (Last 24 hours) at  12/26/2022 1255  Last data filed at 12/26/2022 0704  Gross per 24 hour   Intake 10 ml   Output 1900 ml   Net -1890 ml        Physical Exam  Constitutional:       Appearance: She is ill-appearing.   HENT:      Head: Normocephalic and atraumatic.      Nose: Nose normal.      Mouth/Throat:      Mouth: Mucous membranes are dry.   Cardiovascular:      Rate and Rhythm: Regular rhythm. Tachycardia present.   Pulmonary:      Effort: No respiratory distress.      Breath sounds: No wheezing.   Abdominal:      General: Bowel sounds are normal.      Palpations: Abdomen is soft.   Musculoskeletal:         General: Normal range of motion.      Cervical back: Normal range of motion and neck supple.   Skin:     General: Skin is warm and dry.      Capillary Refill: Capillary refill takes less than 2 seconds.   Neurological:      GCS: GCS eye subscore is 1. GCS verbal subscore is 2. GCS motor subscore is 6.      Motor: Weakness present.       Significant Labs: All pertinent labs within the past 24 hours have been reviewed.    Significant Imaging: I have reviewed all pertinent imaging results/findings within the past 24 hours.  I have reviewed and interpreted all pertinent imaging results/findings within the past 24 hours.      Assessment/Plan:      * Shortness of breath  -Patient has c/o shortness of breath for a couple of days took over the counter medication with no relief      -History of stage IV adenocarcinoma of the lung, finished radiation, currently in middle of chemo    -Chest X-ray results shows Coarse accentuated interstitial markings bilaterally which may relate to low lung volumes, although superimposed component of interstitial edema or nonspecific pneumonitis not excluded. Nonspecific bilateral irregular subsegmental opacities throughout both lungs, most pronounced at the right lung base    -CTA results shows No evidence of acute pulmonary thromboembolism. Decreasing size of multiple pulmonary nodular opacities with  persistent infiltrates.  Findings are most compatible with response to therapy. Diminished size of hilar adenopathy on the right. Increasing size of single nodule in the apical medial aspect of the right upper lobe previously 7 mm now 12 mm.      -Will continue breathing treatment     -Supplemental oxygen as needed    -ABG   -procal WNL   -add doxy   +/- steroids   -patient with stage IV lung CA s/p chemo therapy   -consult hemoc, palliative, pulm   -SLP   -EKG  -IVF   -patient was made DNR today       Secondary malignant neoplasm of left lung        Hyperlipidemia  continue atorvastatin       Primary lung adenocarcinoma, right  -Follow by oncology  -Patient is on chemo    CAD (coronary artery disease)  -Stable  -Continue telemetry monitoring  -Continue home dose ASA  -Continue atorvastatin     Patient with known CAD s/p stent placement, which is controlled Will continue ASA and Statin and monitor for S/Sx of angina/ACS. Continue to monitor on telemetry.       Chronic diastolic congestive heart failure  Patient is identified as having Diastolic (HFpEF) heart failure that is Chronic. CHF is currently controlled. Latest ECHO performed and demonstrates- Results for orders placed during the hospital encounter of 04/05/22    Echo    Interpretation Summary  · The left ventricle is normal in size with mild concentric hypertrophy and normal systolic function.  · The estimated ejection fraction is 60%.  · Mild mitral regurgitation.  · Grade I left ventricular diastolic dysfunction.  · Mild tricuspid regurgitation.  · The estimated PA systolic pressure is 47 mmHg.  · Normal right ventricular size with normal right ventricular systolic function.  · There is mild pulmonary hypertension.  · There is no evidence of intracardiac shunting.  . Continue ACE/ARB and monitor clinical status closely. Monitor on telemetry. Patient is on CHF pathway.  Monitor strict Is&Os and daily weights.  Place on fluid restriction of 1 L. Continue to  stress to patient importance of self efficacy and  on diet for CHF. Last BNP reviewed- and noted below   Recent Labs   Lab 12/23/22  0238   BNP 27   .  -Stable     -patient appears clinically dry with poor PO intake - IV hydration over the next 12 hours     Iron deficiency anemia  -Stable  -H/H is 8.9 and 30  -Will continue to monitor and transfuse below 7      Uncontrolled type 2 diabetes mellitus with hyperglycemia  Patient's FSGs are uncontrolled due to hyperglycemia on current medication regimen.  Last A1c reviewed-   Lab Results   Component Value Date    HGBA1C 8.2 (H) 10/11/2022     Most recent fingerstick glucose reviewed-   Recent Labs   Lab 12/25/22  1616 12/25/22  2052 12/26/22  0622 12/26/22  1158   POCTGLUCOSE 240* 248* 383* 302*     Current correctional scale  Low  Decrease anti-hyperglycemic dose as follows-   Antihyperglycemics (From admission, onward)    Start     Stop Route Frequency Ordered    12/26/22 2100  insulin detemir U-100 pen 12 Units         -- SubQ Nightly 12/26/22 0805    12/26/22 1130  insulin aspart U-100 pen 3 Units         -- SubQ 3 times daily with meals 12/26/22 0805    12/23/22 0723  insulin aspart U-100 pen 0-5 Units         -- SubQ Every 6 hours PRN 12/23/22 0623        Hold Oral hypoglycemics while patient is in the hospital.  -Maintain blood glucose between 140-180        Major depressive disorder in partial remission  Patient has persistent depression which is moderate and is currently controlled. Will Continue anti-depressant medications. We will not consult psychiatry at this time. Patient does not display psychosis at this time. Continue to monitor closely and adjust plan of care as needed.        Chronic obstructive pulmonary disease with acute exacerbation    -Patient has a history of COPD   -Monitor oxygen saturation, keep O2 above 90%  -Continue as need breathing treatment  -supplemental oxygen as need    -try to wean O2  -Hold off on all narcotics for now    -add doxycycline   -obtain abg         VTE Risk Mitigation (From admission, onward)         Ordered     IP VTE HIGH RISK PATIENT  Once         12/23/22 0623     Place sequential compression device  Until discontinued         12/23/22 0623                Discharge Planning   MICKY:      Code Status: DNR   Is the patient medically ready for discharge?:     Reason for patient still in hospital (select all that apply): Patient trending condition                     Margarita Troy NP  Department of [facility] Medicine   [place] [facility]

## 2022-12-26 NOTE — HPI
"72 year old F with hypertension, >40 pack year history of smoking and reported COPD (no PFTs to review, on Trelegy), and stage IV adenocarcinoma of the lungs (R sided primary) s/p chemotherapy. Patient has PRN O2 at home. Patient is currently on pembrolizumab. On 12/22 patient began to have dyspnea on exertion, cough productive of yellow sputum and wheezing. History is obtained from daughter at bedside and via chart review as patient is lethargic and unable to participate in encounter. Patient last received a chemotherapy infusion on 12/5. Her daughter remarks that her functional status has declined significantly. Patient is described as a "greedy eater" and her appetite during this hospitalization has all but disappeared completely. Patient requiring 3-4L O2 to maintain her O2 sats. She has become more encephalopathic in the 48h after admission, which is a ghosh decline from her baseline, per daughter.     Pulmonary consulted to assist in management in patient with advanced lung cancer and COPD.   "

## 2022-12-26 NOTE — HPI
72 year-old female with metastatic adenocarcinoma of lung on pembrolizumab was admitted on 12/23/22 for respiratory failure, encephalopathy. Consult is for lung cancer.

## 2022-12-26 NOTE — SUBJECTIVE & OBJECTIVE
Interval History: This am she makes incomprehensible sounds when I ask her her name. She is able to squeeze my hands bilaterally. She has very little PO intake.   She was made DNR this am per discussion this am with ALEJANDRA and Dr. Cano.   IVF ordered   ABG   Procal negative   Doxycycline ordered for antiinflammatory properties   Hemoc consult   Palliative consult   Pulm consult   Tachycardic- may be from nebs- switch to xopenex, hopefully IVF will help.   Hyperglycemic- will add basal, bolus, CSI   SLP eval to assess for aspiration       Review of Systems   Unable to perform ROS: Acuity of condition   Constitutional:  Positive for activity change and fatigue. Negative for fever.   Respiratory:  Positive for shortness of breath.    Skin: Negative.    Neurological:  Positive for weakness.   Objective:     Vital Signs (Most Recent):  Temp: 97.9 °F (36.6 °C) (12/26/22 1218)  Pulse: (!) 121 (12/26/22 1218)  Resp: 18 (12/26/22 1218)  BP: 120/73 (12/26/22 1218)  SpO2: (!) 92 % (12/26/22 1229)   Vital Signs (24h Range):  Temp:  [97.1 °F (36.2 °C)-98.5 °F (36.9 °C)] 97.9 °F (36.6 °C)  Pulse:  [112-129] 121  Resp:  [18-22] 18  SpO2:  [91 %-96 %] 92 %  BP: (108-154)/(59-82) 120/73     Weight: 57.8 kg (127 lb 6.8 oz)  Body mass index is 23.31 kg/m².    Intake/Output Summary (Last 24 hours) at 12/26/2022 1255  Last data filed at 12/26/2022 0704  Gross per 24 hour   Intake 10 ml   Output 1900 ml   Net -1890 ml        Physical Exam  Constitutional:       Appearance: She is ill-appearing.   HENT:      Head: Normocephalic and atraumatic.      Nose: Nose normal.      Mouth/Throat:      Mouth: Mucous membranes are dry.   Cardiovascular:      Rate and Rhythm: Regular rhythm. Tachycardia present.   Pulmonary:      Effort: No respiratory distress.      Breath sounds: No wheezing.   Abdominal:      General: Bowel sounds are normal.      Palpations: Abdomen is soft.   Musculoskeletal:         General: Normal range of motion.      Cervical  back: Normal range of motion and neck supple.   Skin:     General: Skin is warm and dry.      Capillary Refill: Capillary refill takes less than 2 seconds.   Neurological:      GCS: GCS eye subscore is 1. GCS verbal subscore is 2. GCS motor subscore is 6.      Motor: Weakness present.       Significant Labs: All pertinent labs within the past 24 hours have been reviewed.    Significant Imaging: I have reviewed all pertinent imaging results/findings within the past 24 hours.  I have reviewed and interpreted all pertinent imaging results/findings within the past 24 hours.

## 2022-12-26 NOTE — ASSESSMENT & PLAN NOTE
-Patient has c/o shortness of breath for a couple of days took over the counter medication with no relief      -History of stage IV adenocarcinoma of the lung, finished radiation, currently in middle of chemo    -Chest X-ray results shows Coarse accentuated interstitial markings bilaterally which may relate to low lung volumes, although superimposed component of interstitial edema or nonspecific pneumonitis not excluded. Nonspecific bilateral irregular subsegmental opacities throughout both lungs, most pronounced at the right lung base    -CTA results shows No evidence of acute pulmonary thromboembolism. Decreasing size of multiple pulmonary nodular opacities with persistent infiltrates.  Findings are most compatible with response to therapy. Diminished size of hilar adenopathy on the right. Increasing size of single nodule in the apical medial aspect of the right upper lobe previously 7 mm now 12 mm.      -Will continue breathing treatment     -Supplemental oxygen as needed    -ABG   -procal WNL   -add doxy   +/- steroids   -patient with stage IV lung CA s/p chemo therapy   -consult hemoc, palliative, pulm   -SLP   -EKG  -IVF   -patient was made DNR today

## 2022-12-26 NOTE — ASSESSMENT & PLAN NOTE
Patient's FSGs are uncontrolled due to hyperglycemia on current medication regimen.  Last A1c reviewed-   Lab Results   Component Value Date    HGBA1C 8.2 (H) 10/11/2022     Most recent fingerstick glucose reviewed-   Recent Labs   Lab 12/25/22  1616 12/25/22 2052 12/26/22  0622 12/26/22  1158   POCTGLUCOSE 240* 248* 383* 302*     Current correctional scale  Low  Decrease anti-hyperglycemic dose as follows-   Antihyperglycemics (From admission, onward)    Start     Stop Route Frequency Ordered    12/26/22 2100  insulin detemir U-100 pen 12 Units         -- SubQ Nightly 12/26/22 0805    12/26/22 1130  insulin aspart U-100 pen 3 Units         -- SubQ 3 times daily with meals 12/26/22 0805 12/23/22 0723  insulin aspart U-100 pen 0-5 Units         -- SubQ Every 6 hours PRN 12/23/22 0623        Hold Oral hypoglycemics while patient is in the hospital.  -Maintain blood glucose between 140-180

## 2022-12-26 NOTE — PLAN OF CARE
ST orders for swallow eval received this date, pt seen in room, dtr present. Pt to be downgraded to [facility] soft and thin liquids, will add boost for added nutrition. Pt with overall fair PO intake but has cough not related to eating with meals. Notified MD of diet recs and request for breathing tx which have been ordered.

## 2022-12-26 NOTE — SUBJECTIVE & OBJECTIVE
Past Medical History:   Diagnosis Date    Abdominal distension 8/22/2022    Acute coronary syndrome     Acute hypoxemic respiratory failure 5/1/2017    Anxiety     Aortic atherosclerosis 11/2/2021    Asthma     Bronchiectasis 8/22/2022    Cancer     colon    Cataracts, both eyes     Cervical myelopathy 7/23/2020    Chest pain at rest     Chest pain of uncertain etiology 12/6/2015    Colon cancer 1988    COPD (chronic obstructive pulmonary disease)     Coronary artery disease     Coronary artery disease of native artery of native heart with stable angina pectoris 12/16/2012    Depression     Depressive disorder 8/22/2022    Diabetes mellitus     Diabetic peripheral neuropathy 8/1/2022    Dyspnea on exertion 11/15/2018    Elevated brain natriuretic peptide (BNP) level 11/15/2018    Elevated cholesterol     Hamartoma of lung 6/29/2022    History of partial gastrectomy 12/16/2019    History of stroke with residual deficit 4/5/2022    Hyperlipidemia associated with type 2 diabetes mellitus     Hypertension     Hypertension associated with diabetes     Impaired ambulation     Incisional hernia, without obstruction or gangrene 11/27/2019    Interstitial lung disease 6/29/2022    Lumbar facet arthropathy 8/2/2016    Mass of breast 8/22/2022    Migraine headache 7/23/2020    Mild malnutrition 4/6/2022    Mitral valve prolapse 8/22/2022    Moderate malnutrition 8/24/2022    Neuropathy 12/17/2012    Osteopenia of lumbar spine 8/27/2019    Pancreatitis 8/22/2022    Physical deconditioning 5/9/2022    Pulmonary hypertension 6/9/2022    Recurrent falls 5/9/2022    Restrictive lung disease 8/22/2022    Right leg weakness 4/5/2022    S/P PTCA (percutaneous transluminal coronary angioplasty) 10/20/2015    Seizure disorder 12/16/2019    Simple chronic bronchitis 8/22/2022    Sinus tachycardia 10/11/2016    Sleep apnea 11/4/2019    Solitary pulmonary nodule s/p resection 4/2012 12/17/2012    Stenosis of carotid artery 8/22/2022     Swelling     Syncope and collapse 2016    Tobacco abuse, 1ppd, 50 years 2012    Type 2 diabetes mellitus, with long-term current use of insulin 2019    Ulnar neuropathy 2022    Vitamin D deficiency 2019    Weakness of both lower extremities 2016       Past Surgical History:   Procedure Laterality Date    ABDOMINAL SURGERY      BREAST BIOPSY      benign unsure what side     SECTION, CLASSIC      COLON SURGERY      COLONOSCOPY  2019    repeat in 5 years    CORONARY ANGIOPLASTY WITH STENT PLACEMENT  3 months ago     x2, Hysterectomy, Lung surgery, Partial stomach removed, Part of colon removed for rectal cancer    GASTRECTOMY      HEMORRHOID SURGERY      HYSTERECTOMY      @28yrs of age    LUNG BIOPSY      OOPHORECTOMY      @28yrs of age    POSTERIOR FUSION OF CERVICAL SPINE WITH LAMINECTOMY N/A 2020    Procedure: LAMINECTOMY, SPINE, CERVICAL, WITH POSTERIOR FUSION C3-T1 ;  Surgeon: Herson Tate MD;  Location[facility] OR [address] [place] [address];  Service: Neurosurgery;  Laterality: N/A;    REPAIR OF INCARCERATED INCISIONAL HERNIA WITHOUT HISTORY OF PRIOR REPAIR N/A 2022    Procedure: REPAIR, HERNIA, INCISIONAL, INCARCERATED, WITHOUT HISTORY OF PRIOR REPAIR;  Surgeon: Simon King MD;  Location: [facility];  Service: General;  Laterality: N/A;       Review of patient's allergies indicates:  No Known Allergies    Family History       Problem Relation (Age of Onset)    Breast cancer Mother    Cancer Mother, Maternal Aunt    Depression Sister    Diabetes Mother    Diabetes Mellitus Sister    Heart disease Father    Hypertension Mother, Sister    Lung cancer Father          Tobacco Use    Smoking status: Former     Years: 55.00     Types: Cigarettes     Start date:     Smokeless tobacco: Never    Tobacco comments:     Enrolled in the [employer] on 10/5/15 ([employer] Member ID # [id number]).  Ambulatory referral to Smoking Cessation program.   Substance and Sexual Activity     Alcohol use: Yes     Alcohol/week: 3.0 standard drinks     Types: 3 Glasses of wine per week     Comment: 1 every 3 months    Drug use: No    Sexual activity: Yes     Partners: Male     Birth control/protection: None     Comment: last drink yesterday afternoon         Review of Systems   Unable to perform ROS: Mental status change   Objective:     Vital Signs (Most Recent):  Temp: 97.9 °F (36.6 °C) (12/26/22 1218)  Pulse: (!) 121 (12/26/22 1218)  Resp: 18 (12/26/22 1218)  BP: 120/73 (12/26/22 1218)  SpO2: (!) 92 % (12/26/22 1229)   Vital Signs (24h Range):  Temp:  [97.1 °F (36.2 °C)-98.5 °F (36.9 °C)] 97.9 °F (36.6 °C)  Pulse:  [112-129] 121  Resp:  [18-22] 18  SpO2:  [91 %-96 %] 92 %  BP: (108-154)/(59-82) 120/73     Weight: 57.8 kg (127 lb 6.8 oz)  Body mass index is 23.31 kg/m².      Intake/Output Summary (Last 24 hours) at 12/26/2022 1408  Last data filed at 12/26/2022 0704  Gross per 24 hour   Intake 10 ml   Output 1900 ml   Net -1890 ml       Physical Exam  Vitals and nursing note reviewed.   Constitutional:       General: She is not in acute distress.     Appearance: She is not ill-appearing, toxic-appearing or diaphoretic.      Comments: Frail appearing F   HENT:      Head: Normocephalic and atraumatic.   Cardiovascular:      Rate and Rhythm: Normal rate and regular rhythm.   Pulmonary:      Effort: Pulmonary effort is normal. No respiratory distress.      Breath sounds: Normal breath sounds. No rales.   Abdominal:      General: Abdomen is flat. There is no distension.   Musculoskeletal:      Right lower leg: No edema.      Left lower leg: No edema.   Neurological:      Mental Status: She is disoriented.      Motor: Weakness present.       Vents:       Lines/Drains/Airways       Drain  Duration             Female External Urinary Catheter 12/23/22 1300 3 days              Peripheral Intravenous Line  Duration                  Peripheral IV - Single Lumen 12/24/22 2230 20 G Posterior;Right Forearm 1 day                     Significant Labs:    CBC/Anemia Profile:  Recent Labs   Lab 12/25/22  0322 12/26/22  0305   WBC 4.32 7.29   HGB 8.7* 9.2*   HCT 29.8* 31.6*    213   MCV 87 87   RDW 23.9* 23.8*        Chemistries:  Recent Labs   Lab 12/25/22  0322 12/26/22  0305    132*   K 4.3 4.5    106   CO2 22* 19*   BUN 11 9   CREATININE 0.7 0.8   CALCIUM 8.6* 9.1   ALBUMIN 2.9* 3.0*   PROT 6.5 6.8   BILITOT 0.1 0.2   ALKPHOS 127 129   ALT 9* 10   AST 10 12       All pertinent labs within the past 24 hours have been reviewed.    Significant Imaging:   I have reviewed all pertinent imaging results/findings within the past 24 hours.

## 2022-12-26 NOTE — ASSESSMENT & PLAN NOTE
- initially diagnosed with stage 1 non-small-cell lung cancer of right lung (adenocarcinoma) s/p SBRT.  - recently diagnosed with stage 4 disease due to multiple enlarging bilateral pulmonary nodules.  - she has started pembrolizumab per outside chart review.  - she had previously seen Dr. Young, so I will arrange for a follow-up appointment with him following discharge.  - agree with palliative care consult  - I have ordered a TSH given her lethargy and her being on pembrolizumab, which can cause drug-induced hypothyroidism.

## 2022-12-26 NOTE — ASSESSMENT & PLAN NOTE
-Stable  -Continue telemetry monitoring  -Continue home dose ASA  -Continue atorvastatin     Patient with known CAD s/p stent placement, which is controlled Will continue ASA and Statin and monitor for S/Sx of angina/ACS. Continue to monitor on telemetry.

## 2022-12-26 NOTE — ACP (ADVANCE CARE PLANNING)
Advance Care Planning     Date: 12/26/2022    Code Status  In light of the patients advanced and life limiting illness,I engaged the the family and healthcare power of   in a conversation about the patient's preferences for care  at the very end of life. The patient wishes to have a natural, peaceful death.  Along those lines, the patient does not wish to have CPR or other invasive treatments performed when her heart and/or breathing stops. I communicated to the family and healthcare power of   that a DNR order would be placed in her medical record to reflect this preference.  I spent a total of 15 minutes engaging the patient in this advance care planning discussion.        [facility]ger[facility]   Department of Hospital Medicine

## 2022-12-26 NOTE — PLAN OF CARE
Problem: Diabetes Comorbidity  Goal: Blood Glucose Level Within Targeted Range  Outcome: Ongoing, Progressing     Problem: Gas Exchange Impaired  Goal: Optimal Gas Exchange  Outcome: Ongoing, Progressing

## 2022-12-26 NOTE — SUBJECTIVE & OBJECTIVE
VIRTUAL TELENOTE    Start time:10:10am  Chief complaint: lung cancer  The patient location is: [facility]  The patient arrived at: 10:10am  Present with the patient at the time of the telemed/virtual assessment: nurse    End time: 10:20am    Total time spent with patient: 10 minutes    The attending portion of this evaluation, treatment, and documentation was performed per Mani Norman MD via Telemedicine AudioVisual using the secure Notorious software platform with 2 way audio/video. The provider was located off-site and the patient is located in the hospital. The aforementioned video software was utilized to document the relevant history and physical exam.    - she states she is receiving pembrolizumab. She endorses fatigue, dyspnea upon exertion, weakness. She denies abdominal pain, diarrhea.    Oncology Treatment Plan:   [No matching plan found]    Medications:  Continuous Infusions:  Scheduled Meds:   aspirin  81 mg Oral Daily    atorvastatin  40 mg Oral Daily    busPIRone  15 mg Oral BID    cefTRIAXone (ROCEPHIN) IVPB  1 g Intravenous Q24H    doxycycline  100 mg Oral Q12H    insulin aspart U-100  3 Units Subcutaneous TIDWM    insulin detemir U-100  12 Units Subcutaneous QHS    losartan  25 mg Oral Daily    mirtazapine  15 mg Oral Daily    potassium chloride SA  20 mEq Oral Daily    pregabalin  150 mg Oral TID    QUEtiapine  400 mg Oral Nightly    sertraline  150 mg Oral Daily    sodium chloride 0.9%  10 mL Intravenous Q8H    topiramate  50 mg Oral BID    vitamin D  1,000 Units Oral Daily    zonisamide  200 mg Oral BID     PRN Meds:acetaminophen, albuterol-ipratropium, benzonatate, dextromethorphan-guaiFENesin  mg/5 ml, dextrose 10%, dextrose 10%, glucagon (human recombinant), HYDROcodone-acetaminophen, insulin aspart U-100, melatonin, naloxone, ondansetron, polyethylene glycol, prochlorperazine     Review of patient's allergies indicates:  No Known Allergies     Past Medical History:   Diagnosis Date     Abdominal distension 8/22/2022    Acute coronary syndrome     Acute hypoxemic respiratory failure 5/1/2017    Anxiety     Aortic atherosclerosis 11/2/2021    Asthma     Bronchiectasis 8/22/2022    Cancer     colon    Cataracts, both eyes     Cervical myelopathy 7/23/2020    Chest pain at rest     Chest pain of uncertain etiology 12/6/2015    Colon cancer 1988    COPD (chronic obstructive pulmonary disease)     Coronary artery disease     Coronary artery disease of native artery of native heart with stable angina pectoris 12/16/2012    Depression     Depressive disorder 8/22/2022    Diabetes mellitus     Diabetic peripheral neuropathy 8/1/2022    Dyspnea on exertion 11/15/2018    Elevated brain natriuretic peptide (BNP) level 11/15/2018    Elevated cholesterol     Hamartoma of lung 6/29/2022    History of partial gastrectomy 12/16/2019    History of stroke with residual deficit 4/5/2022    Hyperlipidemia associated with type 2 diabetes mellitus     Hypertension     Hypertension associated with diabetes     Impaired ambulation     Incisional hernia, without obstruction or gangrene 11/27/2019    Interstitial lung disease 6/29/2022    Lumbar facet arthropathy 8/2/2016    Mass of breast 8/22/2022    Migraine headache 7/23/2020    Mild malnutrition 4/6/2022    Mitral valve prolapse 8/22/2022    Moderate malnutrition 8/24/2022    Neuropathy 12/17/2012    Osteopenia of lumbar spine 8/27/2019    Pancreatitis 8/22/2022    Physical deconditioning 5/9/2022    Pulmonary hypertension 6/9/2022    Recurrent falls 5/9/2022    Restrictive lung disease 8/22/2022    Right leg weakness 4/5/2022    S/P PTCA (percutaneous transluminal coronary angioplasty) 10/20/2015    Seizure disorder 12/16/2019    Simple chronic bronchitis 8/22/2022    Sinus tachycardia 10/11/2016    Sleep apnea 11/4/2019    Solitary pulmonary nodule s/p resection 4/2012 12/17/2012    Stenosis of carotid artery 8/22/2022    Swelling     Syncope and collapse 11/8/2016     Tobacco abuse, 1ppd, 50 years 2012    Type 2 diabetes mellitus, with long-term current use of insulin 2019    Ulnar neuropathy 2022    Vitamin D deficiency 2019    Weakness of both lower extremities 2016     Past Surgical History:   Procedure Laterality Date    ABDOMINAL SURGERY      BREAST BIOPSY      benign unsure what side     SECTION, CLASSIC      COLON SURGERY      COLONOSCOPY  2019    repeat in 5 years    CORONARY ANGIOPLASTY WITH STENT PLACEMENT  3 months ago     x2, Hysterectomy, Lung surgery, Partial stomach removed, Part of colon removed for rectal cancer    GASTRECTOMY      HEMORRHOID SURGERY      HYSTERECTOMY      @28yrs of age    LUNG BIOPSY      OOPHORECTOMY      @28yrs of age    POSTERIOR FUSION OF CERVICAL SPINE WITH LAMINECTOMY N/A 2020    Procedure: LAMINECTOMY, SPINE, CERVICAL, WITH POSTERIOR FUSION C3-T1 ;  Surgeon: Herson Tate MD;  Location: [facility] OR [address] [place] [address];  Service: Neurosurgery;  Laterality: N/A;    REPAIR OF INCARCERATED INCISIONAL HERNIA WITHOUT HISTORY OF PRIOR REPAIR N/A 2022    Procedure: REPAIR, HERNIA, INCISIONAL, INCARCERATED, WITHOUT HISTORY OF PRIOR REPAIR;  Surgeon: Simon King MD;  Location: [facility];  Service: General;  Laterality: N/A;     Family History       Problem Relation (Age of Onset)    Breast cancer Mother    Cancer Mother, Maternal Aunt    Depression Sister    Diabetes Mother    Diabetes Mellitus Sister    Heart disease Father    Hypertension Mother, Sister    Lung cancer Father          Tobacco Use    Smoking status: Former     Years: 55.00     Types: Cigarettes     Start date:     Smokeless tobacco: Never    Tobacco comments:     Enrolled in the [employer] on 10/5/15 ([employer] Member ID # [id number]).  Ambulatory referral to Smoking Cessation program.   Substance and Sexual Activity    Alcohol use: Yes     Alcohol/week: 3.0 standard drinks     Types: 3 Glasses of wine per week     Comment: 1  every 3 months    Drug use: No    Sexual activity: Yes     Partners: Male     Birth control/protection: None     Comment: last drink yesterday afternoon       Review of Systems   Constitutional:  Positive for fatigue.   HENT:  Negative for sore throat.    Eyes:  Negative for visual disturbance.   Respiratory:  Positive for cough and shortness of breath.    Cardiovascular:  Negative for chest pain.   Gastrointestinal:  Negative for abdominal pain, constipation, diarrhea, nausea and vomiting.   Genitourinary:  Negative for dysuria.   Musculoskeletal:  Negative for back pain.   Skin:  Negative for rash.   Neurological:  Positive for weakness. Negative for headaches.   Hematological:  Negative for adenopathy.   Psychiatric/Behavioral:  The patient is not nervous/anxious.    Objective:     Vital Signs (Most Recent):  Temp: 98.5 °F (36.9 °C) (12/26/22 0735)  Pulse: (!) 129 (12/26/22 0735)  Resp: 18 (12/26/22 0735)  BP: (!) 124/59 (12/26/22 0735)  SpO2: (!) 91 % (12/26/22 0735)   Vital Signs (24h Range):  Temp:  [97.1 °F (36.2 °C)-98.5 °F (36.9 °C)] 98.5 °F (36.9 °C)  Pulse:  [] 129  Resp:  [18-22] 18  SpO2:  [91 %-96 %] 91 %  BP: (108-154)/(59-82) 124/59     Weight: 57.8 kg (127 lb 6.8 oz)  Body mass index is 23.31 kg/m².  Body surface area is 1.59 meters squared.      Intake/Output Summary (Last 24 hours) at 12/26/2022 1022  Last data filed at 12/26/2022 0704  Gross per 24 hour   Intake 10 ml   Output 1900 ml   Net -1890 ml       Physical Exam  Deferred due to telemedicine visit.    Significant Labs:   CBC:   Recent Labs   Lab 12/25/22 0322 12/26/22  0305   WBC 4.32 7.29   HGB 8.7* 9.2*   HCT 29.8* 31.6*    213    and CMP:   Recent Labs   Lab 12/25/22 0322 12/26/22  0305    132*   K 4.3 4.5    106   CO2 22* 19*   * 374*   BUN 11 9   CREATININE 0.7 0.8   CALCIUM 8.6* 9.1   PROT 6.5 6.8   ALBUMIN 2.9* 3.0*   BILITOT 0.1 0.2   ALKPHOS 127 129   AST 10 12   ALT 9* 10   ANIONGAP 6* 7*        Diagnostic Results:  CTA chest (12/21/22): I have personally reviewed the images  1. No evidence of acute pulmonary thromboembolism.  2. Decreasing size of multiple pulmonary nodular opacities with persistent infiltrates.  Findings are most compatible with response to therapy.  3. Diminished size of hilar adenopathy on the right.  4. Increasing size of single nodule in the apical medial aspect of the right upper lobe previously 7 mm now 12 mm.

## 2022-12-26 NOTE — PT/OT/SLP EVAL
Speech Language Pathology Evaluation  Bedside Swallow    Patient Name:  Chayito Chung   MRN:  [id number]  Admitting Diagnosis: Shortness of breath    Recommendations:                 Diet recommendations:  Mechanical soft, Thin (boost with meals)   Aspiration Precautions: upright for meals, encourage PO intake, boost in between meals, slow rate, alternate sips and bites during meals   General Precautions: Standard, fall  Communication strategies:  none    History per MD         Pt arrives via ems with complaints of having a cough for the last few days. Ems states that pt reported she took her cough meds, pain meds, and Seroquel. Pt appears very lethargic during triage but is alert with stimulation.           HPI: Patient is a 72-year-old female presents via EMS complaining of cough for the past few days. Patient has a pertinent medical history including ACS, respiratory failure, COPD, stage IV adenocarcinoma of the lung, finished radiation, currently in middle of chemo. Patient stated her symptoms of shortness of breath and cough started Thursday, patient stated she continued to have productive cough with yellow sputum production. Patient stated she took over the counter medication with no relief in symptoms. Patient denied fever or chills. Patient denies nausea, vomiting, or diarrhea. Patient denies hemoptysis, hematemesis or hematochezia.      On arrival to the emergency department patient chest x-ray shows coarse accentuated interstitial markings bilaterally which may relate to low lung volumes, although superimposed component of interstitial edema or nonspecific pneumonitis not excluded. Nonspecific bilateral irregular subsegmental opacities throughout both lungs, most pronounced at the right lung base, better evaluated on recent CTA chest examination.  Findings may relate to atelectasis, scarring/post radiation change, aspiration, neoplasm, or infection.     CTA  of chest results shows No evidence of acute  pulmonary thromboembolism. Decreasing size of multiple pulmonary nodular opacities with persistent infiltrates. Diminished size of hilar adenopathy on the right. Increasing size of single nodule in the apical medial aspect of the right upper lobe previously 7 mm now 12 mm.  Continued follow-up is urged.       On assessment patient is lethargic but is arousable and answer questions appropriately. Patient voiced lower abdominal pain after sustained coughing. Patient heart rate will increase to 115's to 120's after coughing. Patient has expiratory wheezing. No sputum production noted.  Awaiting CT head to assess encephalopathy.      Patient will be admitted to hospital services for further evaluation and medical management      Past Medical History:   Diagnosis Date    Abdominal distension 8/22/2022    Acute coronary syndrome     Acute hypoxemic respiratory failure 5/1/2017    Anxiety     Aortic atherosclerosis 11/2/2021    Asthma     Bronchiectasis 8/22/2022    Cancer     colon    Cataracts, both eyes     Cervical myelopathy 7/23/2020    Chest pain at rest     Chest pain of uncertain etiology 12/6/2015    Colon cancer 1988    COPD (chronic obstructive pulmonary disease)     Coronary artery disease     Coronary artery disease of native artery of native heart with stable angina pectoris 12/16/2012    Depression     Depressive disorder 8/22/2022    Diabetes mellitus     Diabetic peripheral neuropathy 8/1/2022    Dyspnea on exertion 11/15/2018    Elevated brain natriuretic peptide (BNP) level 11/15/2018    Elevated cholesterol     Hamartoma of lung 6/29/2022    History of partial gastrectomy 12/16/2019    History of stroke with residual deficit 4/5/2022    Hyperlipidemia associated with type 2 diabetes mellitus     Hypertension     Hypertension associated with diabetes     Impaired ambulation     Incisional hernia, without obstruction or gangrene 11/27/2019    Interstitial lung disease 6/29/2022    Lumbar facet  arthropathy 2016    Mass of breast 2022    Migraine headache 2020    Mild malnutrition 2022    Mitral valve prolapse 2022    Moderate malnutrition 2022    Neuropathy 2012    Osteopenia of lumbar spine 2019    Pancreatitis 2022    Physical deconditioning 2022    Pulmonary hypertension 2022    Recurrent falls 2022    Restrictive lung disease 2022    Right leg weakness 2022    S/P PTCA (percutaneous transluminal coronary angioplasty) 10/20/2015    Seizure disorder 2019    Simple chronic bronchitis 2022    Sinus tachycardia 10/11/2016    Sleep apnea 2019    Solitary pulmonary nodule s/p resection 2012    Stenosis of carotid artery 2022    Swelling     Syncope and collapse 2016    Tobacco abuse, 1ppd, 52 years 2012    Type 2 diabetes mellitus, with long-term current use of insulin 2019    Ulnar neuropathy 2022    Vitamin D deficiency 2019    Weakness of both lower extremities 2016       Past Surgical History:   Procedure Laterality Date    ABDOMINAL SURGERY      BREAST BIOPSY      benign unsure what side     SECTION, CLASSIC      COLON SURGERY      COLONOSCOPY  2019    repeat in 5 years    CORONARY ANGIOPLASTY WITH STENT PLACEMENT  3 months ago     x2, Hysterectomy, Lung surgery, Partial stomach removed, Part of colon removed for rectal cancer    GASTRECTOMY      HEMORRHOID SURGERY      HYSTERECTOMY      @28yrs of age    LUNG BIOPSY      OOPHORECTOMY      @28yrs of age    POSTERIOR FUSION OF CERVICAL SPINE WITH LAMINECTOMY N/A 2020    Procedure: LAMINECTOMY, SPINE, CERVICAL, WITH POSTERIOR FUSION C3-T1 ;  Surgeon: Herson Tate MD;  Location: [facility] OR [address] [place] [address];  Service: Neurosurgery;  Laterality: N/A;    REPAIR OF INCARCERATED INCISIONAL HERNIA WITHOUT HISTORY OF PRIOR REPAIR N/A 2022    Procedure: REPAIR, HERNIA, INCISIONAL, INCARCERATED, WITHOUT HISTORY OF  "PRIOR REPAIR;  Surgeon: Simon King MD;  Location: [facility];  Service: General;  Laterality: N/A;       Social History: Per dtr, pt was living at home upon admit.    Prior Intubation HX:  not on this admit    Modified Barium Swallow: none per recent EMR    Chest X-Rays:  Coarse accentuated interstitial markings bilaterally which may relate to low lung volumes, although superimposed component of interstitial edema or nonspecific pneumonitis not excluded.     Nonspecific bilateral irregular subsegmental opacities throughout both lungs, most pronounced at the right lung base, better evaluated on recent CTA chest examination.  Findings may relate to atelectasis, scarring/post radiation change, aspiration, neoplasm, or infection    CT: No CT evidence of acute intracranial abnormality.     Prior diet: regular diet at home    Subjective     Pt seen in room, pt noted to be closing her eyes on and off. Pt did look up and engage with SLP when name called out aloud.   Dtr at bedside and reports she has been eating at home.   Patient goals: "I am not hungry."    Pain/Comfort:  Pain Rating 1: 0/10    Respiratory Status: nasal cannula     Objective:   SLP elevated HOB for patient. She did awaken to voice when called. Pt able to respond to basic questions but presents with overall cough on and off during session.   Dtr states she has been coughing since she arrived to visit and its not related to swallowing.   Pt appears dehydrated, chapped lips and dry oral cavity.   Dtr states that pt does eat at home. Pt appears overall fatigued and weak.  Pt did accept limited PO trials.       Oral Musculature Evaluation  Oral Musculature: general weakness  Dentition: scattered dentition  Secretion Management:  (overall no drooling but lips are dry and flaky)  Mucosal Quality: dry, cracked (lips chapped and flaky)  Mandibular Strength and Mobility:  (fair)  Oral Labial Strength and Mobility:  (fair)  Lingual Strength and Mobility:  " (fair)  Buccal Strength and Mobility: decreased tone  Volitional Cough: elicited- no wet cough noted but pt has junky chest cough noted not associated with eating or swallowing  Volitional Swallow: overall timely  Voice Prior to PO Intake: clear voice not wet    Bedside Swallow Eval:   Consistencies Assessed:  Thin liquids water and boost by straw  Puree applesauce bites   Soft solids moisten cracker piece       Oral Phase:   Slow oral transit time  Fair ap transfer  Slow mastication  No oral residue present    Pharyngeal Phase:   decreased hyolaryngeal excursion to palpation  no overt clinical signs/symptoms of aspiration  multiple spontaneous swallows  No wet cough and no audible choking present after solids or liquids    Treatment: Per bedside assessment, pt safe for continued oral diet but will need modified textures including mechanical soft and thin liquids, will add boost to meals for added caloric support. Discussed diet recs with dtr and need to encourage PO intake at meal time. Also reinforced for pt to drink liquids to stay hydrated. MD has ordered additional IV fluids and breathing tx has already been ordered due to pt's cough. Secure chat sent to all parties on above diet recs.     Assessment:     Chayito Chung is a 72 y.o. female admitted with shortness of breath who presents with limited PO intake at home. Pt to remain on oral diet but needs mechanical soft textures, continue thin liquids with addition of boost for added caloric support.    Goals:   Multidisciplinary Problems       SLP Goals       Not on file                    Plan:     Patient to be seen:      Plan of Care expires:     Plan of Care reviewed with:  patient, daughter   SLP Follow-Up:  No       Discharge recommendations:   (Palliative has been consulted)   Barriers to Discharge:  None    Time Tracking:     SLP Treatment Date:   12/26/22  Speech Start Time:  1338  Speech Stop Time:  1353     Speech Total Time (min):  15 min    Billable  Minutes: Eval Swallow and Oral Function 15    12/26/2022

## 2022-12-26 NOTE — PLAN OF CARE
12/26/22 1609   Nurse Notification   Charge Nurse Notified? Yes   Name of Charge Nurse Hunter   Bedside Nurse Notified? Yes   Name of Bedside Nurse Sonia   Nurse Notfication Method Secure Chat   Nurse Notified Of Other   Provider Notification   Provider Notified? Yes   Name of Provider Margarita rodriguez NP   Provider Notification Method Secure Chat   Provider Notified Of Other (See Comment)  (MEWS= 5; HR sustaining in the 130s)

## 2022-12-27 PROBLEM — Z71.89 ACP (ADVANCE CARE PLANNING): Status: ACTIVE | Noted: 2022-12-27

## 2022-12-27 PROBLEM — E83.39 HYPOPHOSPHATEMIA: Status: ACTIVE | Noted: 2022-12-27

## 2022-12-27 PROBLEM — E83.42 HYPOMAGNESEMIA: Status: ACTIVE | Noted: 2022-12-27

## 2022-12-27 LAB
ANION GAP SERPL CALC-SCNC: 7 MMOL/L (ref 8–16)
BASOPHILS # BLD AUTO: 0.02 K/UL (ref 0–0.2)
BASOPHILS NFR BLD: 0.4 % (ref 0–1.9)
BUN SERPL-MCNC: 9 MG/DL (ref 8–23)
CALCIUM SERPL-MCNC: 9.3 MG/DL (ref 8.7–10.5)
CHLORIDE SERPL-SCNC: 109 MMOL/L (ref 95–110)
CO2 SERPL-SCNC: 21 MMOL/L (ref 23–29)
CREAT SERPL-MCNC: 0.7 MG/DL (ref 0.5–1.4)
DIFFERENTIAL METHOD: ABNORMAL
EOSINOPHIL # BLD AUTO: 0.1 K/UL (ref 0–0.5)
EOSINOPHIL NFR BLD: 2.4 % (ref 0–8)
ERYTHROCYTE [DISTWIDTH] IN BLOOD BY AUTOMATED COUNT: 23.5 % (ref 11.5–14.5)
EST. GFR  (NO RACE VARIABLE): >60 ML/MIN/1.73 M^2
GLUCOSE SERPL-MCNC: 277 MG/DL (ref 70–110)
HCT VFR BLD AUTO: 30.7 % (ref 37–48.5)
HGB BLD-MCNC: 9.1 G/DL (ref 12–16)
IMM GRANULOCYTES # BLD AUTO: 0.02 K/UL (ref 0–0.04)
IMM GRANULOCYTES NFR BLD AUTO: 0.4 % (ref 0–0.5)
LYMPHOCYTES # BLD AUTO: 0.5 K/UL (ref 1–4.8)
LYMPHOCYTES NFR BLD: 11.8 % (ref 18–48)
MAGNESIUM SERPL-MCNC: 1.5 MG/DL (ref 1.6–2.6)
MCH RBC QN AUTO: 25.3 PG (ref 27–31)
MCHC RBC AUTO-ENTMCNC: 29.6 G/DL (ref 32–36)
MCV RBC AUTO: 85 FL (ref 82–98)
MONOCYTES # BLD AUTO: 0.4 K/UL (ref 0.3–1)
MONOCYTES NFR BLD: 8.9 % (ref 4–15)
NEUTROPHILS # BLD AUTO: 3.4 K/UL (ref 1.8–7.7)
NEUTROPHILS NFR BLD: 76.1 % (ref 38–73)
NRBC BLD-RTO: 0 /100 WBC
PHOSPHATE SERPL-MCNC: 2.4 MG/DL (ref 2.7–4.5)
PLATELET # BLD AUTO: 188 K/UL (ref 150–450)
PMV BLD AUTO: 9 FL (ref 9.2–12.9)
POCT GLUCOSE: 227 MG/DL (ref 70–110)
POCT GLUCOSE: 256 MG/DL (ref 70–110)
POCT GLUCOSE: 273 MG/DL (ref 70–110)
POCT GLUCOSE: 273 MG/DL (ref 70–110)
POCT GLUCOSE: 316 MG/DL (ref 70–110)
POTASSIUM SERPL-SCNC: 3.9 MMOL/L (ref 3.5–5.1)
RBC # BLD AUTO: 3.6 M/UL (ref 4–5.4)
SODIUM SERPL-SCNC: 137 MMOL/L (ref 136–145)
WBC # BLD AUTO: 4.51 K/UL (ref 3.9–12.7)

## 2022-12-27 PROCEDURE — 25000003 PHARM REV CODE 250: Performed by: NURSE PRACTITIONER

## 2022-12-27 PROCEDURE — 25000003 PHARM REV CODE 250: Performed by: CLINICAL NURSE SPECIALIST

## 2022-12-27 PROCEDURE — 80048 BASIC METABOLIC PNL TOTAL CA: CPT | Performed by: NURSE PRACTITIONER

## 2022-12-27 PROCEDURE — A4216 STERILE WATER/SALINE, 10 ML: HCPCS | Performed by: NURSE PRACTITIONER

## 2022-12-27 PROCEDURE — 63600175 PHARM REV CODE 636 W HCPCS: Performed by: NURSE PRACTITIONER

## 2022-12-27 PROCEDURE — 25000242 PHARM REV CODE 250 ALT 637 W/ HCPCS: Performed by: STUDENT IN AN ORGANIZED HEALTH CARE EDUCATION/TRAINING PROGRAM

## 2022-12-27 PROCEDURE — 27000221 HC OXYGEN, UP TO 24 HOURS

## 2022-12-27 PROCEDURE — 94667 MNPJ CHEST WALL 1ST: CPT

## 2022-12-27 PROCEDURE — 99900035 HC TECH TIME PER 15 MIN (STAT)

## 2022-12-27 PROCEDURE — 94640 AIRWAY INHALATION TREATMENT: CPT

## 2022-12-27 PROCEDURE — 36415 COLL VENOUS BLD VENIPUNCTURE: CPT | Performed by: NURSE PRACTITIONER

## 2022-12-27 PROCEDURE — 83735 ASSAY OF MAGNESIUM: CPT | Performed by: NURSE PRACTITIONER

## 2022-12-27 PROCEDURE — 94761 N-INVAS EAR/PLS OXIMETRY MLT: CPT

## 2022-12-27 PROCEDURE — 11000001 HC ACUTE MED/SURG PRIVATE ROOM

## 2022-12-27 PROCEDURE — 85025 COMPLETE CBC W/AUTO DIFF WBC: CPT | Performed by: NURSE PRACTITIONER

## 2022-12-27 PROCEDURE — 84100 ASSAY OF PHOSPHORUS: CPT | Performed by: NURSE PRACTITIONER

## 2022-12-27 RX ORDER — ACETAMINOPHEN 325 MG/1
650 TABLET ORAL EVERY 8 HOURS PRN
Qty: 60 TABLET | Refills: 0 | Status: SHIPPED | OUTPATIENT
Start: 2022-12-27

## 2022-12-27 RX ORDER — DOXYCYCLINE HYCLATE 100 MG
100 TABLET ORAL EVERY 12 HOURS
Qty: 4 TABLET | Refills: 0 | Status: SHIPPED | OUTPATIENT
Start: 2022-12-27 | End: 2022-12-27 | Stop reason: SDUPTHER

## 2022-12-27 RX ORDER — SODIUM,POTASSIUM PHOSPHATES 280-250MG
2 POWDER IN PACKET (EA) ORAL
Status: COMPLETED | OUTPATIENT
Start: 2022-12-27 | End: 2022-12-28

## 2022-12-27 RX ORDER — DOXYCYCLINE HYCLATE 100 MG
100 TABLET ORAL EVERY 12 HOURS
Qty: 8 TABLET | Refills: 0 | Status: SHIPPED | OUTPATIENT
Start: 2022-12-27 | End: 2022-12-31

## 2022-12-27 RX ORDER — KETOROLAC TROMETHAMINE 30 MG/ML
15 INJECTION, SOLUTION INTRAMUSCULAR; INTRAVENOUS ONCE
Status: COMPLETED | OUTPATIENT
Start: 2022-12-27 | End: 2022-12-27

## 2022-12-27 RX ORDER — INSULIN ASPART 100 [IU]/ML
5 INJECTION, SOLUTION INTRAVENOUS; SUBCUTANEOUS
Status: DISCONTINUED | OUTPATIENT
Start: 2022-12-27 | End: 2022-12-28 | Stop reason: HOSPADM

## 2022-12-27 RX ORDER — MAGNESIUM SULFATE HEPTAHYDRATE 40 MG/ML
2 INJECTION, SOLUTION INTRAVENOUS ONCE
Status: COMPLETED | OUTPATIENT
Start: 2022-12-27 | End: 2022-12-27

## 2022-12-27 RX ADMIN — PREGABALIN 150 MG: 75 CAPSULE ORAL at 08:12

## 2022-12-27 RX ADMIN — IPRATROPIUM BROMIDE AND ALBUTEROL SULFATE 3 ML: 2.5; .5 SOLUTION RESPIRATORY (INHALATION) at 04:12

## 2022-12-27 RX ADMIN — INSULIN ASPART 4 UNITS: 100 INJECTION, SOLUTION INTRAVENOUS; SUBCUTANEOUS at 05:12

## 2022-12-27 RX ADMIN — DOXYCYCLINE HYCLATE 100 MG: 100 TABLET, COATED ORAL at 08:12

## 2022-12-27 RX ADMIN — CHOLECALCIFEROL TAB 25 MCG (1000 UNIT) 1000 UNITS: 25 TAB at 08:12

## 2022-12-27 RX ADMIN — SERTRALINE HYDROCHLORIDE 150 MG: 50 TABLET ORAL at 08:12

## 2022-12-27 RX ADMIN — POTASSIUM & SODIUM PHOSPHATES POWDER PACK 280-160-250 MG 2 PACKET: 280-160-250 PACK at 05:12

## 2022-12-27 RX ADMIN — ACETAMINOPHEN 650 MG: 325 TABLET ORAL at 08:12

## 2022-12-27 RX ADMIN — INSULIN ASPART 2 UNITS: 100 INJECTION, SOLUTION INTRAVENOUS; SUBCUTANEOUS at 12:12

## 2022-12-27 RX ADMIN — SODIUM CHLORIDE SOLN NEBU 3% 4 ML: 3 NEBU SOLN at 08:12

## 2022-12-27 RX ADMIN — PREGABALIN 150 MG: 75 CAPSULE ORAL at 03:12

## 2022-12-27 RX ADMIN — Medication 10 ML: at 02:12

## 2022-12-27 RX ADMIN — IPRATROPIUM BROMIDE AND ALBUTEROL SULFATE 3 ML: 2.5; .5 SOLUTION RESPIRATORY (INHALATION) at 12:12

## 2022-12-27 RX ADMIN — BUSPIRONE HYDROCHLORIDE 15 MG: 5 TABLET ORAL at 08:12

## 2022-12-27 RX ADMIN — MAGNESIUM SULFATE 2 G: 2 INJECTION INTRAVENOUS at 12:12

## 2022-12-27 RX ADMIN — INSULIN ASPART 3 UNITS: 100 INJECTION, SOLUTION INTRAVENOUS; SUBCUTANEOUS at 08:12

## 2022-12-27 RX ADMIN — INSULIN ASPART 5 UNITS: 100 INJECTION, SOLUTION INTRAVENOUS; SUBCUTANEOUS at 08:12

## 2022-12-27 RX ADMIN — POTASSIUM & SODIUM PHOSPHATES POWDER PACK 280-160-250 MG 2 PACKET: 280-160-250 PACK at 08:12

## 2022-12-27 RX ADMIN — SODIUM CHLORIDE SOLN NEBU 3% 4 ML: 3 NEBU SOLN at 12:12

## 2022-12-27 RX ADMIN — POTASSIUM CHLORIDE 20 MEQ: 1500 TABLET, EXTENDED RELEASE ORAL at 08:12

## 2022-12-27 RX ADMIN — TOPIRAMATE 50 MG: 25 TABLET, FILM COATED ORAL at 08:12

## 2022-12-27 RX ADMIN — CEFTRIAXONE 2 G: 2 INJECTION, SOLUTION INTRAVENOUS at 10:12

## 2022-12-27 RX ADMIN — ZONISAMIDE 200 MG: 100 CAPSULE ORAL at 08:12

## 2022-12-27 RX ADMIN — KETOROLAC TROMETHAMINE 15 MG: 30 INJECTION, SOLUTION INTRAMUSCULAR; INTRAVENOUS at 08:12

## 2022-12-27 RX ADMIN — INSULIN ASPART 5 UNITS: 100 INJECTION, SOLUTION INTRAVENOUS; SUBCUTANEOUS at 12:12

## 2022-12-27 RX ADMIN — IPRATROPIUM BROMIDE AND ALBUTEROL SULFATE 3 ML: 2.5; .5 SOLUTION RESPIRATORY (INHALATION) at 08:12

## 2022-12-27 RX ADMIN — GUAIFENESIN AND DEXTROMETHORPHAN 10 ML: 100; 10 SYRUP ORAL at 05:12

## 2022-12-27 RX ADMIN — FLUTICASONE FUROATE AND VILANTEROL TRIFENATATE 1 PUFF: 100; 25 POWDER RESPIRATORY (INHALATION) at 08:12

## 2022-12-27 RX ADMIN — TIOTROPIUM BROMIDE INHALATION SPRAY 2 PUFF: 3.12 SPRAY, METERED RESPIRATORY (INHALATION) at 08:12

## 2022-12-27 RX ADMIN — POTASSIUM & SODIUM PHOSPHATES POWDER PACK 280-160-250 MG 2 PACKET: 280-160-250 PACK at 12:12

## 2022-12-27 RX ADMIN — ASPIRIN 81 MG CHEWABLE TABLET 81 MG: 81 TABLET CHEWABLE at 08:12

## 2022-12-27 RX ADMIN — ATORVASTATIN CALCIUM 40 MG: 40 TABLET, FILM COATED ORAL at 08:12

## 2022-12-27 RX ADMIN — INSULIN ASPART 5 UNITS: 100 INJECTION, SOLUTION INTRAVENOUS; SUBCUTANEOUS at 05:12

## 2022-12-27 RX ADMIN — QUETIAPINE FUMARATE 400 MG: 100 TABLET ORAL at 08:12

## 2022-12-27 RX ADMIN — ACETAMINOPHEN 650 MG: 325 TABLET ORAL at 05:12

## 2022-12-27 RX ADMIN — MIRTAZAPINE 15 MG: 7.5 TABLET ORAL at 08:12

## 2022-12-27 RX ADMIN — SODIUM CHLORIDE SOLN NEBU 3% 4 ML: 3 NEBU SOLN at 04:12

## 2022-12-27 RX ADMIN — LOSARTAN POTASSIUM 25 MG: 25 TABLET, FILM COATED ORAL at 08:12

## 2022-12-27 NOTE — PHARMACY MED REC
"[facility]           Pharmacy  Admission Medication Reconciliation     Based on information gathered for medication list, you may go to "Admission" then "Reconcile Home Medications" tabs to review and/or act upon those items.     The home medication list has been updated by the Pharmacy department.   Please read ALL comments highlighted in red.   Please address this information as you see fit.    Feel free to contact us if you have any questions or require assistance.    Feel free to contact us if you have any questions or require assistance.    Marin gArawal, PharmD  [phone]                .        "

## 2022-12-27 NOTE — ASSESSMENT & PLAN NOTE
Patient is identified as having Diastolic (HFpEF) heart failure that is Chronic. CHF is currently controlled. Latest ECHO performed and demonstrates- Results for orders placed during the hospital encounter of 04/05/22     Echo    Interpretation Summary  · The left ventricle is normal in size with mild concentric hypertrophy and normal systolic function.  · The estimated ejection fraction is 60%.  · Mild mitral regurgitation.  · Grade I left ventricular diastolic dysfunction.  · Mild tricuspid regurgitation.  · The estimated PA systolic pressure is 47 mmHg.  · Normal right ventricular size with normal right ventricular systolic function.  · There is mild pulmonary hypertension.  · There is no evidence of intracardiac shunting.  . Continue ACE/ARB and monitor clinical status closely. Monitor on telemetry. Patient is on CHF pathway.  Monitor strict Is&Os and daily weights.  Place on fluid restriction of 1 L. Continue to stress to patient importance of self efficacy and  on diet for CHF. Last BNP reviewed- and noted below   Recent Labs   Lab 12/23/22  0967   BNP 27   .  -Stable     -patient appears clinically dry with poor PO intake - IV hydration over the next 12 hours 12/26

## 2022-12-27 NOTE — SUBJECTIVE & OBJECTIVE
Interval History: This am patient is able to converse, is AAOx3, is able to make decisions. She is SOB, coughing, has a HA, is tachycardic. She would like to go home with hospice with 24/7 care of her daughter Roopa who is at her bedside. Case management has been consulted for arrangements. Per consults with hemoc and pulmonary she would not be well enough to tolerate further chemotherapy. This was explained to the patient and family who both agree and voice understanding. They both agree that the best course of action is home with hospice to make Ms. Chayito Chung the most comfortable and functional for whatever time she has left. Palliative care has also been consulted to assist.     Review of Systems   Unable to perform ROS: Other   Constitutional:  Positive for activity change and fatigue. Negative for fever.   Respiratory:  Positive for cough and shortness of breath.    Cardiovascular:  Positive for chest pain.   Gastrointestinal:  Negative for abdominal pain, nausea and vomiting.   Neurological:  Positive for weakness and headaches.   Objective:     Vital Signs (Most Recent):  Temp: 97.9 °F (36.6 °C) (12/27/22 0721)  Pulse: (!) 131 (12/27/22 0845)  Resp: (!) 32 (12/27/22 0845)  BP: 134/78 (12/27/22 0721)  SpO2: (!) 92 % (12/27/22 0845)   Vital Signs (24h Range):  Temp:  [97.9 °F (36.6 °C)-98.6 °F (37 °C)] 97.9 °F (36.6 °C)  Pulse:  [] 131  Resp:  [18-32] 32  SpO2:  [90 %-95 %] 92 %  BP: (101-149)/(57-87) 134/78     Weight: 57.8 kg (127 lb 6.8 oz)  Body mass index is 23.31 kg/m².    Intake/Output Summary (Last 24 hours) at 12/27/2022 0950  Last data filed at 12/26/2022 1650  Gross per 24 hour   Intake --   Output 700 ml   Net -700 ml        Physical Exam  Constitutional:       Appearance: She is ill-appearing.   HENT:      Head: Normocephalic and atraumatic.      Nose: Nose normal.      Mouth/Throat:      Mouth: Mucous membranes are dry.   Eyes:      Extraocular Movements: Extraocular movements intact.       Conjunctiva/sclera: Conjunctivae normal.   Cardiovascular:      Rate and Rhythm: Regular rhythm. Tachycardia present.   Pulmonary:      Effort: No respiratory distress.      Breath sounds: Rales present. No wheezing.   Chest:      Chest wall: Tenderness present.   Abdominal:      General: Bowel sounds are normal.      Palpations: Abdomen is soft.   Musculoskeletal:         General: Normal range of motion.      Cervical back: Normal range of motion and neck supple.   Skin:     General: Skin is warm and dry.   Neurological:      Mental Status: She is alert and oriented to person, place, and time.      GCS: GCS eye subscore is 4. GCS verbal subscore is 5. GCS motor subscore is 6.      Motor: Weakness present.       Significant Labs: All pertinent labs within the past 24 hours have been reviewed.    Significant Imaging: I have reviewed all pertinent imaging results/findings within the past 24 hours.  I have reviewed and interpreted all pertinent imaging results/findings within the past 24 hours.

## 2022-12-27 NOTE — PLAN OF CARE
Problem: Adult Inpatient Plan of Care  Goal: Plan of Care Review  Outcome: Ongoing, Progressing  Goal: Patient-Specific Goal (Individualized)  Outcome: Ongoing, Progressing  Goal: Absence of Hospital-Acquired Illness or Injury  Outcome: Ongoing, Progressing  Goal: Optimal Comfort and Wellbeing  Outcome: Ongoing, Progressing  Goal: Readiness for Transition of Care  Outcome: Ongoing, Progressing     Problem: Diabetes Comorbidity  Goal: Blood Glucose Level Within Targeted Range  Outcome: Ongoing, Progressing     Problem: Skin Injury Risk Increased  Goal: Skin Health and Integrity  Outcome: Ongoing, Progressing     Problem: Gas Exchange Impaired  Goal: Optimal Gas Exchange  Outcome: Ongoing, Progressing     Problem: Breathing Pattern Ineffective  Goal: Effective Breathing Pattern  Outcome: Ongoing, Progressing     Problem: Coping Ineffective  Goal: Effective Coping  Outcome: Ongoing, Progressing     Problem: Fall Injury Risk  Goal: Absence of Fall and Fall-Related Injury  Outcome: Ongoing, Progressing

## 2022-12-27 NOTE — PLAN OF CARE
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]  Yes    Acute on chronic respiratory failure with hypoxemia [J96.21]  Yes    Hyperlipidemia [E78.5]  Yes    Primary lung adenocarcinoma, right [C34.91]  Yes    CAD (coronary artery disease) [I25.10]  Yes     Chronic    Chronic diastolic congestive heart failure [I50.32]  Yes    Iron deficiency anemia [D50.9]  Yes    Uncontrolled type 2 diabetes mellitus with hyperglycemia [E11.65]  Yes    Major depressive disorder in partial remission [F32.4]  Yes    Chronic obstructive pulmonary disease with acute exacerbation [J44.1]  Yes      Resolved Hospital Problems    Diagnosis Date Resolved POA    Adenocarcinoma of lung, right [C34.91] 12/25/2022 Yes       Hospice Qualifying Diagnoses: Primary Lung Adenocarcinoma R        Patient has a life expectancy < 6 months due to:  Primary Hospice Diagnosis:  Primary Lung Adenocarcinoma R   Comorbid Conditions Contributing to Decline:  CAD, Diastolic HF, Iron Deficiency Anemia, DM II, COPD    Vital Signs: Routine per Hospice Protocol.    Code Status: DNR     Allergies: Review of patient's allergies indicates:  No Known Allergies    Diet: Dysphagia Mechanical Soft, Thin liquids   Vanilla Boost BID     Activities: As tolerated    Goals of Care Treatment Preferences:  Code Status: DNR    Living Will: Yes              Nursing: Per Hospice Routine.  R  Routine Skin for Bedridden Patients: Apply moisture barrier cream to all skin folds and   wet areas in perineal area daily and after baths and all bowel movements.      Oxygen:  3L NC continuous     Other Miscellaneous Care:     Turn q 2 hours   Elevated heels and  "reduce pressure to sacrum with pillow or cushion   Aspiration precautions   Fall precautions   Elevate HOB 30 degrees   Assistance will all meals     Medications:        Medication List        START taking these medications      acetaminophen 325 MG tablet  Commonly known as: TYLENOL  Take 2 tablets (650 mg total) by mouth every 8 (eight) hours as needed for Pain.     doxycycline 100 MG tablet  Commonly known as: VIBRA-TABS  Take 1 tablet (100 mg total) by mouth every 12 (twelve) hours. for 4 days            CONTINUE taking these medications      albuterol 90 mcg/actuation inhaler  Commonly known as: PROVENTIL/VENTOLIN HFA  Inhale 2 puffs into the lungs every 4 (four) hours as needed for Wheezing or Shortness of Breath. Rescue     alendronate 70 MG tablet  Commonly known as: FOSAMAX  Take 1 tablet (70 mg total) by mouth every Sunday.     aspirin 81 MG Chew  Take 1 tablet (81 mg total) by mouth once daily.     atorvastatin 40 MG tablet  Commonly known as: LIPITOR  Take 1 tablet (40 mg total) by mouth once daily.     BD ULTRA-FINE MINI PEN NEEDLE 31 gauge x 3/16" Ndle  Generic drug: pen needle, diabetic     benzonatate 100 MG capsule  Commonly known as: TESSALON  Take 1 capsule (100 mg total) by mouth 3 (three) times daily as needed for Cough.     busPIRone 15 MG tablet  Commonly known as: BUSPAR  Take 15 mg by mouth 2 (two) times daily.     DEXCOM G6  Misc  Generic drug: blood-glucose meter,continuous  1 each by Misc.(Non-Drug; Combo Route) route once daily.     DEXCOM G6 SENSOR Tyra  Generic drug: blood-glucose sensor  1 each by Misc.(Non-Drug; Combo Route) route once daily.     DEXCOM G6 TRANSMITTER Tyra  Generic drug: blood-glucose transmitter  1 each by Misc.(Non-Drug; Combo Route) route once daily.     diclofenac sodium 1 % Gel  Commonly known as: VOLTAREN  Apply 2 g topically 4 (four) times daily as needed.     GOODY'S EXTRA STRENGTH 500-325-65 mg Pwpk  Generic drug: aspirin-acetaminophen-caffeine  Take " 1 packet by mouth daily as needed.     HumaLOG Mix 75-25(U-100)Insuln 100 unit/mL (75-25) Susp  Generic drug: insulin lispro protamine-insulin lispro  Inject 35 Units into the skin 2 (two) times daily before meals.     HYDROcodone-acetaminophen 7.5-325 mg per tablet  Commonly known as: NORCO  Take 1 tablet by mouth 2 (two) times daily as needed.     insulin lispro 100 unit/mL injection  Inject 5 Units into the skin 3 (three) times daily before meals.     LANTUS SOLOSTAR U-100 INSULIN glargine 100 units/mL SubQ pen  Generic drug: insulin  Inject 20 Units into the skin every evening.     losartan 25 MG tablet  Commonly known as: COZAAR  Take 1 tablet (25 mg total) by mouth once daily.     metFORMIN 1000 MG tablet  Commonly known as: GLUCOPHAGE  Take 1,000 mg by mouth 2 (two) times daily.     mirtazapine 15 MG tablet  Commonly known as: REMERON  Take 15 mg by mouth once daily.     nitroGLYCERIN 0.4 MG SL tablet  Commonly known as: NITROSTAT  Place 1 tablet (0.4 mg total) under the tongue every 5 (five) minutes as needed for Chest pain.     ondansetron 4 mg/5 mL solution  Commonly known as: ZOFRAN  Take 4 mg by mouth daily as needed for Nausea.     potassium chloride SA 20 MEQ tablet  Commonly known as: K-DUR,KLOR-CON  Take 1 tablet (20 mEq total) by mouth once daily.     pregabalin 150 MG capsule  Commonly known as: LYRICA  Take 150 mg by mouth 3 (three) times daily.     QUEtiapine 400 MG tablet  Commonly known as: SEROQUEL  Take 400 mg by mouth nightly.     sertraline 100 MG tablet  Commonly known as: ZOLOFT  Take 150 mg by mouth once daily at 6am.     topiramate 50 MG tablet  Commonly known as: TOPAMAX  Take 1 tablet (50 mg total) by mouth 2 (two) times daily.     TRELEGY ELLIPTA 200-62.5-25 mcg inhaler  Generic drug: fluticasone-umeclidin-vilanter  Inhale 1 puff into the lungs once daily.     vitamin D 1000 units Tab  Commonly known as: VITAMIN D3  Take 1,000 Units by mouth.     zonisamide 100 MG Cap  Commonly known  as: ZONEGRAN  Take 200 mg by mouth 2 (two) times daily.                DIABETES CARE:    Fingerstick blood sugar AC and HS     Fingerstick blood sugar every 6 hours if patient is unable to eat    Report CBG < 60 or > 350 to physician.         Insulin Sliding Scale         Glucose  Novolog Insulin Subcutaneous        0 - 60   Orange juice or glucose tablet      No insulin   201-250  2 units   251-300  4 units   301-350  6 units   351-400  8 units   >400   10 units then call physician      Future Orders:  Hospice Medical Director may dictate new orders for comfortable care measures & sign death certificate.        _________________________________  Margarita Troy NP  12/27/2022

## 2022-12-27 NOTE — ASSESSMENT & PLAN NOTE
-Patient has a history of COPD   -Monitor oxygen saturation, keep O2 above 90%  -supplemental oxygen as need    -try to wean O2 - on 3L NC. Will have home O2   -Hold off on all narcotics for now as they make her confused   -add doxycycline - will continue for 4 more days   -obtain abg   ABG  Recent Labs   Lab 12/26/22  1403   PH 7.345*   PO2 68*   PCO2 39.0   HCO3 21.3*   BE -4       CXR: Increase in bilateral airspace opacities.    Nebs scheduled and PRN   Pulmonary on board   Symptoms are likely s/t extensive stage IV lung CA

## 2022-12-27 NOTE — PLAN OF CARE
0900  Message received from NETTA Troy to her daughter's house with home hospice.    0945  CM was informed by daughter, Dawn Chung ([phone]), that the patient will discharge to Dawn's house ([address], [place], [address]), requested a hospital bed, & does not have a home hospice preference. Referral sent to [facility] via [facility]. Awaiting response.     1020  Home hospice orders sent to [facility] via [facility]. Awaiting response.     1025  Updated home hospice orders sent via [facility].       [facility] - Telemetry  Initial Discharge Assessment       Primary Care Provider: Curt Valladares III, MD    Admission Diagnosis: Encephalopathy [G93.40]  Polypharmacy [Z79.899]  Chest pain [R07.9]    Admission Date: 12/23/2022  Expected Discharge Date: 12/28/2022         Payor: [employer]ICARE / Plan: [employer] / Product Type: Medicare Advantage /     Extended Emergency Contact Information  Primary Emergency Contact: Roopa Chung  Address: [address] [place] [address]           [place] [address]  Mobile Phone: [phone]  Relation: Daughter  Secondary Emergency Contact: Evan Chung  Mobile Phone: [phone]  Relation: Daughter  Mother: Marlin Chung   [facility]  Home Phone: [phone]  Mobile Phone: [phone]    Discharge Plan A: (P) Hospice/home  Discharge Plan B: (P) Home Health      [id number] [place] - [address] [facility] [place] [facility]  [address] [place] [address]  Phone: [phone] Fax: [phone]    [facility] - [place] - [address]  [address]  [place] [address]  Phone: [phone] Fax: [phone]    [facility]  - [place] - [address]  [address]  [place] [address]  Phone: [phone] Fax: [phone]    [facility] - [place], [place] - [address] [phone] [address]  [facility] [address]  Phone: [phone] Fax: [phone]      Initial Assessment (most recent)       Adult Discharge Assessment - 12/27/22 1135          Discharge Assessment    Assessment Type Discharge Planning Assessment (P)      Confirmed/corrected address, phone number and insurance Yes (P)      Confirmed Demographics Correct on Facesheet (P)      Source of Information family;patient (P)    daughter, Roopa Chung ([phone])    Communicated MICKY with patient/caregiver Yes (P)      People in Home alone (P)      Do you expect to return to your current living situation? No (P)      Do you have help at home or someone to help you manage your care at home? Yes (P)      Who are your caregiver(s) and their phone number(s)? pt will discharge to [facility] (P)      Prior to hospitilization cognitive status: Alert/Oriented (P)      Current cognitive status: Alert/Oriented (P)      Walking or Climbing Stairs ambulation difficulty, requires equipment (P)      Dressing/Bathing bathing difficulty, requires equipment (P)      Equipment Currently Used at Home wheelchair;rollator;oxygen;bedside commode (P)      Readmission within 30 days? No (P)      Patient currently being followed by outpatient case management? No (P)      Do you currently have service(s) that help you manage your care at home? No (P)      Do you take prescription medications? Yes (P)      Do you have prescription coverage? Yes (P)      Do you have any problems affording any of your prescribed medications? No (P)      Is the patient taking medications as prescribed? yes (P)      How do you get to doctors appointments? family or friend will provide (P)      Are you on dialysis? No (P)      Do you take coumadin? No (P)      Discharge Plan A Hospice/home (P)      Discharge Plan B Home Health (P)      DME Needed Upon Discharge  hospital bed (P)      Discharge Plan discussed with: Adult children;Patient (P)                    4271  Patient  resting quietly in bed with daughter, Roopa Chung, at the bedside when CM rounded. Patient was admitted with SOB & is being followed by pulm, pall care, hem/onc, & SLP. Pox 93% on 3L O2 via NC this AM.    Patient lives alone, has equipment to assist with ADLs, uses 2L O2 via NC at all times (pt unsure of provider) & denied the need for assistance with transportation at time of discharge. Patient in agreement with plan to discharge to [facility] with home hospice care. Pt's daughter, Evan Chung ([phone]), requested that the pt received home hospice services from [facility]. CM informed Whitney ([phone]) w/[facility] [place] [facility] Awaiting response.     CM updated patient's whiteboard with CM name & contact information.     1400  CM was informed by Malou w/[facility] [place] that the patient will be accepted & that she will meet with the family this afternoon to sign admission paperwork and discuss home equipment delivery.    1605  CM was informed by Malou w.[facility] that admission paperwork has been completed, that the family is getting the house ready for equipment delivery tonight, & that the pt can discharge tomorrow. Message sent to NP Margarita Troy informing of above.      Will continue to follow.

## 2022-12-27 NOTE — PLAN OF CARE
Patient on oxygen with documented flow.  Will attempt to wean per O2 order protocol.  The proper method of use, as well as anticipated side effects, of this aerosol treatment are discussed and demonstrated to the patient.   The proper method of use, as well as anticipated side effects, of this metered-dose inhaler are discussed and demonstrated to the patient.  Will continue to monitor.

## 2022-12-27 NOTE — ASSESSMENT & PLAN NOTE
-Patient has c/o shortness of breath for a couple of days took over the counter medication with no relief      -History of stage IV adenocarcinoma of the lung, finished radiation, currently in middle of chemo- will not be able to tolerate chemotherapy going further     -Chest X-ray results shows Coarse accentuated interstitial markings bilaterally which may relate to low lung volumes, although superimposed component of interstitial edema or nonspecific pneumonitis not excluded. Nonspecific bilateral irregular subsegmental opacities throughout both lungs, most pronounced at the right lung base    -CTA results shows No evidence of acute pulmonary thromboembolism. Decreasing size of multiple pulmonary nodular opacities with persistent infiltrates.  Findings are most compatible with response to therapy. Diminished size of hilar adenopathy on the right. Increasing size of single nodule in the apical medial aspect of the right upper lobe previously 7 mm now 12 mm.      -Will continue breathing treatment     -Supplemental oxygen as needed    -ABG   -procal WNL   -add doxy   +/- steroids - defer to pulmonary, glucose has been difficult to control   -patient with stage IV lung CA s/p chemo therapy   -consult hemoc, palliative, pulm   -SLP - diet recommended   -EKG- sinus tachycardia   -IVF   -patient was made DNR 12/26  -arranging home hospice with patient, family, and case management

## 2022-12-27 NOTE — PROGRESS NOTES
[facility] Medicine  Progress Note    Patient Name: Chayito Chung  MRN: [id number]  Patient Class: IP- Inpatient   Admission Date: 12/23/2022  Length of Stay: 2 days  Attending Physician: Flor Cano MD  Primary Care Provider: Curt Valladares III, MD        Subjective:     Principal Problem:Shortness of breath        HPI:  Patient is a 72-year-old female presents via EMS complaining of cough for the past few days. Patient has a pertinent medical history including ACS, respiratory failure, COPD, stage IV adenocarcinoma of the lung, finished radiation, currently in middle of chemo. Patient stated her symptoms of shortness of breath and cough started Thursday, patient stated she continued to have productive cough with yellow sputum production. Patient stated she took over the counter medication with no relief in symptoms. Patient denied fever or chills. Patient denies nausea, vomiting, or diarrhea. Patient denies hemoptysis, hematemesis or hematochezia.     On arrival to the emergency department patient chest x-ray shows coarse accentuated interstitial markings bilaterally which may relate to low lung volumes, although superimposed component of interstitial edema or nonspecific pneumonitis not excluded. Nonspecific bilateral irregular subsegmental opacities throughout both lungs, most pronounced at the right lung base, better evaluated on recent CTA chest examination.  Findings may relate to atelectasis, scarring/post radiation change, aspiration, neoplasm, or infection.    CTA  of chest results shows No evidence of acute pulmonary thromboembolism. Decreasing size of multiple pulmonary nodular opacities with persistent infiltrates. Diminished size of hilar adenopathy on the right. Increasing size of single nodule in the apical medial aspect of the right upper lobe previously 7 mm now 12 mm.  Continued follow-up is urged.      On assessment patient is lethargic but is arousable and answer questions  appropriately. Patient voiced lower abdominal pain after sustained coughing. Patient heart rate will increase to 115's to 120's after coughing. Patient has expiratory wheezing. No sputum production noted.  Awaiting CT head to assess encephalopathy.     Patient will be admitted to hospital services for further evaluation and medical management.       Overview/Hospital Course:  Patient admitted on 12/23 for Shortness of breath. Patient seen and examined on a.m. rounds. Patient is lethargic but answer question appropriately. Patient voiced no pain at the time of assessment. Patient has rebound hyperglycemia.Vital signs are stable.       Interval History: This am patient is able to converse, is AAOx3, is able to make decisions. She is SOB, coughing, has a HA, is tachycardic. Tylenol, nebs, and supplemental O2 provided. She would like to go home with hospice with 24/7 care of her daughter Roopa who is at her bedside. Case management has been consulted for arrangements. Per consults with hemoc and pulmonary she would not be well enough to tolerate further chemotherapy. This was explained to the patient and family who both agree and voice understanding. They both agree that the best course of action is home with hospice to make Ms. Chayito Chung the most comfortable and functional for whatever time she has left. Palliative care has also been consulted to assist.     Review of Systems   Unable to perform ROS: Other   Constitutional:  Positive for activity change and fatigue. Negative for fever.   Respiratory:  Positive for cough and shortness of breath.    Cardiovascular:  Positive for chest pain.   Gastrointestinal:  Negative for abdominal pain, nausea and vomiting.   Neurological:  Positive for weakness and headaches.   Objective:     Vital Signs (Most Recent):  Temp: 97.9 °F (36.6 °C) (12/27/22 0721)  Pulse: (!) 131 (12/27/22 0845)  Resp: (!) 32 (12/27/22 0845)  BP: 134/78 (12/27/22 0721)  SpO2: (!) 92 % (12/27/22  0845)   Vital Signs (24h Range):  Temp:  [97.9 °F (36.6 °C)-98.6 °F (37 °C)] 97.9 °F (36.6 °C)  Pulse:  [] 131  Resp:  [18-32] 32  SpO2:  [90 %-95 %] 92 %  BP: (101-149)/(57-87) 134/78     Weight: 57.8 kg (127 lb 6.8 oz)  Body mass index is 23.31 kg/m².    Intake/Output Summary (Last 24 hours) at 12/27/2022 0950  Last data filed at 12/26/2022 1650  Gross per 24 hour   Intake --   Output 700 ml   Net -700 ml        Physical Exam  Constitutional:       Appearance: She is ill-appearing.   HENT:      Head: Normocephalic and atraumatic.      Nose: Nose normal.      Mouth/Throat:      Mouth: Mucous membranes are dry.   Eyes:      Extraocular Movements: Extraocular movements intact.      Conjunctiva/sclera: Conjunctivae normal.   Cardiovascular:      Rate and Rhythm: Regular rhythm. Tachycardia present.   Pulmonary:      Effort: No respiratory distress.      Breath sounds: Rales present. No wheezing.   Chest:      Chest wall: Tenderness present.   Abdominal:      General: Bowel sounds are normal.      Palpations: Abdomen is soft.   Musculoskeletal:         General: Normal range of motion.      Cervical back: Normal range of motion and neck supple.   Skin:     General: Skin is warm and dry.   Neurological:      Mental Status: She is alert and oriented to person, place, and time.      GCS: GCS eye subscore is 4. GCS verbal subscore is 5. GCS motor subscore is 6.      Motor: Weakness present.       Significant Labs: All pertinent labs within the past 24 hours have been reviewed.    Significant Imaging: I have reviewed all pertinent imaging results/findings within the past 24 hours.  I have reviewed and interpreted all pertinent imaging results/findings within the past 24 hours.      Assessment/Plan:      * Shortness of breath  -Patient has c/o shortness of breath for a couple of days took over the counter medication with no relief      -History of stage IV adenocarcinoma of the lung, finished radiation, currently in  middle of chemo- will not be able to tolerate chemotherapy going further     -Chest X-ray results shows Coarse accentuated interstitial markings bilaterally which may relate to low lung volumes, although superimposed component of interstitial edema or nonspecific pneumonitis not excluded. Nonspecific bilateral irregular subsegmental opacities throughout both lungs, most pronounced at the right lung base    -CTA results shows No evidence of acute pulmonary thromboembolism. Decreasing size of multiple pulmonary nodular opacities with persistent infiltrates.  Findings are most compatible with response to therapy. Diminished size of hilar adenopathy on the right. Increasing size of single nodule in the apical medial aspect of the right upper lobe previously 7 mm now 12 mm.      -Will continue breathing treatment     -Supplemental oxygen as needed    -ABG   -procal WNL   -add doxy /Rocephin  -sputum cx    -CPT as tolerated   +/- steroids - defer to pulmonary, glucose has been difficult to control   -patient with stage IV lung CA s/p chemo therapy   -consult hemoc, palliative, pulm   -SLP - diet recommended   -EKG- sinus tachycardia   -IVF   -patient was made DNR 12/26  -arranging home hospice with patient, family, and case management       Hypomagnesemia    Replete     Hypophosphatemia    Replete     ACP (advance care planning)    She would like to go home with hospice with 24/7 care of her daughter Roopa who is at her bedside. Case management has been consulted for arrangements. Per consults with hemoc and pulmonary she would not be well enough to tolerate further chemotherapy. This was explained to the patient and family who both agree and voice understanding. They both agree that the best course of action is home with hospice to make Ms. Chayito Chung the most comfortable and functional for whatever time she has left. Palliative care has also been consulted to assist. Hospital bed and home O2 ordered.     Time for  counseling and planning 20 minutes.     Secondary malignant neoplasm of left lung        Hyperlipidemia  continue atorvastatin       Primary lung adenocarcinoma, right  -Follow by oncology  -Patient is on chemo    CAD (coronary artery disease)  -Stable  -Continue telemetry monitoring  -Continue home dose ASA  -Continue atorvastatin     Patient with known CAD s/p stent placement, which is controlled Will continue ASA and Statin and monitor for S/Sx of angina/ACS. Continue to monitor on telemetry.       Chronic diastolic congestive heart failure  Patient is identified as having Diastolic (HFpEF) heart failure that is Chronic. CHF is currently controlled. Latest ECHO performed and demonstrates- Results for orders placed during the hospital encounter of 04/05/22     Echo    Interpretation Summary  · The left ventricle is normal in size with mild concentric hypertrophy and normal systolic function.  · The estimated ejection fraction is 60%.  · Mild mitral regurgitation.  · Grade I left ventricular diastolic dysfunction.  · Mild tricuspid regurgitation.  · The estimated PA systolic pressure is 47 mmHg.  · Normal right ventricular size with normal right ventricular systolic function.  · There is mild pulmonary hypertension.  · There is no evidence of intracardiac shunting.  . Continue ACE/ARB and monitor clinical status closely. Monitor on telemetry. Patient is on CHF pathway.  Monitor strict Is&Os and daily weights.  Place on fluid restriction of 1 L. Continue to stress to patient importance of self efficacy and  on diet for CHF. Last BNP reviewed- and noted below   Recent Labs   Lab 12/23/22  0238   BNP 27   .  -Stable     -patient appears clinically dry with poor PO intake - IV hydration over the next 12 hours 12/26    Iron deficiency anemia  -Stable  -H/H is 9 and 30  -Will continue to monitor and transfuse below 7      Uncontrolled type 2 diabetes mellitus with hyperglycemia  Patient's FSGs are uncontrolled due  to hyperglycemia on current medication regimen.  Last A1c reviewed-   Lab Results   Component Value Date    HGBA1C 8.2 (H) 10/11/2022     Most recent fingerstick glucose reviewed-   Recent Labs   Lab 12/26/22  1603 12/26/22  1931 12/27/22  0010 12/27/22  0443   POCTGLUCOSE 290* 409* 273* 273*     Current correctional scale  Low  Decrease anti-hyperglycemic dose as follows-   Antihyperglycemics (From admission, onward)      Start     Stop Route Frequency Ordered    12/27/22 2100  insulin detemir U-100 pen 14 Units         -- SubQ Nightly 12/27/22 0723    12/27/22 1130  insulin aspart U-100 pen 5 Units         -- SubQ 3 times daily with meals 12/27/22 0723 12/23/22 0723  insulin aspart U-100 pen 0-5 Units         -- SubQ Every 6 hours PRN 12/23/22 0623          Hold Oral hypoglycemics while patient is in the hospital.  -Maintain blood glucose between 140-180        Major depressive disorder in partial remission  Patient has persistent depression which is moderate and is currently controlled. Will Continue anti-depressant medications. We will not consult psychiatry at this time. Patient does not display psychosis at this time. Continue to monitor closely and adjust plan of care as needed.        Chronic obstructive pulmonary disease with acute exacerbation    -Patient has a history of COPD   -Monitor oxygen saturation, keep O2 above 90%  -supplemental oxygen as need    -try to wean O2 - on 3L NC. Will have home O2   -Hold off on all narcotics for now as they make her confused   -add doxycycline - will continue for 4 more days   -obtain abg   ABG  Recent Labs   Lab 12/26/22  1403   PH 7.345*   PO2 68*   PCO2 39.0   HCO3 21.3*   BE -4       CXR: Increase in bilateral airspace opacities.    Nebs scheduled and PRN   Pulmonary on board   Symptoms are likely s/t extensive stage IV lung CA       VTE Risk Mitigation (From admission, onward)           Ordered     IP VTE HIGH RISK PATIENT  Once         12/23/22 0623     Place  sequential compression device  Until discontinued         12/23/22 0623                    Discharge Planning   MICKY:      Code Status: DNR   Is the patient medically ready for discharge?:     Reason for patient still in hospital (select all that apply): Patient trending condition                     Margarita Troy NP  Department of [facility]

## 2022-12-27 NOTE — ASSESSMENT & PLAN NOTE
-Patient has c/o shortness of breath for a couple of days took over the counter medication with no relief      -History of stage IV adenocarcinoma of the lung, finished radiation, currently in middle of chemo- will not be able to tolerate chemotherapy going further     -Chest X-ray results shows Coarse accentuated interstitial markings bilaterally which may relate to low lung volumes, although superimposed component of interstitial edema or nonspecific pneumonitis not excluded. Nonspecific bilateral irregular subsegmental opacities throughout both lungs, most pronounced at the right lung base    -CTA results shows No evidence of acute pulmonary thromboembolism. Decreasing size of multiple pulmonary nodular opacities with persistent infiltrates.  Findings are most compatible with response to therapy. Diminished size of hilar adenopathy on the right. Increasing size of single nodule in the apical medial aspect of the right upper lobe previously 7 mm now 12 mm.      -Will continue breathing treatment     -Supplemental oxygen as needed    -ABG   -procal WNL   -add doxy /Rocephin (stop rocephin at [facility], will complete doxy at home)   -sputum cx    -CPT as tolerated   +/- steroids - defer to pulmonary, glucose has been difficult to control   -patient with stage IV lung CA s/p chemo therapy   -consult hemoc, palliative, pulm   -SLP - diet recommended   -EKG- sinus tachycardia   -IVF   -patient was made DNR 12/26  -arranging home hospice with patient, family, and case management

## 2022-12-27 NOTE — ASSESSMENT & PLAN NOTE
Patient's FSGs are uncontrolled due to hyperglycemia on current medication regimen.  Last A1c reviewed-   Lab Results   Component Value Date    HGBA1C 8.2 (H) 10/11/2022     Most recent fingerstick glucose reviewed-   Recent Labs   Lab 12/26/22  1603 12/26/22  1931 12/27/22  0010 12/27/22  0443   POCTGLUCOSE 290* 409* 273* 273*     Current correctional scale  Low  Decrease anti-hyperglycemic dose as follows-   Antihyperglycemics (From admission, onward)    Start     Stop Route Frequency Ordered    12/27/22 2100  insulin detemir U-100 pen 14 Units         -- SubQ Nightly 12/27/22 0723    12/27/22 1130  insulin aspart U-100 pen 5 Units         -- SubQ 3 times daily with meals 12/27/22 0723    12/23/22 0723  insulin aspart U-100 pen 0-5 Units         -- SubQ Every 6 hours PRN 12/23/22 0623        Hold Oral hypoglycemics while patient is in the hospital.  -Maintain blood glucose between 140-180

## 2022-12-27 NOTE — ASSESSMENT & PLAN NOTE
She would like to go home with hospice with 24/7 care of her daughter Roopa who is at her bedside. Case management has been consulted for arrangements. Per consults with hemoc and pulmonary she would not be well enough to tolerate further chemotherapy. This was explained to the patient and family who both agree and voice understanding. They both agree that the best course of action is home with hospice to make Ms. Chayito Chung the most comfortable and functional for whatever time she has left. Palliative care has also been consulted to assist. Hospital bed and home O2 ordered.     Time for counseling and planning 20 minutes.

## 2022-12-28 ENCOUNTER — TELEPHONE (OUTPATIENT)
Dept: HEMATOLOGY/ONCOLOGY | Facility: CLINIC | Age: 72
End: 2022-12-28
Payer: MEDICARE

## 2022-12-28 VITALS
WEIGHT: 123.69 LBS | HEART RATE: 119 BPM | RESPIRATION RATE: 19 BRPM | BODY MASS INDEX: 22.76 KG/M2 | OXYGEN SATURATION: 94 % | DIASTOLIC BLOOD PRESSURE: 91 MMHG | TEMPERATURE: 99 F | HEIGHT: 62 IN | SYSTOLIC BLOOD PRESSURE: 151 MMHG

## 2022-12-28 LAB
ALBUMIN SERPL BCP-MCNC: 2.6 G/DL (ref 3.5–5.2)
ALP SERPL-CCNC: 124 U/L (ref 55–135)
ALT SERPL W/O P-5'-P-CCNC: 14 U/L (ref 10–44)
ANION GAP SERPL CALC-SCNC: 10 MMOL/L (ref 8–16)
AST SERPL-CCNC: 14 U/L (ref 10–40)
BACTERIA BLD CULT: NORMAL
BACTERIA BLD CULT: NORMAL
BASOPHILS # BLD AUTO: 0.02 K/UL (ref 0–0.2)
BASOPHILS NFR BLD: 0.7 % (ref 0–1.9)
BILIRUB SERPL-MCNC: 0.1 MG/DL (ref 0.1–1)
BUN SERPL-MCNC: 10 MG/DL (ref 8–23)
CALCIUM SERPL-MCNC: 9.1 MG/DL (ref 8.7–10.5)
CHLORIDE SERPL-SCNC: 106 MMOL/L (ref 95–110)
CO2 SERPL-SCNC: 19 MMOL/L (ref 23–29)
CREAT SERPL-MCNC: 0.7 MG/DL (ref 0.5–1.4)
DIFFERENTIAL METHOD: ABNORMAL
EOSINOPHIL # BLD AUTO: 0.1 K/UL (ref 0–0.5)
EOSINOPHIL NFR BLD: 2.6 % (ref 0–8)
ERYTHROCYTE [DISTWIDTH] IN BLOOD BY AUTOMATED COUNT: 23.8 % (ref 11.5–14.5)
EST. GFR  (NO RACE VARIABLE): >60 ML/MIN/1.73 M^2
GLUCOSE SERPL-MCNC: 238 MG/DL (ref 70–110)
HCT VFR BLD AUTO: 31.4 % (ref 37–48.5)
HGB BLD-MCNC: 9.1 G/DL (ref 12–16)
IMM GRANULOCYTES # BLD AUTO: 0.01 K/UL (ref 0–0.04)
IMM GRANULOCYTES NFR BLD AUTO: 0.3 % (ref 0–0.5)
LYMPHOCYTES # BLD AUTO: 0.5 K/UL (ref 1–4.8)
LYMPHOCYTES NFR BLD: 17.6 % (ref 18–48)
MAGNESIUM SERPL-MCNC: 1.8 MG/DL (ref 1.6–2.6)
MCH RBC QN AUTO: 25.2 PG (ref 27–31)
MCHC RBC AUTO-ENTMCNC: 29 G/DL (ref 32–36)
MCV RBC AUTO: 87 FL (ref 82–98)
MONOCYTES # BLD AUTO: 0.4 K/UL (ref 0.3–1)
MONOCYTES NFR BLD: 13.4 % (ref 4–15)
NEUTROPHILS # BLD AUTO: 2 K/UL (ref 1.8–7.7)
NEUTROPHILS NFR BLD: 65.4 % (ref 38–73)
NRBC BLD-RTO: 0 /100 WBC
PHOSPHATE SERPL-MCNC: 4.5 MG/DL (ref 2.7–4.5)
PLATELET # BLD AUTO: 165 K/UL (ref 150–450)
PMV BLD AUTO: 8.6 FL (ref 9.2–12.9)
POCT GLUCOSE: 199 MG/DL (ref 70–110)
POCT GLUCOSE: 216 MG/DL (ref 70–110)
POCT GLUCOSE: 231 MG/DL (ref 70–110)
POTASSIUM SERPL-SCNC: 4.3 MMOL/L (ref 3.5–5.1)
PROT SERPL-MCNC: 6.8 G/DL (ref 6–8.4)
RBC # BLD AUTO: 3.61 M/UL (ref 4–5.4)
SODIUM SERPL-SCNC: 135 MMOL/L (ref 136–145)
WBC # BLD AUTO: 3.06 K/UL (ref 3.9–12.7)

## 2022-12-28 PROCEDURE — 85025 COMPLETE CBC W/AUTO DIFF WBC: CPT | Performed by: NURSE PRACTITIONER

## 2022-12-28 PROCEDURE — A4216 STERILE WATER/SALINE, 10 ML: HCPCS | Performed by: NURSE PRACTITIONER

## 2022-12-28 PROCEDURE — 94640 AIRWAY INHALATION TREATMENT: CPT

## 2022-12-28 PROCEDURE — 94668 MNPJ CHEST WALL SBSQ: CPT

## 2022-12-28 PROCEDURE — 80053 COMPREHEN METABOLIC PANEL: CPT | Performed by: NURSE PRACTITIONER

## 2022-12-28 PROCEDURE — 83735 ASSAY OF MAGNESIUM: CPT | Performed by: NURSE PRACTITIONER

## 2022-12-28 PROCEDURE — 84100 ASSAY OF PHOSPHORUS: CPT | Performed by: NURSE PRACTITIONER

## 2022-12-28 PROCEDURE — 99900035 HC TECH TIME PER 15 MIN (STAT)

## 2022-12-28 PROCEDURE — 25000003 PHARM REV CODE 250: Performed by: NURSE PRACTITIONER

## 2022-12-28 PROCEDURE — 63600175 PHARM REV CODE 636 W HCPCS: Performed by: NURSE PRACTITIONER

## 2022-12-28 PROCEDURE — 27000221 HC OXYGEN, UP TO 24 HOURS

## 2022-12-28 PROCEDURE — 94761 N-INVAS EAR/PLS OXIMETRY MLT: CPT

## 2022-12-28 PROCEDURE — 25000003 PHARM REV CODE 250: Performed by: CLINICAL NURSE SPECIALIST

## 2022-12-28 PROCEDURE — 36415 COLL VENOUS BLD VENIPUNCTURE: CPT | Performed by: NURSE PRACTITIONER

## 2022-12-28 PROCEDURE — 25000242 PHARM REV CODE 250 ALT 637 W/ HCPCS: Performed by: STUDENT IN AN ORGANIZED HEALTH CARE EDUCATION/TRAINING PROGRAM

## 2022-12-28 RX ADMIN — ZONISAMIDE 200 MG: 100 CAPSULE ORAL at 09:12

## 2022-12-28 RX ADMIN — SERTRALINE HYDROCHLORIDE 150 MG: 50 TABLET ORAL at 09:12

## 2022-12-28 RX ADMIN — Medication 10 ML: at 06:12

## 2022-12-28 RX ADMIN — PREGABALIN 150 MG: 75 CAPSULE ORAL at 09:12

## 2022-12-28 RX ADMIN — GUAIFENESIN AND DEXTROMETHORPHAN 10 ML: 100; 10 SYRUP ORAL at 10:12

## 2022-12-28 RX ADMIN — ATORVASTATIN CALCIUM 40 MG: 40 TABLET, FILM COATED ORAL at 09:12

## 2022-12-28 RX ADMIN — INSULIN ASPART 5 UNITS: 100 INJECTION, SOLUTION INTRAVENOUS; SUBCUTANEOUS at 11:12

## 2022-12-28 RX ADMIN — IPRATROPIUM BROMIDE AND ALBUTEROL SULFATE 3 ML: 2.5; .5 SOLUTION RESPIRATORY (INHALATION) at 12:12

## 2022-12-28 RX ADMIN — SODIUM CHLORIDE SOLN NEBU 3% 4 ML: 3 NEBU SOLN at 07:12

## 2022-12-28 RX ADMIN — INSULIN ASPART 5 UNITS: 100 INJECTION, SOLUTION INTRAVENOUS; SUBCUTANEOUS at 09:12

## 2022-12-28 RX ADMIN — INSULIN ASPART 2 UNITS: 100 INJECTION, SOLUTION INTRAVENOUS; SUBCUTANEOUS at 06:12

## 2022-12-28 RX ADMIN — LOSARTAN POTASSIUM 25 MG: 25 TABLET, FILM COATED ORAL at 09:12

## 2022-12-28 RX ADMIN — POTASSIUM & SODIUM PHOSPHATES POWDER PACK 280-160-250 MG 2 PACKET: 280-160-250 PACK at 06:12

## 2022-12-28 RX ADMIN — TOPIRAMATE 50 MG: 25 TABLET, FILM COATED ORAL at 09:12

## 2022-12-28 RX ADMIN — MIRTAZAPINE 15 MG: 7.5 TABLET ORAL at 09:12

## 2022-12-28 RX ADMIN — BUSPIRONE HYDROCHLORIDE 15 MG: 5 TABLET ORAL at 09:12

## 2022-12-28 RX ADMIN — TIOTROPIUM BROMIDE INHALATION SPRAY 2 PUFF: 3.12 SPRAY, METERED RESPIRATORY (INHALATION) at 07:12

## 2022-12-28 RX ADMIN — DOXYCYCLINE HYCLATE 100 MG: 100 TABLET, COATED ORAL at 09:12

## 2022-12-28 RX ADMIN — SODIUM CHLORIDE SOLN NEBU 3% 4 ML: 3 NEBU SOLN at 12:12

## 2022-12-28 RX ADMIN — POTASSIUM CHLORIDE 20 MEQ: 1500 TABLET, EXTENDED RELEASE ORAL at 09:12

## 2022-12-28 RX ADMIN — CEFTRIAXONE 2 G: 2 INJECTION, SOLUTION INTRAVENOUS at 10:12

## 2022-12-28 RX ADMIN — CHOLECALCIFEROL TAB 25 MCG (1000 UNIT) 1000 UNITS: 25 TAB at 09:12

## 2022-12-28 RX ADMIN — INSULIN ASPART 2 UNITS: 100 INJECTION, SOLUTION INTRAVENOUS; SUBCUTANEOUS at 11:12

## 2022-12-28 RX ADMIN — FLUTICASONE FUROATE AND VILANTEROL TRIFENATATE 1 PUFF: 100; 25 POWDER RESPIRATORY (INHALATION) at 07:12

## 2022-12-28 RX ADMIN — IPRATROPIUM BROMIDE AND ALBUTEROL SULFATE 3 ML: 2.5; .5 SOLUTION RESPIRATORY (INHALATION) at 07:12

## 2022-12-28 RX ADMIN — ASPIRIN 81 MG CHEWABLE TABLET 81 MG: 81 TABLET CHEWABLE at 09:12

## 2022-12-28 NOTE — PLAN OF CARE
Discharge orders noted. Additional clinical references attached. Patient's discharge instructions given by bedside RN and reviewed by this VN with patient's family. Education provided on hospice care agency and home care indications. Patient's family verbalized understanding. Patient's family to provide ride/transportation home. All questions answered. Floor nurse notified.

## 2022-12-28 NOTE — ASSESSMENT & PLAN NOTE
Patient's FSGs are uncontrolled due to hyperglycemia on current medication regimen.  Last A1c reviewed-   Lab Results   Component Value Date    HGBA1C 8.2 (H) 10/11/2022     Most recent fingerstick glucose reviewed-   Recent Labs   Lab 12/27/22 2038 12/28/22  0113 12/28/22  0540 12/28/22  1121   POCTGLUCOSE 256* 199* 216* 231*     Current correctional scale  Low  Decrease anti-hyperglycemic dose as follows-   Antihyperglycemics (From admission, onward)    Start     Stop Route Frequency Ordered    12/27/22 2100  insulin detemir U-100 pen 14 Units         -- SubQ Nightly 12/27/22 0723    12/27/22 1130  insulin aspart U-100 pen 5 Units         -- SubQ 3 times daily with meals 12/27/22 0723    12/23/22 0723  insulin aspart U-100 pen 0-5 Units         -- SubQ Every 6 hours PRN 12/23/22 0623        Hold Oral hypoglycemics while patient is in the hospital.  -Maintain blood glucose between 140-180  - will resume home regime at [place]

## 2022-12-28 NOTE — ASSESSMENT & PLAN NOTE
Patient is identified as having Diastolic (HFpEF) heart failure that is Chronic. CHF is currently controlled. Latest ECHO performed and demonstrates- Results for orders placed during the hospital encounter of 04/05/22     Echo    Interpretation Summary  · The left ventricle is normal in size with mild concentric hypertrophy and normal systolic function.  · The estimated ejection fraction is 60%.  · Mild mitral regurgitation.  · Grade I left ventricular diastolic dysfunction.  · Mild tricuspid regurgitation.  · The estimated PA systolic pressure is 47 mmHg.  · Normal right ventricular size with normal right ventricular systolic function.  · There is mild pulmonary hypertension.  · There is no evidence of intracardiac shunting.  . Continue ACE/ARB and monitor clinical status closely. Monitor on telemetry. Patient is on CHF pathway.  Monitor strict Is&Os and daily weights.  Place on fluid restriction of 1 L. Continue to stress to patient importance of self efficacy and  on diet for CHF. Last BNP reviewed- and noted below   Recent Labs   Lab 12/23/22  8038   BNP 27   .  -Stable     -patient appears clinically dry with poor PO intake - IV hydration over the next 12 hours 12/26  -patient tolerating PO hydration

## 2022-12-28 NOTE — PROGRESS NOTES
[facility]  Progress Note    Patient Name: Chayito Chung  MRN: [id number]  Patient Class: IP- Inpatient   Admission Date: 12/23/2022  Length of Stay: 3 days  Attending Physician: Flor Cano MD  Primary Care Provider: Curt Valladares III, MD        Subjective:     Principal Problem:Shortness of breath        HPI:  Patient is a 72-year-old female presents via EMS complaining of cough for the past few days. Patient has a pertinent medical history including ACS, respiratory failure, COPD, stage IV adenocarcinoma of the lung, finished radiation, currently in middle of chemo. Patient stated her symptoms of shortness of breath and cough started Thursday, patient stated she continued to have productive cough with yellow sputum production. Patient stated she took over the counter medication with no relief in symptoms. Patient denied fever or chills. Patient denies nausea, vomiting, or diarrhea. Patient denies hemoptysis, hematemesis or hematochezia.     On arrival to the emergency department patient chest x-ray shows coarse accentuated interstitial markings bilaterally which may relate to low lung volumes, although superimposed component of interstitial edema or nonspecific pneumonitis not excluded. Nonspecific bilateral irregular subsegmental opacities throughout both lungs, most pronounced at the right lung base, better evaluated on recent CTA chest examination.  Findings may relate to atelectasis, scarring/post radiation change, aspiration, neoplasm, or infection.    CTA  of chest results shows No evidence of acute pulmonary thromboembolism. Decreasing size of multiple pulmonary nodular opacities with persistent infiltrates. Diminished size of hilar adenopathy on the right. Increasing size of single nodule in the apical medial aspect of the right upper lobe previously 7 mm now 12 mm.  Continued follow-up is urged.      On assessment patient is lethargic but is arousable and answer questions  appropriately. Patient voiced lower abdominal pain after sustained coughing. Patient heart rate will increase to 115's to 120's after coughing. Patient has expiratory wheezing. No sputum production noted.  Awaiting CT head to assess encephalopathy.     Patient will be admitted to hospital services for further evaluation and medical management.       Overview/Hospital Course:  Patient admitted on 12/23 for Shortness of breath. Patient seen and examined on a.m. rounds. Patient is lethargic but answer question appropriately. Patient voiced no pain at the time of assessment. Patient has rebound hyperglycemia.Vital signs are stable.       Interval History: Patient AAOx3, family at bedside. She is SOB with a cough which is stable. Plan to DC Home today with home hospice, under full care of her daughter Roopa. She will have DME: home hospital bed and O2 which was delivered to the home. Patient is clinically stable and in good spirits.       Review of Systems   Unable to perform ROS: Other   Constitutional:  Positive for activity change and fatigue. Negative for fever.   Respiratory:  Positive for cough and shortness of breath.    Cardiovascular:  Negative for chest pain.   Gastrointestinal:  Negative for abdominal pain, nausea and vomiting.   Neurological:  Positive for weakness. Negative for headaches.   Objective:     Vital Signs (Most Recent):  Temp: 98.7 °F (37.1 °C) (12/28/22 1119)  Pulse: (!) 119 (12/28/22 1119)  Resp: 19 (12/28/22 1119)  BP: (!) 151/91 (12/28/22 1119)  SpO2: (!) 94 % (12/28/22 1119)   Vital Signs (24h Range):  Temp:  [96.8 °F (36 °C)-100.2 °F (37.9 °C)] 98.7 °F (37.1 °C)  Pulse:  [107-129] 119  Resp:  [16-20] 19  SpO2:  [92 %-97 %] 94 %  BP: (126-151)/(76-91) 151/91     Weight: 56.1 kg (123 lb 10.9 oz)  Body mass index is 22.62 kg/m².    Intake/Output Summary (Last 24 hours) at 12/28/2022 1234  Last data filed at 12/28/2022 0604  Gross per 24 hour   Intake 10 ml   Output 800 ml   Net -790 ml         Physical Exam  Constitutional:       Appearance: She is ill-appearing.   HENT:      Head: Normocephalic and atraumatic.      Nose: Nose normal.      Mouth/Throat:      Mouth: Mucous membranes are dry.   Eyes:      Extraocular Movements: Extraocular movements intact.      Conjunctiva/sclera: Conjunctivae normal.   Cardiovascular:      Rate and Rhythm: Regular rhythm. Tachycardia present.   Pulmonary:      Effort: No respiratory distress.      Breath sounds: Rales present. No wheezing.   Chest:      Chest wall: Tenderness present.   Abdominal:      General: Bowel sounds are normal.      Palpations: Abdomen is soft.   Musculoskeletal:         General: Normal range of motion.      Cervical back: Normal range of motion and neck supple.   Skin:     General: Skin is warm and dry.   Neurological:      Mental Status: She is alert and oriented to person, place, and time.      GCS: GCS eye subscore is 4. GCS verbal subscore is 5. GCS motor subscore is 6.      Motor: Weakness present.       Significant Labs: All pertinent labs within the past 24 hours have been reviewed.    Significant Imaging: I have reviewed all pertinent imaging results/findings within the past 24 hours.  I have reviewed and interpreted all pertinent imaging results/findings within the past 24 hours.      Assessment/Plan:      * Shortness of breath  -Patient has c/o shortness of breath for a couple of days took over the counter medication with no relief      -History of stage IV adenocarcinoma of the lung, finished radiation, currently in middle of chemo- will not be able to tolerate chemotherapy going further     -Chest X-ray results shows Coarse accentuated interstitial markings bilaterally which may relate to low lung volumes, although superimposed component of interstitial edema or nonspecific pneumonitis not excluded. Nonspecific bilateral irregular subsegmental opacities throughout both lungs, most pronounced at the right lung base    -CTA results  shows No evidence of acute pulmonary thromboembolism. Decreasing size of multiple pulmonary nodular opacities with persistent infiltrates.  Findings are most compatible with response to therapy. Diminished size of hilar adenopathy on the right. Increasing size of single nodule in the apical medial aspect of the right upper lobe previously 7 mm now 12 mm.      -Will continue breathing treatment     -Supplemental oxygen as needed    -ABG   -procal WNL   -add doxy /Rocephin (stop rocephin at DC, will complete doxy at home)   -sputum cx    -CPT as tolerated   +/- steroids - defer to pulmonary, glucose has been difficult to control   -patient with stage IV lung CA s/p chemo therapy   -consult hemoc, palliative, pulm   -SLP - diet recommended   -EKG- sinus tachycardia   -IVF   -patient was made DNR 12/26  -arranging home hospice with patient, family, and case management      Hypomagnesemia    Replete 12/27    Hypophosphatemia    Replete 12/27    ACP (advance care planning)    She would like to go home with hospice with 24/7 care of her daughter Roopa who is at her bedside. Case management has been consulted for arrangements. Per consults with hemoc and pulmonary she would not be well enough to tolerate further chemotherapy. This was explained to the patient and family who both agree and voice understanding. They both agree that the best course of action is home with hospice to make Ms. Chayito Chung the most comfortable and functional for whatever time she has left. Palliative care has also been consulted to assist. Hospital bed and home O2 ordered.     Time for counseling and planning 20 minutes 12/27.     Secondary malignant neoplasm of left lung        Hyperlipidemia  continue atorvastatin       Primary lung adenocarcinoma, right  -Follow by oncology  -Patient is on chemo- will not be able to tolerate further per pulm and hemoc recs     CAD (coronary artery disease)  -Stable  -Continue telemetry monitoring  -Continue  home dose ASA  -Continue atorvastatin     Patient with known CAD s/p stent placement, which is controlled Will continue ASA and Statin and monitor for S/Sx of angina/ACS. Continue to monitor on telemetry.       Chronic diastolic congestive heart failure  Patient is identified as having Diastolic (HFpEF) heart failure that is Chronic. CHF is currently controlled. Latest ECHO performed and demonstrates- Results for orders placed during the hospital encounter of 04/05/22     Echo    Interpretation Summary  · The left ventricle is normal in size with mild concentric hypertrophy and normal systolic function.  · The estimated ejection fraction is 60%.  · Mild mitral regurgitation.  · Grade I left ventricular diastolic dysfunction.  · Mild tricuspid regurgitation.  · The estimated PA systolic pressure is 47 mmHg.  · Normal right ventricular size with normal right ventricular systolic function.  · There is mild pulmonary hypertension.  · There is no evidence of intracardiac shunting.  . Continue ACE/ARB and monitor clinical status closely. Monitor on telemetry. Patient is on CHF pathway.  Monitor strict Is&Os and daily weights.  Place on fluid restriction of 1 L. Continue to stress to patient importance of self efficacy and  on diet for CHF. Last BNP reviewed- and noted below   Recent Labs   Lab 12/23/22  0238   BNP 27   .  -Stable     -patient appears clinically dry with poor PO intake - IV hydration over the next 12 hours 12/26  -patient tolerating PO hydration     Iron deficiency anemia  -Stable  -H/H is 9 and 31  -Will continue to monitor and transfuse below 7      Uncontrolled type 2 diabetes mellitus with hyperglycemia  Patient's FSGs are uncontrolled due to hyperglycemia on current medication regimen.  Last A1c reviewed-   Lab Results   Component Value Date    HGBA1C 8.2 (H) 10/11/2022     Most recent fingerstick glucose reviewed-   Recent Labs   Lab 12/27/22 2038 12/28/22  0113 12/28/22  0540 12/28/22  1121    POCTGLUCOSE 256* 199* 216* 231*     Current correctional scale  Low  Decrease anti-hyperglycemic dose as follows-   Antihyperglycemics (From admission, onward)    Start     Stop Route Frequency Ordered    12/27/22 2100  insulin detemir U-100 pen 14 Units         -- SubQ Nightly 12/27/22 0723    12/27/22 1130  insulin aspart U-100 pen 5 Units         -- SubQ 3 times daily with meals 12/27/22 0723    12/23/22 0723  insulin aspart U-100 pen 0-5 Units         -- SubQ Every 6 hours PRN 12/23/22 0623        Hold Oral hypoglycemics while patient is in the hospital.  -Maintain blood glucose between 140-180  - will resume home regime at [place]       Major depressive disorder in partial remission  Patient has persistent depression which is moderate and is currently controlled. Will Continue anti-depressant medications. We will not consult psychiatry at this time. Patient does not display psychosis at this time. Continue to monitor closely and adjust plan of care as needed.        Chronic obstructive pulmonary disease with acute exacerbation    -Patient has a history of COPD   -Monitor oxygen saturation, keep O2 above 90%  -supplemental oxygen as need    -try to wean O2 - on 3L NC. Will have home O2   -Hold off on all narcotics for now as they make her confused   -add doxycycline - will continue for 3 more days   -obtain abg   ABG  Recent Labs   Lab 12/26/22  1403   PH 7.345*   PO2 68*   PCO2 39.0   HCO3 21.3*   BE -4       CXR: Increase in bilateral airspace opacities.    Nebs scheduled and PRN   Pulmonary on board   Symptoms are likely s/t extensive stage IV lung CA       VTE Risk Mitigation (From admission, onward)         Ordered     IP VTE HIGH RISK PATIENT  Once         12/23/22 0623     Place sequential compression device  Until discontinued         12/23/22 0623                Discharge Planning   MICKY: 12/28/2022     Code Status: DNR   Is the patient medically ready for discharge?:     Reason for patient still in  hospital (select all that apply): Patient trending condition  Discharge Plan A: Hospice/home                  Margarita Troy NP  Department of [facility]   [facility]

## 2022-12-28 NOTE — ASSESSMENT & PLAN NOTE
She would like to go home with hospice with 24/7 care of her daughter Roopa who is at her bedside. Case management has been consulted for arrangements. Per consults with hemoc and pulmonary she would not be well enough to tolerate further chemotherapy. This was explained to the patient and family who both agree and voice understanding. They both agree that the best course of action is home with hospice to make Ms. Chayito Chung the most comfortable and functional for whatever time she has left. Palliative care has also been consulted to assist. Hospital bed and home O2 ordered.     Time for counseling and planning 20 minutes 12/27.

## 2022-12-28 NOTE — PLAN OF CARE
Patient received on   1 Lpm NC with SpO2   94 %. Pt with no apparent distress noted. Will continue to monitor.

## 2022-12-28 NOTE — ASSESSMENT & PLAN NOTE
-Patient has a history of COPD   -Monitor oxygen saturation, keep O2 above 90%  -supplemental oxygen as need    -try to wean O2 - on 3L NC. Will have home O2   -Hold off on all narcotics for now as they make her confused   -add doxycycline - will continue for 3 more days   -obtain abg   ABG  Recent Labs   Lab 12/26/22  1403   PH 7.345*   PO2 68*   PCO2 39.0   HCO3 21.3*   BE -4       CXR: Increase in bilateral airspace opacities.    Nebs scheduled and PRN   Pulmonary on board   Symptoms are likely s/t extensive stage IV lung CA

## 2022-12-28 NOTE — ASSESSMENT & PLAN NOTE
-Follow by oncology  -Patient is on chemo- will not be able to tolerate further per pulm and hemoc recs

## 2022-12-28 NOTE — ASSESSMENT & PLAN NOTE
Patient is identified as having Diastolic (HFpEF) heart failure that is Chronic. CHF is currently controlled. Latest ECHO performed and demonstrates- Results for orders placed during the hospital encounter of 04/05/22     Echo    Interpretation Summary  · The left ventricle is normal in size with mild concentric hypertrophy and normal systolic function.  · The estimated ejection fraction is 60%.  · Mild mitral regurgitation.  · Grade I left ventricular diastolic dysfunction.  · Mild tricuspid regurgitation.  · The estimated PA systolic pressure is 47 mmHg.  · Normal right ventricular size with normal right ventricular systolic function.  · There is mild pulmonary hypertension.  · There is no evidence of intracardiac shunting.  . Continue ACE/ARB and monitor clinical status closely. Monitor on telemetry. Patient is on CHF pathway.  Monitor strict Is&Os and daily weights.  Place on fluid restriction of 1 L. Continue to stress to patient importance of self efficacy and  on diet for CHF. Last BNP reviewed- and noted below   Recent Labs   Lab 12/23/22  5818   BNP 27   .  -Stable     -patient appears clinically dry with poor PO intake - IV hydration over the next 12 hours 12/26  -patient tolerating PO hydration

## 2022-12-28 NOTE — PLAN OF CARE
0755  DC order noted. Updated home hospice orders sent to [facility] via [employer]. Awaiting notification of home equipment delivery prior to discharge.     0855  Message sent to [facility] via [employer] questioning if the home equipment has been delivered. Awaiting response.     0910  CM was informed by Malou brumfield/[facility] that the home equipment was delivered last night.     0925  Voicemail message left for the pt's daughter, Roopa Chung ([phone]), informing of the pt's discharge status, questioning when she would be at the hospital to provide transportation, & reminding Roopa to bring the pt's portable oxygen concentrator. Awaiting response.     1050  DARION was informed by Roopa that she just arrived to the hospital, is in agreement with the plan for the pt to discharge home w/hospice today, will provide transportation, & brought the pt's portable O2 concentrator to the hospital.     1100  Message sent to nurse Sexton & virtual nurse Fabienne informing that the pt is cleared to discharge.       Will continue to follow.

## 2022-12-28 NOTE — SUBJECTIVE & OBJECTIVE
Interval History: Patient AAOx3, family at bedside. She is SOB with a cough which is stable. Plan to DC Home today with home hospice, under full care of her daughter Roopa. She will have DME: home hospital bed and O2 which was delivered to the home. Patient is clinically stable and in good spirits.       Review of Systems   Unable to perform ROS: Other   Constitutional:  Positive for activity change and fatigue. Negative for fever.   Respiratory:  Positive for cough and shortness of breath.    Cardiovascular:  Negative for chest pain.   Gastrointestinal:  Negative for abdominal pain, nausea and vomiting.   Neurological:  Positive for weakness. Negative for headaches.   Objective:     Vital Signs (Most Recent):  Temp: 98.7 °F (37.1 °C) (12/28/22 1119)  Pulse: (!) 119 (12/28/22 1119)  Resp: 19 (12/28/22 1119)  BP: (!) 151/91 (12/28/22 1119)  SpO2: (!) 94 % (12/28/22 1119)   Vital Signs (24h Range):  Temp:  [96.8 °F (36 °C)-100.2 °F (37.9 °C)] 98.7 °F (37.1 °C)  Pulse:  [107-129] 119  Resp:  [16-20] 19  SpO2:  [92 %-97 %] 94 %  BP: (126-151)/(76-91) 151/91     Weight: 56.1 kg (123 lb 10.9 oz)  Body mass index is 22.62 kg/m².    Intake/Output Summary (Last 24 hours) at 12/28/2022 1234  Last data filed at 12/28/2022 0604  Gross per 24 hour   Intake 10 ml   Output 800 ml   Net -790 ml        Physical Exam  Constitutional:       Appearance: She is ill-appearing.   HENT:      Head: Normocephalic and atraumatic.      Nose: Nose normal.      Mouth/Throat:      Mouth: Mucous membranes are dry.   Eyes:      Extraocular Movements: Extraocular movements intact.      Conjunctiva/sclera: Conjunctivae normal.   Cardiovascular:      Rate and Rhythm: Regular rhythm. Tachycardia present.   Pulmonary:      Effort: No respiratory distress.      Breath sounds: Rales present. No wheezing.   Chest:      Chest wall: Tenderness present.   Abdominal:      General: Bowel sounds are normal.      Palpations: Abdomen is soft.   Musculoskeletal:          General: Normal range of motion.      Cervical back: Normal range of motion and neck supple.   Skin:     General: Skin is warm and dry.   Neurological:      Mental Status: She is alert and oriented to person, place, and time.      GCS: GCS eye subscore is 4. GCS verbal subscore is 5. GCS motor subscore is 6.      Motor: Weakness present.       Significant Labs: All pertinent labs within the past 24 hours have been reviewed.    Significant Imaging: I have reviewed all pertinent imaging results/findings within the past 24 hours.  I have reviewed and interpreted all pertinent imaging results/findings within the past 24 hours.

## 2022-12-28 NOTE — DISCHARGE SUMMARY
[facility] Medicine  Discharge Summary      Patient Name: Chayito Chung  MRN: [id number]  ALONZO: [id number]  Patient Class: IP- Inpatient  Admission Date: 12/23/2022  Hospital Length of Stay: 3 days  Discharge Date and Time:  12/28/2022 12:32 PM  Attending Physician: Flor Cano MD   Discharging Provider: Margarita Troy NP  Primary Care Provider: Curt Valladares III, MD    Primary Care Team: Networked reference to record PCT     HPI:   Patient is a 72-year-old female presents via EMS complaining of cough for the past few days. Patient has a pertinent medical history including ACS, respiratory failure, COPD, stage IV adenocarcinoma of the lung, finished radiation, currently in middle of chemo. Patient stated her symptoms of shortness of breath and cough started Thursday, patient stated she continued to have productive cough with yellow sputum production. Patient stated she took over the counter medication with no relief in symptoms. Patient denied fever or chills. Patient denies nausea, vomiting, or diarrhea. Patient denies hemoptysis, hematemesis or hematochezia.     On arrival to the emergency department patient chest x-ray shows coarse accentuated interstitial markings bilaterally which may relate to low lung volumes, although superimposed component of interstitial edema or nonspecific pneumonitis not excluded. Nonspecific bilateral irregular subsegmental opacities throughout both lungs, most pronounced at the right lung base, better evaluated on recent CTA chest examination.  Findings may relate to atelectasis, scarring/post radiation change, aspiration, neoplasm, or infection.    CTA  of chest results shows No evidence of acute pulmonary thromboembolism. Decreasing size of multiple pulmonary nodular opacities with persistent infiltrates. Diminished size of hilar adenopathy on the right. Increasing size of single nodule in the apical medial aspect of the right upper lobe previously 7 mm now 12  mm.  Continued follow-up is urged.      On assessment patient is lethargic but is arousable and answer questions appropriately. Patient voiced lower abdominal pain after sustained coughing. Patient heart rate will increase to 115's to 120's after coughing. Patient has expiratory wheezing. No sputum production noted.  Awaiting CT head to assess encephalopathy.     Patient will be admitted to hospital services for further evaluation and medical management.       * No surgery found *      Hospital Course:   Patient admitted on 12/23 for Shortness of breath. Patient seen and examined on a.m. rounds. Patient is lethargic but answer question appropriately. Patient voiced no pain at the time of assessment. Patient has rebound hyperglycemia.Vital signs are stable.        Goals of Care Treatment Preferences:  Code Status: DNR    Living Will: Yes              Consults:   Consults (From admission, onward)        Status Ordering Provider     Inpatient consult to Telemedicine - Oncology  Once        Provider:  (Not yet assigned)    SERINA Garcia     Inpatient consult to Pulmonology  Once        Provider:  Wagner Blanco MD    Completed EUN NOLASCO          * Shortness of breath  -Patient has c/o shortness of breath for a couple of days took over the counter medication with no relief      -History of stage IV adenocarcinoma of the lung, finished radiation, currently in middle of chemo- will not be able to tolerate chemotherapy going further     -Chest X-ray results shows Coarse accentuated interstitial markings bilaterally which may relate to low lung volumes, although superimposed component of interstitial edema or nonspecific pneumonitis not excluded. Nonspecific bilateral irregular subsegmental opacities throughout both lungs, most pronounced at the right lung base    -CTA results shows No evidence of acute pulmonary thromboembolism. Decreasing size of multiple pulmonary nodular opacities with  persistent infiltrates.  Findings are most compatible with response to therapy. Diminished size of hilar adenopathy on the right. Increasing size of single nodule in the apical medial aspect of the right upper lobe previously 7 mm now 12 mm.      -Will continue breathing treatment     -Supplemental oxygen as needed    -ABG   -procal WNL   -add doxy /Rocephin (stop rocephin at DC, will complete doxy at home)   -sputum cx    -CPT as tolerated   +/- steroids - defer to pulmonary, glucose has been difficult to control   -patient with stage IV lung CA s/p chemo therapy   -consult hemoc, palliative, pulm   -SLP - diet recommended   -EKG- sinus tachycardia   -IVF   -patient was made DNR 12/26  -arranging home hospice with patient, family, and case management      Hypomagnesemia    Replete 12/27    Hypophosphatemia    Replete 12/27    ACP (advance care planning)    She would like to go home with hospice with 24/7 care of her daughter Roopa who is at her bedside. Case management has been consulted for arrangements. Per consults with hemoc and pulmonary she would not be well enough to tolerate further chemotherapy. This was explained to the patient and family who both agree and voice understanding. They both agree that the best course of action is home with hospice to make Ms. Chayito Chung the most comfortable and functional for whatever time she has left. Palliative care has also been consulted to assist. Hospital bed and home O2 ordered.     Time for counseling and planning 20 minutes 12/27.     Hyperlipidemia  continue atorvastatin       Primary lung adenocarcinoma, right  -Follow by oncology  -Patient is on chemo- will not be able to tolerate further per pulm and hemoc recs     CAD (coronary artery disease)  -Stable  -Continue telemetry monitoring  -Continue home dose ASA  -Continue atorvastatin     Patient with known CAD s/p stent placement, which is controlled Will continue ASA and Statin and monitor for S/Sx of  angina/ACS. Continue to monitor on telemetry.       Chronic diastolic congestive heart failure  Patient is identified as having Diastolic (HFpEF) heart failure that is Chronic. CHF is currently controlled. Latest ECHO performed and demonstrates- Results for orders placed during the hospital encounter of 04/05/22     Echo    Interpretation Summary  · The left ventricle is normal in size with mild concentric hypertrophy and normal systolic function.  · The estimated ejection fraction is 60%.  · Mild mitral regurgitation.  · Grade I left ventricular diastolic dysfunction.  · Mild tricuspid regurgitation.  · The estimated PA systolic pressure is 47 mmHg.  · Normal right ventricular size with normal right ventricular systolic function.  · There is mild pulmonary hypertension.  · There is no evidence of intracardiac shunting.  . Continue ACE/ARB and monitor clinical status closely. Monitor on telemetry. Patient is on CHF pathway.  Monitor strict Is&Os and daily weights.  Place on fluid restriction of 1 L. Continue to stress to patient importance of self efficacy and  on diet for CHF. Last BNP reviewed- and noted below   Recent Labs   Lab 12/23/22  0238   BNP 27   .  -Stable     -patient appears clinically dry with poor PO intake - IV hydration over the next 12 hours 12/26  -patient tolerating PO hydration     Iron deficiency anemia  -Stable  -H/H is 9 and 31  -Will continue to monitor and transfuse below 7      Uncontrolled type 2 diabetes mellitus with hyperglycemia  Patient's FSGs are uncontrolled due to hyperglycemia on current medication regimen.  Last A1c reviewed-   Lab Results   Component Value Date    HGBA1C 8.2 (H) 10/11/2022     Most recent fingerstick glucose reviewed-   Recent Labs   Lab 12/27/22  2038 12/28/22  0113 12/28/22  0540 12/28/22  1121   POCTGLUCOSE 256* 199* 216* 231*     Current correctional scale  Low  Decrease anti-hyperglycemic dose as follows-   Antihyperglycemics (From admission,  onward)    Start     Stop Route Frequency Ordered    12/27/22 2100  insulin detemir U-100 pen 14 Units         -- SubQ Nightly 12/27/22 0723    12/27/22 1130  insulin aspart U-100 pen 5 Units         -- SubQ 3 times daily with meals 12/27/22 0723    12/23/22 0723  insulin aspart U-100 pen 0-5 Units         -- SubQ Every 6 hours PRN 12/23/22 0623        Hold Oral hypoglycemics while patient is in the hospital.  -Maintain blood glucose between 140-180  - will resume home regime at [place]       Major depressive disorder in partial remission  Patient has persistent depression which is moderate and is currently controlled. Will Continue anti-depressant medications. We will not consult psychiatry at this time. Patient does not display psychosis at this time. Continue to monitor closely and adjust plan of care as needed.        Chronic obstructive pulmonary disease with acute exacerbation    -Patient has a history of COPD   -Monitor oxygen saturation, keep O2 above 90%  -supplemental oxygen as need    -try to wean O2 - on 3L NC. Will have home O2   -Hold off on all narcotics for now as they make her confused   -add doxycycline - will continue for 3 more days   -obtain abg   ABG  Recent Labs   Lab 12/26/22  1403   PH 7.345*   PO2 68*   PCO2 39.0   HCO3 21.3*   BE -4       CXR: Increase in bilateral airspace opacities.    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] 12/26/2022 Yes    Hyperlipidemia [E78.5] 08/22/2022 Yes    Primary lung adenocarcinoma, right [C34.91] 08/03/2022 Yes    CAD (coronary artery disease) [I25.10] 07/23/2020 Yes      "Chronic    Chronic diastolic congestive heart failure [I50.32] 11/15/2018 Yes    Iron deficiency anemia [D50.9] 10/11/2016 Yes    Uncontrolled type 2 diabetes mellitus with hyperglycemia [E11.65] 09/20/2014 Yes    Major depressive disorder in partial remission [F32.4] 12/17/2012 Yes    Chronic obstructive pulmonary disease with acute exacerbation [J44.1] 12/16/2012 Yes      Problems Resolved During this Admission:    Diagnosis Date Noted Date Resolved POA    Adenocarcinoma of lung, right [C34.91] 07/25/2022 12/25/2022 Yes       Discharged Condition: fair    Disposition: Hospice/Home    Follow Up:   Follow-up Information     Curt Valladares III, MD Follow up in 1 week(s).    Specialty: Internal Medicine  Contact information:  [address] [facility]  [place] [address] [phone]                       Patient Instructions:      HOSPITAL BED FOR HOME USE     Order Specific Question Answer Comments   Type: Electric    Length of need (1-99 months): 99    Height: 5' 2" (1.575 m)    Weight: 57.8 kg (127 lb 6.8 oz)    Please check all that apply: Patient requires positioning of the body in ways not feasible in an ordinary bed due to a medical condition which is expected to last at least one month.    Please check all that apply: Patient requires, for the alleviation of pain, positioning of the body in ways not feasible in an ordinary bed.    Please check all that apply: Patient requires the head of bed to be elevated more than 30 degrees most of the time due to congestive heart failure, chronic pulmonary disease, or aspiration.  Pillows and wedges have been considered and ruled out.    Please check all that apply: Patient requires a bed height different than a fixed height hospital bed to permit transfers to chair, wheelchair, or standing.    Please check all that apply: Patient requires frequent changes in body position and/or has an immediate need for a change in body position.      OXYGEN FOR HOME USE     Order Specific " "Question Answer Comments   Liter Flow 3    Duration Continuous    Qualifying Test Performed at: Rest    Oxygen saturation: 92    Portable mode: continuous    Route nasal cannula    Device: home concentrator with portable tanks    Length of need (in months): 99 mos    Patient condition with qualifying saturation COPD    Height: 5' 2" (1.575 m)    Weight: 57.8 kg (127 lb 6.8 oz)    Alternative treatment measures have been tried or considered and deemed clinically ineffective. Yes      Diet Dysphagia Mechanical Soft     Notify your health care provider if you experience any of the following:  temperature >100.4     Notify your health care provider if you experience any of the following:  persistent nausea and vomiting or diarrhea     Notify your health care provider if you experience any of the following:  severe uncontrolled pain     Notify your health care provider if you experience any of the following:  redness, tenderness, or signs of infection (pain, swelling, redness, odor or green/yellow discharge around incision site)     Notify your health care provider if you experience any of the following:  difficulty breathing or increased cough     Notify your health care provider if you experience any of the following:  severe persistent headache     Notify your health care provider if you experience any of the following:  worsening rash     Notify your health care provider if you experience any of the following:  persistent dizziness, light-headedness, or visual disturbances     Notify your health care provider if you experience any of the following:  increased confusion or weakness     Activity as tolerated       Significant Diagnostic Studies: Labs:   CMP   Recent Labs   Lab 12/27/22  0749 12/28/22  0736    135*   K 3.9 4.3    106   CO2 21* 19*   * 238*   BUN 9 10   CREATININE 0.7 0.7   CALCIUM 9.3 9.1   PROT  --  6.8   ALBUMIN  --  2.6*   BILITOT  --  0.1   ALKPHOS  --  124   AST  --  14   ALT  -- " " 14   ANIONGAP 7* 10    and CBC   Recent Labs   Lab 12/27/22  0749 12/28/22  0736   WBC 4.51 3.06*   HGB 9.1* 9.1*   HCT 30.7* 31.4*    165       Pending Diagnostic Studies:     None         Medications:  Reconciled Home Medications:      Medication List      START taking these medications    acetaminophen 325 MG tablet  Commonly known as: TYLENOL  Take 2 tablets (650 mg total) by mouth every 8 (eight) hours as needed for Pain.     doxycycline 100 MG tablet  Commonly known as: VIBRA-TABS  Take 1 tablet (100 mg total) by mouth every 12 (twelve) hours. for 4 days        CONTINUE taking these medications    albuterol 90 mcg/actuation inhaler  Commonly known as: PROVENTIL/VENTOLIN HFA  Inhale 2 puffs into the lungs every 4 (four) hours as needed for Wheezing or Shortness of Breath. Rescue     alendronate 70 MG tablet  Commonly known as: FOSAMAX  Take 1 tablet (70 mg total) by mouth every Sunday.     aspirin 81 MG Chew  Take 1 tablet (81 mg total) by mouth once daily.     atorvastatin 40 MG tablet  Commonly known as: LIPITOR  Take 1 tablet (40 mg total) by mouth once daily.     BD ULTRA-FINE MINI PEN NEEDLE 31 gauge x 3/16" Ndle  Generic drug: pen needle, diabetic     benzonatate 100 MG capsule  Commonly known as: TESSALON  Take 1 capsule (100 mg total) by mouth 3 (three) times daily as needed for Cough.     busPIRone 15 MG tablet  Commonly known as: BUSPAR  Take 15 mg by mouth 2 (two) times daily.     DEXCOM G6  Misc  Generic drug: blood-glucose meter,continuous  1 each by Misc.(Non-Drug; Combo Route) route once daily.     DEXCOM G6 SENSOR Tyra  Generic drug: blood-glucose sensor  1 each by Misc.(Non-Drug; Combo Route) route once daily.     DEXCOM G6 TRANSMITTER Tyra  Generic drug: blood-glucose transmitter  1 each by Misc.(Non-Drug; Combo Route) route once daily.     diclofenac sodium 1 % Gel  Commonly known as: VOLTAREN  Apply 2 g topically 4 (four) times daily as needed.     GOODY'S EXTRA STRENGTH " 500-325-65 mg Pwpk  Generic drug: aspirin-acetaminophen-caffeine  Take 1 packet by mouth daily as needed.     HumaLOG Mix 75-25(U-100)Insuln 100 unit/mL (75-25) Susp  Generic drug: insulin lispro protamine-insulin lispro  Inject 35 Units into the skin 2 (two) times daily before meals.     HYDROcodone-acetaminophen 7.5-325 mg per tablet  Commonly known as: NORCO  Take 1 tablet by mouth 2 (two) times daily as needed.     insulin lispro 100 unit/mL injection  Inject 5 Units into the skin 3 (three) times daily before meals.     LANTUS SOLOSTAR U-100 INSULIN glargine 100 units/mL SubQ pen  Generic drug: insulin  Inject 20 Units into the skin every evening.     losartan 25 MG tablet  Commonly known as: COZAAR  Take 1 tablet (25 mg total) by mouth once daily.     metFORMIN 1000 MG tablet  Commonly known as: GLUCOPHAGE  Take 1,000 mg by mouth 2 (two) times daily.     mirtazapine 15 MG tablet  Commonly known as: REMERON  Take 15 mg by mouth once daily.     nitroGLYCERIN 0.4 MG SL tablet  Commonly known as: NITROSTAT  Place 1 tablet (0.4 mg total) under the tongue every 5 (five) minutes as needed for Chest pain.     ondansetron 4 mg/5 mL solution  Commonly known as: ZOFRAN  Take 4 mg by mouth daily as needed for Nausea.     potassium chloride SA 20 MEQ tablet  Commonly known as: K-DUR,KLOR-CON  Take 1 tablet (20 mEq total) by mouth once daily.     pregabalin 150 MG capsule  Commonly known as: LYRICA  Take 150 mg by mouth 3 (three) times daily.     QUEtiapine 400 MG tablet  Commonly known as: SEROQUEL  Take 400 mg by mouth nightly.     sertraline 100 MG tablet  Commonly known as: ZOLOFT  Take 150 mg by mouth once daily at 6am.     topiramate 50 MG tablet  Commonly known as: TOPAMAX  Take 1 tablet (50 mg total) by mouth 2 (two) times daily.     TRELEGY ELLIPTA 200-62.5-25 mcg inhaler  Generic drug: fluticasone-umeclidin-vilanter  Inhale 1 puff into the lungs once daily.     vitamin D 1000 units Tab  Commonly known as: VITAMIN  D3  Take 1,000 Units by mouth.     zonisamide 100 MG Cap  Commonly known as: ZONEGRAN  Take 200 mg by mouth 2 (two) times daily.            Indwelling Lines/Drains at time of discharge:   Lines/Drains/Airways     Drain  Duration           Female External Urinary Catheter 12/23/22 1300 4 days                Time spent on the discharge of patient: 60 minutes         Margarita Troy NP  Department of [facility] Medicine  [facility]

## 2022-12-28 NOTE — SUBJECTIVE & OBJECTIVE
Interval History: Patient AAOx3, family at bedside. She is SOB with a cough which is stable. Plan to DC Home today with home hospice, under full care of her daughter Roopa. She will have DME: home hospital bed and O2 which was delivered to the home. Patient is clinically stable and in good spirits.       Review of Systems   Unable to perform ROS: Other   Constitutional:  Positive for activity change and fatigue. Negative for fever.   Respiratory:  Positive for cough and shortness of breath.    Cardiovascular:  Negative for chest pain.   Gastrointestinal:  Negative for abdominal pain, nausea and vomiting.   Neurological:  Positive for weakness. Negative for headaches.   Objective:     Vital Signs (Most Recent):  Temp: 98.7 °F (37.1 °C) (12/28/22 1119)  Pulse: (!) 119 (12/28/22 1119)  Resp: 19 (12/28/22 1119)  BP: (!) 151/91 (12/28/22 1119)  SpO2: (!) 94 % (12/28/22 1119)   Vital Signs (24h Range):  Temp:  [96.8 °F (36 °C)-100.2 °F (37.9 °C)] 98.7 °F (37.1 °C)  Pulse:  [107-129] 119  Resp:  [16-20] 19  SpO2:  [92 %-97 %] 94 %  BP: (126-151)/(76-91) 151/91     Weight: 56.1 kg (123 lb 10.9 oz)  Body mass index is 22.62 kg/m².    Intake/Output Summary (Last 24 hours) at 12/28/2022 1220  Last data filed at 12/28/2022 0604  Gross per 24 hour   Intake 10 ml   Output 800 ml   Net -790 ml        Physical Exam  Constitutional:       Appearance: She is ill-appearing.   HENT:      Head: Normocephalic and atraumatic.      Nose: Nose normal.      Mouth/Throat:      Mouth: Mucous membranes are dry.   Eyes:      Extraocular Movements: Extraocular movements intact.      Conjunctiva/sclera: Conjunctivae normal.   Cardiovascular:      Rate and Rhythm: Regular rhythm. Tachycardia present.   Pulmonary:      Effort: No respiratory distress.      Breath sounds: Rales present. No wheezing.   Chest:      Chest wall: Tenderness present.   Abdominal:      General: Bowel sounds are normal.      Palpations: Abdomen is soft.   Musculoskeletal:          General: Normal range of motion.      Cervical back: Normal range of motion and neck supple.   Skin:     General: Skin is warm and dry.   Neurological:      Mental Status: She is alert and oriented to person, place, and time.      GCS: GCS eye subscore is 4. GCS verbal subscore is 5. GCS motor subscore is 6.      Motor: Weakness present.       Significant Labs: All pertinent labs within the past 24 hours have been reviewed.    Significant Imaging: I have reviewed all pertinent imaging results/findings within the past 24 hours.  I have reviewed and interpreted all pertinent imaging results/findings within the past 24 hours.

## 2022-12-29 NOTE — PLAN OF CARE
[facility] - Telemetry  Discharge Final Note    Primary Care Provider: Curt Valladares III, MD    Expected Discharge Date: 12/28/2022    Final Discharge Note (most recent)       Final Note - 12/29/22 0720          Final Note    Assessment Type Final Discharge Note (P)      Anticipated Discharge Disposition Hospice/Home (P)    [facility]       Post-Acute Status    Post-Acute Authorization Hospice (P)      Hospice Status Set-up Complete/Auth obtained (P)    [facility]                      Contact Info       Curt Valladares III, MD   Specialty: Internal Medicine   Relationship: PCP - General    [address] [facility] [facility]  [place] [address]   Phone: [phone]       Next Steps: Follow up in 1 week(s)

## 2023-01-04 NOTE — PHYSICIAN QUERY
PT Name: Chayito Chung  MR #: [id number]    DOCUMENTATION CLARIFICATION     CDS: Ashley Medellin RN, CCDS   Contact Information: [email or web address]  This form is a permanent document in the medical record.    Query Date: January 4, 2023      By submitting this query, we are merely seeking further clarification of documentation.  Please utilize your independent clinical judgment when addressing the question(s) below.    The Medical Record reflects the following:    Clinical Information Location in Medical Record    Acute on chronic respiratory failure with hypoxemia...Patient requiring 3-4L O2 to maintain her O2 sats.       Shortness of breath   12/26 Pulmonology, Dr. Ramon         12/23-12/28  Daily Progress Notes    12/23 0120: R 16, SpO2 98% on 4L via NC   12/23 0250: R 17, SpO2 94% on 3L via NC   12/24 0726: R 18, SpO2 86% no oxygen documented   12/24 0732: R 20, SpO2 90% on 3.5L via NC  Flowsheets      Oxygen for Home Use   Liter Flow 3   Duration Continuous   Route nasal cannula   Device: home concentrator with portable tanks       Orders    Continue LABA/ICS (Breo on formulary inpatient) and LAMA (tiotropium)    Nebulizer treatments q4h while awake for 8 doses, then PRN for wheezing and dyspnea (ordered)   Anti tussives PRN   Pulmonary toilet with CPT and 3% NaCl nebs(ordered)  12/26 Pulmonology, Dr. Ramon     72 year old F with hypertension, >40 pack year history of smoking and reported COPD (no PFTs to review, on Trelegy), and stage IV adenocarcinoma of the lungs (R sided primary) s/p chemotherapy. Patient has PRN O2 at home.    On 12/22 patient began to have dyspnea on exertion, cough productive of yellow sputum and wheezing.   12/26 Pulmonology, Dr. Ramon        Please clarify/confirm the Pulmonologist's diagnosis of Acute on chronic respiratory failure with hypoxemia:     [ X ] Diagnosis ruled in   [  ] Diagnosis ruled out   [  ] Other diagnosis (please specify):  _____________________________   [  ] Clinically undetermined

## 2023-01-11 DIAGNOSIS — E11.9 TYPE 2 DIABETES MELLITUS WITHOUT COMPLICATION, UNSPECIFIED WHETHER LONG TERM INSULIN USE: ICD-10-CM

## 2023-01-17 ENCOUNTER — PATIENT MESSAGE (OUTPATIENT)
Dept: ADMINISTRATIVE | Facility: HOSPITAL | Age: 73
End: 2023-01-17
Payer: MEDICARE

## 2023-02-10 ENCOUNTER — PATIENT MESSAGE (OUTPATIENT)
Dept: INTERNAL MEDICINE | Facility: CLINIC | Age: 73
End: 2023-02-10
Payer: MEDICARE

## 2023-03-02 LAB
LEFT EYE DM RETINOPATHY: NEGATIVE
RIGHT EYE DM RETINOPATHY: NEGATIVE

## 2023-03-03 ENCOUNTER — PATIENT OUTREACH (OUTPATIENT)
Dept: ADMINISTRATIVE | Facility: HOSPITAL | Age: 73
End: 2023-03-03
Payer: MEDICARE

## 2023-03-03 NOTE — PROGRESS NOTES
Non-compliant [facility] report chart review -  03/03/2023  Chart review completed for the following  test if overdue  ( Dilated EYE EXAM)   Care Everywhere and Media reports - updates requested and reviewed.    [facility] and [facility] reviewed. DIS reviewed for test needed.    updated with external    DM EYE EXAM 2023    Health Maintenance Due   Topic Date Due    COVID-19 Vaccine (5 - Booster for Moderna series) 01/24/2022    Mammogram  12/15/2022    Hemoglobin A1c  01/11/2023

## 2023-03-31 ENCOUNTER — TELEPHONE (OUTPATIENT)
Dept: FAMILY MEDICINE | Facility: CLINIC | Age: 73
End: 2023-03-31
Payer: MEDICARE

## 2023-03-31 DIAGNOSIS — I50.32 CHRONIC DIASTOLIC CONGESTIVE HEART FAILURE: ICD-10-CM

## 2023-03-31 DIAGNOSIS — C34.91 PRIMARY LUNG ADENOCARCINOMA, RIGHT: Primary | ICD-10-CM

## 2023-03-31 DIAGNOSIS — I25.10 CORONARY ARTERY DISEASE INVOLVING NATIVE CORONARY ARTERY OF NATIVE HEART WITHOUT ANGINA PECTORIS: Chronic | ICD-10-CM

## 2023-03-31 NOTE — TELEPHONE ENCOUNTER
----- Message from Catina Marrero MA sent at 3/31/2023  9:30 AM CDT -----  Contact: rocio Valladares,    The patient is requesting orders for Home Health. Please advise.   ----- Message -----  From: Zamzam Monroy  Sent: 3/31/2023   8:09 AM CDT  To: Blaine Barnes V Staff    Type:  signed orders for  services     Who Called:Rocio brumfield/ [employer]  Does the patient know what this is regarding?:pt would like home health services   Signed doctors orders with specific discipline   Documentation of home bound status  Recent clinical notes within last 90 days (face to face)  Would the patient rather a call back or a response via [facility]? Call back   Best Call Back Number:  fax # [phone]  Additional Information: caller stated she called on the Tuesday 03/29 regarding this and no one has respond

## 2023-03-31 NOTE — TELEPHONE ENCOUNTER
Please advise pt that we will send a NP for a visit to document pat's needs. Dr. Valladares has not seen the patient within the past 90 days.

## 2023-04-03 PROBLEM — J96.21 ACUTE ON CHRONIC RESPIRATORY FAILURE WITH HYPOXEMIA: Status: RESOLVED | Noted: 2022-09-03 | Resolved: 2023-04-03

## 2023-04-11 ENCOUNTER — PATIENT MESSAGE (OUTPATIENT)
Dept: ADMINISTRATIVE | Facility: HOSPITAL | Age: 73
End: 2023-04-11
Payer: MEDICARE

## 2023-05-19 NOTE — ASSESSMENT & PLAN NOTE
-worsening dyspnea and cough after radiation treatment  -give 60mg IV methylpred now  --> Prednisone 40mg po daily x 4 days   -apply Oxygen 1-2L now - patient with hypoxia during coughing spells  -Schedule DUO Nebs  -Respiratory culture - pt reports increased sputum volume  -Azithromycin 500mg x 3 days   -Use BREO in place of home TRELEGY     Impression: S/P Pterygium Excision with Graft  - 7 Days. Encounter for surgical aftercare following surgery on a sense organ  Z48.810. Plan: Cont tapering pred. Rx art tears PRN (sample of Systane Complete PF given). RTC immediately if any sudden changes to vision or pain.

## 2023-07-11 ENCOUNTER — PATIENT MESSAGE (OUTPATIENT)
Dept: ADMINISTRATIVE | Facility: HOSPITAL | Age: 73
End: 2023-07-11
Payer: MEDICARE

## 2023-08-24 DIAGNOSIS — E11.9 TYPE 2 DIABETES MELLITUS WITHOUT COMPLICATION: ICD-10-CM

## 2023-08-29 ENCOUNTER — PATIENT MESSAGE (OUTPATIENT)
Dept: ADMINISTRATIVE | Facility: HOSPITAL | Age: 73
End: 2023-08-29
Payer: MEDICARE

## 2023-10-10 ENCOUNTER — PATIENT MESSAGE (OUTPATIENT)
Dept: ADMINISTRATIVE | Facility: HOSPITAL | Age: 73
End: 2023-10-10
Payer: MEDICARE

## 2023-10-20 DIAGNOSIS — E11.9 TYPE 2 DIABETES MELLITUS WITHOUT COMPLICATION: ICD-10-CM

## 2024-01-01 NOTE — ASSESSMENT & PLAN NOTE
Patient's FSGs are uncontrolled due to hyperglycemia on current medication regimen.  Last A1c reviewed-   Lab Results   Component Value Date    HGBA1C 8.2 (H) 10/11/2022     Most recent fingerstick glucose reviewed-   Recent Labs   Lab 12/27/22 2038 12/28/22  0113 12/28/22  0540 12/28/22  1121   POCTGLUCOSE 256* 199* 216* 231*     Current correctional scale  Low  Decrease anti-hyperglycemic dose as follows-   Antihyperglycemics (From admission, onward)    Start     Stop Route Frequency Ordered    12/27/22 2100  insulin detemir U-100 pen 14 Units         -- SubQ Nightly 12/27/22 0723    12/27/22 1130  insulin aspart U-100 pen 5 Units         -- SubQ 3 times daily with meals 12/27/22 0723    12/23/22 0723  insulin aspart U-100 pen 0-5 Units         -- SubQ Every 6 hours PRN 12/23/22 0623        Hold Oral hypoglycemics while patient is in the hospital.  -Maintain blood glucose between 140-180  - will resume home regime at [place]      HCP forms given/No [[id number]]

## 2024-02-12 NOTE — TELEPHONE ENCOUNTER
----- Message from Aury Alonso sent at 4/6/2022 11:04 AM CDT -----  Type:  Needs Medical Advice    Who Called: pt  Symptoms (please be specific): pt has been admitted to the hospital possible stroke pt wanted to inforrm her PCP    Would the patient rather a call back or a response via [facility]? call  Best Call Back Number: [phone]  Additional Information:          Assessment & Plan  Ofelia is a 18 year old-year old male with non-verbal autism, intellectual disability, developmental delays, enamel hypoplasia and staring spells. Family history is significant for full brother with autism and mother with early menopause. Prenatal history is noncontributory. Prior genetic testing (Whole Genome Chromosomal Microarray and Fragile X FMR1 Repeat Analysis) negative.    Genetic testing today was offered: Whole Exome Sequencing (quad). The family provided informed consent for the testing.    1. buccal sample already sent to [facility] for Whole Exome Sequencing (quad)  2. Cost of testing is expected to be $0 out-of-pocket due to Medicaid plan  3. After testing is initiated, results will be returned by phone in 6-8 weeks and we will schedule a follow-up appointment according to Dr. Uribe  4. Additional recommendations per Dr. Uribe     Please do not hesitate to reach out with any questions or concerns. It was a pleasure to participate in Ofelia's care today.       Ashley Kerr MS, [facility]  Genetic Counseling  [facility]  Phone: [phone]    Genetics  [facility]     Contact our nurse care coordinator Gloria CARRILLON, RN, PHN at [phone] or send a [employer] message for any non-urgent general or medical questions.     If you had genetic testing and have further questions, please contact the genetic counselor:    Ashley Kerr I Ph: [phone]    To schedule appointments:  Pediatric Call Center for [facility]: [phone]  Neuropsychology [phone]   Radiology/ Imaging/Echocardiogram: [phone]   Services:   [phone]     You should receive a phone call about your next appointment. If you do not receive this within two weeks of your visit, please call [phone].     IF REFERRALS WERE PLACED/ DISCUSSED DURING THE VISIT, PLEASE LET OUR TEAM KNOW IF YOU DO NOT HEAR FROM THE  SCHEDULERS IN 2 WEEKS    If you have not already done so consider signing up for [email or web address] by speaking with the person at the  on your way out or go to [email or web address] to sign up online.     [email or web address] enables easy and confidential communication with your care team.

## 2024-03-13 NOTE — PLAN OF CARE
09/08/22 1447   Post-Acute Status   Post-Acute Authorization HME  (Rolling Walker)   HME Status Patient declined/refused      The patient declined the rolling walker.      Smita Braxton LMSW  Case Management [facility]  Ext: [phone]       Sutured Wound Care     [facility]: [phone]  [facility]: [phone]    Right Preauricular Cheek      No strenuous activity for 48 hours. Resume moderate activity in 48 hours. No heavy exercising until you are seen for follow up in one week.     Take Tylenol as needed for discomfort.                         Do not drink alcoholic beverages for 48 hours.     Keep the pressure bandage in place for 24 hours. If the bandage becomes blood tinged or loose, reinforce it with gauze and tape.        (Refer to the reverse side of this page for management of bleeding).    Remove pressure bandage in 24 hours ( White Gauze & White Tape)     Leave the flat bandage in place until your follow up appointment. (Light Brown Flesh Color Tape & Steri Strips)     Keep the bandage dry. Wash around it carefully.    If the tape becomes soiled or starts to come off, reinforce it with additional paper tape.    Do not smoke for 3 weeks; smoking is detrimental to wound healing.    It is normal to have swelling and bruising around the surgical site. The bruising will fade in approximately 10-14 days. Elevate the area to reduce swelling.    Numbness, itchiness and sensitivity to temperature changes can occur after surgery and may take up to 18 months to normalize.      POSSIBLE COMPLICATIONS    BLEEDING:    Leave the bandage in place.  Use tightly rolled up gauze or a cloth to apply direct pressure over the bandage for 20   minutes.  Reapply pressure for an additional 20 minutes if necessary  Call the office or go to the nearest emergency room if pressure fails to stop the bleeding.  Use additional gauze and tape to maintain pressure once the bleeding has stopped.    PAIN:    Post operative pain should slowly get better, never worse.  A severe increase in pain may indicate a problem. Call the office if this occurs.    In case of emergency phone:Dr Neal [phone]       ONE WEEK AFTER SURGERY:  -Remove bandage  3/20/24  -Clean and dry the area with plain tap water using a Q-tip or sterile gauze pad  -Reapply steri strips down incision and cover with paper tape  -Leave bandage in place for 1  week  -Remove bandage on       3/27/24             WOUND CARE INSTRUCTIONS  for  ONE WEEK AFTER SURGERY    Leave flat bandage on your skin for one week after today s bandage change.  In one week when you remove the bandage, you may resume your regular skin care routine, including washing with mild soap and water, applying moisturizer, make-up and sunscreen.    If there are any open or bleeding areas at the incision/graft site you should begin to cover the area with a bandage daily as follows:    Clean and dry the area with plain tap water using a Q-tip or sterile gauze pad.  Apply Polysporin or Bacitracin ointment to the open area.  Cover the wound with a band-aid or a sterile non-stick gauze pad and micropore paper tape.     SIGNS OF INFECTION  - If you notice any of these signs of infection, call your doctor right away: expanding redness around the wound.  - Yellow or greenish-colored pus or cloudy wound drainage.    - Red streaking spreading from the wound.  - Increased swelling, tenderness, or pain around the wound.   - Fever.    Please remember that yellow and clear drainage from a wound can be normal and related to normal wound healing.  Isolated drainage from a wound without a combination of the above features does not indicate infection.       *Once the bandages are removed, the scar will be red and firm (especially in the lip/chin area). This is normal and will fade in time. It might take 6-12 months for this to happen.     *Massaging the area will help the scar soften and fade quicker. Begin to massage the area one month after the bandages have been removed. To massage apply pressure directly and firmly over the scar with the fingertips and move in a circular motion. Massage the area for a few minutes several times a day.  Continue to massage the site for several months.    *Approximately 6-8 weeks after surgery it is not uncommon to see the formation of  tender pimple-like  bump along the scar. This is normal. As the scar continues to mature and the stitches underneath the skin begin to dissolve, this might occur. Do not pick or squeeze, this will resolve on it s own. Should one break open producing a small amount of drainage, apply Polysporin or Bacitracin ointment a few times a day until the wound is completely healed.    *Numbness in the surgical area is expected. It might take 12-18 months for the feeling to return to normal. During this time sensations of itchiness, tingling and occasional sharp pains might be noted. These feelings are normal and will subside once the nerves have completely healed.     IN CASE OF EMERGENCY: Dr Neal [phone]   If you were seen in [place] call: [phone]  If you were seen in [place] call: [phone]

## 2024-04-08 ENCOUNTER — PATIENT MESSAGE (OUTPATIENT)
Dept: INTERNAL MEDICINE | Facility: CLINIC | Age: 74
End: 2024-04-08
Payer: COMMERCIAL

## 2024-12-31 NOTE — PT/OT/SLP PROGRESS
Physical Therapy Treatment    Patient Name:  Chayito Chung   MRN:  [id number]  Admitting Diagnosis: Chronic obstructive pulmonary disease with acute exacerbation  Recent Surgery: * No surgery found *      Recommendations:     Discharge Recommendations:  nursing facility, skilled   Discharge Equipment Recommendations:  (TBD)   Barriers to discharge: None    Plan:     During this hospitalization, patient to be seen 3 x/week to address the above listed problems via gait training, therapeutic activities, therapeutic exercises  Plan of Care Expires:  09/22/22  Plan of Care Reviewed with: patient    This Plan of care has been discussed with the patient who was involved in its development and understands and is in agreement with the identified goals and treatment plan    Subjective     Communicated with nurse (Tiffany HAWTHORNE) prior to session.     Patient comments: Pt with c/o fatigue after gait trial  Pain/Comfort:  Pain Rating 1: 0/10  Pain Rating Post-Intervention 1: 0/10    Objective:     Patient found with: oxygen (Visi monitor)    Patient found sup in bed with HOB elevated upon PT entry to room, agreeable to treatment.  No family present in the room.    Respiratory Status: Nasal cannula, flow 2 L/min    General Precautions: Standard, fall   Orthopedic Precautions:N/A   Braces: N/A       BED MOBILITY (vc's for hand placement sequencing of task):        Rolling to the R:  NT.       Rolling to the L:  NT.        Sup > sit at the EOB:  SBA for safety, exiting on the R side.       Sit > sup:  Not performed 2* pt left seated up in the chair.       Scooting hips to EOB with SBA                SITTING AT THE EDGE OF THE BED   Assistance Level Required: close sup for safety    Postural deviations noted: flexed trunk   Encouraged:  upright posture, B feet flat on floor        TRANSFERS  (vc's for hand placement, sequencing of task and safety)   Patient completed Sit <> Stand Transfer from EOB with CGA for balance and safety with  English rolling walker x1 trial(s)   Patient completed Stand <> Sit Transfer to BS chair with CGA for balance and safety with rolling walker      GAIT: within room   Patient ambulated: 44ft   Patient required: CGA for balance and safety   Patient used:  Rolling Walker   Gait Pattern observed: reciprocal gait   Gait Deviation(s): decreased libby, decreased step length, decreased stride length, and FFP     Comments: vc's for upright posture, placement of AD, directional guidance, and safety      EDUCATION  Patient provided with daily orientation and goals of this PT session. They were educated to call for assistance and to transfer with hospital staff only.  Also, pt was educated on the effects of prolonged immobility and the importance of performing OOB activity and exercises to promote healing and reduce recovery time    Whiteboard updated with correct mobility information. RN/PCT notified.  Pt safe to transfer OOB/BTB and amb short distances with RN/PCT: Use RW with min A.    Patient left up in chair, with  all lines intact, call button in reach, and nurse notified    AM-PAC 6 CLICK MOBILITY  Turning over in bed (including adjusting bedclothes, sheets and blankets)?: 3  Sitting down on and standing up from a chair with arms (e.g., wheelchair, bedside commode, etc.): 3  Moving from lying on back to sitting on the side of the bed?: 3  Moving to and from a bed to a chair (including a wheelchair)?: 3  Need to walk in hospital room?: 3  Climbing 3-5 steps with a railing?: 1  Basic Mobility Total Score: 16     Assessment:     Chayito Chung is a 71 y.o. female admitted with a medical diagnosis of Chronic obstructive pulmonary disease with acute exacerbation.  She presents with the following impairments/functional limitations:  weakness, impaired endurance, impaired self care skills, impaired functional mobility, gait instability, impaired balance, decreased upper extremity function, decreased lower extremity function, impaired  cardiopulmonary response to activity. requiring light assistance and verbal cues for bed mob, transfers, gait to prevent falls due to weakness, fatigue.   In light of pt's current functional level and deficits, it is anticipated that pt will need to participate in an intense rehab program consisting of PT and OT in order to achieve full rehab potential to return to previous level of function and roles.  Pt remains motivated to participate in PT session and will cont to benefit from skilled PT intervention.    Rehab Prognosis:  Good; patient would benefit from acute skilled PT services to address these deficits and reach maximum level of function.      GOALS:   Multidisciplinary Problems       Physical Therapy Goals          Problem: Physical Therapy    Goal Priority Disciplines Outcome Goal Variances Interventions   Physical Therapy Goal     PT, PT/OT Ongoing, Progressing     Description: Goals to be met by: 22    Patient will increase functional independence with mobility by performin. Supine to sit with supervision -not met  2. Sit to supine with supervision -not met  3. Sit to stand transfer with supervision -not met  4. Gait  x 100 feet with supervision using LRAD as needed -not met                         Time Tracking:     PT Received On: 22  PT Start Time: 1137     PT Stop Time: 1200  PT Total Time (min): 23 min     Billable Minutes: Gait Training 15 and Therapeutic Activity 8    Treatment Type: Treatment  PT/PTA: PTA     PTA Visit Number: 1       ROCIO [place].  Pager [phone]    2022    .

## 2025-04-08 NOTE — TELEPHONE ENCOUNTER
Hold glipizide for the day if your sugar is less than 80 or if you are going to fast.    Renal fxn returned to baseline  MD Elinor

## (undated) DEVICE — CLOSURE SKIN 1X5 STERI-STRIP

## (undated) DEVICE — SEE MEDLINE ITEM 146417

## (undated) DEVICE — PATCH PARIETEX COMP VNTRL 6.6: Type: IMPLANTABLE DEVICE | Status: NON-FUNCTIONAL

## (undated) DEVICE — DRAPE ABDOMINAL TIBURON 14X11

## (undated) DEVICE — DRESSING AQUACEL FOAM 3 X 3

## (undated) DEVICE — BURR RND FLUT SFT TOUCH 2.0MM

## (undated) DEVICE — GLOVE SURGICAL LATEX SZ 8

## (undated) DEVICE — APPLICATOR CHLORAPREP ORN 26ML

## (undated) DEVICE — DRAPE STERI INSTRUMENT 1018

## (undated) DEVICE — SUT ETHIBOND 0 CR/CT-1 8-18

## (undated) DEVICE — SUT 2/0 30IN SILK BLK BRAI

## (undated) DEVICE — BLADE ELECTRO EDGE INSULATED

## (undated) DEVICE — DRESSING AQUACEL SACRAL 9 X 9

## (undated) DEVICE — GAUZE SPONGE PEANUT STRL

## (undated) DEVICE — MARKER SKIN STND TIP BLUE BARR

## (undated) DEVICE — KIT SURGIFLO HEMOSTATIC MATRIX

## (undated) DEVICE — NDL HYPO REG 25G X 1 1/2

## (undated) DEVICE — DRESSING ABSRBNT ISLAND 3.6X8

## (undated) DEVICE — TOWEL OR DISP STRL BLUE 4/PK

## (undated) DEVICE — SEE MEDLINE ITEM 157116

## (undated) DEVICE — 4.5 TAP

## (undated) DEVICE — DRESSING SURGICAL 1/2X1/2

## (undated) DEVICE — DRAPE C-ARMOR EQUIPMENT COVER

## (undated) DEVICE — DRAPE LAP TIBURON 77X122IN

## (undated) DEVICE — SEE MEDLINE ITEM 157150

## (undated) DEVICE — SUT VICRYL PLUS 2-0 CT1 18

## (undated) DEVICE — COVER LIGHT HANDLE 80/CA

## (undated) DEVICE — ELECTRODE REM PLYHSV RETURN 9

## (undated) DEVICE — NDL 18GA X1 1/2 REG BEVEL

## (undated) DEVICE — SEE MEDLINE ITEM 157117

## (undated) DEVICE — DRESSING AQUACEL FOAM 5 X 5

## (undated) DEVICE — SYR 30CC LUER LOCK

## (undated) DEVICE — SPONGE LAP 4X18 PREWASHED

## (undated) DEVICE — SUT ETHIBOND EXCEL 1 CTX 18

## (undated) DEVICE — SEE MEDLINE ITEM 157131

## (undated) DEVICE — SPONGE GAUZE 16PLY 4X4

## (undated) DEVICE — DRAPE STERI-DRAPE 1000 17X11IN

## (undated) DEVICE — PACK BASIC

## (undated) DEVICE — TRAY FOLEY 16FR INFECTION CONT

## (undated) DEVICE — SEE MEDLINE ITEM 156955

## (undated) DEVICE — NDL SPINAL 18GX3.5 SPINOCAN

## (undated) DEVICE — BUR BONE CUT MICRO TPS 3X3.8MM

## (undated) DEVICE — KIT EVACUATOR 3-SPRING 1/8 DRN

## (undated) DEVICE — DRESSING TRANS 4X4 TEGADERM

## (undated) DEVICE — SEE MEDLINE ITEM 156905

## (undated) DEVICE — DRAIN PENRS STERILE LTX 18X1/2

## (undated) DEVICE — NDL 22GA X1 1/2 REG BEVEL

## (undated) DEVICE — BLADE MILL BONE MEDIUM

## (undated) DEVICE — ADHESIVE DERMABOND ADVANCED